# Patient Record
Sex: MALE | Race: WHITE | NOT HISPANIC OR LATINO | Employment: OTHER | URBAN - METROPOLITAN AREA
[De-identification: names, ages, dates, MRNs, and addresses within clinical notes are randomized per-mention and may not be internally consistent; named-entity substitution may affect disease eponyms.]

---

## 2017-02-20 ENCOUNTER — OFFICE VISIT (OUTPATIENT)
Dept: FAMILY MEDICINE CLINIC | Facility: CLINIC | Age: 82
End: 2017-02-20

## 2017-02-20 VITALS
BODY MASS INDEX: 34.8 KG/M2 | OXYGEN SATURATION: 96 % | DIASTOLIC BLOOD PRESSURE: 76 MMHG | WEIGHT: 235 LBS | HEIGHT: 69 IN | HEART RATE: 60 BPM | TEMPERATURE: 97.8 F | SYSTOLIC BLOOD PRESSURE: 124 MMHG

## 2017-02-20 DIAGNOSIS — M10.9 GOUTY ARTHRITIS: Primary | ICD-10-CM

## 2017-02-20 DIAGNOSIS — Z00.00 MEDICARE ANNUAL WELLNESS VISIT, SUBSEQUENT: ICD-10-CM

## 2017-02-20 PROCEDURE — G0438 PPPS, INITIAL VISIT: HCPCS | Performed by: FAMILY MEDICINE

## 2017-02-20 RX ORDER — ALLOPURINOL 300 MG/1
300 TABLET ORAL DAILY
Qty: 90 TABLET | Refills: 3 | Status: SHIPPED | OUTPATIENT
Start: 2017-02-20 | End: 2018-04-07 | Stop reason: SDUPTHER

## 2017-02-20 NOTE — PROGRESS NOTES
QUICK REFERENCE INFORMATION:  The ABCs of the Annual Wellness Visit    Subsequent Medicare Wellness Visit    HEALTH RISK ASSESSMENT    Recent Hospitalizations:  Recently treated at the following:  [facility].    Nov 30 2016. Ureteral stent change.    Current Medical Providers:  Patient Care Team:  Ryne Lofton MD as PCP - General    Dr Romero -Urology.  Dr Heath -Cardiology.  Dr Cardozo -Orthopedics.    Smoking Status:  History   Smoking Status   • Former Smoker   Smokeless Tobacco   • Not on file       Alcohol Consumption:  History   Alcohol Use No       Depression Screen:   PHQ-9 Depression Screening 2/20/2017   Little interest or pleasure in doing things 0   Feeling down, depressed, or hopeless 0   Trouble falling or staying asleep, or sleeping too much 0   Feeling tired or having little energy 0   Poor appetite or overeating 0   Feeling bad about yourself - or that you are a failure or have let yourself or your family down 0   Trouble concentrating on things, such as reading the newspaper or watching television 0   Moving or speaking so slowly that other people could have noticed. Or the opposite - being so fidgety or restless that you have been moving around a lot more than usual 0   Thoughts that you would be better off dead, or of hurting yourself in some way 0   PHQ-9 Total Score 0   If you checked off any problems, how difficult have these problems made it for you to do your work, take care of things at home, or get along with other people? Not difficult at all       Health Habits and Functional and Cognitive Screening:  Functional & Cognitive Status 2/20/2017   Do you have difficulty preparing food and eating? No   Do you have difficulty bathing yourself? No   Do you have difficulty getting dressed? No   Do you have difficulty using the toilet? No   Do you have difficulty moving around from place to place? No   In the past year have you fallen or experienced a near fall? No   Do you need help  using the phone?  No   Are you deaf or do you have serious difficulty hearing?  Yes   Do you need help with transportation? No   Do you need help shopping? No   Do you need help preparing meals?  No   Do you need help with housework?  No   Do you need help with laundry? No   Do you need help taking your medications? No   Do you need help managing money? No   Do you have difficulty concentrating, remembering or making decisions? Yes          Mini cog test was administered. Remembered 2 out of three words. Clock drawing test is normal.    Does the patient have evidence of cognitive impairment? No    Asprin use counseling:yes not taking aspirin.    Finger Rub Hearing Test (right ear):passed  Finger Rub Hearing Test (left ear):failed    Recent Lab Results:  CMP:  Lab Results   Component Value Date     (H) 10/31/2016    BUN 21 (H) 10/31/2016    CREATININE 1.1 10/31/2016    EGFRIFNONA 58 (L) 06/29/2016    EGFRIFAFRI 64 03/29/2016    BCR 16.7 06/29/2016     (H) 10/31/2016    K 4.1 10/31/2016    CO2 25 (L) 10/31/2016    CALCIUM 9.6 10/31/2016    PROTENTOTREF 7.5 03/29/2016    ALBUMIN 4.20 06/29/2016    LABGLOBREF 3.4 03/29/2016    LABIL2 1.3 06/29/2016    BILITOT 0.6 06/29/2016    ALKPHOS 93 06/29/2016    AST 24 06/29/2016    ALT 20 06/29/2016     Lipid Panel:  Lab Results   Component Value Date    CHLPL 221 (H) 03/29/2016    TRIG 360 (H) 06/29/2016    HDL 37 (L) 06/29/2016    VLDL 72 06/29/2016     (H) 03/29/2016     LDL:     HbA1c:  Lab Results   Component Value Date    HGBA1C 6.7 10/03/2016     Urine Microalbumin:     Visual Acuity:  No exam data present    Age-appropriate Screening Schedule:  Refer to the list below for future screening recommendations based on patient's age, sex and/or medical conditions. Orders for these recommended tests are listed in the plan section. The patient has been provided with a written plan.    Health Maintenance   Topic Date Due   • TDAP/TD VACCINES (1 - Tdap)  05/06/1951   • PNEUMOCOCCAL VACCINES (65+ LOW/MEDIUM RISK) (1 of 2 - PCV13) 05/06/1997   • DIABETIC EYE EXAM  06/29/2016   • URINE MICROALBUMIN  06/29/2016   • ZOSTER VACCINE  06/29/2016   • DXA SCAN  10/03/2016   • HEMOGLOBIN A1C  04/03/2017   • LIPID PANEL  06/29/2017   • DIABETIC FOOT EXAM  10/03/2017   • INFLUENZA VACCINE  Completed        Subjective   History of Present Illness    Forrest Zepeda is a 84 y.o. male who presents for an Subsequent Wellness Visit. In addition, we addressed the following health issues:  Gout      The following portions of the patient's history were reviewed and updated as appropriate: allergies, current medications, past family history, past medical history, past social history, past surgical history and problem list.    Outpatient Medications Prior to Visit   Medication Sig Dispense Refill   • acetaminophen (TYLENOL) 500 MG tablet Take 1 tablet by mouth 2 (Two) Times a Day.     • allopurinol (ZYLOPRIM) 300 MG tablet Take  by mouth.     • carvedilol (COREG) 6.25 MG tablet Take  by mouth 2 (two) times a day.     • finasteride (PROSCAR) 5 MG tablet Take  by mouth daily.     • glimepiride (AMARYL) 2 MG tablet Take  by mouth.     • levothyroxine (SYNTHROID) 50 MCG tablet Take 1 tablet by mouth Daily. 90 tablet 3   • Multiple Vitamins-Minerals (OCUVITE ADULT 50+ PO) Take  by mouth daily.     • benzonatate (TESSALON) 100 MG capsule Take  by mouth.     • cefuroxime (CEFTIN) 500 MG tablet Take  by mouth Every 12 (Twelve) Hours.     • metFORMIN (GLUCOPHAGE) 500 MG tablet Take  by mouth 2 (Two) Times a Day.     • traMADol (ULTRAM) 50 MG tablet Take  by mouth.     • triazolam (HALCION) 0.25 MG tablet Take 1 tablet by mouth.       No facility-administered medications prior to visit.        Patient Active Problem List   Diagnosis   • Benign localized hyperplasia of prostate   • Vitamin D deficiency   • Ureteral stricture, left   • Type 2 diabetes mellitus   • Shortness of breath   • Renal  "insufficiency   • Pharyngitis, acute   • Insomnia   • Hypertension   • Hyperkalemia   • Generalized osteoarthritis of multiple sites   • Diabetic neuropathy   • Chronic gout   • Bilateral knee pain   • Immunization due   • Hypertriglyceridemia   • Elevated TSH   • Diabetes mellitus type 2, diet-controlled   • Acquired hypothyroidism       Identification of Risk Factors:  Risk factors include: inactivity, increased fall risk, isolation, cognitive impairment and hearing limitations.    Review of Systems    Compared to one year ago, the patient feels his physical health is the same.  Compared to one year ago, the patient feels his mental health is the same.    Objective     Physical Exam    Vitals:    02/20/17 1054   BP: 124/76   Pulse: 60   Temp: 97.8 °F (36.6 °C)   SpO2: 96%   Weight: 235 lb (107 kg)   Height: 69\" (175.3 cm)       Body mass index is 34.7 kg/(m^2).  Discussed the patient's BMI with him. The BMI is above average; BMI management plan is completed.    Assessment/Plan   Patient Self-Management and Personalized Health Advice  The patient has been provided with information about: diet, exercise, weight management, prevention of cardiac or vascular disease, fall prevention, designing advance directives and mental health concerns and preventive services including:   · Advanced directives: has an advanced directive - a copy HAS NOT been provided, Counseling for cardiovascular disease risk reduction, Exercise counseling provided, Fall Risk assessment done, Fall Risk plan of care done, Glaucoma screening recommended, Nutrition counseling provided, Prostate cancer screening discussed.    Visit Diagnoses:  No diagnosis found.    No orders of the defined types were placed in this encounter.      Outpatient Encounter Prescriptions as of 2/20/2017   Medication Sig Dispense Refill   • acetaminophen (TYLENOL) 500 MG tablet Take 1 tablet by mouth 2 (Two) Times a Day.     • allopurinol (ZYLOPRIM) 300 MG tablet Take  by " mouth.     • carvedilol (COREG) 6.25 MG tablet Take  by mouth 2 (two) times a day.     • finasteride (PROSCAR) 5 MG tablet Take  by mouth daily.     • glimepiride (AMARYL) 2 MG tablet Take  by mouth.     • levothyroxine (SYNTHROID) 50 MCG tablet Take 1 tablet by mouth Daily. 90 tablet 3   • Multiple Vitamins-Minerals (OCUVITE ADULT 50+ PO) Take  by mouth daily.     • [DISCONTINUED] benzonatate (TESSALON) 100 MG capsule Take  by mouth.     • [DISCONTINUED] cefuroxime (CEFTIN) 500 MG tablet Take  by mouth Every 12 (Twelve) Hours.     • [DISCONTINUED] metFORMIN (GLUCOPHAGE) 500 MG tablet Take  by mouth 2 (Two) Times a Day.     • [DISCONTINUED] traMADol (ULTRAM) 50 MG tablet Take  by mouth.     • [DISCONTINUED] triazolam (HALCION) 0.25 MG tablet Take 1 tablet by mouth.       No facility-administered encounter medications on file as of 2/20/2017.        Reviewed use of high risk medication in the elderly: yes  Reviewed for potential of harmful drug interactions in the elderly: yes    Follow Up:  Return in about 2 months (around 4/20/2017) for Recheck.     An After Visit Summary and PPPS with all of these plans were given to the patient.           Scribed for Dr Ryne Lofton by Zahida Townsend CMA.    I, Ryne Lofton MD, personally performed the services described in this documentation, as scribed by Zahida Townsend in my presence, and is both accurate and complete.

## 2017-04-20 ENCOUNTER — OFFICE VISIT (OUTPATIENT)
Dept: FAMILY MEDICINE CLINIC | Facility: CLINIC | Age: 82
End: 2017-04-20

## 2017-04-20 VITALS
HEIGHT: 69 IN | WEIGHT: 233 LBS | BODY MASS INDEX: 34.51 KG/M2 | HEART RATE: 64 BPM | TEMPERATURE: 98.2 F | OXYGEN SATURATION: 96 % | SYSTOLIC BLOOD PRESSURE: 126 MMHG | DIASTOLIC BLOOD PRESSURE: 68 MMHG

## 2017-04-20 DIAGNOSIS — E11.9 TYPE 2 DIABETES MELLITUS WITHOUT COMPLICATION, WITHOUT LONG-TERM CURRENT USE OF INSULIN (HCC): Primary | ICD-10-CM

## 2017-04-20 DIAGNOSIS — I10 ESSENTIAL HYPERTENSION: ICD-10-CM

## 2017-04-20 DIAGNOSIS — Z23 IMMUNIZATION DUE: ICD-10-CM

## 2017-04-20 LAB
HBA1C MFR BLD: 7.5 %
POC CREATININE URINE: NORMAL
POC MICROALBUMIN URINE: NORMAL

## 2017-04-20 PROCEDURE — 83036 HEMOGLOBIN GLYCOSYLATED A1C: CPT | Performed by: FAMILY MEDICINE

## 2017-04-20 PROCEDURE — 90670 PCV13 VACCINE IM: CPT | Performed by: FAMILY MEDICINE

## 2017-04-20 PROCEDURE — 99213 OFFICE O/P EST LOW 20 MIN: CPT | Performed by: FAMILY MEDICINE

## 2017-04-20 PROCEDURE — 82044 UR ALBUMIN SEMIQUANTITATIVE: CPT | Performed by: FAMILY MEDICINE

## 2017-04-20 PROCEDURE — G0009 ADMIN PNEUMOCOCCAL VACCINE: HCPCS | Performed by: FAMILY MEDICINE

## 2017-04-20 RX ORDER — GLIMEPIRIDE 2 MG/1
2 TABLET ORAL
Qty: 90 TABLET | Refills: 3 | Status: SHIPPED | OUTPATIENT
Start: 2017-04-20 | End: 2018-01-03

## 2017-04-20 RX ORDER — LANOLIN ALCOHOL/MO/W.PET/CERES
1000 CREAM (GRAM) TOPICAL DAILY
COMMUNITY
End: 2017-12-14

## 2017-04-20 RX ORDER — CARVEDILOL 6.25 MG/1
6.25 TABLET ORAL 2 TIMES DAILY
Qty: 180 TABLET | Refills: 3 | Status: SHIPPED | OUTPATIENT
Start: 2017-04-20 | End: 2019-05-12 | Stop reason: SDUPTHER

## 2017-04-20 NOTE — PROGRESS NOTES
"Subjective   Forrest Zepeda is a 84 y.o. male.     Diabetes   He presents for his follow-up diabetic visit. He has type 2 diabetes mellitus. His disease course has been stable. There are no hypoglycemic associated symptoms. Pertinent negatives for hypoglycemia include no dizziness. Associated symptoms include foot paresthesias. Pertinent negatives for diabetes include no chest pain and no foot ulcerations. There are no hypoglycemic complications. There are no diabetic complications. Risk factors for coronary artery disease include diabetes mellitus, dyslipidemia, male sex and obesity. Current diabetic treatment includes oral agent (monotherapy) (Glimepiride). He is compliant with treatment all of the time. His weight is stable. He is following a diabetic and generally healthy diet. Meal planning includes avoidance of concentrated sweets. He has not had a previous visit with a dietitian. He participates in exercise intermittently. His home blood glucose trend is increasing steadily. He does not see a podiatrist.Eye exam is not current.      Also a follow up on Hypertension and Hypothyroidism.    BP today is 126/68.  Taking Carvedilol 6.25 mg twice a day.    The following portions of the patient's history were reviewed and updated as appropriate: allergies, current medications, past social history and problem list.    Review of Systems   Eyes: Negative for visual disturbance.   Respiratory: Positive for shortness of breath (with exertion).    Cardiovascular: Negative for chest pain.   Neurological: Negative for dizziness, syncope and numbness.       Objective   /68  Pulse 64  Temp 98.2 °F (36.8 °C)  Ht 69\" (175.3 cm)  Wt 233 lb (106 kg)  SpO2 96%  BMI 34.41 kg/m2  Physical Exam   Constitutional: He appears well-developed and well-nourished.   Cardiovascular: Normal rate, regular rhythm and normal heart sounds.    Pulmonary/Chest: Effort normal and breath sounds normal.    Forrest had a diabetic foot exam " performed (skin and pulses intact, sensation decreased in the right.) today.  Nursing note and vitals reviewed.      Assessment/Plan   Problem List Items Addressed This Visit        Cardiovascular and Mediastinum    Hypertension       Endocrine    Type 2 diabetes mellitus - Primary    Relevant Orders    POC Glycosylated Hemoglobin (Hb A1C) (Completed)       Other    Immunization due              Drink plenty fluids.  See [place] [facility] for annual exam.    Continue medications as doing.    Check a Micro albumin. Report results by letter.    Given a Prevnar 13 vaccine today.    Follow up 5 months.    Scribed for Dr Ryne Lofton by Zahida Townsend CMA.    I, Ryne Lofton MD, personally performed the services described in this documentation, as scribed by Zahida Townsend in my presence, and is both accurate and complete.

## 2017-04-28 RX ORDER — FINASTERIDE 5 MG/1
TABLET, FILM COATED ORAL
Qty: 90 TABLET | Refills: 2 | Status: SHIPPED | OUTPATIENT
Start: 2017-04-28 | End: 2017-12-14 | Stop reason: SDUPTHER

## 2017-08-09 ENCOUNTER — APPOINTMENT (OUTPATIENT)
Dept: GENERAL RADIOLOGY | Facility: HOSPITAL | Age: 82
End: 2017-08-09

## 2017-08-09 ENCOUNTER — HOSPITAL ENCOUNTER (INPATIENT)
Facility: HOSPITAL | Age: 82
LOS: 2 days | Discharge: HOME | End: 2017-08-11
Attending: EMERGENCY MEDICINE | Admitting: INTERNAL MEDICINE

## 2017-08-09 ENCOUNTER — APPOINTMENT (OUTPATIENT)
Dept: CT IMAGING | Facility: HOSPITAL | Age: 82
End: 2017-08-09

## 2017-08-09 DIAGNOSIS — E11.9 DIABETES MELLITUS TYPE 2, DIET-CONTROLLED (HCC): ICD-10-CM

## 2017-08-09 DIAGNOSIS — R10.32 ACUTE BILATERAL LOWER ABDOMINAL PAIN: ICD-10-CM

## 2017-08-09 DIAGNOSIS — K57.32 SIGMOID DIVERTICULITIS: Primary | ICD-10-CM

## 2017-08-09 DIAGNOSIS — E11.42 DIABETIC POLYNEUROPATHY ASSOCIATED WITH TYPE 2 DIABETES MELLITUS (HCC): ICD-10-CM

## 2017-08-09 DIAGNOSIS — M1A.9XX0 CHRONIC GOUT, UNSPECIFIED CAUSE, UNSPECIFIED SITE: ICD-10-CM

## 2017-08-09 DIAGNOSIS — N28.9 RENAL INSUFFICIENCY: ICD-10-CM

## 2017-08-09 DIAGNOSIS — R10.31 ACUTE BILATERAL LOWER ABDOMINAL PAIN: ICD-10-CM

## 2017-08-09 DIAGNOSIS — N40.0 BENIGN LOCALIZED HYPERPLASIA OF PROSTATE: ICD-10-CM

## 2017-08-09 DIAGNOSIS — I44.1 MOBITZ TYPE 2 SECOND DEGREE HEART BLOCK: ICD-10-CM

## 2017-08-09 PROBLEM — D72.829 LEUKOCYTOSIS: Status: ACTIVE | Noted: 2017-08-09

## 2017-08-09 LAB
ALBUMIN SERPL-MCNC: 4.3 G/DL (ref 3.2–4.8)
ALBUMIN/GLOB SERPL: 1.2 G/DL (ref 1.5–2.5)
ALP SERPL-CCNC: 104 U/L (ref 25–100)
ALT SERPL W P-5'-P-CCNC: 18 U/L (ref 7–40)
ANION GAP SERPL CALCULATED.3IONS-SCNC: 9 MMOL/L (ref 3–11)
AST SERPL-CCNC: 16 U/L (ref 0–33)
BACTERIA UR QL AUTO: ABNORMAL /HPF
BASOPHILS # BLD AUTO: 0.04 10*3/MM3 (ref 0–0.2)
BASOPHILS NFR BLD AUTO: 0.3 % (ref 0–1)
BILIRUB SERPL-MCNC: 0.9 MG/DL (ref 0.3–1.2)
BILIRUB UR QL STRIP: NEGATIVE
BUN BLD-MCNC: 18 MG/DL (ref 9–23)
BUN/CREAT SERPL: 16.4 (ref 7–25)
CALCIUM SPEC-SCNC: 9.7 MG/DL (ref 8.7–10.4)
CHLORIDE SERPL-SCNC: 103 MMOL/L (ref 99–109)
CLARITY UR: ABNORMAL
CO2 SERPL-SCNC: 26 MMOL/L (ref 20–31)
COLOR UR: YELLOW
CREAT BLD-MCNC: 1.1 MG/DL (ref 0.6–1.3)
D-LACTATE SERPL-SCNC: 0.8 MMOL/L (ref 0.5–2)
DEPRECATED RDW RBC AUTO: 49.3 FL (ref 37–54)
DEVELOPER EXPIRATION DATE: NORMAL
DEVELOPER LOT NUMBER: NORMAL
EOSINOPHIL # BLD AUTO: 0.26 10*3/MM3 (ref 0–0.3)
EOSINOPHIL NFR BLD AUTO: 1.8 % (ref 0–3)
ERYTHROCYTE [DISTWIDTH] IN BLOOD BY AUTOMATED COUNT: 14.6 % (ref 11.3–14.5)
EXPIRATION DATE: NORMAL
FECAL OCCULT BLOOD SCREEN, POC: NEGATIVE
GFR SERPL CREATININE-BSD FRML MDRD: 64 ML/MIN/1.73
GLOBULIN UR ELPH-MCNC: 3.7 GM/DL
GLUCOSE BLD-MCNC: 117 MG/DL (ref 70–100)
GLUCOSE UR STRIP-MCNC: ABNORMAL MG/DL
HCT VFR BLD AUTO: 47.1 % (ref 38.9–50.9)
HGB BLD-MCNC: 15.7 G/DL (ref 13.1–17.5)
HGB UR QL STRIP.AUTO: ABNORMAL
HOLD SPECIMEN: NORMAL
HOLD SPECIMEN: NORMAL
HYALINE CASTS UR QL AUTO: ABNORMAL /LPF
IMM GRANULOCYTES # BLD: 0.05 10*3/MM3 (ref 0–0.03)
IMM GRANULOCYTES NFR BLD: 0.3 % (ref 0–0.6)
KETONES UR QL STRIP: NEGATIVE
LEUKOCYTE ESTERASE UR QL STRIP.AUTO: ABNORMAL
LIPASE SERPL-CCNC: 43 U/L (ref 6–51)
LYMPHOCYTES # BLD AUTO: 2.82 10*3/MM3 (ref 0.6–4.8)
LYMPHOCYTES NFR BLD AUTO: 19.3 % (ref 24–44)
Lab: NORMAL
MCH RBC QN AUTO: 31.2 PG (ref 27–31)
MCHC RBC AUTO-ENTMCNC: 33.3 G/DL (ref 32–36)
MCV RBC AUTO: 93.5 FL (ref 80–99)
MONOCYTES # BLD AUTO: 1.14 10*3/MM3 (ref 0–1)
MONOCYTES NFR BLD AUTO: 7.8 % (ref 0–12)
NEGATIVE CONTROL: NEGATIVE
NEUTROPHILS # BLD AUTO: 10.31 10*3/MM3 (ref 1.5–8.3)
NEUTROPHILS NFR BLD AUTO: 70.5 % (ref 41–71)
NITRITE UR QL STRIP: NEGATIVE
PH UR STRIP.AUTO: 6 [PH] (ref 5–8)
PLATELET # BLD AUTO: 170 10*3/MM3 (ref 150–450)
PMV BLD AUTO: 9.7 FL (ref 6–12)
POSITIVE CONTROL: POSITIVE
POTASSIUM BLD-SCNC: 4.1 MMOL/L (ref 3.5–5.5)
PROCALCITONIN SERPL-MCNC: 0.08 NG/ML
PROT SERPL-MCNC: 8 G/DL (ref 5.7–8.2)
PROT UR QL STRIP: ABNORMAL
RBC # BLD AUTO: 5.04 10*6/MM3 (ref 4.2–5.76)
RBC # UR: ABNORMAL /HPF
REF LAB TEST METHOD: ABNORMAL
SODIUM BLD-SCNC: 138 MMOL/L (ref 132–146)
SP GR UR STRIP: 1.01 (ref 1–1.03)
SQUAMOUS #/AREA URNS HPF: ABNORMAL /HPF
TROPONIN I SERPL-MCNC: 0 NG/ML (ref 0–0.07)
TROPONIN I SERPL-MCNC: <0.006 NG/ML
UROBILINOGEN UR QL STRIP: ABNORMAL
WBC NRBC COR # BLD: 14.62 10*3/MM3 (ref 3.5–10.8)
WBC UR QL AUTO: ABNORMAL /HPF
WHOLE BLOOD HOLD SPECIMEN: NORMAL
WHOLE BLOOD HOLD SPECIMEN: NORMAL

## 2017-08-09 PROCEDURE — 99284 EMERGENCY DEPT VISIT MOD MDM: CPT

## 2017-08-09 PROCEDURE — 81003 URINALYSIS AUTO W/O SCOPE: CPT | Performed by: EMERGENCY MEDICINE

## 2017-08-09 PROCEDURE — 87086 URINE CULTURE/COLONY COUNT: CPT | Performed by: EMERGENCY MEDICINE

## 2017-08-09 PROCEDURE — 87040 BLOOD CULTURE FOR BACTERIA: CPT | Performed by: PHYSICIAN ASSISTANT

## 2017-08-09 PROCEDURE — 93005 ELECTROCARDIOGRAM TRACING: CPT | Performed by: EMERGENCY MEDICINE

## 2017-08-09 PROCEDURE — 84145 PROCALCITONIN (PCT): CPT | Performed by: PHYSICIAN ASSISTANT

## 2017-08-09 PROCEDURE — 80053 COMPREHEN METABOLIC PANEL: CPT | Performed by: EMERGENCY MEDICINE

## 2017-08-09 PROCEDURE — 81001 URINALYSIS AUTO W/SCOPE: CPT | Performed by: EMERGENCY MEDICINE

## 2017-08-09 PROCEDURE — 25010000002 PIPERACILLIN SOD-TAZOBACTAM PER 1 G: Performed by: PHYSICIAN ASSISTANT

## 2017-08-09 PROCEDURE — 85025 COMPLETE CBC W/AUTO DIFF WBC: CPT

## 2017-08-09 PROCEDURE — 84484 ASSAY OF TROPONIN QUANT: CPT | Performed by: PHYSICIAN ASSISTANT

## 2017-08-09 PROCEDURE — 74176 CT ABD & PELVIS W/O CONTRAST: CPT

## 2017-08-09 PROCEDURE — 71010 HC CHEST PA OR AP: CPT

## 2017-08-09 PROCEDURE — 83605 ASSAY OF LACTIC ACID: CPT | Performed by: PHYSICIAN ASSISTANT

## 2017-08-09 PROCEDURE — 99223 1ST HOSP IP/OBS HIGH 75: CPT | Performed by: HOSPITALIST

## 2017-08-09 PROCEDURE — 84484 ASSAY OF TROPONIN QUANT: CPT

## 2017-08-09 PROCEDURE — 83690 ASSAY OF LIPASE: CPT | Performed by: EMERGENCY MEDICINE

## 2017-08-09 RX ORDER — SODIUM CHLORIDE 0.9 % (FLUSH) 0.9 %
10 SYRINGE (ML) INJECTION AS NEEDED
Status: DISCONTINUED | OUTPATIENT
Start: 2017-08-09 | End: 2017-08-11 | Stop reason: HOSPADM

## 2017-08-09 RX ORDER — SODIUM CHLORIDE 9 MG/ML
100 INJECTION, SOLUTION INTRAVENOUS CONTINUOUS
Status: DISCONTINUED | OUTPATIENT
Start: 2017-08-09 | End: 2017-08-10 | Stop reason: ALTCHOICE

## 2017-08-09 RX ORDER — LEVOTHYROXINE SODIUM 0.07 MG/1
75 TABLET ORAL DAILY
COMMUNITY
End: 2017-08-18 | Stop reason: SDUPTHER

## 2017-08-09 RX ORDER — DEXTROSE MONOHYDRATE 25 G/50ML
25 INJECTION, SOLUTION INTRAVENOUS
Status: DISCONTINUED | OUTPATIENT
Start: 2017-08-09 | End: 2017-08-11 | Stop reason: HOSPADM

## 2017-08-09 RX ORDER — NICOTINE POLACRILEX 4 MG
15 LOZENGE BUCCAL
Status: DISCONTINUED | OUTPATIENT
Start: 2017-08-09 | End: 2017-08-11 | Stop reason: HOSPADM

## 2017-08-09 RX ADMIN — SODIUM CHLORIDE 100 ML/HR: 9 INJECTION, SOLUTION INTRAVENOUS at 23:08

## 2017-08-09 RX ADMIN — TAZOBACTAM SODIUM AND PIPERACILLIN SODIUM 4.5 G: 500; 4 INJECTION, SOLUTION INTRAVENOUS at 23:37

## 2017-08-09 NOTE — ED PROVIDER NOTES
Subjective   HPI Comments: 85-year-old male presents with a 2-3 day history of periumbilical pain, worsening, now with abdominal distention, mild anorexia and nausea.  No fevers chills or sweats.  No melena or hematochezia.  He has a history of BPH, gout and recurrent kidney stones, currently has a ureteral stent on the left per Dr. Romero.  No dysuria or gross hematuria.  No cough chest congestion sputum hemoptysis.  No vision changes photophobia or stiff neck.  No prior history of diverticulitis.    Patient is a 85 y.o. male presenting with abdominal pain.   Abdominal Pain   Pain location:  Periumbilical  Pain quality: aching and bloating    Pain radiates to:  Does not radiate  Onset quality:  Gradual  Duration:  2 days  Timing:  Constant  Progression:  Worsening  Chronicity:  New  Context: not diet changes, not eating, not laxative use, not recent illness and not retching    Relieved by:  Nothing  Worsened by:  Nothing  Ineffective treatments:  None tried  Associated symptoms: anorexia and nausea    Associated symptoms: no belching, no chest pain, no chills, no constipation, no diarrhea, no dysuria (has ureteral stent), no fatigue, no fever, no flatus, no hematemesis, no hematochezia, no hematuria and no vomiting      Past medical history is remarkable for type 2 diabetes, gout, hypertension, diabetic neuropathy, renal insufficiency with frequent recurrent kidney stones and ureteral strictures, currently has stent on the left, followed by Dr. Romero.      Review of Systems   Constitutional: Negative for chills, fatigue and fever.   Cardiovascular: Negative for chest pain, palpitations and leg swelling.   Gastrointestinal: Positive for abdominal pain, anorexia and nausea. Negative for blood in stool, constipation, diarrhea, flatus, hematemesis, hematochezia and vomiting.   Genitourinary: Negative for dysuria (has ureteral stent) and hematuria.   All other systems reviewed and are negative.      Past Medical History:    Diagnosis Date   • Abnormal PSA     Reported abnormal   • Benign localized hyperplasia of prostate    • Bilateral knee pain    • Chronic gout    • Diabetic neuropathy    • Dyslipidemia     H/O   • Generalized osteoarthritis of multiple sites    • History of chronic kidney disease    • History of gout    • History of nephrolithiasis    • History of osteopenia    • Hyperkalemia    • Hypertension    • Insomnia    • Pharyngitis, acute    • Renal insufficiency    • Shortness of breath    • Type 2 diabetes mellitus    • Ureteral stricture, left    • Vitamin D deficiency        Allergies   Allergen Reactions   • Metformin Diarrhea   • Statins Myalgia       Past Surgical History:   Procedure Laterality Date   • APPENDECTOMY     • CHOLECYSTECTOMY     • EYE SURGERY     • KNEE ARTHROSCOPY     • RENAL ARTERY STENT         Family History   Problem Relation Age of Onset   • Heart attack Sister    • Brain cancer Sister    • Stroke Sister    • Lung cancer Sister    • Brain cancer Brother    • Lung cancer Brother        Social History     Social History   • Marital status:      Spouse name: N/A   • Number of children: N/A   • Years of education: N/A     Social History Main Topics   • Smoking status: Former Smoker   • Smokeless tobacco: None   • Alcohol use No   • Drug use: No   • Sexual activity: Not Asked     Other Topics Concern   • None     Social History Narrative           Objective   Physical Exam   Constitutional: He is oriented to person, place, and time. He appears well-developed and well-nourished. No distress.   HENT:   Head: Normocephalic and atraumatic.   Right Ear: External ear normal.   Left Ear: External ear normal.   Nose: Nose normal.   Mouth/Throat: Oropharynx is clear and moist. No oropharyngeal exudate.   Eyes: Conjunctivae and EOM are normal. Pupils are equal, round, and reactive to light. Right eye exhibits no discharge. Left eye exhibits no discharge. No scleral icterus.   Neck: Normal range of  motion. Neck supple. No JVD present. No tracheal deviation present. No thyromegaly present.   Cardiovascular: Normal rate, regular rhythm, normal heart sounds and intact distal pulses.  Exam reveals no gallop and no friction rub.    No murmur heard.  Pulmonary/Chest: Effort normal and breath sounds normal. No stridor. No respiratory distress. He has no wheezes. He has no rales. He exhibits no tenderness.   Abdominal: Soft. Bowel sounds are normal. He exhibits distension. He exhibits no mass. There is tenderness (Periumbilical, bilateral lower quadrant tenderness without guarding or rebound.  Abdomen is distended with slight increase in tympany in the upper quadrants.). There is no guarding. No hernia.   Musculoskeletal: Normal range of motion. He exhibits no edema, tenderness or deformity.   Neurological: He is alert and oriented to person, place, and time. No cranial nerve deficit. He exhibits normal muscle tone. Coordination normal.   Skin: Skin is warm and dry. No rash noted. He is not diaphoretic. No erythema. No pallor.   Psychiatric: He has a normal mood and affect. His behavior is normal. Judgment and thought content normal.   Nursing note and vitals reviewed.      Procedures         ED Course  ED Course   Comment By Time   IMPRESSION:    Findings suggestive of subacute/chronic bronchitis without definite evidence   of consolidation and no pleural effusion.  Recommend comparison with prior   studies. Rigo Jung PA-C 08/09 2039   IMPRESSION:    Severe ACUTE SIGMOID DIVERTICULITIS without pericolonic abscess or free   intra-abdominal air to suggest perforation. Possibility of underlying colonic   mass cannot be excluded.        Marked enlargement of the prostate gland likely resulting in bladder outlet   obstruction suspicious for prostate malignancy versus benign enlargement.    Recommend followup as clinically indicated.        Small RIGHT renal calculi without evidence of obstructive urolithiasis.         Numerous other nonemergent findings as described.        NOTE: Suboptimal evaluation of bowel loops and abdominal organs due to lack   of oral and intravenous contrast. Rigo Jung PA-C 08/09 2210   D/W Dr. Henderson, admit telemetry Rigo Jung PA-C 08/09 2231        Recent Results (from the past 24 hour(s))   Comprehensive Metabolic Panel    Collection Time: 08/09/17  7:33 PM   Result Value Ref Range    Glucose 117 (H) 70 - 100 mg/dL    BUN 18 9 - 23 mg/dL    Creatinine 1.10 0.60 - 1.30 mg/dL    Sodium 138 132 - 146 mmol/L    Potassium 4.1 3.5 - 5.5 mmol/L    Chloride 103 99 - 109 mmol/L    CO2 26.0 20.0 - 31.0 mmol/L    Calcium 9.7 8.7 - 10.4 mg/dL    Total Protein 8.0 5.7 - 8.2 g/dL    Albumin 4.30 3.20 - 4.80 g/dL    ALT (SGPT) 18 7 - 40 U/L    AST (SGOT) 16 0 - 33 U/L    Alkaline Phosphatase 104 (H) 25 - 100 U/L    Total Bilirubin 0.9 0.3 - 1.2 mg/dL    eGFR Non African Amer 64 >60 mL/min/1.73    Globulin 3.7 gm/dL    A/G Ratio 1.2 (L) 1.5 - 2.5 g/dL    BUN/Creatinine Ratio 16.4 7.0 - 25.0    Anion Gap 9.0 3.0 - 11.0 mmol/L   Lipase    Collection Time: 08/09/17  7:33 PM   Result Value Ref Range    Lipase 43 6 - 51 U/L   Light Blue Top    Collection Time: 08/09/17  7:33 PM   Result Value Ref Range    Extra Tube hold for add-on    Green Top (Gel)    Collection Time: 08/09/17  7:33 PM   Result Value Ref Range    Extra Tube Hold for add-ons.    Lavender Top    Collection Time: 08/09/17  7:33 PM   Result Value Ref Range    Extra Tube hold for add-on    Gold Top - SST    Collection Time: 08/09/17  7:33 PM   Result Value Ref Range    Extra Tube Hold for add-ons.    CBC Auto Differential    Collection Time: 08/09/17  7:33 PM   Result Value Ref Range    WBC 14.62 (H) 3.50 - 10.80 10*3/mm3    RBC 5.04 4.20 - 5.76 10*6/mm3    Hemoglobin 15.7 13.1 - 17.5 g/dL    Hematocrit 47.1 38.9 - 50.9 %    MCV 93.5 80.0 - 99.0 fL    MCH 31.2 (H) 27.0 - 31.0 pg    MCHC 33.3 32.0 - 36.0 g/dL    RDW 14.6 (H) 11.3 -  14.5 %    RDW-SD 49.3 37.0 - 54.0 fl    MPV 9.7 6.0 - 12.0 fL    Platelets 170 150 - 450 10*3/mm3    Neutrophil % 70.5 41.0 - 71.0 %    Lymphocyte % 19.3 (L) 24.0 - 44.0 %    Monocyte % 7.8 0.0 - 12.0 %    Eosinophil % 1.8 0.0 - 3.0 %    Basophil % 0.3 0.0 - 1.0 %    Immature Grans % 0.3 0.0 - 0.6 %    Neutrophils, Absolute 10.31 (H) 1.50 - 8.30 10*3/mm3    Lymphocytes, Absolute 2.82 0.60 - 4.80 10*3/mm3    Monocytes, Absolute 1.14 (H) 0.00 - 1.00 10*3/mm3    Eosinophils, Absolute 0.26 0.00 - 0.30 10*3/mm3    Basophils, Absolute 0.04 0.00 - 0.20 10*3/mm3    Immature Grans, Absolute 0.05 (H) 0.00 - 0.03 10*3/mm3   POC Troponin, Rapid    Collection Time: 08/09/17  7:36 PM   Result Value Ref Range    Troponin I 0.00 0.00 - 0.07 ng/mL   Urinalysis With / Culture If Indicated    Collection Time: 08/09/17  8:21 PM   Result Value Ref Range    Color, UA Yellow Yellow, Straw    Appearance, UA Cloudy (A) Clear    pH, UA 6.0 5.0 - 8.0    Specific Gravity, UA 1.014 1.001 - 1.030    Glucose,  mg/dL (Trace) (A) Negative    Ketones, UA Negative Negative    Bilirubin, UA Negative Negative    Blood, UA Moderate (2+) (A) Negative    Protein, UA Trace (A) Negative    Leuk Esterase, UA Trace (A) Negative    Nitrite, UA Negative Negative    Urobilinogen, UA 0.2 E.U./dL 0.2 - 1.0 E.U./dL   Urinalysis, Microscopic Only    Collection Time: 08/09/17  8:21 PM   Result Value Ref Range    RBC, UA 31-50 (A) None Seen, 0-2 /HPF    WBC, UA 6-12 (A) None Seen /HPF    Bacteria, UA None Seen None Seen, Trace /HPF    Squamous Epithelial Cells, UA 3-6 (A) None Seen, 0-2 /HPF    Hyaline Casts, UA 0-6 0 - 6 /LPF    Methodology Manual Light Microscopy    Lactic Acid, Plasma    Collection Time: 08/09/17  8:29 PM   Result Value Ref Range    Lactate 0.8 0.5 - 2.0 mmol/L   Procalcitonin    Collection Time: 08/09/17  8:29 PM   Result Value Ref Range    Procalcitonin 0.08 ng/mL   POCT Occult Blood, stool    Collection Time: 08/09/17 10:20 PM   Result  Value Ref Range    Fecal Occult Blood Negative Negative    Lot Number [id number]     Expiration Date 3/20     DEVELOPER LOT NUMBER [id number]     DEVELOPER EXPIRATION DATE 2/20     Positive Control Positive Positive    Negative Control Negative Negative     Note: In addition to lab results from this visit, the labs listed above may include labs taken at another facility or during a different encounter within the last 24 hours. Please correlate lab times with ED admission and discharge times for further clarification of the services performed during this visit.    CT Abdomen Pelvis Without Contrast   ED Interpretation     Severe ACUTE SIGMOID DIVERTICULITIS without pericolonic abscess or free    intra-abdominal air to suggest perforation. Possibility of underlying colonic    mass cannot be excluded.         Marked enlargement of the prostate gland likely resulting in bladder outlet    obstruction suspicious for prostate malignancy versus benign enlargement.     Recommend followup as clinically indicated.         Small RIGHT renal calculi without evidence of obstructive urolithiasis.         Numerous other nonemergent findings as described.         NOTE: Suboptimal evaluation of bowel loops and abdominal organs due to lack    of oral and intravenous contrast.          THIS DOCUMENT HAS BEEN ELECTRONICALLY SIGNED BY NERY KIMBALL MD      Final Result   Abnormal     Severe ACUTE SIGMOID DIVERTICULITIS without pericolonic abscess or free    intra-abdominal air to suggest perforation. Possibility of underlying colonic    mass cannot be excluded.         Marked enlargement of the prostate gland likely resulting in bladder outlet    obstruction suspicious for prostate malignancy versus benign enlargement.     Recommend followup as clinically indicated.         Small RIGHT renal calculi without evidence of obstructive urolithiasis.         Numerous other nonemergent findings as described.         NOTE: Suboptimal evaluation of  "bowel loops and abdominal organs due to lack    of oral and intravenous contrast.          THIS DOCUMENT HAS BEEN ELECTRONICALLY SIGNED BY NERY KIMBALL MD      XR Chest 1 View   ED Interpretation     Findings suggestive of subacute/chronic bronchitis without definite evidence    of consolidation and no pleural effusion.  Recommend comparison with prior    studies.          THIS DOCUMENT HAS BEEN ELECTRONICALLY SIGNED BY NERY KIMBALL MD      Final Result   Abnormal     Findings suggestive of subacute/chronic bronchitis without definite evidence    of consolidation and no pleural effusion.  Recommend comparison with prior    studies.          THIS DOCUMENT HAS BEEN ELECTRONICALLY SIGNED BY NERY KIMBALL MD        Vitals:    08/09/17 1841 08/09/17 2221 08/09/17 2224   BP: 137/63 151/85    BP Location: Left arm     Patient Position: Sitting     Pulse: 73  66   Resp: 20     Temp: 99.3 °F (37.4 °C)     TempSrc: Oral     SpO2: 93%  92%   Weight: 233 lb (106 kg)     Height: 69\" (175.3 cm)       Medications   sodium chloride 0.9 % flush 10 mL (not administered)   piperacillin-tazobactam (ZOSYN) 4.5 g in iso-osmotic dextrose 100 mL IVPB (premix) (not administered)   metroNIDAZOLE (FLAGYL) IVPB 500 mg (not administered)   sodium chloride 0.9 % infusion (not administered)     ECG/EMG Results (last 24 hours)     Procedure Component Value Units Date/Time    ECG 12 Lead [[id number]] Collected:  08/09/17 1926     Updated:  08/09/17 1926    ECG 12 Lead [[id number]] Collected:  08/09/17 2148     Updated:  08/09/17 2148       are            MDM    Final diagnoses:   Sigmoid diverticulitis   Acute bilateral lower abdominal pain   Diabetes mellitus type 2, diet-controlled   Benign localized hyperplasia of prostate   Renal insufficiency   Diabetic polyneuropathy associated with type 2 diabetes mellitus   Chronic gout, unspecified cause, unspecified site            Rigo Jung PA-C  08/09/17 2233    "

## 2017-08-10 LAB
ANION GAP SERPL CALCULATED.3IONS-SCNC: 8 MMOL/L (ref 3–11)
BUN BLD-MCNC: 18 MG/DL (ref 9–23)
BUN/CREAT SERPL: 15 (ref 7–25)
CALCIUM SPEC-SCNC: 9 MG/DL (ref 8.7–10.4)
CHLORIDE SERPL-SCNC: 103 MMOL/L (ref 99–109)
CO2 SERPL-SCNC: 26 MMOL/L (ref 20–31)
CREAT BLD-MCNC: 1.2 MG/DL (ref 0.6–1.3)
DEPRECATED RDW RBC AUTO: 52.4 FL (ref 37–54)
ERYTHROCYTE [DISTWIDTH] IN BLOOD BY AUTOMATED COUNT: 14.9 % (ref 11.3–14.5)
GFR SERPL CREATININE-BSD FRML MDRD: 58 ML/MIN/1.73
GLUCOSE BLD-MCNC: 201 MG/DL (ref 70–100)
GLUCOSE BLDC GLUCOMTR-MCNC: 124 MG/DL (ref 70–130)
GLUCOSE BLDC GLUCOMTR-MCNC: 154 MG/DL (ref 70–130)
GLUCOSE BLDC GLUCOMTR-MCNC: 158 MG/DL (ref 70–130)
GLUCOSE BLDC GLUCOMTR-MCNC: 163 MG/DL (ref 70–130)
GLUCOSE BLDC GLUCOMTR-MCNC: 181 MG/DL (ref 70–130)
HCT VFR BLD AUTO: 42.1 % (ref 38.9–50.9)
HGB BLD-MCNC: 13.7 G/DL (ref 13.1–17.5)
MCH RBC QN AUTO: 31.3 PG (ref 27–31)
MCHC RBC AUTO-ENTMCNC: 32.5 G/DL (ref 32–36)
MCV RBC AUTO: 96.1 FL (ref 80–99)
PLATELET # BLD AUTO: 168 10*3/MM3 (ref 150–450)
PMV BLD AUTO: 10.6 FL (ref 6–12)
POTASSIUM BLD-SCNC: 3.8 MMOL/L (ref 3.5–5.5)
RBC # BLD AUTO: 4.38 10*6/MM3 (ref 4.2–5.76)
SODIUM BLD-SCNC: 137 MMOL/L (ref 132–146)
WBC NRBC COR # BLD: 12.5 10*3/MM3 (ref 3.5–10.8)

## 2017-08-10 PROCEDURE — 25010000002 HEPARIN (PORCINE) PER 1000 UNITS: Performed by: HOSPITALIST

## 2017-08-10 PROCEDURE — 63710000001 INSULIN LISPRO (HUMAN) PER 5 UNITS: Performed by: HOSPITALIST

## 2017-08-10 PROCEDURE — 99232 SBSQ HOSP IP/OBS MODERATE 35: CPT | Performed by: HOSPITALIST

## 2017-08-10 PROCEDURE — 85027 COMPLETE CBC AUTOMATED: CPT | Performed by: HOSPITALIST

## 2017-08-10 PROCEDURE — 80048 BASIC METABOLIC PNL TOTAL CA: CPT | Performed by: HOSPITALIST

## 2017-08-10 PROCEDURE — 82962 GLUCOSE BLOOD TEST: CPT

## 2017-08-10 PROCEDURE — 25010000002 PIPERACILLIN SOD-TAZOBACTAM PER 1 G: Performed by: HOSPITALIST

## 2017-08-10 RX ORDER — HEPARIN SODIUM 5000 [USP'U]/ML
5000 INJECTION, SOLUTION INTRAVENOUS; SUBCUTANEOUS EVERY 12 HOURS SCHEDULED
Status: DISCONTINUED | OUTPATIENT
Start: 2017-08-10 | End: 2017-08-11 | Stop reason: HOSPADM

## 2017-08-10 RX ORDER — SODIUM CHLORIDE 0.9 % (FLUSH) 0.9 %
1-10 SYRINGE (ML) INJECTION AS NEEDED
Status: DISCONTINUED | OUTPATIENT
Start: 2017-08-10 | End: 2017-08-11 | Stop reason: HOSPADM

## 2017-08-10 RX ORDER — NALOXONE HCL 0.4 MG/ML
0.4 VIAL (ML) INJECTION
Status: DISCONTINUED | OUTPATIENT
Start: 2017-08-10 | End: 2017-08-11 | Stop reason: HOSPADM

## 2017-08-10 RX ORDER — LEVOTHYROXINE SODIUM 0.07 MG/1
75 TABLET ORAL
Status: DISCONTINUED | OUTPATIENT
Start: 2017-08-10 | End: 2017-08-11 | Stop reason: HOSPADM

## 2017-08-10 RX ORDER — ONDANSETRON 2 MG/ML
4 INJECTION INTRAMUSCULAR; INTRAVENOUS EVERY 6 HOURS PRN
Status: DISCONTINUED | OUTPATIENT
Start: 2017-08-10 | End: 2017-08-11 | Stop reason: HOSPADM

## 2017-08-10 RX ORDER — ONDANSETRON 4 MG/1
4 TABLET, FILM COATED ORAL EVERY 6 HOURS PRN
Status: DISCONTINUED | OUTPATIENT
Start: 2017-08-10 | End: 2017-08-11 | Stop reason: HOSPADM

## 2017-08-10 RX ORDER — ACETAMINOPHEN 325 MG/1
650 TABLET ORAL EVERY 4 HOURS PRN
Status: DISCONTINUED | OUTPATIENT
Start: 2017-08-10 | End: 2017-08-11 | Stop reason: HOSPADM

## 2017-08-10 RX ORDER — ALLOPURINOL 300 MG/1
300 TABLET ORAL DAILY
Status: DISCONTINUED | OUTPATIENT
Start: 2017-08-10 | End: 2017-08-11 | Stop reason: HOSPADM

## 2017-08-10 RX ORDER — FINASTERIDE 5 MG/1
5 TABLET, FILM COATED ORAL DAILY
Status: DISCONTINUED | OUTPATIENT
Start: 2017-08-10 | End: 2017-08-11 | Stop reason: HOSPADM

## 2017-08-10 RX ORDER — MORPHINE SULFATE 2 MG/ML
1 INJECTION, SOLUTION INTRAMUSCULAR; INTRAVENOUS EVERY 4 HOURS PRN
Status: DISCONTINUED | OUTPATIENT
Start: 2017-08-10 | End: 2017-08-11 | Stop reason: HOSPADM

## 2017-08-10 RX ORDER — CARVEDILOL 6.25 MG/1
6.25 TABLET ORAL 2 TIMES DAILY
Status: DISCONTINUED | OUTPATIENT
Start: 2017-08-10 | End: 2017-08-11 | Stop reason: HOSPADM

## 2017-08-10 RX ORDER — SODIUM CHLORIDE 9 MG/ML
75 INJECTION, SOLUTION INTRAVENOUS CONTINUOUS
Status: DISCONTINUED | OUTPATIENT
Start: 2017-08-10 | End: 2017-08-11

## 2017-08-10 RX ADMIN — TAZOBACTAM SODIUM AND PIPERACILLIN SODIUM 4.5 G: 500; 4 INJECTION, SOLUTION INTRAVENOUS at 17:33

## 2017-08-10 RX ADMIN — CARVEDILOL 6.25 MG: 6.25 TABLET, FILM COATED ORAL at 08:13

## 2017-08-10 RX ADMIN — TAZOBACTAM SODIUM AND PIPERACILLIN SODIUM 4.5 G: 500; 4 INJECTION, SOLUTION INTRAVENOUS at 23:42

## 2017-08-10 RX ADMIN — METRONIDAZOLE 500 MG: 500 INJECTION, SOLUTION INTRAVENOUS at 01:12

## 2017-08-10 RX ADMIN — INSULIN LISPRO 2 UNITS: 100 INJECTION, SOLUTION INTRAVENOUS; SUBCUTANEOUS at 21:11

## 2017-08-10 RX ADMIN — HEPARIN SODIUM 5000 UNITS: 5000 INJECTION, SOLUTION INTRAVENOUS; SUBCUTANEOUS at 08:14

## 2017-08-10 RX ADMIN — INSULIN LISPRO 2 UNITS: 100 INJECTION, SOLUTION INTRAVENOUS; SUBCUTANEOUS at 11:25

## 2017-08-10 RX ADMIN — CARVEDILOL 6.25 MG: 6.25 TABLET, FILM COATED ORAL at 17:36

## 2017-08-10 RX ADMIN — TAZOBACTAM SODIUM AND PIPERACILLIN SODIUM 4.5 G: 500; 4 INJECTION, SOLUTION INTRAVENOUS at 11:25

## 2017-08-10 RX ADMIN — SODIUM CHLORIDE 75 ML/HR: 9 INJECTION, SOLUTION INTRAVENOUS at 23:42

## 2017-08-10 RX ADMIN — FINASTERIDE 5 MG: 5 TABLET, FILM COATED ORAL at 08:12

## 2017-08-10 RX ADMIN — SODIUM CHLORIDE 75 ML/HR: 9 INJECTION, SOLUTION INTRAVENOUS at 03:00

## 2017-08-10 RX ADMIN — TAZOBACTAM SODIUM AND PIPERACILLIN SODIUM 4.5 G: 500; 4 INJECTION, SOLUTION INTRAVENOUS at 05:22

## 2017-08-10 RX ADMIN — ALLOPURINOL 300 MG: 300 TABLET ORAL at 08:12

## 2017-08-10 RX ADMIN — HEPARIN SODIUM 5000 UNITS: 5000 INJECTION, SOLUTION INTRAVENOUS; SUBCUTANEOUS at 20:20

## 2017-08-10 RX ADMIN — LEVOTHYROXINE SODIUM 75 MCG: 75 TABLET ORAL at 05:22

## 2017-08-10 RX ADMIN — INSULIN LISPRO 2 UNITS: 100 INJECTION, SOLUTION INTRAVENOUS; SUBCUTANEOUS at 08:12

## 2017-08-10 NOTE — PROGRESS NOTES
Discharge Planning Assessment  [facility]     Patient Name: Forrest Zepeda  MRN: [id number]  Today's Date: 8/10/2017    Admit Date: 8/9/2017          Discharge Needs Assessment       08/10/17 0935    Living Environment    Lives With alone    Living Arrangements house    Home Accessibility no concerns    Living Environment Comment Has basement steps but rarely uses them.    Discharge Needs Assessment    Community Agency Name(S) No HH involved    Equipment Currently Used at Home none    Equipment Needed After Discharge none    Discharge Planning Comments Children are available to help if needed.            Discharge Plan       08/10/17 0937    Case Management/Social Work Plan    Plan Home at DC    Patient/Family In Agreement With Plan yes    Additional Comments I spoke with the pt. He has no DC needs at this time.        Discharge Placement     No information found        Expected Discharge Date and Time     Expected Discharge Date Expected Discharge Time    Aug 12, 2017               Demographic Summary     None            Functional Status       08/10/17 0935    Functional Status Prior    Ambulation 0-->independent    Transferring 0-->independent    Toileting 0-->independent    Bathing 0-->independent    Dressing 0-->independent    Eating 0-->independent    IADL    Medications independent    Meal Preparation independent    Housekeeping independent    Laundry independent    Shopping independent    Oral Care independent            Psychosocial     None            Abuse/Neglect     None            Legal     None            Substance Abuse     None            Patient Forms     None          Elsi Woodruff RN

## 2017-08-10 NOTE — PLAN OF CARE
Problem: Diabetes, Type 2 (Adult)  Goal: Signs and Symptoms of Listed Potential Problems Will be Absent or Manageable (Diabetes, Type 2)  Outcome: Ongoing (interventions implemented as appropriate)    08/10/17 0446   Diabetes, Type 2   Problems Assessed (Type 2 Diabetes) all   Problems Present (Type 2 Diabetes) none

## 2017-08-10 NOTE — PROGRESS NOTES
[facility] Medicine Services  INPATIENT PROGRESS NOTE    Date of Admission: 8/9/2017  Length of Stay: 1  Primary Care Physician: Ryne Lofton MD    Subjective-- HPI/Events overnight/ROS/CC- Hospital follow visit.  Detailed ROS not detailed as performed below        Sitting up at bedside eating clears for lunch and tolerating it without any pain or nausea/vomiting. Denies any fever or chills. No diarrhea, BM normal and without blood.    Objective      Temp:  [97.8 °F (36.6 °C)-99.3 °F (37.4 °C)] 98.4 °F (36.9 °C)  Heart Rate:  [62-73] 63  Resp:  [18-20] 18  BP: (129-151)/(63-85) 134/78    Physical Exam:  Physical Exam    NAD  RRR  CTAB  Abd obese, ND, mildly tender over pelvic area, normal BS, no peritoneal signs  No focal neuro deficits  No LE edema  Affect is appropriate    Results Review:    I have reviewed the labs, radiology results and diagnostic studies.      Results from last 7 days  Lab Units 08/10/17  0518   WBC 10*3/mm3 12.50*   HEMOGLOBIN g/dL 13.7   PLATELETS 10*3/mm3 168       Results from last 7 days  Lab Units 08/10/17  0518   SODIUM mmol/L 137   POTASSIUM mmol/L 3.8   CO2 mmol/L 26.0   CREATININE mg/dL 1.20   GLUCOSE mg/dL 201*       Culture Data:  Radiology Data:     I have reviewed the medications.        Assessment/Plan     Assessment/Problem List  86 yo M with hx of HTN, DM2, diverticular disease-last C'scope >20 years ago, and ureteral stricture s/p stent placement, who presents to the ER with a 2 day history of periumbilical abdominal pain and was found to have acute sigmoid diverticulitis. Pt is afebrile and hemodynamically stable.    Principal Problem:    Sigmoid diverticulitis without abscess or perforation  Active Problems:    Ureteral stricture, left s/p stent    Type 2 diabetes mellitus    Hypertension    Acquired hypothyroidism    Leukocytosis      Plan  - Tolerating liquids, so will ADAT to regular at dinner time and keep O/N due to advanced age. This is  the first episode of diverticulitis. Will need 10-14 day course of abx and consider C'scope in 6-8 weeks-weighing risks vs benefits given his advanced age. If tolerating regular diet and remains stable, can DC home tomorrow. Will decreased IVF rate. Cont on SSI, DM2 fairly controlled.      Cheri Wong MD   08/10/17   3:18 PM    Please note that portions of this note may have been completed with a voice recognition program. Efforts were made to edit the dictations, but occasionally words are mistranscribed.

## 2017-08-10 NOTE — PLAN OF CARE
Problem: Diabetes, Type 2 (Adult)  Intervention: Support/Optimize Psychosocial Response to Condition    08/10/17 0222   Coping Strategies   Family/Support System Care support provided   Supportive Measures active listening utilized;relaxation techniques promoted   Coping/Psychosocial Interventions   Environmental Support calm environment promoted       Intervention: Optimize Glycemic Control    08/10/17 0446   Nutrition Interventions   Glycemic Management blood glucose monitoring         Goal: Signs and Symptoms of Listed Potential Problems Will be Absent or Manageable (Diabetes, Type 2)  Outcome: Ongoing (interventions implemented as appropriate)    08/10/17 0446   Diabetes, Type 2   Problems Assessed (Type 2 Diabetes) all   Problems Present (Type 2 Diabetes) none

## 2017-08-10 NOTE — H&P
HOSPITAL MEDICINE HISTORY AND PHYSICAL    PCP: Ryne Lofton MD  Specialists: Griffin Romero MD (Urology)    Chief Complaint: abdominal pain    Subjective     History of Present Illness  Mr. Zepeda is an 86 yo M with hx of HTN, DM2, and ureteral stricture s/p stent placement who presents to the ER with a 2 day history of periumbilical abdominal pain. He has had mild nausea but no vomiting. Denies any fevers or chills. No diarrhea or blood in stools. No prior history of diverticulitis but he has several family members who have had it. In the ER, labs revealed leukocytosis and a CT abd/pelvis showed acute sigmoid diverticulitis without abscess or perforation. He was given Flagyl and Zosyn and admitted for further management.     Review of Systems   Otherwise complete ROS is negative except as mentioned in the HPI.    Past Medical History:   Past Medical History:   Diagnosis Date   • Abnormal PSA     Reported abnormal   • Benign localized hyperplasia of prostate    • Bilateral knee pain    • Chronic gout    • Diabetic neuropathy    • Dyslipidemia     H/O   • Generalized osteoarthritis of multiple sites    • History of chronic kidney disease    • History of gout    • History of nephrolithiasis    • History of osteopenia    • Hyperkalemia    • Hypertension    • Insomnia    • Pharyngitis, acute    • Renal insufficiency    • Shortness of breath    • Type 2 diabetes mellitus    • Ureteral stricture, left    • Vitamin D deficiency        Past Surgical History:  Past Surgical History:   Procedure Laterality Date   • APPENDECTOMY     • CHOLECYSTECTOMY     • EYE SURGERY     • KNEE ARTHROSCOPY     • RENAL ARTERY STENT         Family History: family history includes Brain cancer in his brother and sister; Heart attack in his sister; Lung cancer in his brother and sister; Stroke in his sister.     Social History:  reports that he has quit smoking. He does not have any smokeless tobacco history on file. He reports that he  "does not drink alcohol or use illicit drugs.    Medications:    (Not in a hospital admission)  Allergies   Allergen Reactions   • Metformin Diarrhea   • Statins Myalgia       Objective    Physical Exam:   Vital Signs: /67  Pulse 65  Temp 99.3 °F (37.4 °C) (Oral)   Resp 20  Ht 69\" (175.3 cm)  Wt 233 lb (106 kg)  SpO2 94%  BMI 34.41 kg/m2  Physical Exam  Temp:  [99.3 °F (37.4 °C)] 99.3 °F (37.4 °C)  Heart Rate:  [65-73] 65  Resp:  [20] 20  BP: (129-151)/(63-85) 129/67  Constitutional: no acute distress, awake, alert  Eyes: PERRLA, sclerae anicteric, no conjunctival injection  Neck: supple, no thyromegaly, trachea midline  Respiratory: Clear to auscultation bilaterally, nonlabored respirations   Cardiovascular: RRR, no murmurs, rubs, or gallops, palpable pedal pulses bilaterally  Gastrointestinal: Positive bowel sounds, soft, mild TTP in the periumbilical, nondistended  Musculoskeletal: No bilateral ankle edema, no clubbing or cyanosis to bilateral lower extremities  Psychiatric: oriented x 3, appropriate affect, cooperative  Neurologic: Strength symmetric in all extremities, Cranial Nerves grossly intact to confrontation       Results Reviewed:  I have personally reviewed current lab, radiology, and data and agree.    Results from last 7 days  Lab Units 08/09/17  1933   WBC 10*3/mm3 14.62*   HEMOGLOBIN g/dL 15.7   PLATELETS 10*3/mm3 170       Results from last 7 days  Lab Units 08/09/17  1933   SODIUM mmol/L 138   POTASSIUM mmol/L 4.1   CO2 mmol/L 26.0   CREATININE mg/dL 1.10   GLUCOSE mg/dL 117*   CALCIUM mg/dL 9.7           Assessment/Plan   Assessment & Plan  Principal Problem:    Sigmoid diverticulitis without abscess or perforation  Active Problems:    Leukocytosis    Ureteral stricture, left s/p stent    Type 2 diabetes mellitus    Hypertension    Acquired hypothyroidism      84 yo M with hx of HTN, DM2, and ureteral stricture s/p stent placement who presents to the ER with a 2 day history of " periumbilical abdominal pain. Found to have acute sigmoid diverticulitis.    PLAN:  --Continue Zosyn and IVF's.   --Allow clears tonight and advance tomorrow as tolerated.   --I suspect he will be able to transition to oral ATBx in the next 1-2 days and be discharged home with close follow up.   --Ureteral stent scheduled to be replaced in November with Dr. Romero. UA with moderate blood. Monitor.     I discussed the patients findings and my recommendations with: patient, family    I believe this patient meets INPATIENT status due to the need for care which can only be reasonably provided in an hospital setting such as aggressive/expedited ancillary services and/or consultation services and the necessity for IV medications, close physician monitoring and/or the possible need for procedures.  In such, I feel patient’s risk for adverse outcomes and need for care warrant INPATIENT evaluation and predict the patient’s care encounter to likely last beyond 2 midnights.      Dang Henderson MD   08/09/17   11:44 PM

## 2017-08-11 VITALS
WEIGHT: 219.2 LBS | HEART RATE: 75 BPM | SYSTOLIC BLOOD PRESSURE: 138 MMHG | TEMPERATURE: 97.9 F | HEIGHT: 69 IN | RESPIRATION RATE: 20 BRPM | OXYGEN SATURATION: 95 % | BODY MASS INDEX: 32.47 KG/M2 | DIASTOLIC BLOOD PRESSURE: 63 MMHG

## 2017-08-11 LAB
ANION GAP SERPL CALCULATED.3IONS-SCNC: 8 MMOL/L (ref 3–11)
BACTERIA SPEC AEROBE CULT: NORMAL
BASOPHILS # BLD AUTO: 0.04 10*3/MM3 (ref 0–0.2)
BASOPHILS NFR BLD AUTO: 0.4 % (ref 0–1)
BUN BLD-MCNC: 15 MG/DL (ref 9–23)
BUN/CREAT SERPL: 11.5 (ref 7–25)
CALCIUM SPEC-SCNC: 8.8 MG/DL (ref 8.7–10.4)
CHLORIDE SERPL-SCNC: 105 MMOL/L (ref 99–109)
CO2 SERPL-SCNC: 25 MMOL/L (ref 20–31)
CREAT BLD-MCNC: 1.3 MG/DL (ref 0.6–1.3)
DEPRECATED RDW RBC AUTO: 51.9 FL (ref 37–54)
EOSINOPHIL # BLD AUTO: 0.38 10*3/MM3 (ref 0–0.3)
EOSINOPHIL NFR BLD AUTO: 3.5 % (ref 0–3)
ERYTHROCYTE [DISTWIDTH] IN BLOOD BY AUTOMATED COUNT: 14.9 % (ref 11.3–14.5)
GFR SERPL CREATININE-BSD FRML MDRD: 52 ML/MIN/1.73
GLUCOSE BLD-MCNC: 166 MG/DL (ref 70–100)
GLUCOSE BLDC GLUCOMTR-MCNC: 145 MG/DL (ref 70–130)
GLUCOSE BLDC GLUCOMTR-MCNC: 171 MG/DL (ref 70–130)
HCT VFR BLD AUTO: 41.9 % (ref 38.9–50.9)
HGB BLD-MCNC: 13.9 G/DL (ref 13.1–17.5)
IMM GRANULOCYTES # BLD: 0.06 10*3/MM3 (ref 0–0.03)
IMM GRANULOCYTES NFR BLD: 0.5 % (ref 0–0.6)
LYMPHOCYTES # BLD AUTO: 1.75 10*3/MM3 (ref 0.6–4.8)
LYMPHOCYTES NFR BLD AUTO: 16 % (ref 24–44)
MCH RBC QN AUTO: 31.5 PG (ref 27–31)
MCHC RBC AUTO-ENTMCNC: 33.2 G/DL (ref 32–36)
MCV RBC AUTO: 95 FL (ref 80–99)
MONOCYTES # BLD AUTO: 0.87 10*3/MM3 (ref 0–1)
MONOCYTES NFR BLD AUTO: 7.9 % (ref 0–12)
NEUTROPHILS # BLD AUTO: 7.86 10*3/MM3 (ref 1.5–8.3)
NEUTROPHILS NFR BLD AUTO: 71.7 % (ref 41–71)
PLAT MORPH BLD: NORMAL
PLATELET # BLD AUTO: 169 10*3/MM3 (ref 150–450)
PMV BLD AUTO: 10.6 FL (ref 6–12)
POTASSIUM BLD-SCNC: 3.9 MMOL/L (ref 3.5–5.5)
RBC # BLD AUTO: 4.41 10*6/MM3 (ref 4.2–5.76)
RBC MORPH BLD: NORMAL
SODIUM BLD-SCNC: 138 MMOL/L (ref 132–146)
WBC MORPH BLD: NORMAL
WBC NRBC COR # BLD: 10.96 10*3/MM3 (ref 3.5–10.8)

## 2017-08-11 PROCEDURE — 25010000002 PIPERACILLIN SOD-TAZOBACTAM PER 1 G: Performed by: HOSPITALIST

## 2017-08-11 PROCEDURE — 25010000002 HEPARIN (PORCINE) PER 1000 UNITS: Performed by: HOSPITALIST

## 2017-08-11 PROCEDURE — 80048 BASIC METABOLIC PNL TOTAL CA: CPT | Performed by: HOSPITALIST

## 2017-08-11 PROCEDURE — 82962 GLUCOSE BLOOD TEST: CPT

## 2017-08-11 PROCEDURE — 99239 HOSP IP/OBS DSCHRG MGMT >30: CPT | Performed by: INTERNAL MEDICINE

## 2017-08-11 PROCEDURE — 85025 COMPLETE CBC W/AUTO DIFF WBC: CPT | Performed by: HOSPITALIST

## 2017-08-11 PROCEDURE — 85007 BL SMEAR W/DIFF WBC COUNT: CPT | Performed by: HOSPITALIST

## 2017-08-11 RX ORDER — METRONIDAZOLE 500 MG/1
500 TABLET ORAL EVERY 6 HOURS SCHEDULED
Qty: 56 TABLET | Refills: 0 | Status: SHIPPED | OUTPATIENT
Start: 2017-08-11 | End: 2017-09-14

## 2017-08-11 RX ORDER — CIPROFLOXACIN 250 MG/1
500 TABLET, FILM COATED ORAL EVERY 12 HOURS SCHEDULED
Status: DISCONTINUED | OUTPATIENT
Start: 2017-08-11 | End: 2017-08-11 | Stop reason: HOSPADM

## 2017-08-11 RX ORDER — METRONIDAZOLE 500 MG/1
500 TABLET ORAL EVERY 6 HOURS SCHEDULED
Status: DISCONTINUED | OUTPATIENT
Start: 2017-08-11 | End: 2017-08-11 | Stop reason: HOSPADM

## 2017-08-11 RX ORDER — CIPROFLOXACIN 500 MG/1
500 TABLET, FILM COATED ORAL 2 TIMES DAILY
Qty: 28 TABLET | Refills: 0 | Status: SHIPPED | OUTPATIENT
Start: 2017-08-11 | End: 2017-09-14

## 2017-08-11 RX ADMIN — ALLOPURINOL 300 MG: 300 TABLET ORAL at 08:22

## 2017-08-11 RX ADMIN — TAZOBACTAM SODIUM AND PIPERACILLIN SODIUM 4.5 G: 500; 4 INJECTION, SOLUTION INTRAVENOUS at 12:18

## 2017-08-11 RX ADMIN — FINASTERIDE 5 MG: 5 TABLET, FILM COATED ORAL at 08:22

## 2017-08-11 RX ADMIN — LEVOTHYROXINE SODIUM 75 MCG: 75 TABLET ORAL at 06:07

## 2017-08-11 RX ADMIN — TAZOBACTAM SODIUM AND PIPERACILLIN SODIUM 4.5 G: 500; 4 INJECTION, SOLUTION INTRAVENOUS at 06:07

## 2017-08-11 RX ADMIN — CARVEDILOL 6.25 MG: 6.25 TABLET, FILM COATED ORAL at 08:22

## 2017-08-11 RX ADMIN — HEPARIN SODIUM 5000 UNITS: 5000 INJECTION, SOLUTION INTRAVENOUS; SUBCUTANEOUS at 08:22

## 2017-08-11 RX ADMIN — INSULIN LISPRO 2 UNITS: 100 INJECTION, SOLUTION INTRAVENOUS; SUBCUTANEOUS at 12:18

## 2017-08-11 NOTE — DISCHARGE SUMMARY
[facility] Medicine Services  DISCHARGE SUMMARY       Date of Admission: 8/9/2017  Date of Discharge:  8/11/2017  Primary Care Physician: Ryne Lofton MD  Consulting Physician(s)          None           Discharge Diagnoses:  Active Hospital Problems (** Indicates Principal Problem)    Diagnosis Date Noted   • **Sigmoid diverticulitis without abscess or perforation [K57.32] 08/09/2017   • Leukocytosis [D72.829] 08/09/2017   • Acquired hypothyroidism [E03.9] 10/03/2016   • Hypertension [I10]    • Type 2 diabetes mellitus [E11.9]    • Ureteral stricture, left s/p stent [N13.5]       Resolved Hospital Problems    Diagnosis Date Noted Date Resolved   No resolved problems to display.       This is a delightful 85-year-old  male with a past medical history significant for long-standing essential hypertension, type 2 diabetes mellitus on oral hypoglycemics, who presented with left lower quadrant abdominal pain and was found to have acute sigmoid non-complicated diverticulitis, first episode.  There was no evidence of perforation, bowel obstruction, fistula or intra-abdominal abscess.  The patient was treated empirically with intravenous Zosyn and was subsequently switched to oral ciprofloxacin and metronidazole to complete a two-week course  The day of discharge the patient was afebrile, tolerating a regular diet without fever, abdominal pain, vomiting or other complications  He was advised to follow-up with his primary care physician with plans for outpatient colonoscopy in 6-8 weeks rule out underlying colon malignancy         Consults:   Consults     No orders found from 7/11/2017 to 8/10/2017.          Pertinent Test Results:  Severe ACUTE SIGMOID DIVERTICULITIS without pericolonic abscess or free   intra-abdominal air to suggest perforation. Possibility of underlying colonic   mass cannot be excluded.        Marked enlargement of the prostate gland likely resulting in bladder  "outlet   obstruction suspicious for prostate malignancy versus benign enlargement.    Recommend followup as clinically indicated.        Small RIGHT renal calculi without evidence of obstructive urolithiasis.        Numerous other nonemergent findings as described  Condition on Discharge:  Good    Physical Exam on Discharge:/63 (BP Location: Left arm) Comment: rechecked after bp med  Pulse 75  Temp 97.9 °F (36.6 °C) (Oral)   Resp 20  Ht 69\" (175.3 cm)  Wt 219 lb 3.2 oz (99.4 kg)  SpO2 95%  BMI 32.37 kg/m2  Physical Exam  Vital signs reviewed and within normal range  Gen. appearance: Pleasant  female in no distress. He is awake and alert. She is oriented to person place and time  HEENT: Pupils reactive and responsive to light. Extraocular muscles are intact. Dry mucous membrane: Diminished skin turgor. No oral lesions thrush.  Chest: Clear to auscultation bilaterally.  No wheezing, rales or rhonchi  Cardiovascular: Regular rate and rhythm. No murmurs rubs or gallop no increased jugular venous pulsation  Abdomen: Soft. Non tender to palpation. No palpable masses. No CVA tenderness. Normal bowel sounds.   Extremities: No skin lesions or rashes. No edema. Intact peripheral pulses  Neurologic examination: Motor tone and strength are normal. Intact deep tendon reflexes. No focal neurological deficit.  Psychiatric: appropriate affect    Discharge Disposition  Home or Self Care    Discharge Medications   Forrest Zepeda   Home Medication Instructions ADRIAN:[id number]    Printed on:08/11/17 3343   Medication Information                      acetaminophen (TYLENOL) 500 MG tablet  Take 1 tablet by mouth 2 (Two) Times a Day.             allopurinol (ZYLOPRIM) 300 MG tablet  Take 1 tablet by mouth Daily.             carvedilol (COREG) 6.25 MG tablet  Take 1 tablet by mouth 2 (Two) Times a Day.             ciprofloxacin (CIPRO) 500 MG tablet  Take 1 tablet by mouth 2 (Two) Times a Day. Indications: " Infection Within the Abdomen             finasteride (PROSCAR) 5 MG tablet  TAKE ONE TABLET BY MOUTH DAILY AS DIRECTED             glimepiride (AMARYL) 2 MG tablet  Take 1 tablet by mouth Every Morning Before Breakfast.             levothyroxine (SYNTHROID) 50 MCG tablet  Take 1 tablet by mouth Daily.             levothyroxine (SYNTHROID, LEVOTHROID) 75 MCG tablet  Take 75 mcg by mouth Daily.             metroNIDAZOLE (FLAGYL) 500 MG tablet  Take 1 tablet by mouth Every 6 (Six) Hours. Indications: Infection Within the Abdomen             Multiple Vitamins-Minerals (OCUVITE ADULT 50+ PO)  Take  by mouth daily.             vitamin B-12 (CYANOCOBALAMIN) 1000 MCG tablet  Take 1,000 mcg by mouth Daily.                 Discharge Diet:  regular consistency, cardiac and consistent carbohydrate    Discharge Care Plan / Instructions: Return to emergency room if recurrent fever, abdominal pain, nausea or vomiting    Activity at Discharge:  resume as previous tolerated    Follow-up Appointments  Future Appointments  Date Time Provider Department Center   9/14/2017 1:00 PM Ryne Lofton MD [facility] None     Additional Instructions for the Follow-ups that You Need to Schedule     Discharge Follow-up with PCP    As directed    Follow Up Details:  Post hospital discharge for uncomplicated sigmoid diverticulitis.  Please arrange for outpatient colonoscopy in 8 weeks to rule out underlying malignancy                 Test Results Pending at Discharge   Order Current Status    Blood Culture Preliminary result    Blood Culture Preliminary result           Jeffrey Archibald MD 08/11/17 1:04 PM    Time: A total of 35 minutes of time were spent in the preparation of the discharge summary including patient education and coronation of care.    Please note that portions of this note may have been completed with a voice recognition program. Efforts were made to edit the dictations, but occasionally words are mistranscribed.

## 2017-08-11 NOTE — DISCHARGE INSTRUCTIONS
You primary care physician will need to refer you to a GI specialist for an outpatient colonoscopy in 8 weeks.

## 2017-08-11 NOTE — PROGRESS NOTES
"Adult Nutrition  Assessment/PES    Patient Name:  Forrest Zepeda  YOB: 1932  MRN: [id number]  Admit Date:  8/9/2017    Assessment Date:  8/11/2017        Reason for Assessment       08/11/17 1248    Reason for Assessment    Reason For Assessment/Visit diagnosis/disease state    Time Spent (min) 30    Cardiac HTN    Endocrine DM Type 2;Hypothyroid    Gastrointestinal Diverticulitis   without abscess or perforation              Nutrition/Diet History       08/11/17 1250    Nutrition/Diet History    Typical Food/Fluid Intake Pt. stated he does not have a cook at home. He eats the brunt of his meals at restaurants.            Anthropometrics       08/11/17 1250    Anthropometrics    Height Method Stated    Height 175.3 cm (69\")    Weight Method Standing scale    Weight 99.4 kg (219 lb 3.2 oz)    Ideal Body Weight (IBW)    Ideal Body Weight (IBW), Male (kg) 73.69    % Ideal Body Weight 135.22    Body Mass Index (BMI)    BMI (kg/m2) 32.44            Labs/Tests/Procedures/Meds       08/11/17 1250    Labs/Tests/Procedures/Meds    Labs/Tests Review Reviewed                Nutrition Prescription Ordered       08/11/17 1251    Nutrition Prescription PO    Current PO Diet Regular    Common Modifiers Consistent Carbohydrate            Evaluation of Received Nutrient/Fluid Intake       08/11/17 1251    PO Evaluation    Number of Meals 1    % PO Intake 100   Reflective for breakfast today, RD observed pt. ate brunt of his lunch meal today.              Problem/Interventions:        Problem 1       08/11/17 1251    Nutrition Diagnoses Problem 1    Problem 1 Knowledge Deficit    Etiology (related to) --   clinical condition    Signs/Symptoms (evidenced by) Potential Information Deficit            Problem 2       08/11/17 1252    Nutrition Diagnoses Problem 2    Problem 2 Nutrition Appropriate for Condition at this Time    Etiology (related to) Factors Affecting Nutrition    Appetite Good    Signs/Symptoms " (evidenced by) PO Intake;Report/Observation    Percent (%) intake recorded 100 %    Over number of meals 1    Reported/Observed By RD/Tech                  Intervention Goal       08/11/17 1251    Intervention Goal    General Nutrition support treatment    PO Continue positive trend            Nutrition Intervention       08/11/17 1252    Nutrition Intervention    RD/Tech Action Follow Tx progress   Patient anticipates discharge today.              Education/Evaluation       08/11/17 1252    RD provided nutrition education. Written educational materials provided.Patient voiced understanding of information reviewed and asked appropriate questions. Advised patient to follow-up with RD as needed after discharge.    Education    Education Education topics    Education Topics GI disease   related to diverticulitis    Monitor/Evaluation    Monitor Per protocol        Electronically signed by:  Jeannette Borden RD  08/11/17 12:53 PM

## 2017-08-14 ENCOUNTER — TRANSITIONAL CARE MANAGEMENT TELEPHONE ENCOUNTER (OUTPATIENT)
Dept: FAMILY MEDICINE CLINIC | Facility: CLINIC | Age: 82
End: 2017-08-14

## 2017-08-15 LAB
BACTERIA SPEC AEROBE CULT: NORMAL
BACTERIA SPEC AEROBE CULT: NORMAL

## 2017-08-18 ENCOUNTER — OFFICE VISIT (OUTPATIENT)
Dept: FAMILY MEDICINE CLINIC | Facility: CLINIC | Age: 82
End: 2017-08-18

## 2017-08-18 VITALS
HEIGHT: 69 IN | HEART RATE: 70 BPM | SYSTOLIC BLOOD PRESSURE: 132 MMHG | BODY MASS INDEX: 34.07 KG/M2 | WEIGHT: 230 LBS | OXYGEN SATURATION: 97 % | DIASTOLIC BLOOD PRESSURE: 78 MMHG | RESPIRATION RATE: 16 BRPM

## 2017-08-18 DIAGNOSIS — E03.9 ACQUIRED HYPOTHYROIDISM: Primary | ICD-10-CM

## 2017-08-18 DIAGNOSIS — K57.32 DIVERTICULITIS OF LARGE INTESTINE WITHOUT PERFORATION OR ABSCESS WITHOUT BLEEDING: ICD-10-CM

## 2017-08-18 PROCEDURE — 99496 TRANSJ CARE MGMT HIGH F2F 7D: CPT | Performed by: FAMILY MEDICINE

## 2017-08-18 RX ORDER — LEVOTHYROXINE SODIUM 0.07 MG/1
75 TABLET ORAL DAILY
Qty: 90 TABLET | Refills: 3 | Status: SHIPPED | OUTPATIENT
Start: 2017-08-18 | End: 2017-12-14 | Stop reason: SDUPTHER

## 2017-08-18 NOTE — PROGRESS NOTES
Transitional Care Follow Up Visit  Subjective     Forrest Zepeda is a 85 y.o. male who presents for a transitional care management visit.    Within 48 business hours after discharge our office contacted him via telephone to coordinate his care and needs.      I reviewed and discussed the details of that call along with the discharge summary, hospital problems, inpatient lab results, inpatient diagnostic studies, and consultation reports with Forrest.    Patient states he is doing better. Taking Metronidazole 500 mg every 6 hours and Ciprofloxacin 500 mg twice a day.    States his bowels are moving well. Most of the time twice a day.  States he does not want to do a colonoscopy.      Date of TCM Phone Call 8/14/2017   [facility]   Date of Admission 8/9/2017   Date of Discharge 8/11/2017   Risk for Readmission (LACE Score) 8   Discharge Disposition Home or Self Care       History of Present Illness   Course During Hospital Stay:    Delightful 85-year-old  male with a past medical history significant for long-standing essential hypertension, type 2 diabetes mellitus on oral hypoglycemics, who presented with left lower quadrant abdominal pain and was found to have acute sigmoid non-complicated diverticulitis, first episode.  There was no evidence of perforation, bowel obstruction, fistula or intra-abdominal abscess.  The patient was treated empirically with intravenous Zosyn and was subsequently switched to oral ciprofloxacin and metronidazole to complete a two-week course  The day of discharge the patient was afebrile, tolerating a regular diet without fever, abdominal pain, vomiting or other complications  He was advised to follow-up with his primary care physician with plans for outpatient colonoscopy in 6-8 weeks rule out underlying colon malignancy   The following portions of the patient's history were reviewed and updated as appropriate: allergies, current medications, past family  history, past medical history, past social history, past surgical history and problem list.    Review of Systems   Constitutional: Negative for chills, diaphoresis, fever and unexpected weight change.   Respiratory: Negative for cough, chest tightness, shortness of breath and wheezing.    Cardiovascular: Negative for chest pain, palpitations and leg swelling.   Gastrointestinal: Negative for abdominal distention, abdominal pain, anal bleeding, blood in stool, constipation, diarrhea, nausea, rectal pain and vomiting.   Genitourinary: Negative for difficulty urinating.       Objective   Physical Exam   Constitutional: He appears well-developed and well-nourished.   Eyes: Pupils are equal, round, and reactive to light.   Neck: Normal range of motion. Neck supple.   Cardiovascular: Normal rate, regular rhythm and normal heart sounds.    Pulmonary/Chest: Effort normal and breath sounds normal. No respiratory distress. He has no wheezes. He has no rales.   Abdominal: Soft. Bowel sounds are normal. He exhibits no distension and no mass. There is no tenderness.   Lymphadenopathy:     He has no cervical adenopathy.   Nursing note and vitals reviewed.      Assessment/Plan   Problems Addressed this Visit        Endocrine    Acquired hypothyroidism - Primary      Other Visit Diagnoses     Diverticulitis of large intestine without perforation or abscess without bleeding                    Drink plenty fluids.  Get plenty fruits and vegetables in diet.  Avoid seeds and nuts.    Continue medications as doing.    Finish out the Metronidazole and Cipro until finished.    Defer colonoscopy for now, per patient's request.    Follow up in September as scheduled.          Scribed for Dr Ryne Lofton by Zahida Townsend CMA.          I, Ryne Lofton MD, personally performed the services described in this documentation, as scribed by Zahida Townsend in my presence, and is both accurate and complete.

## 2017-09-14 ENCOUNTER — OFFICE VISIT (OUTPATIENT)
Dept: FAMILY MEDICINE CLINIC | Facility: CLINIC | Age: 82
End: 2017-09-14

## 2017-09-14 VITALS
SYSTOLIC BLOOD PRESSURE: 128 MMHG | DIASTOLIC BLOOD PRESSURE: 68 MMHG | HEIGHT: 69 IN | OXYGEN SATURATION: 97 % | TEMPERATURE: 98.6 F | WEIGHT: 226 LBS | HEART RATE: 64 BPM | BODY MASS INDEX: 33.47 KG/M2

## 2017-09-14 DIAGNOSIS — I10 ESSENTIAL HYPERTENSION: ICD-10-CM

## 2017-09-14 DIAGNOSIS — E03.9 ACQUIRED HYPOTHYROIDISM: ICD-10-CM

## 2017-09-14 DIAGNOSIS — Z23 NEED FOR IMMUNIZATION AGAINST INFLUENZA: ICD-10-CM

## 2017-09-14 DIAGNOSIS — E11.9 DIABETES MELLITUS TYPE 2, DIET-CONTROLLED (HCC): Primary | ICD-10-CM

## 2017-09-14 LAB
EXPIRATION DATE: NORMAL
HBA1C MFR BLD: 7.6 %
Lab: NORMAL

## 2017-09-14 PROCEDURE — G0008 ADMIN INFLUENZA VIRUS VAC: HCPCS | Performed by: FAMILY MEDICINE

## 2017-09-14 PROCEDURE — 90662 IIV NO PRSV INCREASED AG IM: CPT | Performed by: FAMILY MEDICINE

## 2017-09-14 PROCEDURE — 99213 OFFICE O/P EST LOW 20 MIN: CPT | Performed by: FAMILY MEDICINE

## 2017-09-14 PROCEDURE — 36415 COLL VENOUS BLD VENIPUNCTURE: CPT | Performed by: FAMILY MEDICINE

## 2017-09-14 PROCEDURE — 83036 HEMOGLOBIN GLYCOSYLATED A1C: CPT | Performed by: FAMILY MEDICINE

## 2017-09-14 NOTE — PROGRESS NOTES
"Subjective   Forrest Zepeda is a 85 y.o. male.     History of Present Illness   The patient is here today for a follow up on Hypertension,Hypothyroidism, and Type 2 diabetes.    He states he is doing well. No new complaints.  Denies any chest pain or shortness of breath.    BP today is 128/68.  Taking Carvedilol 6.25 mg twice a day.    A1c today is 7.6%. This is about the same as April at 7.5%.  Taking Glimepiride 2 mg daily.    He does see his eye doctor annually.    The following portions of the patient's history were reviewed and updated as appropriate: allergies, current medications, past social history and problem list.    Review of Systems   Respiratory: Negative for shortness of breath.    Cardiovascular: Negative for chest pain.       Objective   /68  Pulse 64  Temp 98.6 °F (37 °C)  Ht 69\" (175.3 cm)  Wt 226 lb (103 kg)  SpO2 97%  BMI 33.37 kg/m2  Physical Exam   Constitutional: He appears well-developed and well-nourished.   Cardiovascular: Normal rate, regular rhythm and normal heart sounds.    Pulmonary/Chest: Effort normal and breath sounds normal.    Forrest had a diabetic foot exam performed (normal) today.  Nursing note and vitals reviewed.      Assessment/Plan   Problem List Items Addressed This Visit        Cardiovascular and Mediastinum    Hypertension       Endocrine    Diabetes mellitus type 2, diet-controlled - Primary    Relevant Orders    POC Glycosylated Hemoglobin (Hb A1C) (Completed)    Acquired hypothyroidism      Other Visit Diagnoses     Need for immunization against influenza        Relevant Orders    Flu Vaccine High Dose PF 65YR+ (FLUZONE [id number]) (Completed)              Drink plenty fluids.    Continue to work on diet and weight loss.    Continue medications as doing.    Check a TSH. Report results by letter.    Given a high dose flu vaccine today.    Follow up in 3 months.        Scribed for Dr Ryne Lofton by Zahida Townsend CMA.          I, Ryne Lofton MD, " personally performed the services described in this documentation, as scribed by Zahdia Townsend in my presence, and is both accurate and complete.

## 2017-09-15 LAB — TSH SERPL DL<=0.005 MIU/L-ACNC: 0.63 MIU/ML (ref 0.35–5.35)

## 2017-12-07 ENCOUNTER — OFFICE VISIT (OUTPATIENT)
Dept: UROLOGY | Facility: CLINIC | Age: 82
End: 2017-12-07

## 2017-12-07 VITALS
DIASTOLIC BLOOD PRESSURE: 62 MMHG | TEMPERATURE: 98.7 F | HEIGHT: 69 IN | SYSTOLIC BLOOD PRESSURE: 118 MMHG | WEIGHT: 227.07 LBS | OXYGEN SATURATION: 98 % | HEART RATE: 85 BPM | BODY MASS INDEX: 33.63 KG/M2

## 2017-12-07 DIAGNOSIS — N13.5 URETERAL STRICTURE: Primary | ICD-10-CM

## 2017-12-07 LAB
BILIRUB BLD-MCNC: NEGATIVE MG/DL
CLARITY, POC: CLEAR
COLOR UR: YELLOW
GLUCOSE UR STRIP-MCNC: NEGATIVE MG/DL
KETONES UR QL: NEGATIVE
LEUKOCYTE EST, POC: NEGATIVE
NITRITE UR-MCNC: NEGATIVE MG/ML
PH UR: 6 [PH] (ref 5–8)
PROT UR STRIP-MCNC: ABNORMAL MG/DL
RBC # UR STRIP: ABNORMAL /UL
SP GR UR: 1.03 (ref 1–1.03)
UROBILINOGEN UR QL: NORMAL

## 2017-12-07 PROCEDURE — 81003 URINALYSIS AUTO W/O SCOPE: CPT | Performed by: UROLOGY

## 2017-12-07 PROCEDURE — 99213 OFFICE O/P EST LOW 20 MIN: CPT | Performed by: UROLOGY

## 2017-12-07 NOTE — PROGRESS NOTES
Chief Complaint  ureteral stricture (yearly follow up for ureteral stricture.)        JAZMIN Zepeda is a 85 y.o. male who returns today for annual checkup with a known history of multiple ureteral strictures and a tendency for recurrent ureteral stones are always on the left side.  This has been handled with a permanent indwelling metallic stent that is changed once a year and he has done very well.  Ureteral access is somewhat problematic because of his markedly enlarged intravesical prostate.  He takes Proscar to shrink it and has no difficulty voiding describing an AUA index of 0 including nocturia of 0.  His urine today is clear except for trace of blood.  He states he was hospitalized in [place] for diverticulitis a few months ago and was noted to have a normal PSA and LUIS FELIPE at that time.    Vitals:    12/07/17 1121   BP: 118/62   Pulse: 85   Temp: 98.7 °F (37.1 °C)   SpO2: 98%       Past Medical History  Past Medical History:   Diagnosis Date   • Abnormal PSA     Reported abnormal   • Benign localized hyperplasia of prostate    • Bilateral knee pain    • Chronic gout    • Diabetic neuropathy    • Dyslipidemia     H/O   • Generalized osteoarthritis of multiple sites    • History of chronic kidney disease    • History of gout    • History of nephrolithiasis    • History of osteopenia    • Hyperkalemia    • Hypertension    • Insomnia    • Pharyngitis, acute    • Renal insufficiency    • Shortness of breath    • Type 2 diabetes mellitus    • Ureteral stricture, left    • Vitamin D deficiency        Past Surgical History  Past Surgical History:   Procedure Laterality Date   • APPENDECTOMY     • CHOLECYSTECTOMY     • EYE SURGERY     • KNEE ARTHROSCOPY     • RENAL ARTERY STENT         Medications  has a current medication list which includes the following prescription(s): allopurinol, carvedilol, diphenhydramine-apap (sleep), finasteride, glimepiride, levothyroxine, acetaminophen, multiple vitamins-minerals, and vitamin  b-12.      Allergies  Allergies   Allergen Reactions   • Metformin Diarrhea   • Statins Myalgia       Social History  Social History     Social History Narrative       Family History  He has no family history of bladder or kidney cancer  He has no family history of kidney stones      AUA Symptom Score:      Review of Systems  Review of Systems    Physical Exam  Physical Exam   Constitutional: He is oriented to person, place, and time. He appears well-developed and well-nourished.   HENT:   Head: Normocephalic and atraumatic.   Neck: Normal range of motion.   Pulmonary/Chest: Effort normal. No respiratory distress.   Abdominal: Soft. He exhibits no distension and no mass. There is no tenderness. No hernia.   Genitourinary: Rectum normal and prostate normal.   Musculoskeletal: Normal range of motion.   Lymphadenopathy:     He has no cervical adenopathy.   Neurological: He is alert and oriented to person, place, and time.   Skin: Skin is warm and dry.   Psychiatric: He has a normal mood and affect. His behavior is normal.   Vitals reviewed.      Labs Recent and today in the office:  Results for orders placed or performed in visit on 12/07/17   POC Urinalysis Dipstick, Automated   Result Value Ref Range    Color Yellow Yellow, Straw, Dark Yellow, Sushila    Clarity, UA Clear Clear    Glucose, UA Negative Negative, 1000 mg/dL (3+) mg/dL    Bilirubin Negative Negative    Ketones, UA Negative Negative    Specific Gravity  1.030 1.005 - 1.030    Blood, UA 3+ (A) Negative    pH, Urine 6.0 5.0 - 8.0    Protein, POC 1+ (A) Negative mg/dL    Urobilinogen, UA Normal Normal    Leukocytes Negative Negative    Nitrite, UA Negative Negative         Assessment & Plan  Ureteral stricture: He will need replacement of his metallic resonance ureteral stent but prefers to have this done after January 1 for insurance purposes.  He will return at that time.

## 2017-12-14 ENCOUNTER — OFFICE VISIT (OUTPATIENT)
Dept: FAMILY MEDICINE CLINIC | Facility: CLINIC | Age: 82
End: 2017-12-14

## 2017-12-14 VITALS
WEIGHT: 227 LBS | HEIGHT: 69 IN | RESPIRATION RATE: 16 BRPM | OXYGEN SATURATION: 97 % | BODY MASS INDEX: 33.62 KG/M2 | SYSTOLIC BLOOD PRESSURE: 122 MMHG | HEART RATE: 68 BPM | DIASTOLIC BLOOD PRESSURE: 74 MMHG

## 2017-12-14 DIAGNOSIS — E03.9 ACQUIRED HYPOTHYROIDISM: ICD-10-CM

## 2017-12-14 DIAGNOSIS — E11.9 DIABETES MELLITUS TYPE 2, DIET-CONTROLLED (HCC): Primary | ICD-10-CM

## 2017-12-14 LAB — HBA1C MFR BLD: 7.6 %

## 2017-12-14 PROCEDURE — 83036 HEMOGLOBIN GLYCOSYLATED A1C: CPT | Performed by: FAMILY MEDICINE

## 2017-12-14 PROCEDURE — 99213 OFFICE O/P EST LOW 20 MIN: CPT | Performed by: FAMILY MEDICINE

## 2017-12-14 RX ORDER — FINASTERIDE 5 MG/1
5 TABLET, FILM COATED ORAL DAILY
Qty: 90 TABLET | Refills: 3 | Status: SHIPPED | OUTPATIENT
Start: 2017-12-14 | End: 2019-02-27 | Stop reason: SDUPTHER

## 2017-12-14 RX ORDER — LEVOTHYROXINE SODIUM 0.05 MG/1
50 TABLET ORAL DAILY
Qty: 90 TABLET | Refills: 1 | Status: SHIPPED | OUTPATIENT
Start: 2017-12-14 | End: 2018-06-22 | Stop reason: SDUPTHER

## 2017-12-14 NOTE — PROGRESS NOTES
"Subjective   Forrest Zepeda is a 85 y.o. male.   The patient is here for a follow up on Hypertension, Hypothyroidism and Type 2 diabetes.      Diabetes   He presents for his follow-up diabetic visit. He has type 2 diabetes mellitus. There are no hypoglycemic associated symptoms. Pertinent negatives for hypoglycemia include no headaches. Associated symptoms include fatigue. Pertinent negatives for diabetes include no chest pain, no polydipsia and no polyuria. There are no hypoglycemic complications. There are no diabetic complications. Risk factors for coronary artery disease include diabetes mellitus, hypertension and male sex. Current diabetic treatment includes oral agent (monotherapy) (Glymepiride 2 mg daily). He is compliant with treatment all of the time. His weight is stable. He is following a generally healthy diet. He participates in exercise intermittently. There is no change in his home blood glucose trend. An ACE inhibitor/angiotensin II receptor blocker is not being taken. He does not see a podiatrist.Eye exam is current ([facility]      BP today is 122/74.  Taking     The following portions of the patient's history were reviewed and updated as appropriate: allergies, current medications, past social history and problem list.    Review of Systems   Constitutional: Positive for fatigue. Negative for unexpected weight change.   HENT: Negative for congestion.    Eyes: Negative for visual disturbance.   Respiratory: Negative for shortness of breath.    Cardiovascular: Negative for chest pain, palpitations and leg swelling.   Endocrine: Negative for polydipsia and polyuria.   Neurological: Negative for light-headedness, numbness and headaches.       Objective   /74  Pulse 68  Resp 16  Ht 175.3 cm (69\")  Wt 103 kg (227 lb)  SpO2 97%  BMI 33.52 kg/m2  Physical Exam   Constitutional: He appears well-developed and well-nourished.   Cardiovascular: Normal rate and regular rhythm.  "   Pulmonary/Chest: Effort normal and breath sounds normal.   Nursing note and vitals reviewed.      Assessment/Plan   Problem List Items Addressed This Visit        Endocrine    Diabetes mellitus type 2, diet-controlled - Primary    Relevant Orders    POC Glycosylated Hemoglobin (Hb A1C) (Completed)    Acquired hypothyroidism              Drink plenty fluids.    Decrease the Levothyroxine to 50 mcg daily #90+1.    Continue other medications as doing.    Refill Finasteride 5 mg daily #90+3.    Follow up in 4 months fasting for general lab studies.          Scribed for Dr Ryne Lofton by Zahida Townsend CMA.          I, Ryne Lofton MD, personally performed the services described in this documentation, as scribed by Zahida Townsend in my presence, and is both accurate and complete.

## 2018-01-03 ENCOUNTER — APPOINTMENT (OUTPATIENT)
Dept: PREADMISSION TESTING | Facility: HOSPITAL | Age: 83
End: 2018-01-03

## 2018-01-03 ENCOUNTER — OFFICE VISIT (OUTPATIENT)
Dept: UROLOGY | Facility: CLINIC | Age: 83
End: 2018-01-03

## 2018-01-03 ENCOUNTER — HOSPITAL ENCOUNTER (OUTPATIENT)
Dept: GENERAL RADIOLOGY | Facility: HOSPITAL | Age: 83
Discharge: HOME | End: 2018-01-03
Admitting: UROLOGY

## 2018-01-03 VITALS
HEIGHT: 69 IN | TEMPERATURE: 98.4 F | DIASTOLIC BLOOD PRESSURE: 65 MMHG | WEIGHT: 227 LBS | HEART RATE: 78 BPM | BODY MASS INDEX: 33.62 KG/M2 | OXYGEN SATURATION: 98 % | SYSTOLIC BLOOD PRESSURE: 115 MMHG

## 2018-01-03 VITALS — HEIGHT: 69 IN | BODY MASS INDEX: 33.77 KG/M2 | WEIGHT: 228 LBS

## 2018-01-03 DIAGNOSIS — N20.0 NEPHROLITHIASIS: ICD-10-CM

## 2018-01-03 DIAGNOSIS — N13.5 URETERAL STRICTURE, LEFT: ICD-10-CM

## 2018-01-03 DIAGNOSIS — N13.5 URETERAL STRICTURE, LEFT: Primary | ICD-10-CM

## 2018-01-03 LAB
ANION GAP SERPL CALCULATED.3IONS-SCNC: 15.5 MMOL/L
BASOPHILS # BLD AUTO: 0.11 10*3/MM3 (ref 0–0.2)
BASOPHILS NFR BLD AUTO: 1.1 % (ref 0–2.5)
BILIRUB BLD-MCNC: NEGATIVE MG/DL
BUN BLD-MCNC: 25 MG/DL (ref 7–20)
BUN/CREAT SERPL: 25 (ref 6.3–21.9)
CALCIUM SPEC-SCNC: 10.3 MG/DL (ref 8.4–10.2)
CHLORIDE SERPL-SCNC: 106 MMOL/L (ref 98–107)
CLARITY, POC: CLEAR
CO2 SERPL-SCNC: 24 MMOL/L (ref 26–30)
COLOR UR: YELLOW
CREAT BLD-MCNC: 1 MG/DL (ref 0.6–1.3)
DEPRECATED RDW RBC AUTO: 49.2 FL (ref 37–54)
EOSINOPHIL # BLD AUTO: 0.3 10*3/MM3 (ref 0–0.7)
EOSINOPHIL NFR BLD AUTO: 3 % (ref 0–7)
ERYTHROCYTE [DISTWIDTH] IN BLOOD BY AUTOMATED COUNT: 14.3 % (ref 11.5–14.5)
GFR SERPL CREATININE-BSD FRML MDRD: 71 ML/MIN/1.73
GLUCOSE BLD-MCNC: 160 MG/DL (ref 74–98)
GLUCOSE UR STRIP-MCNC: NEGATIVE MG/DL
HCT VFR BLD AUTO: 47.7 % (ref 42–52)
HGB BLD-MCNC: 15.6 G/DL (ref 14–18)
IMM GRANULOCYTES # BLD: 0.06 10*3/MM3 (ref 0–0.06)
IMM GRANULOCYTES NFR BLD: 0.6 % (ref 0–0.6)
KETONES UR QL: NEGATIVE
LEUKOCYTE EST, POC: NEGATIVE
LYMPHOCYTES # BLD AUTO: 3.09 10*3/MM3 (ref 0.6–3.4)
LYMPHOCYTES NFR BLD AUTO: 31.1 % (ref 10–50)
MCH RBC QN AUTO: 31 PG (ref 27–31)
MCHC RBC AUTO-ENTMCNC: 32.7 G/DL (ref 30–37)
MCV RBC AUTO: 94.6 FL (ref 80–94)
MONOCYTES # BLD AUTO: 0.83 10*3/MM3 (ref 0–0.9)
MONOCYTES NFR BLD AUTO: 8.4 % (ref 0–12)
NEUTROPHILS # BLD AUTO: 5.53 10*3/MM3 (ref 2–6.9)
NEUTROPHILS NFR BLD AUTO: 55.8 % (ref 37–80)
NITRITE UR-MCNC: NEGATIVE MG/ML
NRBC BLD MANUAL-RTO: 0 /100 WBC (ref 0–0)
PH UR: 6 [PH] (ref 5–8)
PLATELET # BLD AUTO: 200 10*3/MM3 (ref 130–400)
PMV BLD AUTO: 10.9 FL (ref 6–12)
POTASSIUM BLD-SCNC: 4.5 MMOL/L (ref 3.5–5.1)
PROT UR STRIP-MCNC: ABNORMAL MG/DL
RBC # BLD AUTO: 5.04 10*6/MM3 (ref 4.7–6.1)
RBC # UR STRIP: ABNORMAL /UL
SODIUM BLD-SCNC: 141 MMOL/L (ref 137–145)
SP GR UR: 1.03 (ref 1–1.03)
UROBILINOGEN UR QL: NORMAL
WBC NRBC COR # BLD: 9.92 10*3/MM3 (ref 4.8–10.8)

## 2018-01-03 PROCEDURE — 36415 COLL VENOUS BLD VENIPUNCTURE: CPT

## 2018-01-03 PROCEDURE — 71046 X-RAY EXAM CHEST 2 VIEWS: CPT

## 2018-01-03 PROCEDURE — 80048 BASIC METABOLIC PNL TOTAL CA: CPT | Performed by: UROLOGY

## 2018-01-03 PROCEDURE — 81003 URINALYSIS AUTO W/O SCOPE: CPT | Performed by: UROLOGY

## 2018-01-03 PROCEDURE — 85025 COMPLETE CBC W/AUTO DIFF WBC: CPT | Performed by: UROLOGY

## 2018-01-03 PROCEDURE — 99213 OFFICE O/P EST LOW 20 MIN: CPT | Performed by: UROLOGY

## 2018-01-03 NOTE — PROGRESS NOTES
Chief Complaint  ureteral strictures (pre-op for stent change.)        JAZMIN Zepeda is a 85 y.o. male who returns today for follow-up of a tortuous strictured left ureter and a tendency to form ureteral calculi.  This is been handled with periodic laser lithotripsy and insertion of a permanent metallic stent.  It's been in 1 year and he will be scheduled to have the old one extracted a fresh one inserted and any distal ureteral calculi fragmented with laser lithotripsy.    Vitals:    01/03/18 1341   BP: 115/65   Pulse: 78   Temp: 98.4 °F (36.9 °C)   SpO2: 98%       Past Medical History  Past Medical History:   Diagnosis Date   • Abnormal PSA     Reported abnormal   • Benign localized hyperplasia of prostate    • Bilateral knee pain    • Chronic gout    • Diabetic neuropathy    • Dyslipidemia     H/O   • Generalized osteoarthritis of multiple sites    • History of chronic kidney disease    • History of gout    • History of nephrolithiasis    • History of osteopenia    • Hyperkalemia    • Hypertension    • Insomnia    • Pharyngitis, acute    • Renal insufficiency    • Shortness of breath    • Type 2 diabetes mellitus    • Ureteral stricture, left    • Vitamin D deficiency        Past Surgical History  Past Surgical History:   Procedure Laterality Date   • APPENDECTOMY     • CHOLECYSTECTOMY     • EYE SURGERY     • KNEE ARTHROSCOPY     • RENAL ARTERY STENT         Medications  has a current medication list which includes the following prescription(s): acetaminophen, allopurinol, carvedilol, diphenhydramine-apap (sleep), finasteride, glimepiride, levothyroxine, and multiple vitamins-minerals.      Allergies  Allergies   Allergen Reactions   • Metformin Diarrhea   • Statins Myalgia       Social History  Social History     Social History Narrative       Family History  He has no family history of bladder or kidney cancer  He has no family history of kidney stones      AUA Symptom Score:      Review of Systems  Review of  Systems   Constitutional: Negative.    Genitourinary: Negative.    All other systems reviewed and are negative.      Physical Exam  Physical Exam   Constitutional: He is oriented to person, place, and time. He appears well-developed and well-nourished.   HENT:   Head: Normocephalic and atraumatic.   Neck: Normal range of motion.   Cardiovascular: Normal rate, regular rhythm and normal heart sounds.    Pulmonary/Chest: Effort normal. No respiratory distress.   Abdominal: Soft. He exhibits no distension and no mass. There is no tenderness. No hernia.   Musculoskeletal: Normal range of motion.   Lymphadenopathy:     He has no cervical adenopathy.   Neurological: He is alert and oriented to person, place, and time.   Skin: Skin is warm and dry.   Psychiatric: He has a normal mood and affect. His behavior is normal.   Vitals reviewed.      Labs Recent and today in the office:  Results for orders placed or performed in visit on 01/03/18   POC Urinalysis Dipstick, Automated   Result Value Ref Range    Color Yellow Yellow, Straw, Dark Yellow, Sushila    Clarity, UA Clear Clear    Glucose, UA Negative Negative, 1000 mg/dL (3+) mg/dL    Bilirubin Negative Negative    Ketones, UA Negative Negative    Specific Gravity  1.030 1.005 - 1.030    Blood, UA 2+ (A) Negative    pH, Urine 6.0 5.0 - 8.0    Protein, POC 1+ (A) Negative mg/dL    Urobilinogen, UA Normal Normal    Leukocytes Negative Negative    Nitrite, UA Negative Negative         Assessment & Plan  Ureteral stricture/calculi: His procedure is scheduled for Wednesday 1 week from today assuming that we were able to obtain the metallic stent by then.    Procedure:  URETEROSCOPY LASER LITHOTRIPSY WITH STENT INSERTION LEFT

## 2018-01-03 NOTE — NURSING NOTE
9820 CALLED TO CECILE CERVANTES CRNA OF PT'S HAVING ABNORMAL EKG SINCE 2016 LAST THREE EKGS HAVE BEEN ABNORMAL. HAS NOT SEEN A CARDIOLOGIST, DR VENTURA FOR OVER 2 YEARS. INFORMED CECILE OF PROCEDURE SCHEDULED AND CECILE SAID PT WOULD NEED CARDIAC CLEARANCE FOR SCHEDULED PROCEDURE.  0802 CALLED TO IVONE AT DR MANJARREZ'S OFFICE OF PT NEEDING CARDIAC CLEARANCE PRIOR TO HAVING SCHEDULED PROCEDURE. IVONE SAID SHE WILL INFORM DR MANJARREZ. INFORMED PT THAT HE WILL NEED TO SEE DR VENTURA PRIOR TO THE PROCEDURE AND PT SAID 'THEY AIN'T NOTHING WRONG WITH MY HEART'

## 2018-01-10 ENCOUNTER — ANESTHESIA (OUTPATIENT)
Dept: PERIOP | Facility: HOSPITAL | Age: 83
End: 2018-01-10

## 2018-01-10 ENCOUNTER — ANESTHESIA EVENT (OUTPATIENT)
Dept: PERIOP | Facility: HOSPITAL | Age: 83
End: 2018-01-10

## 2018-01-10 ENCOUNTER — APPOINTMENT (OUTPATIENT)
Dept: GENERAL RADIOLOGY | Facility: HOSPITAL | Age: 83
End: 2018-01-10

## 2018-01-10 ENCOUNTER — HOSPITAL ENCOUNTER (OUTPATIENT)
Facility: HOSPITAL | Age: 83
Setting detail: HOSPITAL OUTPATIENT SURGERY
Discharge: HOME | End: 2018-01-10
Attending: UROLOGY | Admitting: UROLOGY

## 2018-01-10 ENCOUNTER — APPOINTMENT (OUTPATIENT)
Dept: GENERAL RADIOLOGY | Facility: HOSPITAL | Age: 83
End: 2018-01-10
Attending: UROLOGY

## 2018-01-10 VITALS
TEMPERATURE: 97.1 F | OXYGEN SATURATION: 95 % | SYSTOLIC BLOOD PRESSURE: 170 MMHG | DIASTOLIC BLOOD PRESSURE: 83 MMHG | WEIGHT: 228 LBS | HEIGHT: 69 IN | BODY MASS INDEX: 33.77 KG/M2 | RESPIRATION RATE: 20 BRPM | HEART RATE: 64 BPM

## 2018-01-10 DIAGNOSIS — N13.5 URETERAL STRICTURE, LEFT: ICD-10-CM

## 2018-01-10 LAB — GLUCOSE BLDC GLUCOMTR-MCNC: 147 MG/DL (ref 70–130)

## 2018-01-10 PROCEDURE — 74018 RADEX ABDOMEN 1 VIEW: CPT

## 2018-01-10 PROCEDURE — 52332 CYSTOSCOPY AND TREATMENT: CPT | Performed by: UROLOGY

## 2018-01-10 PROCEDURE — C1769 GUIDE WIRE: HCPCS | Performed by: UROLOGY

## 2018-01-10 PROCEDURE — C2625 STENT, NON-COR, TEM W/DEL SY: HCPCS | Performed by: UROLOGY

## 2018-01-10 PROCEDURE — 82962 GLUCOSE BLOOD TEST: CPT

## 2018-01-10 DEVICE — STNT URETRL RESONANCE METALLIC 6F24CM: Type: IMPLANTABLE DEVICE | Status: FUNCTIONAL

## 2018-01-10 RX ORDER — BISACODYL 5 MG/1
5 TABLET, DELAYED RELEASE ORAL DAILY PRN
COMMUNITY
End: 2018-11-19

## 2018-01-10 RX ORDER — OXYCODONE HYDROCHLORIDE AND ACETAMINOPHEN 5; 325 MG/1; MG/1
1 TABLET ORAL EVERY 6 HOURS PRN
Qty: 6 TABLET | Refills: 0 | Status: SHIPPED | OUTPATIENT
Start: 2018-01-10 | End: 2018-08-16

## 2018-01-10 RX ORDER — MORPHINE SULFATE 2 MG/ML
2 INJECTION, SOLUTION INTRAMUSCULAR; INTRAVENOUS
Status: DISCONTINUED | OUTPATIENT
Start: 2018-01-10 | End: 2018-01-10 | Stop reason: HOSPADM

## 2018-01-10 RX ORDER — MAGNESIUM HYDROXIDE 1200 MG/15ML
LIQUID ORAL AS NEEDED
Status: DISCONTINUED | OUTPATIENT
Start: 2018-01-10 | End: 2018-01-10 | Stop reason: HOSPADM

## 2018-01-10 RX ORDER — CIPROFLOXACIN 500 MG/1
500 TABLET, FILM COATED ORAL EVERY 12 HOURS SCHEDULED
Qty: 14 TABLET | Refills: 0 | Status: SHIPPED | OUTPATIENT
Start: 2018-01-10 | End: 2018-01-17

## 2018-01-10 RX ORDER — GLIMEPIRIDE 2 MG/1
2 TABLET ORAL
COMMUNITY
End: 2018-08-16 | Stop reason: ALTCHOICE

## 2018-01-10 RX ORDER — SODIUM CHLORIDE, SODIUM LACTATE, POTASSIUM CHLORIDE, CALCIUM CHLORIDE 600; 310; 30; 20 MG/100ML; MG/100ML; MG/100ML; MG/100ML
1000 INJECTION, SOLUTION INTRAVENOUS CONTINUOUS PRN
Status: DISCONTINUED | OUTPATIENT
Start: 2018-01-10 | End: 2018-01-10 | Stop reason: HOSPADM

## 2018-01-10 RX ORDER — LEVOFLOXACIN 5 MG/ML
500 INJECTION, SOLUTION INTRAVENOUS ONCE
Status: DISCONTINUED | OUTPATIENT
Start: 2018-01-10 | End: 2018-01-10 | Stop reason: HOSPADM

## 2018-01-10 RX ORDER — NEOMYCIN AND POLYMYXIN B SULFATES 40; 200000 MG/ML; [USP'U]/ML
SOLUTION IRRIGATION AS NEEDED
Status: DISCONTINUED | OUTPATIENT
Start: 2018-01-10 | End: 2018-01-10 | Stop reason: HOSPADM

## 2018-01-10 RX ADMIN — SODIUM CHLORIDE, POTASSIUM CHLORIDE, SODIUM LACTATE AND CALCIUM CHLORIDE 1000 ML: 600; 310; 30; 20 INJECTION, SOLUTION INTRAVENOUS at 09:56

## 2018-01-10 NOTE — PLAN OF CARE
Problem: Patient Care Overview (Adult)  Goal: Plan of Care Review  Outcome: Ongoing (interventions implemented as appropriate)   01/10/18 1230   Outcome Evaluation   Outcome Summary/Follow up Plan PACU DISCHARGE CRITERIAM MET

## 2018-01-10 NOTE — ANESTHESIA PREPROCEDURE EVALUATION
Anesthesia Evaluation     Patient summary reviewed and Nursing notes reviewed   no history of anesthetic complications:  NPO Solid Status: > 8 hours  NPO Liquid Status: > 8 hours     Airway   Mallampati: I  TM distance: >3 FB  Neck ROM: full  no difficulty expected  Dental - normal exam   (+) lower dentures and upper dentures    Pulmonary - negative pulmonary ROS and normal exam   Cardiovascular - normal exam    ECG reviewed    (+) hypertension well controlled, hyperlipidemia    ROS comment: Card clearence    Neuro/Psych- negative ROS  GI/Hepatic/Renal/Endo    (+)  diabetes mellitus type 2 well controlled, hypothyroidism,     Musculoskeletal (-) negative ROS    Abdominal    Substance History - negative use     OB/GYN negative ob/gyn ROS         Other - negative ROS                                             Anesthesia Plan    ASA 3     MAC and general     intravenous induction   Anesthetic plan and risks discussed with patient.

## 2018-01-10 NOTE — PLAN OF CARE
Problem: Perioperative Period (Adult)  Goal: Signs and Symptoms of Listed Potential Problems Will be Absent or Manageable (Perioperative Period)  Outcome: Ongoing (interventions implemented as appropriate)   01/10/18 0900   Perioperative Period   Problems Assessed (Perioperative Period) all   Problems Present (Perioperative Period) none

## 2018-01-10 NOTE — PLAN OF CARE
Problem: Perioperative Period (Adult)  Intervention: Promote Pulmonary Hygiene and Secretion Clearance   01/10/18 1133   Activity   Activity Type bedrest   Promote Aggressive Pulmonary Hygiene/Secretion Management   Cough And Deep Breathing done independently per patient   Positioning   Head Of Bed (HOB) Position HOB at 30 degrees     Intervention: Monitor/Manage Pain   01/10/18 1133   Safety Interventions   Medication Review/Management medications reviewed     Intervention: Prevent Myriam-procedural Injury   01/10/18 1133   Positioning   Positioning semi-Fowlers;foot of bed elevated   Head Of Bed (HOB) Position HOB at 30 degrees     Intervention: Prevent/Manage DVT/VTE Risk   01/10/18 1133   Support Surgical/Anesthesia Recovery   Venous Thromboembolism Prevent/Manage plantar flexion performed     Intervention: Monitor/Manage Postoperative Bleeding   01/10/18 1133   Safety Interventions   Bleeding Management affected area elevated

## 2018-01-10 NOTE — ANESTHESIA PROCEDURE NOTES
Airway  Urgency: elective    Airway not difficult    General Information and Staff    Patient location during procedure: OR  CRNA: CARLITA CERVANTES    Indications and Patient Condition  Indications for airway management: CNS depression    Preoxygenated: yes  Mask difficulty assessment: 1 - vent by mask    Final Airway Details  Final airway type: supraglottic airway      Successful airway: classic  Size 5    Number of attempts at approach: 1

## 2018-01-10 NOTE — ANESTHESIA POSTPROCEDURE EVALUATION
Patient: Forrest Zepeda    Procedure Summary     Date Anesthesia Start Anesthesia Stop Room / Location    01/10/18 1039 1136  [facility] /  [facility]       Procedure Diagnosis Surgeon Provider     cysto, left ureteral stent replacement  (Left ) Ureteral stricture, left  (Ureteral stricture, left [N13.5]) MD Ryne Aquino CRNA          Anesthesia Type: MAC, general  Last vitals  BP   139/68 (01/10/18 0947)   Temp   98.1 °F (36.7 °C) (01/10/18 0947)   Pulse   68 (01/10/18 0947)   Resp   18 (01/10/18 0947)     SpO2   95 % (01/10/18 0947)     Post Anesthesia Care and Evaluation    Patient location during evaluation: PACU  Patient participation: complete - patient participated  Level of consciousness: awake and alert  Pain score: 2  Pain management: satisfactory to patient  Airway patency: patent  Anesthetic complications: No anesthetic complications  PONV Status: none  Cardiovascular status: stable and acceptable  Respiratory status: acceptable  Hydration status: acceptable

## 2018-01-10 NOTE — DISCHARGE INSTRUCTIONS
Please follow all post op instructions and follow up appointment time from your physician's office included in your discharge packet.  .   No pushing, pulling, tugging,  heavy lifting, or strenuous activity.  No major decision making, driving, or drinking alcoholic beverages for 24 hours. ( due to the medications you have  received)  Always use good hand hygiene/washing techniques.  NO driving while taking pain medications.    To assist you in voiding:  Drink plenty of fluids  Listen to running water while attempting to void.    If you are unable to urinate and you have an uncomfortable urge to void or it has been   6 hours since you were discharged, return to the Emergency Room  Strain all urine,if any stones ,take to your follow up appointment

## 2018-01-10 NOTE — OP NOTE
URETEROSCOPY LASER LITHOTRIPSY WITH STENT INSERTION  Procedure Note    Forrest Zepeda  1/10/2018    Pre-op Diagnosis:   Ureteral stricture, left [N13.5]    Post-op Diagnosis:     Post-Op Diagnosis Codes:     * Ureteral stricture, left [N13.5]    Procedure/CPT® Codes:      Procedure(s):   cysto, left ureteral stent replacement     Surgeon(s):  Griffin Romero MD    Anesthesia: General  Operative technique: This patient with a known left ureteral stricture and history of stones and has had an indwelling ureteral stent for several years being changed on a yearly basis.  He was given general anesthesia placed in the dorsal lithotomy position prepped and draped in routine sterile fashion.  The patient had a tight phimosis which precluded retraction of the foreskin.  The ureteroscope was inserted through the opening used to find the urethral meatus inserted into the bladder and the encrusted left ureteral stent was immediately seen.  Ureteroscope was inserted alongside the stent and used to insert a 35,000 guidewire up into the left renal pelvis.  Because of tortuosity of the distal ureter I've had the experience of removing the stent and being unable to regain access so I always place a guidewire before removing the stent.  The cystoscope was then inserted alongside the guidewire and with a grasping forceps the encrusted ureteral stent was extracted.  A 24 cm resonance metallic stent was then inserted through a sheath up into the left renal pelvis.  The patient was then awakened from anesthesia and was taken to the recovery room in a satisfactory condition.  The obese sent home with a prescription for Cipro and Percocet to return to the office in 2 weeks' time for follow-up urinalysis.  Staff:   Circulator: Yajaira Medeiros RN; Teresa Ngo RN  Scrub Person: Fabiano Nolasco; Lilliana Roach    Estimated Blood Loss: none    Specimens:                None      Drains:   Ureteral Catheter 01/10/18 1113 left ureter  (Active)           Findings: Encrusted ureteral stent    Complications: None      Griffin Romero MD     Date: 1/10/2018  Time: 11:27 AM

## 2018-01-23 ENCOUNTER — OFFICE VISIT (OUTPATIENT)
Dept: UROLOGY | Facility: CLINIC | Age: 83
End: 2018-01-23

## 2018-01-23 VITALS
OXYGEN SATURATION: 98 % | BODY MASS INDEX: 33.77 KG/M2 | WEIGHT: 228 LBS | HEIGHT: 69 IN | HEART RATE: 70 BPM | TEMPERATURE: 97.9 F

## 2018-01-23 DIAGNOSIS — N13.5 URETERAL STRICTURE: Primary | ICD-10-CM

## 2018-01-23 PROCEDURE — 99213 OFFICE O/P EST LOW 20 MIN: CPT | Performed by: UROLOGY

## 2018-01-23 PROCEDURE — 81003 URINALYSIS AUTO W/O SCOPE: CPT | Performed by: UROLOGY

## 2018-01-23 NOTE — PROGRESS NOTES
Chief Complaint  Post-op (PATIENT IS HERE FOR POST-OP. )        JAZMIN Zepeda is a 85 y.o. male who goes by aJrrett returns today for follow-up of his tortuous strictured ureter that is been handled for years with an indwelling stent.  He has a metallic stent which only has to be changed once a year and returns after successful change.  He states this was the easiest ever he saw no blood after the procedure.  He's had stones in the past that required laser lithotripsy but none recently.  I suggested that when we remove the stent next year that we don't replace it to see if he can get by without it.  Unfortunately access to the ureter is limited and has required a percutaneous antegrade approach when we lost access.  Hopefully we'll have a urologist here next year that can perform that procedure if need be because currently our radiologist cannot.    Vitals:    01/23/18 1110   Pulse: 70   Temp: 97.9 °F (36.6 °C)   SpO2: 98%       Past Medical History  Past Medical History:   Diagnosis Date   • Abnormal PSA     Reported abnormal   • Benign localized hyperplasia of prostate    • Bilateral knee pain    • Cataracts, bilateral    • Chronic gout    • Diabetic neuropathy    • Disease of thyroid gland     HYPOTHYROIDISM   • Dyslipidemia     H/O   • Full dentures    • Generalized osteoarthritis of multiple sites    • History of chronic kidney disease    • History of gout    • History of nephrolithiasis    • History of osteopenia    • Hyperkalemia    • Hypertension    • Insomnia    • Pharyngitis, acute    • Renal insufficiency    • Shortness of breath    • Type 2 diabetes mellitus    • Ureteral stricture, left    • Vitamin D deficiency    • Wears glasses        Past Surgical History  Past Surgical History:   Procedure Laterality Date   • APPENDECTOMY     • CHOLECYSTECTOMY     • EYE SURGERY      CATARACTS REMOVED BILATERALLY   • JOINT REPLACEMENT Bilateral     BILATERAL KNEES REPLACED   • KNEE ARTHROSCOPY Bilateral    • RENAL  ARTERY STENT      SEVERAL TIMES EVERY SINCE 1978   • URETEROSCOPY LASER LITHOTRIPSY WITH STENT INSERTION Left 1/10/2018    Procedure:  cysto, left ureteral stent replacement ;  Surgeon: Griffin Romero MD;  Location: [facility];  Service:        Medications  has a current medication list which includes the following prescription(s): allopurinol, bisacodyl, carvedilol, diphenhydramine-apap (sleep), finasteride, glimepiride, levothyroxine, multiple vitamins-minerals, and oxycodone-acetaminophen.      Allergies  Allergies   Allergen Reactions   • Metformin Diarrhea   • Statins Myalgia       Social History  Social History     Social History Narrative       Family History  He has no family history of bladder or kidney cancer  He has no family history of kidney stones      AUA Symptom Score:      Review of Systems  Review of Systems   Constitutional: Negative.    Genitourinary: Negative.    All other systems reviewed and are negative.      Physical Exam  Physical Exam    Labs Recent and today in the office:  Results for orders placed or performed in visit on 01/23/18   POC Urinalysis Dipstick, Automated   Result Value Ref Range    Color Yellow Yellow, Straw, Dark Yellow, Sushila    Clarity, UA Clear Clear    Glucose, UA Negative Negative, 1000 mg/dL (3+) mg/dL    Bilirubin Negative Negative    Ketones, UA Negative Negative    Specific Gravity  1.030 1.005 - 1.030    Blood, UA 2+ (A) Negative    pH, Urine 6.0 5.0 - 8.0    Protein, POC 1+ (A) Negative mg/dL    Urobilinogen, UA Normal Normal    Leukocytes Negative Negative    Nitrite, UA Negative Negative         Assessment & Plan  Ureteral stricture: He is doing well after insertion of a fresh metallic stent so he can return on an annual basis.  He has completed 1 week of antibiotics and his urine today is uninfected.

## 2018-04-07 DIAGNOSIS — M10.9 GOUTY ARTHRITIS: ICD-10-CM

## 2018-04-11 RX ORDER — ALLOPURINOL 300 MG/1
TABLET ORAL
Qty: 90 TABLET | Refills: 2 | Status: SHIPPED | OUTPATIENT
Start: 2018-04-11 | End: 2018-11-05 | Stop reason: SDUPTHER

## 2018-04-16 ENCOUNTER — OFFICE VISIT (OUTPATIENT)
Dept: FAMILY MEDICINE CLINIC | Facility: CLINIC | Age: 83
End: 2018-04-16

## 2018-04-16 VITALS
HEART RATE: 82 BPM | RESPIRATION RATE: 18 BRPM | SYSTOLIC BLOOD PRESSURE: 138 MMHG | WEIGHT: 230 LBS | OXYGEN SATURATION: 97 % | TEMPERATURE: 97.9 F | BODY MASS INDEX: 34.07 KG/M2 | HEIGHT: 69 IN | DIASTOLIC BLOOD PRESSURE: 90 MMHG

## 2018-04-16 DIAGNOSIS — N40.0 BENIGN LOCALIZED HYPERPLASIA OF PROSTATE: ICD-10-CM

## 2018-04-16 DIAGNOSIS — Z12.5 SCREENING FOR PROSTATE CANCER: ICD-10-CM

## 2018-04-16 DIAGNOSIS — E11.8 TYPE 2 DIABETES MELLITUS WITH COMPLICATION, WITHOUT LONG-TERM CURRENT USE OF INSULIN (HCC): Primary | ICD-10-CM

## 2018-04-16 DIAGNOSIS — E03.9 ACQUIRED HYPOTHYROIDISM: ICD-10-CM

## 2018-04-16 PROBLEM — D72.829 LEUKOCYTOSIS: Status: RESOLVED | Noted: 2017-08-09 | Resolved: 2018-04-16

## 2018-04-16 LAB
EXPIRATION DATE: ABNORMAL
EXPIRATION DATE: ABNORMAL
HBA1C MFR BLD: 7.9 %
Lab: ABNORMAL
Lab: ABNORMAL
POC CREATININE URINE: ABNORMAL
POC MICROALBUMIN URINE: ABNORMAL

## 2018-04-16 PROCEDURE — 83036 HEMOGLOBIN GLYCOSYLATED A1C: CPT | Performed by: FAMILY MEDICINE

## 2018-04-16 PROCEDURE — 99213 OFFICE O/P EST LOW 20 MIN: CPT | Performed by: FAMILY MEDICINE

## 2018-04-16 PROCEDURE — 82044 UR ALBUMIN SEMIQUANTITATIVE: CPT | Performed by: FAMILY MEDICINE

## 2018-04-16 NOTE — PROGRESS NOTES
"Subjective   Forrest Zepeda is a 85 y.o. male.     Diabetes   He presents for his follow-up diabetic visit. He has type 2 diabetes mellitus. There are no hypoglycemic associated symptoms. Pertinent negatives for hypoglycemia include no headaches or speech difficulty. There are no diabetic associated symptoms. Pertinent negatives for diabetes include no chest pain. There are no hypoglycemic complications. Risk factors for coronary artery disease include diabetes mellitus, dyslipidemia, hypertension and male sex. Current diabetic treatment includes oral agent (monotherapy) (Glimepiride 2 mg daily). He is compliant with treatment all of the time. His weight is stable. He is following a generally healthy diet. He participates in exercise intermittently. His home blood glucose trend is increasing steadily (A1c 7.9% today). An ACE inhibitor/angiotensin II receptor blocker is not being taken. He does not see a podiatrist.Eye exam is current.        BP today is 138/90. 130/80 on my recheck.    The following portions of the patient's history were reviewed and updated as appropriate: allergies, current medications, past social history and problem list.    Review of Systems   Constitutional: Negative for fever.   HENT: Negative for congestion.    Respiratory: Negative for shortness of breath.    Cardiovascular: Negative for chest pain.   Genitourinary: Positive for frequency.   Musculoskeletal: Positive for arthralgias.   Neurological: Negative for speech difficulty and headaches.       Objective   /90   Pulse 82   Temp 97.9 °F (36.6 °C) (Oral)   Resp 18   Ht 175.3 cm (69\")   Wt 104 kg (230 lb)   SpO2 97%   BMI 33.97 kg/m²   Physical Exam   Constitutional: He appears well-developed and well-nourished.   Cardiovascular: Normal rate and regular rhythm.    Pulmonary/Chest: Effort normal and breath sounds normal.    Forrest had a diabetic foot exam performed today.  Nursing note and vitals " reviewed.      Assessment/Plan   Problem List Items Addressed This Visit        Endocrine    Type 2 diabetes mellitus - Primary    Relevant Orders    POC Glycosylated Hemoglobin (Hb A1C)    Acquired hypothyroidism       Genitourinary    Benign localized hyperplasia of prostate      Other Visit Diagnoses     Screening for prostate cancer                  Drink plenty fluids.  Work on diet and weight loss.  Cut back on concentrated sweets and carbohydrates.    Continue medications as doing.    Check a Micro albumin,CBC,CMP,Lipids,PSA, Free T 4,and TSH. Report results by letter.      Follow up in 4 months.                Scribed for Dr Ryne Lofton by Zahida Townsend CMA.          I, Ryne Lofton MD, personally performed the services described in this documentation, as scribed by Zahida Townsend in my presence, and is both accurate and complete.

## 2018-04-17 LAB
ALBUMIN SERPL-MCNC: 4.2 G/DL (ref 3.2–4.8)
ALBUMIN/GLOB SERPL: 1.4 G/DL (ref 1.5–2.5)
ALP SERPL-CCNC: 92 U/L (ref 25–100)
ALT SERPL-CCNC: 38 U/L (ref 7–40)
AST SERPL-CCNC: 35 U/L (ref 0–33)
BASOPHILS # BLD AUTO: 0.07 10*3/MM3 (ref 0–0.2)
BASOPHILS NFR BLD AUTO: 0.8 % (ref 0–1)
BILIRUB SERPL-MCNC: 0.7 MG/DL (ref 0.3–1.2)
BUN SERPL-MCNC: 19 MG/DL (ref 9–23)
BUN/CREAT SERPL: 15.8 (ref 7–25)
CALCIUM SERPL-MCNC: 9.3 MG/DL (ref 8.7–10.4)
CHLORIDE SERPL-SCNC: 106 MMOL/L (ref 99–109)
CHOLEST SERPL-MCNC: 246 MG/DL (ref 0–200)
CO2 SERPL-SCNC: 29 MMOL/L (ref 20–31)
CREAT SERPL-MCNC: 1.2 MG/DL (ref 0.6–1.3)
EOSINOPHIL # BLD AUTO: 0.31 10*3/MM3 (ref 0–0.3)
EOSINOPHIL NFR BLD AUTO: 3.5 % (ref 0–3)
ERYTHROCYTE [DISTWIDTH] IN BLOOD BY AUTOMATED COUNT: 14.9 % (ref 11.3–14.5)
GFR SERPLBLD CREATININE-BSD FMLA CKD-EPI: 58 ML/MIN/1.73
GFR SERPLBLD CREATININE-BSD FMLA CKD-EPI: 70 ML/MIN/1.73
GLOBULIN SER CALC-MCNC: 3 GM/DL
GLUCOSE SERPL-MCNC: 169 MG/DL (ref 70–100)
HCT VFR BLD AUTO: 47.2 % (ref 38.9–50.9)
HDLC SERPL-MCNC: 38 MG/DL (ref 40–60)
HGB BLD-MCNC: 15 G/DL (ref 13.1–17.5)
IMM GRANULOCYTES # BLD: 0.03 10*3/MM3 (ref 0–0.03)
IMM GRANULOCYTES NFR BLD: 0.3 % (ref 0–0.6)
LDLC SERPL CALC-MCNC: ABNORMAL MG/DL
LYMPHOCYTES # BLD AUTO: 3.05 10*3/MM3 (ref 0.6–4.8)
LYMPHOCYTES NFR BLD AUTO: 34.3 % (ref 24–44)
MCH RBC QN AUTO: 30.9 PG (ref 27–31)
MCHC RBC AUTO-ENTMCNC: 31.8 G/DL (ref 32–36)
MCV RBC AUTO: 97.1 FL (ref 80–99)
MONOCYTES # BLD AUTO: 0.71 10*3/MM3 (ref 0–1)
MONOCYTES NFR BLD AUTO: 8 % (ref 0–12)
NEUTROPHILS # BLD AUTO: 4.73 10*3/MM3 (ref 1.5–8.3)
NEUTROPHILS NFR BLD AUTO: 53.1 % (ref 41–71)
PLATELET # BLD AUTO: 191 10*3/MM3 (ref 150–450)
POTASSIUM SERPL-SCNC: 4.7 MMOL/L (ref 3.5–5.5)
PROT SERPL-MCNC: 7.2 G/DL (ref 5.7–8.2)
PSA SERPL-MCNC: 0.87 NG/ML (ref 0–4)
RBC # BLD AUTO: 4.86 10*6/MM3 (ref 4.2–5.76)
SODIUM SERPL-SCNC: 143 MMOL/L (ref 132–146)
T4 FREE SERPL-MCNC: 1.1 NG/DL (ref 0.89–1.76)
TRIGL SERPL-MCNC: 538 MG/DL (ref 0–150)
TSH SERPL DL<=0.005 MIU/L-ACNC: 3.01 MIU/ML (ref 0.35–5.35)
VLDLC SERPL CALC-MCNC: ABNORMAL MG/DL
WBC # BLD AUTO: 8.9 10*3/MM3 (ref 3.5–10.8)

## 2018-06-22 DIAGNOSIS — E03.9 ACQUIRED HYPOTHYROIDISM: ICD-10-CM

## 2018-06-22 RX ORDER — LEVOTHYROXINE SODIUM 0.05 MG/1
TABLET ORAL
Qty: 90 TABLET | Refills: 0 | Status: SHIPPED | OUTPATIENT
Start: 2018-06-22 | End: 2018-08-16 | Stop reason: SDUPTHER

## 2018-08-16 ENCOUNTER — OFFICE VISIT (OUTPATIENT)
Dept: FAMILY MEDICINE CLINIC | Facility: CLINIC | Age: 83
End: 2018-08-16

## 2018-08-16 VITALS
WEIGHT: 231 LBS | TEMPERATURE: 98.3 F | DIASTOLIC BLOOD PRESSURE: 76 MMHG | OXYGEN SATURATION: 95 % | HEIGHT: 69 IN | SYSTOLIC BLOOD PRESSURE: 126 MMHG | HEART RATE: 82 BPM | RESPIRATION RATE: 22 BRPM | BODY MASS INDEX: 34.21 KG/M2

## 2018-08-16 DIAGNOSIS — E03.9 ACQUIRED HYPOTHYROIDISM: ICD-10-CM

## 2018-08-16 DIAGNOSIS — E11.9 TYPE 2 DIABETES MELLITUS WITHOUT COMPLICATION, WITHOUT LONG-TERM CURRENT USE OF INSULIN (HCC): Primary | ICD-10-CM

## 2018-08-16 LAB — HBA1C MFR BLD: 8.4 %

## 2018-08-16 PROCEDURE — 99213 OFFICE O/P EST LOW 20 MIN: CPT | Performed by: FAMILY MEDICINE

## 2018-08-16 PROCEDURE — 83036 HEMOGLOBIN GLYCOSYLATED A1C: CPT | Performed by: FAMILY MEDICINE

## 2018-08-16 RX ORDER — LEVOTHYROXINE SODIUM 0.05 MG/1
50 TABLET ORAL DAILY
Qty: 90 TABLET | Refills: 3 | Status: SHIPPED | OUTPATIENT
Start: 2018-08-16 | End: 2019-09-06 | Stop reason: SDUPTHER

## 2018-08-16 NOTE — PROGRESS NOTES
"Subjective   Forrest Zepeda is a 86 y.o. male.     Diabetes   He presents for his follow-up diabetic visit. He has type 2 diabetes mellitus. There are no hypoglycemic associated symptoms. There are no diabetic associated symptoms. Pertinent negatives for diabetes include no chest pain. There are no hypoglycemic complications. Risk factors for coronary artery disease include diabetes mellitus, hypertension, male sex and obesity. Current diabetic treatment includes oral agent (monotherapy) (Glimepiride 2 mg). He is compliant with treatment all of the time. His weight is stable. He is following a generally healthy diet. He participates in exercise intermittently. His home blood glucose trend is increasing steadily. An ACE inhibitor/angiotensin II receptor blocker is not being taken. He does not see a podiatrist.Eye exam is not current.        The following portions of the patient's history were reviewed and updated as appropriate: allergies, current medications, past social history and problem list.    Review of Systems   Respiratory: Negative for shortness of breath.    Cardiovascular: Negative for chest pain.   Neurological: Positive for light-headedness (occasional).       Objective   /76   Pulse 82   Temp 98.3 °F (36.8 °C) (Tympanic)   Resp 22   Ht 175.3 cm (69\")   Wt 105 kg (231 lb)   SpO2 95%   BMI 34.11 kg/m²   Physical Exam   Constitutional: He appears well-developed and well-nourished.   Cardiovascular: Normal rate and regular rhythm.    Pulmonary/Chest: Effort normal and breath sounds normal.    Forrest had a diabetic foot exam performed (Sensation good,pulses intact, and a small abrasion top of left foot.) today.  Nursing note and vitals reviewed.      Assessment/Plan   Problem List Items Addressed This Visit        Endocrine    Type 2 diabetes mellitus (CMS/HCC) - Primary    Relevant Orders    POC Glycosylated Hemoglobin (Hb A1C) (Completed)    Acquired hypothyroidism        Hemoglobin A1c has " increased to 8.4%.  The glimepiride does not appear to be exerting much effect and does put him at greater risk for hypoglycemia.  I will therefore replace it with Januvia 100 mg a day.  He also needs to see his ophthalmologist.  Given a refill on the Synthroid 50 µg a day.      Drink plenty fluids.    Stop Glimepiride.  Rx for Januvia 100 mg daily #90+3..    Continue other medications as doing.    Refill Levothyroxine 50 mcg daily #90+3.    Follow up in 3 months. Sooner if needed.            Scribed for Dr Ryne Lofton by Zahida Townsend CMA.          I, Ryne Lofton MD, personally performed the services described in this documentation, as scribed by Zahida Townsend in my presence, and is both accurate and complete.

## 2018-11-05 ENCOUNTER — OFFICE VISIT (OUTPATIENT)
Dept: FAMILY MEDICINE CLINIC | Facility: CLINIC | Age: 83
End: 2018-11-05

## 2018-11-05 VITALS
HEIGHT: 67 IN | WEIGHT: 222.6 LBS | HEART RATE: 76 BPM | RESPIRATION RATE: 24 BRPM | OXYGEN SATURATION: 96 % | BODY MASS INDEX: 34.94 KG/M2 | TEMPERATURE: 97.8 F | SYSTOLIC BLOOD PRESSURE: 150 MMHG | DIASTOLIC BLOOD PRESSURE: 74 MMHG

## 2018-11-05 DIAGNOSIS — M10.9 GOUTY ARTHRITIS: ICD-10-CM

## 2018-11-05 DIAGNOSIS — E11.9 TYPE 2 DIABETES MELLITUS WITHOUT COMPLICATION, WITHOUT LONG-TERM CURRENT USE OF INSULIN (HCC): Primary | ICD-10-CM

## 2018-11-05 DIAGNOSIS — L89.891 PRESSURE INJURY OF TOE OF RIGHT FOOT, STAGE 1: ICD-10-CM

## 2018-11-05 LAB — HBA1C MFR BLD: 12.4 %

## 2018-11-05 PROCEDURE — 83036 HEMOGLOBIN GLYCOSYLATED A1C: CPT | Performed by: FAMILY MEDICINE

## 2018-11-05 PROCEDURE — G0008 ADMIN INFLUENZA VIRUS VAC: HCPCS | Performed by: FAMILY MEDICINE

## 2018-11-05 PROCEDURE — 90662 IIV NO PRSV INCREASED AG IM: CPT | Performed by: FAMILY MEDICINE

## 2018-11-05 PROCEDURE — 99213 OFFICE O/P EST LOW 20 MIN: CPT | Performed by: FAMILY MEDICINE

## 2018-11-05 RX ORDER — GLIMEPIRIDE 2 MG/1
2 TABLET ORAL
Qty: 90 TABLET | Refills: 1 | Status: SHIPPED | OUTPATIENT
Start: 2018-11-05 | End: 2019-05-06 | Stop reason: SDUPTHER

## 2018-11-05 RX ORDER — ALLOPURINOL 300 MG/1
300 TABLET ORAL DAILY
Qty: 90 TABLET | Refills: 3 | Status: SHIPPED | OUTPATIENT
Start: 2018-11-05 | End: 2019-07-18

## 2018-11-05 NOTE — PROGRESS NOTES
"Subjective   Forrest Zepeda is a 86 y.o. male seen today for Diabetes; sore on toe; and Flu Vaccine.     Diabetes   He presents for his follow-up diabetic visit. He has type 2 diabetes mellitus. There are no hypoglycemic associated symptoms. Associated symptoms include foot ulcerations. Pertinent negatives for diabetes include no chest pain. There are no hypoglycemic complications. There are no diabetic complications. Risk factors for coronary artery disease include diabetes mellitus, hypertension and male sex. Current diabetic treatment includes oral agent (monotherapy) (Januvia). He is compliant with treatment all of the time. His weight is decreasing steadily. He is following a generally healthy diet. He participates in exercise intermittently. His home blood glucose trend is increasing steadily.          The following portions of the patient's history were reviewed and updated as appropriate: allergies, current medications, past social history and problem list.    Review of Systems   Respiratory: Negative for shortness of breath.    Cardiovascular: Negative for chest pain.   Skin: Positive for wound (ulcer right great toe).       Objective   /74   Pulse 76   Temp 97.8 °F (36.6 °C) (Temporal Artery )   Resp 24   Ht 170.2 cm (67\")   Wt 101 kg (222 lb 9.6 oz)   SpO2 96%   BMI 34.86 kg/m²   Physical Exam   Cardiovascular: Normal rate and regular rhythm.   No murmur heard.  Pulmonary/Chest: Effort normal and breath sounds normal. He has no wheezes. He has no rales.   Skin: Skin is warm and dry.   Semination at the dorsum of the right first toe reveals a stage I decubitus that is a 6 mm x 8 mm.  This wound is not open and there is no erythema or tenderness.       Assessment/Plan   Problem List Items Addressed This Visit        Endocrine    Type 2 diabetes mellitus (CMS/[facility]) - Primary    Relevant Orders    POC Glycosylated Hemoglobin (Hb A1C) (Completed)      Other Visit Diagnoses     Pressure injury " of toe of right foot, stage 1        Gouty arthritis                  Drink plenty fluids.    Keep the decubitus covered with band aid and change every other day.  Prop foot up as much as possible.    Written Rx for diabetic shoes.    Refer to Podiatry.    Refill Allopurinol and Januvia #90+3.  Restart Glimepiride 2 mg daily #90+1.    Follow up on the 19 th as scheduled.                  Scribed for Dr Ryne Lofton by Zahida Townsend CMA.

## 2018-11-19 ENCOUNTER — OFFICE VISIT (OUTPATIENT)
Dept: FAMILY MEDICINE CLINIC | Facility: CLINIC | Age: 83
End: 2018-11-19

## 2018-11-19 VITALS
WEIGHT: 226.4 LBS | RESPIRATION RATE: 20 BRPM | HEART RATE: 70 BPM | DIASTOLIC BLOOD PRESSURE: 80 MMHG | TEMPERATURE: 97.7 F | BODY MASS INDEX: 35.53 KG/M2 | OXYGEN SATURATION: 95 % | SYSTOLIC BLOOD PRESSURE: 142 MMHG | HEIGHT: 67 IN

## 2018-11-19 DIAGNOSIS — E11.65 UNCONTROLLED TYPE 2 DIABETES MELLITUS WITH HYPERGLYCEMIA (HCC): ICD-10-CM

## 2018-11-19 DIAGNOSIS — L97.511 SKIN ULCER OF RIGHT GREAT TOE, LIMITED TO BREAKDOWN OF SKIN (HCC): Primary | ICD-10-CM

## 2018-11-19 PROCEDURE — 99213 OFFICE O/P EST LOW 20 MIN: CPT | Performed by: FAMILY MEDICINE

## 2018-11-19 NOTE — PROGRESS NOTES
"Subjective   Forrest Zepeda is a 86 y.o. male seen today for place on toe and Diabetes.     History of Present Illness   The patient is here today to follow up on great toe ulcer.    States he is doing better. Still some soreness.  Use 2% Mupirocin ointment.  Denies any chest pain or shortness of breath.    Denies any episodes of Hypoglycemia.    The following portions of the patient's history were reviewed and updated as appropriate: allergies, current medications, past social history and problem list.    Review of Systems   Respiratory: Negative for shortness of breath.    Cardiovascular: Negative for chest pain.   Skin: Positive for wound (right great toe ulcer).       Objective   /80   Pulse 70   Temp 97.7 °F (36.5 °C) (Temporal)   Resp 20   Ht 170.2 cm (67\")   Wt 103 kg (226 lb 6.4 oz)   SpO2 95%   BMI 35.46 kg/m²   Physical Exam   Constitutional: He appears well-developed and well-nourished.   Cardiovascular: Normal rate and regular rhythm.   Pulmonary/Chest: Effort normal and breath sounds normal.   Nursing note and vitals reviewed.      Assessment/Plan   Problem List Items Addressed This Visit        Endocrine    Uncontrolled type 2 diabetes mellitus (CMS/HCC)      Other Visit Diagnoses     Skin ulcer of right great toe, limited to breakdown of skin (CMS/HCC)    -  Primary        The ulcer of the right first toe has healed completely.  We will have him continue to protect the skin because of underlying osteoarthritis with a Band-Aid.      Drink plenty fluids.    Continue covering with Band aid as doing.    Continue Januvia and Glimepiride.  Check a hemoglobin A1c at the next visit.  His previous A1c 2 weeks ago was elevated at 12.3%.    Follow up in 4 weeks.          Scribed for Dr Ryne Lofton by Zahida Townsend CMA.          I, Ryne Lofton MD, personally performed the services described in this documentation, as scribed by Zahida Townsend in my presence, and is both accurate and " complete.

## 2018-12-19 ENCOUNTER — OFFICE VISIT (OUTPATIENT)
Dept: FAMILY MEDICINE CLINIC | Facility: CLINIC | Age: 83
End: 2018-12-19

## 2018-12-19 VITALS
WEIGHT: 224.8 LBS | DIASTOLIC BLOOD PRESSURE: 78 MMHG | RESPIRATION RATE: 22 BRPM | TEMPERATURE: 97.1 F | SYSTOLIC BLOOD PRESSURE: 122 MMHG | HEIGHT: 67 IN | OXYGEN SATURATION: 94 % | HEART RATE: 82 BPM | BODY MASS INDEX: 35.28 KG/M2

## 2018-12-19 DIAGNOSIS — L97.511 SKIN ULCER OF FOURTH TOE OF RIGHT FOOT, LIMITED TO BREAKDOWN OF SKIN (HCC): ICD-10-CM

## 2018-12-19 DIAGNOSIS — E11.65 UNCONTROLLED TYPE 2 DIABETES MELLITUS WITH HYPERGLYCEMIA (HCC): Primary | ICD-10-CM

## 2018-12-19 LAB
GLUCOSE BLDC GLUCOMTR-MCNC: 189 MG/DL (ref 70–130)
HBA1C MFR BLD: 10.1 %

## 2018-12-19 PROCEDURE — 82962 GLUCOSE BLOOD TEST: CPT | Performed by: FAMILY MEDICINE

## 2018-12-19 PROCEDURE — 83036 HEMOGLOBIN GLYCOSYLATED A1C: CPT | Performed by: FAMILY MEDICINE

## 2018-12-19 PROCEDURE — 99213 OFFICE O/P EST LOW 20 MIN: CPT | Performed by: FAMILY MEDICINE

## 2018-12-19 RX ORDER — HEPATITIS A VACCINE 1440 [IU]/ML
INJECTION, SUSPENSION INTRAMUSCULAR
COMMUNITY
Start: 2018-10-08 | End: 2019-02-06

## 2019-01-24 ENCOUNTER — OFFICE VISIT (OUTPATIENT)
Dept: UROLOGY | Facility: CLINIC | Age: 84
End: 2019-01-24

## 2019-01-24 VITALS
HEART RATE: 69 BPM | WEIGHT: 224 LBS | SYSTOLIC BLOOD PRESSURE: 142 MMHG | DIASTOLIC BLOOD PRESSURE: 68 MMHG | OXYGEN SATURATION: 98 % | BODY MASS INDEX: 35.08 KG/M2

## 2019-01-24 DIAGNOSIS — N35.819 OTHER STRICTURE OF URETHRA IN MALE: Primary | ICD-10-CM

## 2019-01-24 LAB
BILIRUB BLD-MCNC: NEGATIVE MG/DL
CLARITY, POC: CLEAR
COLOR UR: YELLOW
GLUCOSE UR STRIP-MCNC: NEGATIVE MG/DL
KETONES UR QL: NEGATIVE
LEUKOCYTE EST, POC: NEGATIVE
NITRITE UR-MCNC: NEGATIVE MG/ML
PH UR: 6 [PH] (ref 5–8)
PROT UR STRIP-MCNC: NEGATIVE MG/DL
RBC # UR STRIP: NEGATIVE /UL
SP GR UR: 1.01 (ref 1–1.03)
UROBILINOGEN UR QL: NORMAL

## 2019-01-24 PROCEDURE — 81003 URINALYSIS AUTO W/O SCOPE: CPT | Performed by: UROLOGY

## 2019-01-24 PROCEDURE — 99213 OFFICE O/P EST LOW 20 MIN: CPT | Performed by: UROLOGY

## 2019-01-24 NOTE — PROGRESS NOTES
Chief Complaint  History of Uretheral Stricture (Yearly follow up.)        JAZMIN Zepeda is a 86 y.o. male who returns today for follow-up of a distal left ureteral stricture after repeated ureteroscopy is a laser lithotripsies for calculi.  This is been handled with an indwelling ureteral stent for about 10 years.  He has a metallic stent that has been changed once a year but we did have the experience one time of removing the stent and not being able to reinsert it.  Because of persistent hydronephrosis he had have a percutaneous procedure threading a guidewire down to assist passage of the stent.  He hasn't had a stone or difficulty reinserting the stent for 5 years but we do not have interventional radiology at this time to do a percutaneous antegrade stent insertion.  The patient wants the stent removed and understands that if he has recurrent and persistent pain or obstruction he will need to go to [place] for interventional radiology or additional urologic intervention.    Vitals:    01/24/19 1143   BP: 142/68   Pulse: 69   SpO2: 98%       Past Medical History  Past Medical History:   Diagnosis Date   • Abnormal PSA     Reported abnormal   • Benign localized hyperplasia of prostate    • Bilateral knee pain    • Cataracts, bilateral    • Chronic gout    • Diabetic neuropathy (CMS/HCC)    • Disease of thyroid gland     HYPOTHYROIDISM   • Dyslipidemia     H/O   • Full dentures    • Generalized osteoarthritis of multiple sites    • History of chronic kidney disease    • History of gout    • History of nephrolithiasis    • History of osteopenia    • Hyperkalemia    • Hypertension    • Insomnia    • Pharyngitis, acute    • Renal insufficiency    • Shortness of breath    • Type 2 diabetes mellitus (CMS/HCC)    • Ureteral stricture, left    • Vitamin D deficiency    • Wears glasses        Past Surgical History  Past Surgical History:   Procedure Laterality Date   • APPENDECTOMY     • CHOLECYSTECTOMY     • EYE SURGERY       CATARACTS REMOVED BILATERALLY   • JOINT REPLACEMENT Bilateral     BILATERAL KNEES REPLACED   • KNEE ARTHROSCOPY Bilateral    • RENAL ARTERY STENT      SEVERAL TIMES EVERY SINCE 1978   • URETEROSCOPY LASER LITHOTRIPSY WITH STENT INSERTION Left 1/10/2018    Procedure:  cysto, left ureteral stent replacement ;  Surgeon: Griffin Romero MD;  Location: [facility];  Service:        Medications  has a current medication list which includes the following prescription(s): allopurinol, carvedilol, finasteride, glimepiride, havrix, levothyroxine, multiple vitamins-minerals, sitagliptin, diphenhydramine-apap (sleep), and mupirocin.      Allergies  Allergies   Allergen Reactions   • Metformin Diarrhea   • Statins Myalgia       Social History  Social History     Social History Narrative   • Not on file       Family History  He has no family history of bladder or kidney cancer  He has no family history of kidney stones      AUA Symptom Score:      Review of Systems  Review of Systems    Physical Exam  Physical Exam    Labs Recent and today in the office:  Results for orders placed or performed in visit on 01/24/19   POC Urinalysis Dipstick, Automated   Result Value Ref Range    Color Yellow Yellow, Straw, Dark Yellow, Sushila    Clarity, UA Clear Clear    Specific Gravity  1.015 1.005 - 1.030    pH, Urine 6.0 5.0 - 8.0    Leukocytes Negative Negative    Nitrite, UA Negative Negative    Protein, POC Negative Negative mg/dL    Glucose, UA Negative Negative, 1000 mg/dL (3+) mg/dL    Ketones, UA Negative Negative    Urobilinogen, UA Normal Normal    Bilirubin Negative Negative    Blood, UA Negative Negative         Assessment & Plan  #1 distal left ureteral stricture: Patient is anxious to have ureteroscopy to rule out recurrent stones and if none there have the stent removed.  Currently he has upper respiratory infection with severe bronchitis so he'll return in 2 weeks to see if he is a candidate for general anesthesia.  15  minutes is spent counseling the patient out of a 20 minute visit.

## 2019-02-06 ENCOUNTER — OFFICE VISIT (OUTPATIENT)
Dept: FAMILY MEDICINE CLINIC | Facility: CLINIC | Age: 84
End: 2019-02-06

## 2019-02-06 VITALS
HEART RATE: 80 BPM | DIASTOLIC BLOOD PRESSURE: 72 MMHG | WEIGHT: 229.6 LBS | BODY MASS INDEX: 36.03 KG/M2 | HEIGHT: 67 IN | TEMPERATURE: 97.4 F | SYSTOLIC BLOOD PRESSURE: 132 MMHG | OXYGEN SATURATION: 95 % | RESPIRATION RATE: 18 BRPM

## 2019-02-06 DIAGNOSIS — Z23 IMMUNIZATION DUE: ICD-10-CM

## 2019-02-06 DIAGNOSIS — Z00.00 HEALTHCARE MAINTENANCE: ICD-10-CM

## 2019-02-06 DIAGNOSIS — E11.65 UNCONTROLLED TYPE 2 DIABETES MELLITUS WITH HYPERGLYCEMIA (HCC): ICD-10-CM

## 2019-02-06 DIAGNOSIS — Z12.5 SCREENING FOR PROSTATE CANCER: ICD-10-CM

## 2019-02-06 DIAGNOSIS — Z00.00 MEDICARE ANNUAL WELLNESS VISIT, SUBSEQUENT: Primary | ICD-10-CM

## 2019-02-06 DIAGNOSIS — L30.9 ECZEMA, UNSPECIFIED TYPE: ICD-10-CM

## 2019-02-06 LAB
ALBUMIN SERPL-MCNC: 4.1 G/DL (ref 3.2–4.8)
ALBUMIN/GLOB SERPL: 1.5 G/DL (ref 1.5–2.5)
ALP SERPL-CCNC: 104 U/L (ref 25–100)
ALT SERPL-CCNC: 23 U/L (ref 7–40)
AST SERPL-CCNC: 24 U/L (ref 0–33)
BASOPHILS # BLD AUTO: 0.06 10*3/MM3 (ref 0–0.2)
BASOPHILS NFR BLD AUTO: 0.6 % (ref 0–1)
BILIRUB BLD-MCNC: NEGATIVE MG/DL
BILIRUB SERPL-MCNC: 0.4 MG/DL (ref 0.3–1.2)
BUN SERPL-MCNC: 22 MG/DL (ref 9–23)
BUN/CREAT SERPL: 15.9 (ref 7–25)
CALCIUM SERPL-MCNC: 9.5 MG/DL (ref 8.7–10.4)
CHLORIDE SERPL-SCNC: 107 MMOL/L (ref 99–109)
CHOLEST SERPL-MCNC: 202 MG/DL (ref 0–200)
CLARITY, POC: CLEAR
CO2 SERPL-SCNC: 28 MMOL/L (ref 20–31)
COLOR UR: YELLOW
CREAT SERPL-MCNC: 1.38 MG/DL (ref 0.6–1.3)
EOSINOPHIL # BLD AUTO: 0.33 10*3/MM3 (ref 0–0.3)
EOSINOPHIL NFR BLD AUTO: 3.2 % (ref 0–3)
ERYTHROCYTE [DISTWIDTH] IN BLOOD BY AUTOMATED COUNT: 14.7 % (ref 11.3–14.5)
EXPIRATION DATE: NORMAL
GLOBULIN SER CALC-MCNC: 2.7 GM/DL
GLUCOSE SERPL-MCNC: 204 MG/DL (ref 70–100)
GLUCOSE UR STRIP-MCNC: ABNORMAL MG/DL
HBA1C MFR BLD: 9.4 %
HCT VFR BLD AUTO: 46.3 % (ref 38.9–50.9)
HDLC SERPL-MCNC: 37 MG/DL (ref 40–60)
HGB BLD-MCNC: 14.7 G/DL (ref 13.1–17.5)
IMM GRANULOCYTES # BLD AUTO: 0.06 10*3/MM3 (ref 0–0.03)
IMM GRANULOCYTES NFR BLD AUTO: 0.6 % (ref 0–0.6)
KETONES UR QL: NEGATIVE
LDLC SERPL CALC-MCNC: 94 MG/DL (ref 0–100)
LEUKOCYTE EST, POC: ABNORMAL
LYMPHOCYTES # BLD AUTO: 3.15 10*3/MM3 (ref 0.6–4.8)
LYMPHOCYTES NFR BLD AUTO: 30.2 % (ref 24–44)
Lab: NORMAL
MCH RBC QN AUTO: 30.4 PG (ref 27–31)
MCHC RBC AUTO-ENTMCNC: 31.7 G/DL (ref 32–36)
MCV RBC AUTO: 95.9 FL (ref 80–99)
MONOCYTES # BLD AUTO: 0.8 10*3/MM3 (ref 0–1)
MONOCYTES NFR BLD AUTO: 7.7 % (ref 0–12)
NEUTROPHILS # BLD AUTO: 6.04 10*3/MM3 (ref 1.5–8.3)
NEUTROPHILS NFR BLD AUTO: 57.7 % (ref 41–71)
NITRITE UR-MCNC: NEGATIVE MG/ML
PH UR: 5.5 [PH] (ref 5–8)
PLATELET # BLD AUTO: 216 10*3/MM3 (ref 150–450)
POTASSIUM SERPL-SCNC: 4.5 MMOL/L (ref 3.5–5.5)
PROT SERPL-MCNC: 6.8 G/DL (ref 5.7–8.2)
PROT UR STRIP-MCNC: NEGATIVE MG/DL
PSA SERPL-MCNC: 0.81 NG/ML (ref 0–4)
RBC # BLD AUTO: 4.83 10*6/MM3 (ref 4.2–5.76)
RBC # UR STRIP: ABNORMAL /UL
SODIUM SERPL-SCNC: 144 MMOL/L (ref 132–146)
SP GR UR: 1.02 (ref 1–1.03)
TRIGL SERPL-MCNC: 353 MG/DL (ref 0–150)
TSH SERPL DL<=0.005 MIU/L-ACNC: 1.62 MIU/ML (ref 0.35–5.35)
UROBILINOGEN UR QL: NORMAL
VLDLC SERPL CALC-MCNC: 70.6 MG/DL
WBC # BLD AUTO: 10.44 10*3/MM3 (ref 3.5–10.8)

## 2019-02-06 PROCEDURE — G0439 PPPS, SUBSEQ VISIT: HCPCS | Performed by: FAMILY MEDICINE

## 2019-02-06 PROCEDURE — 96160 PT-FOCUSED HLTH RISK ASSMT: CPT | Performed by: FAMILY MEDICINE

## 2019-02-06 PROCEDURE — 99397 PER PM REEVAL EST PAT 65+ YR: CPT | Performed by: FAMILY MEDICINE

## 2019-02-06 PROCEDURE — G0009 ADMIN PNEUMOCOCCAL VACCINE: HCPCS | Performed by: FAMILY MEDICINE

## 2019-02-06 PROCEDURE — 36415 COLL VENOUS BLD VENIPUNCTURE: CPT | Performed by: FAMILY MEDICINE

## 2019-02-06 PROCEDURE — 83036 HEMOGLOBIN GLYCOSYLATED A1C: CPT | Performed by: FAMILY MEDICINE

## 2019-02-06 PROCEDURE — 90732 PPSV23 VACC 2 YRS+ SUBQ/IM: CPT | Performed by: FAMILY MEDICINE

## 2019-02-06 PROCEDURE — 81003 URINALYSIS AUTO W/O SCOPE: CPT | Performed by: FAMILY MEDICINE

## 2019-02-06 RX ORDER — DIAPER,BRIEF,INFANT-TODD,DISP
EACH MISCELLANEOUS DAILY
Qty: 45 G | Refills: 5 | Status: SHIPPED | OUTPATIENT
Start: 2019-02-06 | End: 2019-06-10

## 2019-02-06 NOTE — PROGRESS NOTES
QUICK REFERENCE INFORMATION:  The ABCs of the Annual Wellness Visit    Subsequent Medicare Wellness Visit    HEALTH RISK ASSESSMENT    5/6/1932    Recent Hospitalizations:  No hospitalization(s) within the last year..        Current Medical Providers:  Patient Care Team:  Ryne Lofton MD as PCP - General  Dr Griffin Romero - Urology.  Dr Heath - .  Dr Cardozo - Orthopedics.  Dr Garcia - Ophthalmology.      Smoking Status:  Social History     Tobacco Use   Smoking Status Former Smoker   • Types: Cigarettes   Smokeless Tobacco Never Used       Alcohol Consumption:  Social History     Substance and Sexual Activity   Alcohol Use No       Depression Screen:   PHQ-2/PHQ-9 Depression Screening 2/6/2019   Little interest or pleasure in doing things 0   Feeling down, depressed, or hopeless 0   Trouble falling or staying asleep, or sleeping too much -   Feeling tired or having little energy -   Poor appetite or overeating -   Feeling bad about yourself - or that you are a failure or have let yourself or your family down -   Trouble concentrating on things, such as reading the newspaper or watching television -   Moving or speaking so slowly that other people could have noticed. Or the opposite - being so fidgety or restless that you have been moving around a lot more than usual -   Thoughts that you would be better off dead, or of hurting yourself in some way -   Total Score 0   If you checked off any problems, how difficult have these problems made it for you to do your work, take care of things at home, or get along with other people? -       Health Habits and Functional and Cognitive Screening:  Functional & Cognitive Status 2/6/2019   Do you have difficulty preparing food and eating? No   Do you have difficulty bathing yourself, getting dressed or grooming yourself? No   Do you have difficulty using the toilet? No   Do you have difficulty moving around from place to place? No   Do you have trouble with steps or  getting out of a bed or a chair? No   In the past year have you fallen or experienced a near fall? Yes   Current Diet Well Balanced Diet   Exercise (times per week) 3 times per week   Current Exercise Activities Include Walking   Do you need help using the phone?  Yes   Are you deaf or do you have serious difficulty hearing?  No   Do you need help with transportation? No   Do you need help shopping? No   Do you need help preparing meals?  No   Do you need help with housework?  No   Do you need help with laundry? No   Do you need help taking your medications? No   Do you need help managing money? No   Do you ever drive or ride in a car without wearing a seat belt? No   Do you have difficulty concentrating, remembering or making decisions? -       Mini cog test administered. Remembered all three words.  Clock drawing test is abnormal.    Does the patient have evidence of cognitive impairment? No    Aspirin use counseling: Does not need ASA (and currently is not on it)      Recent Lab Results:  CMP:  Lab Results   Component Value Date     (H) 04/16/2018    BUN 19 04/16/2018    CREATININE 1.20 04/16/2018    EGFRIFNONA 58 (L) 04/16/2018    EGFRIFAFRI 70 04/16/2018    BCR 15.8 04/16/2018     04/16/2018    K 4.7 04/16/2018    CO2 29.0 04/16/2018    CALCIUM 9.3 04/16/2018    PROTENTOTREF 7.2 04/16/2018    ALBUMIN 4.20 04/16/2018    LABGLOBREF 3.0 04/16/2018    LABIL2 1.4 (L) 04/16/2018    BILITOT 0.7 04/16/2018    ALKPHOS 92 04/16/2018    AST 35 (H) 04/16/2018    ALT 38 04/16/2018     Lipid Panel:  Lab Results   Component Value Date    CHOL 228 (H) 06/29/2016    TRIG 538 (H) 04/16/2018    HDL 38 (L) 04/16/2018    VLDL CANCELED 04/16/2018    LDLHDL 3.22 06/29/2016     HbA1c:  Lab Results   Component Value Date    HGBA1C 10.1 12/19/2018       Visual Acuity:  No exam data present    Age-appropriate Screening Schedule:  Refer to the list below for future screening recommendations based on patient's age, sex and/or  medical conditions. Orders for these recommended tests are listed in the plan section. The patient has been provided with a written plan.    Health Maintenance   Topic Date Due   • DXA SCAN  10/03/2016   • PNEUMOCOCCAL VACCINES (65+ LOW/MEDIUM RISK) (2 of 2 - PPSV23) 04/20/2018   • TDAP/TD VACCINES (1 - Tdap) 10/04/2018   • LIPID PANEL  04/16/2019   • URINE MICROALBUMIN  04/16/2019   • HEMOGLOBIN A1C  06/19/2019   • INFLUENZA VACCINE  Completed   • ZOSTER VACCINE  Discontinued        Subjective   History of Present Illness    Forrest Zepeda is a 86 y.o. male who presents for an Subsequent Wellness Visit.    The following portions of the patient's history were reviewed and updated as appropriate: allergies, current medications, past family history, past medical history, past social history, past surgical history and problem list.    Outpatient Medications Prior to Visit   Medication Sig Dispense Refill   • allopurinol (ZYLOPRIM) 300 MG tablet Take 1 tablet by mouth Daily. 90 tablet 3   • carvedilol (COREG) 6.25 MG tablet Take 1 tablet by mouth 2 (Two) Times a Day. 180 tablet 3   • Diphenhydramine-APAP, sleep, (TYLENOL PM EXTRA STRENGTH PO) Take  by mouth As Needed (as needed for sleep.).     • finasteride (PROSCAR) 5 MG tablet Take 1 tablet by mouth Daily. 90 tablet 3   • glimepiride (AMARYL) 2 MG tablet Take 1 tablet by mouth Every Morning Before Breakfast. 90 tablet 1   • levothyroxine (SYNTHROID, LEVOTHROID) 50 MCG tablet Take 1 tablet by mouth Daily. 90 tablet 3   • Multiple Vitamins-Minerals (OCUVITE ADULT 50+ PO) Take  by mouth.     • SITagliptin (JANUVIA) 100 MG tablet Take 1 tablet by mouth Daily. 90 tablet 3   • HAVRIX 1440 EL U/ML vaccine      • mupirocin (BACTROBAN) 2 % ointment Apply to affected area bid 30 g 0     No facility-administered medications prior to visit.        Patient Active Problem List   Diagnosis   • Benign localized hyperplasia of prostate   • Vitamin D deficiency   • Stricture of  ureter   • Uncontrolled type 2 diabetes mellitus (CMS/HCC)   • Renal insufficiency   • Insomnia   • Hypertension   • Generalized osteoarthritis   • Diabetic neuropathy (CMS/HCC)   • Gout   • Immunization due   • Hypertriglyceridemia   • Acquired hypothyroidism   • Sigmoid diverticulitis without abscess or perforation       Advance Care Planning:  has NO advance directive - information provided to the patient today    Identification of Risk Factors:  Risk factors include: weight , cardiovascular risk, increased fall risk, vision limitations and hearing limitations.    Review of Systems   Constitutional: Negative.    HENT: Positive for hearing loss (decreased). Negative for congestion, dental problem, drooling, ear discharge, ear pain, facial swelling, mouth sores, nosebleeds, postnasal drip, rhinorrhea, sinus pressure, sinus pain, sneezing, sore throat, tinnitus, trouble swallowing and voice change.    Eyes: Positive for visual disturbance. Negative for photophobia, pain, discharge, redness and itching.   Respiratory: Positive for shortness of breath (with exertion). Negative for apnea, cough, choking, chest tightness, wheezing and stridor.    Cardiovascular: Negative.    Gastrointestinal: Negative.    Endocrine: Negative.    Genitourinary: Negative.    Musculoskeletal: Negative.    Skin: Positive for rash (both lower extremities.). Negative for color change, pallor and wound.   Allergic/Immunologic: Negative.    Neurological: Negative.    Hematological: Negative for adenopathy. Bruises/bleeds easily.   Psychiatric/Behavioral: Positive for sleep disturbance (insomnia). Negative for agitation, behavioral problems, confusion, decreased concentration, dysphoric mood, hallucinations, self-injury and suicidal ideas. The patient is not nervous/anxious and is not hyperactive.        Compared to one year ago, the patient feels his physical health is the same.  Compared to one year ago, the patient feels his mental health is  "worse.    Objective     Physical Exam   Constitutional: He is oriented to person, place, and time. He appears well-developed and well-nourished. No distress.   HENT:   Head: Normocephalic and atraumatic.   Right Ear: External ear normal.   Left Ear: External ear normal.   Nose: Nose normal.   Mouth/Throat: Oropharynx is clear and moist. He has dentures (upper and lower).   Eyes: Conjunctivae and EOM are normal. Pupils are equal, round, and reactive to light.   Neck: Normal range of motion. Neck supple. No thyromegaly present.   Cardiovascular: Normal rate, regular rhythm, normal heart sounds and intact distal pulses.   Pulmonary/Chest: Effort normal and breath sounds normal. No respiratory distress. He has no wheezes. He has no rales.   Abdominal: Soft. Bowel sounds are normal. There is no tenderness.   Musculoskeletal: Normal range of motion.    Forrest had a diabetic foot exam performed (Decreased pulse. Skin intact sensation intact.) today.   During the foot exam he had a monofilament test performed.  Neurological: He is alert and oriented to person, place, and time. He has normal reflexes.   Skin: Skin is warm and dry. Rash (Lesion of shins. 3 right 1 left.) noted. He is not diaphoretic.   Psychiatric: He has a normal mood and affect. His behavior is normal. Judgment and thought content normal.   Nursing note and vitals reviewed.      Vitals:    02/06/19 0954   BP: 132/72   Pulse: 80   Resp: 18   Temp: 97.4 °F (36.3 °C)   TempSrc: Temporal   SpO2: 95%   Weight: 104 kg (229 lb 9.6 oz)   Height: 170.2 cm (67\")   PainSc: 0-No pain       Patient's Body mass index is 35.96 kg/m². BMI is above normal parameters. Recommendations include: exercise counseling and nutrition counseling.      Assessment/Plan   Patient Self-Management and Personalized Health Advice  The patient has been provided with information about: diet, exercise, weight management, prevention of cardiac or vascular disease and designing advance directives " and preventive services including:   · Advance directive, Counseling for cardiovascular disease risk reduction, Exercise counseling provided, Fall Risk assessment done, Fall Risk plan of care done, Glaucoma screening recommended, Nutrition counseling provided, Pneumococcal vaccine , Prostate cancer screening discussed.    Visit Diagnoses:    ICD-10-CM ICD-9-CM   1. Medicare annual wellness visit, subsequent Z00.00 V70.0   2. Healthcare maintenance Z00.00 V70.0   3. Uncontrolled type 2 diabetes mellitus with hyperglycemia (CMS/[facility]) E11.65 250.02   4. Eczema, unspecified type L30.9 692.9   5. Immunization due Z23 V05.9   6. Screening for prostate cancer Z12.5 V76.44       Orders Placed This Encounter   Procedures   • POC Urinalysis Dipstick, Automated       Outpatient Encounter Medications as of 2/6/2019   Medication Sig Dispense Refill   • allopurinol (ZYLOPRIM) 300 MG tablet Take 1 tablet by mouth Daily. 90 tablet 3   • carvedilol (COREG) 6.25 MG tablet Take 1 tablet by mouth 2 (Two) Times a Day. 180 tablet 3   • Diphenhydramine-APAP, sleep, (TYLENOL PM EXTRA STRENGTH PO) Take  by mouth As Needed (as needed for sleep.).     • finasteride (PROSCAR) 5 MG tablet Take 1 tablet by mouth Daily. 90 tablet 3   • glimepiride (AMARYL) 2 MG tablet Take 1 tablet by mouth Every Morning Before Breakfast. 90 tablet 1   • levothyroxine (SYNTHROID, LEVOTHROID) 50 MCG tablet Take 1 tablet by mouth Daily. 90 tablet 3   • Multiple Vitamins-Minerals (OCUVITE ADULT 50+ PO) Take  by mouth.     • SITagliptin (JANUVIA) 100 MG tablet Take 1 tablet by mouth Daily. 90 tablet 3   • [DISCONTINUED] HAVRIX 1440 EL U/ML vaccine      • [DISCONTINUED] mupirocin (BACTROBAN) 2 % ointment Apply to affected area bid 30 g 0     No facility-administered encounter medications on file as of 2/6/2019.        Reviewed use of high risk medication in the elderly: yes  Reviewed for potential of harmful drug interactions in the elderly: yes    Follow Up:  Return  in about 3 months (around 5/6/2019) for Recheck.     An After Visit Summary and PPPS with all of these plans were given to the patient.          Drink plenty fluids.    Continue medications as doing.    Check a UA,A1C,CBC,CMP,Lipids,PSA, and TSH. Report results by letter.    Rx for Hydrocortisone 1 % cream to apply daily #45 gm +5.    See the eye doctor.    Follow up in 3 months. Sooner if needed.        We discussed with the patient the importance of maintaining a healthy weight by observing a diet that is low in carbohydrates.  We also recommended avoiding consumption of high levels of sodium and caffeine to prevent hypertension. We recommended daily exercise and obtaining a weight with a BMI less than 26.  We also recommended avoiding the use of tobacco and alcohol.  We also recommended an annual physical with their primary care physician.              Scribed for Dr Ryne Lofton by Zahida Townsend CMA.          I, Ryne Lofton MD, personally performed the services described in this documentation, as scribed by Zahida Townsend in my presence, and is both accurate and complete.        (Please note that portions of this note were completed with a voice recognition program. Efforts were made to edit the dictations,but occasionally words are mis transcribed.)

## 2019-02-07 ENCOUNTER — APPOINTMENT (OUTPATIENT)
Dept: PREADMISSION TESTING | Facility: HOSPITAL | Age: 84
End: 2019-02-07

## 2019-02-07 ENCOUNTER — OFFICE VISIT (OUTPATIENT)
Dept: UROLOGY | Facility: CLINIC | Age: 84
End: 2019-02-07

## 2019-02-07 ENCOUNTER — HOSPITAL ENCOUNTER (OUTPATIENT)
Dept: GENERAL RADIOLOGY | Facility: HOSPITAL | Age: 84
Discharge: HOME | End: 2019-02-07
Admitting: UROLOGY

## 2019-02-07 VITALS
WEIGHT: 220 LBS | OXYGEN SATURATION: 95 % | HEART RATE: 72 BPM | BODY MASS INDEX: 34.53 KG/M2 | HEIGHT: 67 IN | DIASTOLIC BLOOD PRESSURE: 67 MMHG | SYSTOLIC BLOOD PRESSURE: 131 MMHG

## 2019-02-07 VITALS
OXYGEN SATURATION: 96 % | DIASTOLIC BLOOD PRESSURE: 63 MMHG | TEMPERATURE: 98.4 F | SYSTOLIC BLOOD PRESSURE: 126 MMHG | HEART RATE: 81 BPM

## 2019-02-07 DIAGNOSIS — N13.5 URETERAL STRICTURE, LEFT: ICD-10-CM

## 2019-02-07 DIAGNOSIS — N20.0 NEPHROLITHIASIS: ICD-10-CM

## 2019-02-07 DIAGNOSIS — N13.5 URETERAL STRICTURE, LEFT: Primary | ICD-10-CM

## 2019-02-07 LAB
ANION GAP SERPL CALCULATED.3IONS-SCNC: 12.1 MMOL/L (ref 10–20)
BILIRUB BLD-MCNC: NEGATIVE MG/DL
BUN BLD-MCNC: 21 MG/DL (ref 7–20)
BUN/CREAT SERPL: 17.5 (ref 6.3–21.9)
CALCIUM SPEC-SCNC: 9.2 MG/DL (ref 8.4–10.2)
CHLORIDE SERPL-SCNC: 109 MMOL/L (ref 98–107)
CLARITY, POC: CLEAR
CO2 SERPL-SCNC: 25 MMOL/L (ref 26–30)
COLOR UR: YELLOW
CREAT BLD-MCNC: 1.2 MG/DL (ref 0.6–1.3)
GFR SERPL CREATININE-BSD FRML MDRD: 57 ML/MIN/1.73
GLUCOSE BLD-MCNC: 259 MG/DL (ref 74–98)
GLUCOSE UR STRIP-MCNC: ABNORMAL MG/DL
KETONES UR QL: NEGATIVE
LEUKOCYTE EST, POC: NEGATIVE
NITRITE UR-MCNC: NEGATIVE MG/ML
PH UR: 6 [PH] (ref 5–8)
POTASSIUM BLD-SCNC: 4.1 MMOL/L (ref 3.5–5.1)
PROT UR STRIP-MCNC: ABNORMAL MG/DL
RBC # UR STRIP: NEGATIVE /UL
SODIUM BLD-SCNC: 142 MMOL/L (ref 137–145)
SP GR UR: 1.02 (ref 1–1.03)
UROBILINOGEN UR QL: NORMAL

## 2019-02-07 PROCEDURE — 71046 X-RAY EXAM CHEST 2 VIEWS: CPT

## 2019-02-07 PROCEDURE — 93005 ELECTROCARDIOGRAM TRACING: CPT | Performed by: UROLOGY

## 2019-02-07 PROCEDURE — 36415 COLL VENOUS BLD VENIPUNCTURE: CPT

## 2019-02-07 PROCEDURE — 81003 URINALYSIS AUTO W/O SCOPE: CPT | Performed by: UROLOGY

## 2019-02-07 PROCEDURE — 99214 OFFICE O/P EST MOD 30 MIN: CPT | Performed by: UROLOGY

## 2019-02-07 PROCEDURE — 80048 BASIC METABOLIC PNL TOTAL CA: CPT | Performed by: UROLOGY

## 2019-02-07 NOTE — PAT
"CALLED LATASHA EARLY, CRNA R/T RECORDS RECEIVED FROM DR. VENTURA'S OFFICE.  CRNA CAME OVER TO P.A.T.  REVIEWED LAST OFFICE VISIT FROM DR. VENTURA'S OFFICE AND EKG THAT WAS SENT OVER.  PER OFFICE, NO ECHO DONE.  PER LATASHA, HE STATED \"I DON'T SEE ANY CHANGES.  I DON'T THINK HE NEEDS TO SEE CARDIOLOGY\".    "

## 2019-02-07 NOTE — H&P (VIEW-ONLY)
Chief Complaint  Distal Left Ureteral Stricture (2 week follow up.)        JAZMIN Zepeda is a 86 y.o. male who returns today for follow-up with obstruction of his distal left ureter for several years this been managed with a chronic indwelling metallic stent typically changed on an annual basis.  He's had numerous kidney stones in the past and has had multiple ureteroscopy laser lithotripsy procedures leaving his distal left ureter somewhat tortuous.  On one occasion when his stent was removed he had have antegrade access in order to reinsert a stent.  He's decided at this point he wants to avoid a permanent indwelling stent so it will be removed any stones extracted and if lithotripsy is required he'll need a temporary stent that can be subsequently removed.    There were no vitals filed for this visit.    Past Medical History  Past Medical History:   Diagnosis Date   • Abnormal PSA     Reported abnormal   • Benign localized hyperplasia of prostate    • Bilateral knee pain    • Cataracts, bilateral    • Chronic gout    • Diabetic neuropathy (CMS/HCC)    • Disease of thyroid gland     HYPOTHYROIDISM   • Dyslipidemia     H/O   • Full dentures    • Generalized osteoarthritis of multiple sites    • History of chronic kidney disease    • History of gout    • History of nephrolithiasis    • History of osteopenia    • Hyperkalemia    • Hypertension    • Insomnia    • Pharyngitis, acute    • Renal insufficiency    • Shortness of breath    • Type 2 diabetes mellitus (CMS/HCC)    • Ureteral stricture, left    • Vitamin D deficiency    • Wears glasses        Past Surgical History  Past Surgical History:   Procedure Laterality Date   • APPENDECTOMY     • CHOLECYSTECTOMY     • EYE SURGERY      CATARACTS REMOVED BILATERALLY   • JOINT REPLACEMENT Bilateral     BILATERAL KNEES REPLACED   • KNEE ARTHROSCOPY Bilateral    • RENAL ARTERY STENT      SEVERAL TIMES EVERY SINCE 1978   • URETEROSCOPY LASER LITHOTRIPSY WITH STENT INSERTION  Left 1/10/2018    Procedure:  cysto, left ureteral stent replacement ;  Surgeon: Griffin Romero MD;  Location: [facility];  Service:        Medications  has a current medication list which includes the following prescription(s): allopurinol, carvedilol, diphenhydramine-apap (sleep), finasteride, glimepiride, hydrocortisone, levothyroxine, multiple vitamins-minerals, and sitagliptin.      Allergies  Allergies   Allergen Reactions   • Metformin Diarrhea   • Statins Myalgia       Social History  Social History     Social History Narrative   • Not on file       Family History  He has no family history of bladder or kidney cancer  He has no family history of kidney stones      AUA Symptom Score:      Review of Systems  Review of Systems   Constitutional: Negative.    Genitourinary: Negative.    All other systems reviewed and are negative.      Physical Exam  Physical Exam   Constitutional: He is oriented to person, place, and time. He appears well-developed and well-nourished.   HENT:   Head: Normocephalic and atraumatic.   Neck: Normal range of motion.   Cardiovascular: Normal rate, regular rhythm and normal heart sounds.   Pulmonary/Chest: Effort normal and breath sounds normal. No respiratory distress.   Abdominal: Soft. He exhibits no distension and no mass. There is no tenderness. No hernia.   Musculoskeletal: Normal range of motion.   Lymphadenopathy:     He has no cervical adenopathy.   Neurological: He is alert and oriented to person, place, and time.   Skin: Skin is warm and dry.   Psychiatric: He has a normal mood and affect. His behavior is normal.   Vitals reviewed.      Labs Recent and today in the office:  Results for orders placed or performed in visit on 02/07/19   POC Urinalysis Dipstick, Automated   Result Value Ref Range    Color Yellow Yellow, Straw, Dark Yellow, Sushila    Clarity, UA Clear Clear    Specific Gravity  1.025 1.005 - 1.030    pH, Urine 6.0 5.0 - 8.0    Leukocytes Negative Negative     Nitrite, UA Negative Negative    Protein, POC Trace (A) Negative mg/dL    Glucose, UA 1+ (A) Negative, 1000 mg/dL (3+) mg/dL    Ketones, UA Negative Negative    Urobilinogen, UA Normal Normal    Bilirubin Negative Negative    Blood, UA Negative Negative         Assessment & Plan  #1 history of nephrolithiasis/distal left ureteral obstruction: The plan will be to remove his permanent metallic stent and perform retrograde pyelogram to rule out residual obstruction.  His stones are noted he'll need laser lithotripsy and insertion of a temporary stent.  Otherwise we will remove the stent.

## 2019-02-07 NOTE — PAT
"CALLED ANESTHESIA PHONE.  SPOKE WITH LATASHA FAIRCHILD, HELADIO.   INFORMED THAT PT HAVING A PROCEDURE WITH DR. MANJARREZ ON 2/11/19.  NOTIFIED REGARDING PRELIMINARY EKG DONE IN [facility] TODAY.  ALSO NOTIFIED OF EKG DONE 8/9/17 THAT IS AVAILABLE FOR COMPARISON.  PT STATES THAT HE SAW DR. VENTURA LAST January FOR CARDIAC CLEARANCE FOR STENT PLACEMENT WITH DR. MANJARREZ.  PT STATES HE THINKS AN ECHOCARDIOGRAM WAS DONE, BUT DENIES ANY OTHER TESTING BEING DONE.  [facility] REVIEWED WITH CRNA.  INFORMED HIM THAT REQUEST FOR MEDICAL RECORDS HAD BEEN FAXED TO DR. VENTURA'S OFFICE.  VERBALIZED UNDERSTANDING.  STATED TO CALL HIM BACK WHEN RECORDS FROM DR. VENTURA'S OFFICE HAVE BEEN RECEIVED.  OFFICE CLOSED NOW FOR LUNCH, BUT WILL CALL WHEN RE-OPENS.      ALSO NOTIFIED CRNA REGARDING FAMILY HISTORY (PT'S BROTHER) OF MALIGNANT HYPERTHERMIA.  PT STATES THAT HIS BROTHER HAD A REACTION TO ANESTHESIA DURING A LUNG SURGERY, AND HAD TO BE PACKED ON ICE.  PT DENIES ANY PROBLEMS IN THE PAST, HIMSELF, WITH ANESTHESIA.  DENIES KNOWLEDGE OF ANY OTHER FAMILY MEMBERS WITH ANESTHESIA ISSUES.  INFORMED CRNA THAT THIS WAS NOTED IN THE EMR, SPECIAL NEEDS SECTION OF THE CHART, AND THE NURSING REPORT SHEET.  STATED \"OK, THANK YOU\".    CALLED ELLEN LACY CHARGE RN AND NOTIFIED REGARDING PT PAST FAMILY HX OF MALIGNANT HYPERTHERMIA.  VERBALIZED UNDERSTANDING.  STATED \"OK, THANK YOU\".      EXPLAINED TO PT CONVERSATION WITH CRNA.  INFORMED HIM THAT HE WOULD BE CONTACTED BY DR. MANJARREZ'S OFFICE IF ANYTHING FURTHER NEEDED.  PT VERBALIZED UNDERSTANDING.    "

## 2019-02-07 NOTE — PROGRESS NOTES
Chief Complaint  Distal Left Ureteral Stricture (2 week follow up.)        JAZMIN Zepeda is a 86 y.o. male who returns today for follow-up with obstruction of his distal left ureter for several years this been managed with a chronic indwelling metallic stent typically changed on an annual basis.  He's had numerous kidney stones in the past and has had multiple ureteroscopy laser lithotripsy procedures leaving his distal left ureter somewhat tortuous.  On one occasion when his stent was removed he had have antegrade access in order to reinsert a stent.  He's decided at this point he wants to avoid a permanent indwelling stent so it will be removed any stones extracted and if lithotripsy is required he'll need a temporary stent that can be subsequently removed.    There were no vitals filed for this visit.    Past Medical History  Past Medical History:   Diagnosis Date   • Abnormal PSA     Reported abnormal   • Benign localized hyperplasia of prostate    • Bilateral knee pain    • Cataracts, bilateral    • Chronic gout    • Diabetic neuropathy (CMS/HCC)    • Disease of thyroid gland     HYPOTHYROIDISM   • Dyslipidemia     H/O   • Full dentures    • Generalized osteoarthritis of multiple sites    • History of chronic kidney disease    • History of gout    • History of nephrolithiasis    • History of osteopenia    • Hyperkalemia    • Hypertension    • Insomnia    • Pharyngitis, acute    • Renal insufficiency    • Shortness of breath    • Type 2 diabetes mellitus (CMS/HCC)    • Ureteral stricture, left    • Vitamin D deficiency    • Wears glasses        Past Surgical History  Past Surgical History:   Procedure Laterality Date   • APPENDECTOMY     • CHOLECYSTECTOMY     • EYE SURGERY      CATARACTS REMOVED BILATERALLY   • JOINT REPLACEMENT Bilateral     BILATERAL KNEES REPLACED   • KNEE ARTHROSCOPY Bilateral    • RENAL ARTERY STENT      SEVERAL TIMES EVERY SINCE 1978   • URETEROSCOPY LASER LITHOTRIPSY WITH STENT INSERTION  Left 1/10/2018    Procedure:  cysto, left ureteral stent replacement ;  Surgeon: Griffin Romero MD;  Location: [facility];  Service:        Medications  has a current medication list which includes the following prescription(s): allopurinol, carvedilol, diphenhydramine-apap (sleep), finasteride, glimepiride, hydrocortisone, levothyroxine, multiple vitamins-minerals, and sitagliptin.      Allergies  Allergies   Allergen Reactions   • Metformin Diarrhea   • Statins Myalgia       Social History  Social History     Social History Narrative   • Not on file       Family History  He has no family history of bladder or kidney cancer  He has no family history of kidney stones      AUA Symptom Score:      Review of Systems  Review of Systems   Constitutional: Negative.    Genitourinary: Negative.    All other systems reviewed and are negative.      Physical Exam  Physical Exam   Constitutional: He is oriented to person, place, and time. He appears well-developed and well-nourished.   HENT:   Head: Normocephalic and atraumatic.   Neck: Normal range of motion.   Cardiovascular: Normal rate, regular rhythm and normal heart sounds.   Pulmonary/Chest: Effort normal and breath sounds normal. No respiratory distress.   Abdominal: Soft. He exhibits no distension and no mass. There is no tenderness. No hernia.   Musculoskeletal: Normal range of motion.   Lymphadenopathy:     He has no cervical adenopathy.   Neurological: He is alert and oriented to person, place, and time.   Skin: Skin is warm and dry.   Psychiatric: He has a normal mood and affect. His behavior is normal.   Vitals reviewed.      Labs Recent and today in the office:  Results for orders placed or performed in visit on 02/07/19   POC Urinalysis Dipstick, Automated   Result Value Ref Range    Color Yellow Yellow, Straw, Dark Yellow, Sushila    Clarity, UA Clear Clear    Specific Gravity  1.025 1.005 - 1.030    pH, Urine 6.0 5.0 - 8.0    Leukocytes Negative Negative     Nitrite, UA Negative Negative    Protein, POC Trace (A) Negative mg/dL    Glucose, UA 1+ (A) Negative, 1000 mg/dL (3+) mg/dL    Ketones, UA Negative Negative    Urobilinogen, UA Normal Normal    Bilirubin Negative Negative    Blood, UA Negative Negative         Assessment & Plan  #1 history of nephrolithiasis/distal left ureteral obstruction: The plan will be to remove his permanent metallic stent and perform retrograde pyelogram to rule out residual obstruction.  His stones are noted he'll need laser lithotripsy and insertion of a temporary stent.  Otherwise we will remove the stent.

## 2019-02-11 ENCOUNTER — ANESTHESIA EVENT (OUTPATIENT)
Dept: PERIOP | Facility: HOSPITAL | Age: 84
End: 2019-02-11

## 2019-02-11 ENCOUNTER — APPOINTMENT (OUTPATIENT)
Dept: GENERAL RADIOLOGY | Facility: HOSPITAL | Age: 84
End: 2019-02-11

## 2019-02-11 ENCOUNTER — HOSPITAL ENCOUNTER (OUTPATIENT)
Facility: HOSPITAL | Age: 84
Setting detail: HOSPITAL OUTPATIENT SURGERY
Discharge: HOME | End: 2019-02-11
Attending: UROLOGY | Admitting: UROLOGY

## 2019-02-11 ENCOUNTER — ANESTHESIA (OUTPATIENT)
Dept: PERIOP | Facility: HOSPITAL | Age: 84
End: 2019-02-11

## 2019-02-11 VITALS
OXYGEN SATURATION: 94 % | HEART RATE: 63 BPM | TEMPERATURE: 97.7 F | DIASTOLIC BLOOD PRESSURE: 62 MMHG | RESPIRATION RATE: 18 BRPM | SYSTOLIC BLOOD PRESSURE: 136 MMHG

## 2019-02-11 DIAGNOSIS — N13.5 URETERAL STRICTURE, LEFT: ICD-10-CM

## 2019-02-11 LAB
BACTERIA UR QL AUTO: ABNORMAL /HPF
BILIRUB UR QL STRIP: NEGATIVE
CLARITY UR: CLEAR
COLOR UR: YELLOW
GLUCOSE BLDC GLUCOMTR-MCNC: 184 MG/DL (ref 70–130)
GLUCOSE UR STRIP-MCNC: NEGATIVE MG/DL
HGB UR QL STRIP.AUTO: ABNORMAL
HYALINE CASTS UR QL AUTO: ABNORMAL /LPF
KETONES UR QL STRIP: NEGATIVE
LEUKOCYTE ESTERASE UR QL STRIP.AUTO: ABNORMAL
NITRITE UR QL STRIP: NEGATIVE
PH UR STRIP.AUTO: 5.5 [PH] (ref 5–8)
PROT UR QL STRIP: NEGATIVE
RBC # UR: ABNORMAL /HPF
REF LAB TEST METHOD: ABNORMAL
SP GR UR STRIP: 1.01 (ref 1–1.03)
SQUAMOUS #/AREA URNS HPF: ABNORMAL /HPF
UROBILINOGEN UR QL STRIP: ABNORMAL
WBC UR QL AUTO: ABNORMAL /HPF

## 2019-02-11 PROCEDURE — 52315 CYSTOSCOPY AND TREATMENT: CPT | Performed by: UROLOGY

## 2019-02-11 PROCEDURE — 25010000002 PROPOFOL 10 MG/ML EMULSION: Performed by: NURSE ANESTHETIST, CERTIFIED REGISTERED

## 2019-02-11 PROCEDURE — C1769 GUIDE WIRE: HCPCS | Performed by: UROLOGY

## 2019-02-11 PROCEDURE — 25010000002 LEVOFLOXACIN PER 250 MG: Performed by: UROLOGY

## 2019-02-11 PROCEDURE — 25010000002 METOCLOPRAMIDE PER 10 MG: Performed by: NURSE ANESTHETIST, CERTIFIED REGISTERED

## 2019-02-11 PROCEDURE — 74018 RADEX ABDOMEN 1 VIEW: CPT

## 2019-02-11 PROCEDURE — 81001 URINALYSIS AUTO W/SCOPE: CPT | Performed by: UROLOGY

## 2019-02-11 PROCEDURE — 82962 GLUCOSE BLOOD TEST: CPT

## 2019-02-11 PROCEDURE — 25010000002 ONDANSETRON PER 1 MG: Performed by: NURSE ANESTHETIST, CERTIFIED REGISTERED

## 2019-02-11 PROCEDURE — C1758 CATHETER, URETERAL: HCPCS | Performed by: UROLOGY

## 2019-02-11 PROCEDURE — 25010000002 DEXAMETHASONE PER 1 MG: Performed by: NURSE ANESTHETIST, CERTIFIED REGISTERED

## 2019-02-11 RX ORDER — PROPOFOL 10 MG/ML
VIAL (ML) INTRAVENOUS AS NEEDED
Status: DISCONTINUED | OUTPATIENT
Start: 2019-02-11 | End: 2019-02-11 | Stop reason: SURG

## 2019-02-11 RX ORDER — SODIUM CHLORIDE, SODIUM LACTATE, POTASSIUM CHLORIDE, CALCIUM CHLORIDE 600; 310; 30; 20 MG/100ML; MG/100ML; MG/100ML; MG/100ML
1000 INJECTION, SOLUTION INTRAVENOUS CONTINUOUS
Status: DISCONTINUED | OUTPATIENT
Start: 2019-02-11 | End: 2019-02-11 | Stop reason: HOSPADM

## 2019-02-11 RX ORDER — AMOXICILLIN AND CLAVULANATE POTASSIUM 875; 125 MG/1; MG/1
1 TABLET, FILM COATED ORAL EVERY 12 HOURS
Qty: 20 TABLET | Refills: 0 | Status: SHIPPED | OUTPATIENT
Start: 2019-02-11 | End: 2019-02-21

## 2019-02-11 RX ORDER — LEVOFLOXACIN 5 MG/ML
500 INJECTION, SOLUTION INTRAVENOUS ONCE
Status: COMPLETED | OUTPATIENT
Start: 2019-02-11 | End: 2019-02-11

## 2019-02-11 RX ORDER — MAGNESIUM HYDROXIDE 1200 MG/15ML
LIQUID ORAL AS NEEDED
Status: DISCONTINUED | OUTPATIENT
Start: 2019-02-11 | End: 2019-02-11 | Stop reason: HOSPADM

## 2019-02-11 RX ORDER — ONDANSETRON 2 MG/ML
4 INJECTION INTRAMUSCULAR; INTRAVENOUS ONCE AS NEEDED
Status: CANCELLED | OUTPATIENT
Start: 2019-02-11 | End: 2019-02-11

## 2019-02-11 RX ORDER — LIDOCAINE HYDROCHLORIDE 20 MG/ML
INJECTION, SOLUTION INFILTRATION; PERINEURAL AS NEEDED
Status: DISCONTINUED | OUTPATIENT
Start: 2019-02-11 | End: 2019-02-11 | Stop reason: SURG

## 2019-02-11 RX ORDER — ONDANSETRON 2 MG/ML
INJECTION INTRAMUSCULAR; INTRAVENOUS AS NEEDED
Status: DISCONTINUED | OUTPATIENT
Start: 2019-02-11 | End: 2019-02-11 | Stop reason: SURG

## 2019-02-11 RX ORDER — SODIUM CHLORIDE 0.9 % (FLUSH) 0.9 %
3 SYRINGE (ML) INJECTION AS NEEDED
Status: DISCONTINUED | OUTPATIENT
Start: 2019-02-11 | End: 2019-02-11 | Stop reason: HOSPADM

## 2019-02-11 RX ORDER — DEXAMETHASONE SODIUM PHOSPHATE 4 MG/ML
INJECTION, SOLUTION INTRA-ARTICULAR; INTRALESIONAL; INTRAMUSCULAR; INTRAVENOUS; SOFT TISSUE AS NEEDED
Status: DISCONTINUED | OUTPATIENT
Start: 2019-02-11 | End: 2019-02-11 | Stop reason: SURG

## 2019-02-11 RX ORDER — METOCLOPRAMIDE HYDROCHLORIDE 5 MG/ML
INJECTION INTRAMUSCULAR; INTRAVENOUS AS NEEDED
Status: DISCONTINUED | OUTPATIENT
Start: 2019-02-11 | End: 2019-02-11 | Stop reason: SURG

## 2019-02-11 RX ADMIN — ONDANSETRON 4 MG: 2 INJECTION INTRAMUSCULAR; INTRAVENOUS at 07:36

## 2019-02-11 RX ADMIN — PROPOFOL 300 MG: 10 INJECTION, EMULSION INTRAVENOUS at 08:08

## 2019-02-11 RX ADMIN — DEXAMETHASONE SODIUM PHOSPHATE 8 MG: 4 INJECTION, SOLUTION INTRAMUSCULAR; INTRAVENOUS at 07:36

## 2019-02-11 RX ADMIN — LIDOCAINE HYDROCHLORIDE 100 MG: 20 INJECTION, SOLUTION INFILTRATION; PERINEURAL at 07:36

## 2019-02-11 RX ADMIN — SODIUM CHLORIDE, POTASSIUM CHLORIDE, SODIUM LACTATE AND CALCIUM CHLORIDE 1000 ML: 600; 310; 30; 20 INJECTION, SOLUTION INTRAVENOUS at 06:48

## 2019-02-11 RX ADMIN — METOCLOPRAMIDE 10 MG: 5 INJECTION, SOLUTION INTRAMUSCULAR; INTRAVENOUS at 07:36

## 2019-02-11 RX ADMIN — LEVOFLOXACIN 500 MG: 5 INJECTION, SOLUTION INTRAVENOUS at 07:31

## 2019-02-11 NOTE — OP NOTE
CYSTOSCOPY URETEROSCOPY  Procedure Note    Forrest Zepeda  2/11/2019    Pre-op Diagnosis:   Ureteral stricture, left [N13.5]    Post-op Diagnosis:     Post-Op Diagnosis Codes:     * Ureteral stricture, left [N13.5]    Procedure/CPT® Codes:      Procedure(s):  URETEROSCOPY WITH STENT EXTRACTION, RPG    Surgeon(s):  Griffin Romero MD    Anesthesia: General  Operative technique: This patient has had problems with recurring ureteral strictures and has required multiple ureteroscopy and laser lithotripsies in his distal left ureter over the years resulting in a scarred tortuous distal ureter.  This is been handled with an indwelling metallic permanent stent changed on an annual basis.  The patient requested this be removed to see if he can get by without a stent at this point.    He was brought to the operating room and placed in the dorsal lithotomy position and given intravenous sedation prepped and draped in routine sterile fashion.  A timeout was called and all pertinent data was removed by the parties in the room and agreed to.    At this point attempt was made to insert the ureteroscope through the urethra although he had a very tight phimosis that precluded retraction of the foreskin.  Ureteroscope was able to be inserted under direct vision up into the bladder where the encrusted ureteral stent was noted to be indwelling.  A guidewire was then inserted alongside the stent and ureteroscope removed.  The cystoscope was then inserted in a similar fashion into the bladder and a specimen obtained for culture and sensitivity.  The grasping forceps were then used to extract the encrusted metallic stent.  A Pollack catheter was then inserted over the guidewire and a mixture of  antibiotic and contrast was instilled.  This revealed the ectatic tortuous strictured left ureter.  The guidewire was removed along with a Pollack catheter and with time the contrast was slowly regressing out the left ureter.  The bladder  was therefore emptied with the cystoscope and the patient taken recovery room in a satisfactory condition.  He'll be sent home with a prescription for Augmentin pending the results of culture obtained today.  He'll return the office in 2 weeks for follow-up care.  He tolerated the procedure well under anesthesia using sedation only.  Staff:   Circulator: Jin Patel RN  Scrub Person: Neftaly Patrick    Estimated Blood Loss: none    Specimens:                ID Type Source Tests Collected by Time   1 : URINE FROM BLADDER Urine Urinary Bladder URINALYSIS W/ CULTURE IF INDICATED Jin Patel RN 2/11/2019 0755         Drains:      Findings: Encrusted metallic stent, tortuous stenotic distal ureter.    Complications: None      Griffin Romero MD     Date: 2/11/2019  Time: 8:16 AM

## 2019-02-11 NOTE — ANESTHESIA PREPROCEDURE EVALUATION
Anesthesia Evaluation     Patient summary reviewed and Nursing notes reviewed   no history of anesthetic complications:  NPO Solid Status: > 8 hours  NPO Liquid Status: > 8 hours           Airway   Mallampati: II  TM distance: >3 FB  Neck ROM: full  no difficulty expected  Dental - normal exam     Pulmonary - normal exam   (+) a smoker Former, shortness of breath,   Cardiovascular - normal exam    ECG reviewed  Rhythm: regular  Rate: normal    (+) hypertension, hyperlipidemia,       Neuro/Psych- negative ROS  GI/Hepatic/Renal/Endo    (+) obesity, morbid obesity,  diabetes mellitus type 2, hypothyroidism,     Musculoskeletal     Abdominal    Substance History - negative use     OB/GYN negative ob/gyn ROS         Other   (+) arthritis                     Anesthesia Plan    ASA 3     general   (Risks and benefits discussed including risk of aspiration, recall and dental damage. All patient questions answered.    Patient told that a breathing tube will be used to manage the airway.    Will continue with plan of care.)  intravenous induction   Anesthetic plan, all risks, benefits, and alternatives have been provided, discussed and informed consent has been obtained with: patient.

## 2019-02-11 NOTE — ANESTHESIA POSTPROCEDURE EVALUATION
Patient: Forrest Zepeda    Procedure Summary     Date:  02/11/19 Room / Location:  [facility] /  [facility]    Anesthesia Start:  0731 Anesthesia Stop:  0813    Procedure:  URETEROSCOPY WITH STENT EXTRACTION, RPG (Left ) Diagnosis:       Ureteral stricture, left      (Ureteral stricture, left [N13.5])    Surgeon:  Griffin Romero MD Provider:  Martín Nicole CRNA    Anesthesia Type:  general ASA Status:  3          Anesthesia Type: general  Last vitals  BP   136/62 (02/11/19 0850)   Temp   97.7 °F (36.5 °C) (02/11/19 0820)   Pulse   63 (02/11/19 0850)   Resp   18 (02/11/19 0850)     SpO2   94 % (02/11/19 0850)     Post Anesthesia Care and Evaluation    Patient location during evaluation: PHASE II  Patient participation: complete - patient participated  Level of consciousness: awake  Pain score: 1  Pain management: adequate  Airway patency: patent  Anesthetic complications: No anesthetic complications  PONV Status: controlled  Cardiovascular status: acceptable and stable  Respiratory status: acceptable  Hydration status: acceptable

## 2019-02-11 NOTE — INTERVAL H&P NOTE
H&P reviewed. The patient was examined and there are no changes to the H&P.         "Maryan Devlin RN    "

## 2019-02-25 ENCOUNTER — OFFICE VISIT (OUTPATIENT)
Dept: UROLOGY | Facility: CLINIC | Age: 84
End: 2019-02-25

## 2019-02-25 DIAGNOSIS — R31.29 MICROSCOPIC HEMATURIA: ICD-10-CM

## 2019-02-25 DIAGNOSIS — B37.42 CANDIDAL BALANITIS: Primary | ICD-10-CM

## 2019-02-25 LAB
BILIRUB BLD-MCNC: NEGATIVE MG/DL
CLARITY, POC: CLEAR
COLOR UR: YELLOW
GLUCOSE UR STRIP-MCNC: NEGATIVE MG/DL
KETONES UR QL: NEGATIVE
LEUKOCYTE EST, POC: ABNORMAL
NITRITE UR-MCNC: NEGATIVE MG/ML
PH UR: 6 [PH] (ref 5–8)
PROT UR STRIP-MCNC: ABNORMAL MG/DL
RBC # UR STRIP: ABNORMAL /UL
SP GR UR: 1.02 (ref 1–1.03)
UROBILINOGEN UR QL: NORMAL

## 2019-02-25 PROCEDURE — 81003 URINALYSIS AUTO W/O SCOPE: CPT | Performed by: UROLOGY

## 2019-02-25 PROCEDURE — 99213 OFFICE O/P EST LOW 20 MIN: CPT | Performed by: UROLOGY

## 2019-02-25 RX ORDER — NYSTATIN AND TRIAMCINOLONE ACETONIDE 100000; 1 [USP'U]/G; MG/G
OINTMENT TOPICAL
Qty: 30 G | Refills: 1 | Status: SHIPPED | OUTPATIENT
Start: 2019-02-25 | End: 2019-06-10

## 2019-02-25 NOTE — PROGRESS NOTES
"Chief Complaint  Post OP        HPI  Duane is a 86 y.o. male who returns today for follow-up after removal of his that he has has had intermittently for years associated with a tortuous distal ureter and recurring ureteral calculi.  He has had no recurrent discomfort since the procedure and he feels great.  He has phimosis and chronic Candida balanitis that shows up in the urine as result of his diabetes.  He is not sexually active and not interested in circumcision.      There were no vitals filed for this visit.    Past Medical History  Past Medical History:   Diagnosis Date   • Abnormal PSA     Reported abnormal   • Arthritis     KNEES,  BUT SINCE HAD REPLACEMENT   • Benign localized hyperplasia of prostate    • Bilateral knee pain    • Cataracts, bilateral    • Diabetic neuropathy (CMS/HCC)    • Disease of thyroid gland     HYPOTHYROIDISM   • Diverticulitis    • Dyslipidemia     H/O   • Full dentures    • History of gout    • History of hepatitis A vaccination    • History of nephrolithiasis    • History of nuclear stress test     STATES IN THE 1990'S.  \"IT WAS OK\"   • History of osteopenia    • Hyperkalemia     PT UNSURE REGARDING HX OF THIS   • Hypertension    • Insomnia    • Malignant hyperthermia due to anesthesia     PER PT REPORT, BROTHER HAD DURING LUNG SURGERY SEVERAL YEARS AGO.  STATED THEY PACKED HIM ON ICE.  STATED HE DID SURVIVE.  PT DENIES ANY HX OF MALIGNANT HYPERTHERMIA HIMSELF, OR ANY ISSUES WITH ANESTHESIA.  STATES TO HIS KNOWLEDGE, NO OTHER FAMILY MEMBER HAS THIS ISSUE EXCEPT FOR HIS BROTHER.   • Renal insufficiency    • Shortness of breath     STATES WITH EXERTION, OTHERWISE, DENIES   • Type 2 diabetes mellitus (CMS/HCC)    • Ureteral stricture, left    • Vitamin D deficiency    • Wears glasses        Past Surgical History  Past Surgical History:   Procedure Laterality Date   • APPENDECTOMY     • CHOLECYSTECTOMY     • COLONOSCOPY     • CYSTOSCOPY URETEROSCOPY Left 2/11/2019    Procedure: " URETEROSCOPY WITH STENT EXTRACTION, RPG;  Surgeon: Griffin Romero MD;  Location: [facility];  Service: Urology   • EYE SURGERY      CATARACTS REMOVED BILATERALLY   • JOINT REPLACEMENT Bilateral     BILATERAL KNEES REPLACED   • KNEE ARTHROSCOPY Bilateral    • RENAL ARTERY STENT      SEVERAL TIMES EVERY SINCE 1978   • URETEROSCOPY LASER LITHOTRIPSY WITH STENT INSERTION Left 1/10/2018    Procedure:  cysto, left ureteral stent replacement ;  Surgeon: Griffin Romero MD;  Location: [facility];  Service:        Medications  has a current medication list which includes the following prescription(s): allopurinol, carvedilol, diphenhydramine-apap (sleep), finasteride, glimepiride, hydrocortisone, levothyroxine, multiple vitamins-minerals, and sitagliptin.      Allergies  Allergies   Allergen Reactions   • Metformin Diarrhea   • Statins Myalgia       Social History  Social History     Social History Narrative   • Not on file       Family History  He has no family history of bladder or kidney cancer  He has no family history of kidney stones      AUA Symptom Score:      Review of Systems  Review of Systems   Constitutional: Negative.    Genitourinary: Negative.    All other systems reviewed and are negative.      Physical Exam  Physical Exam    Labs Recent and today in the office:  Results for orders placed or performed during the hospital encounter of 02/11/19   Urinalysis With Culture If Indicated - Urinary Bladder   Result Value Ref Range    Color, UA Yellow Yellow, Straw    Appearance, UA Clear Clear    pH, UA 5.5 5.0 - 8.0    Specific Gravity, UA 1.010 1.005 - 1.030    Glucose, UA Negative Negative    Ketones, UA Negative Negative    Bilirubin, UA Negative Negative    Blood, UA Moderate (2+) (A) Negative    Protein, UA Negative Negative    Leuk Esterase, UA Trace (A) Negative    Nitrite, UA Negative Negative    Urobilinogen, UA 0.2 E.U./dL 0.2 - 1.0 E.U./dL   Urinalysis, Microscopic Only - Urinary Bladder   Result Value  Ref Range    RBC, UA 21-30 (A) None Seen /HPF    WBC, UA 0-2 (A) None Seen /HPF    Bacteria, UA Trace (A) None Seen /HPF    Squamous Epithelial Cells, UA 3-6 (A) None Seen, 0-2 /HPF    Hyaline Casts, UA None Seen None Seen /LPF    Methodology Manual Light Microscopy    POC Glucose Once   Result Value Ref Range    Glucose 184 (H) 70 - 130 mg/dL         Assessment & Plan  #1 balanitis: Mycolog cream is prescribed and he will return on an annual basis.

## 2019-02-27 LAB
BACTERIA UR CULT: NORMAL
BACTERIA UR CULT: NORMAL

## 2019-02-27 RX ORDER — FINASTERIDE 5 MG/1
TABLET, FILM COATED ORAL
Qty: 90 TABLET | Refills: 2 | Status: SHIPPED | OUTPATIENT
Start: 2019-02-27 | End: 2019-07-18

## 2019-05-06 ENCOUNTER — OFFICE VISIT (OUTPATIENT)
Dept: FAMILY MEDICINE CLINIC | Facility: CLINIC | Age: 84
End: 2019-05-06

## 2019-05-06 VITALS
OXYGEN SATURATION: 94 % | SYSTOLIC BLOOD PRESSURE: 120 MMHG | HEART RATE: 64 BPM | WEIGHT: 222 LBS | RESPIRATION RATE: 17 BRPM | TEMPERATURE: 98.2 F | DIASTOLIC BLOOD PRESSURE: 80 MMHG | BODY MASS INDEX: 34.77 KG/M2

## 2019-05-06 DIAGNOSIS — E11.9 TYPE 2 DIABETES MELLITUS TREATED WITHOUT INSULIN (HCC): Primary | ICD-10-CM

## 2019-05-06 DIAGNOSIS — L30.9 ECZEMA, UNSPECIFIED TYPE: ICD-10-CM

## 2019-05-06 DIAGNOSIS — B35.9 TINEA: ICD-10-CM

## 2019-05-06 LAB — HBA1C MFR BLD: 9.4 %

## 2019-05-06 PROCEDURE — 99214 OFFICE O/P EST MOD 30 MIN: CPT | Performed by: FAMILY MEDICINE

## 2019-05-06 PROCEDURE — 83036 HEMOGLOBIN GLYCOSYLATED A1C: CPT | Performed by: FAMILY MEDICINE

## 2019-05-06 RX ORDER — GLIMEPIRIDE 4 MG/1
4 TABLET ORAL
Qty: 90 TABLET | Refills: 1 | Status: SHIPPED | OUTPATIENT
Start: 2019-05-06 | End: 2019-09-26 | Stop reason: SDUPTHER

## 2019-05-06 RX ORDER — BETAMETHASONE DIPROPIONATE 0.5 MG/G
CREAM TOPICAL
Qty: 45 G | Refills: 1 | Status: SHIPPED | OUTPATIENT
Start: 2019-05-06 | End: 2019-06-10

## 2019-05-06 RX ORDER — KETOCONAZOLE 20 MG/G
CREAM TOPICAL DAILY
COMMUNITY
End: 2019-07-18

## 2019-05-06 RX ORDER — FLUCONAZOLE 150 MG/1
150 TABLET ORAL
Qty: 3 TABLET | Refills: 0 | Status: SHIPPED | OUTPATIENT
Start: 2019-05-06 | End: 2019-06-24

## 2019-05-06 NOTE — PROGRESS NOTES
Subjective   Forrest Zepeda is a 87 y.o. male seen today for Diabetes.     Diabetes   He presents for his follow-up diabetic visit. He has type 2 diabetes mellitus. There are no hypoglycemic associated symptoms. There are no diabetic associated symptoms. Pertinent negatives for diabetes include no chest pain. There are no hypoglycemic complications. There are no diabetic complications. Risk factors for coronary artery disease include diabetes mellitus, hypertension and male sex. Current diabetic treatment includes oral agent (dual therapy) (Glimepiride and Januvia.).      Also a follow up on rash of both hands and right knee.      The following portions of the patient's history were reviewed and updated as appropriate: allergies, current medications, past social history and problem list.    Review of Systems   Respiratory: Negative for shortness of breath.    Cardiovascular: Negative for chest pain.   Skin: Positive for rash.       Objective   /80   Pulse 64   Temp 98.2 °F (36.8 °C)   Resp 17   Wt 101 kg (222 lb)   SpO2 94%   BMI 34.77 kg/m²   Physical Exam   Constitutional: He appears well-developed and well-nourished.   Cardiovascular: Normal rate and regular rhythm.   Pulmonary/Chest: Effort normal and breath sounds normal.   Nursing note and vitals reviewed.      Assessment/Plan   Problem List Items Addressed This Visit     None      Visit Diagnoses     Type 2 diabetes mellitus treated without insulin (CMS/[facility])    -  Primary    Relevant Medications    glimepiride (AMARYL) 4 MG tablet    Other Relevant Orders    POC Glycosylated Hemoglobin (Hb A1C) (Completed)    Tinea        right knee      Relevant Medications    fluconazole (DIFLUCAN) 150 MG tablet    Eczema, unspecified type        hands and arms.    Relevant Medications    ketoconazole (NIZORAL) 2 % cream    mupirocin (BACTROBAN) 2 % ointment    betamethasone dipropionate (DIPROLENE) 0.05 % cream              Drink plenty fluids.  Work on  diet and weight loss.  Cut back on carbohydrates such as breads, potatoes, pastas and desserts.  Eat more  Fruits, vegetables, and lean meats such a fish and chicken.    Increase Glimepiride to 4 mg daily. #90+1.    Continue other medications as doing.    Rx for Fluconazole 150 mg once a week. #3+0.  Rx for Betamethasone Dipropionate 0.05% cream to apply to arms and hands twice a day. #45 GM +1.    Follow up in 3 months.                Scribed for Dr Ryne Lofton by Zahida Townsend CMA.          I, Ryne Lofton MD, personally performed the services described in this documentation, as scribed by Zahida Townsend in my presence, and is both accurate and complete.        (Please note that portions of this note were completed with a voice recognition program. Efforts were made to edit the dictations,but occasionally words are mis transcribed.)

## 2019-05-12 DIAGNOSIS — I10 ESSENTIAL HYPERTENSION: ICD-10-CM

## 2019-05-13 RX ORDER — CARVEDILOL 6.25 MG/1
TABLET ORAL
Qty: 180 TABLET | Refills: 2 | Status: SHIPPED | OUTPATIENT
Start: 2019-05-13 | End: 2019-07-18

## 2019-06-03 ENCOUNTER — TELEPHONE (OUTPATIENT)
Dept: UROLOGY | Facility: CLINIC | Age: 84
End: 2019-06-03

## 2019-06-03 DIAGNOSIS — N39.0 URINARY TRACT INFECTION WITHOUT HEMATURIA, SITE UNSPECIFIED: Primary | ICD-10-CM

## 2019-06-03 RX ORDER — FLUCONAZOLE 150 MG/1
150 TABLET ORAL DAILY
Qty: 3 TABLET | Refills: 0 | Status: SHIPPED | OUTPATIENT
Start: 2019-06-03 | End: 2019-06-06

## 2019-06-03 RX ORDER — CEPHALEXIN 250 MG/1
250 CAPSULE ORAL 2 TIMES DAILY
Qty: 14 CAPSULE | Refills: 0 | Status: SHIPPED | OUTPATIENT
Start: 2019-06-03 | End: 2019-06-10

## 2019-06-03 NOTE — TELEPHONE ENCOUNTER
Patient dropped off urine sample in office. He states he is having some low back pain and burning with urination. Sample given in office was very cloudy and u/a was positive for blood, protein and luekocyte.  out of office for 1 week+ so sending to you.

## 2019-06-10 ENCOUNTER — APPOINTMENT (OUTPATIENT)
Dept: CT IMAGING | Facility: HOSPITAL | Age: 84
End: 2019-06-10

## 2019-06-10 ENCOUNTER — HOSPITAL ENCOUNTER (EMERGENCY)
Facility: HOSPITAL | Age: 84
Discharge: HOME | End: 2019-06-10
Attending: EMERGENCY MEDICINE | Admitting: EMERGENCY MEDICINE

## 2019-06-10 ENCOUNTER — APPOINTMENT (OUTPATIENT)
Dept: GENERAL RADIOLOGY | Facility: HOSPITAL | Age: 84
End: 2019-06-10

## 2019-06-10 VITALS
BODY MASS INDEX: 32.58 KG/M2 | WEIGHT: 220 LBS | HEIGHT: 69 IN | SYSTOLIC BLOOD PRESSURE: 143 MMHG | HEART RATE: 73 BPM | DIASTOLIC BLOOD PRESSURE: 80 MMHG | TEMPERATURE: 98.8 F | RESPIRATION RATE: 16 BRPM | OXYGEN SATURATION: 96 %

## 2019-06-10 DIAGNOSIS — N30.01 ACUTE CYSTITIS WITH HEMATURIA: Primary | ICD-10-CM

## 2019-06-10 DIAGNOSIS — L30.9 DERMATITIS: ICD-10-CM

## 2019-06-10 LAB
ALBUMIN SERPL-MCNC: 3.9 G/DL (ref 3.5–5.2)
ALBUMIN/GLOB SERPL: 1.1 G/DL
ALP SERPL-CCNC: 115 U/L (ref 39–117)
ALT SERPL W P-5'-P-CCNC: 16 U/L (ref 1–41)
ANION GAP SERPL CALCULATED.3IONS-SCNC: 12 MMOL/L
AST SERPL-CCNC: 16 U/L (ref 1–40)
BACTERIA UR QL AUTO: ABNORMAL /HPF
BASOPHILS # BLD AUTO: 0.06 10*3/MM3 (ref 0–0.2)
BASOPHILS NFR BLD AUTO: 0.6 % (ref 0–1.5)
BILIRUB SERPL-MCNC: 0.3 MG/DL (ref 0.2–1.2)
BILIRUB UR QL STRIP: NEGATIVE
BUN BLD-MCNC: 25 MG/DL (ref 8–23)
BUN/CREAT SERPL: 19.2 (ref 7–25)
CALCIUM SPEC-SCNC: 9.8 MG/DL (ref 8.6–10.5)
CHLORIDE SERPL-SCNC: 100 MMOL/L (ref 98–107)
CLARITY UR: ABNORMAL
CO2 SERPL-SCNC: 26 MMOL/L (ref 22–29)
COLOR UR: YELLOW
CREAT BLD-MCNC: 1.3 MG/DL (ref 0.76–1.27)
D-LACTATE SERPL-SCNC: 1.8 MMOL/L (ref 0.5–2)
DEPRECATED RDW RBC AUTO: 52.6 FL (ref 37–54)
EOSINOPHIL # BLD AUTO: 0.26 10*3/MM3 (ref 0–0.4)
EOSINOPHIL NFR BLD AUTO: 2.6 % (ref 0.3–6.2)
ERYTHROCYTE [DISTWIDTH] IN BLOOD BY AUTOMATED COUNT: 14.9 % (ref 12.3–15.4)
GFR SERPL CREATININE-BSD FRML MDRD: 52 ML/MIN/1.73
GLOBULIN UR ELPH-MCNC: 3.7 GM/DL
GLUCOSE BLD-MCNC: 427 MG/DL (ref 65–99)
GLUCOSE BLDC GLUCOMTR-MCNC: 394 MG/DL (ref 70–130)
GLUCOSE UR STRIP-MCNC: ABNORMAL MG/DL
HCT VFR BLD AUTO: 46.6 % (ref 37.5–51)
HGB BLD-MCNC: 14.6 G/DL (ref 13–17.7)
HGB UR QL STRIP.AUTO: ABNORMAL
HOLD SPECIMEN: NORMAL
HOLD SPECIMEN: NORMAL
HYALINE CASTS UR QL AUTO: ABNORMAL /LPF
IMM GRANULOCYTES # BLD AUTO: 0.05 10*3/MM3 (ref 0–0.05)
IMM GRANULOCYTES NFR BLD AUTO: 0.5 % (ref 0–0.5)
KETONES UR QL STRIP: NEGATIVE
LEUKOCYTE ESTERASE UR QL STRIP.AUTO: ABNORMAL
LYMPHOCYTES # BLD AUTO: 2.17 10*3/MM3 (ref 0.7–3.1)
LYMPHOCYTES NFR BLD AUTO: 21.7 % (ref 19.6–45.3)
MAGNESIUM SERPL-MCNC: 1.8 MG/DL (ref 1.6–2.4)
MCH RBC QN AUTO: 30.2 PG (ref 26.6–33)
MCHC RBC AUTO-ENTMCNC: 31.3 G/DL (ref 31.5–35.7)
MCV RBC AUTO: 96.5 FL (ref 79–97)
MONOCYTES # BLD AUTO: 0.67 10*3/MM3 (ref 0.1–0.9)
MONOCYTES NFR BLD AUTO: 6.7 % (ref 5–12)
NEUTROPHILS # BLD AUTO: 6.81 10*3/MM3 (ref 1.7–7)
NEUTROPHILS NFR BLD AUTO: 67.9 % (ref 42.7–76)
NITRITE UR QL STRIP: NEGATIVE
NRBC BLD AUTO-RTO: 0 /100 WBC (ref 0–0.2)
PH UR STRIP.AUTO: 5.5 [PH] (ref 5–8)
PLATELET # BLD AUTO: 179 10*3/MM3 (ref 140–450)
PMV BLD AUTO: 10.4 FL (ref 6–12)
POTASSIUM BLD-SCNC: 4.3 MMOL/L (ref 3.5–5.2)
PROT SERPL-MCNC: 7.6 G/DL (ref 6–8.5)
PROT UR QL STRIP: ABNORMAL
RBC # BLD AUTO: 4.83 10*6/MM3 (ref 4.14–5.8)
RBC # UR: ABNORMAL /HPF
REF LAB TEST METHOD: ABNORMAL
SODIUM BLD-SCNC: 138 MMOL/L (ref 136–145)
SP GR UR STRIP: 1.02 (ref 1–1.03)
SQUAMOUS #/AREA URNS HPF: ABNORMAL /HPF
TROPONIN T SERPL-MCNC: <0.01 NG/ML (ref 0–0.03)
TROPONIN T SERPL-MCNC: <0.01 NG/ML (ref 0–0.03)
UROBILINOGEN UR QL STRIP: ABNORMAL
WBC NRBC COR # BLD: 10.02 10*3/MM3 (ref 3.4–10.8)
WBC UR QL AUTO: ABNORMAL /HPF
WHOLE BLOOD HOLD SPECIMEN: NORMAL
WHOLE BLOOD HOLD SPECIMEN: NORMAL

## 2019-06-10 PROCEDURE — 84484 ASSAY OF TROPONIN QUANT: CPT | Performed by: EMERGENCY MEDICINE

## 2019-06-10 PROCEDURE — 96365 THER/PROPH/DIAG IV INF INIT: CPT

## 2019-06-10 PROCEDURE — 74176 CT ABD & PELVIS W/O CONTRAST: CPT

## 2019-06-10 PROCEDURE — 85025 COMPLETE CBC W/AUTO DIFF WBC: CPT

## 2019-06-10 PROCEDURE — 93005 ELECTROCARDIOGRAM TRACING: CPT

## 2019-06-10 PROCEDURE — 84484 ASSAY OF TROPONIN QUANT: CPT

## 2019-06-10 PROCEDURE — 93005 ELECTROCARDIOGRAM TRACING: CPT | Performed by: EMERGENCY MEDICINE

## 2019-06-10 PROCEDURE — 82962 GLUCOSE BLOOD TEST: CPT

## 2019-06-10 PROCEDURE — 83605 ASSAY OF LACTIC ACID: CPT | Performed by: EMERGENCY MEDICINE

## 2019-06-10 PROCEDURE — 87040 BLOOD CULTURE FOR BACTERIA: CPT | Performed by: EMERGENCY MEDICINE

## 2019-06-10 PROCEDURE — 80053 COMPREHEN METABOLIC PANEL: CPT

## 2019-06-10 PROCEDURE — 83735 ASSAY OF MAGNESIUM: CPT

## 2019-06-10 PROCEDURE — 99284 EMERGENCY DEPT VISIT MOD MDM: CPT

## 2019-06-10 PROCEDURE — 81001 URINALYSIS AUTO W/SCOPE: CPT

## 2019-06-10 PROCEDURE — 25010000002 CEFTRIAXONE PER 250 MG: Performed by: EMERGENCY MEDICINE

## 2019-06-10 PROCEDURE — 87086 URINE CULTURE/COLONY COUNT: CPT | Performed by: EMERGENCY MEDICINE

## 2019-06-10 PROCEDURE — 71045 X-RAY EXAM CHEST 1 VIEW: CPT

## 2019-06-10 RX ORDER — CEFDINIR 300 MG/1
300 CAPSULE ORAL 2 TIMES DAILY
Qty: 14 CAPSULE | Refills: 0 | Status: SHIPPED | OUTPATIENT
Start: 2019-06-10 | End: 2019-06-17

## 2019-06-10 RX ORDER — SODIUM CHLORIDE 0.9 % (FLUSH) 0.9 %
10 SYRINGE (ML) INJECTION AS NEEDED
Status: DISCONTINUED | OUTPATIENT
Start: 2019-06-10 | End: 2019-06-10 | Stop reason: HOSPADM

## 2019-06-10 RX ORDER — DESOXIMETASONE 2.5 MG/G
OINTMENT TOPICAL 2 TIMES DAILY
Qty: 60 G | Refills: 0 | Status: SHIPPED | OUTPATIENT
Start: 2019-06-10 | End: 2019-07-18

## 2019-06-10 RX ADMIN — CEFTRIAXONE SODIUM 1 G: 1 INJECTION, POWDER, FOR SOLUTION INTRAMUSCULAR; INTRAVENOUS at 16:10

## 2019-06-10 RX ADMIN — SODIUM CHLORIDE 500 ML: 9 INJECTION, SOLUTION INTRAVENOUS at 16:10

## 2019-06-10 NOTE — ED PROVIDER NOTES
Subjective   Forrest Zepeda is a 87 y.o.male who presents to the emergency department with complaints of generalized weakness. His weakness has persisted for 2-3 months. He mentions fatigue and decreased appetite, but denies chest pain, back pain, neck pain, headache, vomiting, and diarrhea. The patient reports that he has a fungal urinary tract infection and is taking keflex. He notes that his urinary frequency is improving. His history includes a renal stent that was inserted ten years ago due to renal calculi. His stent was removed in January before the onset of his weakness. He also has a benign enlarged prostate and is taking proscar. Other medications include coreg and synthroid. The patient sees Dr. Romero, urologist, and Dr. Lofton, primary care physician. There are no other acute complaints at this time.           History provided by:  Patient  Weakness - Generalized   Severity:  Moderate  Onset quality:  Gradual  Duration:  8 weeks  Timing:  Constant  Progression:  Worsening  Chronicity:  New  Associated symptoms: no abdominal pain, no chest pain, no dysuria and no headaches        Review of Systems   Constitutional: Positive for appetite change and fatigue.   Cardiovascular: Negative for chest pain.   Gastrointestinal: Negative for abdominal pain.   Genitourinary: Negative for dysuria.   Musculoskeletal: Negative for back pain and neck pain.   Neurological: Negative for headaches.   All other systems reviewed and are negative.      Past Medical History:   Diagnosis Date   • Abnormal PSA     Reported abnormal   • Arthritis     KNEES,  BUT SINCE HAD REPLACEMENT   • Benign localized hyperplasia of prostate    • Bilateral knee pain    • Cataracts, bilateral    • Diabetic neuropathy (CMS/HCC)    • Disease of thyroid gland     HYPOTHYROIDISM   • Diverticulitis    • Dyslipidemia     H/O   • Full dentures    • History of gout    • History of hepatitis A vaccination    • History of nephrolithiasis    • History  "of nuclear stress test     STATES IN THE 1990'S.  \"IT WAS OK\"   • History of osteopenia    • Hyperkalemia     PT UNSURE REGARDING HX OF THIS   • Hypertension    • Insomnia    • Malignant hyperthermia due to anesthesia     PER PT REPORT, BROTHER HAD DURING LUNG SURGERY SEVERAL YEARS AGO.  STATED THEY PACKED HIM ON ICE.  STATED HE DID SURVIVE.  PT DENIES ANY HX OF MALIGNANT HYPERTHERMIA HIMSELF, OR ANY ISSUES WITH ANESTHESIA.  STATES TO HIS KNOWLEDGE, NO OTHER FAMILY MEMBER HAS THIS ISSUE EXCEPT FOR HIS BROTHER.   • Renal insufficiency    • Shortness of breath     STATES WITH EXERTION, OTHERWISE, DENIES   • Type 2 diabetes mellitus (CMS/HCC)    • Ureteral stricture, left    • Vitamin D deficiency    • Wears glasses        Allergies   Allergen Reactions   • Metformin Diarrhea   • Statins Myalgia       Past Surgical History:   Procedure Laterality Date   • APPENDECTOMY     • CHOLECYSTECTOMY     • COLONOSCOPY     • CYSTOSCOPY URETEROSCOPY Left 2/11/2019    Procedure: URETEROSCOPY WITH STENT EXTRACTION, RPG;  Surgeon: Griffin Romero MD;  Location: [facility];  Service: Urology   • EYE SURGERY      CATARACTS REMOVED BILATERALLY   • JOINT REPLACEMENT Bilateral     BILATERAL KNEES REPLACED   • KNEE ARTHROSCOPY Bilateral    • RENAL ARTERY STENT      SEVERAL TIMES EVERY SINCE 1978   • URETEROSCOPY LASER LITHOTRIPSY WITH STENT INSERTION Left 1/10/2018    Procedure:  cysto, left ureteral stent replacement ;  Surgeon: Griffin Romero MD;  Location: [facility] OR;  Service:        Family History   Problem Relation Age of Onset   • Heart attack Sister    • Brain cancer Sister    • Stroke Sister    • Lung cancer Sister    • Brain cancer Brother    • Lung cancer Brother    • Malig Hyperthermia Brother        Social History     Socioeconomic History   • Marital status:      Spouse name: Not on file   • Number of children: Not on file   • Years of education: Not on file   • Highest education level: Not on file   Tobacco Use   • " Smoking status: Former Smoker     Types: Cigarettes   • Smokeless tobacco: Never Used   • Tobacco comment: SMOKED SOME AS A TEEN, DENIES ANY HABIT OR ADDICTION   Substance and Sexual Activity   • Alcohol use: No   • Drug use: No   • Sexual activity: Defer         Objective   Physical Exam   Constitutional: He is oriented to person, place, and time. He appears well-developed and well-nourished. No distress.   HENT:   Head: Normocephalic and atraumatic.   Nose: Nose normal.   Eyes: Conjunctivae are normal. No scleral icterus.   Neck: Normal range of motion. Neck supple.   Cardiovascular: Normal rate, regular rhythm and normal heart sounds.   Pulmonary/Chest: Effort normal and breath sounds normal. No respiratory distress.   Abdominal: Soft. There is no tenderness.   Musculoskeletal: Normal range of motion.   Neurological: He is alert and oriented to person, place, and time.   Equal strength upper and lower extremities   Skin: Skin is warm and dry. Capillary refill takes less than 2 seconds.   Dry erythematous rash right knee and left leg.  There is no warmth to this area.   Psychiatric: He has a normal mood and affect. His behavior is normal.   Nursing note and vitals reviewed.      Procedures         ED Course  ED Course as of Charlie 10 2049   Mon Charlie 10, 2019   1903 STEPHEN reevaluated the patient and discussed results of labs and imaging. He informed the patient and his family of his plan to discharge. They feel comfortable with this.  [BM]   2048 Patient also states that he needs a refill on some cream for a rash that has had for several years.  He states the rash has not changed he is just out of his refills.  [JI]      ED Course User Index  [BM] Mariah Vee  [JI] Jack Rojo PA     Recent Results (from the past 24 hour(s))   POC Glucose Once    Collection Time: 06/10/19 12:35 PM   Result Value Ref Range    Glucose 394 (H) 70 - 130 mg/dL   Comprehensive Metabolic Panel    Collection Time: 06/10/19 12:40 PM    Result Value Ref Range    Glucose 427 (C) 65 - 99 mg/dL    BUN 25 (H) 8 - 23 mg/dL    Creatinine 1.30 (H) 0.76 - 1.27 mg/dL    Sodium 138 136 - 145 mmol/L    Potassium 4.3 3.5 - 5.2 mmol/L    Chloride 100 98 - 107 mmol/L    CO2 26.0 22.0 - 29.0 mmol/L    Calcium 9.8 8.6 - 10.5 mg/dL    Total Protein 7.6 6.0 - 8.5 g/dL    Albumin 3.90 3.50 - 5.20 g/dL    ALT (SGPT) 16 1 - 41 U/L    AST (SGOT) 16 1 - 40 U/L    Alkaline Phosphatase 115 39 - 117 U/L    Total Bilirubin 0.3 0.2 - 1.2 mg/dL    eGFR Non African Amer 52 (L) >60 mL/min/1.73    Globulin 3.7 gm/dL    A/G Ratio 1.1 g/dL    BUN/Creatinine Ratio 19.2 7.0 - 25.0    Anion Gap 12.0 mmol/L   Troponin    Collection Time: 06/10/19 12:40 PM   Result Value Ref Range    Troponin T <0.010 0.000 - 0.030 ng/mL   Magnesium    Collection Time: 06/10/19 12:40 PM   Result Value Ref Range    Magnesium 1.8 1.6 - 2.4 mg/dL   Light Blue Top    Collection Time: 06/10/19 12:40 PM   Result Value Ref Range    Extra Tube hold for add-on    Green Top (Gel)    Collection Time: 06/10/19 12:40 PM   Result Value Ref Range    Extra Tube Hold for add-ons.    Lavender Top    Collection Time: 06/10/19 12:40 PM   Result Value Ref Range    Extra Tube hold for add-on    Gold Top - SST    Collection Time: 06/10/19 12:40 PM   Result Value Ref Range    Extra Tube Hold for add-ons.    CBC Auto Differential    Collection Time: 06/10/19 12:40 PM   Result Value Ref Range    WBC 10.02 3.40 - 10.80 10*3/mm3    RBC 4.83 4.14 - 5.80 10*6/mm3    Hemoglobin 14.6 13.0 - 17.7 g/dL    Hematocrit 46.6 37.5 - 51.0 %    MCV 96.5 79.0 - 97.0 fL    MCH 30.2 26.6 - 33.0 pg    MCHC 31.3 (L) 31.5 - 35.7 g/dL    RDW 14.9 12.3 - 15.4 %    RDW-SD 52.6 37.0 - 54.0 fl    MPV 10.4 6.0 - 12.0 fL    Platelets 179 140 - 450 10*3/mm3    Neutrophil % 67.9 42.7 - 76.0 %    Lymphocyte % 21.7 19.6 - 45.3 %    Monocyte % 6.7 5.0 - 12.0 %    Eosinophil % 2.6 0.3 - 6.2 %    Basophil % 0.6 0.0 - 1.5 %    Immature Grans % 0.5 0.0 - 0.5 %     Neutrophils, Absolute 6.81 1.70 - 7.00 10*3/mm3    Lymphocytes, Absolute 2.17 0.70 - 3.10 10*3/mm3    Monocytes, Absolute 0.67 0.10 - 0.90 10*3/mm3    Eosinophils, Absolute 0.26 0.00 - 0.40 10*3/mm3    Basophils, Absolute 0.06 0.00 - 0.20 10*3/mm3    Immature Grans, Absolute 0.05 0.00 - 0.05 10*3/mm3    nRBC 0.0 0.0 - 0.2 /100 WBC   Urinalysis With Microscopic If Indicated (No Culture) - Urine, Clean Catch    Collection Time: 06/10/19 12:58 PM   Result Value Ref Range    Color, UA Yellow Yellow, Straw    Appearance, UA Turbid (A) Clear    pH, UA 5.5 5.0 - 8.0    Specific Gravity, UA 1.022 1.001 - 1.030    Glucose, UA >=1000 mg/dL (3+) (A) Negative    Ketones, UA Negative Negative    Bilirubin, UA Negative Negative    Blood, UA Moderate (2+) (A) Negative    Protein,  mg/dL (2+) (A) Negative    Leuk Esterase, UA Large (3+) (A) Negative    Nitrite, UA Negative Negative    Urobilinogen, UA 0.2 E.U./dL 0.2 - 1.0 E.U./dL   Urinalysis, Microscopic Only - Urine, Clean Catch    Collection Time: 06/10/19 12:58 PM   Result Value Ref Range    RBC, UA 21-30 (A) None Seen, 0-2 /HPF    WBC, UA Too Numerous to Count (A) None Seen, 0-2 /HPF    Bacteria, UA None Seen None Seen, Trace /HPF    Squamous Epithelial Cells, UA 3-6 (A) None Seen, 0-2 /HPF    Hyaline Casts, UA None Seen 0 - 6 /LPF    Methodology Manual Light Microscopy    Troponin    Collection Time: 06/10/19  3:26 PM   Result Value Ref Range    Troponin T <0.010 0.000 - 0.030 ng/mL   Lactic Acid, Plasma    Collection Time: 06/10/19  3:55 PM   Result Value Ref Range    Lactate 1.8 0.5 - 2.0 mmol/L     Note: In addition to lab results from this visit, the labs listed above may include labs taken at another facility or during a different encounter within the last 24 hours. Please correlate lab times with ED admission and discharge times for further clarification of the services performed during this visit.    CT Abdomen Pelvis Without Contrast   Preliminary Result    Chronic obstruction of the left kidney with dilatation of   the renal collecting system and left ureter. There is wall thickening   identified of the bladder with surrounding stranding suggesting a mild   cystitis. Correlation to urinalysis is recommended. Mild enlargement of   the prostate. Diverticulosis with no evidence of diverticulitis.       D:  06/10/2019   E:  06/10/2019          XR Chest 1 View   Final Result   Chronic changes seen within the lung fields with no evidence   of acute parenchymal disease. Degenerative changes seen within the   spine.       D:  06/10/2019   E:  06/10/2019       This report was finalized on 6/10/2019 4:24 PM by Dr. Alona Linares MD.            Vitals:    06/10/19 1900 06/10/19 1930 06/10/19 2000 06/10/19 2030   BP: 144/75 147/68 154/69 143/80   BP Location:       Patient Position:       Pulse: 68 66 59 73   Resp:       Temp:       TempSrc:       SpO2: 93% 94% 94% 96%   Weight:       Height:         Medications   sodium chloride 0.9 % flush 10 mL (not administered)   sodium chloride 0.9 % bolus 500 mL (0 mL Intravenous Stopped 6/10/19 1652)   cefTRIAXone (ROCEPHIN) 1 g/100 mL 0.9% NS (MBP) (0 g Intravenous Stopped 6/10/19 1652)     ECG/EMG Results (last 24 hours)     Procedure Component Value Units Date/Time    ECG 12 Lead [[id number]] Collected:  06/10/19 1233     Updated:  06/10/19 1346        ECG 12 Lead   Final Result   Test Reason : Weak/Dizzy/AMS protocol   Blood Pressure : **/** mmHG   Vent. Rate : 071 BPM     Atrial Rate : 071 BPM      P-R Int : 216 ms          QRS Dur : 090 ms       QT Int : 382 ms       P-R-T Axes : 000 -36 012 degrees      QTc Int : 415 ms      Sinus rhythm with 1st degree AV block   Left axis deviation   Inferior infarct (cited on or before 09-AUG-2017)   Abnormal ECG   When compared with ECG of 09-AUG-2017 21:48,   Questionable change in QRS duration   Confirmed by LATONYA GUERRERO MD ([id number]) on 6/10/2019 6:48:06 PM      Referred By:  ARIELLE            Confirmed By:LATONYA GUERRERO MD                          [facility]    Final diagnoses:   Acute cystitis with hematuria   Dermatitis       Documentation assistance provided by brad Vee.  Information recorded by the danielibdaniella was done at my direction and has been verified and validated by me.     Mariah Vee  06/10/19 7025       Jack Rojo PA  06/10/19 2863

## 2019-06-11 LAB — BACTERIA SPEC AEROBE CULT: NO GROWTH

## 2019-06-15 LAB
BACTERIA SPEC AEROBE CULT: NORMAL
BACTERIA SPEC AEROBE CULT: NORMAL

## 2019-06-24 ENCOUNTER — OFFICE VISIT (OUTPATIENT)
Dept: UROLOGY | Facility: CLINIC | Age: 84
End: 2019-06-24

## 2019-06-24 DIAGNOSIS — B37.49 CANDIDA UTI: ICD-10-CM

## 2019-06-24 DIAGNOSIS — N13.2 HYDRONEPHROSIS WITH RENAL AND URETERAL CALCULUS OBSTRUCTION: ICD-10-CM

## 2019-06-24 DIAGNOSIS — N20.0 NEPHROLITHIASIS: ICD-10-CM

## 2019-06-24 DIAGNOSIS — N39.0 URINARY TRACT INFECTION WITHOUT HEMATURIA, SITE UNSPECIFIED: Primary | ICD-10-CM

## 2019-06-24 LAB
BILIRUB BLD-MCNC: NEGATIVE MG/DL
CLARITY, POC: ABNORMAL
COLOR UR: YELLOW
GLUCOSE UR STRIP-MCNC: ABNORMAL MG/DL
KETONES UR QL: NEGATIVE
LEUKOCYTE EST, POC: ABNORMAL
NITRITE UR-MCNC: NEGATIVE MG/ML
PH UR: 6 [PH] (ref 5–8)
PROT UR STRIP-MCNC: ABNORMAL MG/DL
RBC # UR STRIP: ABNORMAL /UL
SP GR UR: 1.02 (ref 1–1.03)
UROBILINOGEN UR QL: NORMAL

## 2019-06-24 PROCEDURE — 81003 URINALYSIS AUTO W/O SCOPE: CPT | Performed by: UROLOGY

## 2019-06-24 PROCEDURE — 99214 OFFICE O/P EST MOD 30 MIN: CPT | Performed by: UROLOGY

## 2019-06-24 RX ORDER — FLUCONAZOLE 200 MG/1
200 TABLET ORAL 2 TIMES DAILY
Qty: 14 TABLET | Refills: 1 | Status: SHIPPED | OUTPATIENT
Start: 2019-06-24 | End: 2019-07-01

## 2019-06-24 NOTE — PROGRESS NOTES
"Chief Complaint  Urinary Tract Infection        JAZMIN Zepeda is a 87 y.o. male who returns today with complaints of feeling poorly and recent urinary tract infection.  He was treated first here and then in [place] with antibiotics although urine cultures and blood cultures subsequently returned negative or no growth.  He has a past history of left hydronephrosis associated with a tortuous distal strictured ureter and a history of recurrent nephrolithiasis so a CT scan was recently performed.  This revealed hydronephrosis of the left side and a questionable stone in the distal ureter on the left although was not read by radiology.  I suggested a nuclear scan to determine if there is any active obstruction to the left side and if so I will recommend ureteroscopy and possible reinsertion of the stent.  In the meantime we will repeat his urine culture and provide antibiotics if bacterial growth is found.  Otherwise he started on high-dose Diflucan for his apparent yeast infection and encouraged to get his diabetes under better control.  He has 3+ glucose persistently in the urine.  He denies any difficulty voiding.    There were no vitals filed for this visit.    Past Medical History  Past Medical History:   Diagnosis Date   • Abnormal PSA     Reported abnormal   • Arthritis     KNEES,  BUT SINCE HAD REPLACEMENT   • Benign localized hyperplasia of prostate    • Bilateral knee pain    • Cataracts, bilateral    • Diabetic neuropathy (CMS/HCC)    • Disease of thyroid gland     HYPOTHYROIDISM   • Diverticulitis    • Dyslipidemia     H/O   • Full dentures    • History of gout    • History of hepatitis A vaccination    • History of nephrolithiasis    • History of nuclear stress test     STATES IN THE 1990'S.  \"IT WAS OK\"   • History of osteopenia    • Hyperkalemia     PT UNSURE REGARDING HX OF THIS   • Hypertension    • Insomnia    • Malignant hyperthermia due to anesthesia     PER PT REPORT, BROTHER HAD DURING LUNG SURGERY " SEVERAL YEARS AGO.  STATED THEY PACKED HIM ON ICE.  STATED HE DID SURVIVE.  PT DENIES ANY HX OF MALIGNANT HYPERTHERMIA HIMSELF, OR ANY ISSUES WITH ANESTHESIA.  STATES TO HIS KNOWLEDGE, NO OTHER FAMILY MEMBER HAS THIS ISSUE EXCEPT FOR HIS BROTHER.   • Renal insufficiency    • Shortness of breath     STATES WITH EXERTION, OTHERWISE, DENIES   • Type 2 diabetes mellitus (CMS/HCC)    • Ureteral stricture, left    • Vitamin D deficiency    • Wears glasses        Past Surgical History  Past Surgical History:   Procedure Laterality Date   • APPENDECTOMY     • CHOLECYSTECTOMY     • COLONOSCOPY     • CYSTOSCOPY URETEROSCOPY Left 2/11/2019    Procedure: URETEROSCOPY WITH STENT EXTRACTION, RPG;  Surgeon: Griffin Romero MD;  Location: [facility];  Service: Urology   • EYE SURGERY      CATARACTS REMOVED BILATERALLY   • JOINT REPLACEMENT Bilateral     BILATERAL KNEES REPLACED   • KNEE ARTHROSCOPY Bilateral    • RENAL ARTERY STENT      SEVERAL TIMES EVERY SINCE 1978   • URETEROSCOPY LASER LITHOTRIPSY WITH STENT INSERTION Left 1/10/2018    Procedure:  cysto, left ureteral stent replacement ;  Surgeon: Griffin Romero MD;  Location: [facility];  Service:        Medications  has a current medication list which includes the following prescription(s): allopurinol, carvedilol, desoximetasone, diphenhydramine-apap (sleep), finasteride, fluconazole, glimepiride, ketoconazole, levothyroxine, multiple vitamins-minerals, mupirocin, and sitagliptin.      Allergies  Allergies   Allergen Reactions   • Metformin Diarrhea   • Statins Myalgia       Social History  Social History     Social History Narrative   • Not on file       Family History  He has no family history of bladder or kidney cancer  He has no family history of kidney stones      AUA Symptom Score:      Review of Systems  Review of Systems   Constitutional: Positive for fatigue.   Genitourinary: Positive for dysuria.   All other systems reviewed and are negative.      Physical  Exam  Physical Exam    Labs Recent and today in the office:  Results for orders placed or performed in visit on 06/24/19   POC Urinalysis Dipstick, Automated   Result Value Ref Range    Color Yellow Yellow, Straw, Dark Yellow, Sushila    Clarity, UA Cloudy (A) Clear    Specific Gravity  1.020 1.005 - 1.030    pH, Urine 6.0 5.0 - 8.0    Leukocytes Moderate (2+) (A) Negative    Nitrite, UA Negative Negative    Protein, POC 1+ (A) Negative mg/dL    Glucose, UA 3+ (A) Negative, 1000 mg/dL (3+) mg/dL    Ketones, UA Negative Negative    Urobilinogen, UA Normal Normal    Bilirubin Negative Negative    Blood, UA 1+ (A) Negative         Assessment & Plan  Left hydronephrosis: Schedule nuclear scan to rule out obstruction.  This could be residual radiographic changes from previous obstruction    Nephrolithiasis: He could have a stone in his distal ureter according to my review of the CT scan although this was not read by radiology    Urinary tract infection: Most likely Candida in light of his poor control of his diabetes and no growth on urine cultures but will repeat the specimen.  Will provide high-dose Diflucan until the culture returns.

## 2019-06-26 LAB
BACTERIA UR CULT: NO GROWTH
BACTERIA UR CULT: NORMAL

## 2019-07-03 ENCOUNTER — OFFICE VISIT (OUTPATIENT)
Dept: UROLOGY | Facility: CLINIC | Age: 84
End: 2019-07-03

## 2019-07-03 ENCOUNTER — HOSPITAL ENCOUNTER (OUTPATIENT)
Dept: NUCLEAR MEDICINE | Facility: HOSPITAL | Age: 84
Discharge: HOME | End: 2019-07-03

## 2019-07-03 VITALS
TEMPERATURE: 98.1 F | DIASTOLIC BLOOD PRESSURE: 68 MMHG | WEIGHT: 213 LBS | OXYGEN SATURATION: 92 % | SYSTOLIC BLOOD PRESSURE: 132 MMHG | HEART RATE: 77 BPM | BODY MASS INDEX: 31.45 KG/M2

## 2019-07-03 DIAGNOSIS — Z87.440 HISTORY OF UTI: ICD-10-CM

## 2019-07-03 DIAGNOSIS — N13.5 URETERAL STRICTURE: ICD-10-CM

## 2019-07-03 DIAGNOSIS — N30.00 ACUTE CYSTITIS WITHOUT HEMATURIA: Primary | ICD-10-CM

## 2019-07-03 DIAGNOSIS — N13.2 HYDRONEPHROSIS WITH RENAL AND URETERAL CALCULUS OBSTRUCTION: ICD-10-CM

## 2019-07-03 DIAGNOSIS — N13.30 HYDRONEPHROSIS, UNSPECIFIED HYDRONEPHROSIS TYPE: ICD-10-CM

## 2019-07-03 LAB
BILIRUB BLD-MCNC: NEGATIVE MG/DL
CLARITY, POC: ABNORMAL
COLOR UR: YELLOW
GLUCOSE UR STRIP-MCNC: NEGATIVE MG/DL
KETONES UR QL: NEGATIVE
LEUKOCYTE EST, POC: ABNORMAL
NITRITE UR-MCNC: NEGATIVE MG/ML
PH UR: 6 [PH] (ref 5–8)
PROT UR STRIP-MCNC: NEGATIVE MG/DL
RBC # UR STRIP: NEGATIVE /UL
SP GR UR: 1.01 (ref 1–1.03)
UROBILINOGEN UR QL: NORMAL

## 2019-07-03 PROCEDURE — 25010000002 FUROSEMIDE PER 20 MG: Performed by: UROLOGY

## 2019-07-03 PROCEDURE — 0 TECHNETIUM TC 99M PENTETATE KIT: Performed by: UROLOGY

## 2019-07-03 PROCEDURE — A9539 TC99M PENTETATE: HCPCS | Performed by: UROLOGY

## 2019-07-03 PROCEDURE — 78708 K FLOW/FUNCT IMAGE W/DRUG: CPT

## 2019-07-03 PROCEDURE — 99214 OFFICE O/P EST MOD 30 MIN: CPT | Performed by: UROLOGY

## 2019-07-03 PROCEDURE — 81003 URINALYSIS AUTO W/O SCOPE: CPT | Performed by: UROLOGY

## 2019-07-03 RX ORDER — FUROSEMIDE 10 MG/ML
40 INJECTION INTRAMUSCULAR; INTRAVENOUS
Status: COMPLETED | OUTPATIENT
Start: 2019-07-03 | End: 2019-07-03

## 2019-07-03 RX ADMIN — FUROSEMIDE 40 MG: 10 INJECTION, SOLUTION INTRAMUSCULAR; INTRAVENOUS at 08:32

## 2019-07-03 RX ADMIN — KIT FOR THE PREPARATION OF TECHNETIUM TC 99M PENTETATE 1 DOSE: 20 INJECTION, POWDER, LYOPHILIZED, FOR SOLUTION INTRAVENOUS; RESPIRATORY (INHALATION) at 08:08

## 2019-07-03 NOTE — PROGRESS NOTES
"Chief Complaint  History of UTI and left ureteral stricture (1 week follow up.)        JAZMIN Zepeda is a 87 y.o. male who returns today for follow-up with known left hydronephrosis associated with ureteral calculi and possible ureteral stricture that is been handled for years with an indwelling stent.  Recent nuclear medicine study confirms poor function on the left kidney and hydronephrosis with delayed emptying but not definite obstruction.  He has persistent leukocytes in his urine but has had 4 or 5- urine cultures growing only yeast.  He was recently treated with a round of Diflucan without change.  He has a history of diabetes as well as diverticulitis and C. difficile which all complicate the use of antibiotics.  Specifically he denies any pain in the left kidney area.  He denies any difficulty voiding and empties the bladder well.  He continues his Proscar    Vitals:    07/03/19 1519   BP: 132/68   Pulse: 77   Temp: 98.1 °F (36.7 °C)   SpO2: 92%       Past Medical History  Past Medical History:   Diagnosis Date   • Abnormal PSA     Reported abnormal   • Arthritis     KNEES,  BUT SINCE HAD REPLACEMENT   • Benign localized hyperplasia of prostate    • Bilateral knee pain    • Cataracts, bilateral    • Diabetic neuropathy (CMS/HCC)    • Disease of thyroid gland     HYPOTHYROIDISM   • Diverticulitis    • Dyslipidemia     H/O   • Full dentures    • History of gout    • History of hepatitis A vaccination    • History of nephrolithiasis    • History of nuclear stress test     STATES IN THE 1990'S.  \"IT WAS OK\"   • History of osteopenia    • Hyperkalemia     PT UNSURE REGARDING HX OF THIS   • Hypertension    • Insomnia    • Malignant hyperthermia due to anesthesia     PER PT REPORT, BROTHER HAD DURING LUNG SURGERY SEVERAL YEARS AGO.  STATED THEY PACKED HIM ON ICE.  STATED HE DID SURVIVE.  PT DENIES ANY HX OF MALIGNANT HYPERTHERMIA HIMSELF, OR ANY ISSUES WITH ANESTHESIA.  STATES TO HIS KNOWLEDGE, NO OTHER FAMILY MEMBER " HAS THIS ISSUE EXCEPT FOR HIS BROTHER.   • Renal insufficiency    • Shortness of breath     STATES WITH EXERTION, OTHERWISE, DENIES   • Type 2 diabetes mellitus (CMS/HCC)    • Ureteral stricture, left    • Vitamin D deficiency    • Wears glasses        Past Surgical History  Past Surgical History:   Procedure Laterality Date   • APPENDECTOMY     • CHOLECYSTECTOMY     • COLONOSCOPY     • CYSTOSCOPY URETEROSCOPY Left 2/11/2019    Procedure: URETEROSCOPY WITH STENT EXTRACTION, RPG;  Surgeon: Griffin Romero MD;  Location: [facility];  Service: Urology   • EYE SURGERY      CATARACTS REMOVED BILATERALLY   • JOINT REPLACEMENT Bilateral     BILATERAL KNEES REPLACED   • KNEE ARTHROSCOPY Bilateral    • RENAL ARTERY STENT      SEVERAL TIMES EVERY SINCE 1978   • URETEROSCOPY LASER LITHOTRIPSY WITH STENT INSERTION Left 1/10/2018    Procedure:  cysto, left ureteral stent replacement ;  Surgeon: Griffin Romero MD;  Location: [facility];  Service:        Medications  has a current medication list which includes the following prescription(s): allopurinol, carvedilol, desoximetasone, diphenhydramine-apap (sleep), finasteride, glimepiride, ketoconazole, levothyroxine, multiple vitamins-minerals, mupirocin, and sitagliptin.      Allergies  Allergies   Allergen Reactions   • Metformin Diarrhea   • Statins Myalgia       Social History  Social History     Social History Narrative   • Not on file       Family History  He has no family history of bladder or kidney cancer  He has no family history of kidney stones      AUA Symptom Score:      Review of Systems  Review of Systems   Constitutional: Positive for appetite change, fatigue and unexpected weight change.   Genitourinary: Negative.        Physical Exam  Physical Exam    Labs Recent and today in the office:  Results for orders placed or performed in visit on 07/03/19   POC Urinalysis Dipstick, Automated   Result Value Ref Range    Color Yellow Yellow, Straw, Dark Yellow, Sushila     Clarity, UA Hazy (A) Clear    Specific Gravity  1.015 1.005 - 1.030    pH, Urine 6.0 5.0 - 8.0    Leukocytes Large (3+) (A) Negative    Nitrite, UA Negative Negative    Protein, POC Negative Negative mg/dL    Glucose, UA Negative Negative, 1000 mg/dL (3+) mg/dL    Ketones, UA Negative Negative    Urobilinogen, UA Normal Normal    Bilirubin Negative Negative    Blood, UA Negative Negative         Assessment & Plan  Left hydronephrosis associated with a poorly functioning kidney.  He would be a high risk for left nephrectomy but if he develops persistent infection or flank pain we can always reinsert the ureteral stent.  Urine culture submitted and if bacterial growth is documented he might be a candidate for antibiotics.

## 2019-07-05 ENCOUNTER — OFFICE VISIT (OUTPATIENT)
Dept: FAMILY MEDICINE CLINIC | Facility: CLINIC | Age: 84
End: 2019-07-05

## 2019-07-05 VITALS
OXYGEN SATURATION: 94 % | HEART RATE: 66 BPM | DIASTOLIC BLOOD PRESSURE: 64 MMHG | RESPIRATION RATE: 16 BRPM | TEMPERATURE: 97 F | BODY MASS INDEX: 31.4 KG/M2 | HEIGHT: 69 IN | SYSTOLIC BLOOD PRESSURE: 118 MMHG | WEIGHT: 212 LBS

## 2019-07-05 DIAGNOSIS — L97.511 SKIN ULCER OF FOURTH TOE OF RIGHT FOOT, LIMITED TO BREAKDOWN OF SKIN (HCC): ICD-10-CM

## 2019-07-05 DIAGNOSIS — K57.92 DIVERTICULITIS: Primary | ICD-10-CM

## 2019-07-05 LAB
BACTERIA UR CULT: NO GROWTH
BACTERIA UR CULT: NORMAL

## 2019-07-05 PROCEDURE — 99213 OFFICE O/P EST LOW 20 MIN: CPT | Performed by: NURSE PRACTITIONER

## 2019-07-05 RX ORDER — CIPROFLOXACIN 500 MG/1
500 TABLET, FILM COATED ORAL 2 TIMES DAILY
Qty: 20 TABLET | Refills: 0 | Status: SHIPPED | OUTPATIENT
Start: 2019-07-05 | End: 2019-07-18

## 2019-07-05 RX ORDER — METRONIDAZOLE 500 MG/1
500 TABLET ORAL 3 TIMES DAILY
Qty: 30 TABLET | Refills: 0 | Status: SHIPPED | OUTPATIENT
Start: 2019-07-05 | End: 2019-07-15

## 2019-07-05 NOTE — PROGRESS NOTES
"Forrest Zepeda is a 87 y.o. male who presents for diverticulitis.    Chief Complaint   Patient presents with   • Abdominal Pain       Patient is here today accompanied by his son and has had abdominal pain and constipation for 3 weeks. He has been evaluate at the ER and by his urologist who could not find anything. He has diverticulosis and has had diverticulitis in the past. He states that this feels like the same thing. He was very constipated and his granddaughter is an RN and had him eat heated prunes and Metamucil. He states that he lost 8 pounds over the course of a couple of days and now he is regular again. He still has the pain but it is not as bad. He rates it at 3-5/10 versus 8/10 in the beginning.           Past Medical History:   Diagnosis Date   • Abnormal PSA     Reported abnormal   • Arthritis     KNEES,  BUT SINCE HAD REPLACEMENT   • Benign localized hyperplasia of prostate    • Bilateral knee pain    • Cataracts, bilateral    • Diabetic neuropathy (CMS/HCC)    • Disease of thyroid gland     HYPOTHYROIDISM   • Diverticulitis    • Dyslipidemia     H/O   • Full dentures    • History of gout    • History of hepatitis A vaccination    • History of nephrolithiasis    • History of nuclear stress test     STATES IN THE 1990'S.  \"IT WAS OK\"   • History of osteopenia    • Hyperkalemia     PT UNSURE REGARDING HX OF THIS   • Hypertension    • Insomnia    • Malignant hyperthermia due to anesthesia     PER PT REPORT, BROTHER HAD DURING LUNG SURGERY SEVERAL YEARS AGO.  STATED THEY PACKED HIM ON ICE.  STATED HE DID SURVIVE.  PT DENIES ANY HX OF MALIGNANT HYPERTHERMIA HIMSELF, OR ANY ISSUES WITH ANESTHESIA.  STATES TO HIS KNOWLEDGE, NO OTHER FAMILY MEMBER HAS THIS ISSUE EXCEPT FOR HIS BROTHER.   • Renal insufficiency    • Shortness of breath     STATES WITH EXERTION, OTHERWISE, DENIES   • Type 2 diabetes mellitus (CMS/HCC)    • Ureteral stricture, left    • Vitamin D deficiency    • Wears glasses        Past " "Surgical History:   Procedure Laterality Date   • APPENDECTOMY     • CHOLECYSTECTOMY     • COLONOSCOPY     • CYSTOSCOPY URETEROSCOPY Left 2/11/2019    Procedure: URETEROSCOPY WITH STENT EXTRACTION, RPG;  Surgeon: Griffin Romero MD;  Location: [facility];  Service: Urology   • EYE SURGERY      CATARACTS REMOVED BILATERALLY   • JOINT REPLACEMENT Bilateral     BILATERAL KNEES REPLACED   • KNEE ARTHROSCOPY Bilateral    • RENAL ARTERY STENT      SEVERAL TIMES EVERY SINCE 1978   • URETEROSCOPY LASER LITHOTRIPSY WITH STENT INSERTION Left 1/10/2018    Procedure:  cysto, left ureteral stent replacement ;  Surgeon: Griffin Romero MD;  Location: [facility];  Service:        Family History   Problem Relation Age of Onset   • Heart attack Sister    • Brain cancer Sister    • Stroke Sister    • Lung cancer Sister    • Brain cancer Brother    • Lung cancer Brother    • Malig Hyperthermia Brother        Social History     Socioeconomic History   • Marital status:      Spouse name: Not on file   • Number of children: Not on file   • Years of education: Not on file   • Highest education level: Not on file   Tobacco Use   • Smoking status: Former Smoker     Types: Cigarettes   • Smokeless tobacco: Never Used   • Tobacco comment: SMOKED SOME AS A TEEN, DENIES ANY HABIT OR ADDICTION   Substance and Sexual Activity   • Alcohol use: No   • Drug use: No   • Sexual activity: Defer       Allergies   Allergen Reactions   • Metformin Diarrhea   • Statins Myalgia       ROS    Review of Systems   Constitutional: Positive for fatigue.   Gastrointestinal: Positive for abdominal distention, abdominal pain, constipation and nausea.   All other systems reviewed and are negative.      Vitals:    07/05/19 0806   BP: 118/64   BP Location: Right arm   Patient Position: Sitting   Pulse: 66   Resp: 16   Temp: 97 °F (36.1 °C)   TempSrc: Temporal   SpO2: 94%   Weight: 96.2 kg (212 lb)   Height: 175.3 cm (69\")   PainSc:   7   PainLoc: Abdomen "         Current Outpatient Medications:   •  allopurinol (ZYLOPRIM) 300 MG tablet, Take 1 tablet by mouth Daily., Disp: 90 tablet, Rfl: 3  •  carvedilol (COREG) 6.25 MG tablet, TAKE ONE TABLET BY MOUTH TWICE A DAY, Disp: 180 tablet, Rfl: 2  •  desoximetasone (TOPICORT) 0.25 % ointment, Apply  topically to the appropriate area as directed 2 (Two) Times a Day., Disp: 60 g, Rfl: 0  •  Diphenhydramine-APAP, sleep, (TYLENOL PM EXTRA STRENGTH PO), Take 1 tablet by mouth At Night As Needed (as needed for sleep.)., Disp: , Rfl:   •  finasteride (PROSCAR) 5 MG tablet, TAKE ONE TABLET BY MOUTH DAILY, Disp: 90 tablet, Rfl: 2  •  glimepiride (AMARYL) 4 MG tablet, Take 1 tablet by mouth Every Morning Before Breakfast., Disp: 90 tablet, Rfl: 1  •  ketoconazole (NIZORAL) 2 % cream, Apply  topically to the appropriate area as directed Daily., Disp: , Rfl:   •  levothyroxine (SYNTHROID, LEVOTHROID) 50 MCG tablet, Take 1 tablet by mouth Daily., Disp: 90 tablet, Rfl: 3  •  Multiple Vitamins-Minerals (OCUVITE ADULT 50+ PO), Take 1 capsule by mouth Daily., Disp: , Rfl:   •  mupirocin (BACTROBAN) 2 % ointment, Apply  topically to the appropriate area as directed 3 (Three) Times a Day., Disp: , Rfl:   •  SITagliptin (JANUVIA) 100 MG tablet, Take 1 tablet by mouth Daily., Disp: 90 tablet, Rfl: 3  •  metroNIDAZOLE (FLAGYL) 500 MG tablet, Take 1 tablet by mouth 3 (Three) Times a Day for 10 days., Disp: 30 tablet, Rfl: 0    PE    Physical Exam   Constitutional: He is oriented to person, place, and time. He appears well-developed. He is obese.  HENT:   Head: Normocephalic and atraumatic.   Cardiovascular: Normal rate, regular rhythm and normal heart sounds. Exam reveals no gallop and no friction rub.   No murmur heard.  Pulmonary/Chest: Effort normal and breath sounds normal. No stridor. No respiratory distress. He has no wheezes. He has no rales.   Abdominal: Soft. Bowel sounds are normal. There is no hepatosplenomegaly. There is tenderness  in the right lower quadrant, periumbilical area and left lower quadrant. There is guarding. There is no rigidity, no rebound, no CVA tenderness, no tenderness at McBurney's point and negative Baeza's sign. No hernia.   Neurological: He is alert and oriented to person, place, and time.   Skin: Skin is warm and dry.   Psychiatric: He has a normal mood and affect. His behavior is normal. Judgment and thought content normal.   Nursing note and vitals reviewed.       A/P    Problem List Items Addressed This Visit        Musculoskeletal and Integument    Skin ulcer of fourth toe of right foot, limited to breakdown of skin (CMS/HCC)    Relevant Medications    ketoconazole (NIZORAL) 2 % cream    mupirocin (BACTROBAN) 2 % ointment    desoximetasone (TOPICORT) 0.25 % ointment      Other Visit Diagnoses     Diverticulitis    -  Primary    Relevant Medications    metroNIDAZOLE (FLAGYL) 500 MG tablet      Advised patient to avoid alcohol while taking medication and let us know if he has any problems taking it.    Plan of care reviewed with patient at the conclusion of today's visit. Education was provided regarding diagnosis, management and any prescribed or recommended OTC medications.  Patient verbalizes understanding of and agreement with management plan.    Return if symptoms worsen or fail to improve.     JOYCE Meier

## 2019-07-09 ENCOUNTER — APPOINTMENT (OUTPATIENT)
Dept: NUCLEAR MEDICINE | Facility: HOSPITAL | Age: 84
End: 2019-07-09

## 2019-07-18 ENCOUNTER — HOSPITAL ENCOUNTER (INPATIENT)
Facility: HOSPITAL | Age: 84
LOS: 9 days | Discharge: HOME | End: 2019-07-27
Attending: EMERGENCY MEDICINE | Admitting: HOSPITALIST

## 2019-07-18 ENCOUNTER — APPOINTMENT (OUTPATIENT)
Dept: GENERAL RADIOLOGY | Facility: HOSPITAL | Age: 84
End: 2019-07-18

## 2019-07-18 ENCOUNTER — TELEPHONE (OUTPATIENT)
Dept: FAMILY MEDICINE CLINIC | Facility: CLINIC | Age: 84
End: 2019-07-18

## 2019-07-18 ENCOUNTER — APPOINTMENT (OUTPATIENT)
Dept: CT IMAGING | Facility: HOSPITAL | Age: 84
End: 2019-07-18

## 2019-07-18 DIAGNOSIS — N39.0 URINARY TRACT INFECTION WITH HEMATURIA, SITE UNSPECIFIED: ICD-10-CM

## 2019-07-18 DIAGNOSIS — Z74.09 IMPAIRED MOBILITY AND ADLS: ICD-10-CM

## 2019-07-18 DIAGNOSIS — K57.20 DIVERTICULITIS OF LARGE INTESTINE WITH PERFORATION, UNSPECIFIED BLEEDING STATUS: Primary | ICD-10-CM

## 2019-07-18 DIAGNOSIS — R31.9 URINARY TRACT INFECTION WITH HEMATURIA, SITE UNSPECIFIED: ICD-10-CM

## 2019-07-18 DIAGNOSIS — R53.1 GENERALIZED WEAKNESS: ICD-10-CM

## 2019-07-18 DIAGNOSIS — Z74.09 IMPAIRED FUNCTIONAL MOBILITY, BALANCE, GAIT, AND ENDURANCE: ICD-10-CM

## 2019-07-18 DIAGNOSIS — N13.30 HYDRONEPHROSIS, UNSPECIFIED HYDRONEPHROSIS TYPE: ICD-10-CM

## 2019-07-18 DIAGNOSIS — N13.30 HYDRONEPHROSIS OF LEFT KIDNEY: ICD-10-CM

## 2019-07-18 DIAGNOSIS — Z78.9 IMPAIRED MOBILITY AND ADLS: ICD-10-CM

## 2019-07-18 PROBLEM — K57.92 DIVERTICULITIS: Status: ACTIVE | Noted: 2019-07-18

## 2019-07-18 PROBLEM — K57.92 ACUTE DIVERTICULITIS: Status: ACTIVE | Noted: 2019-07-18

## 2019-07-18 PROBLEM — N18.30 CKD (CHRONIC KIDNEY DISEASE) STAGE 3, GFR 30-59 ML/MIN: Status: ACTIVE | Noted: 2019-07-18

## 2019-07-18 LAB
ALBUMIN SERPL-MCNC: 3.7 G/DL (ref 3.5–5.2)
ALBUMIN/GLOB SERPL: 0.8 G/DL
ALP SERPL-CCNC: 94 U/L (ref 39–117)
ALT SERPL W P-5'-P-CCNC: 18 U/L (ref 1–41)
ANION GAP SERPL CALCULATED.3IONS-SCNC: 12 MMOL/L (ref 5–15)
AST SERPL-CCNC: 26 U/L (ref 1–40)
BACTERIA UR QL AUTO: ABNORMAL /HPF
BASOPHILS # BLD AUTO: 0.07 10*3/MM3 (ref 0–0.2)
BASOPHILS NFR BLD AUTO: 0.6 % (ref 0–1.5)
BILIRUB SERPL-MCNC: 0.4 MG/DL (ref 0.2–1.2)
BILIRUB UR QL STRIP: NEGATIVE
BUN BLD-MCNC: 33 MG/DL (ref 8–23)
BUN/CREAT SERPL: 23.9 (ref 7–25)
CALCIUM SPEC-SCNC: 10.4 MG/DL (ref 8.6–10.5)
CHLORIDE SERPL-SCNC: 101 MMOL/L (ref 98–107)
CLARITY UR: ABNORMAL
CO2 SERPL-SCNC: 26 MMOL/L (ref 22–29)
COLOR UR: YELLOW
CREAT BLD-MCNC: 1.38 MG/DL (ref 0.76–1.27)
DEPRECATED RDW RBC AUTO: 51.5 FL (ref 37–54)
EOSINOPHIL # BLD AUTO: 0.32 10*3/MM3 (ref 0–0.4)
EOSINOPHIL NFR BLD AUTO: 3 % (ref 0.3–6.2)
ERYTHROCYTE [DISTWIDTH] IN BLOOD BY AUTOMATED COUNT: 14.8 % (ref 12.3–15.4)
GFR SERPL CREATININE-BSD FRML MDRD: 49 ML/MIN/1.73
GLOBULIN UR ELPH-MCNC: 4.4 GM/DL
GLUCOSE BLD-MCNC: 335 MG/DL (ref 65–99)
GLUCOSE BLDC GLUCOMTR-MCNC: 240 MG/DL (ref 70–130)
GLUCOSE BLDC GLUCOMTR-MCNC: 294 MG/DL (ref 70–130)
GLUCOSE UR STRIP-MCNC: ABNORMAL MG/DL
HCT VFR BLD AUTO: 46.3 % (ref 37.5–51)
HGB BLD-MCNC: 14.8 G/DL (ref 13–17.7)
HGB UR QL STRIP.AUTO: ABNORMAL
HOLD SPECIMEN: NORMAL
HOLD SPECIMEN: NORMAL
HYALINE CASTS UR QL AUTO: ABNORMAL /LPF
IMM GRANULOCYTES # BLD AUTO: 0.05 10*3/MM3 (ref 0–0.05)
IMM GRANULOCYTES NFR BLD AUTO: 0.5 % (ref 0–0.5)
KETONES UR QL STRIP: NEGATIVE
LEUKOCYTE ESTERASE UR QL STRIP.AUTO: ABNORMAL
LYMPHOCYTES # BLD AUTO: 3.07 10*3/MM3 (ref 0.7–3.1)
LYMPHOCYTES NFR BLD AUTO: 28.4 % (ref 19.6–45.3)
MAGNESIUM SERPL-MCNC: 2.1 MG/DL (ref 1.6–2.4)
MCH RBC QN AUTO: 30.6 PG (ref 26.6–33)
MCHC RBC AUTO-ENTMCNC: 32 G/DL (ref 31.5–35.7)
MCV RBC AUTO: 95.9 FL (ref 79–97)
MONOCYTES # BLD AUTO: 0.99 10*3/MM3 (ref 0.1–0.9)
MONOCYTES NFR BLD AUTO: 9.1 % (ref 5–12)
NEUTROPHILS # BLD AUTO: 6.32 10*3/MM3 (ref 1.7–7)
NEUTROPHILS NFR BLD AUTO: 58.4 % (ref 42.7–76)
NITRITE UR QL STRIP: NEGATIVE
NRBC BLD AUTO-RTO: 0 /100 WBC (ref 0–0.2)
PH UR STRIP.AUTO: 5.5 [PH] (ref 5–8)
PLATELET # BLD AUTO: 195 10*3/MM3 (ref 140–450)
PMV BLD AUTO: 10 FL (ref 6–12)
POTASSIUM BLD-SCNC: 4.7 MMOL/L (ref 3.5–5.2)
PROT SERPL-MCNC: 8.1 G/DL (ref 6–8.5)
PROT UR QL STRIP: ABNORMAL
RBC # BLD AUTO: 4.83 10*6/MM3 (ref 4.14–5.8)
RBC # UR: ABNORMAL /HPF
REF LAB TEST METHOD: ABNORMAL
SODIUM BLD-SCNC: 139 MMOL/L (ref 136–145)
SP GR UR STRIP: 1.02 (ref 1–1.03)
SQUAMOUS #/AREA URNS HPF: ABNORMAL /HPF
TROPONIN T SERPL-MCNC: <0.01 NG/ML (ref 0–0.03)
UROBILINOGEN UR QL STRIP: ABNORMAL
WBC NRBC COR # BLD: 10.82 10*3/MM3 (ref 3.4–10.8)
WBC UR QL AUTO: ABNORMAL /HPF
WHOLE BLOOD HOLD SPECIMEN: NORMAL
WHOLE BLOOD HOLD SPECIMEN: NORMAL
YEAST URNS QL MICRO: ABNORMAL /HPF

## 2019-07-18 PROCEDURE — 83735 ASSAY OF MAGNESIUM: CPT | Performed by: EMERGENCY MEDICINE

## 2019-07-18 PROCEDURE — 82962 GLUCOSE BLOOD TEST: CPT

## 2019-07-18 PROCEDURE — 63710000001 INSULIN LISPRO (HUMAN) PER 5 UNITS: Performed by: PHYSICIAN ASSISTANT

## 2019-07-18 PROCEDURE — 85025 COMPLETE CBC W/AUTO DIFF WBC: CPT | Performed by: EMERGENCY MEDICINE

## 2019-07-18 PROCEDURE — 84484 ASSAY OF TROPONIN QUANT: CPT | Performed by: EMERGENCY MEDICINE

## 2019-07-18 PROCEDURE — 80053 COMPREHEN METABOLIC PANEL: CPT | Performed by: EMERGENCY MEDICINE

## 2019-07-18 PROCEDURE — 25010000002 PIPERACILLIN SOD-TAZOBACTAM PER 1 G: Performed by: PHYSICIAN ASSISTANT

## 2019-07-18 PROCEDURE — 71045 X-RAY EXAM CHEST 1 VIEW: CPT

## 2019-07-18 PROCEDURE — 93005 ELECTROCARDIOGRAM TRACING: CPT | Performed by: EMERGENCY MEDICINE

## 2019-07-18 PROCEDURE — 81001 URINALYSIS AUTO W/SCOPE: CPT | Performed by: EMERGENCY MEDICINE

## 2019-07-18 PROCEDURE — 25010000002 HEPARIN (PORCINE) PER 1000 UNITS: Performed by: PHYSICIAN ASSISTANT

## 2019-07-18 PROCEDURE — 74176 CT ABD & PELVIS W/O CONTRAST: CPT

## 2019-07-18 PROCEDURE — 99223 1ST HOSP IP/OBS HIGH 75: CPT | Performed by: HOSPITALIST

## 2019-07-18 PROCEDURE — 99285 EMERGENCY DEPT VISIT HI MDM: CPT

## 2019-07-18 RX ORDER — SODIUM CHLORIDE 0.9 % (FLUSH) 0.9 %
10 SYRINGE (ML) INJECTION AS NEEDED
Status: DISCONTINUED | OUTPATIENT
Start: 2019-07-18 | End: 2019-07-27 | Stop reason: HOSPADM

## 2019-07-18 RX ORDER — ALLOPURINOL 300 MG/1
300 TABLET ORAL NIGHTLY
COMMUNITY
End: 2019-09-26 | Stop reason: SDUPTHER

## 2019-07-18 RX ORDER — ALLOPURINOL 300 MG/1
300 TABLET ORAL NIGHTLY
Status: DISCONTINUED | OUTPATIENT
Start: 2019-07-18 | End: 2019-07-27 | Stop reason: HOSPADM

## 2019-07-18 RX ORDER — SODIUM CHLORIDE 0.9 % (FLUSH) 0.9 %
3 SYRINGE (ML) INJECTION EVERY 12 HOURS SCHEDULED
Status: DISCONTINUED | OUTPATIENT
Start: 2019-07-18 | End: 2019-07-27 | Stop reason: HOSPADM

## 2019-07-18 RX ORDER — FINASTERIDE 5 MG/1
5 TABLET, FILM COATED ORAL DAILY
COMMUNITY
End: 2019-12-05 | Stop reason: SDUPTHER

## 2019-07-18 RX ORDER — CARVEDILOL 6.25 MG/1
6.25 TABLET ORAL 2 TIMES DAILY WITH MEALS
COMMUNITY
End: 2019-09-26 | Stop reason: SDUPTHER

## 2019-07-18 RX ORDER — MULTIPLE VITAMINS W/ MINERALS TAB 9MG-400MCG
1 TAB ORAL DAILY
Status: DISCONTINUED | OUTPATIENT
Start: 2019-07-19 | End: 2019-07-27 | Stop reason: HOSPADM

## 2019-07-18 RX ORDER — DEXTROSE MONOHYDRATE 25 G/50ML
25 INJECTION, SOLUTION INTRAVENOUS
Status: DISCONTINUED | OUTPATIENT
Start: 2019-07-18 | End: 2019-07-27 | Stop reason: HOSPADM

## 2019-07-18 RX ORDER — MUPIROCIN CALCIUM 20 MG/G
CREAM TOPICAL EVERY 12 HOURS SCHEDULED
Status: DISCONTINUED | OUTPATIENT
Start: 2019-07-18 | End: 2019-07-18

## 2019-07-18 RX ORDER — FINASTERIDE 5 MG/1
5 TABLET, FILM COATED ORAL DAILY
Status: DISCONTINUED | OUTPATIENT
Start: 2019-07-19 | End: 2019-07-27 | Stop reason: HOSPADM

## 2019-07-18 RX ORDER — CARVEDILOL 6.25 MG/1
6.25 TABLET ORAL 2 TIMES DAILY WITH MEALS
Status: DISCONTINUED | OUTPATIENT
Start: 2019-07-18 | End: 2019-07-27 | Stop reason: HOSPADM

## 2019-07-18 RX ORDER — L.ACID,PARA/B.BIFIDUM/S.THERM 8B CELL
1 CAPSULE ORAL DAILY
Status: DISCONTINUED | OUTPATIENT
Start: 2019-07-19 | End: 2019-07-27 | Stop reason: HOSPADM

## 2019-07-18 RX ORDER — LEVOTHYROXINE SODIUM 0.05 MG/1
50 TABLET ORAL DAILY
Status: DISCONTINUED | OUTPATIENT
Start: 2019-07-19 | End: 2019-07-27 | Stop reason: HOSPADM

## 2019-07-18 RX ORDER — CHOLECALCIFEROL (VITAMIN D3) 125 MCG
5 CAPSULE ORAL NIGHTLY PRN
Status: DISCONTINUED | OUTPATIENT
Start: 2019-07-18 | End: 2019-07-27 | Stop reason: HOSPADM

## 2019-07-18 RX ORDER — SODIUM CHLORIDE 9 MG/ML
75 INJECTION, SOLUTION INTRAVENOUS ONCE
Status: COMPLETED | OUTPATIENT
Start: 2019-07-18 | End: 2019-07-18

## 2019-07-18 RX ORDER — SODIUM CHLORIDE 0.9 % (FLUSH) 0.9 %
3-10 SYRINGE (ML) INJECTION AS NEEDED
Status: DISCONTINUED | OUTPATIENT
Start: 2019-07-18 | End: 2019-07-27 | Stop reason: HOSPADM

## 2019-07-18 RX ORDER — ACETAMINOPHEN,DIPHENHYDRAMINE HCL 500; 25 MG/1; MG/1
1 TABLET, FILM COATED ORAL NIGHTLY
COMMUNITY
End: 2020-12-14

## 2019-07-18 RX ORDER — NICOTINE POLACRILEX 4 MG
15 LOZENGE BUCCAL
Status: DISCONTINUED | OUTPATIENT
Start: 2019-07-18 | End: 2019-07-27 | Stop reason: HOSPADM

## 2019-07-18 RX ORDER — HEPARIN SODIUM 5000 [USP'U]/ML
5000 INJECTION, SOLUTION INTRAVENOUS; SUBCUTANEOUS EVERY 8 HOURS SCHEDULED
Status: DISCONTINUED | OUTPATIENT
Start: 2019-07-18 | End: 2019-07-27 | Stop reason: HOSPADM

## 2019-07-18 RX ADMIN — SODIUM CHLORIDE, PRESERVATIVE FREE 3 ML: 5 INJECTION INTRAVENOUS at 23:20

## 2019-07-18 RX ADMIN — CARVEDILOL 6.25 MG: 6.25 TABLET, FILM COATED ORAL at 22:40

## 2019-07-18 RX ADMIN — HEPARIN SODIUM 5000 UNITS: 5000 INJECTION INTRAVENOUS; SUBCUTANEOUS at 22:40

## 2019-07-18 RX ADMIN — ALLOPURINOL 300 MG: 300 TABLET ORAL at 22:40

## 2019-07-18 RX ADMIN — MUPIROCIN: 20 OINTMENT TOPICAL at 23:25

## 2019-07-18 RX ADMIN — SODIUM CHLORIDE 75 ML/HR: 9 INJECTION, SOLUTION INTRAVENOUS at 23:19

## 2019-07-18 RX ADMIN — SODIUM CHLORIDE 500 ML: 9 INJECTION, SOLUTION INTRAVENOUS at 19:59

## 2019-07-18 RX ADMIN — TAZOBACTAM SODIUM AND PIPERACILLIN SODIUM 4.5 G: 500; 4 INJECTION, SOLUTION INTRAVENOUS at 20:00

## 2019-07-18 RX ADMIN — INSULIN LISPRO 4 UNITS: 100 INJECTION, SOLUTION INTRAVENOUS; SUBCUTANEOUS at 22:41

## 2019-07-18 NOTE — TELEPHONE ENCOUNTER
Pt's granddaughter called to say that Forrest's glucose level was 400 and that he is having extreme weakness and incontinence.  Spoke with Dr. Lofton regarding this and he wants pt to use 10 units of lantus twice a day and to stop taking the Glimepiride 4 mg. Rx has been sent in for the Lantus and instructions given to pt's granddaughter. Pt advised if not feeling better in few days will need to be seen.  BBmaurice, CMA

## 2019-07-19 ENCOUNTER — APPOINTMENT (OUTPATIENT)
Dept: CARDIOLOGY | Facility: HOSPITAL | Age: 84
End: 2019-07-19

## 2019-07-19 LAB
ANION GAP SERPL CALCULATED.3IONS-SCNC: 13 MMOL/L (ref 5–15)
BASOPHILS # BLD AUTO: 0.08 10*3/MM3 (ref 0–0.2)
BASOPHILS NFR BLD AUTO: 0.8 % (ref 0–1.5)
BH CV ECHO MEAS - AO MAX PG (FULL): 13.2 MMHG
BH CV ECHO MEAS - AO MAX PG: 16 MMHG
BH CV ECHO MEAS - AO MEAN PG (FULL): 7.7 MMHG
BH CV ECHO MEAS - AO MEAN PG: 9.1 MMHG
BH CV ECHO MEAS - AO ROOT AREA (BSA CORRECTED): 1.5
BH CV ECHO MEAS - AO ROOT AREA: 8.4 CM^2
BH CV ECHO MEAS - AO ROOT DIAM: 3.3 CM
BH CV ECHO MEAS - AO V2 MAX: 200.3 CM/SEC
BH CV ECHO MEAS - AO V2 MEAN: 141.2 CM/SEC
BH CV ECHO MEAS - AO V2 VTI: 39.8 CM
BH CV ECHO MEAS - AVA(I,A): 2.1 CM^2
BH CV ECHO MEAS - AVA(I,D): 2.1 CM^2
BH CV ECHO MEAS - AVA(V,A): 1.8 CM^2
BH CV ECHO MEAS - AVA(V,D): 1.8 CM^2
BH CV ECHO MEAS - BSA(HAYCOCK): 2.2 M^2
BH CV ECHO MEAS - BSA: 2.1 M^2
BH CV ECHO MEAS - BZI_BMI: 31 KILOGRAMS/M^2
BH CV ECHO MEAS - BZI_METRIC_HEIGHT: 175.3 CM
BH CV ECHO MEAS - BZI_METRIC_WEIGHT: 95.3 KG
BH CV ECHO MEAS - EDV(CUBED): 51.7 ML
BH CV ECHO MEAS - EDV(TEICH): 59.1 ML
BH CV ECHO MEAS - EF(CUBED): 52.5 %
BH CV ECHO MEAS - EF(TEICH): 45.1 %
BH CV ECHO MEAS - ESV(CUBED): 24.6 ML
BH CV ECHO MEAS - ESV(TEICH): 32.4 ML
BH CV ECHO MEAS - FS: 22 %
BH CV ECHO MEAS - IVS/LVPW: 0.94
BH CV ECHO MEAS - IVSD: 1.3 CM
BH CV ECHO MEAS - LA DIMENSION: 3 CM
BH CV ECHO MEAS - LA/AO: 0.92
BH CV ECHO MEAS - LAD MAJOR: 4.3 CM
BH CV ECHO MEAS - LAT PEAK E' VEL: 7.6 CM/SEC
BH CV ECHO MEAS - LATERAL E/E' RATIO: 7.1
BH CV ECHO MEAS - LV MASS(C)D: 175 GRAMS
BH CV ECHO MEAS - LV MASS(C)DI: 83 GRAMS/M^2
BH CV ECHO MEAS - LV MAX PG: 2.8 MMHG
BH CV ECHO MEAS - LV MEAN PG: 1.4 MMHG
BH CV ECHO MEAS - LV V1 MAX: 84.4 CM/SEC
BH CV ECHO MEAS - LV V1 MEAN: 53.7 CM/SEC
BH CV ECHO MEAS - LV V1 VTI: 19.1 CM
BH CV ECHO MEAS - LVIDD: 3.7 CM
BH CV ECHO MEAS - LVIDS: 2.9 CM
BH CV ECHO MEAS - LVOT AREA (M): 4.2 CM^2
BH CV ECHO MEAS - LVOT AREA: 4.3 CM^2
BH CV ECHO MEAS - LVOT DIAM: 2.3 CM
BH CV ECHO MEAS - LVPWD: 1.4 CM
BH CV ECHO MEAS - MED PEAK E' VEL: 6.5 CM/SEC
BH CV ECHO MEAS - MEDIAL E/E' RATIO: 8.3
BH CV ECHO MEAS - MV A MAX VEL: 79 CM/SEC
BH CV ECHO MEAS - MV DEC TIME: 0.28 SEC
BH CV ECHO MEAS - MV E MAX VEL: 54.8 CM/SEC
BH CV ECHO MEAS - MV E/A: 0.69
BH CV ECHO MEAS - RAP SYSTOLE: 3 MMHG
BH CV ECHO MEAS - RVSP: 50 MMHG
BH CV ECHO MEAS - SI(AO): 158.1 ML/M^2
BH CV ECHO MEAS - SI(CUBED): 12.9 ML/M^2
BH CV ECHO MEAS - SI(LVOT): 39 ML/M^2
BH CV ECHO MEAS - SI(TEICH): 12.6 ML/M^2
BH CV ECHO MEAS - SV(AO): 333.3 ML
BH CV ECHO MEAS - SV(CUBED): 27.1 ML
BH CV ECHO MEAS - SV(LVOT): 82.2 ML
BH CV ECHO MEAS - SV(TEICH): 26.7 ML
BH CV ECHO MEAS - TAPSE (>1.6): 2.8 CM2
BH CV ECHO MEAS - TR MAX PG: 47 MMHG
BH CV ECHO MEAS - TR MAX VEL: 339.9 CM/SEC
BH CV ECHO MEASUREMENTS AVERAGE E/E' RATIO: 7.77
BH CV VAS BP RIGHT ARM: NORMAL MMHG
BH CV XLRA - RV BASE: 3.7 CM
BH CV XLRA - RV LENGTH: 6.7 CM
BH CV XLRA - RV MID: 2.6 CM
BH CV XLRA - TDI S': 13.4 CM/SEC
BUN BLD-MCNC: 27 MG/DL (ref 8–23)
BUN/CREAT SERPL: 19.6 (ref 7–25)
CALCIUM SPEC-SCNC: 9.9 MG/DL (ref 8.6–10.5)
CHLORIDE SERPL-SCNC: 101 MMOL/L (ref 98–107)
CHOLEST SERPL-MCNC: 170 MG/DL (ref 0–200)
CO2 SERPL-SCNC: 25 MMOL/L (ref 22–29)
CREAT BLD-MCNC: 1.38 MG/DL (ref 0.76–1.27)
D-LACTATE SERPL-SCNC: 1.3 MMOL/L (ref 0.5–2)
DEPRECATED RDW RBC AUTO: 52.1 FL (ref 37–54)
EOSINOPHIL # BLD AUTO: 0.33 10*3/MM3 (ref 0–0.4)
EOSINOPHIL NFR BLD AUTO: 3.2 % (ref 0.3–6.2)
ERYTHROCYTE [DISTWIDTH] IN BLOOD BY AUTOMATED COUNT: 14.7 % (ref 12.3–15.4)
GFR SERPL CREATININE-BSD FRML MDRD: 49 ML/MIN/1.73
GLUCOSE BLD-MCNC: 193 MG/DL (ref 65–99)
GLUCOSE BLDC GLUCOMTR-MCNC: 207 MG/DL (ref 70–130)
GLUCOSE BLDC GLUCOMTR-MCNC: 217 MG/DL (ref 70–130)
GLUCOSE BLDC GLUCOMTR-MCNC: 236 MG/DL (ref 70–130)
GLUCOSE BLDC GLUCOMTR-MCNC: 254 MG/DL (ref 70–130)
HBA1C MFR BLD: 10.8 % (ref 4.8–5.6)
HCT VFR BLD AUTO: 43.2 % (ref 37.5–51)
HDLC SERPL-MCNC: 26 MG/DL (ref 40–60)
HGB BLD-MCNC: 13.5 G/DL (ref 13–17.7)
IMM GRANULOCYTES # BLD AUTO: 0.07 10*3/MM3 (ref 0–0.05)
IMM GRANULOCYTES NFR BLD AUTO: 0.7 % (ref 0–0.5)
LDLC SERPL CALC-MCNC: 68 MG/DL (ref 0–100)
LDLC/HDLC SERPL: 2.63 {RATIO}
LEFT ATRIUM VOLUME INDEX: 12.8 ML/M^2
LEFT ATRIUM VOLUME: 27 ML
LV EF 2D ECHO EST: 55 %
LYMPHOCYTES # BLD AUTO: 3.56 10*3/MM3 (ref 0.7–3.1)
LYMPHOCYTES NFR BLD AUTO: 35 % (ref 19.6–45.3)
MAXIMAL PREDICTED HEART RATE: 133 BPM
MCH RBC QN AUTO: 30.3 PG (ref 26.6–33)
MCHC RBC AUTO-ENTMCNC: 31.3 G/DL (ref 31.5–35.7)
MCV RBC AUTO: 96.9 FL (ref 79–97)
MONOCYTES # BLD AUTO: 0.91 10*3/MM3 (ref 0.1–0.9)
MONOCYTES NFR BLD AUTO: 8.9 % (ref 5–12)
NEUTROPHILS # BLD AUTO: 5.23 10*3/MM3 (ref 1.7–7)
NEUTROPHILS NFR BLD AUTO: 51.4 % (ref 42.7–76)
NRBC BLD AUTO-RTO: 0 /100 WBC (ref 0–0.2)
PLATELET # BLD AUTO: 184 10*3/MM3 (ref 140–450)
PMV BLD AUTO: 11.4 FL (ref 6–12)
POTASSIUM BLD-SCNC: 4.4 MMOL/L (ref 3.5–5.2)
PROCALCITONIN SERPL-MCNC: 0.14 NG/ML (ref 0.1–0.25)
RBC # BLD AUTO: 4.46 10*6/MM3 (ref 4.14–5.8)
SODIUM BLD-SCNC: 139 MMOL/L (ref 136–145)
STRESS TARGET HR: 113 BPM
TRIGL SERPL-MCNC: 378 MG/DL (ref 0–150)
TSH SERPL DL<=0.05 MIU/L-ACNC: 2.09 MIU/ML (ref 0.27–4.2)
VLDLC SERPL-MCNC: 75.6 MG/DL
WBC NRBC COR # BLD: 10.18 10*3/MM3 (ref 3.4–10.8)

## 2019-07-19 PROCEDURE — 84443 ASSAY THYROID STIM HORMONE: CPT | Performed by: PHYSICIAN ASSISTANT

## 2019-07-19 PROCEDURE — 87186 SC STD MICRODIL/AGAR DIL: CPT

## 2019-07-19 PROCEDURE — 85025 COMPLETE CBC W/AUTO DIFF WBC: CPT | Performed by: PHYSICIAN ASSISTANT

## 2019-07-19 PROCEDURE — 82962 GLUCOSE BLOOD TEST: CPT

## 2019-07-19 PROCEDURE — 97165 OT EVAL LOW COMPLEX 30 MIN: CPT

## 2019-07-19 PROCEDURE — 83605 ASSAY OF LACTIC ACID: CPT | Performed by: PHYSICIAN ASSISTANT

## 2019-07-19 PROCEDURE — 87186 SC STD MICRODIL/AGAR DIL: CPT | Performed by: INTERNAL MEDICINE

## 2019-07-19 PROCEDURE — 25010000002 PIPERACILLIN SOD-TAZOBACTAM PER 1 G: Performed by: PHYSICIAN ASSISTANT

## 2019-07-19 PROCEDURE — 97162 PT EVAL MOD COMPLEX 30 MIN: CPT

## 2019-07-19 PROCEDURE — 99233 SBSQ HOSP IP/OBS HIGH 50: CPT | Performed by: INTERNAL MEDICINE

## 2019-07-19 PROCEDURE — 84145 PROCALCITONIN (PCT): CPT | Performed by: PHYSICIAN ASSISTANT

## 2019-07-19 PROCEDURE — 93306 TTE W/DOPPLER COMPLETE: CPT

## 2019-07-19 PROCEDURE — 93306 TTE W/DOPPLER COMPLETE: CPT | Performed by: INTERNAL MEDICINE

## 2019-07-19 PROCEDURE — 87158 CULTURE TYPING ADDED METHOD: CPT

## 2019-07-19 PROCEDURE — 87102 FUNGUS ISOLATION CULTURE: CPT | Performed by: INTERNAL MEDICINE

## 2019-07-19 PROCEDURE — 83036 HEMOGLOBIN GLYCOSYLATED A1C: CPT | Performed by: PHYSICIAN ASSISTANT

## 2019-07-19 PROCEDURE — 80048 BASIC METABOLIC PNL TOTAL CA: CPT | Performed by: PHYSICIAN ASSISTANT

## 2019-07-19 PROCEDURE — 25010000002 HEPARIN (PORCINE) PER 1000 UNITS: Performed by: PHYSICIAN ASSISTANT

## 2019-07-19 PROCEDURE — 80061 LIPID PANEL: CPT | Performed by: PHYSICIAN ASSISTANT

## 2019-07-19 RX ORDER — TRAZODONE HYDROCHLORIDE 50 MG/1
50 TABLET ORAL NIGHTLY
Status: DISCONTINUED | OUTPATIENT
Start: 2019-07-19 | End: 2019-07-27 | Stop reason: HOSPADM

## 2019-07-19 RX ORDER — FAMOTIDINE 20 MG/1
20 TABLET, FILM COATED ORAL
Status: DISCONTINUED | OUTPATIENT
Start: 2019-07-19 | End: 2019-07-27 | Stop reason: HOSPADM

## 2019-07-19 RX ADMIN — ALLOPURINOL 300 MG: 300 TABLET ORAL at 22:34

## 2019-07-19 RX ADMIN — LEVOTHYROXINE SODIUM 50 MCG: 50 TABLET ORAL at 05:36

## 2019-07-19 RX ADMIN — CARVEDILOL 6.25 MG: 6.25 TABLET, FILM COATED ORAL at 18:04

## 2019-07-19 RX ADMIN — TAZOBACTAM SODIUM AND PIPERACILLIN SODIUM 3.38 G: 375; 3 INJECTION, SOLUTION INTRAVENOUS at 18:04

## 2019-07-19 RX ADMIN — HEPARIN SODIUM 5000 UNITS: 5000 INJECTION INTRAVENOUS; SUBCUTANEOUS at 16:08

## 2019-07-19 RX ADMIN — CARVEDILOL 6.25 MG: 6.25 TABLET, FILM COATED ORAL at 09:07

## 2019-07-19 RX ADMIN — INSULIN LISPRO 6 UNITS: 100 INJECTION, SOLUTION INTRAVENOUS; SUBCUTANEOUS at 12:30

## 2019-07-19 RX ADMIN — INSULIN LISPRO 4 UNITS: 100 INJECTION, SOLUTION INTRAVENOUS; SUBCUTANEOUS at 09:09

## 2019-07-19 RX ADMIN — TAZOBACTAM SODIUM AND PIPERACILLIN SODIUM 3.38 G: 375; 3 INJECTION, SOLUTION INTRAVENOUS at 01:26

## 2019-07-19 RX ADMIN — FINASTERIDE 5 MG: 5 TABLET, FILM COATED ORAL at 09:08

## 2019-07-19 RX ADMIN — INSULIN LISPRO 4 UNITS: 100 INJECTION, SOLUTION INTRAVENOUS; SUBCUTANEOUS at 22:35

## 2019-07-19 RX ADMIN — INSULIN LISPRO 4 UNITS: 100 INJECTION, SOLUTION INTRAVENOUS; SUBCUTANEOUS at 18:04

## 2019-07-19 RX ADMIN — TAZOBACTAM SODIUM AND PIPERACILLIN SODIUM 3.38 G: 375; 3 INJECTION, SOLUTION INTRAVENOUS at 09:12

## 2019-07-19 RX ADMIN — FAMOTIDINE 20 MG: 20 TABLET ORAL at 18:06

## 2019-07-19 RX ADMIN — MUPIROCIN: 20 OINTMENT TOPICAL at 09:08

## 2019-07-19 RX ADMIN — TRAZODONE HYDROCHLORIDE 50 MG: 50 TABLET ORAL at 22:33

## 2019-07-19 RX ADMIN — SODIUM CHLORIDE, PRESERVATIVE FREE 10 ML: 5 INJECTION INTRAVENOUS at 09:08

## 2019-07-19 RX ADMIN — Medication 1 CAPSULE: at 09:07

## 2019-07-19 RX ADMIN — HEPARIN SODIUM 5000 UNITS: 5000 INJECTION INTRAVENOUS; SUBCUTANEOUS at 05:35

## 2019-07-19 RX ADMIN — MULTIPLE VITAMINS W/ MINERALS TAB 1 TABLET: TAB ORAL at 09:07

## 2019-07-19 RX ADMIN — HEPARIN SODIUM 5000 UNITS: 5000 INJECTION INTRAVENOUS; SUBCUTANEOUS at 22:31

## 2019-07-20 ENCOUNTER — ANESTHESIA EVENT (OUTPATIENT)
Dept: PERIOP | Facility: HOSPITAL | Age: 84
End: 2019-07-20

## 2019-07-20 ENCOUNTER — APPOINTMENT (OUTPATIENT)
Dept: GENERAL RADIOLOGY | Facility: HOSPITAL | Age: 84
End: 2019-07-20

## 2019-07-20 ENCOUNTER — ANESTHESIA (OUTPATIENT)
Dept: PERIOP | Facility: HOSPITAL | Age: 84
End: 2019-07-20

## 2019-07-20 LAB
ALBUMIN SERPL-MCNC: 3 G/DL (ref 3.5–5.2)
ALBUMIN/GLOB SERPL: 0.8 G/DL
ALP SERPL-CCNC: 79 U/L (ref 39–117)
ALT SERPL W P-5'-P-CCNC: 16 U/L (ref 1–41)
ANION GAP SERPL CALCULATED.3IONS-SCNC: 14 MMOL/L (ref 5–15)
AST SERPL-CCNC: 21 U/L (ref 1–40)
BASOPHILS # BLD AUTO: 0.08 10*3/MM3 (ref 0–0.2)
BASOPHILS NFR BLD AUTO: 0.8 % (ref 0–1.5)
BILIRUB SERPL-MCNC: 0.4 MG/DL (ref 0.2–1.2)
BUN BLD-MCNC: 23 MG/DL (ref 8–23)
BUN/CREAT SERPL: 16.5 (ref 7–25)
CALCIUM SPEC-SCNC: 9.2 MG/DL (ref 8.6–10.5)
CHLORIDE SERPL-SCNC: 101 MMOL/L (ref 98–107)
CO2 SERPL-SCNC: 24 MMOL/L (ref 22–29)
CREAT BLD-MCNC: 1.39 MG/DL (ref 0.76–1.27)
CRP SERPL-MCNC: 1.78 MG/DL (ref 0–0.5)
DEPRECATED RDW RBC AUTO: 53.9 FL (ref 37–54)
EOSINOPHIL # BLD AUTO: 0.36 10*3/MM3 (ref 0–0.4)
EOSINOPHIL NFR BLD AUTO: 3.4 % (ref 0.3–6.2)
ERYTHROCYTE [DISTWIDTH] IN BLOOD BY AUTOMATED COUNT: 15 % (ref 12.3–15.4)
ERYTHROCYTE [SEDIMENTATION RATE] IN BLOOD: 55 MM/HR (ref 0–20)
GFR SERPL CREATININE-BSD FRML MDRD: 48 ML/MIN/1.73
GLOBULIN UR ELPH-MCNC: 3.8 GM/DL
GLUCOSE BLD-MCNC: 195 MG/DL (ref 65–99)
GLUCOSE BLDC GLUCOMTR-MCNC: 194 MG/DL (ref 70–130)
GLUCOSE BLDC GLUCOMTR-MCNC: 219 MG/DL (ref 70–130)
GLUCOSE BLDC GLUCOMTR-MCNC: 221 MG/DL (ref 70–130)
GLUCOSE BLDC GLUCOMTR-MCNC: 225 MG/DL (ref 70–130)
HCT VFR BLD AUTO: 44 % (ref 37.5–51)
HGB BLD-MCNC: 13.6 G/DL (ref 13–17.7)
IMM GRANULOCYTES # BLD AUTO: 0.07 10*3/MM3 (ref 0–0.05)
IMM GRANULOCYTES NFR BLD AUTO: 0.7 % (ref 0–0.5)
LYMPHOCYTES # BLD AUTO: 3.4 10*3/MM3 (ref 0.7–3.1)
LYMPHOCYTES NFR BLD AUTO: 32.5 % (ref 19.6–45.3)
MCH RBC QN AUTO: 30.2 PG (ref 26.6–33)
MCHC RBC AUTO-ENTMCNC: 30.9 G/DL (ref 31.5–35.7)
MCV RBC AUTO: 97.8 FL (ref 79–97)
MONOCYTES # BLD AUTO: 0.91 10*3/MM3 (ref 0.1–0.9)
MONOCYTES NFR BLD AUTO: 8.7 % (ref 5–12)
NEUTROPHILS # BLD AUTO: 5.65 10*3/MM3 (ref 1.7–7)
NEUTROPHILS NFR BLD AUTO: 53.9 % (ref 42.7–76)
NRBC BLD AUTO-RTO: 0 /100 WBC (ref 0–0.2)
PLATELET # BLD AUTO: 168 10*3/MM3 (ref 140–450)
PMV BLD AUTO: 11.6 FL (ref 6–12)
POTASSIUM BLD-SCNC: 4.1 MMOL/L (ref 3.5–5.2)
PROT SERPL-MCNC: 6.8 G/DL (ref 6–8.5)
RBC # BLD AUTO: 4.5 10*6/MM3 (ref 4.14–5.8)
SODIUM BLD-SCNC: 139 MMOL/L (ref 136–145)
WBC NRBC COR # BLD: 10.47 10*3/MM3 (ref 3.4–10.8)

## 2019-07-20 PROCEDURE — 99233 SBSQ HOSP IP/OBS HIGH 50: CPT | Performed by: INTERNAL MEDICINE

## 2019-07-20 PROCEDURE — 25010000002 HEPARIN (PORCINE) PER 1000 UNITS: Performed by: PHYSICIAN ASSISTANT

## 2019-07-20 PROCEDURE — C1769 GUIDE WIRE: HCPCS | Performed by: UROLOGY

## 2019-07-20 PROCEDURE — 86140 C-REACTIVE PROTEIN: CPT | Performed by: NURSE PRACTITIONER

## 2019-07-20 PROCEDURE — 80053 COMPREHEN METABOLIC PANEL: CPT | Performed by: NURSE PRACTITIONER

## 2019-07-20 PROCEDURE — 76000 FLUOROSCOPY <1 HR PHYS/QHP: CPT

## 2019-07-20 PROCEDURE — BT1F1ZZ FLUOROSCOPY OF LEFT KIDNEY, URETER AND BLADDER USING LOW OSMOLAR CONTRAST: ICD-10-PCS | Performed by: UROLOGY

## 2019-07-20 PROCEDURE — 25010000002 PIPERACILLIN SOD-TAZOBACTAM PER 1 G: Performed by: PHYSICIAN ASSISTANT

## 2019-07-20 PROCEDURE — 85652 RBC SED RATE AUTOMATED: CPT | Performed by: NURSE PRACTITIONER

## 2019-07-20 PROCEDURE — 25010000002 IOPAMIDOL 61 % SOLUTION: Performed by: UROLOGY

## 2019-07-20 PROCEDURE — 25010000002 PROPOFOL 10 MG/ML EMULSION: Performed by: ANESTHESIOLOGY

## 2019-07-20 PROCEDURE — 85025 COMPLETE CBC W/AUTO DIFF WBC: CPT | Performed by: NURSE PRACTITIONER

## 2019-07-20 PROCEDURE — 82962 GLUCOSE BLOOD TEST: CPT

## 2019-07-20 PROCEDURE — C1758 CATHETER, URETERAL: HCPCS | Performed by: UROLOGY

## 2019-07-20 PROCEDURE — 25010000002 FENTANYL CITRATE (PF) 100 MCG/2ML SOLUTION: Performed by: ANESTHESIOLOGY

## 2019-07-20 RX ORDER — PROMETHAZINE HYDROCHLORIDE 25 MG/1
25 TABLET ORAL ONCE AS NEEDED
Status: DISCONTINUED | OUTPATIENT
Start: 2019-07-20 | End: 2019-07-20 | Stop reason: HOSPADM

## 2019-07-20 RX ORDER — PROMETHAZINE HYDROCHLORIDE 25 MG/1
25 SUPPOSITORY RECTAL ONCE AS NEEDED
Status: DISCONTINUED | OUTPATIENT
Start: 2019-07-20 | End: 2019-07-20 | Stop reason: HOSPADM

## 2019-07-20 RX ORDER — MAGNESIUM HYDROXIDE 1200 MG/15ML
LIQUID ORAL AS NEEDED
Status: DISCONTINUED | OUTPATIENT
Start: 2019-07-20 | End: 2019-07-20 | Stop reason: HOSPADM

## 2019-07-20 RX ORDER — FENTANYL CITRATE 50 UG/ML
INJECTION, SOLUTION INTRAMUSCULAR; INTRAVENOUS AS NEEDED
Status: DISCONTINUED | OUTPATIENT
Start: 2019-07-20 | End: 2019-07-20 | Stop reason: SURG

## 2019-07-20 RX ORDER — FAMOTIDINE 20 MG/1
20 TABLET, FILM COATED ORAL ONCE
Status: CANCELLED | OUTPATIENT
Start: 2019-07-20 | End: 2019-07-20

## 2019-07-20 RX ORDER — PROPOFOL 10 MG/ML
VIAL (ML) INTRAVENOUS AS NEEDED
Status: DISCONTINUED | OUTPATIENT
Start: 2019-07-20 | End: 2019-07-20 | Stop reason: SURG

## 2019-07-20 RX ORDER — PROMETHAZINE HYDROCHLORIDE 25 MG/ML
6.25 INJECTION, SOLUTION INTRAMUSCULAR; INTRAVENOUS ONCE AS NEEDED
Status: DISCONTINUED | OUTPATIENT
Start: 2019-07-20 | End: 2019-07-20 | Stop reason: HOSPADM

## 2019-07-20 RX ORDER — SODIUM CHLORIDE, SODIUM LACTATE, POTASSIUM CHLORIDE, CALCIUM CHLORIDE 600; 310; 30; 20 MG/100ML; MG/100ML; MG/100ML; MG/100ML
9 INJECTION, SOLUTION INTRAVENOUS CONTINUOUS
Status: DISCONTINUED | OUTPATIENT
Start: 2019-07-20 | End: 2019-07-27 | Stop reason: HOSPADM

## 2019-07-20 RX ORDER — HYDROMORPHONE HYDROCHLORIDE 1 MG/ML
0.5 INJECTION, SOLUTION INTRAMUSCULAR; INTRAVENOUS; SUBCUTANEOUS
Status: DISCONTINUED | OUTPATIENT
Start: 2019-07-20 | End: 2019-07-20 | Stop reason: HOSPADM

## 2019-07-20 RX ORDER — FENTANYL CITRATE 50 UG/ML
50 INJECTION, SOLUTION INTRAMUSCULAR; INTRAVENOUS
Status: DISCONTINUED | OUTPATIENT
Start: 2019-07-20 | End: 2019-07-20 | Stop reason: HOSPADM

## 2019-07-20 RX ORDER — SODIUM CHLORIDE 0.9 % (FLUSH) 0.9 %
3 SYRINGE (ML) INJECTION EVERY 12 HOURS SCHEDULED
Status: CANCELLED | OUTPATIENT
Start: 2019-07-20

## 2019-07-20 RX ORDER — ONDANSETRON 2 MG/ML
4 INJECTION INTRAMUSCULAR; INTRAVENOUS ONCE AS NEEDED
Status: DISCONTINUED | OUTPATIENT
Start: 2019-07-20 | End: 2019-07-20 | Stop reason: HOSPADM

## 2019-07-20 RX ORDER — FAMOTIDINE 10 MG/ML
20 INJECTION, SOLUTION INTRAVENOUS ONCE
Status: COMPLETED | OUTPATIENT
Start: 2019-07-20 | End: 2019-07-20

## 2019-07-20 RX ORDER — LIDOCAINE HYDROCHLORIDE 10 MG/ML
0.5 INJECTION, SOLUTION EPIDURAL; INFILTRATION; INTRACAUDAL; PERINEURAL ONCE AS NEEDED
Status: CANCELLED | OUTPATIENT
Start: 2019-07-20

## 2019-07-20 RX ORDER — SODIUM CHLORIDE 0.9 % (FLUSH) 0.9 %
3-10 SYRINGE (ML) INJECTION AS NEEDED
Status: CANCELLED | OUTPATIENT
Start: 2019-07-20

## 2019-07-20 RX ORDER — PROPOFOL 10 MG/ML
VIAL (ML) INTRAVENOUS CONTINUOUS PRN
Status: DISCONTINUED | OUTPATIENT
Start: 2019-07-20 | End: 2019-07-20 | Stop reason: SURG

## 2019-07-20 RX ADMIN — SODIUM CHLORIDE, POTASSIUM CHLORIDE, SODIUM LACTATE AND CALCIUM CHLORIDE 9 ML/HR: 600; 310; 30; 20 INJECTION, SOLUTION INTRAVENOUS at 11:55

## 2019-07-20 RX ADMIN — FENTANYL CITRATE 100 MCG: 50 INJECTION, SOLUTION INTRAMUSCULAR; INTRAVENOUS at 12:38

## 2019-07-20 RX ADMIN — HEPARIN SODIUM 5000 UNITS: 5000 INJECTION INTRAVENOUS; SUBCUTANEOUS at 20:13

## 2019-07-20 RX ADMIN — SODIUM CHLORIDE, POTASSIUM CHLORIDE, SODIUM LACTATE AND CALCIUM CHLORIDE: 600; 310; 30; 20 INJECTION, SOLUTION INTRAVENOUS at 12:29

## 2019-07-20 RX ADMIN — MULTIPLE VITAMINS W/ MINERALS TAB 1 TABLET: TAB ORAL at 08:48

## 2019-07-20 RX ADMIN — FINASTERIDE 5 MG: 5 TABLET, FILM COATED ORAL at 08:48

## 2019-07-20 RX ADMIN — INSULIN LISPRO 4 UNITS: 100 INJECTION, SOLUTION INTRAVENOUS; SUBCUTANEOUS at 17:40

## 2019-07-20 RX ADMIN — TRAZODONE HYDROCHLORIDE 50 MG: 50 TABLET ORAL at 20:12

## 2019-07-20 RX ADMIN — INSULIN LISPRO 4 UNITS: 100 INJECTION, SOLUTION INTRAVENOUS; SUBCUTANEOUS at 08:49

## 2019-07-20 RX ADMIN — TAZOBACTAM SODIUM AND PIPERACILLIN SODIUM 3.38 G: 375; 3 INJECTION, SOLUTION INTRAVENOUS at 01:22

## 2019-07-20 RX ADMIN — SODIUM CHLORIDE, PRESERVATIVE FREE 10 ML: 5 INJECTION INTRAVENOUS at 08:48

## 2019-07-20 RX ADMIN — SODIUM CHLORIDE, PRESERVATIVE FREE 3 ML: 5 INJECTION INTRAVENOUS at 20:13

## 2019-07-20 RX ADMIN — ALLOPURINOL 300 MG: 300 TABLET ORAL at 20:12

## 2019-07-20 RX ADMIN — TAZOBACTAM SODIUM AND PIPERACILLIN SODIUM 3.38 G: 375; 3 INJECTION, SOLUTION INTRAVENOUS at 17:26

## 2019-07-20 RX ADMIN — PROPOFOL 150 MG: 10 INJECTION, EMULSION INTRAVENOUS at 12:38

## 2019-07-20 RX ADMIN — CARVEDILOL 6.25 MG: 6.25 TABLET, FILM COATED ORAL at 17:26

## 2019-07-20 RX ADMIN — FAMOTIDINE 20 MG: 20 TABLET ORAL at 08:48

## 2019-07-20 RX ADMIN — CARVEDILOL 6.25 MG: 6.25 TABLET, FILM COATED ORAL at 08:48

## 2019-07-20 RX ADMIN — INSULIN LISPRO 4 UNITS: 100 INJECTION, SOLUTION INTRAVENOUS; SUBCUTANEOUS at 21:29

## 2019-07-20 RX ADMIN — TAZOBACTAM SODIUM AND PIPERACILLIN SODIUM 3.38 G: 375; 3 INJECTION, SOLUTION INTRAVENOUS at 10:00

## 2019-07-20 RX ADMIN — Medication 1 CAPSULE: at 08:48

## 2019-07-20 RX ADMIN — PROPOFOL 150 MCG/KG/MIN: 10 INJECTION, EMULSION INTRAVENOUS at 12:38

## 2019-07-20 RX ADMIN — FAMOTIDINE 20 MG: 20 TABLET ORAL at 17:26

## 2019-07-20 RX ADMIN — FAMOTIDINE 20 MG: 10 INJECTION INTRAVENOUS at 12:00

## 2019-07-20 NOTE — ANESTHESIA PREPROCEDURE EVALUATION
Anesthesia Evaluation     Patient summary reviewed and Nursing notes reviewed   history of anesthetic complications (brother with history of MH): malignant hyperthermia               Airway   Mallampati: II  TM distance: >3 FB  Neck ROM: full  No difficulty expected  Dental - normal exam     Pulmonary - negative pulmonary ROS and normal exam   Cardiovascular - normal exam    (+) hypertension, hyperlipidemia,     ROS comment: EF 55%, mild AS, mod TR, RVSP 50    Neuro/Psych- negative ROS  GI/Hepatic/Renal/Endo    (+) morbid obesity,  diabetes mellitus, hypothyroidism,     ROS Comment: Diverticulitis    Chronic recurrent left hydronephrosis    Musculoskeletal     Abdominal  - normal exam    Bowel sounds: normal.   Substance History - negative use     OB/GYN negative ob/gyn ROS         Other   (+) arthritis                     Anesthesia Plan    ASA 3     general   (TIVA)  intravenous induction   Anesthetic plan, all risks, benefits, and alternatives have been provided, discussed and informed consent has been obtained with: patient.

## 2019-07-20 NOTE — ANESTHESIA PROCEDURE NOTES
Airway  Urgency: elective    Airway not difficult    General Information and Staff    Patient location during procedure: OR  Anesthesiologist: Brodie Mendoza MD    Indications and Patient Condition  Indications for airway management: airway protection    Preoxygenated: yes  Mask difficulty assessment: 1 - vent by mask    Final Airway Details  Final airway type: supraglottic airway      Successful airway: I-gel  Size 4    Number of attempts at approach: 1    Additional Comments  LMA placed without difficulty, ventilations assisted with breath sounds equal bilaterally and symmetric chest rise and fall

## 2019-07-20 NOTE — ANESTHESIA POSTPROCEDURE EVALUATION
Patient: Forrest Zepeda    Procedure Summary     Date:  07/20/19 Room / Location:   [facility] /  [facility]    Anesthesia Start:  1229 Anesthesia Stop:  1325    Procedure:  CYSTOSCOPY, LEFT RETROGRADE PYELOGRAM,  ATTEMPTED LEFT URETERAL STENT INSERTION (Left ) Diagnosis:      Surgeon:  Isaac Flynn MD Provider:  Brodie Mendoza MD    Anesthesia Type:  general ASA Status:  3          Anesthesia Type: general  Last vitals  BP   136/70 (07/20/19 1149)   Temp   97.8 °F (36.6 °C) (07/20/19 1149)   Pulse   67 (07/20/19 1149)   Resp   18 (07/20/19 1149)     SpO2   91 % (07/20/19 1149)     Post Anesthesia Care and Evaluation    Patient location during evaluation: PACU  Patient participation: complete - patient participated  Level of consciousness: awake and alert  Pain score: 0  Pain management: adequate  Airway patency: patent  Anesthetic complications: No anesthetic complications  PONV Status: none  Cardiovascular status: hemodynamically stable and acceptable  Respiratory status: nonlabored ventilation, acceptable and nasal cannula  Hydration status: acceptable

## 2019-07-21 LAB
GLUCOSE BLDC GLUCOMTR-MCNC: 220 MG/DL (ref 70–130)
GLUCOSE BLDC GLUCOMTR-MCNC: 242 MG/DL (ref 70–130)
GLUCOSE BLDC GLUCOMTR-MCNC: 277 MG/DL (ref 70–130)
GLUCOSE BLDC GLUCOMTR-MCNC: 309 MG/DL (ref 70–130)

## 2019-07-21 PROCEDURE — 99233 SBSQ HOSP IP/OBS HIGH 50: CPT | Performed by: INTERNAL MEDICINE

## 2019-07-21 PROCEDURE — 25010000002 PIPERACILLIN SOD-TAZOBACTAM PER 1 G: Performed by: PHYSICIAN ASSISTANT

## 2019-07-21 PROCEDURE — 82962 GLUCOSE BLOOD TEST: CPT

## 2019-07-21 PROCEDURE — 25010000002 HEPARIN (PORCINE) PER 1000 UNITS: Performed by: PHYSICIAN ASSISTANT

## 2019-07-21 RX ADMIN — INSULIN LISPRO 6 UNITS: 100 INJECTION, SOLUTION INTRAVENOUS; SUBCUTANEOUS at 17:18

## 2019-07-21 RX ADMIN — INSULIN LISPRO 7 UNITS: 100 INJECTION, SOLUTION INTRAVENOUS; SUBCUTANEOUS at 12:04

## 2019-07-21 RX ADMIN — MULTIPLE VITAMINS W/ MINERALS TAB 1 TABLET: TAB ORAL at 09:10

## 2019-07-21 RX ADMIN — INSULIN LISPRO 4 UNITS: 100 INJECTION, SOLUTION INTRAVENOUS; SUBCUTANEOUS at 09:15

## 2019-07-21 RX ADMIN — HEPARIN SODIUM 5000 UNITS: 5000 INJECTION INTRAVENOUS; SUBCUTANEOUS at 05:11

## 2019-07-21 RX ADMIN — FAMOTIDINE 20 MG: 20 TABLET ORAL at 09:10

## 2019-07-21 RX ADMIN — TAZOBACTAM SODIUM AND PIPERACILLIN SODIUM 3.38 G: 375; 3 INJECTION, SOLUTION INTRAVENOUS at 09:15

## 2019-07-21 RX ADMIN — SODIUM CHLORIDE, PRESERVATIVE FREE 10 ML: 5 INJECTION INTRAVENOUS at 09:14

## 2019-07-21 RX ADMIN — INSULIN LISPRO 4 UNITS: 100 INJECTION, SOLUTION INTRAVENOUS; SUBCUTANEOUS at 21:24

## 2019-07-21 RX ADMIN — CARVEDILOL 6.25 MG: 6.25 TABLET, FILM COATED ORAL at 09:11

## 2019-07-21 RX ADMIN — Medication 1 CAPSULE: at 09:10

## 2019-07-21 RX ADMIN — CARVEDILOL 6.25 MG: 6.25 TABLET, FILM COATED ORAL at 17:19

## 2019-07-21 RX ADMIN — HEPARIN SODIUM 5000 UNITS: 5000 INJECTION INTRAVENOUS; SUBCUTANEOUS at 21:20

## 2019-07-21 RX ADMIN — FAMOTIDINE 20 MG: 20 TABLET ORAL at 17:18

## 2019-07-21 RX ADMIN — FINASTERIDE 5 MG: 5 TABLET, FILM COATED ORAL at 09:11

## 2019-07-21 RX ADMIN — TAZOBACTAM SODIUM AND PIPERACILLIN SODIUM 3.38 G: 375; 3 INJECTION, SOLUTION INTRAVENOUS at 01:13

## 2019-07-21 RX ADMIN — TAZOBACTAM SODIUM AND PIPERACILLIN SODIUM 3.38 G: 375; 3 INJECTION, SOLUTION INTRAVENOUS at 17:20

## 2019-07-21 RX ADMIN — HEPARIN SODIUM 5000 UNITS: 5000 INJECTION INTRAVENOUS; SUBCUTANEOUS at 17:18

## 2019-07-21 RX ADMIN — TRAZODONE HYDROCHLORIDE 50 MG: 50 TABLET ORAL at 21:20

## 2019-07-21 RX ADMIN — ALLOPURINOL 300 MG: 300 TABLET ORAL at 21:20

## 2019-07-21 RX ADMIN — POLYETHYLENE GLYCOL 3350 17 G: 17 POWDER, FOR SOLUTION ORAL at 09:10

## 2019-07-21 RX ADMIN — SODIUM CHLORIDE, PRESERVATIVE FREE 3 ML: 5 INJECTION INTRAVENOUS at 21:27

## 2019-07-21 RX ADMIN — LEVOTHYROXINE SODIUM 50 MCG: 50 TABLET ORAL at 05:11

## 2019-07-22 LAB
GLUCOSE BLDC GLUCOMTR-MCNC: 184 MG/DL (ref 70–130)
GLUCOSE BLDC GLUCOMTR-MCNC: 226 MG/DL (ref 70–130)
GLUCOSE BLDC GLUCOMTR-MCNC: 260 MG/DL (ref 70–130)
GLUCOSE BLDC GLUCOMTR-MCNC: 285 MG/DL (ref 70–130)

## 2019-07-22 PROCEDURE — 97535 SELF CARE MNGMENT TRAINING: CPT

## 2019-07-22 PROCEDURE — 25010000002 PIPERACILLIN SOD-TAZOBACTAM PER 1 G: Performed by: PHYSICIAN ASSISTANT

## 2019-07-22 PROCEDURE — 82962 GLUCOSE BLOOD TEST: CPT

## 2019-07-22 PROCEDURE — 25010000002 HEPARIN (PORCINE) PER 1000 UNITS: Performed by: PHYSICIAN ASSISTANT

## 2019-07-22 PROCEDURE — G0108 DIAB MANAGE TRN  PER INDIV: HCPCS | Performed by: REGISTERED NURSE

## 2019-07-22 PROCEDURE — 63710000001 INSULIN DETEMIR PER 5 UNITS: Performed by: INTERNAL MEDICINE

## 2019-07-22 PROCEDURE — 99233 SBSQ HOSP IP/OBS HIGH 50: CPT | Performed by: INTERNAL MEDICINE

## 2019-07-22 RX ADMIN — FINASTERIDE 5 MG: 5 TABLET, FILM COATED ORAL at 07:59

## 2019-07-22 RX ADMIN — FAMOTIDINE 20 MG: 20 TABLET ORAL at 07:58

## 2019-07-22 RX ADMIN — FAMOTIDINE 20 MG: 20 TABLET ORAL at 17:55

## 2019-07-22 RX ADMIN — INSULIN LISPRO 4 UNITS: 100 INJECTION, SOLUTION INTRAVENOUS; SUBCUTANEOUS at 17:53

## 2019-07-22 RX ADMIN — MULTIPLE VITAMINS W/ MINERALS TAB 1 TABLET: TAB ORAL at 07:59

## 2019-07-22 RX ADMIN — ALLOPURINOL 300 MG: 300 TABLET ORAL at 21:04

## 2019-07-22 RX ADMIN — INSULIN LISPRO 6 UNITS: 100 INJECTION, SOLUTION INTRAVENOUS; SUBCUTANEOUS at 21:08

## 2019-07-22 RX ADMIN — Medication 1 CAPSULE: at 07:58

## 2019-07-22 RX ADMIN — INSULIN DETEMIR 10 UNITS: 100 INJECTION, SOLUTION SUBCUTANEOUS at 21:04

## 2019-07-22 RX ADMIN — INSULIN LISPRO 5 UNITS: 100 INJECTION, SOLUTION INTRAVENOUS; SUBCUTANEOUS at 17:52

## 2019-07-22 RX ADMIN — HEPARIN SODIUM 5000 UNITS: 5000 INJECTION INTRAVENOUS; SUBCUTANEOUS at 05:21

## 2019-07-22 RX ADMIN — HEPARIN SODIUM 5000 UNITS: 5000 INJECTION INTRAVENOUS; SUBCUTANEOUS at 14:29

## 2019-07-22 RX ADMIN — HEPARIN SODIUM 5000 UNITS: 5000 INJECTION INTRAVENOUS; SUBCUTANEOUS at 21:04

## 2019-07-22 RX ADMIN — CARVEDILOL 6.25 MG: 6.25 TABLET, FILM COATED ORAL at 07:58

## 2019-07-22 RX ADMIN — TRAZODONE HYDROCHLORIDE 25 MG: 50 TABLET ORAL at 21:04

## 2019-07-22 RX ADMIN — SODIUM CHLORIDE, PRESERVATIVE FREE 3 ML: 5 INJECTION INTRAVENOUS at 07:59

## 2019-07-22 RX ADMIN — INSULIN LISPRO 2 UNITS: 100 INJECTION, SOLUTION INTRAVENOUS; SUBCUTANEOUS at 08:01

## 2019-07-22 RX ADMIN — LEVOTHYROXINE SODIUM 50 MCG: 50 TABLET ORAL at 05:21

## 2019-07-22 RX ADMIN — INSULIN LISPRO 5 UNITS: 100 INJECTION, SOLUTION INTRAVENOUS; SUBCUTANEOUS at 12:49

## 2019-07-22 RX ADMIN — TAZOBACTAM SODIUM AND PIPERACILLIN SODIUM 3.38 G: 375; 3 INJECTION, SOLUTION INTRAVENOUS at 01:01

## 2019-07-22 RX ADMIN — INSULIN LISPRO 6 UNITS: 100 INJECTION, SOLUTION INTRAVENOUS; SUBCUTANEOUS at 12:49

## 2019-07-22 RX ADMIN — CARVEDILOL 6.25 MG: 6.25 TABLET, FILM COATED ORAL at 17:53

## 2019-07-22 RX ADMIN — TAZOBACTAM SODIUM AND PIPERACILLIN SODIUM 3.38 G: 375; 3 INJECTION, SOLUTION INTRAVENOUS at 17:53

## 2019-07-22 RX ADMIN — TAZOBACTAM SODIUM AND PIPERACILLIN SODIUM 3.38 G: 375; 3 INJECTION, SOLUTION INTRAVENOUS at 10:14

## 2019-07-23 ENCOUNTER — APPOINTMENT (OUTPATIENT)
Dept: CT IMAGING | Facility: HOSPITAL | Age: 84
End: 2019-07-23

## 2019-07-23 LAB
ANION GAP SERPL CALCULATED.3IONS-SCNC: 12 MMOL/L (ref 5–15)
APTT PPP: 35.6 SECONDS (ref 24–37)
BUN BLD-MCNC: 17 MG/DL (ref 8–23)
BUN/CREAT SERPL: 11.9 (ref 7–25)
CALCIUM SPEC-SCNC: 8.8 MG/DL (ref 8.6–10.5)
CHLORIDE SERPL-SCNC: 106 MMOL/L (ref 98–107)
CO2 SERPL-SCNC: 22 MMOL/L (ref 22–29)
CREAT BLD-MCNC: 1.43 MG/DL (ref 0.76–1.27)
DEPRECATED RDW RBC AUTO: 54.9 FL (ref 37–54)
ERYTHROCYTE [DISTWIDTH] IN BLOOD BY AUTOMATED COUNT: 15.1 % (ref 12.3–15.4)
GFR SERPL CREATININE-BSD FRML MDRD: 47 ML/MIN/1.73
GLUCOSE BLD-MCNC: 199 MG/DL (ref 65–99)
GLUCOSE BLDC GLUCOMTR-MCNC: 187 MG/DL (ref 70–130)
GLUCOSE BLDC GLUCOMTR-MCNC: 200 MG/DL (ref 70–130)
GLUCOSE BLDC GLUCOMTR-MCNC: 271 MG/DL (ref 70–130)
GLUCOSE BLDC GLUCOMTR-MCNC: 297 MG/DL (ref 70–130)
HCT VFR BLD AUTO: 42.3 % (ref 37.5–51)
HGB BLD-MCNC: 13.2 G/DL (ref 13–17.7)
INR PPP: 1.05 (ref 0.85–1.16)
MCH RBC QN AUTO: 30.7 PG (ref 26.6–33)
MCHC RBC AUTO-ENTMCNC: 31.2 G/DL (ref 31.5–35.7)
MCV RBC AUTO: 98.4 FL (ref 79–97)
PLATELET # BLD AUTO: 152 10*3/MM3 (ref 140–450)
PMV BLD AUTO: 10.9 FL (ref 6–12)
POTASSIUM BLD-SCNC: 4 MMOL/L (ref 3.5–5.2)
PROTHROMBIN TIME: 13.2 SECONDS (ref 11.2–14.3)
RBC # BLD AUTO: 4.3 10*6/MM3 (ref 4.14–5.8)
SODIUM BLD-SCNC: 140 MMOL/L (ref 136–145)
WBC NRBC COR # BLD: 8.01 10*3/MM3 (ref 3.4–10.8)

## 2019-07-23 PROCEDURE — 25010000002 MIDAZOLAM PER 1 MG: Performed by: RADIOLOGY

## 2019-07-23 PROCEDURE — 82962 GLUCOSE BLOOD TEST: CPT

## 2019-07-23 PROCEDURE — 63710000001 INSULIN DETEMIR PER 5 UNITS: Performed by: INTERNAL MEDICINE

## 2019-07-23 PROCEDURE — 25010000002 FENTANYL CITRATE (PF) 100 MCG/2ML SOLUTION: Performed by: RADIOLOGY

## 2019-07-23 PROCEDURE — 85610 PROTHROMBIN TIME: CPT | Performed by: RADIOLOGY

## 2019-07-23 PROCEDURE — C1729 CATH, DRAINAGE: HCPCS

## 2019-07-23 PROCEDURE — 0T143JD BYPASS LEFT KIDNEY PELVIS TO CUTANEOUS WITH SYNTHETIC SUBSTITUTE, PERCUTANEOUS APPROACH: ICD-10-PCS | Performed by: RADIOLOGY

## 2019-07-23 PROCEDURE — 99233 SBSQ HOSP IP/OBS HIGH 50: CPT | Performed by: INTERNAL MEDICINE

## 2019-07-23 PROCEDURE — 25010000002 PIPERACILLIN SOD-TAZOBACTAM PER 1 G: Performed by: PHYSICIAN ASSISTANT

## 2019-07-23 PROCEDURE — 85027 COMPLETE CBC AUTOMATED: CPT | Performed by: INTERNAL MEDICINE

## 2019-07-23 PROCEDURE — 80048 BASIC METABOLIC PNL TOTAL CA: CPT | Performed by: INTERNAL MEDICINE

## 2019-07-23 PROCEDURE — 25010000002 HEPARIN (PORCINE) PER 1000 UNITS: Performed by: PHYSICIAN ASSISTANT

## 2019-07-23 PROCEDURE — 85730 THROMBOPLASTIN TIME PARTIAL: CPT | Performed by: RADIOLOGY

## 2019-07-23 PROCEDURE — 25010000003 LIDOCAINE 1 % SOLUTION: Performed by: RADIOLOGY

## 2019-07-23 RX ORDER — FENTANYL CITRATE 50 UG/ML
50 INJECTION, SOLUTION INTRAMUSCULAR; INTRAVENOUS
Status: COMPLETED | OUTPATIENT
Start: 2019-07-23 | End: 2019-07-23

## 2019-07-23 RX ORDER — MIDAZOLAM HYDROCHLORIDE 1 MG/ML
2 INJECTION INTRAMUSCULAR; INTRAVENOUS
Status: COMPLETED | OUTPATIENT
Start: 2019-07-23 | End: 2019-07-23

## 2019-07-23 RX ORDER — LIDOCAINE HYDROCHLORIDE 10 MG/ML
20 INJECTION, SOLUTION INFILTRATION; PERINEURAL ONCE
Status: COMPLETED | OUTPATIENT
Start: 2019-07-23 | End: 2019-07-23

## 2019-07-23 RX ADMIN — HEPARIN SODIUM 5000 UNITS: 5000 INJECTION INTRAVENOUS; SUBCUTANEOUS at 05:10

## 2019-07-23 RX ADMIN — INSULIN LISPRO 6 UNITS: 100 INJECTION, SOLUTION INTRAVENOUS; SUBCUTANEOUS at 12:05

## 2019-07-23 RX ADMIN — CARVEDILOL 6.25 MG: 6.25 TABLET, FILM COATED ORAL at 17:14

## 2019-07-23 RX ADMIN — CARVEDILOL 6.25 MG: 6.25 TABLET, FILM COATED ORAL at 08:29

## 2019-07-23 RX ADMIN — FAMOTIDINE 20 MG: 20 TABLET ORAL at 08:28

## 2019-07-23 RX ADMIN — LEVOTHYROXINE SODIUM 50 MCG: 50 TABLET ORAL at 05:11

## 2019-07-23 RX ADMIN — INSULIN DETEMIR 10 UNITS: 100 INJECTION, SOLUTION SUBCUTANEOUS at 20:45

## 2019-07-23 RX ADMIN — LIDOCAINE HYDROCHLORIDE 20 ML: 10 INJECTION, SOLUTION INFILTRATION; PERINEURAL at 16:18

## 2019-07-23 RX ADMIN — TAZOBACTAM SODIUM AND PIPERACILLIN SODIUM 3.38 G: 375; 3 INJECTION, SOLUTION INTRAVENOUS at 01:49

## 2019-07-23 RX ADMIN — ALLOPURINOL 300 MG: 300 TABLET ORAL at 20:49

## 2019-07-23 RX ADMIN — FINASTERIDE 5 MG: 5 TABLET, FILM COATED ORAL at 08:28

## 2019-07-23 RX ADMIN — INSULIN LISPRO 2 UNITS: 100 INJECTION, SOLUTION INTRAVENOUS; SUBCUTANEOUS at 17:15

## 2019-07-23 RX ADMIN — INSULIN LISPRO 6 UNITS: 100 INJECTION, SOLUTION INTRAVENOUS; SUBCUTANEOUS at 20:47

## 2019-07-23 RX ADMIN — TAZOBACTAM SODIUM AND PIPERACILLIN SODIUM 3.38 G: 375; 3 INJECTION, SOLUTION INTRAVENOUS at 20:59

## 2019-07-23 RX ADMIN — TAZOBACTAM SODIUM AND PIPERACILLIN SODIUM 3.38 G: 375; 3 INJECTION, SOLUTION INTRAVENOUS at 10:17

## 2019-07-23 RX ADMIN — SODIUM CHLORIDE, PRESERVATIVE FREE 3 ML: 5 INJECTION INTRAVENOUS at 08:30

## 2019-07-23 RX ADMIN — Medication 1 CAPSULE: at 08:30

## 2019-07-23 RX ADMIN — SODIUM CHLORIDE, PRESERVATIVE FREE 3 ML: 5 INJECTION INTRAVENOUS at 20:59

## 2019-07-23 RX ADMIN — MIDAZOLAM HYDROCHLORIDE 1 MG: 1 INJECTION, SOLUTION INTRAMUSCULAR; INTRAVENOUS at 16:13

## 2019-07-23 RX ADMIN — HEPARIN SODIUM 5000 UNITS: 5000 INJECTION INTRAVENOUS; SUBCUTANEOUS at 14:24

## 2019-07-23 RX ADMIN — FENTANYL CITRATE 25 MCG: 50 INJECTION, SOLUTION INTRAMUSCULAR; INTRAVENOUS at 16:15

## 2019-07-23 RX ADMIN — INSULIN LISPRO 4 UNITS: 100 INJECTION, SOLUTION INTRAVENOUS; SUBCUTANEOUS at 08:29

## 2019-07-23 RX ADMIN — INSULIN LISPRO 5 UNITS: 100 INJECTION, SOLUTION INTRAVENOUS; SUBCUTANEOUS at 12:04

## 2019-07-23 RX ADMIN — MULTIPLE VITAMINS W/ MINERALS TAB 1 TABLET: TAB ORAL at 08:28

## 2019-07-23 RX ADMIN — HEPARIN SODIUM 5000 UNITS: 5000 INJECTION INTRAVENOUS; SUBCUTANEOUS at 20:49

## 2019-07-23 RX ADMIN — INSULIN LISPRO 5 UNITS: 100 INJECTION, SOLUTION INTRAVENOUS; SUBCUTANEOUS at 17:15

## 2019-07-23 RX ADMIN — TRAZODONE HYDROCHLORIDE 50 MG: 50 TABLET ORAL at 20:49

## 2019-07-23 RX ADMIN — FAMOTIDINE 20 MG: 20 TABLET ORAL at 17:14

## 2019-07-23 RX ADMIN — INSULIN LISPRO 5 UNITS: 100 INJECTION, SOLUTION INTRAVENOUS; SUBCUTANEOUS at 08:29

## 2019-07-24 ENCOUNTER — TELEPHONE (OUTPATIENT)
Dept: UROLOGY | Facility: CLINIC | Age: 84
End: 2019-07-24

## 2019-07-24 LAB
ANION GAP SERPL CALCULATED.3IONS-SCNC: 12 MMOL/L (ref 5–15)
BACTERIA UR QL AUTO: ABNORMAL /HPF
BACTERIA UR QL AUTO: ABNORMAL /HPF
BILIRUB UR QL STRIP: ABNORMAL
BILIRUB UR QL STRIP: NEGATIVE
BUN BLD-MCNC: 16 MG/DL (ref 8–23)
BUN/CREAT SERPL: 12.2 (ref 7–25)
CALCIUM SPEC-SCNC: 8.9 MG/DL (ref 8.6–10.5)
CHLORIDE SERPL-SCNC: 103 MMOL/L (ref 98–107)
CLARITY UR: ABNORMAL
CLARITY UR: ABNORMAL
CO2 SERPL-SCNC: 23 MMOL/L (ref 22–29)
COLOR UR: ABNORMAL
COLOR UR: YELLOW
CREAT BLD-MCNC: 1.31 MG/DL (ref 0.76–1.27)
DEPRECATED RDW RBC AUTO: 54.4 FL (ref 37–54)
ERYTHROCYTE [DISTWIDTH] IN BLOOD BY AUTOMATED COUNT: 15.2 % (ref 12.3–15.4)
GFR SERPL CREATININE-BSD FRML MDRD: 52 ML/MIN/1.73
GLUCOSE BLD-MCNC: 229 MG/DL (ref 65–99)
GLUCOSE BLDC GLUCOMTR-MCNC: 216 MG/DL (ref 70–130)
GLUCOSE BLDC GLUCOMTR-MCNC: 233 MG/DL (ref 70–130)
GLUCOSE BLDC GLUCOMTR-MCNC: 250 MG/DL (ref 70–130)
GLUCOSE BLDC GLUCOMTR-MCNC: 345 MG/DL (ref 70–130)
GLUCOSE UR STRIP-MCNC: ABNORMAL MG/DL
GLUCOSE UR STRIP-MCNC: ABNORMAL MG/DL
HCT VFR BLD AUTO: 42 % (ref 37.5–51)
HGB BLD-MCNC: 13.3 G/DL (ref 13–17.7)
HGB UR QL STRIP.AUTO: ABNORMAL
HGB UR QL STRIP.AUTO: ABNORMAL
HYALINE CASTS UR QL AUTO: ABNORMAL /LPF
HYALINE CASTS UR QL AUTO: ABNORMAL /LPF
KETONES UR QL STRIP: NEGATIVE
KETONES UR QL STRIP: NEGATIVE
LEUKOCYTE ESTERASE UR QL STRIP.AUTO: ABNORMAL
LEUKOCYTE ESTERASE UR QL STRIP.AUTO: ABNORMAL
MCH RBC QN AUTO: 31.1 PG (ref 26.6–33)
MCHC RBC AUTO-ENTMCNC: 31.7 G/DL (ref 31.5–35.7)
MCV RBC AUTO: 98.4 FL (ref 79–97)
NITRITE UR QL STRIP: NEGATIVE
NITRITE UR QL STRIP: POSITIVE
PH UR STRIP.AUTO: 6 [PH] (ref 5–8)
PH UR STRIP.AUTO: 6.5 [PH] (ref 5–8)
PLATELET # BLD AUTO: 146 10*3/MM3 (ref 140–450)
PMV BLD AUTO: 10.1 FL (ref 6–12)
POTASSIUM BLD-SCNC: 4.2 MMOL/L (ref 3.5–5.2)
PROT UR QL STRIP: ABNORMAL
PROT UR QL STRIP: ABNORMAL
RBC # BLD AUTO: 4.27 10*6/MM3 (ref 4.14–5.8)
RBC # UR: ABNORMAL /HPF
RBC # UR: ABNORMAL /HPF
REF LAB TEST METHOD: ABNORMAL
REF LAB TEST METHOD: ABNORMAL
SODIUM BLD-SCNC: 138 MMOL/L (ref 136–145)
SP GR UR STRIP: 1.01 (ref 1–1.03)
SP GR UR STRIP: 1.01 (ref 1–1.03)
SQUAMOUS #/AREA URNS HPF: ABNORMAL /HPF
SQUAMOUS #/AREA URNS HPF: ABNORMAL /HPF
UROBILINOGEN UR QL STRIP: ABNORMAL
UROBILINOGEN UR QL STRIP: ABNORMAL
WBC NRBC COR # BLD: 8.37 10*3/MM3 (ref 3.4–10.8)
WBC UR QL AUTO: ABNORMAL /HPF
WBC UR QL AUTO: ABNORMAL /HPF
YEAST URNS QL MICRO: ABNORMAL /HPF

## 2019-07-24 PROCEDURE — 63710000001 INSULIN LISPRO (HUMAN) PER 5 UNITS: Performed by: INTERNAL MEDICINE

## 2019-07-24 PROCEDURE — 85027 COMPLETE CBC AUTOMATED: CPT | Performed by: INTERNAL MEDICINE

## 2019-07-24 PROCEDURE — 82962 GLUCOSE BLOOD TEST: CPT

## 2019-07-24 PROCEDURE — 97530 THERAPEUTIC ACTIVITIES: CPT

## 2019-07-24 PROCEDURE — 80048 BASIC METABOLIC PNL TOTAL CA: CPT | Performed by: INTERNAL MEDICINE

## 2019-07-24 PROCEDURE — 63710000001 INSULIN DETEMIR PER 5 UNITS: Performed by: INTERNAL MEDICINE

## 2019-07-24 PROCEDURE — 81001 URINALYSIS AUTO W/SCOPE: CPT | Performed by: NURSE PRACTITIONER

## 2019-07-24 PROCEDURE — 99233 SBSQ HOSP IP/OBS HIGH 50: CPT | Performed by: INTERNAL MEDICINE

## 2019-07-24 PROCEDURE — 25010000002 HEPARIN (PORCINE) PER 1000 UNITS: Performed by: PHYSICIAN ASSISTANT

## 2019-07-24 PROCEDURE — 87086 URINE CULTURE/COLONY COUNT: CPT | Performed by: INTERNAL MEDICINE

## 2019-07-24 PROCEDURE — 97110 THERAPEUTIC EXERCISES: CPT

## 2019-07-24 PROCEDURE — 97116 GAIT TRAINING THERAPY: CPT

## 2019-07-24 PROCEDURE — 81001 URINALYSIS AUTO W/SCOPE: CPT | Performed by: INTERNAL MEDICINE

## 2019-07-24 PROCEDURE — 87086 URINE CULTURE/COLONY COUNT: CPT | Performed by: NURSE PRACTITIONER

## 2019-07-24 RX ORDER — ACETAMINOPHEN 325 MG/1
650 TABLET ORAL EVERY 6 HOURS PRN
Status: DISCONTINUED | OUTPATIENT
Start: 2019-07-24 | End: 2019-07-27 | Stop reason: HOSPADM

## 2019-07-24 RX ADMIN — INSULIN LISPRO 4 UNITS: 100 INJECTION, SOLUTION INTRAVENOUS; SUBCUTANEOUS at 08:30

## 2019-07-24 RX ADMIN — FAMOTIDINE 20 MG: 20 TABLET ORAL at 08:31

## 2019-07-24 RX ADMIN — INSULIN LISPRO 5 UNITS: 100 INJECTION, SOLUTION INTRAVENOUS; SUBCUTANEOUS at 12:31

## 2019-07-24 RX ADMIN — HEPARIN SODIUM 5000 UNITS: 5000 INJECTION INTRAVENOUS; SUBCUTANEOUS at 16:49

## 2019-07-24 RX ADMIN — HEPARIN SODIUM 5000 UNITS: 5000 INJECTION INTRAVENOUS; SUBCUTANEOUS at 05:40

## 2019-07-24 RX ADMIN — CARVEDILOL 6.25 MG: 6.25 TABLET, FILM COATED ORAL at 08:32

## 2019-07-24 RX ADMIN — INSULIN DETEMIR 10 UNITS: 100 INJECTION, SOLUTION SUBCUTANEOUS at 20:13

## 2019-07-24 RX ADMIN — INSULIN LISPRO 5 UNITS: 100 INJECTION, SOLUTION INTRAVENOUS; SUBCUTANEOUS at 18:11

## 2019-07-24 RX ADMIN — FAMOTIDINE 20 MG: 20 TABLET ORAL at 16:52

## 2019-07-24 RX ADMIN — HEPARIN SODIUM 5000 UNITS: 5000 INJECTION INTRAVENOUS; SUBCUTANEOUS at 20:02

## 2019-07-24 RX ADMIN — INSULIN LISPRO 7 UNITS: 100 INJECTION, SOLUTION INTRAVENOUS; SUBCUTANEOUS at 20:11

## 2019-07-24 RX ADMIN — Medication 1 CAPSULE: at 08:31

## 2019-07-24 RX ADMIN — SODIUM CHLORIDE, PRESERVATIVE FREE 3 ML: 5 INJECTION INTRAVENOUS at 08:32

## 2019-07-24 RX ADMIN — INSULIN LISPRO 6 UNITS: 100 INJECTION, SOLUTION INTRAVENOUS; SUBCUTANEOUS at 12:30

## 2019-07-24 RX ADMIN — FINASTERIDE 5 MG: 5 TABLET, FILM COATED ORAL at 08:32

## 2019-07-24 RX ADMIN — ALLOPURINOL 300 MG: 300 TABLET ORAL at 20:01

## 2019-07-24 RX ADMIN — MULTIPLE VITAMINS W/ MINERALS TAB 1 TABLET: TAB ORAL at 08:32

## 2019-07-24 RX ADMIN — TRAZODONE HYDROCHLORIDE 50 MG: 50 TABLET ORAL at 20:01

## 2019-07-24 RX ADMIN — ACETAMINOPHEN 650 MG: 325 TABLET, FILM COATED ORAL at 16:49

## 2019-07-24 RX ADMIN — CARVEDILOL 6.25 MG: 6.25 TABLET, FILM COATED ORAL at 18:11

## 2019-07-24 RX ADMIN — INSULIN LISPRO 5 UNITS: 100 INJECTION, SOLUTION INTRAVENOUS; SUBCUTANEOUS at 08:30

## 2019-07-24 RX ADMIN — LEVOTHYROXINE SODIUM 50 MCG: 50 TABLET ORAL at 05:40

## 2019-07-24 RX ADMIN — INSULIN LISPRO 4 UNITS: 100 INJECTION, SOLUTION INTRAVENOUS; SUBCUTANEOUS at 18:12

## 2019-07-24 RX ADMIN — SODIUM CHLORIDE, PRESERVATIVE FREE 3 ML: 5 INJECTION INTRAVENOUS at 20:11

## 2019-07-24 NOTE — TELEPHONE ENCOUNTER
Erika the patient's grand daughter called stating that Forrest has been in [facility] and Dr.Justin Flynn tried to place the stent and was unable to place the stent, the patient has a nephrostomy tube. Erika wants to know if Forrest needs to go back and see  or come to you for follow up since you have always been his urologist.  Erika [phone]

## 2019-07-25 LAB
ANION GAP SERPL CALCULATED.3IONS-SCNC: 14 MMOL/L (ref 5–15)
BACTERIA SPEC AEROBE CULT: NO GROWTH
BACTERIA SPEC AEROBE CULT: NO GROWTH
BASOPHILS # BLD AUTO: 0.06 10*3/MM3 (ref 0–0.2)
BASOPHILS NFR BLD AUTO: 0.9 % (ref 0–1.5)
BUN BLD-MCNC: 17 MG/DL (ref 8–23)
BUN/CREAT SERPL: 14.5 (ref 7–25)
CALCIUM SPEC-SCNC: 9.2 MG/DL (ref 8.6–10.5)
CHLORIDE SERPL-SCNC: 100 MMOL/L (ref 98–107)
CO2 SERPL-SCNC: 22 MMOL/L (ref 22–29)
CREAT BLD-MCNC: 1.17 MG/DL (ref 0.76–1.27)
DEPRECATED RDW RBC AUTO: 55.6 FL (ref 37–54)
EOSINOPHIL # BLD AUTO: 0.07 10*3/MM3 (ref 0–0.4)
EOSINOPHIL NFR BLD AUTO: 1.1 % (ref 0.3–6.2)
ERYTHROCYTE [DISTWIDTH] IN BLOOD BY AUTOMATED COUNT: 15.1 % (ref 12.3–15.4)
GFR SERPL CREATININE-BSD FRML MDRD: 59 ML/MIN/1.73
GLUCOSE BLD-MCNC: 252 MG/DL (ref 65–99)
GLUCOSE BLDC GLUCOMTR-MCNC: 219 MG/DL (ref 70–130)
GLUCOSE BLDC GLUCOMTR-MCNC: 244 MG/DL (ref 70–130)
GLUCOSE BLDC GLUCOMTR-MCNC: 269 MG/DL (ref 70–130)
GLUCOSE BLDC GLUCOMTR-MCNC: 271 MG/DL (ref 70–130)
HCT VFR BLD AUTO: 46.1 % (ref 37.5–51)
HGB BLD-MCNC: 14.2 G/DL (ref 13–17.7)
IMM GRANULOCYTES # BLD AUTO: 0.09 10*3/MM3 (ref 0–0.05)
IMM GRANULOCYTES NFR BLD AUTO: 1.4 % (ref 0–0.5)
LYMPHOCYTES # BLD AUTO: 1.27 10*3/MM3 (ref 0.7–3.1)
LYMPHOCYTES NFR BLD AUTO: 19.4 % (ref 19.6–45.3)
MCH RBC QN AUTO: 30.7 PG (ref 26.6–33)
MCHC RBC AUTO-ENTMCNC: 30.8 G/DL (ref 31.5–35.7)
MCV RBC AUTO: 99.8 FL (ref 79–97)
MONOCYTES # BLD AUTO: 0.66 10*3/MM3 (ref 0.1–0.9)
MONOCYTES NFR BLD AUTO: 10.1 % (ref 5–12)
NEUTROPHILS # BLD AUTO: 4.38 10*3/MM3 (ref 1.7–7)
NEUTROPHILS NFR BLD AUTO: 67.1 % (ref 42.7–76)
NRBC BLD AUTO-RTO: 0.3 /100 WBC (ref 0–0.2)
PLATELET # BLD AUTO: 132 10*3/MM3 (ref 140–450)
PMV BLD AUTO: 10.1 FL (ref 6–12)
POTASSIUM BLD-SCNC: 4.2 MMOL/L (ref 3.5–5.2)
RBC # BLD AUTO: 4.62 10*6/MM3 (ref 4.14–5.8)
SODIUM BLD-SCNC: 136 MMOL/L (ref 136–145)
WBC NRBC COR # BLD: 6.53 10*3/MM3 (ref 3.4–10.8)

## 2019-07-25 PROCEDURE — 80048 BASIC METABOLIC PNL TOTAL CA: CPT | Performed by: INTERNAL MEDICINE

## 2019-07-25 PROCEDURE — 25010000003 MICAFUNGIN SODIUM 100 MG RECONSTITUTED SOLUTION: Performed by: NURSE PRACTITIONER

## 2019-07-25 PROCEDURE — 97110 THERAPEUTIC EXERCISES: CPT

## 2019-07-25 PROCEDURE — 25010000002 PIPERACILLIN SOD-TAZOBACTAM PER 1 G: Performed by: NURSE PRACTITIONER

## 2019-07-25 PROCEDURE — 85025 COMPLETE CBC W/AUTO DIFF WBC: CPT | Performed by: INTERNAL MEDICINE

## 2019-07-25 PROCEDURE — 25010000002 HEPARIN (PORCINE) PER 1000 UNITS: Performed by: PHYSICIAN ASSISTANT

## 2019-07-25 PROCEDURE — 82962 GLUCOSE BLOOD TEST: CPT

## 2019-07-25 PROCEDURE — 99233 SBSQ HOSP IP/OBS HIGH 50: CPT | Performed by: INTERNAL MEDICINE

## 2019-07-25 PROCEDURE — 63710000001 INSULIN DETEMIR PER 5 UNITS: Performed by: INTERNAL MEDICINE

## 2019-07-25 RX ORDER — FLUCONAZOLE 200 MG/1
200 TABLET ORAL EVERY 24 HOURS
Status: DISCONTINUED | OUTPATIENT
Start: 2019-07-25 | End: 2019-07-27 | Stop reason: HOSPADM

## 2019-07-25 RX ADMIN — HEPARIN SODIUM 5000 UNITS: 5000 INJECTION INTRAVENOUS; SUBCUTANEOUS at 14:09

## 2019-07-25 RX ADMIN — INSULIN LISPRO 5 UNITS: 100 INJECTION, SOLUTION INTRAVENOUS; SUBCUTANEOUS at 12:19

## 2019-07-25 RX ADMIN — MICAFUNGIN SODIUM 100 MG: 20 INJECTION, POWDER, LYOPHILIZED, FOR SOLUTION INTRAVENOUS at 08:11

## 2019-07-25 RX ADMIN — SODIUM CHLORIDE, PRESERVATIVE FREE 3 ML: 5 INJECTION INTRAVENOUS at 08:20

## 2019-07-25 RX ADMIN — INSULIN DETEMIR 15 UNITS: 100 INJECTION, SOLUTION SUBCUTANEOUS at 21:13

## 2019-07-25 RX ADMIN — INSULIN LISPRO 4 UNITS: 100 INJECTION, SOLUTION INTRAVENOUS; SUBCUTANEOUS at 08:12

## 2019-07-25 RX ADMIN — HEPARIN SODIUM 5000 UNITS: 5000 INJECTION INTRAVENOUS; SUBCUTANEOUS at 21:14

## 2019-07-25 RX ADMIN — INSULIN LISPRO 4 UNITS: 100 INJECTION, SOLUTION INTRAVENOUS; SUBCUTANEOUS at 21:14

## 2019-07-25 RX ADMIN — TAZOBACTAM SODIUM AND PIPERACILLIN SODIUM 3.38 G: 375; 3 INJECTION, SOLUTION INTRAVENOUS at 04:31

## 2019-07-25 RX ADMIN — FAMOTIDINE 20 MG: 20 TABLET ORAL at 17:27

## 2019-07-25 RX ADMIN — TAZOBACTAM SODIUM AND PIPERACILLIN SODIUM 3.38 G: 375; 3 INJECTION, SOLUTION INTRAVENOUS at 21:12

## 2019-07-25 RX ADMIN — FLUCONAZOLE 200 MG: 200 TABLET ORAL at 12:18

## 2019-07-25 RX ADMIN — INSULIN LISPRO 4 UNITS: 100 INJECTION, SOLUTION INTRAVENOUS; SUBCUTANEOUS at 12:18

## 2019-07-25 RX ADMIN — TRAZODONE HYDROCHLORIDE 50 MG: 50 TABLET ORAL at 21:14

## 2019-07-25 RX ADMIN — HEPARIN SODIUM 5000 UNITS: 5000 INJECTION INTRAVENOUS; SUBCUTANEOUS at 04:19

## 2019-07-25 RX ADMIN — SODIUM CHLORIDE, PRESERVATIVE FREE 3 ML: 5 INJECTION INTRAVENOUS at 21:13

## 2019-07-25 RX ADMIN — INSULIN LISPRO 6 UNITS: 100 INJECTION, SOLUTION INTRAVENOUS; SUBCUTANEOUS at 17:28

## 2019-07-25 RX ADMIN — LEVOTHYROXINE SODIUM 50 MCG: 50 TABLET ORAL at 04:19

## 2019-07-25 RX ADMIN — ACETAMINOPHEN 650 MG: 325 TABLET, FILM COATED ORAL at 04:32

## 2019-07-25 RX ADMIN — CARVEDILOL 6.25 MG: 6.25 TABLET, FILM COATED ORAL at 17:27

## 2019-07-25 RX ADMIN — ACETAMINOPHEN 650 MG: 325 TABLET, FILM COATED ORAL at 21:13

## 2019-07-25 RX ADMIN — TAZOBACTAM SODIUM AND PIPERACILLIN SODIUM 3.38 G: 375; 3 INJECTION, SOLUTION INTRAVENOUS at 12:18

## 2019-07-25 RX ADMIN — ALLOPURINOL 300 MG: 300 TABLET ORAL at 21:14

## 2019-07-25 RX ADMIN — FAMOTIDINE 20 MG: 20 TABLET ORAL at 08:11

## 2019-07-25 RX ADMIN — MELATONIN TAB 5 MG 5 MG: 5 TAB at 21:14

## 2019-07-25 RX ADMIN — MULTIPLE VITAMINS W/ MINERALS TAB 1 TABLET: TAB ORAL at 08:11

## 2019-07-25 RX ADMIN — FINASTERIDE 5 MG: 5 TABLET, FILM COATED ORAL at 08:17

## 2019-07-25 RX ADMIN — ACETAMINOPHEN 650 MG: 325 TABLET, FILM COATED ORAL at 10:30

## 2019-07-25 RX ADMIN — POLYETHYLENE GLYCOL 3350 17 G: 17 POWDER, FOR SOLUTION ORAL at 08:11

## 2019-07-25 RX ADMIN — Medication 1 CAPSULE: at 08:11

## 2019-07-25 RX ADMIN — CARVEDILOL 6.25 MG: 6.25 TABLET, FILM COATED ORAL at 08:11

## 2019-07-25 RX ADMIN — INSULIN LISPRO 5 UNITS: 100 INJECTION, SOLUTION INTRAVENOUS; SUBCUTANEOUS at 08:13

## 2019-07-26 LAB
ANION GAP SERPL CALCULATED.3IONS-SCNC: 13 MMOL/L (ref 5–15)
BUN BLD-MCNC: 18 MG/DL (ref 8–23)
BUN/CREAT SERPL: 13 (ref 7–25)
CALCIUM SPEC-SCNC: 9.3 MG/DL (ref 8.6–10.5)
CHLORIDE SERPL-SCNC: 99 MMOL/L (ref 98–107)
CO2 SERPL-SCNC: 25 MMOL/L (ref 22–29)
CREAT BLD-MCNC: 1.38 MG/DL (ref 0.76–1.27)
DEPRECATED RDW RBC AUTO: 53.5 FL (ref 37–54)
ERYTHROCYTE [DISTWIDTH] IN BLOOD BY AUTOMATED COUNT: 15 % (ref 12.3–15.4)
GFR SERPL CREATININE-BSD FRML MDRD: 49 ML/MIN/1.73
GLUCOSE BLD-MCNC: 245 MG/DL (ref 65–99)
GLUCOSE BLDC GLUCOMTR-MCNC: 208 MG/DL (ref 70–130)
GLUCOSE BLDC GLUCOMTR-MCNC: 216 MG/DL (ref 70–130)
GLUCOSE BLDC GLUCOMTR-MCNC: 254 MG/DL (ref 70–130)
GLUCOSE BLDC GLUCOMTR-MCNC: 274 MG/DL (ref 70–130)
HCT VFR BLD AUTO: 44.8 % (ref 37.5–51)
HGB BLD-MCNC: 13.9 G/DL (ref 13–17.7)
MCH RBC QN AUTO: 30.3 PG (ref 26.6–33)
MCHC RBC AUTO-ENTMCNC: 31 G/DL (ref 31.5–35.7)
MCV RBC AUTO: 97.8 FL (ref 79–97)
PLATELET # BLD AUTO: 147 10*3/MM3 (ref 140–450)
PMV BLD AUTO: 10.9 FL (ref 6–12)
POTASSIUM BLD-SCNC: 4.3 MMOL/L (ref 3.5–5.2)
RBC # BLD AUTO: 4.58 10*6/MM3 (ref 4.14–5.8)
SODIUM BLD-SCNC: 137 MMOL/L (ref 136–145)
WBC NRBC COR # BLD: 7.17 10*3/MM3 (ref 3.4–10.8)

## 2019-07-26 PROCEDURE — 97116 GAIT TRAINING THERAPY: CPT

## 2019-07-26 PROCEDURE — 25010000002 HEPARIN (PORCINE) PER 1000 UNITS: Performed by: PHYSICIAN ASSISTANT

## 2019-07-26 PROCEDURE — 85027 COMPLETE CBC AUTOMATED: CPT | Performed by: INTERNAL MEDICINE

## 2019-07-26 PROCEDURE — 82962 GLUCOSE BLOOD TEST: CPT

## 2019-07-26 PROCEDURE — 99233 SBSQ HOSP IP/OBS HIGH 50: CPT | Performed by: INTERNAL MEDICINE

## 2019-07-26 PROCEDURE — 63710000001 INSULIN DETEMIR PER 5 UNITS: Performed by: INTERNAL MEDICINE

## 2019-07-26 PROCEDURE — 97110 THERAPEUTIC EXERCISES: CPT

## 2019-07-26 PROCEDURE — 80048 BASIC METABOLIC PNL TOTAL CA: CPT | Performed by: INTERNAL MEDICINE

## 2019-07-26 PROCEDURE — 25010000002 PIPERACILLIN SOD-TAZOBACTAM PER 1 G: Performed by: NURSE PRACTITIONER

## 2019-07-26 RX ADMIN — TAZOBACTAM SODIUM AND PIPERACILLIN SODIUM 3.38 G: 375; 3 INJECTION, SOLUTION INTRAVENOUS at 04:13

## 2019-07-26 RX ADMIN — INSULIN LISPRO 4 UNITS: 100 INJECTION, SOLUTION INTRAVENOUS; SUBCUTANEOUS at 17:25

## 2019-07-26 RX ADMIN — LEVOTHYROXINE SODIUM 50 MCG: 50 TABLET ORAL at 04:13

## 2019-07-26 RX ADMIN — HEPARIN SODIUM 5000 UNITS: 5000 INJECTION INTRAVENOUS; SUBCUTANEOUS at 20:46

## 2019-07-26 RX ADMIN — CARVEDILOL 6.25 MG: 6.25 TABLET, FILM COATED ORAL at 08:41

## 2019-07-26 RX ADMIN — FLUCONAZOLE 200 MG: 200 TABLET ORAL at 11:42

## 2019-07-26 RX ADMIN — SODIUM CHLORIDE, PRESERVATIVE FREE 3 ML: 5 INJECTION INTRAVENOUS at 20:48

## 2019-07-26 RX ADMIN — FAMOTIDINE 20 MG: 20 TABLET ORAL at 08:41

## 2019-07-26 RX ADMIN — INSULIN LISPRO 6 UNITS: 100 INJECTION, SOLUTION INTRAVENOUS; SUBCUTANEOUS at 12:45

## 2019-07-26 RX ADMIN — MULTIPLE VITAMINS W/ MINERALS TAB 1 TABLET: TAB ORAL at 08:41

## 2019-07-26 RX ADMIN — ACETAMINOPHEN 650 MG: 325 TABLET, FILM COATED ORAL at 11:42

## 2019-07-26 RX ADMIN — ALLOPURINOL 300 MG: 300 TABLET ORAL at 20:45

## 2019-07-26 RX ADMIN — INSULIN LISPRO 6 UNITS: 100 INJECTION, SOLUTION INTRAVENOUS; SUBCUTANEOUS at 20:46

## 2019-07-26 RX ADMIN — TRAZODONE HYDROCHLORIDE 50 MG: 50 TABLET ORAL at 20:45

## 2019-07-26 RX ADMIN — Medication 1 CAPSULE: at 08:41

## 2019-07-26 RX ADMIN — FAMOTIDINE 20 MG: 20 TABLET ORAL at 17:26

## 2019-07-26 RX ADMIN — TAZOBACTAM SODIUM AND PIPERACILLIN SODIUM 3.38 G: 375; 3 INJECTION, SOLUTION INTRAVENOUS at 20:46

## 2019-07-26 RX ADMIN — HEPARIN SODIUM 5000 UNITS: 5000 INJECTION INTRAVENOUS; SUBCUTANEOUS at 14:15

## 2019-07-26 RX ADMIN — INSULIN DETEMIR 15 UNITS: 100 INJECTION, SOLUTION SUBCUTANEOUS at 21:00

## 2019-07-26 RX ADMIN — INSULIN LISPRO 4 UNITS: 100 INJECTION, SOLUTION INTRAVENOUS; SUBCUTANEOUS at 08:44

## 2019-07-26 RX ADMIN — HEPARIN SODIUM 5000 UNITS: 5000 INJECTION INTRAVENOUS; SUBCUTANEOUS at 04:13

## 2019-07-26 RX ADMIN — TAZOBACTAM SODIUM AND PIPERACILLIN SODIUM 3.38 G: 375; 3 INJECTION, SOLUTION INTRAVENOUS at 12:48

## 2019-07-26 RX ADMIN — CARVEDILOL 6.25 MG: 6.25 TABLET, FILM COATED ORAL at 17:26

## 2019-07-26 RX ADMIN — FINASTERIDE 5 MG: 5 TABLET, FILM COATED ORAL at 08:41

## 2019-07-26 RX ADMIN — POLYETHYLENE GLYCOL 3350 17 G: 17 POWDER, FOR SOLUTION ORAL at 08:41

## 2019-07-27 VITALS
WEIGHT: 208.4 LBS | BODY MASS INDEX: 30.87 KG/M2 | TEMPERATURE: 98.1 F | HEIGHT: 69 IN | DIASTOLIC BLOOD PRESSURE: 70 MMHG | SYSTOLIC BLOOD PRESSURE: 129 MMHG | HEART RATE: 67 BPM | OXYGEN SATURATION: 89 % | RESPIRATION RATE: 18 BRPM

## 2019-07-27 LAB
ANION GAP SERPL CALCULATED.3IONS-SCNC: 12 MMOL/L (ref 5–15)
BUN BLD-MCNC: 24 MG/DL (ref 8–23)
BUN/CREAT SERPL: 17.1 (ref 7–25)
CALCIUM SPEC-SCNC: 9.1 MG/DL (ref 8.6–10.5)
CHLORIDE SERPL-SCNC: 101 MMOL/L (ref 98–107)
CO2 SERPL-SCNC: 25 MMOL/L (ref 22–29)
CREAT BLD-MCNC: 1.4 MG/DL (ref 0.76–1.27)
GFR SERPL CREATININE-BSD FRML MDRD: 48 ML/MIN/1.73
GLUCOSE BLD-MCNC: 198 MG/DL (ref 65–99)
GLUCOSE BLDC GLUCOMTR-MCNC: 193 MG/DL (ref 70–130)
POTASSIUM BLD-SCNC: 4 MMOL/L (ref 3.5–5.2)
SODIUM BLD-SCNC: 138 MMOL/L (ref 136–145)

## 2019-07-27 PROCEDURE — 80048 BASIC METABOLIC PNL TOTAL CA: CPT | Performed by: INTERNAL MEDICINE

## 2019-07-27 PROCEDURE — 82962 GLUCOSE BLOOD TEST: CPT

## 2019-07-27 PROCEDURE — 25010000002 PIPERACILLIN SOD-TAZOBACTAM PER 1 G: Performed by: NURSE PRACTITIONER

## 2019-07-27 PROCEDURE — 25010000002 HEPARIN (PORCINE) PER 1000 UNITS: Performed by: PHYSICIAN ASSISTANT

## 2019-07-27 PROCEDURE — 99239 HOSP IP/OBS DSCHRG MGMT >30: CPT | Performed by: INTERNAL MEDICINE

## 2019-07-27 RX ORDER — L.ACID,PARA/B.BIFIDUM/S.THERM 8B CELL
1 CAPSULE ORAL DAILY
Qty: 14 CAPSULE | Refills: 0 | Status: ON HOLD | OUTPATIENT
Start: 2019-07-28 | End: 2019-08-23

## 2019-07-27 RX ORDER — FLUCONAZOLE 200 MG/1
200 TABLET ORAL DAILY
Qty: 4 TABLET | Refills: 0 | Status: ON HOLD
Start: 2019-07-26 | End: 2019-08-23

## 2019-07-27 RX ADMIN — CARVEDILOL 6.25 MG: 6.25 TABLET, FILM COATED ORAL at 08:40

## 2019-07-27 RX ADMIN — TAZOBACTAM SODIUM AND PIPERACILLIN SODIUM 3.38 G: 375; 3 INJECTION, SOLUTION INTRAVENOUS at 04:00

## 2019-07-27 RX ADMIN — FLUCONAZOLE 200 MG: 200 TABLET ORAL at 12:20

## 2019-07-27 RX ADMIN — MULTIPLE VITAMINS W/ MINERALS TAB 1 TABLET: TAB ORAL at 08:40

## 2019-07-27 RX ADMIN — Medication 1 CAPSULE: at 08:43

## 2019-07-27 RX ADMIN — INSULIN LISPRO 2 UNITS: 100 INJECTION, SOLUTION INTRAVENOUS; SUBCUTANEOUS at 08:42

## 2019-07-27 RX ADMIN — LEVOTHYROXINE SODIUM 50 MCG: 50 TABLET ORAL at 04:02

## 2019-07-27 RX ADMIN — POLYETHYLENE GLYCOL 3350 17 G: 17 POWDER, FOR SOLUTION ORAL at 08:40

## 2019-07-27 RX ADMIN — FINASTERIDE 5 MG: 5 TABLET, FILM COATED ORAL at 08:40

## 2019-07-27 RX ADMIN — FAMOTIDINE 20 MG: 20 TABLET ORAL at 08:40

## 2019-07-27 RX ADMIN — HEPARIN SODIUM 5000 UNITS: 5000 INJECTION INTRAVENOUS; SUBCUTANEOUS at 04:02

## 2019-07-27 RX ADMIN — SODIUM CHLORIDE, PRESERVATIVE FREE 3 ML: 5 INJECTION INTRAVENOUS at 08:42

## 2019-07-28 ENCOUNTER — READMISSION MANAGEMENT (OUTPATIENT)
Dept: CALL CENTER | Facility: HOSPITAL | Age: 84
End: 2019-07-28

## 2019-07-28 NOTE — OUTREACH NOTE
Prep Survey      Responses   Facility patient discharged from?  [facility]   Is patient eligible?  Yes   Discharge diagnosis  Acute diverticulitis,    Fungal UTI with chronic left hydronephrosis with associated left ureteral stricture and renal insufficiency   Does the patient have one of the following disease processes/diagnoses(primary or secondary)?  Other   Does the patient have Home health ordered?  No   Is there a DME ordered?  No   Prep survey completed?  Yes          Emily Gomes RN

## 2019-07-29 ENCOUNTER — TRANSITIONAL CARE MANAGEMENT TELEPHONE ENCOUNTER (OUTPATIENT)
Dept: FAMILY MEDICINE CLINIC | Facility: CLINIC | Age: 84
End: 2019-07-29

## 2019-07-29 ENCOUNTER — READMISSION MANAGEMENT (OUTPATIENT)
Dept: CALL CENTER | Facility: HOSPITAL | Age: 84
End: 2019-07-29

## 2019-07-29 NOTE — OUTREACH NOTE
Medical Week 1 Survey      Responses   Facility patient discharged from?  [place]   Does the patient have one of the following disease processes/diagnoses(primary or secondary)?  Other   Is there a successful TCM telephone encounter documented?  No   Call start time  1443   Call end time  1447   Discharge diagnosis  Acute diverticulitis,    Fungal UTI with chronic left hydronephrosis with associated left ureteral stricture and renal insufficiency   Meds reviewed with patient/caregiver?  Yes   Is the patient having any side effects they believe may be caused by any medication additions or changes?  No   Does the patient have all medications ordered at discharge?  Yes   Is the patient taking all medications as directed (includes completed medication regime)?  Yes   Does the patient have a primary care provider?   Yes   Comments regarding PCP  Pt plans to see Dr. Romero in the morning at 9am   Has the patient kept scheduled appointments due by today?  N/A   Psychosocial issues?  No   Did the patient receive a copy of their discharge instructions?  Yes   Nursing interventions  Reviewed instructions with patient   What is the patient's perception of their health status since discharge?  Improving   Is the patient/caregiver able to teach back signs and symptoms related to disease process for when to call PCP?  Yes   Is the patient/caregiver able to teach back signs and symptoms related to disease process for when to call 911?  Yes   Is the patient/caregiver able to teach back the hierarchy of who to call/visit for symptoms/problems? PCP, Specialist, Home health nurse, Urgent Care, ED, 911  Yes   Week 1 call completed?  Yes   Wrap up additional comments  Pt reports he is tired but feeling ok otherwise.            Camila Frances RN

## 2019-07-30 ENCOUNTER — OFFICE VISIT (OUTPATIENT)
Dept: UROLOGY | Facility: CLINIC | Age: 84
End: 2019-07-30

## 2019-07-30 VITALS
OXYGEN SATURATION: 93 % | HEART RATE: 74 BPM | TEMPERATURE: 98.3 F | DIASTOLIC BLOOD PRESSURE: 62 MMHG | SYSTOLIC BLOOD PRESSURE: 128 MMHG

## 2019-07-30 DIAGNOSIS — N13.30 HYDRONEPHROSIS, UNSPECIFIED HYDRONEPHROSIS TYPE: Primary | ICD-10-CM

## 2019-07-30 DIAGNOSIS — N20.0 NEPHROLITHIASIS: ICD-10-CM

## 2019-07-30 DIAGNOSIS — N13.5 URETERAL STRICTURE: ICD-10-CM

## 2019-07-30 PROCEDURE — 99214 OFFICE O/P EST MOD 30 MIN: CPT | Performed by: UROLOGY

## 2019-07-30 NOTE — PROGRESS NOTES
Chief Complaint  Recent hospital stay/Nephrostomy tube        JAZMIN Zepeda is a 87 y.o. male who returns today with a complicated situation having recently been admitted to [facility] in [place] for a febrile episode of diverticulitis with apparent perforation of the colon that is being treated conservatively with antibiotics.  During the work-up they noted his hydronephrosis and urology was consulted.  Dr. Flynn was unable to reinsert a ureteral stent is of course he has a distal ureteral stricture and a very tortuous course.  He therefore has a nephrostomy tube and at this point and they state his urine culture was growing out only yeast.  He is currently taking Diflucan and his urine through the nephrostomy tube has cleared.  He is also on Flagyl and Ceftin for his diverticulitis and this will require another week.  Mr. Flynn does not look well today is dyspneic at rest and is not able to eat.  I would be happy to try to reinsert his ureteral stent but he is at high risk for anesthetic with his history of pulmonary edema and mitral regurgitation.  I suggested we give him a couple more weeks to get over the acute illness and then I will try to reinsert his stent so we can remove his nephrostomy tube.  Apparently they do not perform antegrade insertion of the stent at [facility] and he would have to go to [place] for this procedure.    Vitals:    07/30/19 0933   BP: 128/62   Pulse: 74   Temp: 98.3 °F (36.8 °C)   SpO2: 93%       Past Medical History  Past Medical History:   Diagnosis Date   • Abnormal PSA     Reported abnormal   • Arthritis     KNEES,  BUT SINCE HAD REPLACEMENT   • Benign localized hyperplasia of prostate    • Bilateral knee pain    • Cataracts, bilateral    • Diabetic neuropathy (CMS/HCC)    • Disease of thyroid gland     HYPOTHYROIDISM   • Diverticulitis    • Dyslipidemia     H/O   • Full dentures    • History of gout    • History of hepatitis A vaccination    • History of  "nephrolithiasis    • History of nuclear stress test     STATES IN THE 1990'S.  \"IT WAS OK\"   • History of osteopenia    • Hyperkalemia     PT UNSURE REGARDING HX OF THIS   • Hypertension    • Insomnia    • Malignant hyperthermia due to anesthesia     PER PT REPORT, BROTHER HAD DURING LUNG SURGERY SEVERAL YEARS AGO.  STATED THEY PACKED HIM ON ICE.  STATED HE DID SURVIVE.  PT DENIES ANY HX OF MALIGNANT HYPERTHERMIA HIMSELF, OR ANY ISSUES WITH ANESTHESIA.  STATES TO HIS KNOWLEDGE, NO OTHER FAMILY MEMBER HAS THIS ISSUE EXCEPT FOR HIS BROTHER.   • Renal insufficiency    • Shortness of breath     STATES WITH EXERTION, OTHERWISE, DENIES   • Type 2 diabetes mellitus (CMS/HCC)    • Ureteral stricture, left    • Vitamin D deficiency    • Wears glasses        Past Surgical History  Past Surgical History:   Procedure Laterality Date   • APPENDECTOMY     • CHOLECYSTECTOMY     • COLONOSCOPY     • CYSTOSCOPY URETEROSCOPY Left 2/11/2019    Procedure: URETEROSCOPY WITH STENT EXTRACTION, RPG;  Surgeon: Griffin Romero MD;  Location: [facility] OR;  Service: Urology   • CYSTOSCOPY W/ URETERAL STENT PLACEMENT Left 7/20/2019    Procedure: CYSTOSCOPY, LEFT RETROGRADE PYELOGRAM,  ATTEMPTED LEFT URETERAL STENT INSERTION;  Surgeon: Isaac Flynn MD;  Location: [facility] OR;  Service: Urology   • EYE SURGERY      CATARACTS REMOVED BILATERALLY   • KNEE ARTHROPLASTY Bilateral    • KNEE ARTHROSCOPY Bilateral    • RENAL ARTERY STENT      SEVERAL TIMES EVERY SINCE 1978   • URETEROSCOPY LASER LITHOTRIPSY WITH STENT INSERTION Left 1/10/2018    Procedure:  cysto, left ureteral stent replacement ;  Surgeon: Grfifin Romero MD;  Location: [facility] OR;  Service:        Medications  has a current medication list which includes the following prescription(s): allopurinol, carvedilol, cefuroxime, diphenhydramine-acetaminophen, finasteride, fluconazole, glimepiride, glucose blood, insulin glargine, lactobacillus acidophilus, levothyroxine, metronidazole, " multiple vitamins-minerals, and sitagliptin.      Allergies  Allergies   Allergen Reactions   • Metformin Diarrhea   • Statins Myalgia       Social History  Social History     Social History Narrative   • Not on file       Family History  He has no family history of bladder or kidney cancer  He has no family history of kidney stones      AUA Symptom Score:      Review of Systems  Review of Systems   Constitutional: Positive for activity change, appetite change and fatigue.   Respiratory: Positive for shortness of breath.    Genitourinary: Negative.    All other systems reviewed and are negative.      Physical Exam  Physical Exam   Constitutional: He is oriented to person, place, and time. He appears well-developed and well-nourished.   HENT:   Head: Normocephalic and atraumatic.   Neck: Normal range of motion.   Pulmonary/Chest: Effort normal. No respiratory distress.   Abdominal: Soft. He exhibits no distension and no mass. There is no tenderness. No hernia.   Musculoskeletal: Normal range of motion.   Lymphadenopathy:     He has no cervical adenopathy.   Neurological: He is alert and oriented to person, place, and time.   Skin: Skin is warm and dry.   Psychiatric: He has a normal mood and affect. His behavior is normal.   Vitals reviewed.      Labs Recent and today in the office:  Results for orders placed or performed during the hospital encounter of 07/18/19   Fungus Culture - Urine, Urine, Clean Catch   Result Value Ref Range    Fungus Culture Heavy growth (4+) Yeast, Not Candida albicans (A)    Urine Culture - Urine, Urine, Catheter   Result Value Ref Range    Urine Culture No growth    Urine Culture - Urine, Urine, Clean Catch   Result Value Ref Range    Urine Culture No growth    Comprehensive Metabolic Panel   Result Value Ref Range    Glucose 335 (H) 65 - 99 mg/dL    BUN 33 (H) 8 - 23 mg/dL    Creatinine 1.38 (H) 0.76 - 1.27 mg/dL    Sodium 139 136 - 145 mmol/L    Potassium 4.7 3.5 - 5.2 mmol/L    Chloride  101 98 - 107 mmol/L    CO2 26.0 22.0 - 29.0 mmol/L    Calcium 10.4 8.6 - 10.5 mg/dL    Total Protein 8.1 6.0 - 8.5 g/dL    Albumin 3.70 3.50 - 5.20 g/dL    ALT (SGPT) 18 1 - 41 U/L    AST (SGOT) 26 1 - 40 U/L    Alkaline Phosphatase 94 39 - 117 U/L    Total Bilirubin 0.4 0.2 - 1.2 mg/dL    eGFR Non African Amer 49 (L) >60 mL/min/1.73    Globulin 4.4 gm/dL    A/G Ratio 0.8 g/dL    BUN/Creatinine Ratio 23.9 7.0 - 25.0    Anion Gap 12.0 5.0 - 15.0 mmol/L   Troponin   Result Value Ref Range    Troponin T <0.010 0.000 - 0.030 ng/mL   Magnesium   Result Value Ref Range    Magnesium 2.1 1.6 - 2.4 mg/dL   Urinalysis With Microscopic If Indicated (No Culture) - Urine, Clean Catch   Result Value Ref Range    Color, UA Yellow Yellow, Straw    Appearance, UA Turbid (A) Clear    pH, UA 5.5 5.0 - 8.0    Specific Gravity, UA 1.018 1.001 - 1.030    Glucose, UA >=1000 mg/dL (3+) (A) Negative    Ketones, UA Negative Negative    Bilirubin, UA Negative Negative    Blood, UA Moderate (2+) (A) Negative    Protein,  mg/dL (2+) (A) Negative    Leuk Esterase, UA Large (3+) (A) Negative    Nitrite, UA Negative Negative    Urobilinogen, UA 0.2 E.U./dL 0.2 - 1.0 E.U./dL   CBC Auto Differential   Result Value Ref Range    WBC 10.82 (H) 3.40 - 10.80 10*3/mm3    RBC 4.83 4.14 - 5.80 10*6/mm3    Hemoglobin 14.8 13.0 - 17.7 g/dL    Hematocrit 46.3 37.5 - 51.0 %    MCV 95.9 79.0 - 97.0 fL    MCH 30.6 26.6 - 33.0 pg    MCHC 32.0 31.5 - 35.7 g/dL    RDW 14.8 12.3 - 15.4 %    RDW-SD 51.5 37.0 - 54.0 fl    MPV 10.0 6.0 - 12.0 fL    Platelets 195 140 - 450 10*3/mm3    Neutrophil % 58.4 42.7 - 76.0 %    Lymphocyte % 28.4 19.6 - 45.3 %    Monocyte % 9.1 5.0 - 12.0 %    Eosinophil % 3.0 0.3 - 6.2 %    Basophil % 0.6 0.0 - 1.5 %    Immature Grans % 0.5 0.0 - 0.5 %    Neutrophils, Absolute 6.32 1.70 - 7.00 10*3/mm3    Lymphocytes, Absolute 3.07 0.70 - 3.10 10*3/mm3    Monocytes, Absolute 0.99 (H) 0.10 - 0.90 10*3/mm3    Eosinophils, Absolute 0.32 0.00 -  0.40 10*3/mm3    Basophils, Absolute 0.07 0.00 - 0.20 10*3/mm3    Immature Grans, Absolute 0.05 0.00 - 0.05 10*3/mm3    nRBC 0.0 0.0 - 0.2 /100 WBC   Urinalysis, Microscopic Only - Urine, Clean Catch   Result Value Ref Range    RBC, UA Too Numerous to Count (A) None Seen, 0-2 /HPF    WBC, UA Too Numerous to Count (A) None Seen, 0-2 /HPF    Bacteria, UA 3+ (A) None Seen, Trace /HPF    Squamous Epithelial Cells, UA 3-6 (A) None Seen, 0-2 /HPF    Yeast, UA Small/1+ Budding Yeast None Seen /HPF    Hyaline Casts, UA 0-6 0 - 6 /LPF    Methodology Manual Light Microscopy    Basic Metabolic Panel   Result Value Ref Range    Glucose 193 (H) 65 - 99 mg/dL    BUN 27 (H) 8 - 23 mg/dL    Creatinine 1.38 (H) 0.76 - 1.27 mg/dL    Sodium 139 136 - 145 mmol/L    Potassium 4.4 3.5 - 5.2 mmol/L    Chloride 101 98 - 107 mmol/L    CO2 25.0 22.0 - 29.0 mmol/L    Calcium 9.9 8.6 - 10.5 mg/dL    eGFR Non African Amer 49 (L) >60 mL/min/1.73    BUN/Creatinine Ratio 19.6 7.0 - 25.0    Anion Gap 13.0 5.0 - 15.0 mmol/L   CBC Auto Differential   Result Value Ref Range    WBC 10.18 3.40 - 10.80 10*3/mm3    RBC 4.46 4.14 - 5.80 10*6/mm3    Hemoglobin 13.5 13.0 - 17.7 g/dL    Hematocrit 43.2 37.5 - 51.0 %    MCV 96.9 79.0 - 97.0 fL    MCH 30.3 26.6 - 33.0 pg    MCHC 31.3 (L) 31.5 - 35.7 g/dL    RDW 14.7 12.3 - 15.4 %    RDW-SD 52.1 37.0 - 54.0 fl    MPV 11.4 6.0 - 12.0 fL    Platelets 184 140 - 450 10*3/mm3    Neutrophil % 51.4 42.7 - 76.0 %    Lymphocyte % 35.0 19.6 - 45.3 %    Monocyte % 8.9 5.0 - 12.0 %    Eosinophil % 3.2 0.3 - 6.2 %    Basophil % 0.8 0.0 - 1.5 %    Immature Grans % 0.7 (H) 0.0 - 0.5 %    Neutrophils, Absolute 5.23 1.70 - 7.00 10*3/mm3    Lymphocytes, Absolute 3.56 (H) 0.70 - 3.10 10*3/mm3    Monocytes, Absolute 0.91 (H) 0.10 - 0.90 10*3/mm3    Eosinophils, Absolute 0.33 0.00 - 0.40 10*3/mm3    Basophils, Absolute 0.08 0.00 - 0.20 10*3/mm3    Immature Grans, Absolute 0.07 (H) 0.00 - 0.05 10*3/mm3    nRBC 0.0 0.0 - 0.2 /100 WBC    Lactic Acid, Plasma   Result Value Ref Range    Lactate 1.3 0.5 - 2.0 mmol/L   Hemoglobin A1c   Result Value Ref Range    Hemoglobin A1C 10.80 (H) 4.80 - 5.60 %   Lipid Panel   Result Value Ref Range    Total Cholesterol 170 0 - 200 mg/dL    Triglycerides 378 (H) 0 - 150 mg/dL    HDL Cholesterol 26 (L) 40 - 60 mg/dL    LDL Cholesterol  68 0 - 100 mg/dL    VLDL Cholesterol 75.6 mg/dL    LDL/HDL Ratio 2.63    TSH   Result Value Ref Range    TSH 2.090 0.270 - 4.200 mIU/mL   Procalcitonin   Result Value Ref Range    Procalcitonin 0.14 0.10 - 0.25 ng/mL   Comprehensive Metabolic Panel   Result Value Ref Range    Glucose 195 (H) 65 - 99 mg/dL    BUN 23 8 - 23 mg/dL    Creatinine 1.39 (H) 0.76 - 1.27 mg/dL    Sodium 139 136 - 145 mmol/L    Potassium 4.1 3.5 - 5.2 mmol/L    Chloride 101 98 - 107 mmol/L    CO2 24.0 22.0 - 29.0 mmol/L    Calcium 9.2 8.6 - 10.5 mg/dL    Total Protein 6.8 6.0 - 8.5 g/dL    Albumin 3.00 (L) 3.50 - 5.20 g/dL    ALT (SGPT) 16 1 - 41 U/L    AST (SGOT) 21 1 - 40 U/L    Alkaline Phosphatase 79 39 - 117 U/L    Total Bilirubin 0.4 0.2 - 1.2 mg/dL    eGFR Non African Amer 48 (L) >60 mL/min/1.73    Globulin 3.8 gm/dL    A/G Ratio 0.8 g/dL    BUN/Creatinine Ratio 16.5 7.0 - 25.0    Anion Gap 14.0 5.0 - 15.0 mmol/L   C-reactive Protein   Result Value Ref Range    C-Reactive Protein 1.78 (H) 0.00 - 0.50 mg/dL   Sedimentation Rate   Result Value Ref Range    Sed Rate 55 (H) 0 - 20 mm/hr   CBC Auto Differential   Result Value Ref Range    WBC 10.47 3.40 - 10.80 10*3/mm3    RBC 4.50 4.14 - 5.80 10*6/mm3    Hemoglobin 13.6 13.0 - 17.7 g/dL    Hematocrit 44.0 37.5 - 51.0 %    MCV 97.8 (H) 79.0 - 97.0 fL    MCH 30.2 26.6 - 33.0 pg    MCHC 30.9 (L) 31.5 - 35.7 g/dL    RDW 15.0 12.3 - 15.4 %    RDW-SD 53.9 37.0 - 54.0 fl    MPV 11.6 6.0 - 12.0 fL    Platelets 168 140 - 450 10*3/mm3    Neutrophil % 53.9 42.7 - 76.0 %    Lymphocyte % 32.5 19.6 - 45.3 %    Monocyte % 8.7 5.0 - 12.0 %    Eosinophil % 3.4 0.3 - 6.2 %     Basophil % 0.8 0.0 - 1.5 %    Immature Grans % 0.7 (H) 0.0 - 0.5 %    Neutrophils, Absolute 5.65 1.70 - 7.00 10*3/mm3    Lymphocytes, Absolute 3.40 (H) 0.70 - 3.10 10*3/mm3    Monocytes, Absolute 0.91 (H) 0.10 - 0.90 10*3/mm3    Eosinophils, Absolute 0.36 0.00 - 0.40 10*3/mm3    Basophils, Absolute 0.08 0.00 - 0.20 10*3/mm3    Immature Grans, Absolute 0.07 (H) 0.00 - 0.05 10*3/mm3    nRBC 0.0 0.0 - 0.2 /100 WBC   CBC (No Diff)   Result Value Ref Range    WBC 8.01 3.40 - 10.80 10*3/mm3    RBC 4.30 4.14 - 5.80 10*6/mm3    Hemoglobin 13.2 13.0 - 17.7 g/dL    Hematocrit 42.3 37.5 - 51.0 %    MCV 98.4 (H) 79.0 - 97.0 fL    MCH 30.7 26.6 - 33.0 pg    MCHC 31.2 (L) 31.5 - 35.7 g/dL    RDW 15.1 12.3 - 15.4 %    RDW-SD 54.9 (H) 37.0 - 54.0 fl    MPV 10.9 6.0 - 12.0 fL    Platelets 152 140 - 450 10*3/mm3   Basic Metabolic Panel   Result Value Ref Range    Glucose 199 (H) 65 - 99 mg/dL    BUN 17 8 - 23 mg/dL    Creatinine 1.43 (H) 0.76 - 1.27 mg/dL    Sodium 140 136 - 145 mmol/L    Potassium 4.0 3.5 - 5.2 mmol/L    Chloride 106 98 - 107 mmol/L    CO2 22.0 22.0 - 29.0 mmol/L    Calcium 8.8 8.6 - 10.5 mg/dL    eGFR Non African Amer 47 (L) >60 mL/min/1.73    BUN/Creatinine Ratio 11.9 7.0 - 25.0    Anion Gap 12.0 5.0 - 15.0 mmol/L   Protime-INR   Result Value Ref Range    Protime 13.2 11.2 - 14.3 Seconds    INR 1.05 0.85 - 1.16   aPTT   Result Value Ref Range    PTT 35.6 24.0 - 37.0 seconds   CBC (No Diff)   Result Value Ref Range    WBC 8.37 3.40 - 10.80 10*3/mm3    RBC 4.27 4.14 - 5.80 10*6/mm3    Hemoglobin 13.3 13.0 - 17.7 g/dL    Hematocrit 42.0 37.5 - 51.0 %    MCV 98.4 (H) 79.0 - 97.0 fL    MCH 31.1 26.6 - 33.0 pg    MCHC 31.7 31.5 - 35.7 g/dL    RDW 15.2 12.3 - 15.4 %    RDW-SD 54.4 (H) 37.0 - 54.0 fl    MPV 10.1 6.0 - 12.0 fL    Platelets 146 140 - 450 10*3/mm3   Basic Metabolic Panel   Result Value Ref Range    Glucose 229 (H) 65 - 99 mg/dL    BUN 16 8 - 23 mg/dL    Creatinine 1.31 (H) 0.76 - 1.27 mg/dL    Sodium 138  136 - 145 mmol/L    Potassium 4.2 3.5 - 5.2 mmol/L    Chloride 103 98 - 107 mmol/L    CO2 23.0 22.0 - 29.0 mmol/L    Calcium 8.9 8.6 - 10.5 mg/dL    eGFR Non African Amer 52 (L) >60 mL/min/1.73    BUN/Creatinine Ratio 12.2 7.0 - 25.0    Anion Gap 12.0 5.0 - 15.0 mmol/L   Urinalysis With Culture If Indicated - Urine, Catheter   Result Value Ref Range    Color, UA Red (A) Yellow, Straw    Appearance, UA Cloudy (A) Clear    pH, UA 6.5 5.0 - 8.0    Specific Gravity, UA 1.015 1.005 - 1.030    Glucose,  mg/dL (1+) (A) Negative    Ketones, UA Negative Negative    Bilirubin, UA Small (1+) (A) Negative    Blood, UA Large (3+) (A) Negative    Protein,  mg/dL (2+) (A) Negative    Leuk Esterase, UA Large (3+) (A) Negative    Nitrite, UA Positive (A) Negative    Urobilinogen, UA 0.2 E.U./dL 0.2 - 1.0 E.U./dL   Urinalysis, Microscopic Only - Urine, Catheter   Result Value Ref Range    RBC, UA Too Numerous to Count (A) None Seen, 0-2 /HPF    WBC, UA Too Numerous to Count (A) None Seen, 0-2 /HPF    Bacteria, UA 1+ (A) None Seen, Trace /HPF    Squamous Epithelial Cells, UA None Seen None Seen, 0-2 /HPF    Hyaline Casts, UA None Seen 0 - 6 /LPF    Methodology Automated Microscopy    Urinalysis With Culture If Indicated - Urine, Clean Catch   Result Value Ref Range    Color, UA Yellow Yellow, Straw    Appearance, UA Turbid (A) Clear    pH, UA 6.0 5.0 - 8.0    Specific Gravity, UA 1.014 1.001 - 1.030    Glucose,  mg/dL (1+) (A) Negative    Ketones, UA Negative Negative    Bilirubin, UA Negative Negative    Blood, UA Large (3+) (A) Negative    Protein, UA >=300 mg/dL (3+) (A) Negative    Leuk Esterase, UA Large (3+) (A) Negative    Nitrite, UA Negative Negative    Urobilinogen, UA 0.2 E.U./dL 0.2 - 1.0 E.U./dL   Urinalysis, Microscopic Only - Urine, Clean Catch   Result Value Ref Range    RBC, UA Too Numerous to Count (A) None Seen, 0-2 /HPF    WBC, UA Too Numerous to Count (A) None Seen, 0-2 /HPF    Bacteria, UA 1+  (A) None Seen, Trace /HPF    Squamous Epithelial Cells, UA 3-6 (A) None Seen, 0-2 /HPF    Yeast, UA Small/1+ Budding Yeast None Seen /HPF    Hyaline Casts, UA None Seen 0 - 6 /LPF    Methodology Manual Light Microscopy    Basic Metabolic Panel   Result Value Ref Range    Glucose 252 (H) 65 - 99 mg/dL    BUN 17 8 - 23 mg/dL    Creatinine 1.17 0.76 - 1.27 mg/dL    Sodium 136 136 - 145 mmol/L    Potassium 4.2 3.5 - 5.2 mmol/L    Chloride 100 98 - 107 mmol/L    CO2 22.0 22.0 - 29.0 mmol/L    Calcium 9.2 8.6 - 10.5 mg/dL    eGFR Non African Amer 59 (L) >60 mL/min/1.73    BUN/Creatinine Ratio 14.5 7.0 - 25.0    Anion Gap 14.0 5.0 - 15.0 mmol/L   CBC Auto Differential   Result Value Ref Range    WBC 6.53 3.40 - 10.80 10*3/mm3    RBC 4.62 4.14 - 5.80 10*6/mm3    Hemoglobin 14.2 13.0 - 17.7 g/dL    Hematocrit 46.1 37.5 - 51.0 %    MCV 99.8 (H) 79.0 - 97.0 fL    MCH 30.7 26.6 - 33.0 pg    MCHC 30.8 (L) 31.5 - 35.7 g/dL    RDW 15.1 12.3 - 15.4 %    RDW-SD 55.6 (H) 37.0 - 54.0 fl    MPV 10.1 6.0 - 12.0 fL    Platelets 132 (L) 140 - 450 10*3/mm3    Neutrophil % 67.1 42.7 - 76.0 %    Lymphocyte % 19.4 (L) 19.6 - 45.3 %    Monocyte % 10.1 5.0 - 12.0 %    Eosinophil % 1.1 0.3 - 6.2 %    Basophil % 0.9 0.0 - 1.5 %    Immature Grans % 1.4 (H) 0.0 - 0.5 %    Neutrophils, Absolute 4.38 1.70 - 7.00 10*3/mm3    Lymphocytes, Absolute 1.27 0.70 - 3.10 10*3/mm3    Monocytes, Absolute 0.66 0.10 - 0.90 10*3/mm3    Eosinophils, Absolute 0.07 0.00 - 0.40 10*3/mm3    Basophils, Absolute 0.06 0.00 - 0.20 10*3/mm3    Immature Grans, Absolute 0.09 (H) 0.00 - 0.05 10*3/mm3    nRBC 0.3 (H) 0.0 - 0.2 /100 WBC   CBC (No Diff)   Result Value Ref Range    WBC 7.17 3.40 - 10.80 10*3/mm3    RBC 4.58 4.14 - 5.80 10*6/mm3    Hemoglobin 13.9 13.0 - 17.7 g/dL    Hematocrit 44.8 37.5 - 51.0 %    MCV 97.8 (H) 79.0 - 97.0 fL    MCH 30.3 26.6 - 33.0 pg    MCHC 31.0 (L) 31.5 - 35.7 g/dL    RDW 15.0 12.3 - 15.4 %    RDW-SD 53.5 37.0 - 54.0 fl    MPV 10.9 6.0 - 12.0  fL    Platelets 147 140 - 450 10*3/mm3   Basic Metabolic Panel   Result Value Ref Range    Glucose 245 (H) 65 - 99 mg/dL    BUN 18 8 - 23 mg/dL    Creatinine 1.38 (H) 0.76 - 1.27 mg/dL    Sodium 137 136 - 145 mmol/L    Potassium 4.3 3.5 - 5.2 mmol/L    Chloride 99 98 - 107 mmol/L    CO2 25.0 22.0 - 29.0 mmol/L    Calcium 9.3 8.6 - 10.5 mg/dL    eGFR Non African Amer 49 (L) >60 mL/min/1.73    BUN/Creatinine Ratio 13.0 7.0 - 25.0    Anion Gap 13.0 5.0 - 15.0 mmol/L   Basic Metabolic Panel   Result Value Ref Range    Glucose 198 (H) 65 - 99 mg/dL    BUN 24 (H) 8 - 23 mg/dL    Creatinine 1.40 (H) 0.76 - 1.27 mg/dL    Sodium 138 136 - 145 mmol/L    Potassium 4.0 3.5 - 5.2 mmol/L    Chloride 101 98 - 107 mmol/L    CO2 25.0 22.0 - 29.0 mmol/L    Calcium 9.1 8.6 - 10.5 mg/dL    eGFR Non African Amer 48 (L) >60 mL/min/1.73    BUN/Creatinine Ratio 17.1 7.0 - 25.0    Anion Gap 12.0 5.0 - 15.0 mmol/L   POC Glucose Once   Result Value Ref Range    Glucose 294 (H) 70 - 130 mg/dL   POC Glucose Once   Result Value Ref Range    Glucose 240 (H) 70 - 130 mg/dL   POC Glucose Once   Result Value Ref Range    Glucose 236 (H) 70 - 130 mg/dL   POC Glucose Once   Result Value Ref Range    Glucose 254 (H) 70 - 130 mg/dL   POC Glucose Once   Result Value Ref Range    Glucose 207 (H) 70 - 130 mg/dL   POC Glucose Once   Result Value Ref Range    Glucose 217 (H) 70 - 130 mg/dL   POC Glucose Once   Result Value Ref Range    Glucose 219 (H) 70 - 130 mg/dL   POC Glucose Once   Result Value Ref Range    Glucose 194 (H) 70 - 130 mg/dL   POC Glucose Once   Result Value Ref Range    Glucose 221 (H) 70 - 130 mg/dL   POC Glucose Once   Result Value Ref Range    Glucose 225 (H) 70 - 130 mg/dL   POC Glucose Once   Result Value Ref Range    Glucose 220 (H) 70 - 130 mg/dL   POC Glucose Once   Result Value Ref Range    Glucose 309 (H) 70 - 130 mg/dL   POC Glucose Once   Result Value Ref Range    Glucose 277 (H) 70 - 130 mg/dL   POC Glucose Once   Result Value  Ref Range    Glucose 242 (H) 70 - 130 mg/dL   POC Glucose Once   Result Value Ref Range    Glucose 184 (H) 70 - 130 mg/dL   POC Glucose Once   Result Value Ref Range    Glucose 285 (H) 70 - 130 mg/dL   POC Glucose Once   Result Value Ref Range    Glucose 226 (H) 70 - 130 mg/dL   POC Glucose Once   Result Value Ref Range    Glucose 260 (H) 70 - 130 mg/dL   POC Glucose Once   Result Value Ref Range    Glucose 200 (H) 70 - 130 mg/dL   POC Glucose Once   Result Value Ref Range    Glucose 271 (H) 70 - 130 mg/dL   POC Glucose Once   Result Value Ref Range    Glucose 187 (H) 70 - 130 mg/dL   POC Glucose Once   Result Value Ref Range    Glucose 297 (H) 70 - 130 mg/dL   POC Glucose Once   Result Value Ref Range    Glucose 216 (H) 70 - 130 mg/dL   POC Glucose Once   Result Value Ref Range    Glucose 250 (H) 70 - 130 mg/dL   POC Glucose Once   Result Value Ref Range    Glucose 233 (H) 70 - 130 mg/dL   POC Glucose Once   Result Value Ref Range    Glucose 345 (H) 70 - 130 mg/dL   POC Glucose Once   Result Value Ref Range    Glucose 219 (H) 70 - 130 mg/dL   POC Glucose Once   Result Value Ref Range    Glucose 244 (H) 70 - 130 mg/dL   POC Glucose Once   Result Value Ref Range    Glucose 269 (H) 70 - 130 mg/dL   POC Glucose Once   Result Value Ref Range    Glucose 271 (H) 70 - 130 mg/dL   POC Glucose Once   Result Value Ref Range    Glucose 216 (H) 70 - 130 mg/dL   POC Glucose Once   Result Value Ref Range    Glucose 274 (H) 70 - 130 mg/dL   POC Glucose Once   Result Value Ref Range    Glucose 208 (H) 70 - 130 mg/dL   POC Glucose Once   Result Value Ref Range    Glucose 254 (H) 70 - 130 mg/dL   POC Glucose Once   Result Value Ref Range    Glucose 193 (H) 70 - 130 mg/dL   Adult Transthoracic Echo Complete W/ Cont if Necessary Per Protocol   Result Value Ref Range    BSA 2.1 m^2    IVSd 1.3 cm    LVIDd 3.7 cm    LVIDs 2.9 cm    LVPWd 1.4 cm    IVS/LVPW 0.94     FS 22.0 %    EDV(Teich) 59.1 ml    ESV(Teich) 32.4 ml    EF(Teich) 45.1 %     EDV(cubed) 51.7 ml    ESV(cubed) 24.6 ml    EF(cubed) 52.5 %    LV mass(C)d 175.0 grams    LV mass(C)dI 83.0 grams/m^2    SV(Teich) 26.7 ml    SI(Teich) 12.6 ml/m^2    SV(cubed) 27.1 ml    SI(cubed) 12.9 ml/m^2    Ao root diam 3.3 cm    Ao root area 8.4 cm^2    LA dimension 3.0 cm    LA/Ao 0.92     LVOT diam 2.3 cm    LVOT area 4.3 cm^2    LVOT area(traced) 4.2 cm^2    LAd major 4.3 cm    LA volume 27.0 ml    Ao root area (BSA corrected) 1.5     LA Volume Index 12.8 ml/m^2    MV E max lupillo 54.8 cm/sec    MV A max lupillo 79.0 cm/sec    MV E/A 0.69     MV dec time 0.28 sec    Ao pk lupillo 200.3 cm/sec    Ao max PG 16.0 mmHg    Ao max PG (full) 13.2 mmHg    Ao V2 mean 141.2 cm/sec    Ao mean PG 9.1 mmHg    Ao mean PG (full) 7.7 mmHg    Ao V2 VTI 39.8 cm    ASIA(I,A) 2.1 cm^2    ASIA(I,D) 2.1 cm^2    ASIA(V,A) 1.8 cm^2    ASIA(V,D) 1.8 cm^2    LV V1 max PG 2.8 mmHg    LV V1 mean PG 1.4 mmHg    LV V1 max 84.4 cm/sec    LV V1 mean 53.7 cm/sec    LV V1 VTI 19.1 cm    SV(Ao) 333.3 ml    SI(Ao) 158.1 ml/m^2    SV(LVOT) 82.2 ml    SI(LVOT) 39.0 ml/m^2    TR max lupillo 339.9 cm/sec     CV ECHO ZEUS - TR MAX PG 47.0 mmHg    Lat E/e'  7.1     Med E/e' 8.3     Lat Peak E' Lupillo 7.6 cm/sec    Med Peak E' Lupillo 6.5 cm/sec     CV ECHO ZEUS - BZI_BMI 31.0 kilograms/m^2     CV ECHO ZEUS - BSA(HAYCOCK) 2.2 m^2    BH CV ECHO ZEUS - BZI_METRIC_WEIGHT 95.3 kg    BH CV ECHO ZEUS - BZI_METRIC_HEIGHT 175.3 cm    Avg E/e' ratio 7.77     Target HR (85%) 113 bpm    Max. Pred. HR (100%) 133 bpm    BH CV VAS BP RIGHT /85 mmHg    TDI S' 13.40 cm/sec    RV Base 3.70 cm    RV Length 6.70 cm    RV Mid 2.60 cm    RAP systole 3 mmHg    RVSP(TR) 50 mmHg    TAPSE (>1.6) 2.80 cm2    Echo EF Estimated 55 %   Light Blue Top   Result Value Ref Range    Extra Tube hold for add-on    Green Top (Gel)   Result Value Ref Range    Extra Tube Hold for add-ons.    Lavender Top   Result Value Ref Range    Extra Tube hold for add-on    Gold Top - SST   Result Value Ref  Range    Extra Tube Hold for add-ons.          Assessment & Plan  Left hydronephrosis with ureteral stricture: He has an indwelling left nephrostomy tube that is currently draining clear urine and appears to be well-positioned with minimal inflammation at the puncture site.  Sutures are in place.  I have suggested once he gets over his acute illness that I try to reinsert the ureteral stent and then we could remove the nephrostomy tube.  Otherwise he will need to go back to [place] with Dr. Flynn for antegrade insertion of stent.    Nephrolithiasis: He has nonobstructing calculi on the right side and an atrophic hydronephrotic left kidney.

## 2019-07-30 NOTE — OUTREACH NOTE
"Pt's son returned call and lvm during after hours on 7/29 stating pt is \"doing well\". He confirmed pcp appt. He voiced no questions, concerns, or needs.  "

## 2019-08-01 ENCOUNTER — OFFICE VISIT (OUTPATIENT)
Dept: FAMILY MEDICINE CLINIC | Facility: CLINIC | Age: 84
End: 2019-08-01

## 2019-08-01 VITALS
DIASTOLIC BLOOD PRESSURE: 72 MMHG | SYSTOLIC BLOOD PRESSURE: 120 MMHG | HEART RATE: 74 BPM | TEMPERATURE: 98.2 F | HEIGHT: 68 IN | OXYGEN SATURATION: 97 % | RESPIRATION RATE: 18 BRPM | WEIGHT: 207 LBS | BODY MASS INDEX: 31.37 KG/M2

## 2019-08-01 DIAGNOSIS — L30.9 DERMATITIS: ICD-10-CM

## 2019-08-01 DIAGNOSIS — K57.21 DIVERTICULITIS OF LARGE INTESTINE WITH PERFORATION WITH BLEEDING: Primary | ICD-10-CM

## 2019-08-01 DIAGNOSIS — E11.65 UNCONTROLLED TYPE 2 DIABETES MELLITUS WITH HYPERGLYCEMIA (HCC): ICD-10-CM

## 2019-08-01 DIAGNOSIS — N13.30 HYDRONEPHROSIS, UNSPECIFIED HYDRONEPHROSIS TYPE: ICD-10-CM

## 2019-08-01 PROCEDURE — 99214 OFFICE O/P EST MOD 30 MIN: CPT | Performed by: FAMILY MEDICINE

## 2019-08-01 RX ORDER — DESOXIMETASONE 0.5 MG/G
CREAM TOPICAL DAILY
Qty: 60 G | Refills: 1 | Status: SHIPPED | OUTPATIENT
Start: 2019-08-01 | End: 2019-10-18 | Stop reason: ALTCHOICE

## 2019-08-01 NOTE — PROGRESS NOTES
Transitional Care Follow Up Visit  Subjective     Forrest Zepeda is a 87 y.o. male who presents for a transitional care management visit.    Within 48 business hours after discharge our office contacted him via telephone to coordinate his care and needs.      I reviewed and discussed the details of that call along with the discharge summary, hospital problems, inpatient lab results, inpatient diagnostic studies, and consultation reports with Forrest.    States he is not doing much better.  Saw Dr Romero on Tuesday and planning to replace renal stent if possible.     Will see Dr Espino. next week for the infection.    States he is wanting a refill for cream for the dermatitis on his right knee.     Granddaughter states his glucose is running below 200. He has not yet started the Insulin.      Current outpatient and discharge medications have been reconciled for the patient.    Date of TCM Phone Call 8/14/2017 7/30/2019   [facility]   Date of Admission 8/9/2017 7/18/2019   Date of Discharge 8/11/2017 7/27/2019   Risk for Readmission (LACE Score) 8 -   Discharge Disposition Home or Self Care Home or Self Care     Risk for Readmission (LACE) Score: 12 (7/27/2019  6:00 AM)      History of Present Illness   Course During Hospital Stay:    Presented to the ER with c/o weakness for 3-4 weeks.    Was admitted and CT revealed Diverticulitis with perforation. He was started on IV Zosy.    CT showed persistent left hydronephrosis and hydroureter extending to the level of the urinary bladder with lobulated heterogeneous enlargement of the prostate gland unchanged from previous comparison.  Urology attempted cystoscopy with stent placement.  Stent was unable to be placed at that time. CT guided nephrostomy tube placement. Nephrostomy tube to remain in place for temporary relief and eventually for Urology to reattempt stent placement.     Had Shortness of breath and  Echo revealed  a normal ejection fraction of 55%.    Was discharged with home health.  Rx for Flagyl,Ceftin and Fluconazole.     The following portions of the patient's history were reviewed and updated as appropriate: allergies, current medications, past family history, past medical history, past social history, past surgical history and problem list.    Review of Systems   Constitutional: Positive for fatigue.   Respiratory: Negative for shortness of breath.    Cardiovascular: Negative for chest pain.   Neurological: Positive for weakness (legs).       Objective   Physical Exam   Constitutional: He is oriented to person, place, and time. He appears well-developed and well-nourished. No distress.   Cardiovascular: Normal rate and regular rhythm.   No murmur heard.  Pulmonary/Chest: Effort normal and breath sounds normal. He has no rales.   Abdominal: Soft. Bowel sounds are normal. There is no tenderness.   Neurological: He is alert and oriented to person, place, and time.   Skin: Skin is warm and dry. Rash noted. He is not diaphoretic.   Papular dermatitis of the right anterior knee   Nursing note and vitals reviewed.      Assessment/Plan   Forrest was seen today for diabetes.    Diagnoses and all orders for this visit:    Diverticulitis of large intestine with perforation with bleeding    Hydronephrosis, unspecified hydronephrosis type    Dermatitis    Uncontrolled type 2 diabetes mellitus with hyperglycemia (CMS/[facility])      Forrest was recently hospitalized with acute diverticulitis as well as his chronic hydronephrosis.  He says that he still feels extremely fatigued.  No further fever or chills.  He has developed a dermatitis of his right lower extremity.  He is diabetes is stable.        Drink plenty fluids.  Continue to work on diet and weight loss.  Cut back on carbohydrates such as breads, potatoes, pastas and desserts.  Eat more  Fruits, vegetables, and lean meats such a fish and chicken.    Continue medications as doing.    Rx  for Desoximetasone 0.05% cream to apply to right leg daily #60 GM +1.    See Dr Espino and Dr Romero as scheduled.    Follow up in 4 weeks.            Scribed for Dr Ryne Lofton by Zahida Townsend CMA.          I, Ryne Lofton MD, personally performed the services described in this documentation, as scribed by Zahida Townsend in my presence, and is both accurate and complete.        (Please note that portions of this note were completed with a voice recognition program. Efforts were made to edit the dictations,but occasionally words are mis transcribed.)

## 2019-08-02 ENCOUNTER — TELEPHONE (OUTPATIENT)
Dept: FAMILY MEDICINE CLINIC | Facility: CLINIC | Age: 84
End: 2019-08-02

## 2019-08-02 NOTE — TELEPHONE ENCOUNTER
----- Message from Joselin Wilde sent at 7/31/2019  3:13 PM EDT -----  Kasey from [facility] call. Asking for verbal orders to continue OT. Her call back number is [phone]    Gave verbal consent for [facility] to continue OT on pt.  Juan, RADHA

## 2019-08-05 ENCOUNTER — READMISSION MANAGEMENT (OUTPATIENT)
Dept: CALL CENTER | Facility: HOSPITAL | Age: 84
End: 2019-08-05

## 2019-08-05 NOTE — OUTREACH NOTE
Medical Week 2 Survey      Responses   Facility patient discharged from?  [facility]   Does the patient have one of the following disease processes/diagnoses(primary or secondary)?  Other   Week 2 attempt successful?  Yes   Call start time  1414   Discharge diagnosis  Acute diverticulitis,    Fungal UTI with chronic left hydronephrosis with associated left ureteral stricture and renal insufficiency   Call end time  1417   Meds reviewed with patient/caregiver?  Yes   Is the patient taking all medications as directed (includes completed medication regime)?  Yes   Has the patient kept scheduled appointments due by today?  Yes   What is the patient's perception of their health status since discharge?  Improving   Is the patient/caregiver able to teach back signs and symptoms related to disease process for when to call PCP?  Yes   Is the patient/caregiver able to teach back signs and symptoms related to disease process for when to call 911?  Yes   Is the patient/caregiver able to teach back the hierarchy of who to call/visit for symptoms/problems? PCP, Specialist, Home health nurse, Urgent Care, ED, 911  Yes   Additional teach back comments  Pt says he is doing good, he is following his discharge instructions, no quesitons or concerns at this time.   Week 2 Call Completed?  Yes          Leidy Sherman RN

## 2019-08-06 ENCOUNTER — LAB REQUISITION (OUTPATIENT)
Dept: LAB | Facility: HOSPITAL | Age: 84
End: 2019-08-06

## 2019-08-06 DIAGNOSIS — Z00.00 ROUTINE GENERAL MEDICAL EXAMINATION AT A HEALTH CARE FACILITY: ICD-10-CM

## 2019-08-06 LAB
ALBUMIN SERPL-MCNC: 3.6 G/DL (ref 3.5–5.2)
ALBUMIN/GLOB SERPL: 0.8 G/DL
ALP SERPL-CCNC: 82 U/L (ref 39–117)
ALT SERPL W P-5'-P-CCNC: 15 U/L (ref 1–41)
ANION GAP SERPL CALCULATED.3IONS-SCNC: 13 MMOL/L (ref 5–15)
AST SERPL-CCNC: 21 U/L (ref 1–40)
BASOPHILS # BLD AUTO: 0.09 10*3/MM3 (ref 0–0.2)
BASOPHILS NFR BLD AUTO: 0.7 % (ref 0–1.5)
BILIRUB SERPL-MCNC: 0.3 MG/DL (ref 0.2–1.2)
BUN BLD-MCNC: 33 MG/DL (ref 8–23)
BUN/CREAT SERPL: 26.8 (ref 7–25)
CALCIUM SPEC-SCNC: 10.6 MG/DL (ref 8.6–10.5)
CHLORIDE SERPL-SCNC: 103 MMOL/L (ref 98–107)
CK SERPL-CCNC: 17 U/L (ref 20–200)
CO2 SERPL-SCNC: 27 MMOL/L (ref 22–29)
CREAT BLD-MCNC: 1.23 MG/DL (ref 0.76–1.27)
CRP SERPL-MCNC: 2.71 MG/DL (ref 0–0.5)
DEPRECATED RDW RBC AUTO: 53.6 FL (ref 37–54)
EOSINOPHIL # BLD AUTO: 0.36 10*3/MM3 (ref 0–0.4)
EOSINOPHIL NFR BLD AUTO: 2.8 % (ref 0.3–6.2)
ERYTHROCYTE [DISTWIDTH] IN BLOOD BY AUTOMATED COUNT: 14.9 % (ref 12.3–15.4)
ERYTHROCYTE [SEDIMENTATION RATE] IN BLOOD: 82 MM/HR (ref 0–20)
GFR SERPL CREATININE-BSD FRML MDRD: 56 ML/MIN/1.73
GLOBULIN UR ELPH-MCNC: 4.7 GM/DL
GLUCOSE BLD-MCNC: 249 MG/DL (ref 65–99)
HCT VFR BLD AUTO: 45.3 % (ref 37.5–51)
HGB BLD-MCNC: 13.9 G/DL (ref 13–17.7)
IMM GRANULOCYTES # BLD AUTO: 0.09 10*3/MM3 (ref 0–0.05)
IMM GRANULOCYTES NFR BLD AUTO: 0.7 % (ref 0–0.5)
LYMPHOCYTES # BLD AUTO: 2.88 10*3/MM3 (ref 0.7–3.1)
LYMPHOCYTES NFR BLD AUTO: 22.2 % (ref 19.6–45.3)
MCH RBC QN AUTO: 30.1 PG (ref 26.6–33)
MCHC RBC AUTO-ENTMCNC: 30.7 G/DL (ref 31.5–35.7)
MCV RBC AUTO: 98.1 FL (ref 79–97)
MONOCYTES # BLD AUTO: 1.04 10*3/MM3 (ref 0.1–0.9)
MONOCYTES NFR BLD AUTO: 8 % (ref 5–12)
NEUTROPHILS # BLD AUTO: 8.49 10*3/MM3 (ref 1.7–7)
NEUTROPHILS NFR BLD AUTO: 65.6 % (ref 42.7–76)
NRBC BLD AUTO-RTO: 0 /100 WBC (ref 0–0.2)
PLATELET # BLD AUTO: 334 10*3/MM3 (ref 140–450)
PMV BLD AUTO: 10.7 FL (ref 6–12)
POTASSIUM BLD-SCNC: 4.6 MMOL/L (ref 3.5–5.2)
PROT SERPL-MCNC: 8.3 G/DL (ref 6–8.5)
RBC # BLD AUTO: 4.62 10*6/MM3 (ref 4.14–5.8)
SODIUM BLD-SCNC: 143 MMOL/L (ref 136–145)
WBC NRBC COR # BLD: 12.95 10*3/MM3 (ref 3.4–10.8)

## 2019-08-06 PROCEDURE — 85652 RBC SED RATE AUTOMATED: CPT | Performed by: INTERNAL MEDICINE

## 2019-08-06 PROCEDURE — 82550 ASSAY OF CK (CPK): CPT | Performed by: INTERNAL MEDICINE

## 2019-08-06 PROCEDURE — 86140 C-REACTIVE PROTEIN: CPT | Performed by: INTERNAL MEDICINE

## 2019-08-06 PROCEDURE — 80053 COMPREHEN METABOLIC PANEL: CPT | Performed by: INTERNAL MEDICINE

## 2019-08-06 PROCEDURE — 85025 COMPLETE CBC W/AUTO DIFF WBC: CPT | Performed by: INTERNAL MEDICINE

## 2019-08-06 PROCEDURE — 36415 COLL VENOUS BLD VENIPUNCTURE: CPT | Performed by: INTERNAL MEDICINE

## 2019-08-07 LAB — FUNGUS WND CULT: ABNORMAL

## 2019-08-12 ENCOUNTER — OFFICE VISIT (OUTPATIENT)
Dept: UROLOGY | Facility: CLINIC | Age: 84
End: 2019-08-12

## 2019-08-12 ENCOUNTER — APPOINTMENT (OUTPATIENT)
Dept: PREADMISSION TESTING | Facility: HOSPITAL | Age: 84
End: 2019-08-12

## 2019-08-12 VITALS — WEIGHT: 206 LBS | BODY MASS INDEX: 31.22 KG/M2 | HEIGHT: 68 IN

## 2019-08-12 VITALS
DIASTOLIC BLOOD PRESSURE: 68 MMHG | TEMPERATURE: 98.2 F | HEART RATE: 85 BPM | SYSTOLIC BLOOD PRESSURE: 110 MMHG | OXYGEN SATURATION: 95 %

## 2019-08-12 DIAGNOSIS — N13.5 URETERAL STRICTURE, LEFT: ICD-10-CM

## 2019-08-12 DIAGNOSIS — N13.39 OTHER HYDRONEPHROSIS: Primary | ICD-10-CM

## 2019-08-12 DIAGNOSIS — N13.39 OTHER HYDRONEPHROSIS: ICD-10-CM

## 2019-08-12 DIAGNOSIS — N20.0 NEPHROLITHIASIS: ICD-10-CM

## 2019-08-12 LAB
ANION GAP SERPL CALCULATED.3IONS-SCNC: 17 MMOL/L (ref 5–15)
BASOPHILS # BLD AUTO: 0.11 10*3/MM3 (ref 0–0.2)
BASOPHILS NFR BLD AUTO: 0.9 % (ref 0–1.5)
BUN BLD-MCNC: 29 MG/DL (ref 8–23)
BUN/CREAT SERPL: 20.4 (ref 7–25)
CALCIUM SPEC-SCNC: 11.1 MG/DL (ref 8.6–10.5)
CHLORIDE SERPL-SCNC: 100 MMOL/L (ref 98–107)
CO2 SERPL-SCNC: 28.2 MMOL/L (ref 22–29)
CREAT BLD-MCNC: 1.42 MG/DL (ref 0.76–1.27)
DEPRECATED RDW RBC AUTO: 55.1 FL (ref 37–54)
EOSINOPHIL # BLD AUTO: 0.36 10*3/MM3 (ref 0–0.4)
EOSINOPHIL NFR BLD AUTO: 3 % (ref 0.3–6.2)
ERYTHROCYTE [DISTWIDTH] IN BLOOD BY AUTOMATED COUNT: 15.1 % (ref 12.3–15.4)
GFR SERPL CREATININE-BSD FRML MDRD: 47 ML/MIN/1.73
GLUCOSE BLD-MCNC: 244 MG/DL (ref 65–99)
HCT VFR BLD AUTO: 48.1 % (ref 37.5–51)
HGB BLD-MCNC: 14.9 G/DL (ref 13–17.7)
IMM GRANULOCYTES # BLD AUTO: 0.07 10*3/MM3 (ref 0–0.05)
IMM GRANULOCYTES NFR BLD AUTO: 0.6 % (ref 0–0.5)
LYMPHOCYTES # BLD AUTO: 3.17 10*3/MM3 (ref 0.7–3.1)
LYMPHOCYTES NFR BLD AUTO: 26.1 % (ref 19.6–45.3)
MCH RBC QN AUTO: 30.8 PG (ref 26.6–33)
MCHC RBC AUTO-ENTMCNC: 31 G/DL (ref 31.5–35.7)
MCV RBC AUTO: 99.4 FL (ref 79–97)
MONOCYTES # BLD AUTO: 0.91 10*3/MM3 (ref 0.1–0.9)
MONOCYTES NFR BLD AUTO: 7.5 % (ref 5–12)
NEUTROPHILS # BLD AUTO: 7.54 10*3/MM3 (ref 1.7–7)
NEUTROPHILS NFR BLD AUTO: 61.9 % (ref 42.7–76)
NRBC BLD AUTO-RTO: 0 /100 WBC (ref 0–0.2)
PLATELET # BLD AUTO: 219 10*3/MM3 (ref 140–450)
PMV BLD AUTO: 9.7 FL (ref 6–12)
POTASSIUM BLD-SCNC: 5.2 MMOL/L (ref 3.5–5.2)
RBC # BLD AUTO: 4.84 10*6/MM3 (ref 4.14–5.8)
SODIUM BLD-SCNC: 140 MMOL/L (ref 136–145)
WBC NRBC COR # BLD: 12.16 10*3/MM3 (ref 3.4–10.8)

## 2019-08-12 PROCEDURE — 99214 OFFICE O/P EST MOD 30 MIN: CPT | Performed by: UROLOGY

## 2019-08-12 PROCEDURE — 85025 COMPLETE CBC W/AUTO DIFF WBC: CPT | Performed by: UROLOGY

## 2019-08-12 PROCEDURE — 80048 BASIC METABOLIC PNL TOTAL CA: CPT | Performed by: UROLOGY

## 2019-08-12 PROCEDURE — 36415 COLL VENOUS BLD VENIPUNCTURE: CPT

## 2019-08-12 NOTE — PAT
Spoke to YOHANA Conte CRNA regarding patient's PMH including EKG and hospitalization 07/2019 at [facility]; DM; HTN; CKD; hyperlipidemia; thyroid disease; sepsis, obesity. Patient reports seeing Dr. Heath twice, the last time being last year; once for potential MI and once for surgical clearance per patient. Patient denies any CP/tightness, palpitations. Patient has SOB and dizziness at times. Also informed CRNA of patient's family history of malignant hyperthermia (brother), no personal history of anesthesia complications.   Per CRNA, patient okay for scheduled surgery on 8/14/2019; no additional orders.     Spoke to PRAVIN Barron RN at OR back desk and Becki Gonsalez, OR  regarding patient's family hx of MH, scheduled for 8/14/19. Both verbalized acknowledgment. Patient's chart flagged for family hx of MH under FYIs and Special needs; also flagged on paper pre-op chart for SDS.

## 2019-08-12 NOTE — H&P (VIEW-ONLY)
"Chief Complaint  Hydronephrosis        JAZMIN Zepeda is a 87 y.o. male who returns today with a long history of hydronephrosis handled for years with an indwelling ureteral stent.  He was recently hospitalized at [facility] for sepsis thought secondary to diverticulitis but also with noted significant hydronephrosis.  Urology consult was unable to insert a ureteral stent and therefore he had a left nephrostomy tube inserted.  Since I have handled this for a decade with a stent I suggested for me to try the ureteroscopy and insertion of ureteral stent myself.  If this is not successful he will need an antegrade insertion of a ureteral stent through the nephrostomy tube at [facility] in [place] as directed by Dr. Flynn.  He has a long history of a tortuous distal ureter with stricture after repeated ureteral calculi treated with laser lithotripsy.  Has an atrophic left kidney contributing only 25% of his total renal function but is a high risk for nephrectomy because of his multiple medical problems.    Vitals:    08/12/19 1101   BP: 110/68   Pulse: 85   Temp: 98.2 °F (36.8 °C)   SpO2: 95%       Past Medical History  Past Medical History:   Diagnosis Date   • Abnormal PSA     Reported abnormal   • Arthritis     KNEES,  BUT SINCE HAD REPLACEMENT   • Benign localized hyperplasia of prostate    • Bilateral knee pain    • Cataracts, bilateral    • Diabetic neuropathy (CMS/HCC)    • Disease of thyroid gland     HYPOTHYROIDISM   • Diverticulitis    • Dyslipidemia     H/O   • Full dentures    • History of gout    • History of hepatitis A vaccination    • History of nephrolithiasis    • History of nuclear stress test     STATES IN THE 1990'S.  \"IT WAS OK\"   • History of osteopenia    • Hyperkalemia     PT UNSURE REGARDING HX OF THIS   • Hypertension    • Insomnia    • Malignant hyperthermia due to anesthesia     PER PT REPORT, BROTHER HAD DURING LUNG SURGERY SEVERAL YEARS AGO.  STATED THEY PACKED HIM ON " ICE.  STATED HE DID SURVIVE.  PT DENIES ANY HX OF MALIGNANT HYPERTHERMIA HIMSELF, OR ANY ISSUES WITH ANESTHESIA.  STATES TO HIS KNOWLEDGE, NO OTHER FAMILY MEMBER HAS THIS ISSUE EXCEPT FOR HIS BROTHER.   • Renal insufficiency    • Shortness of breath     STATES WITH EXERTION, OTHERWISE, DENIES   • Type 2 diabetes mellitus (CMS/HCC)    • Ureteral stricture, left    • Vitamin D deficiency    • Wears glasses        Past Surgical History  Past Surgical History:   Procedure Laterality Date   • APPENDECTOMY     • CHOLECYSTECTOMY     • COLONOSCOPY     • CYSTOSCOPY URETEROSCOPY Left 2/11/2019    Procedure: URETEROSCOPY WITH STENT EXTRACTION, RPG;  Surgeon: Griffin Romero MD;  Location: [facility] OR;  Service: Urology   • CYSTOSCOPY W/ URETERAL STENT PLACEMENT Left 7/20/2019    Procedure: CYSTOSCOPY, LEFT RETROGRADE PYELOGRAM,  ATTEMPTED LEFT URETERAL STENT INSERTION;  Surgeon: Isaac Flynn MD;  Location: [facility] OR;  Service: Urology   • EYE SURGERY      CATARACTS REMOVED BILATERALLY   • KNEE ARTHROPLASTY Bilateral    • KNEE ARTHROSCOPY Bilateral    • RENAL ARTERY STENT      SEVERAL TIMES EVERY SINCE 1978   • URETEROSCOPY LASER LITHOTRIPSY WITH STENT INSERTION Left 1/10/2018    Procedure:  cysto, left ureteral stent replacement ;  Surgeon: Griffin Romero MD;  Location: [facility] OR;  Service:        Medications  has a current medication list which includes the following prescription(s): allopurinol, carvedilol, cefuroxime, desoximetasone, diphenhydramine-acetaminophen, finasteride, fluconazole, glimepiride, glucose blood, insulin glargine, lactobacillus acidophilus, levothyroxine, metronidazole, multiple vitamins-minerals, and sitagliptin.      Allergies  Allergies   Allergen Reactions   • Metformin Diarrhea   • Statins Myalgia       Social History  Social History     Social History Narrative   • Not on file       Family History  He has no family history of bladder or kidney cancer  He has no family history of kidney  stones      AUA Symptom Score:      Review of Systems  Review of Systems   Constitutional: Negative.    Genitourinary: Positive for flank pain and hematuria.   All other systems reviewed and are negative.      Physical Exam  Physical Exam   Constitutional: He is oriented to person, place, and time. He appears well-developed and well-nourished.   HENT:   Head: Normocephalic and atraumatic.   Neck: Normal range of motion.   Cardiovascular: Normal rate, regular rhythm and normal heart sounds.   Pulmonary/Chest: Effort normal and breath sounds normal. No respiratory distress.   Abdominal: Soft. He exhibits no distension and no mass. There is no tenderness. No hernia.   Musculoskeletal: Normal range of motion.   Lymphadenopathy:     He has no cervical adenopathy.   Neurological: He is alert and oriented to person, place, and time.   Skin: Skin is warm and dry.   Psychiatric: He has a normal mood and affect. His behavior is normal.   Vitals reviewed.      Labs Recent and today in the office:  Results for orders placed or performed in visit on 08/06/19   Comprehensive metabolic panel   Result Value Ref Range    Glucose 249 (H) 65 - 99 mg/dL    BUN 33 (H) 8 - 23 mg/dL    Creatinine 1.23 0.76 - 1.27 mg/dL    Sodium 143 136 - 145 mmol/L    Potassium 4.6 3.5 - 5.2 mmol/L    Chloride 103 98 - 107 mmol/L    CO2 27.0 22.0 - 29.0 mmol/L    Calcium 10.6 (H) 8.6 - 10.5 mg/dL    Total Protein 8.3 6.0 - 8.5 g/dL    Albumin 3.60 3.50 - 5.20 g/dL    ALT (SGPT) 15 1 - 41 U/L    AST (SGOT) 21 1 - 40 U/L    Alkaline Phosphatase 82 39 - 117 U/L    Total Bilirubin 0.3 0.2 - 1.2 mg/dL    eGFR Non African Amer 56 (L) >60 mL/min/1.73    Globulin 4.7 gm/dL    A/G Ratio 0.8 g/dL    BUN/Creatinine Ratio 26.8 (H) 7.0 - 25.0    Anion Gap 13.0 5.0 - 15.0 mmol/L   Sedimentation Rate   Result Value Ref Range    Sed Rate 82 (H) 0 - 20 mm/hr   C-reactive Protein   Result Value Ref Range    C-Reactive Protein 2.71 (H) 0.00 - 0.50 mg/dL   CK   Result Value  Ref Range    Creatine Kinase 17 (L) 20 - 200 U/L   CBC Auto Differential   Result Value Ref Range    WBC 12.95 (H) 3.40 - 10.80 10*3/mm3    RBC 4.62 4.14 - 5.80 10*6/mm3    Hemoglobin 13.9 13.0 - 17.7 g/dL    Hematocrit 45.3 37.5 - 51.0 %    MCV 98.1 (H) 79.0 - 97.0 fL    MCH 30.1 26.6 - 33.0 pg    MCHC 30.7 (L) 31.5 - 35.7 g/dL    RDW 14.9 12.3 - 15.4 %    RDW-SD 53.6 37.0 - 54.0 fl    MPV 10.7 6.0 - 12.0 fL    Platelets 334 140 - 450 10*3/mm3    Neutrophil % 65.6 42.7 - 76.0 %    Lymphocyte % 22.2 19.6 - 45.3 %    Monocyte % 8.0 5.0 - 12.0 %    Eosinophil % 2.8 0.3 - 6.2 %    Basophil % 0.7 0.0 - 1.5 %    Immature Grans % 0.7 (H) 0.0 - 0.5 %    Neutrophils, Absolute 8.49 (H) 1.70 - 7.00 10*3/mm3    Lymphocytes, Absolute 2.88 0.70 - 3.10 10*3/mm3    Monocytes, Absolute 1.04 (H) 0.10 - 0.90 10*3/mm3    Eosinophils, Absolute 0.36 0.00 - 0.40 10*3/mm3    Basophils, Absolute 0.09 0.00 - 0.20 10*3/mm3    Immature Grans, Absolute 0.09 (H) 0.00 - 0.05 10*3/mm3    nRBC 0.0 0.0 - 0.2 /100 WBC         Assessment & Plan  Left hydronephrosis secondary to distal left ureteral stricture and possible stone: Plan ureteroscopy with possible laser lithotripsy and insertion of ureteral stent with subsequent removal of left nephrostomy tube.  He is currently taking Diflucan Ceftin and Flagyl for diverticulitis with his latest urine culture revealing yeast only.

## 2019-08-12 NOTE — PROGRESS NOTES
"Chief Complaint  Hydronephrosis        JAZMIN Zepeda is a 87 y.o. male who returns today with a long history of hydronephrosis handled for years with an indwelling ureteral stent.  He was recently hospitalized at [facility] for sepsis thought secondary to diverticulitis but also with noted significant hydronephrosis.  Urology consult was unable to insert a ureteral stent and therefore he had a left nephrostomy tube inserted.  Since I have handled this for a decade with a stent I suggested for me to try the ureteroscopy and insertion of ureteral stent myself.  If this is not successful he will need an antegrade insertion of a ureteral stent through the nephrostomy tube at [facility] in [place] as directed by Dr. Flynn.  He has a long history of a tortuous distal ureter with stricture after repeated ureteral calculi treated with laser lithotripsy.  Has an atrophic left kidney contributing only 25% of his total renal function but is a high risk for nephrectomy because of his multiple medical problems.    Vitals:    08/12/19 1101   BP: 110/68   Pulse: 85   Temp: 98.2 °F (36.8 °C)   SpO2: 95%       Past Medical History  Past Medical History:   Diagnosis Date   • Abnormal PSA     Reported abnormal   • Arthritis     KNEES,  BUT SINCE HAD REPLACEMENT   • Benign localized hyperplasia of prostate    • Bilateral knee pain    • Cataracts, bilateral    • Diabetic neuropathy (CMS/HCC)    • Disease of thyroid gland     HYPOTHYROIDISM   • Diverticulitis    • Dyslipidemia     H/O   • Full dentures    • History of gout    • History of hepatitis A vaccination    • History of nephrolithiasis    • History of nuclear stress test     STATES IN THE 1990'S.  \"IT WAS OK\"   • History of osteopenia    • Hyperkalemia     PT UNSURE REGARDING HX OF THIS   • Hypertension    • Insomnia    • Malignant hyperthermia due to anesthesia     PER PT REPORT, BROTHER HAD DURING LUNG SURGERY SEVERAL YEARS AGO.  STATED THEY PACKED HIM ON " ICE.  STATED HE DID SURVIVE.  PT DENIES ANY HX OF MALIGNANT HYPERTHERMIA HIMSELF, OR ANY ISSUES WITH ANESTHESIA.  STATES TO HIS KNOWLEDGE, NO OTHER FAMILY MEMBER HAS THIS ISSUE EXCEPT FOR HIS BROTHER.   • Renal insufficiency    • Shortness of breath     STATES WITH EXERTION, OTHERWISE, DENIES   • Type 2 diabetes mellitus (CMS/HCC)    • Ureteral stricture, left    • Vitamin D deficiency    • Wears glasses        Past Surgical History  Past Surgical History:   Procedure Laterality Date   • APPENDECTOMY     • CHOLECYSTECTOMY     • COLONOSCOPY     • CYSTOSCOPY URETEROSCOPY Left 2/11/2019    Procedure: URETEROSCOPY WITH STENT EXTRACTION, RPG;  Surgeon: Griffin Romero MD;  Location: [facility] OR;  Service: Urology   • CYSTOSCOPY W/ URETERAL STENT PLACEMENT Left 7/20/2019    Procedure: CYSTOSCOPY, LEFT RETROGRADE PYELOGRAM,  ATTEMPTED LEFT URETERAL STENT INSERTION;  Surgeon: Isaac Flynn MD;  Location: [facility] OR;  Service: Urology   • EYE SURGERY      CATARACTS REMOVED BILATERALLY   • KNEE ARTHROPLASTY Bilateral    • KNEE ARTHROSCOPY Bilateral    • RENAL ARTERY STENT      SEVERAL TIMES EVERY SINCE 1978   • URETEROSCOPY LASER LITHOTRIPSY WITH STENT INSERTION Left 1/10/2018    Procedure:  cysto, left ureteral stent replacement ;  Surgeon: Griffin Romero MD;  Location: [facility] OR;  Service:        Medications  has a current medication list which includes the following prescription(s): allopurinol, carvedilol, cefuroxime, desoximetasone, diphenhydramine-acetaminophen, finasteride, fluconazole, glimepiride, glucose blood, insulin glargine, lactobacillus acidophilus, levothyroxine, metronidazole, multiple vitamins-minerals, and sitagliptin.      Allergies  Allergies   Allergen Reactions   • Metformin Diarrhea   • Statins Myalgia       Social History  Social History     Social History Narrative   • Not on file       Family History  He has no family history of bladder or kidney cancer  He has no family history of kidney  stones      AUA Symptom Score:      Review of Systems  Review of Systems   Constitutional: Negative.    Genitourinary: Positive for flank pain and hematuria.   All other systems reviewed and are negative.      Physical Exam  Physical Exam   Constitutional: He is oriented to person, place, and time. He appears well-developed and well-nourished.   HENT:   Head: Normocephalic and atraumatic.   Neck: Normal range of motion.   Cardiovascular: Normal rate, regular rhythm and normal heart sounds.   Pulmonary/Chest: Effort normal and breath sounds normal. No respiratory distress.   Abdominal: Soft. He exhibits no distension and no mass. There is no tenderness. No hernia.   Musculoskeletal: Normal range of motion.   Lymphadenopathy:     He has no cervical adenopathy.   Neurological: He is alert and oriented to person, place, and time.   Skin: Skin is warm and dry.   Psychiatric: He has a normal mood and affect. His behavior is normal.   Vitals reviewed.      Labs Recent and today in the office:  Results for orders placed or performed in visit on 08/06/19   Comprehensive metabolic panel   Result Value Ref Range    Glucose 249 (H) 65 - 99 mg/dL    BUN 33 (H) 8 - 23 mg/dL    Creatinine 1.23 0.76 - 1.27 mg/dL    Sodium 143 136 - 145 mmol/L    Potassium 4.6 3.5 - 5.2 mmol/L    Chloride 103 98 - 107 mmol/L    CO2 27.0 22.0 - 29.0 mmol/L    Calcium 10.6 (H) 8.6 - 10.5 mg/dL    Total Protein 8.3 6.0 - 8.5 g/dL    Albumin 3.60 3.50 - 5.20 g/dL    ALT (SGPT) 15 1 - 41 U/L    AST (SGOT) 21 1 - 40 U/L    Alkaline Phosphatase 82 39 - 117 U/L    Total Bilirubin 0.3 0.2 - 1.2 mg/dL    eGFR Non African Amer 56 (L) >60 mL/min/1.73    Globulin 4.7 gm/dL    A/G Ratio 0.8 g/dL    BUN/Creatinine Ratio 26.8 (H) 7.0 - 25.0    Anion Gap 13.0 5.0 - 15.0 mmol/L   Sedimentation Rate   Result Value Ref Range    Sed Rate 82 (H) 0 - 20 mm/hr   C-reactive Protein   Result Value Ref Range    C-Reactive Protein 2.71 (H) 0.00 - 0.50 mg/dL   CK   Result Value  Ref Range    Creatine Kinase 17 (L) 20 - 200 U/L   CBC Auto Differential   Result Value Ref Range    WBC 12.95 (H) 3.40 - 10.80 10*3/mm3    RBC 4.62 4.14 - 5.80 10*6/mm3    Hemoglobin 13.9 13.0 - 17.7 g/dL    Hematocrit 45.3 37.5 - 51.0 %    MCV 98.1 (H) 79.0 - 97.0 fL    MCH 30.1 26.6 - 33.0 pg    MCHC 30.7 (L) 31.5 - 35.7 g/dL    RDW 14.9 12.3 - 15.4 %    RDW-SD 53.6 37.0 - 54.0 fl    MPV 10.7 6.0 - 12.0 fL    Platelets 334 140 - 450 10*3/mm3    Neutrophil % 65.6 42.7 - 76.0 %    Lymphocyte % 22.2 19.6 - 45.3 %    Monocyte % 8.0 5.0 - 12.0 %    Eosinophil % 2.8 0.3 - 6.2 %    Basophil % 0.7 0.0 - 1.5 %    Immature Grans % 0.7 (H) 0.0 - 0.5 %    Neutrophils, Absolute 8.49 (H) 1.70 - 7.00 10*3/mm3    Lymphocytes, Absolute 2.88 0.70 - 3.10 10*3/mm3    Monocytes, Absolute 1.04 (H) 0.10 - 0.90 10*3/mm3    Eosinophils, Absolute 0.36 0.00 - 0.40 10*3/mm3    Basophils, Absolute 0.09 0.00 - 0.20 10*3/mm3    Immature Grans, Absolute 0.09 (H) 0.00 - 0.05 10*3/mm3    nRBC 0.0 0.0 - 0.2 /100 WBC         Assessment & Plan  Left hydronephrosis secondary to distal left ureteral stricture and possible stone: Plan ureteroscopy with possible laser lithotripsy and insertion of ureteral stent with subsequent removal of left nephrostomy tube.  He is currently taking Diflucan Ceftin and Flagyl for diverticulitis with his latest urine culture revealing yeast only.

## 2019-08-13 ENCOUNTER — READMISSION MANAGEMENT (OUTPATIENT)
Dept: CALL CENTER | Facility: HOSPITAL | Age: 84
End: 2019-08-13

## 2019-08-13 NOTE — OUTREACH NOTE
Medical Week 3 Survey      Responses   Facility patient discharged from?  [facility]   Does the patient have one of the following disease processes/diagnoses(primary or secondary)?  Other   Week 3 attempt successful?  Yes   Call start time  1553   Call end time  1554   Discharge diagnosis  Acute diverticulitis,    Fungal UTI with chronic left hydronephrosis with associated left ureteral stricture and renal insufficiency   Meds reviewed with patient/caregiver?  Yes   Is the patient having any side effects they believe may be caused by any medication additions or changes?  No   Does the patient have all medications ordered at discharge?  Yes   Is the patient taking all medications as directed (includes completed medication regime)?  Yes   Does the patient have a primary care provider?   Yes   Does the patient have an appointment with their PCP within 7 days of discharge?  Yes   Has the patient kept scheduled appointments due by today?  Yes   Has home health visited the patient within 72 hours of discharge?  N/A   Psychosocial issues?  No   Did the patient receive a copy of their discharge instructions?  Yes   Nursing interventions  Reviewed instructions with patient   What is the patient's perception of their health status since discharge?  Improving   Is the patient/caregiver able to teach back signs and symptoms related to disease process for when to call PCP?  Yes   Is the patient/caregiver able to teach back signs and symptoms related to disease process for when to call 911?  Yes   Is the patient/caregiver able to teach back the hierarchy of who to call/visit for symptoms/problems? PCP, Specialist, Home health nurse, Urgent Care, ED, 911  Yes   Additional teach back comments  He will be back tomorrow to have a stent placed.  He is ok otherwise.   Week 3 Call Completed?  Yes          Maya Simon RN

## 2019-08-14 ENCOUNTER — ANESTHESIA (OUTPATIENT)
Dept: PERIOP | Facility: HOSPITAL | Age: 84
End: 2019-08-14

## 2019-08-14 ENCOUNTER — HOSPITAL ENCOUNTER (OUTPATIENT)
Facility: HOSPITAL | Age: 84
Setting detail: HOSPITAL OUTPATIENT SURGERY
Discharge: HOME | End: 2019-08-14
Attending: UROLOGY | Admitting: UROLOGY

## 2019-08-14 ENCOUNTER — ANESTHESIA EVENT (OUTPATIENT)
Dept: PERIOP | Facility: HOSPITAL | Age: 84
End: 2019-08-14

## 2019-08-14 ENCOUNTER — APPOINTMENT (OUTPATIENT)
Dept: GENERAL RADIOLOGY | Facility: HOSPITAL | Age: 84
End: 2019-08-14

## 2019-08-14 VITALS
OXYGEN SATURATION: 92 % | SYSTOLIC BLOOD PRESSURE: 156 MMHG | HEART RATE: 64 BPM | TEMPERATURE: 98 F | RESPIRATION RATE: 16 BRPM | DIASTOLIC BLOOD PRESSURE: 71 MMHG

## 2019-08-14 DIAGNOSIS — N13.39 OTHER HYDRONEPHROSIS: ICD-10-CM

## 2019-08-14 LAB
BILIRUB UR QL STRIP: NEGATIVE
CLARITY UR: ABNORMAL
COLOR UR: YELLOW
GLUCOSE BLDC GLUCOMTR-MCNC: 197 MG/DL (ref 70–130)
GLUCOSE BLDC GLUCOMTR-MCNC: 202 MG/DL (ref 70–130)
GLUCOSE UR STRIP-MCNC: NEGATIVE MG/DL
HGB UR QL STRIP.AUTO: ABNORMAL
KETONES UR QL STRIP: NEGATIVE
LEUKOCYTE ESTERASE UR QL STRIP.AUTO: NEGATIVE
NITRITE UR QL STRIP: NEGATIVE
PH UR STRIP.AUTO: <=5 [PH] (ref 5–8)
PROT UR QL STRIP: NEGATIVE
SP GR UR STRIP: 1.01 (ref 1–1.03)
UROBILINOGEN UR QL STRIP: ABNORMAL

## 2019-08-14 PROCEDURE — 25010000002 DEXAMETHASONE PER 1 MG: Performed by: NURSE ANESTHETIST, CERTIFIED REGISTERED

## 2019-08-14 PROCEDURE — 25010000002 LEVOFLOXACIN PER 250 MG: Performed by: UROLOGY

## 2019-08-14 PROCEDURE — 25010000002 PROPOFOL 200 MG/20ML EMULSION: Performed by: NURSE ANESTHETIST, CERTIFIED REGISTERED

## 2019-08-14 PROCEDURE — 82962 GLUCOSE BLOOD TEST: CPT

## 2019-08-14 PROCEDURE — 52352 CYSTOURETERO W/STONE REMOVE: CPT | Performed by: UROLOGY

## 2019-08-14 PROCEDURE — C2617 STENT, NON-COR, TEM W/O DEL: HCPCS | Performed by: UROLOGY

## 2019-08-14 PROCEDURE — 25010000002 PROPOFOL 1000 MG/ML EMULSION: Performed by: NURSE ANESTHETIST, CERTIFIED REGISTERED

## 2019-08-14 PROCEDURE — 74018 RADEX ABDOMEN 1 VIEW: CPT

## 2019-08-14 PROCEDURE — 52332 CYSTOSCOPY AND TREATMENT: CPT | Performed by: UROLOGY

## 2019-08-14 PROCEDURE — 25010000002 ONDANSETRON PER 1 MG: Performed by: NURSE ANESTHETIST, CERTIFIED REGISTERED

## 2019-08-14 PROCEDURE — C1758 CATHETER, URETERAL: HCPCS | Performed by: UROLOGY

## 2019-08-14 PROCEDURE — 87086 URINE CULTURE/COLONY COUNT: CPT | Performed by: UROLOGY

## 2019-08-14 PROCEDURE — 25010000002 FENTANYL CITRATE (PF) 250 MCG/5ML SOLUTION: Performed by: NURSE ANESTHETIST, CERTIFIED REGISTERED

## 2019-08-14 PROCEDURE — 81003 URINALYSIS AUTO W/O SCOPE: CPT | Performed by: UROLOGY

## 2019-08-14 PROCEDURE — C1769 GUIDE WIRE: HCPCS | Performed by: UROLOGY

## 2019-08-14 DEVICE — URETERAL STENT
Type: IMPLANTABLE DEVICE | Site: URETER | Status: FUNCTIONAL
Brand: CONTOUR™

## 2019-08-14 RX ORDER — FENTANYL CITRATE 50 UG/ML
INJECTION, SOLUTION INTRAMUSCULAR; INTRAVENOUS AS NEEDED
Status: DISCONTINUED | OUTPATIENT
Start: 2019-08-14 | End: 2019-08-14 | Stop reason: SURG

## 2019-08-14 RX ORDER — LEVOFLOXACIN 5 MG/ML
500 INJECTION, SOLUTION INTRAVENOUS ONCE
Status: COMPLETED | OUTPATIENT
Start: 2019-08-14 | End: 2019-08-14

## 2019-08-14 RX ORDER — MAGNESIUM HYDROXIDE 1200 MG/15ML
LIQUID ORAL AS NEEDED
Status: DISCONTINUED | OUTPATIENT
Start: 2019-08-14 | End: 2019-08-14 | Stop reason: HOSPADM

## 2019-08-14 RX ORDER — DEXAMETHASONE SODIUM PHOSPHATE 4 MG/ML
INJECTION, SOLUTION INTRA-ARTICULAR; INTRALESIONAL; INTRAMUSCULAR; INTRAVENOUS; SOFT TISSUE AS NEEDED
Status: DISCONTINUED | OUTPATIENT
Start: 2019-08-14 | End: 2019-08-14 | Stop reason: SURG

## 2019-08-14 RX ORDER — ONDANSETRON 2 MG/ML
4 INJECTION INTRAMUSCULAR; INTRAVENOUS ONCE AS NEEDED
Status: DISCONTINUED | OUTPATIENT
Start: 2019-08-14 | End: 2019-08-14 | Stop reason: HOSPADM

## 2019-08-14 RX ORDER — ONDANSETRON 2 MG/ML
INJECTION INTRAMUSCULAR; INTRAVENOUS AS NEEDED
Status: DISCONTINUED | OUTPATIENT
Start: 2019-08-14 | End: 2019-08-14 | Stop reason: SURG

## 2019-08-14 RX ORDER — IPRATROPIUM BROMIDE AND ALBUTEROL SULFATE 2.5; .5 MG/3ML; MG/3ML
3 SOLUTION RESPIRATORY (INHALATION) ONCE AS NEEDED
Status: DISCONTINUED | OUTPATIENT
Start: 2019-08-14 | End: 2019-08-14 | Stop reason: HOSPADM

## 2019-08-14 RX ORDER — PROPOFOL 10 MG/ML
INJECTION, EMULSION INTRAVENOUS AS NEEDED
Status: DISCONTINUED | OUTPATIENT
Start: 2019-08-14 | End: 2019-08-14 | Stop reason: SURG

## 2019-08-14 RX ORDER — SODIUM CHLORIDE, SODIUM LACTATE, POTASSIUM CHLORIDE, CALCIUM CHLORIDE 600; 310; 30; 20 MG/100ML; MG/100ML; MG/100ML; MG/100ML
1000 INJECTION, SOLUTION INTRAVENOUS CONTINUOUS
Status: DISCONTINUED | OUTPATIENT
Start: 2019-08-14 | End: 2019-08-14 | Stop reason: HOSPADM

## 2019-08-14 RX ORDER — LIDOCAINE HYDROCHLORIDE 20 MG/ML
INJECTION, SOLUTION INTRAVENOUS AS NEEDED
Status: DISCONTINUED | OUTPATIENT
Start: 2019-08-14 | End: 2019-08-14 | Stop reason: SURG

## 2019-08-14 RX ADMIN — FENTANYL CITRATE 100 MCG: 50 INJECTION, SOLUTION INTRAMUSCULAR; INTRAVENOUS at 07:56

## 2019-08-14 RX ADMIN — PROPOFOL 160 MCG/KG/MIN: 10 INJECTION, EMULSION INTRAVENOUS at 07:56

## 2019-08-14 RX ADMIN — ONDANSETRON 4 MG: 2 INJECTION INTRAMUSCULAR; INTRAVENOUS at 07:56

## 2019-08-14 RX ADMIN — FENTANYL CITRATE 25 MCG: 50 INJECTION, SOLUTION INTRAMUSCULAR; INTRAVENOUS at 08:20

## 2019-08-14 RX ADMIN — DEXAMETHASONE SODIUM PHOSPHATE 4 MG: 4 INJECTION, SOLUTION INTRAMUSCULAR; INTRAVENOUS at 07:56

## 2019-08-14 RX ADMIN — PROPOFOL 130 MG: 10 INJECTION, EMULSION INTRAVENOUS at 07:56

## 2019-08-14 RX ADMIN — SODIUM CHLORIDE, POTASSIUM CHLORIDE, SODIUM LACTATE AND CALCIUM CHLORIDE 1000 ML: 600; 310; 30; 20 INJECTION, SOLUTION INTRAVENOUS at 07:08

## 2019-08-14 RX ADMIN — LIDOCAINE HYDROCHLORIDE 60 MG: 20 INJECTION, SOLUTION INTRAVENOUS at 07:56

## 2019-08-14 RX ADMIN — LEVOFLOXACIN 500 MG: 5 INJECTION, SOLUTION INTRAVENOUS at 07:47

## 2019-08-14 NOTE — OP NOTE
URETEROSCOPY LASER LITHOTRIPSY WITH STENT INSERTION  Procedure Note    Forrest Zepeda  8/14/2019    Pre-op Diagnosis:   Other hydronephrosis [N13.39]    Post-op Diagnosis:     Post-Op Diagnosis Codes:     * Other hydronephrosis [N13.39]    Procedure/CPT® Codes:      Procedure(s):  URETEROSCOPY EXTRACTION OF URETERAL CALCULUS WITH STENT INSERTION    Surgeon(s):  Griffin Romero MD    Anesthesia: General  Operative procedure: This patient with left hydronephrosis and a failed operative attempt in [place] to insert a ureteral stent was brought to the cystoscopy suite and given general anesthesia by way of laryngal mask anesthetic.  The previously taped left nephrostomy tube was secured to prevent dislodging.  He was placed in the dorsolithotomy position and prepped and draped in routine sterile fashion.  At this point a timeout was called to review all pertinent data and was agreed to by parties in the room.    The patient had a phimosis that would preclude retraction of the foreskin but additional prep was performed underneath this and a cystoscope was then inserted into the urethra.  Anterior urethra was within normal limits posterior fossa was partially obstructed from the prostate.  A small ureteral calculus was noted at the left ureteral orifice and extracted.  An attempt to perform retrograde pyelogram with universal wedge catheter was not successful.  A semirigid ureteroscope was then inserted into the bladder and was inserted into the distal left ureter which was quite tortuous.  A 35,000 guidewire was then inserted through the ureteral scope up into the left renal pelvis and ureteroscopy was performed up to that area to rule out additional stones.  Since the stone was removed intact laser lithotripsy was not required.  Proper placement of the stent was confirmed with fluoroscopy and the bladder was drained with a Ascencio catheter.  He will return to the office and when the nephrostomy tube was draining  it can be removed along with a Ascencio catheter.  He will need to return for periodic stent changes.  Is Vitor on multiple antibiotics and we will change this pending the results of a repeat culture and sensitivity performed today.  He tolerated procedure well there were no complications.  Staff:   Circulator: Yajaira Medeiros RN  Scrub Person: Scout Padilla    Estimated Blood Loss: 0 mL    Specimens:                Specimens     ID Source Type Tests Collected By Collected At Frozen?      1 Urinary Bladder Urine · URINE CULTURE  · URINALYSIS WITHOUT MICROSCOPIC (NO CULTURE)   Griffin Romero MD 8/14/19 0810      Description: cystoscopic urine for culture            Drains:   Nephrostomy Left 8.5 Fr. (Active)   Site Assessment RAN 8/14/2019  7:21 AM   Dressing Status Clean;Dry;Intact 8/14/2019  7:21 AM   Dressing Type Other (Comment) 7/23/2019  5:14 PM   Collection Container Standard drainage bag 8/14/2019  7:21 AM   Securement Method Securing device 7/27/2019 12:00 PM   Output (mL) 550 mL 7/27/2019 11:00 AM       Urethral Catheter Latex 16 Fr. (Active)       Ureteral Drain/Stent Left ureter 6 Fr. (Active)       Findings: #1 phimosis #2 left hydronephrosis #3 left ureteral stricture   #4 3 mm left ureteral calculus    Complications: None      Griffin Romero MD     Date: 8/14/2019  Time: 8:32 AM

## 2019-08-14 NOTE — ANESTHESIA PREPROCEDURE EVALUATION
Anesthesia Evaluation     Patient summary reviewed and Nursing notes reviewed   history of anesthetic complications: malignant hyperthermia  NPO Solid Status: > 8 hours  NPO Liquid Status: > 8 hours           Airway   Mallampati: II  TM distance: >3 FB  Neck ROM: full  No difficulty expected  Dental - normal exam   (+) upper dentures, lower dentures and edentulous    Pulmonary     breath sounds clear to auscultation  (+) shortness of breath, decreased breath sounds,   Cardiovascular - normal exam  Exercise tolerance: good (4-7 METS)    ECG reviewed  Patient on routine beta blocker and Beta blocker not taken-may be given intraoperatively  Rhythm: regular  Rate: normal    (+) hypertension well controlled less than 2 medications, hyperlipidemia,     ROS comment: 7/2019  ECHO  · Left ventricular wall thickness is consistent with mild concentric hypertrophy.  · Mild aortic valve stenosis is present.  · Calculated right ventricular systolic pressure from tricuspid regurgitation is 50 mmHg.  · Moderate tricuspid valve regurgitation is present.  · Estimated EF = 55%.  · Left ventricular systolic function is normal.  · Normal right ventricular cavity size, wall thickness, systolic function and septal motion noted.  · Moderate pulmonary hypertension is present.  · There is no evidence of pericardial effusion.    7/2019 ECG   NSR with left anterior fasicular block     Neuro/Psych- negative ROS  GI/Hepatic/Renal/Endo    (+)   diabetes mellitus type 2 poorly controlled, hypothyroidism,     Musculoskeletal         ROS comment: Weakness - uses cane to ambulate  Abdominal  - normal exam   Substance History - negative use     OB/GYN negative ob/gyn ROS         Other   (+) arthritis       Other Comment: Reported history of malignant hyperthermia of brother. However, pt states he has had multiple procedures without any reaction to anesthesia. Chart review reveals pt has received anesthetic gases (Sevo) in the past. However, due to  close relationship with familial history of malignant hyperthermia, precautions will be taken.   ROS/Med Hx Other:     Benign localized hyperplasia of prostate  Vitamin D deficiency  Stricture of ureter  Uncontrolled type 2 diabetes mellitus   Renal insufficiency  Insomnia  Hypertension  Generalized osteoarthritis  Diabetic neuropathy  Gout  Immunization due  Hypertriglyceridemia  Acquired hypothyroidism  Sigmoid diverticulitis without abscess or perforation  Ureteral stricture, left  Skin ulcer of fourth toe of right foot, limited to breakdown of skin   Acute diverticulitis  UTI (urinary tract infection)  Hydronephrosis of left kidney  Generalized weakness  Other hydronephrosis    Current nephrostomy tube in place          Phys Exam Other: Generalized weakness                Anesthesia Plan    ASA 3     general   (Risks and benefits of general anesthesia discussed with patient, including, aspiration, recall, dental damage, cardiac or respiratory compromise, stroke, fluctuations in blood pressure, seizure or death.     Pt advised that a endotracheal tube (ETT), laryngeal mask airway (LMA) or mask would be utilized to maintain the airway. Pt verbalized understanding and agreed to plan.    Total intravenous anesthesia (TIVA) with LMA planned. )  intravenous induction   Anesthetic plan, all risks, benefits, and alternatives have been provided, discussed and informed consent has been obtained with: patient.

## 2019-08-14 NOTE — ANESTHESIA PROCEDURE NOTES
Airway  Urgency: elective    Airway not difficult    General Information and Staff    Patient location during procedure: OR  CRNA: Tomy Ordonez CRNA    Indications and Patient Condition  Indications for airway management: airway protection    Preoxygenated: yes  MILS maintained throughout  Mask difficulty assessment: 1 - vent by mask    Final Airway Details  Final airway type: supraglottic airway      Successful airway: unique  Size 4    Number of attempts at approach: 1    Additional Comments  LMA placed easily without trauma. Dentition and lips as noted pre-induction. Factory cuff pressure to minimal occlusive pressure.

## 2019-08-15 LAB — BACTERIA SPEC AEROBE CULT: NO GROWTH

## 2019-08-16 ENCOUNTER — HOSPITAL ENCOUNTER (OUTPATIENT)
Dept: GENERAL RADIOLOGY | Facility: HOSPITAL | Age: 84
Discharge: HOME | End: 2019-08-16
Admitting: UROLOGY

## 2019-08-16 ENCOUNTER — OFFICE VISIT (OUTPATIENT)
Dept: UROLOGY | Facility: CLINIC | Age: 84
End: 2019-08-16

## 2019-08-16 VITALS
TEMPERATURE: 97.4 F | OXYGEN SATURATION: 93 % | RESPIRATION RATE: 16 BRPM | DIASTOLIC BLOOD PRESSURE: 68 MMHG | SYSTOLIC BLOOD PRESSURE: 112 MMHG | HEART RATE: 70 BPM

## 2019-08-16 DIAGNOSIS — N13.39 OTHER HYDRONEPHROSIS: Primary | ICD-10-CM

## 2019-08-16 DIAGNOSIS — N13.2 HYDRONEPHROSIS WITH RENAL AND URETERAL CALCULUS OBSTRUCTION: Primary | ICD-10-CM

## 2019-08-16 DIAGNOSIS — N13.5 URETERAL STRICTURE: ICD-10-CM

## 2019-08-16 PROCEDURE — 99214 OFFICE O/P EST MOD 30 MIN: CPT | Performed by: UROLOGY

## 2019-08-16 PROCEDURE — 74018 RADEX ABDOMEN 1 VIEW: CPT

## 2019-08-16 NOTE — PROGRESS NOTES
"Chief Complaint  Hydronephrosis (follow up.)        JAZMIN Zepeda is a 87 y.o. male who returns today after ureteral stent insertion for his left hydronephrosis.  His nephrostomy tube drainage is markedly reduced since insertion of the Ascencio catheter in the stent.  He will therefore be removed today but I recommended leaving in the Ascencio for a few more days.    Vitals:    08/16/19 0927   BP: 112/68   Pulse: 70   Resp: 16   Temp: 97.4 °F (36.3 °C)   SpO2: 93%       Past Medical History  Past Medical History:   Diagnosis Date   • Abnormal EKG    • Abnormal PSA     Reported abnormal   • Acute diverticulitis 2019   • Arthritis     KNEES,  BUT SINCE HAD REPLACEMENT   • Benign localized hyperplasia of prostate    • Bilateral knee pain    • Cataracts, bilateral    • CKD (chronic kidney disease)    • Diabetic neuropathy (CMS/HCC)    • Disease of thyroid gland     HYPOTHYROIDISM   • Diverticulitis    • Dyslipidemia     H/O   • Family history of malignant hyperthermia     Brother    • Full dentures    • Generalized weakness    • History of gout    • History of hepatitis A vaccination    • History of nephrolithiasis    • History of nuclear stress test     STATES IN THE 1990'S.  \"IT WAS OK\"   • History of osteopenia    • History of recurrent UTI (urinary tract infection)    • Hydronephrosis with ureteral stricture    • Hyperkalemia     PT UNSURE REGARDING HX OF THIS   • Hyperlipidemia    • Hypertension    • Insomnia    • Malignant hyperthermia due to anesthesia     PER PT REPORT, BROTHER HAD DURING LUNG SURGERY SEVERAL YEARS AGO.  STATED THEY PACKED HIM ON ICE.  STATED HE DID SURVIVE.  PT DENIES ANY HX OF MALIGNANT HYPERTHERMIA HIMSELF, OR ANY ISSUES WITH ANESTHESIA.  STATES TO HIS KNOWLEDGE, NO OTHER FAMILY MEMBER HAS THIS ISSUE EXCEPT FOR HIS BROTHER.   • Renal insufficiency    • Sepsis (CMS/HCC) 2019   • Shortness of breath     STATES WITH EXERTION, OTHERWISE, DENIES   • Skin breakdown     fourth toe right foot noted in 2019 MD " D/C note   • Type 2 diabetes mellitus (CMS/[facility])    • Ureteral stricture, left    • Vitamin D deficiency    • Wears glasses        Past Surgical History  Past Surgical History:   Procedure Laterality Date   • APPENDECTOMY     • CHOLECYSTECTOMY     • COLONOSCOPY     • CYSTOSCOPY URETEROSCOPY Left 2/11/2019    Procedure: URETEROSCOPY WITH STENT EXTRACTION, RPG;  Surgeon: Griffin Romero MD;  Location: [facility];  Service: Urology   • CYSTOSCOPY W/ URETERAL STENT PLACEMENT Left 7/20/2019    Procedure: CYSTOSCOPY, LEFT RETROGRADE PYELOGRAM,  ATTEMPTED LEFT URETERAL STENT INSERTION;  Surgeon: Isaac Flynn MD;  Location: [facility] OR;  Service: Urology   • EYE SURGERY      CATARACTS REMOVED BILATERALLY   • KNEE ARTHROPLASTY Bilateral    • KNEE ARTHROSCOPY Bilateral    • RENAL ARTERY STENT      SEVERAL TIMES EVERY SINCE 1978   • URETEROSCOPY LASER LITHOTRIPSY WITH STENT INSERTION Left 1/10/2018    Procedure:  cysto, left ureteral stent replacement ;  Surgeon: Griffin Romero MD;  Location: [facility] OR;  Service:    • URETEROSCOPY LASER LITHOTRIPSY WITH STENT INSERTION Left 8/14/2019    Procedure: URETEROSCOPY, STONE REMOVAL WITH STENT INSERTION;  Surgeon: Griffin Romero MD;  Location: [facility];  Service: Urology       Medications  has a current medication list which includes the following prescription(s): allopurinol, carvedilol, desoximetasone, diphenhydramine-acetaminophen, finasteride, fluconazole, glimepiride, glucose blood, insulin glargine, lactobacillus acidophilus, levothyroxine, metronidazole, multiple vitamins-minerals, sitagliptin, and cefuroxime.      Allergies  Allergies   Allergen Reactions   • Metformin Diarrhea   • Statins Myalgia       Social History  Social History     Social History Narrative   • Not on file       Family History  He has no family history of bladder or kidney cancer  He has no family history of kidney stones      AUA Symptom Score:      Review of Systems  Review of Systems    Constitutional: Negative for activity change, appetite change, chills, fatigue, fever, unexpected weight gain and unexpected weight loss.   Respiratory: Negative for apnea, cough, chest tightness, shortness of breath, wheezing and stridor.    Cardiovascular: Negative for chest pain, palpitations and leg swelling.   Gastrointestinal: Negative for abdominal distention, abdominal pain, anal bleeding, blood in stool, constipation, diarrhea, nausea, rectal pain, vomiting, GERD and indigestion.   Genitourinary: Negative for decreased libido, decreased urine volume, difficulty urinating, discharge, dysuria, flank pain, frequency, genital sores, hematuria, nocturia, penile pain, erectile dysfunction, penile swelling, scrotal swelling, testicular pain, urgency and urinary incontinence.   Musculoskeletal: Negative for back pain and joint swelling.   Neurological: Negative for tremors, seizures, speech difficulty, weakness and numbness.   Psychiatric/Behavioral: Negative for agitation, decreased concentration, sleep disturbance, depressed mood and stress. The patient is not nervous/anxious.        Physical Exam  Physical Exam   Constitutional: He is oriented to person, place, and time. He appears well-developed and well-nourished.   HENT:   Head: Normocephalic and atraumatic.   Neck: Normal range of motion.   Pulmonary/Chest: Effort normal. No respiratory distress.   Abdominal: Soft. He exhibits no distension and no mass. There is no tenderness. No hernia.   Musculoskeletal: Normal range of motion.   Lymphadenopathy:     He has no cervical adenopathy.   Neurological: He is alert and oriented to person, place, and time.   Skin: Skin is warm and dry.   Psychiatric: He has a normal mood and affect. His behavior is normal.   Vitals reviewed.      Labs Recent and today in the office:  Results for orders placed or performed during the hospital encounter of 08/14/19   Urine Culture - Urine, Urinary Bladder   Result Value Ref Range     Urine Culture No growth    Urinalysis without microscopic (no culture) - Urinary Bladder   Result Value Ref Range    Color, UA Yellow Yellow, Straw    Appearance, UA Turbid (A) Clear    pH, UA <=5.0 5.0 - 8.0    Specific Gravity, UA 1.007 1.005 - 1.030    Glucose, UA Negative Negative    Ketones, UA Negative Negative    Bilirubin, UA Negative Negative    Blood, UA Moderate (2+) (A) Negative    Protein, UA Negative Negative    Leuk Esterase, UA Negative Negative    Nitrite, UA Negative Negative    Urobilinogen, UA 0.2 E.U./dL 0.2 - 1.0 E.U./dL   POC Glucose Once   Result Value Ref Range    Glucose 197 (H) 70 - 130 mg/dL   POC Glucose Once   Result Value Ref Range    Glucose 202 (H) 70 - 130 mg/dL         Assessment & Plan  Hydronephrosis: His left nephrostomy tube is removed and he will return on Monday for removal of the Ascencio catheter.  He is already on multiple antibiotics pending the results of the repeat culture.

## 2019-08-19 ENCOUNTER — OFFICE VISIT (OUTPATIENT)
Dept: UROLOGY | Facility: CLINIC | Age: 84
End: 2019-08-19

## 2019-08-19 VITALS
SYSTOLIC BLOOD PRESSURE: 126 MMHG | OXYGEN SATURATION: 93 % | TEMPERATURE: 98.1 F | WEIGHT: 206 LBS | BODY MASS INDEX: 31.22 KG/M2 | HEIGHT: 68 IN | HEART RATE: 80 BPM | DIASTOLIC BLOOD PRESSURE: 70 MMHG

## 2019-08-19 DIAGNOSIS — N13.5 URETERAL STRICTURE: ICD-10-CM

## 2019-08-19 DIAGNOSIS — N40.1 BPH WITH URINARY OBSTRUCTION: ICD-10-CM

## 2019-08-19 DIAGNOSIS — N13.8 BPH WITH URINARY OBSTRUCTION: ICD-10-CM

## 2019-08-19 DIAGNOSIS — N13.2 HYDRONEPHROSIS WITH RENAL AND URETERAL CALCULUS OBSTRUCTION: Primary | ICD-10-CM

## 2019-08-19 DIAGNOSIS — Z87.440 HISTORY OF UTI: ICD-10-CM

## 2019-08-19 PROCEDURE — 99213 OFFICE O/P EST LOW 20 MIN: CPT | Performed by: UROLOGY

## 2019-08-19 RX ORDER — SILODOSIN 8 MG/1
8 CAPSULE ORAL
Qty: 30 CAPSULE | Refills: 11 | Status: SHIPPED | OUTPATIENT
Start: 2019-08-19 | End: 2020-06-29 | Stop reason: SDUPTHER

## 2019-08-19 NOTE — PROGRESS NOTES
"Chief Complaint  Hydronephrosis        JAZMIN Zepeda is a 87 y.o. male who returns today for follow-up after a ureteral stent was recently inserted for his left-sided hydronephrosis.  He had an indwelling Ascencio catheter in over the weekend after I pulled his nephrostomy tube on Friday.  He returns today to have the Ascencio removed with a history of urinary frequency and a known markedly enlarged prostate taking Proscar.  He is actually enjoyed having the Ascencio catheter in since it eliminated his frequency both day and nighttime.  He has denied difficulty voiding in the past and apparently has never taken Flomax.  He had a PSA 1 year ago that was less than 1.    Vitals:    08/19/19 0836   BP: 126/70   Pulse: 80   Temp: 98.1 °F (36.7 °C)   SpO2: 93%       Past Medical History  Past Medical History:   Diagnosis Date   • Abnormal EKG    • Abnormal PSA     Reported abnormal   • Acute diverticulitis 2019   • Arthritis     KNEES,  BUT SINCE HAD REPLACEMENT   • Benign localized hyperplasia of prostate    • Bilateral knee pain    • Cataracts, bilateral    • CKD (chronic kidney disease)    • Diabetic neuropathy (CMS/HCC)    • Disease of thyroid gland     HYPOTHYROIDISM   • Diverticulitis    • Dyslipidemia     H/O   • Family history of malignant hyperthermia     Brother    • Full dentures    • Generalized weakness    • History of gout    • History of hepatitis A vaccination    • History of nephrolithiasis    • History of nuclear stress test     STATES IN THE 1990'S.  \"IT WAS OK\"   • History of osteopenia    • History of recurrent UTI (urinary tract infection)    • Hydronephrosis with ureteral stricture    • Hyperkalemia     PT UNSURE REGARDING HX OF THIS   • Hyperlipidemia    • Hypertension    • Insomnia    • Malignant hyperthermia due to anesthesia     PER PT REPORT, BROTHER HAD DURING LUNG SURGERY SEVERAL YEARS AGO.  STATED THEY PACKED HIM ON ICE.  STATED HE DID SURVIVE.  PT DENIES ANY HX OF MALIGNANT HYPERTHERMIA HIMSELF, OR ANY " ISSUES WITH ANESTHESIA.  STATES TO HIS KNOWLEDGE, NO OTHER FAMILY MEMBER HAS THIS ISSUE EXCEPT FOR HIS BROTHER.   • Renal insufficiency    • Sepsis (CMS/HCC) 2019   • Shortness of breath     STATES WITH EXERTION, OTHERWISE, DENIES   • Skin breakdown     fourth toe right foot noted in 2019 MD D/C note   • Type 2 diabetes mellitus (CMS/[facility])    • Ureteral stricture, left    • Vitamin D deficiency    • Wears glasses        Past Surgical History  Past Surgical History:   Procedure Laterality Date   • APPENDECTOMY     • CHOLECYSTECTOMY     • COLONOSCOPY     • CYSTOSCOPY URETEROSCOPY Left 2/11/2019    Procedure: URETEROSCOPY WITH STENT EXTRACTION, RPG;  Surgeon: Griffin Romero MD;  Location: [facility] OR;  Service: Urology   • CYSTOSCOPY W/ URETERAL STENT PLACEMENT Left 7/20/2019    Procedure: CYSTOSCOPY, LEFT RETROGRADE PYELOGRAM,  ATTEMPTED LEFT URETERAL STENT INSERTION;  Surgeon: Isaac Flynn MD;  Location: [facility];  Service: Urology   • EYE SURGERY      CATARACTS REMOVED BILATERALLY   • KNEE ARTHROPLASTY Bilateral    • KNEE ARTHROSCOPY Bilateral    • RENAL ARTERY STENT      SEVERAL TIMES EVERY SINCE 1978   • URETEROSCOPY LASER LITHOTRIPSY WITH STENT INSERTION Left 1/10/2018    Procedure:  cysto, left ureteral stent replacement ;  Surgeon: Griffin Romero MD;  Location: [facility] OR;  Service:    • URETEROSCOPY LASER LITHOTRIPSY WITH STENT INSERTION Left 8/14/2019    Procedure: URETEROSCOPY, STONE REMOVAL WITH STENT INSERTION;  Surgeon: Griffin Romero MD;  Location: [facility];  Service: Urology       Medications  has a current medication list which includes the following prescription(s): allopurinol, carvedilol, cefuroxime, desoximetasone, diphenhydramine-acetaminophen, finasteride, fluconazole, glimepiride, glucose blood, insulin glargine, lactobacillus acidophilus, levothyroxine, metronidazole, multiple vitamins-minerals, and sitagliptin.      Allergies  Allergies   Allergen Reactions   • Metformin  Diarrhea   • Statins Myalgia       Social History  Social History     Social History Narrative   • Not on file       Family History  He has no family history of bladder or kidney cancer  He has no family history of kidney stones      AUA Symptom Score:      Review of Systems  Review of Systems   Constitutional: Positive for unexpected weight loss. Negative for activity change, appetite change, chills, fatigue, fever and unexpected weight gain.   Respiratory: Negative for apnea, cough, chest tightness, shortness of breath, wheezing and stridor.    Cardiovascular: Negative for chest pain, palpitations and leg swelling.   Gastrointestinal: Negative for abdominal distention, abdominal pain, anal bleeding, blood in stool, constipation, diarrhea, nausea, rectal pain, vomiting, GERD and indigestion.   Genitourinary: Positive for frequency and urinary incontinence. Negative for decreased libido, decreased urine volume, difficulty urinating, discharge, dysuria, flank pain, genital sores, hematuria, nocturia, penile pain, erectile dysfunction, penile swelling, scrotal swelling, testicular pain and urgency.   Musculoskeletal: Negative for back pain and joint swelling.   Neurological: Negative for tremors, seizures, speech difficulty, weakness and numbness.   Psychiatric/Behavioral: Negative for agitation, decreased concentration, sleep disturbance, depressed mood and stress. The patient is not nervous/anxious.    All other systems reviewed and are negative.      Physical Exam  Physical Exam   Constitutional: He is oriented to person, place, and time. He appears well-developed and well-nourished.   HENT:   Head: Normocephalic and atraumatic.   Neck: Normal range of motion.   Pulmonary/Chest: Effort normal. No respiratory distress.   Abdominal: Soft. He exhibits no distension and no mass. There is no tenderness. No hernia.   Genitourinary: Penis normal.   Musculoskeletal: Normal range of motion.   Lymphadenopathy:     He has no  cervical adenopathy.   Neurological: He is alert and oriented to person, place, and time.   Skin: Skin is warm and dry.   Psychiatric: He has a normal mood and affect. His behavior is normal.   Vitals reviewed.      Labs Recent and today in the office:  Results for orders placed or performed during the hospital encounter of 08/14/19   Urine Culture - Urine, Urinary Bladder   Result Value Ref Range    Urine Culture No growth    Urinalysis without microscopic (no culture) - Urinary Bladder   Result Value Ref Range    Color, UA Yellow Yellow, Straw    Appearance, UA Turbid (A) Clear    pH, UA <=5.0 5.0 - 8.0    Specific Gravity, UA 1.007 1.005 - 1.030    Glucose, UA Negative Negative    Ketones, UA Negative Negative    Bilirubin, UA Negative Negative    Blood, UA Moderate (2+) (A) Negative    Protein, UA Negative Negative    Leuk Esterase, UA Negative Negative    Nitrite, UA Negative Negative    Urobilinogen, UA 0.2 E.U./dL 0.2 - 1.0 E.U./dL   POC Glucose Once   Result Value Ref Range    Glucose 197 (H) 70 - 130 mg/dL   POC Glucose Once   Result Value Ref Range    Glucose 202 (H) 70 - 130 mg/dL         Assessment & Plan  Left hydronephrosis/UTI: This has been present for a decade and attributed to a strictured tortuous distal left ureter after repeated laser ablation of ureteral calculi.  Is been handled with an indwelling stent which was recently replaced with a conventional ureteral stent.  I recommended he return in 2 months to have a metallic stent inserted which only has to be changed yearly.  He is currently on multiple antibiotics for recent bout of sepsis from diverticulitis and this includes Diflucan Flagyl and Ceftin.  His last urine culture was no growth.     BPH with outlet obstruction: His Ascencio catheter was removed today and he is started on Rapaflo.  If this is not approved we will need to provide Flomax.

## 2019-08-20 ENCOUNTER — LAB REQUISITION (OUTPATIENT)
Dept: LAB | Facility: HOSPITAL | Age: 84
End: 2019-08-20

## 2019-08-20 ENCOUNTER — READMISSION MANAGEMENT (OUTPATIENT)
Dept: CALL CENTER | Facility: HOSPITAL | Age: 84
End: 2019-08-20

## 2019-08-20 DIAGNOSIS — Z00.00 ROUTINE GENERAL MEDICAL EXAMINATION AT A HEALTH CARE FACILITY: ICD-10-CM

## 2019-08-20 LAB
ALBUMIN SERPL-MCNC: 3.6 G/DL (ref 3.5–5.2)
ALBUMIN/GLOB SERPL: 0.9 G/DL
ALP SERPL-CCNC: 78 U/L (ref 39–117)
ALT SERPL W P-5'-P-CCNC: 16 U/L (ref 1–41)
ANION GAP SERPL CALCULATED.3IONS-SCNC: 12 MMOL/L (ref 5–15)
AST SERPL-CCNC: 20 U/L (ref 1–40)
BASOPHILS # BLD AUTO: 0.11 10*3/MM3 (ref 0–0.2)
BASOPHILS NFR BLD AUTO: 0.8 % (ref 0–1.5)
BILIRUB SERPL-MCNC: 0.4 MG/DL (ref 0.2–1.2)
BUN BLD-MCNC: 27 MG/DL (ref 8–23)
BUN/CREAT SERPL: 24.8 (ref 7–25)
CALCIUM SPEC-SCNC: 10.6 MG/DL (ref 8.6–10.5)
CHLORIDE SERPL-SCNC: 101 MMOL/L (ref 98–107)
CO2 SERPL-SCNC: 26 MMOL/L (ref 22–29)
CREAT BLD-MCNC: 1.09 MG/DL (ref 0.76–1.27)
CRP SERPL-MCNC: 2.39 MG/DL (ref 0–0.5)
DEPRECATED RDW RBC AUTO: 53.7 FL (ref 37–54)
EOSINOPHIL # BLD AUTO: 0.43 10*3/MM3 (ref 0–0.4)
EOSINOPHIL NFR BLD AUTO: 3.2 % (ref 0.3–6.2)
ERYTHROCYTE [DISTWIDTH] IN BLOOD BY AUTOMATED COUNT: 15.2 % (ref 12.3–15.4)
ERYTHROCYTE [SEDIMENTATION RATE] IN BLOOD: 66 MM/HR (ref 0–20)
GFR SERPL CREATININE-BSD FRML MDRD: 64 ML/MIN/1.73
GLOBULIN UR ELPH-MCNC: 4.1 GM/DL
GLUCOSE BLD-MCNC: 295 MG/DL (ref 65–99)
HCT VFR BLD AUTO: 47 % (ref 37.5–51)
HGB BLD-MCNC: 14.6 G/DL (ref 13–17.7)
IMM GRANULOCYTES # BLD AUTO: 0.18 10*3/MM3 (ref 0–0.05)
IMM GRANULOCYTES NFR BLD AUTO: 1.3 % (ref 0–0.5)
LYMPHOCYTES # BLD AUTO: 2.89 10*3/MM3 (ref 0.7–3.1)
LYMPHOCYTES NFR BLD AUTO: 21.6 % (ref 19.6–45.3)
MCH RBC QN AUTO: 30.2 PG (ref 26.6–33)
MCHC RBC AUTO-ENTMCNC: 31.1 G/DL (ref 31.5–35.7)
MCV RBC AUTO: 97.3 FL (ref 79–97)
MONOCYTES # BLD AUTO: 0.96 10*3/MM3 (ref 0.1–0.9)
MONOCYTES NFR BLD AUTO: 7.2 % (ref 5–12)
NEUTROPHILS # BLD AUTO: 8.79 10*3/MM3 (ref 1.7–7)
NEUTROPHILS NFR BLD AUTO: 65.9 % (ref 42.7–76)
NRBC BLD AUTO-RTO: 0 /100 WBC (ref 0–0.2)
PLATELET # BLD AUTO: 149 10*3/MM3 (ref 140–450)
PMV BLD AUTO: 10.7 FL (ref 6–12)
POTASSIUM BLD-SCNC: 4.6 MMOL/L (ref 3.5–5.2)
PROT SERPL-MCNC: 7.7 G/DL (ref 6–8.5)
RBC # BLD AUTO: 4.83 10*6/MM3 (ref 4.14–5.8)
SODIUM BLD-SCNC: 139 MMOL/L (ref 136–145)
WBC NRBC COR # BLD: 13.36 10*3/MM3 (ref 3.4–10.8)

## 2019-08-20 PROCEDURE — 80053 COMPREHEN METABOLIC PANEL: CPT | Performed by: INTERNAL MEDICINE

## 2019-08-20 PROCEDURE — 85025 COMPLETE CBC W/AUTO DIFF WBC: CPT | Performed by: INTERNAL MEDICINE

## 2019-08-20 PROCEDURE — 86140 C-REACTIVE PROTEIN: CPT | Performed by: INTERNAL MEDICINE

## 2019-08-20 PROCEDURE — 36415 COLL VENOUS BLD VENIPUNCTURE: CPT | Performed by: INTERNAL MEDICINE

## 2019-08-20 PROCEDURE — 85652 RBC SED RATE AUTOMATED: CPT | Performed by: INTERNAL MEDICINE

## 2019-08-22 ENCOUNTER — APPOINTMENT (OUTPATIENT)
Dept: GENERAL RADIOLOGY | Facility: HOSPITAL | Age: 84
End: 2019-08-22

## 2019-08-22 ENCOUNTER — HOSPITAL ENCOUNTER (INPATIENT)
Facility: HOSPITAL | Age: 84
LOS: 4 days | Discharge: HOME HEALTH | End: 2019-08-26
Attending: EMERGENCY MEDICINE | Admitting: INTERNAL MEDICINE

## 2019-08-22 DIAGNOSIS — R25.2 LEG CRAMPING: ICD-10-CM

## 2019-08-22 DIAGNOSIS — N18.30 CKD (CHRONIC KIDNEY DISEASE) STAGE 3, GFR 30-59 ML/MIN (HCC): ICD-10-CM

## 2019-08-22 DIAGNOSIS — N28.9 ACUTE ON CHRONIC RENAL INSUFFICIENCY: ICD-10-CM

## 2019-08-22 DIAGNOSIS — Z99.81 HYPOXEMIA REQUIRING SUPPLEMENTAL OXYGEN: ICD-10-CM

## 2019-08-22 DIAGNOSIS — E11.65 CONTROLLED TYPE 2 DIABETES MELLITUS WITH HYPERGLYCEMIA, UNSPECIFIED WHETHER LONG TERM INSULIN USE (HCC): ICD-10-CM

## 2019-08-22 DIAGNOSIS — N18.9 ACUTE ON CHRONIC RENAL INSUFFICIENCY: ICD-10-CM

## 2019-08-22 DIAGNOSIS — R09.02 HYPOXEMIA REQUIRING SUPPLEMENTAL OXYGEN: ICD-10-CM

## 2019-08-22 DIAGNOSIS — R06.03 ACUTE RESPIRATORY DISTRESS: Primary | ICD-10-CM

## 2019-08-22 LAB
ALBUMIN SERPL-MCNC: 3.9 G/DL (ref 3.5–5.2)
ALBUMIN/GLOB SERPL: 0.9 G/DL
ALP SERPL-CCNC: 83 U/L (ref 39–117)
ALT SERPL W P-5'-P-CCNC: 18 U/L (ref 1–41)
ANION GAP SERPL CALCULATED.3IONS-SCNC: 15 MMOL/L (ref 5–15)
AST SERPL-CCNC: 56 U/L (ref 1–40)
BASOPHILS # BLD AUTO: 0.09 10*3/MM3 (ref 0–0.2)
BASOPHILS NFR BLD AUTO: 0.6 % (ref 0–1.5)
BILIRUB SERPL-MCNC: 0.3 MG/DL (ref 0.2–1.2)
BUN BLD-MCNC: 31 MG/DL (ref 8–23)
BUN/CREAT SERPL: 23.1 (ref 7–25)
CALCIUM SPEC-SCNC: 10.4 MG/DL (ref 8.6–10.5)
CHLORIDE SERPL-SCNC: 102 MMOL/L (ref 98–107)
CO2 SERPL-SCNC: 24 MMOL/L (ref 22–29)
CREAT BLD-MCNC: 1.34 MG/DL (ref 0.76–1.27)
DEPRECATED RDW RBC AUTO: 54.9 FL (ref 37–54)
EOSINOPHIL # BLD AUTO: 0.42 10*3/MM3 (ref 0–0.4)
EOSINOPHIL NFR BLD AUTO: 2.8 % (ref 0.3–6.2)
ERYTHROCYTE [DISTWIDTH] IN BLOOD BY AUTOMATED COUNT: 15.4 % (ref 12.3–15.4)
GFR SERPL CREATININE-BSD FRML MDRD: 50 ML/MIN/1.73
GLOBULIN UR ELPH-MCNC: 4.4 GM/DL
GLUCOSE BLD-MCNC: 309 MG/DL (ref 65–99)
HCT VFR BLD AUTO: 47.9 % (ref 37.5–51)
HGB BLD-MCNC: 14.8 G/DL (ref 13–17.7)
IMM GRANULOCYTES # BLD AUTO: 0.15 10*3/MM3 (ref 0–0.05)
IMM GRANULOCYTES NFR BLD AUTO: 1 % (ref 0–0.5)
LYMPHOCYTES # BLD AUTO: 2.99 10*3/MM3 (ref 0.7–3.1)
LYMPHOCYTES NFR BLD AUTO: 20.3 % (ref 19.6–45.3)
MCH RBC QN AUTO: 30.3 PG (ref 26.6–33)
MCHC RBC AUTO-ENTMCNC: 30.9 G/DL (ref 31.5–35.7)
MCV RBC AUTO: 98 FL (ref 79–97)
MONOCYTES # BLD AUTO: 1.01 10*3/MM3 (ref 0.1–0.9)
MONOCYTES NFR BLD AUTO: 6.8 % (ref 5–12)
NEUTROPHILS # BLD AUTO: 10.09 10*3/MM3 (ref 1.7–7)
NEUTROPHILS NFR BLD AUTO: 68.5 % (ref 42.7–76)
NRBC BLD AUTO-RTO: 0 /100 WBC (ref 0–0.2)
NT-PROBNP SERPL-MCNC: 311.6 PG/ML (ref 5–1800)
PLATELET # BLD AUTO: 151 10*3/MM3 (ref 140–450)
PMV BLD AUTO: 11.2 FL (ref 6–12)
POTASSIUM BLD-SCNC: 6 MMOL/L (ref 3.5–5.2)
PROT SERPL-MCNC: 8.3 G/DL (ref 6–8.5)
RBC # BLD AUTO: 4.89 10*6/MM3 (ref 4.14–5.8)
SODIUM BLD-SCNC: 141 MMOL/L (ref 136–145)
TROPONIN T SERPL-MCNC: 0.01 NG/ML (ref 0–0.03)
WBC NRBC COR # BLD: 14.75 10*3/MM3 (ref 3.4–10.8)

## 2019-08-22 PROCEDURE — 93005 ELECTROCARDIOGRAM TRACING: CPT | Performed by: EMERGENCY MEDICINE

## 2019-08-22 PROCEDURE — 84484 ASSAY OF TROPONIN QUANT: CPT | Performed by: EMERGENCY MEDICINE

## 2019-08-22 PROCEDURE — 80053 COMPREHEN METABOLIC PANEL: CPT | Performed by: EMERGENCY MEDICINE

## 2019-08-22 PROCEDURE — 83880 ASSAY OF NATRIURETIC PEPTIDE: CPT | Performed by: EMERGENCY MEDICINE

## 2019-08-22 PROCEDURE — 85025 COMPLETE CBC W/AUTO DIFF WBC: CPT

## 2019-08-22 PROCEDURE — 93005 ELECTROCARDIOGRAM TRACING: CPT

## 2019-08-22 PROCEDURE — 25010000002 ENOXAPARIN PER 10 MG: Performed by: EMERGENCY MEDICINE

## 2019-08-22 PROCEDURE — 99285 EMERGENCY DEPT VISIT HI MDM: CPT

## 2019-08-22 PROCEDURE — 71045 X-RAY EXAM CHEST 1 VIEW: CPT

## 2019-08-22 RX ORDER — SODIUM CHLORIDE 0.9 % (FLUSH) 0.9 %
10 SYRINGE (ML) INJECTION AS NEEDED
Status: DISCONTINUED | OUTPATIENT
Start: 2019-08-22 | End: 2019-08-26 | Stop reason: HOSPADM

## 2019-08-22 RX ADMIN — ENOXAPARIN SODIUM 90 MG: 100 INJECTION SUBCUTANEOUS at 23:40

## 2019-08-23 ENCOUNTER — APPOINTMENT (OUTPATIENT)
Dept: CARDIOLOGY | Facility: HOSPITAL | Age: 84
End: 2019-08-23

## 2019-08-23 ENCOUNTER — APPOINTMENT (OUTPATIENT)
Dept: NUCLEAR MEDICINE | Facility: HOSPITAL | Age: 84
End: 2019-08-23

## 2019-08-23 PROBLEM — N13.39 OTHER HYDRONEPHROSIS: Status: RESOLVED | Noted: 2019-08-12 | Resolved: 2019-08-23

## 2019-08-23 PROBLEM — N39.0 UTI (URINARY TRACT INFECTION): Status: RESOLVED | Noted: 2019-07-18 | Resolved: 2019-08-23

## 2019-08-23 PROBLEM — R06.03 ACUTE RESPIRATORY DISTRESS: Status: ACTIVE | Noted: 2019-08-23

## 2019-08-23 PROBLEM — N18.30 CKD (CHRONIC KIDNEY DISEASE) STAGE 3, GFR 30-59 ML/MIN (HCC): Status: ACTIVE | Noted: 2019-08-23

## 2019-08-23 PROBLEM — N13.30 HYDRONEPHROSIS OF LEFT KIDNEY: Status: RESOLVED | Noted: 2019-07-18 | Resolved: 2019-08-23

## 2019-08-23 PROBLEM — K57.32 SIGMOID DIVERTICULITIS: Status: RESOLVED | Noted: 2017-08-09 | Resolved: 2019-08-23

## 2019-08-23 PROBLEM — J96.01 ACUTE RESPIRATORY FAILURE WITH HYPOXIA: Status: ACTIVE | Noted: 2019-08-23

## 2019-08-23 PROBLEM — N13.5 URETERAL STRICTURE, LEFT: Status: RESOLVED | Noted: 2019-02-07 | Resolved: 2019-08-23

## 2019-08-23 PROBLEM — R09.89 SUSPECTED PULMONARY EMBOLISM: Status: ACTIVE | Noted: 2019-08-23

## 2019-08-23 PROBLEM — K57.92 ACUTE DIVERTICULITIS: Status: RESOLVED | Noted: 2019-07-18 | Resolved: 2019-08-23

## 2019-08-23 PROBLEM — R53.1 GENERALIZED WEAKNESS: Status: RESOLVED | Noted: 2019-07-18 | Resolved: 2019-08-23

## 2019-08-23 PROBLEM — L97.511: Status: RESOLVED | Noted: 2019-07-05 | Resolved: 2019-08-23

## 2019-08-23 LAB
ANION GAP SERPL CALCULATED.3IONS-SCNC: 16 MMOL/L (ref 5–15)
BACTERIA UR QL AUTO: ABNORMAL /HPF
BASOPHILS # BLD AUTO: 0.1 10*3/MM3 (ref 0–0.2)
BASOPHILS NFR BLD AUTO: 0.8 % (ref 0–1.5)
BH CV ECHO MEAS - AO ROOT AREA (BSA CORRECTED): 1.8
BH CV ECHO MEAS - AO ROOT AREA: 11 CM^2
BH CV ECHO MEAS - AO ROOT DIAM: 3.7 CM
BH CV ECHO MEAS - BSA(HAYCOCK): 2.1 M^2
BH CV ECHO MEAS - BSA: 2 M^2
BH CV ECHO MEAS - BZI_BMI: 30.1 KILOGRAMS/M^2
BH CV ECHO MEAS - BZI_METRIC_HEIGHT: 172.7 CM
BH CV ECHO MEAS - BZI_METRIC_WEIGHT: 89.8 KG
BH CV ECHO MEAS - EDV(CUBED): 80.1 ML
BH CV ECHO MEAS - EDV(TEICH): 83.5 ML
BH CV ECHO MEAS - EF(CUBED): 66.6 %
BH CV ECHO MEAS - EF(TEICH): 58.4 %
BH CV ECHO MEAS - ESV(CUBED): 26.7 ML
BH CV ECHO MEAS - ESV(TEICH): 34.7 ML
BH CV ECHO MEAS - FS: 30.6 %
BH CV ECHO MEAS - IVS/LVPW: 0.98
BH CV ECHO MEAS - IVSD: 1.2 CM
BH CV ECHO MEAS - LA DIMENSION: 3.8 CM
BH CV ECHO MEAS - LA/AO: 1
BH CV ECHO MEAS - LV MASS(C)D: 184.9 GRAMS
BH CV ECHO MEAS - LV MASS(C)DI: 90.9 GRAMS/M^2
BH CV ECHO MEAS - LVIDD: 4.3 CM
BH CV ECHO MEAS - LVIDS: 3 CM
BH CV ECHO MEAS - LVOT AREA (M): 4.2 CM^2
BH CV ECHO MEAS - LVOT AREA: 4.2 CM^2
BH CV ECHO MEAS - LVOT DIAM: 2.3 CM
BH CV ECHO MEAS - LVPWD: 1.2 CM
BH CV ECHO MEAS - RAP SYSTOLE: 8 MMHG
BH CV ECHO MEAS - RVSP: 26 MMHG
BH CV ECHO MEAS - SI(CUBED): 26.2 ML/M^2
BH CV ECHO MEAS - SI(TEICH): 24 ML/M^2
BH CV ECHO MEAS - SV(CUBED): 53.3 ML
BH CV ECHO MEAS - SV(TEICH): 48.8 ML
BH CV ECHO MEAS - TR MAX PG: 18 MMHG
BH CV ECHO MEAS - TR MAX VEL: 211.8 CM/SEC
BH CV LOW VAS RIGHT DISTAL FEMORAL SPONT: 1
BH CV LOW VAS RIGHT POPLITEAL SPONT: 1
BH CV LOWER VASCULAR LEFT COMMON FEMORAL AUGMENT: NORMAL
BH CV LOWER VASCULAR LEFT COMMON FEMORAL COMPRESS: NORMAL
BH CV LOWER VASCULAR LEFT COMMON FEMORAL PHASIC: NORMAL
BH CV LOWER VASCULAR LEFT COMMON FEMORAL SPONT: NORMAL
BH CV LOWER VASCULAR LEFT DISTAL FEMORAL COMPRESS: NORMAL
BH CV LOWER VASCULAR LEFT GASTRONEMIUS COMPRESS: NORMAL
BH CV LOWER VASCULAR LEFT GREATER SAPH AK COMPRESS: NORMAL
BH CV LOWER VASCULAR LEFT GREATER SAPH BK COMPRESS: NORMAL
BH CV LOWER VASCULAR LEFT MID FEMORAL AUGMENT: NORMAL
BH CV LOWER VASCULAR LEFT MID FEMORAL COMPRESS: NORMAL
BH CV LOWER VASCULAR LEFT MID FEMORAL PHASIC: NORMAL
BH CV LOWER VASCULAR LEFT MID FEMORAL SPONT: NORMAL
BH CV LOWER VASCULAR LEFT PERONEAL COMPRESS: NORMAL
BH CV LOWER VASCULAR LEFT POPLITEAL AUGMENT: NORMAL
BH CV LOWER VASCULAR LEFT POPLITEAL COMPRESS: NORMAL
BH CV LOWER VASCULAR LEFT POPLITEAL PHASIC: NORMAL
BH CV LOWER VASCULAR LEFT POPLITEAL SPONT: NORMAL
BH CV LOWER VASCULAR LEFT POSTERIOR TIBIAL COMPRESS: NORMAL
BH CV LOWER VASCULAR LEFT PROFUNDA FEMORAL AUGMENT: NORMAL
BH CV LOWER VASCULAR LEFT PROFUNDA FEMORAL COMPRESS: NORMAL
BH CV LOWER VASCULAR LEFT PROFUNDA FEMORAL PHASIC: NORMAL
BH CV LOWER VASCULAR LEFT PROFUNDA FEMORAL SPONT: NORMAL
BH CV LOWER VASCULAR LEFT PROXIMAL FEMORAL COMPRESS: NORMAL
BH CV LOWER VASCULAR LEFT SAPHENOFEMORAL JUNCTION AUGMENT: NORMAL
BH CV LOWER VASCULAR LEFT SAPHENOFEMORAL JUNCTION COMPRESS: NORMAL
BH CV LOWER VASCULAR LEFT SAPHENOFEMORAL JUNCTION PHASIC: NORMAL
BH CV LOWER VASCULAR LEFT SAPHENOFEMORAL JUNCTION SPONT: NORMAL
BH CV LOWER VASCULAR RIGHT COMMON FEMORAL AUGMENT: NORMAL
BH CV LOWER VASCULAR RIGHT COMMON FEMORAL COMPRESS: NORMAL
BH CV LOWER VASCULAR RIGHT COMMON FEMORAL PHASIC: NORMAL
BH CV LOWER VASCULAR RIGHT COMMON FEMORAL SPONT: NORMAL
BH CV LOWER VASCULAR RIGHT DISTAL FEMORAL AUGMENT: NORMAL
BH CV LOWER VASCULAR RIGHT DISTAL FEMORAL COMPETENT: NORMAL
BH CV LOWER VASCULAR RIGHT DISTAL FEMORAL COMPRESS: NORMAL
BH CV LOWER VASCULAR RIGHT DISTAL FEMORAL PHASIC: NORMAL
BH CV LOWER VASCULAR RIGHT DISTAL FEMORAL SPONT: NORMAL
BH CV LOWER VASCULAR RIGHT DISTAL FEMORAL THROMBUS: NORMAL
BH CV LOWER VASCULAR RIGHT GASTRONEMIUS COMPRESS: NORMAL
BH CV LOWER VASCULAR RIGHT GREATER SAPH AK COMPRESS: NORMAL
BH CV LOWER VASCULAR RIGHT GREATER SAPH BK COMPRESS: NORMAL
BH CV LOWER VASCULAR RIGHT MID FEMORAL AUGMENT: NORMAL
BH CV LOWER VASCULAR RIGHT MID FEMORAL COMPETENT: NORMAL
BH CV LOWER VASCULAR RIGHT MID FEMORAL COMPRESS: NORMAL
BH CV LOWER VASCULAR RIGHT MID FEMORAL PHASIC: NORMAL
BH CV LOWER VASCULAR RIGHT MID FEMORAL SPONT: NORMAL
BH CV LOWER VASCULAR RIGHT PERONEAL COMPRESS: NORMAL
BH CV LOWER VASCULAR RIGHT POPLITEAL AUGMENT: NORMAL
BH CV LOWER VASCULAR RIGHT POPLITEAL COMPETENT: NORMAL
BH CV LOWER VASCULAR RIGHT POPLITEAL COMPRESS: NORMAL
BH CV LOWER VASCULAR RIGHT POPLITEAL PHASIC: NORMAL
BH CV LOWER VASCULAR RIGHT POPLITEAL SPONT: NORMAL
BH CV LOWER VASCULAR RIGHT POPLITEAL THROMBUS: NORMAL
BH CV LOWER VASCULAR RIGHT POSTERIOR TIBIAL COMPRESS: NORMAL
BH CV LOWER VASCULAR RIGHT PROFUNDA FEMORAL COMPRESS: NORMAL
BH CV LOWER VASCULAR RIGHT PROXIMAL FEMORAL COMPRESS: NORMAL
BH CV LOWER VASCULAR RIGHT SAPHENOFEMORAL JUNCTION AUGMENT: NORMAL
BH CV LOWER VASCULAR RIGHT SAPHENOFEMORAL JUNCTION COMPRESS: NORMAL
BH CV LOWER VASCULAR RIGHT SAPHENOFEMORAL JUNCTION PHASIC: NORMAL
BH CV LOWER VASCULAR RIGHT SAPHENOFEMORAL JUNCTION SPONT: NORMAL
BH CV XLRA - RV BASE: 4.8 CM
BH CV XLRA - RV LENGTH: 6.5 CM
BH CV XLRA - RV MID: 4.2 CM
BILIRUB UR QL STRIP: NEGATIVE
BUN BLD-MCNC: 31 MG/DL (ref 8–23)
BUN/CREAT SERPL: 23.5 (ref 7–25)
CALCIUM SPEC-SCNC: 10.4 MG/DL (ref 8.6–10.5)
CHLORIDE SERPL-SCNC: 102 MMOL/L (ref 98–107)
CLARITY UR: ABNORMAL
CO2 SERPL-SCNC: 23 MMOL/L (ref 22–29)
COLOR UR: YELLOW
CREAT BLD-MCNC: 1.32 MG/DL (ref 0.76–1.27)
DEPRECATED RDW RBC AUTO: 55.5 FL (ref 37–54)
EOSINOPHIL # BLD AUTO: 0.3 10*3/MM3 (ref 0–0.4)
EOSINOPHIL NFR BLD AUTO: 2.4 % (ref 0.3–6.2)
ERYTHROCYTE [DISTWIDTH] IN BLOOD BY AUTOMATED COUNT: 15.4 % (ref 12.3–15.4)
GFR SERPL CREATININE-BSD FRML MDRD: 51 ML/MIN/1.73
GLUCOSE BLD-MCNC: 283 MG/DL (ref 65–99)
GLUCOSE BLDC GLUCOMTR-MCNC: 179 MG/DL (ref 70–130)
GLUCOSE BLDC GLUCOMTR-MCNC: 182 MG/DL (ref 70–130)
GLUCOSE BLDC GLUCOMTR-MCNC: 214 MG/DL (ref 70–130)
GLUCOSE BLDC GLUCOMTR-MCNC: 228 MG/DL (ref 70–130)
GLUCOSE BLDC GLUCOMTR-MCNC: 252 MG/DL (ref 70–130)
GLUCOSE UR STRIP-MCNC: ABNORMAL MG/DL
HBA1C MFR BLD: 9.9 % (ref 4.8–5.6)
HCT VFR BLD AUTO: 45.6 % (ref 37.5–51)
HGB BLD-MCNC: 14 G/DL (ref 13–17.7)
HGB UR QL STRIP.AUTO: ABNORMAL
HOLD SPECIMEN: NORMAL
HOLD SPECIMEN: NORMAL
HYALINE CASTS UR QL AUTO: ABNORMAL /LPF
IMM GRANULOCYTES # BLD AUTO: 0.14 10*3/MM3 (ref 0–0.05)
IMM GRANULOCYTES NFR BLD AUTO: 1.1 % (ref 0–0.5)
KETONES UR QL STRIP: NEGATIVE
LEUKOCYTE ESTERASE UR QL STRIP.AUTO: ABNORMAL
LV EF 2D ECHO EST: 50 %
LYMPHOCYTES # BLD AUTO: 3.07 10*3/MM3 (ref 0.7–3.1)
LYMPHOCYTES NFR BLD AUTO: 24.5 % (ref 19.6–45.3)
MAXIMAL PREDICTED HEART RATE: 133 BPM
MCH RBC QN AUTO: 30.4 PG (ref 26.6–33)
MCHC RBC AUTO-ENTMCNC: 30.7 G/DL (ref 31.5–35.7)
MCV RBC AUTO: 99.1 FL (ref 79–97)
MONOCYTES # BLD AUTO: 0.96 10*3/MM3 (ref 0.1–0.9)
MONOCYTES NFR BLD AUTO: 7.7 % (ref 5–12)
NEUTROPHILS # BLD AUTO: 7.97 10*3/MM3 (ref 1.7–7)
NEUTROPHILS NFR BLD AUTO: 63.5 % (ref 42.7–76)
NITRITE UR QL STRIP: NEGATIVE
NRBC BLD AUTO-RTO: 0 /100 WBC (ref 0–0.2)
PH UR STRIP.AUTO: 5.5 [PH] (ref 5–8)
PLATELET # BLD AUTO: 126 10*3/MM3 (ref 140–450)
PMV BLD AUTO: 11.1 FL (ref 6–12)
POTASSIUM BLD-SCNC: 4.4 MMOL/L (ref 3.5–5.2)
PROT UR QL STRIP: ABNORMAL
RBC # BLD AUTO: 4.6 10*6/MM3 (ref 4.14–5.8)
RBC # UR: ABNORMAL /HPF
REF LAB TEST METHOD: ABNORMAL
SODIUM BLD-SCNC: 141 MMOL/L (ref 136–145)
SP GR UR STRIP: 1.02 (ref 1–1.03)
SQUAMOUS #/AREA URNS HPF: ABNORMAL /HPF
STRESS TARGET HR: 113 BPM
UROBILINOGEN UR QL STRIP: ABNORMAL
WBC NRBC COR # BLD: 12.54 10*3/MM3 (ref 3.4–10.8)
WBC UR QL AUTO: ABNORMAL /HPF
WHOLE BLOOD HOLD SPECIMEN: NORMAL
WHOLE BLOOD HOLD SPECIMEN: NORMAL

## 2019-08-23 PROCEDURE — 82962 GLUCOSE BLOOD TEST: CPT

## 2019-08-23 PROCEDURE — 83036 HEMOGLOBIN GLYCOSYLATED A1C: CPT | Performed by: NURSE PRACTITIONER

## 2019-08-23 PROCEDURE — 80048 BASIC METABOLIC PNL TOTAL CA: CPT | Performed by: NURSE PRACTITIONER

## 2019-08-23 PROCEDURE — A9558 XE133 XENON 10MCI: HCPCS | Performed by: HOSPITALIST

## 2019-08-23 PROCEDURE — 81001 URINALYSIS AUTO W/SCOPE: CPT | Performed by: EMERGENCY MEDICINE

## 2019-08-23 PROCEDURE — 93321 DOPPLER ECHO F-UP/LMTD STD: CPT | Performed by: INTERNAL MEDICINE

## 2019-08-23 PROCEDURE — 87077 CULTURE AEROBIC IDENTIFY: CPT | Performed by: HOSPITALIST

## 2019-08-23 PROCEDURE — 78582 LUNG VENTILAT&PERFUS IMAGING: CPT

## 2019-08-23 PROCEDURE — 0 XENON XE 133: Performed by: HOSPITALIST

## 2019-08-23 PROCEDURE — A9540 TC99M MAA: HCPCS | Performed by: HOSPITALIST

## 2019-08-23 PROCEDURE — 93970 EXTREMITY STUDY: CPT

## 2019-08-23 PROCEDURE — 25010000002 ENOXAPARIN PER 10 MG: Performed by: HOSPITALIST

## 2019-08-23 PROCEDURE — 85025 COMPLETE CBC W/AUTO DIFF WBC: CPT | Performed by: NURSE PRACTITIONER

## 2019-08-23 PROCEDURE — 93308 TTE F-UP OR LMTD: CPT

## 2019-08-23 PROCEDURE — 93325 DOPPLER ECHO COLOR FLOW MAPG: CPT | Performed by: INTERNAL MEDICINE

## 2019-08-23 PROCEDURE — 99223 1ST HOSP IP/OBS HIGH 75: CPT | Performed by: INTERNAL MEDICINE

## 2019-08-23 PROCEDURE — 93321 DOPPLER ECHO F-UP/LMTD STD: CPT

## 2019-08-23 PROCEDURE — 63710000001 INSULIN LISPRO (HUMAN) PER 5 UNITS: Performed by: NURSE PRACTITIONER

## 2019-08-23 PROCEDURE — 25010000002 CEFTRIAXONE PER 250 MG: Performed by: HOSPITALIST

## 2019-08-23 PROCEDURE — 87086 URINE CULTURE/COLONY COUNT: CPT | Performed by: HOSPITALIST

## 2019-08-23 PROCEDURE — 0 TECHNETIUM ALBUMIN AGGREGATED: Performed by: HOSPITALIST

## 2019-08-23 PROCEDURE — 93325 DOPPLER ECHO COLOR FLOW MAPG: CPT

## 2019-08-23 PROCEDURE — 93970 EXTREMITY STUDY: CPT | Performed by: INTERNAL MEDICINE

## 2019-08-23 PROCEDURE — 87186 SC STD MICRODIL/AGAR DIL: CPT | Performed by: HOSPITALIST

## 2019-08-23 PROCEDURE — 93308 TTE F-UP OR LMTD: CPT | Performed by: INTERNAL MEDICINE

## 2019-08-23 RX ORDER — SODIUM CHLORIDE 0.9 % (FLUSH) 0.9 %
3 SYRINGE (ML) INJECTION EVERY 12 HOURS SCHEDULED
Status: DISCONTINUED | OUTPATIENT
Start: 2019-08-23 | End: 2019-08-26 | Stop reason: HOSPADM

## 2019-08-23 RX ORDER — FLUCONAZOLE 200 MG/1
400 TABLET ORAL DAILY
COMMUNITY
End: 2019-08-26 | Stop reason: HOSPADM

## 2019-08-23 RX ORDER — ALLOPURINOL 300 MG/1
300 TABLET ORAL NIGHTLY
Status: DISCONTINUED | OUTPATIENT
Start: 2019-08-23 | End: 2019-08-23

## 2019-08-23 RX ORDER — IPRATROPIUM BROMIDE AND ALBUTEROL SULFATE 2.5; .5 MG/3ML; MG/3ML
3 SOLUTION RESPIRATORY (INHALATION) EVERY 4 HOURS PRN
Status: DISCONTINUED | OUTPATIENT
Start: 2019-08-23 | End: 2019-08-26 | Stop reason: HOSPADM

## 2019-08-23 RX ORDER — FINASTERIDE 5 MG/1
5 TABLET, FILM COATED ORAL DAILY
Status: DISCONTINUED | OUTPATIENT
Start: 2019-08-24 | End: 2019-08-26 | Stop reason: HOSPADM

## 2019-08-23 RX ORDER — NICOTINE POLACRILEX 4 MG
15 LOZENGE BUCCAL
Status: DISCONTINUED | OUTPATIENT
Start: 2019-08-23 | End: 2019-08-26 | Stop reason: HOSPADM

## 2019-08-23 RX ORDER — ALLOPURINOL 100 MG/1
100 TABLET ORAL NIGHTLY
Status: DISCONTINUED | OUTPATIENT
Start: 2019-08-23 | End: 2019-08-23

## 2019-08-23 RX ORDER — CARVEDILOL 6.25 MG/1
6.25 TABLET ORAL 2 TIMES DAILY WITH MEALS
Status: DISCONTINUED | OUTPATIENT
Start: 2019-08-23 | End: 2019-08-25

## 2019-08-23 RX ORDER — CARVEDILOL 3.12 MG/1
3.12 TABLET ORAL 2 TIMES DAILY WITH MEALS
Status: DISCONTINUED | OUTPATIENT
Start: 2019-08-23 | End: 2019-08-23

## 2019-08-23 RX ORDER — DEXTROSE MONOHYDRATE 25 G/50ML
25 INJECTION, SOLUTION INTRAVENOUS
Status: DISCONTINUED | OUTPATIENT
Start: 2019-08-23 | End: 2019-08-26 | Stop reason: HOSPADM

## 2019-08-23 RX ORDER — L.ACID,PARA/B.BIFIDUM/S.THERM 8B CELL
1 CAPSULE ORAL DAILY
Status: DISCONTINUED | OUTPATIENT
Start: 2019-08-23 | End: 2019-08-26 | Stop reason: HOSPADM

## 2019-08-23 RX ORDER — ALLOPURINOL 300 MG/1
300 TABLET ORAL NIGHTLY
Status: DISCONTINUED | OUTPATIENT
Start: 2019-08-23 | End: 2019-08-26 | Stop reason: HOSPADM

## 2019-08-23 RX ORDER — FLUCONAZOLE 200 MG/1
200 TABLET ORAL DAILY
Status: DISCONTINUED | OUTPATIENT
Start: 2019-08-23 | End: 2019-08-23

## 2019-08-23 RX ORDER — FINASTERIDE 5 MG/1
5 TABLET, FILM COATED ORAL DAILY
Status: DISCONTINUED | OUTPATIENT
Start: 2019-08-23 | End: 2019-08-23

## 2019-08-23 RX ORDER — ACETAMINOPHEN 325 MG/1
650 TABLET ORAL EVERY 6 HOURS PRN
Status: DISCONTINUED | OUTPATIENT
Start: 2019-08-23 | End: 2019-08-26 | Stop reason: HOSPADM

## 2019-08-23 RX ORDER — LEVOTHYROXINE SODIUM 0.03 MG/1
50 TABLET ORAL DAILY
Status: DISCONTINUED | OUTPATIENT
Start: 2019-08-23 | End: 2019-08-26 | Stop reason: HOSPADM

## 2019-08-23 RX ORDER — SODIUM CHLORIDE 0.9 % (FLUSH) 0.9 %
3-10 SYRINGE (ML) INJECTION AS NEEDED
Status: DISCONTINUED | OUTPATIENT
Start: 2019-08-23 | End: 2019-08-26 | Stop reason: HOSPADM

## 2019-08-23 RX ORDER — SODIUM CHLORIDE 9 MG/ML
75 INJECTION, SOLUTION INTRAVENOUS ONCE
Status: COMPLETED | OUTPATIENT
Start: 2019-08-23 | End: 2019-08-23

## 2019-08-23 RX ORDER — TAMSULOSIN HYDROCHLORIDE 0.4 MG/1
0.4 CAPSULE ORAL NIGHTLY
Status: DISCONTINUED | OUTPATIENT
Start: 2019-08-23 | End: 2019-08-26 | Stop reason: HOSPADM

## 2019-08-23 RX ORDER — FLUCONAZOLE 200 MG/1
400 TABLET ORAL DAILY
Status: DISCONTINUED | OUTPATIENT
Start: 2019-08-24 | End: 2019-08-25

## 2019-08-23 RX ORDER — METRONIDAZOLE 500 MG/1
500 TABLET ORAL 2 TIMES DAILY
Status: DISCONTINUED | OUTPATIENT
Start: 2019-08-23 | End: 2019-08-23

## 2019-08-23 RX ORDER — CARVEDILOL 6.25 MG/1
6.25 TABLET ORAL 2 TIMES DAILY WITH MEALS
Status: DISCONTINUED | OUTPATIENT
Start: 2019-08-23 | End: 2019-08-23

## 2019-08-23 RX ORDER — FINASTERIDE 5 MG/1
4 TABLET, FILM COATED ORAL DAILY
Status: DISCONTINUED | OUTPATIENT
Start: 2019-08-23 | End: 2019-08-23

## 2019-08-23 RX ADMIN — Medication 1 DOSE: at 11:50

## 2019-08-23 RX ADMIN — ALLOPURINOL 300 MG: 300 TABLET ORAL at 20:47

## 2019-08-23 RX ADMIN — INSULIN LISPRO 3 UNITS: 100 INJECTION, SOLUTION INTRAVENOUS; SUBCUTANEOUS at 12:55

## 2019-08-23 RX ADMIN — INSULIN LISPRO 2 UNITS: 100 INJECTION, SOLUTION INTRAVENOUS; SUBCUTANEOUS at 20:56

## 2019-08-23 RX ADMIN — SODIUM CHLORIDE 75 ML/HR: 9 INJECTION, SOLUTION INTRAVENOUS at 03:35

## 2019-08-23 RX ADMIN — ENOXAPARIN SODIUM 90 MG: 100 INJECTION SUBCUTANEOUS at 21:00

## 2019-08-23 RX ADMIN — CARVEDILOL 3.12 MG: 3.12 TABLET, FILM COATED ORAL at 08:33

## 2019-08-23 RX ADMIN — LEVOTHYROXINE SODIUM 50 MCG: 25 TABLET ORAL at 06:07

## 2019-08-23 RX ADMIN — FINASTERIDE 3.75 MG: 5 TABLET, FILM COATED ORAL at 08:32

## 2019-08-23 RX ADMIN — Medication 1 CAPSULE: at 11:15

## 2019-08-23 RX ADMIN — INSULIN LISPRO 4 UNITS: 100 INJECTION, SOLUTION INTRAVENOUS; SUBCUTANEOUS at 03:36

## 2019-08-23 RX ADMIN — XENON XE-133 15.7 MILLICURIE: 10 GAS RESPIRATORY (INHALATION) at 12:35

## 2019-08-23 RX ADMIN — METRONIDAZOLE 500 MG: 500 TABLET ORAL at 08:32

## 2019-08-23 RX ADMIN — TAMSULOSIN HYDROCHLORIDE 0.4 MG: 0.4 CAPSULE ORAL at 20:47

## 2019-08-23 RX ADMIN — CARVEDILOL 6.25 MG: 6.25 TABLET, FILM COATED ORAL at 18:22

## 2019-08-23 RX ADMIN — INSULIN LISPRO 2 UNITS: 100 INJECTION, SOLUTION INTRAVENOUS; SUBCUTANEOUS at 08:36

## 2019-08-23 RX ADMIN — SODIUM CHLORIDE, PRESERVATIVE FREE 3 ML: 5 INJECTION INTRAVENOUS at 20:48

## 2019-08-23 RX ADMIN — INSULIN LISPRO 3 UNITS: 100 INJECTION, SOLUTION INTRAVENOUS; SUBCUTANEOUS at 18:21

## 2019-08-23 RX ADMIN — ENOXAPARIN SODIUM 90 MG: 100 INJECTION SUBCUTANEOUS at 11:15

## 2019-08-23 RX ADMIN — CEFTRIAXONE SODIUM 1 G: 1 INJECTION, POWDER, FOR SOLUTION INTRAMUSCULAR; INTRAVENOUS at 11:14

## 2019-08-23 RX ADMIN — FLUCONAZOLE 200 MG: 200 TABLET ORAL at 08:31

## 2019-08-24 LAB
ANION GAP SERPL CALCULATED.3IONS-SCNC: 13 MMOL/L (ref 5–15)
BUN BLD-MCNC: 30 MG/DL (ref 8–23)
BUN/CREAT SERPL: 30.6 (ref 7–25)
CALCIUM SPEC-SCNC: 9.8 MG/DL (ref 8.6–10.5)
CHLORIDE SERPL-SCNC: 105 MMOL/L (ref 98–107)
CO2 SERPL-SCNC: 22 MMOL/L (ref 22–29)
CREAT BLD-MCNC: 0.98 MG/DL (ref 0.76–1.27)
DEPRECATED RDW RBC AUTO: 55.3 FL (ref 37–54)
ERYTHROCYTE [DISTWIDTH] IN BLOOD BY AUTOMATED COUNT: 15.3 % (ref 12.3–15.4)
GFR SERPL CREATININE-BSD FRML MDRD: 72 ML/MIN/1.73
GLUCOSE BLD-MCNC: 200 MG/DL (ref 65–99)
GLUCOSE BLDC GLUCOMTR-MCNC: 160 MG/DL (ref 70–130)
GLUCOSE BLDC GLUCOMTR-MCNC: 203 MG/DL (ref 70–130)
GLUCOSE BLDC GLUCOMTR-MCNC: 212 MG/DL (ref 70–130)
GLUCOSE BLDC GLUCOMTR-MCNC: 293 MG/DL (ref 70–130)
HCT VFR BLD AUTO: 43.1 % (ref 37.5–51)
HGB BLD-MCNC: 13.4 G/DL (ref 13–17.7)
MCH RBC QN AUTO: 30.8 PG (ref 26.6–33)
MCHC RBC AUTO-ENTMCNC: 31.1 G/DL (ref 31.5–35.7)
MCV RBC AUTO: 99.1 FL (ref 79–97)
PLATELET # BLD AUTO: 123 10*3/MM3 (ref 140–450)
PMV BLD AUTO: 11.5 FL (ref 6–12)
POTASSIUM BLD-SCNC: 4 MMOL/L (ref 3.5–5.2)
RBC # BLD AUTO: 4.35 10*6/MM3 (ref 4.14–5.8)
SODIUM BLD-SCNC: 140 MMOL/L (ref 136–145)
WBC NRBC COR # BLD: 10.4 10*3/MM3 (ref 3.4–10.8)

## 2019-08-24 PROCEDURE — 63710000001 INSULIN DETEMIR PER 5 UNITS: Performed by: HOSPITALIST

## 2019-08-24 PROCEDURE — 99233 SBSQ HOSP IP/OBS HIGH 50: CPT | Performed by: HOSPITALIST

## 2019-08-24 PROCEDURE — 82962 GLUCOSE BLOOD TEST: CPT

## 2019-08-24 PROCEDURE — 94799 UNLISTED PULMONARY SVC/PX: CPT

## 2019-08-24 PROCEDURE — 25010000002 CEFTRIAXONE PER 250 MG: Performed by: HOSPITALIST

## 2019-08-24 PROCEDURE — 85027 COMPLETE CBC AUTOMATED: CPT | Performed by: HOSPITALIST

## 2019-08-24 PROCEDURE — 80048 BASIC METABOLIC PNL TOTAL CA: CPT | Performed by: HOSPITALIST

## 2019-08-24 RX ADMIN — Medication 1 CAPSULE: at 11:33

## 2019-08-24 RX ADMIN — APIXABAN 10 MG: 5 TABLET, FILM COATED ORAL at 08:24

## 2019-08-24 RX ADMIN — LEVOTHYROXINE SODIUM 50 MCG: 25 TABLET ORAL at 06:22

## 2019-08-24 RX ADMIN — SODIUM CHLORIDE, PRESERVATIVE FREE 3 ML: 5 INJECTION INTRAVENOUS at 20:19

## 2019-08-24 RX ADMIN — CARVEDILOL 6.25 MG: 6.25 TABLET, FILM COATED ORAL at 08:16

## 2019-08-24 RX ADMIN — FINASTERIDE 5 MG: 5 TABLET, FILM COATED ORAL at 08:16

## 2019-08-24 RX ADMIN — FLUCONAZOLE 400 MG: 200 TABLET ORAL at 08:16

## 2019-08-24 RX ADMIN — INSULIN LISPRO 2 UNITS: 100 INJECTION, SOLUTION INTRAVENOUS; SUBCUTANEOUS at 18:14

## 2019-08-24 RX ADMIN — SODIUM CHLORIDE, PRESERVATIVE FREE 3 ML: 5 INJECTION INTRAVENOUS at 08:25

## 2019-08-24 RX ADMIN — INSULIN LISPRO 4 UNITS: 100 INJECTION, SOLUTION INTRAVENOUS; SUBCUTANEOUS at 12:20

## 2019-08-24 RX ADMIN — INSULIN DETEMIR 6 UNITS: 100 INJECTION, SOLUTION SUBCUTANEOUS at 09:54

## 2019-08-24 RX ADMIN — ALLOPURINOL 300 MG: 300 TABLET ORAL at 20:19

## 2019-08-24 RX ADMIN — INSULIN LISPRO 3 UNITS: 100 INJECTION, SOLUTION INTRAVENOUS; SUBCUTANEOUS at 21:17

## 2019-08-24 RX ADMIN — TAMSULOSIN HYDROCHLORIDE 0.4 MG: 0.4 CAPSULE ORAL at 20:19

## 2019-08-24 RX ADMIN — CEFTRIAXONE SODIUM 1 G: 1 INJECTION, POWDER, FOR SOLUTION INTRAMUSCULAR; INTRAVENOUS at 09:54

## 2019-08-24 RX ADMIN — APIXABAN 10 MG: 5 TABLET, FILM COATED ORAL at 20:19

## 2019-08-24 RX ADMIN — INSULIN LISPRO 3 UNITS: 100 INJECTION, SOLUTION INTRAVENOUS; SUBCUTANEOUS at 08:15

## 2019-08-25 LAB
ANION GAP SERPL CALCULATED.3IONS-SCNC: 13 MMOL/L (ref 5–15)
BACTERIA SPEC AEROBE CULT: ABNORMAL
BUN BLD-MCNC: 25 MG/DL (ref 8–23)
BUN/CREAT SERPL: 26 (ref 7–25)
CALCIUM SPEC-SCNC: 9.9 MG/DL (ref 8.6–10.5)
CHLORIDE SERPL-SCNC: 103 MMOL/L (ref 98–107)
CK SERPL-CCNC: 20 U/L (ref 20–200)
CO2 SERPL-SCNC: 25 MMOL/L (ref 22–29)
CREAT BLD-MCNC: 0.96 MG/DL (ref 0.76–1.27)
DEPRECATED RDW RBC AUTO: 54.3 FL (ref 37–54)
ERYTHROCYTE [DISTWIDTH] IN BLOOD BY AUTOMATED COUNT: 15 % (ref 12.3–15.4)
GFR SERPL CREATININE-BSD FRML MDRD: 74 ML/MIN/1.73
GLUCOSE BLD-MCNC: 187 MG/DL (ref 65–99)
GLUCOSE BLDC GLUCOMTR-MCNC: 162 MG/DL (ref 70–130)
GLUCOSE BLDC GLUCOMTR-MCNC: 202 MG/DL (ref 70–130)
GLUCOSE BLDC GLUCOMTR-MCNC: 203 MG/DL (ref 70–130)
GLUCOSE BLDC GLUCOMTR-MCNC: 282 MG/DL (ref 70–130)
HCT VFR BLD AUTO: 43.7 % (ref 37.5–51)
HGB BLD-MCNC: 13.6 G/DL (ref 13–17.7)
MCH RBC QN AUTO: 31 PG (ref 26.6–33)
MCHC RBC AUTO-ENTMCNC: 31.1 G/DL (ref 31.5–35.7)
MCV RBC AUTO: 99.5 FL (ref 79–97)
PLATELET # BLD AUTO: 133 10*3/MM3 (ref 140–450)
PMV BLD AUTO: 11 FL (ref 6–12)
POTASSIUM BLD-SCNC: 4.1 MMOL/L (ref 3.5–5.2)
RBC # BLD AUTO: 4.39 10*6/MM3 (ref 4.14–5.8)
SODIUM BLD-SCNC: 141 MMOL/L (ref 136–145)
WBC NRBC COR # BLD: 10.7 10*3/MM3 (ref 3.4–10.8)

## 2019-08-25 PROCEDURE — 82962 GLUCOSE BLOOD TEST: CPT

## 2019-08-25 PROCEDURE — 85027 COMPLETE CBC AUTOMATED: CPT | Performed by: HOSPITALIST

## 2019-08-25 PROCEDURE — 63710000001 INSULIN DETEMIR PER 5 UNITS: Performed by: HOSPITALIST

## 2019-08-25 PROCEDURE — 25010000002 DAPTOMYCIN PER 1 MG: Performed by: NURSE PRACTITIONER

## 2019-08-25 PROCEDURE — 99233 SBSQ HOSP IP/OBS HIGH 50: CPT | Performed by: HOSPITALIST

## 2019-08-25 PROCEDURE — 25010000002 VANCOMYCIN 10 G RECONSTITUTED SOLUTION

## 2019-08-25 PROCEDURE — 97162 PT EVAL MOD COMPLEX 30 MIN: CPT

## 2019-08-25 PROCEDURE — 97116 GAIT TRAINING THERAPY: CPT

## 2019-08-25 PROCEDURE — 82550 ASSAY OF CK (CPK): CPT | Performed by: INTERNAL MEDICINE

## 2019-08-25 PROCEDURE — 80048 BASIC METABOLIC PNL TOTAL CA: CPT | Performed by: HOSPITALIST

## 2019-08-25 RX ORDER — CARVEDILOL 3.12 MG/1
3.12 TABLET ORAL 2 TIMES DAILY WITH MEALS
Status: DISCONTINUED | OUTPATIENT
Start: 2019-08-25 | End: 2019-08-26 | Stop reason: HOSPADM

## 2019-08-25 RX ADMIN — SODIUM CHLORIDE, PRESERVATIVE FREE 3 ML: 5 INJECTION INTRAVENOUS at 09:24

## 2019-08-25 RX ADMIN — SODIUM CHLORIDE 2000 MG: 9 INJECTION, SOLUTION INTRAVENOUS at 10:23

## 2019-08-25 RX ADMIN — DAPTOMYCIN 300 MG: 500 INJECTION, POWDER, LYOPHILIZED, FOR SOLUTION INTRAVENOUS at 14:03

## 2019-08-25 RX ADMIN — FINASTERIDE 5 MG: 5 TABLET, FILM COATED ORAL at 09:20

## 2019-08-25 RX ADMIN — INSULIN LISPRO 4 UNITS: 100 INJECTION, SOLUTION INTRAVENOUS; SUBCUTANEOUS at 12:35

## 2019-08-25 RX ADMIN — INSULIN LISPRO 3 UNITS: 100 INJECTION, SOLUTION INTRAVENOUS; SUBCUTANEOUS at 09:23

## 2019-08-25 RX ADMIN — ALLOPURINOL 300 MG: 300 TABLET ORAL at 20:40

## 2019-08-25 RX ADMIN — LEVOTHYROXINE SODIUM 50 MCG: 25 TABLET ORAL at 05:40

## 2019-08-25 RX ADMIN — INSULIN LISPRO 3 UNITS: 100 INJECTION, SOLUTION INTRAVENOUS; SUBCUTANEOUS at 20:42

## 2019-08-25 RX ADMIN — INSULIN LISPRO 2 UNITS: 100 INJECTION, SOLUTION INTRAVENOUS; SUBCUTANEOUS at 17:28

## 2019-08-25 RX ADMIN — CARVEDILOL 3.12 MG: 3.12 TABLET, FILM COATED ORAL at 17:29

## 2019-08-25 RX ADMIN — Medication 1 CAPSULE: at 12:35

## 2019-08-25 RX ADMIN — ACETAMINOPHEN 650 MG: 325 TABLET ORAL at 20:40

## 2019-08-25 RX ADMIN — FLUCONAZOLE 400 MG: 200 TABLET ORAL at 09:21

## 2019-08-25 RX ADMIN — APIXABAN 10 MG: 5 TABLET, FILM COATED ORAL at 09:20

## 2019-08-25 RX ADMIN — TAMSULOSIN HYDROCHLORIDE 0.4 MG: 0.4 CAPSULE ORAL at 20:40

## 2019-08-25 RX ADMIN — INSULIN DETEMIR 8 UNITS: 100 INJECTION, SOLUTION SUBCUTANEOUS at 10:23

## 2019-08-25 RX ADMIN — APIXABAN 10 MG: 5 TABLET, FILM COATED ORAL at 20:40

## 2019-08-26 VITALS
WEIGHT: 201.8 LBS | BODY MASS INDEX: 30.58 KG/M2 | HEART RATE: 72 BPM | RESPIRATION RATE: 18 BRPM | OXYGEN SATURATION: 94 % | SYSTOLIC BLOOD PRESSURE: 151 MMHG | HEIGHT: 68 IN | DIASTOLIC BLOOD PRESSURE: 84 MMHG | TEMPERATURE: 98.4 F

## 2019-08-26 PROBLEM — I82.401 ACUTE DEEP VEIN THROMBOSIS (DVT) OF RIGHT LOWER EXTREMITY: Status: ACTIVE | Noted: 2019-08-26

## 2019-08-26 PROBLEM — I50.811 ACUTE SYSTOLIC RIGHT HEART FAILURE: Status: ACTIVE | Noted: 2019-08-26

## 2019-08-26 PROBLEM — I26.02 ACUTE SADDLE PULMONARY EMBOLISM WITH ACUTE COR PULMONALE: Status: ACTIVE | Noted: 2019-08-23

## 2019-08-26 LAB
ANION GAP SERPL CALCULATED.3IONS-SCNC: 11 MMOL/L (ref 5–15)
BUN BLD-MCNC: 23 MG/DL (ref 8–23)
BUN/CREAT SERPL: 21.1 (ref 7–25)
CALCIUM SPEC-SCNC: 9.4 MG/DL (ref 8.6–10.5)
CHLORIDE SERPL-SCNC: 101 MMOL/L (ref 98–107)
CO2 SERPL-SCNC: 26 MMOL/L (ref 22–29)
CREAT BLD-MCNC: 1.09 MG/DL (ref 0.76–1.27)
GFR SERPL CREATININE-BSD FRML MDRD: 64 ML/MIN/1.73
GLUCOSE BLD-MCNC: 184 MG/DL (ref 65–99)
GLUCOSE BLDC GLUCOMTR-MCNC: 190 MG/DL (ref 70–130)
GLUCOSE BLDC GLUCOMTR-MCNC: 211 MG/DL (ref 70–130)
POTASSIUM BLD-SCNC: 4 MMOL/L (ref 3.5–5.2)
SODIUM BLD-SCNC: 138 MMOL/L (ref 136–145)

## 2019-08-26 PROCEDURE — 80048 BASIC METABOLIC PNL TOTAL CA: CPT

## 2019-08-26 PROCEDURE — 63710000001 INSULIN DETEMIR PER 5 UNITS: Performed by: HOSPITALIST

## 2019-08-26 PROCEDURE — 82962 GLUCOSE BLOOD TEST: CPT

## 2019-08-26 PROCEDURE — 99239 HOSP IP/OBS DSCHRG MGMT >30: CPT | Performed by: INTERNAL MEDICINE

## 2019-08-26 RX ORDER — DOXYCYCLINE 100 MG/1
100 CAPSULE ORAL EVERY 12 HOURS SCHEDULED
Status: DISCONTINUED | OUTPATIENT
Start: 2019-08-26 | End: 2019-08-26 | Stop reason: HOSPADM

## 2019-08-26 RX ORDER — L.ACID,PARA/B.BIFIDUM/S.THERM 8B CELL
1 CAPSULE ORAL DAILY
Qty: 30 CAPSULE | Refills: 0 | Status: SHIPPED | OUTPATIENT
Start: 2019-08-26 | End: 2020-12-14

## 2019-08-26 RX ORDER — DOXYCYCLINE 100 MG/1
100 CAPSULE ORAL EVERY 12 HOURS SCHEDULED
Qty: 20 CAPSULE | Refills: 0 | Status: SHIPPED | OUTPATIENT
Start: 2019-08-26 | End: 2019-09-05

## 2019-08-26 RX ADMIN — DOXYCYCLINE 100 MG: 100 CAPSULE ORAL at 13:26

## 2019-08-26 RX ADMIN — Medication 1 CAPSULE: at 13:26

## 2019-08-26 RX ADMIN — INSULIN LISPRO 3 UNITS: 100 INJECTION, SOLUTION INTRAVENOUS; SUBCUTANEOUS at 13:26

## 2019-08-26 RX ADMIN — FINASTERIDE 5 MG: 5 TABLET, FILM COATED ORAL at 09:15

## 2019-08-26 RX ADMIN — INSULIN LISPRO 2 UNITS: 100 INJECTION, SOLUTION INTRAVENOUS; SUBCUTANEOUS at 09:15

## 2019-08-26 RX ADMIN — APIXABAN 10 MG: 5 TABLET, FILM COATED ORAL at 09:15

## 2019-08-26 RX ADMIN — LEVOTHYROXINE SODIUM 50 MCG: 25 TABLET ORAL at 06:13

## 2019-08-26 RX ADMIN — INSULIN DETEMIR 8 UNITS: 100 INJECTION, SOLUTION SUBCUTANEOUS at 09:16

## 2019-08-26 RX ADMIN — CARVEDILOL 3.12 MG: 3.12 TABLET, FILM COATED ORAL at 09:16

## 2019-08-27 ENCOUNTER — READMISSION MANAGEMENT (OUTPATIENT)
Dept: CALL CENTER | Facility: HOSPITAL | Age: 84
End: 2019-08-27

## 2019-08-27 ENCOUNTER — TRANSITIONAL CARE MANAGEMENT TELEPHONE ENCOUNTER (OUTPATIENT)
Dept: FAMILY MEDICINE CLINIC | Facility: CLINIC | Age: 84
End: 2019-08-27

## 2019-08-27 NOTE — OUTREACH NOTE
Prep Survey      Responses   Facility patient discharged from?  [facility]   Is patient eligible?  Yes   Discharge diagnosis  Acute hypoxic respiratory failure due to submassive PE with right heart failure,    Staph Epi UTI    Does the patient have one of the following disease processes/diagnoses(primary or secondary)?  CHF   Does the patient have Home health ordered?  Yes   What is the Home health agency?    [facility]    Is there a DME ordered?  No   Prep survey completed?  Yes          Emily Gomes RN

## 2019-08-27 NOTE — OUTREACH NOTE
TRACE call completed with pt. See TCM call flowsheet for details.    Pt states he is doing pretty good. He states breathing and right leg soreness better. Denies chest pain, n/v, or bowel/bladder issues. He confirms pcp appt 8/29 and denies any questions or needs. He said his granddght is a nurse. Appt extended for hospital f/u.

## 2019-08-28 ENCOUNTER — READMISSION MANAGEMENT (OUTPATIENT)
Dept: CALL CENTER | Facility: HOSPITAL | Age: 84
End: 2019-08-28

## 2019-08-28 NOTE — OUTREACH NOTE
CHF Week 1 Survey      Responses   Facility patient discharged from?  [facility]   Does the patient have one of the following disease processes/diagnoses(primary or secondary)?  CHF   Is there a successful TCM telephone encounter documented?  Yes          Shelley Johnson RN

## 2019-08-29 ENCOUNTER — OFFICE VISIT (OUTPATIENT)
Dept: FAMILY MEDICINE CLINIC | Facility: CLINIC | Age: 84
End: 2019-08-29

## 2019-08-29 VITALS
OXYGEN SATURATION: 91 % | HEIGHT: 68 IN | WEIGHT: 196 LBS | HEART RATE: 73 BPM | DIASTOLIC BLOOD PRESSURE: 70 MMHG | TEMPERATURE: 97.8 F | BODY MASS INDEX: 29.7 KG/M2 | RESPIRATION RATE: 16 BRPM | SYSTOLIC BLOOD PRESSURE: 130 MMHG

## 2019-08-29 DIAGNOSIS — H69.82 DYSFUNCTION OF LEFT EUSTACHIAN TUBE: ICD-10-CM

## 2019-08-29 DIAGNOSIS — I26.02 ACUTE SADDLE PULMONARY EMBOLISM WITH ACUTE COR PULMONALE (HCC): Primary | ICD-10-CM

## 2019-08-29 DIAGNOSIS — N39.0 ACUTE UTI: ICD-10-CM

## 2019-08-29 DIAGNOSIS — R09.02 HYPOXEMIA: ICD-10-CM

## 2019-08-29 DIAGNOSIS — E11.65 UNCONTROLLED TYPE 2 DIABETES MELLITUS WITH HYPERGLYCEMIA (HCC): ICD-10-CM

## 2019-08-29 DIAGNOSIS — I82.431 ACUTE DEEP VEIN THROMBOSIS (DVT) OF POPLITEAL VEIN OF RIGHT LOWER EXTREMITY (HCC): ICD-10-CM

## 2019-08-29 PROCEDURE — 99496 TRANSJ CARE MGMT HIGH F2F 7D: CPT | Performed by: FAMILY MEDICINE

## 2019-08-29 NOTE — PROGRESS NOTES
Transitional Care Follow Up Visit  Subjective     Forrest Zepeda is a 87 y.o. male who presents for a transitional care management visit.    Within 48 business hours after discharge our office contacted him via telephone to coordinate his care and needs.      I reviewed and discussed the details of that call along with the discharge summary, hospital problems, inpatient lab results, inpatient diagnostic studies, and consultation reports with Forrest.    States he is doing some better.  Denies any chest pain. Does have some shortness of breath.  Does c/o some plugged ear sensation of the left ear.  States his appetite is good and fluid intake is good. Resting well.    Taking Eliquis 10 mg twice a day.  Does not want to go on oxygen at home.    Does take Doxycyline 100 mg twice a day for urinary infection.    Last A1C was 9.6%.  Taking Glimepiride 4 mg and Januvia 100 mg daily.    Current outpatient and discharge medications have been reconciled for the patient.  Reviewed by: Ryne Lofton MD      Date of TCM Phone Call 8/14/2017 7/30/2019 8/27/2019   [facility]   Date of Admission 8/9/2017 7/18/2019 8/22/2019   Date of Discharge 8/11/2017 7/27/2019 8/26/2019   Risk for Readmission (LACE Score) 8 - -   Discharge Disposition Home or Self Care Home or Self Care Home or Self Care     Risk for Readmission (LACE) Score: 14 (8/26/2019  6:00 AM)      History of Present Illness   Course During Hospital Stay:    The patient was admitted with Shortness of breath and found to have a submassive Pulmonary Embolis.     Venous duplex was positive for Right Lower Eextremity DVT. Echocardiogram showed reduced Right Ventricular function. VTE was most likely provoked by recent immobility related to his prior hospitalization for ureteral stent placement.   He was placed on therapeutic lovenox and then transitioned to PO Eliquis 5 mg.  Discharged with Doxycyline  100 mg twice a day for urinary infection.     The following portions of the patient's history were reviewed and updated as appropriate: allergies, current medications, past family history, past medical history, past social history, past surgical history and problem list.    Review of Systems   Constitutional: Positive for activity change and fatigue. Negative for appetite change, chills, diaphoresis, fever and unexpected weight change.   HENT: Positive for congestion, ear pain and postnasal drip. Negative for dental problem, drooling, ear discharge, facial swelling, hearing loss, mouth sores, nosebleeds, rhinorrhea, sinus pressure, sinus pain, sneezing, sore throat, tinnitus, trouble swallowing and voice change.         Plugged ear sensation Left    Eyes: Negative for photophobia, pain, discharge, redness, itching and visual disturbance.   Respiratory: Positive for shortness of breath. Negative for apnea, cough, choking, chest tightness, wheezing and stridor.    Cardiovascular: Negative for chest pain, palpitations and leg swelling.   Gastrointestinal: Negative for abdominal distention, abdominal pain, anal bleeding, blood in stool, constipation, diarrhea, nausea, rectal pain and vomiting.   Endocrine: Negative for cold intolerance, heat intolerance, polydipsia, polyphagia and polyuria.   Genitourinary: Positive for urgency. Negative for decreased urine volume, difficulty urinating, discharge, dysuria, enuresis, flank pain, frequency, genital sores, hematuria, penile pain, penile swelling, scrotal swelling and testicular pain.   Musculoskeletal: Positive for back pain and gait problem. Negative for arthralgias, joint swelling, myalgias, neck pain and neck stiffness.   Skin: Negative for color change, pallor, rash and wound.   Allergic/Immunologic: Negative for environmental allergies, food allergies and immunocompromised state.   Neurological: Negative for dizziness, tremors, seizures, syncope, facial asymmetry,  speech difficulty, weakness, light-headedness, numbness and headaches.   Hematological: Negative for adenopathy. Does not bruise/bleed easily.   Psychiatric/Behavioral: Negative for agitation, behavioral problems, confusion, decreased concentration, dysphoric mood, hallucinations, self-injury, sleep disturbance and suicidal ideas. The patient is not nervous/anxious and is not hyperactive.        Objective   Physical Exam   Constitutional: He is oriented to person, place, and time. He appears well-developed and well-nourished. No distress.   HENT:   Head: Normocephalic and atraumatic.   Right Ear: External ear normal.   Nose: Nose normal.   Mouth/Throat: Oropharynx is clear and moist. No oropharyngeal exudate.   Examination of the left ear reveals serous otitis of the left middle ear cavity.   Eyes: Conjunctivae and EOM are normal. Pupils are equal, round, and reactive to light. Right eye exhibits no discharge. Left eye exhibits no discharge. No scleral icterus.   Neck: Normal range of motion. Neck supple. No JVD present. No tracheal deviation present. No thyromegaly present.   Cardiovascular: Normal rate, regular rhythm, normal heart sounds and intact distal pulses. Exam reveals no gallop and no friction rub.   No murmur heard.  Pulmonary/Chest: Effort normal and breath sounds normal. No stridor. No respiratory distress. He has no wheezes. He has no rales. He exhibits no tenderness.   Abdominal: Soft. Bowel sounds are normal. He exhibits no distension and no mass. There is no tenderness. There is no rebound and no guarding. No hernia.   Musculoskeletal: Normal range of motion. He exhibits no edema, tenderness or deformity.   Lymphadenopathy:     He has no cervical adenopathy.   Neurological: He is alert and oriented to person, place, and time. He displays normal reflexes. No cranial nerve deficit or sensory deficit. He exhibits normal muscle tone. Coordination normal.   Skin: Skin is warm and dry. No rash noted. He  is not diaphoretic. No erythema. No pallor.   Nursing note and vitals reviewed.      Assessment/Plan   Problems Addressed this Visit        Cardiovascular and Mediastinum    Acute deep vein thrombosis (DVT) of right lower extremity (CMS/HCC)    Acute saddle pulmonary embolism with acute cor pulmonale (CMS/HCC) - Primary       Endocrine    Uncontrolled type 2 diabetes mellitus (CMS/[facility])      Other Visit Diagnoses     Dysfunction of left eustachian tube        Hypoxemia        Acute UTI            The patient was admitted to [facility] in [place] on August 23 with an acute saddle pulmonary embolus.  This resulted from a deep vein thrombosis of his left lower extremity.  He was treated urgently with anticoagulation therapy.  His vital signs stabilized.  He was able to be switched over to oral anticoagulant therapy within 36 hours.    He also had a urinary tract infection.  Previous placement of a stent was presumed to be causative.  Cultures grew out Staphylococcus epidermidis.  He was seen by infectious disease and has been placed on doxycycline.  His urologist states that he is unable to remove the stent for at least 3 months until the patient is stabilized from his pulmonary embolus.    The patient is accompanied today by his granddaughter.  She notes that he does become short of breath with any exertion.  At rest he states that he is doing well.  He has been able to eat and drink normally and has been able to walk to the bathroom.  He is experiencing no fever or chills at home.  No chest pain.    The patient does have type 2 diabetes.  Glucoses are monitored at home and range between 140 and 240.  He is on metformin and glimepiride.    The patient has developed chronic hypoxemia as the result of a pulmonary embolus that occurred over 1 week ago.  He is dyspneic with any exertion.  His color becomes ashen and he feels as though he is about to lose consciousness.  His O2 saturation on room air at rest is 91%.   Upon ambulating within 10 to 15 feet his O2 saturation drops to 86% and lower.  When placed in a chair and given 2 L of oxygen by nasal cannula his O2 saturation improved to 97% and his clinical condition improves visibly.  As a consequence the patient is being prescribed 2 L of oxygen per nasal cannula on the 24-hour a day basis for the next 3 months.  He is being monitored by home health nursing at his home.  We will reevaluate him here at the office in 4 weeks.    The patient will be seeing Dr. Oneal with infectious disease next Tuesday.  Continue on doxycycline in the interim.  He is on Eliquis 10 mg twice a day and he is to continue this until the end of September at which time his dose will be decreased to 5 mg twice a day.  Samples that medication were provided.    His blood pressure in the office today is 130/60 and his heart rate is 70.  To continue on carvedilol is doing.    The patient is advised to avoid contact with anyone with an acute upper respiratory infection.  There is no evidence of acute infection at the current time.  He does have complaints of discomfort in his left ear.  He has evidence of serous otitis.  He will continue on fluticasone nasal spray and we will add to that Afrin nasal spray to use twice a day for 4 days.    We will have the patient return to see us in 4 weeks.  He understands that his prognosis is tenuous.  He is made a decision to change his CODE STATUS to DO NOT RESUSCITATE.  He states that he is relying entirely on his sonny at this time.    The patient is stable at the time of discharge from the office.  He will follow-up here in 4 weeks.        Rest and Drink plenty fluids.    Use OTC Afrin nasal spray. Place 3 sprays in each nostril twice a day for no more than 4 days. Follow 5 minutes after with OTC saline nasal spray.    Continue medications as doing.    Follow up in 4 weeks.                  Scribed for Dr Ryne Lofton by Ryne Dumas CMA.  Rolly BECK, personally performed the services described in this documentation, as scribed by Zahida Townsend in my presence, and is both accurate and complete.        (Please note that portions of this note were completed with a voice recognition program. Efforts were made to edit the dictations,but occasionally words are mis transcribed.)

## 2019-09-05 ENCOUNTER — TELEPHONE (OUTPATIENT)
Dept: FAMILY MEDICINE CLINIC | Facility: CLINIC | Age: 84
End: 2019-09-05

## 2019-09-05 ENCOUNTER — READMISSION MANAGEMENT (OUTPATIENT)
Dept: CALL CENTER | Facility: HOSPITAL | Age: 84
End: 2019-09-05

## 2019-09-05 NOTE — OUTREACH NOTE
CHF Week 2 Survey      Responses   Facility patient discharged from?  [place]   Does the patient have one of the following disease processes/diagnoses(primary or secondary)?  CHF   Week 2 attempt successful?  Yes   Call start time  1645   Call end time  1646   Discharge diagnosis  Acute hypoxic respiratory failure due to submassive PE with right heart failure,    Staph Epi UTI    Meds reviewed with patient/caregiver?  Yes   Is the patient having any side effects they believe may be caused by any medication additions or changes?  No   Does the patient have all medications ordered at discharge?  Yes   Is the patient taking all medications as directed (includes completed medication regime)?  Yes   Does the patient have a primary care provider?   Yes   Does the patient have an appointment with their PCP within 7 days of discharge?  Yes   Comments regarding PCP  Pt plans to see Dr. Romero in the morning at 9am   Has the patient kept scheduled appointments due by today?  Yes   What is the Home health agency?    [facility]    Has home health visited the patient within 72 hours of discharge?  Yes   Psychosocial issues?  No   Did the patient receive a copy of their discharge instructions?  Yes   Nursing interventions  Reviewed instructions with patient   What is the patient's perception of their health status since discharge?  Improving   Is the patient weighing daily?  No   Does the patient have scales?  Yes   Daily weight interventions  Education provided on importance of daily weight   Is the patient able to teach back Heart Failure diet management?  Yes   Is the patient able to teach back Heart Failure Zones?  Yes   Is the patient able to teach back signs and symptoms of worsening condition? (i.e. weight gain, shortness of air, etc.)  Yes   Is the patient/caregiver able to teach back the hierarchy of who to call/visit for symptoms/problems? PCP, Specialist, Home health nurse, Urgent Care, ED, 911  Yes   CHF Week 2 call  completed?  Yes          Misha Cruz RN

## 2019-09-06 DIAGNOSIS — E03.9 ACQUIRED HYPOTHYROIDISM: ICD-10-CM

## 2019-09-06 RX ORDER — LEVOTHYROXINE SODIUM 0.05 MG/1
TABLET ORAL
Qty: 90 TABLET | Refills: 2 | Status: SHIPPED | OUTPATIENT
Start: 2019-09-06 | End: 2020-06-20

## 2019-09-12 ENCOUNTER — READMISSION MANAGEMENT (OUTPATIENT)
Dept: CALL CENTER | Facility: HOSPITAL | Age: 84
End: 2019-09-12

## 2019-09-12 NOTE — OUTREACH NOTE
CHF Week 3 Survey      Responses   Facility patient discharged from?  [place]   Does the patient have one of the following disease processes/diagnoses(primary or secondary)?  CHF   Week 3 attempt successful?  Yes   Call start time  1757   Call end time  1803   Discharge diagnosis  Acute hypoxic respiratory failure due to submassive PE with right heart failure,    Staph Epi UTI    Is patient permission given to speak with other caregiver?  No   Meds reviewed with patient/caregiver?  Yes   Is the patient having any side effects they believe may be caused by any medication additions or changes?  No   Does the patient have all medications ordered at discharge?  Yes   Is the patient taking all medications as directed (includes completed medication regime)?  Yes   Does the patient have a primary care provider?   Yes   Does the patient have an appointment with their PCP within 7 days of discharge?  Yes   Has the patient kept scheduled appointments due by today?  Yes   Has home health visited the patient within 72 hours of discharge?  Yes   DME comments  Is on Oxygen - he pays out of pocket- his insurance would not pay for it.    Psychosocial issues?  No   Did the patient receive a copy of their discharge instructions?  Yes   Nursing interventions  Reviewed instructions with patient   What is the patient's perception of their health status since discharge?  Improving   Nursing interventions  Nurse provided patient education   Is the patient weighing daily?  No   Does the patient have scales?  Yes   Daily weight interventions  Education provided on importance of daily weight   Is the patient able to teach back Heart Failure diet management?  Yes   Is the patient able to teach back Heart Failure Zones?  Yes   Is the patient able to teach back signs and symptoms of worsening condition? (i.e. weight gain, shortness of air, etc.)  Yes   Is the patient/caregiver able to teach back the hierarchy of who to call/visit for  symptoms/problems? PCP, Specialist, Home health nurse, Urgent Care, ED, 911  Yes   CHF Week 3 call completed?  Yes          Misha Cruz RN

## 2019-09-13 ENCOUNTER — TELEPHONE (OUTPATIENT)
Dept: FAMILY MEDICINE CLINIC | Facility: CLINIC | Age: 84
End: 2019-09-13

## 2019-09-13 NOTE — TELEPHONE ENCOUNTER
----- Message from Tristin Kirby sent at 9/11/2019 10:44 AM EDT -----  Regarding: PAPERWORK   Contact: [phone]  PT GRANDCHILD MARCUS CALLED IN ABOUT [facility] PAPERWORK THAT SHE FAXED IN TO BE SIGNED. IS THIS PAPER WORK READY? CAN YOU PLS CALL HER @ [phone] AND ADVISE      [facility] paperwork has been filled out and faxed back to number provided.  Juan, CMA

## 2019-09-20 ENCOUNTER — READMISSION MANAGEMENT (OUTPATIENT)
Dept: CALL CENTER | Facility: HOSPITAL | Age: 84
End: 2019-09-20

## 2019-09-20 NOTE — OUTREACH NOTE
CHF Week 4 Survey      Responses   Facility patient discharged from?  [place]   Does the patient have one of the following disease processes/diagnoses(primary or secondary)?  CHF   Week 4 attempt successful?  Yes   Call start time  1024   Call end time  1030   Discharge diagnosis  Acute hypoxic respiratory failure due to submassive PE with right heart failure,    Staph Epi UTI    Is patient permission given to speak with other caregiver?  No   Meds reviewed with patient/caregiver?  Yes   Is the patient taking all medications as directed (includes completed medication regime)?  Yes   Has the patient kept scheduled appointments due by today?  Yes   Is the patient still receiving Home Health Services?  N/A   DME comments  Patient wearing home oxygen.    Psychosocial issues?  No   What is the patient's perception of their health status since discharge?  Improving [Patient states breathing is easy wearing the oxygen. ]   Nursing interventions  Nurse provided patient education   Is the patient weighing daily?  No [Patient states weighs every other day. ]   Daily weight interventions  Education provided on importance of daily weight   Is the patient able to teach back Heart Failure diet management?  Yes   Is the patient able to teach back signs and symptoms of worsening condition? (i.e. weight gain, shortness of air, etc.)  Yes   Is the patient/caregiver able to teach back the hierarchy of who to call/visit for symptoms/problems? PCP, Specialist, Home health nurse, Urgent Care, ED, 911  Yes   Week 4 Call Completed?  Yes   Would the patient like one additional call?  No   Graduated  Yes   Did the patient feel the follow up calls were helpful during their recovery period?  Yes   Was the number of calls appropriate?  Yes   Wrap up additional comments  Patient states that he needs a urology stent procedure when he is cleared from the blood clots.           Aleksandra Berger RN

## 2019-09-25 ENCOUNTER — TELEPHONE (OUTPATIENT)
Dept: FAMILY MEDICINE CLINIC | Facility: CLINIC | Age: 84
End: 2019-09-25

## 2019-09-25 NOTE — TELEPHONE ENCOUNTER
----- Message from Tristin Dee Rep sent at 9/25/2019  8:23 AM EDT -----  Regarding: VERBAL ORDER OCCUPATIONAL  Contact: [phone]  EMILY FROM HOME HEALTH NEEDS VERBAL ORDER FOR OCCUPATIONAL THERAPY SO PLEASE GIVE HER A CALL NUMBER ABOVE    Verbal consent given for OT.  BBmaurice, CMA

## 2019-09-26 ENCOUNTER — OFFICE VISIT (OUTPATIENT)
Dept: FAMILY MEDICINE CLINIC | Facility: CLINIC | Age: 84
End: 2019-09-26

## 2019-09-26 VITALS
WEIGHT: 205 LBS | HEIGHT: 68 IN | BODY MASS INDEX: 31.07 KG/M2 | HEART RATE: 68 BPM | RESPIRATION RATE: 15 BRPM | DIASTOLIC BLOOD PRESSURE: 60 MMHG | SYSTOLIC BLOOD PRESSURE: 130 MMHG | TEMPERATURE: 97.6 F | OXYGEN SATURATION: 95 %

## 2019-09-26 DIAGNOSIS — N13.30 HYDRONEPHROSIS OF LEFT KIDNEY: ICD-10-CM

## 2019-09-26 DIAGNOSIS — Z23 NEED FOR IMMUNIZATION AGAINST INFLUENZA: ICD-10-CM

## 2019-09-26 DIAGNOSIS — I10 ESSENTIAL HYPERTENSION: ICD-10-CM

## 2019-09-26 DIAGNOSIS — N13.5 STRICTURE OF LEFT URETER: ICD-10-CM

## 2019-09-26 DIAGNOSIS — E11.65 UNCONTROLLED TYPE 2 DIABETES MELLITUS WITH HYPERGLYCEMIA (HCC): Primary | ICD-10-CM

## 2019-09-26 DIAGNOSIS — M10.9 GOUTY ARTHRITIS: ICD-10-CM

## 2019-09-26 DIAGNOSIS — I26.02 ACUTE SADDLE PULMONARY EMBOLISM WITH ACUTE COR PULMONALE (HCC): ICD-10-CM

## 2019-09-26 LAB — HBA1C MFR BLD: 8.4 %

## 2019-09-26 PROCEDURE — G0008 ADMIN INFLUENZA VIRUS VAC: HCPCS | Performed by: FAMILY MEDICINE

## 2019-09-26 PROCEDURE — 83036 HEMOGLOBIN GLYCOSYLATED A1C: CPT | Performed by: FAMILY MEDICINE

## 2019-09-26 PROCEDURE — 90653 IIV ADJUVANT VACCINE IM: CPT | Performed by: FAMILY MEDICINE

## 2019-09-26 PROCEDURE — 99214 OFFICE O/P EST MOD 30 MIN: CPT | Performed by: FAMILY MEDICINE

## 2019-09-26 RX ORDER — ALLOPURINOL 300 MG/1
300 TABLET ORAL NIGHTLY
Qty: 90 TABLET | Refills: 3 | Status: SHIPPED | OUTPATIENT
Start: 2019-09-26 | End: 2020-09-30

## 2019-09-26 RX ORDER — GLIMEPIRIDE 4 MG/1
4 TABLET ORAL
Qty: 90 TABLET | Refills: 3 | Status: SHIPPED | OUTPATIENT
Start: 2019-09-26 | End: 2019-10-21

## 2019-09-26 RX ORDER — CARVEDILOL 6.25 MG/1
6.25 TABLET ORAL 2 TIMES DAILY WITH MEALS
Qty: 180 TABLET | Refills: 3 | Status: SHIPPED | OUTPATIENT
Start: 2019-09-26 | End: 2020-10-14 | Stop reason: HOSPADM

## 2019-09-26 NOTE — PROGRESS NOTES
"Subjective   Forrest Zepeda is a 87 y.o. male seen today for Diabetes.     Diabetes   He presents for his follow-up diabetic visit. He has type 2 diabetes mellitus. There are no hypoglycemic associated symptoms. There are no diabetic associated symptoms. Pertinent negatives for diabetes include no chest pain. There are no hypoglycemic complications. Diabetic complications include heart disease. Risk factors for coronary artery disease include diabetes mellitus, hypertension, male sex and obesity. Current diabetic treatment includes oral agent (dual therapy) (Amaryl and Januvia). He is compliant with treatment all of the time. His weight is stable. He is following a generally healthy diet. He rarely participates in exercise. His home blood glucose trend is decreasing steadily (A1C is 8.4%. This is down from August at 9.9%.). An ACE inhibitor/angiotensin II receptor blocker is not being taken.      The patient was hospitalized about a month ago with an acute pulmonary embolus secondary to a saddle pulmonary embolus.  He has been on anticoagulant therapy and though he continues to have dyspnea on exertion his breathing overall has improved.  He is not on any home oxygen at this point.  No chest pain and no leg edema.    He will be seeing his urologist regarding the stent in his ureter.  It apparently has moved and needs to have it replaced once he is stable with regards to his pulmonary embolus.    The following portions of the patient's history were reviewed and updated as appropriate: allergies, current medications, past social history and problem list.    Review of Systems   Respiratory: Negative for shortness of breath.    Cardiovascular: Negative for chest pain.       Objective   /60   Pulse 68   Temp 97.6 °F (36.4 °C)   Resp 15   Ht 172.7 cm (68\")   Wt 93 kg (205 lb)   SpO2 95%   BMI 31.17 kg/m²   Physical Exam   Constitutional: He appears well-developed and well-nourished.   Cardiovascular: " Normal rate and regular rhythm.   Pulmonary/Chest: Effort normal and breath sounds normal.   Nursing note and vitals reviewed.      Assessment/Plan   Problem List Items Addressed This Visit        Cardiovascular and Mediastinum    Hypertension    Acute saddle pulmonary embolism with acute cor pulmonale (CMS/HCC)       Endocrine    Uncontrolled type 2 diabetes mellitus (CMS/HCC) - Primary    Relevant Orders    POC Glycosylated Hemoglobin (Hb A1C) (Completed)      Other Visit Diagnoses     Hydronephrosis of left kidney        Stricture of left ureter        Gouty arthritis        Need for immunization against influenza            The patient is doing well following his pulmonary embolus.  His diabetes is stable as well.  We will check a ventilation/perfusion lung scan in order to see if he is a reasonable risk for the urologic procedure that is being planned in the next few weeks.      Drink plenty fluids.    Continue medications as doing.    Refill Amaryl,Carvedilol,Januvia, and Allopurinol. 90 days with 3 refills.    Check a NM lung perfusion scan.    Follow up on the 18 th day of October.              Scribed for Dr Ryne Lofton by Zahida Townsend CMA.          I, Ryne Lofton MD, personally performed the services described in this documentation, as scribed by Zahida Townsend in my presence, and is both accurate and complete.        (Please note that portions of this note were completed with a voice recognition program. Efforts were made to edit the dictations,but occasionally words are mis transcribed.)

## 2019-10-01 ENCOUNTER — HOSPITAL ENCOUNTER (OUTPATIENT)
Dept: NUCLEAR MEDICINE | Facility: HOSPITAL | Age: 84
Discharge: HOME | End: 2019-10-01

## 2019-10-01 ENCOUNTER — APPOINTMENT (OUTPATIENT)
Dept: GENERAL RADIOLOGY | Facility: HOSPITAL | Age: 84
End: 2019-10-01

## 2019-10-01 ENCOUNTER — HOSPITAL ENCOUNTER (OUTPATIENT)
Dept: GENERAL RADIOLOGY | Facility: HOSPITAL | Age: 84
Discharge: HOME | End: 2019-10-01
Admitting: RADIOLOGY

## 2019-10-01 DIAGNOSIS — I26.02 ACUTE SADDLE PULMONARY EMBOLISM WITH ACUTE COR PULMONALE (HCC): ICD-10-CM

## 2019-10-01 DIAGNOSIS — R06.00 DYSPNEA: ICD-10-CM

## 2019-10-01 PROCEDURE — A9540 TC99M MAA: HCPCS | Performed by: FAMILY MEDICINE

## 2019-10-01 PROCEDURE — 78582 LUNG VENTILAT&PERFUS IMAGING: CPT

## 2019-10-01 PROCEDURE — 0 XENON XE 133: Performed by: FAMILY MEDICINE

## 2019-10-01 PROCEDURE — 0 TECHNETIUM ALBUMIN AGGREGATED: Performed by: FAMILY MEDICINE

## 2019-10-01 PROCEDURE — 71045 X-RAY EXAM CHEST 1 VIEW: CPT

## 2019-10-01 PROCEDURE — A9558 XE133 XENON 10MCI: HCPCS | Performed by: FAMILY MEDICINE

## 2019-10-01 RX ADMIN — XENON XE-133 24.8 MILLICURIE: 10 GAS RESPIRATORY (INHALATION) at 12:52

## 2019-10-01 RX ADMIN — Medication 1 DOSE: at 13:05

## 2019-10-18 ENCOUNTER — OFFICE VISIT (OUTPATIENT)
Dept: FAMILY MEDICINE CLINIC | Facility: CLINIC | Age: 84
End: 2019-10-18

## 2019-10-18 VITALS
HEIGHT: 68 IN | DIASTOLIC BLOOD PRESSURE: 60 MMHG | WEIGHT: 208 LBS | TEMPERATURE: 97.1 F | OXYGEN SATURATION: 96 % | RESPIRATION RATE: 16 BRPM | SYSTOLIC BLOOD PRESSURE: 130 MMHG | BODY MASS INDEX: 31.52 KG/M2 | HEART RATE: 66 BPM

## 2019-10-18 DIAGNOSIS — E11.65 UNCONTROLLED TYPE 2 DIABETES MELLITUS WITH HYPERGLYCEMIA (HCC): ICD-10-CM

## 2019-10-18 DIAGNOSIS — Z79.01 ANTICOAGULANT LONG-TERM USE: Primary | ICD-10-CM

## 2019-10-18 LAB — HBA1C MFR BLD: 8.4 %

## 2019-10-18 PROCEDURE — 83036 HEMOGLOBIN GLYCOSYLATED A1C: CPT | Performed by: FAMILY MEDICINE

## 2019-10-18 PROCEDURE — 99213 OFFICE O/P EST LOW 20 MIN: CPT | Performed by: FAMILY MEDICINE

## 2019-10-18 RX ORDER — DESOXIMETASONE 2.5 MG/G
OINTMENT TOPICAL 2 TIMES DAILY
Qty: 60 G | Refills: 5 | Status: SHIPPED | OUTPATIENT
Start: 2019-10-18 | End: 2020-10-16 | Stop reason: SDUPTHER

## 2019-10-18 NOTE — PROGRESS NOTES
"Subjective   Forrest Zepeda is a 87 y.o. male seen today for Deep Vein Thrombosis.     History of Present Illness   The patient is here for a follow up on DVT.    States he is doing well.  Denies any chest pain or shortness of breath.    Taking Eliquis 5 mg twice a day. Needing refills.  DVT has resolved.    Scheduled to see Urology on Monday to discuss Ureteral stent.         The following portions of the patient's history were reviewed and updated as appropriate: allergies, current medications, past social history and problem list.    Review of Systems   Respiratory: Negative for shortness of breath and wheezing.    Cardiovascular: Negative for chest pain and leg swelling.       Objective   /60   Pulse 66   Temp 97.1 °F (36.2 °C)   Resp 16   Ht 172.7 cm (68\")   Wt 94.3 kg (208 lb)   SpO2 96%   BMI 31.63 kg/m²   Physical Exam   Constitutional: He appears well-developed and well-nourished.   Cardiovascular: Normal rate and regular rhythm.   Pulmonary/Chest: Effort normal and breath sounds normal.   Musculoskeletal: He exhibits no edema or tenderness.   Nursing note and vitals reviewed.      Assessment/Plan   Problem List Items Addressed This Visit        Endocrine    Uncontrolled type 2 diabetes mellitus (CMS/[facility])      Other Visit Diagnoses     Anticoagulant long-term use    -  Primary      Hemoglobin A1c today is 8.4%.        Drink plenty fluids.  Wear support stockings when up and about.  Stop the Eliquis for 1 week. Restart next Friday.    Continue other medications as doing.    Refill Eliquis 5 mg twice a day #60+2.  Rx for Desoximetasone 0.25 mg twice a day as needed #60 GM +5.  Rx for True Metrix glucose strips daily #50+11.    Follow up in 3 months.              Scribed for Dr Ryne Lofton by Zahida Townsend [facility].          I, Ryne Lofton MD, personally performed the services described in this documentation, as scribed by Zahida Townsend in my presence, and is both accurate and " complete.        (Please note that portions of this note were completed with a voice recognition program. Efforts were made to edit the dictations,but occasionally words are mis transcribed.)

## 2019-10-21 ENCOUNTER — OFFICE VISIT (OUTPATIENT)
Dept: UROLOGY | Facility: CLINIC | Age: 84
End: 2019-10-21

## 2019-10-21 ENCOUNTER — APPOINTMENT (OUTPATIENT)
Dept: PREADMISSION TESTING | Facility: HOSPITAL | Age: 84
End: 2019-10-21

## 2019-10-21 ENCOUNTER — HOSPITAL ENCOUNTER (OUTPATIENT)
Dept: GENERAL RADIOLOGY | Facility: HOSPITAL | Age: 84
Discharge: HOME | End: 2019-10-21
Admitting: UROLOGY

## 2019-10-21 ENCOUNTER — TELEPHONE (OUTPATIENT)
Dept: UROLOGY | Facility: CLINIC | Age: 84
End: 2019-10-21

## 2019-10-21 VITALS
BODY MASS INDEX: 31.52 KG/M2 | DIASTOLIC BLOOD PRESSURE: 80 MMHG | HEIGHT: 68 IN | SYSTOLIC BLOOD PRESSURE: 128 MMHG | RESPIRATION RATE: 18 BRPM | OXYGEN SATURATION: 96 % | WEIGHT: 208 LBS | HEART RATE: 68 BPM

## 2019-10-21 VITALS
WEIGHT: 199 LBS | SYSTOLIC BLOOD PRESSURE: 142 MMHG | DIASTOLIC BLOOD PRESSURE: 63 MMHG | HEIGHT: 68 IN | OXYGEN SATURATION: 95 % | BODY MASS INDEX: 30.16 KG/M2

## 2019-10-21 DIAGNOSIS — N13.2 HYDRONEPHROSIS WITH RENAL AND URETERAL CALCULUS OBSTRUCTION: Primary | ICD-10-CM

## 2019-10-21 DIAGNOSIS — N13.2 HYDRONEPHROSIS WITH RENAL AND URETERAL CALCULUS OBSTRUCTION: ICD-10-CM

## 2019-10-21 LAB
ANION GAP SERPL CALCULATED.3IONS-SCNC: 11.6 MMOL/L (ref 5–15)
BASOPHILS # BLD AUTO: 0.09 10*3/MM3 (ref 0–0.2)
BASOPHILS NFR BLD AUTO: 0.8 % (ref 0–1.5)
BILIRUB BLD-MCNC: NEGATIVE MG/DL
BUN BLD-MCNC: 25 MG/DL (ref 8–23)
BUN/CREAT SERPL: 19.4 (ref 7–25)
CALCIUM SPEC-SCNC: 10 MG/DL (ref 8.6–10.5)
CHLORIDE SERPL-SCNC: 106 MMOL/L (ref 98–107)
CLARITY, POC: ABNORMAL
CO2 SERPL-SCNC: 25.4 MMOL/L (ref 22–29)
COLOR UR: YELLOW
CREAT BLD-MCNC: 1.29 MG/DL (ref 0.76–1.27)
DEPRECATED RDW RBC AUTO: 55.6 FL (ref 37–54)
EOSINOPHIL # BLD AUTO: 0.45 10*3/MM3 (ref 0–0.4)
EOSINOPHIL NFR BLD AUTO: 4 % (ref 0.3–6.2)
ERYTHROCYTE [DISTWIDTH] IN BLOOD BY AUTOMATED COUNT: 15.5 % (ref 12.3–15.4)
GFR SERPL CREATININE-BSD FRML MDRD: 53 ML/MIN/1.73
GLUCOSE BLD-MCNC: 150 MG/DL (ref 65–99)
GLUCOSE UR STRIP-MCNC: NEGATIVE MG/DL
HCT VFR BLD AUTO: 46.1 % (ref 37.5–51)
HGB BLD-MCNC: 14.3 G/DL (ref 13–17.7)
IMM GRANULOCYTES # BLD AUTO: 0.05 10*3/MM3 (ref 0–0.05)
IMM GRANULOCYTES NFR BLD AUTO: 0.4 % (ref 0–0.5)
KETONES UR QL: NEGATIVE
LEUKOCYTE EST, POC: ABNORMAL
LYMPHOCYTES # BLD AUTO: 3.26 10*3/MM3 (ref 0.7–3.1)
LYMPHOCYTES NFR BLD AUTO: 29.2 % (ref 19.6–45.3)
MCH RBC QN AUTO: 30.5 PG (ref 26.6–33)
MCHC RBC AUTO-ENTMCNC: 31 G/DL (ref 31.5–35.7)
MCV RBC AUTO: 98.3 FL (ref 79–97)
MONOCYTES # BLD AUTO: 0.77 10*3/MM3 (ref 0.1–0.9)
MONOCYTES NFR BLD AUTO: 6.9 % (ref 5–12)
NEUTROPHILS # BLD AUTO: 6.54 10*3/MM3 (ref 1.7–7)
NEUTROPHILS NFR BLD AUTO: 58.7 % (ref 42.7–76)
NITRITE UR-MCNC: NEGATIVE MG/ML
NRBC BLD AUTO-RTO: 0 /100 WBC (ref 0–0.2)
PH UR: 6 [PH] (ref 5–8)
PLATELET # BLD AUTO: 204 10*3/MM3 (ref 140–450)
PMV BLD AUTO: 10.8 FL (ref 6–12)
POTASSIUM BLD-SCNC: 5 MMOL/L (ref 3.5–5.2)
PROT UR STRIP-MCNC: ABNORMAL MG/DL
RBC # BLD AUTO: 4.69 10*6/MM3 (ref 4.14–5.8)
RBC # UR STRIP: ABNORMAL /UL
SODIUM BLD-SCNC: 143 MMOL/L (ref 136–145)
SP GR UR: 1.02 (ref 1–1.03)
UROBILINOGEN UR QL: NORMAL
WBC NRBC COR # BLD: 11.16 10*3/MM3 (ref 3.4–10.8)

## 2019-10-21 PROCEDURE — 93005 ELECTROCARDIOGRAM TRACING: CPT

## 2019-10-21 PROCEDURE — 85025 COMPLETE CBC W/AUTO DIFF WBC: CPT | Performed by: UROLOGY

## 2019-10-21 PROCEDURE — 99214 OFFICE O/P EST MOD 30 MIN: CPT | Performed by: UROLOGY

## 2019-10-21 PROCEDURE — 36415 COLL VENOUS BLD VENIPUNCTURE: CPT

## 2019-10-21 PROCEDURE — 71046 X-RAY EXAM CHEST 2 VIEWS: CPT

## 2019-10-21 PROCEDURE — 81003 URINALYSIS AUTO W/O SCOPE: CPT | Performed by: UROLOGY

## 2019-10-21 PROCEDURE — 80048 BASIC METABOLIC PNL TOTAL CA: CPT | Performed by: UROLOGY

## 2019-10-21 RX ORDER — CIPROFLOXACIN 500 MG/1
500 TABLET, FILM COATED ORAL 2 TIMES DAILY
Qty: 20 TABLET | Refills: 0 | Status: SHIPPED | OUTPATIENT
Start: 2019-10-21 | End: 2019-10-23 | Stop reason: HOSPADM

## 2019-10-21 NOTE — PROGRESS NOTES
"Chief Complaint  Follow-up (2 month)        JAZMIN Zepeda is a 87 y.o. male who returns today for follow-up of hydronephrosis and left ureteral stricture that is been handled for years with an indwelling stent.  When it was removed in [place] they were unable to reinsert it and he ended up with a nephrostomy tube.  He came back here where I reinserted the indwelling stent and remove the nephrostomy tube.  At this point he prefers switching to the permanent metallic stent that only has to be changed once per year and he presents today to schedule that procedure.  He has a very tight phimosis which makes his urine look chronically infected and have recommended a dorsal slit circumcision at the time of the stent change.  Urine culture submitted for antibiotics and he will need to be treated appropriately prior to the procedure.  He will also need cardiology clearance and he is sent back to Dr. Heath.  He's had a past history of pulmonary embolus with a recent lung scan showing resolution.  He still takes his blood thinner and he is informed to stop 3 days before the procedure.  Currently he is voiding without difficulty on Rapaflo.    Vitals:    10/21/19 1314   BP: 128/80   Pulse: 68   Resp: 18   SpO2: 96%       Past Medical History  Past Medical History:   Diagnosis Date   • Abnormal EKG    • Abnormal PSA     Reported abnormal   • Acute diverticulitis 2019   • Arthritis     KNEES,  BUT SINCE HAD REPLACEMENT   • Benign localized hyperplasia of prostate    • Bilateral knee pain    • Cataracts, bilateral    • CKD (chronic kidney disease)    • Diabetic neuropathy (CMS/HCC)    • Disease of thyroid gland     HYPOTHYROIDISM   • Diverticulitis    • Dyslipidemia     H/O   • Family history of malignant hyperthermia     Brother    • Full dentures    • Generalized weakness    • History of gout    • History of hepatitis A vaccination    • History of nephrolithiasis    • History of nuclear stress test     STATES IN THE 1990'S.  \"IT " "WAS OK\"   • History of osteopenia    • History of recurrent UTI (urinary tract infection)    • Hydronephrosis of left kidney 7/18/2019   • Hydronephrosis with ureteral stricture    • Hyperkalemia     PT UNSURE REGARDING HX OF THIS   • Hyperlipidemia    • Hypertension    • Insomnia    • Malignant hyperthermia due to anesthesia     PER PT REPORT, BROTHER HAD DURING LUNG SURGERY SEVERAL YEARS AGO.  STATED THEY PACKED HIM ON ICE.  STATED HE DID SURVIVE.  PT DENIES ANY HX OF MALIGNANT HYPERTHERMIA HIMSELF, OR ANY ISSUES WITH ANESTHESIA.  STATES TO HIS KNOWLEDGE, NO OTHER FAMILY MEMBER HAS THIS ISSUE EXCEPT FOR HIS BROTHER.   • Other hydronephrosis 8/12/2019    Added automatically from request for surgery [id number]   • Renal insufficiency    • Sepsis (CMS/HCC) 2019   • Shortness of breath     STATES WITH EXERTION, OTHERWISE, DENIES   • Sigmoid diverticulitis without abscess or perforation 8/9/2017   • Skin breakdown     fourth toe right foot noted in 2019 MD D/C note   • Skin ulcer of fourth toe of right foot, limited to breakdown of skin (CMS/HCC) 7/5/2019   • Stricture of ureter    • Type 2 diabetes mellitus (CMS/HCC)    • Ureteral stricture, left    • UTI (urinary tract infection) 7/18/2019   • Vitamin D deficiency    • Wears glasses        Past Surgical History  Past Surgical History:   Procedure Laterality Date   • APPENDECTOMY     • CHOLECYSTECTOMY     • COLONOSCOPY     • CYSTOSCOPY URETEROSCOPY Left 2/11/2019    Procedure: URETEROSCOPY WITH STENT EXTRACTION, RPG;  Surgeon: Griffin Romero MD;  Location: [facility] OR;  Service: Urology   • CYSTOSCOPY W/ URETERAL STENT PLACEMENT Left 7/20/2019    Procedure: CYSTOSCOPY, LEFT RETROGRADE PYELOGRAM,  ATTEMPTED LEFT URETERAL STENT INSERTION;  Surgeon: Isaac Flynn MD;  Location: [facility] OR;  Service: Urology   • EYE SURGERY      CATARACTS REMOVED BILATERALLY   • KNEE ARTHROPLASTY Bilateral    • KNEE ARTHROSCOPY Bilateral    • RENAL ARTERY STENT      SEVERAL TIMES " EVERY SINCE 1978   • URETEROSCOPY LASER LITHOTRIPSY WITH STENT INSERTION Left 1/10/2018    Procedure:  cysto, left ureteral stent replacement ;  Surgeon: Griffin Romero MD;  Location: [facility];  Service:    • URETEROSCOPY LASER LITHOTRIPSY WITH STENT INSERTION Left 8/14/2019    Procedure: URETEROSCOPY, STONE REMOVAL WITH STENT INSERTION;  Surgeon: Griffin Romero MD;  Location: [facility];  Service: Urology       Medications  has a current medication list which includes the following prescription(s): allopurinol, apixaban, carvedilol, desoximetasone, diphenhydramine-acetaminophen, finasteride, fluticasone, glimepiride, glucose blood, lactobacillus acidophilus, levothyroxine, multiple vitamins-minerals, polyethylene glycol, silodosin, and sitagliptin.      Allergies  Allergies   Allergen Reactions   • Metformin Diarrhea   • Statins Myalgia       Social History  Social History     Socioeconomic History   • Marital status:      Spouse name: Not on file   • Number of children: Not on file   • Years of education: Not on file   • Highest education level: Not on file   Tobacco Use   • Smoking status: Former Smoker     Types: Cigarettes   • Smokeless tobacco: Never Used   • Tobacco comment: SMOKED SOME AS A TEEN, DENIES ANY HABIT OR ADDICTION   Substance and Sexual Activity   • Alcohol use: No   • Drug use: No   • Sexual activity: Defer       Family History  He has no family history of bladder or kidney cancer  He has no family history of kidney stones      AUA Symptom Score:      Review of Systems  Review of Systems   Constitutional: Negative for activity change, appetite change, chills, fatigue, fever, unexpected weight gain and unexpected weight loss.   Respiratory: Negative for apnea, cough, chest tightness, shortness of breath, wheezing and stridor.    Cardiovascular: Negative for chest pain, palpitations and leg swelling.   Gastrointestinal: Negative for abdominal distention, abdominal pain, anal bleeding,  blood in stool, constipation, diarrhea, nausea, rectal pain, vomiting, GERD and indigestion.   Genitourinary: Negative for decreased libido, decreased urine volume, difficulty urinating, discharge, dysuria, flank pain, frequency, genital sores, hematuria, nocturia, penile pain, erectile dysfunction, penile swelling, scrotal swelling, testicular pain, urgency and urinary incontinence.   Musculoskeletal: Negative for back pain and joint swelling.   Neurological: Negative for tremors, seizures, speech difficulty, weakness and numbness.   Psychiatric/Behavioral: Negative for agitation, decreased concentration, sleep disturbance, depressed mood and stress. The patient is not nervous/anxious.        Physical Exam  Physical Exam   Constitutional: He is oriented to person, place, and time. He appears well-developed and well-nourished.   HENT:   Head: Normocephalic and atraumatic.   Neck: Normal range of motion.   Cardiovascular: Normal rate, regular rhythm and normal heart sounds.   Pulmonary/Chest: Effort normal and breath sounds normal. No respiratory distress.   Abdominal: Soft. He exhibits no distension and no mass. There is no tenderness. No hernia.   Musculoskeletal: Normal range of motion.   Lymphadenopathy:     He has no cervical adenopathy.   Neurological: He is alert and oriented to person, place, and time.   Skin: Skin is warm and dry.   Psychiatric: He has a normal mood and affect. His behavior is normal.   Vitals reviewed.      Labs Recent and today in the office:  Results for orders placed or performed in visit on 10/18/19   POC Glycosylated Hemoglobin (Hb A1C)   Result Value Ref Range    Hemoglobin A1C 8.4 %         Assessment & Plan  Hydronephrosis/ureteral stricture: Patient has an indwelling ureteral stent that needs to be changed and so we will insert a metallic stent which is last him a year in the past.  He has had repeated stones in his distal ureter that initially caused the problem so we reviewed  will be will be prepared for laser lithotripsy if one is present.    Phimosis: Patient appears to have chronic UTI based in part on his chronic phimosis and chronic balanitis so we will plan on doing circumcision limited to a dorsal slit procedure at the time of his stent change.

## 2019-10-21 NOTE — H&P (VIEW-ONLY)
"Chief Complaint  Follow-up (2 month)        JAZMIN Zepeda is a 87 y.o. male who returns today for follow-up of hydronephrosis and left ureteral stricture that is been handled for years with an indwelling stent.  When it was removed in [place] they were unable to reinsert it and he ended up with a nephrostomy tube.  He came back here where I reinserted the indwelling stent and remove the nephrostomy tube.  At this point he prefers switching to the permanent metallic stent that only has to be changed once per year and he presents today to schedule that procedure.  He has a very tight phimosis which makes his urine look chronically infected and have recommended a dorsal slit circumcision at the time of the stent change.  Urine culture submitted for antibiotics and he will need to be treated appropriately prior to the procedure.  He will also need cardiology clearance and he is sent back to Dr. Heath.  He's had a past history of pulmonary embolus with a recent lung scan showing resolution.  He still takes his blood thinner and he is informed to stop 3 days before the procedure.  Currently he is voiding without difficulty on Rapaflo.    Vitals:    10/21/19 1314   BP: 128/80   Pulse: 68   Resp: 18   SpO2: 96%       Past Medical History  Past Medical History:   Diagnosis Date   • Abnormal EKG    • Abnormal PSA     Reported abnormal   • Acute diverticulitis 2019   • Arthritis     KNEES,  BUT SINCE HAD REPLACEMENT   • Benign localized hyperplasia of prostate    • Bilateral knee pain    • Cataracts, bilateral    • CKD (chronic kidney disease)    • Diabetic neuropathy (CMS/HCC)    • Disease of thyroid gland     HYPOTHYROIDISM   • Diverticulitis    • Dyslipidemia     H/O   • Family history of malignant hyperthermia     Brother    • Full dentures    • Generalized weakness    • History of gout    • History of hepatitis A vaccination    • History of nephrolithiasis    • History of nuclear stress test     STATES IN THE 1990'S.  \"IT " "WAS OK\"   • History of osteopenia    • History of recurrent UTI (urinary tract infection)    • Hydronephrosis of left kidney 7/18/2019   • Hydronephrosis with ureteral stricture    • Hyperkalemia     PT UNSURE REGARDING HX OF THIS   • Hyperlipidemia    • Hypertension    • Insomnia    • Malignant hyperthermia due to anesthesia     PER PT REPORT, BROTHER HAD DURING LUNG SURGERY SEVERAL YEARS AGO.  STATED THEY PACKED HIM ON ICE.  STATED HE DID SURVIVE.  PT DENIES ANY HX OF MALIGNANT HYPERTHERMIA HIMSELF, OR ANY ISSUES WITH ANESTHESIA.  STATES TO HIS KNOWLEDGE, NO OTHER FAMILY MEMBER HAS THIS ISSUE EXCEPT FOR HIS BROTHER.   • Other hydronephrosis 8/12/2019    Added automatically from request for surgery [id number]   • Renal insufficiency    • Sepsis (CMS/HCC) 2019   • Shortness of breath     STATES WITH EXERTION, OTHERWISE, DENIES   • Sigmoid diverticulitis without abscess or perforation 8/9/2017   • Skin breakdown     fourth toe right foot noted in 2019 MD D/C note   • Skin ulcer of fourth toe of right foot, limited to breakdown of skin (CMS/HCC) 7/5/2019   • Stricture of ureter    • Type 2 diabetes mellitus (CMS/HCC)    • Ureteral stricture, left    • UTI (urinary tract infection) 7/18/2019   • Vitamin D deficiency    • Wears glasses        Past Surgical History  Past Surgical History:   Procedure Laterality Date   • APPENDECTOMY     • CHOLECYSTECTOMY     • COLONOSCOPY     • CYSTOSCOPY URETEROSCOPY Left 2/11/2019    Procedure: URETEROSCOPY WITH STENT EXTRACTION, RPG;  Surgeon: Griffin Romero MD;  Location: [facility] OR;  Service: Urology   • CYSTOSCOPY W/ URETERAL STENT PLACEMENT Left 7/20/2019    Procedure: CYSTOSCOPY, LEFT RETROGRADE PYELOGRAM,  ATTEMPTED LEFT URETERAL STENT INSERTION;  Surgeon: Isaac Flynn MD;  Location: [facility] OR;  Service: Urology   • EYE SURGERY      CATARACTS REMOVED BILATERALLY   • KNEE ARTHROPLASTY Bilateral    • KNEE ARTHROSCOPY Bilateral    • RENAL ARTERY STENT      SEVERAL TIMES " EVERY SINCE 1978   • URETEROSCOPY LASER LITHOTRIPSY WITH STENT INSERTION Left 1/10/2018    Procedure:  cysto, left ureteral stent replacement ;  Surgeon: Griffin Romero MD;  Location: [facility];  Service:    • URETEROSCOPY LASER LITHOTRIPSY WITH STENT INSERTION Left 8/14/2019    Procedure: URETEROSCOPY, STONE REMOVAL WITH STENT INSERTION;  Surgeon: Griffin Romero MD;  Location: [facility];  Service: Urology       Medications  has a current medication list which includes the following prescription(s): allopurinol, apixaban, carvedilol, desoximetasone, diphenhydramine-acetaminophen, finasteride, fluticasone, glimepiride, glucose blood, lactobacillus acidophilus, levothyroxine, multiple vitamins-minerals, polyethylene glycol, silodosin, and sitagliptin.      Allergies  Allergies   Allergen Reactions   • Metformin Diarrhea   • Statins Myalgia       Social History  Social History     Socioeconomic History   • Marital status:      Spouse name: Not on file   • Number of children: Not on file   • Years of education: Not on file   • Highest education level: Not on file   Tobacco Use   • Smoking status: Former Smoker     Types: Cigarettes   • Smokeless tobacco: Never Used   • Tobacco comment: SMOKED SOME AS A TEEN, DENIES ANY HABIT OR ADDICTION   Substance and Sexual Activity   • Alcohol use: No   • Drug use: No   • Sexual activity: Defer       Family History  He has no family history of bladder or kidney cancer  He has no family history of kidney stones      AUA Symptom Score:      Review of Systems  Review of Systems   Constitutional: Negative for activity change, appetite change, chills, fatigue, fever, unexpected weight gain and unexpected weight loss.   Respiratory: Negative for apnea, cough, chest tightness, shortness of breath, wheezing and stridor.    Cardiovascular: Negative for chest pain, palpitations and leg swelling.   Gastrointestinal: Negative for abdominal distention, abdominal pain, anal bleeding,  blood in stool, constipation, diarrhea, nausea, rectal pain, vomiting, GERD and indigestion.   Genitourinary: Negative for decreased libido, decreased urine volume, difficulty urinating, discharge, dysuria, flank pain, frequency, genital sores, hematuria, nocturia, penile pain, erectile dysfunction, penile swelling, scrotal swelling, testicular pain, urgency and urinary incontinence.   Musculoskeletal: Negative for back pain and joint swelling.   Neurological: Negative for tremors, seizures, speech difficulty, weakness and numbness.   Psychiatric/Behavioral: Negative for agitation, decreased concentration, sleep disturbance, depressed mood and stress. The patient is not nervous/anxious.        Physical Exam  Physical Exam   Constitutional: He is oriented to person, place, and time. He appears well-developed and well-nourished.   HENT:   Head: Normocephalic and atraumatic.   Neck: Normal range of motion.   Cardiovascular: Normal rate, regular rhythm and normal heart sounds.   Pulmonary/Chest: Effort normal and breath sounds normal. No respiratory distress.   Abdominal: Soft. He exhibits no distension and no mass. There is no tenderness. No hernia.   Musculoskeletal: Normal range of motion.   Lymphadenopathy:     He has no cervical adenopathy.   Neurological: He is alert and oriented to person, place, and time.   Skin: Skin is warm and dry.   Psychiatric: He has a normal mood and affect. His behavior is normal.   Vitals reviewed.      Labs Recent and today in the office:  Results for orders placed or performed in visit on 10/18/19   POC Glycosylated Hemoglobin (Hb A1C)   Result Value Ref Range    Hemoglobin A1C 8.4 %         Assessment & Plan  Hydronephrosis/ureteral stricture: Patient has an indwelling ureteral stent that needs to be changed and so we will insert a metallic stent which is last him a year in the past.  He has had repeated stones in his distal ureter that initially caused the problem so we reviewed  will be will be prepared for laser lithotripsy if one is present.    Phimosis: Patient appears to have chronic UTI based in part on his chronic phimosis and chronic balanitis so we will plan on doing circumcision limited to a dorsal slit procedure at the time of his stent change.

## 2019-10-21 NOTE — TELEPHONE ENCOUNTER
I spoke with Dr. Heath's office regarding surgical clearance.  Dr. Heath is reviewing his chart and will fax clearance or call patient to schedule an appointment.

## 2019-10-22 NOTE — PAT
"Call to OR back desk, spoke with RITCHIE Langley RN charge to inform her of patient's upcoming surgery and reported family history of MH (brother).  Spoke with JOSE L Ordonez CRNA and also informed of patient's scheduled surgery and family history of MH. Also discussed patient's medical history including hospital admission 08/2019 for PE/DVT. Dr. Romero's office has requested cardiac clearance from Dr. Heath's office. Per anesthesia, no additional orders.     Spoke with Beto at Dr. Heath's office regarding cardiac clearance for surgery tomorrow 10/23/2019; Beto stated the request was on Dr. Heath's desk but he has not reviewed it yet, call back around 5 pm.   Spoke with Sandra at Dr. Romero's office and informed her that cardiac clearance has not been received yet and patient is scheduled to arrive at 0630 am tomorrow. Sandra stated per Dr. Romero \"if he doesn't have clearance tomorrow morning then it will be up to anesthesia.\"   Spoke with MAGDALENE Rosenbaum CRNA regarding lack of cardiac clearance, patient's arrival time and Dr. Romero's response. Patient's medical history and hospitalization from 8/26/2019 also reviewed. Per CRNA, if patient does not have cardiac clearance he will be cancelled and postponed.     Unable to contact Dr. Rmoero's office after speaking with anesthesia, office closed. Spoke with Beto at Dr. Heath's office, cardiac clearance still pending review by MD. Patient's pre-op chart flagged that cardiac clearance is still pending, SDS charge RN notified and this note attached.   "

## 2019-10-23 ENCOUNTER — APPOINTMENT (OUTPATIENT)
Dept: GENERAL RADIOLOGY | Facility: HOSPITAL | Age: 84
End: 2019-10-23

## 2019-10-23 ENCOUNTER — HOSPITAL ENCOUNTER (OUTPATIENT)
Facility: HOSPITAL | Age: 84
Setting detail: HOSPITAL OUTPATIENT SURGERY
Discharge: HOME | End: 2019-10-23
Attending: UROLOGY | Admitting: UROLOGY

## 2019-10-23 ENCOUNTER — ANESTHESIA (OUTPATIENT)
Dept: PERIOP | Facility: HOSPITAL | Age: 84
End: 2019-10-23

## 2019-10-23 ENCOUNTER — ANESTHESIA EVENT (OUTPATIENT)
Dept: PERIOP | Facility: HOSPITAL | Age: 84
End: 2019-10-23

## 2019-10-23 VITALS
RESPIRATION RATE: 16 BRPM | TEMPERATURE: 97 F | HEART RATE: 58 BPM | SYSTOLIC BLOOD PRESSURE: 139 MMHG | DIASTOLIC BLOOD PRESSURE: 57 MMHG | OXYGEN SATURATION: 94 %

## 2019-10-23 DIAGNOSIS — N13.2 HYDRONEPHROSIS WITH RENAL AND URETERAL CALCULUS OBSTRUCTION: ICD-10-CM

## 2019-10-23 LAB
BACTERIA UR CULT: NO GROWTH
BACTERIA UR CULT: NORMAL
BACTERIA UR QL AUTO: ABNORMAL /HPF
BILIRUB UR QL STRIP: NEGATIVE
CLARITY UR: ABNORMAL
COLOR UR: ABNORMAL
GLUCOSE BLDC GLUCOMTR-MCNC: 169 MG/DL (ref 70–130)
GLUCOSE UR STRIP-MCNC: NEGATIVE MG/DL
HGB UR QL STRIP.AUTO: ABNORMAL
HYALINE CASTS UR QL AUTO: ABNORMAL /LPF
KETONES UR QL STRIP: NEGATIVE
LEUKOCYTE ESTERASE UR QL STRIP.AUTO: ABNORMAL
NITRITE UR QL STRIP: NEGATIVE
PH UR STRIP.AUTO: 5.5 [PH] (ref 5–8)
PROT UR QL STRIP: ABNORMAL
RBC # UR: ABNORMAL /HPF
REF LAB TEST METHOD: ABNORMAL
SP GR UR STRIP: 1.01 (ref 1–1.03)
SQUAMOUS #/AREA URNS HPF: ABNORMAL /HPF
UROBILINOGEN UR QL STRIP: ABNORMAL
WBC UR QL AUTO: ABNORMAL /HPF

## 2019-10-23 PROCEDURE — 25010000003 LIDOCAINE 1 % SOLUTION: Performed by: UROLOGY

## 2019-10-23 PROCEDURE — C2625 STENT, NON-COR, TEM W/DEL SY: HCPCS | Performed by: UROLOGY

## 2019-10-23 PROCEDURE — 25010000002 PROPOFOL 10 MG/ML EMULSION: Performed by: NURSE ANESTHETIST, CERTIFIED REGISTERED

## 2019-10-23 PROCEDURE — 25010000002 DEXAMETHASONE PER 1 MG: Performed by: NURSE ANESTHETIST, CERTIFIED REGISTERED

## 2019-10-23 PROCEDURE — 74018 RADEX ABDOMEN 1 VIEW: CPT

## 2019-10-23 PROCEDURE — 25010000002 MIDAZOLAM PER 1 MG: Performed by: NURSE ANESTHETIST, CERTIFIED REGISTERED

## 2019-10-23 PROCEDURE — C1769 GUIDE WIRE: HCPCS | Performed by: UROLOGY

## 2019-10-23 PROCEDURE — 81001 URINALYSIS AUTO W/SCOPE: CPT | Performed by: UROLOGY

## 2019-10-23 PROCEDURE — C1758 CATHETER, URETERAL: HCPCS | Performed by: UROLOGY

## 2019-10-23 PROCEDURE — 87086 URINE CULTURE/COLONY COUNT: CPT | Performed by: UROLOGY

## 2019-10-23 PROCEDURE — 54001 SLITTING OF PREPUCE: CPT | Performed by: UROLOGY

## 2019-10-23 PROCEDURE — 25010000002 IOPAMIDOL 61 % SOLUTION: Performed by: UROLOGY

## 2019-10-23 PROCEDURE — 25010000002 LEVOFLOXACIN PER 250 MG: Performed by: UROLOGY

## 2019-10-23 PROCEDURE — 52356 CYSTO/URETERO W/LITHOTRIPSY: CPT | Performed by: UROLOGY

## 2019-10-23 PROCEDURE — 25010000002 ONDANSETRON PER 1 MG: Performed by: NURSE ANESTHETIST, CERTIFIED REGISTERED

## 2019-10-23 PROCEDURE — 82962 GLUCOSE BLOOD TEST: CPT

## 2019-10-23 DEVICE — STNT URETRL RESONANCE METALLIC 6F26CM: Type: IMPLANTABLE DEVICE | Site: URETER | Status: FUNCTIONAL

## 2019-10-23 RX ORDER — MIDAZOLAM HYDROCHLORIDE 1 MG/ML
INJECTION INTRAMUSCULAR; INTRAVENOUS AS NEEDED
Status: DISCONTINUED | OUTPATIENT
Start: 2019-10-23 | End: 2019-10-23 | Stop reason: SURG

## 2019-10-23 RX ORDER — MEPERIDINE HYDROCHLORIDE 50 MG/ML
INJECTION INTRAMUSCULAR; INTRAVENOUS; SUBCUTANEOUS AS NEEDED
Status: DISCONTINUED | OUTPATIENT
Start: 2019-10-23 | End: 2019-10-23 | Stop reason: SURG

## 2019-10-23 RX ORDER — ONDANSETRON 2 MG/ML
INJECTION INTRAMUSCULAR; INTRAVENOUS AS NEEDED
Status: DISCONTINUED | OUTPATIENT
Start: 2019-10-23 | End: 2019-10-23 | Stop reason: SURG

## 2019-10-23 RX ORDER — DEXAMETHASONE SODIUM PHOSPHATE 4 MG/ML
INJECTION, SOLUTION INTRA-ARTICULAR; INTRALESIONAL; INTRAMUSCULAR; INTRAVENOUS; SOFT TISSUE AS NEEDED
Status: DISCONTINUED | OUTPATIENT
Start: 2019-10-23 | End: 2019-10-23 | Stop reason: SURG

## 2019-10-23 RX ORDER — KETAMINE HCL IN NACL, ISO-OSM 100MG/10ML
SYRINGE (ML) INJECTION AS NEEDED
Status: DISCONTINUED | OUTPATIENT
Start: 2019-10-23 | End: 2019-10-23 | Stop reason: SURG

## 2019-10-23 RX ORDER — LIDOCAINE HYDROCHLORIDE 10 MG/ML
INJECTION, SOLUTION INFILTRATION; PERINEURAL AS NEEDED
Status: DISCONTINUED | OUTPATIENT
Start: 2019-10-23 | End: 2019-10-23 | Stop reason: HOSPADM

## 2019-10-23 RX ORDER — NITROFURANTOIN 25; 75 MG/1; MG/1
100 CAPSULE ORAL 2 TIMES DAILY
Qty: 14 CAPSULE | Refills: 0 | Status: SHIPPED | OUTPATIENT
Start: 2019-10-23 | End: 2019-11-18

## 2019-10-23 RX ORDER — MEPERIDINE HYDROCHLORIDE 50 MG/ML
25 INJECTION INTRAMUSCULAR; INTRAVENOUS; SUBCUTANEOUS ONCE AS NEEDED
Status: DISCONTINUED | OUTPATIENT
Start: 2019-10-23 | End: 2019-10-23 | Stop reason: HOSPADM

## 2019-10-23 RX ORDER — SODIUM CHLORIDE, SODIUM LACTATE, POTASSIUM CHLORIDE, CALCIUM CHLORIDE 600; 310; 30; 20 MG/100ML; MG/100ML; MG/100ML; MG/100ML
1000 INJECTION, SOLUTION INTRAVENOUS CONTINUOUS
Status: DISCONTINUED | OUTPATIENT
Start: 2019-10-23 | End: 2019-10-23 | Stop reason: HOSPADM

## 2019-10-23 RX ORDER — SODIUM CHLORIDE 0.9 % (FLUSH) 0.9 %
10 SYRINGE (ML) INJECTION AS NEEDED
Status: DISCONTINUED | OUTPATIENT
Start: 2019-10-23 | End: 2019-10-23 | Stop reason: HOSPADM

## 2019-10-23 RX ORDER — LEVOFLOXACIN 5 MG/ML
500 INJECTION, SOLUTION INTRAVENOUS ONCE
Status: COMPLETED | OUTPATIENT
Start: 2019-10-23 | End: 2019-10-23

## 2019-10-23 RX ORDER — ONDANSETRON 2 MG/ML
4 INJECTION INTRAMUSCULAR; INTRAVENOUS ONCE AS NEEDED
Status: DISCONTINUED | OUTPATIENT
Start: 2019-10-23 | End: 2019-10-23 | Stop reason: HOSPADM

## 2019-10-23 RX ORDER — MAGNESIUM HYDROXIDE 1200 MG/15ML
LIQUID ORAL AS NEEDED
Status: DISCONTINUED | OUTPATIENT
Start: 2019-10-23 | End: 2019-10-23 | Stop reason: HOSPADM

## 2019-10-23 RX ADMIN — SODIUM CHLORIDE, POTASSIUM CHLORIDE, SODIUM LACTATE AND CALCIUM CHLORIDE 1000 ML: 600; 310; 30; 20 INJECTION, SOLUTION INTRAVENOUS at 07:45

## 2019-10-23 RX ADMIN — MEPERIDINE HYDROCHLORIDE 25 MG: 50 INJECTION, SOLUTION INTRAMUSCULAR; INTRAVENOUS; SUBCUTANEOUS at 07:59

## 2019-10-23 RX ADMIN — MEPERIDINE HYDROCHLORIDE 25 MG: 50 INJECTION, SOLUTION INTRAMUSCULAR; INTRAVENOUS; SUBCUTANEOUS at 08:10

## 2019-10-23 RX ADMIN — MIDAZOLAM HYDROCHLORIDE 1 MG: 1 INJECTION, SOLUTION INTRAMUSCULAR; INTRAVENOUS at 07:56

## 2019-10-23 RX ADMIN — MIDAZOLAM HYDROCHLORIDE 1 MG: 1 INJECTION, SOLUTION INTRAMUSCULAR; INTRAVENOUS at 08:06

## 2019-10-23 RX ADMIN — LEVOFLOXACIN 500 MG: 5 INJECTION, SOLUTION INTRAVENOUS at 07:54

## 2019-10-23 RX ADMIN — PROPOFOL 100 MCG/KG/MIN: 10 INJECTION, EMULSION INTRAVENOUS at 07:56

## 2019-10-23 RX ADMIN — ONDANSETRON 4 MG: 2 INJECTION INTRAMUSCULAR; INTRAVENOUS at 08:11

## 2019-10-23 RX ADMIN — Medication 25 MG: at 08:04

## 2019-10-23 RX ADMIN — DEXAMETHASONE SODIUM PHOSPHATE 8 MG: 4 INJECTION, SOLUTION INTRAMUSCULAR; INTRAVENOUS at 08:11

## 2019-10-23 NOTE — OP NOTE
URETEROSCOPY LASER LITHOTRIPSY WITH STENT INSERTION, CIRCUMCISION  Procedure Note    Forrest Zepeda  10/23/2019    Pre-op Diagnosis:   Hydronephrosis with renal and ureteral calculus obstruction [N13.2]    Post-op Diagnosis:     Post-Op Diagnosis Codes:     * Hydronephrosis with renal and ureteral calculus obstruction [N13.2]    Procedure/CPT® Codes:      Procedure(s):  Left URETEROSCOPY LASER LITHOTRIPSY WITH STENT INSERTION  CIRCUMCISION    Surgeon(s):  Griffin Romero MD    Anesthesia: General  Operative technique: This patient with a long history of chronic UTI left hydronephrosis and ureteral calculi presents at this time to have his temporary Silastic ureteral stent replaced with a more permanent residence metallic stent that can be left indwelling for 1 year.  Preoperative KUB showed no calculi in the distal ureter at this time.  With his family history of malignant hyperthermia anesthesia elected to provide sedation with propofol drip.  With the patient's tight phimosis contributing to his recurrent UTI and limiting access to the urethra he will undergo dorsal slit circumcision under local anesthesia.  The patient was brought to the operating room placed on the cystoscopy table in the dorsolithotomy position prepped and draped in routine sterile fashion and the above anesthesia administered.  A timeout was called to review all pertinent data and was agreed to by those parties in the room.    At this point additional prep underneath the foreskin was provided with the Betadine and Q-tips.  The foreskin was then grasped on either side with a small curved hemostats and then clamped at the 12 o'clock position with a straight hemostat.  An incision was then made down to the glans penis.  The proximal distal skin margins were then reapproximated with a running 4-0 Vicryl suture.    Rigid ureteroscopy was then performed with insertion of a guidewire up into the left renal pelvis alongside the previously placed  Silastic stent.  The ureteroscope was then replaced with a cystoscope and grasping forceps were used to extract the previously placed temporary ureteral stent.  The [employer] metallic catheter was then inserted over the guidewire under fluoroscopy through the sheath and properly positioned between the kidney and the bladder with a good curl on both hands.  Sheath and guidewire were removed with the stent left indwelling.  A specimen had been submitted for culture and sensitivity.    Preoperative urine culture was no growth and the patient has a history of C. difficile.  Therefore he was instructed to discontinue the broad-spectrum antibiotics and will be sent home on Macrodantin.  Is also had a past history of pulmonary embolus and the blood thinner had been stopped 36 hours prior to the procedure.  Patient was instructed to resume this postoperatively day 2.  He can return to the office for follow-up in 2 weeks.  He tolerated procedure well there were no complications this is end of dictation thank  Staff:   Circulator: Joselin Cooper RN  Scrub Person: Scout Padilla    Estimated Blood Loss: none    Specimens:                Specimens     ID Source Type Tests Collected By Collected At Frozen?      1 Urinary Bladder Urine · URINALYSIS W/ CULTURE IF INDICATED   Joselin Cooper RN 10/23/19 0838      Description: cystoscopy urine            Drains:   Ureteral Drain/Stent Left ureter 6 Fr. (Active)       Findings: Phimosis, distal left ureteral obstruction with hydronephrosis    Complications: None      Griffin Romero MD     Date: 10/23/2019  Time: 8:57 AM

## 2019-10-23 NOTE — INTERVAL H&P NOTE
H&P updated. The patient was examined and the following changes are noted:  This patient has a family history of malignant hyperthermia as well as a documented genetic propensity.  He will therefore undergo this procedure with local anesthesia provided for the circumcision and propofol drip sedation for insertion of the stent.

## 2019-10-23 NOTE — ANESTHESIA POSTPROCEDURE EVALUATION
Patient: Forrest Zepeda    Procedure Summary     Date:  10/23/19 Room / Location:  [facility] /  [facility]    Anesthesia Start:  0754 Anesthesia Stop:      Procedures:       Left URETEROSCOPY WITH STENT INSERTION-LEFT (Left )      CIRCUMCISIOn-DORSAL SLIT (N/A Penis) Diagnosis:       Hydronephrosis with renal and ureteral calculus obstruction      (Hydronephrosis with renal and ureteral calculus obstruction [N13.2])    Surgeon:  Griffin Romero MD Provider:  Griffin Kraus CRNA    Anesthesia Type:  MAC ASA Status:  4          Anesthesia Type: MAC  Last vitals  BP   127/74 (10/23/19 0707)   Temp   97.9 °F (36.6 °C) (10/23/19 0707)   Pulse   65 (10/23/19 0707)   Resp   18 (10/23/19 0707)     SpO2   96 % (10/23/19 0707)     Post Anesthesia Care and Evaluation    Patient location during evaluation: PACU  Patient participation: complete - patient participated  Level of consciousness: awake  Pain score: 0  Pain management: adequate  Airway patency: patent  Anesthetic complications: No anesthetic complications  PONV Status: none  Cardiovascular status: acceptable  Respiratory status: acceptable and face mask  Hydration status: acceptable    Comments: vsss resp spont, reflexes intact, responsive, report given to pacu nurse

## 2019-10-23 NOTE — ANESTHESIA PREPROCEDURE EVALUATION
Anesthesia Evaluation     Patient summary reviewed and Nursing notes reviewed   history of anesthetic complications: malignant hyperthermia  NPO Solid Status: > 8 hours  NPO Liquid Status: > 8 hours           Airway   Mallampati: II  TM distance: >3 FB  Neck ROM: full  No difficulty expected  Dental - normal exam   (+) upper dentures, lower dentures and edentulous    Pulmonary     breath sounds clear to auscultation  (+) pneumonia resolved , pulmonary embolism ( resolved), shortness of breath, sleep apnea, decreased breath sounds,   Cardiovascular - normal exam  Exercise tolerance: good (4-7 METS)    ECG reviewed  PT is on anticoagulation therapy  Patient on routine beta blocker and Beta blocker not taken-may be given intraoperatively  Rhythm: regular  Rate: normal    (+) hypertension well controlled less than 2 medications, CHF, LEHMAN, DVT, hyperlipidemia,   Past MI:  per old ekgs. CAD:  high risk.    ROS comment: 7/2019  ECHO  · Left ventricular wall thickness is consistent with mild concentric hypertrophy.  · Mild aortic valve stenosis is present.  · Calculated right ventricular systolic pressure from tricuspid regurgitation is 50 mmHg.  · Moderate tricuspid valve regurgitation is present.  · Estimated EF = 55%.  · Left ventricular systolic function is normal.  · Normal right ventricular cavity size, wall thickness, systolic function and septal motion noted.  · Moderate pulmonary hypertension is present.  · There is no evidence of pericardial effusion.    7/2019 ECG   NSR with left anterior fasicular block     Neuro/Psych- negative ROS  GI/Hepatic/Renal/Endo    (+)   renal disease stones, diabetes mellitus type 2 poorly controlled, hypothyroidism,     Musculoskeletal     (+) arthralgias, chronic pain, myalgias,       ROS comment: Weakness - uses cane to ambulate  Abdominal   (+) obese,    Substance History - negative use     OB/GYN negative ob/gyn ROS         Other   (+) arthritis       Other Comment: Reported  history of malignant hyperthermia of brother. However, pt states he has had multiple procedures without any reaction to anesthesia. Chart review reveals pt has received anesthetic gases (Sevo) in the past. However, due to close relationship with familial history of malignant hyperthermia, precautions will be taken.   ROS/Med Hx Other: Labs reviewed  ekg sr lvh  cxr chronic changes stable  nm lung vq resolution of abnormality left lung with low probabilty for pe  Benign localized hyperplasia of prostate  Vitamin D deficiency  Stricture of ureter  Uncontrolled type 2 diabetes mellitus   Renal insufficiency  Insomnia  Hypertension  Generalized osteoarthritis  Diabetic neuropathy  Gout  Immunization due  Hypertriglyceridemia  Acquired hypothyroidism  Sigmoid diverticulitis without abscess or perforation  Ureteral stricture, left  Skin ulcer of fourth toe of right foot, limited to breakdown of skin   Acute diverticulitis  UTI (urinary tract infection)  Hydronephrosis of left kidney  Generalized weakness  Other hydronephrosis    Current nephrostomy tube in place          Phys Exam Other: Generalized weakness                Anesthesia Plan    ASA 4     MAC   (Risks and benefits of general anesthesia discussed with patient, including, aspiration, recall, dental damage, cardiac or respiratory compromise, stroke, fluctuations in blood pressure, seizure or death. Discussed risk with pt., pt extremely high intraop and postop risk for cv, resp, and neuro events.  Surgeon wishes to proceed without cardiac clearance under MAC anesthesia. Pt advised that a endotracheal tube (ETT), laryngeal mask airway (LMA) or mask would be utilized to maintain the airway. Pt verbalized understanding and agreed to plan.    Total intravenous anesthesia (TIVA) with LMA planned. )  intravenous induction   Anesthetic plan, all risks, benefits, and alternatives have been provided, discussed and informed consent has been obtained with: patient.    Plan  discussed with CRNA.

## 2019-10-24 LAB — BACTERIA SPEC AEROBE CULT: NO GROWTH

## 2019-10-28 ENCOUNTER — HOSPITAL ENCOUNTER (OUTPATIENT)
Dept: GENERAL RADIOLOGY | Facility: HOSPITAL | Age: 84
Discharge: HOME | End: 2019-10-28
Admitting: UROLOGY

## 2019-10-28 ENCOUNTER — OFFICE VISIT (OUTPATIENT)
Dept: UROLOGY | Facility: CLINIC | Age: 84
End: 2019-10-28

## 2019-10-28 VITALS
OXYGEN SATURATION: 96 % | SYSTOLIC BLOOD PRESSURE: 118 MMHG | DIASTOLIC BLOOD PRESSURE: 74 MMHG | HEART RATE: 76 BPM | TEMPERATURE: 98.3 F

## 2019-10-28 DIAGNOSIS — R31.0 GROSS HEMATURIA: Primary | ICD-10-CM

## 2019-10-28 DIAGNOSIS — R10.30 LOWER ABDOMINAL PAIN: ICD-10-CM

## 2019-10-28 LAB
BILIRUB BLD-MCNC: NEGATIVE MG/DL
CLARITY, POC: ABNORMAL
COLOR UR: YELLOW
GLUCOSE UR STRIP-MCNC: NEGATIVE MG/DL
KETONES UR QL: NEGATIVE
LEUKOCYTE EST, POC: ABNORMAL
NITRITE UR-MCNC: NEGATIVE MG/ML
PH UR: 6 [PH] (ref 5–8)
PROT UR STRIP-MCNC: ABNORMAL MG/DL
RBC # UR STRIP: ABNORMAL /UL
SP GR UR: 1.02 (ref 1–1.03)
UROBILINOGEN UR QL: NORMAL

## 2019-10-28 PROCEDURE — 74018 RADEX ABDOMEN 1 VIEW: CPT

## 2019-10-28 PROCEDURE — 99214 OFFICE O/P EST MOD 30 MIN: CPT | Performed by: UROLOGY

## 2019-10-28 PROCEDURE — 81003 URINALYSIS AUTO W/O SCOPE: CPT | Performed by: UROLOGY

## 2019-10-28 NOTE — PROGRESS NOTES
Chief Complaint  Gross Hematuria (stopped his blood thinner and bleeding stopped but still having abdominal pain.)        JAZMIN Zepeda is a 87 y.o. male who returns today requesting urgent evaluation complaining of abdominal pain but with some gross hematuria over the weekend when he started back on Eliquis subsequent to his stent change.  This has subsequently cleared.  He is accompanied by his granddaughter Yary who is a nurse and points out that he had been complaining of blood in the stools as well.  He reportedly had a hole in his colon from diverticulitis 2 months ago when he was hospitalized at [facility] in [place] but was considered too high risk for surgery and was treated with prolonged parenteral antibiotics.  During that time they were unable to insert his ureteral stent that had been removed but he had that put back in Wednesday and KUB today shows it is in good position.  His urine today is grossly clear although a culture is submitted.  He is currently taking Macrobid and have tried to avoid broad-spectrum antibiotics because of his history of C. difficile.  Currently his chief complaint is abdominal pain but with a history of bleeding and recent perforation I strongly recommended he go back to [facility] in [place] where he was treated for this condition.  Unfortunately he states he would rather die than make that trip.  There were no vitals filed for this visit.    Past Medical History  Past Medical History:   Diagnosis Date   • Abnormal EKG    • Abnormal PSA     Reported abnormal   • Acute deep vein thrombosis (DVT) of right lower extremity (CMS/HCC) 08/22/2019   • Acute diverticulitis 2019   • Acute hyperkalemia 08/22/2019   • Acute respiratory failure with hypoxia (CMS/[facility])    • Acute saddle pulmonary embolism with acute cor pulmonale (CMS/HCC) 08/22/2019   • Acute systolic right heart failure (CMS/HCC) 08/22/2019   • Arthritis     KNEES,  BUT SINCE HAD REPLACEMENT   • Benign  "localized hyperplasia of prostate    • Bilateral knee pain    • Cataracts, bilateral    • CKD (chronic kidney disease)    • Diabetic neuropathy (CMS/HCC)    • Diabetic neuropathy (CMS/HCC)    • Disease of thyroid gland     HYPOTHYROIDISM   • Diverticulitis    • Dyslipidemia     H/O   • Family history of malignant hyperthermia     Brother    • Full dentures    • Generalized weakness    • History of gout    • History of hepatitis A vaccination    • History of nephrolithiasis    • History of nuclear stress test     STATES IN THE 1990'S.  \"IT WAS OK\"   • History of osteopenia    • History of recurrent UTI (urinary tract infection)    • Hydronephrosis of left kidney 7/18/2019   • Hydronephrosis with ureteral stricture    • Hyperkalemia     PT UNSURE REGARDING HX OF THIS   • Hyperlipidemia    • Hypertension    • Insomnia    • Malignant hyperthermia due to anesthesia     PER PT REPORT, BROTHER HAD DURING LUNG SURGERY SEVERAL YEARS AGO.  STATED THEY PACKED HIM ON ICE.  STATED HE DID SURVIVE.  PT DENIES ANY PERSONAL HX OF MALIGNANT HYPERTHERMIA HIMSELF, OR ANY ISSUES WITH ANESTHESIA.  STATES TO HIS KNOWLEDGE, NO OTHER FAMILY MEMBER HAS THIS ISSUE EXCEPT FOR HIS BROTHER.   • On supplemental oxygen therapy     2L NC QHS   • Other hydronephrosis 8/12/2019    Added automatically from request for surgery [id number]   • Renal insufficiency    • Sepsis (CMS/HCC) 2019   • Shortness of breath     STATES WITH EXERTION, OTHERWISE, DENIES   • Sigmoid diverticulitis without abscess or perforation 8/9/2017   • Skin breakdown     fourth toe right foot noted in 2019 MD D/C note   • Skin ulcer of fourth toe of right foot, limited to breakdown of skin (CMS/HCC) 7/5/2019   • Stricture of ureter    • Type 2 diabetes mellitus (CMS/HCC)    • Ureteral stricture, left    • UTI (urinary tract infection) 7/18/2019   • Vitamin D deficiency    • Wears glasses        Past Surgical History  Past Surgical History:   Procedure Laterality Date   • APPENDECTOMY "     • CHOLECYSTECTOMY     • CIRCUMCISION N/A 10/23/2019    Procedure: CIRCUMCISIOn-DORSAL SLIT;  Surgeon: Griffin Romero MD;  Location: [facility] OR;  Service: Urology   • COLONOSCOPY     • CYSTOSCOPY URETEROSCOPY Left 2/11/2019    Procedure: URETEROSCOPY WITH STENT EXTRACTION, RPG;  Surgeon: Griffin Romero MD;  Location: [facility] OR;  Service: Urology   • CYSTOSCOPY W/ URETERAL STENT PLACEMENT Left 7/20/2019    Procedure: CYSTOSCOPY, LEFT RETROGRADE PYELOGRAM,  ATTEMPTED LEFT URETERAL STENT INSERTION;  Surgeon: Isaac Flynn MD;  Location: [facility] OR;  Service: Urology   • EYE SURGERY      CATARACTS REMOVED BILATERALLY   • KNEE ARTHROPLASTY Bilateral    • KNEE ARTHROSCOPY Bilateral    • RENAL ARTERY STENT      SEVERAL TIMES EVERY SINCE 1978   • URETEROSCOPY LASER LITHOTRIPSY WITH STENT INSERTION Left 1/10/2018    Procedure:  cysto, left ureteral stent replacement ;  Surgeon: Griffin Romero MD;  Location: [facility] OR;  Service:    • URETEROSCOPY LASER LITHOTRIPSY WITH STENT INSERTION Left 8/14/2019    Procedure: URETEROSCOPY, STONE REMOVAL WITH STENT INSERTION;  Surgeon: Griffin Romero MD;  Location: [facility] OR;  Service: Urology   • URETEROSCOPY LASER LITHOTRIPSY WITH STENT INSERTION Left 10/23/2019    Procedure: Left URETEROSCOPY WITH STENT INSERTION-LEFT;  Surgeon: Griffin Romero MD;  Location: [facility] OR;  Service: Urology       Medications  has a current medication list which includes the following prescription(s): allopurinol, carvedilol, desoximetasone, diphenhydramine-acetaminophen, finasteride, fluticasone, glucose blood, lactobacillus acidophilus, levothyroxine, multiple vitamins-minerals, o2, polyethylene glycol, silodosin, sitagliptin, and nitrofurantoin (macrocrystal-monohydrate).      Allergies  Allergies   Allergen Reactions   • Metformin Diarrhea   • Statins Myalgia       Social History  Social History     Socioeconomic History   • Marital status:      Spouse name: Not on file   •  Number of children: Not on file   • Years of education: Not on file   • Highest education level: Not on file   Tobacco Use   • Smoking status: Former Smoker     Types: Cigarettes   • Smokeless tobacco: Never Used   • Tobacco comment: SMOKED SOME AS A TEEN, DENIES ANY HABIT OR ADDICTION   Substance and Sexual Activity   • Alcohol use: No   • Drug use: No   • Sexual activity: Defer       Family History  He has no family history of bladder or kidney cancer  He has no family history of kidney stones      AUA Symptom Score:      Review of Systems  Review of Systems   Gastrointestinal: Positive for abdominal distention, abdominal pain and blood in stool.   Genitourinary: Positive for hematuria.       Physical Exam  Physical Exam    Labs Recent and today in the office:  Results for orders placed or performed in visit on 10/28/19   POC Urinalysis Dipstick, Automated   Result Value Ref Range    Color Yellow Yellow, Straw, Dark Yellow, Sushila    Clarity, UA Cloudy (A) Clear    Specific Gravity  1.020 1.005 - 1.030    pH, Urine 6.0 5.0 - 8.0    Leukocytes Large (3+) (A) Negative    Nitrite, UA Negative Negative    Protein, POC 1+ (A) Negative mg/dL    Glucose, UA Negative Negative, 1000 mg/dL (3+) mg/dL    Ketones, UA Negative Negative    Urobilinogen, UA Normal Normal    Bilirubin Negative Negative    Blood, UA 2+ (A) Negative         Assessment & Plan  Abdominal pain/bloody stool: Recommend urgent general surgical evaluation of his abdomen.    Left hydronephrosis: Recently treated by inserting a semipermanent ureteral stent which on KUB today is in good position.  Urine is submitted for culture and he be contacted for antibiotics resistant to Macrodantin.  He denies any difficulty voiding.

## 2019-10-28 NOTE — TELEPHONE ENCOUNTER
Patient's granddaughter is calling - pt is down to one test strip. Need to call her?   [phone]    Please call into the [facility] in [place]

## 2019-10-30 LAB
BACTERIA UR CULT: NORMAL
BACTERIA UR CULT: NORMAL

## 2019-11-04 ENCOUNTER — OFFICE VISIT (OUTPATIENT)
Dept: UROLOGY | Facility: CLINIC | Age: 84
End: 2019-11-04

## 2019-11-04 VITALS
RESPIRATION RATE: 18 BRPM | HEIGHT: 68 IN | WEIGHT: 199 LBS | TEMPERATURE: 98.1 F | OXYGEN SATURATION: 96 % | BODY MASS INDEX: 30.16 KG/M2 | HEART RATE: 84 BPM

## 2019-11-04 DIAGNOSIS — N13.39 OTHER HYDRONEPHROSIS: Primary | ICD-10-CM

## 2019-11-04 DIAGNOSIS — Z87.448 HISTORY OF HEMATURIA: ICD-10-CM

## 2019-11-04 DIAGNOSIS — Z87.440 HISTORY OF UTI: ICD-10-CM

## 2019-11-04 DIAGNOSIS — N47.1 PHIMOSIS: ICD-10-CM

## 2019-11-04 LAB
BILIRUB BLD-MCNC: NEGATIVE MG/DL
CLARITY, POC: ABNORMAL
COLOR UR: YELLOW
GLUCOSE UR STRIP-MCNC: ABNORMAL MG/DL
KETONES UR QL: NEGATIVE
LEUKOCYTE EST, POC: ABNORMAL
NITRITE UR-MCNC: NEGATIVE MG/ML
PH UR: 6 [PH] (ref 5–8)
PROT UR STRIP-MCNC: ABNORMAL MG/DL
RBC # UR STRIP: ABNORMAL /UL
SP GR UR: 1.01 (ref 1–1.03)
UROBILINOGEN UR QL: NORMAL

## 2019-11-04 PROCEDURE — 99213 OFFICE O/P EST LOW 20 MIN: CPT | Performed by: UROLOGY

## 2019-11-04 PROCEDURE — 76857 US EXAM PELVIC LIMITED: CPT | Performed by: UROLOGY

## 2019-11-04 PROCEDURE — 81003 URINALYSIS AUTO W/O SCOPE: CPT | Performed by: UROLOGY

## 2019-11-04 NOTE — PROGRESS NOTES
Chief Complaint  2 Week FUP Procedure/Post OP; Hydronephrosis; and Hematuria        JAZMIN Zepeda is a 87 y.o. male who returns today for follow-up with known hydronephrosis secondary to a distal ureteral stricture handled with an indwelling stent after recent insertion.  On his last visit he complained of abdominal pain and that apparently has improved.  He has had a recent admission Cut Off for possible bowel perforation.  His urine appears infected but this is largely due to his recent dorsal slit circumcision and indwelling sutures on his foreskin.  Recent urine culture was essentially negative.  He has been given a round of Macrobid but broad-spectrum antibiotics need to be avoided with his history of C. difficile.  His only complaint today is discomfort in the foreskin which is probably explained by the recent surgery.  There is no active balanitis although that he has Mycolog cream to treat considering his condition of diabetes.    Vitals:    11/04/19 1328   Pulse: 84   Resp: 18   Temp: 98.1 °F (36.7 °C)   SpO2: 96%       Past Medical History  Past Medical History:   Diagnosis Date   • Abnormal EKG    • Abnormal PSA     Reported abnormal   • Acute deep vein thrombosis (DVT) of right lower extremity (CMS/HCC) 08/22/2019   • Acute diverticulitis 2019   • Acute hyperkalemia 08/22/2019   • Acute respiratory failure with hypoxia (CMS/HCC)    • Acute saddle pulmonary embolism with acute cor pulmonale (CMS/HCC) 08/22/2019   • Acute systolic right heart failure (CMS/HCC) 08/22/2019   • Arthritis     KNEES,  BUT SINCE HAD REPLACEMENT   • Benign localized hyperplasia of prostate    • Bilateral knee pain    • Cataracts, bilateral    • CKD (chronic kidney disease)    • Diabetic neuropathy (CMS/HCC)    • Diabetic neuropathy (CMS/HCC)    • Disease of thyroid gland     HYPOTHYROIDISM   • Diverticulitis    • Dyslipidemia     H/O   • Family history of malignant hyperthermia     Brother    • Full dentures    • Generalized  "weakness    • History of gout    • History of hepatitis A vaccination    • History of nephrolithiasis    • History of nuclear stress test     STATES IN THE 1990'S.  \"IT WAS OK\"   • History of osteopenia    • History of recurrent UTI (urinary tract infection)    • Hydronephrosis of left kidney 7/18/2019   • Hydronephrosis with ureteral stricture    • Hyperkalemia     PT UNSURE REGARDING HX OF THIS   • Hyperlipidemia    • Hypertension    • Insomnia    • Malignant hyperthermia due to anesthesia     PER PT REPORT, BROTHER HAD DURING LUNG SURGERY SEVERAL YEARS AGO.  STATED THEY PACKED HIM ON ICE.  STATED HE DID SURVIVE.  PT DENIES ANY PERSONAL HX OF MALIGNANT HYPERTHERMIA HIMSELF, OR ANY ISSUES WITH ANESTHESIA.  STATES TO HIS KNOWLEDGE, NO OTHER FAMILY MEMBER HAS THIS ISSUE EXCEPT FOR HIS BROTHER.   • On supplemental oxygen therapy     2L NC QHS   • Other hydronephrosis 8/12/2019    Added automatically from request for surgery [id number]   • Renal insufficiency    • Sepsis (CMS/HCC) 2019   • Shortness of breath     STATES WITH EXERTION, OTHERWISE, DENIES   • Sigmoid diverticulitis without abscess or perforation 8/9/2017   • Skin breakdown     fourth toe right foot noted in 2019 MD D/C note   • Skin ulcer of fourth toe of right foot, limited to breakdown of skin (CMS/HCC) 7/5/2019   • Stricture of ureter    • Type 2 diabetes mellitus (CMS/HCC)    • Ureteral stricture, left    • UTI (urinary tract infection) 7/18/2019   • Vitamin D deficiency    • Wears glasses        Past Surgical History  Past Surgical History:   Procedure Laterality Date   • APPENDECTOMY     • CHOLECYSTECTOMY     • CIRCUMCISION N/A 10/23/2019    Procedure: CIRCUMCISIOn-DORSAL SLIT;  Surgeon: Griffin Romero MD;  Location: [facility] OR;  Service: Urology   • COLONOSCOPY     • CYSTOSCOPY URETEROSCOPY Left 2/11/2019    Procedure: URETEROSCOPY WITH STENT EXTRACTION, RPG;  Surgeon: Griffin Romero MD;  Location: [facility] OR;  Service: Urology   • CYSTOSCOPY W/ " URETERAL STENT PLACEMENT Left 7/20/2019    Procedure: CYSTOSCOPY, LEFT RETROGRADE PYELOGRAM,  ATTEMPTED LEFT URETERAL STENT INSERTION;  Surgeon: Isaac Flynn MD;  Location: [facility];  Service: Urology   • EYE SURGERY      CATARACTS REMOVED BILATERALLY   • KNEE ARTHROPLASTY Bilateral    • KNEE ARTHROSCOPY Bilateral    • RENAL ARTERY STENT      SEVERAL TIMES EVERY SINCE 1978   • URETEROSCOPY LASER LITHOTRIPSY WITH STENT INSERTION Left 1/10/2018    Procedure:  cysto, left ureteral stent replacement ;  Surgeon: Griffin Romero MD;  Location: [facility] OR;  Service:    • URETEROSCOPY LASER LITHOTRIPSY WITH STENT INSERTION Left 8/14/2019    Procedure: URETEROSCOPY, STONE REMOVAL WITH STENT INSERTION;  Surgeon: Griffin Romero MD;  Location: [facility] OR;  Service: Urology   • URETEROSCOPY LASER LITHOTRIPSY WITH STENT INSERTION Left 10/23/2019    Procedure: Left URETEROSCOPY WITH STENT INSERTION-LEFT;  Surgeon: Griffin Romero MD;  Location: [facility] OR;  Service: Urology       Medications  has a current medication list which includes the following prescription(s): allopurinol, carvedilol, desoximetasone, diphenhydramine-acetaminophen, finasteride, fluticasone, glucose blood, lactobacillus acidophilus, levothyroxine, multiple vitamins-minerals, nitrofurantoin (macrocrystal-monohydrate), o2, polyethylene glycol, silodosin, and sitagliptin.      Allergies  Allergies   Allergen Reactions   • Metformin Diarrhea   • Statins Myalgia       Social History  Social History     Socioeconomic History   • Marital status:      Spouse name: Not on file   • Number of children: Not on file   • Years of education: Not on file   • Highest education level: Not on file   Tobacco Use   • Smoking status: Former Smoker     Types: Cigarettes   • Smokeless tobacco: Never Used   • Tobacco comment: SMOKED SOME AS A TEEN, DENIES ANY HABIT OR ADDICTION   Substance and Sexual Activity   • Alcohol use: No   • Drug use: No   • Sexual  activity: Defer       Family History  He has no family history of bladder or kidney cancer  He has no family history of kidney stones      AUA Symptom Score:      Review of Systems  Review of Systems   Genitourinary: Positive for penile pain.       Physical Exam  Physical Exam   Constitutional: He is oriented to person, place, and time. He appears well-developed and well-nourished.   HENT:   Head: Normocephalic and atraumatic.   Neck: Normal range of motion.   Pulmonary/Chest: Effort normal. No respiratory distress.   Abdominal: Soft. He exhibits no distension and no mass. There is no tenderness. No hernia.   Genitourinary: Penis normal.         Musculoskeletal: Normal range of motion.   Lymphadenopathy:     He has no cervical adenopathy.   Neurological: He is alert and oriented to person, place, and time.   Skin: Skin is warm and dry.   Psychiatric: He has a normal mood and affect. His behavior is normal.   Vitals reviewed.      Labs Recent and today in the office:  Results for orders placed or performed in visit on 11/04/19   POC Urinalysis Dipstick, Automated   Result Value Ref Range    Color Yellow Yellow, Straw, Dark Yellow, Sushila    Clarity, UA Cloudy (A) Clear    Specific Gravity  1.015 1.005 - 1.030    pH, Urine 6.0 5.0 - 8.0    Leukocytes Large (3+) (A) Negative    Nitrite, UA Negative Negative    Protein, POC 1+ (A) Negative mg/dL    Glucose, UA 2+ (A) Negative, 1000 mg/dL (3+) mg/dL    Ketones, UA Negative Negative    Urobilinogen, UA Normal Normal    Bilirubin Negative Negative    Blood, UA 3+ (A) Negative         Assessment & Plan  BPH with outlet obstruction: Patient has 3 ounces of postvoid residual despite taking Rapaflo and Flomax.  With his 90 g gland he would be very high risk for suprapubic prostatectomy and it is too large for TURP.  Digital rectal exam today is perfectly benign and his latest PSA was less than 1.    Hydronephrosis/ureteral stricture: He recently had a metallic stent inserted  which should be good for 1 year and KUB on his last office visit showed it to be in good position.

## 2019-11-04 NOTE — PROGRESS NOTES
[facility]- UROLOGY  [address], [address]  [place], [place] [address]  [phone]      11/04/2019    Forrest Zepeda  5/6/1932        Pelvic Ultrasound with PVR    A transabdominal pelvic ultrasound was performed using a 3.5 MHz transducer of a B-K Gray through the suprapubic area.     The purpose of the study was to investigate difficulty voiding.  There was mild bladder wall thickening noted.  There were no intravesical filling defects seen.  The post void residual of 116 ml was noted.  Prostate was homogeneous and was noted to be 92 grams.  There was a protrusion of the prostate into the bladder.  Ultrasound images will be scanned into the chart for reference.       CPT code 87216        Performed by Griffin Romero MD

## 2019-11-05 NOTE — TELEPHONE ENCOUNTER
Patients granddaughter (on ИВАН) called in requesting the TRUE METRIX BLOOD GLUCOSE TEST STRIPS be refilled for her grandfather. She states he is completely out and is needing these sent to the [facility] pharmacy in [place] on [address] as soon as possible.     FYI: it shows the test strips was sent and confirmed by the preferred pharmacy on October 28, but patients granddaughter said she spoke with them and they have not received anything, and that is the reason for a 2nd request.

## 2019-11-11 ENCOUNTER — TELEPHONE (OUTPATIENT)
Dept: FAMILY MEDICINE CLINIC | Facility: CLINIC | Age: 84
End: 2019-11-11

## 2019-11-11 NOTE — TELEPHONE ENCOUNTER
Pt's granddaughter called and stated that her grandfather has not been able to check his blood sugar for a week now. Granddaughter just spoke to [facility] and they still have never received the refill request for the test strips.

## 2019-11-11 NOTE — TELEPHONE ENCOUNTER
Erika states the [facility] does not have RX for:    glucose blood (TRUE METRIX BLOOD GLUCOSE TEST) test strip [15276] (Order [id number])       Forrest has been completely out for a week and has made several attempts to get this filled.     Can we call the pharmacy to straighten out and inform pt when filled at [phone]?    Thank you!

## 2019-11-11 NOTE — TELEPHONE ENCOUNTER
Carol from [facility] in [place] called because the patient's daughter has requested a refill for the patient for the glucose blood (TRUE METRIX BLOOD GLUCOSE TEST) test strip. Carol said that she did not have a prescription to send for a refill and so she wanted to send a message back to Dr. Lofton to see if he could send a prescription for this to them to fill. Carol also said that she is assuming that they will need the TRUEplus lancets that go with the strips, so she is requesting for a prescription to be sent over for that as well. Please call Carol from [facility] in [place] back at [phone] when this message is received with information regarding the above prescription requests.

## 2019-11-18 ENCOUNTER — OFFICE VISIT (OUTPATIENT)
Dept: FAMILY MEDICINE CLINIC | Facility: CLINIC | Age: 84
End: 2019-11-18

## 2019-11-18 VITALS
BODY MASS INDEX: 30.31 KG/M2 | DIASTOLIC BLOOD PRESSURE: 60 MMHG | SYSTOLIC BLOOD PRESSURE: 110 MMHG | OXYGEN SATURATION: 96 % | RESPIRATION RATE: 20 BRPM | HEART RATE: 73 BPM | HEIGHT: 68 IN | WEIGHT: 200 LBS | TEMPERATURE: 97.1 F

## 2019-11-18 DIAGNOSIS — N39.0 ACUTE UTI: ICD-10-CM

## 2019-11-18 DIAGNOSIS — E11.65 UNCONTROLLED TYPE 2 DIABETES MELLITUS WITH HYPERGLYCEMIA (HCC): Primary | ICD-10-CM

## 2019-11-18 LAB — HBA1C MFR BLD: 9 %

## 2019-11-18 PROCEDURE — 96372 THER/PROPH/DIAG INJ SC/IM: CPT | Performed by: FAMILY MEDICINE

## 2019-11-18 PROCEDURE — 99213 OFFICE O/P EST LOW 20 MIN: CPT | Performed by: FAMILY MEDICINE

## 2019-11-18 PROCEDURE — 83036 HEMOGLOBIN GLYCOSYLATED A1C: CPT | Performed by: FAMILY MEDICINE

## 2019-11-18 RX ORDER — AMOXICILLIN 250 MG/1
250 CAPSULE ORAL 2 TIMES DAILY
Qty: 20 CAPSULE | Refills: 0 | Status: SHIPPED | OUTPATIENT
Start: 2019-11-18 | End: 2019-12-04

## 2019-11-18 RX ORDER — CEFTRIAXONE 500 MG/1
500 INJECTION, POWDER, FOR SOLUTION INTRAMUSCULAR; INTRAVENOUS ONCE
Status: COMPLETED | OUTPATIENT
Start: 2019-11-18 | End: 2019-11-18

## 2019-11-18 RX ADMIN — CEFTRIAXONE 500 MG: 500 INJECTION, POWDER, FOR SOLUTION INTRAMUSCULAR; INTRAVENOUS at 16:11

## 2019-11-18 NOTE — PROGRESS NOTES
"Subjective   Forrest Zepeda is a 87 y.o. male seen today for Fatigue; Shortness of Breath; and Diabetes.     History of Present Illness   The patient is here for a follow up on Type 2 diabetes and weakness.    States he has been weak since he was in the hospital in July. Stays in except for Confucianist times on Sunday and Wednesday.  States his blood sugars are running in the 400's. Has increased thirst and having to urinate more.  Denies any chest pain.  Does have some shortness of breath.    BP today is 110/60.    The following portions of the patient's history were reviewed and updated as appropriate: allergies, current medications, past social history and problem list.    Review of Systems   Constitutional: Positive for fatigue.   Respiratory: Negative for shortness of breath.    Cardiovascular: Negative for chest pain.   Endocrine: Positive for polydipsia and polyuria.   Genitourinary: Positive for frequency.   Neurological: Positive for weakness.       Objective   /60   Pulse 73   Temp 97.1 °F (36.2 °C)   Resp 20   Ht 172.7 cm (68\")   Wt 90.7 kg (200 lb)   SpO2 96%   BMI 30.41 kg/m²   Physical Exam   Constitutional: He appears well-developed and well-nourished.   HENT:   Mouth/Throat: Oropharynx is clear and moist.   Cardiovascular: Normal rate and regular rhythm.   Pulmonary/Chest: Effort normal and breath sounds normal.   Abdominal: Soft. There is no tenderness.   Nursing note and vitals reviewed.      Assessment/Plan   Problem List Items Addressed This Visit        Endocrine    Uncontrolled type 2 diabetes mellitus (CMS/HCC) - Primary      Other Visit Diagnoses     Acute UTI                  Drink plenty fluids.  Avoid concentrated sweets.    Given Toujeo to inject 20 units SQ once daily. #1 pen.    Continue other medications as doing.    Rx for Amoxicillin 250 mg twice a day #20+0.    Check an A1C,UA,CBC,CMP, and a urine culture. Report results by letter.    Follow up in 3 days.          I, " Ryne Lofton MD, personally performed the services described in this documentation, as scribed by Zahida Townsend in my presence, and is both accurate and complete.        (Please note that portions of this note were completed with a voice recognition program. Efforts were made to edit the dictations,but occasionally words are mis transcribed.)                        Scribed for Dr Ryne Lofton by Zahida Townsend CMA.

## 2019-11-19 LAB
ALBUMIN SERPL-MCNC: 4.1 G/DL (ref 3.5–5.2)
ALBUMIN/GLOB SERPL: 1.1 G/DL
ALP SERPL-CCNC: 110 U/L (ref 39–117)
ALT SERPL-CCNC: 14 U/L (ref 1–41)
AST SERPL-CCNC: 15 U/L (ref 1–40)
BASOPHILS # BLD AUTO: (no result) 10*3/UL
BASOPHILS # BLD MANUAL: 0.12 10*3/MM3 (ref 0–0.2)
BASOPHILS NFR BLD MANUAL: 1.1 % (ref 0–1.5)
BILIRUB SERPL-MCNC: 0.4 MG/DL (ref 0.2–1.2)
BUN SERPL-MCNC: 36 MG/DL (ref 8–23)
BUN/CREAT SERPL: 21.3 (ref 7–25)
CALCIUM SERPL-MCNC: 9.9 MG/DL (ref 8.6–10.5)
CHLORIDE SERPL-SCNC: 98 MMOL/L (ref 98–107)
CO2 SERPL-SCNC: 26 MMOL/L (ref 22–29)
CREAT SERPL-MCNC: 1.69 MG/DL (ref 0.76–1.27)
DIFFERENTIAL COMMENT: ABNORMAL
EOSINOPHIL # BLD AUTO: (no result) 10*3/UL
EOSINOPHIL NFR BLD AUTO: (no result) %
ERYTHROCYTE [DISTWIDTH] IN BLOOD BY AUTOMATED COUNT: 13.3 % (ref 12.3–15.4)
GLOBULIN SER CALC-MCNC: 3.8 GM/DL
GLUCOSE SERPL-MCNC: 399 MG/DL (ref 65–99)
HCT VFR BLD AUTO: 42 % (ref 37.5–51)
HGB BLD-MCNC: 14.2 G/DL (ref 13–17.7)
LYMPHOCYTES # BLD AUTO: (no result) 10*3/UL
LYMPHOCYTES # BLD MANUAL: 4.61 10*3/MM3 (ref 0.7–3.1)
LYMPHOCYTES NFR BLD AUTO: (no result) %
LYMPHOCYTES NFR BLD MANUAL: 41.5 % (ref 19.6–45.3)
MCH RBC QN AUTO: 32 PG (ref 26.6–33)
MCHC RBC AUTO-ENTMCNC: 33.8 G/DL (ref 31.5–35.7)
MCV RBC AUTO: 94.6 FL (ref 79–97)
MONOCYTES # BLD MANUAL: 0.95 10*3/MM3 (ref 0.1–0.9)
MONOCYTES NFR BLD AUTO: (no result) %
MONOCYTES NFR BLD MANUAL: 8.5 % (ref 5–12)
NEUTROPHILS # BLD MANUAL: 5.44 10*3/MM3 (ref 1.7–7)
NEUTROPHILS NFR BLD AUTO: (no result) %
NEUTROPHILS NFR BLD MANUAL: 48.9 % (ref 42.7–76)
PLATELET # BLD AUTO: 201 10*3/MM3 (ref 140–450)
PLATELET BLD QL SMEAR: ABNORMAL
POTASSIUM SERPL-SCNC: 4.8 MMOL/L (ref 3.5–5.2)
PROT SERPL-MCNC: 7.9 G/DL (ref 6–8.5)
RBC # BLD AUTO: 4.44 10*6/MM3 (ref 4.14–5.8)
RBC MORPH BLD: ABNORMAL
SODIUM SERPL-SCNC: 140 MMOL/L (ref 136–145)
WBC # BLD AUTO: 11.12 10*3/MM3 (ref 3.4–10.8)

## 2019-11-21 ENCOUNTER — OFFICE VISIT (OUTPATIENT)
Dept: FAMILY MEDICINE CLINIC | Facility: CLINIC | Age: 84
End: 2019-11-21

## 2019-11-21 VITALS
OXYGEN SATURATION: 95 % | RESPIRATION RATE: 16 BRPM | TEMPERATURE: 97.8 F | HEART RATE: 70 BPM | BODY MASS INDEX: 30.46 KG/M2 | DIASTOLIC BLOOD PRESSURE: 60 MMHG | WEIGHT: 201 LBS | SYSTOLIC BLOOD PRESSURE: 120 MMHG | HEIGHT: 68 IN

## 2019-11-21 DIAGNOSIS — E11.65 UNCONTROLLED TYPE 2 DIABETES MELLITUS WITH HYPERGLYCEMIA (HCC): ICD-10-CM

## 2019-11-21 DIAGNOSIS — N39.0 ACUTE UTI: Primary | ICD-10-CM

## 2019-11-21 LAB
BACTERIA UR CULT: NORMAL
BACTERIA UR CULT: NORMAL
BILIRUB BLD-MCNC: NEGATIVE MG/DL
CLARITY, POC: ABNORMAL
COLOR UR: YELLOW
GLUCOSE UR STRIP-MCNC: ABNORMAL MG/DL
KETONES UR QL: NEGATIVE
LEUKOCYTE EST, POC: ABNORMAL
NITRITE UR-MCNC: NEGATIVE MG/ML
PH UR: 5.5 [PH] (ref 5–8)
PROT UR STRIP-MCNC: ABNORMAL MG/DL
RBC # UR STRIP: ABNORMAL /UL
SP GR UR: 1.02 (ref 1–1.03)
UROBILINOGEN UR QL: NORMAL

## 2019-11-21 PROCEDURE — 99213 OFFICE O/P EST LOW 20 MIN: CPT | Performed by: FAMILY MEDICINE

## 2019-11-21 PROCEDURE — 81003 URINALYSIS AUTO W/O SCOPE: CPT | Performed by: FAMILY MEDICINE

## 2019-11-21 RX ORDER — GLIMEPIRIDE 4 MG/1
4 TABLET ORAL
COMMUNITY
End: 2020-06-29

## 2019-11-21 RX ORDER — FLUCONAZOLE 150 MG/1
TABLET ORAL
Qty: 4 TABLET | Refills: 0 | Status: SHIPPED | OUTPATIENT
Start: 2019-11-21 | End: 2019-12-04

## 2019-11-21 NOTE — PROGRESS NOTES
"Subjective   Forrest Zepeda is a 87 y.o. male seen today for Urinary Tract Infection; Weakness - Generalized; and Diabetes.     History of Present Illness   The patient is here for a follow up on Type 2 diabetes,UTI and Weakness.     States he is still feeling weak but not as much as before.  Denies any chest pain or shortness of breath.    Glucose this morning was 167 and before he left for his visit here today it was 319.  Taking the Toujeo 20 units daily. Januvia 100 mg and Glimepiride 4 mg daily.    Taking the Amoxicillin 250 mg twice a day.      The following portions of the patient's history were reviewed and updated as appropriate: allergies, current medications, past social history and problem list.    Review of Systems   Constitutional: Negative for chills, diaphoresis and fever.   Respiratory: Negative for shortness of breath.    Cardiovascular: Negative for chest pain.   Gastrointestinal: Negative for abdominal pain.   Genitourinary: Positive for hematuria.       Objective   /60   Pulse 70   Temp 97.8 °F (36.6 °C)   Resp 16   Ht 172.7 cm (68\")   Wt 91.2 kg (201 lb)   SpO2 95%   BMI 30.56 kg/m²   Physical Exam   Constitutional: He appears well-developed and well-nourished.   Cardiovascular: Normal rate and regular rhythm.   Pulmonary/Chest: Effort normal and breath sounds normal.   Nursing note and vitals reviewed.      Assessment/Plan   Problem List Items Addressed This Visit        Endocrine    Uncontrolled type 2 diabetes mellitus (CMS/[facility])    Relevant Medications    glimepiride (AMARYL) 4 MG tablet      Other Visit Diagnoses     Acute UTI    -  Primary      Glucose has been ranging between 190 and 319.  We will increase the Toujeo dosage to 26 units a day.  Urinalysis today reveals a urinary tract infection.  Will treat with amoxicillin for 7 days and also use fluconazole 150 mg once a week for 4 weeks.  Continue on Januvia 100 mg a day and glimepiride 4 mg a day.        Drink plenty " fluids.    Increase the Toujeo to 26 units daily.    Continue other medications as doing.    Rx for Fluconazole 150 mg every 7 days x 4 weeks.#4+0.    Follow up in 11 days.            Scribed for Dr Ryne Lofton by Zahida Townsend CMA.          I, Ryne Lofton MD, personally performed the services described in this documentation, as scribed by Zahida Townsend in my presence, and is both accurate and complete.        (Please note that portions of this note were completed with a voice recognition program. Efforts were made to edit the dictations,but occasionally words are mis transcribed.)

## 2019-12-04 ENCOUNTER — OFFICE VISIT (OUTPATIENT)
Dept: FAMILY MEDICINE CLINIC | Facility: CLINIC | Age: 84
End: 2019-12-04

## 2019-12-04 VITALS
HEIGHT: 68 IN | OXYGEN SATURATION: 97 % | SYSTOLIC BLOOD PRESSURE: 120 MMHG | RESPIRATION RATE: 18 BRPM | DIASTOLIC BLOOD PRESSURE: 60 MMHG | BODY MASS INDEX: 30.92 KG/M2 | TEMPERATURE: 98.1 F | WEIGHT: 204 LBS | HEART RATE: 61 BPM

## 2019-12-04 DIAGNOSIS — E11.65 UNCONTROLLED TYPE 2 DIABETES MELLITUS WITH HYPERGLYCEMIA (HCC): Primary | ICD-10-CM

## 2019-12-04 DIAGNOSIS — N39.0 ACUTE UTI: ICD-10-CM

## 2019-12-04 DIAGNOSIS — I26.02 ACUTE SADDLE PULMONARY EMBOLISM WITH ACUTE COR PULMONALE (HCC): ICD-10-CM

## 2019-12-04 PROCEDURE — 99213 OFFICE O/P EST LOW 20 MIN: CPT | Performed by: FAMILY MEDICINE

## 2019-12-04 RX ORDER — BLOOD SUGAR DIAGNOSTIC
1 STRIP MISCELLANEOUS DAILY
Qty: 90 EACH | Refills: 1 | Status: SHIPPED | OUTPATIENT
Start: 2019-12-04 | End: 2020-07-02

## 2019-12-04 NOTE — PROGRESS NOTES
"Subjective   Forrest Zepeda is a 87 y.o. male seen today for Fatigue.     History of Present Illness   The patient is here today for a follow up on Fatigue and Anticoagulation therapy.    States he is doing much better.  Denies any chest pain or shortness of breath.    Glucoses have been doing better.  Running in the 108 to 180's. Did have one episode of 98.  Has decreased the Toujeo to 15 units daily.  Taking Glimepiride 4 mg daily and Januvia 100 mg daily.    Taking Eliquis 5 mg twice a day.      The following portions of the patient's history were reviewed and updated as appropriate: allergies, current medications, past social history and problem list.    Review of Systems   Constitutional: Positive for fatigue (improving).   Respiratory: Negative for shortness of breath.    Cardiovascular: Negative for chest pain.       Objective   /60   Pulse 61   Temp 98.1 °F (36.7 °C)   Resp 18   Ht 172.7 cm (68\")   Wt 92.5 kg (204 lb)   SpO2 97%   BMI 31.02 kg/m²   Physical Exam   Constitutional: He is oriented to person, place, and time. He appears well-developed and well-nourished. No distress.   Neck: Normal range of motion. Neck supple.   Cardiovascular: Normal rate and regular rhythm.   No murmur heard.  Pulmonary/Chest: Effort normal and breath sounds normal.   Abdominal: Soft. Bowel sounds are normal.   Neurological: He is alert and oriented to person, place, and time.   Skin: He is not diaphoretic.   Nursing note and vitals reviewed.      Assessment/Plan   Problem List Items Addressed This Visit        Cardiovascular and Mediastinum    Acute saddle pulmonary embolism with acute cor pulmonale (CMS/HCC)       Endocrine    Uncontrolled type 2 diabetes mellitus (CMS/HCC) - Primary      Other Visit Diagnoses     Acute UTI                  Drink plenty fluids.  Work on weight loss.    Monitor glucose and continue medications as doing.    Rx for pen needles daily #90+1.    Follow up in January as " scheduled.                  Scribed for Dr Ryne Lofton by Zahida Townsend CMA.          I, Ryne Lofton MD, personally performed the services described in this documentation, as scribed by Zahida Townsend in my presence, and is both accurate and complete.        (Please note that portions of this note were completed with a voice recognition program. Efforts were made to edit the dictations,but occasionally words are mis transcribed.)

## 2019-12-05 RX ORDER — FINASTERIDE 5 MG/1
TABLET, FILM COATED ORAL
Qty: 90 TABLET | Refills: 1 | Status: SHIPPED | OUTPATIENT
Start: 2019-12-05 | End: 2020-06-01

## 2020-01-08 ENCOUNTER — TELEPHONE (OUTPATIENT)
Dept: FAMILY MEDICINE CLINIC | Facility: CLINIC | Age: 85
End: 2020-01-08

## 2020-01-08 NOTE — TELEPHONE ENCOUNTER
PT'S DAUGHTER CALLED  STATES THE SMAPLE OF  TOUJEO RAN OUT TODAY SHE WAS ABLE TO GIVE 9 UNITS BUT HER FATHER IS TAKING 15 UNITS DAILY  SHE WANTED TO KNOW IF A REFILL CAN BE CALLED IN OR IS IT OK TO STOP    PLEASE ADVISE AND GIVE CALL

## 2020-01-17 ENCOUNTER — OFFICE VISIT (OUTPATIENT)
Dept: FAMILY MEDICINE CLINIC | Facility: CLINIC | Age: 85
End: 2020-01-17

## 2020-01-17 VITALS
BODY MASS INDEX: 30.92 KG/M2 | RESPIRATION RATE: 16 BRPM | HEIGHT: 68 IN | SYSTOLIC BLOOD PRESSURE: 146 MMHG | OXYGEN SATURATION: 94 % | HEART RATE: 69 BPM | DIASTOLIC BLOOD PRESSURE: 62 MMHG | TEMPERATURE: 97.7 F | WEIGHT: 204 LBS

## 2020-01-17 DIAGNOSIS — I26.02 ACUTE SADDLE PULMONARY EMBOLISM WITH ACUTE COR PULMONALE (HCC): ICD-10-CM

## 2020-01-17 DIAGNOSIS — E11.628 TYPE 2 DIABETES MELLITUS WITH PRESSURE CALLUS (HCC): ICD-10-CM

## 2020-01-17 DIAGNOSIS — E11.65 UNCONTROLLED TYPE 2 DIABETES MELLITUS WITH HYPERGLYCEMIA (HCC): Primary | ICD-10-CM

## 2020-01-17 DIAGNOSIS — L84 TYPE 2 DIABETES MELLITUS WITH PRESSURE CALLUS (HCC): ICD-10-CM

## 2020-01-17 PROBLEM — J96.01 ACUTE RESPIRATORY FAILURE WITH HYPOXIA (HCC): Status: RESOLVED | Noted: 2019-08-23 | Resolved: 2020-01-17

## 2020-01-17 PROCEDURE — 99213 OFFICE O/P EST LOW 20 MIN: CPT | Performed by: FAMILY MEDICINE

## 2020-01-17 RX ORDER — CLOPIDOGREL BISULFATE 75 MG/1
75 TABLET ORAL DAILY
Qty: 30 TABLET | Refills: 5 | Status: SHIPPED | OUTPATIENT
Start: 2020-01-17 | End: 2020-06-29 | Stop reason: SDUPTHER

## 2020-02-17 ENCOUNTER — OFFICE VISIT (OUTPATIENT)
Dept: FAMILY MEDICINE CLINIC | Facility: CLINIC | Age: 85
End: 2020-02-17

## 2020-02-17 VITALS
HEART RATE: 63 BPM | RESPIRATION RATE: 19 BRPM | HEIGHT: 68 IN | SYSTOLIC BLOOD PRESSURE: 112 MMHG | DIASTOLIC BLOOD PRESSURE: 58 MMHG | BODY MASS INDEX: 30.77 KG/M2 | WEIGHT: 203 LBS | OXYGEN SATURATION: 96 % | TEMPERATURE: 97.6 F

## 2020-02-17 DIAGNOSIS — E11.65 UNCONTROLLED TYPE 2 DIABETES MELLITUS WITH HYPERGLYCEMIA (HCC): Primary | ICD-10-CM

## 2020-02-17 DIAGNOSIS — E11.42 DIABETIC POLYNEUROPATHY ASSOCIATED WITH TYPE 2 DIABETES MELLITUS (HCC): ICD-10-CM

## 2020-02-17 LAB — HBA1C MFR BLD: 7.5 %

## 2020-02-17 PROCEDURE — 83036 HEMOGLOBIN GLYCOSYLATED A1C: CPT | Performed by: FAMILY MEDICINE

## 2020-02-17 PROCEDURE — 99214 OFFICE O/P EST MOD 30 MIN: CPT | Performed by: FAMILY MEDICINE

## 2020-02-17 NOTE — PROGRESS NOTES
"Subjective   Forrest Zepeda is a 87 y.o. male seen today for Diabetes.     Diabetes   There are no hypoglycemic associated symptoms. Associated symptoms include foot paresthesias. Pertinent negatives for diabetes include no chest pain and no foot ulcerations. There are no hypoglycemic complications. Diabetic complications include heart disease and peripheral neuropathy. Risk factors for coronary artery disease include diabetes mellitus, hypertension, male sex and obesity. Current diabetic treatment includes insulin injections and oral agent (dual therapy) (Toujeo, Glimepiride and Januvia.). He is compliant with treatment all of the time. His weight is stable. He is following a generally healthy diet. He participates in exercise intermittently. His home blood glucose trend is decreasing steadily. An ACE inhibitor/angiotensin II receptor blocker is not being taken.        The following portions of the patient's history were reviewed and updated as appropriate: allergies, current medications, past social history and problem list.    Review of Systems   Constitutional: Negative for diaphoresis.   Respiratory: Negative for shortness of breath.    Cardiovascular: Negative for chest pain.   Musculoskeletal: Positive for arthralgias. Negative for myalgias.   Neurological: Positive for numbness. Negative for syncope.       Objective   /58   Pulse 63   Temp 97.6 °F (36.4 °C)   Resp 19   Ht 172.7 cm (68\")   Wt 92.1 kg (203 lb)   SpO2 96%   BMI 30.87 kg/m²   Physical Exam   Constitutional: He is oriented to person, place, and time. He appears well-developed and well-nourished.   HENT:   Mouth/Throat: Oropharynx is clear and moist.   Eyes: Pupils are equal, round, and reactive to light.   Neck: Normal range of motion. Neck supple.   Cardiovascular: Normal rate and regular rhythm.   Abdominal: Soft. Bowel sounds are normal.   Musculoskeletal: He exhibits no edema.    Forrest had a diabetic foot exam performed " (decreased sensation) today.  Neurological: He is alert and oriented to person, place, and time.   Diabetic foot exam reveals intact pulses of both feet.  There is decreased sensation to monofilament at the lateral aspect of the sole of each foot.  There is also evidence of callus formation at the ball of each foot and also along each heel.  There is no ulceration.   Skin: Skin is warm and dry. No rash noted.   Nursing note and vitals reviewed.      Assessment/Plan   Problem List Items Addressed This Visit        Endocrine    Uncontrolled type 2 diabetes mellitus (CMS/[facility]) - Primary    Relevant Orders    POC Glycosylated Hemoglobin (Hb A1C) (Completed)        hemoGlobin A1c today is 7.5%.  Patient brings in a home glucose monitoring chart which reveals glucoses varying between 90 and 150.  There have been no hypoglycemic episodes.  He is currently taking Toujeo insulin along with glimepiride and sitagliptin.      Drink plenty fluids.    Continue medications as doing.    Follow up in 4 months. Sooner if needed.          Scribed for Dr Ryne Lofton by Zahida Townsend CMA.          I, Ryne Lofton MD, personally performed the services described in this documentation, as scribed by Zahida Townsend in my presence, and is both accurate and complete.        (Please note that portions of this note were completed with a voice recognition program. Efforts were made to edit the dictations,but occasionally words are mis transcribed.)

## 2020-02-26 ENCOUNTER — OFFICE VISIT (OUTPATIENT)
Dept: UROLOGY | Facility: CLINIC | Age: 85
End: 2020-02-26

## 2020-02-26 DIAGNOSIS — N13.39 OTHER HYDRONEPHROSIS: Primary | ICD-10-CM

## 2020-02-26 DIAGNOSIS — N30.00 ACUTE CYSTITIS WITHOUT HEMATURIA: ICD-10-CM

## 2020-02-26 DIAGNOSIS — N13.5 URETERAL STRICTURE, LEFT: ICD-10-CM

## 2020-02-26 LAB
BILIRUB BLD-MCNC: NEGATIVE MG/DL
CLARITY, POC: ABNORMAL
COLOR UR: YELLOW
GLUCOSE UR STRIP-MCNC: NEGATIVE MG/DL
KETONES UR QL: NEGATIVE
LEUKOCYTE EST, POC: ABNORMAL
NITRITE UR-MCNC: NEGATIVE MG/ML
PH UR: 6 [PH] (ref 5–8)
PROT UR STRIP-MCNC: ABNORMAL MG/DL
RBC # UR STRIP: ABNORMAL /UL
SP GR UR: 1.01 (ref 1–1.03)
UROBILINOGEN UR QL: NORMAL

## 2020-02-26 PROCEDURE — 99214 OFFICE O/P EST MOD 30 MIN: CPT | Performed by: UROLOGY

## 2020-02-26 PROCEDURE — 81003 URINALYSIS AUTO W/O SCOPE: CPT | Performed by: UROLOGY

## 2020-02-26 RX ORDER — NITROFURANTOIN 25; 75 MG/1; MG/1
100 CAPSULE ORAL 2 TIMES DAILY
Qty: 20 CAPSULE | Refills: 0 | Status: SHIPPED | OUTPATIENT
Start: 2020-02-26 | End: 2020-06-29

## 2020-02-26 NOTE — PROGRESS NOTES
"Chief Complaint  Hydronephrosis and Hematuria history        JAZMIN Zepeda is a 87 y.o. 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  He developed severe hydronephrosis that is relieved with an indwelling metallic stent and needs to be changed on an annual basis.  He is currently asymptomatic and feeling great and recovering from his saddle embolus that developed after a bowel perforation.  He has stopped his Eliquis and still takes Plavix.  He is currently voiding without difficulty although his urine appears infected.  With his past history of C. difficile we like to avoid broad-spectrum antibiotics.  Previous urine cultures have revealed yeast infection only and he developed some arrhythmias from Diflucan.    There were no vitals filed for this visit.    Past Medical History  Past Medical History:   Diagnosis Date   • Abnormal EKG    • Abnormal PSA     Reported abnormal   • Acute deep vein thrombosis (DVT) of right lower extremity (CMS/[facility]) 08/22/2019   • Acute diverticulitis 2019   • Acute hyperkalemia 08/22/2019   • Acute respiratory failure with hypoxia (CMS/[facility])    • Acute saddle pulmonary embolism with acute cor pulmonale (CMS/HCC) 08/22/2019   • Acute systolic right heart failure (CMS/[facility]) 08/22/2019   • Arthritis     KNEES,  BUT SINCE HAD REPLACEMENT   • Benign localized hyperplasia of prostate    • Bilateral knee pain    • Cataracts, bilateral    • CKD (chronic kidney disease)    • Diabetic neuropathy (CMS/HCC)    • Diabetic neuropathy (CMS/HCC)    • Disease of thyroid gland     HYPOTHYROIDISM   • Diverticulitis    • Dyslipidemia     H/O   • Family history of malignant hyperthermia     Brother    • Full dentures    • Generalized weakness    • History of gout    • History of hepatitis A vaccination    • History of nephrolithiasis    • History of nuclear stress test     STATES IN THE 1990'S.  \"IT " "WAS OK\"   • History of osteopenia    • History of recurrent UTI (urinary tract infection)    • Hydronephrosis of left kidney 7/18/2019   • Hydronephrosis with ureteral stricture    • Hyperkalemia     PT UNSURE REGARDING HX OF THIS   • Hyperlipidemia    • Hypertension    • Insomnia    • Malignant hyperthermia due to anesthesia     PER PT REPORT, BROTHER HAD DURING LUNG SURGERY SEVERAL YEARS AGO.  STATED THEY PACKED HIM ON ICE.  STATED HE DID SURVIVE.  PT DENIES ANY PERSONAL HX OF MALIGNANT HYPERTHERMIA HIMSELF, OR ANY ISSUES WITH ANESTHESIA.  STATES TO HIS KNOWLEDGE, NO OTHER FAMILY MEMBER HAS THIS ISSUE EXCEPT FOR HIS BROTHER.   • On supplemental oxygen therapy     2L [place]   • Other hydronephrosis 8/12/2019    Added automatically from request for surgery [id number]   • Renal insufficiency    • Sepsis (CMS/HCC) 2019   • Shortness of breath     STATES WITH EXERTION, OTHERWISE, DENIES   • Sigmoid diverticulitis without abscess or perforation 8/9/2017   • Skin breakdown     fourth toe right foot noted in 2019 MD D/C note   • Skin ulcer of fourth toe of right foot, limited to breakdown of skin (CMS/HCC) 7/5/2019   • Stricture of ureter    • Type 2 diabetes mellitus (CMS/HCC)    • Ureteral stricture, left    • UTI (urinary tract infection) 7/18/2019   • Vitamin D deficiency    • Wears glasses        Past Surgical History  Past Surgical History:   Procedure Laterality Date   • APPENDECTOMY     • CHOLECYSTECTOMY     • CIRCUMCISION N/A 10/23/2019    Procedure: CIRCUMCISIOn-DORSAL SLIT;  Surgeon: Griffin Romero MD;  Location: [facility] OR;  Service: Urology   • COLONOSCOPY     • CYSTOSCOPY URETEROSCOPY Left 2/11/2019    Procedure: URETEROSCOPY WITH STENT EXTRACTION, RPG;  Surgeon: Griffin Romero MD;  Location: [facility] OR;  Service: Urology   • CYSTOSCOPY W/ URETERAL STENT PLACEMENT Left 7/20/2019    Procedure: CYSTOSCOPY, LEFT RETROGRADE PYELOGRAM,  ATTEMPTED LEFT URETERAL STENT INSERTION;  Surgeon: Isaac Flynn MD; "  Location: [facility];  Service: Urology   • EYE SURGERY      CATARACTS REMOVED BILATERALLY   • KNEE ARTHROPLASTY Bilateral    • KNEE ARTHROSCOPY Bilateral    • RENAL ARTERY STENT      SEVERAL TIMES EVERY SINCE 1978   • URETEROSCOPY LASER LITHOTRIPSY WITH STENT INSERTION Left 1/10/2018    Procedure:  cysto, left ureteral stent replacement ;  Surgeon: Griffin Romero MD;  Location: [facility];  Service:    • URETEROSCOPY LASER LITHOTRIPSY WITH STENT INSERTION Left 8/14/2019    Procedure: URETEROSCOPY, STONE REMOVAL WITH STENT INSERTION;  Surgeon: Griffin Romero MD;  Location: [facility] OR;  Service: Urology   • URETEROSCOPY LASER LITHOTRIPSY WITH STENT INSERTION Left 10/23/2019    Procedure: Left URETEROSCOPY WITH STENT INSERTION-LEFT;  Surgeon: Griffin Romero MD;  Location: [facility] OR;  Service: Urology       Medications  has a current medication list which includes the following prescription(s): allopurinol, carvedilol, clopidogrel, desoximetasone, diphenhydramine-acetaminophen, finasteride, fluticasone, glimepiride, glucose blood, insulin glargine (1 unit dial), pen needles, lactobacillus acidophilus, levothyroxine, multiple vitamins-minerals, o2, polyethylene glycol, silodosin, and sitagliptin.      Allergies  Allergies   Allergen Reactions   • Metformin Diarrhea   • Statins Myalgia       Social History  Social History     Socioeconomic History   • Marital status:      Spouse name: Not on file   • Number of children: Not on file   • Years of education: Not on file   • Highest education level: Not on file   Tobacco Use   • Smoking status: Former Smoker     Types: Cigarettes   • Smokeless tobacco: Never Used   • Tobacco comment: SMOKED SOME AS A TEEN, DENIES ANY HABIT OR ADDICTION   Substance and Sexual Activity   • Alcohol use: No   • Drug use: No   • Sexual activity: Defer       Family History  He has no family history of bladder or kidney cancer  He has no family history of kidney stones      AUA Symptom  Score:      Review of Systems  Review of Systems   Constitutional: Negative for activity change, appetite change, chills, fatigue, fever, unexpected weight gain and unexpected weight loss.   Respiratory: Negative for apnea, cough, chest tightness, shortness of breath, wheezing and stridor.    Cardiovascular: Negative for chest pain, palpitations and leg swelling.   Gastrointestinal: Negative for abdominal distention, abdominal pain, anal bleeding, blood in stool, constipation, diarrhea, nausea, rectal pain, vomiting, GERD and indigestion.   Genitourinary: Negative for decreased libido, decreased urine volume, difficulty urinating, discharge, dysuria, flank pain, frequency, genital sores, hematuria, nocturia, penile pain, erectile dysfunction, penile swelling, scrotal swelling, testicular pain, urgency and urinary incontinence.   Musculoskeletal: Negative for back pain and joint swelling.   Neurological: Negative for tremors, seizures, speech difficulty, weakness and numbness.   Psychiatric/Behavioral: Negative for agitation, decreased concentration, sleep disturbance, depressed mood and stress. The patient is not nervous/anxious.        Physical Exam  Physical Exam   Constitutional: He is oriented to person, place, and time. He appears well-developed and well-nourished.   HENT:   Head: Normocephalic and atraumatic.   Neck: Normal range of motion.   Pulmonary/Chest: Effort normal. No respiratory distress.   Abdominal: He exhibits no distension and no mass. There is no tenderness. No hernia.   Musculoskeletal: Normal range of motion.   Lymphadenopathy:     He has no cervical adenopathy.   Neurological: He is alert and oriented to person, place, and time.   Skin: Skin is warm and dry.   Psychiatric: He has a normal mood and affect. His behavior is normal.   Vitals reviewed.      Labs Recent and today in the office:  Results for orders placed or performed in visit on 02/26/20   POC Urinalysis Dipstick, Automated    Result Value Ref Range    Color Yellow Yellow, Straw, Dark Yellow, Sushila    Clarity, UA Hazy (A) Clear    Specific Gravity  1.015 1.005 - 1.030    pH, Urine 6.0 5.0 - 8.0    Leukocytes Large (3+) (A) Negative    Nitrite, UA Negative Negative    Protein, POC Trace (A) Negative mg/dL    Glucose, UA Negative Negative, 1000 mg/dL (3+) mg/dL    Ketones, UA Negative Negative    Urobilinogen, UA Normal Normal    Bilirubin Negative Negative    Blood, UA 2+ (A) Negative         Assessment & Plan  Ureteral stricture/hydronephrosis/urinary tract infection: Urine culture submitted and Macrobid is called and pending the results of the sensitivities.  He may need Diflucan for yeast infection but I would like to avoid broad-spectrum antibiotics because of his history of C. difficile.  He will return in 6 months when we will need to reschedule his next stent change.

## 2020-02-29 LAB
BACTERIA UR CULT: ABNORMAL
BACTERIA UR CULT: ABNORMAL
OTHER ANTIBIOTIC SUSC ISLT: ABNORMAL

## 2020-04-01 ENCOUNTER — TELEPHONE (OUTPATIENT)
Dept: FAMILY MEDICINE CLINIC | Facility: CLINIC | Age: 85
End: 2020-04-01

## 2020-04-01 ENCOUNTER — LAB REQUISITION (OUTPATIENT)
Dept: LAB | Facility: HOSPITAL | Age: 85
End: 2020-04-01

## 2020-04-01 DIAGNOSIS — R30.0 DYSURIA: ICD-10-CM

## 2020-04-01 DIAGNOSIS — R50.9 FEVER, UNSPECIFIED: ICD-10-CM

## 2020-04-01 DIAGNOSIS — N39.0 URINARY TRACT INFECTION WITHOUT HEMATURIA, SITE UNSPECIFIED: Primary | ICD-10-CM

## 2020-04-01 LAB
BACTERIA UR QL AUTO: ABNORMAL /HPF
BILIRUB UR QL STRIP: NEGATIVE
CLARITY UR: ABNORMAL
COLOR UR: YELLOW
GLUCOSE UR STRIP-MCNC: NEGATIVE MG/DL
HGB UR QL STRIP.AUTO: ABNORMAL
HYALINE CASTS UR QL AUTO: ABNORMAL /LPF
KETONES UR QL STRIP: NEGATIVE
LEUKOCYTE ESTERASE UR QL STRIP.AUTO: ABNORMAL
NITRITE UR QL STRIP: NEGATIVE
PH UR STRIP.AUTO: 6.5 [PH] (ref 5–8)
PROT UR QL STRIP: ABNORMAL
RBC # UR: ABNORMAL /HPF
REF LAB TEST METHOD: ABNORMAL
SP GR UR STRIP: 1.02 (ref 1–1.03)
SQUAMOUS #/AREA URNS HPF: ABNORMAL /HPF
UROBILINOGEN UR QL STRIP: ABNORMAL
WBC UR QL AUTO: ABNORMAL /HPF

## 2020-04-01 PROCEDURE — 87086 URINE CULTURE/COLONY COUNT: CPT | Performed by: FAMILY MEDICINE

## 2020-04-01 PROCEDURE — 81001 URINALYSIS AUTO W/SCOPE: CPT | Performed by: FAMILY MEDICINE

## 2020-04-01 RX ORDER — SULFAMETHOXAZOLE AND TRIMETHOPRIM 800; 160 MG/1; MG/1
1 TABLET ORAL 2 TIMES DAILY
Qty: 20 TABLET | Refills: 0 | Status: SHIPPED | OUTPATIENT
Start: 2020-04-01 | End: 2020-06-29

## 2020-04-01 NOTE — TELEPHONE ENCOUNTER
Please advise    I spoke to the patient's granddaughter who is a nurse on the telephone.  She tells me that her grandfather called her this morning and said that he was not feeling well.  He apparently has had some shaking chills overnight.  When his granddaughter saw him this morning to the temperature was 100.2.  His other vital signs are within normal range.  Glucose was 166.    The patient saw his urologist Dr. Romero on March 25.  At that time he did have pyuria and was treated briefly with Macrobid.    The patient has had no cough.  His granddaughter did auscultate his lungs in the lungs sounded clear to her.    My diagnosis at this point would be a UTI with possible early bacteremia.  We will treat him with trimethoprim sulfamethoxazole 800/160 1 tablet twice a day.  We will do that for 10 days.  The patient's granddaughter will observe him overnight to ensure that he is improving and not deteriorating.  Should he begin to deteriorate then I recommended going to the emergency room.

## 2020-04-01 NOTE — TELEPHONE ENCOUNTER
Patient has a fever of 102 and granddaughter called to inquire if she could do labs for him (she is a nurse).  Please return call and advise.

## 2020-04-02 ENCOUNTER — TELEPHONE (OUTPATIENT)
Dept: FAMILY MEDICINE CLINIC | Facility: CLINIC | Age: 85
End: 2020-04-02

## 2020-04-02 LAB — BACTERIA SPEC AEROBE CULT: NO GROWTH

## 2020-04-02 NOTE — TELEPHONE ENCOUNTER
I spoke to the patient's granddaughter by telephone.  She tells me that he is feeling much better today with no fever.    We did discuss the absence of any bacterial growth on his urine culture.  Apparently that is been the case on several occasions in the past.  At one point there was a fungus that grew out and he was treated for a 4-week period of time with fluconazole.  However he developed changes in his EKG and as a consequence was taken off that medication.    In the future we may want to also include orders for fungal culture of his urine.

## 2020-06-01 RX ORDER — FINASTERIDE 5 MG/1
TABLET, FILM COATED ORAL
Qty: 90 TABLET | Refills: 0 | Status: SHIPPED | OUTPATIENT
Start: 2020-06-01 | End: 2020-06-29 | Stop reason: SDUPTHER

## 2020-06-20 DIAGNOSIS — E03.9 ACQUIRED HYPOTHYROIDISM: ICD-10-CM

## 2020-06-20 RX ORDER — LEVOTHYROXINE SODIUM 0.05 MG/1
TABLET ORAL
Qty: 30 TABLET | Refills: 0 | Status: SHIPPED | OUTPATIENT
Start: 2020-06-20 | End: 2020-06-29 | Stop reason: SDUPTHER

## 2020-06-29 ENCOUNTER — OFFICE VISIT (OUTPATIENT)
Dept: FAMILY MEDICINE CLINIC | Facility: CLINIC | Age: 85
End: 2020-06-29

## 2020-06-29 VITALS
HEIGHT: 68 IN | BODY MASS INDEX: 32.13 KG/M2 | WEIGHT: 212 LBS | TEMPERATURE: 97.3 F | SYSTOLIC BLOOD PRESSURE: 120 MMHG | DIASTOLIC BLOOD PRESSURE: 68 MMHG | RESPIRATION RATE: 17 BRPM | HEART RATE: 65 BPM | OXYGEN SATURATION: 96 %

## 2020-06-29 DIAGNOSIS — E11.65 UNCONTROLLED TYPE 2 DIABETES MELLITUS WITH HYPERGLYCEMIA (HCC): Primary | ICD-10-CM

## 2020-06-29 DIAGNOSIS — E03.9 ACQUIRED HYPOTHYROIDISM: ICD-10-CM

## 2020-06-29 DIAGNOSIS — N13.8 BPH WITH URINARY OBSTRUCTION: ICD-10-CM

## 2020-06-29 DIAGNOSIS — I10 ESSENTIAL HYPERTENSION: ICD-10-CM

## 2020-06-29 DIAGNOSIS — N40.1 BPH WITH URINARY OBSTRUCTION: ICD-10-CM

## 2020-06-29 LAB — HBA1C MFR BLD: 6.8 %

## 2020-06-29 PROCEDURE — 83036 HEMOGLOBIN GLYCOSYLATED A1C: CPT | Performed by: FAMILY MEDICINE

## 2020-06-29 PROCEDURE — 99213 OFFICE O/P EST LOW 20 MIN: CPT | Performed by: FAMILY MEDICINE

## 2020-06-29 RX ORDER — CLOPIDOGREL BISULFATE 75 MG/1
75 TABLET ORAL DAILY
Qty: 90 TABLET | Refills: 3 | Status: SHIPPED | OUTPATIENT
Start: 2020-06-29 | End: 2021-02-08

## 2020-06-29 RX ORDER — SILODOSIN 8 MG/1
8 CAPSULE ORAL
Qty: 90 CAPSULE | Refills: 3 | Status: SHIPPED | OUTPATIENT
Start: 2020-06-29 | End: 2021-06-23

## 2020-06-29 RX ORDER — LEVOTHYROXINE SODIUM 0.05 MG/1
50 TABLET ORAL DAILY
Qty: 90 TABLET | Refills: 3 | Status: SHIPPED | OUTPATIENT
Start: 2020-06-29 | End: 2021-07-24 | Stop reason: SDUPTHER

## 2020-06-29 RX ORDER — FINASTERIDE 5 MG/1
5 TABLET, FILM COATED ORAL DAILY
Qty: 90 TABLET | Refills: 3 | Status: SHIPPED | OUTPATIENT
Start: 2020-06-29 | End: 2021-06-01 | Stop reason: SDUPTHER

## 2020-06-30 ENCOUNTER — APPOINTMENT (OUTPATIENT)
Dept: CT IMAGING | Facility: HOSPITAL | Age: 85
End: 2020-06-30

## 2020-06-30 ENCOUNTER — APPOINTMENT (OUTPATIENT)
Dept: GENERAL RADIOLOGY | Facility: HOSPITAL | Age: 85
End: 2020-06-30

## 2020-06-30 ENCOUNTER — HOSPITAL ENCOUNTER (EMERGENCY)
Facility: HOSPITAL | Age: 85
Discharge: HOME | End: 2020-06-30
Attending: EMERGENCY MEDICINE | Admitting: EMERGENCY MEDICINE

## 2020-06-30 VITALS
DIASTOLIC BLOOD PRESSURE: 88 MMHG | HEIGHT: 69 IN | HEART RATE: 65 BPM | OXYGEN SATURATION: 96 % | WEIGHT: 212 LBS | RESPIRATION RATE: 16 BRPM | BODY MASS INDEX: 31.4 KG/M2 | SYSTOLIC BLOOD PRESSURE: 144 MMHG | TEMPERATURE: 98 F

## 2020-06-30 DIAGNOSIS — S61.216A LACERATION OF RIGHT LITTLE FINGER WITHOUT FOREIGN BODY WITHOUT DAMAGE TO NAIL, INITIAL ENCOUNTER: Primary | ICD-10-CM

## 2020-06-30 DIAGNOSIS — S00.93XA CONTUSION OF HEAD, UNSPECIFIED PART OF HEAD, INITIAL ENCOUNTER: ICD-10-CM

## 2020-06-30 PROCEDURE — 90471 IMMUNIZATION ADMIN: CPT | Performed by: NURSE PRACTITIONER

## 2020-06-30 PROCEDURE — 70450 CT HEAD/BRAIN W/O DYE: CPT

## 2020-06-30 PROCEDURE — 73140 X-RAY EXAM OF FINGER(S): CPT

## 2020-06-30 PROCEDURE — 90715 TDAP VACCINE 7 YRS/> IM: CPT | Performed by: NURSE PRACTITIONER

## 2020-06-30 PROCEDURE — 25010000002 TDAP 5-2.5-18.5 LF-MCG/0.5 SUSPENSION: Performed by: NURSE PRACTITIONER

## 2020-06-30 PROCEDURE — 70486 CT MAXILLOFACIAL W/O DYE: CPT

## 2020-06-30 PROCEDURE — 99284 EMERGENCY DEPT VISIT MOD MDM: CPT

## 2020-06-30 RX ORDER — LIDOCAINE HYDROCHLORIDE 10 MG/ML
10 INJECTION, SOLUTION EPIDURAL; INFILTRATION; INTRACAUDAL; PERINEURAL ONCE
Status: COMPLETED | OUTPATIENT
Start: 2020-06-30 | End: 2020-06-30

## 2020-06-30 RX ADMIN — TETANUS TOXOID, REDUCED DIPHTHERIA TOXOID AND ACELLULAR PERTUSSIS VACCINE, ADSORBED 0.5 ML: 5; 2.5; 8; 8; 2.5 SUSPENSION INTRAMUSCULAR at 19:45

## 2020-06-30 RX ADMIN — LIDOCAINE HYDROCHLORIDE 10 ML: 10 INJECTION, SOLUTION EPIDURAL; INFILTRATION; INTRACAUDAL; PERINEURAL at 20:30

## 2020-07-01 ENCOUNTER — EPISODE CHANGES (OUTPATIENT)
Dept: CASE MANAGEMENT | Facility: OTHER | Age: 85
End: 2020-07-01

## 2020-07-01 DIAGNOSIS — E11.65 UNCONTROLLED TYPE 2 DIABETES MELLITUS WITH HYPERGLYCEMIA (HCC): ICD-10-CM

## 2020-07-01 NOTE — DISCHARGE INSTRUCTIONS
Strict skin care to your laceration, keeping it dressed until morning to control the small amount of bleeding.  Tomorrow, remove the dressing and apply her first skin care.  Limit use of your right hand as you recover.  See your primary care for suture removal in 7 days.  Avoid aspirin-containing products.  Cool compresses to the bruise over your right eye to reduce swelling and bruising.  Return to the emergency department as needed for worsening symptoms or concerns.  Thank you

## 2020-07-02 ENCOUNTER — EPISODE CHANGES (OUTPATIENT)
Dept: CASE MANAGEMENT | Facility: OTHER | Age: 85
End: 2020-07-02

## 2020-07-02 ENCOUNTER — PATIENT OUTREACH (OUTPATIENT)
Dept: CASE MANAGEMENT | Facility: OTHER | Age: 85
End: 2020-07-02

## 2020-07-02 RX ORDER — INSULIN GLARGINE 300 U/ML
INJECTION, SOLUTION SUBCUTANEOUS
Qty: 3 PEN | Refills: 3 | Status: SHIPPED | OUTPATIENT
Start: 2020-07-02 | End: 2020-08-21 | Stop reason: SDUPTHER

## 2020-07-02 NOTE — OUTREACH NOTE
"Patient Outreach Note    RN-ACM outreach to patient following ED visit 6/30/20 for a fall. Explained role of Ambulatory  and contact information given. ED AVS and recommendations reviewed. Pt reports to be doing well.  [facility]. He reports contusion of forehead is some better. He had been applying ice. Laceration of finger right hand sutured, reports looks \"clean as a whistle.\" Granddaughter is a nurse and checking on pt twice daily.  Reviewed signs and sx's of infection to monitor and symptoms to monitor after head injury.  Reviewed safe application of ice as needed per AVS.  Fall prevention discussed.  Pt has a cane however does not use it consistently. States usually ambulates well. Discussed importance of ED f/u with PCP and suture removal as per AVS.  Pt states his granddaughter is planning to remove his sutures for him, explained that he and his granddaughter feels very comfortable with that plan.  Encouraged pt to contact PCP should any needs or concerns arise.  Pt lives alone but reports good family support. He has not been out often out of precaution due to COVID pandemic. Son and granddaughter assist with groceries and meals.  He also cooks as needed.  Pt drives and has good transportation. Confirms compliance with all medications. Reports DM well managed, was recently able to discontinue one of his DM medications due to good control. He monitors glucose at home. Discussed healthy eating habits and keeping log of glucose with medication changes to ensure glucose remains well controlled. Notify PCP if glucose trending upwards.  24/7 nurse triage number offered but pt declined. States he can call granddaughter as needed. Advance Directive on file. Sincere active.  Discussed Medicare AWV which is scheduled 10/27/20. Reviewed overdue for eye exam and discussed importance of yearly exam. Pt reports he wishes to find a new eye doctor and " has one in mind he plans to contact. Encouraged him to contact PCP for referral if needed. Pt denies any needs at present and voiced much appreciation for the call.    Silvia Taylor RN  Ambulatory     7/2/2020, 13:45

## 2020-08-03 NOTE — TELEPHONE ENCOUNTER
Please call and tell him that I sent in both antibiotics and antifungals to his pharmacy, given his history of Candida balanitis and phimosis. Pt signed AMA because she did not want to be admitted. Pt is A&Ox3.        Abdelrahman Hayes RN  08/03/20 [id number]

## 2020-08-26 ENCOUNTER — OFFICE VISIT (OUTPATIENT)
Dept: UROLOGY | Facility: CLINIC | Age: 85
End: 2020-08-26

## 2020-08-26 VITALS
HEIGHT: 69 IN | DIASTOLIC BLOOD PRESSURE: 68 MMHG | BODY MASS INDEX: 31.4 KG/M2 | TEMPERATURE: 97.8 F | SYSTOLIC BLOOD PRESSURE: 124 MMHG | OXYGEN SATURATION: 98 % | WEIGHT: 212 LBS | HEART RATE: 66 BPM

## 2020-08-26 DIAGNOSIS — N13.5 URETERAL STRICTURE, LEFT: ICD-10-CM

## 2020-08-26 DIAGNOSIS — E29.1 HYPOGONADISM MALE: ICD-10-CM

## 2020-08-26 DIAGNOSIS — N30.00 ACUTE CYSTITIS WITHOUT HEMATURIA: ICD-10-CM

## 2020-08-26 DIAGNOSIS — N13.8 BPH WITH URINARY OBSTRUCTION: ICD-10-CM

## 2020-08-26 DIAGNOSIS — N40.1 BPH WITH URINARY OBSTRUCTION: ICD-10-CM

## 2020-08-26 DIAGNOSIS — N13.39 OTHER HYDRONEPHROSIS: Primary | ICD-10-CM

## 2020-08-26 PROCEDURE — 99214 OFFICE O/P EST MOD 30 MIN: CPT | Performed by: UROLOGY

## 2020-08-26 PROCEDURE — 81003 URINALYSIS AUTO W/O SCOPE: CPT | Performed by: UROLOGY

## 2020-08-26 RX ORDER — FLUCONAZOLE 150 MG/1
150 TABLET ORAL DAILY
Qty: 10 TABLET | Refills: 0 | Status: SHIPPED | OUTPATIENT
Start: 2020-08-26 | End: 2020-10-14 | Stop reason: HOSPADM

## 2020-08-26 NOTE — PROGRESS NOTES
Chief Complaint  Hydronephrosis        JAZMIN Zepeda is a 88 y.o. male who returns today for follow-up of complex urological problem.  He has a long history of recurrent kidney stones and in fact passed one in the past week.  Due to repeated laser lithotripsy's of his distal left ureter he now has a ureteral stricture and a very tortuous distal ureter that is associated with hydronephrosis and handled with a chronic indwelling ureteral stent.  He now has a metallic stent that only has to be changed once per year and is only been 6 months.  His current problem is persistent urinary tract infection that was recently treated with 10 days of Augmentin but not checked with a culture.  Repeated cultures in the past have been no growth on bacteria but positive for yeast.  Another acute problem is poorly controlled diabetes.  His daughter is a nurse who accompanies him today and has had increase the amount of insulin required to keep his blood sugar even at 1 60-1 70.  Obviously this is related to urinary tract infection.  When encouraged to get more physical activity to help control his diabetes he states he is weak as a kitten and cannot even walk.  I suggested checking a testosterone level as if appropriate supplement might increase his stamina and ability to exercise more and help control his diabetes.    He is known to have a markedly enlarged prostate that prevents easy access to his ureter but he is voiding adequately on Proscar and Rapaflo.  He was unable to tolerate other alpha blockers.  He also difficulty with arrhythmias from high-dose Diflucan because of its effect on his QT interval.    Vitals:    08/26/20 1303   BP: 124/68   Pulse: 66   Temp: 97.8 °F (36.6 °C)   SpO2: 98%       Past Medical History  Past Medical History:   Diagnosis Date   • Abnormal EKG    • Abnormal PSA     Reported abnormal   • Acute deep vein thrombosis (DVT) of right lower extremity (CMS/[facility]) 08/22/2019   • Acute diverticulitis 2019   •  "Acute hyperkalemia 08/22/2019   • Acute respiratory failure with hypoxia (CMS/[facility])    • Acute saddle pulmonary embolism with acute cor pulmonale (CMS/[facility]) 08/22/2019   • Acute systolic right heart failure (CMS/[facility]) 08/22/2019   • Arthritis     KNEES,  BUT SINCE HAD REPLACEMENT   • Benign localized hyperplasia of prostate    • Bilateral knee pain    • Cataracts, bilateral    • CKD (chronic kidney disease)    • Diabetic neuropathy (CMS/[facility])    • Diabetic neuropathy (CMS/[facility])    • Disease of thyroid gland     HYPOTHYROIDISM   • Diverticulitis    • Dyslipidemia     H/O   • Family history of malignant hyperthermia     Brother    • Full dentures    • Generalized weakness    • History of gout    • History of hepatitis A vaccination    • History of nephrolithiasis    • History of nuclear stress test     STATES IN THE 1990'S.  \"IT WAS OK\"   • History of osteopenia    • History of recurrent UTI (urinary tract infection)    • Hydronephrosis of left kidney 7/18/2019   • Hydronephrosis with ureteral stricture    • Hyperkalemia     PT UNSURE REGARDING HX OF THIS   • Hyperlipidemia    • Hypertension    • Insomnia    • Malignant hyperthermia due to anesthesia     PER PT REPORT, BROTHER HAD DURING LUNG SURGERY SEVERAL YEARS AGO.  STATED THEY PACKED HIM ON ICE.  STATED HE DID SURVIVE.  PT DENIES ANY PERSONAL HX OF MALIGNANT HYPERTHERMIA HIMSELF, OR ANY ISSUES WITH ANESTHESIA.  STATES TO HIS KNOWLEDGE, NO OTHER FAMILY MEMBER HAS THIS ISSUE EXCEPT FOR HIS BROTHER.   • On supplemental oxygen therapy     2L [place] QHS   • Other hydronephrosis 8/12/2019    Added automatically from request for surgery [id number]   • Renal insufficiency    • Sepsis (CMS/[facility]) 2019   • Shortness of breath     STATES WITH EXERTION, OTHERWISE, DENIES   • Sigmoid diverticulitis without abscess or perforation 8/9/2017   • Skin breakdown     fourth toe right foot noted in 2019 MD D/C note   • Skin ulcer of fourth toe of right foot, limited to breakdown of skin (CMS/[facility]) " 7/5/2019   • Stricture of ureter    • Type 2 diabetes mellitus (CMS/HCC)    • Ureteral stricture, left    • UTI (urinary tract infection) 7/18/2019   • Vitamin D deficiency    • Wears glasses        Past Surgical History  Past Surgical History:   Procedure Laterality Date   • APPENDECTOMY     • CHOLECYSTECTOMY     • CIRCUMCISION N/A 10/23/2019    Procedure: CIRCUMCISIOn-DORSAL SLIT;  Surgeon: Griffin Romero MD;  Location: [facility] OR;  Service: Urology   • COLONOSCOPY     • CYSTOSCOPY URETEROSCOPY Left 2/11/2019    Procedure: URETEROSCOPY WITH STENT EXTRACTION, RPG;  Surgeon: Griffin Romero MD;  Location: [facility] OR;  Service: Urology   • CYSTOSCOPY W/ URETERAL STENT PLACEMENT Left 7/20/2019    Procedure: CYSTOSCOPY, LEFT RETROGRADE PYELOGRAM,  ATTEMPTED LEFT URETERAL STENT INSERTION;  Surgeon: Isaac Flynn MD;  Location: [facility] OR;  Service: Urology   • EYE SURGERY      CATARACTS REMOVED BILATERALLY   • KNEE ARTHROPLASTY Bilateral    • KNEE ARTHROSCOPY Bilateral    • RENAL ARTERY STENT      SEVERAL TIMES EVERY SINCE 1978   • URETEROSCOPY LASER LITHOTRIPSY WITH STENT INSERTION Left 1/10/2018    Procedure:  cysto, left ureteral stent replacement ;  Surgeon: Griffin Romero MD;  Location: [facility] OR;  Service:    • URETEROSCOPY LASER LITHOTRIPSY WITH STENT INSERTION Left 8/14/2019    Procedure: URETEROSCOPY, STONE REMOVAL WITH STENT INSERTION;  Surgeon: Griffin Romero MD;  Location: [facility] OR;  Service: Urology   • URETEROSCOPY LASER LITHOTRIPSY WITH STENT INSERTION Left 10/23/2019    Procedure: Left URETEROSCOPY WITH STENT INSERTION-LEFT;  Surgeon: Griffin Romero MD;  Location: [facility] OR;  Service: Urology       Medications  has a current medication list which includes the following prescription(s): allopurinol, carvedilol, clopidogrel, desoximetasone, diphenhydramine-acetaminophen, finasteride, glucose blood, insulin glargine (1 unit dial), lactobacillus acidophilus, levothyroxine, multiple  vitamins-minerals, novofine, polyethylene glycol, silodosin, and sitagliptin.      Allergies  Allergies   Allergen Reactions   • Metformin Diarrhea   • Statins Myalgia       Social History  Social History     Socioeconomic History   • Marital status:      Spouse name: Not on file   • Number of children: Not on file   • Years of education: Not on file   • Highest education level: Not on file   Tobacco Use   • Smoking status: Former Smoker     Types: Cigarettes   • Smokeless tobacco: Never Used   • Tobacco comment: SMOKED SOME AS A TEEN, DENIES ANY HABIT OR ADDICTION   Substance and Sexual Activity   • Alcohol use: No   • Drug use: No   • Sexual activity: Defer       Family History  He has no family history of bladder or kidney cancer  He has no family history of kidney stones      AUA Symptom Score:      Review of Systems  Review of Systems   Constitutional: Positive for fatigue. Negative for activity change, appetite change, chills, fever, unexpected weight gain and unexpected weight loss.   Respiratory: Negative for apnea, cough, chest tightness, shortness of breath, wheezing and stridor.    Cardiovascular: Negative for chest pain, palpitations and leg swelling.   Gastrointestinal: Negative for abdominal distention, abdominal pain, anal bleeding, blood in stool, constipation, diarrhea, nausea, rectal pain, vomiting, GERD and indigestion.   Genitourinary: Positive for difficulty urinating. Negative for decreased libido, decreased urine volume, discharge, dysuria, flank pain, frequency, genital sores, hematuria, nocturia, penile pain, erectile dysfunction, penile swelling, scrotal swelling, testicular pain, urgency and urinary incontinence.   Musculoskeletal: Negative for back pain and joint swelling.   Neurological: Negative for tremors, seizures, speech difficulty, weakness and numbness.   Psychiatric/Behavioral: Negative for agitation, decreased concentration, sleep disturbance, depressed mood and stress.  The patient is not nervous/anxious.        Physical Exam  Physical Exam   Constitutional: He is oriented to person, place, and time. He appears well-developed and well-nourished.   HENT:   Head: Normocephalic and atraumatic.   Neck: Normal range of motion.   Pulmonary/Chest: Effort normal. No respiratory distress.   Abdominal: Soft. He exhibits no distension and no mass. There is no tenderness. No hernia.   Musculoskeletal: Normal range of motion.   Lymphadenopathy:     He has no cervical adenopathy.   Neurological: He is alert and oriented to person, place, and time.   Skin: Skin is warm and dry.   Psychiatric: He has a normal mood and affect. His behavior is normal.   Vitals reviewed.      Labs Recent and today in the office:  Results for orders placed or performed in visit on 08/26/20   POC Urinalysis Dipstick, Automated   Result Value Ref Range    Color Yellow Yellow, Straw, Dark Yellow, Sushila    Clarity, UA Clear Clear    Specific Gravity  1.025 1.005 - 1.030    pH, Urine 6.0 5.0 - 8.0    Leukocytes Large (3+) (A) Negative    Nitrite, UA Negative Negative    Protein, POC 1+ (A) Negative mg/dL    Glucose, UA Negative Negative, 1000 mg/dL (3+) mg/dL    Ketones, UA Negative Negative    Urobilinogen, UA Normal Normal    Bilirubin Negative Negative    Blood, UA 3+ (A) Negative         Assessment & Plan  Urinary tract infection/hydronephrosis/ureteral stricture/poorly controlled diabetes: Patient is placed on Diflucan 150 mg daily for 10 days and will return in 2 weeks for follow-up.  A urine culture submitted and he will need to be contacted for antibiotics if bacteria are found.  He does not need his stent change for another 6 months if we can control the infection.    Hypogonadism: The patient's extreme weakness may be limiting his ability to control his diabetes through exercise so we will check a testosterone level and consider supplement.

## 2020-08-28 LAB
BACTERIA UR CULT: NORMAL
BACTERIA UR CULT: NORMAL

## 2020-08-29 DIAGNOSIS — E29.1 HYPOGONADISM MALE: Primary | ICD-10-CM

## 2020-08-29 LAB
PSA SERPL-MCNC: 1.45 NG/ML (ref 0–4)
TESTOST FREE SERPL-MCNC: 4.1 PG/ML (ref 6.6–18.1)
TESTOST SERPL-MCNC: 146 NG/DL (ref 264–916)

## 2020-08-29 RX ORDER — TESTOSTERONE CYPIONATE 200 MG/ML
400 INJECTION, SOLUTION INTRAMUSCULAR
Qty: 4 ML | Refills: 2 | Status: SHIPPED | OUTPATIENT
Start: 2020-08-29 | End: 2020-11-10

## 2020-09-10 ENCOUNTER — OFFICE VISIT (OUTPATIENT)
Dept: UROLOGY | Facility: CLINIC | Age: 85
End: 2020-09-10

## 2020-09-10 VITALS
HEART RATE: 70 BPM | OXYGEN SATURATION: 98 % | SYSTOLIC BLOOD PRESSURE: 118 MMHG | TEMPERATURE: 98.4 F | RESPIRATION RATE: 16 BRPM | DIASTOLIC BLOOD PRESSURE: 70 MMHG

## 2020-09-10 DIAGNOSIS — N13.30 HYDRONEPHROSIS, UNSPECIFIED HYDRONEPHROSIS TYPE: Primary | ICD-10-CM

## 2020-09-10 DIAGNOSIS — E29.1 HYPOGONADISM MALE: ICD-10-CM

## 2020-09-10 PROCEDURE — 99214 OFFICE O/P EST MOD 30 MIN: CPT | Performed by: UROLOGY

## 2020-09-10 PROCEDURE — 81003 URINALYSIS AUTO W/O SCOPE: CPT | Performed by: UROLOGY

## 2020-09-10 NOTE — PROGRESS NOTES
Chief Complaint  2 week fup        JAZMIN Zepeda is a 88 y.o. male who returns today for follow-up of hypogonadism.  The patient has a long history of a tortuous distal ureteral stricture from repeated laser lithotripsy stone impacted calculi and subsequent development of hydronephrosis that is been handled for many years with an indwelling stent.  He has a metallic stent that has to be changed once a year at this point.  On his last visit he was accompanied by his daughter who is a nurse and complained of extreme fatigue lack of energy weight gain loss of muscle mass and I suggested checking a testosterone.  It was quite low and he is recently been started on supplement using 400 mg every 14 days as injected by his daughter at home.  He returns today stating he already feels a little better but is anxious to continue at this point.  His PSA was normal and his urine culture was negative.  He always has pyuria associated with chronic yeast infection from repeated bouts of antibiotics and the indwelling metallic stent.  A major concern is his diabetes is difficult to control even on insulin.    Vitals:    09/10/20 1453   BP: 118/70   Pulse: 70   Resp: 16   Temp: 98.4 °F (36.9 °C)   SpO2: 98%       Past Medical History  Past Medical History:   Diagnosis Date   • Abnormal EKG    • Abnormal PSA     Reported abnormal   • Acute deep vein thrombosis (DVT) of right lower extremity (CMS/HCC) 08/22/2019   • Acute diverticulitis 2019   • Acute hyperkalemia 08/22/2019   • Acute respiratory failure with hypoxia (CMS/HCC)    • Acute saddle pulmonary embolism with acute cor pulmonale (CMS/HCC) 08/22/2019   • Acute systolic right heart failure (CMS/HCC) 08/22/2019   • Arthritis     KNEES,  BUT SINCE HAD REPLACEMENT   • Benign localized hyperplasia of prostate    • Bilateral knee pain    • Cataracts, bilateral    • CKD (chronic kidney disease)    • Diabetic neuropathy (CMS/HCC)    • Diabetic neuropathy (CMS/HCC)    • Disease of thyroid  "gland     HYPOTHYROIDISM   • Diverticulitis    • Dyslipidemia     H/O   • Family history of malignant hyperthermia     Brother    • Full dentures    • Generalized weakness    • History of gout    • History of hepatitis A vaccination    • History of nephrolithiasis    • History of nuclear stress test     STATES IN THE 1990'S.  \"IT WAS OK\"   • History of osteopenia    • History of recurrent UTI (urinary tract infection)    • Hydronephrosis of left kidney 7/18/2019   • Hydronephrosis with ureteral stricture    • Hyperkalemia     PT UNSURE REGARDING HX OF THIS   • Hyperlipidemia    • Hypertension    • Insomnia    • Malignant hyperthermia due to anesthesia     PER PT REPORT, BROTHER HAD DURING LUNG SURGERY SEVERAL YEARS AGO.  STATED THEY PACKED HIM ON ICE.  STATED HE DID SURVIVE.  PT DENIES ANY PERSONAL HX OF MALIGNANT HYPERTHERMIA HIMSELF, OR ANY ISSUES WITH ANESTHESIA.  STATES TO HIS KNOWLEDGE, NO OTHER FAMILY MEMBER HAS THIS ISSUE EXCEPT FOR HIS BROTHER.   • On supplemental oxygen therapy     2L [place] QHS   • Other hydronephrosis 8/12/2019    Added automatically from request for surgery [id number]   • Renal insufficiency    • Sepsis (CMS/HCC) 2019   • Shortness of breath     STATES WITH EXERTION, OTHERWISE, DENIES   • Sigmoid diverticulitis without abscess or perforation 8/9/2017   • Skin breakdown     fourth toe right foot noted in 2019 MD D/C note   • Skin ulcer of fourth toe of right foot, limited to breakdown of skin (CMS/HCC) 7/5/2019   • Stricture of ureter    • Type 2 diabetes mellitus (CMS/HCC)    • Ureteral stricture, left    • UTI (urinary tract infection) 7/18/2019   • Vitamin D deficiency    • Wears glasses        Past Surgical History  Past Surgical History:   Procedure Laterality Date   • APPENDECTOMY     • CHOLECYSTECTOMY     • CIRCUMCISION N/A 10/23/2019    Procedure: CIRCUMCISIOn-DORSAL SLIT;  Surgeon: Griffin Romero MD;  Location: [facility];  Service: Urology   • COLONOSCOPY     • CYSTOSCOPY " URETEROSCOPY Left 2/11/2019    Procedure: URETEROSCOPY WITH STENT EXTRACTION, RPG;  Surgeon: Griffin Romero MD;  Location: [facility] OR;  Service: Urology   • CYSTOSCOPY W/ URETERAL STENT PLACEMENT Left 7/20/2019    Procedure: CYSTOSCOPY, LEFT RETROGRADE PYELOGRAM,  ATTEMPTED LEFT URETERAL STENT INSERTION;  Surgeon: Isaac Flynn MD;  Location:  [facility] OR;  Service: Urology   • EYE SURGERY      CATARACTS REMOVED BILATERALLY   • KNEE ARTHROPLASTY Bilateral    • KNEE ARTHROSCOPY Bilateral    • RENAL ARTERY STENT      SEVERAL TIMES EVERY SINCE 1978   • URETEROSCOPY LASER LITHOTRIPSY WITH STENT INSERTION Left 1/10/2018    Procedure:  cysto, left ureteral stent replacement ;  Surgeon: Griffin Romero MD;  Location: [facility] OR;  Service:    • URETEROSCOPY LASER LITHOTRIPSY WITH STENT INSERTION Left 8/14/2019    Procedure: URETEROSCOPY, STONE REMOVAL WITH STENT INSERTION;  Surgeon: Griffin Romero MD;  Location: [facility] OR;  Service: Urology   • URETEROSCOPY LASER LITHOTRIPSY WITH STENT INSERTION Left 10/23/2019    Procedure: Left URETEROSCOPY WITH STENT INSERTION-LEFT;  Surgeon: Griffin Romero MD;  Location: [facility] OR;  Service: Urology       Medications  has a current medication list which includes the following prescription(s): allopurinol, carvedilol, clopidogrel, desoximetasone, diphenhydramine-acetaminophen, finasteride, glucose blood, insulin glargine (1 unit dial), lactobacillus acidophilus, levothyroxine, multiple vitamins-minerals, novofine, polyethylene glycol, silodosin, sitagliptin, testosterone cypionate, and fluconazole.      Allergies  Allergies   Allergen Reactions   • Metformin Diarrhea   • Statins Myalgia       Social History  Social History     Socioeconomic History   • Marital status:      Spouse name: Not on file   • Number of children: Not on file   • Years of education: Not on file   • Highest education level: Not on file   Tobacco Use   • Smoking status: Former Smoker     Types:  Cigarettes   • Smokeless tobacco: Never Used   • Tobacco comment: SMOKED SOME AS A TEEN, DENIES ANY HABIT OR ADDICTION   Substance and Sexual Activity   • Alcohol use: No   • Drug use: No   • Sexual activity: Defer       Family History  He has no family history of bladder or kidney cancer  He has no family history of kidney stones      AUA Symptom Score:      Review of Systems  Review of Systems   Constitutional: Positive for fatigue. Negative for activity change, appetite change, chills, fever, unexpected weight gain and unexpected weight loss.   Respiratory: Negative for apnea, cough, chest tightness, shortness of breath, wheezing and stridor.    Cardiovascular: Negative for chest pain, palpitations and leg swelling.   Gastrointestinal: Negative for abdominal distention, abdominal pain, anal bleeding, blood in stool, constipation, diarrhea, nausea, rectal pain, vomiting, GERD and indigestion.   Genitourinary: Positive for difficulty urinating and dysuria. Negative for decreased libido, decreased urine volume, discharge, flank pain, frequency, genital sores, hematuria, nocturia, penile pain, erectile dysfunction, penile swelling, scrotal swelling, testicular pain, urgency and urinary incontinence.   Musculoskeletal: Negative for back pain and joint swelling.   Neurological: Negative for tremors, seizures, speech difficulty, weakness and numbness.   Psychiatric/Behavioral: Negative for agitation, decreased concentration, sleep disturbance, depressed mood and stress. The patient is not nervous/anxious.        Physical Exam  Physical Exam   Constitutional: He is oriented to person, place, and time. He appears well-developed and well-nourished.   HENT:   Head: Normocephalic and atraumatic.   Neck: Normal range of motion.   Pulmonary/Chest: Effort normal. No respiratory distress.   Abdominal: Soft. He exhibits no distension and no mass. There is no tenderness. No hernia.   Musculoskeletal: Normal range of motion.    Lymphadenopathy:     He has no cervical adenopathy.   Neurological: He is alert and oriented to person, place, and time.   Skin: Skin is warm and dry.   Psychiatric: He has a normal mood and affect. His behavior is normal.   Vitals reviewed.      Labs Recent and today in the office:  Results for orders placed or performed in visit on 09/10/20   POC Urinalysis Dipstick, Automated   Result Value Ref Range    Color Yellow Yellow, Straw, Dark Yellow, Sushila    Clarity, UA Clear Clear    Specific Gravity  1.020 1.005 - 1.030    pH, Urine 6.0 5.0 - 8.0    Leukocytes Large (3+) (A) Negative    Nitrite, UA Negative Negative    Protein, POC Trace (A) Negative mg/dL    Glucose, UA Negative Negative, 1000 mg/dL (3+) mg/dL    Ketones, UA Negative Negative    Urobilinogen, UA Normal Normal    Bilirubin Negative Negative    Blood, UA 2+ (A) Negative         Assessment & Plan  Hypogonadism: The patient will receive Dabo testosterone 400 mg every 14 days and return in 2 months with follow-up blood work.

## 2020-09-30 DIAGNOSIS — M10.9 GOUTY ARTHRITIS: ICD-10-CM

## 2020-09-30 RX ORDER — ALLOPURINOL 300 MG/1
TABLET ORAL
Qty: 90 TABLET | Refills: 0 | Status: SHIPPED | OUTPATIENT
Start: 2020-09-30 | End: 2020-10-16 | Stop reason: SDUPTHER

## 2020-10-04 ENCOUNTER — APPOINTMENT (OUTPATIENT)
Dept: CT IMAGING | Facility: HOSPITAL | Age: 85
End: 2020-10-04

## 2020-10-04 ENCOUNTER — HOSPITAL ENCOUNTER (EMERGENCY)
Facility: HOSPITAL | Age: 85
Discharge: HOME | End: 2020-10-04
Attending: EMERGENCY MEDICINE | Admitting: EMERGENCY MEDICINE

## 2020-10-04 VITALS
DIASTOLIC BLOOD PRESSURE: 68 MMHG | HEART RATE: 63 BPM | TEMPERATURE: 97.7 F | RESPIRATION RATE: 20 BRPM | HEIGHT: 69 IN | WEIGHT: 210 LBS | SYSTOLIC BLOOD PRESSURE: 142 MMHG | BODY MASS INDEX: 31.1 KG/M2 | OXYGEN SATURATION: 96 %

## 2020-10-04 DIAGNOSIS — R31.0 GROSS HEMATURIA: Primary | ICD-10-CM

## 2020-10-04 DIAGNOSIS — N39.0 ACUTE UTI: ICD-10-CM

## 2020-10-04 LAB
ALBUMIN SERPL-MCNC: 4 G/DL (ref 3.5–5.2)
ALBUMIN/GLOB SERPL: 1.3 G/DL
ALP SERPL-CCNC: 98 U/L (ref 39–117)
ALT SERPL W P-5'-P-CCNC: 12 U/L (ref 1–41)
ANION GAP SERPL CALCULATED.3IONS-SCNC: 9 MMOL/L (ref 5–15)
AST SERPL-CCNC: 24 U/L (ref 1–40)
BACTERIA UR QL AUTO: ABNORMAL /HPF
BASOPHILS # BLD AUTO: 0.09 10*3/MM3 (ref 0–0.2)
BASOPHILS NFR BLD AUTO: 0.8 % (ref 0–1.5)
BILIRUB SERPL-MCNC: 0.5 MG/DL (ref 0–1.2)
BILIRUB UR QL STRIP: ABNORMAL
BUN SERPL-MCNC: 20 MG/DL (ref 8–23)
BUN/CREAT SERPL: 13.3 (ref 7–25)
CALCIUM SPEC-SCNC: 9.2 MG/DL (ref 8.6–10.5)
CHLORIDE SERPL-SCNC: 104 MMOL/L (ref 98–107)
CLARITY UR: ABNORMAL
CO2 SERPL-SCNC: 28 MMOL/L (ref 22–29)
COLOR UR: ABNORMAL
CREAT SERPL-MCNC: 1.5 MG/DL (ref 0.76–1.27)
DEPRECATED RDW RBC AUTO: 62.1 FL (ref 37–54)
EOSINOPHIL # BLD AUTO: 0.39 10*3/MM3 (ref 0–0.4)
EOSINOPHIL NFR BLD AUTO: 3.5 % (ref 0.3–6.2)
ERYTHROCYTE [DISTWIDTH] IN BLOOD BY AUTOMATED COUNT: 16.2 % (ref 12.3–15.4)
GFR SERPL CREATININE-BSD FRML MDRD: 44 ML/MIN/1.73
GLOBULIN UR ELPH-MCNC: 3.2 GM/DL
GLUCOSE SERPL-MCNC: 179 MG/DL (ref 65–99)
GLUCOSE UR STRIP-MCNC: NEGATIVE MG/DL
HCT VFR BLD AUTO: 48.9 % (ref 37.5–51)
HGB BLD-MCNC: 15.3 G/DL (ref 13–17.7)
HGB UR QL STRIP.AUTO: ABNORMAL
HYALINE CASTS UR QL AUTO: ABNORMAL /LPF
IMM GRANULOCYTES # BLD AUTO: 0.09 10*3/MM3 (ref 0–0.05)
IMM GRANULOCYTES NFR BLD AUTO: 0.8 % (ref 0–0.5)
INR PPP: 1.07 (ref 0.85–1.16)
KETONES UR QL STRIP: NEGATIVE
LEUKOCYTE ESTERASE UR QL STRIP.AUTO: ABNORMAL
LYMPHOCYTES # BLD AUTO: 2.5 10*3/MM3 (ref 0.7–3.1)
LYMPHOCYTES NFR BLD AUTO: 22.3 % (ref 19.6–45.3)
MCH RBC QN AUTO: 32.1 PG (ref 26.6–33)
MCHC RBC AUTO-ENTMCNC: 31.3 G/DL (ref 31.5–35.7)
MCV RBC AUTO: 102.5 FL (ref 79–97)
MONOCYTES # BLD AUTO: 0.78 10*3/MM3 (ref 0.1–0.9)
MONOCYTES NFR BLD AUTO: 7 % (ref 5–12)
NEUTROPHILS NFR BLD AUTO: 65.6 % (ref 42.7–76)
NEUTROPHILS NFR BLD AUTO: 7.35 10*3/MM3 (ref 1.7–7)
NITRITE UR QL STRIP: POSITIVE
NRBC BLD AUTO-RTO: 0 /100 WBC (ref 0–0.2)
PH UR STRIP.AUTO: <=5 [PH] (ref 5–8)
PLATELET # BLD AUTO: 205 10*3/MM3 (ref 140–450)
PMV BLD AUTO: 10 FL (ref 6–12)
POTASSIUM SERPL-SCNC: 4.6 MMOL/L (ref 3.5–5.2)
PROT SERPL-MCNC: 7.2 G/DL (ref 6–8.5)
PROT UR QL STRIP: ABNORMAL
PROTHROMBIN TIME: 13.6 SECONDS (ref 11.5–14)
RBC # BLD AUTO: 4.77 10*6/MM3 (ref 4.14–5.8)
RBC # UR: ABNORMAL /HPF
REF LAB TEST METHOD: ABNORMAL
SODIUM SERPL-SCNC: 141 MMOL/L (ref 136–145)
SP GR UR STRIP: 1.02 (ref 1–1.03)
SQUAMOUS #/AREA URNS HPF: ABNORMAL /HPF
UROBILINOGEN UR QL STRIP: ABNORMAL
WBC # BLD AUTO: 11.2 10*3/MM3 (ref 3.4–10.8)
WBC CLUMPS # UR AUTO: ABNORMAL /HPF
WBC UR QL AUTO: ABNORMAL /HPF

## 2020-10-04 PROCEDURE — 25010000002 CEFTRIAXONE PER 250 MG: Performed by: PHYSICIAN ASSISTANT

## 2020-10-04 PROCEDURE — 87102 FUNGUS ISOLATION CULTURE: CPT | Performed by: PHYSICIAN ASSISTANT

## 2020-10-04 PROCEDURE — 80053 COMPREHEN METABOLIC PANEL: CPT | Performed by: PHYSICIAN ASSISTANT

## 2020-10-04 PROCEDURE — 85025 COMPLETE CBC W/AUTO DIFF WBC: CPT | Performed by: PHYSICIAN ASSISTANT

## 2020-10-04 PROCEDURE — 85610 PROTHROMBIN TIME: CPT | Performed by: PHYSICIAN ASSISTANT

## 2020-10-04 PROCEDURE — 81001 URINALYSIS AUTO W/SCOPE: CPT | Performed by: PHYSICIAN ASSISTANT

## 2020-10-04 PROCEDURE — 74176 CT ABD & PELVIS W/O CONTRAST: CPT

## 2020-10-04 PROCEDURE — 87086 URINE CULTURE/COLONY COUNT: CPT | Performed by: PHYSICIAN ASSISTANT

## 2020-10-04 PROCEDURE — 96365 THER/PROPH/DIAG IV INF INIT: CPT

## 2020-10-04 PROCEDURE — 99283 EMERGENCY DEPT VISIT LOW MDM: CPT

## 2020-10-04 RX ORDER — SODIUM CHLORIDE 0.9 % (FLUSH) 0.9 %
10 SYRINGE (ML) INJECTION AS NEEDED
Status: DISCONTINUED | OUTPATIENT
Start: 2020-10-04 | End: 2020-10-04 | Stop reason: HOSPADM

## 2020-10-04 RX ORDER — CEFDINIR 300 MG/1
300 CAPSULE ORAL 2 TIMES DAILY
Qty: 20 CAPSULE | Refills: 0 | Status: SHIPPED | OUTPATIENT
Start: 2020-10-04 | End: 2020-10-14 | Stop reason: HOSPADM

## 2020-10-04 RX ADMIN — CEFTRIAXONE SODIUM 1 G: 1 INJECTION, POWDER, FOR SOLUTION INTRAMUSCULAR; INTRAVENOUS at 14:15

## 2020-10-04 NOTE — ED PROVIDER NOTES
Subjective   Mr. Sherman is a 88-year-old male that began having hematuria yesterday.  He has a history of recurrent UTIs and many of these have been fungal in nature.  He is on Plavix.  He states he is had no difficulty passing his urine.  He has a little bit of frequency but no urgency or dysuria.  He is had no clots in his urine.  States he is had no trauma to the abdomen or back.  Sees Dr. Pelon Norwoodty  infectious disease and Dr. Romero in urology.  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  He has no other complaints.  He has had no fevers no chills no nausea or vomiting associated with this.  He is attended here by his daughter who states that he has had fungal UTIs in the past and has been on Diflucan for up to 6 weeks.      History provided by:  Patient   used: No    Blood in Urine  This is a new problem. The current episode started yesterday. The problem is unchanged. He describes the hematuria as gross hematuria. The hematuria occurs during the initial portion of his urinary stream. He reports no clotting in his urine stream. He is experiencing no pain. He describes his urine color as dark red. Irritative symptoms include frequency and urgency. Obstructive symptoms do not include dribbling, incomplete emptying, an intermittent stream, a slower stream, straining or a weak stream. Pertinent negatives include no abdominal pain, chills, dysuria, facial swelling, fever, flank pain, genital pain, hesitancy, inability to urinate, nausea or vomiting. He is not sexually active. His past medical history is significant for kidney stones and recent infection. There is no history of prostatitis, sickle cell disease or tobacco use.       Review of Systems   Constitutional: Negative for chills and fever.   HENT: Negative for facial swelling.    Respiratory: Negative for chest tightness, shortness of breath and wheezing.    Cardiovascular: Negative for chest pain and  "palpitations.   Gastrointestinal: Negative for abdominal pain, nausea and vomiting.   Genitourinary: Positive for frequency, hematuria and urgency. Negative for dysuria, flank pain, hesitancy, incomplete emptying and penile pain.   All other systems reviewed and are negative.      Past Medical History:   Diagnosis Date   • Abnormal EKG    • Abnormal PSA     Reported abnormal   • Acute deep vein thrombosis (DVT) of right lower extremity (CMS/[facility]) 08/22/2019   • Acute diverticulitis 2019   • Acute hyperkalemia 08/22/2019   • Acute respiratory failure with hypoxia (CMS/[facility])    • Acute saddle pulmonary embolism with acute cor pulmonale (CMS/[facility]) 08/22/2019   • Acute systolic right heart failure (CMS/[facility]) 08/22/2019   • Arthritis     KNEES,  BUT SINCE HAD REPLACEMENT   • Benign localized hyperplasia of prostate    • Bilateral knee pain    • Cataracts, bilateral    • CKD (chronic kidney disease)    • Diabetic neuropathy (CMS/[facility])    • Diabetic neuropathy (CMS/[facility])    • Disease of thyroid gland     HYPOTHYROIDISM   • Diverticulitis    • Dyslipidemia     H/O   • Family history of malignant hyperthermia     Brother    • Full dentures    • Generalized weakness    • History of gout    • History of hepatitis A vaccination    • History of nephrolithiasis    • History of nuclear stress test     STATES IN THE 1990'S.  \"IT WAS OK\"   • History of osteopenia    • History of recurrent UTI (urinary tract infection)    • Hydronephrosis of left kidney 7/18/2019   • Hydronephrosis with ureteral stricture    • Hyperkalemia     PT UNSURE REGARDING HX OF THIS   • Hyperlipidemia    • Hypertension    • Insomnia    • Malignant hyperthermia due to anesthesia     PER PT REPORT, BROTHER HAD DURING LUNG SURGERY SEVERAL YEARS AGO.  STATED THEY PACKED HIM ON ICE.  STATED HE DID SURVIVE.  PT DENIES ANY PERSONAL HX OF MALIGNANT HYPERTHERMIA HIMSELF, OR ANY ISSUES WITH ANESTHESIA.  STATES TO HIS KNOWLEDGE, NO OTHER FAMILY MEMBER HAS THIS ISSUE EXCEPT " FOR HIS BROTHER.   • On supplemental oxygen therapy     2L [place]   • Other hydronephrosis 8/12/2019    Added automatically from request for surgery [id number]   • Renal insufficiency    • Sepsis (CMS/HCC) 2019   • Shortness of breath     STATES WITH EXERTION, OTHERWISE, DENIES   • Sigmoid diverticulitis without abscess or perforation 8/9/2017   • Skin breakdown     fourth toe right foot noted in 2019 MD D/C note   • Skin ulcer of fourth toe of right foot, limited to breakdown of skin (CMS/HCC) 7/5/2019   • Stricture of ureter    • Type 2 diabetes mellitus (CMS/HCC)    • Ureteral stricture, left    • UTI (urinary tract infection) 7/18/2019   • Vitamin D deficiency    • Wears glasses        Allergies   Allergen Reactions   • Metformin Diarrhea   • Statins Myalgia       Past Surgical History:   Procedure Laterality Date   • APPENDECTOMY     • CHOLECYSTECTOMY     • CIRCUMCISION N/A 10/23/2019    Procedure: CIRCUMCISIOn-DORSAL SLIT;  Surgeon: Griffin Romero MD;  Location: [facility] OR;  Service: Urology   • COLONOSCOPY     • CYSTOSCOPY URETEROSCOPY Left 2/11/2019    Procedure: URETEROSCOPY WITH STENT EXTRACTION, RPG;  Surgeon: Griffin Romero MD;  Location: [facility] OR;  Service: Urology   • CYSTOSCOPY W/ URETERAL STENT PLACEMENT Left 7/20/2019    Procedure: CYSTOSCOPY, LEFT RETROGRADE PYELOGRAM,  ATTEMPTED LEFT URETERAL STENT INSERTION;  Surgeon: Isaac Flynn MD;  Location: [facility] OR;  Service: Urology   • EYE SURGERY      CATARACTS REMOVED BILATERALLY   • KNEE ARTHROPLASTY Bilateral    • KNEE ARTHROSCOPY Bilateral    • RENAL ARTERY STENT      SEVERAL TIMES EVERY SINCE 1978   • URETEROSCOPY LASER LITHOTRIPSY WITH STENT INSERTION Left 1/10/2018    Procedure:  cysto, left ureteral stent replacement ;  Surgeon: Griffin Romero MD;  Location: [facility] OR;  Service:    • URETEROSCOPY LASER LITHOTRIPSY WITH STENT INSERTION Left 8/14/2019    Procedure: URETEROSCOPY, STONE REMOVAL WITH STENT INSERTION;  Surgeon: Nick  Griffin JOSEPH MD;  Location: [facility];  Service: Urology   • URETEROSCOPY LASER LITHOTRIPSY WITH STENT INSERTION Left 10/23/2019    Procedure: Left URETEROSCOPY WITH STENT INSERTION-LEFT;  Surgeon: Griffin Romero MD;  Location: [facility];  Service: Urology       Family History   Problem Relation Age of Onset   • Heart attack Sister    • Brain cancer Sister    • Stroke Sister    • Lung cancer Sister    • Brain cancer Brother    • Lung cancer Brother    • Malig Hyperthermia Brother        Social History     Socioeconomic History   • Marital status:      Spouse name: Not on file   • Number of children: Not on file   • Years of education: Not on file   • Highest education level: Not on file   Tobacco Use   • Smoking status: Former Smoker     Types: Cigarettes   • Smokeless tobacco: Never Used   • Tobacco comment: SMOKED SOME AS A TEEN, DENIES ANY HABIT OR ADDICTION   Substance and Sexual Activity   • Alcohol use: No   • Drug use: No   • Sexual activity: Defer           Objective   Physical Exam  Vitals signs and nursing note reviewed.   Constitutional:       Appearance: He is well-developed.   HENT:      Head: Normocephalic and atraumatic.      Right Ear: External ear normal.      Left Ear: External ear normal.      Nose: Nose normal.   Eyes:      General: No scleral icterus.     Conjunctiva/sclera: Conjunctivae normal.      Pupils: Pupils are equal, round, and reactive to light.   Neck:      Musculoskeletal: Normal range of motion.      Thyroid: No thyromegaly.   Cardiovascular:      Rate and Rhythm: Normal rate and regular rhythm.      Heart sounds: Normal heart sounds.   Pulmonary:      Effort: Pulmonary effort is normal. No respiratory distress.      Breath sounds: Normal breath sounds. No wheezing or rales.   Chest:      Chest wall: No tenderness.   Abdominal:      General: Bowel sounds are normal. There is no distension.      Palpations: Abdomen is soft.      Tenderness: There is no abdominal tenderness.    Musculoskeletal: Normal range of motion.   Lymphadenopathy:      Cervical: No cervical adenopathy.   Skin:     General: Skin is warm and dry.   Neurological:      Mental Status: He is alert and oriented to person, place, and time.      Cranial Nerves: No cranial nerve deficit.      Coordination: Coordination normal.      Deep Tendon Reflexes: Reflexes are normal and symmetric. Reflexes normal.   Psychiatric:         Behavior: Behavior normal.         Thought Content: Thought content normal.         Judgment: Judgment normal.         Procedures           ED Course      I discussed the findings with the CAT scan and the urine with the patient and his daughter who is at bedside.  She states that he is had recurrent fungal urinary tract infections.  I advised her that I could not access her find evidence of this in the past medical history.  We are going to give a dose of Rocephin here today she is going to contact his infectious disease doctor Dr. Pelon Espino tomorrow for guidance as to whether they want to continue oral antibiotics or oral antifungals.  He is in no acute distress he does not want to be admitted to the hospital.  He is having no difficulty with stream no obstructive symptoms.                                     MDM  Number of Diagnoses or Management Options  Acute UTI: new and requires workup  Gross hematuria: new and requires workup     Amount and/or Complexity of Data Reviewed  Clinical lab tests: ordered and reviewed  Tests in the radiology section of CPT®: ordered and reviewed  Tests in the medicine section of CPT®: reviewed and ordered  Discuss the patient with other providers: yes    Patient Progress  Patient progress: stable      Final diagnoses:   Gross hematuria   Acute UTI            Jose Antonio Ybarra PA  10/05/20 1119

## 2020-10-05 ENCOUNTER — EPISODE CHANGES (OUTPATIENT)
Dept: CASE MANAGEMENT | Facility: OTHER | Age: 85
End: 2020-10-05

## 2020-10-05 LAB — BACTERIA SPEC AEROBE CULT: NO GROWTH

## 2020-10-07 ENCOUNTER — PATIENT OUTREACH (OUTPATIENT)
Dept: CASE MANAGEMENT | Facility: OTHER | Age: 85
End: 2020-10-07

## 2020-10-07 NOTE — OUTREACH NOTE
"Care Plan Note      Responses   Lifestyle Goals  Medication management, Lower blood sugar, Self monitor blood sugar, Routine follow-up with doctor(s)   Barriers  Disease education   Self Management  Medication Adherence, Home Glucose Monitoring   Annual Wellness Visit:   -- [Scheduled for 10/27/20, reviewed w/ pt]   Care Gaps Addressed  Diabetic A1C, Diabetic Eye Exam, Flu Shot   HbA1c Status  Up to Date-within defined limits   Diabetic Eye Exam Status  Patient will Complete   Flu Shot Status  Patient will Complete   Flu Shot Completion at [facility] or Other  [facility]   Flu Shot Comments  Plans to complete at PCP visit 10/27/20   Specific Disease Process Teaching  Diabetes   Does patient have depression diagnosis?  No   Advanced Directives:  Patient Has   Ed Visits past 12 months:  2 or 3   Hospitalizations past 12 months  1   Medication Adherence  -- [Pt reports granddaughter who is a nurse prepares weekly pill box for pt which he reports is working very well]   Goal Progress  Making Progress Toward Goal(s)        The main concerns and/or symptoms the patient would like to address are: [facility] ED visit 10/4/20 for hematuria, DM. Pt reports to be doing well today and \"feeling good.\" He states his urologist Dr. Romero recommended f/u with a urology clinic at [facility] for evaluation of ureter stent replacement.  Pt completed f/u on Monday and urology is making arrangements to replace stent.  Pt states  urology did not feel he needed to continue cefdinir rx'd at [facility] ED so med was discontinued. Pt denies any further hematuria (states resolved by time of ED eval), denies any fever, abd pain, back pain, n/v. Pt voiced compliance w/ medications. Reports granddaughter who is a nurse prepares weekly pill box.  She is very supportive and active in his healthcare needs. Pt reports grandalejandroughter lives close by should he need anything. States she keeps up with his meds in her phone, appts, etc. Pt reports to be independent in " "ADLs, tends to the home and housekeeping, still drives.    Education/instruction provided by Care Coordinator: Explained role of Ambulatory  and contact information given. ED AVS and recommendations reviewed. Discussed importance of close f/u with providers.  Reviewed signs and sx's that would warrant re-eval and pt voiced understanding.  DM management discussed. Pt reports to continue to monitor glucose daily, has noticed recent elevation in fasting glucose, avg \"around 166.\" Reviewed w/ pt ADA target goals. Encouraged pt continue to log glucose and communicate w/ PCP if glucose remains elevated.  Encouraged pt to take glucose log to PCP appt which he states he always does. Care gaps discussed as noted in care plan. MyChart Active. Advance Directive on file. Pt denies any needs at this time. Encouraged he call RN-ACM as needed.    Follow Up Outreach Due: as needed    Silvia Taylor RN  Ambulatory     10/7/2020, 11:43 EDT    "

## 2020-10-07 NOTE — OUTREACH NOTE
Care Coordination Assessment    Documented/Reviewed By: Silvia Taylor RN Date/time: 10/7/2020 11:39 AM   Assessment completed with: patient  Living arrangement: alone  Support system: children, family (Comment: Granddaughter is a nurse, lives 10 minutes away, very supportive in pt's needs and healthcare)  Type of residence: private residence  Equipment used at home: cane  Communication device: Yes  Bed or wheelchair confined: No  Inadequate nutrition: No  Medication adherence problem: No  Experiencing side effects from current medications: No  History of fall(s) in last 6 months: Yes  Difficulty keeping appointments: No  Family aware of the patient's advance care planning wishes: Yes (Comment: Advance Directive on file)

## 2020-10-13 ENCOUNTER — APPOINTMENT (OUTPATIENT)
Dept: CT IMAGING | Facility: HOSPITAL | Age: 85
End: 2020-10-13

## 2020-10-13 ENCOUNTER — HOSPITAL ENCOUNTER (OUTPATIENT)
Facility: HOSPITAL | Age: 85
Setting detail: OBSERVATION
Discharge: HOME | End: 2020-10-14
Attending: EMERGENCY MEDICINE | Admitting: INTERNAL MEDICINE

## 2020-10-13 ENCOUNTER — APPOINTMENT (OUTPATIENT)
Dept: GENERAL RADIOLOGY | Facility: HOSPITAL | Age: 85
End: 2020-10-13

## 2020-10-13 ENCOUNTER — TELEPHONE (OUTPATIENT)
Dept: FAMILY MEDICINE CLINIC | Facility: CLINIC | Age: 85
End: 2020-10-13

## 2020-10-13 DIAGNOSIS — N18.9 CHRONIC RENAL IMPAIRMENT, UNSPECIFIED CKD STAGE: ICD-10-CM

## 2020-10-13 DIAGNOSIS — N39.0 URINARY TRACT INFECTION IN MALE: ICD-10-CM

## 2020-10-13 DIAGNOSIS — J84.10 PULMONARY FIBROSIS (HCC): ICD-10-CM

## 2020-10-13 DIAGNOSIS — R53.1 WEAKNESS: ICD-10-CM

## 2020-10-13 DIAGNOSIS — R06.00 DYSPNEA, UNSPECIFIED TYPE: Primary | ICD-10-CM

## 2020-10-13 DIAGNOSIS — R00.1 BRADYCARDIA, UNSPECIFIED: ICD-10-CM

## 2020-10-13 DIAGNOSIS — R09.02 HYPOXIA: ICD-10-CM

## 2020-10-13 LAB
ALBUMIN SERPL-MCNC: 3.8 G/DL (ref 3.5–5.2)
ALBUMIN/GLOB SERPL: 1.2 G/DL
ALP SERPL-CCNC: 84 U/L (ref 39–117)
ALT SERPL W P-5'-P-CCNC: 8 U/L (ref 1–41)
ANION GAP SERPL CALCULATED.3IONS-SCNC: 11 MMOL/L (ref 5–15)
AST SERPL-CCNC: 23 U/L (ref 1–40)
BACTERIA UR QL AUTO: ABNORMAL /HPF
BASOPHILS # BLD AUTO: 0.09 10*3/MM3 (ref 0–0.2)
BASOPHILS NFR BLD AUTO: 0.8 % (ref 0–1.5)
BILIRUB SERPL-MCNC: 0.4 MG/DL (ref 0–1.2)
BILIRUB UR QL STRIP: NEGATIVE
BUN SERPL-MCNC: 26 MG/DL (ref 8–23)
BUN/CREAT SERPL: 18.2 (ref 7–25)
CALCIUM SPEC-SCNC: 8.7 MG/DL (ref 8.6–10.5)
CHLORIDE SERPL-SCNC: 107 MMOL/L (ref 98–107)
CLARITY UR: ABNORMAL
CO2 SERPL-SCNC: 23 MMOL/L (ref 22–29)
COLOR UR: YELLOW
CREAT SERPL-MCNC: 1.43 MG/DL (ref 0.76–1.27)
D DIMER PPP FEU-MCNC: 0.59 MCGFEU/ML (ref 0–0.56)
DEPRECATED RDW RBC AUTO: 58.8 FL (ref 37–54)
EOSINOPHIL # BLD AUTO: 0.32 10*3/MM3 (ref 0–0.4)
EOSINOPHIL NFR BLD AUTO: 2.9 % (ref 0.3–6.2)
ERYTHROCYTE [DISTWIDTH] IN BLOOD BY AUTOMATED COUNT: 15.8 % (ref 12.3–15.4)
GFR SERPL CREATININE-BSD FRML MDRD: 47 ML/MIN/1.73
GLOBULIN UR ELPH-MCNC: 3.2 GM/DL
GLUCOSE SERPL-MCNC: 208 MG/DL (ref 65–99)
GLUCOSE UR STRIP-MCNC: ABNORMAL MG/DL
HCT VFR BLD AUTO: 47.4 % (ref 37.5–51)
HGB BLD-MCNC: 14.8 G/DL (ref 13–17.7)
HGB UR QL STRIP.AUTO: ABNORMAL
HOLD SPECIMEN: NORMAL
HOLD SPECIMEN: NORMAL
HYALINE CASTS UR QL AUTO: ABNORMAL /LPF
IMM GRANULOCYTES # BLD AUTO: 0.07 10*3/MM3 (ref 0–0.05)
IMM GRANULOCYTES NFR BLD AUTO: 0.6 % (ref 0–0.5)
KETONES UR QL STRIP: NEGATIVE
LEUKOCYTE ESTERASE UR QL STRIP.AUTO: ABNORMAL
LYMPHOCYTES # BLD AUTO: 2.74 10*3/MM3 (ref 0.7–3.1)
LYMPHOCYTES NFR BLD AUTO: 25.1 % (ref 19.6–45.3)
MCH RBC QN AUTO: 31.4 PG (ref 26.6–33)
MCHC RBC AUTO-ENTMCNC: 31.2 G/DL (ref 31.5–35.7)
MCV RBC AUTO: 100.6 FL (ref 79–97)
MONOCYTES # BLD AUTO: 0.98 10*3/MM3 (ref 0.1–0.9)
MONOCYTES NFR BLD AUTO: 9 % (ref 5–12)
NEUTROPHILS NFR BLD AUTO: 6.71 10*3/MM3 (ref 1.7–7)
NEUTROPHILS NFR BLD AUTO: 61.6 % (ref 42.7–76)
NITRITE UR QL STRIP: NEGATIVE
NRBC BLD AUTO-RTO: 0 /100 WBC (ref 0–0.2)
NT-PROBNP SERPL-MCNC: 323.2 PG/ML (ref 0–1800)
PH UR STRIP.AUTO: 6 [PH] (ref 5–8)
PLATELET # BLD AUTO: 207 10*3/MM3 (ref 140–450)
PMV BLD AUTO: 10.1 FL (ref 6–12)
POTASSIUM SERPL-SCNC: 4.5 MMOL/L (ref 3.5–5.2)
PROT SERPL-MCNC: 7 G/DL (ref 6–8.5)
PROT UR QL STRIP: ABNORMAL
RBC # BLD AUTO: 4.71 10*6/MM3 (ref 4.14–5.8)
RBC # UR: ABNORMAL /HPF
REF LAB TEST METHOD: ABNORMAL
SODIUM SERPL-SCNC: 141 MMOL/L (ref 136–145)
SP GR UR STRIP: 1.02 (ref 1–1.03)
SQUAMOUS #/AREA URNS HPF: ABNORMAL /HPF
TROPONIN T SERPL-MCNC: <0.01 NG/ML (ref 0–0.03)
TSH SERPL DL<=0.05 MIU/L-ACNC: 2.2 UIU/ML (ref 0.27–4.2)
UROBILINOGEN UR QL STRIP: ABNORMAL
WBC # BLD AUTO: 10.91 10*3/MM3 (ref 3.4–10.8)
WBC UR QL AUTO: ABNORMAL /HPF
WHOLE BLOOD HOLD SPECIMEN: NORMAL
WHOLE BLOOD HOLD SPECIMEN: NORMAL
YEAST URNS QL MICRO: ABNORMAL /HPF

## 2020-10-13 PROCEDURE — 71275 CT ANGIOGRAPHY CHEST: CPT

## 2020-10-13 PROCEDURE — 83880 ASSAY OF NATRIURETIC PEPTIDE: CPT | Performed by: EMERGENCY MEDICINE

## 2020-10-13 PROCEDURE — 99285 EMERGENCY DEPT VISIT HI MDM: CPT

## 2020-10-13 PROCEDURE — 87086 URINE CULTURE/COLONY COUNT: CPT | Performed by: FAMILY MEDICINE

## 2020-10-13 PROCEDURE — 99219 PR INITIAL OBSERVATION CARE/DAY 50 MINUTES: CPT | Performed by: FAMILY MEDICINE

## 2020-10-13 PROCEDURE — 85379 FIBRIN DEGRADATION QUANT: CPT | Performed by: NURSE PRACTITIONER

## 2020-10-13 PROCEDURE — 93005 ELECTROCARDIOGRAM TRACING: CPT

## 2020-10-13 PROCEDURE — 84484 ASSAY OF TROPONIN QUANT: CPT | Performed by: EMERGENCY MEDICINE

## 2020-10-13 PROCEDURE — 0 IOPAMIDOL PER 1 ML: Performed by: EMERGENCY MEDICINE

## 2020-10-13 PROCEDURE — 84443 ASSAY THYROID STIM HORMONE: CPT | Performed by: NURSE PRACTITIONER

## 2020-10-13 PROCEDURE — 80053 COMPREHEN METABOLIC PANEL: CPT | Performed by: EMERGENCY MEDICINE

## 2020-10-13 PROCEDURE — 84145 PROCALCITONIN (PCT): CPT | Performed by: FAMILY MEDICINE

## 2020-10-13 PROCEDURE — 71045 X-RAY EXAM CHEST 1 VIEW: CPT

## 2020-10-13 PROCEDURE — 85025 COMPLETE CBC W/AUTO DIFF WBC: CPT

## 2020-10-13 PROCEDURE — 81001 URINALYSIS AUTO W/SCOPE: CPT | Performed by: NURSE PRACTITIONER

## 2020-10-13 PROCEDURE — 93005 ELECTROCARDIOGRAM TRACING: CPT | Performed by: EMERGENCY MEDICINE

## 2020-10-13 RX ORDER — SODIUM CHLORIDE 0.9 % (FLUSH) 0.9 %
10 SYRINGE (ML) INJECTION AS NEEDED
Status: DISCONTINUED | OUTPATIENT
Start: 2020-10-13 | End: 2020-10-14

## 2020-10-13 RX ADMIN — IOPAMIDOL 82 ML: 755 INJECTION, SOLUTION INTRAVENOUS at 22:25

## 2020-10-13 RX ADMIN — SODIUM CHLORIDE 1000 ML: 9 INJECTION, SOLUTION INTRAVENOUS at 21:14

## 2020-10-13 NOTE — TELEPHONE ENCOUNTER
PT DAUGHTER  CALLED TO REQUEST A CALL BACK FROM NURSE IN REGARDS TO PT HEART RATE BEING 36.    PLEASE ADVISE.  CALL BACK:[phone]

## 2020-10-13 NOTE — ED PROVIDER NOTES
Subjective   Forrest Zepeda is an 88-year-old male that presents emergency department for complaints of shortness of breath.  Patient explains that he has had issues with his breathing throughout the summer.  He advises that he has become more short of air over the past week.  Patient reports a decreased heart rate.  Patient's heart rate was in the 40s upon his arrival.  Patient's heart rate at this time is 73.  Patient denies any history of cardiac catheterization, stress test or any evaluation regarding his heart.  Patient explains that he has not had any chest pain but the shortness of breath does increase with exertion.  He denies any nausea, vomiting.  He denies any swelling to his extremities.  Patient reports increasing fatigue and weakness.  Patient advises he was in our emergency department on Sunday for hematuria.  Patient has a stent that was placed 1 year ago.  He advises that he came in to possibly have it removed he was encouraged to follow-up with his urologist.  Patient contacted his urologist and has an appointment now to see a different urologist at  on Thursday.      History provided by:  Patient   used: No    Shortness of Breath  Severity:  Moderate  Timing:  Intermittent  Progression:  Worsening  Context: activity    Relieved by:  Nothing  Worsened by:  Activity and exertion  Associated symptoms: no abdominal pain, no chest pain, no cough, no fever, no sputum production and no vomiting    Risk factors: no hx of PE/DVT, no prolonged immobilization, no recent surgery and no tobacco use        Review of Systems   Constitutional: Negative for chills and fever.   Respiratory: Positive for shortness of breath. Negative for cough and sputum production.    Cardiovascular: Negative for chest pain and leg swelling.   Gastrointestinal: Negative for abdominal pain, nausea and vomiting.   Genitourinary: Positive for hematuria.   Neurological: Positive for weakness. Negative for dizziness  "and light-headedness.   All other systems reviewed and are negative.      Past Medical History:   Diagnosis Date   • Abnormal EKG    • Abnormal PSA     Reported abnormal   • Acute deep vein thrombosis (DVT) of right lower extremity (CMS/[facility]) 08/22/2019   • Acute diverticulitis 2019   • Acute hyperkalemia 08/22/2019   • Acute respiratory failure with hypoxia (CMS/[facility])    • Acute saddle pulmonary embolism with acute cor pulmonale (CMS/[facility]) 08/22/2019   • Acute systolic right heart failure (CMS/[facility]) 08/22/2019   • Arthritis     KNEES,  BUT SINCE HAD REPLACEMENT   • Benign localized hyperplasia of prostate    • Bilateral knee pain    • Cataracts, bilateral    • CKD (chronic kidney disease)    • Diabetic neuropathy (CMS/[facility])    • Diabetic neuropathy (CMS/[facility])    • Disease of thyroid gland     HYPOTHYROIDISM   • Diverticulitis    • Dyslipidemia     H/O   • Family history of malignant hyperthermia     Brother    • Full dentures    • Generalized weakness    • History of gout    • History of hepatitis A vaccination    • History of nephrolithiasis    • History of nuclear stress test     STATES IN THE 1990'S.  \"IT WAS OK\"   • History of osteopenia    • History of recurrent UTI (urinary tract infection)    • Hydronephrosis of left kidney 7/18/2019   • Hydronephrosis with ureteral stricture    • Hyperkalemia     PT UNSURE REGARDING HX OF THIS   • Hyperlipidemia    • Hypertension    • Insomnia    • Malignant hyperthermia due to anesthesia     PER PT REPORT, BROTHER HAD DURING LUNG SURGERY SEVERAL YEARS AGO.  STATED THEY PACKED HIM ON ICE.  STATED HE DID SURVIVE.  PT DENIES ANY PERSONAL HX OF MALIGNANT HYPERTHERMIA HIMSELF, OR ANY ISSUES WITH ANESTHESIA.  STATES TO HIS KNOWLEDGE, NO OTHER FAMILY MEMBER HAS THIS ISSUE EXCEPT FOR HIS BROTHER.   • On supplemental oxygen therapy     [address] [place] [address]   • Other hydronephrosis 8/12/2019    Added automatically from request for surgery [id number]   • Renal insufficiency    • Sepsis (CMS/[facility]) 2019 "   • Shortness of breath     STATES WITH EXERTION, OTHERWISE, DENIES   • Sigmoid diverticulitis without abscess or perforation 8/9/2017   • Skin breakdown     fourth toe right foot noted in 2019 MD D/C note   • Skin ulcer of fourth toe of right foot, limited to breakdown of skin (CMS/[facility]) 7/5/2019   • Stricture of ureter    • Type 2 diabetes mellitus (CMS/[facility])    • Ureteral stricture, left    • UTI (urinary tract infection) 7/18/2019   • Vitamin D deficiency    • Wears glasses        Allergies   Allergen Reactions   • Metformin Diarrhea   • Statins Myalgia       Past Surgical History:   Procedure Laterality Date   • APPENDECTOMY     • CHOLECYSTECTOMY     • CIRCUMCISION N/A 10/23/2019    Procedure: CIRCUMCISIOn-DORSAL SLIT;  Surgeon: Griffin Romero MD;  Location: [facility] OR;  Service: Urology   • COLONOSCOPY     • CYSTOSCOPY URETEROSCOPY Left 2/11/2019    Procedure: URETEROSCOPY WITH STENT EXTRACTION, RPG;  Surgeon: Griffin Romero MD;  Location: [facility] OR;  Service: Urology   • CYSTOSCOPY W/ URETERAL STENT PLACEMENT Left 7/20/2019    Procedure: CYSTOSCOPY, LEFT RETROGRADE PYELOGRAM,  ATTEMPTED LEFT URETERAL STENT INSERTION;  Surgeon: Isaac Flynn MD;  Location:  [facility] OR;  Service: Urology   • EYE SURGERY      CATARACTS REMOVED BILATERALLY   • KNEE ARTHROPLASTY Bilateral    • KNEE ARTHROSCOPY Bilateral    • RENAL ARTERY STENT      SEVERAL TIMES EVERY SINCE 1978   • URETEROSCOPY LASER LITHOTRIPSY WITH STENT INSERTION Left 1/10/2018    Procedure:  cysto, left ureteral stent replacement ;  Surgeon: Griffin Romero MD;  Location: [facility] OR;  Service:    • URETEROSCOPY LASER LITHOTRIPSY WITH STENT INSERTION Left 8/14/2019    Procedure: URETEROSCOPY, STONE REMOVAL WITH STENT INSERTION;  Surgeon: Griffin Romero MD;  Location: [facility] OR;  Service: Urology   • URETEROSCOPY LASER LITHOTRIPSY WITH STENT INSERTION Left 10/23/2019    Procedure: Left URETEROSCOPY WITH STENT INSERTION-LEFT;  Surgeon: Griffin Romero  MD OPAL;  Location: [facility];  Service: Urology       Family History   Problem Relation Age of Onset   • Heart attack Sister    • Brain cancer Sister    • Stroke Sister    • Lung cancer Sister    • Brain cancer Brother    • Lung cancer Brother    • Malig Hyperthermia Brother        Social History     Socioeconomic History   • Marital status:      Spouse name: Not on file   • Number of children: Not on file   • Years of education: Not on file   • Highest education level: Not on file   Tobacco Use   • Smoking status: Former Smoker     Types: Cigarettes   • Smokeless tobacco: Never Used   • Tobacco comment: SMOKED SOME AS A TEEN, DENIES ANY HABIT OR ADDICTION   Substance and Sexual Activity   • Alcohol use: No   • Drug use: No   • Sexual activity: Defer           Objective   Physical Exam  Vitals signs and nursing note reviewed.   Constitutional:       General: He is not in acute distress.     Appearance: Normal appearance. He is well-developed. He is not ill-appearing or toxic-appearing.   HENT:      Head: Normocephalic and atraumatic.      Mouth/Throat:      Mouth: Mucous membranes are moist.   Eyes:      General: Lids are normal.      Extraocular Movements: Extraocular movements intact.      Conjunctiva/sclera: Conjunctivae normal.      Pupils: Pupils are equal, round, and reactive to light.   Neck:      Musculoskeletal: Full passive range of motion without pain and normal range of motion.      Trachea: Trachea normal.   Cardiovascular:      Rate and Rhythm: Regular rhythm. Bradycardia present.      Pulses: Normal pulses.      Heart sounds: Normal heart sounds.   Pulmonary:      Effort: Pulmonary effort is normal. No respiratory distress.      Breath sounds: Normal breath sounds. No decreased breath sounds, wheezing, rhonchi or rales.   Abdominal:      General: Bowel sounds are normal.      Palpations: Abdomen is soft.      Tenderness: There is no abdominal tenderness.   Musculoskeletal: Normal range of  motion.      Right lower leg: He exhibits no tenderness. No edema.      Left lower leg: He exhibits no tenderness. No edema.   Skin:     General: Skin is warm and dry.      Findings: No rash.   Neurological:      Mental Status: He is alert and oriented to person, place, and time.      Cranial Nerves: No cranial nerve deficit.   Psychiatric:         Speech: Speech normal.         Behavior: Behavior normal. Behavior is cooperative.         Procedures           ED Course  ED Course as of Oct 13 2335   Tue Oct 13, 2020   1929 Creatinine(!): 1.43 [KG]   1929 WBC(!): 10.91 [KG]   2024 D-Dimer, Quant(!): 0.59 [KG]   2324 RBC, UA(!): Too Numerous to Count [KG]   2324 WBC, UA(!): Too Numerous to Count [KG]   2324 Pt is currently on Omnicef.     Leukocytes, UA(!): Large (3+) [KG]      ED Course User Index  [KG] Billie Chaparro, JOYCE           Recent Results (from the past 24 hour(s))   Comprehensive Metabolic Panel    Collection Time: 10/13/20  6:44 PM    Specimen: Blood   Result Value Ref Range    Glucose 208 (H) 65 - 99 mg/dL    BUN 26 (H) 8 - 23 mg/dL    Creatinine 1.43 (H) 0.76 - 1.27 mg/dL    Sodium 141 136 - 145 mmol/L    Potassium 4.5 3.5 - 5.2 mmol/L    Chloride 107 98 - 107 mmol/L    CO2 23.0 22.0 - 29.0 mmol/L    Calcium 8.7 8.6 - 10.5 mg/dL    Total Protein 7.0 6.0 - 8.5 g/dL    Albumin 3.80 3.50 - 5.20 g/dL    ALT (SGPT) 8 1 - 41 U/L    AST (SGOT) 23 1 - 40 U/L    Alkaline Phosphatase 84 39 - 117 U/L    Total Bilirubin 0.4 0.0 - 1.2 mg/dL    eGFR Non African Amer 47 (L) >60 mL/min/1.73    Globulin 3.2 gm/dL    A/G Ratio 1.2 g/dL    BUN/Creatinine Ratio 18.2 7.0 - 25.0    Anion Gap 11.0 5.0 - 15.0 mmol/L   BNP    Collection Time: 10/13/20  6:44 PM    Specimen: Blood   Result Value Ref Range    proBNP 323.2 0.0-1,800.0 pg/mL   Troponin    Collection Time: 10/13/20  6:44 PM    Specimen: Blood   Result Value Ref Range    Troponin T <0.010 0.000 - 0.030 ng/mL   Light Blue Top    Collection Time: 10/13/20  6:44 PM    Result Value Ref Range    Extra Tube hold for add-on    Green Top (Gel)    Collection Time: 10/13/20  6:44 PM   Result Value Ref Range    Extra Tube Hold for add-ons.    Lavender Top    Collection Time: 10/13/20  6:44 PM   Result Value Ref Range    Extra Tube hold for add-on    Gold Top - SST    Collection Time: 10/13/20  6:44 PM   Result Value Ref Range    Extra Tube Hold for add-ons.    CBC Auto Differential    Collection Time: 10/13/20  6:44 PM    Specimen: Blood   Result Value Ref Range    WBC 10.91 (H) 3.40 - 10.80 10*3/mm3    RBC 4.71 4.14 - 5.80 10*6/mm3    Hemoglobin 14.8 13.0 - 17.7 g/dL    Hematocrit 47.4 37.5 - 51.0 %    .6 (H) 79.0 - 97.0 fL    MCH 31.4 26.6 - 33.0 pg    MCHC 31.2 (L) 31.5 - 35.7 g/dL    RDW 15.8 (H) 12.3 - 15.4 %    RDW-SD 58.8 (H) 37.0 - 54.0 fl    MPV 10.1 6.0 - 12.0 fL    Platelets 207 140 - 450 10*3/mm3    Neutrophil % 61.6 42.7 - 76.0 %    Lymphocyte % 25.1 19.6 - 45.3 %    Monocyte % 9.0 5.0 - 12.0 %    Eosinophil % 2.9 0.3 - 6.2 %    Basophil % 0.8 0.0 - 1.5 %    Immature Grans % 0.6 (H) 0.0 - 0.5 %    Neutrophils, Absolute 6.71 1.70 - 7.00 10*3/mm3    Lymphocytes, Absolute 2.74 0.70 - 3.10 10*3/mm3    Monocytes, Absolute 0.98 (H) 0.10 - 0.90 10*3/mm3    Eosinophils, Absolute 0.32 0.00 - 0.40 10*3/mm3    Basophils, Absolute 0.09 0.00 - 0.20 10*3/mm3    Immature Grans, Absolute 0.07 (H) 0.00 - 0.05 10*3/mm3    nRBC 0.0 0.0 - 0.2 /100 WBC   TSH    Collection Time: 10/13/20  6:44 PM    Specimen: Blood   Result Value Ref Range    TSH 2.200 0.270 - 4.200 uIU/mL   D-dimer, Quantitative    Collection Time: 10/13/20  6:44 PM    Specimen: Blood   Result Value Ref Range    D-Dimer, Quantitative 0.59 (H) 0.00 - 0.56 MCGFEU/mL   Urinalysis With Microscopic If Indicated (No Culture) - Urine, Clean Catch    Collection Time: 10/13/20  9:14 PM    Specimen: Urine, Clean Catch   Result Value Ref Range    Color, UA Yellow Yellow, Straw    Appearance, UA Turbid (A) Clear    pH, UA 6.0 5.0 -  8.0    Specific Gravity, UA 1.019 1.001 - 1.030    Glucose,  mg/dL (1+) (A) Negative    Ketones, UA Negative Negative    Bilirubin, UA Negative Negative    Blood, UA Large (3+) (A) Negative    Protein,  mg/dL (2+) (A) Negative    Leuk Esterase, UA Large (3+) (A) Negative    Nitrite, UA Negative Negative    Urobilinogen, UA 0.2 E.U./dL 0.2 - 1.0 E.U./dL   Urinalysis, Microscopic Only - Urine, Clean Catch    Collection Time: 10/13/20  9:14 PM    Specimen: Urine, Clean Catch   Result Value Ref Range    RBC, UA Too Numerous to Count (A) None Seen, 0-2 /HPF    WBC, UA Too Numerous to Count (A) None Seen, 0-2 /HPF    Bacteria, UA None Seen None Seen, Trace /HPF    Squamous Epithelial Cells, UA 3-6 (A) None Seen, 0-2 /HPF    Yeast, UA Large/3+ Budding Yeast None Seen /HPF    Hyaline Casts, UA 13-20 0 - 6 /LPF    Methodology Manual Light Microscopy      Note: In addition to lab results from this visit, the labs listed above may include labs taken at another facility or during a different encounter within the last 24 hours. Please correlate lab times with ED admission and discharge times for further clarification of the services performed during this visit.    CT Angiogram Chest   Final Result   1.  No evidence of pulmonary embolism or other acute vascular abnormality within the chest.   2.  Moderate diffuse chronic interstitial pulmonary fibrosis in a UIP pattern. No evidence of superimposed acute pulmonary infiltrate.   3.  Limited images through the uppermost abdomen partially include the upper left kidney, and a left ureteral stent is visible along with probable hydronephrosis on the left.      Signer Name: Richard Delgado MD    Signed: 10/13/2020 10:45 PM    Workstation Name: [id number]     [facility]      XR Chest 1 View   Final Result   No clearly acute cardiopulmonary findings.      No significant change from study of 10/1/2019.      Signer Name: Shalom Jones MD    Signed:  10/13/2020 7:54 PM    Workstation Name: [id number]     [facility] [place]        Vitals:    10/13/20 2030 10/13/20 2130 10/13/20 2200 10/13/20 2300   BP: 152/85 156/78 152/77 169/86   BP Location:       Patient Position:       Pulse: 71 67 69 65   Resp: 22 22     Temp:       SpO2: 96% 94% 94% 96%   Weight:       Height:         Medications   sodium chloride 0.9 % flush 10 mL (has no administration in time range)   sodium chloride 0.9 % bolus 1,000 mL (1,000 mL Intravenous New Bag 10/13/20 2114)   iopamidol (ISOVUE-370) 76 % injection 100 mL (82 mL Intravenous Given 10/13/20 2225)     ECG/EMG Results (last 24 hours)     Procedure Component Value Units Date/Time    ECG 12 Lead [[id number]] Collected: 10/13/20 1838     Updated: 10/13/20 1840        ECG 12 Lead                    COVID-19 RISK SCREEN     1. Has the patient had close contact without PPE with a lab confirmed COVID-19 (+) person or a person under investigation (PUI) for COVID-19 infection?  -- No     2. Has the patient had respiratory symptoms, worsened/new cough and/or SOA, unexplained fever, or sudden loss of smell and/or taste in the past 7 days? --  No    3. Does the patient have baseline higher exposure risk such as working in healthcare field, currently residing in healthcare facility, or ongoing hemodialysis?  --  No                                MDM    Final diagnoses:   Dyspnea, unspecified type   Bradycardia, unspecified   Weakness   Urinary tract infection in male   Chronic renal impairment, unspecified CKD stage            Billie Chaparro, APRN  10/13/20 [id number]

## 2020-10-13 NOTE — TELEPHONE ENCOUNTER
Spoke to pt grand daughter and she said his hr apical was 36. Dr. Lofton advised the pt to go to the ER and the pt grand daughter said he said he did not want to go at this moment that when he gets up walking around it will come back up. Pt grand daughter said she was holding his carvedilol to see if this helps with it coming back up. I advised her to keep in touch if the pt decides to go to the ER.

## 2020-10-14 ENCOUNTER — READMISSION MANAGEMENT (OUTPATIENT)
Dept: CALL CENTER | Facility: HOSPITAL | Age: 85
End: 2020-10-14

## 2020-10-14 ENCOUNTER — APPOINTMENT (OUTPATIENT)
Dept: CARDIOLOGY | Facility: HOSPITAL | Age: 85
End: 2020-10-14

## 2020-10-14 VITALS
OXYGEN SATURATION: 94 % | DIASTOLIC BLOOD PRESSURE: 77 MMHG | HEIGHT: 69 IN | BODY MASS INDEX: 31.99 KG/M2 | HEART RATE: 67 BPM | RESPIRATION RATE: 16 BRPM | TEMPERATURE: 98.8 F | SYSTOLIC BLOOD PRESSURE: 173 MMHG | WEIGHT: 216 LBS

## 2020-10-14 PROBLEM — B37.49 CANDIDA UTI: Status: ACTIVE | Noted: 2020-10-14

## 2020-10-14 PROBLEM — J84.10 PULMONARY FIBROSIS: Status: ACTIVE | Noted: 2020-10-14

## 2020-10-14 PROBLEM — R00.1 SYMPTOMATIC BRADYCARDIA: Status: ACTIVE | Noted: 2020-10-14

## 2020-10-14 LAB
D-LACTATE SERPL-SCNC: 1 MMOL/L (ref 0.5–2)
GLUCOSE BLDC GLUCOMTR-MCNC: 136 MG/DL (ref 70–130)
GLUCOSE BLDC GLUCOMTR-MCNC: 162 MG/DL (ref 70–130)
HBA1C MFR BLD: 7.5 % (ref 4.8–5.6)
PROCALCITONIN SERPL-MCNC: 0.09 NG/ML (ref 0–0.25)
SARS-COV-2 RNA RESP QL NAA+PROBE: NOT DETECTED

## 2020-10-14 PROCEDURE — 25010000002 CEFTRIAXONE PER 250 MG: Performed by: FAMILY MEDICINE

## 2020-10-14 PROCEDURE — 94799 UNLISTED PULMONARY SVC/PX: CPT

## 2020-10-14 PROCEDURE — 99217 PR OBSERVATION CARE DISCHARGE MANAGEMENT: CPT | Performed by: INTERNAL MEDICINE

## 2020-10-14 PROCEDURE — 83605 ASSAY OF LACTIC ACID: CPT | Performed by: FAMILY MEDICINE

## 2020-10-14 PROCEDURE — G0378 HOSPITAL OBSERVATION PER HR: HCPCS

## 2020-10-14 PROCEDURE — 63710000001 INSULIN DETEMIR PER 5 UNITS: Performed by: FAMILY MEDICINE

## 2020-10-14 PROCEDURE — 94640 AIRWAY INHALATION TREATMENT: CPT

## 2020-10-14 PROCEDURE — 82962 GLUCOSE BLOOD TEST: CPT

## 2020-10-14 PROCEDURE — 63710000001 INSULIN LISPRO (HUMAN) PER 5 UNITS: Performed by: FAMILY MEDICINE

## 2020-10-14 PROCEDURE — 87635 SARS-COV-2 COVID-19 AMP PRB: CPT | Performed by: INTERNAL MEDICINE

## 2020-10-14 PROCEDURE — 97165 OT EVAL LOW COMPLEX 30 MIN: CPT

## 2020-10-14 PROCEDURE — 97161 PT EVAL LOW COMPLEX 20 MIN: CPT

## 2020-10-14 PROCEDURE — 83036 HEMOGLOBIN GLYCOSYLATED A1C: CPT | Performed by: FAMILY MEDICINE

## 2020-10-14 RX ORDER — ALLOPURINOL 300 MG/1
300 TABLET ORAL NIGHTLY
Status: DISCONTINUED | OUTPATIENT
Start: 2020-10-14 | End: 2020-10-14 | Stop reason: HOSPADM

## 2020-10-14 RX ORDER — DEXTROSE MONOHYDRATE 25 G/50ML
25 INJECTION, SOLUTION INTRAVENOUS
Status: DISCONTINUED | OUTPATIENT
Start: 2020-10-14 | End: 2020-10-14 | Stop reason: HOSPADM

## 2020-10-14 RX ORDER — FINASTERIDE 5 MG/1
5 TABLET, FILM COATED ORAL DAILY
Status: DISCONTINUED | OUTPATIENT
Start: 2020-10-14 | End: 2020-10-14 | Stop reason: HOSPADM

## 2020-10-14 RX ORDER — TAMSULOSIN HYDROCHLORIDE 0.4 MG/1
0.4 CAPSULE ORAL NIGHTLY
Status: DISCONTINUED | OUTPATIENT
Start: 2020-10-14 | End: 2020-10-14 | Stop reason: HOSPADM

## 2020-10-14 RX ORDER — FLUCONAZOLE 100 MG/1
100 TABLET ORAL EVERY 24 HOURS
Status: DISCONTINUED | OUTPATIENT
Start: 2020-10-14 | End: 2020-10-14

## 2020-10-14 RX ORDER — SODIUM CHLORIDE 0.9 % (FLUSH) 0.9 %
10 SYRINGE (ML) INJECTION AS NEEDED
Status: DISCONTINUED | OUTPATIENT
Start: 2020-10-14 | End: 2020-10-14 | Stop reason: HOSPADM

## 2020-10-14 RX ORDER — CLOPIDOGREL BISULFATE 75 MG/1
75 TABLET ORAL DAILY
Status: DISCONTINUED | OUTPATIENT
Start: 2020-10-14 | End: 2020-10-14 | Stop reason: HOSPADM

## 2020-10-14 RX ORDER — IPRATROPIUM BROMIDE AND ALBUTEROL SULFATE 2.5; .5 MG/3ML; MG/3ML
3 SOLUTION RESPIRATORY (INHALATION)
Status: DISCONTINUED | OUTPATIENT
Start: 2020-10-14 | End: 2020-10-14 | Stop reason: HOSPADM

## 2020-10-14 RX ORDER — LEVOTHYROXINE SODIUM 0.05 MG/1
50 TABLET ORAL DAILY
Status: DISCONTINUED | OUTPATIENT
Start: 2020-10-14 | End: 2020-10-14 | Stop reason: HOSPADM

## 2020-10-14 RX ORDER — NICOTINE POLACRILEX 4 MG
15 LOZENGE BUCCAL
Status: DISCONTINUED | OUTPATIENT
Start: 2020-10-14 | End: 2020-10-14 | Stop reason: HOSPADM

## 2020-10-14 RX ORDER — IPRATROPIUM BROMIDE AND ALBUTEROL SULFATE 2.5; .5 MG/3ML; MG/3ML
3 SOLUTION RESPIRATORY (INHALATION)
Status: DISCONTINUED | OUTPATIENT
Start: 2020-10-14 | End: 2020-10-14

## 2020-10-14 RX ORDER — POLYETHYLENE GLYCOL 3350 17 G/17G
17 POWDER, FOR SOLUTION ORAL DAILY
Status: DISCONTINUED | OUTPATIENT
Start: 2020-10-14 | End: 2020-10-14 | Stop reason: HOSPADM

## 2020-10-14 RX ORDER — BUDESONIDE 0.5 MG/2ML
0.5 INHALANT ORAL
Status: DISCONTINUED | OUTPATIENT
Start: 2020-10-14 | End: 2020-10-14 | Stop reason: HOSPADM

## 2020-10-14 RX ORDER — ACETAMINOPHEN 325 MG/1
650 TABLET ORAL EVERY 4 HOURS PRN
Status: DISCONTINUED | OUTPATIENT
Start: 2020-10-14 | End: 2020-10-14 | Stop reason: HOSPADM

## 2020-10-14 RX ORDER — SODIUM CHLORIDE 0.9 % (FLUSH) 0.9 %
10 SYRINGE (ML) INJECTION EVERY 12 HOURS SCHEDULED
Status: DISCONTINUED | OUTPATIENT
Start: 2020-10-14 | End: 2020-10-14 | Stop reason: HOSPADM

## 2020-10-14 RX ADMIN — POLYETHYLENE GLYCOL 3350 17 G: 17 POWDER, FOR SOLUTION ORAL at 09:21

## 2020-10-14 RX ADMIN — BUDESONIDE 0.5 MG: 0.5 INHALANT RESPIRATORY (INHALATION) at 07:23

## 2020-10-14 RX ADMIN — IPRATROPIUM BROMIDE AND ALBUTEROL SULFATE 3 ML: 2.5; .5 SOLUTION RESPIRATORY (INHALATION) at 07:23

## 2020-10-14 RX ADMIN — SODIUM CHLORIDE, PRESERVATIVE FREE 10 ML: 5 INJECTION INTRAVENOUS at 09:21

## 2020-10-14 RX ADMIN — ACETAMINOPHEN 650 MG: 325 TABLET, FILM COATED ORAL at 10:59

## 2020-10-14 RX ADMIN — FINASTERIDE 5 MG: 5 TABLET, FILM COATED ORAL at 09:21

## 2020-10-14 RX ADMIN — INSULIN DETEMIR 10 UNITS: 100 INJECTION, SOLUTION SUBCUTANEOUS at 09:31

## 2020-10-14 RX ADMIN — IPRATROPIUM BROMIDE AND ALBUTEROL SULFATE 3 ML: 2.5; .5 SOLUTION RESPIRATORY (INHALATION) at 12:11

## 2020-10-14 RX ADMIN — FLUCONAZOLE 100 MG: 100 TABLET ORAL at 09:21

## 2020-10-14 RX ADMIN — SODIUM CHLORIDE, PRESERVATIVE FREE 10 ML: 5 INJECTION INTRAVENOUS at 05:18

## 2020-10-14 RX ADMIN — CEFTRIAXONE 1 G: 1 INJECTION, POWDER, FOR SOLUTION INTRAMUSCULAR; INTRAVENOUS at 05:17

## 2020-10-14 RX ADMIN — INSULIN LISPRO 2 UNITS: 100 INJECTION, SOLUTION INTRAVENOUS; SUBCUTANEOUS at 13:19

## 2020-10-14 RX ADMIN — CLOPIDOGREL BISULFATE 75 MG: 75 TABLET ORAL at 09:21

## 2020-10-14 RX ADMIN — LEVOTHYROXINE SODIUM 50 MCG: 50 TABLET ORAL at 05:21

## 2020-10-14 NOTE — H&P
[facility] Medicine Services  HISTORY AND PHYSICAL    Patient Name: Forrest Zepeda  : 1932  MRN: [id number]  Primary Care Physician: Ryne Lofton MD  Date of admission: 10/13/2020      Subjective   Subjective     Chief Complaint:  Shortness of breath     HPI:  Forrest Zepeda is a 88 y.o. male with type 2 DM, HTN, CKD stage 3, hypothyroidsim, urethral stricture with indwelling urethral stent, history of pulmonary embolus, history of malignant hyperthermia under anesthesia, hyperlipidemia, history of saddle emboli and 2019.  Patient presented to [facility] ED due to worsening shortness of breath.  Patient states he has been becoming more short of breath for the past 6 months and then last night his shortness of breath became severe.  States he was short of breath today and his granddaughter who is a nurse prompted him to come to the emergency department.  Patient also reports hypoxia at home with pulse ox at home noted to be in the 80s.  He states his granddaughter had checked his pulse several times throughout the day and his heart rate was in 30-40 bpm.  Patient denied any chest pain denied any coughing or wheezing denied any edema.  He complains however increased fatigue.    Patient was recently diagnosed with a UTI and given Omnicef to take for the UTI by the ED.  Patient was subsequently referred to [facility] for plans of stent replacement.  Patient states he was advised by [facility] to not take the Omnicef.  He has an appointment Thursday to establish care with [facility] for stent removal and replacement.  He was previously followed by Dr. Romero at [facility] who last changed his ureteral stent on 10/23/2019.        Current COVID Risks are:  [] Fever []  Cough [x] Shortness of breath [x] Fatigue [] Change in taste or smell    [] Exposure to COVID positive patient  [] High risk facility   []  NONE     Review of Systems   Constitutional: Positive for  appetite change and fatigue. Negative for chills and fever.   HENT: Negative for congestion, ear pain, rhinorrhea and sore throat.    Eyes: Negative for visual disturbance.   Respiratory: Positive for shortness of breath. Negative for cough, chest tightness and wheezing.    Cardiovascular: Negative for chest pain, palpitations and leg swelling.   Gastrointestinal: Negative for abdominal pain, blood in stool, constipation, diarrhea, nausea and vomiting.   Genitourinary: Positive for hematuria (stent placed 1 year ago, seeing urology at  ). Negative for dysuria.        New blood in urine today    Musculoskeletal: Negative for back pain.   Skin: Negative.    Neurological: Positive for weakness. Negative for dizziness, syncope, light-headedness and headaches.        [place] throbbing in his head   Psychiatric/Behavioral: Negative for dysphoric mood and sleep disturbance. The patient is not nervous/anxious.         All other systems reviewed and are negative.     Personal History     Past Medical History:   Diagnosis Date   • Abnormal EKG    • Abnormal PSA     Reported abnormal   • Acute deep vein thrombosis (DVT) of right lower extremity (CMS/HCC) 08/22/2019   • Acute diverticulitis 2019   • Acute hyperkalemia 08/22/2019   • Acute respiratory failure with hypoxia (CMS/HCC)    • Acute saddle pulmonary embolism with acute cor pulmonale (CMS/HCC) 08/22/2019   • Acute systolic right heart failure (CMS/HCC) 08/22/2019   • Arthritis     KNEES,  BUT SINCE HAD REPLACEMENT   • Benign localized hyperplasia of prostate    • Bilateral knee pain    • Cataracts, bilateral    • CKD (chronic kidney disease)    • Diabetic neuropathy (CMS/HCC)    • Diabetic neuropathy (CMS/HCC)    • Disease of thyroid gland     HYPOTHYROIDISM   • Diverticulitis    • Dyslipidemia     H/O   • Family history of malignant hyperthermia     Brother    • Full dentures    • Generalized weakness    • History of gout    • History of hepatitis A vaccination    •  "History of nephrolithiasis    • History of nuclear stress test     STATES IN THE 1990'S.  \"IT WAS OK\"   • History of osteopenia    • History of recurrent UTI (urinary tract infection)    • Hydronephrosis of left kidney 7/18/2019   • Hydronephrosis with ureteral stricture    • Hyperkalemia     PT UNSURE REGARDING HX OF THIS   • Hyperlipidemia    • Hypertension    • Insomnia    • Malignant hyperthermia due to anesthesia     PER PT REPORT, BROTHER HAD DURING LUNG SURGERY SEVERAL YEARS AGO.  STATED THEY PACKED HIM ON ICE.  STATED HE DID SURVIVE.  PT DENIES ANY PERSONAL HX OF MALIGNANT HYPERTHERMIA HIMSELF, OR ANY ISSUES WITH ANESTHESIA.  STATES TO HIS KNOWLEDGE, NO OTHER FAMILY MEMBER HAS THIS ISSUE EXCEPT FOR HIS BROTHER.   • On supplemental oxygen therapy     2L [place]   • Other hydronephrosis 8/12/2019    Added automatically from request for surgery [id number]   • Renal insufficiency    • Sepsis (CMS/HCC) 2019   • Shortness of breath     STATES WITH EXERTION, OTHERWISE, DENIES   • Sigmoid diverticulitis without abscess or perforation 8/9/2017   • Skin breakdown     fourth toe right foot noted in 2019 MD D/C note   • Skin ulcer of fourth toe of right foot, limited to breakdown of skin (CMS/HCC) 7/5/2019   • Stricture of ureter    • Type 2 diabetes mellitus (CMS/HCC)    • Ureteral stricture, left    • UTI (urinary tract infection) 7/18/2019   • Vitamin D deficiency    • Wears glasses        Past Surgical History:   Procedure Laterality Date   • APPENDECTOMY     • CHOLECYSTECTOMY     • CIRCUMCISION N/A 10/23/2019    Procedure: CIRCUMCISIOn-DORSAL SLIT;  Surgeon: Griffin Romero MD;  Location: [facility] OR;  Service: Urology   • COLONOSCOPY     • CYSTOSCOPY URETEROSCOPY Left 2/11/2019    Procedure: URETEROSCOPY WITH STENT EXTRACTION, RPG;  Surgeon: Griffin Romero MD;  Location: [facility] OR;  Service: Urology   • CYSTOSCOPY W/ URETERAL STENT PLACEMENT Left 7/20/2019    Procedure: CYSTOSCOPY, LEFT RETROGRADE PYELOGRAM,  " ATTEMPTED LEFT URETERAL STENT INSERTION;  Surgeon: Isaac Flynn MD;  Location: [facility] OR;  Service: Urology   • EYE SURGERY      CATARACTS REMOVED BILATERALLY   • KNEE ARTHROPLASTY Bilateral    • KNEE ARTHROSCOPY Bilateral    • RENAL ARTERY STENT      SEVERAL TIMES EVERY SINCE 1978   • URETEROSCOPY LASER LITHOTRIPSY WITH STENT INSERTION Left 1/10/2018    Procedure:  cysto, left ureteral stent replacement ;  Surgeon: Griffin Romero MD;  Location: [facility] OR;  Service:    • URETEROSCOPY LASER LITHOTRIPSY WITH STENT INSERTION Left 8/14/2019    Procedure: URETEROSCOPY, STONE REMOVAL WITH STENT INSERTION;  Surgeon: Griffin Romero MD;  Location: [facility] OR;  Service: Urology   • URETEROSCOPY LASER LITHOTRIPSY WITH STENT INSERTION Left 10/23/2019    Procedure: Left URETEROSCOPY WITH STENT INSERTION-LEFT;  Surgeon: Griffin Romero MD;  Location: [facility] OR;  Service: Urology       Family History: family history includes Brain cancer in his brother and sister; Heart attack in his sister; Lung cancer in his brother and sister; Malig Hyperthermia in his brother; Stroke in his sister. Otherwise pertinent FHx was reviewed and unremarkable.     Social History:  reports that he has quit smoking. His smoking use included cigarettes. He has never used smokeless tobacco. He reports that he does not drink alcohol or use drugs.  Social History     Social History Narrative   • Not on file       Medications:  Available home medication information reviewed.    No current facility-administered medications on file prior to encounter.      Current Outpatient Medications on File Prior to Encounter   Medication Sig Dispense Refill   • allopurinol (ZYLOPRIM) 300 MG tablet TAKE ONE TABLET BY MOUTH ONCE NIGHTLY 90 tablet 0   • carvedilol (COREG) 6.25 MG tablet Take 1 tablet by mouth 2 (Two) Times a Day With Meals. 180 tablet 3   • cefdinir (OMNICEF) 300 MG capsule Take 1 capsule by mouth 2 (Two) Times a Day. 20 capsule 0   •  clopidogrel (PLAVIX) 75 MG tablet Take 1 tablet by mouth Daily. Start on 2/1/20, after completing course of eliquis 90 tablet 3   • desoximetasone (TOPICORT) 0.25 % ointment Apply  topically to the appropriate area as directed 2 (Two) Times a Day. 60 g 5   • diphenhydrAMINE-acetaminophen (TYLENOL PM)  MG tablet per tablet Take 1 tablet by mouth Every Night.     • finasteride (PROSCAR) 5 MG tablet Take 1 tablet by mouth Daily. 90 tablet 3   • fluconazole (DIFLUCAN) 150 MG tablet Take 1 tablet by mouth Daily. 10 tablet 0   • glucose blood (TRUE METRIX BLOOD GLUCOSE TEST) test strip Use once a day as instructed 100 each 6   • Insulin Glargine, 1 Unit Dial, (Toujeo SoloStar) 300 UNIT/ML solution pen-injector injection Inject 30 Units under the skin into the appropriate area as directed Daily. inject 30 units under the skin daily 3 pen 3   • lactobacillus acidophilus (RISAQUAD) capsule capsule Take 1 capsule by mouth Daily. 30 capsule 0   • levothyroxine (SYNTHROID, LEVOTHROID) 50 MCG tablet Take 1 tablet by mouth Daily. 90 tablet 3   • Multiple Vitamins-Minerals (OCUVITE ADULT 50+ PO) Take 1 capsule by mouth Daily.     • NOVOFINE 32G X 6 MM misc USE ONE DAILY 100 each 3   • polyethylene glycol (MIRALAX) pack packet Take 17 g by mouth Daily.     • silodosin (RAPAFLO) 8 MG capsule capsule Take 1 capsule by mouth Daily With Breakfast. 90 capsule 3   • SITagliptin (JANUVIA) 100 MG tablet Take 1 tablet by mouth Daily. 90 tablet 3   • Testosterone Cypionate (Depo-Testosterone) 200 MG/ML injection Inject 2 mL into the appropriate muscle as directed by prescriber Every 14 (Fourteen) Days. 4 mL 2         Allergies   Allergen Reactions   • Metformin Diarrhea   • Statins Myalgia       Objective   Objective     Vital Signs:   Temp:  [97.8 °F (36.6 °C)] 97.8 °F (36.6 °C)  Heart Rate:  [42-72] 71  Resp:  [16-22] 22  BP: (145-170)/(68-89) 145/81        Physical Exam  Vitals signs and nursing note reviewed.   Constitutional:        General: He is not in acute distress.     Appearance: He is well-developed.   HENT:      Head: Atraumatic.      Right Ear: External ear normal.      Left Ear: External ear normal.   Eyes:      Conjunctiva/sclera: Conjunctivae normal.   Neck:      Musculoskeletal: Normal range of motion.   Cardiovascular:      Rate and Rhythm: Normal rate and regular rhythm.      Heart sounds: No murmur.   Pulmonary:      Breath sounds: Rales (dry rales in bases bilaterally ) present. No wheezing.   Abdominal:      General: Bowel sounds are normal.      Palpations: Abdomen is soft.      Tenderness: There is no abdominal tenderness.   Musculoskeletal:      Right lower leg: No edema.      Left lower leg: No edema.   Skin:     Findings: No rash.   Neurological:      Mental Status: He is alert and oriented to person, place, and time.   Psychiatric:         Behavior: Behavior normal.            Results Reviewed:  I have personally reviewed most recent indicated data and agree with findings including:  [x]  Laboratory  [x]  Radiology  [x]  EKG/Telemetry  []  Pathology  []  Cardiac/Vascular Studies  []  Old records  []  Other:  Most pertinent findings include: CT chest left ureteral stent with probable hydronephrosis. IPF noted on CT of chest.   CMP noted 1.43, BUN 26, glucose 208.  UA   D-Dimer-0.59, CBC noted WBC of 10.91, h/h 14.8/47.4, .     Results from last 7 days   Lab Units 10/13/20  1844   WBC 10*3/mm3 10.91*   HEMOGLOBIN g/dL 14.8   HEMATOCRIT % 47.4   PLATELETS 10*3/mm3 207     Results from last 7 days   Lab Units 10/13/20  1844   SODIUM mmol/L 141   POTASSIUM mmol/L 4.5   CHLORIDE mmol/L 107   CO2 mmol/L 23.0   BUN mg/dL 26*   CREATININE mg/dL 1.43*   GLUCOSE mg/dL 208*   CALCIUM mg/dL 8.7   ALT (SGPT) U/L 8   AST (SGOT) U/L 23   TROPONIN T ng/mL <0.010   PROBNP pg/mL 323.2     Estimated Creatinine Clearance: 40.7 mL/min (A) (by C-G formula based on SCr of 1.43 mg/dL (H)).  Brief Urine Lab Results  (Last result in the  past 365 days)      Color   Clarity   Blood   Leuk Est   Nitrite   Protein   CREAT   Urine HCG        10/13/20 2114 Yellow Turbid Large (3+) Large (3+) Negative 100 mg/dL (2+)             Imaging Results (Last 24 Hours)     Procedure Component Value Units Date/Time    CT Angiogram Chest [[id number]] Collected: 10/13/20 2245     Updated: 10/13/20 2247    Narrative:      CT CHEST WITH CONTRAST, PE PROTOCOL, 10/13/2020    HISTORY:  88-year-old male in the ED with shortness of air over the last several months, but worsening symptoms this week. Bradycardia.    TECHNIQUE:  CT examination of the chest with IV contrast. CTA MIP multiplanar pulmonary artery images were reformatted with 3-D postprocessing. Radiation dose reduction techniques included automated exposure control. Radiation audit for CT and nuclear cardiology  exams in the last 12 months: 3.    FINDINGS:  No pulmonary embolism is demonstrated. Thoracic aorta is normal in caliber with no aneurysm or dissection. Mild cardiomegaly, but no pericardial effusion.    Lung images show moderate diffuse chronic interstitial pulmonary fibrosis in a peripheral distribution throughout both lungs with a slight basilar predominance. There is a component of honeycomb fibrosis within the lung bases. The findings are compatible  with pulmonary fibrosis in a UIP pattern.    No visible superimposed acute airspace consolidation, endobronchial occlusion, pulmonary nodularity or pleural effusion. Trachea and central bronchi are patent. No mass or adenopathy within the chest. No hiatal hernia or thoracic esophageal dilatation.      Impression:      1.  No evidence of pulmonary embolism or other acute vascular abnormality within the chest.  2.  Moderate diffuse chronic interstitial pulmonary fibrosis in a UIP pattern. No evidence of superimposed acute pulmonary infiltrate.  3.  Limited images through the uppermost abdomen partially include the upper left kidney, and a left ureteral stent  is visible along with probable hydronephrosis on the left.    Signer Name: Richard Delgado MD   Signed: 10/13/2020 10:45 PM   Workstation Name: [email or web address]    [facility]    XR Chest 1 View [[id number]] Collected: 10/13/20 1954     Updated: 10/13/20 1956    Narrative:      CR Chest 1 Vw    INDICATION:   Shortness of breath protocol     COMPARISON:    October 1 2819    FINDINGS:  Single portable AP view(s) of the chest.  The heart and mediastinal contours are normal. The lungs are free of acute infiltrates. Chronic interstitial changes redemonstrated. No pneumothorax or pleural effusion.      Impression:      No clearly acute cardiopulmonary findings.    No significant change from study of 10/1/2019.    Signer Name: Shalom Jones MD   Signed: 10/13/2020 7:54 PM   Workstation Name: [id number]    [facility]        Results for orders placed during the hospital encounter of 08/22/19   Adult Transthoracic Echo Limited W/ Cont if Necessary Per Protocol    Narrative · This was a limited echocardiogram.  · Left ventricular systolic function is low normal. Estimated EF = 50%.  · Left ventricular wall thickness is consistent with mild concentric   hypertrophy.  · Right ventricular cavity is moderately dilated.  · Severely reduced right ventricular systolic function noted.          Assessment/Plan   Assessment & Plan     Active Hospital Problems    Diagnosis POA   • **Symptomatic bradycardia [R00.1] Unknown   • Pulmonary fibrosis (CMS/HCC) [J84.10] Unknown   • [place] UTI [B37.49] Unknown   • Dyspnea [R06.00] Yes   • CKD (chronic kidney disease) stage 3, GFR 30-59 ml/min [N18.30] Yes   • Uncontrolled type 2 diabetes mellitus (CMS/HCC) [E11.65] Yes   • Hypertension [I10] Yes       PLAN:   -Admit patient to telemetry-symptomatic sinus bradycardia will hold Coreg for now.  Get 2D echocardiogram in a.m. we will also obtain Lexar covid testing.  -UTI-with budding yeast noted on  UA. we will start Diflucan. Will send urine for culture, yeast noted on UA. Will send for culture as well.   -Hematuria-secondary to Plavix and indwelling ureteral stent.  Patient has an appointment to see urology this week at the [facility].  -Pulmonary fibrosis-this is possibly new diagnosis for the patient.  Pulmonary fibrosis noted on CT scan of the chest.  Patient would likely benefit from a referral to pulmonologist as outpatient.  We will also initiate trial of Pulmicort nebs and duo nebs to see if patient benefits from this.  -History of pulmonary embolus-patient states he is currently only on Plavix.  Patient had been started on Eliquis in August 2019 after diagnosis of pulmonary embolus.  -T2DM- will cover with SSI and basal insulin.     DVT prophylaxis:  SCDs      CODE STATUS:    Code Status and Medical Interventions:   Ordered at: 10/14/20 0241     Level Of Support Discussed With:    Patient     Code Status:    CPR     Medical Interventions (Level of Support Prior to Arrest):    Full       Admission Status:  I believe this patient meets INPATIENT status due to symptomatic bradycardia and right sided heart failure.  I feel patient’s risk for adverse outcomes and need for care warrant INPATIENT evaluation and I predict the patient’s care encounter to likely last beyond 2 midnights.      Dorota Morton, DO  10/14/20

## 2020-10-14 NOTE — DISCHARGE SUMMARY
[facility] Medicine Services  DISCHARGE SUMMARY    Patient Name: Forrest Zepeda  : 1932  MRN: [id number]    Date of Admission: 10/13/2020  6:56 PM  Date of Discharge:  10/14/2020  Primary Care Physician: Ryne Lofton MD    Consults     No orders found for last 30 day(s).          Hospital Course     Presenting Problem:   Dyspnea, unspecified type [R06.00]  Dyspnea, unspecified type [R06.00]    Active Hospital Problems    Diagnosis  POA   • **Symptomatic bradycardia [R00.1]  Unknown   • Pulmonary fibrosis (CMS/HCC) [J84.10]  Unknown   • Candida UTI [B37.49]  Unknown   • Dyspnea [R06.00]  Yes   • CKD (chronic kidney disease) stage 3, GFR 30-59 ml/min [N18.30]  Yes   • Uncontrolled type 2 diabetes mellitus (CMS/HCC) [E11.65]  Yes   • Hypertension [I10]  Yes      Resolved Hospital Problems   No resolved problems to display.      ---------final diagnses---------  Pulmonary fibrosis changes on CT scan  Hypoxia (due to above)  Sinus Bradycardia, resolved    -stopped coreg; tsh ok  Hx of previous Pulmonary Embolism (completed anticoagulation)   -ct angio chest negative for PE this admission  Hx or previous Transthoracic Echo w/ corpulmonale (2019)  Recurrent fungal UTI's, most recently growing candida globrata from 10/4/2020 culture   -typically follows w/ Dr. Espino of [facility]  Hx of left ureteral stricture, requiring yearly ureteral stent changes (last stent change 10/2019)   -previously seen by Dr. Romero in [place], who apparently is no longer performing this procedure, and patient referred to  urology w/ appointment planned tomorrow 10/15/2020  -------------------------------      Hospital Course:  Forrest Zepeda is a 88 y.o. male w/ hx left ureteral stent requiring yearly changes, formerly followed by Dr. Romero in [place]. However, patient tells me that Dr. Romero is no longer able to perform this procedure and has thus referred patient to [facility], as (per patient &  "granddaughter) multiple other urologists have had great difficulty changing this stent. Patient has been followed by [facility] (Dr. Martín Espino) as well for previous uti's including candida globrata. Patient also w/ previous hx of PE, apparently completed anticoagulation, and now is on plavix (as cannot tolerate plavix per granddaughter) for secondary prevention. Patient was recently seen in our ED on 10/4/20 for hematuria. At that time, urine culture obtained and given rocephin and script for omnicef. Apparently urine cleared up shortly after rocephin shot and never filled the omnicef. Denies flank pain, fever, or dysuria and no further hematuria. In the meantime the urine culture from that appointment (10/4) grew candida globrata.   On this occasion came in due to fatigue, dyspnea. Was noted to be bradycardic (sinus) in ED. Troponin negative and ekg no ischemic changes. Ct angio chest negative for PE or acute infiltrate but did have appearance of pulmonary fibrosis. procalcitonin negative. tsh normal. Heart rate normalized after stopping coreg. Patient has no wheezing, does desaturate w/ ambulation (sats 87%). Discussed staying in hospital and repeating echo, consulting pulmonary but patient pleasantly adamant about going home since he needs to make tomorrow's  urology appointment, and patient feels better. Will discharge on home oxygen, follow up w/ [facility] as outpatient and strict instructions to go to nearest ED if develops worsening dyspnea. Also instructed to contact Dr. Espino w/ fever or urinary complaints as could warrant treatment of candica globrata and patient voices understanding and I also discussed w/ Dr. Espino about this plan.      Discharge Follow Up Recommendations for outpatient labs/diagnostics:  pcp Friday 10/16/20  [facility] 1st available  [facility] tomorrow 10/15/20 as scheduled    Day of Discharge     HPI:   Feels fine. Wants to go home. Breathing \"fine\". No fever " or productive cough. No flank pain, no dysuria, no abd pain, no hematuria    Review of Systems  No headache.     Vital Signs:   Temp:  [97.8 °F (36.6 °C)-98.3 °F (36.8 °C)] 98.1 °F (36.7 °C)  Heart Rate:  [42-75] 66  Resp:  [16-22] 20  BP: (145-170)/(66-89) 162/66     Physical Exam:  Constitutional:Alert, oriented x 3, sitting in chair comfortably  Psych:Normal/appropriate affect  HEENT:Ncat, oroph clear, nasal canula in place  Neck: neck supple, full range of motion  Neuro: Face symmetric, speech clear, equal , moves all extremities  Cardiac: Rrr; No pretibial pitting edema  Resp: Ctab, normal effort  GI: abd soft, nontender, obese, no c.v.a. tenderness  Skin: No extremity rash  Musculoskeletal/extremities: no cyanosis extremities; no significant ankle edema        Pertinent  and/or Most Recent Results     Results from last 7 days   Lab Units 10/13/20  1844   WBC 10*3/mm3 10.91*   HEMOGLOBIN g/dL 14.8   HEMATOCRIT % 47.4   PLATELETS 10*3/mm3 207   SODIUM mmol/L 141   POTASSIUM mmol/L 4.5   CHLORIDE mmol/L 107   CO2 mmol/L 23.0   BUN mg/dL 26*   CREATININE mg/dL 1.43*   GLUCOSE mg/dL 208*   CALCIUM mg/dL 8.7     Results from last 7 days   Lab Units 10/13/20  1844   BILIRUBIN mg/dL 0.4   ALK PHOS U/L 84   ALT (SGPT) U/L 8   AST (SGOT) U/L 23           Invalid input(s): TG, LDLCALC, LDLREALC  Results from last 7 days   Lab Units 10/14/20  0502 10/13/20  1844   TSH uIU/mL  --  2.200   HEMOGLOBIN A1C % 7.50*  --    PROBNP pg/mL  --  323.2   TROPONIN T ng/mL  --  <0.010   PROCALCITONIN ng/mL  --  0.09   LACTATE mmol/L 1.0  --        Brief Urine Lab Results  (Last result in the past 365 days)      Color   Clarity   Blood   Leuk Est   Nitrite   Protein   CREAT   Urine HCG        10/13/20 2114 Yellow Turbid Large (3+) Large (3+) Negative 100 mg/dL (2+)               Microbiology Results Abnormal     Procedure Component Value - Date/Time    COVID PRE-OP / PRE-PROCEDURE SCREENING ORDER (NO ISOLATION) - Swab, Nasopharynx  [[id number]]  (Normal) Collected: 10/14/20 0049    Lab Status: Final result Specimen: Swab from Nasopharynx Updated: 10/14/20 0207    Narrative:      The following orders were created for panel order COVID PRE-OP / PRE-PROCEDURE SCREENING ORDER (NO ISOLATION) - Swab, Nasopharynx.  Procedure                               Abnormality         Status                     ---------                               -----------         ------                     COVID-19,CEPHEID,JOSE IN-...[[id number]]  Normal              Final result                 Please view results for these tests on the individual orders.    COVID-19,CEPHEID,JOSE IN-HOUSE(OR EMERGENT/ADD-ON),NP SWAB IN TRANSPORT MEDIA 3-4 HR TAT - Swab, Nasopharynx [[id number]]  (Normal) Collected: 10/14/20 0049    Lab Status: Final result Specimen: Swab from Nasopharynx Updated: 10/14/20 0207     COVID19 Not Detected    Narrative:      Fact sheet for providers: https://www.fda.gov/media/619414/download     Fact sheet for patients: https://www.fda.gov/media/334901/download          Imaging Results (All)     Procedure Component Value Units Date/Time    CT Angiogram Chest [[id number]] Collected: 10/13/20 2245     Updated: 10/13/20 2247    Narrative:      CT CHEST WITH CONTRAST, PE PROTOCOL, 10/13/2020    HISTORY:  88-year-old male in the ED with shortness of air over the last several months, but worsening symptoms this week. Bradycardia.    TECHNIQUE:  CT examination of the chest with IV contrast. CTA MIP multiplanar pulmonary artery images were reformatted with 3-D postprocessing. Radiation dose reduction techniques included automated exposure control. Radiation audit for CT and nuclear cardiology  exams in the last 12 months: 3.    FINDINGS:  No pulmonary embolism is demonstrated. Thoracic aorta is normal in caliber with no aneurysm or dissection. Mild cardiomegaly, but no pericardial effusion.    Lung images show moderate diffuse chronic interstitial pulmonary fibrosis in a  peripheral distribution throughout both lungs with a slight basilar predominance. There is a component of honeycomb fibrosis within the lung bases. The findings are compatible  with pulmonary fibrosis in a UIP pattern.    No visible superimposed acute airspace consolidation, endobronchial occlusion, pulmonary nodularity or pleural effusion. Trachea and central bronchi are patent. No mass or adenopathy within the chest. No hiatal hernia or thoracic esophageal dilatation.      Impression:      1.  No evidence of pulmonary embolism or other acute vascular abnormality within the chest.  2.  Moderate diffuse chronic interstitial pulmonary fibrosis in a UIP pattern. No evidence of superimposed acute pulmonary infiltrate.  3.  Limited images through the uppermost abdomen partially include the upper left kidney, and a left ureteral stent is visible along with probable hydronephrosis on the left.    Signer Name: Richard Delgado MD   Signed: 10/13/2020 10:45 PM   Workstation Name: [id number]    [facility]    XR Chest 1 View [[id number]] Collected: 10/13/20 1954     Updated: 10/13/20 1956    Narrative:      CR Chest 1 Vw    INDICATION:   Shortness of breath protocol     COMPARISON:    October 1 2819    FINDINGS:  Single portable AP view(s) of the chest.  The heart and mediastinal contours are normal. The lungs are free of acute infiltrates. Chronic interstitial changes redemonstrated. No pneumothorax or pleural effusion.      Impression:      No clearly acute cardiopulmonary findings.    No significant change from study of 10/1/2019.    Signer Name: Shalom Jones MD   Signed: 10/13/2020 7:54 PM   Workstation Name: [id number]    [facility]          Results for orders placed during the hospital encounter of 08/22/19   Duplex Venous Lower Extremity - Bilateral CAR    Narrative · Acute right lower extremity deep vein thrombosis noted in the distal   femoral and popliteal.  ·  All other veins appeared normal bilaterally.          Results for orders placed during the hospital encounter of 08/22/19   Duplex Venous Lower Extremity - Bilateral CAR    Narrative · Acute right lower extremity deep vein thrombosis noted in the distal   femoral and popliteal.  · All other veins appeared normal bilaterally.          Results for orders placed during the hospital encounter of 08/22/19   Adult Transthoracic Echo Limited W/ Cont if Necessary Per Protocol    Narrative · This was a limited echocardiogram.  · Left ventricular systolic function is low normal. Estimated EF = 50%.  · Left ventricular wall thickness is consistent with mild concentric   hypertrophy.  · Right ventricular cavity is moderately dilated.  · Severely reduced right ventricular systolic function noted.          Plan for Follow-up of Pending Labs/Results: pcp  Pending Labs     Order Current Status    Urine Culture - Urine, Urine, Clean Catch In process        Discharge Details        Discharge Medications      Continue These Medications      Instructions Start Date   allopurinol 300 MG tablet  Commonly known as: ZYLOPRIM   TAKE ONE TABLET BY MOUTH ONCE NIGHTLY      clopidogrel 75 MG tablet  Commonly known as: PLAVIX   75 mg, Oral, Daily, Start on 2/1/20, after completing course of eliquis      desoximetasone 0.25 % ointment  Commonly known as: TOPICORT   Topical, 2 Times Daily      diphenhydrAMINE-acetaminophen  MG tablet per tablet  Commonly known as: TYLENOL PM   1 tablet, Oral, Nightly      finasteride 5 MG tablet  Commonly known as: PROSCAR   5 mg, Oral, Daily      glucose blood test strip  Commonly known as: True Metrix Blood Glucose Test   Use once a day as instructed      Insulin Glargine (1 Unit Dial) 300 UNIT/ML solution pen-injector injection  Commonly known as: Toujeo SoloStar   30 Units, Subcutaneous, Daily, inject 30 units under the skin daily      lactobacillus acidophilus capsule capsule   1 capsule, Oral, Daily       levothyroxine 50 MCG tablet  Commonly known as: SYNTHROID, LEVOTHROID   50 mcg, Oral, Daily      NovoFine 32G X 6 MM misc  Generic drug: Insulin Pen Needle   USE ONE DAILY      OCUVITE ADULT 50+ PO   1 capsule, Oral, Daily      polyethylene glycol pack packet  Commonly known as: MIRALAX   17 g, Oral, Daily      silodosin 8 MG capsule capsule  Commonly known as: RAPAFLO   8 mg, Oral, Daily With Breakfast      SITagliptin 100 MG tablet  Commonly known as: JANUVIA   100 mg, Oral, Daily      Testosterone Cypionate 200 MG/ML injection  Commonly known as: Depo-Testosterone   400 mg, Intramuscular, Every 14 Days         Stop These Medications    carvedilol 6.25 MG tablet  Commonly known as: COREG     cefdinir 300 MG capsule  Commonly known as: OMNICEF     fluconazole 150 MG tablet  Commonly known as: DIFLUCAN            Allergies   Allergen Reactions   • Metformin Diarrhea   • Statins Myalgia         Discharge Disposition:  Home or Self Care    Diet:  Hospital:  Diet Order   Procedures   • Diet Regular; Cardiac, Consistent Carbohydrate, Renal       Activity:         CODE STATUS:    Code Status and Medical Interventions:   Ordered at: 10/14/20 0241     Level Of Support Discussed With:    Patient     Code Status:    CPR     Medical Interventions (Level of Support Prior to Arrest):    Full       Future Appointments   Date Time Provider Department Center   10/16/2020  3:00 PM Ryne Lofton MD [facility] None   10/27/2020 11:00 AM Ryne Lofton MD [facility] None   11/10/2020  1:00 PM Griffin Romero MD [facility] None       Additional Instructions for the Follow-ups that You Need to Schedule     Discharge Follow-up with PCP   As directed       Currently Documented PCP:    Ryne Lofton MD    PCP Phone Number:    [phone]     Follow Up Details: Dr. Ryne Lofton on 10/16/2020 @ 3 pm.         Discharge Follow-up with Specialty: [facility]monary clinic, 1st available (pulmonary fibrosis on ct  scan, hypoxia)   As directed      Specialty: [facility]monary clinic, 1st available (pulmonary fibrosis on ct scan, hypoxia)         Discharge Follow-up with Specialty: keep follow up w/ [facility] as scheduled tomorrow 10/15/20   As directed      Specialty: keep follow up w/  urology as scheduled tomorrow 10/15/20                     Cristo Barreto MD  10/14/20      Time Spent on Discharge:  I spent  40 minutes on this discharge activity which included: face-to-face encounter with the patient, reviewing the data in the system, coordination of the care with the nursing staff as well as consultants, documentation, and entering orders.

## 2020-10-14 NOTE — PLAN OF CARE
Problem: Adult Inpatient Plan of Care  Goal: Plan of Care Review  Recent Flowsheet Documentation  Taken 10/14/2020 1026 by Idalmis Downs, OT  Progress: improving  Plan of Care Reviewed With:   patient   family  Outcome Summary: OT eval completed Pt presents at baseline ADL completion SBA STS without LOB, SUP progressed to mod I bed mobility, tolerated toileting tasks, washing hands sink side with mod I on RA desat to 89% with recovery to 90% under 30 seconds, I don/doff socks at chair with extended time, no further recom IPOT at this time recom home with assist

## 2020-10-14 NOTE — OUTREACH NOTE
Prep Survey      Responses   [facility] patient discharged from?  [place]   Is LACE score < 7 ?  No   Eligibility  [facility]   Date of Admission  10/13/20   Date of Discharge  10/14/20   Discharge Disposition  Home or Self Care   Discharge diagnosis  Symptomatic bradycardia  Pulmonary fibrosis    Does the patient have one of the following disease processes/diagnoses(primary or secondary)?  Other   Does the patient have Home health ordered?  No   Is there a DME ordered?  Yes   What DME was ordered?  [employer] along with order for oxgyen   Prep survey completed?  Yes          Aleksandra Rodriguez RN

## 2020-10-14 NOTE — PROGRESS NOTES
"Discharge Planning Assessment  [facility]     Patient Name: Forrest Zepeda  MRN: [id number]  Today's Date: 10/14/2020    Admit Date: 10/13/2020    Discharge Needs Assessment     Row Name 10/14/20 1520       Living Environment    Lives With  alone    Current Living Arrangements  home/apartment/condo    Primary Care Provided by  self    Provides Primary Care For  no one    Family Caregiver if Needed  grandchild(shital), adult    Family Caregiver Names  Erika    Able to Return to Prior Arrangements  yes       Transition Planning    Patient/Family Anticipates Transition to  home    Patient/Family Anticipated Services at Transition  none    Transportation Anticipated  family or friend will provide       Discharge Needs Assessment    Readmission Within the Last 30 Days  no previous admission in last 30 days    Equipment Currently Used at Home  cane, straight;walker, rolling;wheelchair;other (see comments) raised toilet seat    Concerns to be Addressed  no discharge needs identified;denies needs/concerns at this time    Anticipated Changes Related to Illness  none    Equipment Needed After Discharge  none        Discharge Plan     Row Name 10/14/20 1523       Plan    Plan  Home    Plan Comments  Met and spoke with Mr. Zepeda and his granddaughter, Erika at the bedside. He lives alone in [facility] [place]. He is ind. with ADL's/mobility. He has a straight cane, WC, RW, and a raised toilet seat. He said he has the equipment, but never uses it. Denies oxygen use at home. However, he qualified for oxygen during this admission. He had used [employer] in the past. I contacted [facility] and they faxed a \"testing for oxygen\" paper to be completed by the nurse to get him qualified for oxgyen. His oxygen saturations on exertion were 88%, resting on room air was 94%, and oxygen at 2LNC at exertion was 95%. I faxed this paper to [facility] along with order for oxgyen therapy. He ideally needs to wear oxygen with exertion and not continuous " according to the values. Mr. Zepeda is being discharged home today. Ronald will deliver the oxygen (ordered portable & concentrator) to his home per family request. His granddaughter will be transporting him home via car.    Final Discharge Disposition Code  01 - home or self-care        Continued Care and Services - Admitted Since 10/13/2020     Durable Medical Equipment Coordination complete    Service Provider Request Status Selected Services Address Phone Fax    [facility] [address] [place] [address] DR SCHUMACHER [address] [place] [address] [phone]              Expected Discharge Date and Time     Expected Discharge Date Expected Discharge Time    Oct 14, 2020         Demographic Summary     Row Name 10/14/20 1515       General Information    General Information Comments  Met and spoke with Mr. Zepeda and his grand daughter, Erika at the bedside. Verified his PCP is Dr Ryne Lofton. Primary insurance is [employer] Medicare replacement. Has prescription drug coverage.        Functional Status     Row Name 10/14/20 1519       Functional Status, IADL    Medications  independent    Meal Preparation  independent    Housekeeping  independent    Laundry  independent    Shopping  independent        Psychosocial    No documentation.       Abuse/Neglect    No documentation.       Legal    No documentation.       Substance Abuse    No documentation.       Patient Forms    No documentation.           Keely Alicea, RN

## 2020-10-14 NOTE — DISCHARGE PLACEMENT REQUEST
"Forrest Zepeda (88 y.o. Male)     From [facility] Case Mgr  [phone]      Date of Birth Social Security Number Address Home Phone MRN    1932  [address] [place] [address] [phone] [id number]    [employer] Marital Status          [employer]        Admission Date Admission Type Admitting Provider Attending Provider Department, Room/Bed    10/13/20 Emergency Olena Harrington DO West, Christopher R, MD [address], [address]    Discharge Date Discharge Disposition Discharge Destination         Home or Self Care              Attending Provider: Cristo Barreto MD    Allergies: Metformin, Statins    Isolation: None   Infection: None   Code Status: CPR    Ht: 175.3 cm (69\")   Wt: 98 kg (216 lb)    Admission Cmt: None   Principal Problem: Symptomatic bradycardia [R00.1]                 Active Insurance as of 10/13/2020     Primary Coverage     Payor Plan Insurance Group Employer/Plan Group    HU[employer]NA MEDICARE REPLACEMENT HU[employer]NA MEDICARE REPLACEMENT [id number]     Payor Plan Address Payor Plan Phone Number Payor Plan Fax Number Effective Dates    [address] [address] [phone]  2018 - None Entered    [facility] [address]       Subscriber Name Subscriber Birth Date Member ID       FORREST ZEPEDA 1932 [id number]                 Emergency Contacts      (Rel.) Home Phone Work Phone Mobile Phone    MARCUS DIMAS (Grandchild) [phone] -- --    Eron Zepeda (Power of ) [phone] -- --    Mariam Dior (Daughter) [phone] -- --          [address] [address]  Dept. Phone:  [phone]  Dept. Fax:   Date Ordered: Oct 14, 2020         Patient:  Forrest Zepeda MRN:  [id number]   [address] [place] [address] :  1932  SSN:    Phone: [phone] Sex:  M     Weight: 98 kg (216 lb)         Ht Readings from Last 1 Encounters:   10/14/20 175.3 cm (69\")         Oxygen Therapy    "      (Order ID: [id number])    Diagnosis:  Dyspnea, unspecified type (R06.00 [ICD-10-CM] 786.09 [ICD-9-CM])  Hypoxia (R09.02 [ICD-10-CM] 799.02 [ICD-9-CM])  Pulmonary fibrosis (CMS/HCC) (J84.10 [ICD-10-CM] 515 [ICD-9-CM])   Quantity:  1  Comments: Specifically needed when ambulating.     Delivery Modality: Nasal Cannula  Liters Per Minute: 2  Duration: With Exertion  Equipment:  Oxygen Concentrator &  &  Portable Gaseous Oxygen System & Portable Oxygen Contents Gaseous &  Conserving Regulator  Length of Need (99 Months = Lifetime): 99 Months = Lifetime        Authorizing Provider's Phone: [phone]   Verbal Order Mode: Verbal with readback   Authorizing Provider: Cristo Barreto MD  Authorizing Provider's NPI: [id number]     Order Entered By: Keely Alicea RN 10/14/2020 12:30 PM

## 2020-10-14 NOTE — PLAN OF CARE
Goal Outcome Evaluation:  Plan of Care Reviewed With: patient  Progress: no change  Outcome Summary: New admission from ED.  SB to SR on monitor.  VSS on 2L per NC.  Increased SOA with minimal exertion.  Accessory muscle use noted.  Denies pain.  Plan for ECHO this am.  IV abx Rocephin and Diflucan initiated.  Pt and ED report blood tinged urine.  Pt follows with urology for ureteral stents, appt thursday.  Will continue to monitor.

## 2020-10-14 NOTE — PLAN OF CARE
Problem: Adult Inpatient Plan of Care  Goal: Plan of Care Review  Recent Flowsheet Documentation  Taken 10/14/2020 1109 by Teresa Rausch PT  Progress: improving  Plan of Care Reviewed With:   patient   family  Outcome Summary: PT eval completed. Pt demonstrates continued strength and ROM BLEs, intact sensation, mobility without LOB. Pt performed sit<>stand with supervision. Pt ambulated 380 ft in el without AD, with CGA 2/2 pt c/o pain in back causing forward lean. Pt goes up/down 2 stairs with CGA, uses HR despite cueing to attempt as if at home (w/o HR). Pt reports he has returned to baseline. Pt expected to d/c home safely with assist. Pt would benefit from HR by stairs entering house, but not interested during session.

## 2020-10-14 NOTE — THERAPY DISCHARGE NOTE
Patient Name: Forrest Zepeda  : 1932    MRN: [id number]                              Today's Date: 10/14/2020       Admit Date: 10/13/2020    Visit Dx:     ICD-10-CM ICD-9-CM   1. Dyspnea, unspecified type  R06.00 786.09   2. Bradycardia, unspecified  R00.1 427.89   3. Weakness  R53.1 780.79   4. Urinary tract infection in male  N39.0 599.0   5. Chronic renal impairment, unspecified CKD stage  N18.9 585.9     Patient Active Problem List   Diagnosis   • Uncontrolled type 2 diabetes mellitus (CMS/[facility])   • Hypertension   • Hyperkalemia   • Generalized osteoarthritis   • Diabetic neuropathy (CMS/[facility])   • Gout   • Hypertriglyceridemia   • Acquired hypothyroidism   • Acute saddle pulmonary embolism with acute cor pulmonale (CMS/[facility])   • CKD (chronic kidney disease) stage 3, GFR 30-59 ml/min   • Acute respiratory distress   • Acute systolic right heart failure (CMS/[facility])   • Acute deep vein thrombosis (DVT) of right lower extremity (CMS/[facility])   • Hydronephrosis with renal and ureteral calculus obstruction   • Dyspnea   • Pulmonary fibrosis (CMS/[facility])   • Symptomatic bradycardia   • Candida UTI     Past Medical History:   Diagnosis Date   • Abnormal EKG    • Abnormal PSA     Reported abnormal   • Acute deep vein thrombosis (DVT) of right lower extremity (CMS/[facility]) 2019   • Acute diverticulitis    • Acute hyperkalemia 2019   • Acute respiratory failure with hypoxia (CMS/[facility])    • Acute saddle pulmonary embolism with acute cor pulmonale (CMS/[facility]) 2019   • Acute systolic right heart failure (CMS/[facility]) 2019   • Arthritis     KNEES,  BUT SINCE HAD REPLACEMENT   • Benign localized hyperplasia of prostate    • Bilateral knee pain    • Cataracts, bilateral    • CKD (chronic kidney disease)    • Diabetic neuropathy (CMS/HCC)    • Diabetic neuropathy (CMS/[facility])    • Disease of thyroid gland     HYPOTHYROIDISM   • Diverticulitis    • Dyslipidemia     H/O   • Family history of malignant hyperthermia      "Brother    • Full dentures    • Generalized weakness    • History of gout    • History of hepatitis A vaccination    • History of nephrolithiasis    • History of nuclear stress test     STATES IN THE 1990'S.  \"IT WAS OK\"   • History of osteopenia    • History of recurrent UTI (urinary tract infection)    • Hydronephrosis of left kidney 7/18/2019   • Hydronephrosis with ureteral stricture    • Hyperkalemia     PT UNSURE REGARDING HX OF THIS   • Hyperlipidemia    • Hypertension    • Insomnia    • Malignant hyperthermia due to anesthesia     PER PT REPORT, BROTHER HAD DURING LUNG SURGERY SEVERAL YEARS AGO.  STATED THEY PACKED HIM ON ICE.  STATED HE DID SURVIVE.  PT DENIES ANY PERSONAL HX OF MALIGNANT HYPERTHERMIA HIMSELF, OR ANY ISSUES WITH ANESTHESIA.  STATES TO HIS KNOWLEDGE, NO OTHER FAMILY MEMBER HAS THIS ISSUE EXCEPT FOR HIS BROTHER.   • On supplemental oxygen therapy     2L [place]   • Other hydronephrosis 8/12/2019    Added automatically from request for surgery [id number]   • Renal insufficiency    • Sepsis (CMS/HCC) 2019   • Shortness of breath     STATES WITH EXERTION, OTHERWISE, DENIES   • Sigmoid diverticulitis without abscess or perforation 8/9/2017   • Skin breakdown     fourth toe right foot noted in 2019 MD D/C note   • Skin ulcer of fourth toe of right foot, limited to breakdown of skin (CMS/HCC) 7/5/2019   • Stricture of ureter    • Type 2 diabetes mellitus (CMS/HCC)    • Ureteral stricture, left    • UTI (urinary tract infection) 7/18/2019   • Vitamin D deficiency    • Wears glasses      Past Surgical History:   Procedure Laterality Date   • APPENDECTOMY     • CHOLECYSTECTOMY     • CIRCUMCISION N/A 10/23/2019    Procedure: CIRCUMCISIOn-DORSAL SLIT;  Surgeon: Griffin Romero MD;  Location: [facility] OR;  Service: Urology   • COLONOSCOPY     • CYSTOSCOPY URETEROSCOPY Left 2/11/2019    Procedure: URETEROSCOPY WITH STENT EXTRACTION, RPG;  Surgeon: Griffin Romero MD;  Location: [facility] OR;  Service: Urology "   • CYSTOSCOPY W/ URETERAL STENT PLACEMENT Left 7/20/2019    Procedure: CYSTOSCOPY, LEFT RETROGRADE PYELOGRAM,  ATTEMPTED LEFT URETERAL STENT INSERTION;  Surgeon: Isaac Flynn MD;  [facility];  Service: Urology   • EYE SURGERY      CATARACTS REMOVED BILATERALLY   • KNEE ARTHROPLASTY Bilateral    • KNEE ARTHROSCOPY Bilateral    • RENAL ARTERY STENT      SEVERAL TIMES EVERY SINCE 1978   • URETEROSCOPY LASER LITHOTRIPSY WITH STENT INSERTION Left 1/10/2018    Procedure:  cysto, left ureteral stent replacement ;  Surgeon: Griffin Romero MD;  Location: [facility] OR;  Service:    • URETEROSCOPY LASER LITHOTRIPSY WITH STENT INSERTION Left 8/14/2019    Procedure: URETEROSCOPY, STONE REMOVAL WITH STENT INSERTION;  Surgeon: Griffin Romero MD;  Location: [facility] OR;  Service: Urology   • URETEROSCOPY LASER LITHOTRIPSY WITH STENT INSERTION Left 10/23/2019    Procedure: Left URETEROSCOPY WITH STENT INSERTION-LEFT;  Surgeon: Griffin Romero MD;  Location: [facility] OR;  Service: Urology     General Information     Row Name 10/14/20 1102          Physical Therapy Time and Intention    Document Type  discharge evaluation/summary  -EJ     Mode of Treatment  physical therapy  -EJ     Row Name 10/14/20 1102          General Information    Patient Profile Reviewed  yes  -EJ     Prior Level of Function  independent:;all household mobility;gait;transfer;ADL's;bed mobility;community mobility;cooking;cleaning does not go to basement  -EJ     Existing Precautions/Restrictions  oxygen therapy device and L/min  -EJ     Barriers to Rehab  previous functional deficit  -EJ     Row Name 10/14/20 1102          Living Environment    Lives With  alone  -EJ     Row Name 10/14/20 1102          Home Main Entrance    Number of Stairs, Main Entrance  two  -EJ     Stair Railings, Main Entrance  none  -EJ     Row Name 10/14/20 1102          Cognition    Orientation Status (Cognition)  oriented x 4  -EJ     Row Name 10/14/20 1102           Safety Issues, Functional Mobility    Safety Issues Affecting Function (Mobility)  insight into deficits/self-awareness;judgment  -EJ     Impairments Affecting Function (Mobility)  balance;endurance/activity tolerance  -EJ       User Key  (r) = Recorded By, (t) = Taken By, (c) = Cosigned By    Initials Name Provider Type    Teresa Salazar PT Physical Therapist        Mobility     Row Name 10/14/20 1104          Bed Mobility    Comment (Bed Mobility)  Pt UIC  -EJ     Row Name 10/14/20 1104          Sit-Stand Transfer    Sit-Stand [place] (Transfers)  standby assist  -EJ     Row Name 10/14/20 1104          Gait/Stairs (Locomotion)    [place] Level (Gait)  contact guard  -EJ     Distance in Feet (Gait)  380  -EJ     Deviations/Abnormal Patterns (Gait)  antalgic  -EJ     Bilateral Gait Deviations  forward flexed posture  -EJ     Comment (Gait/Stairs)  Pt ambulated with slight forward flexed posture, improved with cueing for upright posture, but worsened with fatigue and increased back pain. Reports this is a chronic issue- increasing back pain with upright posture while walking.  -EJ       User Key  (r) = Recorded By, (t) = Taken By, (c) = Cosigned By    Initials Name Provider Type    EJ Teresa Rausch PT Physical Therapist        Obj/Interventions     Row Name 10/14/20 1106          Range of Motion Comprehensive    General Range of Motion  bilateral upper extremity ROM WNL  -EJ     Row Name 10/14/20 1106          Strength Comprehensive (MMT)    General Manual Muscle Testing (MMT) Assessment  no strength deficits identified  -EJ     Comment, General Manual Muscle Testing (MMT) Assessment  5/5 throughout.  -EJ     Row Name 10/14/20 1106          Motor Skills    Therapeutic Exercise  knee  -EJ     Row Name 10/14/20 1106          Knee (Therapeutic Exercise)    Knee (Therapeutic Exercise)  strengthening exercise  -EJ     Knee Strengthening (Therapeutic Exercise)  SLR (straight leg raise);5  repetitions;bilateral;heel slides  -     Row Name 10/14/20 1106          Balance    Comment, Balance  pt performs, demonstrating some ther ex he performs at home.  -[facility] Name 10/14/20 1106          Sensory Assessment (Somatosensory)    Sensory Assessment (Somatosensory)  LE sensation intact  -       User Key  (r) = Recorded By, (t) = Taken By, (c) = Cosigned By    Initials Name Provider Type     Teresa Rausch PT Physical Therapist        Goals/Plan    No documentation.       Clinical Impression     [facility] Name 10/14/20 1109          Pain Scale: Numbers Pre/Post-Treatment    Pretreatment Pain Rating  0/10 - no pain  -EJ     Posttreatment Pain Rating  0/10 - no pain  -EJ     Pain Intervention(s)  Repositioned;Ambulation/increased activity  -[facility] Name 10/14/20 1109          Plan of Care Review    Plan of Care Reviewed With  patient;family  -     Progress  improving  -     Outcome Summary  PT eval completed. Pt demonstrates continued strength and ROM BLEs, intact sensation, mobility without LOB. Pt performed sit<>stand with supervision. Pt ambulated 380 ft in el without AD, with CGA 2/2 pt c/o pain in back causing forward lean. Pt goes up/down 2 stairs with CGA, uses HR despite cueing to attempt as if at home (w/o HR). Pt reports he has returned to baseline. Pt expected to d/c home safely with assist. Pt would benefit from HR by stairs entering house, but not interested during session.  -[facility] Name 10/14/20 1109          Therapy Assessment/Plan (PT)    Criteria for Skilled Interventions Met (PT)  other (see comments) pt reports he is at baseline, expected to d/c home this date.  -[facility] Name 10/14/20 1109          Vital Signs    Pre Systolic BP Rehab  142  -EJ     Pre Treatment Diastolic BP  74  -EJ     Post Systolic BP Rehab  173  -EJ     Post Treatment Diastolic BP  77  -EJ     Pretreatment Heart Rate (beats/min)  76  -EJ     Intratreatment Heart Rate (beats/min)  98  -EJ      Posttreatment Heart Rate (beats/min)  77  -EJ     Pre SpO2 (%)  94  -EJ     O2 Delivery Pre Treatment  room air  -EJ     Intra SpO2 (%)  88  -EJ     O2 Delivery Intra Treatment  room air  -EJ     Post SpO2 (%)  93  -EJ     O2 Delivery Post Treatment  room air  -EJ     Pre Patient Position  Sitting  -EJ     Intra Patient Position  Standing  -EJ     Post Patient Position  Sitting  -EJ     Row Name 10/14/20 1109          Positioning and Restraints    Pre-Treatment Position  sitting in chair/recliner  -EJ     Post Treatment Position  chair  -EJ     In Chair  reclined;call light within reach;encouraged to call for assist;with family/caregiver;compression device;waffle cushion  -       User Key  (r) = Recorded By, (t) = Taken By, (c) = Cosigned By    Initials Name Provider Type    Teresa Salazar PT Physical Therapist        Outcome Measures     Row Name 10/14/20 1116          How much help from another person do you currently need...    Turning from your back to your side while in flat bed without using bedrails?  4  -EJ     Moving from lying on back to sitting on the side of a flat bed without bedrails?  4  -EJ     Moving to and from a bed to a chair (including a wheelchair)?  4  -EJ     Standing up from a chair using your arms (e.g., wheelchair, bedside chair)?  4  -EJ     Climbing 3-5 steps with a railing?  3  -EJ     To walk in hospital room?  3  -EJ     AM-PAC 6 Clicks Score (PT)  22  -     Row Name 10/14/20 1116          Functional Assessment    Outcome Measure Options  AM-PAC 6 Clicks Basic Mobility (PT)  -       User Key  (r) = Recorded By, (t) = Taken By, (c) = Cosigned By    Initials Name Provider Type    Teresa Salazar PT Physical Therapist        Physical Therapy Education                 Title: PT OT SLP Therapies (Done)     Topic: Physical Therapy (Done)     Point: Mobility training (Done)     Learning Progress Summary           Patient Acceptance, E,TB, BRAVO,DU by ALEN at 10/14/2020 1117    Family Acceptance, E,TB, VU,DU by  at 10/14/2020 1117                   Point: Home exercise program (Done)     Learning Progress Summary           Patient Acceptance, E,TB, VU,DU by EJ at 10/14/2020 1117   Family Acceptance, E,TB, VU,DU by EJ at 10/14/2020 1117                   Point: Body mechanics (Done)     Learning Progress Summary           Patient Acceptance, E,TB, VU,DU by EJ at 10/14/2020 1117   Family Acceptance, E,TB, VU,DU by EJ at 10/14/2020 1117                   Point: Precautions (Done)     Learning Progress Summary           Patient Acceptance, E,TB, VU,DU by EJ at 10/14/2020 1117   Family Acceptance, E,TB, VU,DU by EJ at 10/14/2020 1117                               User Key     Initials Effective Dates Name Provider Type [facility] 11/20/18 -  Teresa Rausch PT Physical Therapist PT              PT Recommendation and Plan     Plan of Care Reviewed With: patient, family  Progress: improving  Outcome Summary: PT eval completed. Pt demonstrates continued strength and ROM BLEs, intact sensation, mobility without LOB. Pt performed sit<>stand with supervision. Pt ambulated 380 ft in el without AD, with CGA 2/2 pt c/o pain in back causing forward lean. Pt goes up/down 2 stairs with CGA, uses HR despite cueing to attempt as if at home (w/o HR). Pt reports he has returned to baseline. Pt expected to d/c home safely with assist. Pt would benefit from HR by stairs entering house, but not interested during session.     Time Calculation:   PT Charges     Row Name 10/14/20 1119             Time Calculation    Start Time  1035  -EJ      PT Received On  10/14/20  -      PT Goal Re-Cert Due Date  10/24/20  -         Time Calculation- PT    Total Timed Code Minutes- PT  3 minute(s)  -         Timed Charges    59466 - PT Therapeutic Exercise Minutes  3  -EJ        User Key  (r) = Recorded By, (t) = Taken By, (c) = Cosigned By    Initials Name Provider Type     Teresa Rausch PT  Physical Therapist        Therapy Charges for Today     Code Description Service Date Service Provider Modifiers Qty    01360907771 HC PT EVAL LOW COMPLEXITY 4 10/14/2020 Teresa Rausch, PT GP 1          PT G-Codes  Outcome Measure Options: AM-PAC 6 Clicks Basic Mobility (PT)  AM-PAC 6 Clicks Score (PT): 22  AM-PAC 6 Clicks Score (OT): 24    PT Discharge Summary  Anticipated Discharge Disposition (PT): home with assist  Reason for Discharge: At baseline function, Discharge from facility    Teresa Kim, PT  10/14/2020

## 2020-10-14 NOTE — THERAPY DISCHARGE NOTE
Patient Name: Forrest Zepeda  : 1932    MRN: [id number]                              Today's Date: 10/14/2020       Admit Date: 10/13/2020    Visit Dx:     ICD-10-CM ICD-9-CM   1. Dyspnea, unspecified type  R06.00 786.09   2. Bradycardia, unspecified  R00.1 427.89   3. Weakness  R53.1 780.79   4. Urinary tract infection in male  N39.0 599.0   5. Chronic renal impairment, unspecified CKD stage  N18.9 585.9     Patient Active Problem List   Diagnosis   • Uncontrolled type 2 diabetes mellitus (CMS/[facility])   • Hypertension   • Hyperkalemia   • Generalized osteoarthritis   • Diabetic neuropathy (CMS/[facility])   • Gout   • Hypertriglyceridemia   • Acquired hypothyroidism   • Acute saddle pulmonary embolism with acute cor pulmonale (CMS/[facility])   • CKD (chronic kidney disease) stage 3, GFR 30-59 ml/min   • Acute respiratory distress   • Acute systolic right heart failure (CMS/[facility])   • Acute deep vein thrombosis (DVT) of right lower extremity (CMS/[facility])   • Hydronephrosis with renal and ureteral calculus obstruction   • Dyspnea   • Pulmonary fibrosis (CMS/[facility])   • Symptomatic bradycardia   • Candida UTI     Past Medical History:   Diagnosis Date   • Abnormal EKG    • Abnormal PSA     Reported abnormal   • Acute deep vein thrombosis (DVT) of right lower extremity (CMS/[facility]) 2019   • Acute diverticulitis    • Acute hyperkalemia 2019   • Acute respiratory failure with hypoxia (CMS/[facility])    • Acute saddle pulmonary embolism with acute cor pulmonale (CMS/[facility]) 2019   • Acute systolic right heart failure (CMS/[facility]) 2019   • Arthritis     KNEES,  BUT SINCE HAD REPLACEMENT   • Benign localized hyperplasia of prostate    • Bilateral knee pain    • Cataracts, bilateral    • CKD (chronic kidney disease)    • Diabetic neuropathy (CMS/HCC)    • Diabetic neuropathy (CMS/[facility])    • Disease of thyroid gland     HYPOTHYROIDISM   • Diverticulitis    • Dyslipidemia     H/O   • Family history of malignant hyperthermia      "Brother    • Full dentures    • Generalized weakness    • History of gout    • History of hepatitis A vaccination    • History of nephrolithiasis    • History of nuclear stress test     STATES IN THE 1990'S.  \"IT WAS OK\"   • History of osteopenia    • History of recurrent UTI (urinary tract infection)    • Hydronephrosis of left kidney 7/18/2019   • Hydronephrosis with ureteral stricture    • Hyperkalemia     PT UNSURE REGARDING HX OF THIS   • Hyperlipidemia    • Hypertension    • Insomnia    • Malignant hyperthermia due to anesthesia     PER PT REPORT, BROTHER HAD DURING LUNG SURGERY SEVERAL YEARS AGO.  STATED THEY PACKED HIM ON ICE.  STATED HE DID SURVIVE.  PT DENIES ANY PERSONAL HX OF MALIGNANT HYPERTHERMIA HIMSELF, OR ANY ISSUES WITH ANESTHESIA.  STATES TO HIS KNOWLEDGE, NO OTHER FAMILY MEMBER HAS THIS ISSUE EXCEPT FOR HIS BROTHER.   • On supplemental oxygen therapy     2L NC QHS   • Other hydronephrosis 8/12/2019    Added automatically from request for surgery [id number]   • Renal insufficiency    • Sepsis (CMS/HCC) 2019   • Shortness of breath     STATES WITH EXERTION, OTHERWISE, DENIES   • Sigmoid diverticulitis without abscess or perforation 8/9/2017   • Skin breakdown     fourth toe right foot noted in 2019 MD D/C note   • Skin ulcer of fourth toe of right foot, limited to breakdown of skin (CMS/HCC) 7/5/2019   • Stricture of ureter    • Type 2 diabetes mellitus (CMS/HCC)    • Ureteral stricture, left    • UTI (urinary tract infection) 7/18/2019   • Vitamin D deficiency    • Wears glasses      Past Surgical History:   Procedure Laterality Date   • APPENDECTOMY     • CHOLECYSTECTOMY     • CIRCUMCISION N/A 10/23/2019    Procedure: CIRCUMCISIOn-DORSAL SLIT;  Surgeon: Griffin Romero MD;  Location: [facility] OR;  Service: Urology   • COLONOSCOPY     • CYSTOSCOPY URETEROSCOPY Left 2/11/2019    Procedure: URETEROSCOPY WITH STENT EXTRACTION, RPG;  Surgeon: Griffin Romero MD;  Location: [facility] OR;  Service: Urology "   • CYSTOSCOPY W/ URETERAL STENT PLACEMENT Left 7/20/2019    Procedure: CYSTOSCOPY, LEFT RETROGRADE PYELOGRAM,  ATTEMPTED LEFT URETERAL STENT INSERTION;  Surgeon: Isaac Flynn MD;  Location: [facility] OR;  Service: Urology   • EYE SURGERY      CATARACTS REMOVED BILATERALLY   • KNEE ARTHROPLASTY Bilateral    • KNEE ARTHROSCOPY Bilateral    • RENAL ARTERY STENT      SEVERAL TIMES EVERY SINCE 1978   • URETEROSCOPY LASER LITHOTRIPSY WITH STENT INSERTION Left 1/10/2018    Procedure:  cysto, left ureteral stent replacement ;  Surgeon: Griffin Romero MD;  Location: [facility] OR;  Service:    • URETEROSCOPY LASER LITHOTRIPSY WITH STENT INSERTION Left 8/14/2019    Procedure: URETEROSCOPY, STONE REMOVAL WITH STENT INSERTION;  Surgeon: Griffin Romero MD;  Location: [facility] OR;  Service: Urology   • URETEROSCOPY LASER LITHOTRIPSY WITH STENT INSERTION Left 10/23/2019    Procedure: Left URETEROSCOPY WITH STENT INSERTION-LEFT;  Surgeon: Griffin Romero MD;  Location: [facility] OR;  Service: Urology     General Information     Row Name 10/14/20 1020          OT Time and Intention    Document Type  evaluation;discharge evaluation/summary  -KF     Mode of Treatment  occupational therapy  -KF     Row Name 10/14/20 1020          General Information    Patient Profile Reviewed  yes  -KF     Prior Level of Function  independent:;all household mobility;gait;transfer;w/c or scooter;ADL's;bed mobility;driving;community mobility;cooking;cleaning  -KF     Existing Precautions/Restrictions  oxygen therapy device and L/min  -KF     Barriers to Rehab  previous functional deficit  -KF     Row Name 10/14/20 1020          Living Environment    Lives With  alone  -KF     Row Name 10/14/20 1020          Home Main Entrance    Number of Stairs, Main Entrance  two  -KF     Row Name 10/14/20 1020          Stairs Within Home, Primary    Stairs, Within Home, Primary  10 steps to basement but doesn't use, bed/bath on first floor; at baseline sponge  bathes at sink side  -     Row Name 10/14/20 1020          Cognition    Orientation Status (Cognition)  oriented x 4  -     Row Name 10/14/20 1020          Safety Issues, Functional Mobility    Safety Issues Affecting Function (Mobility)  judgment;insight into deficits/self-awareness very mild  -KF     Impairments Affecting Function (Mobility)  balance;endurance/activity tolerance  -       User Key  (r) = Recorded By, (t) = Taken By, (c) = Cosigned By    Initials Name Provider Type    KF Idalmis Downs, OT Occupational Therapist        Mobility/ADL's     Row Name 10/14/20 1022          Bed Mobility    Bed Mobility  rolling left;scooting/bridging;supine-sit  -KF     Rolling Left [place] (Bed Mobility)  modified independence  -KF     Scooting/Bridging [place] (Bed Mobility)  modified independence  -KF     Supine-Sit [place] (Bed Mobility)  modified independence  -KF     Bed Mobility, Safety Issues  impaired trunk control for bed mobility  -KF     Assistive Device (Bed Mobility)  bed rails;head of bed elevated  -     Comment (Bed Mobility)  demonstrated good rolling technique cues on easier method in sidelying  -     Row Name 10/14/20 1022          Transfers    Transfers  sit-stand transfer;bed-chair transfer;toilet transfer  -     Bed-Chair [place] (Transfers)  standby assist  -     Assistive Device (Bed-Chair Transfers)  -- no AD, HHA as needed  -     Sit-Stand [place] (Transfers)  standby assist  -     [place] Level (Toilet Transfer)  standby assist  -     Assistive Device (Toilet Transfer)  commode;grab bars/safety frame  -     Row Name 10/14/20 1022          Toilet Transfer    Type (Toilet Transfer)  stand pivot/stand step  -     Row Name 10/14/20 1022          Functional Mobility    Functional Mobility- Ind. Level  standby assist with HHA PRN  -     Functional Mobility- Device  -- no AD  -KF     Functional Mobility- Safety Issues  step length decreased   -KF     Functional Mobility- Comment  limited activity tolerance but post toileting and 20 ft desat to 89% with recovery sitting under 30 seconds  -     Row Name 10/14/20 1022          Activities of Daily Living    BADL Assessment/Intervention  lower body dressing;grooming;toileting  -     Row Name 10/14/20 1022          Lower Body Dressing Assessment/Training    [place] Level (Lower Body Dressing)  don;doff;socks;independent;pants/bottoms  -     Position (Lower Body Dressing)  unsupported sitting  -     Comment (Lower Body Dressing)  I with extended time; demo throughout toileting tasks I with briefs as well  -     Row Name 10/14/20 1022          Grooming Assessment/Training    [place] Level (Grooming)  wash face, hands;supervision  -     Position (Grooming)  sink side;unsupported standing  -     Row Name 10/14/20 1022          Toileting Assessment/Training    [place] Level (Toileting)  toileting skills;adjust/manage clothing;perform perineal hygiene;modified independence  -KF     Position (Toileting)  supported sitting;unsupported standing  -       User Key  (r) = Recorded By, (t) = Taken By, (c) = Cosigned By    Initials Name Provider Type    KF Idalmis Downs, OT Occupational Therapist        Obj/Interventions     Row Name 10/14/20 1025          Sensory Assessment (Somatosensory)    Sensory Assessment (Somatosensory)  UE sensation intact  -     Row Name 10/14/20 1025          Vision Assessment/Intervention    Visual Impairment/Limitations  corrective lenses full-time  -     Row Name 10/14/20 1025          Range of Motion Comprehensive    General Range of Motion  bilateral upper extremity ROM WNL  -     Row Name 10/14/20 1025          Strength Comprehensive (MMT)    General Manual Muscle Testing (MMT) Assessment  no strength deficits identified  -     Comment, General Manual Muscle Testing (MMT) Assessment  for BUEs: grossly 5/5  -     Row Name 10/14/20 1025           Balance    Balance Assessment  sitting static balance;sitting dynamic balance;standing static balance;standing dynamic balance  -KF     Static Sitting Balance  WFL  -KF     Dynamic Sitting Balance  WFL  -KF     Static Standing Balance  WFL  -KF     Dynamic Standing Balance  WFL;mild impairment;unsupported;standing family reports appears at baseline now  -KF     Balance Interventions  occupation based/functional task;moderate challenge  -KF       User Key  (r) = Recorded By, (t) = Taken By, (c) = Cosigned By    Initials Name Provider Type    Idalmis Huitron, OT Occupational Therapist        Goals/Plan    No documentation.       Clinical Impression     Row Name 10/14/20 1026          Pain Assessment    Additional Documentation  Pain Scale: Numbers Pre/Post-Treatment (Group)  -[facility] Name 10/14/20 1026          Pain Scale: Numbers Pre/Post-Treatment    Pretreatment Pain Rating  0/10 - no pain  -KF     Posttreatment Pain Rating  0/10 - no pain  -KF     Pain Intervention(s)  Ambulation/increased activity;Repositioned  -     Row Name 10/14/20 1026          Plan of Care Review    Plan of Care Reviewed With  patient;family  -KF     Progress  improving  -     Outcome Summary  OT eval completed Pt presents at baseline ADL completion SBA STS without LOB, SUP progressed to mod I bed mobility, tolerated toileting tasks, washing hands sink side with mod I on RA desat to 89% with recovery to 90% under 30 seconds, I don/doff socks at chair with extended time, no further recom IPOT at this time recom home with assist  -     Row Name 10/14/20 1026          Therapy Assessment/Plan (OT)    Criteria for Skilled Therapeutic Interventions Met (OT)  no;skilled treatment is necessary  -KF     Therapy Frequency (OT)  evaluation only  -     Row Name 10/14/20 1026          Therapy Plan Review/Discharge Plan (OT)    Anticipated Discharge Disposition (OT)  home with assist  -     Row Name 10/14/20 1026          Vital Signs     Pre Systolic BP Rehab  -- RN cleared VSS  -KF     Pretreatment Heart Rate (beats/min)  77  -KF     Intratreatment Heart Rate (beats/min)  93  -KF     Posttreatment Heart Rate (beats/min)  79  -KF     Pre SpO2 (%)  93  -KF     O2 Delivery Pre Treatment  room air  -KF     Intra SpO2 (%)  89  -KF     O2 Delivery Intra Treatment  room air  -KF     Post SpO2 (%)  93  -KF     O2 Delivery Post Treatment  room air  -KF     Pre Patient Position  Supine  -KF     Intra Patient Position  Standing  -KF     Post Patient Position  Sitting  -KF     Rest Breaks   1  -KF     Row Name 10/14/20 1026          Positioning and Restraints    Pre-Treatment Position  in bed  -KF     Post Treatment Position  chair  -KF     In Chair  notified nsg;reclined;sitting;call light within reach;encouraged to call for assist;with family/caregiver;waffle cushion;legs elevated SCDs, exit waved by RN  -KF       User Key  (r) = Recorded By, (t) = Taken By, (c) = Cosigned By    Initials Name Provider Type    Idalmis Huitron OT Occupational Therapist        Outcome Measures     Row Name 10/14/20 1029          How much help from another is currently needed...    Putting on and taking off regular lower body clothing?  4  -KF     Bathing (including washing, rinsing, and drying)  4  -KF     Toileting (which includes using toilet bed pan or urinal)  4  -KF     Putting on and taking off regular upper body clothing  4  -KF     Taking care of personal grooming (such as brushing teeth)  4  -KF     Eating meals  4  -KF     AM-PAC 6 Clicks Score (OT)  24  -KF     Row Name 10/14/20 1029          Functional Assessment    Outcome Measure Options  AM-PAC 6 Clicks Daily Activity (OT)  -KF       User Key  (r) = Recorded By, (t) = Taken By, (c) = Cosigned By    Initials Name Provider Type    Idalmis Huitron OT Occupational Therapist        Occupational Therapy Education                 Title: PT OT SLP Therapies (Done)     Topic: Occupational Therapy (Done)      Point: ADL training (Done)     Description:   Instruct learner(s) on proper safety adaptation and remediation techniques during self care or transfers.   Instruct in proper use of assistive devices.              Learning Progress Summary           Patient Acceptance, E,TB,D, VU,DU by  at 10/14/2020 1030   Family Acceptance, E,TB,D, VU,DU by  at 10/14/2020 1030                   Point: Home exercise program (Done)     Description:   Instruct learner(s) on appropriate technique for monitoring, assisting and/or progressing therapeutic exercises/activities.              Learning Progress Summary           Patient Acceptance, E,TB,D, VU,DU by KF at 10/14/2020 1030   Family Acceptance, E,TB,D, VU,DU by  at 10/14/2020 1030                   Point: Precautions (Done)     Description:   Instruct learner(s) on prescribed precautions during self-care and functional transfers.              Learning Progress Summary           Patient Acceptance, E,TB,D, VU,DU by  at 10/14/2020 1030   Family Acceptance, E,TB,D, VU,DU by  at 10/14/2020 1030                   Point: Body mechanics (Done)     Description:   Instruct learner(s) on proper positioning and spine alignment during self-care, functional mobility activities and/or exercises.              Learning Progress Summary           Patient Acceptance, E,TB,D, VU,DU by  at 10/14/2020 1030   Family Acceptance, E,TB,D, VU,DU by  at 10/14/2020 1030                               User Key     Initials Effective Dates Name Provider Type Discipline     04/03/18 -  Idalmis Downs OT Occupational Therapist OT              OT Recommendation and Plan  Therapy Frequency (OT): evaluation only  Plan of Care Review  Plan of Care Reviewed With: patient, family  Progress: improving  Outcome Summary: OT eval completed Pt presents at baseline ADL completion SBA STS without LOB, SUP progressed to mod I bed mobility, tolerated toileting tasks, washing hands sink side with mod I on RA  desat to 89% with recovery to 90% under 30 seconds, I don/doff socks at chair with extended time, no further recom IPOT at this time recom home with assist     Time Calculation:   Time Calculation- OT     Row Name 10/14/20 1031             Time Calculation- OT    OT Start Time  1010  -KF      OT Received On  10/14/20  -KF         Timed Charges    34958 - OT Self Care/Mgmt Minutes  --  -KF        User Key  (r) = Recorded By, (t) = Taken By, (c) = Cosigned By    Initials Name Provider Type    Idalmis Huitron OT Occupational Therapist        Therapy Charges for Today     Code Description Service Date Service Provider Modifiers Qty    [id number]  OT EVAL LOW COMPLEXITY 4 10/14/2020 Idalmis Downs OT GO 1               Idalmis Downs OT  10/14/2020

## 2020-10-15 ENCOUNTER — TRANSITIONAL CARE MANAGEMENT TELEPHONE ENCOUNTER (OUTPATIENT)
Dept: CALL CENTER | Facility: HOSPITAL | Age: 85
End: 2020-10-15

## 2020-10-15 ENCOUNTER — EPISODE CHANGES (OUTPATIENT)
Dept: CASE MANAGEMENT | Facility: OTHER | Age: 85
End: 2020-10-15

## 2020-10-15 LAB — BACTERIA SPEC AEROBE CULT: NORMAL

## 2020-10-15 NOTE — OUTREACH NOTE
Call Center TCM Note      Responses   [facility] patient discharged from?  [place]   Does the patient have one of the following disease processes/diagnoses(primary or secondary)?  Other   TCM attempt successful?  Yes   Call start time  1459   Call end time  1502   Meds reviewed with patient/caregiver?  Yes   Is the patient having any side effects they believe may be caused by any medication additions or changes?  No   Does the patient have all medications ordered at discharge?  N/A   Is the patient taking all medications as directed (includes completed medication regime)?  Yes   Does the patient have a primary care provider?   Yes   Does the patient have an appointment with their PCP within 7 days of discharge?  Yes   Comments regarding PCP  PCP APPOINTMENT IS TOMORROW 10/16, AND HE STATES HE HAS SURGERY SCHEDULED FOR 10/27 AT    Has the patient kept scheduled appointments due by today?  N/A   Has home health visited the patient within 72 hours of discharge?  N/A   What DME was ordered?  [employer] along with order for [employer]gyen   Did the patient receive a copy of their discharge instructions?  Yes   Nursing interventions  Reviewed instructions with patient   What is the patient's perception of their health status since discharge?  Improving   Is the patient/caregiver able to teach back signs and symptoms related to disease process for when to call PCP?  Yes   Is the patient/caregiver able to teach back signs and symptoms related to disease process for when to call 911?  Yes   Is the patient/caregiver able to teach back the hierarchy of who to call/visit for symptoms/problems? PCP, Specialist, Home health nurse, Urgent Care, ED, 911  Yes   TCM call completed?  Yes          Nany Mirza LPN    10/15/2020, 15:03 EDT

## 2020-10-16 ENCOUNTER — OFFICE VISIT (OUTPATIENT)
Dept: FAMILY MEDICINE CLINIC | Facility: CLINIC | Age: 85
End: 2020-10-16

## 2020-10-16 VITALS
HEART RATE: 89 BPM | SYSTOLIC BLOOD PRESSURE: 132 MMHG | DIASTOLIC BLOOD PRESSURE: 70 MMHG | BODY MASS INDEX: 32.58 KG/M2 | HEIGHT: 69 IN | TEMPERATURE: 97.5 F | WEIGHT: 220 LBS | OXYGEN SATURATION: 94 %

## 2020-10-16 DIAGNOSIS — B35.9 TINEA: ICD-10-CM

## 2020-10-16 DIAGNOSIS — M10.9 GOUTY ARTHRITIS: ICD-10-CM

## 2020-10-16 DIAGNOSIS — E11.65 UNCONTROLLED TYPE 2 DIABETES MELLITUS WITH HYPERGLYCEMIA (HCC): Primary | ICD-10-CM

## 2020-10-16 DIAGNOSIS — L30.9 DERMATITIS: ICD-10-CM

## 2020-10-16 DIAGNOSIS — Z23 NEED FOR IMMUNIZATION AGAINST INFLUENZA: ICD-10-CM

## 2020-10-16 PROCEDURE — 90694 VACC AIIV4 NO PRSRV 0.5ML IM: CPT | Performed by: FAMILY MEDICINE

## 2020-10-16 PROCEDURE — 99495 TRANSJ CARE MGMT MOD F2F 14D: CPT | Performed by: FAMILY MEDICINE

## 2020-10-16 PROCEDURE — G0008 ADMIN INFLUENZA VIRUS VAC: HCPCS | Performed by: FAMILY MEDICINE

## 2020-10-16 RX ORDER — CLOTRIMAZOLE 1 %
CREAM (GRAM) TOPICAL 2 TIMES DAILY
Qty: 45 G | Refills: 0 | Status: SHIPPED | OUTPATIENT
Start: 2020-10-16 | End: 2022-01-13 | Stop reason: ALTCHOICE

## 2020-10-16 RX ORDER — GLIMEPIRIDE 2 MG/1
2 TABLET ORAL
Qty: 90 TABLET | Refills: 1 | Status: SHIPPED | OUTPATIENT
Start: 2020-10-16 | End: 2021-03-16 | Stop reason: SDUPTHER

## 2020-10-16 RX ORDER — INSULIN GLARGINE 300 U/ML
15 INJECTION, SOLUTION SUBCUTANEOUS DAILY
Qty: 3 PEN | Refills: 3 | Status: SHIPPED | OUTPATIENT
Start: 2020-10-16 | End: 2021-11-30 | Stop reason: SDUPTHER

## 2020-10-16 RX ORDER — DESOXIMETASONE 2.5 MG/G
OINTMENT TOPICAL 2 TIMES DAILY
Qty: 60 G | Refills: 2 | Status: SHIPPED | OUTPATIENT
Start: 2020-10-16 | End: 2021-08-04 | Stop reason: SDUPTHER

## 2020-10-16 RX ORDER — ALLOPURINOL 300 MG/1
300 TABLET ORAL NIGHTLY
Qty: 90 TABLET | Refills: 3 | Status: SHIPPED | OUTPATIENT
Start: 2020-10-16 | End: 2022-01-10 | Stop reason: SDUPTHER

## 2020-10-16 NOTE — PROGRESS NOTES
Transitional Care Follow Up Visit  Subjective     Forrest Zepeda is a 88 y.o. male who presents for a transitional care management visit.    Within 48 business hours after discharge our office contacted him via telephone to coordinate his care and needs.      I reviewed and discussed the details of that call along with the discharge summary, hospital problems, inpatient lab results, inpatient diagnostic studies, and consultation reports with Forrest.    States he is doing better.  Denies any chest pain or shortness of breath.  He is off the Carvedilol.  BP today is 132/70 and heart rate is 89.  O 2 sat is 94%.    States he is scheduled to have surgery on 10/27/2020 with Dr Jain to remove the metal stent and replace it with a rubber stent due to recurrent infections.    States he is taking Toujeo 30 units daily and Januvia 100 mg daily.     Current outpatient and discharge medications have been reconciled for the patient.  Reviewed by: Ryne Lofton MD      Date of TCM Phone Call 10/14/2020   [facility]   Date of Admission 10/13/2020   Date of Discharge 10/14/2020   Risk for Readmission (LACE Score) -   Discharge Disposition Home or Self Care     Risk for Readmission (LACE) Score: 12 (10/14/2020  6:00 AM)      History of Present Illness   Course During Hospital Stay:    The patient went to the ER on [id number] due to shortness of breath, fatigue, and Bradycardia.  The EKG show no change from the previous one in. Chest xray, CT chest and Troponin were negative except for some Pulmonary fibrosis on CT.  Carvedilol was discontinued and the heart rate normalized.  Patient did not want to be hospitalized due to having an appointment the next day with Urology.  Scheduled to see Pulmonary due to Pulmonary fibrosis.     The following portions of the patient's history were reviewed and updated as appropriate: allergies, current medications, past family history, past medical history, past social history, past  surgical history and problem list.    Review of Systems   Constitutional: Positive for activity change. Negative for appetite change, chills, diaphoresis, fatigue, fever and unexpected weight change.   HENT: Negative for congestion.    Eyes: Negative for visual disturbance.   Respiratory: Positive for shortness of breath. Negative for cough.    Cardiovascular: Negative for chest pain, palpitations and leg swelling.   Gastrointestinal: Negative for abdominal pain and diarrhea.       Objective   Physical Exam    Assessment/Plan   Problems Addressed this Visit        Endocrine    Uncontrolled type 2 diabetes mellitus (CMS/[facility]) - Primary    Relevant Medications    Insulin Glargine, 1 Unit Dial, (Toujeo SoloStar) 300 UNIT/ML solution pen-injector injection    glimepiride (Amaryl) 2 MG tablet    SITagliptin (JANUVIA) 100 MG tablet      Other Visit Diagnoses     Gouty arthritis        Relevant Medications    allopurinol (ZYLOPRIM) 300 MG tablet    Need for immunization against influenza        Relevant Orders    Fluad Quad >65 years (Completed)    Tinea        Relevant Medications    clotrimazole (LOTRIMIN) 1 % cream    Dermatitis        Relevant Medications    desoximetasone (TOPICORT) 0.25 % ointment    clotrimazole (LOTRIMIN) 1 % cream      Diagnoses       Codes Comments    Uncontrolled type 2 diabetes mellitus with hyperglycemia (CMS/[facility])    -  Primary ICD-10-CM: E11.65  ICD-9-CM: 250.02     Gouty arthritis     ICD-10-CM: M10.9  ICD-9-CM: 274.00     Need for immunization against influenza     ICD-10-CM: Z23  ICD-9-CM: V04.81     Tinea     ICD-10-CM: B35.9  ICD-9-CM: 110.9     Dermatitis     ICD-10-CM: L30.9  ICD-9-CM: 692.9         The patient has recently been hospitalized with urosepsis as well as bradycardia.  His bradycardia improved.  He does have congestive heart failure which also has improved with appropriate therapy.  His urosepsis resolved with IV antibiotic therapy.  He is been referred to urology at  for  replacement of his stents that been a chronic need for many years.  That procedure is planned in the near future.  His diabetes has been uncontrolled with some elevation of his glucoses.  The patient request that he be allowed to start back on glimepiride and to reduce his Toujeo.  We will do so.        Drink plenty fluids.    Decrease Toujeo to 15 units daily #3pens with 3 refills.  Restart Glimepiride 2 mg daily #90+1.    Continue other medications as doing.    Refill Allopurinol 300 mg daily #90+3.  Refill Desoximetasone 0.25% ointment twice a day as needed #60 GM +2.    Follow up in 6 weeks for a Medicare wellness exam.              Scribed for Dr Ryne Lofton by Zahida Townsend CMA.          I, Ryne Lofton MD, personally performed the services described in this documentation, as scribed by Zahida Townsend in my presence, and is both accurate and complete.        (Please note that portions of this note were completed with a voice recognition program. Efforts were made to edit the dictations,but occasionally words are mis transcribed.)

## 2020-10-23 ENCOUNTER — READMISSION MANAGEMENT (OUTPATIENT)
Dept: CALL CENTER | Facility: HOSPITAL | Age: 85
End: 2020-10-23

## 2020-10-23 NOTE — OUTREACH NOTE
Medical Week 2 Survey      Responses   [facility] patient discharged from?  [place]   Does the patient have one of the following disease processes/diagnoses(primary or secondary)?  Other   Week 2 attempt successful?  Yes   Call start time  1457   Discharge diagnosis  Symptomatic bradycardia  Pulmonary fibrosis    Revoke  Decline to participate   Call end time  1503   Meds reviewed with patient/caregiver?  Yes   Is the patient having any side effects they believe may be caused by any medication additions or changes?  No   Does the patient have all medications ordered at discharge?  Yes   Is the patient taking all medications as directed (includes completed medication regime)?  Yes   Does the patient have a primary care provider?   Yes   Does the patient have an appointment with their PCP within 7 days of discharge?  Yes   Comments regarding PCP  PCP APPOINTMENT IS TOMORROW 10/16, AND HE STATES HE HAS SURGERY SCHEDULED FOR 10/27 AT    Has the patient kept scheduled appointments due by today?  Yes   Did the patient receive a copy of their discharge instructions?  Yes   Nursing interventions  Reviewed instructions with patient   What is the patient's perception of their health status since discharge?  Improving   Is the patient/caregiver able to teach back signs and symptoms related to disease process for when to call PCP?  Yes   Is the patient/caregiver able to teach back signs and symptoms related to disease process for when to call 911?  Yes   Is the patient/caregiver able to teach back the hierarchy of who to call/visit for symptoms/problems? PCP, Specialist, Home health nurse, Urgent Care, ED, 911  Yes   If the patient is a current smoker, are they able to teach back resources for cessation?  Not a smoker   Week 2 Call Completed?  Yes          Sara Aranda RN

## 2020-11-02 ENCOUNTER — RESULTS ENCOUNTER (OUTPATIENT)
Dept: UROLOGY | Facility: CLINIC | Age: 85
End: 2020-11-02

## 2020-11-02 DIAGNOSIS — E29.1 HYPOGONADISM MALE: ICD-10-CM

## 2020-11-02 LAB — FUNGUS WND CULT: ABNORMAL

## 2020-11-04 ENCOUNTER — EPISODE CHANGES (OUTPATIENT)
Dept: CASE MANAGEMENT | Facility: OTHER | Age: 85
End: 2020-11-04

## 2020-11-04 ENCOUNTER — OFFICE VISIT (OUTPATIENT)
Dept: PULMONOLOGY | Facility: CLINIC | Age: 85
End: 2020-11-04

## 2020-11-04 VITALS
SYSTOLIC BLOOD PRESSURE: 128 MMHG | WEIGHT: 218 LBS | HEART RATE: 75 BPM | BODY MASS INDEX: 32.29 KG/M2 | DIASTOLIC BLOOD PRESSURE: 70 MMHG | OXYGEN SATURATION: 95 % | HEIGHT: 69 IN | TEMPERATURE: 98 F

## 2020-11-04 DIAGNOSIS — Z99.81 HYPOXEMIA REQUIRING SUPPLEMENTAL OXYGEN: ICD-10-CM

## 2020-11-04 DIAGNOSIS — Z01.812 BLOOD TESTS PRIOR TO TREATMENT OR PROCEDURE: Primary | ICD-10-CM

## 2020-11-04 DIAGNOSIS — Z86.711 HISTORY OF PULMONARY EMBOLISM: ICD-10-CM

## 2020-11-04 DIAGNOSIS — R06.09 DYSPNEA ON EXERTION: ICD-10-CM

## 2020-11-04 DIAGNOSIS — R09.02 HYPOXEMIA REQUIRING SUPPLEMENTAL OXYGEN: ICD-10-CM

## 2020-11-04 DIAGNOSIS — J84.112 IPF (IDIOPATHIC PULMONARY FIBROSIS) (HCC): Primary | ICD-10-CM

## 2020-11-04 PROBLEM — I50.811 ACUTE SYSTOLIC RIGHT HEART FAILURE (HCC): Status: RESOLVED | Noted: 2019-08-26 | Resolved: 2020-11-04

## 2020-11-04 PROBLEM — I26.02 ACUTE SADDLE PULMONARY EMBOLISM WITH ACUTE COR PULMONALE: Status: RESOLVED | Noted: 2019-08-23 | Resolved: 2020-11-04

## 2020-11-04 PROBLEM — I82.401 ACUTE DEEP VEIN THROMBOSIS (DVT) OF RIGHT LOWER EXTREMITY (HCC): Status: RESOLVED | Noted: 2019-08-26 | Resolved: 2020-11-04

## 2020-11-04 PROBLEM — R06.03 ACUTE RESPIRATORY DISTRESS: Status: RESOLVED | Noted: 2019-08-23 | Resolved: 2020-11-04

## 2020-11-04 LAB — CHROMATIN AB SERPL-ACNC: <10 IU/ML (ref 0–14)

## 2020-11-04 PROCEDURE — 86225 DNA ANTIBODY NATIVE: CPT | Performed by: INTERNAL MEDICINE

## 2020-11-04 PROCEDURE — 80076 HEPATIC FUNCTION PANEL: CPT | Performed by: INTERNAL MEDICINE

## 2020-11-04 PROCEDURE — 86063 ANTISTREPTOLYSIN O SCREEN: CPT | Performed by: INTERNAL MEDICINE

## 2020-11-04 PROCEDURE — 36415 COLL VENOUS BLD VENIPUNCTURE: CPT | Performed by: INTERNAL MEDICINE

## 2020-11-04 PROCEDURE — 84550 ASSAY OF BLOOD/URIC ACID: CPT | Performed by: INTERNAL MEDICINE

## 2020-11-04 PROCEDURE — 94618 PULMONARY STRESS TESTING: CPT | Performed by: INTERNAL MEDICINE

## 2020-11-04 PROCEDURE — 86140 C-REACTIVE PROTEIN: CPT | Performed by: INTERNAL MEDICINE

## 2020-11-04 PROCEDURE — 85025 COMPLETE CBC W/AUTO DIFF WBC: CPT | Performed by: INTERNAL MEDICINE

## 2020-11-04 PROCEDURE — 99204 OFFICE O/P NEW MOD 45 MIN: CPT | Performed by: INTERNAL MEDICINE

## 2020-11-04 PROCEDURE — 86431 RHEUMATOID FACTOR QUANT: CPT | Performed by: INTERNAL MEDICINE

## 2020-11-04 PROCEDURE — 86038 ANTINUCLEAR ANTIBODIES: CPT | Performed by: INTERNAL MEDICINE

## 2020-11-04 NOTE — PROGRESS NOTES
Sorts Subjective:     Chief Complaint:   Chief Complaint   Patient presents with   • Shortness of Breath       HPI:    Forrest Zepeda is a 88 y.o. male seen in consultation at the request of Cristo Barreto MD for evaluation of shortness of breath and possible ILD    He was recently hospitalized at [facility] from 10/13 through 10/14 for bradycardia and hypoxemia. His bradycardia was felt related to Coreg and this was discontinued. He was found to have no pulmonary embolism as he does have a history of that. What was seen on his CAT scan were findings consistent with ILD. He was found to have oxygen desaturation with exercise. He was referred to pulmonary on an outpatient basis.    It appears his shortness of breath arose to his level of consciousness about 6 months ago. He has a mild cough. He notes no lower extremity edema. He had a pulmonary embolism in 2019 while hospitalized for urologic reasons. He was placed on Eliquis for 6 months but has since been placed on Plavix for assistance with prevention though the clotting was considered to be provoked by his hospital confinement. He has had problems over the years with his urologic stent which needs periodic replacement.    He has a remote history of asbestos exposure. He worked as a  and was exposed to asbestos containing brake shoes for period of time. Since then he has worked as a  and a .    He has no significant animal or pet exposure. He has been on Macrodantin in the past but only briefly. He has no significant rheumatologic diagnoses.    Further historical details:    General symptoms:  - no fever  - weight gain of 10 lbs  - no edema    Exercise tolerance:  - SOB at about 60 feet on level ground    Chest symptoms:  - no chest pain  - mild cough    Sputum:  - expectorates clear/white sputum  - denies hemoptysis    Upper airway:  - chronic congestion    Reflux symptoms:  - no reflux symptoms    Recent imaging:  - CT of chest:  Basilar predominant peripheral fibrosis with heterogeneity and honeycombing very consistent with UIP. Images seen.    Current medications are:   Current Outpatient Medications:   •  allopurinol (ZYLOPRIM) 300 MG tablet, Take 1 tablet by mouth Every Night., Disp: 90 tablet, Rfl: 3  •  clopidogrel (PLAVIX) 75 MG tablet, Take 1 tablet by mouth Daily. Start on 2/1/20, after completing course of eliquis, Disp: 90 tablet, Rfl: 3  •  clotrimazole (LOTRIMIN) 1 % cream, Apply  topically to the appropriate area as directed 2 (Two) Times a Day., Disp: 45 g, Rfl: 0  •  desoximetasone (TOPICORT) 0.25 % ointment, Apply  topically to the appropriate area as directed 2 (Two) Times a Day., Disp: 60 g, Rfl: 2  •  diphenhydrAMINE-acetaminophen (TYLENOL PM)  MG tablet per tablet, Take 1 tablet by mouth Every Night., Disp: , Rfl:   •  finasteride (PROSCAR) 5 MG tablet, Take 1 tablet by mouth Daily., Disp: 90 tablet, Rfl: 3  •  glimepiride (Amaryl) 2 MG tablet, Take 1 tablet by mouth Every Morning Before Breakfast., Disp: 90 tablet, Rfl: 1  •  glucose blood (TRUE METRIX BLOOD GLUCOSE TEST) test strip, Use once a day as instructed, Disp: 100 each, Rfl: 6  •  Insulin Glargine, 1 Unit Dial, (Toujeo SoloStar) 300 UNIT/ML solution pen-injector injection, Inject 15 Units under the skin into the appropriate area as directed Daily., Disp: 3 pen, Rfl: 3  •  lactobacillus acidophilus (RISAQUAD) capsule capsule, Take 1 capsule by mouth Daily., Disp: 30 capsule, Rfl: 0  •  levothyroxine (SYNTHROID, LEVOTHROID) 50 MCG tablet, Take 1 tablet by mouth Daily., Disp: 90 tablet, Rfl: 3  •  Multiple Vitamins-Minerals (OCUVITE ADULT 50+ PO), Take 1 capsule by mouth Daily., Disp: , Rfl:   •  NOVOFINE 32G X 6 MM misc, USE ONE DAILY, Disp: 100 each, Rfl: 3  •  polyethylene glycol (MIRALAX) pack packet, Take 17 g by mouth Daily., Disp: , Rfl:   •  silodosin (RAPAFLO) 8 MG capsule capsule, Take 1 capsule by mouth Daily With Breakfast., Disp: 90 capsule,  Rfl: 3  •  SITagliptin (JANUVIA) 100 MG tablet, Take 1 tablet by mouth Daily., Disp: 90 tablet, Rfl: 3  •  Testosterone Cypionate (Depo-Testosterone) 200 MG/ML injection, Inject 2 mL into the appropriate muscle as directed by prescriber Every 14 (Fourteen) Days., Disp: 4 mL, Rfl: 2.      The patient's relevant past medical, surgical, family and social history were reviewed and updated in Epic as appropriate.     ROS:    Review of Systems  ROS documented in patient questionnaire ×14 systems.  Reviewed with patient.  Otherwise negative except as noted in HPI.    Objective:    Physical Exam  Vitals signs and nursing note reviewed.   Constitutional:       Appearance: Normal appearance. He is well-developed.   HENT:      Head: Normocephalic and atraumatic.      Nose: Nose normal.      Mouth/Throat:      Mouth: Mucous membranes are moist.      Pharynx: Oropharynx is clear. No oropharyngeal exudate.      Comments: Edentulous  Eyes:      General: No scleral icterus.     Conjunctiva/sclera: Conjunctivae normal.   Neck:      Thyroid: No thyromegaly.      Trachea: No tracheal deviation.   Cardiovascular:      Rate and Rhythm: Normal rate and regular rhythm.      Heart sounds: No murmur. No friction rub. No gallop.    Pulmonary:      Effort: Pulmonary effort is normal. No respiratory distress.      Breath sounds: Rales present. No wheezing.      Comments: Bibasilar Velcro crackles  Musculoskeletal: Normal range of motion.         General: No deformity.   Skin:     General: Skin is warm and dry.      Findings: No rash.   Neurological:      Mental Status: He is alert and oriented to person, place, and time.   Psychiatric:         Behavior: Behavior normal.         Thought Content: Thought content normal.         Diagnostics:     PFT:  -None    CXR:  -None. CT as noted above and images reviewed with the patient and his daughter.    Exercise oximetry: Walked 600 feet with oxygen desaturations to 93% but did not require supplemental  oxygen.  Assessment:    Problem List Items Addressed This Visit        Pulmonary Problems    Dyspnea    Relevant Orders    CBC & Differential    Hepatic Function Panel    JEANNA Screen    Uric acid    Antistreptolysin O screen    Rheumatoid factor    C-reactive protein    CBC Auto Differential    IPF (idiopathic pulmonary fibrosis) (CMS/HCC) - Primary    Relevant Orders    CT Chest Hi Resolution    CBC & Differential    Hepatic Function Panel    JEANNA Screen    Uric acid    Antistreptolysin O screen    Rheumatoid factor    C-reactive protein    CBC Auto Differential    Walking Oximetry (Completed)    Hypoxemia requiring supplemental oxygen    Relevant Orders    CBC & Differential    Hepatic Function Panel    JEANNA Screen    Uric acid    Antistreptolysin O screen    Rheumatoid factor    C-reactive protein    CBC Auto Differential       Other    History of pulmonary embolism    Relevant Orders    CBC & Differential    Hepatic Function Panel    JEANNA Screen    Uric acid    Antistreptolysin O screen    Rheumatoid factor    C-reactive protein    CBC Auto Differential          88-year-old male with an almost certain diagnosis of IPF due to UIP. He is in the proper age group with insidious onset of symptoms and a high probability scan with basilar predominant heterogeneous fibrosis with honeycombing. He does have some hypoxemia and has been recommended to use oxygen at night and with exertion though he is often reticent to do this according to his granddaughter, who accompanies him and is a registered nurse.    Plan:     1. HRCT  2. Full PFTs as these have not been done  3. JEANNA, RF/CCP  4. I discussed the diagnosis in detail as well as its progressive nature and likelihood of about 5 years of survival after diagnosis in most patients without treatment. We discussed the possibility of using Esbriet or Ofev and the potential side effects. I gave the patient and his granddaughter information on pulmonary fibrosis and the medications.  Would likely prefer Esbriet as initial therapy in his case.    Level of Risk Moderate due to: undiagnosed new problem    Signed by  eSe Alexandre MD

## 2020-11-05 LAB
ALBUMIN SERPL-MCNC: 4.3 G/DL (ref 3.5–5.2)
ALP SERPL-CCNC: 90 U/L (ref 39–117)
ALT SERPL W P-5'-P-CCNC: 15 U/L (ref 1–41)
ASO AB SERPL-ACNC: NEGATIVE [IU]/ML
AST SERPL-CCNC: 25 U/L (ref 1–40)
BASOPHILS # BLD AUTO: 0.11 10*3/MM3 (ref 0–0.2)
BASOPHILS NFR BLD AUTO: 0.9 % (ref 0–1.5)
BILIRUB CONJ SERPL-MCNC: <0.2 MG/DL (ref 0–0.3)
BILIRUB INDIRECT SERPL-MCNC: NORMAL MG/DL
BILIRUB SERPL-MCNC: 0.4 MG/DL (ref 0–1.2)
CRP SERPL-MCNC: 1.12 MG/DL (ref 0–0.5)
DEPRECATED RDW RBC AUTO: 48.6 FL (ref 37–54)
EOSINOPHIL # BLD AUTO: 0.37 10*3/MM3 (ref 0–0.4)
EOSINOPHIL NFR BLD AUTO: 3 % (ref 0.3–6.2)
ERYTHROCYTE [DISTWIDTH] IN BLOOD BY AUTOMATED COUNT: 14.1 % (ref 12.3–15.4)
HCT VFR BLD AUTO: 47.6 % (ref 37.5–51)
HGB BLD-MCNC: 15.7 G/DL (ref 13–17.7)
IMM GRANULOCYTES # BLD AUTO: 0.08 10*3/MM3 (ref 0–0.05)
IMM GRANULOCYTES NFR BLD AUTO: 0.6 % (ref 0–0.5)
LYMPHOCYTES # BLD AUTO: 3.74 10*3/MM3 (ref 0.7–3.1)
LYMPHOCYTES NFR BLD AUTO: 30.1 % (ref 19.6–45.3)
MCH RBC QN AUTO: 30.9 PG (ref 26.6–33)
MCHC RBC AUTO-ENTMCNC: 33 G/DL (ref 31.5–35.7)
MCV RBC AUTO: 93.7 FL (ref 79–97)
MONOCYTES # BLD AUTO: 0.94 10*3/MM3 (ref 0.1–0.9)
MONOCYTES NFR BLD AUTO: 7.6 % (ref 5–12)
NEUTROPHILS NFR BLD AUTO: 57.8 % (ref 42.7–76)
NEUTROPHILS NFR BLD AUTO: 7.18 10*3/MM3 (ref 1.7–7)
NRBC BLD AUTO-RTO: 0.1 /100 WBC (ref 0–0.2)
PLATELET # BLD AUTO: 233 10*3/MM3 (ref 140–450)
PMV BLD AUTO: 12.5 FL (ref 6–12)
PROT SERPL-MCNC: 7.3 G/DL (ref 6–8.5)
RBC # BLD AUTO: 5.08 10*6/MM3 (ref 4.14–5.8)
URATE SERPL-MCNC: 5.2 MG/DL (ref 3.4–7)
WBC # BLD AUTO: 12.42 10*3/MM3 (ref 3.4–10.8)

## 2020-11-06 LAB
ANA SER QL: POSITIVE
DSDNA AB SER-ACNC: 22 IU/ML (ref 0–9)
Lab: ABNORMAL

## 2020-11-09 DIAGNOSIS — R79.89 LOW TESTOSTERONE: ICD-10-CM

## 2020-11-09 DIAGNOSIS — E29.1 HYPOGONADISM IN MALE: Primary | ICD-10-CM

## 2020-11-10 ENCOUNTER — OFFICE VISIT (OUTPATIENT)
Dept: UROLOGY | Facility: CLINIC | Age: 85
End: 2020-11-10

## 2020-11-10 ENCOUNTER — HOSPITAL ENCOUNTER (OUTPATIENT)
Dept: CT IMAGING | Facility: HOSPITAL | Age: 85
Discharge: HOME | End: 2020-11-10
Admitting: INTERNAL MEDICINE

## 2020-11-10 DIAGNOSIS — E28.0 ESTRADIOL EXCESS: ICD-10-CM

## 2020-11-10 DIAGNOSIS — J84.112 IPF (IDIOPATHIC PULMONARY FIBROSIS) (HCC): ICD-10-CM

## 2020-11-10 DIAGNOSIS — E29.1 HYPOGONADISM IN MALE: Primary | ICD-10-CM

## 2020-11-10 DIAGNOSIS — E29.1 HYPOGONADISM MALE: ICD-10-CM

## 2020-11-10 LAB
BASOPHILS # BLD AUTO: 0.12 10*3/MM3 (ref 0–0.2)
BASOPHILS NFR BLD AUTO: 0.9 % (ref 0–1.5)
BILIRUB BLD-MCNC: NEGATIVE MG/DL
CLARITY, POC: ABNORMAL
COLOR UR: YELLOW
EOSINOPHIL # BLD AUTO: 0.45 10*3/MM3 (ref 0–0.4)
EOSINOPHIL NFR BLD AUTO: 3.2 % (ref 0.3–6.2)
ERYTHROCYTE [DISTWIDTH] IN BLOOD BY AUTOMATED COUNT: 14 % (ref 12.3–15.4)
ESTRADIOL SERPL-MCNC: 154 PG/ML (ref 7.6–42.6)
GLUCOSE UR STRIP-MCNC: NEGATIVE MG/DL
HCT VFR BLD AUTO: 49.8 % (ref 37.5–51)
HGB BLD-MCNC: 16.4 G/DL (ref 13–17.7)
IMM GRANULOCYTES # BLD AUTO: 0.09 10*3/MM3 (ref 0–0.05)
IMM GRANULOCYTES NFR BLD AUTO: 0.6 % (ref 0–0.5)
KETONES UR QL: NEGATIVE
LEUKOCYTE EST, POC: ABNORMAL
LYMPHOCYTES # BLD AUTO: 3.72 10*3/MM3 (ref 0.7–3.1)
LYMPHOCYTES NFR BLD AUTO: 26.6 % (ref 19.6–45.3)
MCH RBC QN AUTO: 31.1 PG (ref 26.6–33)
MCHC RBC AUTO-ENTMCNC: 32.9 G/DL (ref 31.5–35.7)
MCV RBC AUTO: 94.3 FL (ref 79–97)
MONOCYTES # BLD AUTO: 0.81 10*3/MM3 (ref 0.1–0.9)
MONOCYTES NFR BLD AUTO: 5.8 % (ref 5–12)
NEUTROPHILS # BLD AUTO: 8.79 10*3/MM3 (ref 1.7–7)
NEUTROPHILS NFR BLD AUTO: 62.9 % (ref 42.7–76)
NITRITE UR-MCNC: NEGATIVE MG/ML
NRBC BLD AUTO-RTO: 0 /100 WBC (ref 0–0.2)
PH UR: 6 [PH] (ref 5–8)
PLATELET # BLD AUTO: 212 10*3/MM3 (ref 140–450)
PROT UR STRIP-MCNC: ABNORMAL MG/DL
RBC # BLD AUTO: 5.28 10*6/MM3 (ref 4.14–5.8)
RBC # UR STRIP: ABNORMAL /UL
SP GR UR: 1.02 (ref 1–1.03)
TESTOST SERPL-MCNC: >1500 NG/DL (ref 264–916)
UROBILINOGEN UR QL: NORMAL
WBC # BLD AUTO: 13.98 10*3/MM3 (ref 3.4–10.8)

## 2020-11-10 PROCEDURE — 99214 OFFICE O/P EST MOD 30 MIN: CPT | Performed by: UROLOGY

## 2020-11-10 PROCEDURE — 71250 CT THORAX DX C-: CPT

## 2020-11-10 PROCEDURE — 81003 URINALYSIS AUTO W/O SCOPE: CPT | Performed by: UROLOGY

## 2020-11-10 RX ORDER — TESTOSTERONE CYPIONATE 200 MG/ML
400 INJECTION, SOLUTION INTRAMUSCULAR
Qty: 4 ML | Refills: 2 | Status: SHIPPED | OUTPATIENT
Start: 2020-11-10 | End: 2020-12-14

## 2020-11-10 RX ORDER — ANASTROZOLE 1 MG/1
1 TABLET ORAL
Qty: 12 TABLET | Refills: 3 | Status: SHIPPED | OUTPATIENT
Start: 2020-11-10 | End: 2020-12-14

## 2020-11-10 NOTE — PROGRESS NOTES
Chief Complaint  Hypogonadism (follow up labs.)        JAZMIN Zepeda is a 88 y.o. male who returns today for follow-up of his hypogonadism and low testosterone recently being treated with supplemental testosterone 400 mg every 14 days.  He is noted a tremendous clinical benefit in terms of strength stamina and energy and the ability to walk up to right.  This is administered at home by his daughter who is a nurse.  He understands the need to monitor for toxicity therapy and came by recently for blood work.  His testosterone is overcorrected and I have instructed his daughter to reduce this to every 21 days.  Also because of the high level he is converted sound to estradiol and he is started on Arimidex.    He has a known distal urethral stricture that requires insertion of a metallic stent and this was recently changed at .  His urine always grows out by resistant Candida and infectious disease suggested as long as it is asymptomatic to forego the required parenteral therapy.    There were no vitals filed for this visit.    Past Medical History  Past Medical History:   Diagnosis Date   • Abnormal EKG    • Abnormal PSA     Reported abnormal   • Acute deep vein thrombosis (DVT) of right lower extremity (CMS/HCC) 08/22/2019   • Acute diverticulitis 2019   • Acute hyperkalemia 08/22/2019   • Acute respiratory failure with hypoxia (CMS/HCC)    • Acute saddle pulmonary embolism with acute cor pulmonale (CMS/HCC) 08/22/2019   • Acute systolic right heart failure (CMS/HCC) 08/22/2019   • Arthritis     KNEES,  BUT SINCE HAD REPLACEMENT   • Benign localized hyperplasia of prostate    • Bilateral knee pain    • Cataracts, bilateral    • CKD (chronic kidney disease)    • Diabetic neuropathy (CMS/HCC)    • Diabetic neuropathy (CMS/HCC)    • Disease of thyroid gland     HYPOTHYROIDISM   • Diverticulitis    • Dyslipidemia     H/O   • Family history of malignant hyperthermia     Brother    • Full dentures    • Generalized weakness   "  • History of gout    • History of hepatitis A vaccination    • History of nephrolithiasis    • History of nuclear stress test     STATES IN THE 1990'S.  \"IT WAS OK\"   • History of osteopenia    • History of recurrent UTI (urinary tract infection)    • Hydronephrosis of left kidney 7/18/2019   • Hydronephrosis with ureteral stricture    • Hyperkalemia     PT UNSURE REGARDING HX OF THIS   • Hyperlipidemia    • Hypertension    • Insomnia    • Malignant hyperthermia due to anesthesia     PER PT REPORT, BROTHER HAD DURING LUNG SURGERY SEVERAL YEARS AGO.  STATED THEY PACKED HIM ON ICE.  STATED HE DID SURVIVE.  PT DENIES ANY PERSONAL HX OF MALIGNANT HYPERTHERMIA HIMSELF, OR ANY ISSUES WITH ANESTHESIA.  STATES TO HIS KNOWLEDGE, NO OTHER FAMILY MEMBER HAS THIS ISSUE EXCEPT FOR HIS BROTHER.   • On supplemental oxygen therapy     2L [place] QHS   • Other hydronephrosis 8/12/2019    Added automatically from request for surgery [id number]   • Renal insufficiency    • Sepsis (CMS/HCC) 2019   • Shortness of breath     STATES WITH EXERTION, OTHERWISE, DENIES   • Sigmoid diverticulitis without abscess or perforation 8/9/2017   • Skin breakdown     fourth toe right foot noted in 2019 MD D/C note   • Skin ulcer of fourth toe of right foot, limited to breakdown of skin (CMS/HCC) 7/5/2019   • Stricture of ureter    • Type 2 diabetes mellitus (CMS/HCC)    • Ureteral stricture, left    • UTI (urinary tract infection) 7/18/2019   • Vitamin D deficiency    • Wears glasses        Past Surgical History  Past Surgical History:   Procedure Laterality Date   • APPENDECTOMY     • CHOLECYSTECTOMY     • CIRCUMCISION N/A 10/23/2019    Procedure: CIRCUMCISIOn-DORSAL SLIT;  Surgeon: Griffin Romero MD;  Location: [facility] OR;  Service: Urology   • COLONOSCOPY     • CYSTOSCOPY URETEROSCOPY Left 2/11/2019    Procedure: URETEROSCOPY WITH STENT EXTRACTION, RPG;  Surgeon: Griffin Romero MD;  Location: [facility] OR;  Service: Urology   • CYSTOSCOPY W/ URETERAL " STENT PLACEMENT Left 7/20/2019    Procedure: CYSTOSCOPY, LEFT RETROGRADE PYELOGRAM,  ATTEMPTED LEFT URETERAL STENT INSERTION;  Surgeon: Isaac Flynn MD;  Location: [facility];  Service: Urology   • EYE SURGERY      CATARACTS REMOVED BILATERALLY   • KNEE ARTHROPLASTY Bilateral    • KNEE ARTHROSCOPY Bilateral    • RENAL ARTERY STENT      SEVERAL TIMES EVERY SINCE 1978   • URETEROSCOPY LASER LITHOTRIPSY WITH STENT INSERTION Left 1/10/2018    Procedure:  cysto, left ureteral stent replacement ;  Surgeon: Griffin Romero MD;  Location: [facility] OR;  Service:    • URETEROSCOPY LASER LITHOTRIPSY WITH STENT INSERTION Left 8/14/2019    Procedure: URETEROSCOPY, STONE REMOVAL WITH STENT INSERTION;  Surgeon: Griffin Romero MD;  Location: [facility] OR;  Service: Urology   • URETEROSCOPY LASER LITHOTRIPSY WITH STENT INSERTION Left 10/23/2019    Procedure: Left URETEROSCOPY WITH STENT INSERTION-LEFT;  Surgeon: Griffin Romero MD;  Location: [facility] OR;  Service: Urology       Medications  has a current medication list which includes the following prescription(s): allopurinol, clopidogrel, clotrimazole, desoximetasone, diphenhydramine-acetaminophen, finasteride, glimepiride, glucose blood, toujeo solostar, lactobacillus acidophilus, levothyroxine, multiple vitamins-minerals, novofine, polyethylene glycol, silodosin, sitagliptin, and testosterone cypionate.      Allergies  Allergies   Allergen Reactions   • Metformin Diarrhea   • Statins Myalgia       Social History  Social History     Socioeconomic History   • Marital status:      Spouse name: Not on file   • Number of children: Not on file   • Years of education: Not on file   • Highest education level: Not on file   Tobacco Use   • Smoking status: Former Smoker     Types: Cigarettes   • Smokeless tobacco: Never Used   • Tobacco comment: SMOKED SOME AS A TEEN, DENIES ANY HABIT OR ADDICTION   Substance and Sexual Activity   • Alcohol use: No   • Drug use: No   •  Sexual activity: Defer   Social History Narrative    Pt lives alone    Granddaughter Erika is nurse.        Family History  He has no family history of bladder or kidney cancer  He has no family history of kidney stones      AUA Symptom Score:      Review of Systems  Review of Systems   Constitutional: Negative for activity change, appetite change, chills, fatigue, fever, unexpected weight gain and unexpected weight loss.   Respiratory: Negative for apnea, cough, chest tightness, shortness of breath, wheezing and stridor.    Cardiovascular: Negative for chest pain, palpitations and leg swelling.   Gastrointestinal: Negative for abdominal distention, abdominal pain, anal bleeding, blood in stool, constipation, diarrhea, nausea, rectal pain, vomiting, GERD and indigestion.   Genitourinary: Negative for decreased libido, decreased urine volume, difficulty urinating, discharge, dysuria, flank pain, frequency, genital sores, hematuria, nocturia, penile pain, erectile dysfunction, penile swelling, scrotal swelling, testicular pain, urgency and urinary incontinence.   Musculoskeletal: Negative for back pain and joint swelling.   Neurological: Negative for tremors, seizures, speech difficulty, weakness and numbness.   Psychiatric/Behavioral: Negative for agitation, decreased concentration, sleep disturbance, depressed mood and stress. The patient is not nervous/anxious.        Physical Exam  Physical Exam  Vitals signs reviewed.   Constitutional:       Appearance: He is well-developed.   HENT:      Head: Normocephalic and atraumatic.   Neck:      Musculoskeletal: Normal range of motion.   Pulmonary:      Effort: Pulmonary effort is normal. No respiratory distress.   Abdominal:      General: There is no distension.      Palpations: Abdomen is soft. There is no mass.      Tenderness: There is no abdominal tenderness.      Hernia: No hernia is present.   Musculoskeletal: Normal range of motion.   Lymphadenopathy:       Cervical: No cervical adenopathy.   Skin:     General: Skin is warm and dry.   Neurological:      Mental Status: He is alert and oriented to person, place, and time.   Psychiatric:         Behavior: Behavior normal.         Labs Recent and today in the office:  Results for orders placed or performed in visit on 11/10/20   POC Urinalysis Dipstick, Automated    Specimen: Urine   Result Value Ref Range    Color Yellow Yellow, Straw, Dark Yellow, Sushila    Clarity, UA Cloudy (A) Clear    Specific Gravity  1.025 1.005 - 1.030    pH, Urine 6.0 5.0 - 8.0    Leukocytes Small (1+) (A) Negative    Nitrite, UA Negative Negative    Protein, POC 1+ (A) Negative mg/dL    Glucose, UA Negative Negative, 1000 mg/dL (3+) mg/dL    Ketones, UA Negative Negative    Urobilinogen, UA Normal Normal    Bilirubin Negative Negative    Blood, UA 3+ (A) Negative         Assessment & Plan  Hypogonadism/estradiol excess: He is instructed to reduce the amount of testosterone supplement to 400 mg every 21 days and begin Arimidex 1 mg weekly to safely continue the supplement.  He will return in 3 months with follow-up blood work to monitor for toxicity therapy.

## 2020-11-11 DIAGNOSIS — I10 ESSENTIAL HYPERTENSION: ICD-10-CM

## 2020-11-11 RX ORDER — CARVEDILOL 6.25 MG/1
TABLET ORAL
Qty: 180 TABLET | Refills: 2 | Status: SHIPPED | OUTPATIENT
Start: 2020-11-11 | End: 2020-11-30 | Stop reason: ALTCHOICE

## 2020-11-30 ENCOUNTER — OFFICE VISIT (OUTPATIENT)
Dept: FAMILY MEDICINE CLINIC | Facility: CLINIC | Age: 85
End: 2020-11-30

## 2020-11-30 VITALS
TEMPERATURE: 97.8 F | BODY MASS INDEX: 31.96 KG/M2 | OXYGEN SATURATION: 93 % | HEART RATE: 81 BPM | DIASTOLIC BLOOD PRESSURE: 78 MMHG | SYSTOLIC BLOOD PRESSURE: 160 MMHG | WEIGHT: 215.8 LBS | HEIGHT: 69 IN

## 2020-11-30 DIAGNOSIS — R00.1 SYMPTOMATIC BRADYCARDIA: ICD-10-CM

## 2020-11-30 DIAGNOSIS — Z00.00 MEDICARE ANNUAL WELLNESS VISIT, SUBSEQUENT: Primary | ICD-10-CM

## 2020-11-30 DIAGNOSIS — Z12.5 SCREENING FOR PROSTATE CANCER: ICD-10-CM

## 2020-11-30 DIAGNOSIS — E11.65 UNCONTROLLED TYPE 2 DIABETES MELLITUS WITH HYPERGLYCEMIA (HCC): ICD-10-CM

## 2020-11-30 DIAGNOSIS — Z00.00 HEALTHCARE MAINTENANCE: ICD-10-CM

## 2020-11-30 PROCEDURE — 36415 COLL VENOUS BLD VENIPUNCTURE: CPT | Performed by: FAMILY MEDICINE

## 2020-11-30 PROCEDURE — G0439 PPPS, SUBSEQ VISIT: HCPCS | Performed by: FAMILY MEDICINE

## 2020-11-30 PROCEDURE — 96160 PT-FOCUSED HLTH RISK ASSMT: CPT | Performed by: FAMILY MEDICINE

## 2020-11-30 PROCEDURE — 99397 PER PM REEVAL EST PAT 65+ YR: CPT | Performed by: FAMILY MEDICINE

## 2020-11-30 PROCEDURE — 93000 ELECTROCARDIOGRAM COMPLETE: CPT | Performed by: FAMILY MEDICINE

## 2020-11-30 PROCEDURE — 1126F AMNT PAIN NOTED NONE PRSNT: CPT | Performed by: FAMILY MEDICINE

## 2020-11-30 PROCEDURE — 1170F FXNL STATUS ASSESSED: CPT | Performed by: FAMILY MEDICINE

## 2020-12-01 ENCOUNTER — LAB REQUISITION (OUTPATIENT)
Dept: LAB | Facility: HOSPITAL | Age: 85
End: 2020-12-01

## 2020-12-01 DIAGNOSIS — Z11.59 ENCOUNTER FOR SCREENING FOR OTHER VIRAL DISEASES: ICD-10-CM

## 2020-12-01 LAB
ALBUMIN SERPL-MCNC: 4.4 G/DL (ref 3.5–5.2)
ALBUMIN/GLOB SERPL: 1.3 G/DL
ALP SERPL-CCNC: 104 U/L (ref 39–117)
ALT SERPL-CCNC: 17 U/L (ref 1–41)
AST SERPL-CCNC: 19 U/L (ref 1–40)
BASOPHILS # BLD AUTO: 0.1 10*3/MM3 (ref 0–0.2)
BASOPHILS NFR BLD AUTO: 0.9 % (ref 0–1.5)
BILIRUB SERPL-MCNC: 0.3 MG/DL (ref 0–1.2)
BUN SERPL-MCNC: 29 MG/DL (ref 8–23)
BUN/CREAT SERPL: 18 (ref 7–25)
CALCIUM SERPL-MCNC: 9.8 MG/DL (ref 8.6–10.5)
CHLORIDE SERPL-SCNC: 104 MMOL/L (ref 98–107)
CHOLEST SERPL-MCNC: 187 MG/DL (ref 0–200)
CO2 SERPL-SCNC: 28.8 MMOL/L (ref 22–29)
CREAT SERPL-MCNC: 1.61 MG/DL (ref 0.76–1.27)
EOSINOPHIL # BLD AUTO: 0.32 10*3/MM3 (ref 0–0.4)
EOSINOPHIL NFR BLD AUTO: 2.9 % (ref 0.3–6.2)
ERYTHROCYTE [DISTWIDTH] IN BLOOD BY AUTOMATED COUNT: 13.6 % (ref 12.3–15.4)
GLOBULIN SER CALC-MCNC: 3.3 GM/DL
GLUCOSE SERPL-MCNC: 99 MG/DL (ref 65–99)
HBA1C MFR BLD: 6.83 % (ref 4.8–5.6)
HCT VFR BLD AUTO: 52.8 % (ref 37.5–51)
HDLC SERPL-MCNC: 40 MG/DL (ref 40–60)
HGB BLD-MCNC: 17.5 G/DL (ref 13–17.7)
IMM GRANULOCYTES # BLD AUTO: 0.05 10*3/MM3 (ref 0–0.05)
IMM GRANULOCYTES NFR BLD AUTO: 0.5 % (ref 0–0.5)
LDLC SERPL CALC-MCNC: 113 MG/DL (ref 0–100)
LYMPHOCYTES # BLD AUTO: 2.49 10*3/MM3 (ref 0.7–3.1)
LYMPHOCYTES NFR BLD AUTO: 22.5 % (ref 19.6–45.3)
MCH RBC QN AUTO: 30.6 PG (ref 26.6–33)
MCHC RBC AUTO-ENTMCNC: 33.1 G/DL (ref 31.5–35.7)
MCV RBC AUTO: 92.5 FL (ref 79–97)
MONOCYTES # BLD AUTO: 0.7 10*3/MM3 (ref 0.1–0.9)
MONOCYTES NFR BLD AUTO: 6.3 % (ref 5–12)
NEUTROPHILS # BLD AUTO: 7.42 10*3/MM3 (ref 1.7–7)
NEUTROPHILS NFR BLD AUTO: 66.9 % (ref 42.7–76)
NRBC BLD AUTO-RTO: 0 /100 WBC (ref 0–0.2)
PLATELET # BLD AUTO: 228 10*3/MM3 (ref 140–450)
POTASSIUM SERPL-SCNC: 4.9 MMOL/L (ref 3.5–5.2)
PROT SERPL-MCNC: 7.7 G/DL (ref 6–8.5)
PSA SERPL-MCNC: 8.64 NG/ML (ref 0–4)
RBC # BLD AUTO: 5.71 10*6/MM3 (ref 4.14–5.8)
SARS-COV-2 RNA RESP QL NAA+PROBE: NOT DETECTED
SODIUM SERPL-SCNC: 143 MMOL/L (ref 136–145)
TRIGL SERPL-MCNC: 192 MG/DL (ref 0–150)
TSH SERPL DL<=0.005 MIU/L-ACNC: 2.38 UIU/ML (ref 0.27–4.2)
VLDLC SERPL CALC-MCNC: 34 MG/DL (ref 5–40)
WBC # BLD AUTO: 11.08 10*3/MM3 (ref 3.4–10.8)

## 2020-12-01 PROCEDURE — U0004 COV-19 TEST NON-CDC HGH THRU: HCPCS | Performed by: INTERNAL MEDICINE

## 2020-12-04 ENCOUNTER — OFFICE VISIT (OUTPATIENT)
Dept: CARDIOLOGY | Facility: HOSPITAL | Age: 85
End: 2020-12-04

## 2020-12-04 ENCOUNTER — OFFICE VISIT (OUTPATIENT)
Dept: PULMONOLOGY | Facility: CLINIC | Age: 85
End: 2020-12-04

## 2020-12-04 ENCOUNTER — HOSPITAL ENCOUNTER (OUTPATIENT)
Dept: CARDIOLOGY | Facility: HOSPITAL | Age: 85
Discharge: HOME | End: 2020-12-04

## 2020-12-04 VITALS
BODY MASS INDEX: 31.73 KG/M2 | RESPIRATION RATE: 20 BRPM | OXYGEN SATURATION: 95 % | HEART RATE: 81 BPM | WEIGHT: 214.25 LBS | SYSTOLIC BLOOD PRESSURE: 173 MMHG | TEMPERATURE: 98.1 F | DIASTOLIC BLOOD PRESSURE: 78 MMHG | HEIGHT: 69 IN

## 2020-12-04 VITALS
BODY MASS INDEX: 31.84 KG/M2 | SYSTOLIC BLOOD PRESSURE: 178 MMHG | DIASTOLIC BLOOD PRESSURE: 74 MMHG | WEIGHT: 215 LBS | HEART RATE: 80 BPM | OXYGEN SATURATION: 99 % | HEIGHT: 69 IN | TEMPERATURE: 97.5 F

## 2020-12-04 DIAGNOSIS — R00.1 BRADYCARDIA, SINUS: ICD-10-CM

## 2020-12-04 DIAGNOSIS — R53.83 OTHER FATIGUE: ICD-10-CM

## 2020-12-04 DIAGNOSIS — I10 ESSENTIAL HYPERTENSION: ICD-10-CM

## 2020-12-04 DIAGNOSIS — Z86.711 HISTORY OF PULMONARY EMBOLISM: ICD-10-CM

## 2020-12-04 DIAGNOSIS — R00.1 BRADYCARDIA, SINUS: Primary | ICD-10-CM

## 2020-12-04 DIAGNOSIS — R42 DIZZINESS: ICD-10-CM

## 2020-12-04 DIAGNOSIS — R06.09 DYSPNEA ON EXERTION: Primary | ICD-10-CM

## 2020-12-04 DIAGNOSIS — Z99.81 HYPOXEMIA REQUIRING SUPPLEMENTAL OXYGEN: ICD-10-CM

## 2020-12-04 DIAGNOSIS — R09.02 HYPOXEMIA REQUIRING SUPPLEMENTAL OXYGEN: ICD-10-CM

## 2020-12-04 DIAGNOSIS — J84.112 IPF (IDIOPATHIC PULMONARY FIBROSIS) (HCC): ICD-10-CM

## 2020-12-04 PROCEDURE — 94729 DIFFUSING CAPACITY: CPT | Performed by: INTERNAL MEDICINE

## 2020-12-04 PROCEDURE — 99214 OFFICE O/P EST MOD 30 MIN: CPT | Performed by: INTERNAL MEDICINE

## 2020-12-04 PROCEDURE — 99214 OFFICE O/P EST MOD 30 MIN: CPT | Performed by: NURSE PRACTITIONER

## 2020-12-04 PROCEDURE — 94726 PLETHYSMOGRAPHY LUNG VOLUMES: CPT | Performed by: INTERNAL MEDICINE

## 2020-12-04 PROCEDURE — 94375 RESPIRATORY FLOW VOLUME LOOP: CPT | Performed by: INTERNAL MEDICINE

## 2020-12-04 RX ORDER — LOSARTAN POTASSIUM 25 MG/1
25 TABLET ORAL DAILY
Qty: 30 TABLET | Refills: 3 | Status: SHIPPED | OUTPATIENT
Start: 2020-12-04 | End: 2021-03-16 | Stop reason: SDUPTHER

## 2020-12-04 NOTE — PROGRESS NOTES
[facility]  Heart and Valve Center      12/04/2020         Forrest Zepeda  [address] [place] [address]  [unfilled]    5/6/1932    Ryne Lofton MD    Forrest Zepeda is a 88 y.o. male.      Subjective:     Chief Complaint:  Slow Heart Rate and Establish Care       HPI 88-year-old male presents the office today at the request of his primary care provider Dr. Lofton for ongoing evaluation of his recent bradycardia.  Patient was noted to be bradycardic during recent Medicare wellness visit with heart rate in the forties.  Patient reports he has been experiencing intermittent fatigue, dizziness and generalized weakness for the past 2 to 3 years.  He reports he sustained a fall in July.  Patient reports that he was walking on the sidewalk which was not raised and suddenly was on the ground.  Does not remember any dizziness, palpitations or lightheadedness prior to that fall.  Patient reports he is very active and continues to live on his own.  Denies chest pain.  Does note dyspnea on exertion that improves with rest.  Has a history of pulmonary hypertension and is followed by Dr. Hernandez.  Per his granddaughter's report who is an RN, heart rate is sometimes noted to be in the thirties.      Patient Active Problem List   Diagnosis   • Uncontrolled type 2 diabetes mellitus (CMS/HCC)   • Hypertension   • Generalized osteoarthritis   • Diabetic neuropathy (CMS/HCC)   • Gout   • Hypertriglyceridemia   • Acquired hypothyroidism   • CKD (chronic kidney disease) stage 3, GFR 30-59 ml/min   • Hydronephrosis with renal and ureteral calculus obstruction   • Dyspnea   • IPF (idiopathic pulmonary fibrosis) (CMS/HCC)   • Symptomatic bradycardia   • Candida UTI   • Hypoxemia requiring supplemental oxygen   • History of pulmonary embolism       Past Medical History:   Diagnosis Date   • Abnormal EKG    • Abnormal PSA     Reported abnormal   • Acute deep vein thrombosis (DVT) of right lower extremity (CMS/HCC)  "08/22/2019   • Acute diverticulitis 2019   • Acute hyperkalemia 08/22/2019   • Acute respiratory failure with hypoxia (CMS/[facility])    • Acute saddle pulmonary embolism with acute cor pulmonale (CMS/[facility]) 08/22/2019   • Acute systolic right heart failure (CMS/[facility]) 08/22/2019   • Arthritis     KNEES,  BUT SINCE HAD REPLACEMENT   • Benign localized hyperplasia of prostate    • Bilateral knee pain    • Cataracts, bilateral    • CKD (chronic kidney disease)    • Diabetic neuropathy (CMS/[facility])    • Diabetic neuropathy (CMS/[facility])    • Disease of thyroid gland     HYPOTHYROIDISM   • Diverticulitis    • Dyslipidemia     H/O   • Family history of malignant hyperthermia     Brother    • Full dentures    • Generalized weakness    • History of gout    • History of hepatitis A vaccination    • History of nephrolithiasis    • History of nuclear stress test     STATES IN THE 1990'S.  \"IT WAS OK\"   • History of osteopenia    • History of recurrent UTI (urinary tract infection)    • Hydronephrosis of left kidney 7/18/2019   • Hydronephrosis with ureteral stricture    • Hyperkalemia     PT UNSURE REGARDING HX OF THIS   • Hyperlipidemia    • Hypertension    • Insomnia    • Malignant hyperthermia due to anesthesia     PER PT REPORT, BROTHER HAD DURING LUNG SURGERY SEVERAL YEARS AGO.  STATED THEY PACKED HIM ON ICE.  STATED HE DID SURVIVE.  PT DENIES ANY PERSONAL HX OF MALIGNANT HYPERTHERMIA HIMSELF, OR ANY ISSUES WITH ANESTHESIA.  STATES TO HIS KNOWLEDGE, NO OTHER FAMILY MEMBER HAS THIS ISSUE EXCEPT FOR HIS BROTHER.   • On supplemental oxygen therapy     2L [place] QHS   • Other hydronephrosis 8/12/2019    Added automatically from request for surgery [id number]   • Renal insufficiency    • Sepsis (CMS/[facility]) 2019   • Shortness of breath     STATES WITH EXERTION, OTHERWISE, DENIES   • Sigmoid diverticulitis without abscess or perforation 8/9/2017   • Skin breakdown     fourth toe right foot noted in 2019 MD D/C note   • Skin ulcer of fourth toe of right " foot, limited to breakdown of skin (CMS/HCC) 7/5/2019   • Stricture of ureter    • Type 2 diabetes mellitus (CMS/HCC)    • Ureteral stricture, left    • UTI (urinary tract infection) 7/18/2019   • Vitamin D deficiency    • Wears glasses        Past Surgical History:   Procedure Laterality Date   • APPENDECTOMY     • CHOLECYSTECTOMY     • CIRCUMCISION N/A 10/23/2019    Procedure: CIRCUMCISIOn-DORSAL SLIT;  Surgeon: Griffin Romero MD;  Location: [facility] OR;  Service: Urology   • COLONOSCOPY     • CYSTOSCOPY URETEROSCOPY Left 2/11/2019    Procedure: URETEROSCOPY WITH STENT EXTRACTION, RPG;  Surgeon: Griffin Romero MD;  Location: [facility] OR;  Service: Urology   • CYSTOSCOPY W/ URETERAL STENT PLACEMENT Left 7/20/2019    Procedure: CYSTOSCOPY, LEFT RETROGRADE PYELOGRAM,  ATTEMPTED LEFT URETERAL STENT INSERTION;  Surgeon: Isaac Flynn MD;  Location: [facility] OR;  Service: Urology   • EYE SURGERY      CATARACTS REMOVED BILATERALLY   • KNEE ARTHROPLASTY Bilateral    • KNEE ARTHROSCOPY Bilateral    • RENAL ARTERY STENT      SEVERAL TIMES EVERY SINCE 1978   • URETERAL STENT INSERTION  10/2020   • URETEROSCOPY LASER LITHOTRIPSY WITH STENT INSERTION Left 1/10/2018    Procedure:  cysto, left ureteral stent replacement ;  Surgeon: Griffin Romero MD;  Location: [facility] OR;  Service:    • URETEROSCOPY LASER LITHOTRIPSY WITH STENT INSERTION Left 8/14/2019    Procedure: URETEROSCOPY, STONE REMOVAL WITH STENT INSERTION;  Surgeon: Griffin Romero MD;  Location: [facility] OR;  Service: Urology   • URETEROSCOPY LASER LITHOTRIPSY WITH STENT INSERTION Left 10/23/2019    Procedure: Left URETEROSCOPY WITH STENT INSERTION-LEFT;  Surgeon: Griffin Romero MD;  Location: [facility] OR;  Service: Urology       Family History   Problem Relation Age of Onset   • Heart attack Sister    • Brain cancer Sister    • Stroke Sister    • Lung cancer Sister    • Brain cancer Brother    • Lung cancer Brother    • Malig Hyperthermia Brother         Social History     Socioeconomic History   • Marital status:      Spouse name: Not on file   • Number of children: Not on file   • Years of education: Not on file   • Highest education level: Not on file   Tobacco Use   • Smoking status: Former Smoker     Types: Cigarettes   • Smokeless tobacco: Never Used   • Tobacco comment: SMOKED SOME AS A TEEN, DENIES ANY HABIT OR ADDICTION   Substance and Sexual Activity   • Alcohol use: No   • Drug use: No   • Sexual activity: Defer   Social History Narrative    Pt lives alone    Granddaughter Erika is nurse.     Caffeine soda ocassional       Allergies   Allergen Reactions   • Metformin Diarrhea   • Statins Myalgia         Current Outpatient Medications:   •  allopurinol (ZYLOPRIM) 300 MG tablet, Take 1 tablet by mouth Every Night., Disp: 90 tablet, Rfl: 3  •  clopidogrel (PLAVIX) 75 MG tablet, Take 1 tablet by mouth Daily. Start on 2/1/20, after completing course of eliquis, Disp: 90 tablet, Rfl: 3  •  clotrimazole (LOTRIMIN) 1 % cream, Apply  topically to the appropriate area as directed 2 (Two) Times a Day., Disp: 45 g, Rfl: 0  •  desoximetasone (TOPICORT) 0.25 % ointment, Apply  topically to the appropriate area as directed 2 (Two) Times a Day., Disp: 60 g, Rfl: 2  •  finasteride (PROSCAR) 5 MG tablet, Take 1 tablet by mouth Daily., Disp: 90 tablet, Rfl: 3  •  glimepiride (Amaryl) 2 MG tablet, Take 1 tablet by mouth Every Morning Before Breakfast., Disp: 90 tablet, Rfl: 1  •  glucose blood (TRUE METRIX BLOOD GLUCOSE TEST) test strip, Use once a day as instructed, Disp: 100 each, Rfl: 6  •  Insulin Glargine, 1 Unit Dial, (Toujeo SoloStar) 300 UNIT/ML solution pen-injector injection, Inject 15 Units under the skin into the appropriate area as directed Daily., Disp: 3 pen, Rfl: 3  •  levothyroxine (SYNTHROID, LEVOTHROID) 50 MCG tablet, Take 1 tablet by mouth Daily., Disp: 90 tablet, Rfl: 3  •  Multiple Vitamins-Minerals (OCUVITE ADULT 50+ PO), Take 1 capsule by  mouth Daily., Disp: , Rfl:   •  NOVOFINE 32G X 6 MM misc, USE ONE DAILY, Disp: 100 each, Rfl: 3  •  polyethylene glycol (MIRALAX) pack packet, Take 17 g by mouth Daily., Disp: , Rfl:   •  silodosin (RAPAFLO) 8 MG capsule capsule, Take 1 capsule by mouth Daily With Breakfast., Disp: 90 capsule, Rfl: 3  •  SITagliptin (JANUVIA) 100 MG tablet, Take 1 tablet by mouth Daily., Disp: 90 tablet, Rfl: 3  •  anastrozole (Arimidex) 1 MG tablet, Take 1 tablet by mouth Every 7 (Seven) Days., Disp: 12 tablet, Rfl: 3  •  diphenhydrAMINE-acetaminophen (TYLENOL PM)  MG tablet per tablet, Take 1 tablet by mouth Every Night., Disp: , Rfl:   •  lactobacillus acidophilus (RISAQUAD) capsule capsule, Take 1 capsule by mouth Daily., Disp: 30 capsule, Rfl: 0  •  Testosterone Cypionate (Depo-Testosterone) 200 MG/ML injection, Inject 2 mL into the appropriate muscle as directed by prescriber Every 21 (Twenty-One) Days., Disp: 4 mL, Rfl: 2    The following portions of the patient's history were reviewed today and updated as appropriate: allergies, current medications, past family history, past medical history, past social history, past surgical history and problem list     Review of Systems   Constitution: Positive for malaise/fatigue. Negative for chills, decreased appetite, diaphoresis, fever, night sweats, weight gain and weight loss.   HENT: Negative for congestion, hearing loss, hoarse voice and nosebleeds.    Eyes: Negative for blurred vision, visual disturbance and visual halos.   Cardiovascular: Negative for chest pain, claudication, cyanosis, dyspnea on exertion, irregular heartbeat, leg swelling, near-syncope, orthopnea, palpitations, paroxysmal nocturnal dyspnea and syncope.   Respiratory: Negative for cough, hemoptysis, shortness of breath, sleep disturbances due to breathing, snoring, sputum production and wheezing.    Hematologic/Lymphatic: Negative for bleeding problem. Does not bruise/bleed easily.   Skin: Negative for  "dry skin, itching and rash.   Musculoskeletal: Positive for falls (july 2020). Negative for arthritis, joint pain, joint swelling and myalgias.   Gastrointestinal: Negative for bloating, abdominal pain, constipation, diarrhea, flatus, heartburn, hematemesis, hematochezia, melena, nausea and vomiting.   Genitourinary: Negative for dysuria, frequency, hematuria, nocturia and urgency.   Neurological: Positive for dizziness and weakness. Negative for excessive daytime sleepiness, headaches, light-headedness and loss of balance.   Psychiatric/Behavioral: Negative for depression. The patient does not have insomnia and is not nervous/anxious.        Objective:     Vitals:    12/04/20 1047 12/04/20 1052 12/04/20 1053   BP: (!) 182/84 166/78 173/78   BP Location: Right arm Left arm Left arm   Patient Position: Sitting Standing Sitting   Cuff Size: Adult Adult Adult   Pulse: 88 84 81   Resp:   20   Temp:   98.1 °F (36.7 °C)   TempSrc:   Temporal   SpO2: 96% 95% 95%   Weight:   97.2 kg (214 lb 4 oz)   Height:   175.3 cm (69\")       Body mass index is 31.64 kg/m².    Vitals signs and nursing note reviewed.   Constitutional:       General: Not in acute distress.     Appearance: Well-developed.   Eyes:      Conjunctiva/sclera: Conjunctivae normal.      Pupils: Pupils are equal, round, and reactive to light.   HENT:      Head: Normocephalic and atraumatic.   Neck:      Musculoskeletal: Normal range of motion and neck supple.      Thyroid: No thyromegaly.      Vascular: No JVD.      Trachea: No tracheal deviation.   Pulmonary:      Effort: Pulmonary effort is normal.      Breath sounds: Normal breath sounds.   Cardiovascular:      Normal rate. Regular rhythm.   Pulses:     Intact distal pulses.   Abdominal:      General: Bowel sounds are normal. There is no distension.      Palpations: Abdomen is soft.      Tenderness: There is no abdominal tenderness.   Musculoskeletal: Normal range of motion.   Skin:     General: Skin is warm and " dry.   Neurological:      Mental Status: Alert and oriented to person, place, and time.   Psychiatric:         Behavior: Behavior normal. Behavior is cooperative.         Lab and Diagnostic Review:  Results for orders placed or performed in visit on 12/01/20   COVID-19 PCR, [facility], NP SWAB IN [facility] V[facility]AL TRANSPORT MEDIA 24-30 HR TAT - Swab, Nasopharynx    Specimen: Nasopharynx; Swab   Result Value Ref Range    SARS-CoV-2 LYUBOV Not Detected Not Detected     ekg 10/2020: Blood Pressure : **/** mmHG  Vent. Rate : 046 BPM     Atrial Rate : 046 BPM     P-R Int : 190 ms          QRS Dur : 092 ms      QT Int : 416 ms       P-R-T Axes : 017 071 026 degrees     QTc Int : 364 ms     Sinus bradycardia with premature atrial complexes  Otherwise normal ECG  When compared with ECG of 21-OCT-2019 14:54,  premature atrial complexes are now present  QRS axis shifted right  QT has shortened  Confirmed by ENZO BECK, HARRIS ([id number]) on 10/20/2020 12:42:20 PM       Assessment and Plan:   1. Bradycardia, sinus  Coreg had been discontinued  Suspect SSS  - Mobile Cardiac Outpatient Telemetry; Future    2. Dizziness    - Mobile Cardiac Outpatient Telemetry; Future    3. Other fatigue    - Mobile Cardiac Outpatient Telemetry; Future    4. Essential hypertension  Begin losartan 25 mg daily and watch bps closely            It has been a pleasure to participate in the care of this patient.  Patient was instructed to call the [facility] with any questions, concerns, or worsening symptoms.    *Please note that portions of this note were completed with a voice recognition program. Efforts were made to edit the dictations, but occasionally words are mistranscribed.

## 2020-12-04 NOTE — PROGRESS NOTES
[facility] Heart Monitor Documentation    Forrest Zepeda  5/6/1932  [id number]  12/04/20    JOYCE Urbina    [] ZIO XT Patch  Model [id number] Prescribed for N/A Days    · Serial Number: (N + 9 Digits) N   · Apply-By Date on Box:   · USPS Tracking Number:   · USPS Tracking        [] [employer] BodyGuardian MINI PLUS Mobile Cardiac Telemetry  Model BGMINIPLUS Prescribed for 30 Days    · Serial Number: (BGM + 7 Digits) [id number]  · Shipped-By Date on Box: [id number]  · UPS Tracking Number: [id number]  · UPS Tracking      [] [employer]ian MINI Holter Monitor  Model BGMINIEL Prescribed for N/A Days    · Serial Number: (7 Digits)   · Shipped-By Date on Box:   · UPS Tracking Number: [id number]  · UPS Tracking        This monitor was applied to the patient's chest and checked for proper functioning.  Mr. Forrest Zepeda was instructed in the proper use of this monitor.  He was given the opportunity to ask questions and left the office with the device 's instruction manual.    Aarti Strauss MA, 11:25 EST, 12/04/20                  [facility]

## 2020-12-04 NOTE — PATIENT INSTRUCTIONS
Begin losartan 25 mg once a day for high blood pressure  Please wear heart monitor for up to 30 days

## 2020-12-04 NOTE — PROGRESS NOTES
Subjective:     Chief Complaint:   Chief Complaint   Patient presents with   • ipf   • Follow-up       HPI:    Forrest Zepeda is a 88 y.o. male here for follow-up of IPF    I saw him in consultation 1 month ago initially.    He was recently hospitalized at [facility] from 10/13 through 10/14 for bradycardia and hypoxemia. His bradycardia was felt related to Coreg and this was discontinued. He was found to have no pulmonary embolism as he does have a history of that. What was seen on his CAT scan were findings consistent with ILD. He was found to have oxygen desaturation with exercise. He was referred to me after discharge.     It appears his shortness of breath arose to his level of consciousness about 6 months ago. He has a mild cough. He notes no lower extremity edema. He had a pulmonary embolism in 2019 while hospitalized for urologic reasons. He was placed on Eliquis for 6 months but has since been placed on Plavix for assistance with prevention though the clotting was considered to be provoked by his hospital confinement. He has had problems over the years with his urologic stent which needs periodic replacement.     He has a remote history of asbestos exposure. He worked as a  and was exposed to asbestos containing brake shoes for period of time. Since then he has worked as a  and a .     He has no significant animal or pet exposure. He has been on Macrodantin in the past but only briefly    At the time of his initial evaluation I felt that he almost certainly had IPF.  An HRCT was performed which revealed findings highly consistent with UIP.  PFTs revealed a mild restrictive defect.  Serologies showed a positive JEANNA screen and double-stranded DNA positivity of unclear clinical significance.    Further details:    General symptoms:  - no fever  - no edema    Exercise tolerance:  -Shortness of breath at 60 feet on level ground    Chest symptoms:  - no chest pain  - mild  cough    Sputum:  - expectorates clear/white sputum  - denies hemoptysis    Upper airway:  - chronic congestion    Recent imaging:  - high resolution CT of chest: Findings consistent with UIP    Current medications are:   Current Outpatient Medications:   •  allopurinol (ZYLOPRIM) 300 MG tablet, Take 1 tablet by mouth Every Night., Disp: 90 tablet, Rfl: 3  •  anastrozole (Arimidex) 1 MG tablet, Take 1 tablet by mouth Every 7 (Seven) Days., Disp: 12 tablet, Rfl: 3  •  clopidogrel (PLAVIX) 75 MG tablet, Take 1 tablet by mouth Daily. Start on 2/1/20, after completing course of eliquis, Disp: 90 tablet, Rfl: 3  •  clotrimazole (LOTRIMIN) 1 % cream, Apply  topically to the appropriate area as directed 2 (Two) Times a Day., Disp: 45 g, Rfl: 0  •  desoximetasone (TOPICORT) 0.25 % ointment, Apply  topically to the appropriate area as directed 2 (Two) Times a Day., Disp: 60 g, Rfl: 2  •  diphenhydrAMINE-acetaminophen (TYLENOL PM)  MG tablet per tablet, Take 1 tablet by mouth Every Night., Disp: , Rfl:   •  finasteride (PROSCAR) 5 MG tablet, Take 1 tablet by mouth Daily., Disp: 90 tablet, Rfl: 3  •  glimepiride (Amaryl) 2 MG tablet, Take 1 tablet by mouth Every Morning Before Breakfast., Disp: 90 tablet, Rfl: 1  •  glucose blood (TRUE METRIX BLOOD GLUCOSE TEST) test strip, Use once a day as instructed, Disp: 100 each, Rfl: 6  •  Insulin Glargine, 1 Unit Dial, (Toujeo SoloStar) 300 UNIT/ML solution pen-injector injection, Inject 15 Units under the skin into the appropriate area as directed Daily., Disp: 3 pen, Rfl: 3  •  lactobacillus acidophilus (RISAQUAD) capsule capsule, Take 1 capsule by mouth Daily., Disp: 30 capsule, Rfl: 0  •  levothyroxine (SYNTHROID, LEVOTHROID) 50 MCG tablet, Take 1 tablet by mouth Daily., Disp: 90 tablet, Rfl: 3  •  losartan (Cozaar) 25 MG tablet, Take 1 tablet by mouth Daily., Disp: 30 tablet, Rfl: 3  •  Multiple Vitamins-Minerals (OCUVITE ADULT 50+ PO), Take 1 capsule by mouth Daily., Disp:  , Rfl:   •  NOVOFINE 32G X 6 MM misc, USE ONE DAILY, Disp: 100 each, Rfl: 3  •  polyethylene glycol (MIRALAX) pack packet, Take 17 g by mouth Daily., Disp: , Rfl:   •  silodosin (RAPAFLO) 8 MG capsule capsule, Take 1 capsule by mouth Daily With Breakfast., Disp: 90 capsule, Rfl: 3  •  SITagliptin (JANUVIA) 100 MG tablet, Take 1 tablet by mouth Daily., Disp: 90 tablet, Rfl: 3  •  Testosterone Cypionate (Depo-Testosterone) 200 MG/ML injection, Inject 2 mL into the appropriate muscle as directed by prescriber Every 21 (Twenty-One) Days., Disp: 4 mL, Rfl: 2.      The patient's relevant past medical, surgical, family and social history were reviewed and updated in Epic as appropriate.     ROS:    Review of Systems   Constitutional: Negative for fever.   HENT: Positive for congestion.    Respiratory: Positive for cough.      ROS as documented in patient questionnaire unless as noted otherwise    Objective:    Physical Exam  Vitals signs reviewed.   Constitutional:       Appearance: He is well-developed.   HENT:      Head: Normocephalic and atraumatic.      Mouth/Throat:      Mouth: Mucous membranes are moist.      Pharynx: Oropharynx is clear.   Neck:      Musculoskeletal: Neck supple.      Thyroid: No thyromegaly.   Cardiovascular:      Rate and Rhythm: Normal rate and regular rhythm.      Heart sounds: No murmur. No friction rub. No gallop.    Pulmonary:      Effort: Pulmonary effort is normal. No respiratory distress.      Breath sounds: Rales present. No wheezing.      Comments: Bibasilar crackles  Skin:     General: Skin is warm and dry.   Neurological:      Mental Status: He is alert and oriented to person, place, and time.   Psychiatric:         Behavior: Behavior normal.         Thought Content: Thought content normal.         Diagnostics:    CXR:  - no additional    PFT:  - mild restriction  - normal diffusion    Assessment:    Problem List Items Addressed This Visit        Pulmonary Problems    Dyspnea - Primary     Relevant Orders    Pulmonary Function Test (Completed)    IPF (idiopathic pulmonary fibrosis) (CMS/HCC)    Hypoxemia requiring supplemental oxygen       Other    History of pulmonary embolism            1. ILD: HRCT and remainder of work-up completely consistent with UIP/IPF etiology.  Have discussed treatment options in detail with the patient and his granddaughter at the prior visit and today.  He has considered it carefully and does not want to do any type of medical therapy with potential side effects.  He is fairly clear that he wants to simply live with the conditions he has for what time he has left.  He does understand that the medications can potentially slow any progression of his lung disease.  I think he has made an informed decision.  His granddaughter agrees.  2. Dyspnea on exertion: Stable  3. Hypoxemia: Recommendations are to use oxygen with exertion and at night.  He does have this available.    Plan:    1. I offered to make him a follow-up visit to assess any progression but he does not want to do this at the current time.  2. Other recommendations as above  3. Will be available for further consultation in the future as needed.    Level of Risk Moderate due to: two stable chronic illnesses    Discussed in detail with the patient.  He will call prior to his follow up visit for any new problems.    Signed by  See Alexandre MD

## 2020-12-07 ENCOUNTER — TELEPHONE (OUTPATIENT)
Dept: CARDIOLOGY | Facility: HOSPITAL | Age: 85
End: 2020-12-07

## 2020-12-07 NOTE — TELEPHONE ENCOUNTER
VI Telephone Note for:    Forrest Zepeda, 5/6/1932  Home Phone [phone]   Mobile [phone]       Reason for Call: Patient is experiencing weakness and bradycardia     Symptoms:  Weakness, bradycardia     Onset:: Last night     Anything Tried?:  Nothing tried     Other Pertinent Information (Weight, Vitals, etc.):    Patient stated he is having some SOA and weakness. Patient took his BP this morning and it was 154/85 and the pulse ox is registering his HR at 34. Patients granddaughter attempted to get a manual pulse but could not because her watch was messed up.

## 2020-12-07 NOTE — TELEPHONE ENCOUNTER
Spoke with patient's granddaughter.  I have reviewed M cot strips.  Patient did have 1 brief run of nonsustained VT (4 beats) and one brief episode of bradycardia.  Continue to wear M cot.

## 2020-12-08 ENCOUNTER — TELEPHONE (OUTPATIENT)
Dept: CARDIOLOGY | Facility: HOSPITAL | Age: 85
End: 2020-12-08

## 2020-12-08 ENCOUNTER — DOCUMENTATION (OUTPATIENT)
Dept: CARDIOLOGY | Facility: HOSPITAL | Age: 85
End: 2020-12-08

## 2020-12-08 DIAGNOSIS — R00.1 SYMPTOMATIC BRADYCARDIA: Primary | ICD-10-CM

## 2020-12-08 NOTE — PROGRESS NOTES
Patient is wearing MCOT and patient has been having intermittent bradycardia with heart rates 36.8-40. Patient symptomatic with fatigue, lightheadedness. Urgent referral to Cardiology for PPM

## 2020-12-08 NOTE — TELEPHONE ENCOUNTER
Yuni a representative with [facility] called regarding the patient having a critical episode. Yuni stated the patient is experiencing bradycardia at 32bpm. Yuni stated she would fax this report over to the H[facility]V clinic.

## 2020-12-08 NOTE — TELEPHONE ENCOUNTER
Patient is current wearing M cot with symptomatic bradycardia.  Strip earlier today showed a heart rate of 32.1.  Strip late yesterday showed a heart rate of 36.8.  Patient experiences fatigue, lightheadedness and generalized weakness.  Urgent referral placed to cardiac EP for further evaluation of pacemaker.  Did discuss plan of care with patient's granddaughter Erika.

## 2020-12-14 ENCOUNTER — TELEPHONE (OUTPATIENT)
Dept: CARDIOLOGY | Facility: HOSPITAL | Age: 85
End: 2020-12-14

## 2020-12-14 ENCOUNTER — CONSULT (OUTPATIENT)
Dept: CARDIOLOGY | Facility: CLINIC | Age: 85
End: 2020-12-14

## 2020-12-14 VITALS
SYSTOLIC BLOOD PRESSURE: 138 MMHG | BODY MASS INDEX: 31.7 KG/M2 | WEIGHT: 214 LBS | TEMPERATURE: 97.3 F | HEIGHT: 69 IN | OXYGEN SATURATION: 97 % | DIASTOLIC BLOOD PRESSURE: 78 MMHG | HEART RATE: 75 BPM

## 2020-12-14 DIAGNOSIS — R06.09 DYSPNEA ON EXERTION: ICD-10-CM

## 2020-12-14 DIAGNOSIS — I10 ESSENTIAL HYPERTENSION: ICD-10-CM

## 2020-12-14 DIAGNOSIS — I49.5 SINUS NODE DYSFUNCTION (HCC): Primary | ICD-10-CM

## 2020-12-14 PROBLEM — R53.82 CHRONIC FATIGUE: Status: ACTIVE | Noted: 2020-12-14

## 2020-12-14 PROCEDURE — 93000 ELECTROCARDIOGRAM COMPLETE: CPT | Performed by: INTERNAL MEDICINE

## 2020-12-14 PROCEDURE — 99204 OFFICE O/P NEW MOD 45 MIN: CPT | Performed by: INTERNAL MEDICINE

## 2020-12-14 NOTE — PROGRESS NOTES
Cardiac Electrophysiology Outpatient Consult Note            [facility]    Consult Note     Forrest Zepeda  [id number]  12/14/2020  [phone]     Primary Care Physician: Ryne Lofton MD    Referred By: Opal Helms APRN    Subjective     Chief Complaint: Sinus node dysfunction, hypertension, dyspnea on exertion.  Chief Complaint   Patient presents with   • Slow Heart Rate     Problem List:      1. Sinus node dysfunction  a. Echo 8/23/2019 EF 50%, mild LVH, severely reduced RV systolic function with moderate dilation  b. 30-day cardiac monitor placed 12/4/2020 revealing evidence of sinus node dysfunction with heart rate consistently in the 30s associated with increased profound fatigue and shortness of breath with minimal activity.  2. Essential hypertension  3. Dyslipidemia  4. Type 2 diabetes mellitus  5. Hypothyroidism  6. Chronic renal insufficiency  7. Pulmonary fibrosis  8. Pulmonary hypertension  9. History of PE  10. Gout      History of Present Illness: Mr. Forrest Zepeda is an 88-year-old white male seen in EP consultation today at the request of JOYCE Urbina for the evaluation of sinus node dysfunction and consideration for permanent pacemaker implant.  Patient with recent cardiac monitor placed with early transmission revealing daytime heart rates 30 bpm associated with continued profound fatigue, weakness, and shortness of breath with minimal activity.  Patient taking no heart rate lowering medications and no transient or reversible causes have been identified.  Upon evaluation a progressive decline in his functional ability over the past year and 1/2 to 2 years associated with increased fatigue, weakness, and shortness of breath with activity.  He reports more bad days than good over the past 3 months.  Patient reports occasional lightheadedness and dizziness with standing on occasion.  Patient denies chest pain, palpitations, presyncope,  or syncope.  Patient denies TIA/strokelike symptoms.  Patient admits to history of thyroid disease with normal TSH on recent lab evaluation.  Patient denies history of obstructive sleep apnea or concerning associated signs and symptoms.  Patient with echocardiogram in recent past with noted normal LV function.  Patient denies orthopnea, PND, or bilateral lower extremity edema.  Patient with acceptable BP in office today on current medication therapy with recorded control at home from his granddaughter in clinic today who is a nurse.      Review of Systems:   Constitutional: No fevers or chills, no recent weight gain or weight loss, + fatigue  Eyes: No visual loss, blurred vision, double vision, yellow sclerae.  ENT: No headaches, hearing loss, vertigo, congestion or sore throat.   Cardiovascular: Per HPI  Respiratory: No cough or wheezing, no sputum production, no hematemesis, + SOA, LEHMAN  Gastrointestinal: No abdominal pain, no nausea, vomiting, constipation, diarrhea, melena.   Genitourinary: No dysuria, hematuria or increased frequency.  Musculoskeletal:  No gait disturbance, weakness or joint pain or stiffness  Integumentary: No rashes, urticaria, ulcers or sores.   Neurological: No headache, syncope, paralysis, ataxia, no prior CVA/TIA  Psychiatric: No anxiety, or depression  Endocrine: No diaphoresis, cold or heat intolerance. No polyuria or polydipsia.   Hematologic/Lymphatic: No anemia, abnormal bruising or bleeding.       Past Medical History:   Past Medical History:   Diagnosis Date   • Abnormal EKG    • Abnormal PSA     Reported abnormal   • Acute deep vein thrombosis (DVT) of right lower extremity (CMS/[facility]) 08/22/2019   • Acute diverticulitis 2019   • Acute hyperkalemia 08/22/2019   • Acute respiratory failure with hypoxia (CMS/[facility])    • Acute saddle pulmonary embolism with acute cor pulmonale (CMS/[facility]) 08/22/2019   • Acute systolic right heart failure (CMS/[facility]) 08/22/2019   • Arthritis     KNEES,  BUT  "SINCE HAD REPLACEMENT   • Benign localized hyperplasia of prostate    • Bilateral knee pain    • Cataracts, bilateral    • CKD (chronic kidney disease)    • Diabetic neuropathy (CMS/HCC)    • Diabetic neuropathy (CMS/HCC)    • Disease of thyroid gland     HYPOTHYROIDISM   • Diverticulitis    • Dyslipidemia     H/O   • Family history of malignant hyperthermia     Brother    • Full dentures    • Generalized weakness    • History of gout    • History of hepatitis A vaccination    • History of nephrolithiasis    • History of nuclear stress test     STATES IN THE 1990'S.  \"IT WAS OK\"   • History of osteopenia    • History of recurrent UTI (urinary tract infection)    • Hydronephrosis of left kidney 7/18/2019   • Hydronephrosis with ureteral stricture    • Hyperkalemia     PT UNSURE REGARDING HX OF THIS   • Hyperlipidemia    • Hypertension    • Insomnia    • Malignant hyperthermia due to anesthesia     PER PT REPORT, BROTHER HAD DURING LUNG SURGERY SEVERAL YEARS AGO.  STATED THEY PACKED HIM ON ICE.  STATED HE DID SURVIVE.  PT DENIES ANY PERSONAL HX OF MALIGNANT HYPERTHERMIA HIMSELF, OR ANY ISSUES WITH ANESTHESIA.  STATES TO HIS KNOWLEDGE, NO OTHER FAMILY MEMBER HAS THIS ISSUE EXCEPT FOR HIS BROTHER.   • On supplemental oxygen therapy     2L [place]   • Other hydronephrosis 8/12/2019    Added automatically from request for surgery [id number]   • Renal insufficiency    • Sepsis (CMS/HCC) 2019   • Shortness of breath     STATES WITH EXERTION, OTHERWISE, DENIES   • Sigmoid diverticulitis without abscess or perforation 8/9/2017   • Skin breakdown     fourth toe right foot noted in 2019 MD D/C note   • Skin ulcer of fourth toe of right foot, limited to breakdown of skin (CMS/HCC) 7/5/2019   • Stricture of ureter    • Type 2 diabetes mellitus (CMS/HCC)    • Ureteral stricture, left    • UTI (urinary tract infection) 7/18/2019   • Vitamin D deficiency    • Wears glasses        Past Surgical History:   Past Surgical History:   "   Procedure Laterality Date   • APPENDECTOMY     • CHOLECYSTECTOMY     • CIRCUMCISION N/A 10/23/2019    Procedure: CIRCUMCISIOn-DORSAL SLIT;  Surgeon: Griffin Romero MD;  Location: [facility] OR;  Service: Urology   • COLONOSCOPY     • CYSTOSCOPY URETEROSCOPY Left 2/11/2019    Procedure: URETEROSCOPY WITH STENT EXTRACTION, RPG;  Surgeon: Griffin Romero MD;  Location: [facility] OR;  Service: Urology   • CYSTOSCOPY W/ URETERAL STENT PLACEMENT Left 7/20/2019    Procedure: CYSTOSCOPY, LEFT RETROGRADE PYELOGRAM,  ATTEMPTED LEFT URETERAL STENT INSERTION;  Surgeon: Isaac Flynn MD;  Location: [facility] OR;  Service: Urology   • EYE SURGERY      CATARACTS REMOVED BILATERALLY   • KNEE ARTHROPLASTY Bilateral    • KNEE ARTHROSCOPY Bilateral    • RENAL ARTERY STENT      SEVERAL TIMES EVERY SINCE 1978   • URETERAL STENT INSERTION  10/2020   • URETEROSCOPY LASER LITHOTRIPSY WITH STENT INSERTION Left 1/10/2018    Procedure:  cysto, left ureteral stent replacement ;  Surgeon: Griffin Romero MD;  Location: [facility] OR;  Service:    • URETEROSCOPY LASER LITHOTRIPSY WITH STENT INSERTION Left 8/14/2019    Procedure: URETEROSCOPY, STONE REMOVAL WITH STENT INSERTION;  Surgeon: Griffin Romero MD;  Location: [facility] OR;  Service: Urology   • URETEROSCOPY LASER LITHOTRIPSY WITH STENT INSERTION Left 10/23/2019    Procedure: Left URETEROSCOPY WITH STENT INSERTION-LEFT;  Surgeon: Griffin Romero MD;  Location: [facility] OR;  Service: Urology       Family History:   Family History   Problem Relation Age of Onset   • Heart attack Sister    • Brain cancer Sister    • Stroke Sister    • Lung cancer Sister    • Brain cancer Brother    • Lung cancer Brother    • Malig Hyperthermia Brother        Social History:   Social History     Socioeconomic History   • Marital status:      Spouse name: Not on file   • Number of children: Not on file   • Years of education: Not on file   • Highest education level: Not on file   Tobacco Use   •  Smoking status: Former Smoker     Types: Cigarettes   • Smokeless tobacco: Never Used   • Tobacco comment: SMOKED SOME AS A TEEN, DENIES ANY HABIT OR ADDICTION   Substance and Sexual Activity   • Alcohol use: No   • Drug use: No   • Sexual activity: Defer   Social History Narrative    Pt lives alone    Granddaughter Erika is nurse.     Caffeine soda ocassional       Medications:     Current Outpatient Medications:   •  allopurinol (ZYLOPRIM) 300 MG tablet, Take 1 tablet by mouth Every Night., Disp: 90 tablet, Rfl: 3  •  clopidogrel (PLAVIX) 75 MG tablet, Take 1 tablet by mouth Daily. Start on 2/1/20, after completing course of eliquis, Disp: 90 tablet, Rfl: 3  •  clotrimazole (LOTRIMIN) 1 % cream, Apply  topically to the appropriate area as directed 2 (Two) Times a Day., Disp: 45 g, Rfl: 0  •  desoximetasone (TOPICORT) 0.25 % ointment, Apply  topically to the appropriate area as directed 2 (Two) Times a Day., Disp: 60 g, Rfl: 2  •  finasteride (PROSCAR) 5 MG tablet, Take 1 tablet by mouth Daily., Disp: 90 tablet, Rfl: 3  •  glimepiride (Amaryl) 2 MG tablet, Take 1 tablet by mouth Every Morning Before Breakfast., Disp: 90 tablet, Rfl: 1  •  glucose blood (TRUE METRIX BLOOD GLUCOSE TEST) test strip, Use once a day as instructed, Disp: 100 each, Rfl: 6  •  Insulin Glargine, 1 Unit Dial, (Toujeo SoloStar) 300 UNIT/ML solution pen-injector injection, Inject 15 Units under the skin into the appropriate area as directed Daily., Disp: 3 pen, Rfl: 3  •  levothyroxine (SYNTHROID, LEVOTHROID) 50 MCG tablet, Take 1 tablet by mouth Daily., Disp: 90 tablet, Rfl: 3  •  losartan (Cozaar) 25 MG tablet, Take 1 tablet by mouth Daily., Disp: 30 tablet, Rfl: 3  •  Multiple Vitamins-Minerals (OCUVITE ADULT 50+ PO), Take 1 capsule by mouth Daily., Disp: , Rfl:   •  NOVOFINE 32G X 6 MM misc, USE ONE DAILY, Disp: 100 each, Rfl: 3  •  polyethylene glycol (MIRALAX) pack packet, Take 17 g by mouth Daily As Needed., Disp: , Rfl:   •  silodosin  "(RAPAFLO) 8 MG capsule capsule, Take 1 capsule by mouth Daily With Breakfast., Disp: 90 capsule, Rfl: 3  •  SITagliptin (JANUVIA) 100 MG tablet, Take 1 tablet by mouth Daily., Disp: 90 tablet, Rfl: 3    Allergies:   Allergies   Allergen Reactions   • Metformin Diarrhea   • Statins Myalgia       Objective     Physical Exam:  Vital Signs:   Vitals:    12/14/20 0849   BP: 138/78   BP Location: Left arm   Patient Position: Sitting   Pulse: 75   Temp: 97.3 °F (36.3 °C)   SpO2: 97%   Weight: 97.1 kg (214 lb)   Height: 175.3 cm (69\")     GEN: Well nourished, well-developed, no acute distress  HEENT: Normocephalic, atraumatic, moist mucous membranes  NECK: Supple, no JVD, no thyromegaly, no lymphadenopathy  CARD: Regular rate and rhythm, normal S1 & S2 are present.  No murmur, gallop or rubs are appreciated.  LUNGS: Clear to auscultation bilateraly, normal respiratory effort  ABDOMEN: Soft, nontender, normal bowel sounds  EXTREMITIES: No gross deformities, no clubbing, cyanosis.  No edema   SKIN: Warm, dry  NEURO: No focal deficits, alert and oriented x 3  PSYCHIATRIC: Normal affect and mood, appropriate use of semantics and logic.      Lab Results   Component Value Date    GLUCOSE 208 (H) 10/13/2020    CALCIUM 9.8 11/30/2020     11/30/2020    K 4.9 11/30/2020    CO2 28.8 11/30/2020     11/30/2020    BUN 29 (H) 11/30/2020    CREATININE 1.61 (H) 11/30/2020    EGFRIFAFRI 49 (L) 11/30/2020    EGFRIFNONA 41 (L) 11/30/2020    BCR 18.0 11/30/2020    ANIONGAP 11.0 10/13/2020     Lab Results   Component Value Date    WBC 11.08 (H) 11/30/2020    HGB 17.5 11/30/2020    HCT 52.8 (H) 11/30/2020    MCV 92.5 11/30/2020     11/30/2020     Lab Results   Component Value Date    INR 1.07 10/04/2020    INR 1.05 07/23/2019    PROTIME 13.6 10/04/2020    PROTIME 13.2 07/23/2019     Lab Results   Component Value Date    TSH 2.380 11/30/2020       Cardiac Testing:      I personally viewed and interpreted the patient's " EKG/Telemetry/lab data        ECG 12 Lead    Date/Time: 12/14/2020 9:35 AM  Performed by: Michael Frances APRN  Authorized by: Michael Frances APRN   Comparison: compared with previous ECG from 11/30/2020  Similar to previous ECG  Rhythm: sinus rhythm  BPM: 75            Assessment & Plan      Diagnoses and all orders for this visit:    1. Sinus node dysfunction (CMS/HCC) (Primary)  -     ECG 12 Lead    2. Dyspnea on exertion    3. Essential hypertension         Diagnosis Plan   1. Sinus node dysfunction (CMS/HCC)   PPM implant   2. Dyspnea on exertion   likely multifactorial however we will assess after PPM implant   3. Essential hypertension   on reasonable medical therapy at this point.  Will reassess after permanent pacemaker implantation.     Plan:  Mr. Forrest Zepeda is an 88-year-old white male seen in EP consultation today at the request of JOYCE Urbina for the evaluation of sinus node dysfunction and consideration for permanent pacemaker implant.  Patient with recent cardiac monitor placed with early transmission revealing daytime heart rates 30 bpm associated with continued profound fatigue, weakness, and shortness of breath with minimal activity.  Patient taking no heart rate lowering medications and no transient or reversible causes identified.  Patient seen in evaluation in clinic today with Dr. Ledezma with recommended permanent pacemaker implant for documented symptomatic sinus node dysfunction outlined above.  All risk, benefits, and alternatives to the procedure were outlined reviewed with patient in detail in clinic today with granddaughter at bedside.  Patient verbalized clear understanding of the procedure and is willing to proceed with scheduling in near future.        Follow Up: After scheduled procedure       Scribed for Jimy Ledezma, DO by JOYCE Torres . 12/14/2020  09:47 EST      Thank you for allowing me to participate in the care of your patient. Please to not  hesitate to contact me with additional questions or concerns.        Jimy Ledezma, DO, FACC, RS  Cardiac Electrophysiologist

## 2020-12-14 NOTE — H&P (VIEW-ONLY)
Cardiac Electrophysiology Outpatient Consult Note            [facility]    Consult Note     Forrest Zepdea  [id number]  12/14/2020  [phone]     Primary Care Physician: Ryne Lofton MD    Referred By: Opal Helms APRN    Subjective     Chief Complaint: Sinus node dysfunction, hypertension, dyspnea on exertion.  Chief Complaint   Patient presents with   • Slow Heart Rate     Problem List:      1. Sinus node dysfunction  a. Echo 8/23/2019 EF 50%, mild LVH, severely reduced RV systolic function with moderate dilation  b. 30-day cardiac monitor placed 12/4/2020 revealing evidence of sinus node dysfunction with heart rate consistently in the 30s associated with increased profound fatigue and shortness of breath with minimal activity.  2. Essential hypertension  3. Dyslipidemia  4. Type 2 diabetes mellitus  5. Hypothyroidism  6. Chronic renal insufficiency  7. Pulmonary fibrosis  8. Pulmonary hypertension  9. History of PE  10. Gout      History of Present Illness: Mr. Forrest Zepeda is an 88-year-old white male seen in EP consultation today at the request of JOYCE Urbina for the evaluation of sinus node dysfunction and consideration for permanent pacemaker implant.  Patient with recent cardiac monitor placed with early transmission revealing daytime heart rates 30 bpm associated with continued profound fatigue, weakness, and shortness of breath with minimal activity.  Patient taking no heart rate lowering medications and no transient or reversible causes have been identified.  Upon evaluation a progressive decline in his functional ability over the past year and 1/2 to 2 years associated with increased fatigue, weakness, and shortness of breath with activity.  He reports more bad days than good over the past 3 months.  Patient reports occasional lightheadedness and dizziness with standing on occasion.  Patient denies chest pain, palpitations, presyncope,  or syncope.  Patient denies TIA/strokelike symptoms.  Patient admits to history of thyroid disease with normal TSH on recent lab evaluation.  Patient denies history of obstructive sleep apnea or concerning associated signs and symptoms.  Patient with echocardiogram in recent past with noted normal LV function.  Patient denies orthopnea, PND, or bilateral lower extremity edema.  Patient with acceptable BP in office today on current medication therapy with recorded control at home from his granddaughter in clinic today who is a nurse.      Review of Systems:   Constitutional: No fevers or chills, no recent weight gain or weight loss, + fatigue  Eyes: No visual loss, blurred vision, double vision, yellow sclerae.  ENT: No headaches, hearing loss, vertigo, congestion or sore throat.   Cardiovascular: Per HPI  Respiratory: No cough or wheezing, no sputum production, no hematemesis, + SOA, LEHMAN  Gastrointestinal: No abdominal pain, no nausea, vomiting, constipation, diarrhea, melena.   Genitourinary: No dysuria, hematuria or increased frequency.  Musculoskeletal:  No gait disturbance, weakness or joint pain or stiffness  Integumentary: No rashes, urticaria, ulcers or sores.   Neurological: No headache, syncope, paralysis, ataxia, no prior CVA/TIA  Psychiatric: No anxiety, or depression  Endocrine: No diaphoresis, cold or heat intolerance. No polyuria or polydipsia.   Hematologic/Lymphatic: No anemia, abnormal bruising or bleeding.       Past Medical History:   Past Medical History:   Diagnosis Date   • Abnormal EKG    • Abnormal PSA     Reported abnormal   • Acute deep vein thrombosis (DVT) of right lower extremity (CMS/[facility]) 08/22/2019   • Acute diverticulitis 2019   • Acute hyperkalemia 08/22/2019   • Acute respiratory failure with hypoxia (CMS/[facility])    • Acute saddle pulmonary embolism with acute cor pulmonale (CMS/[facility]) 08/22/2019   • Acute systolic right heart failure (CMS/[facility]) 08/22/2019   • Arthritis     KNEES,  BUT  "SINCE HAD REPLACEMENT   • Benign localized hyperplasia of prostate    • Bilateral knee pain    • Cataracts, bilateral    • CKD (chronic kidney disease)    • Diabetic neuropathy (CMS/HCC)    • Diabetic neuropathy (CMS/HCC)    • Disease of thyroid gland     HYPOTHYROIDISM   • Diverticulitis    • Dyslipidemia     H/O   • Family history of malignant hyperthermia     Brother    • Full dentures    • Generalized weakness    • History of gout    • History of hepatitis A vaccination    • History of nephrolithiasis    • History of nuclear stress test     STATES IN THE 1990'S.  \"IT WAS OK\"   • History of osteopenia    • History of recurrent UTI (urinary tract infection)    • Hydronephrosis of left kidney 7/18/2019   • Hydronephrosis with ureteral stricture    • Hyperkalemia     PT UNSURE REGARDING HX OF THIS   • Hyperlipidemia    • Hypertension    • Insomnia    • Malignant hyperthermia due to anesthesia     PER PT REPORT, BROTHER HAD DURING LUNG SURGERY SEVERAL YEARS AGO.  STATED THEY PACKED HIM ON ICE.  STATED HE DID SURVIVE.  PT DENIES ANY PERSONAL HX OF MALIGNANT HYPERTHERMIA HIMSELF, OR ANY ISSUES WITH ANESTHESIA.  STATES TO HIS KNOWLEDGE, NO OTHER FAMILY MEMBER HAS THIS ISSUE EXCEPT FOR HIS BROTHER.   • On supplemental oxygen therapy     2L [place] QHS   • Other hydronephrosis 8/12/2019    Added automatically from request for surgery [id number]   • Renal insufficiency    • Sepsis (CMS/HCC) 2019   • Shortness of breath     STATES WITH EXERTION, OTHERWISE, DENIES   • Sigmoid diverticulitis without abscess or perforation 8/9/2017   • Skin breakdown     fourth toe right foot noted in 2019 MD D/C note   • Skin ulcer of fourth toe of right foot, limited to breakdown of skin (CMS/HCC) 7/5/2019   • Stricture of ureter    • Type 2 diabetes mellitus (CMS/HCC)    • Ureteral stricture, left    • UTI (urinary tract infection) 7/18/2019   • Vitamin D deficiency    • Wears glasses        Past Surgical History:   Past Surgical History:   "   Procedure Laterality Date   • APPENDECTOMY     • CHOLECYSTECTOMY     • CIRCUMCISION N/A 10/23/2019    Procedure: CIRCUMCISIOn-DORSAL SLIT;  Surgeon: Griffin Romero MD;  Location: [facility] OR;  Service: Urology   • COLONOSCOPY     • CYSTOSCOPY URETEROSCOPY Left 2/11/2019    Procedure: URETEROSCOPY WITH STENT EXTRACTION, RPG;  Surgeon: Griffin Romero MD;  Location: [facility] OR;  Service: Urology   • CYSTOSCOPY W/ URETERAL STENT PLACEMENT Left 7/20/2019    Procedure: CYSTOSCOPY, LEFT RETROGRADE PYELOGRAM,  ATTEMPTED LEFT URETERAL STENT INSERTION;  Surgeon: Isaac Flynn MD;  Location: [facility] OR;  Service: Urology   • EYE SURGERY      CATARACTS REMOVED BILATERALLY   • KNEE ARTHROPLASTY Bilateral    • KNEE ARTHROSCOPY Bilateral    • RENAL ARTERY STENT      SEVERAL TIMES EVERY SINCE 1978   • URETERAL STENT INSERTION  10/2020   • URETEROSCOPY LASER LITHOTRIPSY WITH STENT INSERTION Left 1/10/2018    Procedure:  cysto, left ureteral stent replacement ;  Surgeon: Griffin Romero MD;  Location: [facility] OR;  Service:    • URETEROSCOPY LASER LITHOTRIPSY WITH STENT INSERTION Left 8/14/2019    Procedure: URETEROSCOPY, STONE REMOVAL WITH STENT INSERTION;  Surgeon: Griffin Romero MD;  Location: [facility] OR;  Service: Urology   • URETEROSCOPY LASER LITHOTRIPSY WITH STENT INSERTION Left 10/23/2019    Procedure: Left URETEROSCOPY WITH STENT INSERTION-LEFT;  Surgeon: Griffin Romero MD;  Location: [facility] OR;  Service: Urology       Family History:   Family History   Problem Relation Age of Onset   • Heart attack Sister    • Brain cancer Sister    • Stroke Sister    • Lung cancer Sister    • Brain cancer Brother    • Lung cancer Brother    • Malig Hyperthermia Brother        Social History:   Social History     Socioeconomic History   • Marital status:      Spouse name: Not on file   • Number of children: Not on file   • Years of education: Not on file   • Highest education level: Not on file   Tobacco Use   •  Smoking status: Former Smoker     Types: Cigarettes   • Smokeless tobacco: Never Used   • Tobacco comment: SMOKED SOME AS A TEEN, DENIES ANY HABIT OR ADDICTION   Substance and Sexual Activity   • Alcohol use: No   • Drug use: No   • Sexual activity: Defer   Social History Narrative    Pt lives alone    Granddaughter Erika is nurse.     Caffeine soda ocassional       Medications:     Current Outpatient Medications:   •  allopurinol (ZYLOPRIM) 300 MG tablet, Take 1 tablet by mouth Every Night., Disp: 90 tablet, Rfl: 3  •  clopidogrel (PLAVIX) 75 MG tablet, Take 1 tablet by mouth Daily. Start on 2/1/20, after completing course of eliquis, Disp: 90 tablet, Rfl: 3  •  clotrimazole (LOTRIMIN) 1 % cream, Apply  topically to the appropriate area as directed 2 (Two) Times a Day., Disp: 45 g, Rfl: 0  •  desoximetasone (TOPICORT) 0.25 % ointment, Apply  topically to the appropriate area as directed 2 (Two) Times a Day., Disp: 60 g, Rfl: 2  •  finasteride (PROSCAR) 5 MG tablet, Take 1 tablet by mouth Daily., Disp: 90 tablet, Rfl: 3  •  glimepiride (Amaryl) 2 MG tablet, Take 1 tablet by mouth Every Morning Before Breakfast., Disp: 90 tablet, Rfl: 1  •  glucose blood (TRUE METRIX BLOOD GLUCOSE TEST) test strip, Use once a day as instructed, Disp: 100 each, Rfl: 6  •  Insulin Glargine, 1 Unit Dial, (Toujeo SoloStar) 300 UNIT/ML solution pen-injector injection, Inject 15 Units under the skin into the appropriate area as directed Daily., Disp: 3 pen, Rfl: 3  •  levothyroxine (SYNTHROID, LEVOTHROID) 50 MCG tablet, Take 1 tablet by mouth Daily., Disp: 90 tablet, Rfl: 3  •  losartan (Cozaar) 25 MG tablet, Take 1 tablet by mouth Daily., Disp: 30 tablet, Rfl: 3  •  Multiple Vitamins-Minerals (OCUVITE ADULT 50+ PO), Take 1 capsule by mouth Daily., Disp: , Rfl:   •  NOVOFINE 32G X 6 MM misc, USE ONE DAILY, Disp: 100 each, Rfl: 3  •  polyethylene glycol (MIRALAX) pack packet, Take 17 g by mouth Daily As Needed., Disp: , Rfl:   •  silodosin  "(RAPAFLO) 8 MG capsule capsule, Take 1 capsule by mouth Daily With Breakfast., Disp: 90 capsule, Rfl: 3  •  SITagliptin (JANUVIA) 100 MG tablet, Take 1 tablet by mouth Daily., Disp: 90 tablet, Rfl: 3    Allergies:   Allergies   Allergen Reactions   • Metformin Diarrhea   • Statins Myalgia       Objective     Physical Exam:  Vital Signs:   Vitals:    12/14/20 0849   BP: 138/78   BP Location: Left arm   Patient Position: Sitting   Pulse: 75   Temp: 97.3 °F (36.3 °C)   SpO2: 97%   Weight: 97.1 kg (214 lb)   Height: 175.3 cm (69\")     GEN: Well nourished, well-developed, no acute distress  HEENT: Normocephalic, atraumatic, moist mucous membranes  NECK: Supple, no JVD, no thyromegaly, no lymphadenopathy  CARD: Regular rate and rhythm, normal S1 & S2 are present.  No murmur, gallop or rubs are appreciated.  LUNGS: Clear to auscultation bilateraly, normal respiratory effort  ABDOMEN: Soft, nontender, normal bowel sounds  EXTREMITIES: No gross deformities, no clubbing, cyanosis.  No edema   SKIN: Warm, dry  NEURO: No focal deficits, alert and oriented x 3  PSYCHIATRIC: Normal affect and mood, appropriate use of semantics and logic.      Lab Results   Component Value Date    GLUCOSE 208 (H) 10/13/2020    CALCIUM 9.8 11/30/2020     11/30/2020    K 4.9 11/30/2020    CO2 28.8 11/30/2020     11/30/2020    BUN 29 (H) 11/30/2020    CREATININE 1.61 (H) 11/30/2020    EGFRIFAFRI 49 (L) 11/30/2020    EGFRIFNONA 41 (L) 11/30/2020    BCR 18.0 11/30/2020    ANIONGAP 11.0 10/13/2020     Lab Results   Component Value Date    WBC 11.08 (H) 11/30/2020    HGB 17.5 11/30/2020    HCT 52.8 (H) 11/30/2020    MCV 92.5 11/30/2020     11/30/2020     Lab Results   Component Value Date    INR 1.07 10/04/2020    INR 1.05 07/23/2019    PROTIME 13.6 10/04/2020    PROTIME 13.2 07/23/2019     Lab Results   Component Value Date    TSH 2.380 11/30/2020       Cardiac Testing:      I personally viewed and interpreted the patient's " EKG/Telemetry/lab data        ECG 12 Lead    Date/Time: 12/14/2020 9:35 AM  Performed by: Michael Frances APRN  Authorized by: Michael Frances APRN   Comparison: compared with previous ECG from 11/30/2020  Similar to previous ECG  Rhythm: sinus rhythm  BPM: 75            Assessment & Plan      Diagnoses and all orders for this visit:    1. Sinus node dysfunction (CMS/HCC) (Primary)  -     ECG 12 Lead    2. Dyspnea on exertion    3. Essential hypertension         Diagnosis Plan   1. Sinus node dysfunction (CMS/HCC)   PPM implant   2. Dyspnea on exertion   likely multifactorial however we will assess after PPM implant   3. Essential hypertension   on reasonable medical therapy at this point.  Will reassess after permanent pacemaker implantation.     Plan:  Mr. Forrest Zepeda is an 88-year-old white male seen in EP consultation today at the request of JOYCE Urbina for the evaluation of sinus node dysfunction and consideration for permanent pacemaker implant.  Patient with recent cardiac monitor placed with early transmission revealing daytime heart rates 30 bpm associated with continued profound fatigue, weakness, and shortness of breath with minimal activity.  Patient taking no heart rate lowering medications and no transient or reversible causes identified.  Patient seen in evaluation in clinic today with Dr. Ledezma with recommended permanent pacemaker implant for documented symptomatic sinus node dysfunction outlined above.  All risk, benefits, and alternatives to the procedure were outlined reviewed with patient in detail in clinic today with granddaughter at bedside.  Patient verbalized clear understanding of the procedure and is willing to proceed with scheduling in near future.        Follow Up: After scheduled procedure       Scribed for Jimy Ledezma, DO by JOYCE Torres . 12/14/2020  09:47 EST      Thank you for allowing me to participate in the care of your patient. Please to not  hesitate to contact me with additional questions or concerns.        Jimy Ledezma, DO, FACC, RS  Cardiac Electrophysiologist

## 2020-12-15 NOTE — TELEPHONE ENCOUNTER
Spoke with patient's granddaughter. Patient is having intermittent atrial fib on MCOT.  Will begin eliquis 5 mg bid . Did notify Dr Ledezma and patient to hold eliquis 24 hours prior to PPM implant.

## 2020-12-16 ENCOUNTER — PREP FOR SURGERY (OUTPATIENT)
Dept: OTHER | Facility: HOSPITAL | Age: 85
End: 2020-12-16

## 2020-12-16 DIAGNOSIS — I49.5 SINUS NODE DYSFUNCTION (HCC): Primary | ICD-10-CM

## 2020-12-16 RX ORDER — SODIUM CHLORIDE 0.9 % (FLUSH) 0.9 %
1-10 SYRINGE (ML) INJECTION AS NEEDED
Status: CANCELLED | OUTPATIENT
Start: 2020-12-16

## 2020-12-16 RX ORDER — SODIUM CHLORIDE 0.9 % (FLUSH) 0.9 %
3 SYRINGE (ML) INJECTION EVERY 12 HOURS SCHEDULED
Status: CANCELLED | OUTPATIENT
Start: 2020-12-16

## 2020-12-16 RX ORDER — CEFAZOLIN SODIUM 2 G/100ML
2 INJECTION, SOLUTION INTRAVENOUS ONCE
Status: CANCELLED | OUTPATIENT
Start: 2020-12-16 | End: 2020-12-16

## 2020-12-16 RX ORDER — NITROGLYCERIN 0.4 MG/1
0.4 TABLET SUBLINGUAL
Status: CANCELLED | OUTPATIENT
Start: 2020-12-16

## 2020-12-16 RX ORDER — SODIUM CHLORIDE 9 MG/ML
1 INJECTION, SOLUTION INTRAVENOUS CONTINUOUS
Status: CANCELLED | OUTPATIENT
Start: 2020-12-16 | End: 2020-12-16

## 2020-12-16 RX ORDER — ACETAMINOPHEN 325 MG/1
650 TABLET ORAL EVERY 4 HOURS PRN
Status: CANCELLED | OUTPATIENT
Start: 2020-12-16

## 2020-12-21 ENCOUNTER — LAB REQUISITION (OUTPATIENT)
Dept: LAB | Facility: HOSPITAL | Age: 85
End: 2020-12-21

## 2020-12-21 DIAGNOSIS — Z11.59 ENCOUNTER FOR SCREENING FOR OTHER VIRAL DISEASES: ICD-10-CM

## 2020-12-21 LAB — SARS-COV-2 RNA RESP QL NAA+PROBE: NOT DETECTED

## 2020-12-21 PROCEDURE — U0004 COV-19 TEST NON-CDC HGH THRU: HCPCS | Performed by: INTERNAL MEDICINE

## 2020-12-22 ENCOUNTER — APPOINTMENT (OUTPATIENT)
Dept: PREADMISSION TESTING | Facility: HOSPITAL | Age: 85
End: 2020-12-22

## 2020-12-22 DIAGNOSIS — I49.5 SINUS NODE DYSFUNCTION (HCC): ICD-10-CM

## 2020-12-22 LAB
ANION GAP SERPL CALCULATED.3IONS-SCNC: 9 MMOL/L (ref 5–15)
BUN SERPL-MCNC: 37 MG/DL (ref 8–23)
BUN/CREAT SERPL: 24 (ref 7–25)
CALCIUM SPEC-SCNC: 9.2 MG/DL (ref 8.6–10.5)
CHLORIDE SERPL-SCNC: 105 MMOL/L (ref 98–107)
CO2 SERPL-SCNC: 27 MMOL/L (ref 22–29)
CREAT SERPL-MCNC: 1.54 MG/DL (ref 0.76–1.27)
DEPRECATED RDW RBC AUTO: 47.8 FL (ref 37–54)
ERYTHROCYTE [DISTWIDTH] IN BLOOD BY AUTOMATED COUNT: 13.8 % (ref 12.3–15.4)
GFR SERPL CREATININE-BSD FRML MDRD: 43 ML/MIN/1.73
GLUCOSE SERPL-MCNC: 152 MG/DL (ref 65–99)
HCT VFR BLD AUTO: 49.5 % (ref 37.5–51)
HGB BLD-MCNC: 15.1 G/DL (ref 13–17.7)
MCH RBC QN AUTO: 29 PG (ref 26.6–33)
MCHC RBC AUTO-ENTMCNC: 30.5 G/DL (ref 31.5–35.7)
MCV RBC AUTO: 95 FL (ref 79–97)
PLATELET # BLD AUTO: 173 10*3/MM3 (ref 140–450)
PMV BLD AUTO: 9.9 FL (ref 6–12)
POTASSIUM SERPL-SCNC: 4.6 MMOL/L (ref 3.5–5.2)
RBC # BLD AUTO: 5.21 10*6/MM3 (ref 4.14–5.8)
SODIUM SERPL-SCNC: 141 MMOL/L (ref 136–145)
WBC # BLD AUTO: 11.87 10*3/MM3 (ref 3.4–10.8)

## 2020-12-22 PROCEDURE — 80048 BASIC METABOLIC PNL TOTAL CA: CPT

## 2020-12-22 PROCEDURE — 85027 COMPLETE CBC AUTOMATED: CPT

## 2020-12-22 PROCEDURE — 36415 COLL VENOUS BLD VENIPUNCTURE: CPT

## 2020-12-22 NOTE — DISCHARGE INSTRUCTIONS

## 2020-12-22 NOTE — PAT
Anesthesia:  PER PT REPORT, BROTHER HAD malignant Hyperthermia DURING LUNG SURGERY SEVERAL YEARS AGO.  STATED THEY PACKED HIM ON ICE.  STATED HE DID SURVIVE.  PT DENIES ANY PERSONAL HX OF MALIGNANT HYPERTHERMIA HIMSELF, OR ANY ISSUES WITH ANESTHESIA.  STATES TO HIS KNOWLEDGE, NO OTHER FAMILY MEMBER HAS THIS ISSUE EXCEPT FOR HIS BROTHER.    Patient to apply Chlorhexadine wipes  to surgical area (as instructed) the night before procedure and the AM of procedure. Wipes provided.    Hold blood thinners 24 hours before procedure per Dr. Ledezma

## 2020-12-23 ENCOUNTER — HOSPITAL ENCOUNTER (OUTPATIENT)
Facility: HOSPITAL | Age: 85
Discharge: HOME | End: 2020-12-23
Attending: INTERNAL MEDICINE | Admitting: INTERNAL MEDICINE

## 2020-12-23 ENCOUNTER — APPOINTMENT (OUTPATIENT)
Dept: GENERAL RADIOLOGY | Facility: HOSPITAL | Age: 85
End: 2020-12-23

## 2020-12-23 VITALS
TEMPERATURE: 97.8 F | RESPIRATION RATE: 16 BRPM | SYSTOLIC BLOOD PRESSURE: 166 MMHG | WEIGHT: 214.51 LBS | HEIGHT: 69 IN | OXYGEN SATURATION: 93 % | BODY MASS INDEX: 31.77 KG/M2 | DIASTOLIC BLOOD PRESSURE: 82 MMHG | HEART RATE: 61 BPM

## 2020-12-23 DIAGNOSIS — I49.5 SINUS NODE DYSFUNCTION (HCC): ICD-10-CM

## 2020-12-23 PROCEDURE — 33208 INSRT HEART PM ATRIAL & VENT: CPT | Performed by: INTERNAL MEDICINE

## 2020-12-23 PROCEDURE — C1892 INTRO/SHEATH,FIXED,PEEL-AWAY: HCPCS | Performed by: INTERNAL MEDICINE

## 2020-12-23 PROCEDURE — C1898 LEAD, PMKR, OTHER THAN TRANS: HCPCS | Performed by: INTERNAL MEDICINE

## 2020-12-23 PROCEDURE — C1785 PMKR, DUAL, RATE-RESP: HCPCS | Performed by: INTERNAL MEDICINE

## 2020-12-23 PROCEDURE — 25010000003 LIDOCAINE 1 % SOLUTION: Performed by: INTERNAL MEDICINE

## 2020-12-23 PROCEDURE — 99152 MOD SED SAME PHYS/QHP 5/>YRS: CPT | Performed by: INTERNAL MEDICINE

## 2020-12-23 PROCEDURE — 25010000003 CEFAZOLIN IN DEXTROSE 2-4 GM/100ML-% SOLUTION: Performed by: NURSE PRACTITIONER

## 2020-12-23 PROCEDURE — 25010000002 FENTANYL CITRATE (PF) 100 MCG/2ML SOLUTION: Performed by: INTERNAL MEDICINE

## 2020-12-23 PROCEDURE — 99153 MOD SED SAME PHYS/QHP EA: CPT | Performed by: INTERNAL MEDICINE

## 2020-12-23 PROCEDURE — S0260 H&P FOR SURGERY: HCPCS | Performed by: INTERNAL MEDICINE

## 2020-12-23 PROCEDURE — 71046 X-RAY EXAM CHEST 2 VIEWS: CPT

## 2020-12-23 PROCEDURE — 25010000002 MIDAZOLAM PER 1 MG: Performed by: INTERNAL MEDICINE

## 2020-12-23 DEVICE — PACEMAKER
Type: IMPLANTABLE DEVICE | Status: FUNCTIONAL
Brand: ACCOLADE™ MRI DR

## 2020-12-23 DEVICE — PACE/SENSE LEAD
Type: IMPLANTABLE DEVICE | Status: FUNCTIONAL
Brand: INGEVITY™+

## 2020-12-23 RX ORDER — ACETAMINOPHEN 325 MG/1
650 TABLET ORAL EVERY 6 HOURS SCHEDULED
Qty: 40 TABLET | Refills: 0 | Status: SHIPPED | OUTPATIENT
Start: 2020-12-23 | End: 2020-12-31 | Stop reason: HOSPADM

## 2020-12-23 RX ORDER — LIDOCAINE HYDROCHLORIDE 10 MG/ML
INJECTION, SOLUTION INFILTRATION; PERINEURAL AS NEEDED
Status: DISCONTINUED | OUTPATIENT
Start: 2020-12-23 | End: 2020-12-23 | Stop reason: HOSPADM

## 2020-12-23 RX ORDER — SODIUM CHLORIDE 0.9 % (FLUSH) 0.9 %
1-10 SYRINGE (ML) INJECTION AS NEEDED
Status: DISCONTINUED | OUTPATIENT
Start: 2020-12-23 | End: 2020-12-23 | Stop reason: HOSPADM

## 2020-12-23 RX ORDER — SODIUM CHLORIDE 9 MG/ML
1 INJECTION, SOLUTION INTRAVENOUS CONTINUOUS
Status: ACTIVE | OUTPATIENT
Start: 2020-12-23 | End: 2020-12-23

## 2020-12-23 RX ORDER — IBUPROFEN 600 MG/1
600 TABLET ORAL EVERY 6 HOURS SCHEDULED
Status: DISCONTINUED | OUTPATIENT
Start: 2020-12-23 | End: 2020-12-23 | Stop reason: HOSPADM

## 2020-12-23 RX ORDER — FENTANYL CITRATE 50 UG/ML
INJECTION, SOLUTION INTRAMUSCULAR; INTRAVENOUS AS NEEDED
Status: DISCONTINUED | OUTPATIENT
Start: 2020-12-23 | End: 2020-12-23 | Stop reason: HOSPADM

## 2020-12-23 RX ORDER — SODIUM CHLORIDE 0.9 % (FLUSH) 0.9 %
3 SYRINGE (ML) INJECTION EVERY 12 HOURS SCHEDULED
Status: DISCONTINUED | OUTPATIENT
Start: 2020-12-23 | End: 2020-12-23 | Stop reason: HOSPADM

## 2020-12-23 RX ORDER — BUPIVACAINE HYDROCHLORIDE 5 MG/ML
INJECTION, SOLUTION PERINEURAL AS NEEDED
Status: DISCONTINUED | OUTPATIENT
Start: 2020-12-23 | End: 2020-12-23 | Stop reason: HOSPADM

## 2020-12-23 RX ORDER — CEFAZOLIN SODIUM 2 G/100ML
2 INJECTION, SOLUTION INTRAVENOUS ONCE
Status: COMPLETED | OUTPATIENT
Start: 2020-12-23 | End: 2020-12-23

## 2020-12-23 RX ORDER — MIDAZOLAM HYDROCHLORIDE 1 MG/ML
INJECTION INTRAMUSCULAR; INTRAVENOUS AS NEEDED
Status: DISCONTINUED | OUTPATIENT
Start: 2020-12-23 | End: 2020-12-23 | Stop reason: HOSPADM

## 2020-12-23 RX ORDER — ACETAMINOPHEN 325 MG/1
650 TABLET ORAL EVERY 6 HOURS
Status: DISCONTINUED | OUTPATIENT
Start: 2020-12-23 | End: 2020-12-23 | Stop reason: HOSPADM

## 2020-12-23 RX ORDER — IBUPROFEN 600 MG/1
600 TABLET ORAL EVERY 6 HOURS SCHEDULED
Qty: 20 TABLET | Refills: 0 | Status: SHIPPED | OUTPATIENT
Start: 2020-12-23 | End: 2020-12-31 | Stop reason: HOSPADM

## 2020-12-23 RX ORDER — SODIUM CHLORIDE 9 MG/ML
INJECTION, SOLUTION INTRAVENOUS CONTINUOUS PRN
Status: COMPLETED | OUTPATIENT
Start: 2020-12-23 | End: 2020-12-23

## 2020-12-23 RX ORDER — SODIUM CHLORIDE 0.9 % (FLUSH) 0.9 %
10 SYRINGE (ML) INJECTION AS NEEDED
Status: DISCONTINUED | OUTPATIENT
Start: 2020-12-23 | End: 2020-12-23 | Stop reason: HOSPADM

## 2020-12-23 RX ORDER — ACETAMINOPHEN 325 MG/1
650 TABLET ORAL EVERY 4 HOURS PRN
Status: DISCONTINUED | OUTPATIENT
Start: 2020-12-23 | End: 2020-12-23 | Stop reason: HOSPADM

## 2020-12-23 RX ORDER — NITROGLYCERIN 0.4 MG/1
0.4 TABLET SUBLINGUAL
Status: DISCONTINUED | OUTPATIENT
Start: 2020-12-23 | End: 2020-12-23 | Stop reason: HOSPADM

## 2020-12-23 RX ADMIN — SODIUM CHLORIDE 1 ML/KG/HR: 9 INJECTION, SOLUTION INTRAVENOUS at 12:59

## 2020-12-23 RX ADMIN — CEFAZOLIN SODIUM 2 G: 2 INJECTION, SOLUTION INTRAVENOUS at 13:48

## 2020-12-23 NOTE — INTERVAL H&P NOTE
CC: Sinus node dysfunction    HPI update: Mr. Forrest Zepeda is an 88-year-old white male who presents today for elective dual-chamber permanent pacemaker implant for documented symptomatic sinus node dysfunction.  Patient was recently seen in EP consultation on 12/14/2020 with options for continued treatment for his sinus node dysfunction outlined and reviewed to include a permanent pacemaker implant for which he presents for today.  All risk, benefits, and alternatives to the procedure were outlined reviewed with patient in detail in clinic and again here at bedside today.  Patient verbalizes complete understanding of the procedure and is willing to proceed as scheduled.  All preprocedure lab evaluation reviewed and acceptable with noted history of chronic renal insufficiency.  Patient denies recent infections or signs of fever, chills, sweats, or cough.  Patient denies recent sick contacts.  Patient has a negative Covid screening documented within the last 72 hours.    ROS:  Constitutional: No fevers or chills, no recent weight gain or weight loss, + fatigue  Eyes: No visual loss, blurred vision, double vision, yellow sclerae.  ENT: No headaches, hearing loss, vertigo, congestion or sore throat.   Cardiovascular: Per HPI  Respiratory: No cough or wheezing, no sputum production, no hematemesis, + soa, [facility]   Gastrointestinal: No abdominal pain, no nausea, vomiting, constipation, diarrhea, melena.   Genitourinary: No dysuria, hematuria or increased frequency.  Musculoskeletal:  No gait disturbance, weakness or joint pain or stiffness  Integumentary: No rashes, urticaria, ulcers or sores.   Neurological: No headache, dizziness, syncope, paralysis, ataxia  Psychiatric: No anxiety, or depression  Endocrine: No diaphoresis, cold or heat intolerance. No polyuria or polydipsia.   Hematologic/Lymphatic: No anemia, abnormal bruising or bleeding.       Vital signs:  /84 (BP Location: Right arm, Patient Position:  "Lying)   Pulse 65   Temp 97.8 °F (36.6 °C) (Tympanic)   Resp 16   Ht 175.3 cm (69\")   Wt 97.3 kg (214 lb 8.1 oz)   SpO2 92%   BMI 31.68 kg/m²     Telemetry: NSR 60 bpm      Lab Results   Component Value Date    WBC 11.87 (H) 12/22/2020    HGB 15.1 12/22/2020    HCT 49.5 12/22/2020    MCV 95.0 12/22/2020     12/22/2020     Lab Results   Component Value Date    GLUCOSE 152 (H) 12/22/2020    CALCIUM 9.2 12/22/2020     12/22/2020    K 4.6 12/22/2020    CO2 27.0 12/22/2020     12/22/2020    BUN 37 (H) 12/22/2020    CREATININE 1.54 (H) 12/22/2020    EGFRIFAFRI 49 (L) 11/30/2020    EGFRIFNONA 43 (L) 12/22/2020    BCR 24.0 12/22/2020    ANIONGAP 9.0 12/22/2020       GEN: Well nourished, well-developed, no acute distress  HEENT: Normocephalic, atraumatic, moist mucous membranes  NECK: Supple, no JVD, no thyromegaly, no lymphadenopathy  CARD: Regular rate and rhythm, normal S1 & S2 are present.  No murmur, gallop or rubs are appreciated.  LUNGS: Clear to auscultation bilateraly, normal respiratory effort  ABDOMEN: Soft, nontender, normal bowel sounds  EXTREMITIES: No gross deformities, no clubbing, cyanosis.  No edema   SKIN: Warm, dry  NEURO: No focal deficits, alert and oriented x 3  PSYCHIATRIC: Normal affect and mood, appropriate use of semantics and logic.          Electronically signed by JOYCE Torres, 12/23/20, 12:39 PM EST.          "

## 2020-12-23 NOTE — OP NOTE
Cardiac Electrophysiology Procedure Note       [facility]    PROCEDURE: PERMANENT PACEMAKER    OPERATION PERFORMED:     Implantation of MRI compatible [employer] model L3 1 1 model, [id number] serial number pulse generator at the left prepectoral site.    Atrial lead [employer] MRI compatible model 7841, 52 cm, serial number [id number].    Right ventricular lead [employer] MRI compatible model 7842, 59 cm, serial number [id number].     ATTENDING SURGEON: Jimy Ledezma, DO     MODERATE SEDATION FOR PROCEDURE:    Moderate sedation was given during this procedure.    I supervised and directed Isis CUNHA to administer this sedation.  This staff member also monitored the patient's hemodynamic and respiratory status and response to these medications.  Please see the full detailed procedure report generated by the electrophysiology laboratory staff.  The patient tolerated moderate sedation well.  There were no complications regarding sedation.  The total dose of fentanyl was 100 mcg and the total dose of midazolam was 3 mg.  The total dose of Brevital was 50 mg.  First sedation was administered at 1348 and continued through 1439.    ESTIMATED BLOOD LOSS: less than 20cc    RADIATION EXPOSURE: 1.1 minutes and 4 milliGray.    COMPLICATIONS: None.    TIME OUT: Time out was completed with verification of the correct patient identity, procedure to be performed, procedure site and implanted equipment.    INDICATION FOR PROCEDURE:  Briefly, Forrest Zepeda is a 88 y.o. year old male with a history of symptomatic sinus node dysfunction.     PROCEDURE AND FINDINGS:  The patient was brought to the electrophysiology laboratory in a post absorptive state.  Informed consent was given by the patient prior to the procedure and confirmed.  Intravenous prophylactic antibiotics were administered prior to the procedure.  After the site of implantation was prepped and draped in the usual  sterile fashion and after adequate anesthesia was given, the skin was infiltrated with 1% lidocaine and 0.5% bupivicaine 50/50 mixture.  The skin was incised with a #10 scalpel.  Blunt and electrosurgical dissection was carried out to the level of the pre pectoral fascia with careful attention paid to hemostasis.  A pocket to house the pulse generator was formed between the pre pectoral fascia and subcutaneous fat with blunt and electrosurgical dissection.  The pocket was copiously irrigated with antibiotic containing normal saline and subsequently observed.  Once adequate hemostasis was confirmed within the pocket, venous access was obtained.  Venous access was obtained via cephalic vein cut down.  This was performed via direct visualization.  After dissection down to the fascia of the pectoralis major muscle, the deltopectoral groove was identified.  The fat lying between the pectoralis major and deltoid muscles was identified.  I used Metzenbaum and electrosurgery to dissect posteriorly through the deltopectoral groove.  A cephalic vein was identified here.  The vein was isolated and cannulated with a direct surgical cut down technique.  A 0.035 inch J tip wire was introduced into the vein and passed through the superior vena cava, then right atrium and then to the inferior vena cava, excluding inadvertent arterial access.  A short peel away sheath was then placed over this wire into the vascular system.  The cephalic vein was then ligatted medially and laterally to the vascular insertion point of the sheath.      RIGHT VENTRICULAR LEAD:    A peel away sheath was brought to the field and placed into the venous system via over the wire technique.  The right ventricular lead was placed via this sheath into the right ventricle. The lead was attached via active fixation.  Adequate sensing and threshold parameters were obtained.  There was no evidence of diaphragmatic stimulation at 10 V output.  The peel away sheath  was removed and the lead collar was advanced to the pectoral muscle and sutured with non absorbable suture.  Tug testing of this lead confirmed stability of the lead and the length of the lead's slack was assessed as optimal with fluoroscopy.     RIGHT ATRIAL LEAD:    A peel away sheath was brought to the field and placed into the venous system via over the wire technique.  The right atrial lead was placed via this sheath into the right atrium. The lead was attached via active fixation.  Adequate sensing and threshold parameters were obtained.  There was no evidence of diaphragmatic stimulation at 10 V output.  The peel away sheath was removed and the lead collar was advanced to the pectoral muscle and sutured with non absorbable suture.  Tug testing of this lead confirmed stability of the lead and the length of the lead's slack was assessed as optimal with fluoroscopy.     The pulse generator was then sutured to the fascia in a medial location within the pocket using 1-0 silk suture.  The pocket was closed with two layers of suture using 2-0 then 3-0 Vicryl, followed by a layer of surgical adhesive and finally a sterile occlusive dressing.    MEASURED DEVICE DATA:    Atrial lead                   sensin mV                   impedance:            592 ohms                   Threshold:             1 volts at 0.4 ms    RV lead                    sensin.9 mV                   impedance:            818 ohms                   Threshold:             0.5 volts at 0.4 ms                     PROGRAMMED PARAMETERS:    Mode:                      DDDR  Lower Rate:                60 pulses per minute  Upper Sensor Rate:         130 pulses per minute  Upper Tracking Rate:       130 pulses per minute  Mode Switch Rate:          170 bpm    CONCLUSION:  Successful implantation of permanent pacemaker system.      Home tonight      RECOMMENDATION:    Patient is to follow up with our clinic in  approximately one week and then myself in 3 months.    No lovenox or heparin at any dose is to be given.      FOR THE PATIENT    Please do not lift more than 10 pounds or abduct the shoulder joint / or raise the arm above the shoulder for 6 weeks after device was implanted (this does not apply to subcutaneous ICDs).    Avoid activities that involve heavy lifting or rough contact that could result in blows to your implant site and to allow your incision time to heal.    No shower or getting device incision wet for 2 days post-operatively.    Keep wound exposed to air unless otherwise instructed, the surgical glue is the bandage.    No creams, lotions, or powders to incision.    Please avoid allowing bra strap or suspenders to lay over incision until completely healed.    Avoid hot tubs or pools until incision completely healed.    No driving for 24 hours post-operatively after device implant.    Call your doctor if you have any swelling, redness or discharge around your incision, notice anything unusual or unexpected, or you develop a fever that does not go away in two or three days.    Call your doctor if you hear any beeping sounds / vibratory alerts from your device as this indicates your device needs to be checked immediately.    Carry your Medical Device ID Card with you at all times.  Please call our office () with any questions about the device or the wounds.          Jimy Ledezma DO, FACC, [facility]  Cardiac Electrophysiologist  [facility] / [facility]

## 2020-12-23 NOTE — DISCHARGE INSTRUCTIONS
Patient is to follow up with our clinic in approximately one week and then myself in 3 months.    Please do not lift more than 10 pounds or abduct the shoulder joint / or raise the arm above the shoulder for 6 weeks after device was implanted (this does not apply to subcutaneous ICDs).     Avoid activities that involve heavy lifting or rough contact that could result in blows to your implant site and to allow your incision time to heal.     No shower or getting device incision wet for 2 days post-operatively.     Keep wound exposed to air unless otherwise instructed, the surgical glue is the bandage.     No creams, lotions, or powders to incision.     Please avoid allowing bra strap or suspenders to lay over incision until completely healed.     Avoid hot tubs or pools until incision completely healed.     No driving for 24 hours post-operatively after device implant.     Call your doctor if you have any swelling, redness or discharge around your incision, notice anything unusual or unexpected, or you develop a fever that does not go away in two or three days.     Call your doctor if you hear any beeping sounds / vibratory alerts from your device as this indicates your device needs to be checked immediately.     Carry your Medical Device ID Card with you at all times.  Please call our office () with any questions about the device or the wounds.

## 2020-12-26 ENCOUNTER — HOSPITAL ENCOUNTER (EMERGENCY)
Facility: HOSPITAL | Age: 85
Discharge: HOME | End: 2020-12-26
Attending: EMERGENCY MEDICINE | Admitting: EMERGENCY MEDICINE

## 2020-12-26 ENCOUNTER — APPOINTMENT (OUTPATIENT)
Dept: GENERAL RADIOLOGY | Facility: HOSPITAL | Age: 85
End: 2020-12-26

## 2020-12-26 VITALS
BODY MASS INDEX: 31.1 KG/M2 | TEMPERATURE: 97.8 F | HEIGHT: 69 IN | DIASTOLIC BLOOD PRESSURE: 79 MMHG | OXYGEN SATURATION: 96 % | WEIGHT: 210 LBS | RESPIRATION RATE: 18 BRPM | HEART RATE: 86 BPM | SYSTOLIC BLOOD PRESSURE: 141 MMHG

## 2020-12-26 DIAGNOSIS — R00.0 TACHYCARDIA: ICD-10-CM

## 2020-12-26 DIAGNOSIS — Z98.890 POST-OPERATIVE STATE: Primary | ICD-10-CM

## 2020-12-26 DIAGNOSIS — R53.1 WEAKNESS GENERALIZED: ICD-10-CM

## 2020-12-26 DIAGNOSIS — Z95.0 PRESENCE OF CARDIAC PACEMAKER: ICD-10-CM

## 2020-12-26 LAB
ALBUMIN SERPL-MCNC: 3.6 G/DL (ref 3.5–5.2)
ALBUMIN/GLOB SERPL: 0.9 G/DL
ALP SERPL-CCNC: 102 U/L (ref 39–117)
ALT SERPL W P-5'-P-CCNC: 16 U/L (ref 1–41)
ANION GAP SERPL CALCULATED.3IONS-SCNC: 8 MMOL/L (ref 5–15)
AST SERPL-CCNC: 21 U/L (ref 1–40)
BASOPHILS # BLD AUTO: 0.08 10*3/MM3 (ref 0–0.2)
BASOPHILS NFR BLD AUTO: 0.7 % (ref 0–1.5)
BILIRUB SERPL-MCNC: 0.3 MG/DL (ref 0–1.2)
BUN SERPL-MCNC: 34 MG/DL (ref 8–23)
BUN/CREAT SERPL: 21.8 (ref 7–25)
CALCIUM SPEC-SCNC: 9.3 MG/DL (ref 8.6–10.5)
CHLORIDE SERPL-SCNC: 103 MMOL/L (ref 98–107)
CO2 SERPL-SCNC: 28 MMOL/L (ref 22–29)
CREAT SERPL-MCNC: 1.56 MG/DL (ref 0.76–1.27)
DEPRECATED RDW RBC AUTO: 48 FL (ref 37–54)
EOSINOPHIL # BLD AUTO: 0.24 10*3/MM3 (ref 0–0.4)
EOSINOPHIL NFR BLD AUTO: 2 % (ref 0.3–6.2)
ERYTHROCYTE [DISTWIDTH] IN BLOOD BY AUTOMATED COUNT: 14 % (ref 12.3–15.4)
GFR SERPL CREATININE-BSD FRML MDRD: 42 ML/MIN/1.73
GLOBULIN UR ELPH-MCNC: 4 GM/DL
GLUCOSE SERPL-MCNC: 164 MG/DL (ref 65–99)
HCT VFR BLD AUTO: 49.2 % (ref 37.5–51)
HGB BLD-MCNC: 15.3 G/DL (ref 13–17.7)
HOLD SPECIMEN: NORMAL
HOLD SPECIMEN: NORMAL
IMM GRANULOCYTES # BLD AUTO: 0.04 10*3/MM3 (ref 0–0.05)
IMM GRANULOCYTES NFR BLD AUTO: 0.3 % (ref 0–0.5)
LYMPHOCYTES # BLD AUTO: 1.12 10*3/MM3 (ref 0.7–3.1)
LYMPHOCYTES NFR BLD AUTO: 9.5 % (ref 19.6–45.3)
MAGNESIUM SERPL-MCNC: 1.8 MG/DL (ref 1.6–2.4)
MCH RBC QN AUTO: 29.2 PG (ref 26.6–33)
MCHC RBC AUTO-ENTMCNC: 31.1 G/DL (ref 31.5–35.7)
MCV RBC AUTO: 93.9 FL (ref 79–97)
MONOCYTES # BLD AUTO: 0.58 10*3/MM3 (ref 0.1–0.9)
MONOCYTES NFR BLD AUTO: 4.9 % (ref 5–12)
NEUTROPHILS NFR BLD AUTO: 82.6 % (ref 42.7–76)
NEUTROPHILS NFR BLD AUTO: 9.69 10*3/MM3 (ref 1.7–7)
NRBC BLD AUTO-RTO: 0 /100 WBC (ref 0–0.2)
NT-PROBNP SERPL-MCNC: 244.6 PG/ML (ref 0–1800)
PLATELET # BLD AUTO: 162 10*3/MM3 (ref 140–450)
PMV BLD AUTO: 9.8 FL (ref 6–12)
POTASSIUM SERPL-SCNC: 4.6 MMOL/L (ref 3.5–5.2)
PROT SERPL-MCNC: 7.6 G/DL (ref 6–8.5)
QT INTERVAL: 400 MS
QTC INTERVAL: 510 MS
RBC # BLD AUTO: 5.24 10*6/MM3 (ref 4.14–5.8)
SODIUM SERPL-SCNC: 139 MMOL/L (ref 136–145)
TROPONIN T SERPL-MCNC: <0.01 NG/ML (ref 0–0.03)
TSH SERPL DL<=0.05 MIU/L-ACNC: 1.82 UIU/ML (ref 0.27–4.2)
WBC # BLD AUTO: 11.75 10*3/MM3 (ref 3.4–10.8)
WHOLE BLOOD HOLD SPECIMEN: NORMAL
WHOLE BLOOD HOLD SPECIMEN: NORMAL

## 2020-12-26 PROCEDURE — 80053 COMPREHEN METABOLIC PANEL: CPT

## 2020-12-26 PROCEDURE — 85025 COMPLETE CBC W/AUTO DIFF WBC: CPT

## 2020-12-26 PROCEDURE — 99284 EMERGENCY DEPT VISIT MOD MDM: CPT

## 2020-12-26 PROCEDURE — 83880 ASSAY OF NATRIURETIC PEPTIDE: CPT

## 2020-12-26 PROCEDURE — 84443 ASSAY THYROID STIM HORMONE: CPT

## 2020-12-26 PROCEDURE — 83735 ASSAY OF MAGNESIUM: CPT

## 2020-12-26 PROCEDURE — 84484 ASSAY OF TROPONIN QUANT: CPT

## 2020-12-26 PROCEDURE — 93005 ELECTROCARDIOGRAM TRACING: CPT

## 2020-12-26 PROCEDURE — 71045 X-RAY EXAM CHEST 1 VIEW: CPT

## 2020-12-27 NOTE — DISCHARGE INSTRUCTIONS
Symptomatic care is recommended. Take all medications as prescribed and instructed. Follow up with your primary care and cardiology as directed or return to Emergency Department with worsening of symptoms.

## 2020-12-27 NOTE — ED PROVIDER NOTES
Subjective   Patient is an 88-year-old male who recently underwent implantation of a cardiac pacemaker on December 23, 2020 by .  Patient shares that he was discharged home and feeling well.  He states that he had family over for [place] and that his granddaughter was concerned as he experienced an episode of an elevated heart rate at 114 bpm.  Granddaughter who is present at bedside also reports that she felt as if he was weaker than normal and that he may have been running a fever.  She states that since her grandfather has been home he has used his oxygen which he has in place to use as needed.  Patient denies any specific concerns and notes that he does not wish to be admitted to the hospital and is feeling fine.          Review of Systems   Constitutional: Positive for activity change. Negative for chills, diaphoresis, fatigue and fever.   HENT: Negative.    Eyes: Negative.    Respiratory: Positive for shortness of breath. Negative for cough and chest tightness.    Cardiovascular: Negative for chest pain and palpitations.   Gastrointestinal: Negative.    Genitourinary: Negative.    Musculoskeletal: Negative.    Skin: Negative.    Neurological: Negative.    Psychiatric/Behavioral: Negative.    All other systems reviewed and are negative.      Past Medical History:   Diagnosis Date   • Abnormal EKG    • Abnormal PSA     Reported abnormal   • Acute deep vein thrombosis (DVT) of right lower extremity (CMS/[facility]) 08/22/2019   • Acute diverticulitis 2019   • Acute hyperkalemia 08/22/2019   • Acute respiratory failure with hypoxia (CMS/[facility])    • Acute saddle pulmonary embolism with acute cor pulmonale (CMS/[facility]) 08/22/2019   • Acute systolic right heart failure (CMS/[facility]) 08/22/2019   • Arthritis     KNEES,  BUT SINCE HAD REPLACEMENT   • Benign localized hyperplasia of prostate    • Bilateral knee pain    • C. difficile diarrhea 2010   • Cataracts, bilateral    • CKD (chronic kidney disease)    • Diabetic  "neuropathy (CMS/HCC)    • Diabetic neuropathy (CMS/HCC)    • Disease of thyroid gland     HYPOTHYROIDISM   • Diverticulitis    • Dyslipidemia     H/O   • Family history of malignant hyperthermia     Brother    • Full dentures    • Generalized weakness    • History of gout    • History of hepatitis A vaccination    • History of nephrolithiasis    • History of nuclear stress test     STATES IN THE 1990'S.  \"IT WAS OK\"   • History of osteopenia    • History of recurrent UTI (urinary tract infection)    • Hydronephrosis of left kidney 7/18/2019   • Hydronephrosis with ureteral stricture    • Hyperkalemia     PT UNSURE REGARDING HX OF THIS   • Hyperlipidemia    • Hypertension    • Insomnia    • Malignant hyperthermia due to anesthesia     PER PT REPORT, BROTHER HAD DURING LUNG SURGERY SEVERAL YEARS AGO.  STATED THEY PACKED HIM ON ICE.  STATED HE DID SURVIVE.  PT DENIES ANY PERSONAL HX OF MALIGNANT HYPERTHERMIA HIMSELF, OR ANY ISSUES WITH ANESTHESIA.  STATES TO HIS KNOWLEDGE, NO OTHER FAMILY MEMBER HAS THIS ISSUE EXCEPT FOR HIS BROTHER.   • On supplemental oxygen therapy     2L NC QHS   • Other hydronephrosis 8/12/2019    Added automatically from request for surgery [id number]   • Renal insufficiency    • Sepsis (CMS/HCC) 2019   • Shortness of breath     STATES WITH EXERTION, OTHERWISE, DENIES   • Sigmoid diverticulitis without abscess or perforation 8/9/2017   • Skin breakdown     fourth toe right foot noted in 2019 MD D/C note   • Skin ulcer of fourth toe of right foot, limited to breakdown of skin (CMS/HCC) 7/5/2019   • Stricture of ureter    • Type 2 diabetes mellitus (CMS/HCC)    • Ureteral stricture, left    • UTI (urinary tract infection) 7/18/2019   • Vitamin D deficiency    • Wears glasses        Allergies   Allergen Reactions   • Metformin Diarrhea   • Statins Myalgia       Past Surgical History:   Procedure Laterality Date   • APPENDECTOMY     • CARDIAC ELECTROPHYSIOLOGY PROCEDURE N/A 12/23/2020    Procedure: " Pacemaker DC new. DNS meds.;  Surgeon: Jimy Ledezma DO;  Location:  [facility];  Service: Cardiology;  Laterality: N/A;   • CHOLECYSTECTOMY     • CIRCUMCISION N/A 10/23/2019    Procedure: CIRCUMCISIOn-DORSAL SLIT;  Surgeon: Griffin Romero MD;  Location: [facility] OR;  Service: Urology   • COLONOSCOPY     • CYSTOSCOPY URETEROSCOPY Left 2/11/2019    Procedure: URETEROSCOPY WITH STENT EXTRACTION, RPG;  Surgeon: Griffin Romero MD;  Location:  [facility];  Service: Urology   • CYSTOSCOPY W/ URETERAL STENT PLACEMENT Left 7/20/2019    Procedure: CYSTOSCOPY, LEFT RETROGRADE PYELOGRAM,  ATTEMPTED LEFT URETERAL STENT INSERTION;  Surgeon: Isaac Flynn MD;  Location:  [facility] OR;  Service: Urology   • EYE SURGERY      CATARACTS REMOVED BILATERALLY   • KNEE ARTHROPLASTY Bilateral    • KNEE ARTHROSCOPY Bilateral    • RENAL ARTERY STENT      SEVERAL TIMES EVERY SINCE 1978   • URETERAL STENT INSERTION  10/2020   • URETEROSCOPY LASER LITHOTRIPSY WITH STENT INSERTION Left 1/10/2018    Procedure:  cysto, left ureteral stent replacement ;  Surgeon: Griffin Romero MD;  Location: [facility] OR;  Service:    • URETEROSCOPY LASER LITHOTRIPSY WITH STENT INSERTION Left 8/14/2019    Procedure: URETEROSCOPY, STONE REMOVAL WITH STENT INSERTION;  Surgeon: Griffin Romero MD;  Location: [facility] OR;  Service: Urology   • URETEROSCOPY LASER LITHOTRIPSY WITH STENT INSERTION Left 10/23/2019    Procedure: Left URETEROSCOPY WITH STENT INSERTION-LEFT;  Surgeon: Griffin Romero MD;  Location: [facility] OR;  Service: Urology       Family History   Problem Relation Age of Onset   • Heart attack Sister    • Brain cancer Sister    • Stroke Sister    • Lung cancer Sister    • Brain cancer Brother    • Lung cancer Brother    • Malig Hyperthermia Brother        Social History     Socioeconomic History   • Marital status:      Spouse name: Not on file   • Number of children: Not on file   • Years of education: Not on file   •  Highest education level: Not on file   Tobacco Use   • Smoking status: Former Smoker     Types: Cigarettes   • Smokeless tobacco: Never Used   • Tobacco comment: SMOKED SOME AS A TEEN, DENIES ANY HABIT OR ADDICTION   Substance and Sexual Activity   • Alcohol use: No   • Drug use: No   • Sexual activity: Defer   Social History Narrative    Pt lives alone    Granddaughter Erika is nurse.     Caffeine soda ocassional           Objective   Physical Exam  Vitals signs and nursing note reviewed.   Constitutional:       General: He is not in acute distress.     Appearance: Normal appearance. He is well-developed. He is not ill-appearing or toxic-appearing.   HENT:      Head: Normocephalic and atraumatic.      Mouth/Throat:      Mouth: Mucous membranes are moist.   Eyes:      Extraocular Movements: Extraocular movements intact.   Neck:      Musculoskeletal: Normal range of motion and neck supple.   Cardiovascular:      Rate and Rhythm: Normal rate and regular rhythm.      Pulses: Normal pulses.   Pulmonary:      Effort: Pulmonary effort is normal. No respiratory distress.      Breath sounds: Normal breath sounds.   Chest:      Comments: Site of recent pacemaker implantation and left chest wall healing well without signs or symptoms of infection.  Abdominal:      General: There is no distension.      Palpations: Abdomen is soft.      Tenderness: There is no abdominal tenderness.   Musculoskeletal: Normal range of motion.         General: No deformity.   Skin:     General: Skin is warm and dry.   Neurological:      General: No focal deficit present.      Mental Status: He is alert.   Psychiatric:         Mood and Affect: Mood normal.         Behavior: Behavior normal.         Thought Content: Thought content normal.         Judgment: Judgment normal.         Procedures           ED Course  ED Course as of Dec 27 0526   Sun Dec 27, 2020   0524 Patient presents to ED for evaluation following new pacemaker placed on December 23,  2020.  No acute abnormalities appreciated on physical exam.  Pacemaker site healing well without signs or symptoms of infection.  No acute abnormalities on labs, consistent with previous results.  Negative troponin.  Paced rhythm on EKG without acute ST elevations.  Chest x-ray without acute cardiopulmonary process.  Patient afebrile, nontoxic in appearance and vital signs stable.  Will be discharged home with continued outpatient follow-up to primary care and cardiology as scheduled.  Patient and family at bedside are agreeable to plan of care of outpatient follow-up, given return precautions and showed understanding.    [JG]      ED Course User Index  [JG] Avi West, PA      Recent Results (from the past 24 hour(s))   ECG 12 Lead    Collection Time: 12/26/20  8:15 PM   Result Value Ref Range    QT Interval 400 ms    QTC Interval 510 ms   Comprehensive Metabolic Panel    Collection Time: 12/26/20  8:21 PM    Specimen: Blood   Result Value Ref Range    Glucose 164 (H) 65 - 99 mg/dL    BUN 34 (H) 8 - 23 mg/dL    Creatinine 1.56 (H) 0.76 - 1.27 mg/dL    Sodium 139 136 - 145 mmol/L    Potassium 4.6 3.5 - 5.2 mmol/L    Chloride 103 98 - 107 mmol/L    CO2 28.0 22.0 - 29.0 mmol/L    Calcium 9.3 8.6 - 10.5 mg/dL    Total Protein 7.6 6.0 - 8.5 g/dL    Albumin 3.60 3.50 - 5.20 g/dL    ALT (SGPT) 16 1 - 41 U/L    AST (SGOT) 21 1 - 40 U/L    Alkaline Phosphatase 102 39 - 117 U/L    Total Bilirubin 0.3 0.0 - 1.2 mg/dL    eGFR Non African Amer 42 (L) >60 mL/min/1.73    Globulin 4.0 gm/dL    A/G Ratio 0.9 g/dL    BUN/Creatinine Ratio 21.8 7.0 - 25.0    Anion Gap 8.0 5.0 - 15.0 mmol/L   Magnesium    Collection Time: 12/26/20  8:21 PM    Specimen: Blood   Result Value Ref Range    Magnesium 1.8 1.6 - 2.4 mg/dL   Troponin    Collection Time: 12/26/20  8:21 PM    Specimen: Blood   Result Value Ref Range    Troponin T <0.010 0.000 - 0.030 ng/mL   TSH    Collection Time: 12/26/20  8:21 PM    Specimen: Blood   Result Value Ref  Range    TSH 1.820 0.270 - 4.200 uIU/mL   BNP    Collection Time: 12/26/20  8:21 PM    Specimen: Blood   Result Value Ref Range    proBNP 244.6 0.0-1,800.0 pg/mL   Light Blue Top    Collection Time: 12/26/20  8:21 PM   Result Value Ref Range    Extra Tube hold for add-on    Green Top (Gel)    Collection Time: 12/26/20  8:21 PM   Result Value Ref Range    Extra Tube Hold for add-ons.    Lavender Top    Collection Time: 12/26/20  8:21 PM   Result Value Ref Range    Extra Tube hold for add-on    Gold Top - SST    Collection Time: 12/26/20  8:21 PM   Result Value Ref Range    Extra Tube Hold for add-ons.    CBC Auto Differential    Collection Time: 12/26/20  8:21 PM    Specimen: Blood   Result Value Ref Range    WBC 11.75 (H) 3.40 - 10.80 10*3/mm3    RBC 5.24 4.14 - 5.80 10*6/mm3    Hemoglobin 15.3 13.0 - 17.7 g/dL    Hematocrit 49.2 37.5 - 51.0 %    MCV 93.9 79.0 - 97.0 fL    MCH 29.2 26.6 - 33.0 pg    MCHC 31.1 (L) 31.5 - 35.7 g/dL    RDW 14.0 12.3 - 15.4 %    RDW-SD 48.0 37.0 - 54.0 fl    MPV 9.8 6.0 - 12.0 fL    Platelets 162 140 - 450 10*3/mm3    Neutrophil % 82.6 (H) 42.7 - 76.0 %    Lymphocyte % 9.5 (L) 19.6 - 45.3 %    Monocyte % 4.9 (L) 5.0 - 12.0 %    Eosinophil % 2.0 0.3 - 6.2 %    Basophil % 0.7 0.0 - 1.5 %    Immature Grans % 0.3 0.0 - 0.5 %    Neutrophils, Absolute 9.69 (H) 1.70 - 7.00 10*3/mm3    Lymphocytes, Absolute 1.12 0.70 - 3.10 10*3/mm3    Monocytes, Absolute 0.58 0.10 - 0.90 10*3/mm3    Eosinophils, Absolute 0.24 0.00 - 0.40 10*3/mm3    Basophils, Absolute 0.08 0.00 - 0.20 10*3/mm3    Immature Grans, Absolute 0.04 0.00 - 0.05 10*3/mm3    nRBC 0.0 0.0 - 0.2 /100 WBC     Note: In addition to lab results from this visit, the labs listed above may include labs taken at another facility or during a different encounter within the last 24 hours. Please correlate lab times with ED admission and discharge times for further clarification of the services performed during this visit.    XR Chest 1 View    Final Result   No acute disease. Chronic interstitial fibrosis seen in the periphery of the lungs and confirmed by prior chest CT.             Signer Name: Janette Mendoza MD    Signed: 12/26/2020 9:00 PM    Workstation Name: [id number]     [facility]        Vitals:    12/26/20 2100 12/26/20 2115 12/26/20 2130 12/26/20 2145   BP: 131/87 146/72 140/88 141/79   BP Location:       Patient Position:       Pulse: 90 89 92 86   Resp:    18   Temp:       TempSrc:       SpO2: 96% 97% 97% 96%   Weight:       Height:         Medications - No data to display  ECG/EMG Results (last 24 hours)     Procedure Component Value Units Date/Time    ECG 12 Lead [[id number]] Collected: 12/26/20 2015     Updated: 12/26/20 2141     QT Interval 400 ms      QTC Interval 510 ms     Narrative:      Test Reason : Dysrhythmia triage protocol  Blood Pressure : **/** mmHG  Vent. Rate : 098 BPM     Atrial Rate : 113 BPM     P-R Int : 000 ms          QRS Dur : 168 ms      QT Int : 400 ms       P-R-T Axes : 000 023 228 degrees     QTc Int : 510 ms    Ventricular-paced rhythm  Abnormal ECG  When compared with ECG of 13-OCT-2020 18:38,  Electronic ventricular pacemaker has replaced Sinus rhythm  Vent. rate has increased BY  52 BPM  Confirmed by ISI MAR MD ([id number]) on 12/26/2020 9:41:20 PM    Referred By:  ARIELLE           Confirmed By:ISI MAR MD        ECG 12 Lead   Final Result   Test Reason : Dysrhythmia triage protocol   Blood Pressure : **/** mmHG   Vent. Rate : 098 BPM     Atrial Rate : 113 BPM      P-R Int : 000 ms          QRS Dur : 168 ms       QT Int : 400 ms       P-R-T Axes : 000 023 228 degrees      QTc Int : 510 ms      Ventricular-paced rhythm   Abnormal ECG   When compared with ECG of 13-OCT-2020 18:38,   Electronic ventricular pacemaker has replaced Sinus rhythm   Vent. rate has increased BY  52 BPM   Confirmed by ISI MAR MD ([id number]) on 12/26/2020 9:41:20 PM      Referred By:  ARIELLE           Confirmed  By:ISI MAR MD             DISCHARGE    Patient discharged in stable condition.    Reviewed implications of results, diagnosis, meds, responsibility to follow up, warning signs and symptoms of possible worsening, potential complications and reasons to return to ER.    Patient/Family voiced understanding of above instructions.    Discussed plan for discharge, as there is no emergent indication for admission.  Pt/family is agreeable and understands need for follow up and possible repeat testing.  Pt/family is aware that discharge does not mean that nothing is wrong but that it indicates no emergency is currently present that requires admission and they must continue care with follow-up as given below or with a physician of their choice.     FOLLOW-UP  Ryne Lofton MD  [address]  [address]  [place] [id number]  [phone]    Schedule an appointment as soon as possible for a visit       Jimy Ledezma, DO  [address]  [address]  [facility] [address]  [phone]      Keep follow up appointment as scheduled with cardiology    [facility] Emergency Department  [address] [address] [phone]  Go to   If symptoms worsen         Medication List      Changed    clopidogrel 75 MG tablet  Commonly known as: PLAVIX  Take 1 tablet by mouth Daily. Start on 2/1/20, after completing course of eliquis  What changed: additional instructions     desoximetasone 0.25 % ointment  Commonly known as: TOPICORT  Apply  topically to the appropriate area as directed 2 (Two) Times a Day.  What changed: how much to take                                        MDM  Number of Diagnoses or Management Options  Post-operative state: new and requires workup  Presence of cardiac pacemaker: new and requires workup  Tachycardia: new and requires workup  Weakness generalized: new and requires workup     Amount and/or Complexity of Data Reviewed  Clinical lab tests:  reviewed  Tests in the radiology section of CPT®: reviewed  Tests in the medicine section of CPT®: reviewed  Decide to obtain previous medical records or to obtain history from someone other than the patient: yes    Risk of Complications, Morbidity, and/or Mortality  Presenting problems: moderate  Diagnostic procedures: moderate  Management options: moderate    Patient Progress  Patient progress: stable      Final diagnoses:   Post-operative state   Weakness generalized   Tachycardia   Presence of cardiac pacemaker            Avi West PA  12/27/20 0527

## 2020-12-28 ENCOUNTER — APPOINTMENT (OUTPATIENT)
Dept: CT IMAGING | Facility: HOSPITAL | Age: 85
End: 2020-12-28

## 2020-12-28 ENCOUNTER — CALL CENTER PROGRAMS (OUTPATIENT)
Dept: CALL CENTER | Facility: HOSPITAL | Age: 85
End: 2020-12-28

## 2020-12-28 ENCOUNTER — HOSPITAL ENCOUNTER (OUTPATIENT)
Facility: HOSPITAL | Age: 85
Setting detail: OBSERVATION
Discharge: HOME HEALTH | End: 2020-12-31
Attending: EMERGENCY MEDICINE | Admitting: EMERGENCY MEDICINE

## 2020-12-28 DIAGNOSIS — R79.89 ELEVATED PROCALCITONIN: ICD-10-CM

## 2020-12-28 DIAGNOSIS — B96.29 UTI DUE TO EXTENDED-SPECTRUM BETA LACTAMASE (ESBL) PRODUCING ESCHERICHIA COLI: Primary | ICD-10-CM

## 2020-12-28 DIAGNOSIS — N39.0 UTI DUE TO EXTENDED-SPECTRUM BETA LACTAMASE (ESBL) PRODUCING ESCHERICHIA COLI: Primary | ICD-10-CM

## 2020-12-28 DIAGNOSIS — R53.1 GENERAL WEAKNESS: ICD-10-CM

## 2020-12-28 DIAGNOSIS — N39.0 URINARY TRACT INFECTION WITHOUT HEMATURIA, SITE UNSPECIFIED: ICD-10-CM

## 2020-12-28 DIAGNOSIS — Z16.12 UTI DUE TO EXTENDED-SPECTRUM BETA LACTAMASE (ESBL) PRODUCING ESCHERICHIA COLI: Primary | ICD-10-CM

## 2020-12-28 DIAGNOSIS — N28.9 RENAL INSUFFICIENCY: ICD-10-CM

## 2020-12-28 PROBLEM — N35.919 URETHRAL STRICTURE: Status: ACTIVE | Noted: 2020-12-28

## 2020-12-28 LAB
ALBUMIN SERPL-MCNC: 3.7 G/DL (ref 3.5–5.2)
ALBUMIN/GLOB SERPL: 1 G/DL
ALP SERPL-CCNC: 99 U/L (ref 39–117)
ALT SERPL W P-5'-P-CCNC: 24 U/L (ref 1–41)
ANION GAP SERPL CALCULATED.3IONS-SCNC: 13.6 MMOL/L (ref 5–15)
AST SERPL-CCNC: 30 U/L (ref 1–40)
BACTERIA UR QL AUTO: ABNORMAL /HPF
BASOPHILS # BLD AUTO: 0.12 10*3/MM3 (ref 0–0.2)
BASOPHILS NFR BLD AUTO: 0.6 % (ref 0–1.5)
BILIRUB SERPL-MCNC: 0.5 MG/DL (ref 0–1.2)
BILIRUB UR QL STRIP: NEGATIVE
BUN SERPL-MCNC: 30 MG/DL (ref 8–23)
BUN/CREAT SERPL: 17.5 (ref 7–25)
CALCIUM SPEC-SCNC: 8.9 MG/DL (ref 8.6–10.5)
CHLORIDE SERPL-SCNC: 101 MMOL/L (ref 98–107)
CLARITY UR: ABNORMAL
CO2 SERPL-SCNC: 23.4 MMOL/L (ref 22–29)
COLOR UR: YELLOW
CREAT SERPL-MCNC: 1.71 MG/DL (ref 0.76–1.27)
D-LACTATE SERPL-SCNC: 2.1 MMOL/L (ref 0.5–2)
DEPRECATED RDW RBC AUTO: 47.7 FL (ref 37–54)
EOSINOPHIL # BLD AUTO: 0.1 10*3/MM3 (ref 0–0.4)
EOSINOPHIL NFR BLD AUTO: 0.5 % (ref 0.3–6.2)
ERYTHROCYTE [DISTWIDTH] IN BLOOD BY AUTOMATED COUNT: 14 % (ref 12.3–15.4)
GFR SERPL CREATININE-BSD FRML MDRD: 38 ML/MIN/1.73
GLOBULIN UR ELPH-MCNC: 3.8 GM/DL
GLUCOSE SERPL-MCNC: 227 MG/DL (ref 65–99)
GLUCOSE UR STRIP-MCNC: ABNORMAL MG/DL
HBA1C MFR BLD: 7.5 % (ref 4.8–5.6)
HCT VFR BLD AUTO: 46.9 % (ref 37.5–51)
HGB BLD-MCNC: 15.2 G/DL (ref 13–17.7)
HGB UR QL STRIP.AUTO: ABNORMAL
HOLD SPECIMEN: NORMAL
HOLD SPECIMEN: NORMAL
HYALINE CASTS UR QL AUTO: ABNORMAL /LPF
IMM GRANULOCYTES # BLD AUTO: 0.14 10*3/MM3 (ref 0–0.05)
IMM GRANULOCYTES NFR BLD AUTO: 0.7 % (ref 0–0.5)
KETONES UR QL STRIP: NEGATIVE
LACTATE HOLD SPECIMEN: NORMAL
LEUKOCYTE ESTERASE UR QL STRIP.AUTO: ABNORMAL
LYMPHOCYTES # BLD AUTO: 2.77 10*3/MM3 (ref 0.7–3.1)
LYMPHOCYTES NFR BLD AUTO: 12.9 % (ref 19.6–45.3)
MCH RBC QN AUTO: 29.8 PG (ref 26.6–33)
MCHC RBC AUTO-ENTMCNC: 32.4 G/DL (ref 31.5–35.7)
MCV RBC AUTO: 92 FL (ref 79–97)
MONOCYTES # BLD AUTO: 1.53 10*3/MM3 (ref 0.1–0.9)
MONOCYTES NFR BLD AUTO: 7.1 % (ref 5–12)
NEUTROPHILS NFR BLD AUTO: 16.87 10*3/MM3 (ref 1.7–7)
NEUTROPHILS NFR BLD AUTO: 78.2 % (ref 42.7–76)
NITRITE UR QL STRIP: POSITIVE
NRBC BLD AUTO-RTO: 0 /100 WBC (ref 0–0.2)
NT-PROBNP SERPL-MCNC: 743.1 PG/ML (ref 0–1800)
PH UR STRIP.AUTO: 6 [PH] (ref 5–8)
PLATELET # BLD AUTO: 151 10*3/MM3 (ref 140–450)
PMV BLD AUTO: 9.6 FL (ref 6–12)
POTASSIUM SERPL-SCNC: 4.3 MMOL/L (ref 3.5–5.2)
PROCALCITONIN SERPL-MCNC: 1.08 NG/ML (ref 0–0.25)
PROT SERPL-MCNC: 7.5 G/DL (ref 6–8.5)
PROT UR QL STRIP: ABNORMAL
RBC # BLD AUTO: 5.1 10*6/MM3 (ref 4.14–5.8)
RBC # UR: ABNORMAL /HPF
REF LAB TEST METHOD: ABNORMAL
SARS-COV-2 RNA PNL SPEC NAA+PROBE: NOT DETECTED
SODIUM SERPL-SCNC: 138 MMOL/L (ref 136–145)
SP GR UR STRIP: 1.02 (ref 1–1.03)
SQUAMOUS #/AREA URNS HPF: ABNORMAL /HPF
TROPONIN T SERPL-MCNC: <0.01 NG/ML (ref 0–0.03)
UROBILINOGEN UR QL STRIP: ABNORMAL
WBC # BLD AUTO: 21.53 10*3/MM3 (ref 3.4–10.8)
WBC UR QL AUTO: ABNORMAL /HPF
WHOLE BLOOD HOLD SPECIMEN: NORMAL
WHOLE BLOOD HOLD SPECIMEN: NORMAL

## 2020-12-28 PROCEDURE — 96365 THER/PROPH/DIAG IV INF INIT: CPT

## 2020-12-28 PROCEDURE — 84484 ASSAY OF TROPONIN QUANT: CPT | Performed by: EMERGENCY MEDICINE

## 2020-12-28 PROCEDURE — 87040 BLOOD CULTURE FOR BACTERIA: CPT | Performed by: EMERGENCY MEDICINE

## 2020-12-28 PROCEDURE — 87186 SC STD MICRODIL/AGAR DIL: CPT | Performed by: EMERGENCY MEDICINE

## 2020-12-28 PROCEDURE — 84145 PROCALCITONIN (PCT): CPT | Performed by: EMERGENCY MEDICINE

## 2020-12-28 PROCEDURE — 83036 HEMOGLOBIN GLYCOSYLATED A1C: CPT | Performed by: EMERGENCY MEDICINE

## 2020-12-28 PROCEDURE — 83880 ASSAY OF NATRIURETIC PEPTIDE: CPT | Performed by: EMERGENCY MEDICINE

## 2020-12-28 PROCEDURE — 74176 CT ABD & PELVIS W/O CONTRAST: CPT

## 2020-12-28 PROCEDURE — 80053 COMPREHEN METABOLIC PANEL: CPT | Performed by: EMERGENCY MEDICINE

## 2020-12-28 PROCEDURE — 99284 EMERGENCY DEPT VISIT MOD MDM: CPT

## 2020-12-28 PROCEDURE — 71250 CT THORAX DX C-: CPT

## 2020-12-28 PROCEDURE — 83605 ASSAY OF LACTIC ACID: CPT | Performed by: EMERGENCY MEDICINE

## 2020-12-28 PROCEDURE — C9803 HOPD COVID-19 SPEC COLLECT: HCPCS

## 2020-12-28 PROCEDURE — G0378 HOSPITAL OBSERVATION PER HR: HCPCS

## 2020-12-28 PROCEDURE — 85025 COMPLETE CBC W/AUTO DIFF WBC: CPT | Performed by: EMERGENCY MEDICINE

## 2020-12-28 PROCEDURE — 99220 PR INITIAL OBSERVATION CARE/DAY 70 MINUTES: CPT | Performed by: EMERGENCY MEDICINE

## 2020-12-28 PROCEDURE — 25010000002 PIPERACILLIN SOD-TAZOBACTAM PER 1 G: Performed by: EMERGENCY MEDICINE

## 2020-12-28 PROCEDURE — 81001 URINALYSIS AUTO W/SCOPE: CPT | Performed by: EMERGENCY MEDICINE

## 2020-12-28 PROCEDURE — 87635 SARS-COV-2 COVID-19 AMP PRB: CPT | Performed by: EMERGENCY MEDICINE

## 2020-12-28 PROCEDURE — 87086 URINE CULTURE/COLONY COUNT: CPT | Performed by: EMERGENCY MEDICINE

## 2020-12-28 PROCEDURE — 87077 CULTURE AEROBIC IDENTIFY: CPT | Performed by: EMERGENCY MEDICINE

## 2020-12-28 PROCEDURE — 93005 ELECTROCARDIOGRAM TRACING: CPT | Performed by: EMERGENCY MEDICINE

## 2020-12-28 RX ORDER — SODIUM CHLORIDE 0.9 % (FLUSH) 0.9 %
10 SYRINGE (ML) INJECTION AS NEEDED
Status: DISCONTINUED | OUTPATIENT
Start: 2020-12-28 | End: 2020-12-31 | Stop reason: HOSPADM

## 2020-12-28 RX ORDER — DEXTROSE MONOHYDRATE 25 G/50ML
25 INJECTION, SOLUTION INTRAVENOUS
Status: DISCONTINUED | OUTPATIENT
Start: 2020-12-28 | End: 2020-12-31 | Stop reason: HOSPADM

## 2020-12-28 RX ORDER — LOSARTAN POTASSIUM 25 MG/1
25 TABLET ORAL DAILY
Status: DISCONTINUED | OUTPATIENT
Start: 2020-12-29 | End: 2020-12-31 | Stop reason: HOSPADM

## 2020-12-28 RX ORDER — LEVOTHYROXINE SODIUM 0.05 MG/1
50 TABLET ORAL DAILY
Status: DISCONTINUED | OUTPATIENT
Start: 2020-12-29 | End: 2020-12-31 | Stop reason: HOSPADM

## 2020-12-28 RX ORDER — NICOTINE POLACRILEX 4 MG
1 LOZENGE BUCCAL
Status: DISCONTINUED | OUTPATIENT
Start: 2020-12-28 | End: 2020-12-31 | Stop reason: HOSPADM

## 2020-12-28 RX ORDER — ACETAMINOPHEN 160 MG/5ML
650 SOLUTION ORAL EVERY 4 HOURS PRN
Status: DISCONTINUED | OUTPATIENT
Start: 2020-12-28 | End: 2020-12-31 | Stop reason: HOSPADM

## 2020-12-28 RX ORDER — SODIUM CHLORIDE 0.9 % (FLUSH) 0.9 %
10 SYRINGE (ML) INJECTION EVERY 12 HOURS SCHEDULED
Status: DISCONTINUED | OUTPATIENT
Start: 2020-12-28 | End: 2020-12-31 | Stop reason: HOSPADM

## 2020-12-28 RX ORDER — ACETAMINOPHEN 650 MG/1
650 SUPPOSITORY RECTAL EVERY 4 HOURS PRN
Status: DISCONTINUED | OUTPATIENT
Start: 2020-12-28 | End: 2020-12-31 | Stop reason: HOSPADM

## 2020-12-28 RX ORDER — CLOPIDOGREL BISULFATE 75 MG/1
75 TABLET ORAL DAILY
Status: DISCONTINUED | OUTPATIENT
Start: 2020-12-29 | End: 2020-12-31 | Stop reason: HOSPADM

## 2020-12-28 RX ORDER — ONDANSETRON 2 MG/ML
4 INJECTION INTRAMUSCULAR; INTRAVENOUS EVERY 6 HOURS PRN
Status: DISCONTINUED | OUTPATIENT
Start: 2020-12-28 | End: 2020-12-31 | Stop reason: HOSPADM

## 2020-12-28 RX ORDER — TAMSULOSIN HYDROCHLORIDE 0.4 MG/1
0.4 CAPSULE ORAL NIGHTLY
Status: DISCONTINUED | OUTPATIENT
Start: 2020-12-29 | End: 2020-12-31 | Stop reason: HOSPADM

## 2020-12-28 RX ORDER — ACETAMINOPHEN 325 MG/1
650 TABLET ORAL EVERY 4 HOURS PRN
Status: DISCONTINUED | OUTPATIENT
Start: 2020-12-28 | End: 2020-12-31 | Stop reason: HOSPADM

## 2020-12-28 RX ADMIN — TAZOBACTAM SODIUM AND PIPERACILLIN SODIUM 4.5 G: 500; 4 INJECTION, SOLUTION INTRAVENOUS at 21:03

## 2020-12-28 RX ADMIN — SODIUM CHLORIDE 500 ML: 9 INJECTION, SOLUTION INTRAVENOUS at 20:13

## 2020-12-28 NOTE — OUTREACH NOTE
PCI/Device Survey      Responses   Facility patient discharged from?  [facility]   Procedure date  12/26/20   Procedure (if device, specify in description)  Device   Device Description  L Chect PM   Performing MD Dr. Jimy Ledezma   Attempt successful?  Yes   Call start time  0939   Call end time  0943   Person spoke with today (if not patient) and relationship  son   Nursing intervention  Reminded to continue to take prescribed medications   Nursing intervention  Patient education provided   Does the patient have an appointment scheduled with the cardiologist?  Yes   Did the patient feel prepared to go home on the same day as the procedure?  Yes   Is the patient satisfied with the same day discharge process?  Yes   PCI/Device call completed  Yes          Pelon Pereyra RN

## 2020-12-29 LAB
ANION GAP SERPL CALCULATED.3IONS-SCNC: 10.6 MMOL/L (ref 5–15)
BASOPHILS # BLD AUTO: 0.09 10*3/MM3 (ref 0–0.2)
BASOPHILS NFR BLD AUTO: 0.4 % (ref 0–1.5)
BUN SERPL-MCNC: 28 MG/DL (ref 8–23)
BUN/CREAT SERPL: 16.2 (ref 7–25)
CALCIUM SPEC-SCNC: 8.8 MG/DL (ref 8.6–10.5)
CHLORIDE SERPL-SCNC: 104 MMOL/L (ref 98–107)
CO2 SERPL-SCNC: 21.4 MMOL/L (ref 22–29)
CREAT SERPL-MCNC: 1.73 MG/DL (ref 0.76–1.27)
D-LACTATE SERPL-SCNC: 1.8 MMOL/L (ref 0.5–2)
DEPRECATED RDW RBC AUTO: 46.6 FL (ref 37–54)
EOSINOPHIL # BLD AUTO: 0.08 10*3/MM3 (ref 0–0.4)
EOSINOPHIL NFR BLD AUTO: 0.3 % (ref 0.3–6.2)
ERYTHROCYTE [DISTWIDTH] IN BLOOD BY AUTOMATED COUNT: 14 % (ref 12.3–15.4)
GFR SERPL CREATININE-BSD FRML MDRD: 37 ML/MIN/1.73
GLUCOSE BLDC GLUCOMTR-MCNC: 149 MG/DL (ref 70–130)
GLUCOSE BLDC GLUCOMTR-MCNC: 168 MG/DL (ref 70–130)
GLUCOSE BLDC GLUCOMTR-MCNC: 170 MG/DL (ref 70–130)
GLUCOSE BLDC GLUCOMTR-MCNC: 226 MG/DL (ref 70–130)
GLUCOSE SERPL-MCNC: 188 MG/DL (ref 65–99)
HCT VFR BLD AUTO: 45 % (ref 37.5–51)
HGB BLD-MCNC: 14.3 G/DL (ref 13–17.7)
IMM GRANULOCYTES # BLD AUTO: 0.22 10*3/MM3 (ref 0–0.05)
IMM GRANULOCYTES NFR BLD AUTO: 1 % (ref 0–0.5)
LYMPHOCYTES # BLD AUTO: 2.97 10*3/MM3 (ref 0.7–3.1)
LYMPHOCYTES NFR BLD AUTO: 13 % (ref 19.6–45.3)
MCH RBC QN AUTO: 29.1 PG (ref 26.6–33)
MCHC RBC AUTO-ENTMCNC: 31.8 G/DL (ref 31.5–35.7)
MCV RBC AUTO: 91.5 FL (ref 79–97)
MONOCYTES # BLD AUTO: 1.58 10*3/MM3 (ref 0.1–0.9)
MONOCYTES NFR BLD AUTO: 6.9 % (ref 5–12)
NEUTROPHILS NFR BLD AUTO: 17.99 10*3/MM3 (ref 1.7–7)
NEUTROPHILS NFR BLD AUTO: 78.4 % (ref 42.7–76)
NRBC BLD AUTO-RTO: 0 /100 WBC (ref 0–0.2)
PLATELET # BLD AUTO: 141 10*3/MM3 (ref 140–450)
PMV BLD AUTO: 10.2 FL (ref 6–12)
POTASSIUM SERPL-SCNC: 4.9 MMOL/L (ref 3.5–5.2)
RBC # BLD AUTO: 4.92 10*6/MM3 (ref 4.14–5.8)
SODIUM SERPL-SCNC: 136 MMOL/L (ref 136–145)
WBC # BLD AUTO: 22.93 10*3/MM3 (ref 3.4–10.8)

## 2020-12-29 PROCEDURE — 96367 TX/PROPH/DG ADDL SEQ IV INF: CPT

## 2020-12-29 PROCEDURE — 99225 PR SBSQ OBSERVATION CARE/DAY 25 MINUTES: CPT | Performed by: INTERNAL MEDICINE

## 2020-12-29 PROCEDURE — 96366 THER/PROPH/DIAG IV INF ADDON: CPT

## 2020-12-29 PROCEDURE — 63710000001 INSULIN ASPART PER 5 UNITS: Performed by: EMERGENCY MEDICINE

## 2020-12-29 PROCEDURE — 80048 BASIC METABOLIC PNL TOTAL CA: CPT | Performed by: EMERGENCY MEDICINE

## 2020-12-29 PROCEDURE — G0378 HOSPITAL OBSERVATION PER HR: HCPCS

## 2020-12-29 PROCEDURE — 25010000002 PIPERACILLIN SOD-TAZOBACTAM PER 1 G: Performed by: EMERGENCY MEDICINE

## 2020-12-29 PROCEDURE — 25010000002 ENOXAPARIN PER 10 MG: Performed by: INTERNAL MEDICINE

## 2020-12-29 PROCEDURE — 97165 OT EVAL LOW COMPLEX 30 MIN: CPT

## 2020-12-29 PROCEDURE — 82962 GLUCOSE BLOOD TEST: CPT

## 2020-12-29 PROCEDURE — 83605 ASSAY OF LACTIC ACID: CPT | Performed by: EMERGENCY MEDICINE

## 2020-12-29 PROCEDURE — 96372 THER/PROPH/DIAG INJ SC/IM: CPT

## 2020-12-29 PROCEDURE — 97161 PT EVAL LOW COMPLEX 20 MIN: CPT

## 2020-12-29 PROCEDURE — 63710000001 INSULIN DETEMIR PER 5 UNITS: Performed by: EMERGENCY MEDICINE

## 2020-12-29 PROCEDURE — 85025 COMPLETE CBC W/AUTO DIFF WBC: CPT | Performed by: EMERGENCY MEDICINE

## 2020-12-29 RX ADMIN — SODIUM CHLORIDE, PRESERVATIVE FREE 10 ML: 5 INJECTION INTRAVENOUS at 20:26

## 2020-12-29 RX ADMIN — CLOPIDOGREL BISULFATE 75 MG: 75 TABLET ORAL at 09:17

## 2020-12-29 RX ADMIN — TAZOBACTAM SODIUM AND PIPERACILLIN SODIUM 3.38 G: 375; 3 INJECTION, SOLUTION INTRAVENOUS at 06:02

## 2020-12-29 RX ADMIN — INSULIN ASPART 4 UNITS: 100 INJECTION, SOLUTION INTRAVENOUS; SUBCUTANEOUS at 12:26

## 2020-12-29 RX ADMIN — ACETAMINOPHEN 650 MG: 325 TABLET, FILM COATED ORAL at 17:45

## 2020-12-29 RX ADMIN — TAMSULOSIN HYDROCHLORIDE 0.4 MG: 0.4 CAPSULE ORAL at 01:23

## 2020-12-29 RX ADMIN — ACETAMINOPHEN 650 MG: 325 TABLET, FILM COATED ORAL at 01:23

## 2020-12-29 RX ADMIN — TAMSULOSIN HYDROCHLORIDE 0.4 MG: 0.4 CAPSULE ORAL at 20:26

## 2020-12-29 RX ADMIN — MICAFUNGIN SODIUM 100 MG: 20 INJECTION, POWDER, LYOPHILIZED, FOR SOLUTION INTRAVENOUS at 11:48

## 2020-12-29 RX ADMIN — INSULIN ASPART 2 UNITS: 100 INJECTION, SOLUTION INTRAVENOUS; SUBCUTANEOUS at 06:41

## 2020-12-29 RX ADMIN — TAZOBACTAM SODIUM AND PIPERACILLIN SODIUM 3.38 G: 375; 3 INJECTION, SOLUTION INTRAVENOUS at 16:05

## 2020-12-29 RX ADMIN — SODIUM CHLORIDE, PRESERVATIVE FREE 10 ML: 5 INJECTION INTRAVENOUS at 01:25

## 2020-12-29 RX ADMIN — INSULIN DETEMIR 10 UNITS: 100 INJECTION, SOLUTION SUBCUTANEOUS at 09:18

## 2020-12-29 RX ADMIN — ENOXAPARIN SODIUM 40 MG: 40 INJECTION SUBCUTANEOUS at 17:45

## 2020-12-29 RX ADMIN — SODIUM CHLORIDE, PRESERVATIVE FREE 10 ML: 5 INJECTION INTRAVENOUS at 09:17

## 2020-12-29 RX ADMIN — LEVOTHYROXINE SODIUM 50 MCG: 50 TABLET ORAL at 09:17

## 2020-12-30 LAB
ANION GAP SERPL CALCULATED.3IONS-SCNC: 12.1 MMOL/L (ref 5–15)
BACTERIA SPEC AEROBE CULT: ABNORMAL
BASOPHILS # BLD AUTO: 0.09 10*3/MM3 (ref 0–0.2)
BASOPHILS NFR BLD AUTO: 0.5 % (ref 0–1.5)
BUN SERPL-MCNC: 30 MG/DL (ref 8–23)
BUN/CREAT SERPL: 17.1 (ref 7–25)
CALCIUM SPEC-SCNC: 8.7 MG/DL (ref 8.6–10.5)
CHLORIDE SERPL-SCNC: 102 MMOL/L (ref 98–107)
CO2 SERPL-SCNC: 20.9 MMOL/L (ref 22–29)
CREAT SERPL-MCNC: 1.75 MG/DL (ref 0.76–1.27)
DEPRECATED RDW RBC AUTO: 46.5 FL (ref 37–54)
EOSINOPHIL # BLD AUTO: 0.37 10*3/MM3 (ref 0–0.4)
EOSINOPHIL NFR BLD AUTO: 2 % (ref 0.3–6.2)
ERYTHROCYTE [DISTWIDTH] IN BLOOD BY AUTOMATED COUNT: 14 % (ref 12.3–15.4)
GFR SERPL CREATININE-BSD FRML MDRD: 37 ML/MIN/1.73
GLUCOSE BLDC GLUCOMTR-MCNC: 149 MG/DL (ref 70–130)
GLUCOSE BLDC GLUCOMTR-MCNC: 185 MG/DL (ref 70–130)
GLUCOSE BLDC GLUCOMTR-MCNC: 217 MG/DL (ref 70–130)
GLUCOSE BLDC GLUCOMTR-MCNC: 285 MG/DL (ref 70–130)
GLUCOSE SERPL-MCNC: 257 MG/DL (ref 65–99)
HCT VFR BLD AUTO: 46.2 % (ref 37.5–51)
HGB BLD-MCNC: 14.9 G/DL (ref 13–17.7)
IMM GRANULOCYTES # BLD AUTO: 0.09 10*3/MM3 (ref 0–0.05)
IMM GRANULOCYTES NFR BLD AUTO: 0.5 % (ref 0–0.5)
LYMPHOCYTES # BLD AUTO: 2.69 10*3/MM3 (ref 0.7–3.1)
LYMPHOCYTES NFR BLD AUTO: 14.8 % (ref 19.6–45.3)
MCH RBC QN AUTO: 29.3 PG (ref 26.6–33)
MCHC RBC AUTO-ENTMCNC: 32.3 G/DL (ref 31.5–35.7)
MCV RBC AUTO: 90.8 FL (ref 79–97)
MONOCYTES # BLD AUTO: 0.94 10*3/MM3 (ref 0.1–0.9)
MONOCYTES NFR BLD AUTO: 5.2 % (ref 5–12)
NEUTROPHILS NFR BLD AUTO: 13.95 10*3/MM3 (ref 1.7–7)
NEUTROPHILS NFR BLD AUTO: 77 % (ref 42.7–76)
NRBC BLD AUTO-RTO: 0 /100 WBC (ref 0–0.2)
PLATELET # BLD AUTO: 157 10*3/MM3 (ref 140–450)
PMV BLD AUTO: 10.5 FL (ref 6–12)
POTASSIUM SERPL-SCNC: 4 MMOL/L (ref 3.5–5.2)
RBC # BLD AUTO: 5.09 10*6/MM3 (ref 4.14–5.8)
SODIUM SERPL-SCNC: 135 MMOL/L (ref 136–145)
WBC # BLD AUTO: 18.13 10*3/MM3 (ref 3.4–10.8)

## 2020-12-30 PROCEDURE — 80048 BASIC METABOLIC PNL TOTAL CA: CPT | Performed by: INTERNAL MEDICINE

## 2020-12-30 PROCEDURE — 25010000002 PIPERACILLIN SOD-TAZOBACTAM PER 1 G: Performed by: EMERGENCY MEDICINE

## 2020-12-30 PROCEDURE — 85025 COMPLETE CBC W/AUTO DIFF WBC: CPT | Performed by: INTERNAL MEDICINE

## 2020-12-30 PROCEDURE — 99024 POSTOP FOLLOW-UP VISIT: CPT | Performed by: UROLOGY

## 2020-12-30 PROCEDURE — 25010000002 ERTAPENEM PER 500 MG: Performed by: INTERNAL MEDICINE

## 2020-12-30 PROCEDURE — 99225 PR SBSQ OBSERVATION CARE/DAY 25 MINUTES: CPT | Performed by: INTERNAL MEDICINE

## 2020-12-30 PROCEDURE — 63710000001 INSULIN ASPART PER 5 UNITS: Performed by: EMERGENCY MEDICINE

## 2020-12-30 PROCEDURE — 25010000002 ENOXAPARIN PER 10 MG: Performed by: INTERNAL MEDICINE

## 2020-12-30 PROCEDURE — G0378 HOSPITAL OBSERVATION PER HR: HCPCS

## 2020-12-30 PROCEDURE — 63710000001 INSULIN DETEMIR PER 5 UNITS: Performed by: EMERGENCY MEDICINE

## 2020-12-30 PROCEDURE — 82962 GLUCOSE BLOOD TEST: CPT

## 2020-12-30 PROCEDURE — 96372 THER/PROPH/DIAG INJ SC/IM: CPT

## 2020-12-30 RX ADMIN — TAMSULOSIN HYDROCHLORIDE 0.4 MG: 0.4 CAPSULE ORAL at 19:59

## 2020-12-30 RX ADMIN — INSULIN DETEMIR 10 UNITS: 100 INJECTION, SOLUTION SUBCUTANEOUS at 08:43

## 2020-12-30 RX ADMIN — LEVOTHYROXINE SODIUM 50 MCG: 50 TABLET ORAL at 08:43

## 2020-12-30 RX ADMIN — TAZOBACTAM SODIUM AND PIPERACILLIN SODIUM 3.38 G: 375; 3 INJECTION, SOLUTION INTRAVENOUS at 06:51

## 2020-12-30 RX ADMIN — ERTAPENEM SODIUM 1 G: 1 INJECTION, POWDER, LYOPHILIZED, FOR SOLUTION INTRAMUSCULAR; INTRAVENOUS at 16:36

## 2020-12-30 RX ADMIN — ACETAMINOPHEN 650 MG: 325 TABLET, FILM COATED ORAL at 22:02

## 2020-12-30 RX ADMIN — SODIUM CHLORIDE, PRESERVATIVE FREE 10 ML: 5 INJECTION INTRAVENOUS at 08:45

## 2020-12-30 RX ADMIN — INSULIN ASPART 2 UNITS: 100 INJECTION, SOLUTION INTRAVENOUS; SUBCUTANEOUS at 16:36

## 2020-12-30 RX ADMIN — SODIUM CHLORIDE, PRESERVATIVE FREE 10 ML: 5 INJECTION INTRAVENOUS at 20:02

## 2020-12-30 RX ADMIN — ENOXAPARIN SODIUM 40 MG: 40 INJECTION SUBCUTANEOUS at 16:36

## 2020-12-30 RX ADMIN — CLOPIDOGREL BISULFATE 75 MG: 75 TABLET ORAL at 08:43

## 2020-12-30 RX ADMIN — LOSARTAN POTASSIUM 25 MG: 25 TABLET, FILM COATED ORAL at 08:43

## 2020-12-30 RX ADMIN — INSULIN ASPART 6 UNITS: 100 INJECTION, SOLUTION INTRAVENOUS; SUBCUTANEOUS at 11:27

## 2020-12-31 ENCOUNTER — APPOINTMENT (OUTPATIENT)
Dept: GENERAL RADIOLOGY | Facility: HOSPITAL | Age: 85
End: 2020-12-31

## 2020-12-31 ENCOUNTER — READMISSION MANAGEMENT (OUTPATIENT)
Dept: CALL CENTER | Facility: HOSPITAL | Age: 85
End: 2020-12-31

## 2020-12-31 VITALS
SYSTOLIC BLOOD PRESSURE: 149 MMHG | TEMPERATURE: 97.7 F | HEART RATE: 71 BPM | DIASTOLIC BLOOD PRESSURE: 71 MMHG | HEIGHT: 69 IN | OXYGEN SATURATION: 93 % | RESPIRATION RATE: 18 BRPM | WEIGHT: 207.23 LBS | BODY MASS INDEX: 30.69 KG/M2

## 2020-12-31 LAB
ANION GAP SERPL CALCULATED.3IONS-SCNC: 10.6 MMOL/L (ref 5–15)
BASOPHILS # BLD AUTO: 0.08 10*3/MM3 (ref 0–0.2)
BASOPHILS NFR BLD AUTO: 0.6 % (ref 0–1.5)
BUN SERPL-MCNC: 34 MG/DL (ref 8–23)
BUN/CREAT SERPL: 20.5 (ref 7–25)
CALCIUM SPEC-SCNC: 8.7 MG/DL (ref 8.6–10.5)
CHLORIDE SERPL-SCNC: 106 MMOL/L (ref 98–107)
CO2 SERPL-SCNC: 20.4 MMOL/L (ref 22–29)
CREAT SERPL-MCNC: 1.66 MG/DL (ref 0.76–1.27)
DEPRECATED RDW RBC AUTO: 46.8 FL (ref 37–54)
EOSINOPHIL # BLD AUTO: 0.49 10*3/MM3 (ref 0–0.4)
EOSINOPHIL NFR BLD AUTO: 3.8 % (ref 0.3–6.2)
ERYTHROCYTE [DISTWIDTH] IN BLOOD BY AUTOMATED COUNT: 13.9 % (ref 12.3–15.4)
GFR SERPL CREATININE-BSD FRML MDRD: 39 ML/MIN/1.73
GLUCOSE BLDC GLUCOMTR-MCNC: 140 MG/DL (ref 70–130)
GLUCOSE BLDC GLUCOMTR-MCNC: 211 MG/DL (ref 70–130)
GLUCOSE SERPL-MCNC: 155 MG/DL (ref 65–99)
HCT VFR BLD AUTO: 45.2 % (ref 37.5–51)
HGB BLD-MCNC: 14.4 G/DL (ref 13–17.7)
IMM GRANULOCYTES # BLD AUTO: 0.05 10*3/MM3 (ref 0–0.05)
IMM GRANULOCYTES NFR BLD AUTO: 0.4 % (ref 0–0.5)
LYMPHOCYTES # BLD AUTO: 2.57 10*3/MM3 (ref 0.7–3.1)
LYMPHOCYTES NFR BLD AUTO: 20 % (ref 19.6–45.3)
MCH RBC QN AUTO: 29.1 PG (ref 26.6–33)
MCHC RBC AUTO-ENTMCNC: 31.9 G/DL (ref 31.5–35.7)
MCV RBC AUTO: 91.5 FL (ref 79–97)
MONOCYTES # BLD AUTO: 0.82 10*3/MM3 (ref 0.1–0.9)
MONOCYTES NFR BLD AUTO: 6.4 % (ref 5–12)
NEUTROPHILS NFR BLD AUTO: 68.8 % (ref 42.7–76)
NEUTROPHILS NFR BLD AUTO: 8.87 10*3/MM3 (ref 1.7–7)
NRBC BLD AUTO-RTO: 0 /100 WBC (ref 0–0.2)
PLATELET # BLD AUTO: 161 10*3/MM3 (ref 140–450)
PMV BLD AUTO: 10.2 FL (ref 6–12)
POTASSIUM SERPL-SCNC: 4.3 MMOL/L (ref 3.5–5.2)
RBC # BLD AUTO: 4.94 10*6/MM3 (ref 4.14–5.8)
SODIUM SERPL-SCNC: 137 MMOL/L (ref 136–145)
WBC # BLD AUTO: 12.88 10*3/MM3 (ref 3.4–10.8)

## 2020-12-31 PROCEDURE — 71045 X-RAY EXAM CHEST 1 VIEW: CPT

## 2020-12-31 PROCEDURE — 99217 PR OBSERVATION CARE DISCHARGE MANAGEMENT: CPT | Performed by: INTERNAL MEDICINE

## 2020-12-31 PROCEDURE — 63710000001 INSULIN ASPART PER 5 UNITS: Performed by: EMERGENCY MEDICINE

## 2020-12-31 PROCEDURE — 63710000001 INSULIN DETEMIR PER 5 UNITS: Performed by: EMERGENCY MEDICINE

## 2020-12-31 PROCEDURE — C1751 CATH, INF, PER/CENT/MIDLINE: HCPCS

## 2020-12-31 PROCEDURE — G0378 HOSPITAL OBSERVATION PER HR: HCPCS

## 2020-12-31 PROCEDURE — C1894 INTRO/SHEATH, NON-LASER: HCPCS

## 2020-12-31 PROCEDURE — 85025 COMPLETE CBC W/AUTO DIFF WBC: CPT | Performed by: INTERNAL MEDICINE

## 2020-12-31 PROCEDURE — 25010000002 ERTAPENEM PER 500 MG: Performed by: INTERNAL MEDICINE

## 2020-12-31 PROCEDURE — 80048 BASIC METABOLIC PNL TOTAL CA: CPT | Performed by: INTERNAL MEDICINE

## 2020-12-31 PROCEDURE — 82962 GLUCOSE BLOOD TEST: CPT

## 2020-12-31 RX ORDER — SODIUM CHLORIDE 0.9 % (FLUSH) 0.9 %
10 SYRINGE (ML) INJECTION EVERY 12 HOURS SCHEDULED
Status: DISCONTINUED | OUTPATIENT
Start: 2020-12-31 | End: 2020-12-31 | Stop reason: HOSPADM

## 2020-12-31 RX ORDER — SODIUM CHLORIDE 0.9 % (FLUSH) 0.9 %
20 SYRINGE (ML) INJECTION AS NEEDED
Status: DISCONTINUED | OUTPATIENT
Start: 2020-12-31 | End: 2020-12-31 | Stop reason: HOSPADM

## 2020-12-31 RX ORDER — SODIUM CHLORIDE 0.9 % (FLUSH) 0.9 %
10 SYRINGE (ML) INJECTION AS NEEDED
Status: DISCONTINUED | OUTPATIENT
Start: 2020-12-31 | End: 2020-12-31 | Stop reason: HOSPADM

## 2020-12-31 RX ADMIN — SODIUM CHLORIDE, PRESERVATIVE FREE 10 ML: 5 INJECTION INTRAVENOUS at 08:57

## 2020-12-31 RX ADMIN — INSULIN DETEMIR 10 UNITS: 100 INJECTION, SOLUTION SUBCUTANEOUS at 08:58

## 2020-12-31 RX ADMIN — ERTAPENEM SODIUM 1 G: 1 INJECTION, POWDER, LYOPHILIZED, FOR SOLUTION INTRAMUSCULAR; INTRAVENOUS at 11:55

## 2020-12-31 RX ADMIN — LEVOTHYROXINE SODIUM 50 MCG: 50 TABLET ORAL at 08:57

## 2020-12-31 RX ADMIN — Medication 10 ML: at 12:35

## 2020-12-31 RX ADMIN — INSULIN ASPART 4 UNITS: 100 INJECTION, SOLUTION INTRAVENOUS; SUBCUTANEOUS at 11:55

## 2020-12-31 RX ADMIN — LOSARTAN POTASSIUM 25 MG: 25 TABLET, FILM COATED ORAL at 08:57

## 2020-12-31 RX ADMIN — CLOPIDOGREL BISULFATE 75 MG: 75 TABLET ORAL at 08:57

## 2020-12-31 NOTE — OUTREACH NOTE
Prep Survey      Responses   [facility] patient discharged from?  [facility]   Is LACE score < 7 ?  No   Emergency Room discharge w/ pulse ox?  No   Eligibility  [facility]   Date of Admission  12/28/20   Date of Discharge  12/31/20   Discharge Disposition  Home-Health Care Svc   Discharge diagnosis  Acute UTI PIC line placed 12/31/2020   Does the patient have one of the following disease processes/diagnoses(primary or secondary)?  Other   Does the patient have Home health ordered?  Yes   What is the Home health agency?   [facility]    Is there a DME ordered?  No   Prep survey completed?  Yes          Aleksandra Rodriguez RN

## 2021-01-01 NOTE — PROGRESS NOTES
Case Management Discharge Note           Provided Post Acute Provider List?: N/A  N/A Provider List Comment: No DME, HH or Skilled Nursing needs  Provided Post Acute Provider Quality & Resource List?: N/A  N/A Quality & Resource List Comment: No Needs    Selected Continued Care - Discharged on 12/31/2020 Admission date: 12/28/2020 - Discharge disposition: Home-Health Care Svc    Destination    No services have been selected for the patient.              Durable Medical Equipment    No services have been selected for the patient.              Dialysis/Infusion Coordination complete    Service Provider Selected Services Address Phone Fax Patient Preferred    [facility]  [facility] IV [facility] [address] [facility] [address] [address] [phone] --          Home Medical Care Coordination complete    Service Provider Selected Services Address Phone Fax Patient Preferred    JAYMIE BAUER,[facility]  [facility] [address] [id number] [phone] --          Therapy    No services have been selected for the patient.              Community Resources    No services have been selected for the patient.                Selected Continued Care - Prior Encounters Includes selections from prior encounters from 9/29/2020 to 12/31/2020    Discharged on 10/14/2020 Admission date: 10/13/2020 - Discharge disposition: Home or Self Care    Durable Medical Equipment     Service Provider Selected Services Address Phone Fax Patient Preferred    [facility]  Durable Medical Equipment [address] [address] [phone] --                         Final Discharge Disposition Code: 06 - home with home health care

## 2021-01-02 LAB
BACTERIA SPEC AEROBE CULT: NORMAL
BACTERIA SPEC AEROBE CULT: NORMAL

## 2021-01-04 ENCOUNTER — TRANSITIONAL CARE MANAGEMENT TELEPHONE ENCOUNTER (OUTPATIENT)
Dept: CALL CENTER | Facility: HOSPITAL | Age: 86
End: 2021-01-04

## 2021-01-04 NOTE — TELEPHONE ENCOUNTER
Patients granddaughter called to report that the patient was hospitalized for UTI and did not restart his Eliquis until 1/1/21. On 1/3/21 he started to experience hematuria. He was told while he was in the hospital that reducing his Eliquis dose to 2.5mg should be considered due to renal function. Would you like the patient to stop Eliquis? Or reduce dose? Erika reports that this has happened in the past and stopping the Eliquis cured the hematuria.

## 2021-01-04 NOTE — OUTREACH NOTE
Call Center TCM Note      Responses   [facility] patient discharged from?  [facility]   Does the patient have one of the following disease processes/diagnoses(primary or secondary)?  Other   TCM attempt successful?  Yes   Call start time  1057   Call end time  1110   Discharge diagnosis  Acute UTI, PICC line placed 12/31/2020, symptomatic bradycardia/ pacemaker   Is patient permission given to speak with other caregiver?  Yes   List who call center can speak with  keiry Fitzgerald   Person spoke with today (if not patient) and relationship  Carliekam Zepedakeiry   Medication alerts for this patient  On home IV antibiotic per PICC Line   Meds reviewed with patient/caregiver?  Yes   Is the patient having any side effects they believe may be caused by any medication additions or changes?  No   Does the patient have all medications ordered at discharge?  Yes   Is the patient taking all medications as directed (includes completed medication regime)?  No   What is preventing the patient from taking all medications as directed?  Side effects, Medication regime [Son reports that as soon as eliquis resumed, patient having blood in urine. ]   Nursing Interventions  Nurse provided patient education, Advised patient to call provider [Advised to contact cardiology regarding blood thinner regimine. ]   Medication comments  Son reports that he has already contacted cardiology office and awaiting a callback.    Does the patient have a primary care provider?   Yes   Does the patient have an appointment with their PCP within 7 days of discharge?  Greater than 7 days   Comments regarding PCP  PCP Dr Ryne Lofton. Hospital follow up scheduled for 1/8 3pm.    Nursing Interventions  -- [Scheduled needed TCM appt. ]   Has the patient kept scheduled appointments due by today?  N/A   Comments  Follow Up with Jimy Ledezma DO Monday Mar 22, 2021 2:45 PM   What is the Home health agency?   [facility]   Has home health visited the patient  within 72 hours of discharge?  Yes   Psychosocial issues?  No   Did the patient receive a copy of their discharge instructions?  Yes   Nursing interventions  Reviewed instructions with patient [son]   What is the patient's perception of their health status since discharge?  Improving   Is the patient/caregiver able to teach back signs and symptoms related to disease process for when to call PCP?  Yes   Is the patient/caregiver able to teach back signs and symptoms related to disease process for when to call 911?  Yes   Is the patient/caregiver able to teach back the hierarchy of who to call/visit for symptoms/problems? PCP, Specialist, Home health nurse, Urgent Care, ED, 911  Yes   If the patient is a current smoker, are they able to teach back resources for cessation?  Not a smoker   TCM call completed?  Yes          Aleksandra Berger RN    1/4/2021, 11:10 EST

## 2021-01-05 PROCEDURE — 93228 REMOTE 30 DAY ECG REV/REPORT: CPT | Performed by: INTERNAL MEDICINE

## 2021-01-07 ENCOUNTER — READMISSION MANAGEMENT (OUTPATIENT)
Dept: CALL CENTER | Facility: HOSPITAL | Age: 86
End: 2021-01-07

## 2021-01-07 NOTE — OUTREACH NOTE
Medical Week 2 Survey      Responses   [facility] patient discharged from?  [facility]   Does the patient have one of the following disease processes/diagnoses(primary or secondary)?  Other   Week 2 attempt successful?  Yes   Call start time  1429   Call end time  1432   Medication alerts for this patient  Pt has completed IV antibiotics and PICC line has been removed.   Meds reviewed with patient/caregiver?  Yes   Is the patient taking all medications as directed (includes completed medication regime)?  Yes   Week 2 Call Completed?  Yes   Wrap up additional comments  Spoke with dewey who is a  nurse. She stated he is doing well and PICC line has been removed.  is still coming for one more week.          Carmelita Chan RN

## 2021-01-08 ENCOUNTER — OFFICE VISIT (OUTPATIENT)
Dept: FAMILY MEDICINE CLINIC | Facility: CLINIC | Age: 86
End: 2021-01-08

## 2021-01-08 VITALS
HEIGHT: 69 IN | SYSTOLIC BLOOD PRESSURE: 130 MMHG | BODY MASS INDEX: 31.7 KG/M2 | DIASTOLIC BLOOD PRESSURE: 68 MMHG | WEIGHT: 214 LBS | OXYGEN SATURATION: 97 % | HEART RATE: 67 BPM | TEMPERATURE: 97.8 F | RESPIRATION RATE: 20 BRPM

## 2021-01-08 DIAGNOSIS — R31.9 URINARY TRACT INFECTION WITH HEMATURIA, SITE UNSPECIFIED: Primary | ICD-10-CM

## 2021-01-08 DIAGNOSIS — N39.0 URINARY TRACT INFECTION WITH HEMATURIA, SITE UNSPECIFIED: Primary | ICD-10-CM

## 2021-01-08 LAB
BILIRUB BLD-MCNC: NEGATIVE MG/DL
CLARITY, POC: ABNORMAL
COLOR UR: YELLOW
EXPIRATION DATE: ABNORMAL
GLUCOSE UR STRIP-MCNC: NEGATIVE MG/DL
KETONES UR QL: NEGATIVE
LEUKOCYTE EST, POC: ABNORMAL
Lab: 6023
NITRITE UR-MCNC: NEGATIVE MG/ML
PH UR: 6 [PH] (ref 5–8)
PROT UR STRIP-MCNC: ABNORMAL MG/DL
RBC # UR STRIP: ABNORMAL /UL
SP GR UR: 1.02 (ref 1–1.03)
UROBILINOGEN UR QL: NORMAL

## 2021-01-08 PROCEDURE — 81003 URINALYSIS AUTO W/O SCOPE: CPT | Performed by: FAMILY MEDICINE

## 2021-01-08 PROCEDURE — 99214 OFFICE O/P EST MOD 30 MIN: CPT | Performed by: FAMILY MEDICINE

## 2021-01-08 RX ORDER — SULFAMETHOXAZOLE AND TRIMETHOPRIM 800; 160 MG/1; MG/1
1 TABLET ORAL 2 TIMES DAILY
Qty: 14 TABLET | Refills: 1 | Status: SHIPPED | OUTPATIENT
Start: 2021-01-08 | End: 2021-01-22 | Stop reason: ALTCHOICE

## 2021-01-08 NOTE — PROGRESS NOTES
"Subjective   Forrest Zepeda is a 88 y.o. male seen today for Urinary Tract Infection.     History of Present Illness   The patient is here today for a follow up from [facility].    Admitted to the hospital on 12/28/2020 and discharged on 12/31/2020.  States he is doing better.  Denies any fever,chills,abdominal pain, nausea, or vomiting.  Denies any chest pain or shortness of breath.    He states his appetite is good. Does have some sore throat on the right side with swallowing.        The following portions of the patient's history were reviewed and updated as appropriate: allergies, current medications, past social history and problem list.    Review of Systems   Constitutional: Negative for chills and fever.   HENT: Positive for postnasal drip and sore throat.    Respiratory: Negative for cough and shortness of breath.    Cardiovascular: Negative for chest pain.       Objective   /68   Pulse 67   Temp 97.8 °F (36.6 °C)   Resp 20   Ht 175.3 cm (69\")   Wt 97.1 kg (214 lb)   SpO2 97%   BMI 31.60 kg/m²   Physical Exam  Vitals signs and nursing note reviewed.   Constitutional:       Appearance: Normal appearance.   Cardiovascular:      Rate and Rhythm: Normal rate and regular rhythm.   Pulmonary:      Effort: Pulmonary effort is normal.      Breath sounds: Normal breath sounds.   Neurological:      Mental Status: He is alert.         Assessment/Plan   Problems Addressed this Visit     None      Visit Diagnoses     Urinary tract infection with hematuria, site unspecified    -  Primary    Relevant Orders    POCT urinalysis dipstick, automated (Completed)      Diagnoses       Codes Comments    Urinary tract infection with hematuria, site unspecified    -  Primary ICD-10-CM: N39.0, R31.9  ICD-9-CM: 599.0, 599.70               Drink plenty fluids.    Rx for Bactrim DS twice a day #14+1.    Check urine every 2-3 days for any signs of infection.    Follow up in February as " scheduled.            Scribed for Dr Ryne Lofton by Zahida Townsend CMA.          I, Ryne Lofton MD, personally performed the services described in this documentation, as scribed by Zahida Townsend in my presence, and is both accurate and complete.        (Please note that portions of this note were completed with a voice recognition program. Efforts were made to edit the dictations,but occasionally words are mis transcribed.)

## 2021-01-13 ENCOUNTER — READMISSION MANAGEMENT (OUTPATIENT)
Dept: CALL CENTER | Facility: HOSPITAL | Age: 86
End: 2021-01-13

## 2021-01-13 NOTE — OUTREACH NOTE
Medical Week 3 Survey      Responses   [facility] patient discharged from?  [place]   Does the patient have one of the following disease processes/diagnoses(primary or secondary)?  Other   Week 3 attempt successful?  Yes   Call start time  0959   Call end time  1002   Discharge diagnosis  Acute UTI, PICC line placed 12/31/2020, symptomatic bradycardia/ pacemaker   Person spoke with today (if not patient) and relationship  keiry Finch   Meds reviewed with patient/caregiver?  Yes   Is the patient taking all medications as directed (includes completed medication regime)?  Yes   Medication comments  Oral antibiotic started   Does the patient have a primary care provider?   Yes   Has the patient kept scheduled appointments due by today?  Yes   What is the Home health agency?   [facility]   What is the patient's perception of their health status since discharge?  Improving   Week 3 Call Completed?  Yes          Honey Arzola RN

## 2021-01-20 ENCOUNTER — READMISSION MANAGEMENT (OUTPATIENT)
Dept: CALL CENTER | Facility: HOSPITAL | Age: 86
End: 2021-01-20

## 2021-01-20 NOTE — OUTREACH NOTE
Medical Week 4 Survey      Responses   [facility] [place] patient discharged from?  [place]   Does the patient have one of the following disease processes/diagnoses(primary or secondary)?  Other   Week 4 attempt successful?  Yes   Call start time  1521   Call end time  1522   Discharge diagnosis  Acute UTI, PICC line placed 12/31/2020, symptomatic bradycardia/ pacemaker   Person spoke with today (if not patient) and relationship  keiry Finch   Meds reviewed with patient/caregiver?  Yes   Is the patient taking all medications as directed (includes completed medication regime)?  Yes   Has the patient kept scheduled appointments due by today?  Yes   Is the patient still receiving Home Health Services?  N/A   What is the patient's perception of their health status since discharge?  Improving   Week 4 Call Completed?  Yes   Would the patient like one additional call?  No   Graduated  Yes   Is the patient interested in additional calls from an ambulatory ?  NOTE:  applies to high risk patients requiring additional follow-up.  No   Did the patient feel the follow up calls were helpful during their recovery period?  Yes          Honey Arzola RN

## 2021-01-22 ENCOUNTER — TELEPHONE (OUTPATIENT)
Dept: FAMILY MEDICINE CLINIC | Facility: CLINIC | Age: 86
End: 2021-01-22

## 2021-01-22 RX ORDER — NITROFURANTOIN 25; 75 MG/1; MG/1
100 CAPSULE ORAL 2 TIMES DAILY
Qty: 20 CAPSULE | Refills: 0 | Status: SHIPPED | OUTPATIENT
Start: 2021-01-22 | End: 2021-01-29 | Stop reason: HOSPADM

## 2021-01-22 NOTE — TELEPHONE ENCOUNTER
I spoke to the patient's granddaughter.  His urine has become cloudy and he is experiencing some dysuria.  His a last culture grew out E. coli which was resistant to Levaquin.  He was treated with IV antibiotics in the hospital and then I placed him on oral trimethoprim sulfamethoxazole on  January 6.    We will have him discontinue Bactrim at this point.  Place him on Macrobid 100 mg twice a day #20.

## 2021-01-26 ENCOUNTER — APPOINTMENT (OUTPATIENT)
Dept: CT IMAGING | Facility: HOSPITAL | Age: 86
End: 2021-01-26

## 2021-01-26 ENCOUNTER — HOSPITAL ENCOUNTER (OUTPATIENT)
Facility: HOSPITAL | Age: 86
Setting detail: OBSERVATION
Discharge: HOME | End: 2021-01-29
Attending: EMERGENCY MEDICINE | Admitting: EMERGENCY MEDICINE

## 2021-01-26 DIAGNOSIS — N39.0 ACUTE UTI (URINARY TRACT INFECTION): ICD-10-CM

## 2021-01-26 DIAGNOSIS — N39.0 URINARY TRACT INFECTION WITHOUT HEMATURIA, SITE UNSPECIFIED: Primary | ICD-10-CM

## 2021-01-26 LAB
ALBUMIN SERPL-MCNC: 3.7 G/DL (ref 3.5–5.2)
ALBUMIN/GLOB SERPL: 0.9 G/DL
ALP SERPL-CCNC: 134 U/L (ref 39–117)
ALT SERPL W P-5'-P-CCNC: 29 U/L (ref 1–41)
ANION GAP SERPL CALCULATED.3IONS-SCNC: 13.3 MMOL/L (ref 5–15)
AST SERPL-CCNC: 31 U/L (ref 1–40)
BACTERIA UR QL AUTO: ABNORMAL /HPF
BASOPHILS # BLD AUTO: 0.06 10*3/MM3 (ref 0–0.2)
BASOPHILS NFR BLD AUTO: 0.4 % (ref 0–1.5)
BILIRUB SERPL-MCNC: 0.3 MG/DL (ref 0–1.2)
BILIRUB UR QL STRIP: NEGATIVE
BUN SERPL-MCNC: 29 MG/DL (ref 8–23)
BUN/CREAT SERPL: 16.5 (ref 7–25)
CALCIUM SPEC-SCNC: 9.1 MG/DL (ref 8.6–10.5)
CHLORIDE SERPL-SCNC: 97 MMOL/L (ref 98–107)
CLARITY UR: ABNORMAL
CO2 SERPL-SCNC: 22.7 MMOL/L (ref 22–29)
COLOR UR: YELLOW
CREAT SERPL-MCNC: 1.76 MG/DL (ref 0.76–1.27)
D-LACTATE SERPL-SCNC: 1.7 MMOL/L (ref 0.5–2)
DEPRECATED RDW RBC AUTO: 49.7 FL (ref 37–54)
EOSINOPHIL # BLD AUTO: 0.11 10*3/MM3 (ref 0–0.4)
EOSINOPHIL NFR BLD AUTO: 0.7 % (ref 0.3–6.2)
ERYTHROCYTE [DISTWIDTH] IN BLOOD BY AUTOMATED COUNT: 15.1 % (ref 12.3–15.4)
GFR SERPL CREATININE-BSD FRML MDRD: 37 ML/MIN/1.73
GLOBULIN UR ELPH-MCNC: 3.9 GM/DL
GLUCOSE SERPL-MCNC: 198 MG/DL (ref 65–99)
GLUCOSE UR STRIP-MCNC: ABNORMAL MG/DL
HCT VFR BLD AUTO: 44.8 % (ref 37.5–51)
HGB BLD-MCNC: 14.2 G/DL (ref 13–17.7)
HGB UR QL STRIP.AUTO: ABNORMAL
HOLD SPECIMEN: NORMAL
HOLD SPECIMEN: NORMAL
HYALINE CASTS UR QL AUTO: ABNORMAL /LPF
IMM GRANULOCYTES # BLD AUTO: 0.12 10*3/MM3 (ref 0–0.05)
IMM GRANULOCYTES NFR BLD AUTO: 0.8 % (ref 0–0.5)
KETONES UR QL STRIP: NEGATIVE
LEUKOCYTE ESTERASE UR QL STRIP.AUTO: ABNORMAL
LYMPHOCYTES # BLD AUTO: 2.26 10*3/MM3 (ref 0.7–3.1)
LYMPHOCYTES NFR BLD AUTO: 14.4 % (ref 19.6–45.3)
MCH RBC QN AUTO: 28.5 PG (ref 26.6–33)
MCHC RBC AUTO-ENTMCNC: 31.7 G/DL (ref 31.5–35.7)
MCV RBC AUTO: 90 FL (ref 79–97)
MONOCYTES # BLD AUTO: 1.08 10*3/MM3 (ref 0.1–0.9)
MONOCYTES NFR BLD AUTO: 6.9 % (ref 5–12)
NEUTROPHILS NFR BLD AUTO: 12.07 10*3/MM3 (ref 1.7–7)
NEUTROPHILS NFR BLD AUTO: 76.8 % (ref 42.7–76)
NITRITE UR QL STRIP: NEGATIVE
NRBC BLD AUTO-RTO: 0 /100 WBC (ref 0–0.2)
PH UR STRIP.AUTO: 7 [PH] (ref 5–8)
PLATELET # BLD AUTO: 169 10*3/MM3 (ref 140–450)
PMV BLD AUTO: 8.6 FL (ref 6–12)
POTASSIUM SERPL-SCNC: 5 MMOL/L (ref 3.5–5.2)
PROCALCITONIN SERPL-MCNC: 0.53 NG/ML (ref 0–0.25)
PROT SERPL-MCNC: 7.6 G/DL (ref 6–8.5)
PROT UR QL STRIP: ABNORMAL
RBC # BLD AUTO: 4.98 10*6/MM3 (ref 4.14–5.8)
RBC # UR: ABNORMAL /HPF
REF LAB TEST METHOD: ABNORMAL
SODIUM SERPL-SCNC: 133 MMOL/L (ref 136–145)
SP GR UR STRIP: 1.02 (ref 1–1.03)
SQUAMOUS #/AREA URNS HPF: ABNORMAL /HPF
UROBILINOGEN UR QL STRIP: ABNORMAL
WBC # BLD AUTO: 15.7 10*3/MM3 (ref 3.4–10.8)
WBC UR QL AUTO: ABNORMAL /HPF
WHOLE BLOOD HOLD SPECIMEN: NORMAL
WHOLE BLOOD HOLD SPECIMEN: NORMAL

## 2021-01-26 PROCEDURE — 93005 ELECTROCARDIOGRAM TRACING: CPT | Performed by: EMERGENCY MEDICINE

## 2021-01-26 PROCEDURE — 85025 COMPLETE CBC W/AUTO DIFF WBC: CPT | Performed by: EMERGENCY MEDICINE

## 2021-01-26 PROCEDURE — 99284 EMERGENCY DEPT VISIT MOD MDM: CPT

## 2021-01-26 PROCEDURE — 87186 SC STD MICRODIL/AGAR DIL: CPT | Performed by: EMERGENCY MEDICINE

## 2021-01-26 PROCEDURE — 71250 CT THORAX DX C-: CPT

## 2021-01-26 PROCEDURE — 87088 URINE BACTERIA CULTURE: CPT | Performed by: EMERGENCY MEDICINE

## 2021-01-26 PROCEDURE — 83605 ASSAY OF LACTIC ACID: CPT | Performed by: EMERGENCY MEDICINE

## 2021-01-26 PROCEDURE — 81001 URINALYSIS AUTO W/SCOPE: CPT | Performed by: EMERGENCY MEDICINE

## 2021-01-26 PROCEDURE — 87086 URINE CULTURE/COLONY COUNT: CPT | Performed by: EMERGENCY MEDICINE

## 2021-01-26 PROCEDURE — 84145 PROCALCITONIN (PCT): CPT | Performed by: EMERGENCY MEDICINE

## 2021-01-26 PROCEDURE — 96365 THER/PROPH/DIAG IV INF INIT: CPT

## 2021-01-26 PROCEDURE — 87040 BLOOD CULTURE FOR BACTERIA: CPT | Performed by: EMERGENCY MEDICINE

## 2021-01-26 PROCEDURE — 80053 COMPREHEN METABOLIC PANEL: CPT | Performed by: EMERGENCY MEDICINE

## 2021-01-26 PROCEDURE — 74176 CT ABD & PELVIS W/O CONTRAST: CPT

## 2021-01-26 PROCEDURE — 25010000002 PIPERACILLIN SOD-TAZOBACTAM PER 1 G: Performed by: EMERGENCY MEDICINE

## 2021-01-26 RX ORDER — SODIUM CHLORIDE 0.9 % (FLUSH) 0.9 %
10 SYRINGE (ML) INJECTION AS NEEDED
Status: DISCONTINUED | OUTPATIENT
Start: 2021-01-26 | End: 2021-01-29 | Stop reason: HOSPADM

## 2021-01-26 RX ORDER — ACETAMINOPHEN 325 MG/1
975 TABLET ORAL ONCE
Status: COMPLETED | OUTPATIENT
Start: 2021-01-26 | End: 2021-01-26

## 2021-01-26 RX ADMIN — ACETAMINOPHEN 975 MG: 325 TABLET, FILM COATED ORAL at 23:49

## 2021-01-26 RX ADMIN — TAZOBACTAM SODIUM AND PIPERACILLIN SODIUM 3.38 G: 375; 3 INJECTION, SOLUTION INTRAVENOUS at 23:40

## 2021-01-26 RX ADMIN — SODIUM CHLORIDE 1000 ML: 9 INJECTION, SOLUTION INTRAVENOUS at 21:11

## 2021-01-27 PROBLEM — I95.1 ORTHOSTATIC HYPOTENSION: Status: RESOLVED | Noted: 2021-01-27 | Resolved: 2021-01-27

## 2021-01-27 PROBLEM — N39.0 URINARY TRACT INFECTION WITHOUT HEMATURIA: Status: ACTIVE | Noted: 2021-01-27

## 2021-01-27 PROBLEM — I95.1 ORTHOSTATIC HYPOTENSION: Status: ACTIVE | Noted: 2021-01-27

## 2021-01-27 LAB
ANION GAP SERPL CALCULATED.3IONS-SCNC: 9.9 MMOL/L (ref 5–15)
BASOPHILS # BLD AUTO: 0.06 10*3/MM3 (ref 0–0.2)
BASOPHILS NFR BLD AUTO: 0.4 % (ref 0–1.5)
BUN SERPL-MCNC: 28 MG/DL (ref 8–23)
BUN/CREAT SERPL: 16.5 (ref 7–25)
CALCIUM SPEC-SCNC: 9 MG/DL (ref 8.6–10.5)
CHLORIDE SERPL-SCNC: 101 MMOL/L (ref 98–107)
CO2 SERPL-SCNC: 24.1 MMOL/L (ref 22–29)
CREAT SERPL-MCNC: 1.7 MG/DL (ref 0.76–1.27)
DEPRECATED RDW RBC AUTO: 50.5 FL (ref 37–54)
EOSINOPHIL # BLD AUTO: 0.12 10*3/MM3 (ref 0–0.4)
EOSINOPHIL NFR BLD AUTO: 0.9 % (ref 0.3–6.2)
ERYTHROCYTE [DISTWIDTH] IN BLOOD BY AUTOMATED COUNT: 15.2 % (ref 12.3–15.4)
GFR SERPL CREATININE-BSD FRML MDRD: 38 ML/MIN/1.73
GLUCOSE BLDC GLUCOMTR-MCNC: 121 MG/DL (ref 70–130)
GLUCOSE BLDC GLUCOMTR-MCNC: 146 MG/DL (ref 70–130)
GLUCOSE BLDC GLUCOMTR-MCNC: 154 MG/DL (ref 70–130)
GLUCOSE BLDC GLUCOMTR-MCNC: 181 MG/DL (ref 70–130)
GLUCOSE BLDC GLUCOMTR-MCNC: 218 MG/DL (ref 70–130)
GLUCOSE SERPL-MCNC: 129 MG/DL (ref 65–99)
HCT VFR BLD AUTO: 43.5 % (ref 37.5–51)
HGB BLD-MCNC: 13.6 G/DL (ref 13–17.7)
IMM GRANULOCYTES # BLD AUTO: 0.1 10*3/MM3 (ref 0–0.05)
IMM GRANULOCYTES NFR BLD AUTO: 0.7 % (ref 0–0.5)
LYMPHOCYTES # BLD AUTO: 1.86 10*3/MM3 (ref 0.7–3.1)
LYMPHOCYTES NFR BLD AUTO: 13.9 % (ref 19.6–45.3)
MCH RBC QN AUTO: 28.4 PG (ref 26.6–33)
MCHC RBC AUTO-ENTMCNC: 31.3 G/DL (ref 31.5–35.7)
MCV RBC AUTO: 90.8 FL (ref 79–97)
MONOCYTES # BLD AUTO: 0.83 10*3/MM3 (ref 0.1–0.9)
MONOCYTES NFR BLD AUTO: 6.2 % (ref 5–12)
NEUTROPHILS NFR BLD AUTO: 10.45 10*3/MM3 (ref 1.7–7)
NEUTROPHILS NFR BLD AUTO: 77.9 % (ref 42.7–76)
NRBC BLD AUTO-RTO: 0 /100 WBC (ref 0–0.2)
PLATELET # BLD AUTO: 171 10*3/MM3 (ref 140–450)
PMV BLD AUTO: 9.6 FL (ref 6–12)
POTASSIUM SERPL-SCNC: 5.4 MMOL/L (ref 3.5–5.2)
RBC # BLD AUTO: 4.79 10*6/MM3 (ref 4.14–5.8)
SARS-COV-2 RNA PNL SPEC NAA+PROBE: NOT DETECTED
SODIUM SERPL-SCNC: 135 MMOL/L (ref 136–145)
WBC # BLD AUTO: 13.42 10*3/MM3 (ref 3.4–10.8)

## 2021-01-27 PROCEDURE — 63710000001 INSULIN ASPART PER 5 UNITS: Performed by: EMERGENCY MEDICINE

## 2021-01-27 PROCEDURE — G0378 HOSPITAL OBSERVATION PER HR: HCPCS

## 2021-01-27 PROCEDURE — 97165 OT EVAL LOW COMPLEX 30 MIN: CPT

## 2021-01-27 PROCEDURE — 25010000002 PIPERACILLIN SOD-TAZOBACTAM PER 1 G: Performed by: EMERGENCY MEDICINE

## 2021-01-27 PROCEDURE — 96366 THER/PROPH/DIAG IV INF ADDON: CPT

## 2021-01-27 PROCEDURE — 99225 PR SBSQ OBSERVATION CARE/DAY 25 MINUTES: CPT | Performed by: NURSE PRACTITIONER

## 2021-01-27 PROCEDURE — 99024 POSTOP FOLLOW-UP VISIT: CPT | Performed by: UROLOGY

## 2021-01-27 PROCEDURE — 80048 BASIC METABOLIC PNL TOTAL CA: CPT | Performed by: EMERGENCY MEDICINE

## 2021-01-27 PROCEDURE — 87635 SARS-COV-2 COVID-19 AMP PRB: CPT | Performed by: EMERGENCY MEDICINE

## 2021-01-27 PROCEDURE — 97161 PT EVAL LOW COMPLEX 20 MIN: CPT

## 2021-01-27 PROCEDURE — 99220 PR INITIAL OBSERVATION CARE/DAY 70 MINUTES: CPT | Performed by: EMERGENCY MEDICINE

## 2021-01-27 PROCEDURE — 82962 GLUCOSE BLOOD TEST: CPT

## 2021-01-27 PROCEDURE — 63710000001 INSULIN DETEMIR PER 5 UNITS: Performed by: EMERGENCY MEDICINE

## 2021-01-27 PROCEDURE — 85025 COMPLETE CBC W/AUTO DIFF WBC: CPT | Performed by: EMERGENCY MEDICINE

## 2021-01-27 RX ORDER — SODIUM CHLORIDE 0.9 % (FLUSH) 0.9 %
10 SYRINGE (ML) INJECTION EVERY 12 HOURS SCHEDULED
Status: DISCONTINUED | OUTPATIENT
Start: 2021-01-27 | End: 2021-01-29 | Stop reason: HOSPADM

## 2021-01-27 RX ORDER — TAMSULOSIN HYDROCHLORIDE 0.4 MG/1
0.4 CAPSULE ORAL NIGHTLY
Status: DISCONTINUED | OUTPATIENT
Start: 2021-01-27 | End: 2021-01-29 | Stop reason: HOSPADM

## 2021-01-27 RX ORDER — CLOPIDOGREL BISULFATE 75 MG/1
75 TABLET ORAL DAILY
Status: DISCONTINUED | OUTPATIENT
Start: 2021-01-27 | End: 2021-01-29 | Stop reason: HOSPADM

## 2021-01-27 RX ORDER — L.ACID,CASEI/B.ANIMAL/S.THERMO 16B CELL
1 CAPSULE ORAL DAILY
COMMUNITY
End: 2022-01-13 | Stop reason: ALTCHOICE

## 2021-01-27 RX ORDER — SODIUM CHLORIDE 0.9 % (FLUSH) 0.9 %
10 SYRINGE (ML) INJECTION AS NEEDED
Status: DISCONTINUED | OUTPATIENT
Start: 2021-01-27 | End: 2021-01-29 | Stop reason: HOSPADM

## 2021-01-27 RX ORDER — DEXTROSE MONOHYDRATE 25 G/50ML
25 INJECTION, SOLUTION INTRAVENOUS
Status: DISCONTINUED | OUTPATIENT
Start: 2021-01-27 | End: 2021-01-29 | Stop reason: HOSPADM

## 2021-01-27 RX ORDER — ACETAMINOPHEN 650 MG/1
650 SUPPOSITORY RECTAL EVERY 4 HOURS PRN
Status: DISCONTINUED | OUTPATIENT
Start: 2021-01-27 | End: 2021-01-29 | Stop reason: HOSPADM

## 2021-01-27 RX ORDER — LEVOTHYROXINE SODIUM 0.05 MG/1
50 TABLET ORAL DAILY
Status: DISCONTINUED | OUTPATIENT
Start: 2021-01-27 | End: 2021-01-29 | Stop reason: HOSPADM

## 2021-01-27 RX ORDER — ONDANSETRON 2 MG/ML
4 INJECTION INTRAMUSCULAR; INTRAVENOUS EVERY 6 HOURS PRN
Status: DISCONTINUED | OUTPATIENT
Start: 2021-01-27 | End: 2021-01-29 | Stop reason: HOSPADM

## 2021-01-27 RX ORDER — ACETAMINOPHEN 325 MG/1
650 TABLET ORAL EVERY 4 HOURS PRN
Status: DISCONTINUED | OUTPATIENT
Start: 2021-01-27 | End: 2021-01-29 | Stop reason: HOSPADM

## 2021-01-27 RX ORDER — NICOTINE POLACRILEX 4 MG
1 LOZENGE BUCCAL
Status: DISCONTINUED | OUTPATIENT
Start: 2021-01-27 | End: 2021-01-29 | Stop reason: HOSPADM

## 2021-01-27 RX ORDER — ACETAMINOPHEN 160 MG/5ML
650 SOLUTION ORAL EVERY 4 HOURS PRN
Status: DISCONTINUED | OUTPATIENT
Start: 2021-01-27 | End: 2021-01-29 | Stop reason: HOSPADM

## 2021-01-27 RX ADMIN — TAZOBACTAM SODIUM AND PIPERACILLIN SODIUM 3.38 G: 375; 3 INJECTION, SOLUTION INTRAVENOUS at 22:52

## 2021-01-27 RX ADMIN — TAZOBACTAM SODIUM AND PIPERACILLIN SODIUM 3.38 G: 375; 3 INJECTION, SOLUTION INTRAVENOUS at 06:51

## 2021-01-27 RX ADMIN — ACETAMINOPHEN 650 MG: 325 TABLET, FILM COATED ORAL at 20:32

## 2021-01-27 RX ADMIN — APIXABAN 2.5 MG: 2.5 TABLET, FILM COATED ORAL at 20:32

## 2021-01-27 RX ADMIN — SODIUM CHLORIDE, PRESERVATIVE FREE 10 ML: 5 INJECTION INTRAVENOUS at 08:45

## 2021-01-27 RX ADMIN — SODIUM CHLORIDE, PRESERVATIVE FREE 10 ML: 5 INJECTION INTRAVENOUS at 20:33

## 2021-01-27 RX ADMIN — INSULIN DETEMIR 15 UNITS: 100 INJECTION, SOLUTION SUBCUTANEOUS at 03:07

## 2021-01-27 RX ADMIN — CLOPIDOGREL BISULFATE 75 MG: 75 TABLET ORAL at 08:45

## 2021-01-27 RX ADMIN — TAZOBACTAM SODIUM AND PIPERACILLIN SODIUM 3.38 G: 375; 3 INJECTION, SOLUTION INTRAVENOUS at 15:45

## 2021-01-27 RX ADMIN — TAMSULOSIN HYDROCHLORIDE 0.4 MG: 0.4 CAPSULE ORAL at 03:07

## 2021-01-27 RX ADMIN — LEVOTHYROXINE SODIUM 50 MCG: 50 TABLET ORAL at 08:45

## 2021-01-27 RX ADMIN — INSULIN ASPART 2 UNITS: 100 INJECTION, SOLUTION INTRAVENOUS; SUBCUTANEOUS at 12:40

## 2021-01-27 RX ADMIN — APIXABAN 2.5 MG: 2.5 TABLET, FILM COATED ORAL at 08:45

## 2021-01-27 RX ADMIN — INSULIN DETEMIR 15 UNITS: 100 INJECTION, SOLUTION SUBCUTANEOUS at 20:39

## 2021-01-27 RX ADMIN — TAMSULOSIN HYDROCHLORIDE 0.4 MG: 0.4 CAPSULE ORAL at 20:32

## 2021-01-27 RX ADMIN — SODIUM CHLORIDE, PRESERVATIVE FREE 10 ML: 5 INJECTION INTRAVENOUS at 15:45

## 2021-01-27 RX ADMIN — SODIUM CHLORIDE, PRESERVATIVE FREE 10 ML: 5 INJECTION INTRAVENOUS at 02:30

## 2021-01-28 LAB
ALBUMIN SERPL-MCNC: 3.1 G/DL (ref 3.5–5.2)
ALBUMIN/GLOB SERPL: 0.8 G/DL
ALP SERPL-CCNC: 113 U/L (ref 39–117)
ALT SERPL W P-5'-P-CCNC: 36 U/L (ref 1–41)
ANION GAP SERPL CALCULATED.3IONS-SCNC: 10.2 MMOL/L (ref 5–15)
AST SERPL-CCNC: 42 U/L (ref 1–40)
BACTERIA SPEC AEROBE CULT: ABNORMAL
BASOPHILS # BLD AUTO: 0.05 10*3/MM3 (ref 0–0.2)
BASOPHILS NFR BLD AUTO: 0.5 % (ref 0–1.5)
BILIRUB SERPL-MCNC: 0.3 MG/DL (ref 0–1.2)
BUN SERPL-MCNC: 29 MG/DL (ref 8–23)
BUN/CREAT SERPL: 16.7 (ref 7–25)
CALCIUM SPEC-SCNC: 8.9 MG/DL (ref 8.6–10.5)
CHLORIDE SERPL-SCNC: 104 MMOL/L (ref 98–107)
CO2 SERPL-SCNC: 23.8 MMOL/L (ref 22–29)
CREAT SERPL-MCNC: 1.74 MG/DL (ref 0.76–1.27)
DEPRECATED RDW RBC AUTO: 50.5 FL (ref 37–54)
EOSINOPHIL # BLD AUTO: 0.46 10*3/MM3 (ref 0–0.4)
EOSINOPHIL NFR BLD AUTO: 4.3 % (ref 0.3–6.2)
ERYTHROCYTE [DISTWIDTH] IN BLOOD BY AUTOMATED COUNT: 15.4 % (ref 12.3–15.4)
GFR SERPL CREATININE-BSD FRML MDRD: 37 ML/MIN/1.73
GLOBULIN UR ELPH-MCNC: 3.8 GM/DL
GLUCOSE BLDC GLUCOMTR-MCNC: 142 MG/DL (ref 70–130)
GLUCOSE BLDC GLUCOMTR-MCNC: 164 MG/DL (ref 70–130)
GLUCOSE BLDC GLUCOMTR-MCNC: 225 MG/DL (ref 70–130)
GLUCOSE BLDC GLUCOMTR-MCNC: 87 MG/DL (ref 70–130)
GLUCOSE SERPL-MCNC: 88 MG/DL (ref 65–99)
HCT VFR BLD AUTO: 41.2 % (ref 37.5–51)
HGB BLD-MCNC: 13.4 G/DL (ref 13–17.7)
IMM GRANULOCYTES # BLD AUTO: 0.1 10*3/MM3 (ref 0–0.05)
IMM GRANULOCYTES NFR BLD AUTO: 0.9 % (ref 0–0.5)
LYMPHOCYTES # BLD AUTO: 2.19 10*3/MM3 (ref 0.7–3.1)
LYMPHOCYTES NFR BLD AUTO: 20.5 % (ref 19.6–45.3)
MCH RBC QN AUTO: 29.2 PG (ref 26.6–33)
MCHC RBC AUTO-ENTMCNC: 32.5 G/DL (ref 31.5–35.7)
MCV RBC AUTO: 89.8 FL (ref 79–97)
MONOCYTES # BLD AUTO: 1.05 10*3/MM3 (ref 0.1–0.9)
MONOCYTES NFR BLD AUTO: 9.8 % (ref 5–12)
NEUTROPHILS NFR BLD AUTO: 6.84 10*3/MM3 (ref 1.7–7)
NEUTROPHILS NFR BLD AUTO: 64 % (ref 42.7–76)
NRBC BLD AUTO-RTO: 0 /100 WBC (ref 0–0.2)
PLATELET # BLD AUTO: 157 10*3/MM3 (ref 140–450)
PMV BLD AUTO: 9 FL (ref 6–12)
POTASSIUM SERPL-SCNC: 4.2 MMOL/L (ref 3.5–5.2)
PROT SERPL-MCNC: 6.9 G/DL (ref 6–8.5)
RBC # BLD AUTO: 4.59 10*6/MM3 (ref 4.14–5.8)
SODIUM SERPL-SCNC: 138 MMOL/L (ref 136–145)
WBC # BLD AUTO: 10.69 10*3/MM3 (ref 3.4–10.8)

## 2021-01-28 PROCEDURE — 25010000002 ERTAPENEM PER 500 MG: Performed by: NURSE PRACTITIONER

## 2021-01-28 PROCEDURE — G0378 HOSPITAL OBSERVATION PER HR: HCPCS

## 2021-01-28 PROCEDURE — 82962 GLUCOSE BLOOD TEST: CPT

## 2021-01-28 PROCEDURE — 96367 TX/PROPH/DG ADDL SEQ IV INF: CPT

## 2021-01-28 PROCEDURE — 63710000001 INSULIN ASPART PER 5 UNITS: Performed by: EMERGENCY MEDICINE

## 2021-01-28 PROCEDURE — 99225 PR SBSQ OBSERVATION CARE/DAY 25 MINUTES: CPT | Performed by: NURSE PRACTITIONER

## 2021-01-28 PROCEDURE — 96366 THER/PROPH/DIAG IV INF ADDON: CPT

## 2021-01-28 PROCEDURE — 93005 ELECTROCARDIOGRAM TRACING: CPT | Performed by: EMERGENCY MEDICINE

## 2021-01-28 PROCEDURE — 25010000002 PIPERACILLIN SOD-TAZOBACTAM PER 1 G: Performed by: EMERGENCY MEDICINE

## 2021-01-28 PROCEDURE — 85025 COMPLETE CBC W/AUTO DIFF WBC: CPT | Performed by: NURSE PRACTITIONER

## 2021-01-28 PROCEDURE — 80053 COMPREHEN METABOLIC PANEL: CPT | Performed by: NURSE PRACTITIONER

## 2021-01-28 PROCEDURE — 63710000001 INSULIN DETEMIR PER 5 UNITS: Performed by: EMERGENCY MEDICINE

## 2021-01-28 RX ADMIN — TAMSULOSIN HYDROCHLORIDE 0.4 MG: 0.4 CAPSULE ORAL at 21:06

## 2021-01-28 RX ADMIN — ERTAPENEM SODIUM 1 G: 1 INJECTION, POWDER, LYOPHILIZED, FOR SOLUTION INTRAMUSCULAR; INTRAVENOUS at 14:20

## 2021-01-28 RX ADMIN — APIXABAN 2.5 MG: 2.5 TABLET, FILM COATED ORAL at 08:34

## 2021-01-28 RX ADMIN — LEVOTHYROXINE SODIUM 50 MCG: 50 TABLET ORAL at 08:34

## 2021-01-28 RX ADMIN — INSULIN DETEMIR 15 UNITS: 100 INJECTION, SOLUTION SUBCUTANEOUS at 21:09

## 2021-01-28 RX ADMIN — INSULIN ASPART 4 UNITS: 100 INJECTION, SOLUTION INTRAVENOUS; SUBCUTANEOUS at 17:01

## 2021-01-28 RX ADMIN — APIXABAN 2.5 MG: 2.5 TABLET, FILM COATED ORAL at 21:06

## 2021-01-28 RX ADMIN — SODIUM CHLORIDE, PRESERVATIVE FREE 10 ML: 5 INJECTION INTRAVENOUS at 21:07

## 2021-01-28 RX ADMIN — TAZOBACTAM SODIUM AND PIPERACILLIN SODIUM 3.38 G: 375; 3 INJECTION, SOLUTION INTRAVENOUS at 06:01

## 2021-01-28 RX ADMIN — CLOPIDOGREL BISULFATE 75 MG: 75 TABLET ORAL at 08:34

## 2021-01-29 ENCOUNTER — READMISSION MANAGEMENT (OUTPATIENT)
Dept: CALL CENTER | Facility: HOSPITAL | Age: 86
End: 2021-01-29

## 2021-01-29 VITALS
BODY MASS INDEX: 30.66 KG/M2 | HEART RATE: 80 BPM | RESPIRATION RATE: 16 BRPM | OXYGEN SATURATION: 93 % | HEIGHT: 69 IN | SYSTOLIC BLOOD PRESSURE: 149 MMHG | TEMPERATURE: 98.1 F | WEIGHT: 207.01 LBS | DIASTOLIC BLOOD PRESSURE: 69 MMHG

## 2021-01-29 LAB
ALBUMIN SERPL-MCNC: 3.1 G/DL (ref 3.5–5.2)
ALBUMIN/GLOB SERPL: 0.7 G/DL
ALP SERPL-CCNC: 111 U/L (ref 39–117)
ALT SERPL W P-5'-P-CCNC: 32 U/L (ref 1–41)
ANION GAP SERPL CALCULATED.3IONS-SCNC: 9.9 MMOL/L (ref 5–15)
AST SERPL-CCNC: 30 U/L (ref 1–40)
BASOPHILS # BLD AUTO: 0.07 10*3/MM3 (ref 0–0.2)
BASOPHILS NFR BLD AUTO: 0.7 % (ref 0–1.5)
BILIRUB SERPL-MCNC: 0.3 MG/DL (ref 0–1.2)
BUN SERPL-MCNC: 31 MG/DL (ref 8–23)
BUN/CREAT SERPL: 23.1 (ref 7–25)
CALCIUM SPEC-SCNC: 9.5 MG/DL (ref 8.6–10.5)
CHLORIDE SERPL-SCNC: 103 MMOL/L (ref 98–107)
CO2 SERPL-SCNC: 24.1 MMOL/L (ref 22–29)
CREAT SERPL-MCNC: 1.34 MG/DL (ref 0.76–1.27)
DEPRECATED RDW RBC AUTO: 49.8 FL (ref 37–54)
EOSINOPHIL # BLD AUTO: 0.56 10*3/MM3 (ref 0–0.4)
EOSINOPHIL NFR BLD AUTO: 5.6 % (ref 0.3–6.2)
ERYTHROCYTE [DISTWIDTH] IN BLOOD BY AUTOMATED COUNT: 15.1 % (ref 12.3–15.4)
GFR SERPL CREATININE-BSD FRML MDRD: 50 ML/MIN/1.73
GLOBULIN UR ELPH-MCNC: 4.2 GM/DL
GLUCOSE BLDC GLUCOMTR-MCNC: 123 MG/DL (ref 70–130)
GLUCOSE BLDC GLUCOMTR-MCNC: 223 MG/DL (ref 70–130)
GLUCOSE SERPL-MCNC: 129 MG/DL (ref 65–99)
HCT VFR BLD AUTO: 41.1 % (ref 37.5–51)
HGB BLD-MCNC: 13.3 G/DL (ref 13–17.7)
IMM GRANULOCYTES # BLD AUTO: 0.07 10*3/MM3 (ref 0–0.05)
IMM GRANULOCYTES NFR BLD AUTO: 0.7 % (ref 0–0.5)
LYMPHOCYTES # BLD AUTO: 2.23 10*3/MM3 (ref 0.7–3.1)
LYMPHOCYTES NFR BLD AUTO: 22.3 % (ref 19.6–45.3)
MCH RBC QN AUTO: 29.2 PG (ref 26.6–33)
MCHC RBC AUTO-ENTMCNC: 32.4 G/DL (ref 31.5–35.7)
MCV RBC AUTO: 90.1 FL (ref 79–97)
MONOCYTES # BLD AUTO: 0.78 10*3/MM3 (ref 0.1–0.9)
MONOCYTES NFR BLD AUTO: 7.8 % (ref 5–12)
NEUTROPHILS NFR BLD AUTO: 6.27 10*3/MM3 (ref 1.7–7)
NEUTROPHILS NFR BLD AUTO: 62.9 % (ref 42.7–76)
NRBC BLD AUTO-RTO: 0 /100 WBC (ref 0–0.2)
PLATELET # BLD AUTO: 189 10*3/MM3 (ref 140–450)
PMV BLD AUTO: 9.3 FL (ref 6–12)
POTASSIUM SERPL-SCNC: 4.4 MMOL/L (ref 3.5–5.2)
PROT SERPL-MCNC: 7.3 G/DL (ref 6–8.5)
QT INTERVAL: 400 MS
QTC INTERVAL: 440 MS
RBC # BLD AUTO: 4.56 10*6/MM3 (ref 4.14–5.8)
SODIUM SERPL-SCNC: 137 MMOL/L (ref 136–145)
WBC # BLD AUTO: 9.98 10*3/MM3 (ref 3.4–10.8)

## 2021-01-29 PROCEDURE — 63710000001 INSULIN ASPART PER 5 UNITS: Performed by: EMERGENCY MEDICINE

## 2021-01-29 PROCEDURE — 80053 COMPREHEN METABOLIC PANEL: CPT | Performed by: NURSE PRACTITIONER

## 2021-01-29 PROCEDURE — 99217 PR OBSERVATION CARE DISCHARGE MANAGEMENT: CPT | Performed by: NURSE PRACTITIONER

## 2021-01-29 PROCEDURE — 85025 COMPLETE CBC W/AUTO DIFF WBC: CPT | Performed by: NURSE PRACTITIONER

## 2021-01-29 PROCEDURE — 25010000002 ERTAPENEM PER 500 MG: Performed by: NURSE PRACTITIONER

## 2021-01-29 PROCEDURE — C1751 CATH, INF, PER/CENT/MIDLINE: HCPCS

## 2021-01-29 PROCEDURE — 82962 GLUCOSE BLOOD TEST: CPT

## 2021-01-29 PROCEDURE — G0378 HOSPITAL OBSERVATION PER HR: HCPCS

## 2021-01-29 PROCEDURE — C1894 INTRO/SHEATH, NON-LASER: HCPCS

## 2021-01-29 RX ORDER — SODIUM CHLORIDE 0.9 % (FLUSH) 0.9 %
10 SYRINGE (ML) INJECTION AS NEEDED
Status: DISCONTINUED | OUTPATIENT
Start: 2021-01-29 | End: 2021-01-29 | Stop reason: HOSPADM

## 2021-01-29 RX ORDER — SODIUM CHLORIDE 0.9 % (FLUSH) 0.9 %
10 SYRINGE (ML) INJECTION EVERY 12 HOURS SCHEDULED
Status: DISCONTINUED | OUTPATIENT
Start: 2021-01-29 | End: 2021-01-29 | Stop reason: HOSPADM

## 2021-01-29 RX ORDER — SODIUM CHLORIDE 0.9 % (FLUSH) 0.9 %
20 SYRINGE (ML) INJECTION AS NEEDED
Status: DISCONTINUED | OUTPATIENT
Start: 2021-01-29 | End: 2021-01-29 | Stop reason: HOSPADM

## 2021-01-29 RX ORDER — NITROFURANTOIN 25; 75 MG/1; MG/1
100 CAPSULE ORAL DAILY
Qty: 14 CAPSULE | Refills: 0 | Status: SHIPPED | OUTPATIENT
Start: 2021-01-29 | End: 2021-02-12

## 2021-01-29 RX ADMIN — APIXABAN 2.5 MG: 2.5 TABLET, FILM COATED ORAL at 08:21

## 2021-01-29 RX ADMIN — INSULIN ASPART 4 UNITS: 100 INJECTION, SOLUTION INTRAVENOUS; SUBCUTANEOUS at 11:46

## 2021-01-29 RX ADMIN — LEVOTHYROXINE SODIUM 50 MCG: 50 TABLET ORAL at 08:20

## 2021-01-29 RX ADMIN — CLOPIDOGREL BISULFATE 75 MG: 75 TABLET ORAL at 08:21

## 2021-01-29 RX ADMIN — ERTAPENEM SODIUM 1 G: 1 INJECTION, POWDER, LYOPHILIZED, FOR SOLUTION INTRAMUSCULAR; INTRAVENOUS at 13:37

## 2021-01-29 RX ADMIN — SODIUM CHLORIDE, PRESERVATIVE FREE 10 ML: 5 INJECTION INTRAVENOUS at 08:21

## 2021-01-30 NOTE — OUTREACH NOTE
Prep Survey      Responses   [facility] patient discharged from?  [place]   Is LACE score < 7 ?  No   Emergency Room discharge w/ pulse ox?  No   Eligibility  [facility]   Date of Admission  01/26/21   Date of Discharge  01/29/21   Discharge Disposition  Home-Health Care [facility]   Discharge diagnosis   Urinary tract infection without hematuriaWill be discharged home today with PICC line for home infusions ending 2/11/2021.   Does the patient have one of the following disease processes/diagnoses(primary or secondary)?  Other   Does the patient have Home health ordered?  Yes   What [facility] the [facility] agency[facility]    Is there a DME ordered?  No   Prep survey completed?  Yes          Sara Aranda RN

## 2021-01-31 LAB
BACTERIA SPEC AEROBE CULT: NORMAL
BACTERIA SPEC AEROBE CULT: NORMAL

## 2021-02-01 ENCOUNTER — OFFICE VISIT (OUTPATIENT)
Dept: FAMILY MEDICINE CLINIC | Facility: CLINIC | Age: 86
End: 2021-02-01

## 2021-02-01 ENCOUNTER — TRANSITIONAL CARE MANAGEMENT TELEPHONE ENCOUNTER (OUTPATIENT)
Dept: CALL CENTER | Facility: HOSPITAL | Age: 86
End: 2021-02-01

## 2021-02-01 VITALS
SYSTOLIC BLOOD PRESSURE: 140 MMHG | RESPIRATION RATE: 17 BRPM | BODY MASS INDEX: 30.96 KG/M2 | HEART RATE: 74 BPM | WEIGHT: 209 LBS | HEIGHT: 69 IN | OXYGEN SATURATION: 98 % | DIASTOLIC BLOOD PRESSURE: 58 MMHG | TEMPERATURE: 97.8 F

## 2021-02-01 DIAGNOSIS — R93.1 ABNORMAL ECHOCARDIOGRAM: ICD-10-CM

## 2021-02-01 DIAGNOSIS — E11.65 UNCONTROLLED TYPE 2 DIABETES MELLITUS WITH HYPERGLYCEMIA (HCC): Primary | ICD-10-CM

## 2021-02-01 DIAGNOSIS — N39.0 ACUTE UTI: ICD-10-CM

## 2021-02-01 DIAGNOSIS — Z01.818 PRE-OPERATIVE CLEARANCE: ICD-10-CM

## 2021-02-01 PROCEDURE — 99495 TRANSJ CARE MGMT MOD F2F 14D: CPT | Performed by: FAMILY MEDICINE

## 2021-02-01 PROCEDURE — 1111F DSCHRG MED/CURRENT MED MERGE: CPT | Performed by: FAMILY MEDICINE

## 2021-02-01 NOTE — OUTREACH NOTE
Call Center TCM Note      Responses   [facility] patient discharged from?  [place]   Does the patient have one of the following disease processes/diagnoses(primary or secondary)?  Other   TCM attempt successful?  No   Revoked Reason  Other [Patient is at his f/u appt with Dr. Lofton]          Leidy Sherman RN    2/1/2021, 16:13 EST

## 2021-02-01 NOTE — PROGRESS NOTES
Transitional Care Follow Up Visit  Subjective     Forrest Zepeda is a 88 y.o. male who presents for a transitional care management visit.    Within 48 business hours after discharge our office contacted him via telephone to coordinate his care and needs.      I reviewed and discussed the details of that call along with the discharge summary, hospital problems, inpatient lab results, inpatient diagnostic studies, and consultation reports with Forrest.    States he is doing some better. Still having some dysuria and frequency. He is doing IV Invanz via PICC line through 2/11/21. Then he will transition to PO Macrobid 100 mg daily.  Denies any chest pain or shortness of breath.    States he is scheduled to see Dr Sierra to discuss the ureteral stent replacement.     Current outpatient and discharge medications have been reconciled for the patient.  Reviewed by: Zahida Townsend MA      Date of TCM Phone Call 1/29/2021   [facility]   Date of Admission 1/26/2021   Date of Discharge 1/29/2021   Risk for Readmission (LACE Score) -   Discharge Disposition Home-Health Care [facility]     Risk for Readmission (LACE) Score: 12 (1/29/2021  6:00 AM)      History of Present Illness   Course During Hospital Stay:    The patient presented to the ER at [facility] on 01/26/21 due to Dysuria, fever, and frequency. He has a Ureteral stent in placed and was consulted by Urology Dr Sierra.  He was admitted to hospital for treatment of UTI. Urine culture grew out E coli and he was given IV fluids and Zosyn. He was then transitioned to Ertapenum 1 gm daily. He was discharged home on 01/29/21 with PICC line in place.     The following portions of the patient's history were reviewed and updated as appropriate: allergies, current medications, past family history, past medical history, past social history, past surgical history and problem list.    Review of Systems   Constitutional: Negative for chills and fever.    Respiratory: Negative for shortness of breath.    Cardiovascular: Negative for chest pain.   Genitourinary: Positive for dysuria and frequency.   Neurological: Positive for weakness (hips and legs).       Objective   Physical Exam  Vitals signs and nursing note reviewed.   Constitutional:       Appearance: Normal appearance.         Assessment/Plan   Diagnoses and all orders for this visit:    1. Uncontrolled type 2 diabetes mellitus with hyperglycemia (CMS/[facility]) (Primary)    2. Acute UTI    3. Abnormal echocardiogram    4. Pre-operative clearance             Drink plenty fluids.    Continue medications as doing.    Refer to Cardiology for pre op clearance for ureteral stent replacement.    See Dr Sierra as scheduled.    Follow up on 01/25/21 as scheduled.          Scribed for Dr Ryne Lofton by Zahida Townsend CMA.          I, Ryne Lofton MD, personally performed the services described in this documentation, as scribed by Zahida Townsend in my presence, and is both accurate and complete.        (Please note that portions of this note were completed with a voice recognition program. Efforts were made to edit the dictations,but occasionally words are mis transcribed.)

## 2021-02-05 ENCOUNTER — READMISSION MANAGEMENT (OUTPATIENT)
Dept: CALL CENTER | Facility: HOSPITAL | Age: 86
End: 2021-02-05

## 2021-02-05 NOTE — OUTREACH NOTE
Medical Week 2 Survey      Responses   [facility] patient discharged from?  [place]   Does the patient have one of the following disease processes/diagnoses(primary or secondary)?  Other   Week 2 attempt successful?  Yes   Call start time  1108   Discharge diagnosis   Urinary tract infection without hematuriaWill be discharged home today with PICC line for home infusions ending 2/11/2021.   Call end time  1109   Is patient permission given to speak with other caregiver?  Yes   List who call center can speak with  Eron- Son,  Erika- Grandchild   Person spoke with today (if not patient) and relationship  Erika- Granddaughter   Meds reviewed with patient/caregiver?  Yes   Is the patient having any side effects they believe may be caused by any medication additions or changes?  No   Does the patient have all medications ordered at discharge?  Yes   Is the patient taking all medications as directed (includes completed medication regime)?  Yes   Does the patient have a primary care provider?   Yes   Does the patient have an appointment with their PCP within 7 days of discharge?  Yes   Has the patient kept scheduled appointments due by today?  Yes   Psychosocial issues?  No   Did the patient receive a copy of their discharge instructions?  Yes   Nursing interventions  Reviewed instructions with patient, Educated on MyChart   What is the patient's perception of their health status since discharge?  Improving   Is the patient/caregiver able to teach back signs and symptoms related to disease process for when to call PCP?  Yes   Is the patient/caregiver able to teach back signs and symptoms related to disease process for when to call 911?  Yes   Is the patient/caregiver able to teach back the hierarchy of who to call/visit for symptoms/problems? PCP, Specialist, Home health nurse, Urgent Care, ED, 911  Yes   If the patient is a current smoker, are they able to teach back resources for cessation?  Not a smoker   Week 2  Call Completed?  Yes          Marylou Ta RN

## 2021-02-06 RX ORDER — CALCIUM CITRATE/VITAMIN D3 200MG-6.25
TABLET ORAL
Qty: 100 EACH | Refills: 5 | Status: SHIPPED | OUTPATIENT
Start: 2021-02-06 | End: 2021-08-20 | Stop reason: SDUPTHER

## 2021-02-08 ENCOUNTER — CONSULT (OUTPATIENT)
Dept: CARDIOLOGY | Facility: CLINIC | Age: 86
End: 2021-02-08

## 2021-02-08 VITALS
SYSTOLIC BLOOD PRESSURE: 102 MMHG | BODY MASS INDEX: 30.96 KG/M2 | DIASTOLIC BLOOD PRESSURE: 68 MMHG | WEIGHT: 209 LBS | OXYGEN SATURATION: 92 % | HEART RATE: 96 BPM | HEIGHT: 69 IN

## 2021-02-08 DIAGNOSIS — J84.112 IPF (IDIOPATHIC PULMONARY FIBROSIS) (HCC): ICD-10-CM

## 2021-02-08 DIAGNOSIS — Z01.810 PRE-OPERATIVE CARDIOVASCULAR EXAMINATION: ICD-10-CM

## 2021-02-08 DIAGNOSIS — I49.5 SINUS NODE DYSFUNCTION (HCC): ICD-10-CM

## 2021-02-08 DIAGNOSIS — I10 ESSENTIAL HYPERTENSION: ICD-10-CM

## 2021-02-08 DIAGNOSIS — I48.0 PAROXYSMAL ATRIAL FIBRILLATION (HCC): ICD-10-CM

## 2021-02-08 DIAGNOSIS — N18.32 STAGE 3B CHRONIC KIDNEY DISEASE (HCC): ICD-10-CM

## 2021-02-08 DIAGNOSIS — R06.09 DYSPNEA ON EXERTION: Primary | ICD-10-CM

## 2021-02-08 PROCEDURE — 99214 OFFICE O/P EST MOD 30 MIN: CPT | Performed by: INTERNAL MEDICINE

## 2021-02-08 RX ORDER — SODIUM CHLORIDE 0.9 % (FLUSH) 0.9 %
SYRINGE (ML) INJECTION
COMMUNITY
Start: 2020-12-30 | End: 2021-03-16

## 2021-02-08 RX ORDER — ERTAPENEM 1 G/1
INJECTION, POWDER, LYOPHILIZED, FOR SOLUTION INTRAMUSCULAR; INTRAVENOUS
COMMUNITY
Start: 2021-01-29 | End: 2021-02-22

## 2021-02-12 ENCOUNTER — READMISSION MANAGEMENT (OUTPATIENT)
Dept: CALL CENTER | Facility: HOSPITAL | Age: 86
End: 2021-02-12

## 2021-02-12 NOTE — OUTREACH NOTE
Medical Week 3 Survey      Responses   [facility] patient discharged from?  [place]   Does the patient have one of the following disease processes/diagnoses(primary or secondary)?  Other   Week 3 attempt successful?  No   Unsuccessful attempts  Attempt 1          Opal Corado RN

## 2021-02-15 ENCOUNTER — READMISSION MANAGEMENT (OUTPATIENT)
Dept: CALL CENTER | Facility: HOSPITAL | Age: 86
End: 2021-02-15

## 2021-02-15 NOTE — OUTREACH NOTE
Medical Week 3 Survey      Responses   [facility] [place] patient discharged from?  [place]   Does the patient have one of the following disease processes/diagnoses(primary or secondary)?  Other   Week 3 attempt successful?  Yes   Call start time  1725   Call end time  1726   Discharge diagnosis   Urinary tract infection without hematuriaWill be discharged home today with PICC line for home infusions ending 2/11/2021.   Is patient permission given to speak with other caregiver?  Yes   Person spoke with today (if not patient) and relationship  Erika- Granddaughter   Meds reviewed with patient/caregiver?  Yes   Is the patient having any side effects they believe may be caused by any medication additions or changes?  No   Does the patient have all medications ordered at discharge?  Yes   Is the patient taking all medications as directed (includes completed medication regime)?  Yes   Does the patient have a primary care provider?   Yes   Does the patient have an appointment with their PCP within 7 days of discharge?  Yes   Has the patient kept scheduled appointments due by today?  Yes   Comments  Follow Up with Jimy Ledezma DO Monday Mar 22, 2021 2:45 PM   Has home health visited the patient within 72 hours of discharge?  Yes   Psychosocial issues?  No   Did the patient receive a copy of their discharge instructions?  Yes   Nursing interventions  Reviewed instructions with patient, Educated on MyChart   What is the patient's perception of their health status since discharge?  Improving   Is the patient/caregiver able to teach back signs and symptoms related to disease process for when to call PCP?  Yes   Is the patient/caregiver able to teach back signs and symptoms related to disease process for when to call 911?  Yes   Is the patient/caregiver able to teach back the hierarchy of who to call/visit for symptoms/problems? PCP, Specialist, Home health nurse, Urgent Care, ED, 911  Yes   If the patient is a current smoker,  are they able to teach back resources for cessation?  Not a smoker   Week 3 Call Completed?  Yes          Misha Cruz RN

## 2021-02-22 ENCOUNTER — OFFICE VISIT (OUTPATIENT)
Dept: UROLOGY | Facility: CLINIC | Age: 86
End: 2021-02-22

## 2021-02-22 ENCOUNTER — READMISSION MANAGEMENT (OUTPATIENT)
Dept: CALL CENTER | Facility: HOSPITAL | Age: 86
End: 2021-02-22

## 2021-02-22 VITALS
WEIGHT: 205 LBS | OXYGEN SATURATION: 95 % | HEART RATE: 114 BPM | TEMPERATURE: 97.3 F | BODY MASS INDEX: 30.36 KG/M2 | HEIGHT: 69 IN

## 2021-02-22 DIAGNOSIS — N13.2 HYDRONEPHROSIS WITH RENAL AND URETERAL CALCULUS OBSTRUCTION: ICD-10-CM

## 2021-02-22 PROCEDURE — 51798 US URINE CAPACITY MEASURE: CPT | Performed by: UROLOGY

## 2021-02-22 PROCEDURE — 99215 OFFICE O/P EST HI 40 MIN: CPT | Performed by: UROLOGY

## 2021-02-22 RX ORDER — NITROFURANTOIN 25; 75 MG/1; MG/1
100 CAPSULE ORAL DAILY
COMMUNITY
End: 2021-02-22 | Stop reason: SDUPTHER

## 2021-02-22 RX ORDER — NITROFURANTOIN 25; 75 MG/1; MG/1
100 CAPSULE ORAL DAILY
Qty: 30 CAPSULE | Refills: 11 | Status: SHIPPED | OUTPATIENT
Start: 2021-02-22 | End: 2021-12-22

## 2021-02-22 NOTE — OUTREACH NOTE
Medical Week 4 Survey      Responses   [facility] patient discharged from?  [place]   Does the patient have one of the following disease processes/diagnoses(primary or secondary)?  Other   Week 4 attempt successful?  Yes   Call start time  1529   Call end time  1531   Discharge diagnosis   Urinary tract infection without hematuriaWill be discharged home today with PICC line for home infusions ending 2/11/2021.   List who call center can speak with  Eron- Son,  Erika- Grandchild   Person spoke with today (if not patient) and relationship  Erika- Granddaughter   Meds reviewed with patient/caregiver?  Yes   Is the patient having any side effects they believe may be caused by any medication additions or changes?  No   Is the patient taking all medications as directed (includes completed medication regime)?  Yes   Has the patient kept scheduled appointments due by today?  Yes   Comments  Follow Up with Jimy Ledezma DO Monday Mar 22, 2021 2:45 PM   Is the patient still receiving Home Health Services?  Yes   Home health comments  still coming [facility]    Psychosocial issues?  No   What is the patient's perception of their health status since discharge?  Improving   Is the patient/caregiver able to teach back signs and symptoms related to disease process for when to call PCP?  Yes   Is the patient/caregiver able to teach back signs and symptoms related to disease process for when to call 911?  Yes   Is the patient/caregiver able to teach back the hierarchy of who to call/visit for symptoms/problems? PCP, Specialist, Home health nurse, Urgent Care, ED, 911  Yes   If the patient is a current smoker, are they able to teach back resources for cessation?  Not a smoker   Week 4 Call Completed?  Yes   Would the patient like one additional call?  No   Graduated  Yes   Is the patient interested in additional calls from an ambulatory ?  NOTE:  applies to high risk patients requiring additional follow-up.  Yes    Did the patient feel the follow up calls were helpful during their recovery period?  Yes   Was the number of calls appropriate?  Yes          Sara Aranda RN

## 2021-02-22 NOTE — PROGRESS NOTES
"Chief Complaint  Chief Complaint   Patient presents with   • Follow-up        HPI  Mr. Zepeda is a 88 y.o. male with PMHx of diabetes mellitus, pulmonary embolus, CHF, longstanding ureteral stricture with metallic resonance left ureteral stent, who presented to the hospital recently with urinary tract infection, chronic lower urinary tract symptoms, severe urge urinary incontinence.   He also has a 6 mm right lower pole renal stone.    He denies weak stream   He wears a diaper at night, with sometimes it being dry.   He is currently on silodosin and finasteride    Still on eliquis    Past Medical History  Past Medical History:   Diagnosis Date   • Abnormal EKG    • Abnormal PSA     Reported abnormal   • Acute deep vein thrombosis (DVT) of right lower extremity (CMS/[facility]) 08/22/2019   • Acute diverticulitis 2019   • Acute hyperkalemia 08/22/2019   • Acute respiratory failure with hypoxia (CMS/[facility])    • Acute saddle pulmonary embolism with acute cor pulmonale (CMS/[facility]) 08/22/2019   • Acute systolic right heart failure (CMS/[facility]) 08/22/2019   • Arthritis     KNEES,  BUT SINCE HAD REPLACEMENT   • Benign localized hyperplasia of prostate    • Bilateral knee pain    • C. difficile diarrhea 2010   • Cataracts, bilateral    • CKD (chronic kidney disease)    • Diabetic neuropathy (CMS/HCC)    • Diabetic neuropathy (CMS/HCC)    • Disease of thyroid gland     HYPOTHYROIDISM   • Diverticulitis    • Dyslipidemia     H/O   • Family history of malignant hyperthermia     Brother    • Full dentures    • Generalized weakness    • History of gout    • History of hepatitis A vaccination    • History of nephrolithiasis    • History of nuclear stress test     STATES IN THE 1990'S.  \"IT WAS OK\"   • History of osteopenia    • History of recurrent UTI (urinary tract infection)    • Hydronephrosis of left kidney 7/18/2019   • Hydronephrosis with ureteral stricture    • Hyperkalemia     PT UNSURE REGARDING HX OF THIS   • Hyperlipidemia    • " Hypertension    • Impaired functional mobility, balance, gait, and endurance    • Impaired functional mobility, balance, gait, and endurance    • Insomnia    • Malignant hyperthermia due to anesthesia     PER PT REPORT, BROTHER HAD DURING LUNG SURGERY SEVERAL YEARS AGO.  STATED THEY PACKED HIM ON ICE.  STATED HE DID SURVIVE.  PT DENIES ANY PERSONAL HX OF MALIGNANT HYPERTHERMIA HIMSELF, OR ANY ISSUES WITH ANESTHESIA.  STATES TO HIS KNOWLEDGE, NO OTHER FAMILY MEMBER HAS THIS ISSUE EXCEPT FOR HIS BROTHER.   • On supplemental oxygen therapy     2L [place] Q[place]   • Other hydronephrosis 8/12/2019    Added automatically from request for surgery [id number]   • Renal insufficiency    • Sepsis (CMS/HCC) 2019   • Shortness of breath     STATES WITH EXERTION, OTHERWISE, DENIES   • Sigmoid diverticulitis without abscess or perforation 8/9/2017   • Skin breakdown     fourth toe right foot noted in 2019 MD D/C note   • Skin ulcer of fourth toe of right foot, limited to breakdown of skin (CMS/HCC) 7/5/2019   • Stricture of ureter    • Type 2 diabetes mellitus (CMS/HCC)    • Ureteral stricture, left    • UTI (urinary tract infection) 7/18/2019   • Vitamin D deficiency    • Wears glasses        Past Surgical History  Past Surgical History:   Procedure Laterality Date   • APPENDECTOMY     • CARDIAC ELECTROPHYSIOLOGY PROCEDURE N/A 12/23/2020    Procedure: Pacemaker DC new. DNS meds.;  Surgeon: Jimy Ledezma DO;  Location: [facility] INVASIVE LOCATION;  Service: Cardiology;  Laterality: N/A;   • CHOLECYSTECTOMY     • CIRCUMCISION N/A 10/23/2019    Procedure: CIRCUMCISIOn-DORSAL SLIT;  Surgeon: Griffin Romero MD;  Location: [facility] OR;  Service: Urology   • COLONOSCOPY     • CYSTOSCOPY URETEROSCOPY Left 2/11/2019    Procedure: URETEROSCOPY WITH STENT EXTRACTION, RPG;  Surgeon: Griffin Romero MD;  Location: [facility] OR;  Service: Urology   • CYSTOSCOPY W/ URETERAL STENT PLACEMENT Left 7/20/2019    Procedure: CYSTOSCOPY, LEFT RETROGRADE  PYELOGRAM,  ATTEMPTED LEFT URETERAL STENT INSERTION;  Surgeon: Isaac Flynn MD;  Location:  [facility] OR;  Service: Urology   • EYE SURGERY      CATARACTS REMOVED BILATERALLY   • KNEE ARTHROPLASTY Bilateral    • KNEE ARTHROSCOPY Bilateral    • RENAL ARTERY STENT      SEVERAL TIMES EVERY SINCE 1978   • URETERAL STENT INSERTION  10/2020   • URETEROSCOPY LASER LITHOTRIPSY WITH STENT INSERTION Left 1/10/2018    Procedure:  cysto, left ureteral stent replacement ;  Surgeon: Griffin Romero MD;  Location: [facility] OR;  Service:    • URETEROSCOPY LASER LITHOTRIPSY WITH STENT INSERTION Left 8/14/2019    Procedure: URETEROSCOPY, STONE REMOVAL WITH STENT INSERTION;  Surgeon: Griffin Romero MD;  Location: [facility] OR;  Service: Urology   • URETEROSCOPY LASER LITHOTRIPSY WITH STENT INSERTION Left 10/23/2019    Procedure: Left URETEROSCOPY WITH STENT INSERTION-LEFT;  Surgeon: Griffin Romero MD;  Location: [facility] OR;  Service: Urology       Medications    Current Outpatient Medications:   •  allopurinol (ZYLOPRIM) 300 MG tablet, Take 1 tablet by mouth Every Night., Disp: 90 tablet, Rfl: 3  •  apixaban (ELIQUIS) 2.5 MG tablet tablet, Take 1 tablet by mouth 2 (Two) Times a Day., Disp: 60 tablet, Rfl: 5  •  clotrimazole (LOTRIMIN) 1 % cream, Apply  topically to the appropriate area as directed 2 (Two) Times a Day. (Patient taking differently: Apply 1 application topically to the appropriate area as directed As Needed.), Disp: 45 g, Rfl: 0  •  desoximetasone (TOPICORT) 0.25 % ointment, Apply  topically to the appropriate area as directed 2 (Two) Times a Day. (Patient taking differently: Apply 1 application topically to the appropriate area as directed 2 (Two) Times a Day As Needed.), Disp: 60 g, Rfl: 2  •  finasteride (PROSCAR) 5 MG tablet, Take 1 tablet by mouth Daily., Disp: 90 tablet, Rfl: 3  •  glimepiride (Amaryl) 2 MG tablet, Take 1 tablet by mouth Every Morning Before Breakfast., Disp: 90 tablet, Rfl: 1  •  Insulin  Glargine, 1 Unit Dial, (Toujeo SoloStar) 300 UNIT/ML solution pen-injector injection, Inject 15 Units under the skin into the appropriate area as directed Daily., Disp: 3 pen, Rfl: 3  •  levothyroxine (SYNTHROID, LEVOTHROID) 50 MCG tablet, Take 1 tablet by mouth Daily., Disp: 90 tablet, Rfl: 3  •  losartan (Cozaar) 25 MG tablet, Take 1 tablet by mouth Daily., Disp: 30 tablet, Rfl: 3  •  Multiple Vitamins-Minerals (OCUVITE ADULT 50+ PO), Take 1 capsule by mouth Daily., Disp: , Rfl:   •  nitrofurantoin, macrocrystal-monohydrate, (MACROBID) 100 MG capsule, Take 100 mg by mouth Daily., Disp: , Rfl:   •  NOVOFINE 32G X 6 MM misc, USE ONE DAILY, Disp: 100 each, Rfl: 3  •  polyethylene glycol (MIRALAX) pack packet, Take 17 g by mouth Daily As Needed., Disp: , Rfl:   •  Probiotic Product (Risaquad-2) capsule capsule, Take 1 capsule by mouth Daily., Disp: , Rfl:   •  silodosin (RAPAFLO) 8 MG capsule capsule, Take 1 capsule by mouth Daily With Breakfast., Disp: 90 capsule, Rfl: 3  •  SITagliptin (JANUVIA) 100 MG tablet, Take 1 tablet by mouth Daily., Disp: 90 tablet, Rfl: 3  •  Sodium Chloride Flush (sodium chloride 0.9 % flush) 0.9 % solution, , Disp: , Rfl:   •  True Metrix Blood Glucose Test test strip, USE TO TEST BLOOD SUGAR ONCE DAILY, Disp: 100 each, Rfl: 5    Allergies  Allergies   Allergen Reactions   • Metformin Diarrhea   • Statins Myalgia       Social History  Social History     Socioeconomic History   • Marital status:      Spouse name: Not on file   • Number of children: Not on file   • Years of education: Not on file   • Highest education level: Not on file   Tobacco Use   • Smoking status: Former Smoker     Types: Cigarettes     Quit date: 1950     Years since quittin.2   • Smokeless tobacco: Never Used   • Tobacco comment: SMOKED SOME AS A TEEN, DENIES ANY HABIT OR ADDICTION   Substance and Sexual Activity   • Alcohol use: No   • Drug use: No   • Sexual activity: Defer   Social History  "Narrative    Pt lives alone    Granddaughter Erika is nurse.     Caffeine soda ocassional       Family History  Family History   Problem Relation Age of Onset   • Heart attack Sister    • Brain cancer Sister    • Stroke Sister    • Lung cancer Sister    • Brain cancer Brother    • Lung cancer Brother    • Malig Hyperthermia Brother        Review of Systems  Review of systems was notable for occasional right flank pain.    Physical Exam  Visit Vitals  Pulse 114   Temp 97.3 °F (36.3 °C)   Ht 175.3 cm (69\")   Wt 93 kg (205 lb)   SpO2 95%   BMI 30.27 kg/m²     Physical exam was performed and was notable for obese.  Somewhat unkempt.    Labs  Lab Results   Component Value Date    PSA 8.640 (H) 11/30/2020    PSA 1.450 08/26/2020    PSA 0.810 02/06/2019       Lab Results   Component Value Date    GLUCOSE 129 (H) 01/29/2021    CALCIUM 9.5 01/29/2021     01/29/2021    K 4.4 01/29/2021    CO2 24.1 01/29/2021     01/29/2021    BUN 31 (H) 01/29/2021    CREATININE 1.34 (H) 01/29/2021    EGFRIFAFRI 49 (L) 11/30/2020    EGFRIFNONA 50 (L) 01/29/2021    BCR 23.1 01/29/2021    ANIONGAP 9.9 01/29/2021       Lab Results   Component Value Date    WBC 9.98 01/29/2021    HGB 13.3 01/29/2021    HCT 41.1 01/29/2021    MCV 90.1 01/29/2021     01/29/2021       Brief Urine Lab Results  (Last result in the past 365 days)      Color   Clarity   Blood   Leuk Est   Nitrite   Protein   CREAT   Urine HCG        01/26/21 2218 Yellow Turbid Moderate (2+) Large (3+) Negative 100 mg/dL (2+)                Radiologic Studies  Ct Abdomen Pelvis Without Contrast  Result Date: 1/26/2021  Enlarged prostate gland. Chronic appearing findings as detailed above. Authenticated by Sy Agee MD on 01/26/2021 10:21:32 PM    Ct Abdomen Pelvis Without Contrast  Result Date: 12/29/2020  1. Left ureteral stent in place which appears appropriately positioned. There is persistent, chronic moderate to severe left hydronephrosis and ureteral " dilatation with left renal cortical thinning. 2. Colonic diverticula without CT evidence of diverticulitis. 3. Enlargement of the prostate gland. 4. Fibrotic changes in the lung bases.     1232.53 mGy.cm. 24.80 mGy  This study was performed with techniques to keep radiation doses as low as reasonably achievable (ALARA). Individualized dose reduction techniques using automated exposure control or adjustment of mA and/or kV according to the patient size were employed.  This report was finalized on 12/29/2020 8:22 AM by Mercedes Bailey M.D..      PVR  Post-void residual performed with ultrasound scanner by staff and interpreted by me -0 mL after multiple attempts      Assessment  Mr. Zepeda is a 88 y.o. male with PMHx of diabetes mellitus, pulmonary embolus, CHF, longstanding ureteral stricture with metallic resonance left ureteral stent, who presented to the hospital recently with urinary tract infection, chronic lower urinary tract symptoms, severe urge urinary incontinence.  Urge incontinence seems to have improved.  He has had multiple urinary tract infections in the past 6 months and this is his greatest urologic problem at present because he usually results in hospitalization.   He also has a 6 mm right lower pole renal stone.  Additionally, his PSA was checked in November and was 8.6.    Plan  1.  Tentatively for left resonance stent exchange, as well as right uRS/HLL, pending cardiac eval  2.  Macrobid daily until then  3.  Cardiac eval and risk assesment as planned with Dr. Saha  4.  Defer elevated PSA work-up to the future.  This will be another separate long conversation and we will need to review the risks versus benefits of prostate biopsy.    I spent a total of 40 minutes with the patient and the chart engaging in data gathering and interpretation, patient interaction, as well as counseling on the risks, benefits, and alternatives of the therapy and coordinating care.       Mumtaz Sierra,  MD

## 2021-02-23 ENCOUNTER — EPISODE CHANGES (OUTPATIENT)
Dept: CASE MANAGEMENT | Facility: OTHER | Age: 86
End: 2021-02-23

## 2021-02-25 LAB
BH CV ECHO MEAS - % IVS THICK: 14.3 %
BH CV ECHO MEAS - % LVPW THICK: 15.8 %
BH CV ECHO MEAS - AO MAX PG (FULL): 9.3 MMHG
BH CV ECHO MEAS - AO MAX PG: 12 MMHG
BH CV ECHO MEAS - AO MEAN PG (FULL): 5.7 MMHG
BH CV ECHO MEAS - AO MEAN PG: 6.7 MMHG
BH CV ECHO MEAS - AO ROOT AREA (BSA CORRECTED): 1.3
BH CV ECHO MEAS - AO ROOT AREA: 6.2 CM^2
BH CV ECHO MEAS - AO ROOT DIAM: 2.8 CM
BH CV ECHO MEAS - AO V2 MAX: 170.8 CM/SEC
BH CV ECHO MEAS - AO V2 MEAN: 120 CM/SEC
BH CV ECHO MEAS - AO V2 VTI: 31.1 CM
BH CV ECHO MEAS - AVA(I,A): 2.2 CM^2
BH CV ECHO MEAS - AVA(I,D): 2.2 CM^2
BH CV ECHO MEAS - AVA(V,A): 2.3 CM^2
BH CV ECHO MEAS - AVA(V,D): 2.3 CM^2
BH CV ECHO MEAS - BSA(HAYCOCK): 2.2 M^2
BH CV ECHO MEAS - BSA: 2.1 M^2
BH CV ECHO MEAS - BZI_BMI: 30.9 KILOGRAMS/M^2
BH CV ECHO MEAS - BZI_METRIC_HEIGHT: 175.3 CM
BH CV ECHO MEAS - BZI_METRIC_WEIGHT: 94.8 KG
BH CV ECHO MEAS - EDV(CUBED): 29.8 ML
BH CV ECHO MEAS - EDV(MOD-SP4): 152 ML
BH CV ECHO MEAS - EDV(TEICH): 37.9 ML
BH CV ECHO MEAS - EF(CUBED): 73.1 %
BH CV ECHO MEAS - EF(MOD-SP4): 63.2 %
BH CV ECHO MEAS - EF(TEICH): 66.4 %
BH CV ECHO MEAS - ESV(CUBED): 8 ML
BH CV ECHO MEAS - ESV(MOD-SP4): 56 ML
BH CV ECHO MEAS - ESV(TEICH): 12.7 ML
BH CV ECHO MEAS - FS: 35.5 %
BH CV ECHO MEAS - IVS/LVPW: 0.74
BH CV ECHO MEAS - IVSD: 1.4 CM
BH CV ECHO MEAS - IVSS: 1.6 CM
BH CV ECHO MEAS - LA DIMENSION: 3.8 CM
BH CV ECHO MEAS - LA/AO: 1.5
BH CV ECHO MEAS - LAD MAJOR: 4.8 CM
BH CV ECHO MEAS - LAT PEAK E' VEL: 6.4 CM/SEC
BH CV ECHO MEAS - LATERAL E/E' RATIO: 9.3
BH CV ECHO MEAS - LV DIASTOLIC VOL/BSA (35-75): 72.2 ML/M^2
BH CV ECHO MEAS - LV IVRT: 0.11 SEC
BH CV ECHO MEAS - LV MASS(C)D: 193.9 GRAMS
BH CV ECHO MEAS - LV MASS(C)DI: 92.1 GRAMS/M^2
BH CV ECHO MEAS - LV MASS(C)S: 156.3 GRAMS
BH CV ECHO MEAS - LV MASS(C)SI: 74.3 GRAMS/M^2
BH CV ECHO MEAS - LV MAX PG: 2.7 MMHG
BH CV ECHO MEAS - LV MEAN PG: 1 MMHG
BH CV ECHO MEAS - LV SYSTOLIC VOL/BSA (12-30): 26.6 ML/M^2
BH CV ECHO MEAS - LV V1 MAX: 81.8 CM/SEC
BH CV ECHO MEAS - LV V1 MEAN: 42.8 CM/SEC
BH CV ECHO MEAS - LV V1 VTI: 14.6 CM
BH CV ECHO MEAS - LVIDD: 3.1 CM
BH CV ECHO MEAS - LVIDS: 2 CM
BH CV ECHO MEAS - LVLD AP4: 8.2 CM
BH CV ECHO MEAS - LVLS AP4: 7.3 CM
BH CV ECHO MEAS - LVOT AREA (M): 4.9 CM^2
BH CV ECHO MEAS - LVOT AREA: 4.7 CM^2
BH CV ECHO MEAS - LVOT DIAM: 2.5 CM
BH CV ECHO MEAS - LVPWD: 1.4 CM
BH CV ECHO MEAS - LVPWS: 2.2 CM
BH CV ECHO MEAS - MED PEAK E' VEL: 7.1 CM/SEC
BH CV ECHO MEAS - MEDIAL E/E' RATIO: 8.4
BH CV ECHO MEAS - MV A MAX VEL: 92.8 CM/SEC
BH CV ECHO MEAS - MV DEC SLOPE: 202.5 CM/SEC^2
BH CV ECHO MEAS - MV DEC TIME: 0.28 SEC
BH CV ECHO MEAS - MV E MAX VEL: 59.8 CM/SEC
BH CV ECHO MEAS - MV E/A: 0.64
BH CV ECHO MEAS - MV MAX PG: 5.2 MMHG
BH CV ECHO MEAS - MV MEAN PG: 2 MMHG
BH CV ECHO MEAS - MV P1/2T MAX VEL: 58.4 CM/SEC
BH CV ECHO MEAS - MV P1/2T: 84.5 MSEC
BH CV ECHO MEAS - MV V2 MAX: 114 CM/SEC
BH CV ECHO MEAS - MV V2 MEAN: 61 CM/SEC
BH CV ECHO MEAS - MV V2 VTI: 27.9 CM
BH CV ECHO MEAS - MVA P1/2T LCG: 3.8 CM^2
BH CV ECHO MEAS - MVA(P1/2T): 2.6 CM^2
BH CV ECHO MEAS - MVA(VTI): 2.5 CM^2
BH CV ECHO MEAS - PA MAX PG: 2.7 MMHG
BH CV ECHO MEAS - PA V2 MAX: 82.5 CM/SEC
BH CV ECHO MEAS - RAP SYSTOLE: 3 MMHG
BH CV ECHO MEAS - RVSP: 36 MMHG
BH CV ECHO MEAS - SI(AO): 91 ML/M^2
BH CV ECHO MEAS - SI(CUBED): 10.4 ML/M^2
BH CV ECHO MEAS - SI(LVOT): 32.7 ML/M^2
BH CV ECHO MEAS - SI(MOD-SP4): 45.6 ML/M^2
BH CV ECHO MEAS - SI(TEICH): 12 ML/M^2
BH CV ECHO MEAS - SV(AO): 191.5 ML
BH CV ECHO MEAS - SV(CUBED): 21.8 ML
BH CV ECHO MEAS - SV(LVOT): 68.8 ML
BH CV ECHO MEAS - SV(MOD-SP4): 96 ML
BH CV ECHO MEAS - SV(TEICH): 25.2 ML
BH CV ECHO MEAS - TR MAX PG: 26 MMHG
BH CV ECHO MEAS - TR MAX VEL: 253 CM/SEC
BH CV ECHO MEAS - TV MAX PG: 0.82 MMHG
BH CV ECHO MEAS - TV V2 MAX: 45.4 CM/SEC
BH CV ECHO MEASUREMENTS AVERAGE E/E' RATIO: 8.86
LEFT ATRIUM VOLUME INDEX: 17.1 ML/M^2
LEFT ATRIUM VOLUME: 36 ML
LV EF 2D ECHO EST: 64 %

## 2021-02-26 ENCOUNTER — OFFICE VISIT (OUTPATIENT)
Dept: FAMILY MEDICINE CLINIC | Facility: CLINIC | Age: 86
End: 2021-02-26

## 2021-02-26 VITALS
TEMPERATURE: 98.2 F | OXYGEN SATURATION: 97 % | BODY MASS INDEX: 31.25 KG/M2 | HEART RATE: 65 BPM | DIASTOLIC BLOOD PRESSURE: 80 MMHG | SYSTOLIC BLOOD PRESSURE: 122 MMHG | HEIGHT: 69 IN | WEIGHT: 211 LBS

## 2021-02-26 DIAGNOSIS — I10 ESSENTIAL HYPERTENSION: Primary | ICD-10-CM

## 2021-02-26 DIAGNOSIS — E11.65 UNCONTROLLED TYPE 2 DIABETES MELLITUS WITH HYPERGLYCEMIA (HCC): ICD-10-CM

## 2021-02-26 PROCEDURE — 99213 OFFICE O/P EST LOW 20 MIN: CPT | Performed by: FAMILY MEDICINE

## 2021-02-26 NOTE — PROGRESS NOTES
"Subjective   Forrest Zepeda is a 88 y.o. male seen today for Diabetes and Hypertension.     History of Present Illness   The patient is here for a follow up on Hypertension and Type 2 diabetes.    States he is doing well.  Denies any chest pain or shortness of breath.    BP today is 122/80  Taking Losartan 25 mg daily.    A1C in January was 7.5%.  Taking Glimepiride 2 mg and Januvia 100 mg daily.  Toujeo 15 units daily.    States he saw Dr Sierra on 02/22/21. He placed him on Macrobid daily until his left ureteral stent replacement in April.  States his bladder is emptying good and denies any urinary burning or hematuria.      The following portions of the patient's history were reviewed and updated as appropriate: allergies, current medications, past social history and problem list.    Review of Systems   Constitutional: Negative for chills and fever.   Respiratory: Negative for shortness of breath.    Cardiovascular: Negative for chest pain.   Genitourinary: Positive for frequency (occasional frequency.). Negative for dysuria and hematuria.       Objective   /80   Pulse 65   Temp 98.2 °F (36.8 °C)   Ht 175.3 cm (69\")   Wt 95.7 kg (211 lb)   SpO2 97%   BMI 31.16 kg/m²   Physical Exam  Vitals signs and nursing note reviewed.   Constitutional:       General: He is not in acute distress.     Appearance: Normal appearance.   Neurological:      Mental Status: He is alert.         Assessment/Plan   Problems Addressed this Visit        Cardiac and Vasculature    Essential hypertension - Primary       Endocrine and Metabolic    Uncontrolled type 2 diabetes mellitus (CMS/HCC)      Diagnoses       Codes Comments    Essential hypertension    -  Primary ICD-10-CM: I10  ICD-9-CM: 401.9     Uncontrolled type 2 diabetes mellitus with hyperglycemia (CMS/HCC)     ICD-10-CM: E11.65  ICD-9-CM: 250.02               Drink plenty fluids.    Continue medications as doing.    See Dr Saha and Dr Sierra as scheduled.    Follow up " in March with Dr Wen in [place] as scheduled.          Scribed for Dr Ryne Lofton by Zahida Townsend CMA.          I, Ryne Lofton MD, personally performed the services described in this documentation, as scribed by Zahida Townsend in my presence, and is both accurate and complete.        (Please note that portions of this note were completed with a voice recognition program. Efforts were made to edit the dictations,but occasionally words are mis transcribed.)

## 2021-03-01 ENCOUNTER — PATIENT OUTREACH (OUTPATIENT)
Dept: CASE MANAGEMENT | Facility: OTHER | Age: 86
End: 2021-03-01

## 2021-03-01 NOTE — OUTREACH NOTE
"Patient Outreach Note    Called patient, son, Eron, answered phone, in follow up to the [facility]'s Nurse outreaches.  Recent hospitalization 1/26 - 1/29/21 with UTI; patient was DC'd home on IV abx, which was completed.  Son, Eron, said patient \"is doing great\"; his last 2 weeks have been his best in a long time.  Said he is walking without a Cane; is walking with his back straighter than usual.  Reported patient saw PCP, Dr. LIZ Lofton on Friday, 2/26, and since he is retiring, patient's next PCP appt is in [place], with Dr. DAKSHA Wen (3-16-21).  Son stated Dr. Lofton ordered another urine sample while patient was in the office; patient remains on an oral abx; still has a PICC line in his arm.  Son, stated his daughter, patient's granddaughter, Erika, is a Nurse with [employer]; [facility] has completed their service.  Reports that all the family are very attentive to patient's daily needs; family fills weekly pill-box and gives patient his insulin every AM.  Son said patient remains very independent, living alone; still drives.  Noted AWV is up to date; Advanced Directives are on file; MyChart is active.  Son denied needs or concerns for RN-[facility] to address.      Claudia Baum RN  Ambulatory     3/1/2021, 13:03 EST      "

## 2021-03-02 ENCOUNTER — APPOINTMENT (OUTPATIENT)
Dept: NUCLEAR MEDICINE | Facility: HOSPITAL | Age: 86
End: 2021-03-02

## 2021-03-03 ENCOUNTER — APPOINTMENT (OUTPATIENT)
Dept: NUCLEAR MEDICINE | Facility: HOSPITAL | Age: 86
End: 2021-03-03

## 2021-03-04 ENCOUNTER — HOSPITAL ENCOUNTER (OUTPATIENT)
Dept: NUCLEAR MEDICINE | Facility: HOSPITAL | Age: 86
Discharge: HOME | End: 2021-03-04

## 2021-03-04 PROCEDURE — 78452 HT MUSCLE IMAGE SPECT MULT: CPT

## 2021-03-04 PROCEDURE — 25010000002 REGADENOSON 0.4 MG/5ML SOLUTION: Performed by: INTERNAL MEDICINE

## 2021-03-04 PROCEDURE — 93017 CV STRESS TEST TRACING ONLY: CPT

## 2021-03-04 PROCEDURE — 93018 CV STRESS TEST I&R ONLY: CPT | Performed by: INTERNAL MEDICINE

## 2021-03-04 PROCEDURE — 0 TECHNETIUM SESTAMIBI: Performed by: INTERNAL MEDICINE

## 2021-03-04 PROCEDURE — 78452 HT MUSCLE IMAGE SPECT MULT: CPT | Performed by: INTERNAL MEDICINE

## 2021-03-04 PROCEDURE — A9500 TC99M SESTAMIBI: HCPCS | Performed by: INTERNAL MEDICINE

## 2021-03-04 RX ADMIN — TECHNETIUM TC 99M SESTAMIBI 1 DOSE: 1 INJECTION INTRAVENOUS at 08:40

## 2021-03-04 RX ADMIN — REGADENOSON 0.4 MG: 0.08 INJECTION, SOLUTION INTRAVENOUS at 08:40

## 2021-03-05 ENCOUNTER — HOSPITAL ENCOUNTER (OUTPATIENT)
Dept: NUCLEAR MEDICINE | Facility: HOSPITAL | Age: 86
Discharge: HOME | End: 2021-03-05

## 2021-03-05 LAB
BH CV STRESS COMMENTS STAGE 1: ABNORMAL
BH CV STRESS DOSE REGADENOSON STAGE 1: 0.4
BH CV STRESS DURATION MIN STAGE 1: 0
BH CV STRESS DURATION SEC STAGE 1: 10
BH CV STRESS PROTOCOL 1: ABNORMAL
BH CV STRESS RECOVERY BP: ABNORMAL MMHG
BH CV STRESS RECOVERY HR: 72 BPM
BH CV STRESS STAGE 1: 1
LV EF NUC BP: 53 %
MAXIMAL PREDICTED HEART RATE: 132 BPM
PERCENT MAX PREDICTED HR: 59.09 %
STRESS BASELINE BP: ABNORMAL MMHG
STRESS BASELINE HR: 65 BPM
STRESS PERCENT HR: 70 %
STRESS POST PEAK BP: ABNORMAL MMHG
STRESS POST PEAK HR: 78 BPM
STRESS TARGET HR: 112 BPM

## 2021-03-05 PROCEDURE — A9500 TC99M SESTAMIBI: HCPCS | Performed by: INTERNAL MEDICINE

## 2021-03-05 PROCEDURE — 0 TECHNETIUM SESTAMIBI: Performed by: INTERNAL MEDICINE

## 2021-03-05 RX ADMIN — TECHNETIUM TC 99M SESTAMIBI 1 DOSE: 1 INJECTION INTRAVENOUS at 07:27

## 2021-03-10 ENCOUNTER — IMMUNIZATION (OUTPATIENT)
Dept: VACCINE CLINIC | Facility: HOSPITAL | Age: 86
End: 2021-03-10

## 2021-03-10 PROCEDURE — 0001A: CPT | Performed by: INTERNAL MEDICINE

## 2021-03-10 PROCEDURE — 91300 HC SARSCOV02 VAC 30MCG/0.3ML IM: CPT | Performed by: INTERNAL MEDICINE

## 2021-03-16 ENCOUNTER — OFFICE VISIT (OUTPATIENT)
Dept: INTERNAL MEDICINE | Facility: CLINIC | Age: 86
End: 2021-03-16

## 2021-03-16 VITALS
TEMPERATURE: 97.8 F | WEIGHT: 210.13 LBS | BODY MASS INDEX: 31.12 KG/M2 | RESPIRATION RATE: 18 BRPM | HEART RATE: 72 BPM | DIASTOLIC BLOOD PRESSURE: 70 MMHG | HEIGHT: 69 IN | SYSTOLIC BLOOD PRESSURE: 138 MMHG | OXYGEN SATURATION: 94 %

## 2021-03-16 DIAGNOSIS — I48.0 PAROXYSMAL ATRIAL FIBRILLATION (HCC): ICD-10-CM

## 2021-03-16 DIAGNOSIS — E11.59 HYPERTENSION ASSOCIATED WITH DIABETES (HCC): ICD-10-CM

## 2021-03-16 DIAGNOSIS — R97.20 ELEVATED PSA: ICD-10-CM

## 2021-03-16 DIAGNOSIS — I15.2 HYPERTENSION ASSOCIATED WITH DIABETES (HCC): ICD-10-CM

## 2021-03-16 DIAGNOSIS — I10 ESSENTIAL HYPERTENSION: ICD-10-CM

## 2021-03-16 DIAGNOSIS — Z79.4 TYPE 2 DIABETES MELLITUS WITH STAGE 3A CHRONIC KIDNEY DISEASE, WITH LONG-TERM CURRENT USE OF INSULIN (HCC): ICD-10-CM

## 2021-03-16 DIAGNOSIS — Z91.81 AT HIGH RISK FOR FALLS: ICD-10-CM

## 2021-03-16 DIAGNOSIS — M15.9 GENERALIZED OSTEOARTHRITIS: ICD-10-CM

## 2021-03-16 DIAGNOSIS — E03.9 ACQUIRED HYPOTHYROIDISM: ICD-10-CM

## 2021-03-16 DIAGNOSIS — M1A.30X0 CHRONIC GOUT DUE TO RENAL IMPAIRMENT WITHOUT TOPHUS, UNSPECIFIED SITE: ICD-10-CM

## 2021-03-16 DIAGNOSIS — E11.65 TYPE 2 DIABETES MELLITUS WITH HYPERGLYCEMIA, WITH LONG-TERM CURRENT USE OF INSULIN (HCC): Primary | ICD-10-CM

## 2021-03-16 DIAGNOSIS — Z79.4 TYPE 2 DIABETES MELLITUS WITH HYPERGLYCEMIA, WITH LONG-TERM CURRENT USE OF INSULIN (HCC): Primary | ICD-10-CM

## 2021-03-16 DIAGNOSIS — N18.32 STAGE 3B CHRONIC KIDNEY DISEASE (HCC): ICD-10-CM

## 2021-03-16 DIAGNOSIS — N18.31 TYPE 2 DIABETES MELLITUS WITH STAGE 3A CHRONIC KIDNEY DISEASE, WITH LONG-TERM CURRENT USE OF INSULIN (HCC): ICD-10-CM

## 2021-03-16 DIAGNOSIS — E11.22 TYPE 2 DIABETES MELLITUS WITH STAGE 3A CHRONIC KIDNEY DISEASE, WITH LONG-TERM CURRENT USE OF INSULIN (HCC): ICD-10-CM

## 2021-03-16 PROBLEM — B37.49 CANDIDA UTI: Status: RESOLVED | Noted: 2020-10-14 | Resolved: 2021-03-16

## 2021-03-16 PROBLEM — Z01.810 PRE-OPERATIVE CARDIOVASCULAR EXAMINATION: Status: RESOLVED | Noted: 2021-02-08 | Resolved: 2021-03-16

## 2021-03-16 PROBLEM — R53.1 GENERAL WEAKNESS: Status: RESOLVED | Noted: 2020-12-28 | Resolved: 2021-03-16

## 2021-03-16 PROBLEM — N39.0 ACUTE UTI (URINARY TRACT INFECTION): Status: RESOLVED | Noted: 2019-07-18 | Resolved: 2021-03-16

## 2021-03-16 PROBLEM — N39.0 URINARY TRACT INFECTION WITHOUT HEMATURIA: Status: RESOLVED | Noted: 2021-01-27 | Resolved: 2021-03-16

## 2021-03-16 PROCEDURE — 99214 OFFICE O/P EST MOD 30 MIN: CPT | Performed by: FAMILY MEDICINE

## 2021-03-16 RX ORDER — LOSARTAN POTASSIUM 25 MG/1
25 TABLET ORAL DAILY
Qty: 90 TABLET | Refills: 3 | Status: SHIPPED | OUTPATIENT
Start: 2021-03-16 | End: 2022-03-07 | Stop reason: SDUPTHER

## 2021-03-16 RX ORDER — GLIMEPIRIDE 2 MG/1
2 TABLET ORAL
Qty: 90 TABLET | Refills: 3 | Status: SHIPPED | OUTPATIENT
Start: 2021-03-16 | End: 2021-08-04

## 2021-03-16 RX ORDER — ACETAMINOPHEN 500 MG
1000 TABLET ORAL EVERY 6 HOURS PRN
COMMUNITY

## 2021-03-16 NOTE — PROGRESS NOTES
"Subjective    Forrest Zepeda is a 88 y.o. male here for:  Chief Complaint   Patient presents with   • Diabetes     Pt checks his sugars at home, denies any readings over 200.    • Establish Care     Pt would like to establish care with Dr. Wen today.        History per MA reviewed.    Sugars usually 110-125 fasting.   No hypoglycemia appreciated  Continues to see urology for infection, on antibiotic.         The following portions of the patient's history were reviewed and updated as appropriate: allergies, current medications, past family history, past medical history, past social history, past surgical history and problem list.    Review of Systems   Constitutional: Positive for activity change and fatigue. Negative for fever.   All other systems reviewed and are negative.      Visit Vitals  /70   Pulse 72   Temp 97.8 °F (36.6 °C) (Temporal)   Resp 18   Ht 175.3 cm (69.02\")   Wt 95.3 kg (210 lb 2 oz)   SpO2 94%   BMI 31.02 kg/m²         Objective   Physical Exam  Vitals and nursing note reviewed.   Constitutional:       General: He is not in acute distress.     Appearance: Normal appearance. He is well-developed and well-groomed. He is obese. He is not ill-appearing, toxic-appearing or diaphoretic.      Interventions: Face mask in place.   HENT:      Head: Normocephalic and atraumatic.      Right Ear: Hearing normal.      Left Ear: Hearing normal.   Eyes:      General: Lids are normal. No scleral icterus.     Extraocular Movements: Extraocular movements intact.   Cardiovascular:      Rate and Rhythm: Normal rate and regular rhythm.      Heart sounds: No murmur heard.     Pulmonary:      Effort: Pulmonary effort is normal.      Breath sounds: Normal breath sounds and air entry.   Skin:     Coloration: Skin is not jaundiced.   Neurological:      General: No focal deficit present.      Mental Status: He is alert and oriented to person, place, and time.   Psychiatric:         Attention and Perception: Attention " and perception normal.         Mood and Affect: Mood and affect normal.         Speech: Speech normal.         Behavior: Behavior normal. Behavior is cooperative.         Thought Content: Thought content normal.         Cognition and Memory: Cognition and memory normal.         Judgment: Judgment normal.         For medical decision making review of the following was required:  Lab Results   Component Value Date    HGBA1C 7.50 (H) 12/28/2020    HGBA1C 6.83 (H) 11/30/2020    HGBA1C 7.50 (H) 10/14/2020     Lab Results   Component Value Date    TSH 1.820 12/26/2020     Lab Results   Component Value Date    FREET4 1.10 04/16/2018         Assessment/Plan     Problem List Items Addressed This Visit        Cardiac and Vasculature    Essential hypertension    Relevant Medications    losartan (Cozaar) 25 MG tablet    Other Relevant Orders    CBC & Differential    Paroxysmal atrial fibrillation (CMS/HCC)    Relevant Medications    apixaban (ELIQUIS) 2.5 MG tablet tablet    Other Relevant Orders    CBC & Differential       Endocrine and Metabolic    Acquired hypothyroidism    Relevant Orders    CBC & Differential    TSH+Free T4    Type 2 diabetes mellitus with hyperglycemia, with long-term current use of insulin (CMS/[facility]) - Primary    Relevant Medications    glimepiride (Amaryl) 2 MG tablet    Other Relevant Orders    Hemoglobin A1c    Comprehensive Metabolic Panel       Genitourinary and Reproductive     CKD (chronic kidney disease) stage 3, GFR 30-59 ml/min    Relevant Orders    Comprehensive Metabolic Panel    CBC & Differential    Uric Acid    Vitamin D 25 Hydroxy    PTH, Intact    Calcium, Ionized       Musculoskeletal and Injuries    Generalized osteoarthritis    Relevant Medications    acetaminophen (TYLENOL) 500 MG tablet    Gout    Relevant Orders    Uric Acid       Other    Type 2 diabetes mellitus with stage 3a chronic kidney disease, with long-term current use of insulin (CMS/[facility])    Relevant Medications     glimepiride (Amaryl) 2 MG tablet    Other Relevant Orders    Hemoglobin A1c    Comprehensive Metabolic Panel      Other Visit Diagnoses     Hypertension associated with diabetes (CMS/HCC)        Relevant Medications    glimepiride (Amaryl) 2 MG tablet    losartan (Cozaar) 25 MG tablet    Other Relevant Orders    CBC & Differential    At high risk for falls        Elevated PSA        Relevant Orders    PSA DIAGNOSTIC          A1c is not due until March 28.  Daughter will bring her back for labs.  Reviewed chart, last PSA was elevated over 8, will recheck, may have been due to other urological issues.  Plan to follow-up in July for diabetes follow-up then he will need another A1c.    Return in about 4 months (around 7/19/2021) for Diabetes follow up.     Kary Wen MD

## 2021-03-16 NOTE — PATIENT INSTRUCTIONS

## 2021-03-22 ENCOUNTER — OFFICE VISIT (OUTPATIENT)
Dept: CARDIOLOGY | Facility: CLINIC | Age: 86
End: 2021-03-22

## 2021-03-22 VITALS
RESPIRATION RATE: 18 BRPM | BODY MASS INDEX: 31.31 KG/M2 | SYSTOLIC BLOOD PRESSURE: 148 MMHG | HEART RATE: 73 BPM | WEIGHT: 211.4 LBS | DIASTOLIC BLOOD PRESSURE: 64 MMHG | OXYGEN SATURATION: 94 % | HEIGHT: 69 IN

## 2021-03-22 DIAGNOSIS — Z79.4 TYPE 2 DIABETES MELLITUS WITH STAGE 3A CHRONIC KIDNEY DISEASE, WITH LONG-TERM CURRENT USE OF INSULIN (HCC): ICD-10-CM

## 2021-03-22 DIAGNOSIS — J84.112 IPF (IDIOPATHIC PULMONARY FIBROSIS) (HCC): ICD-10-CM

## 2021-03-22 DIAGNOSIS — Z95.0 CARDIAC PACEMAKER IN SITU: ICD-10-CM

## 2021-03-22 DIAGNOSIS — R06.09 DYSPNEA ON EXERTION: ICD-10-CM

## 2021-03-22 DIAGNOSIS — N18.31 TYPE 2 DIABETES MELLITUS WITH STAGE 3A CHRONIC KIDNEY DISEASE, WITH LONG-TERM CURRENT USE OF INSULIN (HCC): ICD-10-CM

## 2021-03-22 DIAGNOSIS — I10 ESSENTIAL HYPERTENSION: Primary | ICD-10-CM

## 2021-03-22 DIAGNOSIS — I48.0 PAROXYSMAL ATRIAL FIBRILLATION (HCC): ICD-10-CM

## 2021-03-22 DIAGNOSIS — I49.5 SINUS NODE DYSFUNCTION (HCC): ICD-10-CM

## 2021-03-22 DIAGNOSIS — E11.22 TYPE 2 DIABETES MELLITUS WITH STAGE 3A CHRONIC KIDNEY DISEASE, WITH LONG-TERM CURRENT USE OF INSULIN (HCC): ICD-10-CM

## 2021-03-22 PROCEDURE — 99213 OFFICE O/P EST LOW 20 MIN: CPT | Performed by: NURSE PRACTITIONER

## 2021-03-22 PROCEDURE — 93280 PM DEVICE PROGR EVAL DUAL: CPT | Performed by: INTERNAL MEDICINE

## 2021-03-24 ENCOUNTER — OFFICE VISIT (OUTPATIENT)
Dept: CARDIOLOGY | Facility: CLINIC | Age: 86
End: 2021-03-24

## 2021-03-24 ENCOUNTER — TELEPHONE (OUTPATIENT)
Dept: CARDIOLOGY | Facility: CLINIC | Age: 86
End: 2021-03-24

## 2021-03-24 VITALS
DIASTOLIC BLOOD PRESSURE: 68 MMHG | BODY MASS INDEX: 30.96 KG/M2 | SYSTOLIC BLOOD PRESSURE: 130 MMHG | HEART RATE: 75 BPM | OXYGEN SATURATION: 93 % | WEIGHT: 209 LBS | HEIGHT: 69 IN

## 2021-03-24 DIAGNOSIS — I49.5 SINUS NODE DYSFUNCTION (HCC): ICD-10-CM

## 2021-03-24 DIAGNOSIS — Z95.0 CARDIAC PACEMAKER IN SITU: ICD-10-CM

## 2021-03-24 DIAGNOSIS — J84.112 IPF (IDIOPATHIC PULMONARY FIBROSIS) (HCC): Primary | ICD-10-CM

## 2021-03-24 DIAGNOSIS — R06.09 DOE (DYSPNEA ON EXERTION): ICD-10-CM

## 2021-03-24 DIAGNOSIS — I48.0 PAROXYSMAL ATRIAL FIBRILLATION (HCC): ICD-10-CM

## 2021-03-24 DIAGNOSIS — I27.20 PULMONARY HYPERTENSION (HCC): ICD-10-CM

## 2021-03-24 DIAGNOSIS — J84.112 IPF (IDIOPATHIC PULMONARY FIBROSIS) (HCC): ICD-10-CM

## 2021-03-24 DIAGNOSIS — I10 ESSENTIAL HYPERTENSION: ICD-10-CM

## 2021-03-24 DIAGNOSIS — Z01.810 PRE-OPERATIVE CARDIOVASCULAR EXAMINATION: ICD-10-CM

## 2021-03-24 DIAGNOSIS — R94.39 ABNORMAL CARDIOVASCULAR STRESS TEST: Primary | ICD-10-CM

## 2021-03-24 DIAGNOSIS — I20.8 ANGINAL EQUIVALENT (HCC): ICD-10-CM

## 2021-03-24 PROBLEM — I20.89 ANGINAL EQUIVALENT: Status: ACTIVE | Noted: 2021-03-24

## 2021-03-24 PROCEDURE — 99214 OFFICE O/P EST MOD 30 MIN: CPT | Performed by: INTERNAL MEDICINE

## 2021-03-24 RX ORDER — ASPIRIN 81 MG/1
81 TABLET ORAL DAILY
Qty: 30 TABLET | Refills: 11 | Status: SHIPPED | OUTPATIENT
Start: 2021-03-24 | End: 2022-03-21 | Stop reason: SDUPTHER

## 2021-03-24 RX ORDER — ISOSORBIDE MONONITRATE 30 MG/1
30 TABLET, EXTENDED RELEASE ORAL EVERY MORNING
Qty: 90 TABLET | Refills: 3 | Status: SHIPPED | OUTPATIENT
Start: 2021-03-24 | End: 2022-03-21 | Stop reason: SDUPTHER

## 2021-03-24 RX ORDER — NITROGLYCERIN 0.4 MG/1
TABLET SUBLINGUAL
Qty: 100 TABLET | Refills: 11 | Status: SHIPPED | OUTPATIENT
Start: 2021-03-24

## 2021-03-24 RX ORDER — BISOPROLOL FUMARATE 5 MG/1
5 TABLET, FILM COATED ORAL DAILY
Qty: 30 TABLET | Refills: 11 | Status: SHIPPED | OUTPATIENT
Start: 2021-03-24 | End: 2022-03-21 | Stop reason: SDUPTHER

## 2021-03-24 RX ORDER — ALIROCUMAB 150 MG/ML
150 INJECTION, SOLUTION SUBCUTANEOUS
Qty: 6 PEN | Refills: 3 | Status: SHIPPED | OUTPATIENT
Start: 2021-03-24 | End: 2021-04-06

## 2021-03-24 NOTE — PROGRESS NOTES
"    Subjective:     Encounter Date:03/24/2021      Patient ID: Forrest Zepeda is a 88 y.o. male.    Chief Complaint: Shortness of breath with activity  HPI  This is an 88-year-old male patient who underwent outpatient cardiac evaluation for shortness of breath with activity.  The patient indicates that he has had a stable dyspnea pattern experiencing a sensation that he is \"breathing hard\" after climbing 1 flight of stairs.  His symptoms are relieved in less than 5 minutes.  He denies having chest discomfort.  He denies having exertional chest arm neck jaw shoulder back discomfort.  He denies orthopnea PND or lower extremity edema.  He denies shortness of breath at rest.  He has had no palpitations dizziness or syncope.  He is a non-smoker.  The patient underwent a vasodilator nuclear stress test which showed a small area of ischemia limited to the distal anterior wall and apex.  The patient also had an echocardiogram which showed a normal ejection fraction with no regional wall motion abnormalities.  The patient had borderline-mild concentric left ventricular hypertrophy, mild left atrial enlargement and grade 1 diastolic dysfunction consistent with hypertensive cardiac disease.  The patient was demonstrated to have mild secondary pulmonary hypertension with estimated right ventricular systolic pressures of approximately 45 mmHg.  There is no evidence of significant valvular heart disease or pericardial disease.  The patient has a history of idiopathic pulmonary fibrosis.  The following portions of the patient's history were reviewed and updated as appropriate: allergies, current medications, past family history, past medical history, past social history, past surgical history and problem  Review of Systems   Constitutional: Negative for chills, diaphoresis, fever, malaise/fatigue, weight gain and weight loss.   HENT: Negative for ear discharge, hearing loss, hoarse voice and nosebleeds.    Eyes: Negative for " discharge, double vision, pain and photophobia.   Cardiovascular: Positive for dyspnea on exertion. Negative for chest pain, claudication, cyanosis, irregular heartbeat, leg swelling, near-syncope, orthopnea, palpitations, paroxysmal nocturnal dyspnea and syncope.   Respiratory: Negative for cough, hemoptysis, shortness of breath, sputum production and wheezing.    Endocrine: Negative for cold intolerance, heat intolerance, polydipsia, polyphagia and polyuria.   Hematologic/Lymphatic: Negative for adenopathy and bleeding problem. Does not bruise/bleed easily.   Skin: Negative for color change, flushing, itching and rash.   Musculoskeletal: Negative for muscle cramps, muscle weakness, myalgias and stiffness.   Gastrointestinal: Negative for abdominal pain, diarrhea, hematemesis, hematochezia, nausea and vomiting.   Genitourinary: Negative for dysuria, frequency and nocturia.   Neurological: Negative for focal weakness, loss of balance, numbness, paresthesias and seizures.   Psychiatric/Behavioral: Negative for altered mental status, hallucinations and suicidal ideas.   Allergic/Immunologic: Negative for HIV exposure, hives and persistent infections.           Current Outpatient Medications:   •  acetaminophen (TYLENOL) 500 MG tablet, Take 500 mg by mouth Every 6 (Six) Hours As Needed for Mild Pain ., Disp: , Rfl:   •  allopurinol (ZYLOPRIM) 300 MG tablet, Take 1 tablet by mouth Every Night., Disp: 90 tablet, Rfl: 3  •  apixaban (ELIQUIS) 2.5 MG tablet tablet, Take 1 tablet by mouth 2 (Two) Times a Day., Disp: 180 tablet, Rfl: 3  •  clotrimazole (LOTRIMIN) 1 % cream, Apply  topically to the appropriate area as directed 2 (Two) Times a Day. (Patient taking differently: Apply 1 application topically to the appropriate area as directed As Needed.), Disp: 45 g, Rfl: 0  •  desoximetasone (TOPICORT) 0.25 % ointment, Apply  topically to the appropriate area as directed 2 (Two) Times a Day. (Patient taking differently: Apply  1 application topically to the appropriate area as directed 2 (Two) Times a Day As Needed.), Disp: 60 g, Rfl: 2  •  finasteride (PROSCAR) 5 MG tablet, Take 1 tablet by mouth Daily., Disp: 90 tablet, Rfl: 3  •  glimepiride (Amaryl) 2 MG tablet, Take 1 tablet by mouth Every Morning Before Breakfast., Disp: 90 tablet, Rfl: 3  •  Insulin Glargine, 1 Unit Dial, (Toujeo SoloStar) 300 UNIT/ML solution pen-injector injection, Inject 15 Units under the skin into the appropriate area as directed Daily., Disp: 3 pen, Rfl: 3  •  levothyroxine (SYNTHROID, LEVOTHROID) 50 MCG tablet, Take 1 tablet by mouth Daily., Disp: 90 tablet, Rfl: 3  •  losartan (Cozaar) 25 MG tablet, Take 1 tablet by mouth Daily., Disp: 90 tablet, Rfl: 3  •  Multiple Vitamins-Minerals (OCUVITE ADULT 50+ PO), Take 1 capsule by mouth Daily., Disp: , Rfl:   •  nitrofurantoin, macrocrystal-monohydrate, (MACROBID) 100 MG capsule, Take 1 capsule by mouth Daily., Disp: 30 capsule, Rfl: 11  •  NOVOFINE 32G X 6 MM misc, USE ONE DAILY, Disp: 100 each, Rfl: 3  •  polyethylene glycol (MIRALAX) pack packet, Take 17 g by mouth Daily As Needed., Disp: , Rfl:   •  Probiotic Product (Risaquad-2) capsule capsule, Take 1 capsule by mouth Daily., Disp: , Rfl:   •  silodosin (RAPAFLO) 8 MG capsule capsule, Take 1 capsule by mouth Daily With Breakfast., Disp: 90 capsule, Rfl: 3  •  SITagliptin (JANUVIA) 100 MG tablet, Take 1 tablet by mouth Daily., Disp: 90 tablet, Rfl: 3  •  True Metrix Blood Glucose Test test strip, USE TO TEST BLOOD SUGAR ONCE DAILY, Disp: 100 each, Rfl: 5  •  Alirocumab (Praluent) 150 MG/ML solution auto-injector, Inject 1 mL under the skin into the appropriate area as directed Every 14 (Fourteen) Days., Disp: 6 pen, Rfl: 3  •  aspirin (aspirin) 81 MG EC tablet, Take 1 tablet by mouth Daily., Disp: 30 tablet, Rfl: 11  •  bisoprolol (ZEBeta) 5 MG tablet, Take 1 tablet by mouth Daily., Disp: 30 tablet, Rfl: 11  •  isosorbide mononitrate (IMDUR) 30 MG 24 hr  "tablet, Take 1 tablet by mouth Every Morning., Disp: 90 tablet, Rfl: 3  •  nitroglycerin (NITROSTAT) 0.4 MG SL tablet, 1 under the tongue as needed for angina, may repeat q5mins for up three doses, Disp: 100 tablet, Rfl: 11    Objective:   Vitals and nursing note reviewed.   Constitutional:       Appearance: Healthy appearance. Not in distress.   Neck:      Vascular: No JVR. JVD normal.   Pulmonary:      Effort: Pulmonary effort is normal.      Breath sounds: Examination of the right-upper field reveals rales. Examination of the left-upper field reveals rales. Examination of the right-middle field reveals rales. Examination of the left-middle field reveals rales. Examination of the right-lower field reveals rales. Examination of the left-lower field reveals rales. No wheezing. No rhonchi. Rales present.      Comments: Fine, \"Velcro-type\" crackles in all lung fields, bilaterally.  Chest:      Chest wall: Not tender to palpatation.   Cardiovascular:      PMI at left midclavicular line. Normal rate. Regular rhythm. Normal S1. Normal S2.      Murmurs: There is no murmur.      No gallop. No click. No rub.   Pulses:     Intact distal pulses.   Edema:     Peripheral edema absent.   Abdominal:      General: Bowel sounds are normal.      Palpations: Abdomen is soft.      Tenderness: There is no abdominal tenderness.   Musculoskeletal: Normal range of motion.         General: No tenderness. Skin:     General: Skin is warm and dry.   Neurological:      General: No focal deficit present.      Mental Status: Alert and oriented to person, place and time.       Blood pressure 130/68, pulse 75, height 175.3 cm (69\"), weight 94.8 kg (209 lb), SpO2 93 %.   Lab Review:     Assessment:       1. Abnormal cardiovascular stress test  Vasodilator nuclear stress test shows a small ischemic burden limited to the distal LAD territory.  No high risk features.    2. Anginal equivalent (CMS/HCC)  Shortness of breath with activity that is " relieved with rest is typical of the diagnosis of angina pectoris.  In light of the results of his stress test, this is a likely contributing etiology.  The patient's overall angina pattern has been stable.    3. LEHMAN (dyspnea on exertion)  Multifactorial.  There appears to be an element of chronic stable angina pectoris in addition to pulmonary fibrosis and mild secondary pulmonary hypertension.    4. Essential hypertension  Acceptable blood pressure control.    5. Sinus node dysfunction (CMS/HCC)  Normal pacemaker function.    6. Paroxysmal atrial fibrillation (CMS/HCC)  No evidence of recurrent arrhythmia.  Rate control and anticoagulation strategy.  The patient has had no bleeding complications related to anticoagulation therapy with reduced dose Eliquis.  Dose reduction due to age and chronic renal insufficiency.    7. Pre-operative cardiovascular examination  The patient may proceed with elective urologic surgery at acceptable risk of a cardiovascular complication.  The low risk abnormal nuclear stress test should not affect his overall surgical risk to an appreciable degree.    8. IPF (idiopathic pulmonary fibrosis) (CMS/HCC)  The patient is not under the care of a pulmonologist but should be.    9. 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  This is primarily due to underlying interstitial lung disease with pulmonary fibrosis.    Procedures    Plan:     Strictly speaking from a cardiovascular standpoint, the patient may proceed with elective urologic surgery with an acceptable risk of cardiovascular complication.  I have had a patient level discussion with Mr. Zepeda and his son regarding the option of coronary angiography with interventional standby.  The procedure has been explained in detail to the patient including risks benefits and alternatives.  The patient indicates that he does not desire any invasive cardiovascular procedures at this time.  He is  "however interested in medical antianginal therapy.  I have recommended the patient start an enteric-coated baby aspirin once per day in combination with Eliquis therapy.  The patient has been advised that he should be on cholesterol-lowering therapy.  He indicates that he will not consider taking statin based cholesterol-lowering therapies as he has tried multiple different statins in the past including but not limited to Zocor, Pravachol, Lipitor and Crestor.  All have resulted in severe \"crippling\" myalgias.  The patient is agreeable to try a PCSK9 inhibitor.  I have recommended Praluent 150 mg subcutaneously every 2 weeks.  I have recommended starting bisoprolol 5 mg orally once per day for antianginal purposes.  Bradycardia will not be a limiting issue as he has a normally functioning underlying permanent pacemaker.  I have recommended starting Imdur 30 mg orally once in the morning.  He is also been given a prescription for sublingual nitroglycerin to use on an as-needed basis for dyspnea that does not improve after resting for 5-10 minutes.  The patient has been advised that the sublingual nitroglycerin is only to be used on an as-needed basis.  He has been given specific instructions on appropriate use.  The patient has been advised that the long-acting oral nitroglycerin therapy should be taken every morning regardless of the presence or absence of symptoms.  If the patient's symptoms do not improve or he develops severe headaches with nitroglycerin therapy, we we will consider the addition of ranolazine.  The patient has been advised that he should be under the care of a pulmonologist given his underlying lung disease.  The decision to refer to pulmonology will be deferred to his primary care provider.      "

## 2021-03-24 NOTE — TELEPHONE ENCOUNTER
Dr. Saha, you saw this patient last month regarding cardiac clearance for exchange of urology stent.  He did have an abnormal stress test and you are seeing him today for follow-up.  You did note that he has a recent diagnosis of idiopathic pulmonary fibrosis.  I was unable to determine if he had started seeing a pulmonologist, as you had advised in your note.  Will you please follow-up with him about this today ?  Please let me know if you want me to put in a referral or do anything.  Thanks

## 2021-03-24 NOTE — PROGRESS NOTES
[facility] Cardiology Office Follow Up Note    Forrest Zepeda  [id number]  03/22/2021    Primary Care Provider: Kary Wen MD   Referring Provider: No ref. provider found    Chief Complaint: Routine follow-up    History of Present Illness:   Mr. Forrest Zepeda is a 88 y.o. male who presents to the Cardiology Clinic for follow-up after cardiac testing.  He has a past medical history of hypertension, paroxysmal atrial fibrillation, pulmonary embolism, hypothyroidism, type 2 diabetes, and chronic kidney disease.  He was last seen in this office by Dr. Saha on 2/8/2021.  At that time, the patient was recently diagnosed with sinus node dysfunction and severe symptomatic bradycardia.  He had underwent implantation of a dual-chamber rate responsive pacemaker-[employer] by one of the electrophysiologist at [facility].  The patient indicated to Dr. Saha that he is no longer able to travel back and forth to the [place] for pacemaker follow-up.  The patient also has a history of multiple urologic abnormalities and is requiring further urologic intervention (changing out a stent).  The patient reported progressively worsening shortness of breath both at rest and with activity.  He had no orthopnea PND or lower extremity edema.  He had been diagnosed with advanced pulmonary fibrosis.  He required no oxygen therapy.  He had no chest discomfort.  He reported to Dr. Saha that his shortness of breath occurred with activities of daily living including bathing and dressing.  At that time there was a growing concern on the part of his primary care provider that this may be due to ischemic heart disease.  A review of records showed that he had not had an ischemic evaluation in over 10 years.  He has no history of documented congestive heart failure.  The patient is on anticoagulation therapy with inappropriate dosing for Eliquis.  The patient only meets 1 of 3 criteria  for reduced dose of Eliquis therapy (age).  His serum creatinine is 1.3 and his body weight is far above 60 kg.  He has had no signs or symptoms to suggest stroke, TIA or other cardioembolic event.  He has had no bleeding complications related to Eliquis therapy.  The patient's daughter indicated that he had previously worn a cardiac monitor in which he apparently had one episode of atrial fibrillation and one episode of ventricular tachycardia.  The duration of both of these events were unknown.  Dr. Saha noted this information was of importance as nonsustained brief paroxysms of atrial fibrillation would not necessitate lifelong anticoagulation therapy.  A stress test and echocardiogram were ordered as part of his ischemic evaluation.  Today, the patient presents with this daughter for follow-up of these results.  The daughter states that they are needing pre-operative clearance for exchange of urology stent today.  Patient denies any change in his status since is last visit, but continues to report ongoing fatigue and shortness of breath with activity.  He specifically denies chest pain, palpitations, orthopnea, PND, and syncope.  He is tolerating his medication without any blood/bleeding problems or perceived side effects.  No other complaints or concern at this time.    Past Cardiac Testin. Last Coronary Angio: N/A  2. Prior Stress Testing: 3/4/2021 Stress Test With Myocardial Perfusion Two Day (2021 08:47)  1. No ECG evidence of myocardial ischemia.  2. Positive clinical evidence of myocardial ischemia.   3. Findings consistent with an abnormal ECG stress test.  4. Myocardial perfusion imaging indicates a medium-to-large-sized, severe area of ischemia located in the anterior wall, apex and septal wall.   5. Impressions are consistent with an intermediate risk study.  3. Last Echo: 2021 Adult Transthoracic Echo Complete W/ Cont if Necessary Per Protocol (2021 12:56)  · Left  ventricular wall thickness is consistent with mild concentric hypertrophy.  · Estimated left ventricular EF = 64% Left ventricular ejection fraction appears to be 61 - 65%. Left ventricular systolic function is normal.  · Left ventricular diastolic function is consistent with (grade I) impaired relaxation.  · The right ventricular cavity is mildly dilated.  · The right atrial cavity is mildly dilated.  · There is severe calcification of the aortic valve mainly affecting the non-coronary, left coronary and right coronary cusp(s).  · Estimated right ventricular systolic pressure from tricuspid regurgitation is mildly elevated (35-45 mmHg).  · Mild pulmonary hypertension is present.  4. Prior Holter Monitor: 12/4/2020 SCANNED - HOLTER MONITOR (12/14/2020)      Review of Systems:   Review of Systems   Constitutional: Positive for fatigue. Negative for activity change, chills, diaphoresis, fever and unexpected weight gain.   Eyes: Negative for blurred vision and visual disturbance.   Respiratory: Positive for shortness of breath. Negative for apnea, cough, chest tightness and wheezing.    Cardiovascular: Negative for chest pain, palpitations and leg swelling.   Gastrointestinal: Negative for abdominal distention, blood in stool, GERD and indigestion.   Endocrine: Negative for cold intolerance and heat intolerance.   Genitourinary: Negative for hematuria.   Musculoskeletal: Negative for gait problem, joint swelling and myalgias.   Skin: Negative for color change, pallor and bruise.   Neurological: Negative for dizziness, seizures, syncope, weakness, light-headedness, numbness, headache and confusion.   Hematological: Does not bruise/bleed easily.   Psychiatric/Behavioral: Negative for behavioral problems, sleep disturbance, suicidal ideas and depressed mood.     I have reviewed and confirmed the accuracy of the ROS as documented by the MA/LPN/RN JOYCE Mercer    I have reviewed and/or updated the patient's  past medical, past surgical, family, social history, problem list and allergies as appropriate.     Medications:     Current Outpatient Medications:   •  acetaminophen (TYLENOL) 500 MG tablet, Take 500 mg by mouth Every 6 (Six) Hours As Needed for Mild Pain ., Disp: , Rfl:   •  allopurinol (ZYLOPRIM) 300 MG tablet, Take 1 tablet by mouth Every Night., Disp: 90 tablet, Rfl: 3  •  apixaban (ELIQUIS) 2.5 MG tablet tablet, Take 1 tablet by mouth 2 (Two) Times a Day., Disp: 180 tablet, Rfl: 3  •  clotrimazole (LOTRIMIN) 1 % cream, Apply  topically to the appropriate area as directed 2 (Two) Times a Day. (Patient taking differently: Apply 1 application topically to the appropriate area as directed As Needed.), Disp: 45 g, Rfl: 0  •  desoximetasone (TOPICORT) 0.25 % ointment, Apply  topically to the appropriate area as directed 2 (Two) Times a Day. (Patient taking differently: Apply 1 application topically to the appropriate area as directed 2 (Two) Times a Day As Needed.), Disp: 60 g, Rfl: 2  •  finasteride (PROSCAR) 5 MG tablet, Take 1 tablet by mouth Daily., Disp: 90 tablet, Rfl: 3  •  glimepiride (Amaryl) 2 MG tablet, Take 1 tablet by mouth Every Morning Before Breakfast., Disp: 90 tablet, Rfl: 3  •  Insulin Glargine, 1 Unit Dial, (Toujeo SoloStar) 300 UNIT/ML solution pen-injector injection, Inject 15 Units under the skin into the appropriate area as directed Daily., Disp: 3 pen, Rfl: 3  •  levothyroxine (SYNTHROID, LEVOTHROID) 50 MCG tablet, Take 1 tablet by mouth Daily., Disp: 90 tablet, Rfl: 3  •  losartan (Cozaar) 25 MG tablet, Take 1 tablet by mouth Daily., Disp: 90 tablet, Rfl: 3  •  Multiple Vitamins-Minerals (OCUVITE ADULT 50+ PO), Take 1 capsule by mouth Daily., Disp: , Rfl:   •  nitrofurantoin, macrocrystal-monohydrate, (MACROBID) 100 MG capsule, Take 1 capsule by mouth Daily., Disp: 30 capsule, Rfl: 11  •  NOVOFINE 32G X 6 MM misc, USE ONE DAILY, Disp: 100 each, Rfl: 3  •  polyethylene glycol (MIRALAX) pack  "packet, Take 17 g by mouth Daily As Needed., Disp: , Rfl:   •  Probiotic Product (Risaquad-2) capsule capsule, Take 1 capsule by mouth Daily., Disp: , Rfl:   •  silodosin (RAPAFLO) 8 MG capsule capsule, Take 1 capsule by mouth Daily With Breakfast., Disp: 90 capsule, Rfl: 3  •  SITagliptin (JANUVIA) 100 MG tablet, Take 1 tablet by mouth Daily., Disp: 90 tablet, Rfl: 3  •  True Metrix Blood Glucose Test test strip, USE TO TEST BLOOD SUGAR ONCE DAILY, Disp: 100 each, Rfl: 5    Physical Exam:  Vital Signs:   Vitals:    03/22/21 0949   BP: 148/64   BP Location: Left arm   Patient Position: Sitting   Cuff Size: Adult   Pulse: 73   Resp: 18   SpO2: 94%   Weight: 95.9 kg (211 lb 6.4 oz)   Height: 175.3 cm (69.02\")     Body mass index is 31.2 kg/m².    Physical Exam  Vitals and nursing note reviewed.   Constitutional:       General: He is not in acute distress.     Appearance: Normal appearance. He is well-developed. He is obese.   HENT:      Head: Normocephalic and atraumatic.      Mouth/Throat:      Mouth: Mucous membranes are moist.   Eyes:      General: No scleral icterus.     Extraocular Movements: Extraocular movements intact.   Neck:      Trachea: Trachea normal.   Cardiovascular:      Rate and Rhythm: Normal rate and regular rhythm.      Pulses: Normal pulses.      Heart sounds: Normal heart sounds. No murmur heard.   No friction rub. No gallop.    Pulmonary:      Effort: Pulmonary effort is normal.      Breath sounds: Normal breath sounds.   Abdominal:      Palpations: Abdomen is soft.      Tenderness: There is no abdominal tenderness.   Musculoskeletal:         General: Normal range of motion.      Cervical back: Neck supple.      Right lower leg: No edema.      Left lower leg: No edema.   Skin:     General: Skin is warm and dry.      Findings: No bruising, lesion or rash.   Neurological:      Mental Status: He is alert and oriented to person, place, and time.      Motor: No weakness.      Gait: Gait normal. "   Psychiatric:         Mood and Affect: Mood normal.         Behavior: Behavior normal. Behavior is cooperative.         Thought Content: Thought content does not include suicidal ideation.         Results Review:   I reviewed the patient's new clinical results.    Procedures    DEVICE INTERROGATION    Interrogation date: 3/22/2021  Implant date: 2020  Device type:[employer] PPM, Accolade MRI, model number L311  Mode: DDDR    RA pacin%,  P wave is 1.5 mV with a threshold of 0.9 V at 0.4 msec and an impedance of 584 ohms.  RV pacin%. R wave is 12.6 mV with a threshold of 0.6 V at 0.4 msec and an impedance of 760 ohms.    Time to JEREMY is 8.5 years.     Summary:  25 ATR, AT/AF 6%      Assessment / Plan:   Diagnoses and all orders for this visit:    1. Essential hypertension (Primary)  -Progressive elevation of blood pressure     2. Sinus node dysfunction (CMS/HCC)  -Functioning pacemaker with interrogation completed today    3. Paroxysmal atrial fibrillation (CMS/HCC)  -CHADSVASC 5, HASBLED 3  -Continue Eliquis for embolic prophylaxis    4. Type 2 diabetes mellitus with stage 3a chronic kidney disease, with long-term current use of insulin (CMS/HCC)  -Closely managed by primary care provider    5. Dyspnea on exertion  -Ongoing  -Abnormal stress test with medium-to-large-sized, severe area of ischemia located in the anterior wall, apex and septal wall    6. IPF (idiopathic pulmonary fibrosis) (CMS/HCC)  -Unknown if patient has a pulmonologist at this point  -Will alert Dr. Saha to this and put in a referral, if needed    7. Pre-operative cardiac examination  -Cancel any pending surgery until patient has met with Dr. Saha    Follow Up:   Schedule for follow-up with Dr. Saha to discuss abnormal stress test results, plan.    Thank you for allowing me to participate in the care of your patient. Please to not hesitate to contact me with additional questions or concerns.     Helen ROMANO  JOYCE Cui

## 2021-03-24 NOTE — TELEPHONE ENCOUNTER
Anyone with the diagnosis of pulmonary fibrosis should be under the care of a pulmonologist long-term.

## 2021-03-31 ENCOUNTER — IMMUNIZATION (OUTPATIENT)
Dept: VACCINE CLINIC | Facility: HOSPITAL | Age: 86
End: 2021-03-31

## 2021-03-31 PROCEDURE — 91300 HC SARSCOV02 VAC 30MCG/0.3ML IM: CPT | Performed by: INTERNAL MEDICINE

## 2021-03-31 PROCEDURE — 0002A: CPT | Performed by: INTERNAL MEDICINE

## 2021-04-06 DIAGNOSIS — E78.1 HYPERTRIGLYCERIDEMIA: Primary | ICD-10-CM

## 2021-04-29 ENCOUNTER — TELEPHONE (OUTPATIENT)
Dept: CARDIOLOGY | Facility: CLINIC | Age: 86
End: 2021-04-29

## 2021-04-29 NOTE — TELEPHONE ENCOUNTER
Per recent EMR records, pt is now receiving his cardiac care with Dr. Saha. I spoke with the patient's son to ask about pacemaker remote home monitoring. Pt's son states Dr. Saha is to follow remote monitoring .     Emailed Danae Salazar asking her to transfer patient to Dr. Saha's remote monitor clinic in [employer] web site.

## 2021-05-10 ENCOUNTER — TELEPHONE (OUTPATIENT)
Dept: CARDIOLOGY | Facility: CLINIC | Age: 86
End: 2021-05-10

## 2021-05-29 DIAGNOSIS — N13.8 BPH WITH URINARY OBSTRUCTION: ICD-10-CM

## 2021-05-29 DIAGNOSIS — N40.1 BPH WITH URINARY OBSTRUCTION: ICD-10-CM

## 2021-05-29 RX ORDER — FINASTERIDE 5 MG/1
TABLET, FILM COATED ORAL
Qty: 90 TABLET | Refills: 2 | OUTPATIENT
Start: 2021-05-29

## 2021-06-01 DIAGNOSIS — N40.1 BPH WITH URINARY OBSTRUCTION: ICD-10-CM

## 2021-06-01 DIAGNOSIS — N13.8 BPH WITH URINARY OBSTRUCTION: ICD-10-CM

## 2021-06-02 RX ORDER — FINASTERIDE 5 MG/1
5 TABLET, FILM COATED ORAL DAILY
Qty: 90 TABLET | Refills: 3 | Status: SHIPPED | OUTPATIENT
Start: 2021-06-02 | End: 2022-06-04

## 2021-06-03 ENCOUNTER — TELEPHONE (OUTPATIENT)
Dept: CARDIOLOGY | Facility: CLINIC | Age: 86
End: 2021-06-03

## 2021-06-03 NOTE — TELEPHONE ENCOUNTER
Spoke with patients dewey Erika. States patient has previously had this procedure done and when he resumed blood thinners the day after, he had a bleed. When this happened, patient held blood thinners for an extended 48 hours and the hematura subsided. Also patient recently had a fall and had a large hematoma to the rt thigh. Due to the fall patients doctor had him stop blood thinners x5 days. He just resumed them on 5/24/21. I just wanted to let you know this information to see if you wanted to make any adjustments.  Please advise.  Thanks!

## 2021-06-03 NOTE — TELEPHONE ENCOUNTER
----- Message from Leidy Yeager MA sent at 6/3/2021 12:46 PM EDT -----  Regarding: FW: Prescription Question  Contact: [phone]    ----- Message -----  From: Forrest Zepeda  Sent: 6/3/2021  12:26 PM EDT  To: [facility]  Subject: Prescription Question                            I was needing to asked about Eliquis and aspirin. Bernardo is having his kidney stent changed on June 11th at . When should he come off these medications and how long before I restart them.     Thanks  Erika Trujillo

## 2021-06-03 NOTE — TELEPHONE ENCOUNTER
Please let the patient know that he can stop Eliquis and aspirin 48 hours prior to his surgery and resume both the day after.

## 2021-06-04 NOTE — TELEPHONE ENCOUNTER
It is not uncommon to have some blood in the urine after uretal stent placement.  Also, that medication is being used to treat underlying coronary artery disease as well.  No changes in medication orders at this time.  Will you let his family know?  Thanks!

## 2021-06-08 ENCOUNTER — LAB REQUISITION (OUTPATIENT)
Dept: LAB | Facility: HOSPITAL | Age: 86
End: 2021-06-08

## 2021-06-08 DIAGNOSIS — N13.30 UNSPECIFIED HYDRONEPHROSIS: ICD-10-CM

## 2021-06-08 LAB — SARS-COV-2 RNA NOSE QL NAA+PROBE: NOT DETECTED

## 2021-06-08 PROCEDURE — U0004 COV-19 TEST NON-CDC HGH THRU: HCPCS | Performed by: UROLOGY

## 2021-06-08 NOTE — TELEPHONE ENCOUNTER
Spoke with patients granddaughter, Erika. Information was given and understood. Advised to call if patient has complications after procedure.

## 2021-06-23 ENCOUNTER — OFFICE VISIT (OUTPATIENT)
Dept: CARDIOLOGY | Facility: CLINIC | Age: 86
End: 2021-06-23

## 2021-06-23 VITALS
OXYGEN SATURATION: 97 % | DIASTOLIC BLOOD PRESSURE: 62 MMHG | BODY MASS INDEX: 31.99 KG/M2 | SYSTOLIC BLOOD PRESSURE: 104 MMHG | HEIGHT: 69 IN | WEIGHT: 216 LBS | RESPIRATION RATE: 18 BRPM | HEART RATE: 60 BPM

## 2021-06-23 DIAGNOSIS — N18.30 STAGE 3 CHRONIC KIDNEY DISEASE, UNSPECIFIED WHETHER STAGE 3A OR 3B CKD (HCC): ICD-10-CM

## 2021-06-23 DIAGNOSIS — Z79.4 TYPE 2 DIABETES MELLITUS WITH HYPERGLYCEMIA, WITH LONG-TERM CURRENT USE OF INSULIN (HCC): ICD-10-CM

## 2021-06-23 DIAGNOSIS — E11.65 TYPE 2 DIABETES MELLITUS WITH HYPERGLYCEMIA, WITH LONG-TERM CURRENT USE OF INSULIN (HCC): ICD-10-CM

## 2021-06-23 DIAGNOSIS — E78.1 HYPERTRIGLYCERIDEMIA: ICD-10-CM

## 2021-06-23 DIAGNOSIS — I48.0 PAROXYSMAL ATRIAL FIBRILLATION (HCC): Primary | ICD-10-CM

## 2021-06-23 DIAGNOSIS — I10 ESSENTIAL HYPERTENSION: ICD-10-CM

## 2021-06-23 DIAGNOSIS — Z86.711 HISTORY OF PULMONARY EMBOLISM: ICD-10-CM

## 2021-06-23 PROCEDURE — 99213 OFFICE O/P EST LOW 20 MIN: CPT | Performed by: NURSE PRACTITIONER

## 2021-06-23 RX ORDER — SILODOSIN 8 MG/1
CAPSULE ORAL
COMMUNITY
End: 2021-08-16

## 2021-06-23 NOTE — PROGRESS NOTES
[facility] Cardiology Office Follow Up Note    Forrest Zepeda  [id number]  06/23/2021    Primary Care Provider: Kary Wen MD   Referring Provider: No ref. provider found    Chief Complaint: Regular follow-up    History of Present Illness:   Mr. Forrest Zepeda is a 89 y.o. male who presents to the Cardiology Clinic for regular follow-up.  The patient has a past medical history of sinus node dysfunction with subsequent pacemaker placement, hypertension, hyperlipidemia, paroxysmal atrial fibrillation, and pulmonary embolism.  Additional pertinent history includes type II by diabetes, chronic kidney disease, and diabetic neuropathy.  At his last cardiology clinic appointment, he was seeking preoperative cardiac clearance for urinary stent procedure.  At that time the patient had reported progressively worsening of shortness of breath both at rest and activity.  He was referred to Dr. Grossman for advanced pulmonary fibrosis, but when our office called to schedule him for an appointment, the patient told our staff that he had already seen a pulmonologist in [place] and did not want to see another provider..  His shortness of breath was noted to occur with activities of daily living such as bathing and dressing.  He underwent an echocardiogram (normal EF 64%, but diastolic dysfunction, calcified aortic valve and mild pulmonary hypertension) and pharmacologic nuclear stress test which was found to be abnormal and consistent with an intermediate risk study.  The patient indicated that he wished to defer invasive cardiovascular procedures in favor of medical management.  A previous cardiac monitor study revealed 1 episode of atrial fibrillation and one episode of ventricular tachycardia.  Duration of both of these events were unknown.  The patient was recently started on daily aspirin in addition to Eliquis.  He was started on a PE C SK 9 inhibitor due to severe myalgias with multiple other  "statins.  He was started on bisoprolol as an antianginal.  Was also started on Imdur 30 mg once daily and given a prescription for sublingual nitroglycerin to use on an as-needed basis for dyspnea that did not improve after resting for 5 to 10 minutes.  He presents today for regular follow-up.  The patient reports feeling okay today.  He denies chest pain and pain of any kind, for that matter.  He does, however, report of shortness of dyspnea on exertion, but notes this is about the same or little worse than when he was last here.  He has self discontinued Repatha and is no longer taking Crestor.  He specifically states that he saw a pulmonologist in [place] who told him that he received a \"false\" diagnosis of pulmonary fibrosis.  He does not recall the name of the pulmonologist.  He reports having to use a cane for mobility but denies any other health issues at this time.  He is taking the rest of his prescribed medication without perceived side effects.  He proceeded with the exchange of urinary stent without any complication.  He does note, however, that he had bright red blood for approximately 3 days with complete resolution yesterday.  He did not call his urologist in [place].  Outside of this he denies any significant bruising or bleeding. No other complaints or concerns at this time.    Past Cardiac Testin. Last Coronary Angio: None  2. Prior Stress Testing: 3/4/2021   1. No ECG evidence of myocardial ischemia. Positive clinical evidence of myocardial ischemia. Findings consistent with an abnormal ECG stress test.   2. Myocardial perfusion imaging indicates a medium-to-large-sized, severe area of ischemia located in the anterior wall, apex and septal wall.    3. Impressions are consistent with an intermediate risk study.  3. Last Echo: 2021   1. Left ventricular wall thickness is consistent with mild concentric hypertrophy.   2. Estimated left ventricular EF = 64% Left ventricular ejection " fraction appears to be 61 - 65%. Left ventricular systolic function is normal.   3. Left ventricular diastolic function is consistent with (grade I) impaired relaxation.   4. The right ventricular cavity is mildly dilated.   5. The right atrial cavity is mildly dilated.   6. There is severe calcification of the aortic valve mainly affecting the non-coronary, left coronary and right coronary cusp(s).   7. Estimated right ventricular systolic pressure from tricuspid regurgitation is mildly elevated (35-45 mmHg).   8. Mild pulmonary hypertension is present.  4. Prior Holter Monitor: [facility] Tele[facility] (12/04/2020 15:59)   1.  Sinus bradycardia   2.  Paroxysmal atrial fibrillation    Review of Systems:   Review of Systems   Constitutional: Positive for fatigue. Negative for activity change, chills, diaphoresis, fever and unexpected weight gain.   Eyes: Negative for blurred vision and visual disturbance.   Respiratory: Positive for shortness of breath. Negative for apnea, cough, chest tightness and wheezing.    Cardiovascular: Negative for chest pain, palpitations and leg swelling.   Gastrointestinal: Negative for abdominal distention, blood in stool, GERD and indigestion.   Endocrine: Negative for cold intolerance and heat intolerance.   Genitourinary: Negative for hematuria.   Musculoskeletal: Negative for gait problem, joint swelling and myalgias.   Skin: Negative for color change, pallor and bruise.   Neurological: Negative for dizziness, seizures, syncope, weakness, light-headedness, numbness, headache and confusion.   Hematological: Does not bruise/bleed easily.   Psychiatric/Behavioral: Negative for behavioral problems, sleep disturbance, suicidal ideas and depressed mood.     I have reviewed and confirmed the accuracy of the ROS as documented by the MA/ALIYA/RN JOYCE Mercer    I have reviewed and/or updated the patient's past medical, past surgical, family, social history, problem list  and allergies as appropriate.     Medications:     Current Outpatient Medications:   •  acetaminophen (TYLENOL) 500 MG tablet, Take 500 mg by mouth Every 6 (Six) Hours As Needed for Mild Pain ., Disp: , Rfl:   •  allopurinol (ZYLOPRIM) 300 MG tablet, Take 1 tablet by mouth Every Night., Disp: 90 tablet, Rfl: 3  •  apixaban (ELIQUIS) 2.5 MG tablet tablet, Take 1 tablet by mouth 2 (Two) Times a Day., Disp: 180 tablet, Rfl: 3  •  aspirin (aspirin) 81 MG EC tablet, Take 1 tablet by mouth Daily., Disp: 30 tablet, Rfl: 11  •  bisoprolol (ZEBeta) 5 MG tablet, Take 1 tablet by mouth Daily., Disp: 30 tablet, Rfl: 11  •  clotrimazole (LOTRIMIN) 1 % cream, Apply  topically to the appropriate area as directed 2 (Two) Times a Day. (Patient taking differently: Apply 1 application topically to the appropriate area as directed As Needed.), Disp: 45 g, Rfl: 0  •  desoximetasone (TOPICORT) 0.25 % ointment, Apply  topically to the appropriate area as directed 2 (Two) Times a Day. (Patient taking differently: Apply 1 application topically to the appropriate area as directed 2 (Two) Times a Day As Needed.), Disp: 60 g, Rfl: 2  •  finasteride (PROSCAR) 5 MG tablet, Take 1 tablet by mouth Daily., Disp: 90 tablet, Rfl: 3  •  glimepiride (Amaryl) 2 MG tablet, Take 1 tablet by mouth Every Morning Before Breakfast., Disp: 90 tablet, Rfl: 3  •  Insulin Glargine, 1 Unit Dial, (Toujeo SoloStar) 300 UNIT/ML solution pen-injector injection, Inject 15 Units under the skin into the appropriate area as directed Daily., Disp: 3 pen, Rfl: 3  •  isosorbide mononitrate (IMDUR) 30 MG 24 hr tablet, Take 1 tablet by mouth Every Morning., Disp: 90 tablet, Rfl: 3  •  levothyroxine (SYNTHROID, LEVOTHROID) 50 MCG tablet, Take 1 tablet by mouth Daily., Disp: 90 tablet, Rfl: 3  •  losartan (Cozaar) 25 MG tablet, Take 1 tablet by mouth Daily., Disp: 90 tablet, Rfl: 3  •  Multiple Vitamins-Minerals (OCUVITE ADULT 50+ PO), Take 1 capsule by mouth Daily., Disp: ,  "Rfl:   •  nitrofurantoin, macrocrystal-monohydrate, (MACROBID) 100 MG capsule, Take 1 capsule by mouth Daily., Disp: 30 capsule, Rfl: 11  •  nitroglycerin (NITROSTAT) 0.4 MG SL tablet, 1 under the tongue as needed for angina, may repeat q5mins for up three doses, Disp: 100 tablet, Rfl: 11  •  Probiotic Product (Risaquad-2) capsule capsule, Take 1 capsule by mouth Daily., Disp: , Rfl:   •  silodosin (Rapaflo) 8 MG capsule capsule, Take  by mouth Daily With Breakfast., Disp: , Rfl:   •  SITagliptin (JANUVIA) 100 MG tablet, Take 1 tablet by mouth Daily., Disp: 90 tablet, Rfl: 3  •  True Metrix Blood Glucose Test test strip, USE TO TEST BLOOD SUGAR ONCE DAILY, Disp: 100 each, Rfl: 5    Physical Exam:  Vital Signs:   Vitals:    06/23/21 1401   BP: 104/62   BP Location: Right arm   Patient Position: Sitting   Cuff Size: Adult   Pulse: 60   Resp: 18   SpO2: 97%   Weight: 98 kg (216 lb)   Height: 175.3 cm (69.02\")     Body mass index is 31.88 kg/m².    Physical Exam  Vitals and nursing note reviewed.   Constitutional:       General: He is not in acute distress.     Appearance: Normal appearance. He is well-developed. He is not toxic-appearing or diaphoretic.      Comments: BMI 31.88, elderly appearing   HENT:      Head: Normocephalic and atraumatic.   Eyes:      General: No scleral icterus.     Extraocular Movements: Extraocular movements intact.   Neck:      Trachea: Trachea normal.   Cardiovascular:      Rate and Rhythm: Normal rate and regular rhythm.      Pulses: Normal pulses.      Heart sounds: Normal heart sounds. No murmur heard.   No friction rub. No gallop.    Pulmonary:      Effort: Pulmonary effort is normal.      Breath sounds: Normal breath sounds. No stridor. No wheezing, rhonchi or rales.   Abdominal:      Palpations: Abdomen is soft.      Tenderness: There is no abdominal tenderness.   Musculoskeletal:         General: Normal range of motion.      Cervical back: Neck supple.      Right lower leg: No edema. "      Left lower leg: No edema.   Skin:     General: Skin is warm and dry.      Findings: No bruising, lesion or rash.   Neurological:      Mental Status: He is alert and oriented to person, place, and time.      Motor: No weakness.      Gait: Gait abnormal.      Comments: Uses cane for ambulation   Psychiatric:         Mood and Affect: Mood normal.         Behavior: Behavior normal. Behavior is cooperative.         Thought Content: Thought content does not include suicidal ideation.         Results Review:   I reviewed the patient's new clinical results.    Procedures    Assessment / Plan:     1. Abnormal cardiovascular stress test  --Denies chest pain or worsening dyspnea on exertion  --Declines invasive testing or further work-up     2. Anginal equivalent (CMS/HCC)  --Shortness of breath with activity that is relieved with rest is typical of the diagnosis of angina pectoris.   --In light of the results of his stress test, this is a likely contributing etiology.    --Stable     3. LEHMAN (dyspnea on exertion)  --Multifactorial.    --Age and deconditioning  --Mild secondary pulmonary hypertension     4. Essential hypertension  --Excellent blood pressure control     5. Sinus node dysfunction (CMS/HCC)  --Normal pacemaker function  --Plan for repeat pacemaker interrogation at next follow-up appt     6. Paroxysmal atrial fibrillation (CMS/HCC)  --No evidence of recurrent arrhythmia  --Rate control and anticoagulation strategy  --Reduced dose of Eliquis due to age and chronic renal insufficiency.     7. IPF (idiopathic pulmonary fibrosis) (CMS/HCC)  --Patient reports he was misdiagnosed with pulmonary fibrosis  --His son will call back to the office with the name of the pulmonologist at which time we can request records and delete this from his problem list, if necessary     9. Pulmonary hypertension (CMS/HCC)  --WHO Group 2  --Possible Group 3 (based on whether or not he has pulmonary fibrosis)     Preventative  Cardiology:   Tobacco Cessation: N/A   Advance Care Planning: ACP discussion was held with the patient during this visit. Patient has an advance directive in EMR which is still valid.      Follow Up:   Return in about 6 months (around 12/23/2021) for Follow-up with Dr. Saha.      Thank you for allowing me to participate in the care of your patient. Please to not hesitate to contact me with additional questions or concerns.     Helen Cui APRN

## 2021-07-23 DIAGNOSIS — E03.9 ACQUIRED HYPOTHYROIDISM: ICD-10-CM

## 2021-07-24 RX ORDER — LEVOTHYROXINE SODIUM 0.05 MG/1
TABLET ORAL
Qty: 90 TABLET | Refills: 2 | Status: SHIPPED | OUTPATIENT
Start: 2021-07-24 | End: 2022-03-07 | Stop reason: SDUPTHER

## 2021-08-04 ENCOUNTER — OFFICE VISIT (OUTPATIENT)
Dept: INTERNAL MEDICINE | Facility: CLINIC | Age: 86
End: 2021-08-04

## 2021-08-04 VITALS
DIASTOLIC BLOOD PRESSURE: 70 MMHG | OXYGEN SATURATION: 95 % | SYSTOLIC BLOOD PRESSURE: 125 MMHG | RESPIRATION RATE: 18 BRPM | TEMPERATURE: 97.3 F | HEART RATE: 63 BPM | HEIGHT: 69 IN | BODY MASS INDEX: 32.03 KG/M2 | WEIGHT: 216.25 LBS

## 2021-08-04 DIAGNOSIS — I10 ESSENTIAL HYPERTENSION: ICD-10-CM

## 2021-08-04 DIAGNOSIS — R53.82 CHRONIC FATIGUE: ICD-10-CM

## 2021-08-04 DIAGNOSIS — E03.9 ACQUIRED HYPOTHYROIDISM: ICD-10-CM

## 2021-08-04 DIAGNOSIS — N18.31 TYPE 2 DIABETES MELLITUS WITH STAGE 3A CHRONIC KIDNEY DISEASE, WITH LONG-TERM CURRENT USE OF INSULIN (HCC): ICD-10-CM

## 2021-08-04 DIAGNOSIS — R29.898 WEAKNESS OF LEFT LOWER EXTREMITY: ICD-10-CM

## 2021-08-04 DIAGNOSIS — L30.9 DERMATITIS: ICD-10-CM

## 2021-08-04 DIAGNOSIS — Z79.4 TYPE 2 DIABETES MELLITUS WITH STAGE 3A CHRONIC KIDNEY DISEASE, WITH LONG-TERM CURRENT USE OF INSULIN (HCC): ICD-10-CM

## 2021-08-04 DIAGNOSIS — R79.89 ABNORMAL CBC: ICD-10-CM

## 2021-08-04 DIAGNOSIS — Z79.4 TYPE 2 DIABETES MELLITUS WITH HYPERGLYCEMIA, WITH LONG-TERM CURRENT USE OF INSULIN (HCC): Primary | ICD-10-CM

## 2021-08-04 DIAGNOSIS — G47.00 INSOMNIA, UNSPECIFIED TYPE: ICD-10-CM

## 2021-08-04 DIAGNOSIS — E11.22 TYPE 2 DIABETES MELLITUS WITH STAGE 3A CHRONIC KIDNEY DISEASE, WITH LONG-TERM CURRENT USE OF INSULIN (HCC): ICD-10-CM

## 2021-08-04 DIAGNOSIS — M54.50 CHRONIC LOW BACK PAIN WITHOUT SCIATICA, UNSPECIFIED BACK PAIN LATERALITY: ICD-10-CM

## 2021-08-04 DIAGNOSIS — G89.29 CHRONIC LOW BACK PAIN WITHOUT SCIATICA, UNSPECIFIED BACK PAIN LATERALITY: ICD-10-CM

## 2021-08-04 DIAGNOSIS — E11.65 TYPE 2 DIABETES MELLITUS WITH HYPERGLYCEMIA, WITH LONG-TERM CURRENT USE OF INSULIN (HCC): Primary | ICD-10-CM

## 2021-08-04 PROBLEM — I50.9 CHF (CONGESTIVE HEART FAILURE) (HCC): Status: ACTIVE | Noted: 2021-06-10

## 2021-08-04 PROBLEM — I25.10 CAD (CORONARY ARTERY DISEASE): Status: ACTIVE | Noted: 2021-06-10

## 2021-08-04 PROBLEM — Z01.810 PRE-OPERATIVE CARDIOVASCULAR EXAMINATION: Status: RESOLVED | Noted: 2021-02-08 | Resolved: 2021-08-04

## 2021-08-04 LAB — HBA1C MFR BLD: 6.5 %

## 2021-08-04 PROCEDURE — 99214 OFFICE O/P EST MOD 30 MIN: CPT | Performed by: FAMILY MEDICINE

## 2021-08-04 PROCEDURE — 83036 HEMOGLOBIN GLYCOSYLATED A1C: CPT | Performed by: FAMILY MEDICINE

## 2021-08-04 RX ORDER — GLIMEPIRIDE 1 MG/1
1 TABLET ORAL
Qty: 90 TABLET | Refills: 3 | Status: SHIPPED | OUTPATIENT
Start: 2021-08-04 | End: 2022-06-15

## 2021-08-04 RX ORDER — DESOXIMETASONE 2.5 MG/G
OINTMENT TOPICAL 2 TIMES DAILY
Qty: 60 G | Refills: 2 | Status: SHIPPED | OUTPATIENT
Start: 2021-08-04 | End: 2021-12-22

## 2021-08-04 NOTE — PROGRESS NOTES
"Subjective    Forrest Zepeda is a 89 y.o. male here for:  Chief Complaint   Patient presents with   • Diabetes     Pt states he is doing well but feels tired all the time.        History per MA reviewed.     Patient notes recent bout where his left leg gave out and he could not use it. Got up middle of morning to go to bathroom and leg didn't want to work. Was able to get back in bed and notes leg gradually got back to normal. He worries this could have been TIA.    Chronically fatigued. Followed by cardiology for chronic heart issues. On levothyroxine for acquired hypothyroidism.     Has some symptoms of hypoglycemia at times. Family tries to help him with diet though they slip him occasional milkshakes.    Rash right knee x years. Topical steroid helps, he's only used once a day as needed but rx says to use bid x 14 days. That's the only topical agent that has helped clear rash though it's never gone away completely. Can be itchy but has not had scale. No rash on left knee nor elbows.         The following portions of the patient's history were reviewed and updated as appropriate: allergies, current medications, past family history, past medical history, past social history, past surgical history and problem list.    Review of Systems   Constitutional: Positive for fatigue. Negative for fever.   Musculoskeletal: Positive for gait problem.   Skin: Positive for rash.   Neurological: Positive for weakness.       Objective   Visit Vitals  /70   Pulse 63   Temp 97.3 °F (36.3 °C) (Temporal)   Resp 18   Ht 175.3 cm (69.02\")   Wt 98.1 kg (216 lb 4 oz)   SpO2 95%   BMI 31.92 kg/m²       Physical Exam  Vitals and nursing note reviewed.   Constitutional:       General: He is not in acute distress.     Appearance: Normal appearance. He is well-developed and well-groomed. He is not ill-appearing, toxic-appearing or diaphoretic.      Interventions: Face mask in place.   HENT:      Head: Normocephalic and atraumatic.      " Right Ear: Hearing normal.      Left Ear: Hearing normal.   Eyes:      General: Lids are normal. No scleral icterus.     Extraocular Movements: Extraocular movements intact.   Pulmonary:      Effort: Pulmonary effort is normal.   Musculoskeletal:      Cervical back: Neck supple.   Skin:     Coloration: Skin is not jaundiced or pale.      Findings: Rash (erythematous patch to right knee no scale) present.   Neurological:      General: No focal deficit present.      Mental Status: He is alert and oriented to person, place, and time.   Psychiatric:         Attention and Perception: Attention and perception normal.         Mood and Affect: Mood and affect normal.         Speech: Speech normal.         Behavior: Behavior normal. Behavior is cooperative.         Thought Content: Thought content normal.         Cognition and Memory: Cognition and memory normal.         Judgment: Judgment normal.         For medical decision making review of the following was required:  Lab Results   Component Value Date    HGBA1C 6.5 08/04/2021    HGBA1C 7.20 (H) 04/15/2021    HGBA1C 7.50 (H) 12/28/2020       Assessment/Plan     Problem List Items Addressed This Visit        Cardiac and Vasculature    Essential hypertension    Current Assessment & Plan     Hypertension is improving with treatment.  Continue current treatment regimen.  Blood pressure will be reassessed in 3 months.         Relevant Orders    TSH+Free T4       Endocrine and Metabolic    Acquired hypothyroidism    Relevant Orders    TSH+Free T4    Type 2 diabetes mellitus with hyperglycemia, with long-term current use of insulin (CMS/[facility]) - Primary    Current Assessment & Plan     Diabetes is improving with treatment.   Decrease glimepiride to 1 mg daily from 2 mg daily to lower risk of hypoglycemia, lower fall risk  Diabetes will be reassessed in 3 months.         Relevant Medications    glimepiride (Amaryl) 1 MG tablet    Other Relevant Orders    POC Glycosylated Hemoglobin  (Hb A1C) (Completed)       Symptoms and Signs    Chronic fatigue    Relevant Orders    Vitamin B12    Folate    CBC (No Diff)    Comprehensive Metabolic Panel    TSH+Free T4       Other    Type 2 diabetes mellitus with stage 3a chronic kidney disease, with long-term current use of insulin (CMS/[facility])    Relevant Medications    glimepiride (Amaryl) 1 MG tablet    Other Relevant Orders    POC Glycosylated Hemoglobin (Hb A1C) (Completed)      Other Visit Diagnoses     Dermatitis        use topical agent bid x 14 days    Relevant Medications    desoximetasone (TOPICORT) 0.25 % ointment    Insomnia, unspecified type        may resume medicine he's taken in past    Relevant Medications    triazolam (HALCION) 0.25 MG tablet    Abnormal CBC        elevated MCV, checking B12/Folate due to fatigue    Relevant Orders    Vitamin B12    Folate    CBC (No Diff)    Chronic low back pain without sciatica, unspecified back pain laterality        Relevant Orders    Back Brace    Weakness of left lower extremity        Discussed possible TIA, risk of CVA, importance of time to presentation to ER          · JOSEFINA reviewed and appropriate.      Return in about 17 weeks (around 12/1/2021) for Medicare Wellness (366 days from last AWV).   Kary Wen MD

## 2021-08-04 NOTE — ASSESSMENT & PLAN NOTE
Diabetes is improving with treatment.   Decrease glimepiride to 1 mg daily from 2 mg daily to lower risk of hypoglycemia, lower fall risk  Diabetes will be reassessed in 3 months.

## 2021-08-05 LAB
ALBUMIN SERPL-MCNC: 4 G/DL (ref 3.5–5.2)
ALBUMIN/GLOB SERPL: 1.1 G/DL
ALP SERPL-CCNC: 100 U/L (ref 39–117)
ALT SERPL-CCNC: 13 U/L (ref 1–41)
AST SERPL-CCNC: 20 U/L (ref 1–40)
BILIRUB SERPL-MCNC: 0.2 MG/DL (ref 0–1.2)
BUN SERPL-MCNC: 35 MG/DL (ref 8–23)
BUN/CREAT SERPL: 23.2 (ref 7–25)
CALCIUM SERPL-MCNC: 9.7 MG/DL (ref 8.6–10.5)
CHLORIDE SERPL-SCNC: 104 MMOL/L (ref 98–107)
CO2 SERPL-SCNC: 26.2 MMOL/L (ref 22–29)
CREAT SERPL-MCNC: 1.51 MG/DL (ref 0.76–1.27)
ERYTHROCYTE [DISTWIDTH] IN BLOOD BY AUTOMATED COUNT: 14.4 % (ref 12.3–15.4)
FOLATE SERPL-MCNC: >20 NG/ML (ref 4.78–24.2)
GLOBULIN SER CALC-MCNC: 3.5 GM/DL
GLUCOSE SERPL-MCNC: 148 MG/DL (ref 65–99)
HCT VFR BLD AUTO: 36.8 % (ref 37.5–51)
HGB BLD-MCNC: 12.2 G/DL (ref 13–17.7)
MCH RBC QN AUTO: 30.9 PG (ref 26.6–33)
MCHC RBC AUTO-ENTMCNC: 33.2 G/DL (ref 31.5–35.7)
MCV RBC AUTO: 93.2 FL (ref 79–97)
PLATELET # BLD AUTO: 225 10*3/MM3 (ref 140–450)
POTASSIUM SERPL-SCNC: 5.1 MMOL/L (ref 3.5–5.2)
PROT SERPL-MCNC: 7.5 G/DL (ref 6–8.5)
RBC # BLD AUTO: 3.95 10*6/MM3 (ref 4.14–5.8)
SODIUM SERPL-SCNC: 142 MMOL/L (ref 136–145)
T4 FREE SERPL-MCNC: 0.98 NG/DL (ref 0.93–1.7)
TSH SERPL DL<=0.005 MIU/L-ACNC: 3.23 UIU/ML (ref 0.27–4.2)
VIT B12 SERPL-MCNC: 574 PG/ML (ref 211–946)
WBC # BLD AUTO: 12.73 10*3/MM3 (ref 3.4–10.8)

## 2021-08-16 ENCOUNTER — PATIENT MESSAGE (OUTPATIENT)
Dept: INTERNAL MEDICINE | Facility: CLINIC | Age: 86
End: 2021-08-16

## 2021-08-16 DIAGNOSIS — E11.22 TYPE 2 DIABETES MELLITUS WITH STAGE 3A CHRONIC KIDNEY DISEASE, WITH LONG-TERM CURRENT USE OF INSULIN (HCC): ICD-10-CM

## 2021-08-16 DIAGNOSIS — N18.31 TYPE 2 DIABETES MELLITUS WITH STAGE 3A CHRONIC KIDNEY DISEASE, WITH LONG-TERM CURRENT USE OF INSULIN (HCC): ICD-10-CM

## 2021-08-16 DIAGNOSIS — Z79.4 TYPE 2 DIABETES MELLITUS WITH STAGE 3A CHRONIC KIDNEY DISEASE, WITH LONG-TERM CURRENT USE OF INSULIN (HCC): ICD-10-CM

## 2021-08-16 DIAGNOSIS — E11.65 TYPE 2 DIABETES MELLITUS WITH HYPERGLYCEMIA, WITH LONG-TERM CURRENT USE OF INSULIN (HCC): Primary | ICD-10-CM

## 2021-08-16 DIAGNOSIS — Z79.4 TYPE 2 DIABETES MELLITUS WITH HYPERGLYCEMIA, WITH LONG-TERM CURRENT USE OF INSULIN (HCC): Primary | ICD-10-CM

## 2021-08-16 RX ORDER — SILODOSIN 8 MG/1
CAPSULE ORAL
Qty: 90 CAPSULE | Refills: 0 | Status: SHIPPED | OUTPATIENT
Start: 2021-08-16 | End: 2021-11-19 | Stop reason: SDUPTHER

## 2021-08-19 RX ORDER — PROCHLORPERAZINE 25 MG/1
SUPPOSITORY RECTAL
Qty: 3 EACH | Refills: 11 | Status: SHIPPED | OUTPATIENT
Start: 2021-08-19 | End: 2022-01-13 | Stop reason: ALTCHOICE

## 2021-08-19 RX ORDER — FLASH GLUCOSE SENSOR
1 KIT MISCELLANEOUS
Qty: 2 EACH | Refills: 6 | Status: SHIPPED | OUTPATIENT
Start: 2021-08-19 | End: 2021-08-19

## 2021-08-19 RX ORDER — PROCHLORPERAZINE 25 MG/1
1 SUPPOSITORY RECTAL CONTINUOUS
Qty: 1 EACH | Refills: 0 | Status: SHIPPED | OUTPATIENT
Start: 2021-08-19 | End: 2022-01-13 | Stop reason: ALTCHOICE

## 2021-08-19 RX ORDER — PROCHLORPERAZINE 25 MG/1
1 SUPPOSITORY RECTAL ONCE
Qty: 1 EACH | Refills: 0 | Status: SHIPPED | OUTPATIENT
Start: 2021-08-19 | End: 2021-08-19

## 2021-08-19 RX ORDER — FLASH GLUCOSE SENSOR
1 KIT MISCELLANEOUS ONCE
Qty: 1 EACH | Refills: 0 | Status: SHIPPED | OUTPATIENT
Start: 2021-08-19 | End: 2021-08-19

## 2021-08-19 NOTE — TELEPHONE ENCOUNTER
From: Forrest Zepeda  To: Kary Wen MD  Sent: 8/16/2021 6:43 PM EDT  Subject: Non-Urgent Medical Question    Hey Dr. Wen,  This is Erika Forrest granddaughter. I wanted to ask you on his last visit and unfortunately I couldn't make it and my dad brought him. I was wondering if we start using the freestyle flora continuous monitor system. There are time he uses multiple strips testing due to humane error lol. I wanted to try and see if maybe this would be easier and I could also keep track of his blood sugar more often especially if he isn't feeling well. Hope you have a great day. Thanks for all you do.    Erika Trujillo  [phone]

## 2021-08-20 RX ORDER — CALCIUM CITRATE/VITAMIN D3 200MG-6.25
1 TABLET ORAL DAILY
Qty: 300 EACH | Refills: 5 | Status: SHIPPED | OUTPATIENT
Start: 2021-08-20 | End: 2022-06-15

## 2021-08-20 NOTE — TELEPHONE ENCOUNTER
"Pt's granddaughter called asking for the test strips to be written more frequently due to \"human error\". He uses several strips at a time when checking his own sugars and is running out of strips each month.   "

## 2021-08-21 DIAGNOSIS — E11.65 UNCONTROLLED TYPE 2 DIABETES MELLITUS WITH HYPERGLYCEMIA (HCC): ICD-10-CM

## 2021-09-14 DIAGNOSIS — G47.00 INSOMNIA, UNSPECIFIED TYPE: ICD-10-CM

## 2021-09-20 ENCOUNTER — OFFICE VISIT (OUTPATIENT)
Dept: UROLOGY | Facility: CLINIC | Age: 86
End: 2021-09-20

## 2021-09-20 VITALS — HEIGHT: 69 IN | BODY MASS INDEX: 32.97 KG/M2 | WEIGHT: 222.6 LBS

## 2021-09-20 DIAGNOSIS — N13.30 HYDRONEPHROSIS, UNSPECIFIED HYDRONEPHROSIS TYPE: Primary | ICD-10-CM

## 2021-09-20 DIAGNOSIS — N30.01 ACUTE CYSTITIS WITH HEMATURIA: ICD-10-CM

## 2021-09-20 PROCEDURE — 99213 OFFICE O/P EST LOW 20 MIN: CPT | Performed by: UROLOGY

## 2021-09-20 NOTE — PROGRESS NOTES
Hydronephrosis Office Visit      Patient Name: Forrest Zepeda  : 1932   MRN: [id number]     Chief Complaint:  Gross Hematuria.   Chief Complaint   Patient presents with   • Blood in Urine     New Patient    • hydronephrosis       Referring Provider: No ref. provider found    History of Present Illness: Mr. Zepeda is a 89 y.o. male with history of gross hematuria.  He was seen by me at [facility].  He has a long history of a left ureteral stricture that is being managed with yearly stent exchanges.  He has a bare metal resonance stent.  He had his last exchange in .  He reports that he was doing well but has since developed gross hematuria.  He reports that his hematuria last 3 to 4 days and goes away after he takes Macrobid that was given to him by his daughter.  Most recently he has been on Macrobid for 2 weeks now.  His urine is clear today.  He has a history of ESBL E. coli.  We admitted him prior to his last stent exchange for IV antibiotics.  He sees infectious disease at [facility].   No fever, chills, nausea or vomiting.     Subjective      Review of System: Review of Systems   Constitutional: Positive for fatigue.   HENT: Negative for sinus pressure and sinus pain.    Eyes: Negative for pain and redness.   Respiratory: Negative for chest tightness.    Cardiovascular: Negative for chest pain.   Gastrointestinal: Negative for abdominal pain, constipation and diarrhea.   Endocrine: Negative for heat intolerance.   Genitourinary: Positive for hematuria. Negative for dysuria and frequency.   Musculoskeletal: Positive for back pain.   Skin: Negative for rash.   Allergic/Immunologic: Negative for food allergies.   Neurological: Negative for headaches.   Hematological: Bruises/bleeds easily.   Psychiatric/Behavioral: The patient is not nervous/anxious.       I have reviewed the ROS documented by my clinical staff,  I have updated appropriately and I agree. Deanne Pitts  "MD    Past Medical History:  Past Medical History:   Diagnosis Date   • Abnormal EKG    • Abnormal PSA     Reported abnormal   • Acute deep vein thrombosis (DVT) of right lower extremity (CMS/HCC) 08/22/2019   • Acute diverticulitis 2019   • Acute hyperkalemia 08/22/2019   • Acute respiratory failure with hypoxia (CMS/HCC)    • Acute saddle pulmonary embolism with acute cor pulmonale (CMS/HCC) 08/22/2019   • Acute systolic right heart failure (CMS/HCC) 08/22/2019   • Arthritis     KNEES,  BUT SINCE HAD REPLACEMENT   • Benign localized hyperplasia of prostate    • Bilateral knee pain    • C. difficile diarrhea 2010   • Cataracts, bilateral    • CKD (chronic kidney disease)    • Diabetic neuropathy (CMS/HCC)    • Diabetic neuropathy (CMS/HCC)    • Disease of thyroid gland     HYPOTHYROIDISM   • Diverticulitis    • Dyslipidemia     H/O   • Family history of malignant hyperthermia     Brother    • Full dentures    • Generalized weakness    • History of gout    • History of hepatitis A vaccination    • History of nephrolithiasis    • History of nuclear stress test     STATES IN THE 1990'S.  \"IT WAS OK\"   • History of osteopenia    • History of recurrent UTI (urinary tract infection)    • Hydronephrosis of left kidney 7/18/2019   • Hydronephrosis with ureteral stricture    • Hypercholesterolemia    • Hyperkalemia     PT UNSURE REGARDING HX OF THIS   • Hyperlipidemia    • Hypertension    • Impaired functional mobility, balance, gait, and endurance    • Impaired functional mobility, balance, gait, and endurance    • Insomnia    • Malignant hyperthermia due to anesthesia     PER PT REPORT, BROTHER HAD DURING LUNG SURGERY SEVERAL YEARS AGO.  STATED THEY PACKED HIM ON ICE.  STATED HE DID SURVIVE.  PT DENIES ANY PERSONAL HX OF MALIGNANT HYPERTHERMIA HIMSELF, OR ANY ISSUES WITH ANESTHESIA.  STATES TO HIS KNOWLEDGE, NO OTHER FAMILY MEMBER HAS THIS ISSUE EXCEPT FOR HIS BROTHER.   • On supplemental oxygen therapy     [address] [place], [place] [address]HS   • " Other hydronephrosis 8/12/2019    Added automatically from request for surgery [id number]   • Renal insufficiency    • Sepsis (CMS/HCC) 2019   • Shortness of breath     STATES WITH EXERTION, OTHERWISE, DENIES   • Sigmoid diverticulitis without abscess or perforation 8/9/2017   • Skin breakdown     fourth toe right foot noted in 2019 MD D/C note   • Skin ulcer of fourth toe of right foot, limited to breakdown of skin (CMS/HCC) 7/5/2019   • Stricture of ureter    • Type 2 diabetes mellitus (CMS/[facility])    • Ureteral stricture, left    • UTI (urinary tract infection) 7/18/2019   • Vitamin D deficiency    • Wears glasses        Past Surgical History:  Past Surgical History:   Procedure Laterality Date   • APPENDECTOMY     • CARDIAC ELECTROPHYSIOLOGY PROCEDURE N/A 12/23/2020    Procedure: Pacemaker DC new. DNS meds.;  Surgeon: Jimy Ledezma DO;  Location:  [facility];  Service: Cardiology;  Laterality: N/A;   • CHOLECYSTECTOMY     • CIRCUMCISION N/A 10/23/2019    Procedure: CIRCUMCISIOn-DORSAL SLIT;  Surgeon: Griffin Romero MD;  Location:  [facility];  Service: Urology   • COLONOSCOPY     • CYSTOSCOPY URETEROSCOPY Left 2/11/2019    Procedure: URETEROSCOPY WITH STENT EXTRACTION, RPG;  Surgeon: Griffin Romero MD;  Location:  [facility];  Service: Urology   • CYSTOSCOPY W/ URETERAL STENT PLACEMENT Left 7/20/2019    Procedure: CYSTOSCOPY, LEFT RETROGRADE PYELOGRAM,  ATTEMPTED LEFT URETERAL STENT INSERTION;  Surgeon: Isaac Flynn MD;  Location:  [facility];  Service: Urology   • EYE SURGERY      CATARACTS REMOVED BILATERALLY   • KNEE ARTHROPLASTY Bilateral    • KNEE ARTHROSCOPY Bilateral    • RENAL ARTERY STENT      SEVERAL TIMES EVERY SINCE 1978   • URETERAL STENT INSERTION  10/2020   • URETEROSCOPY LASER LITHOTRIPSY WITH STENT INSERTION Left 1/10/2018    Procedure:  cysto, left ureteral stent replacement ;  Surgeon: Griffin Romero MD;  Location:  [facility];  Service:    • URETEROSCOPY LASER LITHOTRIPSY  WITH STENT INSERTION Left 8/14/2019    Procedure: URETEROSCOPY, STONE REMOVAL WITH STENT INSERTION;  Surgeon: Griffin Romero MD;  Location: [facility] OR;  Service: Urology   • URETEROSCOPY LASER LITHOTRIPSY WITH STENT INSERTION Left 10/23/2019    Procedure: Left URETEROSCOPY WITH STENT INSERTION-LEFT;  Surgeon: Griffin Romero MD;  Location: [facility] OR;  Service: Urology       Medications:    Current Outpatient Medications:   •  acetaminophen (TYLENOL) 500 MG tablet, Take 500 mg by mouth Every 6 (Six) Hours As Needed for Mild Pain ., Disp: , Rfl:   •  allopurinol (ZYLOPRIM) 300 MG tablet, Take 1 tablet by mouth Every Night., Disp: 90 tablet, Rfl: 3  •  apixaban (ELIQUIS) 2.5 MG tablet tablet, Take 1 tablet by mouth 2 (Two) Times a Day., Disp: 180 tablet, Rfl: 3  •  aspirin (aspirin) 81 MG EC tablet, Take 1 tablet by mouth Daily., Disp: 30 tablet, Rfl: 11  •  bisoprolol (ZEBeta) 5 MG tablet, Take 1 tablet by mouth Daily., Disp: 30 tablet, Rfl: 11  •  clotrimazole (LOTRIMIN) 1 % cream, Apply  topically to the appropriate area as directed 2 (Two) Times a Day. (Patient taking differently: Apply 1 application topically to the appropriate area as directed As Needed.), Disp: 45 g, Rfl: 0  •  Continuous Blood Gluc  (Dexcom G6 ) device, 1 each Continuous., Disp: 1 each, Rfl: 0  •  Continuous Blood Gluc Sensor (Dexcom G6 Sensor), Every 10 (Ten) Days. Apply as directed, Disp: 3 each, Rfl: 11  •  desoximetasone (TOPICORT) 0.25 % ointment, Apply  topically to the appropriate area as directed 2 (Two) Times a Day., Disp: 60 g, Rfl: 2  •  finasteride (PROSCAR) 5 MG tablet, Take 1 tablet by mouth Daily., Disp: 90 tablet, Rfl: 3  •  glimepiride (Amaryl) 1 MG tablet, Take 1 tablet by mouth Every Morning Before Breakfast. Indications: Type 2 Diabetes, Disp: 90 tablet, Rfl: 3  •  glucose blood (True Metrix Blood Glucose Test) test strip, 1 each by Other route Daily. use to test blood sugar 4 times daily, Disp: 300 each,  Rfl: 5  •  Insulin Glargine, 1 Unit Dial, (Toujeo SoloStar) 300 UNIT/ML solution pen-injector injection, Inject 15 Units under the skin into the appropriate area as directed Daily., Disp: 3 pen, Rfl: 3  •  isosorbide mononitrate (IMDUR) 30 MG 24 hr tablet, Take 1 tablet by mouth Every Morning., Disp: 90 tablet, Rfl: 3  •  levothyroxine (SYNTHROID, LEVOTHROID) 50 MCG tablet, TAKE ONE TABLET BY MOUTH DAILY, Disp: 90 tablet, Rfl: 2  •  losartan (Cozaar) 25 MG tablet, Take 1 tablet by mouth Daily., Disp: 90 tablet, Rfl: 3  •  Multiple Vitamins-Minerals (OCUVITE ADULT 50+ PO), Take 1 capsule by mouth Daily., Disp: , Rfl:   •  nitrofurantoin, macrocrystal-monohydrate, (MACROBID) 100 MG capsule, Take 1 capsule by mouth Daily., Disp: 30 capsule, Rfl: 11  •  nitroglycerin (NITROSTAT) 0.4 MG SL tablet, 1 under the tongue as needed for angina, may repeat q5mins for up three doses, Disp: 100 tablet, Rfl: 11  •  NovoFine 32G X 6 MM misc, USE ONE DAILY, Disp: 100 each, Rfl: 3  •  Probiotic Product (Risaquad-2) capsule capsule, Take 1 capsule by mouth Daily., Disp: , Rfl:   •  silodosin (RAPAFLO) 8 MG capsule capsule, TAKE ONE CAPSULE BY MOUTH DAILY WITH BREAKFAST, Disp: 90 capsule, Rfl: 0  •  SITagliptin (JANUVIA) 100 MG tablet, Take 1 tablet by mouth Daily., Disp: 90 tablet, Rfl: 3  •  triazolam (HALCION) 0.25 MG tablet, Take 1 tablet by mouth At Night As Needed for Sleep., Disp: 30 tablet, Rfl: 2    Allergies:  Allergies   Allergen Reactions   • Metformin Diarrhea, GI Intolerance and Unknown - Low Severity     diahrea   • Statins Myalgia and Diarrhea       Social History:  Social History     Socioeconomic History   • Marital status:      Spouse name: Not on file   • Number of children: Not on file   • Years of education: Not on file   • Highest education level: Not on file   Tobacco Use   • Smoking status: Former Smoker     Types: Cigarettes     Quit date: 1950     Years since quittin.7   • Smokeless tobacco:  "Never Used   • Tobacco comment: SMOKED SOME AS A TEEN, DENIES ANY HABIT OR ADDICTION   Substance and Sexual Activity   • Alcohol use: No   • Drug use: No   • Sexual activity: Defer       Family History:  Family History   Problem Relation Age of Onset   • Heart attack Sister    • Brain cancer Sister    • Stroke Sister    • Lung cancer Sister    • Hypertension Sister    • Brain cancer Brother    • Lung cancer Brother    • Malig Hyperthermia Brother        Objective     Physical Exam:   Vital Signs:   Vitals:    09/20/21 1405   Weight: 101 kg (222 lb 9.6 oz)   Height: 175.3 cm (69\")     Body mass index is 32.87 kg/m².     Physical Exam  Vitals and nursing note reviewed.   Constitutional:       General: He is awake. He is not in acute distress.     Appearance: Normal appearance. He is well-developed. He is obese.   HENT:      Head: Normocephalic and atraumatic.      Right Ear: External ear normal.      Left Ear: External ear normal.      Nose: Nose normal.   Eyes:      Conjunctiva/sclera: Conjunctivae normal.   Pulmonary:      Effort: Pulmonary effort is normal.   Abdominal:      General: There is no distension.      Palpations: Abdomen is soft. There is no mass.      Tenderness: There is no abdominal tenderness. There is no right CVA tenderness, left CVA tenderness, guarding or rebound.      Hernia: No hernia is present. There is no hernia in the left inguinal area or right inguinal area.   Genitourinary:     Pubic Area: No rash.       Rectum: No mass or tenderness. Normal anal tone.   Musculoskeletal:      Cervical back: Normal range of motion.   Lymphadenopathy:      Lower Body: No right inguinal adenopathy. No left inguinal adenopathy.   Skin:     General: Skin is warm.   Neurological:      General: No focal deficit present.      Mental Status: He is alert and oriented to person, place, and time.   Psychiatric:         Behavior: Behavior normal. Behavior is cooperative.         Labs:   Brief Urine Lab Results  (Last " result in the past 365 days)      Color   Clarity   Blood   Leuk Est   Nitrite   Protein   CREAT   Urine HCG        01/26/21 2218 Yellow Turbid Moderate (2+) Large (3+) Negative 100 mg/dL (2+)               Urine Culture    Urine Culture 10/13/20 12/28/20 1/26/21   Urine Culture 50,000 CFU/mL Mixed Mary Grace Isolated >100,000 CFU/mL Escherichia coli ESBL (A) >100,000 CFU/mL Escherichia coli ESBL (A)   (A) Abnormal value       Comments are available for some flowsheets but are not being displayed.              Lab Results   Component Value Date    GLUCOSE 129 (H) 01/29/2021    CALCIUM 9.7 08/04/2021     08/04/2021    K 5.1 08/04/2021    CO2 26.2 08/04/2021     08/04/2021    BUN 35 (H) 08/04/2021    CREATININE 1.51 (H) 08/04/2021    EGFRIFAFRI 53 (L) 08/04/2021    EGFRIFNONA 44 (L) 08/04/2021    BCR 23.2 08/04/2021    ANIONGAP 9.9 01/29/2021       Lab Results   Component Value Date    WBC 12.73 (H) 08/04/2021    HGB 12.2 (L) 08/04/2021    HCT 36.8 (L) 08/04/2021    MCV 93.2 08/04/2021     08/04/2021       Lab Results   Component Value Date    PSA 1.160 04/15/2021    PSA 8.640 (H) 11/30/2020    PSA 1.450 08/26/2020       Images:   No Images in the past 120 days found..    Measures:   Tobacco:   Forrest Zepeda  reports that he quit smoking about 70 years ago. His smoking use included cigarettes. He has never used smokeless tobacco.        Urine Incontinence: ( NOUI)  None   Assessment / Plan      Assessment/Plan:   Mr. Zepeda is a 89 y.o. male who presented today for evaluation of gross hematuria.  I discussed that he will likely have gross hematuria on and off as long as he has his stent in place.  I also discussed that it is likely that his urine culture will always be positive and he should only be treated if he has clinical signs of infection.  We discussed that should he start passing large clots or his hematuria last more than a few days he needs to let us know.  We will plan on his next stent exchange  in June 2022.  I will have him come in to see me in 4 months just to make sure things are stable.  If he continues to have significant hematuria we may decide to exchange his stent earlier.      Diagnoses and all orders for this visit:    1. Hydronephrosis, unspecified hydronephrosis type (Primary)  -     Cancel: Urine Culture - Urine, Urine, Clean Catch; Future  -     Urine Culture - Urine, Urine, Clean Catch    2. Acute cystitis with hematuria  -     Cancel: Urine Culture - Urine, Urine, Clean Catch; Future  -     Urine Culture - Urine, Urine, Clean Catch           Follow Up:   Return in about 4 months (around 1/20/2022).    I spent approximately 25 minutes providing clinical care for this patient; including review of patient's chart and provider documentation, face to face time spent with patient in examination room (obtaining history, performing physical exam, discussing diagnosis and management options), placing orders, and completing patient documentation.     Deanne Pitts MD  [facility]

## 2021-09-23 LAB
BACTERIA UR CULT: ABNORMAL
BACTERIA UR CULT: ABNORMAL
OTHER ANTIBIOTIC SUSC ISLT: ABNORMAL

## 2021-09-28 ENCOUNTER — OFFICE VISIT (OUTPATIENT)
Dept: INTERNAL MEDICINE | Facility: CLINIC | Age: 86
End: 2021-09-28

## 2021-09-28 VITALS
HEART RATE: 55 BPM | DIASTOLIC BLOOD PRESSURE: 77 MMHG | WEIGHT: 221 LBS | OXYGEN SATURATION: 98 % | BODY MASS INDEX: 32.73 KG/M2 | TEMPERATURE: 97.7 F | SYSTOLIC BLOOD PRESSURE: 135 MMHG | HEIGHT: 69 IN

## 2021-09-28 DIAGNOSIS — Z95.0 CARDIAC PACEMAKER IN SITU: ICD-10-CM

## 2021-09-28 DIAGNOSIS — R06.09 DOE (DYSPNEA ON EXERTION): ICD-10-CM

## 2021-09-28 DIAGNOSIS — J84.112 IPF (IDIOPATHIC PULMONARY FIBROSIS) (HCC): ICD-10-CM

## 2021-09-28 DIAGNOSIS — J42 CHRONIC BRONCHITIS, UNSPECIFIED CHRONIC BRONCHITIS TYPE (HCC): ICD-10-CM

## 2021-09-28 DIAGNOSIS — Z99.81 HYPOXEMIA REQUIRING SUPPLEMENTAL OXYGEN: ICD-10-CM

## 2021-09-28 DIAGNOSIS — Z23 NEED FOR COVID-19 VACCINE: ICD-10-CM

## 2021-09-28 DIAGNOSIS — R09.02 HYPOXEMIA REQUIRING SUPPLEMENTAL OXYGEN: ICD-10-CM

## 2021-09-28 DIAGNOSIS — I27.20 PULMONARY HYPERTENSION (HCC): ICD-10-CM

## 2021-09-28 DIAGNOSIS — Z23 NEED FOR INFLUENZA VACCINATION: Primary | ICD-10-CM

## 2021-09-28 PROCEDURE — 90662 IIV NO PRSV INCREASED AG IM: CPT | Performed by: FAMILY MEDICINE

## 2021-09-28 PROCEDURE — 91300 COVID-19 (PFIZER): CPT | Performed by: FAMILY MEDICINE

## 2021-09-28 PROCEDURE — 0003A COVID-19 (PFIZER): CPT | Performed by: FAMILY MEDICINE

## 2021-09-28 PROCEDURE — G0008 ADMIN INFLUENZA VIRUS VAC: HCPCS | Performed by: FAMILY MEDICINE

## 2021-09-28 PROCEDURE — 99214 OFFICE O/P EST MOD 30 MIN: CPT | Performed by: FAMILY MEDICINE

## 2021-09-28 NOTE — PROGRESS NOTES
"Subjective    Forrest Zepeda is a 89 y.o. male here for:  Chief Complaint   Patient presents with   • Breathing Problem     Pt needs a note certifying his oxygen need.        History per MA reviewed.    Letter received from insurance stating they need an office note from us to justify his continued use of oxygen. Patient has pulmonary fibrosis and relies on oxygen supplement. Has trouble walking around, can only go very short distances, becomes short of breath and takes quite a bit of time to recover. He worries his pacemaker may not be working like it should. He also notes at one point he fell and landed on the area of his pacemaker, area was very bruised up, he worries he could have affected the device. Has talked to Dr. Saha, his general cardiologist about it, but has not had follow up recently with EP who placed device, Dr. Ledezma.         The following portions of the patient's history were reviewed and updated as appropriate: allergies, current medications, past family history, past medical history, past social history, past surgical history and problem list.    Review of Systems   Constitutional: Positive for fatigue. Negative for fever.   Respiratory: Positive for shortness of breath.          Objective   Visit Vitals  /77   Pulse 55   Temp 97.7 °F (36.5 °C) (Temporal)   Ht 175.3 cm (69.02\")   Wt 100 kg (221 lb)   SpO2 98%   BMI 32.62 kg/m²       Physical Exam  Vitals and nursing note reviewed.   Cardiovascular:      Rate and Rhythm: Normal rate and regular rhythm.   Pulmonary:      Effort: Tachypnea present.      Breath sounds: No stridor. Rales (dry, diffuse) present. No wheezing or rhonchi.   Neurological:      Mental Status: He is alert and oriented to person, place, and time.      Gait: Gait abnormal.         For medical decision making review of the following was required:  Lab Results   Component Value Date    WBC 12.73 (H) 08/04/2021    HGB 12.2 (L) 08/04/2021    HCT 36.8 (L) 08/04/2021    MCV " 93.2 08/04/2021     08/04/2021     Lab Results   Component Value Date    GLUCOSE 129 (H) 01/29/2021    BUN 35 (H) 08/04/2021    CREATININE 1.51 (H) 08/04/2021    EGFRIFNONA 44 (L) 08/04/2021    EGFRIFAFRI 53 (L) 08/04/2021    BCR 23.2 08/04/2021    K 5.1 08/04/2021    CO2 26.2 08/04/2021    CALCIUM 9.7 08/04/2021    PROTENTOTREF 7.5 08/04/2021    ALBUMIN 4.00 08/04/2021    LABIL2 1.1 08/04/2021    AST 20 08/04/2021    ALT 13 08/04/2021         Assessment/Plan     Problem List Items Addressed This Visit        Cardiac and Vasculature    LEHMAN (dyspnea on exertion)    Relevant Orders    Overnight Sleep Oximetry Study    Ambulatory Referral to Cardiology    Cardiac pacemaker in situ    Relevant Orders    Ambulatory Referral to Cardiology       Pulmonary and Pneumonias    IPF (idiopathic pulmonary fibrosis) (CMS/[facility])    Relevant Orders    Overnight Sleep Oximetry Study    Hypoxemia requiring supplemental oxygen    Relevant Orders    Overnight Sleep Oximetry Study    Pulmonary hypertension (CMS/[facility])    Relevant Orders    Overnight Sleep Oximetry Study      Other Visit Diagnoses     Need for influenza vaccination    -  Primary    Relevant Orders    Fluzone High-Dose 65+yrs (1950-9677) (Completed)    Need for COVID-19 vaccine        Relevant Orders    COVID-19 Vaccine (Pfizer) (Completed)    Chronic bronchitis, unspecified chronic bronchitis type (CMS/[facility])        Relevant Orders    Overnight Sleep Oximetry Study          · Six minute walk attempted. He was only able to walk for about 2 min and was too tired and shortness of breath to continue. Saturation down to 90% on room air. After his brief period of ambulation he remained shortness of breath the rest of the encounter, was tachypnic trying to recover. With his pulmonary fibrosis he will always need oxygen. It has previously been approved, and his lungs will not improve, we expect worsening over time hence always will need oxygen supplement.   · Patient himself  worries pacemaker isn't doing what it's supposed to. As he has this person concern I'd like to have it checked by EP to ensure it's working correctly.     Return for As scheduled previously.  For AWV and diabetes mellitus.    Kary Wen MD

## 2021-11-19 ENCOUNTER — PATIENT MESSAGE (OUTPATIENT)
Dept: INTERNAL MEDICINE | Facility: CLINIC | Age: 86
End: 2021-11-19

## 2021-11-19 DIAGNOSIS — E11.65 UNCONTROLLED TYPE 2 DIABETES MELLITUS WITH HYPERGLYCEMIA (HCC): ICD-10-CM

## 2021-11-19 RX ORDER — SILODOSIN 8 MG/1
8 CAPSULE ORAL
Qty: 90 CAPSULE | Refills: 3 | Status: SHIPPED | OUTPATIENT
Start: 2021-11-19 | End: 2022-11-07

## 2021-11-19 NOTE — TELEPHONE ENCOUNTER
From: Forrest Zepeda  To: Kary Wen MD  Sent: 11/19/2021 7:35 AM EST  Subject: Refill    Good morning. [facility] is out of refills on silododin and Januvia. I sent it to pharmacy for request but one is under dr carmona and not you. . Hope you have a good day.    Erika

## 2021-11-30 DIAGNOSIS — E11.22 TYPE 2 DIABETES MELLITUS WITH STAGE 3A CHRONIC KIDNEY DISEASE, WITH LONG-TERM CURRENT USE OF INSULIN (HCC): ICD-10-CM

## 2021-11-30 DIAGNOSIS — Z79.4 TYPE 2 DIABETES MELLITUS WITH HYPERGLYCEMIA, WITH LONG-TERM CURRENT USE OF INSULIN (HCC): Primary | ICD-10-CM

## 2021-11-30 DIAGNOSIS — N18.31 TYPE 2 DIABETES MELLITUS WITH STAGE 3A CHRONIC KIDNEY DISEASE, WITH LONG-TERM CURRENT USE OF INSULIN (HCC): ICD-10-CM

## 2021-11-30 DIAGNOSIS — E11.65 TYPE 2 DIABETES MELLITUS WITH HYPERGLYCEMIA, WITH LONG-TERM CURRENT USE OF INSULIN (HCC): Primary | ICD-10-CM

## 2021-11-30 DIAGNOSIS — E11.65 UNCONTROLLED TYPE 2 DIABETES MELLITUS WITH HYPERGLYCEMIA (HCC): ICD-10-CM

## 2021-11-30 DIAGNOSIS — Z79.4 TYPE 2 DIABETES MELLITUS WITH STAGE 3A CHRONIC KIDNEY DISEASE, WITH LONG-TERM CURRENT USE OF INSULIN (HCC): ICD-10-CM

## 2021-11-30 RX ORDER — INSULIN GLARGINE 300 U/ML
15 INJECTION, SOLUTION SUBCUTANEOUS DAILY
Qty: 3 PEN | Refills: 3 | Status: SHIPPED | OUTPATIENT
Start: 2021-11-30 | End: 2022-08-31 | Stop reason: SDUPTHER

## 2021-12-07 ENCOUNTER — OFFICE VISIT (OUTPATIENT)
Dept: INTERNAL MEDICINE | Facility: CLINIC | Age: 86
End: 2021-12-07

## 2021-12-07 VITALS
RESPIRATION RATE: 16 BRPM | DIASTOLIC BLOOD PRESSURE: 50 MMHG | TEMPERATURE: 97.1 F | BODY MASS INDEX: 32.64 KG/M2 | WEIGHT: 220.4 LBS | HEART RATE: 68 BPM | SYSTOLIC BLOOD PRESSURE: 90 MMHG | OXYGEN SATURATION: 99 % | HEIGHT: 69 IN

## 2021-12-07 DIAGNOSIS — M1A.30X0 CHRONIC GOUT DUE TO RENAL IMPAIRMENT WITHOUT TOPHUS, UNSPECIFIED SITE: ICD-10-CM

## 2021-12-07 DIAGNOSIS — J42 CHRONIC BRONCHITIS, UNSPECIFIED CHRONIC BRONCHITIS TYPE (HCC): ICD-10-CM

## 2021-12-07 DIAGNOSIS — I49.5 SINUS NODE DYSFUNCTION (HCC): ICD-10-CM

## 2021-12-07 DIAGNOSIS — I15.2 HYPERTENSION ASSOCIATED WITH DIABETES (HCC): ICD-10-CM

## 2021-12-07 DIAGNOSIS — N18.32 STAGE 3B CHRONIC KIDNEY DISEASE (HCC): ICD-10-CM

## 2021-12-07 DIAGNOSIS — E03.9 ACQUIRED HYPOTHYROIDISM: ICD-10-CM

## 2021-12-07 DIAGNOSIS — E11.59 HYPERTENSION ASSOCIATED WITH DIABETES (HCC): ICD-10-CM

## 2021-12-07 DIAGNOSIS — I27.20 PULMONARY HYPERTENSION (HCC): ICD-10-CM

## 2021-12-07 DIAGNOSIS — R79.9 ABNORMAL BLOOD CHEMISTRY: ICD-10-CM

## 2021-12-07 DIAGNOSIS — Z91.81 AT HIGH RISK FOR FALLS: ICD-10-CM

## 2021-12-07 DIAGNOSIS — E11.42 DIABETIC POLYNEUROPATHY ASSOCIATED WITH TYPE 2 DIABETES MELLITUS (HCC): ICD-10-CM

## 2021-12-07 DIAGNOSIS — E11.65 TYPE 2 DIABETES MELLITUS WITH HYPERGLYCEMIA, WITH LONG-TERM CURRENT USE OF INSULIN (HCC): ICD-10-CM

## 2021-12-07 DIAGNOSIS — E11.22 TYPE 2 DIABETES MELLITUS WITH STAGE 3B CHRONIC KIDNEY DISEASE, WITH LONG-TERM CURRENT USE OF INSULIN (HCC): ICD-10-CM

## 2021-12-07 DIAGNOSIS — Z51.81 MEDICATION MONITORING ENCOUNTER: ICD-10-CM

## 2021-12-07 DIAGNOSIS — I20.8 ANGINAL EQUIVALENT (HCC): ICD-10-CM

## 2021-12-07 DIAGNOSIS — I48.0 PAROXYSMAL ATRIAL FIBRILLATION (HCC): ICD-10-CM

## 2021-12-07 DIAGNOSIS — J84.112 IPF (IDIOPATHIC PULMONARY FIBROSIS) (HCC): ICD-10-CM

## 2021-12-07 DIAGNOSIS — I10 ESSENTIAL HYPERTENSION: ICD-10-CM

## 2021-12-07 DIAGNOSIS — R00.1 SYMPTOMATIC BRADYCARDIA: ICD-10-CM

## 2021-12-07 DIAGNOSIS — Z00.00 MEDICARE ANNUAL WELLNESS VISIT, SUBSEQUENT: Primary | ICD-10-CM

## 2021-12-07 DIAGNOSIS — N18.32 TYPE 2 DIABETES MELLITUS WITH STAGE 3B CHRONIC KIDNEY DISEASE, WITH LONG-TERM CURRENT USE OF INSULIN (HCC): ICD-10-CM

## 2021-12-07 DIAGNOSIS — Z79.4 TYPE 2 DIABETES MELLITUS WITH STAGE 3B CHRONIC KIDNEY DISEASE, WITH LONG-TERM CURRENT USE OF INSULIN (HCC): ICD-10-CM

## 2021-12-07 DIAGNOSIS — Z79.4 TYPE 2 DIABETES MELLITUS WITH HYPERGLYCEMIA, WITH LONG-TERM CURRENT USE OF INSULIN (HCC): ICD-10-CM

## 2021-12-07 PROBLEM — N13.2 HYDRONEPHROSIS WITH RENAL AND URETERAL CALCULUS OBSTRUCTION: Status: RESOLVED | Noted: 2019-10-21 | Resolved: 2021-12-07

## 2021-12-07 PROCEDURE — 96160 PT-FOCUSED HLTH RISK ASSMT: CPT | Performed by: FAMILY MEDICINE

## 2021-12-07 PROCEDURE — 1159F MED LIST DOCD IN RCRD: CPT | Performed by: FAMILY MEDICINE

## 2021-12-07 PROCEDURE — 1170F FXNL STATUS ASSESSED: CPT | Performed by: FAMILY MEDICINE

## 2021-12-07 PROCEDURE — 1126F AMNT PAIN NOTED NONE PRSNT: CPT | Performed by: FAMILY MEDICINE

## 2021-12-07 PROCEDURE — G0439 PPPS, SUBSEQ VISIT: HCPCS | Performed by: FAMILY MEDICINE

## 2021-12-07 PROCEDURE — 99397 PER PM REEVAL EST PAT 65+ YR: CPT | Performed by: FAMILY MEDICINE

## 2021-12-07 NOTE — PATIENT INSTRUCTIONS
Heart Failure  Heart failure is a condition in which the heart has trouble pumping blood. This means your heart does not pump blood efficiently for your body to work well. In some cases of heart failure, fluid may back up into your lungs or you may have swelling (edema) in your lower legs. Heart failure is usually a long-term (chronic) condition. It is important for you to take good care of yourself and follow your health care provider's treatment plan.    How to cope with Congestive Heart Failure:  Congestive Heart Failure (CHF) is not an unchanging condition.  Heart Failure may deteriorate for a variety of reasons.  For instance:  excessive salt or fluid intake, illness such as flu or pneumonia, cardiac arrhythmias, and heart attack all may worsen heart failure.  Sometimes the patient with heart failure worsens for no apparent reason.  The educated patient must know how to anticipate deterioration, and to know how to react to it in order to correct the deterioration before it becomes serious.  Just as when steering a car, the heart failure patient must adjust to changes in their condition in order to stay on course.  A little too wet and they become congested and short of breathe.  A little too dry and they become weak, fatigued and dizzy.    When your provider examines your neck, he/she is looking at your veins to assess how much fluid is in the circulatory system.  You can also help monitor your fluid status by paying attention to your condition,(particularly how you feel), by noting how much swelling is present at the ankles and monitoring your body weight daily. A little bit of swelling of the ankles at the end of the day is normal and indicates sufficient fluid in the circulatory system to allow a weakened heart to pump normally.  More a than a trace of swelling at the ankles indicates fluid excess.  This fluid may re-enter the central circulation when you lie down, awakening you with shortness of breath  or forcing you to sleep on several pillows for comfort.  Similarly if you weight goes up by more than 2-3 pounds in 1-2 days or by 5 pounds over a week, the body may be retaining too much fluid and worsening heart failure may ensue.  CAUSES   Some health conditions can cause heart failure. Those health conditions include:  · High blood pressure (hypertension). Hypertension causes the heart muscle to work harder than normal. When pressure in the blood vessels is high, the heart needs to pump (contract) with more force in order to circulate blood throughout the body. High blood pressure eventually causes the heart to become stiff and weak.  · Coronary artery disease (CAD). CAD is the buildup of cholesterol and fat (plaque) in the arteries of the heart. The blockage in the arteries deprives the heart muscle of oxygen and blood. This can cause chest pain and may lead to a heart attack. High blood pressure can also contribute to CAD.  · Heart attack (myocardial infarction). A heart attack occurs when one or more arteries in the heart become blocked. The loss of oxygen damages the muscle tissue of the heart. When this happens, part of the heart muscle dies. The injured tissue does not contract as well and weakens the heart's ability to pump blood.  · Abnormal heart valves. When the heart valves do not open and close properly, it can cause heart failure. This makes the heart muscle pump harder to keep the blood flowing.  · Heart muscle disease (cardiomyopathy or myocarditis). Heart muscle disease is damage to the heart muscle from a variety of causes. These can include drug or alcohol abuse, infections, or unknown reasons. These can increase the risk of heart failure.  · Lung disease. Lung disease makes the heart work harder because the lungs do not work properly. This can cause a strain on the heart, leading it to fail.  · Diabetes. Diabetes increases the risk of heart failure. High blood sugar contributes to high fat  (lipid) levels in the blood. Diabetes can also cause slow damage to tiny blood vessels that carry important nutrients to the heart muscle. When the heart does not get enough oxygen and food, it can cause the heart to become weak and stiff. This leads to a heart that does not contract efficiently.  · Other conditions can contribute to heart failure. These include abnormal heart rhythms, thyroid problems, and low blood counts (anemia).  Certain unhealthy behaviors can increase the risk of heart failure, including:  · Being overweight.  · Smoking or chewing tobacco.  · Eating foods high in fat and cholesterol.  · Abusing illicit drugs or alcohol.  · Lacking physical activity.  SYMPTOMS   Heart failure symptoms may vary and can be hard to detect. Symptoms may include:  · Shortness of breath with activity, such as climbing stairs.  · Persistent cough.  · Swelling of the feet, ankles, legs, or abdomen.  · Unexplained weight gain.  · Difficulty breathing when lying flat (orthopnea).  · Waking from sleep because of the need to sit up and get more air.  · Rapid heartbeat.  · Fatigue and loss of energy.  · Feeling light-headed, dizzy, or close to fainting.  · Loss of appetite.  · Nausea.  · Increased urination during the night (nocturia).      DIAGNOSIS   A diagnosis of heart failure is based on your history, symptoms, physical examination, and diagnostic tests. Diagnostic tests for heart failure may include:  · Echocardiography.  · Electrocardiography.  · Chest X-ray.  · Blood tests.  · Exercise stress test.  · Cardiac angiography.  · Radionuclide scans.  TREATMENT   Treatment is aimed at managing the symptoms of heart failure. Medicines, behavioral changes, or surgical intervention may be necessary to treat heart failure.  · Medicines to help treat heart failure may include:    Angiotensin-converting enzyme (ACE) inhibitors. This type of medicine blocks the effects of a blood protein called angiotensin-converting enzyme.  ACE inhibitors relax (dilate) the blood vessels and help lower blood pressure.    Angiotensin receptor blockers (ARBs). This type of medicine blocks the actions of a blood protein called angiotensin. Angiotensin receptor blockers dilate the blood vessels and help lower blood pressure.    Water pills (diuretics). Diuretics cause the kidneys to remove salt and water from the blood. The extra fluid is removed through urination. This loss of extra fluid lowers the volume of blood the heart pumps.    Beta blockers. These prevent the heart from beating too fast and improve heart muscle strength.    Digitalis. This increases the force of the heartbeat.  · Healthy behavior changes include:    Obtaining and maintaining a healthy weight.    Stopping smoking or chewing tobacco.    Eating heart-healthy foods.    Limiting or avoiding alcohol.    Stopping illicit drug use.    Physical activity as directed by your health care provider.  · Surgical treatment for heart failure may include:    A procedure to open blocked arteries, repair damaged heart valves, or remove damaged heart muscle tissue.    A pacemaker to improve heart muscle function and control certain abnormal heart rhythms.    An internal cardioverter defibrillator to treat certain serious abnormal heart rhythms.    A left ventricular assist device (LVAD) to assist the pumping ability of the heart.                HOME CARE INSTRUCTIONS   Take medicines only as directed by your health care provider. Medicines are important in reducing the workload of your heart, slowing the progression of heart failure, and improving your symptoms.    Do not stop taking your medicine unless directed by your health care provider.    Do not skip any dose of medicine.    Refill your prescriptions before you run out of medicine. Your medicines are needed every day.  · Engage in moderate physical activity if directed by your health care provider. Moderate physical activity can benefit some  people. The elderly and people with severe heart failure should consult with a health care provider for physical activity recommendations.  · Eat heart-healthy foods. Food choices should be free of trans fat and low in saturated fat, cholesterol, and salt (sodium). Healthy choices include fresh or frozen fruits and vegetables, fish, lean meats, legumes, fat-free or low-fat dairy products, and whole grain or high fiber foods. Talk to a dietitian to learn more about heart-healthy foods.  · Limit sodium if directed by your health care provider. Sodium restriction may reduce symptoms of heart failure in some people. Talk to a dietitian to learn more about heart-healthy seasonings.  · Use healthy cooking methods. Healthy cooking methods include roasting, grilling, broiling, baking, poaching, steaming, or stir-frying. Talk to a dietitian to learn more about healthy cooking methods.  · Limit fluids if directed by your health care provider. Fluid restriction may reduce symptoms of heart failure in some people.  · Weigh yourself every day. Daily weights are important in the early recognition of excess fluid. You should weigh yourself every morning after you urinate and before you eat breakfast. Wear the same amount of clothing each time you weigh yourself. Record your daily weight. Provide your health care provider with your weight record.  · Monitor and record your blood pressure if directed by your health care provider.  · Check your pulse if directed by your health care provider.  · Lose weight if directed by your health care provider. Weight loss may reduce symptoms of heart failure in some people.  · Stop smoking or chewing tobacco. Nicotine makes your heart work harder by causing your blood vessels to constrict. Do not use nicotine gum or patches before talking to your health care provider.  · Keep all follow-up visits as directed by your health care provider. This is important.  · Limit alcohol intake to no more than  1 drink per day for nonpregnant women and 2 drinks per day for men. One drink equals 12 ounces of beer, 5 ounces of wine, or 1½ ounces of hard liquor. Drinking more than that is harmful to your heart. Tell your health care provider if you drink alcohol several times a week. Talk with your health care provider about whether alcohol is safe for you. If your heart has already been damaged by alcohol or you have severe heart failure, drinking alcohol should be stopped completely.  · Stop illicit drug use.  · Stay up-to-date with immunizations. It is especially important to prevent respiratory infections through current pneumococcal and influenza immunizations.  · Manage other health conditions such as hypertension, diabetes, thyroid disease, or abnormal heart rhythms as directed by your health care provider.  · Learn to manage stress.  · Plan rest periods when fatigued.  · Learn strategies to manage high temperatures. If the weather is extremely hot:    Avoid vigorous physical activity.    Use air conditioning or fans or seek a cooler location.    Avoid caffeine and alcohol.    Wear loose-fitting, lightweight, and light-colored clothing.  · Learn strategies to manage cold temperatures. If the weather is extremely cold:    Avoid vigorous physical activity.    Layer clothes.    Wear mittens or gloves, a hat, and a scarf when going outside.    Avoid alcohol.  · Obtain ongoing education and support as needed.  · Participate in or seek rehabilitation as needed to maintain or improve independence and quality of life.      Medication Type Medication Name/Dose How much to take When to take it                                                                                                           To Monitor Your Own Fluid Status at Home:   1.Weigh yourself daily   2. Weigh yourself at the same time every day-before breakfast is best   3. Use the same scale all the time   4. Wear the same amount of clothes when you weigh  yourself   5. Empty your bladder before weighing   6. Record your weight on the daily record below   7. The weight at which there is just a little bit of swelling in the ankles at the end of       the day is your ideal weight- try and maintain it   8. When taking diuretics, avoid drinking too much in the way of fluids, even if your      mouth is dry and you feel thirsty.  This could counter the effect of the diuretic           and dilute the body's salts causing weakness and confusion   9. You should drink no more than 2000 ml (8 glasses or cups) of fluid per day, or          whatever amount is prescribed for you      Date Weight Exercise Duration Symptoms Better/Worse/Same Swelling Better/Worse/Same                                                                                                                                                                Congestive Heart Failure Management Guide      Green Zone: All Clear Green Zone: Actions   Your Goal Weight____________  *No Shortness of Breath  *No Swelling  *No Weight Gain  *No Chest Pain  *No Decrease in your ability to    maintain your activity level *Your symptoms are under control  *Continue taking your medications as              ordered  *Continue daily weights  *Follow low salt diet  *Keep all physician/provider     appointments     Yellow Zone: Caution Yellow Zone: Actions   If you have any of the following signs or symptoms;    *weight gain of 3 or more pounds in 2 days  *increased cough  *increased swelling  *increased shortness of breath with activity  *increased in the number of pillows to          sleep comfortably    Call your provider's medical assistant or home health nurse or nurse coordinator *Your symptoms may indicate that you           need an adjustment of your medications  *Call your provider's medical         assistant, home health nurse, or      nurse  "coordinator  *NAME______________________    *NUMBER___________________    *INSTRUCTIONS_______________________________________________________________________________________________________       Red Zone: Medical Alert Red Zone: Actions   *Unrelieved shortness of breath   *Shortness of breath at rest  *Unrelieved chest pain  *Wheezing or chest tightness at rest  *Need to sit in chair to sleep  *Weight gain or loss of more than 5       pounds in 2 days  *Confusion This indicates that you need to be evaluated by a physician right away  Call your Doctor immediately if you are going into the red zone    Doctor:______________________________  Number:_____________________________  If unable to reach Doctor, then call 911                        Low-Sodium Eating Plan  Sodium, which is an element that makes up salt, helps you maintain a healthy balance of fluids in your body. Too much sodium can increase your blood pressure and cause fluid and waste to be held in your body.  Your health care provider or dietitian may recommend following this plan if you have high blood pressure (hypertension), kidney disease, liver disease, or heart failure. Eating less sodium can help lower your blood pressure, reduce swelling, and protect your heart, liver, and kidneys.  What are tips for following this plan?  Reading food labels  · The Nutrition Facts label lists the amount of sodium in one serving of the food. If you eat more than one serving, you must multiply the listed amount of sodium by the number of servings.  · Choose foods with less than 140 mg of sodium per serving.  · Avoid foods with 300 mg of sodium or more per serving.  Shopping    · Look for lower-sodium products, often labeled as \"low-sodium\" or \"no salt added.\"  · Always check the sodium content, even if foods are labeled as \"unsalted\" or \"no salt added.\"  · Buy fresh foods.  ? Avoid canned foods and pre-made or frozen meals.  ? Avoid canned, cured, or processed " "meats.  · Buy breads that have less than 80 mg of sodium per slice.    Cooking    · Eat more home-cooked food and less restaurant, buffet, and fast food.  · Avoid adding salt when cooking. Use salt-free seasonings or herbs instead of table salt or sea salt. Check with your health care provider or pharmacist before using salt substitutes.  · Cook with plant-based oils, such as canola, sunflower, or olive oil.    Meal planning  · When eating at a restaurant, ask that your food be prepared with less salt or no salt, if possible. Avoid dishes labeled as brined, pickled, cured, smoked, or made with soy sauce, miso, or teriyaki sauce.  · Avoid foods that contain MSG (monosodium glutamate). MSG is sometimes added to Chinese food, bouillon, and some canned foods.  · Make meals that can be grilled, baked, poached, roasted, or steamed. These are generally made with less sodium.  General information  Most people on this plan should limit their sodium intake to 1,500-2,000 mg (milligrams) of sodium each day.  What foods should I eat?  Fruits  Fresh, frozen, or canned fruit. Fruit juice.  Vegetables  Fresh or frozen vegetables. \"No salt added\" canned vegetables. \"No salt added\" tomato sauce and paste. Low-sodium or reduced-sodium tomato and vegetable juice.  Grains  Low-sodium cereals, including oats, puffed wheat and rice, and shredded wheat. Low-sodium crackers. Unsalted rice. Unsalted pasta. Low-sodium bread. Whole-grain breads and whole-grain pasta.  Meats and other proteins  Fresh or frozen (no salt added) meat, poultry, seafood, and fish. Low-sodium canned tuna and salmon. Unsalted nuts. Dried peas, beans, and lentils without added salt. Unsalted canned beans. Eggs. Unsalted nut butters.  Dairy  Milk. Soy milk. Cheese that is naturally low in sodium, such as ricotta cheese, fresh mozzarella, or Swiss cheese. Low-sodium or reduced-sodium cheese. Cream cheese. Yogurt.  Seasonings and condiments  Fresh and dried herbs and " spices. Salt-free seasonings. Low-sodium mustard and ketchup. Sodium-free salad dressing. Sodium-free light mayonnaise. Fresh or refrigerated horseradish. Lemon juice. Vinegar.  Other foods  Homemade, reduced-sodium, or low-sodium soups. Unsalted popcorn and pretzels. Low-salt or salt-free chips.  The items listed above may not be a complete list of foods and beverages you can eat. Contact a dietitian for more information.  What foods should I avoid?  Vegetables  Sauerkraut, pickled vegetables, and relishes. Olives. French fries. Onion rings. Regular canned vegetables (not low-sodium or reduced-sodium). Regular canned tomato sauce and paste (not low-sodium or reduced-sodium). Regular tomato and vegetable juice (not low-sodium or reduced-sodium). Frozen vegetables in sauces.  Grains  Instant hot cereals. Bread stuffing, pancake, and biscuit mixes. Croutons. Seasoned rice or pasta mixes. Noodle soup cups. Boxed or frozen macaroni and cheese. Regular salted crackers. Self-rising flour.  Meats and other proteins  Meat or fish that is salted, canned, smoked, spiced, or pickled. Precooked or cured meat, such as sausages or meat loaves. Green. Ham. Pepperoni. Hot dogs. Corned beef. Chipped beef. Salt pork. Jerky. Pickled herring. Anchovies and sardines. Regular canned tuna. Salted nuts.  Dairy  Processed cheese and cheese spreads. Hard cheeses. Cheese curds. Blue cheese. Feta cheese. String cheese. Regular cottage cheese. Buttermilk. Canned milk.  Fats and oils  Salted butter. Regular margarine. Ghee. Green fat.  Seasonings and condiments  Onion salt, garlic salt, seasoned salt, table salt, and sea salt. Canned and packaged gravies. Worcestershire sauce. Tartar sauce. Barbecue sauce. Teriyaki sauce. Soy sauce, including reduced-sodium. Steak sauce. Fish sauce. Oyster sauce. Cocktail sauce. Horseradish that you find on the shelf. Regular ketchup and mustard. Meat flavorings and tenderizers. Bouillon cubes. Hot sauce.  Pre-made or packaged marinades. Pre-made or packaged taco seasonings. Relishes. Regular salad dressings. Salsa.  Other foods  Salted popcorn and pretzels. Corn chips and puffs. Potato and tortilla chips. Canned or dried soups. Pizza. Frozen entrees and pot pies.  The items listed above may not be a complete list of foods and beverages you should avoid. Contact a dietitian for more information.  Summary  · Eating less sodium can help lower your blood pressure, reduce swelling, and protect your heart, liver, and kidneys.  · Most people on this plan should limit their sodium intake to 1,500-2,000 mg (milligrams) of sodium each day.  · Canned, boxed, and frozen foods are high in sodium. Restaurant foods, fast foods, and pizza are also very high in sodium. You also get sodium by adding salt to food.  · Try to cook at home, eat more fresh fruits and vegetables, and eat less fast food and canned, processed, or prepared foods.  This information is not intended to replace advice given to you by your health care provider. Make sure you discuss any questions you have with your health care provider.  Document Revised: 01/22/2021 Document Reviewed: 11/18/2020  [employer]vier Patient Education © 2021 [employer]      Fall Prevention in the Home, Adult  Falls can cause injuries and can affect people from all age groups. There are many simple things that you can do to make your home safe and to help prevent falls. Ask for help when making these changes, if needed.  What actions can I take to prevent falls?  General instructions  · Use good lighting in all rooms. Replace any light bulbs that burn out.  · Turn on lights if it is dark. Use night-lights.  · Place frequently used items in easy-to-reach places. Lower the shelves around your home if necessary.  · Set up furniture so that there are clear paths around it. Avoid moving your furniture around.  · Remove throw rugs and other tripping hazards from the floor.  · Avoid walking on wet  floors.  · Fix any uneven floor surfaces.  · Add color or contrast paint or tape to grab bars and handrails in your home. Place contrasting color strips on the first and last steps of stairways.  · When you use a stepladder, make sure that it is completely opened and that the sides are firmly locked. Have someone hold the ladder while you are using it. Do not climb a closed stepladder.  · Be aware of any and all pets.  What can I do in the bathroom?         · Keep the floor dry. Immediately clean up any water that spills onto the floor.  · Remove soap buildup in the tub or shower on a regular basis.  · Use non-skid mats or decals on the floor of the tub or shower.  · Attach bath mats securely with double-sided, non-slip rug tape.  · If you need to sit down while you are in the shower, use a plastic, non-slip stool.  · Install grab bars by the toilet and in the tub and shower. Do not use towel bars as grab bars.  What can I do in the bedroom?  · Make sure that a bedside light is easy to reach.  · Do not use oversized bedding that drapes onto the floor.  · Have a firm chair that has side arms to use for getting dressed.  What can I do in the kitchen?  · Clean up any spills right away.  · If you need to reach for something above you, use a sturdy step stool that has a grab bar.  · Keep electrical cables out of the way.  · Do not use floor polish or wax that makes floors slippery. If you must use wax, make sure that it is non-skid floor wax.  What can I do in the stairways?  · Do not leave any items on the stairs.  · Make sure that you have a light switch at the top of the stairs and the bottom of the stairs. Have them installed if you do not have them.  · Make sure that there are handrails on both sides of the stairs. Fix handrails that are broken or loose. Make sure that handrails are as long as the stairways.  · Install non-slip stair treads on all stairs in your home.  · Avoid having throw rugs at the top or bottom  of stairways, or secure the rugs with carpet tape to prevent them from moving.  · Choose a carpet design that does not hide the edge of steps on the stairway.  · Check any carpeting to make sure that it is firmly attached to the stairs. Fix any carpet that is loose or worn.  What can I do on the outside of my home?  · Use bright outdoor lighting.  · Regularly repair the edges of walkways and driveways and fix any cracks.  · Remove high doorway thresholds.  · Trim any shrubbery on the main path into your home.  · Regularly check that handrails are securely fastened and in good repair. Both sides of any steps should have handrails.  · Install guardrails along the edges of any raised decks or porches.  · Clear walkways of debris and clutter, including tools and rocks.  · Have leaves, snow, and ice cleared regularly.  · Use sand or salt on walkways during winter months.  · In the garage, clean up any spills right away, including grease or oil spills.  What other actions can I take?  · Wear closed-toe shoes that fit well and support your feet. Wear shoes that have rubber soles or low heels.  · Use mobility aids as needed, such as canes, walkers, scooters, and crutches.  · Review your medicines with your health care provider. Some medicines can cause dizziness or changes in blood pressure, which increase your risk of falling.  Talk with your health care provider about other ways that you can decrease your risk of falls. This may include working with a physical therapist or  to improve your strength, balance, and endurance.  Where to find more information  · Centers for Disease Control and Prevention, STEADI: https://www.cdc.gov  · National [place] on Aging: https://cb7ieid.vinicio.nih.gov  Contact a health care provider if:  · You are afraid of falling at home.  · You feel weak, drowsy, or dizzy at home.  · You fall at home.  Summary  · There are many simple things that you can do to make your home safe and to help  prevent falls.  · Ways to make your home safe include removing tripping hazards and installing grab bars in the bathroom.  · Ask for help when making these changes in your home.  This information is not intended to replace advice given to you by your health care provider. Make sure you discuss any questions you have with your health care provider.  Document Revised: 2018 Document Reviewed: 2018  [employer] Patient Education ©  [employer]    Medicare Wellness  Personal Prevention Plan of Service     Date of Office Visit:  2021  Encounter Provider:  Kary Wen MD  Place of Service:  [facility]  Patient Name: Forrest Zepeda  :  1932    As part of the Medicare Wellness portion of your visit today, we are providing you with this personalized preventive plan of services (PPPS). This plan is based upon recommendations of the [place] Preventive Services Task Force (USPSTF) and the Advisory Committee on Immunization Practices (ACIP).    This lists the preventive care services that should be considered, and provides dates of when you are due. Items listed as completed are up-to-date and do not require any further intervention.    Health Maintenance   Topic Date Due   • DXA SCAN  Never done   • URINE MICROALBUMIN  2019   • DIABETIC EYE EXAM  2021   • ANNUAL WELLNESS VISIT  2021   • LIPID PANEL  2021   • HEMOGLOBIN A1C  2022   • TDAP/TD VACCINES (3 - Td or Tdap) 2030   • COVID-19 Vaccine  Completed   • INFLUENZA VACCINE  Completed   • Pneumococcal Vaccine 65+  Completed   • ZOSTER VACCINE  Discontinued       Orders Placed This Encounter   Procedures   • Hemoglobin A1c     Order Specific Question:   Release to patient     Answer:   Immediate   • TSH+Free T4     Order Specific Question:   Release to patient     Answer:   Immediate   • Uric Acid     Order Specific Question:   Release to patient     Answer:   Immediate   •  Comprehensive Metabolic Panel     Order Specific Question:   Release to patient     Answer:   Immediate   • Lipid Panel     Order Specific Question:   [facility] Has the patient fasted?     Answer:   Yes   • Magnesium     Order Specific Question:   Release to patient     Answer:   Immediate   • Vitamin B12     Order Specific Question:   Release to patient     Answer:   Immediate   • Folate     Order Specific Question:   Release to patient     Answer:   Immediate   • PTH, Intact     Order Specific Question:   Release to patient     Answer:   Immediate   • Vitamin D 25 Hydroxy     Order Specific Question:   Release to patient     Answer:   Immediate   • CBC & Differential     Order Specific Question:   Manual Differential     Answer:   No       Return in about 3 months (around 3/12/2022) for Diabetes follow up.

## 2021-12-07 NOTE — PROGRESS NOTES
The ABCs of the Annual Wellness Visit  Subsequent Medicare Wellness Visit    Chief Complaint   Patient presents with   • Medicare Wellness-subsequent     annual wellness      Subjective    History of Present Illness:  Forrest Zepeda is a 89 y.o. male who presents for a Subsequent Medicare Wellness Visit.    Diabetes mellitus: glucose log provided. Lowest 92, highest 159 over last 6 weeks. Majority low 100s to 110s.     Blood pressure: low today but wasn't yesterday. Has low values at times. Some dizziness today. Scheduled to see cardiology ([facility]) but still waiting to see specialist (). Continues to feel pacemaker is a big part of his fatigue.    The following portions of the patient's history were reviewed and   updated as appropriate: allergies, current medications, past family history, past medical history, past social history, past surgical history and problem list.    Compared to one year ago, the patient feels his physical   health is the same.    Compared to one year ago, the patient feels his mental   health is the same.    Recent Hospitalizations:  This patient has had a [facility] admission record on file within the last 365 days.    Current Medical Providers:  Patient Care Team:  Kary Wen MD as PCP - General (Family Medicine)    Outpatient Medications Prior to Visit   Medication Sig Dispense Refill   • acetaminophen (TYLENOL) 500 MG tablet Take 500 mg by mouth Every 6 (Six) Hours As Needed for Mild Pain .     • allopurinol (ZYLOPRIM) 300 MG tablet Take 1 tablet by mouth Every Night. 90 tablet 3   • apixaban (ELIQUIS) 2.5 MG tablet tablet Take 1 tablet by mouth 2 (Two) Times a Day. 180 tablet 3   • aspirin (aspirin) 81 MG EC tablet Take 1 tablet by mouth Daily. 30 tablet 11   • bisoprolol (ZEBeta) 5 MG tablet Take 1 tablet by mouth Daily. 30 tablet 11   • clotrimazole (LOTRIMIN) 1 % cream Apply  topically to the appropriate area as directed 2 (Two) Times a Day. (Patient taking  differently: Apply 1 application topically to the appropriate area as directed As Needed.) 45 g 0   • Continuous Blood Gluc  (Dexcom G6 ) device 1 each Continuous. 1 each 0   • Continuous Blood Gluc Sensor (Dexcom G6 Sensor) Every 10 (Ten) Days. Apply as directed 3 each 11   • desoximetasone (TOPICORT) 0.25 % ointment Apply  topically to the appropriate area as directed 2 (Two) Times a Day. 60 g 2   • finasteride (PROSCAR) 5 MG tablet Take 1 tablet by mouth Daily. 90 tablet 3   • glimepiride (Amaryl) 1 MG tablet Take 1 tablet by mouth Every Morning Before Breakfast. Indications: Type 2 Diabetes 90 tablet 3   • glucose blood (True Metrix Blood Glucose Test) test strip 1 each by Other route Daily. use to test blood sugar 4 times daily 300 each 5   • Insulin Glargine, 1 Unit Dial, (Toujeo SoloStar) 300 UNIT/ML solution pen-injector injection Inject 15 Units under the skin into the appropriate area as directed Daily. 3 pen 3   • isosorbide mononitrate (IMDUR) 30 MG 24 hr tablet Take 1 tablet by mouth Every Morning. 90 tablet 3   • levothyroxine (SYNTHROID, LEVOTHROID) 50 MCG tablet TAKE ONE TABLET BY MOUTH DAILY 90 tablet 2   • losartan (Cozaar) 25 MG tablet Take 1 tablet by mouth Daily. 90 tablet 3   • Multiple Vitamins-Minerals (OCUVITE ADULT 50+ PO) Take 1 capsule by mouth Daily.     • nitrofurantoin, macrocrystal-monohydrate, (MACROBID) 100 MG capsule Take 1 capsule by mouth Daily. 30 capsule 11   • nitroglycerin (NITROSTAT) 0.4 MG SL tablet 1 under the tongue as needed for angina, may repeat q5mins for up three doses 100 tablet 11   • NovoFine 32G X 6 MM misc USE ONE DAILY 100 each 3   • Probiotic Product (Risaquad-2) capsule capsule Take 1 capsule by mouth Daily.     • silodosin (RAPAFLO) 8 MG capsule capsule Take 1 capsule by mouth Daily With Breakfast. 90 capsule 3   • SITagliptin (JANUVIA) 100 MG tablet Take 1 tablet by mouth Daily. 90 tablet 3   • triazolam (HALCION) 0.25 MG tablet Take 1  tablet by mouth At Night As Needed for Sleep. 30 tablet 2     No facility-administered medications prior to visit.       No opioid medication identified on active medication list. I have reviewed chart for other potential  high risk medication/s and harmful drug interactions in the elderly.          Aspirin is on active medication list. Aspirin use is indicated based on review of current medical condition/s. Pros and cons of this therapy have been discussed today. Benefits of this medication outweigh potential harm.  Patient has been encouraged to continue taking this medication.  .      Patient Active Problem List   Diagnosis   • Essential hypertension   • Generalized osteoarthritis   • Diabetic neuropathy (HCC)   • Gout   • Hypertriglyceridemia   • Acquired hypothyroidism   • CKD (chronic kidney disease) stage 3, GFR 30-59 ml/min   • LEHMAN (dyspnea on exertion)   • IPF (idiopathic pulmonary fibrosis) ([facility])   • Symptomatic bradycardia   • Hypoxemia requiring supplemental oxygen   • History of pulmonary embolism   • Sinus node dysfunction ([facility])   • Chronic fatigue   • Urethral stricture   • Paroxysmal atrial fibrillation ([facility])   • Type 2 diabetes mellitus with hyperglycemia, with long-term current use of insulin (HCC)   • Type 2 diabetes mellitus with stage 3a chronic kidney disease, with long-term current use of insulin ([facility])   • 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  ACP discussion was held with the patient during this visit. Patient has an advance directive in EMR which is still valid.     Review of Systems   Constitutional: Positive for activity change and fatigue. Negative for fever.   Respiratory: Negative for shortness of breath.    Cardiovascular: Negative for chest pain.   All other systems  "reviewed and are negative.       Objective    Vitals:    12/07/21 1341   BP: 90/50   BP Location: Right arm   Patient Position: Sitting   Cuff Size: Adult   Pulse: 68   Resp: 16   Temp: 97.1 °F (36.2 °C)   TempSrc: Temporal   SpO2: 99%   Weight: 100 kg (220 lb 6.4 oz)   Height: 175.3 cm (69\")   PainSc: 0-No pain     BMI Readings from Last 1 Encounters:   12/07/21 32.55 kg/m²   BMI is above normal parameters. Recommendations include: educational material    Does the patient have evidence of cognitive impairment? No    Physical Exam  Vitals and nursing note reviewed.   Constitutional:       General: He is not in acute distress.     Appearance: Normal appearance. He is well-developed and well-groomed. He is obese. He is not ill-appearing, toxic-appearing or diaphoretic.      Interventions: Face mask in place.      Comments: Looks tired   HENT:      Head: Normocephalic and atraumatic.      Right Ear: Tympanic membrane, ear canal and external ear normal. Decreased hearing noted.      Left Ear: Tympanic membrane, ear canal and external ear normal. Decreased hearing noted.   Eyes:      General: Lids are normal. No scleral icterus.     Extraocular Movements: Extraocular movements intact.   Cardiovascular:      Rate and Rhythm: Regular rhythm. Bradycardia present.      Heart sounds: Heart sounds are distant.   Pulmonary:      Effort: Pulmonary effort is normal.      Breath sounds: Normal breath sounds and air entry.   Chest:       Musculoskeletal:      Cervical back: Neck supple.   Skin:     General: Skin is warm.      Capillary Refill: Capillary refill takes less than 2 seconds.      Coloration: Skin is not cyanotic, jaundiced or pale.      Findings: Bruising present.   Neurological:      General: No focal deficit present.      Mental Status: He is alert and oriented to person, place, and time.      Gait: Gait abnormal.      Comments: Some fasciculations noted to the right hand between thumb and index finger noted while I " was auscultating heart. Pt denies issues with muscle twitches.   Psychiatric:         Attention and Perception: Attention and perception normal.         Mood and Affect: Mood and affect normal.         Speech: Speech normal.         Behavior: Behavior normal. Behavior is cooperative.         Thought Content: Thought content normal.         Cognition and Memory: Cognition and memory normal.         Judgment: Judgment normal.                 HEALTH RISK ASSESSMENT    Smoking Status:  Social History     Tobacco Use   Smoking Status Former Smoker   • Types: Cigarettes   • Quit date: 1950   • Years since quittin.9   Smokeless Tobacco Never Used   Tobacco Comment    SMOKED SOME AS A TEEN, DENIES ANY HABIT OR ADDICTION     Alcohol Consumption:  Social History     Substance and Sexual Activity   Alcohol Use No     Fall Risk Screen:    STEADI Fall Risk Assessment was completed, and patient is at MODERATE risk for falls. Assessment completed on:2021    Depression Screening:  PHQ-2/PHQ-9 Depression Screening 2021   Little interest or pleasure in doing things 0   Feeling down, depressed, or hopeless 0   Trouble falling or staying asleep, or sleeping too much -   Feeling tired or having little energy -   Poor appetite or overeating -   Feeling bad about yourself - or that you are a failure or have let yourself or your family down -   Trouble concentrating on things, such as reading the newspaper or watching television -   Moving or speaking so slowly that other people could have noticed. Or the opposite - being so fidgety or restless that you have been moving around a lot more than usual -   Thoughts that you would be better off dead, or of hurting yourself in some way -   Total Score 0   If you checked off any problems, how difficult have these problems made it for you to do your work, take care of things at home, or get along with other people? -       Health Habits and Functional and Cognitive  Screening:  Functional & Cognitive Status 12/7/2021   Do you have difficulty preparing food and eating? No   Do you have difficulty bathing yourself, getting dressed or grooming yourself? No   Do you have difficulty using the toilet? No   Do you have difficulty moving around from place to place? No   Do you have trouble with steps or getting out of a bed or a chair? No   Current Diet Well Balanced Diet   Dental Exam Not up to date   Eye Exam Up to date   Exercise (times per week) 7 times per week   Current Exercises Include Walking   Current Exercise Activities Include -   Do you need help using the phone?  No   Are you deaf or do you have serious difficulty hearing?  No   Do you need help with transportation? No   Do you need help shopping? No   Do you need help preparing meals?  No   Do you need help with housework?  No   Do you need help with laundry? No   Do you need help taking your medications? No   Do you need help managing money? No   Do you ever drive or ride in a car without wearing a seat belt? No   Have you felt unusual stress, anger or loneliness in the last month? No   Who do you live with? Alone   If you need help, do you have trouble finding someone available to you? No   Have you been bothered in the last four weeks by sexual problems? No   Do you have difficulty concentrating, remembering or making decisions? No       Age-appropriate Screening Schedule:  Refer to the list below for future screening recommendations based on patient's age, sex and/or medical conditions. Orders for these recommended tests are listed in the plan section. The patient has been provided with a written plan.    Health Maintenance   Topic Date Due   • DXA SCAN  Never done   • URINE MICROALBUMIN  04/16/2019   • DIABETIC EYE EXAM  11/12/2021   • LIPID PANEL  11/30/2021   • HEMOGLOBIN A1C  02/04/2022   • TDAP/TD VACCINES (3 - Td or Tdap) 06/30/2030   • INFLUENZA VACCINE  Completed   • ZOSTER VACCINE  Discontinued               Assessment/Plan   CMS Preventative Services Quick Reference  Risk Factors Identified During Encounter  Cardiovascular Disease  Immunizations Discussed/Encouraged (specific Immunizations; Pneumococcal 23 and COVID19  Inactivity/Sedentary  Obesity/Overweight   Polypharmacy  The above risks/problems have been discussed with the patient.  Follow up actions/plans if indicated are seen below in the Assessment/Plan Section.  Pertinent information has been shared with the patient in the After Visit Summary.    Diagnoses and all orders for this visit:    1. Medicare annual wellness visit, subsequent (Primary)    2. Type 2 diabetes mellitus with hyperglycemia, with long-term current use of insulin (HCC)  Comments:  good glucose levels per log, expecting a1c on labs today to show good glycemic control  Orders:  -     Hemoglobin A1c  -     Comprehensive Metabolic Panel    3. Type 2 diabetes mellitus with stage 3b chronic kidney disease, with long-term current use of insulin (HCC)  -     Hemoglobin A1c  -     Uric Acid  -     Comprehensive Metabolic Panel    4. Stage 3b chronic kidney disease (HCC)  -     Uric Acid  -     CBC & Differential  -     Comprehensive Metabolic Panel  -     PTH, Intact  -     Vitamin D 25 Hydroxy    5. Diabetic polyneuropathy associated with type 2 diabetes mellitus (HCC)  -     TSH+Free T4  -     Vitamin B12  -     Folate    6. Anginal equivalent (HCC)  Comments:  follow up with cardiology; may need to decrease Imdur dose to 15.  Orders:  -     CBC & Differential    7. Sinus node dysfunction (HCC)  Comments:  follow up with cardiology, s/p pacemaker placement  Orders:  -     TSH+Free T4    8. Paroxysmal atrial fibrillation (HCC)  Comments:  continue eliqus  Orders:  -     CBC & Differential    9. IPF (idiopathic pulmonary fibrosis) (HCC)  -     CBC & Differential    10. Chronic bronchitis, unspecified chronic bronchitis type (HCC)  -     CBC & Differential    11. Pulmonary hypertension (HCC)  -      CBC & Differential    12. Hypertension associated with diabetes (HCC)  -     TSH+Free T4    13. Chronic gout due to renal impairment without tophus, unspecified site  -     Uric Acid    14. Symptomatic bradycardia  Comments:  see cardiology as scheduled.  Orders:  -     TSH+Free T4    15. Acquired hypothyroidism  -     TSH+Free T4    16. Essential hypertension  -     TSH+Free T4  -     PTH, Intact    17. Abnormal blood chemistry  -     Hemoglobin A1c  -     TSH+Free T4  -     Uric Acid  -     CBC & Differential  -     Comprehensive Metabolic Panel  -     Lipid Panel  -     Magnesium  -     Vitamin B12  -     Folate  -     PTH, Intact  -     Vitamin D 25 Hydroxy    18. At high risk for falls    19. Medication monitoring encounter  -     Hemoglobin A1c  -     TSH+Free T4  -     Uric Acid  -     CBC & Differential  -     Comprehensive Metabolic Panel  -     Lipid Panel  -     Magnesium  -     Vitamin B12  -     Folate  -     PTH, Intact  -     Vitamin D 25 Hydroxy        Follow Up:   Return in about 3 months (around 3/12/2022) for Diabetes follow up.     An After Visit Summary and PPPS were made available to the patient.

## 2021-12-08 LAB
25(OH)D3+25(OH)D2 SERPL-MCNC: 29.6 NG/ML (ref 30–100)
ALBUMIN SERPL-MCNC: 3.9 G/DL (ref 3.5–5.2)
ALBUMIN/GLOB SERPL: 1.2 G/DL
ALP SERPL-CCNC: 91 U/L (ref 39–117)
ALT SERPL-CCNC: 14 U/L (ref 1–41)
AST SERPL-CCNC: 18 U/L (ref 1–40)
BASOPHILS # BLD AUTO: 0.1 10*3/MM3 (ref 0–0.2)
BASOPHILS NFR BLD AUTO: 0.8 % (ref 0–1.5)
BILIRUB SERPL-MCNC: 0.3 MG/DL (ref 0–1.2)
BUN SERPL-MCNC: 33 MG/DL (ref 8–23)
BUN/CREAT SERPL: 18 (ref 7–25)
CALCIUM SERPL-MCNC: 9.9 MG/DL (ref 8.6–10.5)
CHLORIDE SERPL-SCNC: 104 MMOL/L (ref 98–107)
CHOLEST SERPL-MCNC: 199 MG/DL (ref 0–200)
CO2 SERPL-SCNC: 26.8 MMOL/L (ref 22–29)
CREAT SERPL-MCNC: 1.83 MG/DL (ref 0.76–1.27)
EOSINOPHIL # BLD AUTO: 0.35 10*3/MM3 (ref 0–0.4)
EOSINOPHIL NFR BLD AUTO: 3 % (ref 0.3–6.2)
ERYTHROCYTE [DISTWIDTH] IN BLOOD BY AUTOMATED COUNT: 15 % (ref 12.3–15.4)
FOLATE SERPL-MCNC: >20 NG/ML (ref 4.78–24.2)
GLOBULIN SER CALC-MCNC: 3.3 GM/DL
GLUCOSE SERPL-MCNC: 193 MG/DL (ref 65–99)
HBA1C MFR BLD: 7.2 % (ref 4.8–5.6)
HCT VFR BLD AUTO: 38.1 % (ref 37.5–51)
HDLC SERPL-MCNC: 34 MG/DL (ref 40–60)
HGB BLD-MCNC: 12.8 G/DL (ref 13–17.7)
IMM GRANULOCYTES # BLD AUTO: 0.06 10*3/MM3 (ref 0–0.05)
IMM GRANULOCYTES NFR BLD AUTO: 0.5 % (ref 0–0.5)
LDLC SERPL CALC-MCNC: 114 MG/DL (ref 0–100)
LYMPHOCYTES # BLD AUTO: 3.17 10*3/MM3 (ref 0.7–3.1)
LYMPHOCYTES NFR BLD AUTO: 26.9 % (ref 19.6–45.3)
MAGNESIUM SERPL-MCNC: 1.9 MG/DL (ref 1.6–2.4)
MCH RBC QN AUTO: 30.6 PG (ref 26.6–33)
MCHC RBC AUTO-ENTMCNC: 33.6 G/DL (ref 31.5–35.7)
MCV RBC AUTO: 91.1 FL (ref 79–97)
MONOCYTES # BLD AUTO: 0.65 10*3/MM3 (ref 0.1–0.9)
MONOCYTES NFR BLD AUTO: 5.5 % (ref 5–12)
NEUTROPHILS # BLD AUTO: 7.45 10*3/MM3 (ref 1.7–7)
NEUTROPHILS NFR BLD AUTO: 63.3 % (ref 42.7–76)
PLATELET # BLD AUTO: 190 10*3/MM3 (ref 140–450)
POTASSIUM SERPL-SCNC: 5.4 MMOL/L (ref 3.5–5.2)
PROT SERPL-MCNC: 7.2 G/DL (ref 6–8.5)
PTH-INTACT SERPL-MCNC: 39 PG/ML (ref 15–65)
RBC # BLD AUTO: 4.18 10*6/MM3 (ref 4.14–5.8)
SODIUM SERPL-SCNC: 141 MMOL/L (ref 136–145)
T4 FREE SERPL-MCNC: 0.99 NG/DL (ref 0.93–1.7)
TRIGL SERPL-MCNC: 296 MG/DL (ref 0–150)
TSH SERPL DL<=0.005 MIU/L-ACNC: 3.04 UIU/ML (ref 0.27–4.2)
URATE SERPL-MCNC: 4.7 MG/DL (ref 3.4–7)
VIT B12 SERPL-MCNC: 575 PG/ML (ref 211–946)
VLDLC SERPL CALC-MCNC: 51 MG/DL (ref 5–40)
WBC # BLD AUTO: 11.78 10*3/MM3 (ref 3.4–10.8)

## 2021-12-10 DIAGNOSIS — R79.89 ABNORMAL CBC: ICD-10-CM

## 2021-12-10 DIAGNOSIS — N17.9 ACUTE RENAL FAILURE SUPERIMPOSED ON STAGE 3B CHRONIC KIDNEY DISEASE, UNSPECIFIED ACUTE RENAL FAILURE TYPE (HCC): Primary | ICD-10-CM

## 2021-12-10 DIAGNOSIS — N18.32 ACUTE RENAL FAILURE SUPERIMPOSED ON STAGE 3B CHRONIC KIDNEY DISEASE, UNSPECIFIED ACUTE RENAL FAILURE TYPE (HCC): Primary | ICD-10-CM

## 2021-12-15 LAB
ALBUMIN MFR UR ELPH: 40.7 %
ALBUMIN SERPL ELPH-MCNC: 3.3 G/DL (ref 2.9–4.4)
ALBUMIN SERPL-MCNC: 4.2 G/DL (ref 3.6–4.6)
ALBUMIN/GLOB SERPL: 0.9 {RATIO} (ref 0.7–1.7)
ALPHA1 GLOB MFR UR ELPH: 1.6 %
ALPHA1 GLOB SERPL ELPH-MCNC: 0.2 G/DL (ref 0–0.4)
ALPHA2 GLOB MFR UR ELPH: 10.7 %
ALPHA2 GLOB SERPL ELPH-MCNC: 1 G/DL (ref 0.4–1)
APPEARANCE UR: ABNORMAL
B-GLOBULIN MFR UR ELPH: 29.4 %
B-GLOBULIN SERPL ELPH-MCNC: 1.4 G/DL (ref 0.7–1.3)
BACTERIA #/AREA URNS HPF: ABNORMAL /[HPF]
BASOPHILS # BLD AUTO: 0.1 X10E3/UL (ref 0–0.2)
BASOPHILS NFR BLD AUTO: 1 %
BILIRUB UR QL STRIP: NEGATIVE
BUN SERPL-MCNC: 29 MG/DL (ref 8–27)
BUN/CREAT SERPL: 19 (ref 10–24)
CALCIUM SERPL-MCNC: 9.4 MG/DL (ref 8.6–10.2)
CASTS URNS QL MICRO: ABNORMAL /LPF
CHLORIDE SERPL-SCNC: 103 MMOL/L (ref 96–106)
CO2 SERPL-SCNC: 22 MMOL/L (ref 20–29)
COLOR UR: ABNORMAL
CREAT SERPL-MCNC: 1.56 MG/DL (ref 0.76–1.27)
EOSINOPHIL # BLD AUTO: 0.5 X10E3/UL (ref 0–0.4)
EOSINOPHIL NFR BLD AUTO: 5 %
EPI CELLS #/AREA URNS HPF: ABNORMAL /HPF (ref 0–10)
ERYTHROCYTE [DISTWIDTH] IN BLOOD BY AUTOMATED COUNT: 15.2 % (ref 11.6–15.4)
GAMMA GLOB MFR UR ELPH: 17.5 %
GAMMA GLOB SERPL ELPH-MCNC: 1.3 G/DL (ref 0.4–1.8)
GLOBULIN SER-MCNC: 3.9 G/DL (ref 2.2–3.9)
GLUCOSE SERPL-MCNC: 266 MG/DL (ref 65–99)
GLUCOSE UR QL: ABNORMAL
HCT VFR BLD AUTO: 39.9 % (ref 37.5–51)
HGB BLD-MCNC: 13.1 G/DL (ref 13–17.7)
HGB UR QL STRIP: ABNORMAL
IGA SERPL-MCNC: 617 MG/DL (ref 61–437)
IGG SERPL-MCNC: 1107 MG/DL (ref 603–1613)
IGM SERPL-MCNC: 52 MG/DL (ref 15–143)
IMM GRANULOCYTES # BLD AUTO: 0.1 X10E3/UL (ref 0–0.1)
IMM GRANULOCYTES NFR BLD AUTO: 1 %
INTERPRETATION SERPL IEP-IMP: ABNORMAL
KAPPA LC FREE SER-MCNC: 51 MG/L (ref 3.3–19.4)
KAPPA LC FREE/LAMBDA FREE SER: 1.46 {RATIO} (ref 0.26–1.65)
KETONES UR QL STRIP: NEGATIVE
LABORATORY COMMENT REPORT: ABNORMAL
LABORATORY COMMENT REPORT: ABNORMAL
LAMBDA LC FREE SERPL-MCNC: 34.9 MG/L (ref 5.7–26.3)
LEUKOCYTE ESTERASE UR QL STRIP: ABNORMAL
LYMPHOCYTES # BLD AUTO: 3.5 X10E3/UL (ref 0.7–3.1)
LYMPHOCYTES NFR BLD AUTO: 32 %
M PROTEIN MFR UR ELPH: 6.3 %
M PROTEIN SERPL ELPH-MCNC: ABNORMAL G/DL
MCH RBC QN AUTO: 31 PG (ref 26.6–33)
MCHC RBC AUTO-ENTMCNC: 32.8 G/DL (ref 31.5–35.7)
MCV RBC AUTO: 94 FL (ref 79–97)
MICRO URNS: ABNORMAL
MONOCYTES # BLD AUTO: 0.5 X10E3/UL (ref 0.1–0.9)
MONOCYTES NFR BLD AUTO: 5 %
NEUTROPHILS # BLD AUTO: 6.2 X10E3/UL (ref 1.4–7)
NEUTROPHILS NFR BLD AUTO: 56 %
NITRITE UR QL STRIP: NEGATIVE
PATH INTERP BLD-IMP: ABNORMAL
PATH REV BLD -IMP: ABNORMAL
PATHOLOGIST NAME: ABNORMAL
PH UR STRIP: 5.5 [PH] (ref 5–7.5)
PHOSPHATE SERPL-MCNC: 3.3 MG/DL (ref 2.8–4.1)
PLATELET # BLD AUTO: 208 X10E3/UL (ref 150–450)
POTASSIUM SERPL-SCNC: 4.7 MMOL/L (ref 3.5–5.2)
PROT SERPL-MCNC: 7.2 G/DL (ref 6–8.5)
PROT UR QL STRIP: ABNORMAL
PROT UR-MCNC: 49.7 MG/DL
RBC # BLD AUTO: 4.23 X10E6/UL (ref 4.14–5.8)
RBC #/AREA URNS HPF: >30 /HPF (ref 0–2)
SODIUM SERPL-SCNC: 142 MMOL/L (ref 134–144)
SP GR UR: 1.01 (ref 1–1.03)
UROBILINOGEN UR STRIP-MCNC: 0.2 MG/DL (ref 0.2–1)
WBC # BLD AUTO: 10.9 X10E3/UL (ref 3.4–10.8)
WBC #/AREA URNS HPF: >30 /HPF (ref 0–5)

## 2021-12-16 DIAGNOSIS — N17.9 ACUTE RENAL FAILURE SUPERIMPOSED ON STAGE 3B CHRONIC KIDNEY DISEASE, UNSPECIFIED ACUTE RENAL FAILURE TYPE (HCC): ICD-10-CM

## 2021-12-16 DIAGNOSIS — R80.9 PROTEINURIA, UNSPECIFIED TYPE: Primary | ICD-10-CM

## 2021-12-16 DIAGNOSIS — R77.8 ABNORMAL SPEP: ICD-10-CM

## 2021-12-16 DIAGNOSIS — N18.32 ACUTE RENAL FAILURE SUPERIMPOSED ON STAGE 3B CHRONIC KIDNEY DISEASE, UNSPECIFIED ACUTE RENAL FAILURE TYPE (HCC): ICD-10-CM

## 2021-12-16 DIAGNOSIS — R79.89 ABNORMAL CBC: ICD-10-CM

## 2021-12-16 NOTE — PROGRESS NOTES
I am going to make notes on labs on MyChart but make sure patient (I think granddaughter? Helps with care?) knows. There are some abnormal findings on his protein levels, with the kidney issues I worry we're starting to see signs of a myeloma or blood cancer. Also, very important, find out who is urologist ([place] I think) as his urine is worrisome for infection. I don't think a culture will be done based on how I ordered. Result needs to be sent up to urology.

## 2021-12-17 ENCOUNTER — TELEPHONE (OUTPATIENT)
Dept: INTERNAL MEDICINE | Facility: CLINIC | Age: 86
End: 2021-12-17

## 2021-12-17 NOTE — TELEPHONE ENCOUNTER
----- Message from Blayne Horan MA sent at 12/17/2021  9:35 AM EST -----  Called pt daughter verbally informed. She did have some concerns though, she is afraid that if cancer is mentioned to the pt it will be very bad due to his sister recently dying of cancer, she is wondering if its possible to not mention it until it is fully diagnosed. I did explain that we couldn't really control what was discussed once he sees hematology but she wanted us to try and mention it to hematology, to see if they would avoid using the word cancer if possible since it is such a sensitive topic for the pt. Please advise.

## 2021-12-22 ENCOUNTER — OFFICE VISIT (OUTPATIENT)
Dept: CARDIOLOGY | Facility: CLINIC | Age: 86
End: 2021-12-22

## 2021-12-22 VITALS
HEIGHT: 69 IN | WEIGHT: 221 LBS | OXYGEN SATURATION: 95 % | SYSTOLIC BLOOD PRESSURE: 94 MMHG | HEART RATE: 63 BPM | DIASTOLIC BLOOD PRESSURE: 60 MMHG | BODY MASS INDEX: 32.73 KG/M2

## 2021-12-22 DIAGNOSIS — I25.10 CORONARY ARTERY DISEASE INVOLVING NATIVE CORONARY ARTERY OF NATIVE HEART WITHOUT ANGINA PECTORIS: Primary | ICD-10-CM

## 2021-12-22 DIAGNOSIS — I10 ESSENTIAL HYPERTENSION: ICD-10-CM

## 2021-12-22 DIAGNOSIS — I49.5 SINUS NODE DYSFUNCTION (HCC): ICD-10-CM

## 2021-12-22 DIAGNOSIS — I48.0 PAROXYSMAL ATRIAL FIBRILLATION (HCC): ICD-10-CM

## 2021-12-22 DIAGNOSIS — I50.22 CHRONIC SYSTOLIC CONGESTIVE HEART FAILURE (HCC): ICD-10-CM

## 2021-12-22 DIAGNOSIS — Z95.0 CARDIAC PACEMAKER IN SITU: ICD-10-CM

## 2021-12-22 PROCEDURE — 93280 PM DEVICE PROGR EVAL DUAL: CPT | Performed by: INTERNAL MEDICINE

## 2021-12-22 PROCEDURE — 99213 OFFICE O/P EST LOW 20 MIN: CPT | Performed by: INTERNAL MEDICINE

## 2021-12-22 NOTE — PROGRESS NOTES
Subjective:     Encounter Date:12/22/2021      Patient ID: Forrest Zepeda is a 89 y.o. male.    Chief Complaint: Congestive heart failure  HPI  This is an 89-year-old male patient who presents to cardiology clinic for routine follow-up and pacemaker interrogation.  The patient has a [employer] dual-chamber rate responsive pacemaker model number: L311.  He is paced 57% of the time the right atrium and 43% of the time the right ventricle.  Right atrial and right ventricular lead interrogation shows normal function based on sensing, pacing threshold and lead impedance.  He has had less than 1% atrial fibrillation with the longest episode lasting for 3 minutes on July 3 of this year.  He has had no high heart rate events.  He reports compliance with his medications with no perceived side effects.  He is a non-smoker.  He remains active and ambulates with a cane.  He has had no exertional symptoms or limitations.  The following portions of the patient's history were reviewed and updated as appropriate: allergies, current medications, past family history, past medical history, past social history, past surgical history and problem  Review of Systems   Constitutional: Negative for chills, diaphoresis, fever, malaise/fatigue, weight gain and weight loss.   HENT: Negative for ear discharge, hearing loss, hoarse voice and nosebleeds.    Eyes: Negative for discharge, double vision, pain and photophobia.   Cardiovascular: Negative for chest pain, claudication, cyanosis, dyspnea on exertion, irregular heartbeat, leg swelling, near-syncope, orthopnea, palpitations, paroxysmal nocturnal dyspnea and syncope.   Respiratory: Negative for cough, hemoptysis, shortness of breath, sputum production and wheezing.    Endocrine: Negative for cold intolerance, heat intolerance, polydipsia, polyphagia and polyuria.   Hematologic/Lymphatic: Negative for adenopathy and bleeding problem. Does not bruise/bleed easily.   Skin: Negative  for color change, flushing, itching and rash.   Musculoskeletal: Negative for muscle cramps, muscle weakness, myalgias and stiffness.   Gastrointestinal: Negative for abdominal pain, diarrhea, hematemesis, hematochezia, nausea and vomiting.   Genitourinary: Negative for dysuria, frequency and nocturia.   Neurological: Negative for focal weakness, loss of balance, numbness, paresthesias and seizures.   Psychiatric/Behavioral: Negative for altered mental status, hallucinations and suicidal ideas.   Allergic/Immunologic: Negative for HIV exposure, hives and persistent infections.           Current Outpatient Medications:   •  acetaminophen (TYLENOL) 500 MG tablet, Take 500 mg by mouth Every 6 (Six) Hours As Needed for Mild Pain ., Disp: , Rfl:   •  allopurinol (ZYLOPRIM) 300 MG tablet, Take 1 tablet by mouth Every Night., Disp: 90 tablet, Rfl: 3  •  apixaban (ELIQUIS) 2.5 MG tablet tablet, Take 1 tablet by mouth 2 (Two) Times a Day., Disp: 180 tablet, Rfl: 3  •  aspirin (aspirin) 81 MG EC tablet, Take 1 tablet by mouth Daily., Disp: 30 tablet, Rfl: 11  •  bisoprolol (ZEBeta) 5 MG tablet, Take 1 tablet by mouth Daily., Disp: 30 tablet, Rfl: 11  •  clotrimazole (LOTRIMIN) 1 % cream, Apply  topically to the appropriate area as directed 2 (Two) Times a Day. (Patient taking differently: Apply 1 application topically to the appropriate area as directed As Needed.), Disp: 45 g, Rfl: 0  •  Continuous Blood Gluc  (Dexcom G6 ) device, 1 each Continuous., Disp: 1 each, Rfl: 0  •  Continuous Blood Gluc Sensor (Dexcom G6 Sensor), Every 10 (Ten) Days. Apply as directed, Disp: 3 each, Rfl: 11  •  finasteride (PROSCAR) 5 MG tablet, Take 1 tablet by mouth Daily., Disp: 90 tablet, Rfl: 3  •  glimepiride (Amaryl) 1 MG tablet, Take 1 tablet by mouth Every Morning Before Breakfast. Indications: Type 2 Diabetes, Disp: 90 tablet, Rfl: 3  •  glucose blood (True Metrix Blood Glucose Test) test strip, 1 each by Other route  Daily. use to test blood sugar 4 times daily, Disp: 300 each, Rfl: 5  •  Insulin Glargine, 1 Unit Dial, (Toujeo SoloStar) 300 UNIT/ML solution pen-injector injection, Inject 15 Units under the skin into the appropriate area as directed Daily., Disp: 3 pen, Rfl: 3  •  isosorbide mononitrate (IMDUR) 30 MG 24 hr tablet, Take 1 tablet by mouth Every Morning., Disp: 90 tablet, Rfl: 3  •  levothyroxine (SYNTHROID, LEVOTHROID) 50 MCG tablet, TAKE ONE TABLET BY MOUTH DAILY, Disp: 90 tablet, Rfl: 2  •  losartan (Cozaar) 25 MG tablet, Take 1 tablet by mouth Daily., Disp: 90 tablet, Rfl: 3  •  Multiple Vitamins-Minerals (OCUVITE ADULT 50+ PO), Take 1 capsule by mouth Daily., Disp: , Rfl:   •  nitroglycerin (NITROSTAT) 0.4 MG SL tablet, 1 under the tongue as needed for angina, may repeat q5mins for up three doses, Disp: 100 tablet, Rfl: 11  •  NovoFine 32G X 6 MM misc, USE ONE DAILY, Disp: 100 each, Rfl: 3  •  Probiotic Product (Risaquad-2) capsule capsule, Take 1 capsule by mouth Daily., Disp: , Rfl:   •  silodosin (RAPAFLO) 8 MG capsule capsule, Take 1 capsule by mouth Daily With Breakfast., Disp: 90 capsule, Rfl: 3  •  SITagliptin (JANUVIA) 100 MG tablet, Take 1 tablet by mouth Daily., Disp: 90 tablet, Rfl: 3  •  triazolam (HALCION) 0.25 MG tablet, Take 1 tablet by mouth At Night As Needed for Sleep., Disp: 30 tablet, Rfl: 2    Objective:   Vitals and nursing note reviewed.   Constitutional:       Appearance: Healthy appearance. Not in distress.   Neck:      Vascular: No JVR. JVD normal.   Pulmonary:      Effort: Pulmonary effort is normal.      Breath sounds: Normal breath sounds. No wheezing. No rhonchi. No rales.   Chest:      Chest wall: Not tender to palpatation.   Cardiovascular:      PMI at left midclavicular line. Normal rate. Regular rhythm. Normal S1. Normal S2.      Murmurs: There is no murmur.      No gallop. No click. No rub.   Pulses:     Intact distal pulses.   Edema:     Peripheral edema absent.   Abdominal:  "     General: Bowel sounds are normal.      Palpations: Abdomen is soft.      Tenderness: There is no abdominal tenderness.   Musculoskeletal: Normal range of motion.         General: No tenderness. Skin:     General: Skin is warm and dry.   Neurological:      General: No focal deficit present.      Mental Status: Alert and oriented to person, place and time.       Blood pressure 94/60, pulse 63, height 175.3 cm (69\"), weight 100 kg (221 lb), SpO2 95 %.   Lab Review:     Assessment:       1. Coronary artery disease involving native coronary artery of native heart without angina pectoris  Stable and angina free.    2. Chronic systolic congestive heart failure (HCC)  Heart failure with reduced ejection fraction.  Stage C.  New York Heart Association functional class I symptoms.  Euvolemic.    3. Essential hypertension  Acceptable blood pressure control.    4. Paroxysmal atrial fibrillation (HCC)  Minimal atrial fibrillation burden with no prolonged events.  No bleeding complications related to Eliquis therapy.  No signs or symptoms to suggest stroke, TIA or other cardioembolic events.    5. Sinus node dysfunction (HCC)  Normal pacemaker function.    Procedures    Plan:     Advance Care Planning   ACP discussion was held with the patient during this visit. Patient has an advance directive (not in EMR), copy requested.  No changes in his medications have been made at today's visit.  No cardiovascular testing is warranted at this time.      "

## 2022-01-08 ENCOUNTER — PATIENT MESSAGE (OUTPATIENT)
Dept: INTERNAL MEDICINE | Facility: CLINIC | Age: 87
End: 2022-01-08

## 2022-01-08 DIAGNOSIS — M10.9 GOUTY ARTHRITIS: ICD-10-CM

## 2022-01-10 RX ORDER — ALLOPURINOL 300 MG/1
300 TABLET ORAL NIGHTLY
Qty: 90 TABLET | Refills: 3 | Status: SHIPPED | OUTPATIENT
Start: 2022-01-10 | End: 2023-01-09

## 2022-01-10 NOTE — TELEPHONE ENCOUNTER
From: Forrest Zepeda  To: Kary Wen MD  Sent: 1/8/2022 9:16 AM EST  Subject: Allopurinol    Bernardo is out of refills on his allopurinol and original script is under dr carmona. I contacted the pharmacy to send refill authorization to you, but wanted to email you in case they didn't send it. Hope you have a great day.

## 2022-01-13 ENCOUNTER — CONSULT (OUTPATIENT)
Dept: ONCOLOGY | Facility: CLINIC | Age: 87
End: 2022-01-13

## 2022-01-13 VITALS
DIASTOLIC BLOOD PRESSURE: 58 MMHG | HEIGHT: 69 IN | OXYGEN SATURATION: 95 % | WEIGHT: 225 LBS | BODY MASS INDEX: 33.33 KG/M2 | HEART RATE: 73 BPM | TEMPERATURE: 97.7 F | RESPIRATION RATE: 20 BRPM | SYSTOLIC BLOOD PRESSURE: 123 MMHG

## 2022-01-13 DIAGNOSIS — R77.8 ABNORMAL SPEP: Primary | ICD-10-CM

## 2022-01-13 PROCEDURE — 99204 OFFICE O/P NEW MOD 45 MIN: CPT | Performed by: INTERNAL MEDICINE

## 2022-01-13 NOTE — PROGRESS NOTES
New Patient Office Visit      Date: 2022     Patient Name: Forrest Zepeda  MRN: [id number]  : 1932  Referring Physician: Kary Wen    Chief Complaint: Tablets care for abnormal SPEP    History of Present Illness: Forrest Zepeda is a pleasant 89 y.o. male past medical history of gout, hypertension, hyperlipidemia, type 2 diabetes, hypothyroidism, CKD, A. fib, idiopathic pulmonary fibrosis, urethral stricture status post stent who presents today for evaluation of abnormal SPEP. The patient is accompanied by their son who contributes to the history of their care.  Patient was recently seen by his PCP who checked labs which included an SPEP with immunofixation and UPEP.  SPEP did not reveal any M spike or monoclonal component.  UPEP was notable for a mild M spike of 6.3.  Kappa/lambda light chains mildly elevated but ratio 1.4.  Calcium within normal limits, hemoglobin within normal limits, creatinine slightly elevated but at baseline for the patient.  He denies any new bone or joint pains.  Denies any family history of multiple myeloma.  States that even if he were to have an abnormal protein in his blood, he would not want a PET scan or a bone marrow biopsy as he would not want to pursue any aggressive treatment given his age and medical comorbidities.  He states that at this time he feels well and is compliant with his home medications and has no other major concerns or complaints today    Oncology History:    Oncology/Hematology History    No history exists.       Subjective      Review of Systems:     Constitutional: Negative for fevers, chills, or weight loss  Eyes: Negative for blurred vision or discharge         Ear/Nose/Throat: Negative for difficulty swallowing, sore throat, LAD                                                       Respiratory: Negative for cough, SOA, wheezing                                                                                        Cardiovascular: Negative for  "chest pain or palpitations                                                                  Gastrointestinal: Negative for nausea, vomiting or diarrhea                                                                     Genitourinary: Negative for dysuria or hematuria                                                                                           Musculoskeletal: Negative for any joint pains or muscle aches                                                                        Neurologic: Negative for any weakness, headaches, dizziness                                                                         Hematologic: Negative for any easy bleeding or bruising                                                                                   Psychiatric: Negative for anxiety or depression                             Past Medical History:   Past Medical History:   Diagnosis Date   • Abnormal EKG    • Abnormal PSA     Reported abnormal   • Acute deep vein thrombosis (DVT) of right lower extremity ([facility]) 08/22/2019   • Acute diverticulitis 2019   • Acute hyperkalemia 08/22/2019   • Acute respiratory failure with hypoxia ([facility])    • Acute saddle pulmonary embolism with acute cor pulmonale ([facility]) 08/22/2019   • Acute systolic right heart failure ([facility]) 08/22/2019   • Arthritis     KNEES,  BUT SINCE HAD REPLACEMENT   • Benign localized hyperplasia of prostate    • Bilateral knee pain    • C. difficile diarrhea 2010   • Cataracts, bilateral    • CKD (chronic kidney disease)    • Diabetic neuropathy (HCC)    • Diabetic neuropathy (HCC)    • Disease of thyroid gland     HYPOTHYROIDISM   • Diverticulitis    • Dyslipidemia     H/O   • Family history of malignant hyperthermia     Brother    • Full dentures    • Generalized weakness    • History of gout    • History of hepatitis A vaccination    • History of nephrolithiasis    • History of nuclear stress test     STATES IN THE 1990'S.  \"IT WAS OK\"   • History of osteopenia "    • History of recurrent UTI (urinary tract infection)    • Hydronephrosis of left kidney 7/18/2019   • Hydronephrosis with renal and ureteral calculus obstruction 10/21/2019    Added automatically from request for surgery [id number]   • Hydronephrosis with ureteral stricture    • Hypercholesterolemia    • Hyperkalemia     PT UNSURE REGARDING HX OF THIS   • Hyperlipidemia    • Hypertension    • Impaired functional mobility, balance, gait, and endurance    • Impaired functional mobility, balance, gait, and endurance    • Insomnia    • Malignant hyperthermia due to anesthesia     PER PT REPORT, BROTHER HAD DURING LUNG SURGERY SEVERAL YEARS AGO.  STATED THEY PACKED HIM ON ICE.  STATED HE DID SURVIVE.  PT DENIES ANY PERSONAL HX OF MALIGNANT HYPERTHERMIA HIMSELF, OR ANY ISSUES WITH ANESTHESIA.  STATES TO HIS KNOWLEDGE, NO OTHER FAMILY MEMBER HAS THIS ISSUE EXCEPT FOR HIS BROTHER.   • On supplemental oxygen therapy     2L [place] [facility]   • Other hydronephrosis 8/12/2019    Added automatically from request for surgery [id number]   • Renal insufficiency    • Sepsis (HCC) 2019   • Shortness of breath     STATES WITH EXERTION, OTHERWISE, DENIES   • Sigmoid diverticulitis without abscess or perforation 8/9/2017   • Skin breakdown     fourth toe right foot noted in 2019 MD D/C note   • Skin ulcer of fourth toe of right foot, limited to breakdown of skin (HCC) 7/5/2019   • Stricture of ureter    • Type 2 diabetes mellitus (HCC)    • Ureteral stricture, left    • UTI (urinary tract infection) 7/18/2019   • Vitamin D deficiency    • Wears glasses        Past Surgical History:   Past Surgical History:   Procedure Laterality Date   • APPENDECTOMY     • CARDIAC ELECTROPHYSIOLOGY PROCEDURE N/A 12/23/2020    Procedure: Pacemaker DC new. DNS meds.;  Surgeon: Jimy Ledezma DO;  Location: [facility] INVASIVE LOCATION;  Service: Cardiology;  Laterality: N/A;   • CHOLECYSTECTOMY     • CIRCUMCISION N/A 10/23/2019    Procedure: CIRCUMCISIOn-DORSAL SLIT;   Surgeon: Griffin Romero MD;  Location: [facility] OR;  Service: Urology   • COLONOSCOPY     • CYSTOSCOPY URETEROSCOPY Left 2019    Procedure: URETEROSCOPY WITH STENT EXTRACTION, RPG;  Surgeon: Griffin Romero MD;  Location: [facility] OR;  Service: Urology   • CYSTOSCOPY W/ URETERAL STENT PLACEMENT Left 2019    Procedure: CYSTOSCOPY, LEFT RETROGRADE PYELOGRAM,  ATTEMPTED LEFT URETERAL STENT INSERTION;  Surgeon: Isaac Flynn MD;  Location: [facility] OR;  Service: Urology   • EYE SURGERY      CATARACTS REMOVED BILATERALLY   • KNEE ARTHROPLASTY Bilateral    • KNEE ARTHROSCOPY Bilateral    • RENAL ARTERY STENT      SEVERAL TIMES EVERY SINCE    • URETERAL STENT INSERTION  10/2020   • URETEROSCOPY LASER LITHOTRIPSY WITH STENT INSERTION Left 1/10/2018    Procedure:  cysto, left ureteral stent replacement ;  Surgeon: Griffin Romero MD;  Location: [facility] OR;  Service:    • URETEROSCOPY LASER LITHOTRIPSY WITH STENT INSERTION Left 2019    Procedure: URETEROSCOPY, STONE REMOVAL WITH STENT INSERTION;  Surgeon: Griffin Romero MD;  Location: [facility] OR;  Service: Urology   • URETEROSCOPY LASER LITHOTRIPSY WITH STENT INSERTION Left 10/23/2019    Procedure: Left URETEROSCOPY WITH STENT INSERTION-LEFT;  Surgeon: Griffin Romero MD;  Location: [facility] OR;  Service: Urology       Family History:   Family History   Problem Relation Age of Onset   • Heart attack Sister    • Brain cancer Sister    • Stroke Sister    • Lung cancer Sister    • Hypertension Sister    • Brain cancer Brother    • Lung cancer Brother    • Malig Hyperthermia Brother        Social History:   Social History     Socioeconomic History   • Marital status:    Tobacco Use   • Smoking status: Former Smoker     Types: Cigarettes     Quit date: 1950     Years since quittin.0   • Smokeless tobacco: Never Used   • Tobacco comment: SMOKED SOME AS A TEEN, DENIES ANY HABIT OR ADDICTION   Substance and Sexual Activity   • Alcohol  use: No   • Drug use: No   • Sexual activity: Defer       Medications:     Current Outpatient Medications:   •  allopurinol (ZYLOPRIM) 300 MG tablet, Take 1 tablet by mouth Every Night., Disp: 90 tablet, Rfl: 3  •  apixaban (ELIQUIS) 2.5 MG tablet tablet, Take 1 tablet by mouth 2 (Two) Times a Day., Disp: 180 tablet, Rfl: 3  •  aspirin (aspirin) 81 MG EC tablet, Take 1 tablet by mouth Daily., Disp: 30 tablet, Rfl: 11  •  bisoprolol (ZEBeta) 5 MG tablet, Take 1 tablet by mouth Daily., Disp: 30 tablet, Rfl: 11  •  finasteride (PROSCAR) 5 MG tablet, Take 1 tablet by mouth Daily., Disp: 90 tablet, Rfl: 3  •  glimepiride (Amaryl) 1 MG tablet, Take 1 tablet by mouth Every Morning Before Breakfast. Indications: Type 2 Diabetes, Disp: 90 tablet, Rfl: 3  •  Insulin Glargine, 1 Unit Dial, (Toujeo SoloStar) 300 UNIT/ML solution pen-injector injection, Inject 15 Units under the skin into the appropriate area as directed Daily., Disp: 3 pen, Rfl: 3  •  isosorbide mononitrate (IMDUR) 30 MG 24 hr tablet, Take 1 tablet by mouth Every Morning., Disp: 90 tablet, Rfl: 3  •  levothyroxine (SYNTHROID, LEVOTHROID) 50 MCG tablet, TAKE ONE TABLET BY MOUTH DAILY, Disp: 90 tablet, Rfl: 2  •  losartan (Cozaar) 25 MG tablet, Take 1 tablet by mouth Daily., Disp: 90 tablet, Rfl: 3  •  Multiple Vitamins-Minerals (OCUVITE ADULT 50+ PO), Take 1 capsule by mouth Daily., Disp: , Rfl:   •  nitroglycerin (NITROSTAT) 0.4 MG SL tablet, 1 under the tongue as needed for angina, may repeat q5mins for up three doses, Disp: 100 tablet, Rfl: 11  •  NovoFine 32G X 6 MM misc, USE ONE DAILY, Disp: 100 each, Rfl: 3  •  silodosin (RAPAFLO) 8 MG capsule capsule, Take 1 capsule by mouth Daily With Breakfast., Disp: 90 capsule, Rfl: 3  •  SITagliptin (JANUVIA) 100 MG tablet, Take 1 tablet by mouth Daily., Disp: 90 tablet, Rfl: 3  •  triazolam (HALCION) 0.25 MG tablet, Take 1 tablet by mouth At Night As Needed for Sleep., Disp: 30 tablet, Rfl: 2  •  acetaminophen  "(TYLENOL) 500 MG tablet, Take 500 mg by mouth Every 6 (Six) Hours As Needed for Mild Pain ., Disp: , Rfl:   •  glucose blood (True Metrix Blood Glucose Test) test strip, 1 each by Other route Daily. use to test blood sugar 4 times daily, Disp: 300 each, Rfl: 5    Allergies:   Allergies   Allergen Reactions   • Metformin Diarrhea, GI Intolerance and Unknown - Low Severity     diahrea   • Statins Myalgia and Diarrhea       Objective     Physical Exam:  Vital Signs:   Vitals:    01/13/22 1516   BP: 123/58   Pulse: 73   Resp: 20   Temp: 97.7 °F (36.5 °C)   TempSrc: Temporal   SpO2: 95%   Weight: 102 kg (225 lb)   Height: 175.3 cm (69\")   PainSc: 0-No pain     Pain Score    01/13/22 1516   PainSc: 0-No pain     ECOG Performance Status: 1 - Symptomatic but completely ambulatory    Constitutional: NAD, ECOG 1  Eyes: PERRLA, scleral anicteric  ENT: No LAD, no thyromegaly  Respiratory: CTAB, no wheezing, rales, rhonchi  Cardiovascular: RRR, no murmurs, pulses 2+ bilaterally  Abdomen: soft, NT/ND, no HSM  Musculoskeletal: strength 5/5 bilaterally, no c/c/e  Neurologic: A&O x 3, CN II-XII intact grossly  Psych: mood and affect congruent, no SI or HI    Results Review:   No visits with results within 2 Week(s) from this visit.   Latest known visit with results is:   Orders Only on 12/10/2021   Component Date Value Ref Range Status   • IgG 12/13/2021 1,107  603 - 1,613 mg/dL Final   • IgA 12/13/2021 617* 61 - 437 mg/dL Final   • IgM 12/13/2021 52  15 - 143 mg/dL Final   • Total Protein 12/13/2021 7.2  6.0 - 8.5 g/dL Final   • Albumin 12/13/2021 3.3  2.9 - 4.4 g/dL Final   • Alpha-1-Globulin 12/13/2021 0.2  0.0 - 0.4 g/dL Final   • Alpha-2-Globulin 12/13/2021 1.0  0.4 - 1.0 g/dL Final   • Beta Globulin 12/13/2021 1.4* 0.7 - 1.3 g/dL Final   • Gamma Globulin 12/13/2021 1.3  0.4 - 1.8 g/dL Final   • M-Andrew 12/13/2021 Not Observed  Not Observed g/dL Final   • Globulin 12/13/2021 3.9  2.2 - 3.9 g/dL Final   • A/G Ratio 12/13/2021 " 0.9  0.7 - 1.7 Final   • Immunofixation Reflex, Serum 12/13/2021 Comment   Final    No monoclonality detected.   • Please note 12/13/2021 Comment   Final    Comment: Protein electrophoresis scan will follow via computer, mail, or   delivery.     • Free Light Chain, [place] 12/13/2021 51.0* 3.3 - 19.4 mg/L Final   • Free Lambda Light Chains 12/13/2021 34.9* 5.7 - 26.3 mg/L Final   • Kappa/Lambda Ratio 12/13/2021 1.46  0.26 - 1.65 Final   • Total Protein, Urine 12/13/2021 49.7  Not Estab. mg/dL Final   • Albumin, U 12/13/2021 40.7  % Final   • Alpha-1-Globulin, U 12/13/2021 1.6  % Final   • Alpha-2-Globulin, U 12/13/2021 10.7  % Final   • Beta Globulin, U 12/13/2021 29.4  % Final   • Gamma Globulin, Urine 12/13/2021 17.5  % Final   • M-Andrew 12/13/2021 6.3* Not Observed % Final   • Please note 12/13/2021 Comment   Final    Comment: Protein electrophoresis scan will follow via computer, mail, or   delivery.     • WBC 12/13/2021 Comment   Final    Comment: Lymphocytosis with reactive changes, suggestive of viral  process.  Eosinophilia. This can be seen in allergic reactions (acute  or chronic), drug therapy, or parasite infestation. Other  associations include solid tumor, hematologic neoplasms,  hyper-eosinophilic syndromes or idiopathic causes.     • RBC 12/13/2021 Comment   Final    RBC morphology is normal.   • Platelets 12/13/2021 Comment   Final    Platelet morphology appears normal.   • Comment 12/13/2021 Comment   Final    Comment: Suggest repeat study after resolution of clinical symptoms  to assure return of values to baseline levels.     • Pathologist Review 12/13/2021 Comment   Final    Reviewed by: José Bacon MD, Pathologist   • WBC 12/13/2021 10.9* 3.4 - 10.8 x10E3/uL Final   • RBC 12/13/2021 4.23  4.14 - 5.80 x10E6/uL Final   • Hemoglobin 12/13/2021 13.1  13.0 - 17.7 g/dL Final   • Hematocrit 12/13/2021 39.9  37.5 - 51.0 % Final   • MCV 12/13/2021 94  79 - 97 fL Final   • MCH 12/13/2021 31.0   26.6 - 33.0 pg Final   • MCHC 12/13/2021 32.8  31.5 - 35.7 g/dL Final   • RDW 12/13/2021 15.2  11.6 - 15.4 % Final   • Platelets 12/13/2021 208  150 - 450 x10E3/uL Final   • Neutrophil Rel % 12/13/2021 56  Not Estab. % Final   • Lymphocyte Rel % 12/13/2021 32  Not Estab. % Final   • Monocyte Rel % 12/13/2021 5  Not Estab. % Final   • Eosinophil Rel % 12/13/2021 5  Not Estab. % Final   • Basophil Rel % 12/13/2021 1  Not Estab. % Final   • Neutrophils Absolute 12/13/2021 6.2  1.4 - 7.0 x10E3/uL Final   • Lymphocytes Absolute 12/13/2021 3.5* 0.7 - 3.1 x10E3/uL Final   • Monocytes Absolute 12/13/2021 0.5  0.1 - 0.9 x10E3/uL Final   • Eosinophils Absolute 12/13/2021 0.5* 0.0 - 0.4 x10E3/uL Final   • Basophils Absolute 12/13/2021 0.1  0.0 - 0.2 x10E3/uL Final   • Immature Granulocyte Rel % 12/13/2021 1  Not Estab. % Final   • Immature Grans Absolute 12/13/2021 0.1  0.0 - 0.1 x10E3/uL Final   • Specific Gravity, UA 12/13/2021 1.014  1.005 - 1.030 Final   • pH, UA 12/13/2021 5.5  5.0 - 7.5 Final   • Color, UA 12/13/2021 Red* Yellow Final   • Appearance, UA 12/13/2021 Cloudy* Clear Final   • Leukocytes, UA 12/13/2021 3+* Negative Final   • Protein 12/13/2021 1+* Negative/Trace Final   • Glucose, UA 12/13/2021 Trace* Negative Final   • Ketones 12/13/2021 Negative  Negative Final   • Blood, UA 12/13/2021 3+* Negative Final   • Bilirubin, UA 12/13/2021 Negative  Negative Final   • Urobilinogen, UA 12/13/2021 0.2  0.2 - 1.0 mg/dL Final   • Nitrite, UA 12/13/2021 Negative  Negative Final   • Microscopic Examination 12/13/2021 See below:   Final    Microscopic was indicated and was performed.   • Glucose 12/13/2021 266* 65 - 99 mg/dL Final   • BUN 12/13/2021 29* 8 - 27 mg/dL Final   • Creatinine 12/13/2021 1.56* 0.76 - 1.27 mg/dL Final   • eGFR Non  Am 12/13/2021 39* >59 mL/min/1.73 Final   • eGFR African Am 12/13/2021 45* >59 mL/min/1.73 Final    Comment: **In accordance with recommendations from the NKF-ASN Task  force,**    [facility] is in the process of updating its eGFR calculation to the    2021 CKD-EPI creatinine equation that estimates kidney function    without a race variable.     • BUN/Creatinine Ratio 12/13/2021 19  10 - 24 Final   • Sodium 12/13/2021 142  134 - 144 mmol/L Final   • Potassium 12/13/2021 4.7  3.5 - 5.2 mmol/L Final   • Chloride 12/13/2021 103  96 - 106 mmol/L Final   • Total CO2 12/13/2021 22  20 - 29 mmol/L Final   • Calcium 12/13/2021 9.4  8.6 - 10.2 mg/dL Final   • Phosphorus 12/13/2021 3.3  2.8 - 4.1 mg/dL Final   • Albumin 12/13/2021 4.2  3.6 - 4.6 g/dL Final   • WBC, UA 12/13/2021 >30* 0 - 5 /hpf Final   • RBC, UA 12/13/2021 >30* 0 - 2 /hpf Final   • Epithelial Cells (non renal) 12/13/2021 0-10  0 - 10 /hpf Final   • Casts 12/13/2021 None seen  None seen /lpf Final   • Bacteria, UA 12/13/2021 Many* None seen/Few Final       No results found.    Assessment / Plan      Assessment/Plan:   1. Abnormal SPEP/UPEP (Primary)  -Noted on recent labs with a UPEP noting a mild M spike of 6.3  -SPEP with immunofixation showing no M spike or monoclonal protein  -Kappa/lambda free light chains mildly elevated but ratio 1.4  -No significant CRAB criteria on review of labs from December 2021  -At this point time, there is a low concern for plasma cell dyscrasia  -There is no indication for bone marrow biopsy or PET/CT  -Would avoid further monitoring with SPEP and UPEP's as the patient would not want any further work-up including bone marrow biopsy or PET/CT should he have evidence of a monoclonal protein on SPEP in the future    2.  Hypertension  - Blood pressure stable in clinic today on his current antihypertensive regimen      Follow Up:   Follow-up as needed     Misha Knight MD  Hematology and Oncology     Please note that portions of this note may have been completed with a voice recognition program. Efforts were made to edit the dictations, but occasionally words are mistranscribed.

## 2022-01-18 NOTE — OUTREACH NOTE
From: Isíaas Santiago  To: Joo Guillermo MD  Sent: 1/17/2022 8:50 PM CST  Subject: CBC test results     Hi Yris Boys,    I saw my wbc is high, platelets and Hb are low after delivery. Are these values are fine after postpartum?     Also, I am having Medical Week 4 Survey      Responses   Facility patient discharged from?  [facility]   Does the patient have one of the following disease processes/diagnoses(primary or secondary)?  Other   Week 4 attempt successful?  Yes   Call start time  1447   Call end time  1448   Discharge diagnosis  Acute diverticulitis,    Fungal UTI with chronic left hydronephrosis with associated left ureteral stricture and renal insufficiency   Is patient permission given to speak with other caregiver?  No   Meds reviewed with patient/caregiver?  Yes   Is the patient taking all medications as directed (includes completed medication regime)?  Yes   Has the patient kept scheduled appointments due by today?  Yes   Is the patient still receiving Home Health Services?  N/A   What is the patient's perception of their health status since discharge?  Improving   Week 4 Call Completed?  Yes   Would the patient like one additional call?  No   Graduated  Yes   Did the patient feel the follow up calls were helpful during their recovery period?  Yes   Was the number of calls appropriate?  Yes   Wrap up additional comments  Has no pain. Feeling much better. No questions or complaints.           Opal Corado RN

## 2022-03-07 ENCOUNTER — OFFICE VISIT (OUTPATIENT)
Dept: INTERNAL MEDICINE | Facility: CLINIC | Age: 87
End: 2022-03-07

## 2022-03-07 VITALS
WEIGHT: 227.2 LBS | HEART RATE: 62 BPM | HEIGHT: 69 IN | SYSTOLIC BLOOD PRESSURE: 132 MMHG | BODY MASS INDEX: 33.65 KG/M2 | RESPIRATION RATE: 16 BRPM | OXYGEN SATURATION: 95 % | DIASTOLIC BLOOD PRESSURE: 62 MMHG | TEMPERATURE: 97.1 F

## 2022-03-07 DIAGNOSIS — I10 ESSENTIAL HYPERTENSION: ICD-10-CM

## 2022-03-07 DIAGNOSIS — E03.9 ACQUIRED HYPOTHYROIDISM: ICD-10-CM

## 2022-03-07 DIAGNOSIS — E11.65 TYPE 2 DIABETES MELLITUS WITH HYPERGLYCEMIA, WITH LONG-TERM CURRENT USE OF INSULIN: Primary | ICD-10-CM

## 2022-03-07 DIAGNOSIS — R39.89 ABNORMAL URINE COLOR: ICD-10-CM

## 2022-03-07 DIAGNOSIS — Z79.4 TYPE 2 DIABETES MELLITUS WITH HYPERGLYCEMIA, WITH LONG-TERM CURRENT USE OF INSULIN: Primary | ICD-10-CM

## 2022-03-07 DIAGNOSIS — I27.20 PULMONARY HYPERTENSION: ICD-10-CM

## 2022-03-07 LAB
BILIRUB BLD-MCNC: NEGATIVE MG/DL
CLARITY, POC: ABNORMAL
COLOR UR: ABNORMAL
EXPIRATION DATE: ABNORMAL
EXPIRATION DATE: NORMAL
GLUCOSE UR STRIP-MCNC: NEGATIVE MG/DL
HBA1C MFR BLD: 7.9 %
KETONES UR QL: NEGATIVE
LEUKOCYTE EST, POC: ABNORMAL
Lab: ABNORMAL
Lab: NORMAL
NITRITE UR-MCNC: NEGATIVE MG/ML
PH UR: 5.5 [PH] (ref 5–8)
POC CREATININE URINE: 200
POC MICROALBUMIN URINE: 150
PROT UR STRIP-MCNC: ABNORMAL MG/DL
RBC # UR STRIP: ABNORMAL /UL
SP GR UR: 1.02 (ref 1–1.03)
UROBILINOGEN UR QL: NORMAL

## 2022-03-07 PROCEDURE — 81003 URINALYSIS AUTO W/O SCOPE: CPT | Performed by: FAMILY MEDICINE

## 2022-03-07 PROCEDURE — 99214 OFFICE O/P EST MOD 30 MIN: CPT | Performed by: FAMILY MEDICINE

## 2022-03-07 PROCEDURE — 83036 HEMOGLOBIN GLYCOSYLATED A1C: CPT | Performed by: FAMILY MEDICINE

## 2022-03-07 PROCEDURE — 82044 UR ALBUMIN SEMIQUANTITATIVE: CPT | Performed by: FAMILY MEDICINE

## 2022-03-07 PROCEDURE — 3051F HG A1C>EQUAL 7.0%<8.0%: CPT | Performed by: FAMILY MEDICINE

## 2022-03-07 RX ORDER — LEVOTHYROXINE SODIUM 0.05 MG/1
50 TABLET ORAL DAILY
Qty: 90 TABLET | Refills: 3 | Status: SHIPPED | OUTPATIENT
Start: 2022-03-07 | End: 2022-07-14 | Stop reason: SDUPTHER

## 2022-03-07 RX ORDER — LOSARTAN POTASSIUM 25 MG/1
25 TABLET ORAL DAILY
Qty: 90 TABLET | Refills: 3 | Status: SHIPPED | OUTPATIENT
Start: 2022-03-07

## 2022-03-07 NOTE — ASSESSMENT & PLAN NOTE
Diabetes is worsening.   Continue current treatment regimen.  cut back slightly on carb intake  Diabetes will be reassessed in 4 months.

## 2022-03-07 NOTE — ASSESSMENT & PLAN NOTE
Encouraged more frequent use of supplemental oxygen to help with his dyspnea and improve quality of life.

## 2022-03-07 NOTE — PROGRESS NOTES
"Subjective    Forrest Zepeda is a 89 y.o. male here for:  Chief Complaint   Patient presents with   • Diabetes     3 month follow up, A1c       History per MA reviewed.     Likes sweets  Continues to ayoub shortness of breath  Has tried wearing oxygen more frequently and it helps         The following portions of the patient's history were reviewed and updated as appropriate: allergies, current medications, past family history, past medical history, past social history, past surgical history and problem list.    Review of Systems   Constitutional: Negative for fever.   Respiratory: Positive for shortness of breath.    Genitourinary: Negative for dysuria.   Psychiatric/Behavioral: Positive for sleep disturbance.       Objective   Visit Vitals  /62 (BP Location: Right arm, Patient Position: Sitting, Cuff Size: Adult)   Pulse 62   Temp 97.1 °F (36.2 °C) (Temporal)   Resp 16   Ht 175.3 cm (69\")   Wt 103 kg (227 lb 3.2 oz)   SpO2 95%   BMI 33.55 kg/m²       Physical Exam  Vitals and nursing note reviewed.   Constitutional:       General: He is not in acute distress.     Appearance: Normal appearance. He is well-developed and well-groomed. He is not ill-appearing, toxic-appearing or diaphoretic.      Interventions: Face mask in place.   HENT:      Head: Normocephalic and atraumatic.      Right Ear: Hearing normal.      Left Ear: Hearing normal.   Eyes:      General: Lids are normal. No scleral icterus.     Extraocular Movements: Extraocular movements intact.   Cardiovascular:      Rate and Rhythm: Normal rate and regular rhythm.   Pulmonary:      Effort: Pulmonary effort is normal. Tachypnea present.      Breath sounds: No wheezing, rhonchi or rales.   Musculoskeletal:      Cervical back: Neck supple.   Skin:     Coloration: Skin is not jaundiced or pale.   Neurological:      General: No focal deficit present.      Mental Status: He is alert and oriented to person, place, and time.   Psychiatric:         Attention " and Perception: Attention and perception normal.         Mood and Affect: Mood and affect normal.         Speech: Speech normal.         Behavior: Behavior normal. Behavior is cooperative.         Thought Content: Thought content normal.         Cognition and Memory: Cognition and memory normal.         Judgment: Judgment normal.         For medical decision making review of the following was required:  Lab Results   Component Value Date    HGBA1C 7.20 (H) 12/07/2021    HGBA1C 6.5 08/04/2021    HGBA1C 7.20 (H) 04/15/2021     Lab Results   Component Value Date    TSH 3.040 12/07/2021     Lab Results   Component Value Date    FREET4 0.99 12/07/2021         Office Visit on 03/07/2022   Component Date Value Ref Range Status   • Hemoglobin A1C 03/07/2022 7.9  % Final   • Lot Number 03/07/2022 [id number]   Final   • Expiration Date 03/07/2022 09/13/2023   Final   • Microalbumin, Urine 03/07/2022 150   Final   • Creatinine, Urine 03/07/2022 200   Final   • Color 03/07/2022 Orange (A) Yellow, Straw, Dark Yellow, Sushila Final   • Clarity, UA 03/07/2022 Turbid (A) Clear Final   • Specific Gravity  03/07/2022 1.020  1.005 - 1.030 Final   • pH, Urine 03/07/2022 5.5  5.0 - 8.0 Final   • Leukocytes 03/07/2022 Large (3+) (A) Negative Final   • Nitrite, UA 03/07/2022 Negative  Negative Final   • Protein, POC 03/07/2022 1+ (A) Negative mg/dL Final   • Glucose, UA 03/07/2022 Negative  Negative, 1000 mg/dL (3+) mg/dL Final   • Ketones, UA 03/07/2022 Negative  Negative Final   • Urobilinogen, UA 03/07/2022 Normal  Normal Final   • Bilirubin 03/07/2022 Negative  Negative Final   • Blood, UA 03/07/2022 3+ (A) Negative Final   • Lot Number 03/07/2022 [id number]   Final   • Expiration Date 03/07/2022 10/21/2023   Final         Assessment/Plan     Problem List Items Addressed This Visit        Cardiac and Vasculature    Essential hypertension    Current Assessment & Plan     Hypertension is improving with treatment.  Continue current  treatment regimen.  Blood pressure will be reassessed at the next regular appointment.           Relevant Medications    losartan (Cozaar) 25 MG tablet       Endocrine and Metabolic    Acquired hypothyroidism    Relevant Medications    levothyroxine (SYNTHROID, LEVOTHROID) 50 MCG tablet    Type 2 diabetes mellitus with hyperglycemia, with long-term current use of insulin (HCC) - Primary    Current Assessment & Plan     Diabetes is worsening.   Continue current treatment regimen.  cut back slightly on carb intake  Diabetes will be reassessed in 4 months.           Relevant Orders    POC Glycosylated Hemoglobin (Hb A1C) (Completed)    POC Microalbumin (Completed)       Pulmonary and Pneumonias    Pulmonary hypertension (HCC)    Current Assessment & Plan     Encouraged more frequent use of supplemental oxygen to help with his dyspnea and improve quality of life.             Other Visit Diagnoses     Abnormal urine color        Relevant Orders    POC Urinalysis Dipstick, Automated (Completed)    Urine Culture - , Urine, Clean Catch            Return in about 4 months (around 7/7/2022) for Diabetes follow up.   Kary Wen MD

## 2022-03-12 LAB
BACTERIA UR CULT: ABNORMAL
BACTERIA UR CULT: ABNORMAL
OTHER ANTIBIOTIC SUSC ISLT: ABNORMAL

## 2022-03-14 RX ORDER — AMOXICILLIN AND CLAVULANATE POTASSIUM 875; 125 MG/1; MG/1
1 TABLET, FILM COATED ORAL 2 TIMES DAILY
Qty: 20 TABLET | Refills: 0 | Status: SHIPPED | OUTPATIENT
Start: 2022-03-14 | End: 2022-03-24

## 2022-03-21 RX ORDER — BISOPROLOL FUMARATE 5 MG/1
5 TABLET, FILM COATED ORAL DAILY
Qty: 30 TABLET | Refills: 11 | Status: SHIPPED | OUTPATIENT
Start: 2022-03-21

## 2022-03-21 RX ORDER — ISOSORBIDE MONONITRATE 30 MG/1
30 TABLET, EXTENDED RELEASE ORAL EVERY MORNING
Qty: 90 TABLET | Refills: 3 | Status: SHIPPED | OUTPATIENT
Start: 2022-03-21 | End: 2023-03-20 | Stop reason: SDUPTHER

## 2022-03-21 RX ORDER — ASPIRIN 81 MG/1
81 TABLET ORAL DAILY
Qty: 30 TABLET | Refills: 11 | Status: SHIPPED | OUTPATIENT
Start: 2022-03-21 | End: 2023-03-20 | Stop reason: SDUPTHER

## 2022-04-06 ENCOUNTER — OFFICE VISIT (OUTPATIENT)
Dept: UROLOGY | Facility: CLINIC | Age: 87
End: 2022-04-06

## 2022-04-06 VITALS — WEIGHT: 227 LBS | BODY MASS INDEX: 33.62 KG/M2 | HEIGHT: 69 IN

## 2022-04-06 DIAGNOSIS — R35.0 FREQUENCY OF URINATION: Primary | ICD-10-CM

## 2022-04-06 DIAGNOSIS — R31.9 URINARY TRACT INFECTION WITH HEMATURIA, SITE UNSPECIFIED: ICD-10-CM

## 2022-04-06 DIAGNOSIS — N39.0 URINARY TRACT INFECTION WITH HEMATURIA, SITE UNSPECIFIED: ICD-10-CM

## 2022-04-06 LAB
BILIRUB BLD-MCNC: NEGATIVE MG/DL
CLARITY, POC: ABNORMAL
COLOR UR: ABNORMAL
EXPIRATION DATE: ABNORMAL
GLUCOSE UR STRIP-MCNC: NEGATIVE MG/DL
KETONES UR QL: NEGATIVE
LEUKOCYTE EST, POC: ABNORMAL
Lab: ABNORMAL
NITRITE UR-MCNC: NEGATIVE MG/ML
PH UR: 5 [PH] (ref 5–8)
PROT UR STRIP-MCNC: ABNORMAL MG/DL
RBC # UR STRIP: ABNORMAL /UL
SP GR UR: 1.01 (ref 1–1.03)
UROBILINOGEN UR QL: NORMAL

## 2022-04-06 PROCEDURE — 99214 OFFICE O/P EST MOD 30 MIN: CPT | Performed by: UROLOGY

## 2022-04-06 PROCEDURE — 81003 URINALYSIS AUTO W/O SCOPE: CPT | Performed by: UROLOGY

## 2022-04-06 PROCEDURE — 87086 URINE CULTURE/COLONY COUNT: CPT | Performed by: UROLOGY

## 2022-04-06 PROCEDURE — 87186 SC STD MICRODIL/AGAR DIL: CPT | Performed by: UROLOGY

## 2022-04-06 RX ORDER — AMOXICILLIN AND CLAVULANATE POTASSIUM 875; 125 MG/1; MG/1
1 TABLET, FILM COATED ORAL 2 TIMES DAILY
Qty: 14 TABLET | Refills: 0 | Status: SHIPPED | OUTPATIENT
Start: 2022-04-06 | End: 2022-05-04

## 2022-04-06 NOTE — PROGRESS NOTES
Follow Up Office Visit      Patient Name: Forrest Zepeda  : 1932   MRN: [id number]     Chief Complaint:    Chief Complaint   Patient presents with   • Blood in Urine       Referring Provider: No ref. provider found    History of Present Illness: Forrest Zepeda is a 89 y.o. male who presents today for follow up of hydronephrosis.  He is managed with bare metal stents yearly.  He is here today after being treated for UTI.  He states he is not having any gross hematuria.  He feels his infection is still there.  He reports that his blood sugar has been difficult to control which is his usual sign when he has a UTI.  He was recently treated with 10 days of Augmentin based off of his urine culture from March.    Subjective      Review of System: Review of Systems   Constitutional: Negative for chills, fatigue, fever and unexpected weight change.   HENT: Negative for congestion, rhinorrhea and sore throat.    Eyes: Negative for visual disturbance.   Respiratory: Negative for apnea, cough, chest tightness and shortness of breath.    Cardiovascular: Negative for chest pain.   Gastrointestinal: Negative for abdominal pain, constipation, diarrhea, nausea and vomiting.   Endocrine: Negative for polydipsia and polyuria.   Genitourinary: Negative for difficulty urinating, dysuria, enuresis, flank pain, frequency, genital sores, hematuria and urgency.   Musculoskeletal: Negative for gait problem.   Skin: Negative for rash and wound.   Allergic/Immunologic: Negative for immunocompromised state.   Neurological: Negative for dizziness, tremors, syncope, weakness and light-headedness.   Hematological: Does not bruise/bleed easily.      I have reviewed the ROS documented by my clinical staff, I have updated appropriately and I agree. Deanne Pitts MD    I have reviewed and the following portions of the patient's history were updated as appropriate: past family history, past medical history, past social history, past surgical  "history and problem list.    Past Medical History:   Past Medical History:   Diagnosis Date   • Abnormal EKG    • Abnormal PSA     Reported abnormal   • Acute deep vein thrombosis (DVT) of right lower extremity (HCC) 08/22/2019   • Acute diverticulitis 2019   • Acute hyperkalemia 08/22/2019   • Acute respiratory failure with hypoxia (HCC)    • Acute saddle pulmonary embolism with acute cor pulmonale (HCC) 08/22/2019   • Acute systolic right heart failure (HCC) 08/22/2019   • Arthritis     KNEES,  BUT SINCE HAD REPLACEMENT   • Benign localized hyperplasia of prostate    • Bilateral knee pain    • C. difficile diarrhea 2010   • Cataracts, bilateral    • CKD (chronic kidney disease)    • Diabetic neuropathy (HCC)    • Diabetic neuropathy (HCC)    • Disease of thyroid gland     HYPOTHYROIDISM   • Diverticulitis    • Dyslipidemia     H/O   • Family history of malignant hyperthermia     Brother    • Full dentures    • Generalized weakness    • History of gout    • History of hepatitis A vaccination    • History of nephrolithiasis    • History of nuclear stress test     STATES IN THE 1990'S.  \"IT WAS OK\"   • History of osteopenia    • History of recurrent UTI (urinary tract infection)    • Hydronephrosis of left kidney 7/18/2019   • Hydronephrosis with renal and ureteral calculus obstruction 10/21/2019    Added automatically from request for surgery [id number]   • Hydronephrosis with ureteral stricture    • Hypercholesterolemia    • Hyperkalemia     PT UNSURE REGARDING HX OF THIS   • Hyperlipidemia    • Hypertension    • Impaired functional mobility, balance, gait, and endurance    • Impaired functional mobility, balance, gait, and endurance    • Insomnia    • Malignant hyperthermia due to anesthesia     PER PT REPORT, BROTHER HAD DURING LUNG SURGERY SEVERAL YEARS AGO.  STATED THEY PACKED HIM ON ICE.  STATED HE DID SURVIVE.  PT DENIES ANY PERSONAL HX OF MALIGNANT HYPERTHERMIA HIMSELF, OR ANY ISSUES WITH ANESTHESIA.  STATES TO " HIS KNOWLEDGE, NO OTHER FAMILY MEMBER HAS THIS ISSUE EXCEPT FOR HIS BROTHER.   • On supplemental oxygen therapy     2L [place] QHS   • Other hydronephrosis 8/12/2019    Added automatically from request for surgery [id number]   • Renal insufficiency    • Sepsis (HCC) 2019   • Shortness of breath     STATES WITH EXERTION, OTHERWISE, DENIES   • Sigmoid diverticulitis without abscess or perforation 8/9/2017   • Skin breakdown     fourth toe right foot noted in 2019 MD D/C note   • Skin ulcer of fourth toe of right foot, limited to breakdown of skin (HCC) 7/5/2019   • Stricture of ureter    • Type 2 diabetes mellitus (HCC)    • Ureteral stricture, left    • UTI (urinary tract infection) 7/18/2019   • Vitamin D deficiency    • Wears glasses        Past Surgical History:  Past Surgical History:   Procedure Laterality Date   • APPENDECTOMY     • CARDIAC ELECTROPHYSIOLOGY PROCEDURE N/A 12/23/2020    Procedure: Pacemaker DC new. DNS meds.;  Surgeon: Jimy Ledezma DO;  Location:  [facility];  Service: Cardiology;  Laterality: N/A;   • CHOLECYSTECTOMY     • CIRCUMCISION N/A 10/23/2019    Procedure: CIRCUMCISIOn-DORSAL SLIT;  Surgeon: Griffin Romero MD;  Location:  [facility];  Service: Urology   • COLONOSCOPY     • CYSTOSCOPY URETEROSCOPY Left 2/11/2019    Procedure: URETEROSCOPY WITH STENT EXTRACTION, RPG;  Surgeon: Griffin Romero MD;  Location:  [facility];  Service: Urology   • CYSTOSCOPY W/ URETERAL STENT PLACEMENT Left 7/20/2019    Procedure: CYSTOSCOPY, LEFT RETROGRADE PYELOGRAM,  ATTEMPTED LEFT URETERAL STENT INSERTION;  Surgeon: Isaac Flynn MD;  Location:  [facility];  Service: Urology   • EYE SURGERY      CATARACTS REMOVED BILATERALLY   • KNEE ARTHROPLASTY Bilateral    • KNEE ARTHROSCOPY Bilateral    • RENAL ARTERY STENT      SEVERAL TIMES EVERY SINCE 1978   • URETERAL STENT INSERTION  10/2020   • URETEROSCOPY LASER LITHOTRIPSY WITH STENT INSERTION Left 1/10/2018    Procedure:  cysto, left ureteral  stent replacement ;  Surgeon: Griffin Romero MD;  Location: [facility] OR;  Service:    • URETEROSCOPY LASER LITHOTRIPSY WITH STENT INSERTION Left 8/14/2019    Procedure: URETEROSCOPY, STONE REMOVAL WITH STENT INSERTION;  Surgeon: Griffin Romero MD;  Location: [facility] OR;  Service: Urology   • URETEROSCOPY LASER LITHOTRIPSY WITH STENT INSERTION Left 10/23/2019    Procedure: Left URETEROSCOPY WITH STENT INSERTION-LEFT;  Surgeon: Griffin Romero MD;  Location: [facility] OR;  Service: Urology       Family History:   family history includes Brain cancer in his brother and sister; Heart attack in his sister; Hypertension in his sister; Lung cancer in his brother and sister; Malig Hyperthermia in his brother; Stroke in his sister.   Otherwise pertinent FHx was reviewed and not pertinent to current issue.    Social History:    reports that he quit smoking about 71 years ago. His smoking use included cigarettes. He has a 0.25 pack-year smoking history. He has never used smokeless tobacco. He reports that he does not drink alcohol and does not use drugs.    Medications:     Current Outpatient Medications:   •  acetaminophen (TYLENOL) 500 MG tablet, Take 500 mg by mouth Every 6 (Six) Hours As Needed for Mild Pain ., Disp: , Rfl:   •  allopurinol (ZYLOPRIM) 300 MG tablet, Take 1 tablet by mouth Every Night., Disp: 90 tablet, Rfl: 3  •  apixaban (ELIQUIS) 2.5 MG tablet tablet, Take 1 tablet by mouth 2 (Two) Times a Day., Disp: 180 tablet, Rfl: 3  •  aspirin (aspirin) 81 MG EC tablet, Take 1 tablet by mouth Daily., Disp: 30 tablet, Rfl: 11  •  bisoprolol (ZEBeta) 5 MG tablet, Take 1 tablet by mouth Daily., Disp: 30 tablet, Rfl: 11  •  finasteride (PROSCAR) 5 MG tablet, Take 1 tablet by mouth Daily., Disp: 90 tablet, Rfl: 3  •  glimepiride (Amaryl) 1 MG tablet, Take 1 tablet by mouth Every Morning Before Breakfast. Indications: Type 2 Diabetes, Disp: 90 tablet, Rfl: 3  •  glucose blood (True Metrix Blood Glucose Test) test strip,  "1 each by Other route Daily. use to test blood sugar 4 times daily, Disp: 300 each, Rfl: 5  •  Insulin Glargine, 1 Unit Dial, (Toujeo SoloStar) 300 UNIT/ML solution pen-injector injection, Inject 15 Units under the skin into the appropriate area as directed Daily., Disp: 3 pen, Rfl: 3  •  isosorbide mononitrate (IMDUR) 30 MG 24 hr tablet, Take 1 tablet by mouth Every Morning., Disp: 90 tablet, Rfl: 3  •  levothyroxine (SYNTHROID, LEVOTHROID) 50 MCG tablet, Take 1 tablet by mouth Daily. Indications: Underactive Thyroid, Disp: 90 tablet, Rfl: 3  •  losartan (Cozaar) 25 MG tablet, Take 1 tablet by mouth Daily. Indications: High Blood Pressure Disorder, Disp: 90 tablet, Rfl: 3  •  Multiple Vitamins-Minerals (OCUVITE ADULT 50+ PO), Take 1 capsule by mouth Daily., Disp: , Rfl:   •  nitroglycerin (NITROSTAT) 0.4 MG SL tablet, 1 under the tongue as needed for angina, may repeat q5mins for up three doses, Disp: 100 tablet, Rfl: 11  •  NovoFine 32G X 6 MM misc, USE ONE DAILY, Disp: 100 each, Rfl: 3  •  silodosin (RAPAFLO) 8 MG capsule capsule, Take 1 capsule by mouth Daily With Breakfast., Disp: 90 capsule, Rfl: 3  •  SITagliptin (JANUVIA) 100 MG tablet, Take 1 tablet by mouth Daily., Disp: 90 tablet, Rfl: 3  •  triazolam (HALCION) 0.25 MG tablet, Take 1 tablet by mouth At Night As Needed for Sleep., Disp: 30 tablet, Rfl: 2  •  amoxicillin-clavulanate (Augmentin) 875-125 MG per tablet, Take 1 tablet by mouth 2 (Two) Times a Day., Disp: 14 tablet, Rfl: 0    Allergies:   Allergies   Allergen Reactions   • Metformin Diarrhea, GI Intolerance and Unknown - Low Severity     diahrea   • Statins Myalgia and Diarrhea         Objective     Physical Exam:   Vital Signs:   Vitals:    04/06/22 1045   Weight: 103 kg (227 lb)   Height: 175.3 cm (69\")     Body mass index is 33.52 kg/m².     Physical Exam  Vitals and nursing note reviewed.   Constitutional:       General: He is awake. He is not in acute distress.     Appearance: Normal " appearance. He is well-developed. He is obese.   HENT:      Head: Normocephalic and atraumatic.      Right Ear: External ear normal.      Left Ear: External ear normal.      Nose: Nose normal.   Eyes:      Conjunctiva/sclera: Conjunctivae normal.   Pulmonary:      Effort: Pulmonary effort is normal.   Abdominal:      General: There is no distension.      Palpations: Abdomen is soft. There is no mass.      Tenderness: There is no abdominal tenderness. There is no right CVA tenderness, left CVA tenderness, guarding or rebound.      Hernia: No hernia is present. There is no hernia in the left inguinal area or right inguinal area.   Genitourinary:     Pubic Area: No rash.       Rectum: No mass or tenderness. Normal anal tone.   Musculoskeletal:      Cervical back: Normal range of motion.   Lymphadenopathy:      Lower Body: No right inguinal adenopathy. No left inguinal adenopathy.   Skin:     General: Skin is warm.   Neurological:      General: No focal deficit present.      Mental Status: He is alert and oriented to person, place, and time.   Psychiatric:         Behavior: Behavior normal. Behavior is cooperative.         Labs:   Brief Urine Lab Results  (Last result in the past 365 days)      Color   Clarity   Blood   Leuk Est   Nitrite   Protein   CREAT   Urine HCG        04/06/22 1055 Dark Yellow   Cloudy   3+   Large (3+)   Negative   1+                 Urine Culture    Urine Culture 9/20/21 3/7/22   Urine Culture Final report (A) Final report (A)   (A) Abnormal value               Lab Results   Component Value Date    GLUCOSE 266 (H) 12/13/2021    CALCIUM 9.4 12/13/2021     12/13/2021    K 4.7 12/13/2021    CO2 22 12/13/2021     12/13/2021    BUN 29 (H) 12/13/2021    CREATININE 1.56 (H) 12/13/2021    EGFRIFAFRI 45 (L) 12/13/2021    EGFRIFNONA 39 (L) 12/13/2021    BCR 19 12/13/2021    ANIONGAP 10 06/11/2021       Lab Results   Component Value Date    WBC Comment 12/13/2021    WBC 10.9 (H) 12/13/2021     HGB 13.1 12/13/2021    HCT 39.9 12/13/2021    MCV 94 12/13/2021     12/13/2021       Lab Results   Component Value Date    PSA 1.160 04/15/2021    PSA 8.640 (H) 11/30/2020    PSA 1.450 08/26/2020       Images:   No Images in the past 120 days found..      Measures:   Tobacco:   Forrest Zepeda  reports that he quit smoking about 71 years ago. His smoking use included cigarettes. He has a 0.25 pack-year smoking history. He has never used smokeless tobacco.. I have educated him on the risk of diseases from using tobacco products such as cancer, COPD and heart disease.     I advised him to quit and he is not willing to quit.    I spent 3  minutes counseling the patient.           Urine Incontinence: Patient reports that he is not currently experiencing any symptoms of urinary incontinence.         Assessment / Plan      Assessment/Plan:   89 y.o. male who presented today for follow up of ureteral stricture being managed by chronic stent exchanges.  He has undergone yearly exchanges with a bare-metal stent.  He is due for his next exchange in June.  Based off of his urine today I will restart his Augmentin.  I will send his urine for a new culture.  If he continues to have difficulty with his UTI he may require an exchange sooner.  I will have him follow-up in approximately 3 to 4 weeks to recheck his urine.  We again discussed bare-metal stent versus a long-term [employer] stent.  He is willing to try the [employer] stent.  We will likely do this in [place] as his ID physician is located there.  He will need admitted 24 hours prior for IV antibiotics.    Diagnoses and all orders for this visit:    1. Frequency of urination (Primary)  -     POC Urinalysis Dipstick, Automated  -     Cancel: POC Urinalysis Dipstick, Automated    2. Urinary tract infection with hematuria, site unspecified  -     Urine Culture - Urine, Urine, Random Void  -     amoxicillin-clavulanate (Augmentin) 875-125 MG per tablet;  Take 1 tablet by mouth 2 (Two) Times a Day.  Dispense: 14 tablet; Refill: 0           Follow Up:   Return in about 4 weeks (around 5/4/2022).    I spent approximately 30 minutes providing clinical care for this patient; including review of patient's chart and provider documentation, face to face time spent with patient in examination room (obtaining history, performing physical exam, discussing diagnosis and management options), placing orders, and completing patient documentation.     Deanne Pitts MD  [facility]

## 2022-04-11 ENCOUNTER — TELEPHONE (OUTPATIENT)
Dept: UROLOGY | Facility: CLINIC | Age: 87
End: 2022-04-11

## 2022-04-11 DIAGNOSIS — I48.0 PAROXYSMAL ATRIAL FIBRILLATION: ICD-10-CM

## 2022-04-11 RX ORDER — APIXABAN 2.5 MG/1
TABLET, FILM COATED ORAL
Qty: 180 TABLET | Refills: 3 | Status: SHIPPED | OUTPATIENT
Start: 2022-04-11

## 2022-04-11 NOTE — TELEPHONE ENCOUNTER
The patients granddaughter called and said that he was told to take the Augmentin for 14 days but a weeks worth was called in.

## 2022-04-12 LAB
BACTERIA UR CULT: ABNORMAL
BACTERIA UR CULT: ABNORMAL
OTHER ANTIBIOTIC SUSC ISLT: ABNORMAL

## 2022-04-13 DIAGNOSIS — N30.00 ACUTE CYSTITIS WITHOUT HEMATURIA: Primary | ICD-10-CM

## 2022-04-13 RX ORDER — AMOXICILLIN AND CLAVULANATE POTASSIUM 875; 125 MG/1; MG/1
1 TABLET, FILM COATED ORAL 2 TIMES DAILY
Qty: 14 TABLET | Refills: 0 | Status: SHIPPED | OUTPATIENT
Start: 2022-04-13 | End: 2022-05-04

## 2022-05-04 ENCOUNTER — OFFICE VISIT (OUTPATIENT)
Dept: UROLOGY | Facility: CLINIC | Age: 87
End: 2022-05-04

## 2022-05-04 VITALS — WEIGHT: 227 LBS | HEIGHT: 69 IN | BODY MASS INDEX: 33.62 KG/M2

## 2022-05-04 DIAGNOSIS — N30.00 ACUTE CYSTITIS WITHOUT HEMATURIA: ICD-10-CM

## 2022-05-04 DIAGNOSIS — R35.0 FREQUENCY OF URINATION: Primary | ICD-10-CM

## 2022-05-04 LAB
BILIRUB BLD-MCNC: NEGATIVE MG/DL
CLARITY, POC: ABNORMAL
COLOR UR: ABNORMAL
EXPIRATION DATE: ABNORMAL
GLUCOSE UR STRIP-MCNC: NEGATIVE MG/DL
KETONES UR QL: NEGATIVE
LEUKOCYTE EST, POC: ABNORMAL
Lab: ABNORMAL
NITRITE UR-MCNC: NEGATIVE MG/ML
PH UR: 6 [PH] (ref 5–8)
PROT UR STRIP-MCNC: ABNORMAL MG/DL
RBC # UR STRIP: ABNORMAL /UL
SP GR UR: 1.01 (ref 1–1.03)
UROBILINOGEN UR QL: NORMAL

## 2022-05-04 PROCEDURE — 81003 URINALYSIS AUTO W/O SCOPE: CPT | Performed by: UROLOGY

## 2022-05-04 PROCEDURE — 87086 URINE CULTURE/COLONY COUNT: CPT | Performed by: UROLOGY

## 2022-05-04 PROCEDURE — 99214 OFFICE O/P EST MOD 30 MIN: CPT | Performed by: UROLOGY

## 2022-05-04 NOTE — PROGRESS NOTES
Follow Up Office Visit      Patient Name: Forrest Zepeda  : 1932   MRN: [id number]     Chief Complaint:    Chief Complaint   Patient presents with   • Urinary Tract Infection     F/u       Referring Provider: No ref. provider found    History of Present Illness: Forrest Zepeda is a 89 y.o. male who presents today for follow up of bilateral hydronephrosis.  He has been chronically managed with bare metal stents.  He has a history of MDRO E.coli and is being followed by ID at [facility].  He reports he is doing well but is still having some dysuria and discomfort from the bare metal stents.  During his last visit we had discussed switching him to the Tria stents.  He will need pre-admitted for IV antibiotics 24 hrs prior to his stent exchange.  His ID is in [place].  I have discussed his case with Dr. Mccann to has agreed to see him in [place] to his stent exchange.     Subjective      Review of System: Review of Systems   Constitutional: Negative for chills, fatigue, fever and unexpected weight change.   HENT: Negative for congestion, rhinorrhea and sore throat.    Eyes: Negative for visual disturbance.   Respiratory: Negative for apnea, cough, chest tightness and shortness of breath.    Cardiovascular: Negative for chest pain.   Gastrointestinal: Negative for abdominal pain, constipation, diarrhea, nausea and vomiting.   Endocrine: Negative for polydipsia and polyuria.   Genitourinary: Positive for dysuria and frequency. Negative for difficulty urinating, enuresis, flank pain, genital sores, hematuria and urgency.   Musculoskeletal: Negative for gait problem.   Skin: Negative for rash and wound.   Allergic/Immunologic: Negative for immunocompromised state.   Neurological: Negative for dizziness, tremors, syncope, weakness and light-headedness.   Hematological: Does not bruise/bleed easily.      I have reviewed the ROS documented by my clinical staff, I have updated appropriately and I agree. Deanne TALAMANTES  "MD Hong    I have reviewed and the following portions of the patient's history were updated as appropriate: past family history, past medical history, past social history, past surgical history and problem list.    Past Medical History:   Past Medical History:   Diagnosis Date   • Abnormal EKG    • Abnormal PSA     Reported abnormal   • Acute deep vein thrombosis (DVT) of right lower extremity (HCC) 08/22/2019   • Acute diverticulitis 2019   • Acute hyperkalemia 08/22/2019   • Acute respiratory failure with hypoxia (HCC)    • Acute saddle pulmonary embolism with acute cor pulmonale (HCC) 08/22/2019   • Acute systolic right heart failure (HCC) 08/22/2019   • Arthritis     KNEES,  BUT SINCE HAD REPLACEMENT   • Benign localized hyperplasia of prostate    • Bilateral knee pain    • C. difficile diarrhea 2010   • Cataracts, bilateral    • CKD (chronic kidney disease)    • Diabetic neuropathy (HCC)    • Diabetic neuropathy (HCC)    • Disease of thyroid gland     HYPOTHYROIDISM   • Diverticulitis    • Dyslipidemia     H/O   • Family history of malignant hyperthermia     Brother    • Full dentures    • Generalized weakness    • History of gout    • History of hepatitis A vaccination    • History of nephrolithiasis    • History of nuclear stress test     STATES IN THE 1990'S.  \"IT WAS OK\"   • History of osteopenia    • History of recurrent UTI (urinary tract infection)    • Hydronephrosis of left kidney 7/18/2019   • Hydronephrosis with renal and ureteral calculus obstruction 10/21/2019    Added automatically from request for surgery [id number]   • Hydronephrosis with ureteral stricture    • Hypercholesterolemia    • Hyperkalemia     PT UNSURE REGARDING HX OF THIS   • Hyperlipidemia    • Hypertension    • Impaired functional mobility, balance, gait, and endurance    • Impaired functional mobility, balance, gait, and endurance    • Insomnia    • Malignant hyperthermia due to anesthesia     PER PT REPORT, BROTHER HAD DURING " LUNG SURGERY SEVERAL YEARS AGO.  STATED THEY PACKED HIM ON ICE.  STATED HE DID SURVIVE.  PT DENIES ANY PERSONAL HX OF MALIGNANT HYPERTHERMIA HIMSELF, OR ANY ISSUES WITH ANESTHESIA.  STATES TO HIS KNOWLEDGE, NO OTHER FAMILY MEMBER HAS THIS ISSUE EXCEPT FOR HIS BROTHER.   • On supplemental oxygen therapy     2L [place] QHS   • Other hydronephrosis 8/12/2019    Added automatically from request for surgery [id number]   • Renal insufficiency    • Sepsis (HCC) 2019   • Shortness of breath     STATES WITH EXERTION, OTHERWISE, DENIES   • Sigmoid diverticulitis without abscess or perforation 8/9/2017   • Skin breakdown     fourth toe right foot noted in 2019 MD D/C note   • Skin ulcer of fourth toe of right foot, limited to breakdown of skin ([facility]) 7/5/2019   • Stricture of ureter    • Type 2 diabetes mellitus ([facility])    • Ureteral stricture, left    • UTI (urinary tract infection) 7/18/2019   • Vitamin D deficiency    • Wears glasses        Past Surgical History:  Past Surgical History:   Procedure Laterality Date   • APPENDECTOMY     • CARDIAC ELECTROPHYSIOLOGY PROCEDURE N/A 12/23/2020    Procedure: Pacemaker DC new. DNS meds.;  Surgeon: Jimy Ledezma DO;  Location:  [facility];  Service: Cardiology;  Laterality: N/A;   • CHOLECYSTECTOMY     • CIRCUMCISION N/A 10/23/2019    Procedure: CIRCUMCISIOn-DORSAL SLIT;  Surgeon: Griffin Romero MD;  Location: [facility] OR;  Service: Urology   • COLONOSCOPY     • CYSTOSCOPY URETEROSCOPY Left 2/11/2019    Procedure: URETEROSCOPY WITH STENT EXTRACTION, RPG;  Surgeon: Griffin Romero MD;  Location:  [facility] OR;  Service: Urology   • CYSTOSCOPY W/ URETERAL STENT PLACEMENT Left 7/20/2019    Procedure: CYSTOSCOPY, LEFT RETROGRADE PYELOGRAM,  ATTEMPTED LEFT URETERAL STENT INSERTION;  Surgeon: Isaac Flynn MD;  Location:  [facility] OR;  Service: Urology   • EYE SURGERY      CATARACTS REMOVED BILATERALLY   • KNEE ARTHROPLASTY Bilateral    • KNEE ARTHROSCOPY Bilateral    • RENAL  ARTERY STENT      SEVERAL TIMES EVERY SINCE 1978   • URETERAL STENT INSERTION  10/2020   • URETEROSCOPY LASER LITHOTRIPSY WITH STENT INSERTION Left 1/10/2018    Procedure:  cysto, left ureteral stent replacement ;  Surgeon: Griffin Romero MD;  Location: [facility] OR;  Service:    • URETEROSCOPY LASER LITHOTRIPSY WITH STENT INSERTION Left 8/14/2019    Procedure: URETEROSCOPY, STONE REMOVAL WITH STENT INSERTION;  Surgeon: Griffin Romero MD;  Location: [facility] OR;  Service: Urology   • URETEROSCOPY LASER LITHOTRIPSY WITH STENT INSERTION Left 10/23/2019    Procedure: Left URETEROSCOPY WITH STENT INSERTION-LEFT;  Surgeon: Griffin Romero MD;  Location: [facility] OR;  Service: Urology       Family History:   family history includes Brain cancer in his brother and sister; Heart attack in his sister; Hypertension in his sister; Lung cancer in his brother and sister; Malig Hyperthermia in his brother; Stroke in his sister.   Otherwise pertinent FHx was reviewed and not pertinent to current issue.    Social History:    reports that he quit smoking about 71 years ago. His smoking use included cigarettes. He has a 0.25 pack-year smoking history. He has never used smokeless tobacco. He reports that he does not drink alcohol and does not use drugs.    Medications:     Current Outpatient Medications:   •  acetaminophen (TYLENOL) 500 MG tablet, Take 500 mg by mouth Every 6 (Six) Hours As Needed for Mild Pain ., Disp: , Rfl:   •  allopurinol (ZYLOPRIM) 300 MG tablet, Take 1 tablet by mouth Every Night., Disp: 90 tablet, Rfl: 3  •  aspirin (aspirin) 81 MG EC tablet, Take 1 tablet by mouth Daily., Disp: 30 tablet, Rfl: 11  •  bisoprolol (ZEBeta) 5 MG tablet, Take 1 tablet by mouth Daily., Disp: 30 tablet, Rfl: 11  •  Eliquis 2.5 MG tablet tablet, TAKE ONE TABLET BY MOUTH TWICE A DAY, Disp: 180 tablet, Rfl: 3  •  finasteride (PROSCAR) 5 MG tablet, Take 1 tablet by mouth Daily., Disp: 90 tablet, Rfl: 3  •  glimepiride (Amaryl) 1 MG  "tablet, Take 1 tablet by mouth Every Morning Before Breakfast. Indications: Type 2 Diabetes, Disp: 90 tablet, Rfl: 3  •  glucose blood (True Metrix Blood Glucose Test) test strip, 1 each by Other route Daily. use to test blood sugar 4 times daily, Disp: 300 each, Rfl: 5  •  Insulin Glargine, 1 Unit Dial, (Toujeo SoloStar) 300 UNIT/ML solution pen-injector injection, Inject 15 Units under the skin into the appropriate area as directed Daily., Disp: 3 pen, Rfl: 3  •  isosorbide mononitrate (IMDUR) 30 MG 24 hr tablet, Take 1 tablet by mouth Every Morning., Disp: 90 tablet, Rfl: 3  •  levothyroxine (SYNTHROID, LEVOTHROID) 50 MCG tablet, Take 1 tablet by mouth Daily. Indications: Underactive Thyroid, Disp: 90 tablet, Rfl: 3  •  losartan (Cozaar) 25 MG tablet, Take 1 tablet by mouth Daily. Indications: High Blood Pressure Disorder, Disp: 90 tablet, Rfl: 3  •  Multiple Vitamins-Minerals (OCUVITE ADULT 50+ PO), Take 1 capsule by mouth Daily., Disp: , Rfl:   •  nitroglycerin (NITROSTAT) 0.4 MG SL tablet, 1 under the tongue as needed for angina, may repeat q5mins for up three doses, Disp: 100 tablet, Rfl: 11  •  NovoFine 32G X 6 MM misc, USE ONE DAILY, Disp: 100 each, Rfl: 3  •  silodosin (RAPAFLO) 8 MG capsule capsule, Take 1 capsule by mouth Daily With Breakfast., Disp: 90 capsule, Rfl: 3  •  SITagliptin (JANUVIA) 100 MG tablet, Take 1 tablet by mouth Daily., Disp: 90 tablet, Rfl: 3  •  triazolam (HALCION) 0.25 MG tablet, Take 1 tablet by mouth At Night As Needed for Sleep., Disp: 30 tablet, Rfl: 2    Allergies:   Allergies   Allergen Reactions   • Metformin Diarrhea, GI Intolerance and Unknown - Low Severity     diahrea   • Statins Myalgia and Diarrhea         Objective     Physical Exam:   Vital Signs:   Vitals:    05/04/22 1257   Weight: 103 kg (227 lb)   Height: 175.3 cm (69\")     Body mass index is 33.52 kg/m².     Physical Exam  Vitals and nursing note reviewed.   Constitutional:       General: He is awake. He is not " in acute distress.     Appearance: Normal appearance. He is well-developed. He is obese.   HENT:      Head: Normocephalic and atraumatic.      Right Ear: External ear normal.      Left Ear: External ear normal.      Nose: Nose normal.   Eyes:      Conjunctiva/sclera: Conjunctivae normal.   Pulmonary:      Effort: Pulmonary effort is normal.   Abdominal:      General: There is no distension.      Palpations: Abdomen is soft. There is no mass.      Tenderness: There is no abdominal tenderness. There is no right CVA tenderness, left CVA tenderness, guarding or rebound.      Hernia: No hernia is present. There is no hernia in the left inguinal area or right inguinal area.   Genitourinary:     Pubic Area: No rash.       Rectum: No mass or tenderness. Normal anal tone.   Musculoskeletal:      Cervical back: Normal range of motion.   Lymphadenopathy:      Lower Body: No right inguinal adenopathy. No left inguinal adenopathy.   Skin:     General: Skin is warm.   Neurological:      General: No focal deficit present.      Mental Status: He is alert and oriented to person, place, and time.   Psychiatric:         Behavior: Behavior normal. Behavior is cooperative.         Labs:   Brief Urine Lab Results  (Last result in the past 365 days)      Color   Clarity   Blood   Leuk Est   Nitrite   Protein   CREAT   Urine HCG        05/04/22 1309 Other   Cloudy   3+   Large (3+)   Negative   1+                 Urine Culture    Urine Culture 9/20/21 3/7/22 4/6/22   Urine Culture Final report (A) Final report (A) Final report (A)   (A) Abnormal value               Lab Results   Component Value Date    GLUCOSE 266 (H) 12/13/2021    CALCIUM 9.4 12/13/2021     12/13/2021    K 4.7 12/13/2021    CO2 22 12/13/2021     12/13/2021    BUN 29 (H) 12/13/2021    CREATININE 1.56 (H) 12/13/2021    EGFRIFAFRI 45 (L) 12/13/2021    EGFRIFNONA 39 (L) 12/13/2021    BCR 19 12/13/2021    ANIONGAP 10 06/11/2021       Lab Results   Component Value  Date    WBC Comment 12/13/2021    WBC 10.9 (H) 12/13/2021    HGB 13.1 12/13/2021    HCT 39.9 12/13/2021    MCV 94 12/13/2021     12/13/2021       Lab Results   Component Value Date    PSA 1.160 04/15/2021    PSA 8.640 (H) 11/30/2020    PSA 1.450 08/26/2020       Images:   No Images in the past 120 days found..      Measures:   Tobacco:   Forrest Zepeda  reports that he quit smoking about 71 years ago. His smoking use included cigarettes. He has a 0.25 pack-year smoking history. He has never used smokeless tobacco.    Urine Incontinence: Patient reports that he is not currently experiencing any symptoms of urinary incontinence    Assessment / Plan      Assessment/Plan:   89 y.o. male who presented today for follow up of bilateral hydronephrosis.  He is being managed with chronic bare metal stents.  He has a history of MDRO E.coli and will require admission 24 hrs prior.  I have coordinated with Dr. Mccann in [place].  We will plan stent exchange in June.  I will have him seen in [place] to coordinate his stent exchange.  We will plan on using Tria stents.     Diagnoses and all orders for this visit:    1. Frequency of urination (Primary)  -     POC Urinalysis Dipstick, Automated    2. Acute cystitis without hematuria  -     Urine Culture - Urine, Urine, Random Void           Follow Up:   Return for Follow up after surgery.    I spent approximately 30 minutes providing clinical care for this patient; including review of patient's chart and provider documentation, face to face time spent with patient in examination room (obtaining history, performing physical exam, discussing diagnosis and management options), placing orders, and completing patient documentation.     Deanne Pitts MD  [facility] [place]

## 2022-05-07 LAB
BACTERIA UR CULT: NORMAL
BACTERIA UR CULT: NORMAL

## 2022-05-24 ENCOUNTER — OFFICE VISIT (OUTPATIENT)
Dept: UROLOGY | Facility: CLINIC | Age: 87
End: 2022-05-24

## 2022-05-24 VITALS — BODY MASS INDEX: 33.62 KG/M2 | HEART RATE: 68 BPM | HEIGHT: 69 IN | OXYGEN SATURATION: 97 % | WEIGHT: 227 LBS

## 2022-05-24 DIAGNOSIS — N13.30 HYDRONEPHROSIS OF LEFT KIDNEY: ICD-10-CM

## 2022-05-24 DIAGNOSIS — N13.5 URETERAL STRICTURE: Primary | ICD-10-CM

## 2022-05-24 PROCEDURE — 99215 OFFICE O/P EST HI 40 MIN: CPT | Performed by: UROLOGY

## 2022-05-24 NOTE — PROGRESS NOTES
Office Visit New Urology      Patient Name: Forrest Zepeda  : 1932   MRN: [id number]     Chief Complaint:  Ureteral Stricture, Hydronephrosis     Referring Provider: Deanne Pitts    History of Present Illness: Forrest Zepeda is a 89 y.o. male who presents to Urology today for evaluation as a referral from Dr.Amul Pitts.  The patient has a past medical history of ureteral stricture, bilateral hydronephrosis.  He has previously been managed with  with left bare-metal ureteral stent.  CT imaging with known atrophy of the left kidney.  He has a history of MDRO E. coli and is followed by ID Dr. Oneal at [facility].  He has annual ureteral stent exchange.  Presents today as a referral for stent exchange.    He denies significant discomfort associated with stents.  Reports mild dysuria.  Denies obstructive based urinary symptoms.      Subjective      Review of System: Review of Systems   Constitutional: Negative for chills, fatigue, fever and unexpected weight change.   HENT: Negative for sore throat.    Eyes: Negative for visual disturbance.   Respiratory: Negative for cough, chest tightness and shortness of breath.    Cardiovascular: Negative for chest pain and leg swelling.   Gastrointestinal: Negative for blood in stool, constipation, diarrhea, nausea, rectal pain and vomiting.   Genitourinary: Negative for decreased urine volume, difficulty urinating, dysuria, enuresis, flank pain, frequency, genital sores, hematuria and urgency.   Musculoskeletal: Negative for back pain and joint swelling.   Skin: Negative for rash and wound.   Neurological: Negative for seizures, speech difficulty, weakness and headaches.   Psychiatric/Behavioral: Negative for confusion, sleep disturbance and suicidal ideas. The patient is not nervous/anxious.       I have reviewed the ROS documented by my clinical staff, updated appropriately and I agree. Ryne Mccann MD    Past Medical History:   Past Medical  "History:   Diagnosis Date   • Abnormal EKG    • Abnormal PSA     Reported abnormal   • Acute deep vein thrombosis (DVT) of right lower extremity (HCC) 08/22/2019   • Acute diverticulitis 2019   • Acute hyperkalemia 08/22/2019   • Acute respiratory failure with hypoxia (HCC)    • Acute saddle pulmonary embolism with acute cor pulmonale (HCC) 08/22/2019   • Acute systolic right heart failure (HCC) 08/22/2019   • Arthritis     KNEES,  BUT SINCE HAD REPLACEMENT   • Benign localized hyperplasia of prostate    • Bilateral knee pain    • C. difficile diarrhea 2010   • Cataracts, bilateral    • CKD (chronic kidney disease)    • Diabetic neuropathy (HCC)    • Diabetic neuropathy (HCC)    • Disease of thyroid gland     HYPOTHYROIDISM   • Diverticulitis    • Dyslipidemia     H/O   • Family history of malignant hyperthermia     Brother    • Full dentures    • Generalized weakness    • History of gout    • History of hepatitis A vaccination    • History of nephrolithiasis    • History of nuclear stress test     STATES IN THE 1990'S.  \"IT WAS OK\"   • History of osteopenia    • History of recurrent UTI (urinary tract infection)    • Hydronephrosis of left kidney 7/18/2019   • Hydronephrosis with renal and ureteral calculus obstruction 10/21/2019    Added automatically from request for surgery [id number]   • Hydronephrosis with ureteral stricture    • Hypercholesterolemia    • Hyperkalemia     PT UNSURE REGARDING HX OF THIS   • Hyperlipidemia    • Hypertension    • Impaired functional mobility, balance, gait, and endurance    • Impaired functional mobility, balance, gait, and endurance    • Insomnia    • Malignant hyperthermia due to anesthesia     PER PT REPORT, BROTHER HAD DURING LUNG SURGERY SEVERAL YEARS AGO.  STATED THEY PACKED HIM ON ICE.  STATED HE DID SURVIVE.  PT DENIES ANY PERSONAL HX OF MALIGNANT HYPERTHERMIA HIMSELF, OR ANY ISSUES WITH ANESTHESIA.  STATES TO HIS KNOWLEDGE, NO OTHER FAMILY MEMBER HAS THIS ISSUE EXCEPT FOR " HIS BROTHER.   • On supplemental oxygen therapy     2L NC QHS   • Other hydronephrosis 8/12/2019    Added automatically from request for surgery [id number]   • Renal insufficiency    • Sepsis (HCC) 2019   • Shortness of breath     STATES WITH EXERTION, OTHERWISE, DENIES   • Sigmoid diverticulitis without abscess or perforation 8/9/2017   • Skin breakdown     fourth toe right foot noted in 2019 MD D/C note   • Skin ulcer of fourth toe of right foot, limited to breakdown of skin (HCC) 7/5/2019   • Stricture of ureter    • Type 2 diabetes mellitus (HCC)    • Ureteral stricture, left    • UTI (urinary tract infection) 7/18/2019   • Vitamin D deficiency    • Wears glasses        Past Surgical History:   Past Surgical History:   Procedure Laterality Date   • APPENDECTOMY     • CARDIAC ELECTROPHYSIOLOGY PROCEDURE N/A 12/23/2020    Procedure: Pacemaker DC new. DNS meds.;  Surgeon: Jimy Ledezma DO;  Location:  [facility];  Service: Cardiology;  Laterality: N/A;   • CHOLECYSTECTOMY     • CIRCUMCISION N/A 10/23/2019    Procedure: CIRCUMCISIOn-DORSAL SLIT;  Surgeon: Griffin Romero MD;  Location:  [facility];  Service: Urology   • COLONOSCOPY     • CYSTOSCOPY URETEROSCOPY Left 2/11/2019    Procedure: URETEROSCOPY WITH STENT EXTRACTION, RPG;  Surgeon: Griffin Romero MD;  Location:  [facility];  Service: Urology   • CYSTOSCOPY W/ URETERAL STENT PLACEMENT Left 7/20/2019    Procedure: CYSTOSCOPY, LEFT RETROGRADE PYELOGRAM,  ATTEMPTED LEFT URETERAL STENT INSERTION;  Surgeon: Isaac Flynn MD;  Location:  [facility];  Service: Urology   • EYE SURGERY      CATARACTS REMOVED BILATERALLY   • KNEE ARTHROPLASTY Bilateral    • KNEE ARTHROSCOPY Bilateral    • RENAL ARTERY STENT      SEVERAL TIMES EVERY SINCE 1978   • URETERAL STENT INSERTION  10/2020   • URETEROSCOPY LASER LITHOTRIPSY WITH STENT INSERTION Left 1/10/2018    Procedure:  cysto, left ureteral stent replacement ;  Surgeon: Griffin Romero MD;  Location:  [facility] OR;  Service:    • URETEROSCOPY LASER LITHOTRIPSY WITH STENT INSERTION Left 2019    Procedure: URETEROSCOPY, STONE REMOVAL WITH STENT INSERTION;  Surgeon: Griffin Romero MD;  Location: [facility] OR;  Service: Urology   • URETEROSCOPY LASER LITHOTRIPSY WITH STENT INSERTION Left 10/23/2019    Procedure: Left URETEROSCOPY WITH STENT INSERTION-LEFT;  Surgeon: Griffin Romero MD;  Location: [facility] OR;  Service: Urology       Family History:   Family History   Problem Relation Age of Onset   • Heart attack Sister    • Brain cancer Sister    • Stroke Sister    • Lung cancer Sister    • Hypertension Sister    • Brain cancer Brother    • Lung cancer Brother    • Malig Hyperthermia Brother        Social History:   Social History     Socioeconomic History   • Marital status:    Tobacco Use   • Smoking status: Former Smoker     Packs/day: 0.25     Years: 1.00     Pack years: 0.25     Types: Cigarettes     Quit date: 1950     Years since quittin.4   • Smokeless tobacco: Never Used   • Tobacco comment: SMOKED SOME AS A TEEN, DENIES ANY HABIT OR ADDICTION   Substance and Sexual Activity   • Alcohol use: No   • Drug use: No   • Sexual activity: Defer       Medications:     Current Outpatient Medications:   •  acetaminophen (TYLENOL) 500 MG tablet, Take 500 mg by mouth Every 6 (Six) Hours As Needed for Mild Pain ., Disp: , Rfl:   •  allopurinol (ZYLOPRIM) 300 MG tablet, Take 1 tablet by mouth Every Night., Disp: 90 tablet, Rfl: 3  •  aspirin (aspirin) 81 MG EC tablet, Take 1 tablet by mouth Daily., Disp: 30 tablet, Rfl: 11  •  bisoprolol (ZEBeta) 5 MG tablet, Take 1 tablet by mouth Daily., Disp: 30 tablet, Rfl: 11  •  Eliquis 2.5 MG tablet tablet, TAKE ONE TABLET BY MOUTH TWICE A DAY, Disp: 180 tablet, Rfl: 3  •  finasteride (PROSCAR) 5 MG tablet, Take 1 tablet by mouth Daily., Disp: 90 tablet, Rfl: 3  •  glimepiride (Amaryl) 1 MG tablet, Take 1 tablet by mouth Every Morning Before Breakfast.  "Indications: Type 2 Diabetes, Disp: 90 tablet, Rfl: 3  •  glucose blood (True Metrix Blood Glucose Test) test strip, 1 each by Other route Daily. use to test blood sugar 4 times daily, Disp: 300 each, Rfl: 5  •  Insulin Glargine, 1 Unit Dial, (Toujeo SoloStar) 300 UNIT/ML solution pen-injector injection, Inject 15 Units under the skin into the appropriate area as directed Daily., Disp: 3 pen, Rfl: 3  •  isosorbide mononitrate (IMDUR) 30 MG 24 hr tablet, Take 1 tablet by mouth Every Morning., Disp: 90 tablet, Rfl: 3  •  levothyroxine (SYNTHROID, LEVOTHROID) 50 MCG tablet, Take 1 tablet by mouth Daily. Indications: Underactive Thyroid, Disp: 90 tablet, Rfl: 3  •  losartan (Cozaar) 25 MG tablet, Take 1 tablet by mouth Daily. Indications: High Blood Pressure Disorder, Disp: 90 tablet, Rfl: 3  •  Multiple Vitamins-Minerals (OCUVITE ADULT 50+ PO), Take 1 capsule by mouth Daily., Disp: , Rfl:   •  nitroglycerin (NITROSTAT) 0.4 MG SL tablet, 1 under the tongue as needed for angina, may repeat q5mins for up three doses, Disp: 100 tablet, Rfl: 11  •  NovoFine 32G X 6 MM misc, USE ONE DAILY, Disp: 100 each, Rfl: 3  •  silodosin (RAPAFLO) 8 MG capsule capsule, Take 1 capsule by mouth Daily With Breakfast., Disp: 90 capsule, Rfl: 3  •  SITagliptin (JANUVIA) 100 MG tablet, Take 1 tablet by mouth Daily., Disp: 90 tablet, Rfl: 3  •  triazolam (HALCION) 0.25 MG tablet, Take 1 tablet by mouth At Night As Needed for Sleep., Disp: 30 tablet, Rfl: 2    Allergies:   Allergies   Allergen Reactions   • Metformin Diarrhea, GI Intolerance and Unknown - Low Severity     diahrea   • Statins Myalgia and Diarrhea           Objective     Physical Exam:   Vital Signs:   Vitals:    05/24/22 1551   Pulse: 68   SpO2: 97%   Weight: 103 kg (227 lb)   Height: 175.3 cm (69.02\")   PainSc: 0-No pain     Body mass index is 33.51 kg/m².     Physical Exam  Vitals and nursing note reviewed.   Constitutional:       Appearance: Normal appearance.   HENT:      " Head: Normocephalic and atraumatic.   Cardiovascular:      Comments: Well perfused  Pulmonary:      Effort: Pulmonary effort is normal.   Abdominal:      General: Abdomen is flat.      Palpations: Abdomen is soft.   Musculoskeletal:         General: Normal range of motion.   Skin:     General: Skin is warm and dry.   Neurological:      General: No focal deficit present.      Mental Status: He is alert and oriented to person, place, and time. Mental status is at baseline.   Psychiatric:         Mood and Affect: Mood normal.         Behavior: Behavior normal.         Thought Content: Thought content normal.         Judgment: Judgment normal.         Labs:   Brief Urine Lab Results  (Last result in the past 365 days)      Color   Clarity   Blood   Leuk Est   Nitrite   Protein   CREAT   Urine HCG        05/04/22 1309 Other   Cloudy   3+   Large (3+)   Negative   1+                 Urine Culture    Urine Culture 3/7/22 4/6/22 5/4/22   Urine Culture Final report (A) Final report (A) Final report   (A) Abnormal value               Lab Results   Component Value Date    GLUCOSE 266 (H) 12/13/2021    CALCIUM 9.4 12/13/2021     12/13/2021    K 4.7 12/13/2021    CO2 22 12/13/2021     12/13/2021    BUN 29 (H) 12/13/2021    CREATININE 1.56 (H) 12/13/2021    EGFRIFAFRI 45 (L) 12/13/2021    EGFRIFNONA 39 (L) 12/13/2021    BCR 19 12/13/2021    ANIONGAP 10 06/11/2021       Lab Results   Component Value Date    WBC Comment 12/13/2021    WBC 10.9 (H) 12/13/2021    HGB 13.1 12/13/2021    HCT 39.9 12/13/2021    MCV 94 12/13/2021     12/13/2021       Images:   No Images in the past 120 days found..    Measures:   Tobacco:   Forrest Zepeda  reports that he quit smoking about 71 years ago. His smoking use included cigarettes. He has a 0.25 pack-year smoking history. He has never used smokeless tobacco.. I have educated him on the risk of diseases from using tobacco products.    Assessment / Plan      Assessment/Plan:   89  y.o. male  who presents to Urology today for evaluation as a referral from Dr.Amul Pitts.  The patient has a past medical history of ureteral stricture, bilateral hydronephrosis.  He has previously been managed with  with left bare-metal ureteral stent.  CT imaging with known atrophy of the left kidney.  He has a history of MDRO E. coli and [facility]  He has annual ureteral stent exchange.  Presents today as a referral for stent exchange.    He has previously required hospital admission prior to his procedure for antibiotic regimen due to history of sepsis and MDRO positive E. coli cultures.  We discussed coordination for admission, surgical procedure and ID consultation prior to procedure.  He is needing scheduled stent exchange in 6/2022.  We will coordinate hospital admission and ID consult prior to procedure.  We will contact patient with further operative management plan.  He is understanding agreeable.    Diagnoses and all orders for this visit:    1. Ureteral stricture (Primary)    2. Hydronephrosis of left kidney         I spent approximately 45 minutes providing clinical care for this patient; including review of patient's chart and provider documentation, face to face time spent with patient in examination room (obtaining history, performing physical exam, discussing diagnosis and management options), placing orders, and completing patient documentation.     Ryne Mccann MD  [facility] [place]

## 2022-06-04 DIAGNOSIS — N40.1 BPH WITH URINARY OBSTRUCTION: ICD-10-CM

## 2022-06-04 DIAGNOSIS — N13.8 BPH WITH URINARY OBSTRUCTION: ICD-10-CM

## 2022-06-04 RX ORDER — FINASTERIDE 5 MG/1
TABLET, FILM COATED ORAL
Qty: 90 TABLET | Refills: 3 | Status: SHIPPED | OUTPATIENT
Start: 2022-06-04

## 2022-06-06 ENCOUNTER — TELEPHONE (OUTPATIENT)
Dept: UROLOGY | Facility: CLINIC | Age: 87
End: 2022-06-06

## 2022-06-06 NOTE — TELEPHONE ENCOUNTER
MARCUS IS CALLING AND STATES THAT HER FATHER IS SUPPOSE TO BE SET UP FOR SURGERY. I DIDN'T SEE A SURGICAL REFERRAL IN SO I WENT AND LOOKED AT THE LAST OFFICE NOTE AND IT LOOKS LIKE DR LEON WOULD LIKE HIM TO SEE INFECTIOUS DISEASE FIRST. I ADVISED HER OF THAT AND SHE DIDN'T THINK THAT WAS WHAT DR LEON SAID AND ALSO SAID THAT HER FATHER WILL NOT GO TO ID AND SEE DR BARTLETT. PLEASE ADVISE.

## 2022-06-06 NOTE — TELEPHONE ENCOUNTER
Would you mind calling and letting them know we are planning for 6/17- we are working to coordinate ID appoitment- they want to see and then pre-admission for 16th. Thanks

## 2022-06-06 NOTE — TELEPHONE ENCOUNTER
Jose from [facility] left a message.      He needs the most recent office note, labs and demographics.  Please contact Jose at [phone]

## 2022-06-06 NOTE — TELEPHONE ENCOUNTER
MARCUS CALLED BACK, AND CORRECTION SHE IS THE GRAND DAUGHTER, BUT SHE SAID THAT SHE TALKED MR. ZURITA INTO GOING TO ID AND THEY ARE SCHEDULED FOR THIS Thursday.

## 2022-06-07 ENCOUNTER — LAB REQUISITION (OUTPATIENT)
Dept: LAB | Facility: HOSPITAL | Age: 87
End: 2022-06-07

## 2022-06-07 DIAGNOSIS — N39.0 URINARY TRACT INFECTION, SITE NOT SPECIFIED: ICD-10-CM

## 2022-06-07 LAB
BACTERIA UR QL AUTO: ABNORMAL /HPF
BILIRUB UR QL STRIP: NEGATIVE
CLARITY UR: ABNORMAL
COLOR UR: YELLOW
GLUCOSE UR STRIP-MCNC: NEGATIVE MG/DL
HGB UR QL STRIP.AUTO: ABNORMAL
HYALINE CASTS UR QL AUTO: ABNORMAL /LPF
KETONES UR QL STRIP: NEGATIVE
LEUKOCYTE ESTERASE UR QL STRIP.AUTO: ABNORMAL
NITRITE UR QL STRIP: NEGATIVE
PH UR STRIP.AUTO: 5.5 [PH] (ref 5–8)
PROT UR QL STRIP: ABNORMAL
RBC # UR STRIP: ABNORMAL /HPF
REF LAB TEST METHOD: ABNORMAL
SP GR UR STRIP: 1.02 (ref 1–1.03)
SQUAMOUS #/AREA URNS HPF: ABNORMAL /HPF
UROBILINOGEN UR QL STRIP: ABNORMAL
WBC # UR STRIP: ABNORMAL /HPF
YEAST URNS QL MICRO: ABNORMAL /HPF

## 2022-06-07 PROCEDURE — 87086 URINE CULTURE/COLONY COUNT: CPT | Performed by: INTERNAL MEDICINE

## 2022-06-07 PROCEDURE — 81001 URINALYSIS AUTO W/SCOPE: CPT | Performed by: INTERNAL MEDICINE

## 2022-06-08 LAB — BACTERIA SPEC AEROBE CULT: NORMAL

## 2022-06-12 ENCOUNTER — PREP FOR SURGERY (OUTPATIENT)
Dept: OTHER | Facility: HOSPITAL | Age: 87
End: 2022-06-12

## 2022-06-12 DIAGNOSIS — N13.30 HYDRONEPHROSIS, LEFT: Primary | ICD-10-CM

## 2022-06-12 RX ORDER — ACETAMINOPHEN 500 MG
1000 TABLET ORAL ONCE
Status: CANCELLED | OUTPATIENT
Start: 2022-06-12 | End: 2022-06-12

## 2022-06-12 RX ORDER — MELOXICAM 15 MG/1
15 TABLET ORAL ONCE
Status: CANCELLED | OUTPATIENT
Start: 2022-06-12 | End: 2022-06-12

## 2022-06-12 RX ORDER — SCOLOPAMINE TRANSDERMAL SYSTEM 1 MG/1
1 PATCH, EXTENDED RELEASE TRANSDERMAL CONTINUOUS
Status: CANCELLED | OUTPATIENT
Start: 2022-06-12 | End: 2022-06-15

## 2022-06-12 RX ORDER — GABAPENTIN 300 MG/1
600 CAPSULE ORAL ONCE
Status: CANCELLED | OUTPATIENT
Start: 2022-06-12 | End: 2022-06-12

## 2022-06-15 ENCOUNTER — PRE-ADMISSION TESTING (OUTPATIENT)
Dept: PREADMISSION TESTING | Facility: HOSPITAL | Age: 87
End: 2022-06-15

## 2022-06-15 VITALS — BODY MASS INDEX: 32.82 KG/M2 | WEIGHT: 221.56 LBS | HEIGHT: 69 IN

## 2022-06-15 DIAGNOSIS — N13.30 HYDRONEPHROSIS, LEFT: ICD-10-CM

## 2022-06-15 LAB
ANION GAP SERPL CALCULATED.3IONS-SCNC: 10 MMOL/L (ref 5–15)
APTT PPP: 41.1 SECONDS (ref 22–39)
BUN SERPL-MCNC: 35 MG/DL (ref 8–23)
BUN/CREAT SERPL: 23.3 (ref 7–25)
CALCIUM SPEC-SCNC: 9.7 MG/DL (ref 8.2–9.6)
CHLORIDE SERPL-SCNC: 105 MMOL/L (ref 98–107)
CO2 SERPL-SCNC: 26 MMOL/L (ref 22–29)
CREAT SERPL-MCNC: 1.5 MG/DL (ref 0.76–1.27)
DEPRECATED RDW RBC AUTO: 58.8 FL (ref 37–54)
EGFRCR SERPLBLD CKD-EPI 2021: 44 ML/MIN/1.73
ERYTHROCYTE [DISTWIDTH] IN BLOOD BY AUTOMATED COUNT: 16 % (ref 12.3–15.4)
GLUCOSE SERPL-MCNC: 127 MG/DL (ref 65–99)
HCT VFR BLD AUTO: 40.7 % (ref 37.5–51)
HGB BLD-MCNC: 13 G/DL (ref 13–17.7)
INR PPP: 1.17 (ref 0.84–1.13)
MCH RBC QN AUTO: 32 PG (ref 26.6–33)
MCHC RBC AUTO-ENTMCNC: 31.9 G/DL (ref 31.5–35.7)
MCV RBC AUTO: 100.2 FL (ref 79–97)
PLATELET # BLD AUTO: 195 10*3/MM3 (ref 140–450)
PMV BLD AUTO: 9.6 FL (ref 6–12)
POTASSIUM SERPL-SCNC: 5.6 MMOL/L (ref 3.5–5.2)
PROTHROMBIN TIME: 14.8 SECONDS (ref 11.4–14.4)
QT INTERVAL: 464 MS
QTC INTERVAL: 486 MS
RBC # BLD AUTO: 4.06 10*6/MM3 (ref 4.14–5.8)
SARS-COV-2 RNA PNL SPEC NAA+PROBE: NOT DETECTED
SODIUM SERPL-SCNC: 141 MMOL/L (ref 136–145)
WBC NRBC COR # BLD: 10.99 10*3/MM3 (ref 3.4–10.8)

## 2022-06-15 PROCEDURE — 85730 THROMBOPLASTIN TIME PARTIAL: CPT

## 2022-06-15 PROCEDURE — C9803 HOPD COVID-19 SPEC COLLECT: HCPCS

## 2022-06-15 PROCEDURE — U0004 COV-19 TEST NON-CDC HGH THRU: HCPCS

## 2022-06-15 PROCEDURE — 85027 COMPLETE CBC AUTOMATED: CPT

## 2022-06-15 PROCEDURE — 85610 PROTHROMBIN TIME: CPT

## 2022-06-15 PROCEDURE — 93010 ELECTROCARDIOGRAM REPORT: CPT | Performed by: INTERNAL MEDICINE

## 2022-06-15 PROCEDURE — 36415 COLL VENOUS BLD VENIPUNCTURE: CPT

## 2022-06-15 PROCEDURE — 93005 ELECTROCARDIOGRAM TRACING: CPT

## 2022-06-15 PROCEDURE — 80048 BASIC METABOLIC PNL TOTAL CA: CPT

## 2022-06-15 RX ORDER — GLIMEPIRIDE 2 MG/1
2 TABLET ORAL
COMMUNITY
End: 2022-06-15

## 2022-06-15 RX ORDER — GLIMEPIRIDE 1 MG/1
1 TABLET ORAL
COMMUNITY
End: 2022-07-12 | Stop reason: SDUPTHER

## 2022-06-15 NOTE — PAT
Per Anesthesia Request, patient instructed not to take their ACE/ARB medications on the AM of surgery.    Patient viewed general PAT education video as instructed in their preoperative information received from their surgeon.  Patient stated the general PAT education video was viewed in its entirety and survey completed.  Copies of PAT general education handouts (Incentive Spirometry, Meds to Beds Program, Patient Belongings, Pre-op skin preparation instructions, Blood Glucose testing, Visitor policy, Surgery FAQ, Code H) distributed to patient if not printed. Education related to the PAT pass and skin preparation for surgery (if applicable) completed in PAT as a reinforcement to PAT education video. Patient instructed to return PAT pass provided today as well as completed skin preparation sheet (if applicable) on the day of procedure.     Additionally if patient had not viewed video yet but intended to view it at home or in our waiting area, then referred them to the handout with QR code/link provided during PAT visit.  Instructed patient to complete survey after viewing the video in its entirety.  Encouraged patient/family to read PAT general education handouts thoroughly and notify PAT staff with any questions or concerns. Patient verbalized understanding of all information and priority content.    Patient instructed to drink 20 ounces (or until full) of Gatorade and it needs to be completed 1 hour (for Main OR patients) or 2 hours (scheduled  section patients) before given arrival time for procedure (NO RED Gatorade)  Patient verbalized understanding.    Last dose of eliquis per pt report today 6-15-22, patient/son stated they have not received instructions to hold. Ca in Dr Mccann's office notified and stated she will contact Dr Mccann and call patient back with instructions. Ca also notified to obtain cardiac clearance per dr Saha and recent [employer] Pacemaker report (last from  2-2022 in epic). Info and history reviewed with Dr benítez in the event we do not receive clearance and he stated ok to proceed but would prefer clearance from Dr Saha. Note left on chart to hold until received     History of esbl ecoli in urine. Talked with Veronica in Infectious disease and she lifted precautions based on most recent urine in epic

## 2022-06-16 ENCOUNTER — ANESTHESIA EVENT (OUTPATIENT)
Dept: PERIOP | Facility: HOSPITAL | Age: 87
End: 2022-06-16

## 2022-06-16 NOTE — PAT
Cardiac clearance by Dr Saha not entered in computer yet.  Dr Mccann's office was to contact Dr Saha's office to request for cardiac clearance and latest pacemaker interrogation. Chart forwarded to preop.

## 2022-06-17 ENCOUNTER — ANESTHESIA (OUTPATIENT)
Dept: PERIOP | Facility: HOSPITAL | Age: 87
End: 2022-06-17

## 2022-06-17 ENCOUNTER — HOSPITAL ENCOUNTER (OUTPATIENT)
Facility: HOSPITAL | Age: 87
Setting detail: SURGERY ADMIT
Discharge: HOME | End: 2022-06-17
Attending: UROLOGY | Admitting: UROLOGY

## 2022-06-17 ENCOUNTER — APPOINTMENT (OUTPATIENT)
Dept: GENERAL RADIOLOGY | Facility: HOSPITAL | Age: 87
End: 2022-06-17

## 2022-06-17 VITALS
SYSTOLIC BLOOD PRESSURE: 152 MMHG | WEIGHT: 220 LBS | RESPIRATION RATE: 20 BRPM | OXYGEN SATURATION: 92 % | HEIGHT: 69 IN | BODY MASS INDEX: 32.58 KG/M2 | TEMPERATURE: 98 F | HEART RATE: 60 BPM | DIASTOLIC BLOOD PRESSURE: 75 MMHG

## 2022-06-17 DIAGNOSIS — N13.30 HYDRONEPHROSIS, LEFT: ICD-10-CM

## 2022-06-17 LAB
GLUCOSE BLDC GLUCOMTR-MCNC: 136 MG/DL (ref 70–130)
GLUCOSE BLDC GLUCOMTR-MCNC: 168 MG/DL (ref 70–130)

## 2022-06-17 PROCEDURE — C2617 STENT, NON-COR, TEM W/O DEL: HCPCS | Performed by: UROLOGY

## 2022-06-17 PROCEDURE — 25010000002 DEXAMETHASONE PER 1 MG

## 2022-06-17 PROCEDURE — 25010000002 ONDANSETRON PER 1 MG

## 2022-06-17 PROCEDURE — 25010000002 PROPOFOL 10 MG/ML EMULSION

## 2022-06-17 PROCEDURE — 52332 CYSTOSCOPY AND TREATMENT: CPT | Performed by: UROLOGY

## 2022-06-17 PROCEDURE — C1769 GUIDE WIRE: HCPCS | Performed by: UROLOGY

## 2022-06-17 PROCEDURE — C1758 CATHETER, URETERAL: HCPCS | Performed by: UROLOGY

## 2022-06-17 PROCEDURE — 76000 FLUOROSCOPY <1 HR PHYS/QHP: CPT

## 2022-06-17 PROCEDURE — 82962 GLUCOSE BLOOD TEST: CPT

## 2022-06-17 PROCEDURE — 25010000002 ERTAPENEM PER 500 MG: Performed by: UROLOGY

## 2022-06-17 PROCEDURE — 52351 CYSTOURETERO & OR PYELOSCOPE: CPT | Performed by: UROLOGY

## 2022-06-17 PROCEDURE — 25010000002 FENTANYL CITRATE (PF) 50 MCG/ML SOLUTION

## 2022-06-17 PROCEDURE — 25010000002 IOPAMIDOL 61 % SOLUTION: Performed by: UROLOGY

## 2022-06-17 DEVICE — URETERAL STENT WITH SIDE HOLES 8FX26CM
Type: IMPLANTABLE DEVICE | Site: URETER | Status: FUNCTIONAL
Brand: TRIA™ FIRM

## 2022-06-17 RX ORDER — BUPIVACAINE HCL/0.9 % NACL/PF 0.125 %
PLASTIC BAG, INJECTION (ML) EPIDURAL AS NEEDED
Status: DISCONTINUED | OUTPATIENT
Start: 2022-06-17 | End: 2022-06-17 | Stop reason: SURG

## 2022-06-17 RX ORDER — IPRATROPIUM BROMIDE AND ALBUTEROL SULFATE 2.5; .5 MG/3ML; MG/3ML
3 SOLUTION RESPIRATORY (INHALATION) ONCE AS NEEDED
Status: DISCONTINUED | OUTPATIENT
Start: 2022-06-17 | End: 2022-06-17 | Stop reason: HOSPADM

## 2022-06-17 RX ORDER — ONDANSETRON 2 MG/ML
4 INJECTION INTRAMUSCULAR; INTRAVENOUS ONCE AS NEEDED
Status: DISCONTINUED | OUTPATIENT
Start: 2022-06-17 | End: 2022-06-17 | Stop reason: HOSPADM

## 2022-06-17 RX ORDER — SODIUM CHLORIDE 0.9 % (FLUSH) 0.9 %
10 SYRINGE (ML) INJECTION EVERY 12 HOURS SCHEDULED
Status: DISCONTINUED | OUTPATIENT
Start: 2022-06-17 | End: 2022-06-17 | Stop reason: HOSPADM

## 2022-06-17 RX ORDER — NALOXONE HCL 0.4 MG/ML
0.4 VIAL (ML) INJECTION AS NEEDED
Status: DISCONTINUED | OUTPATIENT
Start: 2022-06-17 | End: 2022-06-17 | Stop reason: HOSPADM

## 2022-06-17 RX ORDER — ROCURONIUM BROMIDE 10 MG/ML
INJECTION, SOLUTION INTRAVENOUS AS NEEDED
Status: DISCONTINUED | OUTPATIENT
Start: 2022-06-17 | End: 2022-06-17 | Stop reason: SURG

## 2022-06-17 RX ORDER — ACETAMINOPHEN 500 MG
1000 TABLET ORAL ONCE
Status: COMPLETED | OUTPATIENT
Start: 2022-06-17 | End: 2022-06-17

## 2022-06-17 RX ORDER — FENTANYL CITRATE 50 UG/ML
INJECTION, SOLUTION INTRAMUSCULAR; INTRAVENOUS AS NEEDED
Status: DISCONTINUED | OUTPATIENT
Start: 2022-06-17 | End: 2022-06-17 | Stop reason: SURG

## 2022-06-17 RX ORDER — MIDAZOLAM HYDROCHLORIDE 1 MG/ML
0.5 INJECTION INTRAMUSCULAR; INTRAVENOUS
Status: DISCONTINUED | OUTPATIENT
Start: 2022-06-17 | End: 2022-06-17 | Stop reason: HOSPADM

## 2022-06-17 RX ORDER — FENTANYL CITRATE 50 UG/ML
50 INJECTION, SOLUTION INTRAMUSCULAR; INTRAVENOUS
Status: DISCONTINUED | OUTPATIENT
Start: 2022-06-17 | End: 2022-06-17 | Stop reason: HOSPADM

## 2022-06-17 RX ORDER — PROPOFOL 10 MG/ML
VIAL (ML) INTRAVENOUS AS NEEDED
Status: DISCONTINUED | OUTPATIENT
Start: 2022-06-17 | End: 2022-06-17 | Stop reason: SURG

## 2022-06-17 RX ORDER — SCOLOPAMINE TRANSDERMAL SYSTEM 1 MG/1
1 PATCH, EXTENDED RELEASE TRANSDERMAL CONTINUOUS
Status: DISCONTINUED | OUTPATIENT
Start: 2022-06-17 | End: 2022-06-17 | Stop reason: HOSPADM

## 2022-06-17 RX ORDER — MAGNESIUM HYDROXIDE 1200 MG/15ML
LIQUID ORAL AS NEEDED
Status: DISCONTINUED | OUTPATIENT
Start: 2022-06-17 | End: 2022-06-17 | Stop reason: HOSPADM

## 2022-06-17 RX ORDER — FAMOTIDINE 10 MG/ML
20 INJECTION, SOLUTION INTRAVENOUS ONCE
Status: DISCONTINUED | OUTPATIENT
Start: 2022-06-17 | End: 2022-06-17 | Stop reason: HOSPADM

## 2022-06-17 RX ORDER — SODIUM CHLORIDE 0.9 % (FLUSH) 0.9 %
10 SYRINGE (ML) INJECTION AS NEEDED
Status: DISCONTINUED | OUTPATIENT
Start: 2022-06-17 | End: 2022-06-17 | Stop reason: HOSPADM

## 2022-06-17 RX ORDER — FAMOTIDINE 20 MG/1
20 TABLET, FILM COATED ORAL ONCE
Status: COMPLETED | OUTPATIENT
Start: 2022-06-17 | End: 2022-06-17

## 2022-06-17 RX ORDER — MELOXICAM 15 MG/1
15 TABLET ORAL ONCE
Status: COMPLETED | OUTPATIENT
Start: 2022-06-17 | End: 2022-06-17

## 2022-06-17 RX ORDER — SODIUM CHLORIDE, SODIUM LACTATE, POTASSIUM CHLORIDE, CALCIUM CHLORIDE 600; 310; 30; 20 MG/100ML; MG/100ML; MG/100ML; MG/100ML
9 INJECTION, SOLUTION INTRAVENOUS CONTINUOUS
Status: DISCONTINUED | OUTPATIENT
Start: 2022-06-17 | End: 2022-06-17 | Stop reason: HOSPADM

## 2022-06-17 RX ORDER — SODIUM CHLORIDE 0.9 % (FLUSH) 0.9 %
3 SYRINGE (ML) INJECTION EVERY 12 HOURS SCHEDULED
Status: DISCONTINUED | OUTPATIENT
Start: 2022-06-17 | End: 2022-06-17 | Stop reason: HOSPADM

## 2022-06-17 RX ORDER — SODIUM CHLORIDE 9 MG/ML
9 INJECTION, SOLUTION INTRAVENOUS ONCE
Status: COMPLETED | OUTPATIENT
Start: 2022-06-17 | End: 2022-06-17

## 2022-06-17 RX ORDER — ONDANSETRON 2 MG/ML
INJECTION INTRAMUSCULAR; INTRAVENOUS AS NEEDED
Status: DISCONTINUED | OUTPATIENT
Start: 2022-06-17 | End: 2022-06-17 | Stop reason: SURG

## 2022-06-17 RX ORDER — LABETALOL HYDROCHLORIDE 5 MG/ML
5 INJECTION, SOLUTION INTRAVENOUS
Status: DISCONTINUED | OUTPATIENT
Start: 2022-06-17 | End: 2022-06-17 | Stop reason: HOSPADM

## 2022-06-17 RX ORDER — DEXAMETHASONE SODIUM PHOSPHATE 4 MG/ML
INJECTION, SOLUTION INTRA-ARTICULAR; INTRALESIONAL; INTRAMUSCULAR; INTRAVENOUS; SOFT TISSUE AS NEEDED
Status: DISCONTINUED | OUTPATIENT
Start: 2022-06-17 | End: 2022-06-17 | Stop reason: SURG

## 2022-06-17 RX ORDER — LIDOCAINE HYDROCHLORIDE 10 MG/ML
INJECTION, SOLUTION EPIDURAL; INFILTRATION; INTRACAUDAL; PERINEURAL AS NEEDED
Status: DISCONTINUED | OUTPATIENT
Start: 2022-06-17 | End: 2022-06-17 | Stop reason: SURG

## 2022-06-17 RX ORDER — GABAPENTIN 300 MG/1
600 CAPSULE ORAL ONCE
Status: COMPLETED | OUTPATIENT
Start: 2022-06-17 | End: 2022-06-17

## 2022-06-17 RX ORDER — HYDROCODONE BITARTRATE AND ACETAMINOPHEN 5; 325 MG/1; MG/1
1 TABLET ORAL ONCE AS NEEDED
Status: DISCONTINUED | OUTPATIENT
Start: 2022-06-17 | End: 2022-06-17 | Stop reason: HOSPADM

## 2022-06-17 RX ORDER — SODIUM CHLORIDE 0.9 % (FLUSH) 0.9 %
3-10 SYRINGE (ML) INJECTION AS NEEDED
Status: DISCONTINUED | OUTPATIENT
Start: 2022-06-17 | End: 2022-06-17 | Stop reason: HOSPADM

## 2022-06-17 RX ORDER — LIDOCAINE HYDROCHLORIDE 10 MG/ML
0.5 INJECTION, SOLUTION EPIDURAL; INFILTRATION; INTRACAUDAL; PERINEURAL ONCE AS NEEDED
Status: DISCONTINUED | OUTPATIENT
Start: 2022-06-17 | End: 2022-06-17 | Stop reason: HOSPADM

## 2022-06-17 RX ADMIN — SODIUM CHLORIDE, POTASSIUM CHLORIDE, SODIUM LACTATE AND CALCIUM CHLORIDE: 600; 310; 30; 20 INJECTION, SOLUTION INTRAVENOUS at 07:33

## 2022-06-17 RX ADMIN — FENTANYL CITRATE 100 MCG: 50 INJECTION, SOLUTION INTRAMUSCULAR; INTRAVENOUS at 07:40

## 2022-06-17 RX ADMIN — FAMOTIDINE 20 MG: 20 TABLET ORAL at 07:10

## 2022-06-17 RX ADMIN — DEXAMETHASONE SODIUM PHOSPHATE 4 MG: 4 INJECTION, SOLUTION INTRA-ARTICULAR; INTRALESIONAL; INTRAMUSCULAR; INTRAVENOUS; SOFT TISSUE at 07:44

## 2022-06-17 RX ADMIN — ACETAMINOPHEN 1000 MG: 500 TABLET ORAL at 07:10

## 2022-06-17 RX ADMIN — SODIUM CHLORIDE 9 ML/HR: 9 INJECTION, SOLUTION INTRAVENOUS at 07:17

## 2022-06-17 RX ADMIN — ONDANSETRON 4 MG: 2 INJECTION INTRAMUSCULAR; INTRAVENOUS at 07:44

## 2022-06-17 RX ADMIN — Medication 1 G: at 07:33

## 2022-06-17 RX ADMIN — Medication 100 MCG: at 07:40

## 2022-06-17 RX ADMIN — ROCURONIUM BROMIDE 50 MG: 10 INJECTION, SOLUTION INTRAVENOUS at 07:40

## 2022-06-17 RX ADMIN — PROPOFOL 200 MCG/KG/MIN: 10 INJECTION, EMULSION INTRAVENOUS at 07:40

## 2022-06-17 RX ADMIN — SUGAMMADEX 200 MG: 100 INJECTION, SOLUTION INTRAVENOUS at 08:14

## 2022-06-17 RX ADMIN — MELOXICAM 15 MG: 15 TABLET ORAL at 07:12

## 2022-06-17 RX ADMIN — PROPOFOL 150 MG: 10 INJECTION, EMULSION INTRAVENOUS at 07:40

## 2022-06-17 RX ADMIN — Medication 100 MCG: at 07:46

## 2022-06-17 RX ADMIN — GABAPENTIN 600 MG: 300 CAPSULE ORAL at 07:10

## 2022-06-17 RX ADMIN — Medication 100 MCG: at 08:03

## 2022-06-17 RX ADMIN — LIDOCAINE HYDROCHLORIDE 50 MG: 10 INJECTION, SOLUTION EPIDURAL; INFILTRATION; INTRACAUDAL; PERINEURAL at 07:40

## 2022-06-17 NOTE — ANESTHESIA PROCEDURE NOTES
Airway  Urgency: elective    Date/Time: 6/17/2022 7:41 AM  Airway not difficult    General Information and Staff    Patient location during procedure: OR  CRNA/CAA: Alice Thompson CRNA    Indications and Patient Condition  Indications for airway management: airway protection    Preoxygenated: yes  MILS not maintained throughout  Mask difficulty assessment: 0 - not attempted    Final Airway Details  Final airway type: endotracheal airway      Successful airway: ETT  Cuffed: yes   Successful intubation technique: direct laryngoscopy  Endotracheal tube insertion site: oral  Blade: Everardo  Blade size: 4  ETT size (mm): 8.0  Cormack-Lehane Classification: grade I - full view of glottis  Placement verified by: chest auscultation and capnometry   Cuff volume (mL): 8  Measured from: lips  ETT/EBT  to lips (cm): 22  Number of attempts at approach: 1  Assessment: lips, teeth, and gum same as pre-op and atraumatic intubation    Additional Comments  Negative epigastric sounds, Breath sound equal bilaterally with symmetric chest rise and fall

## 2022-06-17 NOTE — ANESTHESIA PREPROCEDURE EVALUATION
Anesthesia Evaluation                  Airway   Mallampati: II  Dental      Pulmonary    Cardiovascular     (+) hypertension, dysrhythmias, CHF ,       Neuro/Psych  GI/Hepatic/Renal/Endo    (+)   diabetes mellitus, thyroid problem     Musculoskeletal     Abdominal    Substance History      OB/GYN          Other                        Anesthesia Plan    ASA 4     general             CODE STATUS:

## 2022-06-17 NOTE — OP NOTE
CYSTOSCOPY URETERAL CATHETER/STENT INSERTION, CYSTOSCOPY RETROGRADE PYELOGRAM  Procedure Report    Patient Name:  Forrest Zepeda  YOB: 1932    Date of Surgery:  6/17/2022     Indications: 89-year-old male with a history of left ureteral stent sure, chronic left ureteral stent for management.  History of resistant urinary tract infection.  He follows with infectious disease service.  In preparation for his planned stent exchange she has seen infectious disease and they have determined antibiotic regimen.  He presents today for stent exchange the risks and benefits of this procedure were discussed with patient he elects to proceed.    Pre-op Diagnosis:   Hydronephrosis, left [N13.30]       Post-Op Diagnosis Codes:     * Hydronephrosis, left [N13.30]      Procedure(s):   CYSTOURETHROSCOPY  LEFT URETERAL CATHETER/STENT INSERTION  LEFT RETROGRADE PYELOGRAM    Staff:  Surgeon(s):  Ryne Mccann MD         Anesthesia: General    Estimated Blood Loss: none    Implants:    Implant Name Type Inv. Item Serial No.  Lot No. LRB No. Used Action   Tria Soft Ureteral Stent     [id number] Left 1 Implanted       Specimen:          None        Findings:   1.  Normal pendulous, bulbar, prostatic urethra.  Trabeculated bladder with multiple diverticuli.  Prior indwelling metal resident stent identified.  2.  Sensor wire passed to the level of the left renal collecting system.  Contrast instilled to perform a retrograde pyelogram demonstrating dilated left renal collecting system.  3.  Resident metal indwelling stent removed successfully without difficulty.  4.  Left retrograde pyelogram reperformed demonstrating dilated left renal collecting system, this imaging was used for appropriate stent placement.  5.  Accessible placement of 8 [place] by 26 cm double-J Tria stent.    Complications: None immediate    Description of Procedure:   The patient was identified in the preoperative holding area where informed  consent was reviewed and signed. He was transported the operating room per anesthesia and placed supine on the operating table. Smooth endotracheal intubation was performed without issue after administration of general anesthesia. The patient was then placed in the dorsal lithotomy position where genitals were prepped and draped in the usual sterile fashion. A brief timeout was performed identifying the correct patient procedure and laterality. Perioperative antibiotics were administered. All pressure points were padded.     Procedure began by inserting a 22 Slovak cystoscope atraumatically per the patient's urethra, entering into the bladder were pan cystoscopy was performed identifying bilateral orthotopic ureteral orifices and no evidence of bladder masses or other lesions.  The patient has multiple bladder diverticuli, significantly trabeculated bladder.  Attention was then turned to the left ureteral orifice.  Prior indwelling metal resident stent was in place.A Sensor wire was inserted through the working channel of the scope and advanced up the left ureteral orifice to the level of the renal pelvis on fluoroscopy.  A 5 Slovak open-ended ureteral catheter was then advanced over the wire.  Contrast was instilled to perform a retrograde pyelogram to ensure evaluation of the renal collecting system.  Wire in position the cystoscope was removed    A flexible stent grasper was passed and the indwelling metal stent was grasped and removed intact without difficulty.    5 Slovak open-ended ureteral catheter was then readvanced over the wire and contrast instilled.  A dilated left renal collecting system was identified.  This was used for correct placement of her stent.      A 4.8 x 26 Slovak double J ureteral stent was then advanced over the wire to the renal pelvis confirmed on fluoroscopy. The Sensor wire was then removed and a good proximal stent curl was visualized within the collecting system on fluoroscopy, and  a distal stent curl was observed within the bladder on direct visualization. The patient's bladder was emptied. The cystoscope was then removed.    The patient was awoken from general anesthesia and transported to the PACU in stable condition.    PLAN  -Successful left stent exchange.  He will follow-up with [facility] [place] for next scheduled stent exchange.  He will be contacted by office for next date and time.  He will continue to follow with infectious disease for management of urinary tract infection.  Antibiotic regimen post procedurally per their service.            Ryne Mccann MD     Date: 6/17/2022  Time: 08:44 EDT

## 2022-06-17 NOTE — ANESTHESIA POSTPROCEDURE EVALUATION
Patient: Forrest Zepeda    Procedure Summary     Date: 06/17/22 Room / Location:  [facility]    Anesthesia Start: 0733 Anesthesia Stop:     Procedure: CYSTOSCOPY URETERAL CATHETER/STENT INSERTION (Left Bladder) Diagnosis:       Hydronephrosis, left      (Hydronephrosis, left [N13.30])    Surgeons: Ryne Mccann MD Provider: Christiano Murphy MD    Anesthesia Type: general ASA Status: 4          Anesthesia Type: general    Vitals  Vitals Value Taken Time   /98 06/17/22 0831   Temp 98 °F (36.7 °C) 06/17/22 0831   Pulse 62 06/17/22 0831   Resp 14 06/17/22 0831   SpO2 94 % 06/17/22 0831           Post Anesthesia Care and Evaluation    Patient location during evaluation: PACU  Patient participation: complete - patient participated  Level of consciousness: lethargic  Pain management: adequate    Airway patency: patent  Anesthetic complications: No anesthetic complications  PONV Status: none  Cardiovascular status: hemodynamically stable and acceptable  Respiratory status: nonlabored ventilation, acceptable and nasal cannula  Hydration status: acceptable

## 2022-06-17 NOTE — INTERVAL H&P NOTE
Pre-Op H&P (See Recent Office Note Attached for Full H&P)    Patient Name: Forrest Zepeda  : 1932  MRN: [id number]    Chief complaint: Hydronephrosis    HPI:      Patient is a 89 y.o. male who presents with hydronephrosis of left kidney. Office note is up to date. No changes in symptoms or medical history. Patient anticipates a cystoscopy ureteral catheter today with stent insertion on left side with Dr. Mccann.    He takes Eliquis. Last dose morning of 06/15.    Review of Systems:  General ROS:  no fever, chills, rashes.  No change since last office visit.  No recent sick exposure  Cardiovascular ROS: no chest pain or dyspnea on exertion  Respiratory ROS: no cough, shortness of breath, or wheezing    Immunization History:   Tetanus: 2020  COVID:     Meds:    No current facility-administered medications on file prior to encounter.     Current Outpatient Medications on File Prior to Encounter   Medication Sig Dispense Refill   • acetaminophen (TYLENOL) 500 MG tablet Take 1,000 mg by mouth Every 6 (Six) Hours As Needed for Mild Pain .     • allopurinol (ZYLOPRIM) 300 MG tablet Take 1 tablet by mouth Every Night. 90 tablet 3   • aspirin (aspirin) 81 MG EC tablet Take 1 tablet by mouth Daily. 30 tablet 11   • bisoprolol (ZEBeta) 5 MG tablet Take 1 tablet by mouth Daily. 30 tablet 11   • Eliquis 2.5 MG tablet tablet TAKE ONE TABLET BY MOUTH TWICE A DAY (Patient taking differently: 2.5 mg Every 12 (Twelve) Hours.) 180 tablet 3   • silodosin (RAPAFLO) 8 MG capsule capsule Take 1 capsule by mouth Daily With Breakfast. 90 capsule 3   • SITagliptin (JANUVIA) 100 MG tablet Take 1 tablet by mouth Daily. 90 tablet 3   • finasteride (PROSCAR) 5 MG tablet TAKE ONE TABLET BY MOUTH DAILY (Patient taking differently: Take 5 mg by mouth Daily.) 90 tablet 3   • Insulin Glargine, 1 Unit Dial, (Toujeo SoloStar) 300 UNIT/ML solution pen-injector injection Inject 15 Units under the skin into the appropriate area as directed  "Daily. 3 pen 3   • isosorbide mononitrate (IMDUR) 30 MG 24 hr tablet Take 1 tablet by mouth Every Morning. 90 tablet 3   • levothyroxine (SYNTHROID, LEVOTHROID) 50 MCG tablet Take 1 tablet by mouth Daily. Indications: Underactive Thyroid 90 tablet 3   • losartan (Cozaar) 25 MG tablet Take 1 tablet by mouth Daily. Indications: High Blood Pressure Disorder 90 tablet 3   • Multiple Vitamins-Minerals (OCUVITE ADULT 50+ PO) Take 1 capsule by mouth Daily.     • nitroglycerin (NITROSTAT) 0.4 MG SL tablet 1 under the tongue as needed for angina, may repeat q5mins for up three doses (Patient taking differently: Place 0.4 mg under the tongue Every 5 (Five) Minutes As Needed for Chest Pain. 1 under the tongue as needed for angina, may repeat q5mins for up three doses) 100 tablet 11       Vital Signs:  /79 (BP Location: Right arm, Patient Position: Lying)   Pulse 60   Temp 96.8 °F (36 °C) (Temporal)   Resp 20   Ht 175.3 cm (69\")   Wt 99.8 kg (220 lb)   SpO2 94%   BMI 32.49 kg/m²     Physical Exam:    CV:  S1S2 regular rate and rhythm, no murmur               Resp:  Clear to auscultation; respirations regular, even and unlabored    Results Review:     Lab Results   Component Value Date    WBC 10.99 (H) 06/15/2022    HGB 13.0 06/15/2022    HCT 40.7 06/15/2022    .2 (H) 06/15/2022     06/15/2022    NEUTROABS 6.2 12/13/2021    GLUCOSE 127 (H) 06/15/2022    BUN 35 (H) 06/15/2022    CREATININE 1.50 (H) 06/15/2022    EGFRIFNONA 39 (L) 12/13/2021    EGFRIFAFRI 45 (L) 12/13/2021     06/15/2022    K 5.6 (H) 06/15/2022     06/15/2022    CO2 26.0 06/15/2022    MG 1.9 12/07/2021    CALCIUM 9.7 (H) 06/15/2022    ALBUMIN 3.3 12/13/2021    ALBUMIN 4.2 12/13/2021    AST 18 12/07/2021    ALT 14 12/07/2021    BILITOT 0.3 12/07/2021    PTT 41.1 (H) 06/15/2022        I reviewed the patient's new clinical results.      Assessment/Plan:  Assessment:  Hydronephrosis of left kidney    Plan:  Cystoscopy ureteral " catheter/stent insertion    Electronically signed by Sandra Sierra PA-C    6/17/2022   06:55 EDT     H&P reviewed. The patient was examined and there are no changes to the H&P.

## 2022-06-22 ENCOUNTER — TELEPHONE (OUTPATIENT)
Dept: INTERNAL MEDICINE | Facility: CLINIC | Age: 87
End: 2022-06-22

## 2022-06-22 NOTE — TELEPHONE ENCOUNTER
Sent message earlier about how I put on my schedule today. Can they make that time? If not please cancel.     With the issues mentioned in the [facility] messages it is not wise to wait 2 days.

## 2022-07-12 ENCOUNTER — OFFICE VISIT (OUTPATIENT)
Dept: INTERNAL MEDICINE | Facility: CLINIC | Age: 87
End: 2022-07-12

## 2022-07-12 VITALS
HEART RATE: 82 BPM | SYSTOLIC BLOOD PRESSURE: 126 MMHG | TEMPERATURE: 97.3 F | WEIGHT: 223.8 LBS | BODY MASS INDEX: 33.15 KG/M2 | DIASTOLIC BLOOD PRESSURE: 70 MMHG | OXYGEN SATURATION: 95 % | HEIGHT: 69 IN | RESPIRATION RATE: 16 BRPM

## 2022-07-12 DIAGNOSIS — I10 ESSENTIAL HYPERTENSION: ICD-10-CM

## 2022-07-12 DIAGNOSIS — J84.112 IPF (IDIOPATHIC PULMONARY FIBROSIS): ICD-10-CM

## 2022-07-12 DIAGNOSIS — E03.9 ACQUIRED HYPOTHYROIDISM: ICD-10-CM

## 2022-07-12 DIAGNOSIS — Z51.81 MEDICATION MONITORING ENCOUNTER: ICD-10-CM

## 2022-07-12 DIAGNOSIS — E66.9 CLASS 1 OBESITY WITH SERIOUS COMORBIDITY AND BODY MASS INDEX (BMI) OF 33.0 TO 33.9 IN ADULT, UNSPECIFIED OBESITY TYPE: ICD-10-CM

## 2022-07-12 DIAGNOSIS — R26.9 ABNORMAL GAIT: ICD-10-CM

## 2022-07-12 DIAGNOSIS — N18.32 STAGE 3B CHRONIC KIDNEY DISEASE: ICD-10-CM

## 2022-07-12 DIAGNOSIS — Z79.4 TYPE 2 DIABETES MELLITUS WITH STAGE 3B CHRONIC KIDNEY DISEASE, WITH LONG-TERM CURRENT USE OF INSULIN: ICD-10-CM

## 2022-07-12 DIAGNOSIS — R39.15 URINARY URGENCY: Primary | ICD-10-CM

## 2022-07-12 DIAGNOSIS — M10.30 GOUT DUE TO RENAL IMPAIRMENT, UNSPECIFIED CHRONICITY, UNSPECIFIED SITE: ICD-10-CM

## 2022-07-12 DIAGNOSIS — E11.22 TYPE 2 DIABETES MELLITUS WITH STAGE 3B CHRONIC KIDNEY DISEASE, WITH LONG-TERM CURRENT USE OF INSULIN: ICD-10-CM

## 2022-07-12 DIAGNOSIS — E11.65 TYPE 2 DIABETES MELLITUS WITH HYPERGLYCEMIA, WITH LONG-TERM CURRENT USE OF INSULIN: ICD-10-CM

## 2022-07-12 DIAGNOSIS — Z79.4 TYPE 2 DIABETES MELLITUS WITH HYPERGLYCEMIA, WITH LONG-TERM CURRENT USE OF INSULIN: ICD-10-CM

## 2022-07-12 DIAGNOSIS — N18.32 TYPE 2 DIABETES MELLITUS WITH STAGE 3B CHRONIC KIDNEY DISEASE, WITH LONG-TERM CURRENT USE OF INSULIN: ICD-10-CM

## 2022-07-12 PROCEDURE — 99214 OFFICE O/P EST MOD 30 MIN: CPT | Performed by: FAMILY MEDICINE

## 2022-07-12 RX ORDER — GLIMEPIRIDE 1 MG/1
1 TABLET ORAL
Qty: 90 TABLET | Refills: 3 | Status: SHIPPED | OUTPATIENT
Start: 2022-07-12

## 2022-07-12 NOTE — ASSESSMENT & PLAN NOTE
Hypertension is improving with treatment.  Continue current treatment regimen.  Dietary sodium restriction.  Ambulatory blood pressure monitoring. majority of blood pressures are under 140/80 on log  Blood pressure will be reassessed at the next regular appointment.

## 2022-07-12 NOTE — PROGRESS NOTES
07/12/2022      Assessment & Plan    Problem List Items Addressed This Visit        Cardiac and Vasculature    Essential hypertension    Current Assessment & Plan     Hypertension is improving with treatment.  Continue current treatment regimen.  Dietary sodium restriction.  Ambulatory blood pressure monitoring. majority of blood pressures are under 140/80 on log  Blood pressure will be reassessed at the next regular appointment.           Relevant Orders    TSH+Free T4    CBC (No Diff)    Comprehensive Metabolic Panel       Endocrine and Metabolic    Acquired hypothyroidism    Relevant Orders    TSH+Free T4    Type 2 diabetes mellitus with hyperglycemia, with long-term current use of insulin ([facility])    Relevant Medications    glimepiride (AMARYL) 1 MG tablet    Other Relevant Orders    Comprehensive Metabolic Panel    Hemoglobin A1c       Genitourinary and Reproductive     CKD (chronic kidney disease) stage 3, GFR 30-59 ml/min    Relevant Orders    TSH+Free T4    Vitamin D 25 Hydroxy    CBC (No Diff)    Comprehensive Metabolic Panel       Musculoskeletal and Injuries    Gout    Relevant Orders    Uric Acid       Pulmonary and Pneumonias    IPF (idiopathic pulmonary fibrosis) ([facility])    Current Assessment & Plan     Continue oxygen supplementation           Relevant Orders    CBC (No Diff)       Other    Type 2 diabetes mellitus with stage 3b chronic kidney disease, with long-term current use of insulin ([facility])    Relevant Medications    glimepiride (AMARYL) 1 MG tablet    Other Relevant Orders    Comprehensive Metabolic Panel    Hemoglobin A1c      Other Visit Diagnoses     Urinary urgency    -  Primary    on two urinary agents, not on a diuretic. follow up with urology on this ongoing issue that impacts quality of life.    Abnormal gait        encouraged use of cane for safety    Medication monitoring encounter        Relevant Orders    Uric Acid    TSH+Free T4    Vitamin D 25 Hydroxy    CBC (No Diff)    Comprehensive  Metabolic Panel    Class 1 obesity with serious comorbidity and body mass index (BMI) of 33.0 to 33.9 in adult, unspecified obesity type        information on mediterranean diet via avs    Relevant Orders    Vitamin D 25 Hydroxy        Patient understandably is not drinking a lot of liquids due to his urinary frequency, urgency, and at times incontinence.  He is aware, however, that he must stay hydrated due to his chronic kidney disease.  Drinking Gatorade 0 (lemon lime flavor only when he likes) and water along with occasional cherry Coke zero.     BMI is >= 30 and <35. (Class 1 Obesity). The following options were offered after discussion;: weight loss educational material (shared in after visit summary)     Return in about 5 months (around 12/8/2022) for Medicare Wellness, Diabetes follow up.      Subjective      Forrest Zepeda is a 89 y.o. male here for:  Chief Complaint   Patient presents with   • Diabetes     4 month follow up   • Urinary Incontinence     Been getting worse foe a couple months now         Answers for HPI/ROS submitted by the patient on 7/8/2022  What is the primary reason for your visit?: Other  Please describe your symptoms.: 3 month follow up  Have you had these symptoms before?: No  How long have you been having these symptoms?: 1-4 days  Please list any medications you are currently taking for this condition.: N/a  Please describe any probable cause for these symptoms. : N/a        History per MA reviewed.    Urinary frequency and urgency continues.  Has to get up about 3 times at night and has trouble getting good rest due to this.  On 2 medicines for urination issues, followed by urology.  Family member who accompanies Mr. Flynn today states he they will follow-up with urology on this ongoing issue.  Patient needs a refill today on diabetes medicine.  Otherwise has no acute needs.  Home blood pressure log provided for review see scanned media.  Glucose levels also recorded daily.   "Patient has oxygen at home if needed.         The following portions of the patient's history were reviewed and updated as appropriate: allergies, current medications, past family history, past medical history, past social history, past surgical history and problem list.    Review of Systems   Constitutional: Negative for fever.   Respiratory: Positive for shortness of breath (chronic x years unchanged).    Genitourinary: Positive for frequency, nocturia, urgency and urinary incontinence.         Objective   Visit Vitals  /70 (BP Location: Right arm, Patient Position: Sitting, Cuff Size: Adult)   Pulse 82   Temp 97.3 °F (36.3 °C) (Temporal)   Resp 16   Ht 175.3 cm (69\")   Wt 102 kg (223 lb 12.8 oz)   SpO2 95%   BMI 33.05 kg/m²       Physical Exam  Vitals and nursing note reviewed.   Constitutional:       General: He is not in acute distress.     Appearance: Normal appearance. He is well-developed and well-groomed. He is not ill-appearing, toxic-appearing or diaphoretic.      Interventions: Face mask in place.   HENT:      Head: Normocephalic and atraumatic.      Right Ear: Hearing normal.      Left Ear: Hearing normal.   Eyes:      General: Lids are normal. No scleral icterus.        Right eye: No discharge.         Left eye: No discharge.      Extraocular Movements: Extraocular movements intact.   Cardiovascular:      Rate and Rhythm: Regular rhythm. Bradycardia present.   Pulmonary:      Effort: Pulmonary effort is normal.      Breath sounds: Normal breath sounds.   Musculoskeletal:      Cervical back: Neck supple.   Skin:     Coloration: Skin is not jaundiced or pale.   Neurological:      General: No focal deficit present.      Mental Status: He is alert and oriented to person, place, and time.      Gait: Gait abnormal (a bit slow and unsteady, not using cane, left in car).   Psychiatric:         Attention and Perception: Attention and perception normal.         Mood and Affect: Mood and affect normal.    "      Speech: Speech normal.         Behavior: Behavior normal. Behavior is cooperative.         Thought Content: Thought content normal.         Cognition and Memory: Cognition and memory normal.         Judgment: Judgment normal.           For medical decision making review of the following was required:  Lab Results   Component Value Date    HGBA1C 7.9 03/07/2022    HGBA1C 7.20 (H) 12/07/2021    HGBA1C 6.5 08/04/2021     Lab Results   Component Value Date    TSH 3.040 12/07/2021     Lab Results   Component Value Date    FREET4 0.99 12/07/2021             Kary Wen MD

## 2022-07-12 NOTE — PATIENT INSTRUCTIONS
Mediterranean Diet  A Mediterranean diet refers to food and lifestyle choices that are based on the traditions of countries located on the [place]. This way of eating has been shown to help prevent certain conditions and improve outcomes for people who have chronic diseases, like kidney disease and heart disease.  What are tips for following this plan?  Lifestyle  Cook and eat meals together with your family, when possible.  Drink enough fluid to keep your urine clear or pale yellow.  Be physically active every day. This includes:  Aerobic exercise like running or swimming.  Leisure activities like gardening, walking, or housework.  Get 7-8 hours of sleep each night.  If recommended by your health care provider, drink red wine in moderation. This means 1 glass a day for nonpregnant women and 2 glasses a day for men. A glass of wine equals 5 oz (150 mL).  Reading food labels    Check the serving size of packaged foods. For foods such as rice and pasta, the serving size refers to the amount of cooked product, not dry.  Check the total fat in packaged foods. Avoid foods that have saturated fat or trans fats.  Check the ingredients list for added sugars, such as corn syrup.    Shopping  At the grocery store, buy most of your food from the areas near the walls of the store. This includes:  Fresh fruits and vegetables (produce).  Grains, beans, nuts, and seeds. Some of these may be available in unpackaged forms or large amounts (in bulk).  Fresh seafood.  Poultry and eggs.  Low-fat dairy products.  Buy whole ingredients instead of prepackaged foods.  Buy fresh fruits and vegetables in-season from local farmers markets.  Buy frozen fruits and vegetables in resealable bags.  If you do not have access to quality fresh seafood, buy precooked frozen shrimp or canned fish, such as tuna, salmon, or sardines.  Buy small amounts of raw or cooked vegetables, salads, or olives from the deli or salad bar at your  store.  Stock your pantry so you always have certain foods on hand, such as olive oil, canned tuna, canned tomatoes, rice, pasta, and beans.  Cooking  Cook foods with extra-virgin olive oil instead of using butter or other vegetable oils.  Have meat as a side dish, and have vegetables or grains as your main dish. This means having meat in small portions or adding small amounts of meat to foods like pasta or stew.  Use beans or vegetables instead of meat in common dishes like chili or lasagna.  [place] with different cooking methods. Try roasting or broiling vegetables instead of steaming or sautéeing them.  Add frozen vegetables to soups, stews, pasta, or rice.  Add nuts or seeds for added healthy fat at each meal. You can add these to yogurt, salads, or vegetable dishes.  Marinate fish or vegetables using olive oil, lemon juice, garlic, and fresh herbs.  Meal planning    Plan to eat 1 vegetarian meal one day each week. Try to work up to 2 vegetarian meals, if possible.  Eat seafood 2 or more times a week.  Have healthy snacks readily available, such as:  Vegetable sticks with hummus.  Greek yogurt.  Fruit and nut trail mix.  Eat balanced meals throughout the week. This includes:  Fruit: 2-3 servings a day  Vegetables: 4-5 servings a day  Low-fat dairy: 2 servings a day  Fish, poultry, or lean meat: 1 serving a day  Beans and legumes: 2 or more servings a week  Nuts and seeds: 1-2 servings a day  Whole grains: 6-8 servings a day  Extra-virgin olive oil: 3-4 servings a day  Limit red meat and sweets to only a few servings a month    What are my food choices?  Mediterranean diet  Recommended  Grains: Whole-grain pasta. Brown rice. Bulgar wheat. Polenta. Couscous. Whole-wheat bread. Oatmeal. Quinoa.  Vegetables: Artichokes. Beets. Broccoli. Cabbage. Carrots. Eggplant. Green beans. Chard. Kale. Spinach. Onions. Leeks. Peas. Squash. Tomatoes. Peppers. Radishes.  Fruits: Apples. Apricots. Avocado. Berries. Bananas.  Cherries. Dates. Figs. Grapes. [place]air[place]. Melon. Oranges. Peaches. Plums. Pomegranate.  Meats and other protein foods: Beans. Almonds. Sunflower seeds. Pine nuts. Peanuts. Cod. [place]. Scallops. Shrimp. Tuna. Tilapia. Clams. Oysters. Eggs.  Dairy: Low-fat milk. Cheese. Greek yogurt.  Beverages: Water. Red wine. Herbal tea.  Fats and oils: Extra virgin olive oil. Avocado oil. Grape seed oil.  Sweets and desserts: Greek yogurt with honey. Baked apples. Poached pears. Trail mix.  Seasoning and other foods: Basil. Cilantro. Coriander. Cumin. Mint. Parsley. Stevie. Rosemary. Tarragon. Garlic. Oregano. Thyme. Pepper. Balsalmic vinegar. Tahini. Hummus. Tomato sauce. Olives. Mushrooms.  Limit these  Grains: Prepackaged pasta or rice dishes. Prepackaged cereal with added sugar.  Vegetables: Deep fried potatoes (french fries).  Fruits: Fruit canned in syrup.  Meats and other protein foods: Beef. Pork. Lamb. Poultry with skin. Hot dogs. Green.  Dairy: Ice cream. Sour cream. Whole milk.  Beverages: Juice. Sugar-sweetened soft drinks. Beer. Liquor and spirits.  Fats and oils: Butter. Canola oil. Vegetable oil. Beef fat (tallow). Lard.  Sweets and desserts: Cookies. Cakes. Pies. Candy.  Seasoning and other foods: Mayonnaise. Premade sauces and marinades.  The items listed may not be a complete list. Talk with your dietitian about what dietary choices are right for you.  Summary  The Mediterranean diet includes both food and lifestyle choices.  Eat a variety of fresh fruits and vegetables, beans, nuts, seeds, and whole grains.  Limit the amount of red meat and sweets that you eat.  Talk with your health care provider about whether it is safe for you to drink red wine in moderation. This means 1 glass a day for nonpregnant women and 2 glasses a day for men. A glass of wine equals 5 oz (150 mL).  This information is not intended to replace advice given to you by your health care provider. Make sure you discuss any questions you have with  your health care provider.  Document Revised: 08/17/2017 Document Reviewed: 08/10/2017  Elsevier Patient Education © 2020 [employer]

## 2022-07-13 LAB
25(OH)D3+25(OH)D2 SERPL-MCNC: 27.8 NG/ML (ref 30–100)
ALBUMIN SERPL-MCNC: 4.1 G/DL (ref 3.5–5.2)
ALBUMIN/GLOB SERPL: 1.3 G/DL
ALP SERPL-CCNC: 82 U/L (ref 39–117)
ALT SERPL-CCNC: 14 U/L (ref 1–41)
AST SERPL-CCNC: 17 U/L (ref 1–40)
BILIRUB SERPL-MCNC: 0.3 MG/DL (ref 0–1.2)
BUN SERPL-MCNC: 25 MG/DL (ref 8–23)
BUN/CREAT SERPL: 17.4 (ref 7–25)
CALCIUM SERPL-MCNC: 9.3 MG/DL (ref 8.2–9.6)
CHLORIDE SERPL-SCNC: 104 MMOL/L (ref 98–107)
CO2 SERPL-SCNC: 25.8 MMOL/L (ref 22–29)
CREAT SERPL-MCNC: 1.44 MG/DL (ref 0.76–1.27)
EGFRCR SERPLBLD CKD-EPI 2021: 46.2 ML/MIN/1.73
ERYTHROCYTE [DISTWIDTH] IN BLOOD BY AUTOMATED COUNT: 15.2 % (ref 12.3–15.4)
GLOBULIN SER CALC-MCNC: 3.1 GM/DL
GLUCOSE SERPL-MCNC: 125 MG/DL (ref 65–99)
HBA1C MFR BLD: 7.5 % (ref 4.8–5.6)
HCT VFR BLD AUTO: 38.8 % (ref 37.5–51)
HGB BLD-MCNC: 12.9 G/DL (ref 13–17.7)
MCH RBC QN AUTO: 31.8 PG (ref 26.6–33)
MCHC RBC AUTO-ENTMCNC: 33.2 G/DL (ref 31.5–35.7)
MCV RBC AUTO: 95.6 FL (ref 79–97)
PLATELET # BLD AUTO: 183 10*3/MM3 (ref 140–450)
POTASSIUM SERPL-SCNC: 5.5 MMOL/L (ref 3.5–5.2)
PROT SERPL-MCNC: 7.2 G/DL (ref 6–8.5)
RBC # BLD AUTO: 4.06 10*6/MM3 (ref 4.14–5.8)
SODIUM SERPL-SCNC: 141 MMOL/L (ref 136–145)
T4 FREE SERPL-MCNC: 1.13 NG/DL (ref 0.93–1.7)
TSH SERPL DL<=0.005 MIU/L-ACNC: 1.96 UIU/ML (ref 0.27–4.2)
URATE SERPL-MCNC: 4.5 MG/DL (ref 3.4–7)
WBC # BLD AUTO: 12.11 10*3/MM3 (ref 3.4–10.8)

## 2022-07-14 ENCOUNTER — OFFICE VISIT (OUTPATIENT)
Dept: CARDIOLOGY | Facility: CLINIC | Age: 87
End: 2022-07-14

## 2022-07-14 ENCOUNTER — TELEPHONE (OUTPATIENT)
Dept: UROLOGY | Facility: CLINIC | Age: 87
End: 2022-07-14

## 2022-07-14 VITALS
DIASTOLIC BLOOD PRESSURE: 68 MMHG | HEIGHT: 69 IN | SYSTOLIC BLOOD PRESSURE: 128 MMHG | WEIGHT: 222.4 LBS | BODY MASS INDEX: 32.94 KG/M2 | HEART RATE: 50 BPM | OXYGEN SATURATION: 99 %

## 2022-07-14 DIAGNOSIS — I49.5 SINUS NODE DYSFUNCTION: ICD-10-CM

## 2022-07-14 DIAGNOSIS — J84.112 IPF (IDIOPATHIC PULMONARY FIBROSIS): ICD-10-CM

## 2022-07-14 DIAGNOSIS — I27.20 PULMONARY HYPERTENSION: ICD-10-CM

## 2022-07-14 DIAGNOSIS — I48.0 PAROXYSMAL ATRIAL FIBRILLATION: ICD-10-CM

## 2022-07-14 DIAGNOSIS — I10 ESSENTIAL HYPERTENSION: ICD-10-CM

## 2022-07-14 DIAGNOSIS — E03.9 ACQUIRED HYPOTHYROIDISM: ICD-10-CM

## 2022-07-14 DIAGNOSIS — E11.65 TYPE 2 DIABETES MELLITUS WITH HYPERGLYCEMIA, WITH LONG-TERM CURRENT USE OF INSULIN: ICD-10-CM

## 2022-07-14 DIAGNOSIS — Z79.4 TYPE 2 DIABETES MELLITUS WITH HYPERGLYCEMIA, WITH LONG-TERM CURRENT USE OF INSULIN: ICD-10-CM

## 2022-07-14 DIAGNOSIS — Z95.0 CARDIAC PACEMAKER IN SITU: ICD-10-CM

## 2022-07-14 DIAGNOSIS — I25.10 CORONARY ARTERY DISEASE INVOLVING NATIVE CORONARY ARTERY OF NATIVE HEART WITHOUT ANGINA PECTORIS: Primary | ICD-10-CM

## 2022-07-14 PROBLEM — I20.8 ANGINAL EQUIVALENT (HCC): Status: RESOLVED | Noted: 2021-03-24 | Resolved: 2022-07-14

## 2022-07-14 PROBLEM — I20.89 ANGINAL EQUIVALENT: Status: RESOLVED | Noted: 2021-03-24 | Resolved: 2022-07-14

## 2022-07-14 PROCEDURE — 99213 OFFICE O/P EST LOW 20 MIN: CPT | Performed by: INTERNAL MEDICINE

## 2022-07-14 PROCEDURE — 93288 INTERROG EVL PM/LDLS PM IP: CPT | Performed by: INTERNAL MEDICINE

## 2022-07-14 RX ORDER — LEVOTHYROXINE SODIUM 0.05 MG/1
50 TABLET ORAL DAILY
Qty: 90 TABLET | Refills: 3 | Status: SHIPPED | OUTPATIENT
Start: 2022-07-14

## 2022-07-14 NOTE — TELEPHONE ENCOUNTER
Caller: MARCUS DIMAS    Relationship to patient: GRANDDAUGHTER    Best call back number: [phone]    Patient is needing: PT HAS STARTED EXPERIENCING SOME INCONTINENCE SINCE PROCEDURE. PT HAD CYSTOSCOPY URETERAL CATHETER/STENT INSERTION 6/17/22 W/DR LEON BUT IS A PT OF DR BILL'S AND WAS REFERRED OVER TO DR LEON FOR PROCEDURE SO HE WOULD NOT HAVE TO STAY OUT OF TOWN OVERNIGHT TO HAVE IT W/DR BILL. HE HAS NOT HEARD BACK FROM DR LEON'S OFFICE REGARDING SCHEDULING A POST-OP APPT AS HIS D/C INSTRUCTIONS STATED, AND HE WOULD PREFER TO SCHEDULE W/DR BILL. HOWEVER, UROLOGY ANIVAL STATED HE SHOULD FOLLOW UP WITH DR LEON SINCE HE HAD THE PROCEDURE WITH HIM.

## 2022-07-14 NOTE — PROGRESS NOTES
Subjective:     Encounter Date:07/14/2022      Patient ID: Forrest Zepeda is a 89 y.o. male.    Chief Complaint: Coronary artery disease  HPI  This is a 89-year-old male patient who presents to cardiology clinic for routine follow-up and pacemaker interrogation.  The patient reports doing well since his last visit.  He has had no active cardiovascular issues, symptoms or hospitalizations.  He reports compliance with his medications with no perceived side effects.  He is on continuous oxygen therapy.  He is confined to a wheelchair.  Pacemaker interrogation today demonstrates normal function.  He has a [employer] model L3 1 1 dual-chamber rate responsive pacemaker-60/130.  He is paced 53% of the time the right atrium and 41% of the time the right ventricle.  There is an estimated 7.5 years of generator longevity.  Right atrial and right ventricular lead interrogation shows normal function based on sensing pacing threshold and lead impedance.  He has had no mode switches or high heart rate events.  The following portions of the patient's history were reviewed and updated as appropriate: allergies, current medications, past family history, past medical history, past social history, past surgical history and problem  Review of Systems   Constitutional: Negative for chills, diaphoresis, fever, malaise/fatigue, weight gain and weight loss.   HENT: Negative for ear discharge, hearing loss, hoarse voice and nosebleeds.    Eyes: Negative for discharge, double vision, pain and photophobia.   Cardiovascular: Negative for chest pain, claudication, cyanosis, dyspnea on exertion, irregular heartbeat, leg swelling, near-syncope, orthopnea, palpitations, paroxysmal nocturnal dyspnea and syncope.   Respiratory: Negative for cough, hemoptysis, shortness of breath, sputum production and wheezing.    Endocrine: Negative for cold intolerance, heat intolerance, polydipsia, polyphagia and polyuria.   Hematologic/Lymphatic:  Negative for adenopathy and bleeding problem. Does not bruise/bleed easily.   Skin: Negative for color change, flushing, itching and rash.   Musculoskeletal: Negative for muscle cramps, muscle weakness, myalgias and stiffness.   Gastrointestinal: Negative for abdominal pain, diarrhea, hematemesis, hematochezia, nausea and vomiting.   Genitourinary: Negative for dysuria, frequency and nocturia.   Neurological: Negative for focal weakness, loss of balance, numbness, paresthesias and seizures.   Psychiatric/Behavioral: Negative for altered mental status, hallucinations and suicidal ideas.   Allergic/Immunologic: Negative for HIV exposure, hives and persistent infections.           Current Outpatient Medications:   •  acetaminophen (TYLENOL) 500 MG tablet, Take 1,000 mg by mouth Every 6 (Six) Hours As Needed for Mild Pain ., Disp: , Rfl:   •  allopurinol (ZYLOPRIM) 300 MG tablet, Take 1 tablet by mouth Every Night., Disp: 90 tablet, Rfl: 3  •  aspirin (aspirin) 81 MG EC tablet, Take 1 tablet by mouth Daily., Disp: 30 tablet, Rfl: 11  •  bisoprolol (ZEBeta) 5 MG tablet, Take 1 tablet by mouth Daily., Disp: 30 tablet, Rfl: 11  •  Eliquis 2.5 MG tablet tablet, TAKE ONE TABLET BY MOUTH TWICE A DAY (Patient taking differently: 2.5 mg Every 12 (Twelve) Hours.), Disp: 180 tablet, Rfl: 3  •  finasteride (PROSCAR) 5 MG tablet, TAKE ONE TABLET BY MOUTH DAILY (Patient taking differently: Take 5 mg by mouth Daily.), Disp: 90 tablet, Rfl: 3  •  glimepiride (AMARYL) 1 MG tablet, Take 1 tablet by mouth Every Morning Before Breakfast. Indications: Type 2 Diabetes, Disp: 90 tablet, Rfl: 3  •  Insulin Glargine, 1 Unit Dial, (Toujeo SoloStar) 300 UNIT/ML solution pen-injector injection, Inject 15 Units under the skin into the appropriate area as directed Daily., Disp: 3 pen, Rfl: 3  •  isosorbide mononitrate (IMDUR) 30 MG 24 hr tablet, Take 1 tablet by mouth Every Morning., Disp: 90 tablet, Rfl: 3  •  levothyroxine (SYNTHROID, LEVOTHROID)  50 MCG tablet, Take 1 tablet by mouth Daily. Indications: Underactive Thyroid, Disp: 90 tablet, Rfl: 3  •  losartan (Cozaar) 25 MG tablet, Take 1 tablet by mouth Daily. Indications: High Blood Pressure Disorder, Disp: 90 tablet, Rfl: 3  •  Multiple Vitamins-Minerals (OCUVITE ADULT 50+ PO), Take 1 capsule by mouth Daily., Disp: , Rfl:   •  nitroglycerin (NITROSTAT) 0.4 MG SL tablet, 1 under the tongue as needed for angina, may repeat q5mins for up three doses (Patient taking differently: Place 0.4 mg under the tongue Every 5 (Five) Minutes As Needed for Chest Pain. 1 under the tongue as needed for angina, may repeat q5mins for up three doses), Disp: 100 tablet, Rfl: 11  •  silodosin (RAPAFLO) 8 MG capsule capsule, Take 1 capsule by mouth Daily With Breakfast., Disp: 90 capsule, Rfl: 3  •  SITagliptin (JANUVIA) 100 MG tablet, Take 1 tablet by mouth Daily., Disp: 90 tablet, Rfl: 3    Objective:   Vitals and nursing note reviewed.   Constitutional:       Appearance: Not in distress. Frail. Chronically ill-appearing.      Comments: Confined to wheelchair   Neck:      Vascular: No JVR. JVD normal.   Pulmonary:      Effort: Pulmonary effort is normal.      Breath sounds: Normal breath sounds. No wheezing. No rhonchi. No rales.   Chest:      Chest wall: Not tender to palpatation.   Cardiovascular:      PMI at left midclavicular line. Normal rate. Regular rhythm. Normal S1. Normal S2.      Murmurs: There is no murmur.      No gallop. No click. No rub.   Pulses:     Intact distal pulses.   Edema:     Peripheral edema absent.   Abdominal:      General: Bowel sounds are normal.      Palpations: Abdomen is soft.      Tenderness: There is no abdominal tenderness.   Musculoskeletal: Normal range of motion.         General: No tenderness. Skin:     General: Skin is warm and dry.   Neurological:      General: No focal deficit present.      Mental Status: Alert and oriented to person, place and time.       Blood pressure 128/68,  "pulse 50, height 175.3 cm (69\"), weight 101 kg (222 lb 6.4 oz), SpO2 99 %.   Lab Review:     Assessment:       1. Coronary artery disease involving native coronary artery of native heart without angina pectoris  Stable and angina free.    2. Cardiac pacemaker in situ  Normal pacemaker function.    3. Essential hypertension  Acceptable blood pressure control.    4. Paroxysmal atrial fibrillation (HCC)  Maintaining normal sinus rhythm.    5. Sinus node dysfunction (HCC)  Normal pacemaker function.    6. Type 2 diabetes mellitus with hyperglycemia, with long-term current use of insulin (HCC)      7. IPF (idiopathic pulmonary fibrosis) (HCC)  Continuous oxygen therapy.    8. Pulmonary hypertension (HCC)      Procedures    Plan:     Advance Care Planning   ACP discussion was held with the patient during this visit. Patient has an advance directive (not in EMR), copy requested.           "

## 2022-07-14 NOTE — PROGRESS NOTES
Vitamin D low. Suggest taking vitamin D3 over the counter 2000 IU daily and trying to get 15 min of sun exposure daily to face and arms (when possible).    Thyroid level normal, refilled current dose of med.    White blood cell count mildly elevated similar to past levels.  Hemoglobin just under normal range, again similar to past levels.    Chronic kidney disease, creatinine down somewhat from last 2 checks.  Potassium slightly elevated, decrease intake of potassium rich foods which includes bananas and tomatoes.    For age A1c goal is 7.5 and under.  His A1c did come down from 7.9 to 7.5.

## 2022-07-18 ENCOUNTER — HOSPITAL ENCOUNTER (OUTPATIENT)
Dept: GENERAL RADIOLOGY | Facility: HOSPITAL | Age: 87
Discharge: HOME | End: 2022-07-18
Admitting: UROLOGY

## 2022-07-18 ENCOUNTER — OFFICE VISIT (OUTPATIENT)
Dept: UROLOGY | Facility: CLINIC | Age: 87
End: 2022-07-18

## 2022-07-18 VITALS — HEIGHT: 69 IN | WEIGHT: 222 LBS | BODY MASS INDEX: 32.88 KG/M2

## 2022-07-18 DIAGNOSIS — N30.00 ACUTE CYSTITIS WITHOUT HEMATURIA: ICD-10-CM

## 2022-07-18 DIAGNOSIS — N13.30 HYDRONEPHROSIS, UNSPECIFIED HYDRONEPHROSIS TYPE: ICD-10-CM

## 2022-07-18 DIAGNOSIS — R35.0 FREQUENCY OF URINATION: Primary | ICD-10-CM

## 2022-07-18 DIAGNOSIS — R33.9 INCOMPLETE EMPTYING OF BLADDER: ICD-10-CM

## 2022-07-18 LAB
BILIRUB BLD-MCNC: NEGATIVE MG/DL
CLARITY, POC: ABNORMAL
COLOR UR: YELLOW
EXPIRATION DATE: ABNORMAL
GLUCOSE UR STRIP-MCNC: ABNORMAL MG/DL
KETONES UR QL: NEGATIVE
LEUKOCYTE EST, POC: ABNORMAL
Lab: ABNORMAL
NITRITE UR-MCNC: NEGATIVE MG/ML
PH UR: 6 [PH] (ref 5–8)
PROT UR STRIP-MCNC: ABNORMAL MG/DL
RBC # UR STRIP: ABNORMAL /UL
SP GR UR: 1.02 (ref 1–1.03)
UROBILINOGEN UR QL: NORMAL

## 2022-07-18 PROCEDURE — 74018 RADEX ABDOMEN 1 VIEW: CPT

## 2022-07-18 PROCEDURE — 81003 URINALYSIS AUTO W/O SCOPE: CPT | Performed by: UROLOGY

## 2022-07-18 PROCEDURE — 99213 OFFICE O/P EST LOW 20 MIN: CPT | Performed by: UROLOGY

## 2022-07-18 PROCEDURE — 74018 RADEX ABDOMEN 1 VIEW: CPT | Performed by: RADIOLOGY

## 2022-07-18 NOTE — PROGRESS NOTES
Follow Up Office Visit      Patient Name: Forrest Zepeda  : 1932   MRN: [id number]     Chief Complaint:    Chief Complaint   Patient presents with   • Urinary Incontinence       Referring Provider: No ref. provider found    History of Present Illness: Forrest Zepeda is a 89 y.o. male who presents today for follow up of his chronic stent exchanges.  He had his stent exchange on 22 by Dr. Mccann.  His resonance stent was removed and had a Tria stent replaced.  No dysuria or gross hematuria.  His only complaint is that his urgency and frequency have gotten worse.  He does not wear pads or pull ups during the day.  He wears at night he wears one at night and it is soaked.    He states that his urgency and frequency are no different than prior than his stent change but feels like he would like to try something to control it.      Subjective      Review of System: Review of Systems   Constitutional: Negative for chills, fatigue, fever and unexpected weight change.   HENT: Negative for congestion, rhinorrhea and sore throat.    Eyes: Negative for visual disturbance.   Respiratory: Negative for apnea, cough, chest tightness and shortness of breath.    Cardiovascular: Negative for chest pain.   Gastrointestinal: Negative for abdominal pain, constipation, diarrhea, nausea and vomiting.   Endocrine: Negative for polydipsia and polyuria.   Genitourinary: Positive for frequency and urgency. Negative for difficulty urinating, dysuria, enuresis, flank pain, genital sores and hematuria.   Musculoskeletal: Negative for gait problem.   Skin: Negative for rash and wound.   Allergic/Immunologic: Negative for immunocompromised state.   Neurological: Negative for dizziness, tremors, syncope, weakness and light-headedness.   Hematological: Does not bruise/bleed easily.      I have reviewed the ROS documented by my clinical staff, I have updated appropriately and I agree. Deanne Pitts MD    I have reviewed and the  "following portions of the patient's history were updated as appropriate: past family history, past medical history, past social history, past surgical history and problem list.    Past Medical History:   Past Medical History:   Diagnosis Date   • Abnormal EKG    • Abnormal PSA     Reported abnormal   • Acute deep vein thrombosis (DVT) of right lower extremity (HCC) 08/22/2019   • Acute diverticulitis 2019   • Acute hyperkalemia 08/22/2019   • Acute respiratory failure with hypoxia ([facility])    • Acute saddle pulmonary embolism with acute cor pulmonale (HCC) 08/22/2019   • Acute systolic right heart failure (HCC) 08/22/2019   • Arthritis     KNEES,  BUT SINCE HAD REPLACEMENT   • Benign localized hyperplasia of prostate    • Bilateral knee pain    • C. difficile diarrhea 2010   • Cataracts, bilateral    • CKD (chronic kidney disease)    • Diabetic neuropathy (HCC)    • Diabetic neuropathy ([facility])    • Disease of thyroid gland     HYPOTHYROIDISM   • Diverticulitis    • Dyslipidemia     H/O   • Family history of malignant hyperthermia     Brother    • Full dentures    • Generalized weakness    • History of ESBL E. coli infection     most recent 4-2022 f/u with ID Dr Johnson   • History of gout    • History of hepatitis A vaccination    • History of nephrolithiasis    • History of nuclear stress test     STATES IN THE 1990'S.  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  STATED THEY PACKED HIM ON ICE.  STATED HE DID SURVIVE.  PT DENIES ANY PERSONAL HX OF MALIGNANT HYPERTHERMIA HIMSELF, OR ANY ISSUES WITH ANESTHESIA.  STATES TO HIS KNOWLEDGE, NO OTHER FAMILY MEMBER HAS THIS ISSUE EXCEPT FOR HIS BROTHER.   • On supplemental oxygen therapy     2L [place] [facility]   • Other hydronephrosis 08/12/2019    Added automatically from request for surgery [id number]   • Renal insufficiency    • Sepsis ([facility]) 2019   • Shortness of breath     STATES WITH EXERTION, OTHERWISE, DENIES   • Sigmoid diverticulitis without abscess or perforation 08/09/2017   • Skin breakdown     fourth toe right foot noted in 2019 MD D/C note   • Skin ulcer of fourth toe of right foot, limited to breakdown of skin ([facility]) 07/05/2019   • Stricture of ureter    • Type 2 diabetes mellitus ([facility])    • Ureteral stricture, left    • UTI (urinary tract infection) 07/18/2019   • Vitamin D deficiency    • Wears glasses        Past Surgical History:  Past Surgical History:   Procedure Laterality Date   • APPENDECTOMY     • CARDIAC ELECTROPHYSIOLOGY PROCEDURE N/A 12/23/2020    Procedure: Pacemaker DC new. DNS meds.;  Surgeon: Jimy Ledezma DO;  Location:  [facility];  Service: Cardiology;  Laterality: N/A;   • CHOLECYSTECTOMY     • CIRCUMCISION N/A 10/23/2019    Procedure: CIRCUMCISIOn-DORSAL SLIT;  Surgeon: Griffin Romero MD;  Location:  [facility];  Service: Urology   • COLONOSCOPY     • CYSTOSCOPY URETEROSCOPY Left 2/11/2019    Procedure: URETEROSCOPY WITH STENT EXTRACTION, RPG;  Surgeon: Griffin Romero MD;  Location:  [facility];  Service: Urology   • CYSTOSCOPY W/ URETERAL STENT PLACEMENT Left 7/20/2019    Procedure: CYSTOSCOPY, LEFT RETROGRADE PYELOGRAM,  ATTEMPTED LEFT URETERAL STENT INSERTION;  Surgeon: Isaac Flynn MD;  Location:  [facility];  Service: Urology   • EYE SURGERY      CATARACTS REMOVED BILATERALLY   • KNEE ARTHROPLASTY  Bilateral    • KNEE ARTHROSCOPY Bilateral    • RENAL ARTERY STENT      SEVERAL TIMES EVERY SINCE 1978   • URETERAL STENT INSERTION  10/2020   • URETEROSCOPY LASER LITHOTRIPSY WITH STENT INSERTION Left 1/10/2018    Procedure:  cysto, left ureteral stent replacement ;  Surgeon: Griffin Romero MD;  Location: [facility] OR;  Service:    • URETEROSCOPY LASER LITHOTRIPSY WITH STENT INSERTION Left 8/14/2019    Procedure: URETEROSCOPY, STONE REMOVAL WITH STENT INSERTION;  Surgeon: Griffin Romero MD;  Location: [facility] OR;  Service: Urology   • URETEROSCOPY LASER LITHOTRIPSY WITH STENT INSERTION Left 10/23/2019    Procedure: Left URETEROSCOPY WITH STENT INSERTION-LEFT;  Surgeon: Griffin Romero MD;  Location: [facility] OR;  Service: Urology       Family History:   family history includes Brain cancer in his brother and sister; Heart attack in his sister; Hypertension in his sister; Lung cancer in his brother and sister; Malig Hyperthermia in his brother; Stroke in his sister.   Otherwise pertinent FHx was reviewed and not pertinent to current issue.    Social History:    reports that he quit smoking about 71 years ago. His smoking use included cigarettes and cigarettes. He smoked 0.00 packs per day for 0.00 years. He has never used smokeless tobacco. He reports that he does not drink alcohol and does not use drugs.    Medications:     Current Outpatient Medications:   •  acetaminophen (TYLENOL) 500 MG tablet, Take 1,000 mg by mouth Every 6 (Six) Hours As Needed for Mild Pain ., Disp: , Rfl:   •  allopurinol (ZYLOPRIM) 300 MG tablet, Take 1 tablet by mouth Every Night., Disp: 90 tablet, Rfl: 3  •  aspirin (aspirin) 81 MG EC tablet, Take 1 tablet by mouth Daily., Disp: 30 tablet, Rfl: 11  •  bisoprolol (ZEBeta) 5 MG tablet, Take 1 tablet by mouth Daily., Disp: 30 tablet, Rfl: 11  •  Eliquis 2.5 MG tablet tablet, TAKE ONE TABLET BY MOUTH TWICE A DAY (Patient taking differently: 2.5 mg Every 12 (Twelve) Hours.), Disp: 180 tablet,  Rfl: 3  •  finasteride (PROSCAR) 5 MG tablet, TAKE ONE TABLET BY MOUTH DAILY (Patient taking differently: Take 5 mg by mouth Daily.), Disp: 90 tablet, Rfl: 3  •  glimepiride (AMARYL) 1 MG tablet, Take 1 tablet by mouth Every Morning Before Breakfast. Indications: Type 2 Diabetes, Disp: 90 tablet, Rfl: 3  •  Insulin Glargine, 1 Unit Dial, (T[employer]jeo SoloStar) 300 UNIT/ML solution pen-injector injection, Inject 15 Units under the skin into the appropriate area as directed Daily., Disp: 3 pen, Rfl: 3  •  isosorbide mononitrate (IMDUR) 30 MG 24 hr tablet, Take 1 tablet by mouth Every Morning., Disp: 90 tablet, Rfl: 3  •  levothyroxine (SYNTHROID, LEVOTHROID) 50 MCG tablet, Take 1 tablet by mouth Daily. Indications: Underactive Thyroid, Disp: 90 tablet, Rfl: 3  •  losartan (Cozaar) 25 MG tablet, Take 1 tablet by mouth Daily. Indications: High Blood Pressure Disorder, Disp: 90 tablet, Rfl: 3  •  Multiple Vitamins-Minerals (OCUVITE ADULT 50+ PO), Take 1 capsule by mouth Daily., Disp: , Rfl:   •  nitroglycerin (NITROSTAT) 0.4 MG SL tablet, 1 under the tongue as needed for angina, may repeat q5mins for up three doses (Patient taking differently: Place 0.4 mg under the tongue Every 5 (Five) Minutes As Needed for Chest Pain. 1 under the tongue as needed for angina, may repeat q5mins for up three doses), Disp: 100 tablet, Rfl: 11  •  silodosin (RAPAFLO) 8 MG capsule capsule, Take 1 capsule by mouth Daily With Breakfast., Disp: 90 capsule, Rfl: 3  •  SITagliptin (JANUVIA) 100 MG tablet, Take 1 tablet by mouth Daily., Disp: 90 tablet, Rfl: 3    Allergies:   Allergies   Allergen Reactions   • Metformin Diarrhea, GI Intolerance and Unknown - Low Severity     diahrea   • Statins Myalgia and Diarrhea       IPSS Questionnaire (AUA-7):  Over the past month…    1)  Incomplete Emptying  How often have you had a sensation of not emptying your bladder?  1 - Less than 1 time in 5   2)  Frequency  How often have you had to urinate less than  "every two hours? 5 - Almost always   3)  Intermittency  How often have you found you stopped and started again several times when you urinated?  0 - Not at all   4) Urgency  How often have you found it difficult to postpone urination?  5 - Almost always   5) Weak Stream  How often have you had a weak urinary stream?  4 - More than half the time   6) Straining  How often have you had to push or strain to begin urination?  0 - Not at all   7) Nocturia  How many times did you typically get up at night to urinate?  3 - 3 times   Total Score:  18   The International Prostate Symptom Score (IPSS) is used to screen, diagnose, track symptoms of benign prostatic hyperplasia (BPH).    0-7 pts (Mild Symptoms)  / 8-19 pts (Moderate) / 20-35 (Severe)    Quality of life due to urinary symptoms:  If you were to spend the rest of your life with your urinary condition the way it is now, how would you feel about that? 5-Unhappy   Urine Leakage (Incontinence) 4-Severe Leakage Problems         Post void residual bladder scan:   94 mL     Objective     Physical Exam:   Vital Signs:   Vitals:    07/18/22 1358   Weight: 101 kg (222 lb)   Height: 175.3 cm (69.02\")     Body mass index is 32.77 kg/m².     Physical Exam  Vitals and nursing note reviewed.   Constitutional:       General: He is awake. He is not in acute distress.     Appearance: Normal appearance. He is well-developed. He is obese.   HENT:      Head: Normocephalic and atraumatic.      Right Ear: External ear normal.      Left Ear: External ear normal.      Nose: Nose normal.   Eyes:      Conjunctiva/sclera: Conjunctivae normal.   Pulmonary:      Effort: Pulmonary effort is normal.   Abdominal:      General: There is no distension.      Palpations: Abdomen is soft. There is no mass.      Tenderness: There is no abdominal tenderness. There is no right CVA tenderness, left CVA tenderness, guarding or rebound.      Hernia: No hernia is present. There is no hernia in the left " inguinal area or right inguinal area.   Genitourinary:     Pubic Area: No rash.       Rectum: No mass or tenderness. Normal anal tone.   Musculoskeletal:      Cervical back: Normal range of motion.   Lymphadenopathy:      Lower Body: No right inguinal adenopathy. No left inguinal adenopathy.   Skin:     General: Skin is warm.   Neurological:      General: No focal deficit present.      Mental Status: He is alert and oriented to person, place, and time.   Psychiatric:         Behavior: Behavior normal. Behavior is cooperative.         Labs:   Brief Urine Lab Results  (Last result in the past 365 days)      Color   Clarity   Blood   Leuk Est   Nitrite   Protein   CREAT   Urine HCG        07/18/22 1422 Yellow   Cloudy   3+   Large (3+)   Negative   2+                 Urine Culture    Urine Culture 4/6/22 5/4/22 6/7/22   Urine Culture Final report (A) Final report 25,000 CFU/mL Normal Urogenital [facility] [place]   (A) Abnormal value               Lab Results   Component Value Date    GLUCOSE 125 (H) 07/12/2022    CALCIUM 9.3 07/12/2022     07/12/2022    K 5.5 (H) 07/12/2022    CO2 25.8 07/12/2022     07/12/2022    BUN 25 (H) 07/12/2022    CREATININE 1.44 (H) 07/12/2022    EGFRIFAFRI 45 (L) 12/13/2021    EGFRIFNONA 39 (L) 12/13/2021    BCR 17.4 07/12/2022    ANIONGAP 10.0 06/15/2022       Lab Results   Component Value Date    WBC 12.11 (H) 07/12/2022    HGB 12.9 (L) 07/12/2022    HCT 38.8 07/12/2022    MCV 95.6 07/12/2022     07/12/2022       Lab Results   Component Value Date    PSA 1.160 04/15/2021    PSA 8.640 (H) 11/30/2020    PSA 1.450 08/26/2020       Images:   FL C Arm During Surgery    Result Date: 6/17/2022  Fluoroscopy provided during cystoscopy and left ureteral stent placement.  This report was finalized on 6/17/2022 8:44 AM by Dr. Felix Samson MD.        Pathology:      Measures:   Tobacco:   Forrest Zepeda  reports that he quit smoking about 71 years ago. His smoking use included cigarettes and  cigarettes. He smoked 0.00 packs per day for 0.00 years. He has never used smokeless tobacco..    Urine Incontinence: Patient reports that he is not currently experiencing any symptoms of urinary incontinence.         Assessment / Plan      Assessment/Plan:   89 y.o. male who presented today for follow up of what sounds like worsening overactive bladder.  He reports no discomfort from the stent and is tolerating it well.  He actually reports that he has had more energy and feels that the stent is performing better.  I will give him samples of Myrbetriq.  I discussed the possible risks and side effects of the medication.  I will have him follow-up in September in [place].  We also discussed behavioral modification and timed voiding.     Diagnoses and all orders for this visit:    1. Frequency of urination (Primary)    2. Acute cystitis without hematuria  -     POC Urinalysis Dipstick, Automated  -     Cancel: Urine Culture - Urine, Urine, Clean Catch    3. Incomplete emptying of bladder  -     Bladder Scan    4. Hydronephrosis, unspecified hydronephrosis type  -     XR Abdomen KUB; Future           Follow Up:   Return for Next scheduled follow up.    I spent approximately 25 minutes providing clinical care for this patient; including review of patient's chart and provider documentation, face to face time spent with patient in examination room (obtaining history, performing physical exam, discussing diagnosis and management options), placing orders, and completing patient documentation.     Deanne Pitts MD  [facility]

## 2022-07-22 LAB
BACTERIA UR CULT: NORMAL
BACTERIA UR CULT: NORMAL

## 2022-07-25 NOTE — PROGRESS NOTES
"Subjective   Forrest Zepeda is a 87 y.o. male seen today for Diabetes (fu).     The patient is accompanied by his granddaughter.  It has now been about 5 months since he developed respiratory failure and shock as the result of an acute saddle pulmonary embolus.  He survived the acute event and was managed at the hospital for over a week.  He has been placed on anticoagulant therapy.  He no longer has any edema of his legs and has no pain in either his lower extremities nor in his chest.  He is now breathing without the use of supplemental oxygen.  He does still become mildly dyspneic with exertion.    The patient has a history of type 2 diabetes.  He has been on Toujeo insulin taking 15 units a day.  His morning glucoses have remained under 200 mg percent and none have been lower than 80 mg percent.  He is still on his oral medications as well.       The following portions of the patient's history were reviewed and updated as appropriate: allergies, current medications, past social history and problem list.    Review of Systems   Constitutional: Positive for fatigue. Negative for activity change, appetite change, diaphoresis, fever and unexpected weight change.   HENT: Negative for congestion.    Eyes: Negative for visual disturbance.   Respiratory: Negative for cough and shortness of breath.    Cardiovascular: Negative for chest pain, palpitations and leg swelling.   Neurological: Negative for dizziness, numbness and headaches.       Objective   /62   Pulse 69   Temp 97.7 °F (36.5 °C)   Resp 16   Ht 172.7 cm (68\")   Wt 92.5 kg (204 lb)   SpO2 94%   BMI 31.02 kg/m²   Physical Exam   Constitutional: He is oriented to person, place, and time. He appears well-developed and well-nourished.   Elderly  male who is alert and pleasant.  He is accompanied by his granddaughter.   Eyes: Pupils are equal, round, and reactive to light. Conjunctivae are normal.   Neck: Normal range of motion. Neck supple. " No thyromegaly present.   Cardiovascular: Normal rate and regular rhythm.   No murmur heard.  Pulmonary/Chest: Effort normal and breath sounds normal. He has no wheezes. He has no rales.   Abdominal: Soft. Bowel sounds are normal. There is no tenderness.   Musculoskeletal: Normal range of motion. He exhibits no edema or tenderness.   Neurological: He is alert and oriented to person, place, and time.   Nursing note and vitals reviewed.      Assessment/Plan   Problem List Items Addressed This Visit        Cardiovascular and Mediastinum    Acute saddle pulmonary embolism with acute cor pulmonale (CMS/[facility])       Endocrine    Uncontrolled type 2 diabetes mellitus (CMS/[facility]) - Primary    Relevant Orders    Ambulatory Referral to Podiatry      Other Visit Diagnoses     Type 2 diabetes mellitus with pressure callus (CMS/[facility])        Relevant Orders    Ambulatory Referral to Podiatry      The patient is doing well.  We do note on foot exam that he has a callus on the dorsum of 1 of his toes.  This is been persistent and is so we will ask him to see podiatry for care of that.  Use Neosporin ointment and a Band-Aid on a daily basis.  He is now completed 5 months of anticoagulant therapy with Eliquis.  He is given a few more samples and we will have him stay on the medication for 2 more weeks which will complete a 6-month course.  Continue using the Toujeo insulin along with his oral medications.  Return to see us here in 1 month.        Pt with persistent hyperkalemia in the setting of CKD.

## 2022-08-30 ENCOUNTER — PATIENT MESSAGE (OUTPATIENT)
Dept: INTERNAL MEDICINE | Facility: CLINIC | Age: 87
End: 2022-08-30

## 2022-08-30 DIAGNOSIS — E11.65 TYPE 2 DIABETES MELLITUS WITH HYPERGLYCEMIA, WITH LONG-TERM CURRENT USE OF INSULIN: ICD-10-CM

## 2022-08-30 DIAGNOSIS — N18.31 TYPE 2 DIABETES MELLITUS WITH STAGE 3A CHRONIC KIDNEY DISEASE, WITH LONG-TERM CURRENT USE OF INSULIN: ICD-10-CM

## 2022-08-30 DIAGNOSIS — Z79.4 TYPE 2 DIABETES MELLITUS WITH STAGE 3A CHRONIC KIDNEY DISEASE, WITH LONG-TERM CURRENT USE OF INSULIN: ICD-10-CM

## 2022-08-30 DIAGNOSIS — Z79.4 TYPE 2 DIABETES MELLITUS WITH HYPERGLYCEMIA, WITH LONG-TERM CURRENT USE OF INSULIN: ICD-10-CM

## 2022-08-30 DIAGNOSIS — E11.22 TYPE 2 DIABETES MELLITUS WITH STAGE 3A CHRONIC KIDNEY DISEASE, WITH LONG-TERM CURRENT USE OF INSULIN: ICD-10-CM

## 2022-08-31 RX ORDER — INSULIN GLARGINE 300 U/ML
20 INJECTION, SOLUTION SUBCUTANEOUS NIGHTLY
Qty: 6 ML | Refills: 3 | Status: SHIPPED | OUTPATIENT
Start: 2022-08-31

## 2022-08-31 NOTE — TELEPHONE ENCOUNTER
From: Forrest Zepeda  To: Kary Wen MD  Sent: 8/30/2022 5:29 PM EDT  Subject: Insulin    Good evening,  I wanted to get your opinion on papuli insulin. His fasting blood sugars have been running a little high 130 and over even reached 200 Sunday morning. I thought if we could increase his insulin to 20 units daily. He had started eating more sweets. And honestly he is 89 and that the only real enjoyment he gets so I don't want to take that from him. He is eating at least one to two sweet daily. Let me know what you think. Any questions feel free to call me     Erika pedro

## 2022-09-26 RX ORDER — PEN NEEDLE, DIABETIC 31 GX5/16"
1 NEEDLE, DISPOSABLE MISCELLANEOUS TAKE AS DIRECTED
Qty: 90 EACH | Refills: 1 | Status: SHIPPED | OUTPATIENT
Start: 2022-09-26 | End: 2022-11-07

## 2022-09-26 NOTE — TELEPHONE ENCOUNTER
"Rx Refill Note  Requested Prescriptions     Pending Prescriptions Disp Refills   • Insulin Pen Needle (Pen Needles \") 31G X 8 MM misc 90 each 1     Si each Take As Directed.      Last office visit with prescribing clinician: 2022      Next office visit with prescribing clinician: 2022            Helen Mario LPN  22, 14:07 EDT  "

## 2022-09-26 NOTE — TELEPHONE ENCOUNTER
Erika, patient granddaughter called and states patient needs refill on pen needles. They are no longer on med list when she tried to request online. She states he uses the small pen needles. Call her at [phone]. [facility].

## 2022-10-18 ENCOUNTER — OFFICE VISIT (OUTPATIENT)
Dept: UROLOGY | Facility: CLINIC | Age: 87
End: 2022-10-18

## 2022-10-18 VITALS — HEIGHT: 69 IN | WEIGHT: 222 LBS | BODY MASS INDEX: 32.88 KG/M2

## 2022-10-18 DIAGNOSIS — R35.0 FREQUENCY OF URINATION: ICD-10-CM

## 2022-10-18 PROCEDURE — 99214 OFFICE O/P EST MOD 30 MIN: CPT | Performed by: UROLOGY

## 2022-10-18 PROCEDURE — 51798 US URINE CAPACITY MEASURE: CPT | Performed by: UROLOGY

## 2022-10-18 NOTE — PROGRESS NOTES
Follow Up Office Visit      Patient Name: Forrest Zepeda  : 1932   MRN: [id number]     Chief Complaint:  No chief complaint on file.      Referring Provider: No ref. provider found    History of Present Illness: Forrest Zepeda is a 89 y.o. male who presents today for follow up of chronic stent exchanges.  He had his stent exchange on 22 by Dr. Mccann.  He was then seen in July.  He had complained of irritative symptoms and was started on myrbetriq.  He has done really well with myrbetriq.  He has also stopped caffeine drinks.  He has tried other anticholinergics without success.  His resonance stent was removed and had a Tria stent replaced.  No dysuria or gross hematuria.  His only complaint is that his urgency and frequency have gotten worse.  He does not wear pads or pull ups during the day.  He wears at night he wears one at night and it is soaked.    He states that his urgency and frequency are no different than prior than his stent change but feels like he would like to try something to control it.  He states he gets up a lot at night but is not sure if he snores.  He does not have the frequency during the day.     Subjective      Review of System: Review of Systems   Constitutional: Negative.  Negative for chills, fatigue, fever and unexpected weight change.   HENT: Negative.  Negative for sore throat.    Eyes: Negative.  Negative for visual disturbance.   Respiratory: Negative.  Negative for cough, chest tightness and shortness of breath.    Cardiovascular: Negative.  Negative for chest pain and leg swelling.   Gastrointestinal: Negative.  Negative for blood in stool, constipation, diarrhea, nausea, rectal pain and vomiting.   Genitourinary: Positive for frequency. Negative for decreased urine volume, difficulty urinating, dysuria, enuresis, flank pain, genital sores, hematuria and urgency.   Musculoskeletal: Negative.  Negative for back pain and joint swelling.   Skin: Negative.  Negative  "for rash and wound.   Neurological: Negative.  Negative for seizures, speech difficulty, weakness and headaches.   Psychiatric/Behavioral: Negative.  Negative for confusion, sleep disturbance and suicidal ideas. The patient is not nervous/anxious.       I have reviewed the ROS documented by my clinical staff, I have updated appropriately and I agree. Deanne Pitts MD    I have reviewed and the following portions of the patient's history were updated as appropriate: past family history, past medical history, past social history, past surgical history and problem list.    Past Medical History:   Past Medical History:   Diagnosis Date   • Abnormal EKG    • Abnormal PSA     Reported abnormal   • Acute deep vein thrombosis (DVT) of right lower extremity (HCC) 08/22/2019   • Acute diverticulitis 2019   • Acute hyperkalemia 08/22/2019   • Acute respiratory failure with hypoxia ([facility])    • Acute saddle pulmonary embolism with acute cor pulmonale ([facility]) 08/22/2019   • Acute systolic right heart failure ([facility]) 08/22/2019   • Arthritis     KNEES,  BUT SINCE HAD REPLACEMENT   • Benign localized hyperplasia of prostate    • Bilateral knee pain    • C. difficile diarrhea 2010   • Cataracts, bilateral    • CKD (chronic kidney disease)    • Diabetic neuropathy (HCC)    • Diabetic neuropathy (HCC)    • Disease of thyroid gland     HYPOTHYROIDISM   • Diverticulitis    • Dyslipidemia     H/O   • Family history of malignant hyperthermia     Brother    • Full dentures    • Generalized weakness    • History of ESBL E. coli infection     most recent 4-2022 f/u with ID Dr Johnson   • History of gout    • History of hepatitis A vaccination    • History of nephrolithiasis    • History of nuclear stress test     STATES IN THE 1990'S.  \"IT WAS OK\"   • History of osteopenia    • History of recurrent UTI (urinary tract infection)    • Hydronephrosis of left kidney 07/18/2019   • Hydronephrosis with renal and ureteral calculus obstruction " 10/21/2019    Added automatically from request for surgery [id number]   • Hydronephrosis with ureteral stricture    • Hypercholesterolemia    • Hyperkalemia     PT UNSURE REGARDING HX OF THIS   • Hyperlipidemia    • Hypertension    • Impaired functional mobility, balance, gait, and endurance    • Impaired functional mobility, balance, gait, and endurance    • Insomnia    • Malignant hyperthermia due to anesthesia     PER PT REPORT, BROTHER HAD DURING LUNG SURGERY SEVERAL YEARS AGO.  STATED THEY PACKED HIM ON ICE.  STATED HE DID SURVIVE.  PT DENIES ANY PERSONAL HX OF MALIGNANT HYPERTHERMIA HIMSELF, OR ANY ISSUES WITH ANESTHESIA.  STATES TO HIS KNOWLEDGE, NO OTHER FAMILY MEMBER HAS THIS ISSUE EXCEPT FOR HIS BROTHER.   • On supplemental oxygen therapy     2L [place] [facility]   • Other hydronephrosis 08/12/2019    Added automatically from request for surgery [id number]   • Renal insufficiency    • Sepsis ([facility]) 2019   • Shortness of breath     STATES WITH EXERTION, OTHERWISE, DENIES   • Sigmoid diverticulitis without abscess or perforation 08/09/2017   • Skin breakdown     fourth toe right foot noted in 2019 MD D/C note   • Skin ulcer of fourth toe of right foot, limited to breakdown of skin ([facility]) 07/05/2019   • Stricture of ureter    • Type 2 diabetes mellitus ([facility])    • Ureteral stricture, left    • UTI (urinary tract infection) 07/18/2019   • Vitamin D deficiency    • Wears glasses        Past Surgical History:  Past Surgical History:   Procedure Laterality Date   • APPENDECTOMY     • CARDIAC ELECTROPHYSIOLOGY PROCEDURE N/A 12/23/2020    Procedure: Pacemaker DC new. DNS meds.;  Surgeon: Jimy Ledezma DO;  Location: [facility] INVASIVE LOCATION;  Service: Cardiology;  Laterality: N/A;   • CHOLECYSTECTOMY     • CIRCUMCISION N/A 10/23/2019    Procedure: CIRCUMCISIOn-DORSAL SLIT;  Surgeon: Griffin Romero MD;  Location: [facility] OR;  Service: Urology   • COLONOSCOPY     • CYSTOSCOPY RETROGRADE PYELOGRAM Left 6/17/2022    Procedure:  CYSTOSCOPY RETROGRADE PYELOGRAM;  Surgeon: Ryne Mccann MD;  Location:  [facility] OR;  Service: Urology;  Laterality: Left;   • CYSTOSCOPY URETEROSCOPY Left 2/11/2019    Procedure: URETEROSCOPY WITH STENT EXTRACTION, RPG;  Surgeon: Griffin Romero MD;  Location:  [facility] OR;  Service: Urology   • CYSTOSCOPY W/ URETERAL STENT PLACEMENT Left 7/20/2019    Procedure: CYSTOSCOPY, LEFT RETROGRADE PYELOGRAM,  ATTEMPTED LEFT URETERAL STENT INSERTION;  Surgeon: Isaac Flynn MD;  Location:  [facility] OR;  Service: Urology   • CYSTOSCOPY W/ URETERAL STENT PLACEMENT Left 6/17/2022    Procedure: CYSTOSCOPY URETERAL CATHETER/STENT INSERTION;  Surgeon: Ryne Mccann MD;  Location:  [facility] OR;  Service: Urology;  Laterality: Left;   • EYE SURGERY      CATARACTS REMOVED BILATERALLY   • KNEE ARTHROPLASTY Bilateral    • KNEE ARTHROSCOPY Bilateral    • RENAL ARTERY STENT      SEVERAL TIMES EVERY SINCE 1978   • URETERAL STENT INSERTION  10/2020   • URETEROSCOPY LASER LITHOTRIPSY WITH STENT INSERTION Left 1/10/2018    Procedure:  cysto, left ureteral stent replacement ;  Surgeon: Griffin Romero MD;  Location:  [facility] OR;  Service:    • URETEROSCOPY LASER LITHOTRIPSY WITH STENT INSERTION Left 8/14/2019    Procedure: URETEROSCOPY, STONE REMOVAL WITH STENT INSERTION;  Surgeon: Griffin Romero MD;  Location:  [facility] OR;  Service: Urology   • URETEROSCOPY LASER LITHOTRIPSY WITH STENT INSERTION Left 10/23/2019    Procedure: Left URETEROSCOPY WITH STENT INSERTION-LEFT;  Surgeon: Griffin Romero MD;  Location:  [facility] OR;  Service: Urology       Family History:   family history includes Brain cancer in his brother and sister; Heart attack in his sister; Hypertension in his sister; Lung cancer in his brother and sister; Malig Hyperthermia in his brother; Stroke in his sister.   Otherwise pertinent FHx was reviewed and not pertinent to current issue.    Social History:    reports that he quit smoking about 71 years ago. His smoking use  "included cigarettes and cigarettes. He has never used smokeless tobacco. He reports that he does not drink alcohol and does not use drugs.    Medications:     Current Outpatient Medications:   •  acetaminophen (TYLENOL) 500 MG tablet, Take 1,000 mg by mouth Every 6 (Six) Hours As Needed for Mild Pain ., Disp: , Rfl:   •  allopurinol (ZYLOPRIM) 300 MG tablet, Take 1 tablet by mouth Every Night., Disp: 90 tablet, Rfl: 3  •  aspirin (aspirin) 81 MG EC tablet, Take 1 tablet by mouth Daily., Disp: 30 tablet, Rfl: 11  •  bisoprolol (ZEBeta) 5 MG tablet, Take 1 tablet by mouth Daily., Disp: 30 tablet, Rfl: 11  •  Eliquis 2.5 MG tablet tablet, TAKE ONE TABLET BY MOUTH TWICE A DAY (Patient taking differently: 2.5 mg Every 12 (Twelve) Hours.), Disp: 180 tablet, Rfl: 3  •  finasteride (PROSCAR) 5 MG tablet, TAKE ONE TABLET BY MOUTH DAILY (Patient taking differently: Take 5 mg by mouth Daily.), Disp: 90 tablet, Rfl: 3  •  glimepiride (AMARYL) 1 MG tablet, Take 1 tablet by mouth Every Morning Before Breakfast. Indications: Type 2 Diabetes, Disp: 90 tablet, Rfl: 3  •  Insulin Glargine, 1 Unit Dial, (Toujeo SoloStar) 300 UNIT/ML solution pen-injector injection, Inject 20 Units under the skin into the appropriate area as directed Every Night., Disp: 6 mL, Rfl: 3  •  Insulin Pen Needle (Pen Needles 5/16\") 31G X 8 MM misc, 1 each Take As Directed., Disp: 90 each, Rfl: 1  •  isosorbide mononitrate (IMDUR) 30 MG 24 hr tablet, Take 1 tablet by mouth Every Morning., Disp: 90 tablet, Rfl: 3  •  levothyroxine (SYNTHROID, LEVOTHROID) 50 MCG tablet, Take 1 tablet by mouth Daily. Indications: Underactive Thyroid, Disp: 90 tablet, Rfl: 3  •  losartan (Cozaar) 25 MG tablet, Take 1 tablet by mouth Daily. Indications: High Blood Pressure Disorder, Disp: 90 tablet, Rfl: 3  •  Mirabegron ER (MYRBETRIQ) 50 MG tablet sustained-release 24 hour 24 hr tablet, Take 50 mg by mouth Daily for 360 days., Disp: 90 tablet, Rfl: 3  •  Multiple Vitamins-Minerals " "(OCUVITE ADULT 50+ PO), Take 1 capsule by mouth Daily., Disp: , Rfl:   •  nitroglycerin (NITROSTAT) 0.4 MG SL tablet, 1 under the tongue as needed for angina, may repeat q5mins for up three doses (Patient taking differently: Place 0.4 mg under the tongue Every 5 (Five) Minutes As Needed for Chest Pain. 1 under the tongue as needed for angina, may repeat q5mins for up three doses), Disp: 100 tablet, Rfl: 11  •  silodosin (RAPAFLO) 8 MG capsule capsule, Take 1 capsule by mouth Daily With Breakfast., Disp: 90 capsule, Rfl: 3  •  SITagliptin (JANUVIA) 100 MG tablet, Take 1 tablet by mouth Daily., Disp: 90 tablet, Rfl: 3    Allergies:   Allergies   Allergen Reactions   • Metformin Diarrhea, GI Intolerance and Unknown - Low Severity     diahrea   • Statins Myalgia and Diarrhea     Post void residual bladder scan:   06mL    Objective     Physical Exam:   Vital Signs:   Vitals:    10/18/22 1307   Weight: 101 kg (222 lb)   Height: 175.3 cm (69.02\")     Body mass index is 32.76 kg/m².     Physical Exam  Vitals and nursing note reviewed.   Constitutional:       General: He is awake. He is not in acute distress.     Appearance: Normal appearance. He is well-developed. He is obese.   HENT:      Head: Normocephalic and atraumatic.      Right Ear: External ear normal.      Left Ear: External ear normal.      Nose: Nose normal.   Eyes:      Conjunctiva/sclera: Conjunctivae normal.   Pulmonary:      Effort: Pulmonary effort is normal.   Abdominal:      General: There is no distension.      Palpations: Abdomen is soft. There is no mass.      Tenderness: There is no abdominal tenderness. There is no right CVA tenderness, left CVA tenderness, guarding or rebound.      Hernia: No hernia is present. There is no hernia in the left inguinal area or right inguinal area.   Genitourinary:     Pubic Area: No rash.       Rectum: No mass or tenderness. Normal anal tone.   Musculoskeletal:      Cervical back: Normal range of motion. "   Lymphadenopathy:      Lower Body: No right inguinal adenopathy. No left inguinal adenopathy.   Skin:     General: Skin is warm.   Neurological:      General: No focal deficit present.      Mental Status: He is alert and oriented to person, place, and time.   Psychiatric:         Behavior: Behavior normal. Behavior is cooperative.         Labs:   Brief Urine Lab Results  (Last result in the past 365 days)      Color   Clarity   Blood   Leuk Est   Nitrite   Protein   CREAT   Urine HCG        07/18/22 1422 Yellow   Cloudy   3+   Large (3+)   Negative   2+                 Urine Culture    Urine Culture 5/4/22 6/7/22 7/18/22   Urine Culture Final report 25,000 CFU/mL Normal Urogenital [place] Final report              Lab Results   Component Value Date    GLUCOSE 125 (H) 07/12/2022    CALCIUM 9.3 07/12/2022     07/12/2022    K 5.5 (H) 07/12/2022    CO2 25.8 07/12/2022     07/12/2022    BUN 25 (H) 07/12/2022    CREATININE 1.44 (H) 07/12/2022    EGFRIFAFRI 45 (L) 12/13/2021    EGFRIFNONA 39 (L) 12/13/2021    BCR 17.4 07/12/2022    ANIONGAP 10.0 06/15/2022       Lab Results   Component Value Date    WBC 12.11 (H) 07/12/2022    HGB 12.9 (L) 07/12/2022    HCT 38.8 07/12/2022    MCV 95.6 07/12/2022     07/12/2022       Lab Results   Component Value Date    PSA 1.160 04/15/2021    PSA 8.640 (H) 11/30/2020    PSA 1.450 08/26/2020       Images:   XR Abdomen KUB    Result Date: 7/19/2022    No acute findings.  This report was finalized on 7/19/2022 9:03 AM by Dr. Marcelino Franklin MD.          Measures:   Tobacco:   Forrest Zepeda  reports that he quit smoking about 71 years ago. His smoking use included cigarettes and cigarettes. He has never used smokeless tobacco.    Urine Incontinence: Patient reports that he is not currently experiencing any symptoms of urinary incontinence.        Assessment / Plan      Assessment/Plan:   89 y.o. male who presented today for follow up of chronic stent exchanges.  He will be  due in December.  I will have him follow up in 6 weeks for scheduling.  As far as his nocturia, I believe he would benefit from a sleep study for sleep apnea.    He would like to continue his myrbetriq as this has worked well for him.  I will send in a prescription for him.      Diagnoses and all orders for this visit:    1. Frequency of urination  -     Mirabegron ER (MYRBETRIQ) 50 MG tablet sustained-release 24 hour 24 hr tablet; Take 50 mg by mouth Daily for 360 days.  Dispense: 90 tablet; Refill: 3           Follow Up:   Return in about 6 weeks (around 11/29/2022) for Recheck.    I spent approximately 30 minutes providing clinical care for this patient; including review of patient's chart and provider documentation, face to face time spent with patient in examination room (obtaining history, performing physical exam, discussing diagnosis and management options), placing orders, and completing patient documentation.     Deanne Pitts MD  [facility]

## 2022-11-05 DIAGNOSIS — E11.65 UNCONTROLLED TYPE 2 DIABETES MELLITUS WITH HYPERGLYCEMIA: ICD-10-CM

## 2022-11-07 ENCOUNTER — PATIENT MESSAGE (OUTPATIENT)
Dept: INTERNAL MEDICINE | Facility: CLINIC | Age: 87
End: 2022-11-07

## 2022-11-07 RX ORDER — SILODOSIN 8 MG/1
8 CAPSULE ORAL
Qty: 90 CAPSULE | Refills: 3 | OUTPATIENT
Start: 2022-11-07

## 2022-11-07 RX ORDER — PEN NEEDLE, DIABETIC 32 GX 1/4"
1 NEEDLE, DISPOSABLE MISCELLANEOUS DAILY
Qty: 100 EACH | Refills: 3 | Status: SHIPPED | OUTPATIENT
Start: 2022-11-07

## 2022-11-07 RX ORDER — SILODOSIN 8 MG/1
CAPSULE ORAL
Qty: 90 CAPSULE | Refills: 3 | Status: SHIPPED | OUTPATIENT
Start: 2022-11-07

## 2022-11-07 RX ORDER — CALCIUM CITRATE/VITAMIN D3 200MG-6.25
TABLET ORAL
OUTPATIENT
Start: 2022-11-07

## 2022-11-07 RX ORDER — SITAGLIPTIN 100 MG/1
TABLET, FILM COATED ORAL
Qty: 90 TABLET | Refills: 3 | Status: SHIPPED | OUTPATIENT
Start: 2022-11-07

## 2022-11-07 NOTE — TELEPHONE ENCOUNTER
From: Forrest Zepeda  To: Kary Zheng MD  Sent: 11/7/2022 5:49 PM EST  Subject: [place]    Hey Dr zheng  Would you mind sending a prescription for papaw pen needles. Junior fine 32 gauge 1/4 inches. The ones called in to pharmacy are brusing him. Any question feel free to give me a call.    Erika vasquez

## 2022-11-07 NOTE — TELEPHONE ENCOUNTER
Rx Refill Note  Requested Prescriptions     Refused Prescriptions Disp Refills   • silodosin (RAPAFLO) 8 MG capsule capsule 90 capsule 3     Sig: Take 1 capsule by mouth Daily With Breakfast.      Last office visit with prescribing clinician: 7/12/2022      Next office visit with prescribing clinician: 1/17/2023            Carlyn Crenshaw LPN  11/07/22, 10:07 EST

## 2022-11-07 NOTE — TELEPHONE ENCOUNTER
Rx Refill Note  Requested Prescriptions     Pending Prescriptions Disp Refills   • Januvia 100 MG tablet [Pharmacy Med Name: JANUVIA 100 MG TABLET] 90 tablet 3     Sig: TAKE ONE TABLET BY MOUTH DAILY   • silodosin (RAPAFLO) 8 MG capsule capsule [Pharmacy Med Name: SILODOSIN 8 MG CAPSULE] 90 capsule 3     Sig: TAKE ONE CAPSULE BY MOUTH DAILY WITH BREAKFAST     Refused Prescriptions Disp Refills   • True Metrix Blood Glucose Test test strip [Pharmacy Med Name: JOSEO TRUE METRIX GLUCOSE TEST STRIP]       Sig: USE ONE STRIP TO TEST BLOOD SUGAR FOUR TIMES A DAY      Last office visit with prescribing clinician: 7/12/2022      Next office visit with prescribing clinician: 11/5/2022            Carlyn Crenshaw LPN  11/07/22, 10:04 EST

## 2022-12-22 NOTE — PROGRESS NOTES
[facility] Cardiology Office Follow Up Note    Forrest Zepeda  [id number]  2023    Primary Care Provider: Kary Wen MD   Referring Provider: No ref. provider found      Chief Complaint: Routine follow-up    History of Present Illness:   Mr. Forrest Zepeda is a 89 y.o. male who presents to the Cardiology Clinic for routine follow-up.  The patient reports feeling \"okay\" today.  He notes he is neither better nor worse than his last cardiology clinic visit.  He reports chronic dyspnea, but denies cough and orthopnea.  He does report lower extremity edema, but denies any wearing his shoes.  He is preparing to undergo a urologic procedure soon (ureteral stent exchange).  He continues to deny chest discomfort, palpitations, dizziness.  He continues to take Eliquis without significant bruising or bleeding.  He offers no other complaints or concerns at this time.      Past Cardiac Testin. Last Coronary Angio: None    2. Prior Stress Testing: 3/4/2021     Study Impression Myocardial perfusion imaging indicates a medium-to-large-sized, severe area of ischemia located in the anterior wall, apex and septal wall. Impressions are consistent with an intermediate risk study.   Rest Perfusion Defect 1 No rest myocardial perfusion defect noted.   Stress Perfusion Defect 1 There is a moderately sized defect of severe severity present in the apical anterior, apical septal and apex wall.   Ventricle Size / Description Left ventricular ejection fraction is normal.  (Calculated EF = 53%). Normal LV cavity size. Normal LV wall motion noted. Normal RV cavity size.       3. Last Echo: 2021  • Left ventricular wall thickness is consistent with mild concentric hypertrophy.  • Estimated left ventricular EF = 64% Left ventricular ejection fraction appears to be 61 - 65%. Left ventricular systolic function is normal.  • Left ventricular diastolic function is consistent with (grade I) impaired  relaxation.  • The right ventricular cavity is mildly dilated.  • The right atrial cavity is mildly dilated.  • There is severe calcification of the aortic valve mainly affecting the non-coronary, left coronary and right coronary cusp(s).  • Estimated right ventricular systolic pressure from tricuspid regurgitation is mildly elevated (35-45 mmHg).  • Mild pulmonary hypertension is present.      4. Prior Holter Monitor: 12/4/2020   *Tending toward sinus bradycardia   *Paroxysmal Afib    Review of Systems:   Review of Systems   Constitutional: Negative for activity change, chills, diaphoresis, fatigue, fever and unexpected weight gain.   Eyes: Negative for blurred vision and visual disturbance.   Respiratory: Positive for shortness of breath. Negative for apnea, cough, chest tightness and wheezing.    Cardiovascular: Positive for leg swelling. Negative for chest pain and palpitations.   Gastrointestinal: Negative for abdominal distention, blood in stool, GERD and indigestion.   Endocrine: Negative for cold intolerance and heat intolerance.   Genitourinary: Negative for hematuria.   Musculoskeletal: Negative for joint swelling and myalgias.   Skin: Negative for color change, pallor and wound.   Neurological: Negative for dizziness, seizures, syncope, weakness, light-headedness, numbness, headache and confusion.   Hematological: Does not bruise/bleed easily.   Psychiatric/Behavioral: Negative for behavioral problems, sleep disturbance, suicidal ideas and depressed mood.     I have reviewed and confirmed the accuracy of the ROS as documented by the MA/LPN/RN JOYCE Mercer    I have reviewed and/or updated the patient's past medical, past surgical, family, social history, problem list and allergies as appropriate.     Medications:     Current Outpatient Medications:   •  acetaminophen (TYLENOL) 500 MG tablet, Take 1,000 mg by mouth Every 6 (Six) Hours As Needed for Mild Pain ., Disp: , Rfl:   •  allopurinol  (ZYLOPRIM) 300 MG tablet, TAKE ONE TABLET BY MOUTH ONCE NIGHTLY, Disp: 90 tablet, Rfl: 3  •  aspirin (aspirin) 81 MG EC tablet, Take 1 tablet by mouth Daily., Disp: 30 tablet, Rfl: 11  •  bisoprolol (ZEBeta) 5 MG tablet, Take 1 tablet by mouth Daily., Disp: 30 tablet, Rfl: 11  •  Eliquis 2.5 MG tablet tablet, TAKE ONE TABLET BY MOUTH TWICE A DAY (Patient taking differently: 2.5 mg Every 12 (Twelve) Hours.), Disp: 180 tablet, Rfl: 3  •  finasteride (PROSCAR) 5 MG tablet, TAKE ONE TABLET BY MOUTH DAILY (Patient taking differently: Take 5 mg by mouth Daily.), Disp: 90 tablet, Rfl: 3  •  glimepiride (AMARYL) 1 MG tablet, Take 1 tablet by mouth Every Morning Before Breakfast. Indications: Type 2 Diabetes, Disp: 90 tablet, Rfl: 3  •  Insulin Glargine, 1 Unit Dial, (Toujeo SoloStar) 300 UNIT/ML solution pen-injector injection, Inject 20 Units under the skin into the appropriate area as directed Every Night., Disp: 6 mL, Rfl: 3  •  Insulin Pen Needle (Novofine Pen Needle) 32G X 6 MM misc, 1 each Daily., Disp: 100 each, Rfl: 3  •  isosorbide mononitrate (IMDUR) 30 MG 24 hr tablet, Take 1 tablet by mouth Every Morning., Disp: 90 tablet, Rfl: 3  •  Januvia 100 MG tablet, TAKE ONE TABLET BY MOUTH DAILY, Disp: 90 tablet, Rfl: 3  •  levothyroxine (SYNTHROID, LEVOTHROID) 50 MCG tablet, Take 1 tablet by mouth Daily. Indications: Underactive Thyroid, Disp: 90 tablet, Rfl: 3  •  losartan (Cozaar) 25 MG tablet, Take 1 tablet by mouth Daily. Indications: High Blood Pressure Disorder, Disp: 90 tablet, Rfl: 3  •  Multiple Vitamins-Minerals (OCUVITE ADULT 50+ PO), Take 1 capsule by mouth Daily., Disp: , Rfl:   •  nitroglycerin (NITROSTAT) 0.4 MG SL tablet, 1 under the tongue as needed for angina, may repeat q5mins for up three doses (Patient taking differently: Place 0.4 mg under the tongue Every 5 (Five) Minutes As Needed for Chest Pain. 1 under the tongue as needed for angina, may repeat q5mins for up three doses), Disp: 100 tablet,  Rfl: 11  •  silodosin (RAPAFLO) 8 MG capsule capsule, TAKE ONE CAPSULE BY MOUTH DAILY WITH BREAKFAST, Disp: 90 capsule, Rfl: 3    Physical Exam:  Vital Signs:   Vitals:    23 1116   BP: 118/68   BP Location: Left arm   Patient Position: Sitting   Cuff Size: Adult   Pulse: 66   SpO2: 97%   Weight: 101 kg (222 lb)   Height: 175.3 cm (69\")     Body mass index is 32.78 kg/m².    Physical Exam  Vitals and nursing note reviewed.   Constitutional:       General: He is not in acute distress.     Appearance: He is well-developed.      Comments: Elderly appearing, ambulates with single post cane   HENT:      Head: Normocephalic and atraumatic.   Eyes:      General: No scleral icterus.     Extraocular Movements: Extraocular movements intact.   Neck:      Trachea: Trachea normal.   Cardiovascular:      Rate and Rhythm: Normal rate and regular rhythm.      Pulses: Normal pulses.      Heart sounds: Normal heart sounds. No murmur heard.    No friction rub. No gallop.   Pulmonary:      Effort: Pulmonary effort is normal.      Breath sounds: Examination of the right-middle field reveals rales. Examination of the right-lower field reveals rales. Examination of the left-lower field reveals rales. Rales present.   Abdominal:      General: There is distension.   Musculoskeletal:         General: Normal range of motion.      Cervical back: Neck supple.      Right lower le+ Edema present.      Left lower le+ Edema present.   Skin:     General: Skin is warm and dry.      Findings: No bruising, lesion or rash.   Neurological:      Mental Status: He is alert and oriented to person, place, and time.      Motor: No weakness.   Psychiatric:         Mood and Affect: Mood normal.         Behavior: Behavior normal. Behavior is cooperative.         Thought Content: Thought content does not include suicidal ideation.         Results Review:   I reviewed the patient's new clinical results.     DEVICE INTERROGATION    Interrogation date:  2023  Implant date: 2020  Device type: Lightningcast, PPM  Mode:DDDR    RA pacin,  P wave is 1.9 mV with a threshold of 0.8 V at 0.4 msec and an impedance of 575 ohms.  RV pacin%. R wave is 18.4 mV with a threshold of 0.4 V at 0.4 msec and an impedance of 797 ohms.    Time to JEREMY is 7.5 years.       Summary:  No program changes         Assessment / Plan:     1. Coronary artery disease involving native coronary artery of native heart without angina pectoris  Stable, denies chest pain and exertional angina  Continue daily aspirin  Currently not on statin therapy due to mylagia     2. Cardiac pacemaker in situ  Normal device check/function today     3. Essential hypertension  Currently well controlled     4. Paroxysmal atrial fibrillation (HCC)  Continue bisoprolol for rate control  Continue Eliquis for CVA prophyalxis     5. Sinus node dysfunction (HCC)  Normal pacemaker function     6. Type 2 diabetes mellitus with hyperglycemia, with long-term current use of insulin (HCC)   , HgA1c 7.5     7. IPF (idiopathic pulmonary fibrosis) (HCC)  Maintained on continuous oxygen therapy     8. Pulmonary hypertension (HCC)  , Mild per echocardiogram    9. Shortness of breath  Patient reports this is unchanged, but he has right-sided rales and LLL rales.  He does have lower extremity and reports abdominal edema in the setting of normal BMs.  We (patient, son, and myself) discussed the possibility of volume overload and getting a proBNP/BMP in anticipation of diuresis.  His son notes that he is due to have a uretal stent switched out; they do not want to do anything that could delay this procedure.  Further work-up is declined at this time.    10. Diastolic dysfunction  , LVEF 61-64%, grade 1 diastolic dysfunction  Weight is stable, but evidence of rales and lower extremity edema today    11. Preoperative cardiovascular exam  Results for Revised Cardiac Risk Index (Ricki Criteria) by  [employer]      Estimated Risk of Adverse Outcome with Non-cardiac Surgery: Moderate Risk    Estimated Rate of Myocardial Infarction, Pulmonary Edema, Ventricular Fibrillation, Cardiac Arrest, or Complete Heart Block: 6.6 %      Answers calculated to formulate result:    1. High Risk Surgery? -- No  2. Coronary Artery Disease? -- No  3. Congestive Heart Failure? -- Yes  4. Cerebrovascular Disease? -- No  5. Diabetes Mellitus on Insulin? -- Yes  6. Serum Creatinine >2 mg/dl or >177 ?mol/L? -- No      January 10, 2023 at 12:37  Calculated at: [email or web address]  Get Calculate by [employer] for iOS, Android and web at [email or web address]    Preventative Cardiology:   Tobacco Cessation: N/A   Advance Care Planning: ACP discussion was declined by the patient. Patient has an advance directive in EMR which is still valid.      Follow Up:   Return in about 6 months (around 7/9/2023) for Follow-up with Dr. Saha.      Thank you for allowing me to participate in the care of your patient. Please to not hesitate to contact me with additional questions or concerns.     JOYCE Zamora

## 2023-01-05 ENCOUNTER — OFFICE VISIT (OUTPATIENT)
Dept: UROLOGY | Facility: CLINIC | Age: 88
End: 2023-01-05
Payer: MEDICARE

## 2023-01-05 VITALS — BODY MASS INDEX: 32.88 KG/M2 | HEIGHT: 69 IN | WEIGHT: 222 LBS

## 2023-01-05 DIAGNOSIS — N13.30 HYDRONEPHROSIS, UNSPECIFIED HYDRONEPHROSIS TYPE: Primary | ICD-10-CM

## 2023-01-05 LAB
BILIRUB BLD-MCNC: NEGATIVE MG/DL
CLARITY, POC: CLEAR
COLOR UR: YELLOW
EXPIRATION DATE: ABNORMAL
GLUCOSE UR STRIP-MCNC: NEGATIVE MG/DL
KETONES UR QL: NEGATIVE
LEUKOCYTE EST, POC: ABNORMAL
Lab: ABNORMAL
NITRITE UR-MCNC: NEGATIVE MG/ML
PH UR: 6 [PH] (ref 5–8)
PROT UR STRIP-MCNC: ABNORMAL MG/DL
RBC # UR STRIP: ABNORMAL /UL
SP GR UR: 1.02 (ref 1–1.03)
UROBILINOGEN UR QL: NORMAL

## 2023-01-05 PROCEDURE — 87086 URINE CULTURE/COLONY COUNT: CPT | Performed by: UROLOGY

## 2023-01-05 PROCEDURE — 81003 URINALYSIS AUTO W/O SCOPE: CPT | Performed by: UROLOGY

## 2023-01-05 PROCEDURE — 81001 URINALYSIS AUTO W/SCOPE: CPT | Performed by: UROLOGY

## 2023-01-05 PROCEDURE — 99214 OFFICE O/P EST MOD 30 MIN: CPT | Performed by: UROLOGY

## 2023-01-05 NOTE — PROGRESS NOTES
Follow Up Office Visit      Patient Name: Forrest Zepeda  : 1932   MRN: [id number]     Chief Complaint:    Chief Complaint   Patient presents with   • Frequency of urination       Referring Provider: No ref. provider found    History of Present Illness: Forrest Zepeda is a 89 y.o. male who presents today for follow up of chronic stent exchanges.   He had his stent exchange on 22 by Dr. Mccann.  He was then seen in July.  He had complained of irritative symptoms and was started on myrbetriq.  He has done really well with myrbetriq.  He has also stopped caffeine drinks.  He has tried other anticholinergics without success.  He is here to exchange his 8f Tria stent.  No dysuria or gross hematuria.  He reports Myrbetriq has worked really well for him.        Subjective      Review of System:   Review of Systems   Constitutional: Positive for fatigue.   HENT: Negative.    Eyes: Negative.    Respiratory: Positive for shortness of breath.    Cardiovascular: Negative.    Gastrointestinal: Negative.    Endocrine: Negative.    Genitourinary: Positive for flank pain.   Skin: Negative.    Allergic/Immunologic: Negative.    Neurological: Negative.    Hematological: Negative.    Psychiatric/Behavioral: Negative.       I have reviewed the ROS documented by my clinical staff, I have updated appropriately and I agree. Deanne Pitts MD    I have reviewed and the following portions of the patient's history were updated as appropriate: past family history, past medical history, past social history, past surgical history and problem list.    Past Medical History:   Past Medical History:   Diagnosis Date   • Abnormal EKG    • Abnormal PSA     Reported abnormal   • Acute deep vein thrombosis (DVT) of right lower extremity (HCC) 2019   • Acute diverticulitis    • Acute hyperkalemia 2019   • Acute respiratory failure with hypoxia ([facility])    • Acute saddle pulmonary embolism with acute cor pulmonale (HCC)  08/22/2019   • Acute systolic right heart failure (HCC) 08/22/2019   • Arthritis     KNEES,  BUT SINCE HAD REPLACEMENT   • Benign localized hyperplasia of prostate    • Bilateral knee pain    • C. difficile diarrhea 2010   • Cataracts, bilateral    • CKD (chronic kidney disease)    • Diabetic neuropathy (HCC)    • Diabetic neuropathy (HCC)    • Disease of thyroid gland     HYPOTHYROIDISM   • Diverticulitis    • Dyslipidemia     H/O   • Family history of malignant hyperthermia     Brother    • Full dentures    • Generalized weakness    • History of ESBL E. coli infection     most recent 4-2022 f/u with ID Dr Johnson   • History of gout    • History of hepatitis A vaccination    • History of nephrolithiasis    • History of nuclear stress test     STATES IN THE 1990'S.  \"IT WAS OK\"   • History of osteopenia    • History of recurrent UTI (urinary tract infection)    • Hydronephrosis of left kidney 07/18/2019   • Hydronephrosis with renal and ureteral calculus obstruction 10/21/2019    Added automatically from request for surgery [id number]   • Hydronephrosis with ureteral stricture    • Hypercholesterolemia    • Hyperkalemia     PT UNSURE REGARDING HX OF THIS   • Hyperlipidemia    • Hypertension    • Impaired functional mobility, balance, gait, and endurance    • Impaired functional mobility, balance, gait, and endurance    • Insomnia    • Malignant hyperthermia due to anesthesia     PER PT REPORT, BROTHER HAD DURING LUNG SURGERY SEVERAL YEARS AGO.  STATED THEY PACKED HIM ON ICE.  STATED HE DID SURVIVE.  PT DENIES ANY PERSONAL HX OF MALIGNANT HYPERTHERMIA HIMSELF, OR ANY ISSUES WITH ANESTHESIA.  STATES TO HIS KNOWLEDGE, NO OTHER FAMILY MEMBER HAS THIS ISSUE EXCEPT FOR HIS BROTHER.   • On supplemental oxygen therapy     2L NC QHS   • Other hydronephrosis 08/12/2019    Added automatically from request for surgery [id number]   • Renal insufficiency    • Sepsis (HCC) 2019   • Shortness of breath     STATES WITH EXERTION,  OTHERWISE, DENIES   • Sigmoid diverticulitis without abscess or perforation 08/09/2017   • Skin breakdown     fourth toe right foot noted in 2019 MD D/C note   • Skin ulcer of fourth toe of right foot, limited to breakdown of skin (HCC) 07/05/2019   • Stricture of ureter    • Type 2 diabetes mellitus (HCC)    • Ureteral stricture, left    • UTI (urinary tract infection) 07/18/2019   • Vitamin D deficiency    • Wears glasses        Past Surgical History:  Past Surgical History:   Procedure Laterality Date   • APPENDECTOMY     • CARDIAC ELECTROPHYSIOLOGY PROCEDURE N/A 12/23/2020    Procedure: Pacemaker DC new. DNS meds.;  Surgeon: Jimy Ledezma DO;  Location:  [facility];  Service: Cardiology;  Laterality: N/A;   • CHOLECYSTECTOMY     • CIRCUMCISION N/A 10/23/2019    Procedure: CIRCUMCISIOn-DORSAL SLIT;  Surgeon: Griffin Romero MD;  Location:  [facility];  Service: Urology   • COLONOSCOPY     • CYSTOSCOPY RETROGRADE PYELOGRAM Left 6/17/2022    Procedure: CYSTOSCOPY RETROGRADE PYELOGRAM;  Surgeon: Ryne Mccann MD;  Location:  [facility];  Service: Urology;  Laterality: Left;   • CYSTOSCOPY URETEROSCOPY Left 2/11/2019    Procedure: URETEROSCOPY WITH STENT EXTRACTION, RPG;  Surgeon: Griffin Romero MD;  Location:  [facility];  Service: Urology   • CYSTOSCOPY W/ URETERAL STENT PLACEMENT Left 7/20/2019    Procedure: CYSTOSCOPY, LEFT RETROGRADE PYELOGRAM,  ATTEMPTED LEFT URETERAL STENT INSERTION;  Surgeon: Isaac Flynn MD;  Location:  [facility];  Service: Urology   • CYSTOSCOPY W/ URETERAL STENT PLACEMENT Left 6/17/2022    Procedure: CYSTOSCOPY URETERAL CATHETER/STENT INSERTION;  Surgeon: Ryne Mccann MD;  Location:  [facility];  Service: Urology;  Laterality: Left;   • EYE SURGERY      CATARACTS REMOVED BILATERALLY   • KNEE ARTHROPLASTY Bilateral    • KNEE ARTHROSCOPY Bilateral    • RENAL ARTERY STENT      SEVERAL TIMES EVERY SINCE 1978   • URETERAL STENT INSERTION  10/2020   • URETEROSCOPY  LASER LITHOTRIPSY WITH STENT INSERTION Left 1/10/2018    Procedure:  cysto, left ureteral stent replacement ;  Surgeon: Griffin Romero MD;  Location: [facility];  Service:    • URETEROSCOPY LASER LITHOTRIPSY WITH STENT INSERTION Left 8/14/2019    Procedure: URETEROSCOPY, STONE REMOVAL WITH STENT INSERTION;  Surgeon: Griffin Romero MD;  Location: [facility];  Service: Urology   • URETEROSCOPY LASER LITHOTRIPSY WITH STENT INSERTION Left 10/23/2019    Procedure: Left URETEROSCOPY WITH STENT INSERTION-LEFT;  Surgeon: Griffin Romero MD;  Location: [facility];  Service: Urology       Family History:   family history includes Brain cancer in his brother and sister; Heart attack in his sister; Hypertension in his sister; Lung cancer in his brother and sister; Malig Hyperthermia in his brother; Stroke in his sister.   Otherwise pertinent FHx was reviewed and not pertinent to current issue.    Social History:    reports that he quit smoking about 72 years ago. His smoking use included cigarettes. He has never used smokeless tobacco. He reports that he does not drink alcohol and does not use drugs.    Medications:     Current Outpatient Medications:   •  acetaminophen (TYLENOL) 500 MG tablet, Take 1,000 mg by mouth Every 6 (Six) Hours As Needed for Mild Pain ., Disp: , Rfl:   •  allopurinol (ZYLOPRIM) 300 MG tablet, Take 1 tablet by mouth Every Night., Disp: 90 tablet, Rfl: 3  •  aspirin (aspirin) 81 MG EC tablet, Take 1 tablet by mouth Daily., Disp: 30 tablet, Rfl: 11  •  bisoprolol (ZEBeta) 5 MG tablet, Take 1 tablet by mouth Daily., Disp: 30 tablet, Rfl: 11  •  Eliquis 2.5 MG tablet tablet, TAKE ONE TABLET BY MOUTH TWICE A DAY (Patient taking differently: 2.5 mg Every 12 (Twelve) Hours.), Disp: 180 tablet, Rfl: 3  •  finasteride (PROSCAR) 5 MG tablet, TAKE ONE TABLET BY MOUTH DAILY (Patient taking differently: Take 5 mg by mouth Daily.), Disp: 90 tablet, Rfl: 3  •  glimepiride (AMARYL) 1 MG tablet, Take 1 tablet by  mouth Every Morning Before Breakfast. Indications: Type 2 Diabetes, Disp: 90 tablet, Rfl: 3  •  Insulin Glargine, 1 Unit Dial, (Toujeo SoloStar) 300 UNIT/ML solution pen-injector injection, Inject 20 Units under the skin into the appropriate area as directed Every Night., Disp: 6 mL, Rfl: 3  •  Insulin Pen Needle (Novofine Pen Needle) 32G X 6 MM misc, 1 each Daily., Disp: 100 each, Rfl: 3  •  isosorbide mononitrate (IMDUR) 30 MG 24 hr tablet, Take 1 tablet by mouth Every Morning., Disp: 90 tablet, Rfl: 3  •  Januvia 100 MG tablet, TAKE ONE TABLET BY MOUTH DAILY, Disp: 90 tablet, Rfl: 3  •  levothyroxine (SYNTHROID, LEVOTHROID) 50 MCG tablet, Take 1 tablet by mouth Daily. Indications: Underactive Thyroid, Disp: 90 tablet, Rfl: 3  •  losartan (Cozaar) 25 MG tablet, Take 1 tablet by mouth Daily. Indications: High Blood Pressure Disorder, Disp: 90 tablet, Rfl: 3  •  Multiple Vitamins-Minerals (OCUVITE ADULT 50+ PO), Take 1 capsule by mouth Daily., Disp: , Rfl:   •  nitroglycerin (NITROSTAT) 0.4 MG SL tablet, 1 under the tongue as needed for angina, may repeat q5mins for up three doses (Patient taking differently: Place 0.4 mg under the tongue Every 5 (Five) Minutes As Needed for Chest Pain. 1 under the tongue as needed for angina, may repeat q5mins for up three doses), Disp: 100 tablet, Rfl: 11  •  silodosin (RAPAFLO) 8 MG capsule capsule, TAKE ONE CAPSULE BY MOUTH DAILY WITH BREAKFAST, Disp: 90 capsule, Rfl: 3    Allergies:   Allergies   Allergen Reactions   • Metformin Diarrhea, GI Intolerance and Unknown - Low Severity     diahrea   • Statins Myalgia and Diarrhea         Objective     Physical Exam:   Vital Signs:   Vitals:    01/05/23 1311   Weight: 101 kg (222 lb)   Height: 175.3 cm (69.02\")   PainSc: 0-No pain     Body mass index is 32.77 kg/m².     Physical Exam  Vitals and nursing note reviewed.   Constitutional:       General: He is awake. He is not in acute distress.     Appearance: Normal appearance. He is  well-developed.   HENT:      Head: Normocephalic and atraumatic.      Right Ear: External ear normal.      Left Ear: External ear normal.      Nose: Nose normal.   Eyes:      Conjunctiva/sclera: Conjunctivae normal.   Pulmonary:      Effort: Pulmonary effort is normal.   Abdominal:      General: There is no distension.      Palpations: Abdomen is soft. There is no mass.      Tenderness: There is no abdominal tenderness. There is no right CVA tenderness, left CVA tenderness, guarding or rebound.      Hernia: No hernia is present. There is no hernia in the left inguinal area or right inguinal area.   Genitourinary:     Pubic Area: No rash.       Rectum: No mass or tenderness. Normal anal tone.   Musculoskeletal:      Cervical back: Normal range of motion.   Lymphadenopathy:      Lower Body: No right inguinal adenopathy. No left inguinal adenopathy.   Skin:     General: Skin is warm.   Neurological:      General: No focal deficit present.      Mental Status: He is alert and oriented to person, place, and time.   Psychiatric:         Behavior: Behavior normal. Behavior is cooperative.         Labs:   Brief Urine Lab Results  (Last result in the past 365 days)      Color   Clarity   Blood   Leuk Est   Nitrite   Protein   CREAT   Urine HCG        07/18/22 1422 Yellow   Cloudy   3+   Large (3+)   Negative   2+                 Urine Culture    Urine Culture 5/4/22 6/7/22 7/18/22   Urine Culture Final report 25,000 CFU/mL Normal Urogenital [place] Final report              Lab Results   Component Value Date    GLUCOSE 125 (H) 07/12/2022    CALCIUM 9.3 07/12/2022     07/12/2022    K 5.5 (H) 07/12/2022    CO2 25.8 07/12/2022     07/12/2022    BUN 25 (H) 07/12/2022    CREATININE 1.44 (H) 07/12/2022    EGFRIFAFRI 45 (L) 12/13/2021    EGFRIFNONA 39 (L) 12/13/2021    BCR 17.4 07/12/2022    ANIONGAP 10.0 06/15/2022       Lab Results   Component Value Date    WBC 12.11 (H) 07/12/2022    HGB 12.9 (L) 07/12/2022    HCT 38.8  07/12/2022    MCV 95.6 07/12/2022     07/12/2022       Lab Results   Component Value Date    PSA 1.160 04/15/2021    PSA 8.640 (H) 11/30/2020    PSA 1.450 08/26/2020       Images:   No Images in the past 120 days found..    Measures:   Tobacco:   Forrest Zepeda  reports that he quit smoking about 72 years ago. His smoking use included cigarettes. He has never used smokeless tobacco..      Urine Incontinence: Patient reports that he is not currently experiencing any symptoms of urinary incontinence.         Assessment / Plan      Assessment/Plan:   89 y.o. male who presented today for follow up of chronic left hydronephrosis.  He is here to schedule stent exchanges.  We again discussed the risks and benefits of stent exchange again.  He has had multiple stent exchanges.    We will plan on giving him ertapenem for prophylaxis.      Diagnoses and all orders for this visit:    1. Hydronephrosis, unspecified hydronephrosis type (Primary)  -     Case Request; Standing  -     Urinalysis With Culture If Indicated -; Future  -     ceFAZolin (ANCEF) 2 g in sodium chloride 0.9 % 100 mL IVPB  -     CBC (No Diff); Future  -     Basic Metabolic Panel; Future  -     ertapenem (INVanz) 1 g in sodium chloride 0.9 % 50 mL IVPB  -     Case Request    Other orders  -     Outpatient In A Bed; Standing  -     Follow Anesthesia Guidelines / Protocol; Future  -     Provide NPO Instructions to Patient; Future  -     Chlorhexidine Skin Prep; Future  -     Follow Anesthesia Guidelines / Protocol; Standing  -     Verify NPO Status; Standing  -     Obtain Informed Consent; Standing  -     SCD (Sequential Compression Device) - Place on Patient in Pre-Op; Standing  -     Verify / Perform Chlorhexidine Skin Prep; Standing  -     Verify / Perform Chlorhexidine Skin Prep if Indicated (If Not Already Completed); Standing         Follow Up:   Return for Follow up after procedure.    I spent approximately 30 minutes providing clinical care for  this patient; including review of patient's chart and provider documentation, face to face time spent with patient in examination room (obtaining history, performing physical exam, discussing diagnosis and management options), placing orders, and completing patient documentation.     Deanne Pitts MD  [facility]

## 2023-01-06 LAB
BACTERIA UR QL AUTO: ABNORMAL /HPF
BILIRUB UR QL STRIP: NEGATIVE
CLARITY UR: ABNORMAL
COLOR UR: YELLOW
GLUCOSE UR STRIP-MCNC: NEGATIVE MG/DL
HGB UR QL STRIP.AUTO: ABNORMAL
HYALINE CASTS UR QL AUTO: ABNORMAL /LPF
KETONES UR QL STRIP: NEGATIVE
LEUKOCYTE ESTERASE UR QL STRIP.AUTO: ABNORMAL
NITRITE UR QL STRIP: NEGATIVE
PH UR STRIP.AUTO: 6 [PH] (ref 5–8)
PROT UR QL STRIP: ABNORMAL
RBC # UR STRIP: ABNORMAL /HPF
REF LAB TEST METHOD: ABNORMAL
SP GR UR STRIP: 1.02 (ref 1–1.03)
SQUAMOUS #/AREA URNS HPF: ABNORMAL /HPF
UROBILINOGEN UR QL STRIP: ABNORMAL
WBC # UR STRIP: ABNORMAL /HPF

## 2023-01-07 DIAGNOSIS — M10.9 GOUTY ARTHRITIS: ICD-10-CM

## 2023-01-07 LAB — BACTERIA SPEC AEROBE CULT: NO GROWTH

## 2023-01-09 ENCOUNTER — OFFICE VISIT (OUTPATIENT)
Dept: CARDIOLOGY | Facility: CLINIC | Age: 88
End: 2023-01-09
Payer: MEDICARE

## 2023-01-09 VITALS
WEIGHT: 222 LBS | DIASTOLIC BLOOD PRESSURE: 68 MMHG | HEIGHT: 69 IN | OXYGEN SATURATION: 97 % | HEART RATE: 66 BPM | BODY MASS INDEX: 32.88 KG/M2 | SYSTOLIC BLOOD PRESSURE: 118 MMHG

## 2023-01-09 DIAGNOSIS — I49.5 SINUS NODE DYSFUNCTION: ICD-10-CM

## 2023-01-09 DIAGNOSIS — I10 ESSENTIAL HYPERTENSION: ICD-10-CM

## 2023-01-09 DIAGNOSIS — J84.112 IPF (IDIOPATHIC PULMONARY FIBROSIS): ICD-10-CM

## 2023-01-09 DIAGNOSIS — Z79.4 TYPE 2 DIABETES MELLITUS WITH HYPERGLYCEMIA, WITH LONG-TERM CURRENT USE OF INSULIN: ICD-10-CM

## 2023-01-09 DIAGNOSIS — I25.10 CORONARY ARTERY DISEASE INVOLVING NATIVE CORONARY ARTERY OF NATIVE HEART WITHOUT ANGINA PECTORIS: ICD-10-CM

## 2023-01-09 DIAGNOSIS — E11.65 TYPE 2 DIABETES MELLITUS WITH HYPERGLYCEMIA, WITH LONG-TERM CURRENT USE OF INSULIN: ICD-10-CM

## 2023-01-09 DIAGNOSIS — Z95.0 CARDIAC PACEMAKER IN SITU: ICD-10-CM

## 2023-01-09 DIAGNOSIS — I27.20 PULMONARY HYPERTENSION: ICD-10-CM

## 2023-01-09 DIAGNOSIS — I48.0 PAROXYSMAL ATRIAL FIBRILLATION: Primary | ICD-10-CM

## 2023-01-09 PROCEDURE — 99214 OFFICE O/P EST MOD 30 MIN: CPT | Performed by: NURSE PRACTITIONER

## 2023-01-09 PROCEDURE — 93280 PM DEVICE PROGR EVAL DUAL: CPT | Performed by: INTERNAL MEDICINE

## 2023-01-09 RX ORDER — ALLOPURINOL 300 MG/1
TABLET ORAL
Qty: 90 TABLET | Refills: 3 | Status: SHIPPED | OUTPATIENT
Start: 2023-01-09

## 2023-01-09 NOTE — TELEPHONE ENCOUNTER
Rx Refill Note  Requested Prescriptions     Pending Prescriptions Disp Refills   • allopurinol (ZYLOPRIM) 300 MG tablet [Pharmacy Med Name: ALLOPURINOL 300 MG TABLET] 90 tablet 3     Sig: TAKE ONE TABLET BY MOUTH ONCE NIGHTLY      Last office visit with prescribing clinician: 7/12/2022   Next office visit with prescribing clinician: 1/17/2023         Blayne Horan MA  01/09/23, 17:15 EST

## 2023-01-10 PROBLEM — I50.30 DIASTOLIC CONGESTIVE HEART FAILURE: Status: ACTIVE | Noted: 2021-06-10

## 2023-01-14 DIAGNOSIS — M10.9 GOUTY ARTHRITIS: ICD-10-CM

## 2023-01-16 RX ORDER — ALLOPURINOL 300 MG/1
300 TABLET ORAL NIGHTLY
Qty: 90 TABLET | Refills: 3 | OUTPATIENT
Start: 2023-01-16

## 2023-01-17 ENCOUNTER — OFFICE VISIT (OUTPATIENT)
Dept: INTERNAL MEDICINE | Facility: CLINIC | Age: 88
End: 2023-01-17
Payer: MEDICARE

## 2023-01-17 VITALS
OXYGEN SATURATION: 95 % | SYSTOLIC BLOOD PRESSURE: 120 MMHG | TEMPERATURE: 97.5 F | HEIGHT: 69 IN | WEIGHT: 221.4 LBS | DIASTOLIC BLOOD PRESSURE: 68 MMHG | RESPIRATION RATE: 16 BRPM | HEART RATE: 77 BPM | BODY MASS INDEX: 32.79 KG/M2

## 2023-01-17 DIAGNOSIS — Z23 NEED FOR COVID-19 VACCINE: ICD-10-CM

## 2023-01-17 DIAGNOSIS — E03.9 ACQUIRED HYPOTHYROIDISM: ICD-10-CM

## 2023-01-17 DIAGNOSIS — J84.112 IPF (IDIOPATHIC PULMONARY FIBROSIS): ICD-10-CM

## 2023-01-17 DIAGNOSIS — R79.9 ABNORMAL BLOOD CHEMISTRY: ICD-10-CM

## 2023-01-17 DIAGNOSIS — Z79.4 TYPE 2 DIABETES MELLITUS WITH STAGE 3A CHRONIC KIDNEY DISEASE, WITH LONG-TERM CURRENT USE OF INSULIN: ICD-10-CM

## 2023-01-17 DIAGNOSIS — Z23 NEED FOR INFLUENZA VACCINATION: ICD-10-CM

## 2023-01-17 DIAGNOSIS — Z91.81 AT HIGH RISK FOR FALLS: ICD-10-CM

## 2023-01-17 DIAGNOSIS — Z79.4 TYPE 2 DIABETES MELLITUS WITH HYPERGLYCEMIA, WITH LONG-TERM CURRENT USE OF INSULIN: ICD-10-CM

## 2023-01-17 DIAGNOSIS — Z00.00 MEDICARE ANNUAL WELLNESS VISIT, SUBSEQUENT: Primary | ICD-10-CM

## 2023-01-17 DIAGNOSIS — N18.31 TYPE 2 DIABETES MELLITUS WITH STAGE 3A CHRONIC KIDNEY DISEASE, WITH LONG-TERM CURRENT USE OF INSULIN: ICD-10-CM

## 2023-01-17 DIAGNOSIS — N18.30 STAGE 3 CHRONIC KIDNEY DISEASE, UNSPECIFIED WHETHER STAGE 3A OR 3B CKD: ICD-10-CM

## 2023-01-17 DIAGNOSIS — Z51.81 MEDICATION MONITORING ENCOUNTER: ICD-10-CM

## 2023-01-17 DIAGNOSIS — E11.65 TYPE 2 DIABETES MELLITUS WITH HYPERGLYCEMIA, WITH LONG-TERM CURRENT USE OF INSULIN: ICD-10-CM

## 2023-01-17 DIAGNOSIS — E11.22 TYPE 2 DIABETES MELLITUS WITH STAGE 3A CHRONIC KIDNEY DISEASE, WITH LONG-TERM CURRENT USE OF INSULIN: ICD-10-CM

## 2023-01-17 DIAGNOSIS — I48.0 PAROXYSMAL ATRIAL FIBRILLATION: ICD-10-CM

## 2023-01-17 DIAGNOSIS — E66.09 CLASS 1 OBESITY DUE TO EXCESS CALORIES WITH SERIOUS COMORBIDITY AND BODY MASS INDEX (BMI) OF 32.0 TO 32.9 IN ADULT: ICD-10-CM

## 2023-01-17 PROCEDURE — 1170F FXNL STATUS ASSESSED: CPT | Performed by: FAMILY MEDICINE

## 2023-01-17 PROCEDURE — 96160 PT-FOCUSED HLTH RISK ASSMT: CPT | Performed by: FAMILY MEDICINE

## 2023-01-17 PROCEDURE — 0124A PR ADM SARSCOV2 30MCG/0.3ML BST: CPT | Performed by: FAMILY MEDICINE

## 2023-01-17 PROCEDURE — 99397 PER PM REEVAL EST PAT 65+ YR: CPT | Performed by: FAMILY MEDICINE

## 2023-01-17 PROCEDURE — 90662 IIV NO PRSV INCREASED AG IM: CPT | Performed by: FAMILY MEDICINE

## 2023-01-17 PROCEDURE — 99213 OFFICE O/P EST LOW 20 MIN: CPT | Performed by: FAMILY MEDICINE

## 2023-01-17 PROCEDURE — G0439 PPPS, SUBSEQ VISIT: HCPCS | Performed by: FAMILY MEDICINE

## 2023-01-17 PROCEDURE — G0008 ADMIN INFLUENZA VIRUS VAC: HCPCS | Performed by: FAMILY MEDICINE

## 2023-01-17 PROCEDURE — 1159F MED LIST DOCD IN RCRD: CPT | Performed by: FAMILY MEDICINE

## 2023-01-17 PROCEDURE — 91312 COVID-19 (PFIZER) BIVALENT BOOSTER 12+YRS: CPT | Performed by: FAMILY MEDICINE

## 2023-01-17 NOTE — PATIENT INSTRUCTIONS
Medicare Wellness  Personal Prevention Plan of Service     Date of Office Visit:    Encounter Provider:  Kary Wen MD  Place of Service:  [facility]  Patient Name: Forrest Zepeda  :  1932    As part of the Medicare Wellness portion of your visit today, we are providing you with this personalized preventive plan of services (PPPS). This plan is based upon recommendations of the [place] Preventive Services Task Force (USPSTF) and the Advisory Committee on Immunization Practices (ACIP).    This lists the preventive care services that should be considered, and provides dates of when you are due. Items listed as completed are up-to-date and do not require any further intervention.    Health Maintenance   Topic Date Due   • ANNUAL WELLNESS VISIT  2022   • LIPID PANEL  2022   • HEMOGLOBIN A1C  2023   • DIABETIC EYE EXAM  2023 (Originally 2021)   • URINE MICROALBUMIN  2023   • TDAP/TD VACCINES (3 - Td or Tdap) 2030   • COVID-19 Vaccine  Completed   • INFLUENZA VACCINE  Completed   • Pneumococcal Vaccine 65+  Completed   • DXA SCAN  Discontinued   • ZOSTER VACCINE  Discontinued       Orders Placed This Encounter   Procedures   • COVID-19 Bivalent Booster (Pfizer) 12+yrs   • Fluzone High-Dose 65+yrs ([id number])   • Hemoglobin A1c     Standing Status:   Future     Standing Expiration Date:   2024     Order Specific Question:   Release to patient     Answer:   Routine Release   • Lipid Panel     Standing Status:   Future     Standing Expiration Date:   2024     Order Specific Question:   [facility] Has the patient fasted?     Answer:   Yes   • TSH+Free T4     Standing Status:   Future     Standing Expiration Date:   2024     Order Specific Question:   Release to patient     Answer:   Routine Release   • Vitamin D,25-Hydroxy     Standing Status:   Future     Standing Expiration Date:   2024     Order Specific  Question:   Release to patient     Answer:   Routine Release   • Hepatic Function Panel     Standing Status:   Future     Standing Expiration Date:   1/17/2024     Order Specific Question:   Release to patient     Answer:   Routine Release   • Vitamin B12 & Folate     Standing Status:   Future     Standing Expiration Date:   1/17/2024     Order Specific Question:   Release to patient     Answer:   Routine Release       Return in about 4 months (around 5/17/2023) for Diabetes follow up, A1C in office.            Fall Prevention in the Home, Adult  Falls can cause injuries and affect people of all ages. There are many simple things that you can do to make your home safe and to help prevent falls. Ask for help when making these changes, if needed.  What actions can I take to prevent falls?  General instructions  • Use good lighting in all rooms. Replace any light bulbs that burn out, turn on lights if it is dark, and use night-lights.  • Place frequently used items in easy-to-reach places. Lower the shelves around your home if necessary.  • Set up furniture so that there are clear paths around it. Avoid moving your furniture around.  • Remove throw rugs and other tripping hazards from the floor.  • Avoid walking on wet floors.  • Fix any uneven floor surfaces.  • Add color or contrast paint or tape to grab bars and handrails in your home. Place contrasting color strips on the first and last steps of staircases.  • When you use a stepladder, make sure that it is completely opened and that the sides and supports are firmly locked. Have someone hold the ladder while you are using it. Do not climb a closed stepladder.  • Know where your pets are when moving through your home.  What can I do in the bathroom?     • Keep the floor dry. Immediately clean up any water that is on the floor.  • Remove soap buildup in the tub or shower regularly.  • Use nonskid mats or decals on the floor of the tub or shower.  • Attach bath mats  securely with double-sided, nonslip rug tape.  • If you need to sit down while you are in the shower, use a plastic, nonslip stool.  • Install grab bars by the toilet and in the tub and shower. Do not use towel bars as grab bars.  What can I do in the bedroom?  • Make sure that a bedside light is easy to reach.  • Do not use oversized bedding that reaches the floor.  • Have a firm chair that has side arms to use for getting dressed.  What can I do in the kitchen?  • Clean up any spills right away.  • If you need to reach for something above you, use a sturdy step stool that has a grab bar.  • Keep electrical cables out of the way.  • Do not use floor polish or wax that makes floors slippery. If you must use wax, make sure that it is non-skid floor wax.  What can I do with my stairs?  • Do not leave any items on the stairs.  • Make sure that you have a light switch at the top and the bottom of the stairs. Have them installed if you do not have them.  • Make sure that there are handrails on both sides of the stairs. Fix handrails that are broken or loose. Make sure that handrails are as long as the staircases.  • Install non-slip stair treads on all stairs in your home.  • Avoid having throw rugs at the top or bottom of stairs, or secure the rugs with carpet tape to prevent them from moving.  • Choose a carpet design that does not hide the edge of steps on the stairs.  • Check any carpeting to make sure that it is firmly attached to the stairs. Fix any carpet that is loose or worn.  What can I do on the outside of my home?  • Use bright outdoor lighting.  • Regularly repair the edges of walkways and driveways and fix any cracks.  • Remove high doorway thresholds.  • Trim any shrubbery on the main path into your home.  • Regularly check that handrails are securely fastened and in good repair. Both sides of all steps should have handrails.  • Install guardrails along the edges of any raised decks or porches.  • Clear  walkways of debris and clutter, including tools and rocks.  • Have leaves, snow, and ice cleared regularly.  • Use sand or salt on walkways during winter months.  • In the garage, clean up any spills right away, including grease or oil spills.  What other actions can I take?  • Wear closed-toe shoes that fit well and support your feet. Wear shoes that have rubber soles or low heels.  • Use mobility aids as needed, such as canes, walkers, scooters, and crutches.  • Review your medicines with your health care provider. Some medicines can cause dizziness or changes in blood pressure, which increase your risk of falling.  Talk with your health care provider about other ways that you can decrease your risk of falls. This may include working with a physical therapist or  to improve your strength, balance, and endurance.  Where to find more information  • Centers for Disease Control and Prevention, [place]: www.cdc.gov  • [place] on Aging: www.vinicio.nih.gov  Contact a health care provider if:  • You are afraid of falling at home.  • You feel weak, drowsy, or dizzy at home.  • You fall at home.  Summary  • There are many simple things that you can do to make your home safe and to help prevent falls.  • Ways to make your home safe include removing tripping hazards and installing grab bars in the bathroom.  • Ask for help when making these changes in your home.  This information is not intended to replace advice given to you by your health care provider. Make sure you discuss any questions you have with your health care provider.  Document Revised: 09/19/2022 Document Reviewed: 07/21/2021  [employer] Patient Education © 2022 [employer]      Sit-to-Stand Exercise  The sit-to-stand exercise (also known as the chair stand or chair rise exercise) strengthens your lower body and helps you maintain or improve your mobility and independence. The end goal is to do the sit-to-stand exercise without using your hands.  This will be easier as you become stronger. You should always talk with your health care provider before starting any exercise program, especially if you have had recent surgery.  Do the exercise exactly as told by your health care provider and adjust it as directed. It is normal to feel mild stretching, pulling, tightness, or discomfort as you do this exercise, but you should stop right away if you feel sudden pain or your pain gets worse. Do not begin doing this exercise until told by your health care provider.  What the sit-to-stand exercise does  The sit-to-stand exercise helps to strengthen the muscles in your thighs and the muscles in the center of your body that give you stability (core muscles). This exercise is especially helpful if:  • You have had knee or hip surgery.  • You have trouble getting up from a chair, out of a car, or off the toilet due to muscle weakness.  How to do the sit-to-stand exercise  1. Sit toward the front edge of a sturdy chair without armrests. Your knees should be bent and your feet should be flat on the floor and shoulder-width apart and underneath your hips.  2. Place your hands lightly on each side of the seat. Keep your back and neck as straight as possible, with your chest slightly forward.  3. Breathe in slowly. Lean forward and slightly shift your weight to the front of your feet.  4. Breathe out as you slowly stand up. Try not to support any weight with your hands.  5. Stand and pause for a full breath in and out.  6. Breathe in as you sit down slowly. Tighten your core and abdominal muscles to control your lowering as much as possible. You should lower yourself back to the chair slowly, not just drop back into the seat.  7. Breathe out slowly.  8. Do this exercise 10-15 times. If needed, do it fewer times until you build up strength.  9. Rest for 1 minute, then do another set of 10-15 repetitions.  To change the difficulty of the sit-to-stand exercise  • If the exercise  is too difficult, use a chair with sturdy armrests, and push off the armrests to help you come to the standing position. You can also use the armrests to help slowly lower yourself back to sitting. As this gets easier, try to use your arms less. You can also place a firm cushion or pillow on the chair to make the surface higher.  • If this exercise is too easy, do not use your arms to help raise or lower yourself. You can also wear a weighted vest, use hand weights, increase your repetitions, or try a lower chair.  General tips  • You may feel tired when starting an exercise routine. This is normal.  • You may have muscle soreness that lasts a few days. This is normal. As you get stronger, you may not feel muscle soreness.  • Use smooth, steady movements.  • Do not  hold your breath during strength exercises. This can cause unsafe changes in your blood pressure.  • Breathe in slowly through your nose, and breathe out slowly through your mouth.  Summary  • Strengthening your lower body is an important step to help you move safely and independently.  • The sit-to-stand exercise helps strengthen the muscles in your thighs and core.  • You should always talk with your health care provider before starting any exercise program, especially if you have had recent surgery.  This information is not intended to replace advice given to you by your health care provider. Make sure you discuss any questions you have with your health care provider.  Document Revised: 04/10/2022 Document Reviewed: 04/10/2022  [employer] Patient Education © 2022 [employer]      Exercising to Stay Healthy  To become healthy and stay healthy, it is recommended that you do moderate-intensity and vigorous-intensity exercise. You can tell that you are exercising at a moderate intensity if your heart starts beating faster and you start breathing faster but can still hold a conversation. You can tell that you are exercising at a vigorous intensity if you  are breathing much harder and faster and cannot hold a conversation while exercising.  How can exercise benefit me?  Exercising regularly is important. It has many health benefits, such as:  • Improving overall fitness, flexibility, and endurance.  • Increasing bone density.  • Helping with weight control.  • Decreasing body fat.  • Increasing muscle strength and endurance.  • Reducing stress and tension, anxiety, depression, or anger.  • Improving overall health.  What guidelines should I follow while exercising?  • Before you start a new exercise program, talk with your health care provider.  • Do not exercise so much that you hurt yourself, feel dizzy, or get very short of breath.  • Wear comfortable clothes and wear shoes with good support.  • Drink plenty of water while you exercise to prevent dehydration or heat stroke.  • Work out until your breathing and your heartbeat get faster (moderate intensity).  How often should I exercise?  Choose an activity that you enjoy, and set realistic goals. Your health care provider can help you make an activity plan that is individually designed and works best for you.  Exercise regularly as told by your health care provider. This may include:  • Doing strength training two times a week, such as:  ? Lifting weights.  ? Using resistance bands.  ? Push-ups.  ? Sit-ups.  ? Yoga.  • Doing a certain intensity of exercise for a given amount of time. Choose from these options:  ? A total of 150 minutes of moderate-intensity exercise every week.  ? A total of 75 minutes of vigorous-intensity exercise every week.  ? A mix of moderate-intensity and vigorous-intensity exercise every week.  Children, pregnant women, people who have not exercised regularly, people who are overweight, and older adults may need to talk with a health care provider about what activities are safe to perform. If you have a medical condition, be sure to talk with your health care provider before you start a  new exercise program.  What are some exercise ideas?  Moderate-intensity exercise ideas include:  • Walking 1 mile (1.6 km) in about 15 minutes.  • Biking.  • Hiking.  • Golfing.  • Dancing.  • Water aerobics.  Vigorous-intensity exercise ideas include:  • Walking 4.5 miles (7.2 km) or more in about 1 hour.  • Jogging or running 5 miles (8 km) in about 1 hour.  • Biking 10 miles (16.1 km) or more in about 1 hour.  • Lap swimming.  • Roller-skating or in-line skating.  • Cross-country skiing.  • Vigorous competitive sports, such as football, basketball, and soccer.  • Jumping rope.  • Aerobic dancing.  What are some everyday activities that can help me get exercise?  • Yard work, such as:  ? Pushing a .  ? Raking and bagging leaves.  • Washing your car.  • Pushing a stroller.  • Shoveling snow.  • Gardening.  • Washing windows or floors.  How can I be more active in my day-to-day activities?  • Use stairs instead of an elevator.  • Take a walk during your lunch break.  • If you drive, park your car farther away from your work or school.  • If you take public transportation, get off one stop early and walk the rest of the way.  • Stand up or walk around during all of your indoor phone calls.  • Get up, stretch, and walk around every 30 minutes throughout the day.  • Enjoy exercise with a friend. Support to continue exercising will help you keep a regular routine of activity.  Where to find more information  You can find more information about exercising to stay healthy from:  • [place] Department of Health and Human Services: www.hhs.gov  • Centers for Disease Control and Prevention (CDC): www.cdc.gov  Summary  • Exercising regularly is important. It will improve your overall fitness, flexibility, and endurance.  • Regular exercise will also improve your overall health. It can help you control your weight, reduce stress, and improve your bone density.  • Do not exercise so much that you hurt yourself, feel  dizzy, or get very short of breath.  • Before you start a new exercise program, talk with your health care provider.  This information is not intended to replace advice given to you by your health care provider. Make sure you discuss any questions you have with your health care provider.  Document Revised: 04/15/2022 Document Reviewed: 04/15/2022  [employer] Patient Education © 2022 [employer]

## 2023-01-17 NOTE — PROGRESS NOTES
The ABCs of the Annual Wellness Visit  Subsequent Medicare Wellness Visit    Subjective    Forrest Zepeda is a 89 y.o. male who presents for a Subsequent Medicare Wellness Visit.    The following portions of the patient's history were reviewed and   updated as appropriate: allergies, current medications, past family history, past medical history, past social history, past surgical history and problem list.    Compared to one year ago, the patient feels his physical   health is the same.    Compared to one year ago, the patient feels his mental   health is the same.    Recent Hospitalizations:  This patient has had a [facility] admission record on file within the last 365 days.    Current Medical Providers:  Patient Care Team:  Kary Wen MD as PCP - General (Family Medicine)  Win Saha MD as Consulting Physician (Cardiology)  Deanne Pitts MD as Consulting Physician (Urology)    Outpatient Medications Prior to Visit   Medication Sig Dispense Refill   • acetaminophen (TYLENOL) 500 MG tablet Take 1,000 mg by mouth Every 6 (Six) Hours As Needed for Mild Pain .     • allopurinol (ZYLOPRIM) 300 MG tablet TAKE ONE TABLET BY MOUTH ONCE NIGHTLY 90 tablet 3   • aspirin (aspirin) 81 MG EC tablet Take 1 tablet by mouth Daily. 30 tablet 11   • bisoprolol (ZEBeta) 5 MG tablet Take 1 tablet by mouth Daily. 30 tablet 11   • Eliquis 2.5 MG tablet tablet TAKE ONE TABLET BY MOUTH TWICE A DAY (Patient taking differently: 2.5 mg Every 12 (Twelve) Hours.) 180 tablet 3   • finasteride (PROSCAR) 5 MG tablet TAKE ONE TABLET BY MOUTH DAILY (Patient taking differently: Take 5 mg by mouth Daily.) 90 tablet 3   • glimepiride (AMARYL) 1 MG tablet Take 1 tablet by mouth Every Morning Before Breakfast. Indications: Type 2 Diabetes 90 tablet 3   • Insulin Glargine, 1 Unit Dial, (Toujeo SoloStar) 300 UNIT/ML solution pen-injector injection Inject 20 Units under the skin into the appropriate area as directed Every Night. 6  mL 3   • Insulin Pen Needle (Novofine Pen Needle) 32G X 6 MM misc 1 each Daily. 100 each 3   • isosorbide mononitrate (IMDUR) 30 MG 24 hr tablet Take 1 tablet by mouth Every Morning. 90 tablet 3   • Januvia 100 MG tablet TAKE ONE TABLET BY MOUTH DAILY 90 tablet 3   • levothyroxine (SYNTHROID, LEVOTHROID) 50 MCG tablet Take 1 tablet by mouth Daily. Indications: Underactive Thyroid 90 tablet 3   • losartan (Cozaar) 25 MG tablet Take 1 tablet by mouth Daily. Indications: High Blood Pressure Disorder 90 tablet 3   • Multiple Vitamins-Minerals (OCUVITE ADULT 50+ PO) Take 1 capsule by mouth Daily.     • nitroglycerin (NITROSTAT) 0.4 MG SL tablet 1 under the tongue as needed for angina, may repeat q5mins for up three doses (Patient taking differently: Place 0.4 mg under the tongue Every 5 (Five) Minutes As Needed for Chest Pain. 1 under the tongue as needed for angina, may repeat q5mins for up three doses) 100 tablet 11   • silodosin (RAPAFLO) 8 MG capsule capsule TAKE ONE CAPSULE BY MOUTH DAILY WITH BREAKFAST 90 capsule 3     No facility-administered medications prior to visit.       No opioid medication identified on active medication list. I have reviewed chart for other potential  high risk medication/s and harmful drug interactions in the elderly.          Aspirin is on active medication list. Aspirin use is indicated based on review of current medical condition/s. Pros and cons of this therapy have been discussed today. Benefits of this medication outweigh potential harm.  Patient has been encouraged to continue taking this medication.  .      Patient Active Problem List   Diagnosis   • Essential hypertension   • Generalized osteoarthritis   • Diabetic neuropathy (HCC)   • Gout   • Hypertriglyceridemia   • Acquired hypothyroidism   • Ureteral stricture   • Hydronephrosis of left kidney   • CKD (chronic kidney disease) stage 3, GFR 30-59 ml/min   • LEHMAN (dyspnea on exertion)   • IPF (idiopathic pulmonary fibrosis)  "(HCC)   • Symptomatic bradycardia   • Hypoxemia requiring supplemental oxygen   • History of pulmonary embolism   • Sinus node dysfunction (HCC)   • Chronic fatigue   • Urethral stricture   • Paroxysmal atrial fibrillation (HCC)   • Type 2 diabetes mellitus with hyperglycemia, with long-term current use of insulin (HCC)   • Type 2 diabetes mellitus with stage 3b chronic kidney disease, with long-term current use of insulin (HCC)   • Abnormal cardiovascular stress test   • Pulmonary hypertension (HCC)   • Cardiac pacemaker in situ   • CAD (coronary artery disease)   • Diastolic congestive heart failure (HCC)   • Abnormal SPEP   • Type 2 diabetes mellitus with stage 3b chronic kidney disease, with long-term current use of insulin (HCC)   • Hydronephrosis, left     Advance Care Planning  Advance Directive is on file.  ACP discussion was held with the patient during this visit. Patient has an advance directive in EMR which is still valid.      Objective    Vitals:    23 1514   BP: 120/68   BP Location: Right arm   Patient Position: Sitting   Cuff Size: Adult   Pulse: 77   Resp: 16   Temp: 97.5 °F (36.4 °C)   TempSrc: Temporal   SpO2: 95%   Weight: 100 kg (221 lb 6.4 oz)   Height: 175.3 cm (69\")   PainSc: 0-No pain     Estimated body mass index is 32.7 kg/m² as calculated from the following:    Height as of this encounter: 175.3 cm (69\").    Weight as of this encounter: 100 kg (221 lb 6.4 oz).    BMI is >= 30 and <35. (Class 1 Obesity). The following options were offered after discussion;: nutrition counseling/recommendations      Does the patient have evidence of cognitive impairment? No          HEALTH RISK ASSESSMENT    Smoking Status:  Social History     Tobacco Use   Smoking Status Former   • Packs/day: 0.00   • Years: 0.00   • Pack years: 0.00   • Types: Cigarettes   • Quit date: 1950   • Years since quittin.1   Smokeless Tobacco Never   Tobacco Comments    SMOKED SOME AS A TEEN, DENIES ANY HABIT " OR ADDICTION     Alcohol Consumption:  Social History     Substance and Sexual Activity   Alcohol Use No     Fall Risk Screen:    STEADI Fall Risk Assessment was completed, and patient is at MODERATE risk for falls. Assessment completed on:1/17/2023    Depression Screening:  PHQ-2/PHQ-9 Depression Screening 1/17/2023   Little Interest or Pleasure in Doing Things 0-->not at all   Feeling Down, Depressed or Hopeless 0-->not at all   PHQ-9: Brief Depression Severity Measure Score 0       Health Habits and Functional and Cognitive Screening:  Functional & Cognitive Status 1/17/2023   Do you have difficulty preparing food and eating? No   Do you have difficulty bathing yourself, getting dressed or grooming yourself? No   Do you have difficulty using the toilet? No   Do you have difficulty moving around from place to place? Yes   Do you have trouble with steps or getting out of a bed or a chair? Yes   Current Diet Well Balanced Diet   Dental Exam Not up to date   Eye Exam Not up to date   Exercise (times per week) 0 times per week   Current Exercises Include No Regular Exercise   Current Exercise Activities Include -   Do you need help using the phone?  No   Are you deaf or do you have serious difficulty hearing?  No   Do you need help with transportation? No   Do you need help shopping? No   Do you need help preparing meals?  No   Do you need help with housework?  No   Do you need help with laundry? No   Do you need help taking your medications? No   Do you need help managing money? No   Do you ever drive or ride in a car without wearing a seat belt? No   Have you felt unusual stress, anger or loneliness in the last month? No   Who do you live with? Alone   If you need help, do you have trouble finding someone available to you? No   Have you been bothered in the last four weeks by sexual problems? No   Do you have difficulty concentrating, remembering or making decisions? No       Age-appropriate Screening  Schedule:  Refer to the list below for future screening recommendations based on patient's age, sex and/or medical conditions. Orders for these recommended tests are listed in the plan section. The patient has been provided with a written plan.    Health Maintenance   Topic Date Due   • LIPID PANEL  12/07/2022   • HEMOGLOBIN A1C  01/12/2023   • DIABETIC EYE EXAM  03/07/2023 (Originally 11/12/2021)   • URINE MICROALBUMIN  03/07/2023   • TDAP/TD VACCINES (3 - Td or Tdap) 06/30/2030   • INFLUENZA VACCINE  Completed   • DXA SCAN  Discontinued   • ZOSTER VACCINE  Discontinued                CMS Preventative Services Quick Reference  Risk Factors Identified During Encounter  Fall Risk-High or Moderate: Information on Fall Prevention Shared in After Visit Summary and Sit to Stand Exercise Information Shared in After Visit Summary  The above risks/problems have been discussed with the patient.  Pertinent information has been shared with the patient in the After Visit Summary.  An After Visit Summary and PPPS were made available to the patient.    Follow Up:   Next Medicare Wellness visit to be scheduled in 1 year.       Additional E&M Note during same encounter follows:  Patient has multiple medical problems which are significant and separately identifiable that require additional work above and beyond the Medicare Wellness Visit.      Chief Complaint  Medicare Wellness-subsequent (AWV and preventive care ) and Diabetes    Subjective        HPI  Forrest Zepeda is also being seen today for diabetes and chronic care management. Continues to feel shortness of breath. Has oxygen to use if needed, sometimes helps and sometimes doesn't. Saw cardiology clinic, adding diuretic was discussed, but patient/son held off. Pt denies smothering sensation. Not describing paroxysmal nocturnal dyspnea. Scheduled for stent replacement with urology, has to have pre-op labs next week.          Objective   Vital Signs:  /68 (BP Location:  "Right arm, Patient Position: Sitting, Cuff Size: Adult)   Pulse 77   Temp 97.5 °F (36.4 °C) (Temporal)   Resp 16   Ht 175.3 cm (69\")   Wt 100 kg (221 lb 6.4 oz)   SpO2 95%   BMI 32.70 kg/m²     Physical Exam  Vitals and nursing note reviewed.   Constitutional:       General: He is not in acute distress.     Appearance: Normal appearance. He is well-developed and well-groomed. He is obese. He is not ill-appearing, toxic-appearing or diaphoretic.      Interventions: Face mask in place.   HENT:      Head: Normocephalic and atraumatic.      Right Ear: Decreased hearing noted.      Left Ear: Decreased hearing noted.   Eyes:      General: Lids are normal. No scleral icterus.        Right eye: No discharge.         Left eye: No discharge.      Extraocular Movements: Extraocular movements intact.   Pulmonary:      Effort: Pulmonary effort is normal.      Breath sounds: No stridor. Examination of the right-lower field reveals rales. Examination of the left-lower field reveals rales. Rales (dry) present. No wheezing or rhonchi.      Comments: Shallow breathing (baseline)  Musculoskeletal:      Cervical back: Neck supple.   Skin:     Coloration: Skin is not jaundiced or pale.   Neurological:      General: No focal deficit present.      Mental Status: He is alert and oriented to person, place, and time.   Psychiatric:         Attention and Perception: Attention and perception normal.         Mood and Affect: Mood and affect normal.         Speech: Speech normal.         Behavior: Behavior normal. Behavior is cooperative.         Thought Content: Thought content normal.         Cognition and Memory: Cognition and memory normal.         Judgment: Judgment normal.          The following data was reviewed by: Kary Wen MD on 01/17/2023:  Lab Results   Component Value Date    HGBA1C 7.50 (H) 07/12/2022    HGBA1C 7.9 03/07/2022    HGBA1C 7.20 (H) 12/07/2021                 Assessment and Plan   Diagnoses and all " orders for this visit:    1. Medicare annual wellness visit, subsequent (Primary)    2. Type 2 diabetes mellitus with hyperglycemia, with long-term current use of insulin ([facility])  -     glucose blood test strip; 1 each by Other route 4 (Four) Times a Day. Due to insulin dependent diabetes  Dispense: 300 each; Refill: 3  -     Hemoglobin A1c; Future  -     Lipid Panel; Future  -     Hepatic Function Panel; Future  -     Vitamin B12 & Folate; Future    3. Type 2 diabetes mellitus with stage 3a chronic kidney disease, with long-term current use of insulin (HCC)  -     glucose blood test strip; 1 each by Other route 4 (Four) Times a Day. Due to insulin dependent diabetes  Dispense: 300 each; Refill: 3  -     Hemoglobin A1c; Future  -     Lipid Panel; Future  -     Hepatic Function Panel; Future  -     Vitamin B12 & Folate; Future    4. Stage 3 chronic kidney disease, unspecified whether stage 3a or 3b CKD ([facility])  -     Vitamin D,25-Hydroxy; Future    5. Paroxysmal atrial fibrillation (HCC)  -     TSH+Free T4; Future    6. IPF (idiopathic pulmonary fibrosis) ([facility])  Comments:  continue oxygen    7. Acquired hypothyroidism  -     TSH+Free T4; Future    8. Class 1 obesity due to excess calories with serious comorbidity and body mass index (BMI) of 32.0 to 32.9 in adult  -     Vitamin D,25-Hydroxy; Future    9. Medication monitoring encounter  -     Hemoglobin A1c; Future  -     Lipid Panel; Future  -     TSH+Free T4; Future  -     Vitamin D,25-Hydroxy; Future  -     Hepatic Function Panel; Future  -     Vitamin B12 & Folate; Future    10. Abnormal blood chemistry  -     Hemoglobin A1c; Future  -     Lipid Panel; Future  -     TSH+Free T4; Future  -     Vitamin D,25-Hydroxy; Future  -     Hepatic Function Panel; Future  -     Vitamin B12 & Folate; Future    11. Need for COVID-19 vaccine  -     COVID-19 Bivalent Booster (Pfizer) 12+yrs    12. Need for influenza vaccination  -     Fluzone High-Dose 65+yrs ([id number])    13. At  high risk for falls  Comments:  info via avs    home vitals reviewed, see scanned media.     Labs due, he has to have labs next week at hospital for pre-op work up. Son will ask them to also draw my labs, so that patient only has to have one lab draw. CBC with diff and bmp ordered by urology so did not order those.    Dyspnea--continue oxygen. Discussed exam findings, I suspect atelectasis over wet rales at this time, do not feel diuretic is indicated at this moment for multiple reasons which I discussed with patient/son and they voiced understanding and agree. They have an incentive spirometer at home, I encouraged use and discussed why. Signs of fluid overload discussed, may need diuretic in future.         Follow Up   Return in about 4 months (around 5/17/2023) for Diabetes follow up, A1C in office.  Patient was given instructions and counseling regarding his condition or for health maintenance advice. Please see specific information pulled into the AVS if appropriate.

## 2023-01-24 ENCOUNTER — PRE-ADMISSION TESTING (OUTPATIENT)
Dept: PREADMISSION TESTING | Facility: HOSPITAL | Age: 88
End: 2023-01-24
Payer: MEDICARE

## 2023-01-24 ENCOUNTER — TELEPHONE (OUTPATIENT)
Dept: UROLOGY | Facility: CLINIC | Age: 88
End: 2023-01-24
Payer: MEDICARE

## 2023-01-24 VITALS — HEIGHT: 69 IN | BODY MASS INDEX: 32.73 KG/M2 | WEIGHT: 221.01 LBS

## 2023-01-24 DIAGNOSIS — N18.31 TYPE 2 DIABETES MELLITUS WITH STAGE 3A CHRONIC KIDNEY DISEASE, WITH LONG-TERM CURRENT USE OF INSULIN: ICD-10-CM

## 2023-01-24 DIAGNOSIS — Z51.81 MEDICATION MONITORING ENCOUNTER: ICD-10-CM

## 2023-01-24 DIAGNOSIS — I48.0 PAROXYSMAL ATRIAL FIBRILLATION: ICD-10-CM

## 2023-01-24 DIAGNOSIS — Z79.4 TYPE 2 DIABETES MELLITUS WITH HYPERGLYCEMIA, WITH LONG-TERM CURRENT USE OF INSULIN: ICD-10-CM

## 2023-01-24 DIAGNOSIS — Z79.4 TYPE 2 DIABETES MELLITUS WITH STAGE 3A CHRONIC KIDNEY DISEASE, WITH LONG-TERM CURRENT USE OF INSULIN: ICD-10-CM

## 2023-01-24 DIAGNOSIS — N18.30 STAGE 3 CHRONIC KIDNEY DISEASE, UNSPECIFIED WHETHER STAGE 3A OR 3B CKD: ICD-10-CM

## 2023-01-24 DIAGNOSIS — E03.9 ACQUIRED HYPOTHYROIDISM: ICD-10-CM

## 2023-01-24 DIAGNOSIS — E11.22 TYPE 2 DIABETES MELLITUS WITH STAGE 3A CHRONIC KIDNEY DISEASE, WITH LONG-TERM CURRENT USE OF INSULIN: ICD-10-CM

## 2023-01-24 DIAGNOSIS — E11.65 TYPE 2 DIABETES MELLITUS WITH HYPERGLYCEMIA, WITH LONG-TERM CURRENT USE OF INSULIN: ICD-10-CM

## 2023-01-24 DIAGNOSIS — E66.09 CLASS 1 OBESITY DUE TO EXCESS CALORIES WITH SERIOUS COMORBIDITY AND BODY MASS INDEX (BMI) OF 32.0 TO 32.9 IN ADULT: ICD-10-CM

## 2023-01-24 DIAGNOSIS — R79.9 ABNORMAL BLOOD CHEMISTRY: ICD-10-CM

## 2023-01-24 LAB
25(OH)D3 SERPL-MCNC: 26.9 NG/ML (ref 30–100)
ALBUMIN SERPL-MCNC: 3.9 G/DL (ref 3.5–5.2)
ALP SERPL-CCNC: 102 U/L (ref 39–117)
ALT SERPL W P-5'-P-CCNC: 19 U/L (ref 1–41)
AST SERPL-CCNC: 33 U/L (ref 1–40)
BILIRUB CONJ SERPL-MCNC: <0.2 MG/DL (ref 0–0.3)
BILIRUB INDIRECT SERPL-MCNC: NORMAL MG/DL
BILIRUB SERPL-MCNC: 0.3 MG/DL (ref 0–1.2)
CHOLEST SERPL-MCNC: 179 MG/DL (ref 0–200)
FOLATE SERPL-MCNC: >20 NG/ML (ref 4.78–24.2)
HBA1C MFR BLD: 7.5 % (ref 4.8–5.6)
HDLC SERPL-MCNC: 33 MG/DL (ref 40–60)
INR PPP: 1.2 (ref 0.84–1.13)
LDLC SERPL CALC-MCNC: 83 MG/DL (ref 0–100)
LDLC/HDLC SERPL: 2.08 {RATIO}
PROT SERPL-MCNC: 7.5 G/DL (ref 6–8.5)
PROTHROMBIN TIME: 15.2 SECONDS (ref 11.4–14.4)
QT INTERVAL: 388 MS
QTC INTERVAL: 406 MS
T4 FREE SERPL-MCNC: 1.09 NG/DL (ref 0.93–1.7)
TRIGL SERPL-MCNC: 386 MG/DL (ref 0–150)
TSH SERPL DL<=0.05 MIU/L-ACNC: 2.86 UIU/ML (ref 0.27–4.2)
VIT B12 BLD-MCNC: 693 PG/ML (ref 211–946)
VLDLC SERPL-MCNC: 63 MG/DL (ref 5–40)

## 2023-01-24 PROCEDURE — 82746 ASSAY OF FOLIC ACID SERUM: CPT

## 2023-01-24 PROCEDURE — 84443 ASSAY THYROID STIM HORMONE: CPT

## 2023-01-24 PROCEDURE — 84439 ASSAY OF FREE THYROXINE: CPT

## 2023-01-24 PROCEDURE — 93010 ELECTROCARDIOGRAM REPORT: CPT | Performed by: INTERNAL MEDICINE

## 2023-01-24 PROCEDURE — 36415 COLL VENOUS BLD VENIPUNCTURE: CPT

## 2023-01-24 PROCEDURE — 85027 COMPLETE CBC AUTOMATED: CPT | Performed by: UROLOGY

## 2023-01-24 PROCEDURE — 82607 VITAMIN B-12: CPT

## 2023-01-24 PROCEDURE — 80076 HEPATIC FUNCTION PANEL: CPT

## 2023-01-24 PROCEDURE — 85610 PROTHROMBIN TIME: CPT

## 2023-01-24 PROCEDURE — 83036 HEMOGLOBIN GLYCOSYLATED A1C: CPT

## 2023-01-24 PROCEDURE — 80048 BASIC METABOLIC PNL TOTAL CA: CPT | Performed by: UROLOGY

## 2023-01-24 PROCEDURE — 80061 LIPID PANEL: CPT

## 2023-01-24 PROCEDURE — 82306 VITAMIN D 25 HYDROXY: CPT

## 2023-01-24 PROCEDURE — 93005 ELECTROCARDIOGRAM TRACING: CPT

## 2023-01-24 NOTE — PAT
An arrival time for procedure was not provided during PAT visit. If patient had any questions or concerns about their arrival time, they were instructed to contact their surgeon/physician.  Additionally, if the patient referred to an arrival time that was acquired from their my chart account, patient was encouraged to verify that time with their surgeon/physician. Arrival times are NOT provided in Pre Admission Testing Department.    Patient viewed general PAT education video as instructed in their preoperative information received from their surgeon.  Patient stated the general PAT education video was viewed in its entirety and survey completed.  Copies of PAT general education handouts (Incentive Spirometry, Meds to Beds Program, Patient Belongings, Pre-op skin preparation instructions, Blood Glucose testing, Visitor policy, Surgery FAQ, Code H) distributed to patient if not printed. Education related to the PAT pass and skin preparation for surgery (if applicable) completed in PAT as a reinforcement to PAT education video. Patient instructed to return PAT pass provided today as well as completed skin preparation sheet (if applicable) on the day of procedure.     Additionally if patient had not viewed video yet but intended to view it at home or in our waiting area, then referred them to the handout with QR code/link provided during PAT visit.  Instructed patient to complete survey after viewing the video in its entirety.  Encouraged patient/family to read PAT general education handouts thoroughly and notify PAT staff with any questions or concerns. Patient verbalized understanding of all information and priority content.    Per Anesthesia Request, patient instructed not to take their ACE/ARB medications on the AM of surgery.    Cardiac clearance and PPM Interrogation in [facility] from cardio on 1/9/23 (copy on paper chart).

## 2023-01-24 NOTE — TELEPHONE ENCOUNTER
I called the patient and let him know that Dr. Pitts said that he does not need to stop his blood thinners for surgery on 1/27/23.

## 2023-01-25 NOTE — PROGRESS NOTES
Vitamin D low. Suggest taking vitamin D3 over the counter 2000 IU daily and trying to get 15 min of sun exposure daily to face and arms (when possible).     Total cholesterol and LDL show decent control. No med changes suggested.    Goal for A1C for his age/health conditions is to keep under 7.5. A1C stable, was 7.5 last time and again this time. Continue current medicines. Cut back slightly on sugar intake.    Normal thyroid levels, continue current dose of levothyroxine.

## 2023-01-26 ENCOUNTER — ANESTHESIA EVENT (OUTPATIENT)
Dept: PERIOP | Facility: HOSPITAL | Age: 88
End: 2023-01-26
Payer: MEDICARE

## 2023-01-26 RX ORDER — SODIUM CHLORIDE 9 MG/ML
40 INJECTION, SOLUTION INTRAVENOUS AS NEEDED
Status: CANCELLED | OUTPATIENT
Start: 2023-01-26

## 2023-01-26 RX ORDER — FAMOTIDINE 10 MG/ML
20 INJECTION, SOLUTION INTRAVENOUS ONCE
Status: CANCELLED | OUTPATIENT
Start: 2023-01-26 | End: 2023-01-26

## 2023-01-26 RX ORDER — SODIUM CHLORIDE 0.9 % (FLUSH) 0.9 %
10 SYRINGE (ML) INJECTION AS NEEDED
Status: CANCELLED | OUTPATIENT
Start: 2023-01-26

## 2023-01-26 RX ORDER — SODIUM CHLORIDE, SODIUM LACTATE, POTASSIUM CHLORIDE, CALCIUM CHLORIDE 600; 310; 30; 20 MG/100ML; MG/100ML; MG/100ML; MG/100ML
9 INJECTION, SOLUTION INTRAVENOUS CONTINUOUS
Status: CANCELLED | OUTPATIENT
Start: 2023-01-26

## 2023-01-26 RX ORDER — SODIUM CHLORIDE 0.9 % (FLUSH) 0.9 %
10 SYRINGE (ML) INJECTION EVERY 12 HOURS SCHEDULED
Status: CANCELLED | OUTPATIENT
Start: 2023-01-26

## 2023-01-27 ENCOUNTER — APPOINTMENT (OUTPATIENT)
Dept: GENERAL RADIOLOGY | Facility: HOSPITAL | Age: 88
End: 2023-01-27
Payer: MEDICARE

## 2023-01-27 ENCOUNTER — ANESTHESIA (OUTPATIENT)
Dept: PERIOP | Facility: HOSPITAL | Age: 88
End: 2023-01-27
Payer: MEDICARE

## 2023-01-27 ENCOUNTER — HOSPITAL ENCOUNTER (OUTPATIENT)
Facility: HOSPITAL | Age: 88
Discharge: HOME | End: 2023-01-27
Attending: UROLOGY | Admitting: UROLOGY
Payer: MEDICARE

## 2023-01-27 VITALS
RESPIRATION RATE: 12 BRPM | TEMPERATURE: 97.2 F | WEIGHT: 220.46 LBS | BODY MASS INDEX: 32.65 KG/M2 | DIASTOLIC BLOOD PRESSURE: 65 MMHG | HEART RATE: 60 BPM | OXYGEN SATURATION: 95 % | SYSTOLIC BLOOD PRESSURE: 126 MMHG | HEIGHT: 69 IN

## 2023-01-27 DIAGNOSIS — N13.30 HYDRONEPHROSIS, UNSPECIFIED HYDRONEPHROSIS TYPE: ICD-10-CM

## 2023-01-27 LAB — GLUCOSE BLDC GLUCOMTR-MCNC: 152 MG/DL (ref 70–130)

## 2023-01-27 PROCEDURE — C2617 STENT, NON-COR, TEM W/O DEL: HCPCS | Performed by: UROLOGY

## 2023-01-27 PROCEDURE — 76000 FLUOROSCOPY <1 HR PHYS/QHP: CPT

## 2023-01-27 PROCEDURE — 25010000002 PHENYLEPHRINE 10 MG/ML SOLUTION: Performed by: ANESTHESIOLOGY

## 2023-01-27 PROCEDURE — 25010000002 IOPAMIDOL 61 % SOLUTION 50 ML VIAL: Performed by: UROLOGY

## 2023-01-27 PROCEDURE — 87086 URINE CULTURE/COLONY COUNT: CPT | Performed by: UROLOGY

## 2023-01-27 PROCEDURE — 52332 CYSTOSCOPY AND TREATMENT: CPT | Performed by: UROLOGY

## 2023-01-27 PROCEDURE — 25010000002 ERTAPENEM PER 500 MG: Performed by: UROLOGY

## 2023-01-27 PROCEDURE — C1769 GUIDE WIRE: HCPCS | Performed by: UROLOGY

## 2023-01-27 PROCEDURE — C1758 CATHETER, URETERAL: HCPCS | Performed by: UROLOGY

## 2023-01-27 PROCEDURE — 74420 UROGRAPHY RTRGR +-KUB: CPT | Performed by: UROLOGY

## 2023-01-27 PROCEDURE — 63710000001 FAMOTIDINE 20 MG TABLET: Performed by: ANESTHESIOLOGY

## 2023-01-27 PROCEDURE — 25010000002 DEXAMETHASONE PER 1 MG: Performed by: ANESTHESIOLOGY

## 2023-01-27 PROCEDURE — 82962 GLUCOSE BLOOD TEST: CPT

## 2023-01-27 PROCEDURE — 25010000002 GENTAMICIN PER 80 MG: Performed by: UROLOGY

## 2023-01-27 PROCEDURE — A9270 NON-COVERED ITEM OR SERVICE: HCPCS | Performed by: ANESTHESIOLOGY

## 2023-01-27 PROCEDURE — 25010000002 PROPOFOL 10 MG/ML EMULSION: Performed by: ANESTHESIOLOGY

## 2023-01-27 DEVICE — URETERAL STENT WITH SIDE HOLES 8FX26CM
Type: IMPLANTABLE DEVICE | Site: URETER | Status: FUNCTIONAL
Brand: TRIA™ FIRM

## 2023-01-27 RX ORDER — DEXAMETHASONE SODIUM PHOSPHATE 4 MG/ML
INJECTION, SOLUTION INTRA-ARTICULAR; INTRALESIONAL; INTRAMUSCULAR; INTRAVENOUS; SOFT TISSUE AS NEEDED
Status: DISCONTINUED | OUTPATIENT
Start: 2023-01-27 | End: 2023-01-27 | Stop reason: SURG

## 2023-01-27 RX ORDER — FENTANYL CITRATE 50 UG/ML
50 INJECTION, SOLUTION INTRAMUSCULAR; INTRAVENOUS
Status: DISCONTINUED | OUTPATIENT
Start: 2023-01-27 | End: 2023-01-27 | Stop reason: HOSPADM

## 2023-01-27 RX ORDER — ONDANSETRON 2 MG/ML
4 INJECTION INTRAMUSCULAR; INTRAVENOUS ONCE AS NEEDED
Status: DISCONTINUED | OUTPATIENT
Start: 2023-01-27 | End: 2023-01-27 | Stop reason: HOSPADM

## 2023-01-27 RX ORDER — FAMOTIDINE 20 MG/1
20 TABLET, FILM COATED ORAL ONCE
Status: COMPLETED | OUTPATIENT
Start: 2023-01-27 | End: 2023-01-27

## 2023-01-27 RX ORDER — OXYCODONE HYDROCHLORIDE 5 MG/1
5 TABLET ORAL ONCE AS NEEDED
Status: DISCONTINUED | OUTPATIENT
Start: 2023-01-27 | End: 2023-01-27 | Stop reason: HOSPADM

## 2023-01-27 RX ORDER — LIDOCAINE HYDROCHLORIDE 10 MG/ML
0.5 INJECTION, SOLUTION EPIDURAL; INFILTRATION; INTRACAUDAL; PERINEURAL ONCE AS NEEDED
Status: COMPLETED | OUTPATIENT
Start: 2023-01-27 | End: 2023-01-27

## 2023-01-27 RX ORDER — PROPOFOL 10 MG/ML
VIAL (ML) INTRAVENOUS AS NEEDED
Status: DISCONTINUED | OUTPATIENT
Start: 2023-01-27 | End: 2023-01-27 | Stop reason: SURG

## 2023-01-27 RX ORDER — LABETALOL HYDROCHLORIDE 5 MG/ML
5 INJECTION, SOLUTION INTRAVENOUS
Status: DISCONTINUED | OUTPATIENT
Start: 2023-01-27 | End: 2023-01-27 | Stop reason: HOSPADM

## 2023-01-27 RX ORDER — PHENAZOPYRIDINE HYDROCHLORIDE 100 MG/1
100 TABLET, FILM COATED ORAL 3 TIMES DAILY PRN
Qty: 9 TABLET | Refills: 0 | Status: SHIPPED | OUTPATIENT
Start: 2023-01-27 | End: 2023-01-30

## 2023-01-27 RX ORDER — MAGNESIUM HYDROXIDE 1200 MG/15ML
LIQUID ORAL AS NEEDED
Status: DISCONTINUED | OUTPATIENT
Start: 2023-01-27 | End: 2023-01-27 | Stop reason: HOSPADM

## 2023-01-27 RX ORDER — ACETAMINOPHEN 650 MG/1
650 SUPPOSITORY RECTAL ONCE AS NEEDED
Status: DISCONTINUED | OUTPATIENT
Start: 2023-01-27 | End: 2023-01-27 | Stop reason: HOSPADM

## 2023-01-27 RX ORDER — EPHEDRINE SULFATE 50 MG/ML
5 INJECTION, SOLUTION INTRAVENOUS ONCE AS NEEDED
Status: DISCONTINUED | OUTPATIENT
Start: 2023-01-27 | End: 2023-01-27 | Stop reason: HOSPADM

## 2023-01-27 RX ORDER — MIDAZOLAM HYDROCHLORIDE 1 MG/ML
0.5 INJECTION INTRAMUSCULAR; INTRAVENOUS
Status: DISCONTINUED | OUTPATIENT
Start: 2023-01-27 | End: 2023-01-27 | Stop reason: HOSPADM

## 2023-01-27 RX ORDER — CEFAZOLIN SODIUM 2 G/100ML
2 INJECTION, SOLUTION INTRAVENOUS ONCE
Status: DISCONTINUED | OUTPATIENT
Start: 2023-01-27 | End: 2023-01-27

## 2023-01-27 RX ORDER — PHENYLEPHRINE HYDROCHLORIDE 10 MG/ML
INJECTION INTRAVENOUS AS NEEDED
Status: DISCONTINUED | OUTPATIENT
Start: 2023-01-27 | End: 2023-01-27 | Stop reason: SURG

## 2023-01-27 RX ORDER — SODIUM CHLORIDE 9 MG/ML
9 INJECTION, SOLUTION INTRAVENOUS CONTINUOUS
Status: DISCONTINUED | OUTPATIENT
Start: 2023-01-27 | End: 2023-01-27 | Stop reason: HOSPADM

## 2023-01-27 RX ORDER — LIDOCAINE HYDROCHLORIDE 20 MG/ML
JELLY TOPICAL AS NEEDED
Status: DISCONTINUED | OUTPATIENT
Start: 2023-01-27 | End: 2023-01-27 | Stop reason: HOSPADM

## 2023-01-27 RX ORDER — ACETAMINOPHEN 325 MG/1
650 TABLET ORAL ONCE AS NEEDED
Status: DISCONTINUED | OUTPATIENT
Start: 2023-01-27 | End: 2023-01-27 | Stop reason: HOSPADM

## 2023-01-27 RX ADMIN — PROPOFOL 150 MG: 10 INJECTION, EMULSION INTRAVENOUS at 16:16

## 2023-01-27 RX ADMIN — PROPOFOL 50 MG: 10 INJECTION, EMULSION INTRAVENOUS at 16:32

## 2023-01-27 RX ADMIN — PHENYLEPHRINE HYDROCHLORIDE 200 MCG: 10 INJECTION, SOLUTION INTRAVENOUS at 16:16

## 2023-01-27 RX ADMIN — PHENYLEPHRINE HYDROCHLORIDE 200 MCG: 10 INJECTION, SOLUTION INTRAVENOUS at 16:30

## 2023-01-27 RX ADMIN — PROPOFOL 100 MCG/KG/MIN: 10 INJECTION, EMULSION INTRAVENOUS at 16:18

## 2023-01-27 RX ADMIN — Medication 1 G: at 16:18

## 2023-01-27 RX ADMIN — LIDOCAINE HYDROCHLORIDE 0.5 ML: 10 INJECTION, SOLUTION EPIDURAL; INFILTRATION; INTRACAUDAL; PERINEURAL at 12:53

## 2023-01-27 RX ADMIN — SODIUM CHLORIDE 9 ML/HR: 9 INJECTION, SOLUTION INTRAVENOUS at 12:55

## 2023-01-27 RX ADMIN — PHENYLEPHRINE HYDROCHLORIDE 200 MCG: 10 INJECTION, SOLUTION INTRAVENOUS at 16:24

## 2023-01-27 RX ADMIN — FAMOTIDINE 20 MG: 20 TABLET, FILM COATED ORAL at 12:53

## 2023-01-27 RX ADMIN — DEXAMETHASONE SODIUM PHOSPHATE 4 MG: 4 INJECTION, SOLUTION INTRAMUSCULAR; INTRAVENOUS at 16:18

## 2023-01-27 NOTE — ANESTHESIA PREPROCEDURE EVALUATION
Anesthesia Evaluation                  Airway   Mallampati: I  TM distance: >3 FB  Neck ROM: full  No difficulty expected  Dental      Pulmonary    (+) pneumonia , pulmonary embolism, shortness of breath,   Cardiovascular     ECG reviewed    (+) hypertension, CABG, dysrhythmias, LEHMAN, DVT, hyperlipidemia,       Neuro/Psych  GI/Hepatic/Renal/Endo    (+)   renal disease, diabetes mellitus, thyroid problem     Musculoskeletal     Abdominal    Substance History      OB/GYN          Other                        Anesthesia Plan    ASA 3     general     (Non triggering anestheic)  intravenous induction     Anesthetic plan, risks, benefits, and alternatives have been provided, discussed and informed consent has been obtained with: patient.    Plan discussed with CRNA.        CODE STATUS:

## 2023-01-27 NOTE — ANESTHESIA PROCEDURE NOTES
Airway  Urgency: elective    Date/Time: 1/27/2023 4:17 PM  Airway not difficult    General Information and Staff    Patient location during procedure: OR    Indications and Patient Condition  Indications for airway management: airway protection    Preoxygenated: yes  Mask difficulty assessment: 1 - vent by mask    Final Airway Details  Final airway type: supraglottic airway      Successful airway: I-gel  Size 4     Number of attempts at approach: 1  Assessment: lips, teeth, and gum same as pre-op    Additional Comments  LMA placed without difficulty, ventilation with assist, equal breath sounds and symmetric chest rise and fall

## 2023-01-27 NOTE — ANESTHESIA POSTPROCEDURE EVALUATION
Patient: Forrest Zepeda    Procedure Summary     Date: 01/27/23 Room / Location:  [facility] /  [facility]    Anesthesia Start: 1608 Anesthesia Stop: 1650    Procedure: CYSTOSCOPY URETERAL CATHETER/STENT INSERTION (Left: Bladder) Diagnosis:       Hydronephrosis, unspecified hydronephrosis type      (Hydronephrosis, unspecified hydronephrosis type [N13.30])    Surgeons: Deanne Pitts MD Provider: Shay Camp MD    Anesthesia Type: general ASA Status: 3          Anesthesia Type: general    Vitals  Vitals Value Taken Time   BP     Temp     Pulse 62 01/27/23 1650   Resp     SpO2 95 % 01/27/23 1650   Vitals shown include unvalidated device data.        Post Anesthesia Care and Evaluation    Patient location during evaluation: PACU  Patient participation: complete - patient participated  Level of consciousness: awake and alert  Pain management: adequate    Airway patency: patent  Anesthetic complications: No anesthetic complications  PONV Status: none  Cardiovascular status: hemodynamically stable and acceptable  Respiratory status: nonlabored ventilation, acceptable and nasal cannula  Hydration status: acceptable

## 2023-01-28 LAB — BACTERIA SPEC AEROBE CULT: ABNORMAL

## 2023-01-28 NOTE — ADDENDUM NOTE
Addendum  created 01/28/23 1807 by Latisha Lynne CRNA    Clinical Note Signed, Intraprocedure Blocks edited

## 2023-01-31 ENCOUNTER — TELEPHONE (OUTPATIENT)
Dept: UROLOGY | Facility: CLINIC | Age: 88
End: 2023-01-31
Payer: MEDICARE

## 2023-01-31 ENCOUNTER — LAB REQUISITION (OUTPATIENT)
Dept: LAB | Facility: HOSPITAL | Age: 88
End: 2023-01-31
Payer: MEDICARE

## 2023-01-31 DIAGNOSIS — N30.00 ACUTE CYSTITIS WITHOUT HEMATURIA: ICD-10-CM

## 2023-01-31 DIAGNOSIS — N39.0 URINARY TRACT INFECTION, SITE NOT SPECIFIED: ICD-10-CM

## 2023-01-31 DIAGNOSIS — B37.49 YEAST UTI: Primary | ICD-10-CM

## 2023-01-31 LAB
BACTERIA UR QL AUTO: ABNORMAL /HPF
BILIRUB UR QL STRIP: NEGATIVE
CLARITY UR: ABNORMAL
COLOR UR: YELLOW
GLUCOSE UR STRIP-MCNC: NEGATIVE MG/DL
HGB UR QL STRIP.AUTO: ABNORMAL
HYALINE CASTS UR QL AUTO: ABNORMAL /LPF
KETONES UR QL STRIP: NEGATIVE
LEUKOCYTE ESTERASE UR QL STRIP.AUTO: ABNORMAL
NITRITE UR QL STRIP: NEGATIVE
PH UR STRIP.AUTO: 5.5 [PH] (ref 5–8)
PROT UR QL STRIP: ABNORMAL
RBC # UR STRIP: ABNORMAL /HPF
REF LAB TEST METHOD: ABNORMAL
SP GR UR STRIP: 1.01 (ref 1–1.03)
SQUAMOUS #/AREA URNS HPF: ABNORMAL /HPF
UROBILINOGEN UR QL STRIP: ABNORMAL
WBC # UR STRIP: ABNORMAL /HPF

## 2023-01-31 PROCEDURE — 81001 URINALYSIS AUTO W/SCOPE: CPT | Performed by: UROLOGY

## 2023-01-31 PROCEDURE — 87086 URINE CULTURE/COLONY COUNT: CPT | Performed by: UROLOGY

## 2023-01-31 NOTE — TELEPHONE ENCOUNTER
Called patient to inform him of yeast isolated in OR urine culture.  He was on diflucan previously which caused prolonged QT.   I will have them resend a culture.  Currently he is asymptomatic.

## 2023-02-01 LAB — BACTERIA SPEC AEROBE CULT: NO GROWTH

## 2023-03-20 RX ORDER — ASPIRIN 81 MG/1
81 TABLET ORAL DAILY
Qty: 90 TABLET | Refills: 2 | Status: SHIPPED | OUTPATIENT
Start: 2023-03-20

## 2023-03-20 RX ORDER — ISOSORBIDE MONONITRATE 30 MG/1
30 TABLET, EXTENDED RELEASE ORAL EVERY MORNING
Qty: 90 TABLET | Refills: 1 | Status: SHIPPED | OUTPATIENT
Start: 2023-03-20

## 2023-03-21 ENCOUNTER — PATIENT MESSAGE (OUTPATIENT)
Dept: INTERNAL MEDICINE | Facility: CLINIC | Age: 88
End: 2023-03-21
Payer: MEDICARE

## 2023-03-21 DIAGNOSIS — Z79.4 TYPE 2 DIABETES MELLITUS WITH HYPERGLYCEMIA, WITH LONG-TERM CURRENT USE OF INSULIN: Primary | ICD-10-CM

## 2023-03-21 DIAGNOSIS — N18.31 TYPE 2 DIABETES MELLITUS WITH STAGE 3A CHRONIC KIDNEY DISEASE, WITH LONG-TERM CURRENT USE OF INSULIN: ICD-10-CM

## 2023-03-21 DIAGNOSIS — Z79.4 TYPE 2 DIABETES MELLITUS WITH STAGE 3A CHRONIC KIDNEY DISEASE, WITH LONG-TERM CURRENT USE OF INSULIN: ICD-10-CM

## 2023-03-21 DIAGNOSIS — E11.65 TYPE 2 DIABETES MELLITUS WITH HYPERGLYCEMIA, WITH LONG-TERM CURRENT USE OF INSULIN: Primary | ICD-10-CM

## 2023-03-21 DIAGNOSIS — E11.22 TYPE 2 DIABETES MELLITUS WITH STAGE 3A CHRONIC KIDNEY DISEASE, WITH LONG-TERM CURRENT USE OF INSULIN: ICD-10-CM

## 2023-03-21 RX ORDER — BLOOD-GLUCOSE,RECEIVER,CONT
1 EACH MISCELLANEOUS ONCE
Qty: 1 EACH | Refills: 0 | Status: SHIPPED | OUTPATIENT
Start: 2023-03-21 | End: 2023-03-21

## 2023-03-21 RX ORDER — ISOSORBIDE MONONITRATE 30 MG/1
30 TABLET, EXTENDED RELEASE ORAL EVERY MORNING
Qty: 90 TABLET | Refills: 1 | OUTPATIENT
Start: 2023-03-21

## 2023-03-21 RX ORDER — BLOOD-GLUCOSE SENSOR
1 EACH MISCELLANEOUS
Qty: 3 EACH | Refills: 11 | Status: SHIPPED | OUTPATIENT
Start: 2023-03-21

## 2023-03-28 ENCOUNTER — PRIOR AUTHORIZATION (OUTPATIENT)
Dept: INTERNAL MEDICINE | Facility: CLINIC | Age: 88
End: 2023-03-28
Payer: MEDICARE

## 2023-03-29 NOTE — TELEPHONE ENCOUNTER
"The dexcom G7 was denied with no reason, \"no notification sent\" have submitted an appeal but the appeal process did not allow me to provide any clinical information other than resubmitting his chart notes, it stated \"invalded\" when I attempted to provide any information.     Will update once an appeal determination is sent back to us.   "

## 2023-04-12 RX ORDER — BISOPROLOL FUMARATE 5 MG/1
5 TABLET, FILM COATED ORAL DAILY
Qty: 30 TABLET | Refills: 11 | Status: SHIPPED | OUTPATIENT
Start: 2023-04-12

## 2023-04-19 DIAGNOSIS — I48.0 PAROXYSMAL ATRIAL FIBRILLATION: ICD-10-CM

## 2023-04-19 RX ORDER — APIXABAN 2.5 MG/1
TABLET, FILM COATED ORAL
Qty: 180 TABLET | Refills: 3 | Status: SHIPPED | OUTPATIENT
Start: 2023-04-19

## 2023-04-30 ENCOUNTER — TELEPHONE (OUTPATIENT)
Dept: INTERNAL MEDICINE | Facility: CLINIC | Age: 88
End: 2023-04-30
Payer: MEDICARE

## 2023-04-30 DIAGNOSIS — R31.0 GROSS HEMATURIA: Primary | ICD-10-CM

## 2023-04-30 NOTE — TELEPHONE ENCOUNTER
Patients grand-daughter called the on call provider today.  She reported Forrest has not felt well for the past week.  He did not go to [facility] this morning, so she knew something was wrong.  He had told her hematuria presented last night.  He has a h/o frequent UTIs and sees urology.  She is a home health nurse and requested to draw a CBC and collect a urine herself.  I advised her since he doesn't have home health there wouldn't be any way to sign the order.  However, I did advise she could bring a urine into the clinic in the morning to have that checked.  She is agreeable to this.  She advised that Forrest has previously refused home health and refuses to go to the ER today despite recommendation.  Erika is aware the ER is appropriate for Forrest, but she is not able to get him to go.  Orders for UA and culture have been placed.

## 2023-05-01 ENCOUNTER — APPOINTMENT (OUTPATIENT)
Dept: CT IMAGING | Facility: HOSPITAL | Age: 88
End: 2023-05-01
Payer: MEDICARE

## 2023-05-01 ENCOUNTER — CLINICAL SUPPORT (OUTPATIENT)
Dept: INTERNAL MEDICINE | Facility: CLINIC | Age: 88
End: 2023-05-01
Payer: MEDICARE

## 2023-05-01 ENCOUNTER — HOSPITAL ENCOUNTER (INPATIENT)
Facility: HOSPITAL | Age: 88
LOS: 1 days | Discharge: HOME | End: 2023-05-04
Attending: EMERGENCY MEDICINE | Admitting: INTERNAL MEDICINE
Payer: MEDICARE

## 2023-05-01 DIAGNOSIS — N39.0 ACUTE UTI: ICD-10-CM

## 2023-05-01 DIAGNOSIS — I48.0 PAROXYSMAL ATRIAL FIBRILLATION: ICD-10-CM

## 2023-05-01 DIAGNOSIS — Z96.0 URETERAL STENT PRESENT: ICD-10-CM

## 2023-05-01 DIAGNOSIS — R31.0 GROSS HEMATURIA: Primary | ICD-10-CM

## 2023-05-01 DIAGNOSIS — R93.5 ABNORMAL CT OF THE ABDOMEN: ICD-10-CM

## 2023-05-01 DIAGNOSIS — R31.0 GROSS HEMATURIA: ICD-10-CM

## 2023-05-01 PROBLEM — N30.01 ACUTE CYSTITIS WITH HEMATURIA: Status: ACTIVE | Noted: 2023-05-01

## 2023-05-01 LAB
ABO GROUP BLD: NORMAL
ALBUMIN SERPL-MCNC: 4 G/DL (ref 3.5–5.2)
ALBUMIN/GLOB SERPL: 1.1 G/DL
ALP SERPL-CCNC: 100 U/L (ref 39–117)
ALT SERPL W P-5'-P-CCNC: 14 U/L (ref 1–41)
ANION GAP SERPL CALCULATED.3IONS-SCNC: 11 MMOL/L (ref 5–15)
AST SERPL-CCNC: 21 U/L (ref 1–40)
BACTERIA UR QL AUTO: ABNORMAL /HPF
BASOPHILS # BLD AUTO: 0.11 10*3/MM3 (ref 0–0.2)
BASOPHILS NFR BLD AUTO: 0.9 % (ref 0–1.5)
BILIRUB BLD-MCNC: ABNORMAL MG/DL
BILIRUB SERPL-MCNC: 0.4 MG/DL (ref 0–1.2)
BILIRUB UR QL STRIP: NEGATIVE
BLD GP AB SCN SERPL QL: NEGATIVE
BUN SERPL-MCNC: 28 MG/DL (ref 8–23)
BUN/CREAT SERPL: 15.9 (ref 7–25)
CALCIUM SPEC-SCNC: 9.7 MG/DL (ref 8.2–9.6)
CHLORIDE SERPL-SCNC: 106 MMOL/L (ref 98–107)
CLARITY UR: ABNORMAL
CLARITY, POC: ABNORMAL
CO2 SERPL-SCNC: 20 MMOL/L (ref 22–29)
COLOR UR: ABNORMAL
COLOR UR: YELLOW
CREAT BLDA-MCNC: 1.9 MG/DL
CREAT BLDA-MCNC: 1.9 MG/DL (ref 0.6–1.3)
CREAT SERPL-MCNC: 1.76 MG/DL (ref 0.76–1.27)
DEPRECATED RDW RBC AUTO: 59.6 FL (ref 37–54)
EGFRCR SERPLBLD CKD-EPI 2021: 36.3 ML/MIN/1.73
EOSINOPHIL # BLD AUTO: 0.38 10*3/MM3 (ref 0–0.4)
EOSINOPHIL NFR BLD AUTO: 3.1 % (ref 0.3–6.2)
ERYTHROCYTE [DISTWIDTH] IN BLOOD BY AUTOMATED COUNT: 16 % (ref 12.3–15.4)
EXPIRATION DATE: ABNORMAL
GLOBULIN UR ELPH-MCNC: 3.8 GM/DL
GLUCOSE BLDC GLUCOMTR-MCNC: 218 MG/DL (ref 70–130)
GLUCOSE SERPL-MCNC: 138 MG/DL (ref 65–99)
GLUCOSE UR STRIP-MCNC: ABNORMAL MG/DL
GLUCOSE UR STRIP-MCNC: NEGATIVE MG/DL
HCT VFR BLD AUTO: 39.8 % (ref 37.5–51)
HGB BLD-MCNC: 12.4 G/DL (ref 13–17.7)
HGB UR QL STRIP.AUTO: NEGATIVE
HYALINE CASTS UR QL AUTO: ABNORMAL /LPF
IMM GRANULOCYTES # BLD AUTO: 0.08 10*3/MM3 (ref 0–0.05)
IMM GRANULOCYTES NFR BLD AUTO: 0.7 % (ref 0–0.5)
KETONES UR QL STRIP: NEGATIVE
KETONES UR QL: ABNORMAL
LEUKOCYTE EST, POC: ABNORMAL
LEUKOCYTE ESTERASE UR QL STRIP.AUTO: NEGATIVE
LYMPHOCYTES # BLD AUTO: 3.54 10*3/MM3 (ref 0.7–3.1)
LYMPHOCYTES NFR BLD AUTO: 28.8 % (ref 19.6–45.3)
Lab: ABNORMAL
MCH RBC QN AUTO: 31.5 PG (ref 26.6–33)
MCHC RBC AUTO-ENTMCNC: 31.2 G/DL (ref 31.5–35.7)
MCV RBC AUTO: 101 FL (ref 79–97)
MONOCYTES # BLD AUTO: 0.85 10*3/MM3 (ref 0.1–0.9)
MONOCYTES NFR BLD AUTO: 6.9 % (ref 5–12)
NEUTROPHILS NFR BLD AUTO: 59.6 % (ref 42.7–76)
NEUTROPHILS NFR BLD AUTO: 7.33 10*3/MM3 (ref 1.7–7)
NITRITE UR QL STRIP: NEGATIVE
NITRITE UR-MCNC: POSITIVE MG/ML
NRBC BLD AUTO-RTO: 0.2 /100 WBC (ref 0–0.2)
PH UR STRIP.AUTO: <=5 [PH] (ref 5–8)
PH UR: 7 [PH] (ref 5–8)
PLATELET # BLD AUTO: 241 10*3/MM3 (ref 140–450)
PMV BLD AUTO: 10.1 FL (ref 6–12)
POTASSIUM SERPL-SCNC: 4.8 MMOL/L (ref 3.5–5.2)
PROT SERPL-MCNC: 7.8 G/DL (ref 6–8.5)
PROT UR QL STRIP: ABNORMAL
PROT UR STRIP-MCNC: ABNORMAL MG/DL
RBC # BLD AUTO: 3.94 10*6/MM3 (ref 4.14–5.8)
RBC # UR STRIP: ABNORMAL /HPF
RBC # UR STRIP: ABNORMAL /UL
REF LAB TEST METHOD: ABNORMAL
RH BLD: NEGATIVE
SODIUM SERPL-SCNC: 137 MMOL/L (ref 136–145)
SP GR UR STRIP: 1.09 (ref 1–1.03)
SP GR UR: 1.01 (ref 1–1.03)
SQUAMOUS #/AREA URNS HPF: ABNORMAL /HPF
T&S EXPIRATION DATE: NORMAL
UROBILINOGEN UR QL STRIP: ABNORMAL
UROBILINOGEN UR QL: ABNORMAL
WBC # UR STRIP: ABNORMAL /HPF
WBC NRBC COR # BLD: 12.29 10*3/MM3 (ref 3.4–10.8)

## 2023-05-01 PROCEDURE — 99284 EMERGENCY DEPT VISIT MOD MDM: CPT

## 2023-05-01 PROCEDURE — 81001 URINALYSIS AUTO W/SCOPE: CPT | Performed by: EMERGENCY MEDICINE

## 2023-05-01 PROCEDURE — 85025 COMPLETE CBC W/AUTO DIFF WBC: CPT | Performed by: EMERGENCY MEDICINE

## 2023-05-01 PROCEDURE — 25010000002 ERTAPENEM PER 500 MG: Performed by: NURSE PRACTITIONER

## 2023-05-01 PROCEDURE — G0378 HOSPITAL OBSERVATION PER HR: HCPCS

## 2023-05-01 PROCEDURE — 80053 COMPREHEN METABOLIC PANEL: CPT | Performed by: EMERGENCY MEDICINE

## 2023-05-01 PROCEDURE — 86850 RBC ANTIBODY SCREEN: CPT | Performed by: NURSE PRACTITIONER

## 2023-05-01 PROCEDURE — 86901 BLOOD TYPING SEROLOGIC RH(D): CPT | Performed by: NURSE PRACTITIONER

## 2023-05-01 PROCEDURE — 63710000001 INSULIN LISPRO (HUMAN) PER 5 UNITS: Performed by: NURSE PRACTITIONER

## 2023-05-01 PROCEDURE — 81003 URINALYSIS AUTO W/O SCOPE: CPT | Performed by: FAMILY MEDICINE

## 2023-05-01 PROCEDURE — 99223 1ST HOSP IP/OBS HIGH 75: CPT | Performed by: NURSE PRACTITIONER

## 2023-05-01 PROCEDURE — 74176 CT ABD & PELVIS W/O CONTRAST: CPT

## 2023-05-01 PROCEDURE — 86900 BLOOD TYPING SEROLOGIC ABO: CPT | Performed by: NURSE PRACTITIONER

## 2023-05-01 PROCEDURE — 82948 REAGENT STRIP/BLOOD GLUCOSE: CPT

## 2023-05-01 PROCEDURE — 87086 URINE CULTURE/COLONY COUNT: CPT | Performed by: EMERGENCY MEDICINE

## 2023-05-01 PROCEDURE — 82565 ASSAY OF CREATININE: CPT

## 2023-05-01 RX ORDER — SODIUM CHLORIDE 0.9 % (FLUSH) 0.9 %
10 SYRINGE (ML) INJECTION EVERY 12 HOURS SCHEDULED
Status: DISCONTINUED | OUTPATIENT
Start: 2023-05-01 | End: 2023-05-04 | Stop reason: HOSPADM

## 2023-05-01 RX ORDER — ACETAMINOPHEN 325 MG/1
650 TABLET ORAL EVERY 4 HOURS PRN
Status: DISCONTINUED | OUTPATIENT
Start: 2023-05-01 | End: 2023-05-04 | Stop reason: HOSPADM

## 2023-05-01 RX ORDER — LOSARTAN POTASSIUM 25 MG/1
25 TABLET ORAL EVERY MORNING
Status: DISCONTINUED | OUTPATIENT
Start: 2023-05-02 | End: 2023-05-04 | Stop reason: HOSPADM

## 2023-05-01 RX ORDER — LEVOTHYROXINE SODIUM 0.05 MG/1
50 TABLET ORAL DAILY
Status: DISCONTINUED | OUTPATIENT
Start: 2023-05-02 | End: 2023-05-04 | Stop reason: HOSPADM

## 2023-05-01 RX ORDER — SODIUM CHLORIDE 9 MG/ML
40 INJECTION, SOLUTION INTRAVENOUS AS NEEDED
Status: DISCONTINUED | OUTPATIENT
Start: 2023-05-01 | End: 2023-05-04 | Stop reason: HOSPADM

## 2023-05-01 RX ORDER — TAMSULOSIN HYDROCHLORIDE 0.4 MG/1
0.4 CAPSULE ORAL DAILY
Status: DISCONTINUED | OUTPATIENT
Start: 2023-05-02 | End: 2023-05-04 | Stop reason: HOSPADM

## 2023-05-01 RX ORDER — IBUPROFEN 600 MG/1
1 TABLET ORAL
Status: DISCONTINUED | OUTPATIENT
Start: 2023-05-01 | End: 2023-05-04 | Stop reason: HOSPADM

## 2023-05-01 RX ORDER — FINASTERIDE 5 MG/1
5 TABLET, FILM COATED ORAL DAILY
Status: DISCONTINUED | OUTPATIENT
Start: 2023-05-02 | End: 2023-05-04 | Stop reason: HOSPADM

## 2023-05-01 RX ORDER — BISOPROLOL FUMARATE 5 MG/1
5 TABLET, FILM COATED ORAL DAILY
Status: DISCONTINUED | OUTPATIENT
Start: 2023-05-02 | End: 2023-05-04 | Stop reason: HOSPADM

## 2023-05-01 RX ORDER — L.ACID,PARA/B.BIFIDUM/S.THERM 8B CELL
1 CAPSULE ORAL 2 TIMES DAILY
Status: DISCONTINUED | OUTPATIENT
Start: 2023-05-01 | End: 2023-05-04 | Stop reason: HOSPADM

## 2023-05-01 RX ORDER — ALLOPURINOL 300 MG/1
300 TABLET ORAL NIGHTLY
Status: DISCONTINUED | OUTPATIENT
Start: 2023-05-01 | End: 2023-05-04 | Stop reason: HOSPADM

## 2023-05-01 RX ORDER — ACETAMINOPHEN 160 MG/5ML
650 SOLUTION ORAL EVERY 4 HOURS PRN
Status: DISCONTINUED | OUTPATIENT
Start: 2023-05-01 | End: 2023-05-04 | Stop reason: HOSPADM

## 2023-05-01 RX ORDER — SODIUM CHLORIDE 0.9 % (FLUSH) 0.9 %
10 SYRINGE (ML) INJECTION AS NEEDED
Status: DISCONTINUED | OUTPATIENT
Start: 2023-05-01 | End: 2023-05-04 | Stop reason: HOSPADM

## 2023-05-01 RX ORDER — ACETAMINOPHEN 650 MG/1
650 SUPPOSITORY RECTAL EVERY 4 HOURS PRN
Status: DISCONTINUED | OUTPATIENT
Start: 2023-05-01 | End: 2023-05-04 | Stop reason: HOSPADM

## 2023-05-01 RX ORDER — INSULIN LISPRO 100 [IU]/ML
0-7 INJECTION, SOLUTION INTRAVENOUS; SUBCUTANEOUS
Status: DISCONTINUED | OUTPATIENT
Start: 2023-05-01 | End: 2023-05-04 | Stop reason: HOSPADM

## 2023-05-01 RX ORDER — NICOTINE POLACRILEX 4 MG
15 LOZENGE BUCCAL
Status: DISCONTINUED | OUTPATIENT
Start: 2023-05-01 | End: 2023-05-04 | Stop reason: HOSPADM

## 2023-05-01 RX ORDER — DEXTROSE MONOHYDRATE 25 G/50ML
25 INJECTION, SOLUTION INTRAVENOUS
Status: DISCONTINUED | OUTPATIENT
Start: 2023-05-01 | End: 2023-05-04 | Stop reason: HOSPADM

## 2023-05-01 RX ADMIN — Medication 10 ML: at 22:42

## 2023-05-01 RX ADMIN — ERTAPENEM 500 MG: 1 INJECTION INTRAMUSCULAR; INTRAVENOUS at 22:42

## 2023-05-01 RX ADMIN — ALLOPURINOL 300 MG: 300 TABLET ORAL at 22:24

## 2023-05-01 RX ADMIN — INSULIN LISPRO 3 UNITS: 100 INJECTION, SOLUTION INTRAVENOUS; SUBCUTANEOUS at 22:30

## 2023-05-01 RX ADMIN — SODIUM CHLORIDE 40 ML: 9 INJECTION, SOLUTION INTRAVENOUS at 22:43

## 2023-05-01 NOTE — ED PROVIDER NOTES
Subjective   History of Present Illness  Patient is a pleasant 89-year-old male with a history of ureter stents, most recently placed several months ago.  He is seen by Dr. Hodgson, urology.  He states that yesterday began having bright red blood in his urine.  This persisted overnight prompting his visit to the emergency department today.  He denies associated symptoms.  Denies fever, chills, chest pain, shortness of breath, abdominal pain, flank pain, nausea, vomiting, diarrhea, or other acute complaints.  He states that his had similar presentations with urinary tract infections in years past but typically the hematuria does not last as long as it has this time.  Denies weight loss or history of cancer.  Again, denies flank pain, increased urinary frequency, or dysuria.        Review of Systems   All other systems reviewed and are negative.      Past Medical History:   Diagnosis Date   • Abnormal EKG    • Abnormal PSA     Reported abnormal   • Acute deep vein thrombosis (DVT) of right lower extremity 08/22/2019   • Acute diverticulitis 2019   • Acute hyperkalemia 08/22/2019   • Acute respiratory failure with hypoxia    • Acute saddle pulmonary embolism with acute cor pulmonale 08/22/2019   • Acute systolic right heart failure 08/22/2019   • Arthritis     KNEES,  BUT SINCE HAD REPLACEMENT   • Benign localized hyperplasia of prostate    • Bilateral knee pain    • C. difficile diarrhea 2010   • Cataracts, bilateral    • CKD (chronic kidney disease)    • Coronary artery disease    • Diabetic neuropathy    • Diastolic dysfunction    • Disease of thyroid gland     HYPOTHYROIDISM   • Diverticulitis    • Dyslipidemia     H/O   • Family history of malignant hyperthermia     Brother    • Full dentures    • Generalized weakness    • History of ESBL E. coli infection     most recent 4-2022 f/u with ID Dr Johnson   • History of gout    • History of hepatitis A vaccination    • History of nephrolithiasis    • History of  "nuclear stress test     STATES IN THE 1990'S.  \"IT WAS OK\"   • History of osteopenia    • History of recurrent UTI (urinary tract infection)    • Hydronephrosis of left kidney 07/18/2019   • Hydronephrosis with renal and ureteral calculus obstruction 10/21/2019    Added automatically from request for surgery [id number]   • Hydronephrosis with ureteral stricture    • Hypercholesterolemia    • Hyperkalemia     PT UNSURE REGARDING HX OF THIS   • Hyperlipidemia    • Hypertension    • Impaired functional mobility, balance, gait, and endurance    • Insomnia    • IPF (idiopathic pulmonary fibrosis)     per patient and son, dx at [facility], was told no treatment necessary, not to worry about it   • Malignant hyperthermia due to anesthesia     PER PT REPORT, BROTHER HAD DURING LUNG SURGERY SEVERAL YEARS AGO.  STATED THEY PACKED HIM ON ICE.  STATED HE DID SURVIVE.  PT DENIES ANY PERSONAL HX OF MALIGNANT HYPERTHERMIA HIMSELF, OR ANY ISSUES WITH ANESTHESIA.  STATES TO HIS KNOWLEDGE, NO OTHER FAMILY MEMBER HAS THIS ISSUE EXCEPT FOR HIS BROTHER.   • On supplemental oxygen therapy     2L NC QHS   • Other hydronephrosis 08/12/2019    Added automatically from request for surgery [id number]   • Paroxysmal atrial fibrillation    • Pulmonary hypertension, mild 02/2021    per echo per cardiology note 1/9/23, per pt's son, PCP does not agree with this dx   • Renal insufficiency    • Sepsis 2019   • Shortness of breath     STATES WITH EXERTION, OTHERWISE, DENIES   • Sigmoid diverticulitis without abscess or perforation 08/09/2017   • Sinus node dysfunction    • Skin breakdown     fourth toe right foot noted in 2019 MD D/C note   • Skin ulcer of fourth toe of right foot, limited to breakdown of skin 07/05/2019   • Stricture of ureter    • Type 2 diabetes mellitus    • Ureteral stricture, left    • UTI (urinary tract infection) 07/18/2019   • Vitamin D deficiency    • Wears glasses        Allergies   Allergen Reactions   • Metformin Diarrhea and GI " Intolerance   • Statins Diarrhea and Myalgia       Past Surgical History:   Procedure Laterality Date   • APPENDECTOMY     • CARDIAC ELECTROPHYSIOLOGY PROCEDURE N/A 12/23/2020    Procedure: Pacemaker DC new. DNS meds.;  Surgeon: Jimy Ledezma DO;  Location:  [facility];  Service: Cardiology;  Laterality: N/A;   • CHOLECYSTECTOMY     • CIRCUMCISION N/A 10/23/2019    Procedure: CIRCUMCISIOn-DORSAL SLIT;  Surgeon: Griffin Romero MD;  Location:  [facility];  Service: Urology   • COLONOSCOPY     • CYSTOSCOPY RETROGRADE PYELOGRAM Left 6/17/2022    Procedure: CYSTOSCOPY RETROGRADE PYELOGRAM;  Surgeon: Ryne Mccann MD;  Location:  [facility];  Service: Urology;  Laterality: Left;   • CYSTOSCOPY URETEROSCOPY Left 2/11/2019    Procedure: URETEROSCOPY WITH STENT EXTRACTION, RPG;  Surgeon: Griffin Romero MD;  Location:  [facility];  Service: Urology   • CYSTOSCOPY W/ URETERAL STENT PLACEMENT Left 7/20/2019    Procedure: CYSTOSCOPY, LEFT RETROGRADE PYELOGRAM,  ATTEMPTED LEFT URETERAL STENT INSERTION;  Surgeon: Isaac Flynn MD;  Location:  [facility];  Service: Urology   • CYSTOSCOPY W/ URETERAL STENT PLACEMENT Left 6/17/2022    Procedure: CYSTOSCOPY URETERAL CATHETER/STENT INSERTION;  Surgeon: Ryne Mccann MD;  Location:  [facility];  Service: Urology;  Laterality: Left;   • CYSTOSCOPY W/ URETERAL STENT PLACEMENT Left 1/27/2023    Procedure: CYSTOSCOPY URETERAL CATHETER/STENT INSERTION;  Surgeon: Deanne Pitts MD;  Location:  [facility];  Service: Urology;  Laterality: Left;   • EYE SURGERY      CATARACTS REMOVED BILATERALLY   • KNEE ARTHROPLASTY Bilateral    • KNEE ARTHROSCOPY Bilateral    • RENAL ARTERY STENT      SEVERAL TIMES EVERY SINCE 1978   • URETERAL STENT INSERTION  10/2020   • URETEROSCOPY LASER LITHOTRIPSY WITH STENT INSERTION Left 1/10/2018    Procedure:  cysto, left ureteral stent replacement ;  Surgeon: Griffin Romero MD;  Location:  [facility];  Service:    • URETEROSCOPY LASER  LITHOTRIPSY WITH STENT INSERTION Left 2019    Procedure: URETEROSCOPY, STONE REMOVAL WITH STENT INSERTION;  Surgeon: Griffin Romero MD;  Location: [facility] OR;  Service: Urology   • URETEROSCOPY LASER LITHOTRIPSY WITH STENT INSERTION Left 10/23/2019    Procedure: Left URETEROSCOPY WITH STENT INSERTION-LEFT;  Surgeon: Griffin Romero MD;  Location: [facility] OR;  Service: Urology       Family History   Problem Relation Age of Onset   • Heart attack Sister    • Brain cancer Sister    • Stroke Sister    • Lung cancer Sister    • Hypertension Sister    • Brain cancer Brother    • Lung cancer Brother    • Malig Hyperthermia Brother        Social History     Socioeconomic History   • Marital status:    Tobacco Use   • Smoking status: Former     Packs/day: 0.25     Years: 2.00     Pack years: 0.50     Types: Cigarettes     Quit date: 1950     Years since quittin.3   • Smokeless tobacco: Never   • Tobacco comments:     SMOKED SOME AS A TEEN, DENIES ANY HABIT OR ADDICTION   Vaping Use   • Vaping Use: Never used   Substance and Sexual Activity   • Alcohol use: No   • Drug use: No   • Sexual activity: Defer           Objective   Physical Exam  Vitals and nursing note reviewed.   Constitutional:       General: He is not in acute distress.  HENT:      Head: Normocephalic and atraumatic.   Eyes:      Conjunctiva/sclera: Conjunctivae normal.      Pupils: Pupils are equal, round, and reactive to light.   Neck:      Thyroid: No thyromegaly.   Cardiovascular:      Rate and Rhythm: Normal rate and regular rhythm.      Heart sounds: Normal heart sounds. No murmur heard.    No friction rub. No gallop.   Pulmonary:      Effort: Pulmonary effort is normal. No respiratory distress.      Breath sounds: Normal breath sounds.   Abdominal:      General: Bowel sounds are normal.      Palpations: Abdomen is soft.      Tenderness: There is no abdominal tenderness.   Musculoskeletal:         General: Normal range of motion.       Cervical back: Normal range of motion and neck supple.   Lymphadenopathy:      Cervical: No cervical adenopathy.   Skin:     General: Skin is warm and dry.   Neurological:      Mental Status: He is alert and oriented to person, place, and time.   Psychiatric:         Behavior: Behavior normal.         Thought Content: Thought content normal.         Procedures           ED Course  ED Course as of 05/02/23 1640   Mon May 01, 2023   [id number] Paged Dr. Jerman Allen, urology [CP]      ED Course User Index  [CP] Darryl Shrestha DO           Recent Results (from the past 24 hour(s))   Type & Screen    Collection Time: 05/01/23 10:13 PM    Specimen: Blood   Result Value Ref Range    ABO Type O     RH type Negative     Antibody Screen Negative     T&S Expiration Date 5/4/2023 11:59:59 PM    POC Glucose Once    Collection Time: 05/01/23 10:26 PM    Specimen: Blood   Result Value Ref Range    Glucose 218 (H) 70 - 130 mg/dL   Hemoglobin & Hematocrit, Blood    Collection Time: 05/02/23 12:31 AM    Specimen: Blood   Result Value Ref Range    Hemoglobin 10.8 (L) 13.0 - 17.7 g/dL    Hematocrit 34.5 (L) 37.5 - 51.0 %   Hemoglobin & Hematocrit, Blood    Collection Time: 05/02/23  6:11 AM    Specimen: Blood   Result Value Ref Range    Hemoglobin 11.1 (L) 13.0 - 17.7 g/dL    Hematocrit 36.4 (L) 37.5 - 51.0 %   CBC Auto Differential    Collection Time: 05/02/23  6:11 AM    Specimen: Blood   Result Value Ref Range    WBC 10.64 3.40 - 10.80 10*3/mm3    RBC 3.46 (L) 4.14 - 5.80 10*6/mm3    Hemoglobin 11.1 (L) 13.0 - 17.7 g/dL    Hematocrit 36.4 (L) 37.5 - 51.0 %    .2 (H) 79.0 - 97.0 fL    MCH 32.1 26.6 - 33.0 pg    MCHC 30.5 (L) 31.5 - 35.7 g/dL    RDW 16.1 (H) 12.3 - 15.4 %    RDW-SD 61.9 (H) 37.0 - 54.0 fl    MPV 10.5 6.0 - 12.0 fL    Platelets 200 140 - 450 10*3/mm3    Neutrophil % 52.8 42.7 - 76.0 %    Lymphocyte % 34.0 19.6 - 45.3 %    Monocyte % 7.6 5.0 - 12.0 %    Eosinophil % 4.1 0.3 - 6.2 %    Basophil % 0.9 0.0 - 1.5 %     Immature Grans % 0.6 (H) 0.0 - 0.5 %    Neutrophils, Absolute 5.61 1.70 - 7.00 10*3/mm3    Lymphocytes, Absolute 3.62 (H) 0.70 - 3.10 10*3/mm3    Monocytes, Absolute 0.81 0.10 - 0.90 10*3/mm3    Eosinophils, Absolute 0.44 (H) 0.00 - 0.40 10*3/mm3    Basophils, Absolute 0.10 0.00 - 0.20 10*3/mm3    Immature Grans, Absolute 0.06 (H) 0.00 - 0.05 10*3/mm3    nRBC 0.0 0.0 - 0.2 /100 WBC   Basic Metabolic Panel    Collection Time: 05/02/23  6:11 AM    Specimen: Blood   Result Value Ref Range    Glucose 72 65 - 99 mg/dL    BUN 31 (H) 8 - 23 mg/dL    Creatinine 1.45 (H) 0.76 - 1.27 mg/dL    Sodium 140 136 - 145 mmol/L    Potassium 4.7 3.5 - 5.2 mmol/L    Chloride 107 98 - 107 mmol/L    CO2 17.0 (L) 22.0 - 29.0 mmol/L    Calcium 9.2 8.2 - 9.6 mg/dL    BUN/Creatinine Ratio 21.4 7.0 - 25.0    Anion Gap 16.0 (H) 5.0 - 15.0 mmol/L    eGFR 45.8 (L) >60.0 mL/min/1.73   Hemoglobin A1c    Collection Time: 05/02/23  6:11 AM    Specimen: Blood   Result Value Ref Range    Hemoglobin A1C 7.30 (H) 4.80 - 5.60 %   POC Glucose Once    Collection Time: 05/02/23  7:21 AM    Specimen: Blood   Result Value Ref Range    Glucose 107 70 - 130 mg/dL   Hemoglobin & Hematocrit, Blood    Collection Time: 05/02/23 11:17 AM    Specimen: Blood   Result Value Ref Range    Hemoglobin 10.9 (L) 13.0 - 17.7 g/dL    Hematocrit 35.8 (L) 37.5 - 51.0 %   POC Glucose Once    Collection Time: 05/02/23 11:22 AM    Specimen: Blood   Result Value Ref Range    Glucose 194 (H) 70 - 130 mg/dL   POC Glucose Once    Collection Time: 05/02/23  4:15 PM    Specimen: Blood   Result Value Ref Range    Glucose 237 (H) 70 - 130 mg/dL     Note: In addition to lab results from this visit, the labs listed above may include labs taken at another facility or during a different encounter within the last 24 hours. Please correlate lab times with ED admission and discharge times for further clarification of the services performed during this visit.    CT Abdomen Pelvis Without Contrast    Final Result   1. Left double-J ureteral stent remains in place. New hyperdense material in the upper one half of the dilated left renal pelvis, whether clot or, less likely, tumor.      2. New anterior and superior bladder wall inflammation consistent with cystitis.      3. No evidence of acute intra-abdominal or intrapelvic disease elsewhere. Extensive sigmoid reticulosis without evidence of diverticulitis.      4. Extensive but stable changes of fibrosis of the lower lungs.               Electronically Signed: Felix Samson     5/1/2023 6:21 PM EDT     Workstation ID: [id number]        Vitals:    05/02/23 0719 05/02/23 0900 05/02/23 1100 05/02/23 1300   BP: 124/65  110/67    BP Location: Right arm  Right arm    Patient Position: Lying  Lying    Pulse: 66 65 70 63   Resp: 16  18    Temp: 98.6 °F (37 °C)  97.7 °F (36.5 °C)    TempSrc: Oral  Oral    SpO2: 96% 100%  91%   Weight:       Height:         Medications   allopurinol (ZYLOPRIM) tablet 300 mg (300 mg Oral Given 5/1/23 2224)   bisoprolol (ZEBeta) tablet 5 mg (5 mg Oral Given 5/2/23 0831)   finasteride (PROSCAR) tablet 5 mg (5 mg Oral Given 5/2/23 0831)   levothyroxine (SYNTHROID, LEVOTHROID) tablet 50 mcg (50 mcg Oral Given 5/2/23 0831)   losartan (COZAAR) tablet 25 mg (25 mg Oral Not Given 5/2/23 0754)   lactobacillus acidophilus (RISAQUAD) capsule 1 capsule (1 capsule Oral Given 5/2/23 0832)   tamsulosin (FLOMAX) 24 hr capsule 0.4 mg (0.4 mg Oral Given 5/2/23 0831)   sodium chloride 0.9 % flush 10 mL ( Intravenous Canceled Entry 5/2/23 0805)   sodium chloride 0.9 % flush 10 mL (has no administration in time range)   sodium chloride 0.9 % infusion 40 mL (40 mL Intravenous Given 5/1/23 2243)   acetaminophen (TYLENOL) tablet 650 mg (has no administration in time range)     Or   acetaminophen (TYLENOL) 160 MG/5ML solution 650 mg (has no administration in time range)     Or   acetaminophen (TYLENOL) suppository 650 mg (has no administration in time range)   dextrose  (GLUTOSE) oral gel 15 g (has no administration in time range)   dextrose (D50W) (25 g/50 mL) IV injection 25 g (has no administration in time range)   glucagon (GLUCAGEN) injection 1 mg (has no administration in time range)   Insulin Lispro (humaLOG) injection 0-7 Units (2 Units Subcutaneous Given 5/2/23 1142)   ertapenem (INVanz) 1 g/100 mL 0.9% NS VTB (mbp) (has no administration in time range)     ECG/EMG Results (last 24 hours)     ** No results found for the last 24 hours. **        No orders to display                                       MDM    Final diagnoses:   Gross hematuria   Acute UTI   Ureteral stent present   Abnormal CT of the abdomen       ED Disposition  ED Disposition     ED Disposition   Decision to Admit    Condition   --    Comment   Level of Care: Telemetry [5]   Diagnosis: Gross hematuria [599.71.ICD-9-CM]   Admitting Physician: BESSIE BORJAS [[id number]]   Attending Physician: BESSIE BORJAS [[id number]]               No follow-up provider specified.       Medication List      No changes were made to your prescriptions during this visit.

## 2023-05-02 ENCOUNTER — TELEPHONE (OUTPATIENT)
Dept: UROLOGY | Facility: CLINIC | Age: 88
End: 2023-05-02
Payer: MEDICARE

## 2023-05-02 LAB
ANION GAP SERPL CALCULATED.3IONS-SCNC: 16 MMOL/L (ref 5–15)
BACTERIA SPEC AEROBE CULT: NO GROWTH
BASOPHILS # BLD AUTO: 0.1 10*3/MM3 (ref 0–0.2)
BASOPHILS NFR BLD AUTO: 0.9 % (ref 0–1.5)
BUN SERPL-MCNC: 31 MG/DL (ref 8–23)
BUN/CREAT SERPL: 21.4 (ref 7–25)
CALCIUM SPEC-SCNC: 9.2 MG/DL (ref 8.2–9.6)
CHLORIDE SERPL-SCNC: 107 MMOL/L (ref 98–107)
CO2 SERPL-SCNC: 17 MMOL/L (ref 22–29)
CREAT SERPL-MCNC: 1.45 MG/DL (ref 0.76–1.27)
DEPRECATED RDW RBC AUTO: 61.9 FL (ref 37–54)
EGFRCR SERPLBLD CKD-EPI 2021: 45.8 ML/MIN/1.73
EOSINOPHIL # BLD AUTO: 0.44 10*3/MM3 (ref 0–0.4)
EOSINOPHIL NFR BLD AUTO: 4.1 % (ref 0.3–6.2)
ERYTHROCYTE [DISTWIDTH] IN BLOOD BY AUTOMATED COUNT: 16.1 % (ref 12.3–15.4)
GLUCOSE BLDC GLUCOMTR-MCNC: 107 MG/DL (ref 70–130)
GLUCOSE BLDC GLUCOMTR-MCNC: 152 MG/DL (ref 70–130)
GLUCOSE BLDC GLUCOMTR-MCNC: 194 MG/DL (ref 70–130)
GLUCOSE BLDC GLUCOMTR-MCNC: 237 MG/DL (ref 70–130)
GLUCOSE SERPL-MCNC: 72 MG/DL (ref 65–99)
HBA1C MFR BLD: 7.3 % (ref 4.8–5.6)
HCT VFR BLD AUTO: 32.8 % (ref 37.5–51)
HCT VFR BLD AUTO: 34.5 % (ref 37.5–51)
HCT VFR BLD AUTO: 35.8 % (ref 37.5–51)
HCT VFR BLD AUTO: 36.4 % (ref 37.5–51)
HCT VFR BLD AUTO: 36.4 % (ref 37.5–51)
HGB BLD-MCNC: 10.3 G/DL (ref 13–17.7)
HGB BLD-MCNC: 10.8 G/DL (ref 13–17.7)
HGB BLD-MCNC: 10.9 G/DL (ref 13–17.7)
HGB BLD-MCNC: 11.1 G/DL (ref 13–17.7)
HGB BLD-MCNC: 11.1 G/DL (ref 13–17.7)
IMM GRANULOCYTES # BLD AUTO: 0.06 10*3/MM3 (ref 0–0.05)
IMM GRANULOCYTES NFR BLD AUTO: 0.6 % (ref 0–0.5)
LYMPHOCYTES # BLD AUTO: 3.62 10*3/MM3 (ref 0.7–3.1)
LYMPHOCYTES NFR BLD AUTO: 34 % (ref 19.6–45.3)
MCH RBC QN AUTO: 32.1 PG (ref 26.6–33)
MCHC RBC AUTO-ENTMCNC: 30.5 G/DL (ref 31.5–35.7)
MCV RBC AUTO: 105.2 FL (ref 79–97)
MONOCYTES # BLD AUTO: 0.81 10*3/MM3 (ref 0.1–0.9)
MONOCYTES NFR BLD AUTO: 7.6 % (ref 5–12)
NEUTROPHILS NFR BLD AUTO: 5.61 10*3/MM3 (ref 1.7–7)
NEUTROPHILS NFR BLD AUTO: 52.8 % (ref 42.7–76)
NRBC BLD AUTO-RTO: 0 /100 WBC (ref 0–0.2)
PLATELET # BLD AUTO: 200 10*3/MM3 (ref 140–450)
PMV BLD AUTO: 10.5 FL (ref 6–12)
POTASSIUM SERPL-SCNC: 4.7 MMOL/L (ref 3.5–5.2)
RBC # BLD AUTO: 3.46 10*6/MM3 (ref 4.14–5.8)
SODIUM SERPL-SCNC: 140 MMOL/L (ref 136–145)
WBC NRBC COR # BLD: 10.64 10*3/MM3 (ref 3.4–10.8)

## 2023-05-02 PROCEDURE — 85018 HEMOGLOBIN: CPT | Performed by: NURSE PRACTITIONER

## 2023-05-02 PROCEDURE — 85014 HEMATOCRIT: CPT | Performed by: NURSE PRACTITIONER

## 2023-05-02 PROCEDURE — 25010000002 ERTAPENEM PER 500 MG: Performed by: INTERNAL MEDICINE

## 2023-05-02 PROCEDURE — 85018 HEMOGLOBIN: CPT | Performed by: INTERNAL MEDICINE

## 2023-05-02 PROCEDURE — 63710000001 INSULIN LISPRO (HUMAN) PER 5 UNITS: Performed by: NURSE PRACTITIONER

## 2023-05-02 PROCEDURE — 99232 SBSQ HOSP IP/OBS MODERATE 35: CPT | Performed by: INTERNAL MEDICINE

## 2023-05-02 PROCEDURE — 82948 REAGENT STRIP/BLOOD GLUCOSE: CPT

## 2023-05-02 PROCEDURE — 99231 SBSQ HOSP IP/OBS SF/LOW 25: CPT | Performed by: NURSE PRACTITIONER

## 2023-05-02 PROCEDURE — 85014 HEMATOCRIT: CPT | Performed by: INTERNAL MEDICINE

## 2023-05-02 PROCEDURE — 99222 1ST HOSP IP/OBS MODERATE 55: CPT | Performed by: NURSE PRACTITIONER

## 2023-05-02 PROCEDURE — 83036 HEMOGLOBIN GLYCOSYLATED A1C: CPT | Performed by: NURSE PRACTITIONER

## 2023-05-02 PROCEDURE — 51700 IRRIGATION OF BLADDER: CPT | Performed by: NURSE PRACTITIONER

## 2023-05-02 PROCEDURE — 85025 COMPLETE CBC W/AUTO DIFF WBC: CPT | Performed by: NURSE PRACTITIONER

## 2023-05-02 PROCEDURE — 80048 BASIC METABOLIC PNL TOTAL CA: CPT | Performed by: NURSE PRACTITIONER

## 2023-05-02 PROCEDURE — 99024 POSTOP FOLLOW-UP VISIT: CPT | Performed by: NURSE PRACTITIONER

## 2023-05-02 PROCEDURE — G0378 HOSPITAL OBSERVATION PER HR: HCPCS

## 2023-05-02 RX ADMIN — INSULIN LISPRO 2 UNITS: 100 INJECTION, SOLUTION INTRAVENOUS; SUBCUTANEOUS at 11:42

## 2023-05-02 RX ADMIN — INSULIN LISPRO 2 UNITS: 100 INJECTION, SOLUTION INTRAVENOUS; SUBCUTANEOUS at 17:32

## 2023-05-02 RX ADMIN — TAMSULOSIN HYDROCHLORIDE 0.4 MG: 0.4 CAPSULE ORAL at 08:31

## 2023-05-02 RX ADMIN — LEVOTHYROXINE SODIUM 50 MCG: 0.05 TABLET ORAL at 08:31

## 2023-05-02 RX ADMIN — Medication 10 ML: at 21:05

## 2023-05-02 RX ADMIN — BISOPROLOL FUMARATE 5 MG: 5 TABLET ORAL at 08:31

## 2023-05-02 RX ADMIN — ERTAPENEM 1 G: 1 INJECTION INTRAMUSCULAR; INTRAVENOUS at 21:04

## 2023-05-02 RX ADMIN — ALLOPURINOL 300 MG: 300 TABLET ORAL at 20:13

## 2023-05-02 RX ADMIN — FINASTERIDE 5 MG: 5 TABLET, FILM COATED ORAL at 08:31

## 2023-05-02 RX ADMIN — Medication 1 CAPSULE: at 08:32

## 2023-05-02 RX ADMIN — INSULIN LISPRO 2 UNITS: 100 INJECTION, SOLUTION INTRAVENOUS; SUBCUTANEOUS at 21:04

## 2023-05-02 NOTE — PROGRESS NOTES
[facility] Medicine Services  PROGRESS NOTE    Patient Name: Forrest Zepeda  : 1932  MRN: [id number]    Date of Admission: 2023  Primary Care Physician: Kary Wen MD    Subjective   Subjective     CC:  F/u hematuria    HPI:  Cont to have hematuria, no caceres currently, no other complaints    ROS:  Gen- No fevers, chills  CV- No chest pain, palpitations  Resp- No cough, dyspnea  GI- No N/V/D, abd pain     Objective   Objective     Vital Signs:   Temp:  [97.7 °F (36.5 °C)-98.6 °F (37 °C)] 97.7 °F (36.5 °C)  Heart Rate:  [61-71] 70  Resp:  [16-18] 18  BP: (110-143)/() 110/67  Flow (L/min):  [2] 2     Physical Exam:  Constitutional: Awake, alert, elderly male sitting up in bed eating breakfast  HENT: NCAT, mucous membranes moist  Respiratory: Clear to auscultation bilaterally, respiratory effort normal   Cardiovascular: RRR, palpable radial pulses  Gastrointestinal: Positive bowel sounds, soft, nontender, nondistended  Musculoskeletal: No bilateral ankle edema  Psychiatric: Appropriate affect, cooperative  Neurologic: Speech clear and fluent    Results Reviewed:  LAB RESULTS:      Lab 23  1117 23  0611 23  0031 23  1627   WBC  --  10.64  --  12.29*   HEMOGLOBIN 10.9* 11.1*  11.1* 10.8* 12.4*   HEMATOCRIT 35.8* 36.4*  36.4* 34.5* 39.8   PLATELETS  --  200  --  241   NEUTROS ABS  --  5.61  --  7.33*   IMMATURE GRANS (ABS)  --  0.06*  --  0.08*   LYMPHS ABS  --  3.62*  --  3.54*   MONOS ABS  --  0.81  --  0.85   EOS ABS  --  0.44*  --  0.38   MCV  --  105.2*  --  101.0*         Lab 23  0611 23  1632 23  1631 23  1627   SODIUM 140  --   --  137   POTASSIUM 4.7  --   --  4.8   CHLORIDE 107  --   --  106   CO2 17.0*  --   --  20.0*   ANION GAP 16.0*  --   --  11.0   BUN 31*  --   --  28*   CREATININE 1.45* 1.90 1.90* 1.76*   EGFR 45.8*  --   --  36.3*   GLUCOSE 72  --   --  138*   CALCIUM 9.2  --   --  9.7*   HEMOGLOBIN  A1C 7.30*  --   --   --          Lab 05/01/23  1627   TOTAL PROTEIN 7.8   ALBUMIN 4.0   GLOBULIN 3.8   ALT (SGPT) 14   AST (SGOT) 21   BILIRUBIN 0.4   ALK PHOS 100                 Lab 05/01/23  2213   ABO TYPING O   RH TYPING Negative   ANTIBODY SCREEN Negative         Brief Urine Lab Results  (Last result in the past 365 days)      Color   Clarity   Blood   Leuk Est   Nitrite   Protein   CREAT   Urine HCG        05/01/23 1556 Yellow   Turbid   Negative   Negative   Negative   >=300 mg/dL (3+)                 Microbiology Results Abnormal     Procedure Component Value - Date/Time    Urine Culture - Urine, Urine, Clean Catch [[id number]]  (Normal) Collected: 05/01/23 1556    Lab Status: Preliminary result Specimen: Urine, Clean Catch Updated: 05/02/23 1055     Urine Culture No growth          CT Abdomen Pelvis Without Contrast    Result Date: 5/1/2023  CT ABDOMEN PELVIS WO CONTRAST Date of Exam: 5/1/2023 5:35 PM EDT Indication: hematuria. Comparison: Abdomen pelvis CT scan 1/26/2021 Technique: Axial CT images were obtained of the abdomen and pelvis without the administration of contrast. Reconstructed coronal and sagittal images were also obtained. Automated exposure control and iterative construction methods were used. Findings: Double-J left ureteral stent is again seen in place, with persistent dilatation of the left renal collecting system and ureter, but very similar in extent to the exam from 2021. No left-sided perinephric edema is seen. Left renal cortex is again noted to  be atrophic. Best appreciated on coronal images of today's study, specifically coronal images 71 through 73, is hyperdense appearance of the upper one half of the dilated left renal pelvis, which measures 46 Hounsfield units. By contrast the lower half of the dilated renal pelvis measures fluid density at 13 H. U. Findings presumably represent some aging clot. No mass effect is seen to favor that this is tumor. On the right, there may be a  few tiny cysts but the right kidney otherwise appears normal for age. No right ureteral dilatation or calculus is seen. The bladder is thick walled and shows some new pericystic inflammation anteriorly at the dome of the bladder. No hemorrhage or mass is appreciated. Elsewhere, included lungs show similar degree of fibrosis and bullous cystic change as well as mild bronchiectasis. No active lower lung disease is seen. Gallbladder is surgically absent. Liver, spleen, pancreas, and adrenal glands are unremarkable. Bowel loops are normal in caliber and normal in appearance except for extensive sigmoid diverticulosis. No inflammatory changes are seen to suggest diverticulitis. Bony structures appear to be intact.     Impression: 1. Left double-J ureteral stent remains in place. New hyperdense material in the upper one half of the dilated left renal pelvis, whether clot or, less likely, tumor. 2. New anterior and superior bladder wall inflammation consistent with cystitis. 3. No evidence of acute intra-abdominal or intrapelvic disease elsewhere. Extensive sigmoid reticulosis without evidence of diverticulitis. 4. Extensive but stable changes of fibrosis of the lower lungs. Electronically Signed: Felix Samson  5/1/2023 6:21 PM EDT  Workstation ID: [id number]      Results for orders placed in visit on 02/08/21    Adult Transthoracic Echo Complete W/ Cont if Necessary Per Protocol    Interpretation Summary  · Left ventricular wall thickness is consistent with mild concentric hypertrophy.  · Estimated left ventricular EF = 64% Left ventricular ejection fraction appears to be 61 - 65%. Left ventricular systolic function is normal.  · Left ventricular diastolic function is consistent with (grade I) impaired relaxation.  · The right ventricular cavity is mildly dilated.  · The right atrial cavity is mildly dilated.  · There is severe calcification of the aortic valve mainly affecting the non-coronary, left coronary and right  coronary cusp(s).  · Estimated right ventricular systolic pressure from tricuspid regurgitation is mildly elevated (35-45 mmHg).  · Mild pulmonary hypertension is present.      Current medications:  Scheduled Meds:allopurinol, 300 mg, Oral, Nightly  bisoprolol, 5 mg, Oral, Daily  ertapenem, 1 g, Intravenous, Q24H  finasteride, 5 mg, Oral, Daily  insulin lispro, 0-7 Units, Subcutaneous, 4x Daily With Meals & Nightly  lactobacillus acidophilus, 1 capsule, Oral, BID  levothyroxine, 50 mcg, Oral, Daily  losartan, 25 mg, Oral, QAM  sodium chloride, 10 mL, Intravenous, Q12H  tamsulosin, 0.4 mg, Oral, Daily      Continuous Infusions:   PRN Meds:.•  acetaminophen **OR** acetaminophen **OR** acetaminophen  •  dextrose  •  dextrose  •  glucagon (human recombinant)  •  sodium chloride  •  sodium chloride    Assessment & Plan   Assessment & Plan     Active Hospital Problems    Diagnosis  POA   • **Gross hematuria [R31.0]  Yes   • Acute cystitis with hematuria [N30.01]  Yes   • CAD (coronary artery disease) [I25.10]  Yes   • Diastolic congestive heart failure [I50.30]  Yes   • Type 2 diabetes mellitus with hyperglycemia, with long-term current use of insulin [E11.65, Z79.4]  Not Applicable   • History of pulmonary embolism [Z86.711]  Yes   • IPF (idiopathic pulmonary fibrosis) [J84.112]  Yes   • CKD (chronic kidney disease) stage 3, GFR 30-59 ml/min [N18.30]  Yes   • Acquired hypothyroidism [E03.9]  Yes   • Essential hypertension [I10]  Yes      Resolved Hospital Problems   No resolved problems to display.        Brief Hospital Course to date:  Forrest Zepeda is a 89 y.o. male w/ HTN, hypothyroidism, CKD3, idiopathic pulm-fibrosis, pAfib, prior PE, DM2, CAD, HFpEF, ureteral strictures followed by Dr. Pitts, who presented w/ gross hematuria    The following problems are new to me today    Assessment/Plan    Gross hematuria  Hx ureteral stricture w/ present J-stent  -cont empiric Invanz, follow UrCx  -holding oral AC  -urology  eval pending  -dec H&H frequency    pAfib  Hx PE  Chronic HFpEF, compensated  CAD  -cont bisoprolol, losartan  -asa and eliquis on hold for hematuria    CKD stage 3  -BLine Cr 1.4-1.8; eGFR 30-40's    DM type 2, a1c 7.3%, w/ long term use of insulin  -SSI    Hypothyroidism  -cont levothyroxine    BPH  -cont tamsulosin; at home takes silodosin    Expected Discharge Location and Transportation: home  Expected Discharge   Expected Discharge Date: 5/5/2023; Expected Discharge Time:      DVT prophylaxis:  Mechanical DVT prophylaxis orders are present.     AM-PAC 6 Clicks Score (PT): 17 (05/02/23 0800)    CODE STATUS:   Code Status and Medical Interventions:   Ordered at: 05/01/23 4101     Level Of Support Discussed With:    Patient     Code Status (Patient has no pulse and is not breathing):    CPR (Attempt to Resuscitate)     Medical Interventions (Patient has pulse or is breathing):    Full Support       Praveen Willson, DO  05/02/23

## 2023-05-02 NOTE — CONSULTS
"[facility] [place]    Patient Name: Forrest Zepeda  : 1932  MRN: [id number]  Primary Care Physician:  Kary Wen MD  Date of admission: 2023    Subjective   Subjective     Chief Complaint: Gross Hematuria    HPI:    Forrest Zepeda is a 89 y.o. male with history of HTN, CKD stage III, pulmonary fibrosis, pulmonary embolism, paroxysmal Afib managed on Eliquis, Type 2 Diabetes, HFpEf, CAD and Left ureteral stricture who presented to [facility] for worsening gross hematuria. He reports the hematuria initially began Saturday night and has progressively worsened. He reports history of intermittent gross hematuria in setting of chronic left ureteral stent, however it usually resolved and is \"never this dark\".     He last underwent Cystoscopy, Left ureteral stent exchange with Dr. Pitts on 23 for history of Left ureteral stricture and chronic Left hydronephrosis.     UA upon admission with TNTC WBC, TNTC RBC, 3+Bacteria, +nitrite.   Cr stable at baseline. H&H stable.     CT AP wo contrast ; Left ureteral stent in appropriate position, with persistent dilatation of the left renal collecting system and ureter. There is is hyperdense appearance of the upper one half of the dilated left renal pelvis, likely clot. The bladder is distended.      He is currently resting in bed. He denies flank pain or dysuria. He has been afebrile. His son is at the bedside. He is been voiding small amounts, bladder scan PVR 287cc, 330 cc, 188cc.     Review of Systems   All systems were reviewed and negative except for: Gross hematuria    Personal History     Past Medical History:   Diagnosis Date   • Abnormal EKG    • Abnormal PSA     Reported abnormal   • Acute deep vein thrombosis (DVT) of right lower extremity 2019   • Acute diverticulitis 2019   • Acute hyperkalemia 2019   • Acute respiratory failure with hypoxia    • Acute saddle pulmonary embolism with acute cor pulmonale " "08/22/2019   • Acute systolic right heart failure 08/22/2019   • Arthritis     KNEES,  BUT SINCE HAD REPLACEMENT   • Benign localized hyperplasia of prostate    • Bilateral knee pain    • C. difficile diarrhea 2010   • Cataracts, bilateral    • CKD (chronic kidney disease)    • Coronary artery disease    • Diabetic neuropathy    • Diastolic dysfunction    • Disease of thyroid gland     HYPOTHYROIDISM   • Diverticulitis    • Dyslipidemia     H/O   • Family history of malignant hyperthermia     Brother    • Full dentures    • Generalized weakness    • History of ESBL E. coli infection     most recent 4-2022 f/u with ID Dr Johnson   • History of gout    • History of hepatitis A vaccination    • History of nephrolithiasis    • History of nuclear stress test     STATES IN THE 1990'S.  \"IT WAS OK\"   • History of osteopenia    • History of recurrent UTI (urinary tract infection)    • Hydronephrosis of left kidney 07/18/2019   • Hydronephrosis with renal and ureteral calculus obstruction 10/21/2019    Added automatically from request for surgery [id number]   • Hydronephrosis with ureteral stricture    • Hypercholesterolemia    • Hyperkalemia     PT UNSURE REGARDING HX OF THIS   • Hyperlipidemia    • Hypertension    • Impaired functional mobility, balance, gait, and endurance    • Insomnia    • IPF (idiopathic pulmonary fibrosis)     per patient and son, dx at [facility], was told no treatment necessary, not to worry about it   • Malignant hyperthermia due to anesthesia     PER PT REPORT, BROTHER HAD DURING LUNG SURGERY SEVERAL YEARS AGO.  STATED THEY PACKED HIM ON ICE.  STATED HE DID SURVIVE.  PT DENIES ANY PERSONAL HX OF MALIGNANT HYPERTHERMIA HIMSELF, OR ANY ISSUES WITH ANESTHESIA.  STATES TO HIS KNOWLEDGE, NO OTHER FAMILY MEMBER HAS THIS ISSUE EXCEPT FOR HIS BROTHER.   • On supplemental oxygen therapy     2L NC QHS   • Other hydronephrosis 08/12/2019    Added automatically from request for surgery [id number]   • Paroxysmal atrial " fibrillation    • Pulmonary hypertension, mild 02/2021    per echo per cardiology note 1/9/23, per pt's son, PCP does not agree with this dx   • Renal insufficiency    • Sepsis 2019   • Shortness of breath     STATES WITH EXERTION, OTHERWISE, DENIES   • Sigmoid diverticulitis without abscess or perforation 08/09/2017   • Sinus node dysfunction    • Skin breakdown     fourth toe right foot noted in 2019 MD D/C note   • Skin ulcer of fourth toe of right foot, limited to breakdown of skin 07/05/2019   • Stricture of ureter    • Type 2 diabetes mellitus    • Ureteral stricture, left    • UTI (urinary tract infection) 07/18/2019   • Vitamin D deficiency    • Wears glasses        Past Surgical History:   Procedure Laterality Date   • APPENDECTOMY     • CARDIAC ELECTROPHYSIOLOGY PROCEDURE N/A 12/23/2020    Procedure: Pacemaker DC new. DNS meds.;  Surgeon: Jimy Ledezma DO;  Location:  [facility];  Service: Cardiology;  Laterality: N/A;   • CHOLECYSTECTOMY     • CIRCUMCISION N/A 10/23/2019    Procedure: CIRCUMCISIOn-DORSAL SLIT;  Surgeon: Griffin Romero MD;  Location:  [facility];  Service: Urology   • COLONOSCOPY     • CYSTOSCOPY RETROGRADE PYELOGRAM Left 6/17/2022    Procedure: CYSTOSCOPY RETROGRADE PYELOGRAM;  Surgeon: Ryne Mccann MD;  Location:  [facility];  Service: Urology;  Laterality: Left;   • CYSTOSCOPY URETEROSCOPY Left 2/11/2019    Procedure: URETEROSCOPY WITH STENT EXTRACTION, RPG;  Surgeon: Griffin Romero MD;  Location:  [facility];  Service: Urology   • CYSTOSCOPY W/ URETERAL STENT PLACEMENT Left 7/20/2019    Procedure: CYSTOSCOPY, LEFT RETROGRADE PYELOGRAM,  ATTEMPTED LEFT URETERAL STENT INSERTION;  Surgeon: Isaac Flynn MD;  Location:  [facility];  Service: Urology   • CYSTOSCOPY W/ URETERAL STENT PLACEMENT Left 6/17/2022    Procedure: CYSTOSCOPY URETERAL CATHETER/STENT INSERTION;  Surgeon: Ryne Mccann MD;  Location:  [facility];  Service: Urology;  Laterality: Left;   •  CYSTOSCOPY W/ URETERAL STENT PLACEMENT Left 1/27/2023    Procedure: CYSTOSCOPY URETERAL CATHETER/STENT INSERTION;  Surgeon: Deanne Pitts MD;  Location: [facility] OR;  Service: Urology;  Laterality: Left;   • EYE SURGERY      CATARACTS REMOVED BILATERALLY   • KNEE ARTHROPLASTY Bilateral    • KNEE ARTHROSCOPY Bilateral    • RENAL ARTERY STENT      SEVERAL TIMES EVERY SINCE 1978   • URETERAL STENT INSERTION  10/2020   • URETEROSCOPY LASER LITHOTRIPSY WITH STENT INSERTION Left 1/10/2018    Procedure:  cysto, left ureteral stent replacement ;  Surgeon: Griffin Romero MD;  Location: [facility] OR;  Service:    • URETEROSCOPY LASER LITHOTRIPSY WITH STENT INSERTION Left 8/14/2019    Procedure: URETEROSCOPY, STONE REMOVAL WITH STENT INSERTION;  Surgeon: Griffin Romero MD;  Location: [facility] OR;  Service: Urology   • URETEROSCOPY LASER LITHOTRIPSY WITH STENT INSERTION Left 10/23/2019    Procedure: Left URETEROSCOPY WITH STENT INSERTION-LEFT;  Surgeon: Griffin Romero MD;  Location: [facility] OR;  Service: Urology       Family History: family history includes Brain cancer in his brother and sister; Heart attack in his sister; Hypertension in his sister; Lung cancer in his brother and sister; Malig Hyperthermia in his brother; Stroke in his sister. Otherwise pertinent FHx was reviewed and not pertinent to current issue.    Social History:  reports that he quit smoking about 72 years ago. His smoking use included cigarettes. He has a 0.50 pack-year smoking history. He has never used smokeless tobacco. He reports that he does not drink alcohol and does not use drugs.    Home Medications:  Dexcom G7 Sensor, Insulin Glargine (1 Unit Dial), Insulin Pen Needle, Multiple Vitamins-Minerals, Probiotic Product, SITagliptin, acetaminophen, allopurinol, apixaban, aspirin, bisoprolol, finasteride, glimepiride, glucose blood, isosorbide mononitrate, levothyroxine, losartan, nitroglycerin, and silodosin      Allergies:  Allergies   Allergen  Reactions   • Metformin Diarrhea and GI Intolerance   • Statins Diarrhea and Myalgia       Objective   Objective     Vitals:   Temp:  [97.9 °F (36.6 °C)-98.6 °F (37 °C)] 98.6 °F (37 °C)  Heart Rate:  [61-71] 66  Resp:  [16-18] 16  BP: (118-143)/() 124/65  Flow (L/min):  [2] 2  Physical Exam    Constitutional: Awake in bed, alert    Eyes: PERRLA, sclerae anicteric, no conjunctival injection   HENT: Normocephalic, atraumatic, mucous membranes moist   Neck: Supple, trachea midline   Respiratory:Equal chest rise, non-labored respirations    Cardiovascular: RRR, palpable radial pulses bilaterally   Gastrointestinal: Soft, nontender, non-distended   Musculoskeletal: No bilateral ankle edema, no clubbing or cyanosis to extremities   Genitourinary: Uncircumcised, grossly bloody urine, firm area in right hemiscrotum that resembles AUS pump or IPP pump. Patient denies either of these procedures.    Psychiatric: Appropriate affect, cooperative   Neurologic: Oriented x 3, Cranial Nerves grossly intact, speech clear   Skin: No rashes     Result Review    Result Review:  I have personally reviewed the results from the time of this admission to 5/2/2023 09:46 EDT and agree with these findings:  [x]  Laboratory  [x]  Microbiology  [x]  Radiology  []  EKG/Telemetry   []  Cardiology/Vascular   []  Pathology  [x]  Old records  [x]  Other: Vital signs    Most notable findings include: Gross hematuria, hyperdense material in left renal pelvis.     Assessment & Plan   Assessment / Plan     Brief Patient Summary:  Forrest Zepeda is a 89 y.o. male with history of chronic left ureteral stent who presented to [facility] for worsening gross hematuria.     Active Hospital Problems:  Active Hospital Problems    Diagnosis    • **Gross hematuria    • Acute cystitis with hematuria    • CAD (coronary artery disease)    • Diastolic congestive heart failure    • Type 2 diabetes mellitus with hyperglycemia, with long-term current use of insulin    •  History of pulmonary embolism    • IPF (idiopathic pulmonary fibrosis)    • CKD (chronic kidney disease) stage 3, GFR 30-59 ml/min    • Acquired hypothyroidism    • Essential hypertension      At the bedside, using sterile technique, a 20 Fr 3 way caceres catheter was advanced without incident. There was immediate return of grossly bloody/maroon urine output without clots. The caceres catheter was secured with 10 cc sterile water and attached to gravity bag drainage. I performed manual bladder irrigation with 800 cc sterile water. Urine output initially cleared to cherry red and then quickly become grossly bloody.   CBI was initiated on fast drip with plans to titrate CBI to keep urine output light pink to clear.     Plan:  -Hold Eliquis x5-7 days.  -Continue abx, urine culture pending.   -Tend H&H, transfuse PRN.  -Perform manual bladder irrigation as needed to remove intravesical clots.  -Keep caceres catheter in place at discharge with outpatient follow up with Dr. Pitts. Our office will call to schedule appointment.   -No acute urologic surgical intervention at this time, left ureteral stent is in appropriate position.   will follow, please call if any questions.   Rounds with Dr. Herrera.     DVT prophylaxis:  Mechanical DVT prophylaxis orders are present.    CODE STATUS:    Level Of Support Discussed With: Patient  Code Status (Patient has no pulse and is not breathing): CPR (Attempt to Resuscitate)  Medical Interventions (Patient has pulse or is breathing): Full Support    Admission Status:  I believe this patient meets inpatient status.    Electronically signed by JOYCE Proctor, 05/02/23, 9:46 AM EDT.

## 2023-05-02 NOTE — PROGRESS NOTES
Patient resting in bed. He denies any pain. CBI bag empty/dry. Caceres catheter draining dark cherry red urine output. CBI restarted on fast drip. Urine output in tubing light cherry red/pink. There was one small clot in the caceres catheter tubing.

## 2023-05-02 NOTE — CASE MANAGEMENT/SOCIAL WORK
Continued Stay Note  [facility]     Patient Name: Forrest Zepeda  MRN: [id number]  Today's Date: 5/2/2023    Admit Date: 5/1/2023    Plan: Home   Discharge Plan     Row Name 05/02/23 1157       Plan    Plan Home    Patient/Family in Agreement with Plan yes    Plan Comments Mr. Zepeda resides in [facility] alone.  His son lives close and can assist as needed.  Family does the shopping for him otherwise he is independent with ADL's.   Uses a rolling walker and wheelchair for mobility assistance.   Has O2 at 2L prn supplied by [employer].  His PCP is Kary Wen.  He has had [facility] in the past as well as [facility] and [facility].   Plan is home    Final Discharge Disposition Code 01 - home or self-care               Discharge Codes    No documentation.               Expected Discharge Date and Time     Expected Discharge Date Expected Discharge Time    May 5, 2023             Jillian Gee RN

## 2023-05-02 NOTE — H&P
"    [facility] Medicine Services  HISTORY AND PHYSICAL    Patient Name: Forrest Zepeda  : 1932  MRN: [id number]  Primary Care Physician: Kary Wen MD  Date of admission: 2023    Subjective   Subjective     Chief Complaint:  Hematuria     HPI:  Forrest Zepeda is a 89 y.o. male past medical history significant for essential hypertension, hypothyroidism, CKD stage III, idiopathic pulmonary fibrosis, pulmonary embolism, paroxysmal atrial fibrillation, diabetes mellitus type 2, CAD, HFpEF and ureteral strictures presents the ED accompanied by family due to hematuria.  Patient reports on Saturday evening he noticed \"wine colored\" urine.  By  he describes bright red urinary output.  He denies any dysuria, abdominal pain, flank pain, fever, nausea, vomiting, diarrhea, chest pain, dizziness or lightheadedness.  He does report chronic ongoing shortness of breath that does not seem to be any worse than his usual.  He does currently follow with Dr. Pitts with urology.  He did undergo stent exchange meant in 2023.    Work-up in the ED tonight included CT of abdomen and pelvis without contrast that showed left double-J ureteral stent that is in place.  New hyperdense material in the upper one half of the dilated left renal pelvis, whether clot or less likely tumor.  New anterior and superior bladder wall inflammation consistent with cystitis.  No evidence of acute intra-abdominal intrapelvic disease elsewhere.  Extensive sigmoid diverticulosis without evidence of diverticulitis.  Extensive but stable changes of fibrosis of the lower lungs.  Patient will be [facility] under the care of the hospitalist for further evaluation and treatment.        Review of Systems   Constitutional: Negative for activity change, appetite change, chills, diaphoresis, fatigue, fever and unexpected weight change.   HENT: Negative.    Eyes: Negative for photophobia " "and visual disturbance.   Respiratory: Positive for shortness of breath. Negative for cough.    Cardiovascular: Negative for chest pain, palpitations and leg swelling.   Gastrointestinal: Negative for abdominal distention, abdominal pain, blood in stool, constipation, diarrhea, nausea and vomiting.   Genitourinary: Positive for hematuria. Negative for difficulty urinating, dysuria, flank pain and frequency.   Musculoskeletal: Negative for neck pain and neck stiffness.   Skin: Negative.    Neurological: Negative for dizziness, syncope, weakness, light-headedness, numbness and headaches.   Psychiatric/Behavioral: Negative.             Personal History     Past Medical History:   Diagnosis Date    Abnormal EKG     Abnormal PSA     Reported abnormal    Acute deep vein thrombosis (DVT) of right lower extremity 08/22/2019    Acute diverticulitis 2019    Acute hyperkalemia 08/22/2019    Acute respiratory failure with hypoxia     Acute saddle pulmonary embolism with acute cor pulmonale 08/22/2019    Acute systolic right heart failure 08/22/2019    Arthritis     KNEES,  BUT SINCE HAD REPLACEMENT    Benign localized hyperplasia of prostate     Bilateral knee pain     C. difficile diarrhea 2010    Cataracts, bilateral     CKD (chronic kidney disease)     Coronary artery disease     Diabetic neuropathy     Diastolic dysfunction     Disease of thyroid gland     HYPOTHYROIDISM    Diverticulitis     Dyslipidemia     H/O    Family history of malignant hyperthermia     Brother     Full dentures     Generalized weakness     History of ESBL E. coli infection     most recent 4-2022 f/u with ID Dr Johnson    History of gout     History of hepatitis A vaccination     History of nephrolithiasis     History of nuclear stress test     STATES IN THE 1990'S.  \"IT WAS OK\"    History of osteopenia     History of recurrent UTI (urinary tract infection)     Hydronephrosis of left kidney 07/18/2019    Hydronephrosis with renal and ureteral " calculus obstruction 10/21/2019    Added automatically from request for surgery [id number]    Hydronephrosis with ureteral stricture     Hypercholesterolemia     Hyperkalemia     PT UNSURE REGARDING HX OF THIS    Hyperlipidemia     Hypertension     Impaired functional mobility, balance, gait, and endurance     Insomnia     IPF (idiopathic pulmonary fibrosis)     per patient and son, dx at [facility], was told no treatment necessary, not to worry about it    Malignant hyperthermia due to anesthesia     PER PT REPORT, BROTHER HAD DURING LUNG SURGERY SEVERAL YEARS AGO.  STATED THEY PACKED HIM ON ICE.  STATED HE DID SURVIVE.  PT DENIES ANY PERSONAL HX OF MALIGNANT HYPERTHERMIA HIMSELF, OR ANY ISSUES WITH ANESTHESIA.  STATES TO HIS KNOWLEDGE, NO OTHER FAMILY MEMBER HAS THIS ISSUE EXCEPT FOR HIS BROTHER.    On supplemental oxygen therapy     2L NC QHS    Other hydronephrosis 08/12/2019    Added automatically from request for surgery [id number]    Paroxysmal atrial fibrillation     Pulmonary hypertension, mild 02/2021    per echo per cardiology note 1/9/23, per pt's son, PCP does not agree with this dx    Renal insufficiency     Sepsis 2019    Shortness of breath     STATES WITH EXERTION, OTHERWISE, DENIES    Sigmoid diverticulitis without abscess or perforation 08/09/2017    Sinus node dysfunction     Skin breakdown     fourth toe right foot noted in 2019 MD D/C note    Skin ulcer of fourth toe of right foot, limited to breakdown of skin 07/05/2019    Stricture of ureter     Type 2 diabetes mellitus     Ureteral stricture, left     UTI (urinary tract infection) 07/18/2019    Vitamin D deficiency     Wears glasses              Past Surgical History:   Procedure Laterality Date    APPENDECTOMY      CARDIAC ELECTROPHYSIOLOGY PROCEDURE N/A 12/23/2020    Procedure: Pacemaker DC new. DNS meds.;  Surgeon: Jimy Ledezma DO;  Location: [facility] INVASIVE LOCATION;  Service: Cardiology;  Laterality: N/A;    CHOLECYSTECTOMY      CIRCUMCISION  N/A 10/23/2019    Procedure: CIRCUMCISIOn-DORSAL SLIT;  Surgeon: Griffin Romero MD;  Location:  [facility];  Service: Urology    COLONOSCOPY      CYSTOSCOPY RETROGRADE PYELOGRAM Left 6/17/2022    Procedure: CYSTOSCOPY RETROGRADE PYELOGRAM;  Surgeon: Ryne Mccann MD;  Location:  [facility];  Service: Urology;  Laterality: Left;    CYSTOSCOPY URETEROSCOPY Left 2/11/2019    Procedure: URETEROSCOPY WITH STENT EXTRACTION, RPG;  Surgeon: Griffin Romero MD;  Location:  [facility];  Service: Urology    CYSTOSCOPY W/ URETERAL STENT PLACEMENT Left 7/20/2019    Procedure: CYSTOSCOPY, LEFT RETROGRADE PYELOGRAM,  ATTEMPTED LEFT URETERAL STENT INSERTION;  Surgeon: Isaac Flynn MD;  Location:  [facility];  Service: Urology    CYSTOSCOPY W/ URETERAL STENT PLACEMENT Left 6/17/2022    Procedure: CYSTOSCOPY URETERAL CATHETER/STENT INSERTION;  Surgeon: Ryne Mccann MD;  Location:  [facility];  Service: Urology;  Laterality: Left;    CYSTOSCOPY W/ URETERAL STENT PLACEMENT Left 1/27/2023    Procedure: CYSTOSCOPY URETERAL CATHETER/STENT INSERTION;  Surgeon: Deanne Pitts MD;  Location:  [facility];  Service: Urology;  Laterality: Left;    EYE SURGERY      CATARACTS REMOVED BILATERALLY    KNEE ARTHROPLASTY Bilateral     KNEE ARTHROSCOPY Bilateral     RENAL ARTERY STENT      SEVERAL TIMES EVERY SINCE 1978    URETERAL STENT INSERTION  10/2020    URETEROSCOPY LASER LITHOTRIPSY WITH STENT INSERTION Left 1/10/2018    Procedure:  cysto, left ureteral stent replacement ;  Surgeon: Griffin Romero MD;  Location:  [facility];  Service:     URETEROSCOPY LASER LITHOTRIPSY WITH STENT INSERTION Left 8/14/2019    Procedure: URETEROSCOPY, STONE REMOVAL WITH STENT INSERTION;  Surgeon: Griffin Romero MD;  Location:  [facility];  Service: Urology    URETEROSCOPY LASER LITHOTRIPSY WITH STENT INSERTION Left 10/23/2019    Procedure: Left URETEROSCOPY WITH STENT INSERTION-LEFT;  Surgeon: Griffin Romero MD;  Location:  [facility];  Service: Urology        Family History:  family history includes Brain cancer in his brother and sister; Heart attack in his sister; Hypertension in his sister; Lung cancer in his brother and sister; Malig Hyperthermia in his brother; Stroke in his sister.     Social History:  reports that he quit smoking about 72 years ago. His smoking use included cigarettes. He has a 0.50 pack-year smoking history. He has never used smokeless tobacco. He reports that he does not drink alcohol and does not use drugs.  Social History     Social History Narrative    Pt lives alone    Granddaughter Erika is nurse.     Had 7 brothers and 3 sisters.        Medications:  Dexcom G7 Sensor, Insulin Glargine (1 Unit Dial), Insulin Pen Needle, Multiple Vitamins-Minerals, Probiotic Product, SITagliptin, acetaminophen, allopurinol, apixaban, aspirin, bisoprolol, finasteride, glimepiride, glucose blood, isosorbide mononitrate, levothyroxine, losartan, nitroglycerin, and silodosin    Allergies   Allergen Reactions    Metformin Diarrhea and GI Intolerance    Statins Diarrhea and Myalgia       Objective   Objective     Vital Signs:   Temp:  [97.9 °F (36.6 °C)] 97.9 °F (36.6 °C)  Heart Rate:  [61-64] 62  Resp:  [18] 18  BP: (118-143)/() 122/80    Physical Exam  Vitals and nursing note reviewed.   Constitutional:       General: He is not in acute distress.     Appearance: Normal appearance. He is not ill-appearing, toxic-appearing or diaphoretic.   HENT:      Head: Normocephalic and atraumatic.      Nose: Nose normal.      Mouth/Throat:      Mouth: Mucous membranes are dry.      Pharynx: Oropharynx is clear.   Eyes:      Extraocular Movements: Extraocular movements intact.      Conjunctiva/sclera: Conjunctivae normal.      Pupils: Pupils are equal, round, and reactive to light.   Cardiovascular:      Rate and Rhythm: Normal rate and regular rhythm.      Pulses: Normal pulses.      Heart sounds: Normal heart sounds.   Pulmonary:      Effort: Pulmonary effort is  normal.      Breath sounds: Normal breath sounds.   Abdominal:      General: Bowel sounds are normal. There is no distension.      Palpations: Abdomen is soft. There is no mass.      Tenderness: There is no abdominal tenderness. There is no right CVA tenderness, left CVA tenderness, guarding or rebound.      Hernia: No hernia is present.   Musculoskeletal:         General: No swelling, tenderness, deformity or signs of injury. Normal range of motion.      Cervical back: Normal range of motion and neck supple.      Right lower leg: No edema.      Left lower leg: No edema.   Skin:     General: Skin is warm and dry.   Neurological:      General: No focal deficit present.      Mental Status: He is alert and oriented to person, place, and time. Mental status is at baseline.   Psychiatric:         Mood and Affect: Mood normal.         Behavior: Behavior normal.         Thought Content: Thought content normal.         Judgment: Judgment normal.          Result Review:  I have personally reviewed the results from the time of this admission to 5/1/2023 21:20 EDT and agree with these findings:  [x]  Laboratory list / accordion  [x]  Microbiology  [x]  Radiology  [x]  EKG/Telemetry   []  Cardiology/Vascular   []  Pathology  []  Old records  []  Other:  Most notable findings include:     LAB RESULTS:      Lab 05/01/23  1627   WBC 12.29*   HEMOGLOBIN 12.4*   HEMATOCRIT 39.8   PLATELETS 241   NEUTROS ABS 7.33*   IMMATURE GRANS (ABS) 0.08*   LYMPHS ABS 3.54*   MONOS ABS 0.85   EOS ABS 0.38   .0*         Lab 05/01/23  1632 05/01/23  1631 05/01/23  1627   SODIUM  --   --  137   POTASSIUM  --   --  4.8   CHLORIDE  --   --  106   CO2  --   --  20.0*   ANION GAP  --   --  11.0   BUN  --   --  28*   CREATININE 1.90 1.90* 1.76*   EGFR  --   --  36.3*   GLUCOSE  --   --  138*   CALCIUM  --   --  9.7*         Lab 05/01/23  1627   TOTAL PROTEIN 7.8   ALBUMIN 4.0   GLOBULIN 3.8   ALT (SGPT) 14   AST (SGOT) 21   BILIRUBIN 0.4   ALK  PHOS 100                     Brief Urine Lab Results  (Last result in the past 365 days)        Color   Clarity   Blood   Leuk Est   Nitrite   Protein   CREAT   Urine HCG        05/01/23 1556 Yellow   Turbid   Negative   Negative   Negative   >=300 mg/dL (3+)                 Microbiology Results (last 10 days)       ** No results found for the last 240 hours. **            CT Abdomen Pelvis Without Contrast    Result Date: 5/1/2023  CT ABDOMEN PELVIS WO CONTRAST Date of Exam: 5/1/2023 5:35 PM EDT Indication: hematuria. Comparison: Abdomen pelvis CT scan 1/26/2021 Technique: Axial CT images were obtained of the abdomen and pelvis without the administration of contrast. Reconstructed coronal and sagittal images were also obtained. Automated exposure control and iterative construction methods were used. Findings: Double-J left ureteral stent is again seen in place, with persistent dilatation of the left renal collecting system and ureter, but very similar in extent to the exam from 2021. No left-sided perinephric edema is seen. Left renal cortex is again noted to  be atrophic. Best appreciated on coronal images of today's study, specifically coronal images 71 through 73, is hyperdense appearance of the upper one half of the dilated left renal pelvis, which measures 46 Hounsfield units. By contrast the lower half of the dilated renal pelvis measures fluid density at 13 H. U. Findings presumably represent some aging clot. No mass effect is seen to favor that this is tumor. On the right, there may be a few tiny cysts but the right kidney otherwise appears normal for age. No right ureteral dilatation or calculus is seen. The bladder is thick walled and shows some new pericystic inflammation anteriorly at the dome of the bladder. No hemorrhage or mass is appreciated. Elsewhere, included lungs show similar degree of fibrosis and bullous cystic change as well as mild bronchiectasis. No active lower lung disease is seen.  Gallbladder is surgically absent. Liver, spleen, pancreas, and adrenal glands are unremarkable. Bowel loops are normal in caliber and normal in appearance except for extensive sigmoid diverticulosis. No inflammatory changes are seen to suggest diverticulitis. Bony structures appear to be intact.     Impression: 1. Left double-J ureteral stent remains in place. New hyperdense material in the upper one half of the dilated left renal pelvis, whether clot or, less likely, tumor. 2. New anterior and superior bladder wall inflammation consistent with cystitis. 3. No evidence of acute intra-abdominal or intrapelvic disease elsewhere. Extensive sigmoid reticulosis without evidence of diverticulitis. 4. Extensive but stable changes of fibrosis of the lower lungs. Electronically Signed: Felix aSmson  5/1/2023 6:21 PM EDT  Workstation ID: [id number]      Results for orders placed in visit on 02/08/21    Adult Transthoracic Echo Complete W/ Cont if Necessary Per Protocol    Interpretation Summary  · Left ventricular wall thickness is consistent with mild concentric hypertrophy.  · Estimated left ventricular EF = 64% Left ventricular ejection fraction appears to be 61 - 65%. Left ventricular systolic function is normal.  · Left ventricular diastolic function is consistent with (grade I) impaired relaxation.  · The right ventricular cavity is mildly dilated.  · The right atrial cavity is mildly dilated.  · There is severe calcification of the aortic valve mainly affecting the non-coronary, left coronary and right coronary cusp(s).  · Estimated right ventricular systolic pressure from tricuspid regurgitation is mildly elevated (35-45 mmHg).  · Mild pulmonary hypertension is present.      Assessment & Plan   Assessment & Plan       Gross hematuria    Essential hypertension    Acquired hypothyroidism    CKD (chronic kidney disease) stage 3, GFR 30-59 ml/min    IPF (idiopathic pulmonary fibrosis)    History of pulmonary embolism    Type 2  diabetes mellitus with hyperglycemia, with long-term current use of insulin    CAD (coronary artery disease)    Diastolic congestive heart failure    Acute cystitis with hematuria      89-year-old male presents the ED with hematuria that initially started Saturday evening.     1) gross hematuria      Acute cystitis   Presence of J stent  -CT of abdomen and pelvis as mentioned above  - HOLD on CBIunless retains urine or clot--   - Consult urology in a.m.-- to eval density in the dilated left renal pelvis  - Hold Eliquis  - Trend H&H  - Type and screen  - Strict I's and O's  - UA with noted too numerous to count WBC, negative blood, too numerous to count RBC, 3+ bacteria.  Prior urine cultures reviewed positive growth for yeast and ESBL E. coli  - Urine cultures pending, will initiate Invanz  -Consider ID consult, has previously been followed by Dr. Johnson    2) CKD stage III  - Baseline creatinine 1.4-1.9  - Hold nephrotoxic agents  - Repeat labs in a.m.    3) essential hypertension  - On Cozaar and Zebeta with parameters    4) hypothyroidism  - Continue Synthroid    5) BPH  - On silodosin and finasteride    6) diabetes mellitus type 2  - Check hemoglobin A1c  - Fingersticks before meals and at bedtime  - Start low-dose sliding scale insulin    7) paroxysmal atrial fibrillation      History of PE  - On Zebeta and Eliquis  - Last Eliquis dose was this morning  - Hold Eliquis due to #1  --CONSIDER echo to eval Aorta      DVT prophylaxis:  scds    CODE STATUS:  Full Code        Expected Discharge  TBD    This note has been completed as part of a split-shared workflow.     Signature:Electronically signed by JOYCE Oscar, 05/01/23, 9:30 PM EDT.      Total time spent: 70 mins  Time spent includes time reviewing chart, face-to-face time, counseling patient/family/caregiver, ordering medications/tests/procedures, communicating with other health care professionals, documenting clinical information in the  electronic health record, and coordination of care.    Patient seen and examined with Anabella.  Patient looks great, hungry and ready to celebrate his 89st birthday.    Agree with above.  Keely Reid MD 05/01/23 21:39 EDT

## 2023-05-02 NOTE — TELEPHONE ENCOUNTER
----- Message from JOYCE Proctor sent at 5/2/2023  1:10 PM EDT -----  Regarding: Follow up  Hi,     Can we please get patient scheduled for follow up sometime early next week with Dr. Pitts. Will have caceres catheter in place.     Thank you,  Shayy Pedro      2.663 2.57

## 2023-05-03 LAB
ANION GAP SERPL CALCULATED.3IONS-SCNC: 9 MMOL/L (ref 5–15)
BACTERIA UR CULT: NO GROWTH
BACTERIA UR CULT: NORMAL
BUN SERPL-MCNC: 26 MG/DL (ref 8–23)
BUN/CREAT SERPL: 20.5 (ref 7–25)
CALCIUM SPEC-SCNC: 8.8 MG/DL (ref 8.2–9.6)
CHLORIDE SERPL-SCNC: 109 MMOL/L (ref 98–107)
CO2 SERPL-SCNC: 22 MMOL/L (ref 22–29)
CREAT SERPL-MCNC: 1.27 MG/DL (ref 0.76–1.27)
EGFRCR SERPLBLD CKD-EPI 2021: 53.7 ML/MIN/1.73
GLUCOSE BLDC GLUCOMTR-MCNC: 146 MG/DL (ref 70–130)
GLUCOSE BLDC GLUCOMTR-MCNC: 152 MG/DL (ref 70–130)
GLUCOSE BLDC GLUCOMTR-MCNC: 168 MG/DL (ref 70–130)
GLUCOSE BLDC GLUCOMTR-MCNC: 176 MG/DL (ref 70–130)
GLUCOSE SERPL-MCNC: 188 MG/DL (ref 65–99)
HCT VFR BLD AUTO: 35.7 % (ref 37.5–51)
HGB BLD-MCNC: 11 G/DL (ref 13–17.7)
POTASSIUM SERPL-SCNC: 4.6 MMOL/L (ref 3.5–5.2)
SODIUM SERPL-SCNC: 140 MMOL/L (ref 136–145)

## 2023-05-03 PROCEDURE — 80048 BASIC METABOLIC PNL TOTAL CA: CPT | Performed by: INTERNAL MEDICINE

## 2023-05-03 PROCEDURE — 99233 SBSQ HOSP IP/OBS HIGH 50: CPT | Performed by: NURSE PRACTITIONER

## 2023-05-03 PROCEDURE — 85014 HEMATOCRIT: CPT | Performed by: INTERNAL MEDICINE

## 2023-05-03 PROCEDURE — G0378 HOSPITAL OBSERVATION PER HR: HCPCS

## 2023-05-03 PROCEDURE — 63710000001 INSULIN LISPRO (HUMAN) PER 5 UNITS: Performed by: NURSE PRACTITIONER

## 2023-05-03 PROCEDURE — 82948 REAGENT STRIP/BLOOD GLUCOSE: CPT

## 2023-05-03 PROCEDURE — 85018 HEMOGLOBIN: CPT | Performed by: INTERNAL MEDICINE

## 2023-05-03 PROCEDURE — 99231 SBSQ HOSP IP/OBS SF/LOW 25: CPT | Performed by: PHYSICIAN ASSISTANT

## 2023-05-03 RX ADMIN — Medication 1 CAPSULE: at 08:23

## 2023-05-03 RX ADMIN — Medication 10 ML: at 20:55

## 2023-05-03 RX ADMIN — BISOPROLOL FUMARATE 5 MG: 5 TABLET ORAL at 08:23

## 2023-05-03 RX ADMIN — TAMSULOSIN HYDROCHLORIDE 0.4 MG: 0.4 CAPSULE ORAL at 08:23

## 2023-05-03 RX ADMIN — ALLOPURINOL 300 MG: 300 TABLET ORAL at 20:53

## 2023-05-03 RX ADMIN — LEVOTHYROXINE SODIUM 50 MCG: 0.05 TABLET ORAL at 08:23

## 2023-05-03 RX ADMIN — FINASTERIDE 5 MG: 5 TABLET, FILM COATED ORAL at 08:23

## 2023-05-03 RX ADMIN — INSULIN LISPRO 2 UNITS: 100 INJECTION, SOLUTION INTRAVENOUS; SUBCUTANEOUS at 13:37

## 2023-05-03 RX ADMIN — INSULIN LISPRO 2 UNITS: 100 INJECTION, SOLUTION INTRAVENOUS; SUBCUTANEOUS at 20:55

## 2023-05-03 RX ADMIN — INSULIN LISPRO 2 UNITS: 100 INJECTION, SOLUTION INTRAVENOUS; SUBCUTANEOUS at 08:23

## 2023-05-03 NOTE — PROGRESS NOTES
CBI on slow drip. Urine output quin/brown in caceres bag. Urine clear in tubing. He is resting in bed comfortably, he denies any pain. I turned CBI to minimal drip.

## 2023-05-03 NOTE — PROGRESS NOTES
[facility]   Urology Progress Note    Patient Name: Forrest Zepeda  : 1932  MRN: [id number]  Primary Care Physician:  Kary Wen MD  Date of admission: 2023    Subjective   Subjective     Chief Complaint: Hematuria    HPI:  Patient resting in bed. He denies pain. Son at bedside. CBI on medium drip with light red/pink urine in caceres bag. Clear urine output in tubing.    HCT 35.7.   Cr 1.27.     Urine Culture no growth.     Review of Systems   All systems were reviewed and negative except for: hematuria    Objective   Objective     Vitals:   Temp:  [97.7 °F (36.5 °C)-98.7 °F (37.1 °C)] 97.9 °F (36.6 °C)  Heart Rate:  [61-70] 68  Resp:  [16-18] 16  BP: (110-136)/(61-67) 126/67  Flow (L/min):  [2] 2  Physical Exam    Constitutional: Awake in bed, alert   Eyes: PERRLA, sclerae anicteric, no conjunctival injection   HENT: Normocephalic, atraumatic, mucous membranes moist   Neck: Supple, trachea midline   Respiratory: Equal chest rise, 2 L O2   Cardiovascular: RRR, palpable radial pulses bilaterally   Gastrointestinal: Soft, nontender, non-distended   Genitourinary: 3 way caceres catheter with CBI on medium drip. Light red/pink urine in caceres bag. Clear urine in tubing.    Musculoskeletal: No lower extremity edema bilaterally, no clubbing or cyanosis to extremities   Psychiatric: Appropriate affect, cooperative   Neurologic: Oriented x 3,  Cranial Nerves grossly intact, speech clear   Skin: No rashes     Result Review    Result Review:  I have personally reviewed the results from the time of this admission to 5/3/2023 09:02 EDT and agree with these findings:  [x]  Laboratory  [x]  Microbiology  []  Radiology  []  EKG/Telemetry   []  Cardiology/Vascular   []  Pathology  []  Old records  [x]  Other: Vital signs    Most notable findings include: Gross hematuria    Assessment & Plan   Assessment / Plan     Brief Patient Summary:  Forrest Zepeda is a 89 y.o. male with history of chronic left ureteral  ritchie who presented to [facility] for worsening gross hematuria.     Active Hospital Problems:  Active Hospital Problems    Diagnosis    • **Gross hematuria    • Acute cystitis with hematuria    • CAD (coronary artery disease)    • Diastolic congestive heart failure    • Type 2 diabetes mellitus with hyperglycemia, with long-term current use of insulin    • History of pulmonary embolism    • IPF (idiopathic pulmonary fibrosis)    • CKD (chronic kidney disease) stage 3, GFR 30-59 ml/min    • Acquired hypothyroidism    • Essential hypertension        Plan:   -I turned CBI down to slow drip, will monitor uop. Continue to wean CBI as tolerated.  -Hold Eliquis x5-7 days.   -Trend H&H, transfuse PRN.   -Keep caceres catheter in place at discharge with outpatient follow up with Dr. Pitts.    will follow, please call if any questions.   D/w Dr. Herrera.       DVT prophylaxis:  Mechanical DVT prophylaxis orders are present.    CODE STATUS:   Level Of Support Discussed With: Patient  Code Status (Patient has no pulse and is not breathing): CPR (Attempt to Resuscitate)  Medical Interventions (Patient has pulse or is breathing): Full Support    Disposition:  I expect patient to remain inpatient.    Electronically signed by JOYCE Proctor, 05/03/23, 9:02 AM EDT.

## 2023-05-03 NOTE — PROGRESS NOTES
"    [facility] Medicine Services  PROGRESS NOTE    Patient Name: Forrest Zepeda  : 1932  MRN: [id number]    Date of Admission: 2023  Primary Care Physician: Kary Wen MD    Subjective     CC: f/u hematuria    HPI:  In bed. Son at bedside. Tolerating CBI, urine color slowly improving. Wants to go home soon. On 2L NC - says he wears oxygen \"when I want to\" at home     ROS:  Gen- No fevers, chills  CV- No chest pain, palpitations  Resp- No cough, dyspnea  GI- No N/V/D, abd pain     Objective     Vital Signs:   Temp:  [97.9 °F (36.6 °C)-98.7 °F (37.1 °C)] 98.4 °F (36.9 °C)  Heart Rate:  [61-74] 66  Resp:  [16-18] 16  BP: (123-136)/(61-72) 132/72  Flow (L/min):  [2] 2     Physical Exam:  Constitutional: No acute distress, awake, alert and conversational. Laying in bed   HENT: NCAT, mucous membranes moist  Respiratory: Clear to auscultation bilaterally, respiratory effort normal on 2L NC   Cardiovascular: RRR, no murmurs, rubs, or gallops  Gastrointestinal: Positive bowel sounds, soft, nontender, nondistended  : 3-way catheter in place with CBI infusing. Red-tinged urine in caceres bag   Musculoskeletal: No bilateral ankle edema  Psychiatric: Appropriate affect, cooperative  Neurologic: Oriented x 3, moves all extremities spontaneously without focal deficits, speech clear    Results Reviewed:  LAB RESULTS:      Lab 23  0827 23  1853 23  1117 23  0611 23  0031 23  1627   WBC  --   --   --  10.64  --  12.29*   HEMOGLOBIN 11.0* 10.3* 10.9* 11.1*  11.1* 10.8* 12.4*   HEMATOCRIT 35.7* 32.8* 35.8* 36.4*  36.4* 34.5* 39.8   PLATELETS  --   --   --  200  --  241   NEUTROS ABS  --   --   --  5.61  --  7.33*   IMMATURE GRANS (ABS)  --   --   --  0.06*  --  0.08*   LYMPHS ABS  --   --   --  3.62*  --  3.54*   MONOS ABS  --   --   --  0.81  --  0.85   EOS ABS  --   --   --  0.44*  --  0.38   MCV  --   --   --  105.2*  --  101.0*         Lab " 05/03/23  0827 05/02/23  0611 05/01/23  1632 05/01/23  1631 05/01/23  1627   SODIUM 140 140  --   --  137   POTASSIUM 4.6 4.7  --   --  4.8   CHLORIDE 109* 107  --   --  106   CO2 22.0 17.0*  --   --  20.0*   ANION GAP 9.0 16.0*  --   --  11.0   BUN 26* 31*  --   --  28*   CREATININE 1.27 1.45* 1.90 1.90* 1.76*   EGFR 53.7* 45.8*  --   --  36.3*   GLUCOSE 188* 72  --   --  138*   CALCIUM 8.8 9.2  --   --  9.7*   HEMOGLOBIN A1C  --  7.30*  --   --   --          Lab 05/01/23  1627   TOTAL PROTEIN 7.8   ALBUMIN 4.0   GLOBULIN 3.8   ALT (SGPT) 14   AST (SGOT) 21   BILIRUBIN 0.4   ALK PHOS 100         Lab 05/01/23  2213   ABO TYPING O   RH TYPING Negative   ANTIBODY SCREEN Negative     Brief Urine Lab Results  (Last result in the past 365 days)      Color   Clarity   Blood   Leuk Est   Nitrite   Protein   CREAT   Urine HCG        05/01/23 1556 Yellow   Turbid   Negative   Negative   Negative   >=300 mg/dL (3+)               Microbiology Results Abnormal     Procedure Component Value - Date/Time    Urine Culture - Urine, Urine, Clean Catch [[id number]]  (Normal) Collected: 05/01/23 1556    Lab Status: Final result Specimen: Urine, Clean Catch Updated: 05/02/23 2055     Urine Culture No growth        CT Abdomen Pelvis Without Contrast    Result Date: 5/1/2023  CT ABDOMEN PELVIS WO CONTRAST Date of Exam: 5/1/2023 5:35 PM EDT Indication: hematuria. Comparison: Abdomen pelvis CT scan 1/26/2021 Technique: Axial CT images were obtained of the abdomen and pelvis without the administration of contrast. Reconstructed coronal and sagittal images were also obtained. Automated exposure control and iterative construction methods were used. Findings: Double-J left ureteral stent is again seen in place, with persistent dilatation of the left renal collecting system and ureter, but very similar in extent to the exam from 2021. No left-sided perinephric edema is seen. Left renal cortex is again noted to  be atrophic. Best appreciated on  coronal images of today's study, specifically coronal images 71 through 73, is hyperdense appearance of the upper one half of the dilated left renal pelvis, which measures 46 Hounsfield units. By contrast the lower half of the dilated renal pelvis measures fluid density at 13 H. U. Findings presumably represent some aging clot. No mass effect is seen to favor that this is tumor. On the right, there may be a few tiny cysts but the right kidney otherwise appears normal for age. No right ureteral dilatation or calculus is seen. The bladder is thick walled and shows some new pericystic inflammation anteriorly at the dome of the bladder. No hemorrhage or mass is appreciated. Elsewhere, included lungs show similar degree of fibrosis and bullous cystic change as well as mild bronchiectasis. No active lower lung disease is seen. Gallbladder is surgically absent. Liver, spleen, pancreas, and adrenal glands are unremarkable. Bowel loops are normal in caliber and normal in appearance except for extensive sigmoid diverticulosis. No inflammatory changes are seen to suggest diverticulitis. Bony structures appear to be intact.     Impression: 1. Left double-J ureteral stent remains in place. New hyperdense material in the upper one half of the dilated left renal pelvis, whether clot or, less likely, tumor. 2. New anterior and superior bladder wall inflammation consistent with cystitis. 3. No evidence of acute intra-abdominal or intrapelvic disease elsewhere. Extensive sigmoid reticulosis without evidence of diverticulitis. 4. Extensive but stable changes of fibrosis of the lower lungs. Electronically Signed: Felix Samson  5/1/2023 6:21 PM EDT  Workstation ID: [id number]    Results for orders placed in visit on 02/08/21    Adult Transthoracic Echo Complete W/ Cont if Necessary Per Protocol    Interpretation Summary  · Left ventricular wall thickness is consistent with mild concentric hypertrophy.  · Estimated left ventricular EF = 64%  Left ventricular ejection fraction appears to be 61 - 65%. Left ventricular systolic function is normal.  · Left ventricular diastolic function is consistent with (grade I) impaired relaxation.  · The right ventricular cavity is mildly dilated.  · The right atrial cavity is mildly dilated.  · There is severe calcification of the aortic valve mainly affecting the non-coronary, left coronary and right coronary cusp(s).  · Estimated right ventricular systolic pressure from tricuspid regurgitation is mildly elevated (35-45 mmHg).  · Mild pulmonary hypertension is present.    Current medications:  Scheduled Meds:allopurinol, 300 mg, Oral, Nightly  bisoprolol, 5 mg, Oral, Daily  ertapenem, 1 g, Intravenous, Q24H  finasteride, 5 mg, Oral, Daily  insulin lispro, 0-7 Units, Subcutaneous, 4x Daily With Meals & Nightly  lactobacillus acidophilus, 1 capsule, Oral, BID  levothyroxine, 50 mcg, Oral, Daily  losartan, 25 mg, Oral, QAM  sodium chloride, 10 mL, Intravenous, Q12H  tamsulosin, 0.4 mg, Oral, Daily      Continuous Infusions:   PRN Meds:.•  acetaminophen **OR** acetaminophen **OR** acetaminophen  •  dextrose  •  dextrose  •  glucagon (human recombinant)  •  sodium chloride  •  sodium chloride    Assessment & Plan     Active Hospital Problems    Diagnosis  POA   • **Gross hematuria [R31.0]  Yes   • Acute cystitis with hematuria [N30.01]  Yes   • CAD (coronary artery disease) [I25.10]  Yes   • Diastolic congestive heart failure [I50.30]  Yes   • Type 2 diabetes mellitus with hyperglycemia, with long-term current use of insulin [E11.65, Z79.4]  Not Applicable   • History of pulmonary embolism [Z86.711]  Yes   • IPF (idiopathic pulmonary fibrosis) [J84.112]  Yes   • CKD (chronic kidney disease) stage 3, GFR 30-59 ml/min [N18.30]  Yes   • Acquired hypothyroidism [E03.9]  Yes   • Essential hypertension [I10]  Yes      Resolved Hospital Problems   No resolved problems to display.     Brief Hospital Course to date:  Forrest Pineda  Duane is a 89 y.o. male with PMH significant for HTN, HFpEF, CAD, PAF, idiopathic pulmonary fibrosis, prior PE, CKD III, hypothyroidism and ureteral strictures followed by Dr. Pitts - most recently s/p cystoscopy with double-J TRIA stent exchange 1/27/23. He presented to [facility] ED 5/1/23 for gross hematuria    Gross hematuria  History of ureteral stricture with J-stent  BPH  - Appreciate urology assistance - started on CBI  - Urology recommends holding Eliquis 5-7 days  - Urine culture NGTD - stop Invanz  - Continue Tamsulosin   - H&H stable.     pAfib  Hx PE  Chronic HFpEF, compensated  CAD  - Continue Bisoprolol and Losartan  - ASA and Eliquis on hold for hematuria     CKD III  - Baseline creatinine 1.4-1.8  - Cr 1.90 on admission, improved to 1.27 today    DM type 2, a1c 7.3%, w/ long term use of insulin  - Reasonable control on SSI - continue     Hypothyroidism  - Continue Levothyroxine      Expected Discharge Location and Transportation: home  Expected Discharge Expected Discharge Date: 5/5/2023; Expected Discharge Time:      DVT prophylaxis:Mechanical DVT prophylaxis orders are present.     AM-PAC 6 Clicks Score (PT): 17 (05/02/23 2000)    CODE STATUS:   Code Status and Medical Interventions:   Ordered at: 05/01/23 9700     Level Of Support Discussed With:    Patient     Code Status (Patient has no pulse and is not breathing):    CPR (Attempt to Resuscitate)     Medical Interventions (Patient has pulse or is breathing):    Full Support     Le Lund PA-C  05/03/23

## 2023-05-03 NOTE — PLAN OF CARE
Goal Outcome Evaluation:              Outcome Evaluation: VSS.  Patient has been on room air all shift.  A&O x4.  Antibiotic infused.  No complaints of pain.  Patient with CBI and urine is changing from light red to yellow.  Will continue to follow plan of care.

## 2023-05-03 NOTE — CASE MANAGEMENT/SOCIAL WORK
Continued Stay Note  [facility]     Patient Name: Forrest Zepeda  MRN: [id number]  Today's Date: 5/3/2023    Admit Date: 5/1/2023    Plan: home   Discharge Plan     Row Name 05/03/23 1312       Plan    Plan home    Patient/Family in Agreement with Plan yes    Plan Comments Plan is still home at discharge.  CM will continue to follow for discharge planning.    Final Discharge Disposition Code 01 - home or self-care               Discharge Codes    No documentation.               Expected Discharge Date and Time     Expected Discharge Date Expected Discharge Time    May 5, 2023             Jillian Gee RN

## 2023-05-04 ENCOUNTER — READMISSION MANAGEMENT (OUTPATIENT)
Dept: CALL CENTER | Facility: HOSPITAL | Age: 88
End: 2023-05-04
Payer: MEDICARE

## 2023-05-04 ENCOUNTER — APPOINTMENT (OUTPATIENT)
Dept: ULTRASOUND IMAGING | Facility: HOSPITAL | Age: 88
End: 2023-05-04
Payer: MEDICARE

## 2023-05-04 VITALS
TEMPERATURE: 99.1 F | BODY MASS INDEX: 30.94 KG/M2 | OXYGEN SATURATION: 97 % | DIASTOLIC BLOOD PRESSURE: 65 MMHG | RESPIRATION RATE: 18 BRPM | HEART RATE: 80 BPM | SYSTOLIC BLOOD PRESSURE: 135 MMHG | HEIGHT: 69 IN | WEIGHT: 208.9 LBS

## 2023-05-04 PROBLEM — I50.32 CHRONIC HEART FAILURE WITH PRESERVED EJECTION FRACTION (HFPEF): Status: ACTIVE | Noted: 2023-05-04

## 2023-05-04 LAB
ANION GAP SERPL CALCULATED.3IONS-SCNC: 10 MMOL/L (ref 5–15)
BUN SERPL-MCNC: 23 MG/DL (ref 8–23)
BUN/CREAT SERPL: 18.7 (ref 7–25)
CALCIUM SPEC-SCNC: 9 MG/DL (ref 8.2–9.6)
CHLORIDE SERPL-SCNC: 106 MMOL/L (ref 98–107)
CO2 SERPL-SCNC: 21 MMOL/L (ref 22–29)
CREAT SERPL-MCNC: 1.23 MG/DL (ref 0.76–1.27)
DEPRECATED RDW RBC AUTO: 56.5 FL (ref 37–54)
EGFRCR SERPLBLD CKD-EPI 2021: 55.8 ML/MIN/1.73
ERYTHROCYTE [DISTWIDTH] IN BLOOD BY AUTOMATED COUNT: 15.3 % (ref 12.3–15.4)
GLUCOSE BLDC GLUCOMTR-MCNC: 166 MG/DL (ref 70–130)
GLUCOSE BLDC GLUCOMTR-MCNC: 251 MG/DL (ref 70–130)
GLUCOSE SERPL-MCNC: 162 MG/DL (ref 65–99)
HCT VFR BLD AUTO: 31.7 % (ref 37.5–51)
HGB BLD-MCNC: 10.4 G/DL (ref 13–17.7)
MCH RBC QN AUTO: 32.8 PG (ref 26.6–33)
MCHC RBC AUTO-ENTMCNC: 32.8 G/DL (ref 31.5–35.7)
MCV RBC AUTO: 100 FL (ref 79–97)
PLATELET # BLD AUTO: 197 10*3/MM3 (ref 140–450)
PMV BLD AUTO: 9.8 FL (ref 6–12)
POTASSIUM SERPL-SCNC: 4.6 MMOL/L (ref 3.5–5.2)
RBC # BLD AUTO: 3.17 10*6/MM3 (ref 4.14–5.8)
SODIUM SERPL-SCNC: 137 MMOL/L (ref 136–145)
WBC NRBC COR # BLD: 14.07 10*3/MM3 (ref 3.4–10.8)

## 2023-05-04 PROCEDURE — 85027 COMPLETE CBC AUTOMATED: CPT | Performed by: PHYSICIAN ASSISTANT

## 2023-05-04 PROCEDURE — 99233 SBSQ HOSP IP/OBS HIGH 50: CPT | Performed by: NURSE PRACTITIONER

## 2023-05-04 PROCEDURE — 99239 HOSP IP/OBS DSCHRG MGMT >30: CPT | Performed by: NURSE PRACTITIONER

## 2023-05-04 PROCEDURE — 82948 REAGENT STRIP/BLOOD GLUCOSE: CPT

## 2023-05-04 PROCEDURE — 76870 US EXAM SCROTUM: CPT

## 2023-05-04 PROCEDURE — 97162 PT EVAL MOD COMPLEX 30 MIN: CPT

## 2023-05-04 PROCEDURE — 63710000001 INSULIN LISPRO (HUMAN) PER 5 UNITS: Performed by: NURSE PRACTITIONER

## 2023-05-04 PROCEDURE — 80048 BASIC METABOLIC PNL TOTAL CA: CPT | Performed by: PHYSICIAN ASSISTANT

## 2023-05-04 RX ADMIN — BISOPROLOL FUMARATE 5 MG: 5 TABLET ORAL at 09:17

## 2023-05-04 RX ADMIN — TAMSULOSIN HYDROCHLORIDE 0.4 MG: 0.4 CAPSULE ORAL at 09:17

## 2023-05-04 RX ADMIN — FINASTERIDE 5 MG: 5 TABLET, FILM COATED ORAL at 09:17

## 2023-05-04 RX ADMIN — LEVOTHYROXINE SODIUM 50 MCG: 0.05 TABLET ORAL at 09:17

## 2023-05-04 RX ADMIN — Medication 1 CAPSULE: at 09:17

## 2023-05-04 RX ADMIN — INSULIN LISPRO 2 UNITS: 100 INJECTION, SOLUTION INTRAVENOUS; SUBCUTANEOUS at 09:16

## 2023-05-04 NOTE — OUTREACH NOTE
Prep Survey    Flowsheet Row Responses   [facility] facility patient discharged from? [place]   Is LACE score < 7 ? No   Eligibility [facility]   Date of Admission 05/01/23   Date of Discharge 05/04/23   Discharge Disposition Home or Self Care   Discharge diagnosis Gross hematuria    Does the patient have one of the following disease processes/diagnoses(primary or secondary)? Other   Does the patient have Home health ordered? No   Is there a DME ordered? Yes   What DME was ordered? Has O2 at 2L prn supplied by [employer]   Prep survey completed? Yes          DAR BRYANT - Registered Nurse

## 2023-05-04 NOTE — PLAN OF CARE
Goal Outcome Evaluation:  Plan of Care Reviewed With: patient        Progress: no change  Outcome Evaluation: PT eval completed. Patient presents below baseline for functional mobility. Patient wiht SOA with transfers and weakness with transfers. Patient completed 3 sit to stands, requiring modA for each stand. Patient with difficulty with steps initally demonstrating significant forward trunk flexion and short staggered steps requiring cues. Patient demonstrates decreased activity tolerance, deconditioning and reduced dynamic balance. Patient would continue to benefit from skilled PT services to improve dynamic balance with gait, transfers strength, and activity tolerance training in order to build endurance and reduce risk of falls. Anticipate discharge home with supervision/ HHPT.

## 2023-05-04 NOTE — DISCHARGE SUMMARY
[facility] Medicine Services  DISCHARGE SUMMARY    Patient Name: Forrest Zepeda  : 1932  MRN: [id number]    Date of Admission: 2023  Date of Discharge:  2023  Primary Care Physician: Kary Wen MD    Consults     Date and Time Order Name Status Description    2023 12:34 AM Inpatient Urology Consult Completed           Hospital Course     Presenting Problem:   Gross hematuria [R31.0]  Chronic heart failure with preserved ejection fraction (HFpEF) [I50.32]    Active Hospital Problems    Diagnosis  POA   • **Gross hematuria [R31.0]  Yes   • Chronic heart failure with preserved ejection fraction (HFpEF) [I50.32]  Yes   • Acute cystitis with hematuria [N30.01]  Yes   • CAD (coronary artery disease) [I25.10]  Yes   • Diastolic congestive heart failure [I50.30]  Yes   • Type 2 diabetes mellitus with hyperglycemia, with long-term current use of insulin [E11.65, Z79.4]  Not Applicable   • History of pulmonary embolism [Z86.711]  Yes   • IPF (idiopathic pulmonary fibrosis) [J84.112]  Yes   • CKD (chronic kidney disease) stage 3, GFR 30-59 ml/min [N18.30]  Yes   • Acquired hypothyroidism [E03.9]  Yes   • Essential hypertension [I10]  Yes      Resolved Hospital Problems   No resolved problems to display.      Hospital Course:  Forrest Zepeda is a 89 y.o. male PMH HTN, HFpEF, CAD, PAF, idiopathic pulmonary fibrosis, prior PE, CKD 3, hypothyroidism and urethral strictures followed by Dr. Pitts recently s/p cystoscopy with double J Tria stent exchange 2023.  He presented on 2023 with gross hematuria.  Urology consulted started CBI.  Eliquis/Asa held with improvement of symptoms.  Aspirin and Eliquis will restart  on 2023.      Discharge Follow Up Recommendations for labs/diagnostics:  Follow-up with PCP in 1 week  Follow-up with Dr. Pitts In 2 weeks    Day of Discharge     HPI:   Patient sitting up in bed with family at bedside.  Patient reports he is ready  to go home.    Review of Systems  Gen- No fevers, chills  CV- No chest pain, palpitations  Resp- No cough, dyspnea  GI- No N/V/D, abd pain    Otherwise ROS is negative except as mentioned in the HPI.    Vital Signs:   Temp:  [97.9 °F (36.6 °C)-99.2 °F (37.3 °C)] 99.1 °F (37.3 °C)  Heart Rate:  [66-94] 82  Resp:  [16-18] 18  BP: (120-139)/(58-74) 135/65     Physical Exam:  Constitutional: Awake, alert, NAD  HENT: NCAT, mucous membranes moist  Respiratory: Clear to auscultation bilaterally, nonlabored respirations   Cardiovascular: RRR, no murmurs, rubs, or gallop  Gastrointestinal: Positive bowel sounds, soft, nontender, nondistended  - CBI off. F/C with blood tinged urine  Musculoskeletal: No bilateral ankle edema  Psychiatric: Appropriate affect, cooperative  Neurologic: Oriented x 3, strength symmetric in all extremities, Cranial Nerves grossly intact to confrontation, speech clear  Skin: No rashes    Plan:  Gross hematuria-improving  History of urethral stricture with J stent  BPH  - Urology consulted recommending CBI.  Eliquis/aspirin held until 5/8/2023.  Urine culture showed NGTD so Invanz was stopped.  Patient continues on Flomax.  H&H stable    PAF  History of PE  Chronic HFpEF compensated  CAD  - Bisoprolol, losartan.  Aspirin and Eliquis will resume on 5/8/2023    CKD 3  - Baseline creatinine 1.4-1.5.  Today, creatinine is 1.23    DM2 with A1c 7.3    Hypothyroidism  - Levothyroxine      Pertinent  and/or Most Recent Results     Results from last 7 days   Lab Units 05/04/23  0724 05/03/23  0827 05/02/23  1853 05/02/23  1117 05/02/23  0611 05/02/23  0031 05/01/23  1632 05/01/23  1631 05/01/23  1627   WBC 10*3/mm3 14.07*  --   --   --  10.64  --   --   --  12.29*   HEMOGLOBIN g/dL 10.4* 11.0* 10.3* 10.9* 11.1*  11.1* 10.8*  --   --  12.4*   HEMATOCRIT % 31.7* 35.7* 32.8* 35.8* 36.4*  36.4* 34.5*  --   --  39.8   PLATELETS 10*3/mm3 197  --   --   --  200  --   --   --  241   SODIUM mmol/L 137 140  --   --   140  --   --   --  137   POTASSIUM mmol/L 4.6 4.6  --   --  4.7  --   --   --  4.8   CHLORIDE mmol/L 106 109*  --   --  107  --   --   --  106   CO2 mmol/L 21.0* 22.0  --   --  17.0*  --   --   --  20.0*   BUN mg/dL 23 26*  --   --  31*  --   --   --  28*   CREATININE mg/dL 1.23 1.27  --   --  1.45*  --  1.90 1.90* 1.76*   GLUCOSE mg/dL 162* 188*  --   --  72  --   --   --  138*   CALCIUM mg/dL 9.0 8.8  --   --  9.2  --   --   --  9.7*     Results from last 7 days   Lab Units 05/01/23  1627   BILIRUBIN mg/dL 0.4   ALK PHOS U/L 100   ALT (SGPT) U/L 14   AST (SGOT) U/L 21           Invalid input(s): TG, LDLCALC, LDLREALC  Results from last 7 days   Lab Units 05/02/23  0611   HEMOGLOBIN A1C % 7.30*       Brief Urine Lab Results  (Last result in the past 365 days)      Color   Clarity   Blood   Leuk Est   Nitrite   Protein   CREAT   Urine HCG        05/01/23 1556 Yellow   Turbid   Negative   Negative   Negative   >=300 mg/dL (3+)                 Microbiology Results Abnormal     Procedure Component Value - Date/Time    Urine Culture - Urine, Urine, Clean Catch [[id number]]  (Normal) Collected: 05/01/23 1556    Lab Status: Final result Specimen: Urine, Clean Catch Updated: 05/02/23 2055     Urine Culture No growth          Imaging Results (All)     Procedure Component Value Units Date/Time    CT Abdomen Pelvis Without Contrast [[id number]] Collected: 05/01/23 1806     Updated: 05/01/23 1824    Narrative:      CT ABDOMEN PELVIS WO CONTRAST    Date of Exam: 5/1/2023 5:35 PM EDT    Indication: hematuria.    Comparison: Abdomen pelvis CT scan 1/26/2021    Technique: Axial CT images were obtained of the abdomen and pelvis without the administration of contrast. Reconstructed coronal and sagittal images were also obtained. Automated exposure control and iterative construction methods were used.      Findings:  Double-J left ureteral stent is again seen in place, with persistent dilatation of the left renal collecting system and  ureter, but very similar in extent to the exam from 2021. No left-sided perinephric edema is seen. Left renal cortex is again noted to   be atrophic. Best appreciated on coronal images of today's study, specifically coronal images 71 through 73, is hyperdense appearance of the upper one half of the dilated left renal pelvis, which measures 46 Hounsfield units. By contrast the lower half   of the dilated renal pelvis measures fluid density at 13 H. U. Findings presumably represent some aging clot. No mass effect is seen to favor that this is tumor.    On the right, there may be a few tiny cysts but the right kidney otherwise appears normal for age. No right ureteral dilatation or calculus is seen.    The bladder is thick walled and shows some new pericystic inflammation anteriorly at the dome of the bladder. No hemorrhage or mass is appreciated.    Elsewhere, included lungs show similar degree of fibrosis and bullous cystic change as well as mild bronchiectasis. No active lower lung disease is seen. Gallbladder is surgically absent. Liver, spleen, pancreas, and adrenal glands are unremarkable.   Bowel loops are normal in caliber and normal in appearance except for extensive sigmoid diverticulosis. No inflammatory changes are seen to suggest diverticulitis. Bony structures appear to be intact.      Impression:      1. Left double-J ureteral stent remains in place. New hyperdense material in the upper one half of the dilated left renal pelvis, whether clot or, less likely, tumor.    2. New anterior and superior bladder wall inflammation consistent with cystitis.    3. No evidence of acute intra-abdominal or intrapelvic disease elsewhere. Extensive sigmoid reticulosis without evidence of diverticulitis.    4. Extensive but stable changes of fibrosis of the lower lungs.          Electronically Signed: Felix Samson    5/1/2023 6:21 PM EDT    Workstation ID: [id number]          Results for orders placed during the hospital  encounter of 08/22/19    Duplex Venous Lower Extremity - Bilateral CAR    Interpretation Summary  · Acute right lower extremity deep vein thrombosis noted in the distal femoral and popliteal.  · All other veins appeared normal bilaterally.      Results for orders placed during the hospital encounter of 08/22/19    Duplex Venous Lower Extremity - Bilateral CAR    Interpretation Summary  · Acute right lower extremity deep vein thrombosis noted in the distal femoral and popliteal.  · All other veins appeared normal bilaterally.      Results for orders placed in visit on 02/08/21    Adult Transthoracic Echo Complete W/ Cont if Necessary Per Protocol    Interpretation Summary  · Left ventricular wall thickness is consistent with mild concentric hypertrophy.  · Estimated left ventricular EF = 64% Left ventricular ejection fraction appears to be 61 - 65%. Left ventricular systolic function is normal.  · Left ventricular diastolic function is consistent with (grade I) impaired relaxation.  · The right ventricular cavity is mildly dilated.  · The right atrial cavity is mildly dilated.  · There is severe calcification of the aortic valve mainly affecting the non-coronary, left coronary and right coronary cusp(s).  · Estimated right ventricular systolic pressure from tricuspid regurgitation is mildly elevated (35-45 mmHg).  · Mild pulmonary hypertension is present.        Discharge Details        Discharge Medications      New Medications      Instructions Start Date   Diclofenac Sodium 1 % gel gel  Commonly known as: VOLTAREN   2 g, Topical, 4 Times Daily         Changes to Medications      Instructions Start Date   apixaban 2.5 MG tablet tablet  Commonly known as: Eliquis  What changed:   · how much to take  · These instructions start on May 8, 2023. If you are unsure what to do until then, ask your doctor or other care provider.   2.5 mg, Oral, 2 Times Daily   Start Date: May 8, 2023     aspirin 81 MG EC tablet  What  changed: These instructions start on May 8, 2023. If you are unsure what to do until then, ask your doctor or other care provider.   81 mg, Oral, Daily   Start Date: May 8, 2023     Januvia 100 MG tablet  Generic drug: SITagliptin  What changed: how much to take   TAKE ONE TABLET BY MOUTH DAILY      nitroglycerin 0.4 MG SL tablet  Commonly known as: NITROSTAT  What changed:   · how much to take  · how to take this  · when to take this  · reasons to take this   1 under the tongue as needed for angina, may repeat q5mins for up three doses         Continue These Medications      Instructions Start Date   acetaminophen 500 MG tablet  Commonly known as: TYLENOL   1,000 mg, Oral, Every 6 Hours PRN      allopurinol 300 MG tablet  Commonly known as: ZYLOPRIM   TAKE ONE TABLET BY MOUTH ONCE NIGHTLY      bisoprolol 5 MG tablet  Commonly known as: ZEBeta   5 mg, Oral, Daily      Dexcom G7 Sensor misc   1 each, Does not apply, Every 10 Days      finasteride 5 MG tablet  Commonly known as: PROSCAR   TAKE ONE TABLET BY MOUTH DAILY      levothyroxine 50 MCG tablet  Commonly known as: SYNTHROID, LEVOTHROID   50 mcg, Oral, Daily      Novofine Pen Needle 32G X 6 MM misc  Generic drug: Insulin Pen Needle   1 each, Does not apply, Daily      OCUVITE ADULT 50+ PO   1 capsule, Oral, Daily      PROBIOTIC DAILY PO   1 capsule, Oral, Daily      silodosin 8 MG capsule capsule  Commonly known as: RAPAFLO   TAKE ONE CAPSULE BY MOUTH DAILY WITH BREAKFAST         Stop These Medications    glucose blood test strip     isosorbide mononitrate 30 MG 24 hr tablet  Commonly known as: IMDUR     losartan 25 MG tablet  Commonly known as: Cozaar        ASK your doctor about these medications      Instructions Start Date   glimepiride 1 MG tablet  Commonly known as: AMARYL   1 mg, Oral, Every Morning Before Breakfast      Toujeo SoloStar 300 UNIT/ML solution pen-injector injection  Generic drug: Insulin Glargine (1 Unit Dial)   20 Units, Subcutaneous,  Nightly             Allergies   Allergen Reactions   • Metformin Diarrhea and GI Intolerance   • Statins Diarrhea and Myalgia         Discharge Disposition:  Home or Self Care    Discharge Diet:  Diet Order   Procedures   • Diet: Cardiac Diets, Diabetic Diets; Healthy Heart (2-3 Na+); Consistent Carbohydrate; Texture: Regular Texture (IDDSI 7); Fluid Consistency: Thin (IDDSI 0)         Discharge Activity:   Activity Instructions     Activity as Tolerated      Up WIth Assist              CODE STATUS:    Code Status and Medical Interventions:   Ordered at: 05/01/23 3563     Level Of Support Discussed With:    Patient     Code Status (Patient has no pulse and is not breathing):    CPR (Attempt to Resuscitate)     Medical Interventions (Patient has pulse or is breathing):    Full Support         Future Appointments   Date Time Provider Department Center   5/11/2023  2:10 PM Deanne Pitts MD [facility]   5/17/2023  3:00 PM Kary Wen MD [facility]   7/10/2023  1:00 PM Win Saha MD [facility]       Additional Instructions for the Follow-ups that You Need to Schedule     Call MD With Problems / Concerns   As directed      Instructions: Call provider for temperature greater than 100.4, nausea, vomiting, diarrhea, chest pain, heart palpitations, shortness of breath, decreased urine output with bloody urine with clots    Order Comments: Instructions: Call provider for temperature greater than 100.4, nausea, vomiting, diarrhea, chest pain, heart palpitations, shortness of breath, decreased urine output with bloody urine with clots          Discharge Follow-up with PCP   As directed       Currently Documented PCP:    Kary Wen MD    PCP Phone Number:    [phone]     Follow Up Details: Follow-up with PCP in 1 week         Discharge Follow-up with Specified Provider: Dr Pitts; 2 Weeks   As directed      To: Dr Pitts    Follow Up: 2 Weeks               Time Spent on Discharge:   45 minutes    Electronically signed by JOYCE Reynoso, 05/04/23, 12:05 PM EDT.

## 2023-05-04 NOTE — PROGRESS NOTES
[facility]   Urology Progress Note    Patient Name: Forrest Zepeda  : 1932  MRN: [id number]  Primary Care Physician:  Kary Wen MD  Date of admission: 2023    Subjective   Subjective     Chief Complaint: Hematuria    HPI:  Patient evaluated this AM. CBI was on minimal drip with quin urine output. CBI was clamped and urine output remained quin without clots. I discussed plan of care with Dr. Pitts, plan for voiding trial prior to discharge.     He is currently sitting up in chair and denies any pain. Son at bedside.     HCT stable, 31.7.     Review of Systems   All systems were reviewed and negative except for: Resolved hematuria    Objective   Objective     Vitals:   Temp:  [97.9 °F (36.6 °C)-99.2 °F (37.3 °C)] 98.3 °F (36.8 °C)  Heart Rate:  [66-94] 94  Resp:  [16-18] 16  BP: (120-139)/(58-74) 131/64  Flow (L/min):  [2] 2  Physical Exam    Constitutional: Awake in bed, alert   Eyes: PERRLA, sclerae anicteric, no conjunctival injection   HENT: Normocephalic, atraumatic, mucous membranes moist   Neck: Supple, trachea midline   Respiratory: Equal chest rise, 2 L O2   Cardiovascular: RRR   Gastrointestinal: Soft, nontender, non-distended   Genitourinary: 3 way caceres catheter with cbi clamped, quin urine output, no clots.  Right hemiscrotum firmness.   Musculoskeletal: No lower extremity edema bilaterally, no clubbing or cyanosis to extremities   Psychiatric: Appropriate affect, cooperative   Neurologic: Oriented x 3,  Cranial Nerves grossly intact, speech clear   Skin: No rashes     Result Review    Result Review:  I have personally reviewed the results from the time of this admission to 2023 11:04 EDT and agree with these findings:  [x]  Laboratory  [x]  Microbiology  []  Radiology  []  EKG/Telemetry   []  Cardiology/Vascular   []  Pathology  []  Old records  [x]  Other: Vital signs    Most notable findings include: HCT 31.7.     Assessment & Plan   Assessment / Plan     Brief  Patient Summary:  Forrest Zepeda is a 89 y.o. male with history of chronic left ureteral stent who presented to [facility] for worsening gross hematuria.     Active Hospital Problems:  Active Hospital Problems    Diagnosis    • **Gross hematuria    • Acute cystitis with hematuria    • CAD (coronary artery disease)    • Diastolic congestive heart failure    • Type 2 diabetes mellitus with hyperglycemia, with long-term current use of insulin    • History of pulmonary embolism    • IPF (idiopathic pulmonary fibrosis)    • CKD (chronic kidney disease) stage 3, GFR 30-59 ml/min    • Acquired hypothyroidism    • Essential hypertension      At the bedside, CBI was reinitiated to fill the bladder. The caceres catheter balloon was deflated and the caceres catheter was removed.     Plan for STAT scrotal US due to findings of firmness in right hemiscrotum.     Ok to discharge from Urology standpoint once voids and Scrotal US complete.     Plan for outpatient follow up in 2 weeks with Dr. Pitts.      will be available. D/w Dr. Pitts.     DVT prophylaxis:  Mechanical DVT prophylaxis orders are present.    CODE STATUS:   Level Of Support Discussed With: Patient  Code Status (Patient has no pulse and is not breathing): CPR (Attempt to Resuscitate)  Medical Interventions (Patient has pulse or is breathing): Full Support    Disposition:  I expect patient to discharge home within 24 hours.     Electronically signed by JOYCE Proctor, 05/04/23, 11:04 AM EDT.

## 2023-05-04 NOTE — PLAN OF CARE
Goal Outcome Evaluation:  Plan of Care Reviewed With: patient        Progress: improving  Outcome Evaluation: a/o x 4; VSS,  room air - 2L prn at night; pleasant, no complaints to report; slow drip CBI overnight - blood tinge with few clots; no pain; I/O in epic; will continue POC      Problem: Adult Inpatient Plan of Care  Goal: Absence of Hospital-Acquired Illness or Injury  Intervention: Identify and Manage Fall Risk  Recent Flowsheet Documentation  Taken 5/4/2023 0400 by Christiano Peterson, RN  Safety Promotion/Fall Prevention:   clutter free environment maintained   assistive device/personal items within reach   activity supervised   fall prevention program maintained   nonskid shoes/slippers when out of bed   room organization consistent   safety round/check completed  Taken 5/4/2023 0200 by Christiano Peterson, RN  Safety Promotion/Fall Prevention:   clutter free environment maintained   assistive device/personal items within reach   activity supervised   fall prevention program maintained   nonskid shoes/slippers when out of bed   room organization consistent   safety round/check completed  Taken 5/4/2023 0000 by Christiano Peterson, RN  Safety Promotion/Fall Prevention:   assistive device/personal items within reach   clutter free environment maintained   fall prevention program maintained   nonskid shoes/slippers when out of bed   room organization consistent   safety round/check completed   activity supervised  Taken 5/3/2023 2151 by Christiano Peterson, RN  Safety Promotion/Fall Prevention:   assistive device/personal items within reach   clutter free environment maintained   activity supervised   fall prevention program maintained   nonskid shoes/slippers when out of bed   room organization consistent   safety round/check completed  Taken 5/3/2023 2046 by Christiano Peterson, RN  Safety Promotion/Fall Prevention:   activity supervised   assistive device/personal items within reach   clutter free environment  maintained   fall prevention program maintained   nonskid shoes/slippers when out of bed   room organization consistent   safety round/check completed     Problem: Adult Inpatient Plan of Care  Goal: Absence of Hospital-Acquired Illness or Injury  Intervention: Prevent Skin Injury  Recent Flowsheet Documentation  Taken 5/4/2023 0400 by Christiano Peterson RN  Body Position: position changed independently  Skin Protection:   incontinence pads utilized   adhesive use limited   transparent dressing maintained   skin-to-device areas padded  Taken 5/4/2023 0200 by Christiano Peterson RN  Body Position: position changed independently  Skin Protection:   incontinence pads utilized   adhesive use limited   tubing/devices free from skin contact   transparent dressing maintained   skin-to-skin areas padded   skin-to-device areas padded  Taken 5/4/2023 0000 by Christiano Peterson RN  Body Position: position changed independently  Skin Protection:   adhesive use limited   incontinence pads utilized   tubing/devices free from skin contact   transparent dressing maintained   skin-to-device areas padded  Taken 5/3/2023 2151 by Christiano Peterson RN  Body Position: position changed independently  Skin Protection:   adhesive use limited   incontinence pads utilized   tubing/devices free from skin contact   transparent dressing maintained  Taken 5/3/2023 2046 by Christiano Peterson RN  Body Position: position changed independently  Skin Protection:   adhesive use limited   incontinence pads utilized   transparent dressing maintained   tubing/devices free from skin contact   skin-to-device areas padded

## 2023-05-04 NOTE — THERAPY EVALUATION
Patient Name: Forrest Zepeda  : 1932    MRN: [id number]                              Today's Date: 2023       Admit Date: 2023    Visit Dx:     ICD-10-CM ICD-9-CM   1. Gross hematuria  R31.0 599.71   2. Acute UTI  N39.0 599.0   3. Ureteral stent present  Z96.0 V45.89   4. Abnormal CT of the abdomen  R93.5 793.6     Patient Active Problem List   Diagnosis   • Essential hypertension   • Generalized osteoarthritis   • Diabetic neuropathy   • Gout   • Hypertriglyceridemia   • Acquired hypothyroidism   • Ureteral stricture   • Hydronephrosis of left kidney   • CKD (chronic kidney disease) stage 3, GFR 30-59 ml/min   • LEHMAN (dyspnea on exertion)   • IPF (idiopathic pulmonary fibrosis)   • Symptomatic bradycardia   • Hypoxemia requiring supplemental oxygen   • History of pulmonary embolism   • Sinus node dysfunction   • Chronic fatigue   • Urethral stricture   • Paroxysmal atrial fibrillation   • Type 2 diabetes mellitus with hyperglycemia, with long-term current use of insulin   • Type 2 diabetes mellitus with stage 3b chronic kidney disease, with long-term current use of insulin   • Abnormal cardiovascular stress test   • Pulmonary hypertension   • Cardiac pacemaker in situ   • CAD (coronary artery disease)   • Diastolic congestive heart failure   • Abnormal SPEP   • Type 2 diabetes mellitus with stage 3b chronic kidney disease, with long-term current use of insulin   • Hydronephrosis, left   • Gross hematuria   • Acute cystitis with hematuria     Past Medical History:   Diagnosis Date   • Abnormal EKG    • Abnormal PSA     Reported abnormal   • Acute deep vein thrombosis (DVT) of right lower extremity 2019   • Acute diverticulitis    • Acute hyperkalemia 2019   • Acute respiratory failure with hypoxia    • Acute saddle pulmonary embolism with acute cor pulmonale 2019   • Acute systolic right heart failure 2019   • Arthritis     KNEES,  BUT SINCE HAD REPLACEMENT   • Benign  "localized hyperplasia of prostate    • Bilateral knee pain    • C. difficile diarrhea 2010   • Cataracts, bilateral    • CKD (chronic kidney disease)    • Coronary artery disease    • Diabetic neuropathy    • Diastolic dysfunction    • Disease of thyroid gland     HYPOTHYROIDISM   • Diverticulitis    • Dyslipidemia     H/O   • Family history of malignant hyperthermia     Brother    • Full dentures    • Generalized weakness    • History of ESBL E. coli infection     most recent 4-2022 f/u with ID Dr Johnson   • History of gout    • History of hepatitis A vaccination    • History of nephrolithiasis    • History of nuclear stress test     STATES IN THE 1990'S.  \"IT WAS OK\"   • History of osteopenia    • History of recurrent UTI (urinary tract infection)    • Hydronephrosis of left kidney 07/18/2019   • Hydronephrosis with renal and ureteral calculus obstruction 10/21/2019    Added automatically from request for surgery [id number]   • Hydronephrosis with ureteral stricture    • Hypercholesterolemia    • Hyperkalemia     PT UNSURE REGARDING HX OF THIS   • Hyperlipidemia    • Hypertension    • Impaired functional mobility, balance, gait, and endurance    • Insomnia    • IPF (idiopathic pulmonary fibrosis)     per patient and son, dx at [facility], was told no treatment necessary, not to worry about it   • Malignant hyperthermia due to anesthesia     PER PT REPORT, BROTHER HAD DURING LUNG SURGERY SEVERAL YEARS AGO.  STATED THEY PACKED HIM ON ICE.  STATED HE DID SURVIVE.  PT DENIES ANY PERSONAL HX OF MALIGNANT HYPERTHERMIA HIMSELF, OR ANY ISSUES WITH ANESTHESIA.  STATES TO HIS KNOWLEDGE, NO OTHER FAMILY MEMBER HAS THIS ISSUE EXCEPT FOR HIS BROTHER.   • On supplemental oxygen therapy     2L [place] [facility]   • Other hydronephrosis 08/12/2019    Added automatically from request for surgery [id number]   • Paroxysmal atrial fibrillation    • Pulmonary hypertension, mild 02/2021    per echo per cardiology note 1/9/23, per pt's son, PCP does not " agree with this dx   • Renal insufficiency    • Sepsis 2019   • Shortness of breath     STATES WITH EXERTION, OTHERWISE, DENIES   • Sigmoid diverticulitis without abscess or perforation 08/09/2017   • Sinus node dysfunction    • Skin breakdown     fourth toe right foot noted in 2019 MD D/C note   • Skin ulcer of fourth toe of right foot, limited to breakdown of skin 07/05/2019   • Stricture of ureter    • Type 2 diabetes mellitus    • Ureteral stricture, left    • UTI (urinary tract infection) 07/18/2019   • Vitamin D deficiency    • Wears glasses      Past Surgical History:   Procedure Laterality Date   • APPENDECTOMY     • CARDIAC ELECTROPHYSIOLOGY PROCEDURE N/A 12/23/2020    Procedure: Pacemaker DC new. DNS meds.;  Surgeon: Jimy Ledezma DO;  Location:  [facility];  Service: Cardiology;  Laterality: N/A;   • CHOLECYSTECTOMY     • CIRCUMCISION N/A 10/23/2019    Procedure: CIRCUMCISIOn-DORSAL SLIT;  Surgeon: Griffin Romero MD;  Location:  [facility];  Service: Urology   • COLONOSCOPY     • CYSTOSCOPY RETROGRADE PYELOGRAM Left 6/17/2022    Procedure: CYSTOSCOPY RETROGRADE PYELOGRAM;  Surgeon: Ryne Mccann MD;  Location:  [facility];  Service: Urology;  Laterality: Left;   • CYSTOSCOPY URETEROSCOPY Left 2/11/2019    Procedure: URETEROSCOPY WITH STENT EXTRACTION, RPG;  Surgeon: Griffin Romero MD;  Location:  [facility];  Service: Urology   • CYSTOSCOPY W/ URETERAL STENT PLACEMENT Left 7/20/2019    Procedure: CYSTOSCOPY, LEFT RETROGRADE PYELOGRAM,  ATTEMPTED LEFT URETERAL STENT INSERTION;  Surgeon: Isaac Flynn MD;  Location:  [facility];  Service: Urology   • CYSTOSCOPY W/ URETERAL STENT PLACEMENT Left 6/17/2022    Procedure: CYSTOSCOPY URETERAL CATHETER/STENT INSERTION;  Surgeon: Ryne Mccann MD;  Location:  [facility];  Service: Urology;  Laterality: Left;   • CYSTOSCOPY W/ URETERAL STENT PLACEMENT Left 1/27/2023    Procedure: CYSTOSCOPY URETERAL CATHETER/STENT INSERTION;  Surgeon:  Deanne Pitts MD;  Location: [facility] OR;  Service: Urology;  Laterality: Left;   • EYE SURGERY      CATARACTS REMOVED BILATERALLY   • KNEE ARTHROPLASTY Bilateral    • KNEE ARTHROSCOPY Bilateral    • RENAL ARTERY STENT      SEVERAL TIMES EVERY SINCE 1978   • URETERAL STENT INSERTION  10/2020   • URETEROSCOPY LASER LITHOTRIPSY WITH STENT INSERTION Left 1/10/2018    Procedure:  cysto, left ureteral stent replacement ;  Surgeon: Griffin Romero MD;  Location: [facility] OR;  Service:    • URETEROSCOPY LASER LITHOTRIPSY WITH STENT INSERTION Left 8/14/2019    Procedure: URETEROSCOPY, STONE REMOVAL WITH STENT INSERTION;  Surgeon: Griffin Romero MD;  Location: [facility] OR;  Service: Urology   • URETEROSCOPY LASER LITHOTRIPSY WITH STENT INSERTION Left 10/23/2019    Procedure: Left URETEROSCOPY WITH STENT INSERTION-LEFT;  Surgeon: Griffin Romero MD;  Location: [facility] OR;  Service: Urology      General Information     Row Name 05/04/23 1024          Physical Therapy Time and Intention    Document Type evaluation  -KW     Mode of Treatment individual therapy;physical therapy  -KW     Row Name 05/04/23 1024          General Information    Patient Profile Reviewed yes  -KW     Prior Level of Function independent:;gait;transfer;bed mobility  Patient's family reports that someone usually is at his house several times a day to check on him  -KW     Barriers to Rehab medically complex  -KW     Row Name 05/04/23 1024          Living Environment    People in Home alone  -KW     Row Name 05/04/23 1024          Home Main Entrance    Number of Stairs, Main Entrance two  -KW     Stair Railings, Main Entrance none  -KW     Row Name 05/04/23 1024          Stairs Within Home, Primary    Number of Stairs, Within Home, Primary --  does not use basement  -KW     Row Name 05/04/23 1024          Cognition    Orientation Status (Cognition) oriented x 4  -KW     Row Name 05/04/23 1024          Safety Issues, Functional Mobility    Safety Issues  Affecting Function (Mobility) awareness of need for assistance;insight into deficits/self-awareness;judgment  -KW     Impairments Affecting Function (Mobility) balance;endurance/activity tolerance;range of motion (ROM);shortness of breath;strength  -KW           User Key  (r) = Recorded By, (t) = Taken By, (c) = Cosigned By    Initials Name Provider Type    KW Katty Aguirre PT Physical Therapist               Mobility     Row Name 05/04/23 1010          Bed Mobility    Bed Mobility supine-sit  -KW     Supine-Sit [place] (Bed Mobility) minimum assist (75% patient effort)  -KW     Assistive Device (Bed Mobility) bed rails;head of bed elevated  extended time  -KW     Row Name 05/04/23 1010          Bed-Chair Transfer    Bed-Chair [place] (Transfers) minimum assist (75% patient effort)  -KW     Assistive Device (Bed-Chair Transfers) walker, front-wheeled  -KW     Row Name 05/04/23 1010          Sit-Stand Transfer    Sit-Stand [place] (Transfers) moderate assist (50% patient effort)  -KW     Assistive Device (Sit-Stand Transfers) walker, front-wheeled  -KW     Row Name 05/04/23 1010          Gait/Stairs (Locomotion)    [place] Level (Gait) minimum assist (75% patient effort)  -KW     Assistive Device (Gait) walker, front-wheeled  -KW     Deviations/Abnormal Patterns (Gait) base of support, wide;melvin decreased;gait speed decreased  -KW     Bilateral Gait Deviations forward flexed posture;heel strike decreased  -KW           User Key  (r) = Recorded By, (t) = Taken By, (c) = Cosigned By    Initials Name Provider Type    KW Katty Aguirre PT Physical Therapist               Obj/Interventions     Row Name 05/04/23 1010          Range of Motion Comprehensive    General Range of Motion lower extremity range of motion deficits identified  -KW     Comment, General Range of Motion decreased ankle ROM secondary to pain/stiffness  -KW     Row Name 05/04/23 1010          Strength Comprehensive  (MMT)    General Manual Muscle Testing (MMT) Assessment lower extremity strength deficits identified  -KW     Row Name 05/04/23 1010          Balance    Balance Assessment sitting static balance;sitting dynamic balance;sit to stand dynamic balance;standing static balance;standing dynamic balance  -KW     Static Sitting Balance independent  -KW     Dynamic Sitting Balance standby assist  -KW     Position, Sitting Balance unsupported;sitting edge of bed  -KW     Sit to Stand Dynamic Balance minimal assist  -KW     Static Standing Balance minimal assist  -KW     Dynamic Standing Balance minimal assist  -KW     Position/Device Used, Standing Balance supported;walker, rolling  -KW     Balance Interventions sitting;standing;sit to stand  -KW           User Key  (r) = Recorded By, (t) = Taken By, (c) = Cosigned By    Initials Name Provider Type    KW Katty Aguirre, PT Physical Therapist               Goals/Plan     Row Name 05/04/23 1010          Bed Mobility Goal 1 (PT)    Activity/Assistive Device (Bed Mobility Goal 1, PT) sit to supine/supine to sit  -KW     [place] Level/Cues Needed (Bed Mobility Goal 1, PT) independent  -KW     Time Frame (Bed Mobility Goal 1, PT) 10 days  -KW     Row Name 05/04/23 1010          Transfer Goal 1 (PT)    Activity/Assistive Device (Transfer Goal 1, PT) sit-to-stand/stand-to-sit;bed-to-chair/chair-to-bed  -KW     [place] Level/Cues Needed (Transfer Goal 1, PT) modified independence  -KW     Time Frame (Transfer Goal 1, PT) 10 days  -KW     Row Name 05/04/23 1010          Gait Training Goal 1 (PT)    Activity/Assistive Device (Gait Training Goal 1, PT) gait (walking locomotion);assistive device use;decrease fall risk;improve balance and speed;increase endurance/gait distance  -KW     [place] Level (Gait Training Goal 1, PT) modified independence  -KW     Distance (Gait Training Goal 1, PT) 150  -KW     Time Frame (Gait Training Goal 1, PT) 10 days  -KW           User  Key  (r) = Recorded By, (t) = Taken By, (c) = Cosigned By    Initials Name Provider Type    KW Katty Aguirre, PT Physical Therapist               Clinical Impression     Row Name 05/04/23 1010          Pain    Pretreatment Pain Rating 7/10  -KW     Pain Location - Side/Orientation Bilateral  -KW     Pain Location - foot;ankle  -KW     Pain Intervention(s) Repositioned  -KW     Row Name 05/04/23 1010          Plan of Care Review    Plan of Care Reviewed With patient  -KW     Progress no change  -KW     Outcome Evaluation PT eval completed. Patient presents below baseline for functional mobility. Patient wiht SOA with transfers and weakness with transfers. Patient completed 3 sit to stands, requiring modA for each stand. Patient with difficulty with steps initally demonstrating significant forward trunk flexion and short staggered steps requiring cues. Patient demonstrates decreased activity tolerance, deconditioning and reduced dynamic balance. Patient would continue to benefit from skilled PT services to improve dynamic balance with gait, transfers strength, and activity tolerance training in order to build endurance and reduce risk of falls. Anticipate discharge home with supervision/ HHPT.  -KW     Row Name 05/04/23 1010          Vital Signs    Pre Systolic BP Rehab 131  -KW     Pre Treatment Diastolic BP 64  -KW     Pretreatment Heart Rate (beats/min) 81  -KW     Posttreatment Heart Rate (beats/min) 89  -KW     Pre SpO2 (%) 97  -KW     O2 Delivery Pre Treatment nasal cannula  -KW     Intra SpO2 (%) 87  -KW     O2 Delivery Intra Treatment nasal cannula  -KW     Post SpO2 (%) 95  -KW     O2 Delivery Post Treatment nasal cannula  -KW     Row Name 05/04/23 1010          Positioning and Restraints    Pre-Treatment Position in bed  -KW     Post Treatment Position chair  -KW     In Chair call light within reach;encouraged to call for assist;with family/caregiver  -KW           User Key  (r) = Recorded By, (t) =  Taken By, (c) = Cosigned By    Initials Name Provider Type    Katty Acosta PT Physical Therapist               Outcome Measures     Row Name 05/04/23 1010          How much help from another person do you currently need...    Turning from your back to your side while in flat bed without using bedrails? 3  -KW     Moving from lying on back to sitting on the side of a flat bed without bedrails? 3  -KW     Moving to and from a bed to a chair (including a wheelchair)? 3  -KW     Standing up from a chair using your arms (e.g., wheelchair, bedside chair)? 2  -KW     Climbing 3-5 steps with a railing? 2  -KW     To walk in hospital room? 3  -KW     AM-PAC 6 Clicks Score (PT) 16  -KW     Highest level of mobility 5 --> Static standing  -KW     Row Name 05/04/23 1010          Functional Assessment    Outcome Measure Options AM-PAC 6 Clicks Basic Mobility (PT)  -KW           User Key  (r) = Recorded By, (t) = Taken By, (c) = Cosigned By    Initials Name Provider Type    Katty Acosta PT Physical Therapist                               PT Recommendation and Plan     Plan of Care Reviewed With: patient  Progress: no change  Outcome Evaluation: PT eval completed. Patient presents below baseline for functional mobility. Patient wiht SOA with transfers and weakness with transfers. Patient completed 3 sit to stands, requiring modA for each stand. Patient with difficulty with steps initally demonstrating significant forward trunk flexion and short staggered steps requiring cues. Patient demonstrates decreased activity tolerance, deconditioning and reduced dynamic balance. Patient would continue to benefit from skilled PT services to improve dynamic balance with gait, transfers strength, and activity tolerance training in order to build endurance and reduce risk of falls. Anticipate discharge home with supervision/ HHPT.     Time Calculation:    PT Charges     Row Name 05/04/23 1010             Time Calculation     Start Time 1010  -KW      PT Received On 05/04/23  -KW      PT Goal Re-Cert Due Date 05/14/23  -KW         Untimed Charges    PT Eval/Re-eval Minutes 58  -KW         Total Minutes    Untimed Charges Total Minutes 58  -KW       Total Minutes 58  -KW            User Key  (r) = Recorded By, (t) = Taken By, (c) = Cosigned By    Initials Name Provider Type    Katty Acosta PT Physical Therapist              Therapy Charges for Today     Code Description Service Date Service Provider Modifiers Qty    27748089888 HC PT EVAL MOD COMPLEXITY 4 5/4/2023 Katty Aguirre PT GP 1          PT G-Codes  Outcome Measure Options: AM-PAC 6 Clicks Basic Mobility (PT)  AM-PAC 6 Clicks Score (PT): 16       Katty Aguirre PT  5/4/2023

## 2023-05-05 ENCOUNTER — TRANSITIONAL CARE MANAGEMENT TELEPHONE ENCOUNTER (OUTPATIENT)
Dept: CALL CENTER | Facility: HOSPITAL | Age: 88
End: 2023-05-05
Payer: MEDICARE

## 2023-05-05 ENCOUNTER — TELEPHONE (OUTPATIENT)
Dept: UROLOGY | Facility: CLINIC | Age: 88
End: 2023-05-05
Payer: MEDICARE

## 2023-05-05 NOTE — OUTREACH NOTE
Call Center TCM Note    Flowsheet Row Responses   [facility] patient discharged from? [place]   Does the patient have one of the following disease processes/diagnoses(primary or secondary)? Other   TCM attempt successful? Yes   Call start time 1007   Call end time 1009   Discharge diagnosis Gross hematuria    Is patient permission given to speak with other caregiver? Yes   List who call center can speak with Grandchild- Erika   Person spoke with today (if not patient) and relationship Erika   Meds reviewed with patient/caregiver? Yes   Is the patient having any side effects they believe may be caused by any medication additions or changes? No   Prescription comments no new meds ordered   Is the patient taking all medications as directed (includes completed medication regime)? Yes   Comments Hospital f/u with Dr. Wen on 5/10   Does the patient have an appointment with their PCP within 7 days of discharge? Yes   Has home health visited the patient within 72 hours of discharge? Unsure   What DME was ordered? Has O2 at 2L prn supplied by [employer]   Psychosocial issues? No   Did the patient receive a copy of their discharge instructions? Yes   Nursing interventions Reviewed instructions with patient   What is the patient's perception of their health status since discharge? Improving   Is the patient/caregiver able to teach back signs and symptoms related to disease process for when to call PCP? Yes   Is the patient/caregiver able to teach back signs and symptoms related to disease process for when to call 911? Yes   Is the patient/caregiver able to teach back the hierarchy of who to call/visit for symptoms/problems? PCP, Specialist, Home health nurse, Urgent Care, ED, 911 Yes   TCM call completed? Yes   Wrap up additional comments Per Grandchild, patient is doing well, no questions, confirmed f/u appt with PCP for 5/10.   Call end time 1009   Would this patient benefit from a Referral to [facility] Social Work? No    Is the patient interested in additional calls from an ambulatory ?  NOTE:  applies to high risk patients requiring additional follow-up. No          Leidy Sherman RN    5/5/2023, 10:09 EDT

## 2023-05-05 NOTE — TELEPHONE ENCOUNTER
----- Message from JOYCE Proctor sent at 5/4/2023 11:10 AM EDT -----  Regarding: Follow up  Hi,     I know patient has follow up next week with Dr. Pitts. Can we push it out one more week per Dr. Pitts. Also, we removed his catheter while inpatient.     Thank you  Shayy Pedro     
LVM to push appt out. Hub ok to schedule.   
Never smoker

## 2023-05-10 ENCOUNTER — OFFICE VISIT (OUTPATIENT)
Dept: INTERNAL MEDICINE | Facility: CLINIC | Age: 88
End: 2023-05-10
Payer: MEDICARE

## 2023-05-10 VITALS
WEIGHT: 212.4 LBS | BODY MASS INDEX: 31.46 KG/M2 | SYSTOLIC BLOOD PRESSURE: 124 MMHG | OXYGEN SATURATION: 95 % | HEIGHT: 69 IN | HEART RATE: 58 BPM | DIASTOLIC BLOOD PRESSURE: 72 MMHG | RESPIRATION RATE: 16 BRPM | TEMPERATURE: 97.8 F

## 2023-05-10 DIAGNOSIS — Z09 HOSPITAL DISCHARGE FOLLOW-UP: Primary | ICD-10-CM

## 2023-05-10 DIAGNOSIS — N18.31 TYPE 2 DIABETES MELLITUS WITH STAGE 3A CHRONIC KIDNEY DISEASE, WITH LONG-TERM CURRENT USE OF INSULIN: ICD-10-CM

## 2023-05-10 DIAGNOSIS — I48.0 PAROXYSMAL ATRIAL FIBRILLATION: ICD-10-CM

## 2023-05-10 DIAGNOSIS — Z79.4 TYPE 2 DIABETES MELLITUS WITH STAGE 3A CHRONIC KIDNEY DISEASE, WITH LONG-TERM CURRENT USE OF INSULIN: ICD-10-CM

## 2023-05-10 DIAGNOSIS — E11.65 TYPE 2 DIABETES MELLITUS WITH HYPERGLYCEMIA, WITH LONG-TERM CURRENT USE OF INSULIN: ICD-10-CM

## 2023-05-10 DIAGNOSIS — I50.32 CHRONIC HEART FAILURE WITH PRESERVED EJECTION FRACTION (HFPEF): ICD-10-CM

## 2023-05-10 DIAGNOSIS — R53.81 PHYSICAL DECONDITIONING: ICD-10-CM

## 2023-05-10 DIAGNOSIS — Z79.4 TYPE 2 DIABETES MELLITUS WITH HYPERGLYCEMIA, WITH LONG-TERM CURRENT USE OF INSULIN: ICD-10-CM

## 2023-05-10 DIAGNOSIS — E03.9 ACQUIRED HYPOTHYROIDISM: ICD-10-CM

## 2023-05-10 DIAGNOSIS — R53.82 CHRONIC FATIGUE: ICD-10-CM

## 2023-05-10 DIAGNOSIS — E11.22 TYPE 2 DIABETES MELLITUS WITH STAGE 3A CHRONIC KIDNEY DISEASE, WITH LONG-TERM CURRENT USE OF INSULIN: ICD-10-CM

## 2023-05-10 DIAGNOSIS — R31.0 GROSS HEMATURIA: ICD-10-CM

## 2023-05-10 DIAGNOSIS — Z91.81 AT HIGH RISK FOR INJURY RELATED TO FALL: ICD-10-CM

## 2023-05-10 PROBLEM — N18.32 TYPE 2 DIABETES MELLITUS WITH STAGE 3B CHRONIC KIDNEY DISEASE, WITH LONG-TERM CURRENT USE OF INSULIN: Status: RESOLVED | Noted: 2021-03-16 | Resolved: 2023-05-10

## 2023-05-10 PROBLEM — Z86.19 PERSONAL HISTORY OF OTHER INFECTIOUS AND PARASITIC DISEASES: Status: ACTIVE | Noted: 2019-07-31

## 2023-05-10 PROBLEM — N30.01 ACUTE CYSTITIS WITH HEMATURIA: Status: RESOLVED | Noted: 2023-05-01 | Resolved: 2023-05-10

## 2023-05-10 RX ORDER — GLIMEPIRIDE 1 MG/1
1 TABLET ORAL
Qty: 90 TABLET | Refills: 3 | Status: SHIPPED | OUTPATIENT
Start: 2023-05-10

## 2023-05-10 RX ORDER — INSULIN GLARGINE 300 U/ML
20 INJECTION, SOLUTION SUBCUTANEOUS NIGHTLY
Qty: 6 ML | Refills: 3 | Status: SHIPPED | OUTPATIENT
Start: 2023-05-10

## 2023-05-10 RX ORDER — BISOPROLOL FUMARATE 5 MG/1
5 TABLET, FILM COATED ORAL DAILY
Qty: 90 TABLET | Refills: 3 | Status: SHIPPED | OUTPATIENT
Start: 2023-05-10

## 2023-05-10 RX ORDER — LEVOTHYROXINE SODIUM 0.05 MG/1
50 TABLET ORAL DAILY
Qty: 90 TABLET | Refills: 3 | Status: SHIPPED | OUTPATIENT
Start: 2023-05-10

## 2023-05-10 NOTE — PROGRESS NOTES
Transitional Care Follow Up Visit  Subjective     Forrest Zepeda is a 89 y.o. male who presents for a transitional care management visit.    Within 48 business hours after discharge our office contacted him via telephone to coordinate his care and needs.      I reviewed and discussed the details of that call along with the discharge summary, hospital problems, inpatient lab results, inpatient diagnostic studies, and consultation reports with Forrest.     Current outpatient and discharge medications have been reconciled for the patient.  Reviewed by: Kary Wen MD          5/4/2023     7:34 PM   Date of TCM Phone Call   [facility]   Date of Admission 5/1/2023   Date of Discharge 5/4/2023   Discharge Disposition Home or Self Care     Risk for Readmission (LACE) Score: 9 (5/4/2023  6:00 AM)      History of Present Illness  No further bleeding since discharge.  Has urology appointment next week.   Feels weak, no energy. Any fix for that?  Not interested in home health PT.     Course During Hospital Stay:  Per discharge summary, reviewed.    Discharge Summary by Juliana Roberson APRN (05/04/2023 12:05)       The following portions of the patient's history were reviewed and updated as appropriate: allergies, current medications, past family history, past medical history, past social history, past surgical history and problem list.    Review of Systems    Objective   Physical Exam  Vitals and nursing note reviewed.   Pulmonary:      Effort: Pulmonary effort is normal.      Breath sounds: Normal breath sounds and air entry.   Neurological:      Gait: Gait abnormal (low, a bit unsteady, using a cane).   Psychiatric:         Attention and Perception: Attention normal.         Mood and Affect: Mood and affect normal.         Cognition and Memory: Cognition normal.          Lab Results   Component Value Date    HGBA1C 7.30 (H) 05/02/2023    HGBA1C 7.50 (H) 01/24/2023    HGBA1C 7.50 (H) 07/12/2022         Assessment & Plan   Diagnoses and all orders for this visit:    1. Hospital discharge follow-up (Primary)    2. Gross hematuria  Comments:  follow up with urology as scheduled, resumed anticoagulation on 5/8 as previously instructed    3. Type 2 diabetes mellitus with stage 3a chronic kidney disease, with long-term current use of insulin  Comments:  discussed a1c which was collected during his admission; repeat in 3 months in office  Orders:  -     glimepiride (AMARYL) 1 MG tablet; Take 1 tablet by mouth Every Morning Before Breakfast. Indications: Type 2 Diabetes  Dispense: 90 tablet; Refill: 3  -     Insulin Glargine, 1 Unit Dial, (Toujeo SoloStar) 300 UNIT/ML solution pen-injector injection; Inject 20 Units under the skin into the appropriate area as directed Every Night.  Dispense: 6 mL; Refill: 3    4. Type 2 diabetes mellitus with hyperglycemia, with long-term current use of insulin  -     glimepiride (AMARYL) 1 MG tablet; Take 1 tablet by mouth Every Morning Before Breakfast. Indications: Type 2 Diabetes  Dispense: 90 tablet; Refill: 3  -     Insulin Glargine, 1 Unit Dial, (Toujeo SoloStar) 300 UNIT/ML solution pen-injector injection; Inject 20 Units under the skin into the appropriate area as directed Every Night.  Dispense: 6 mL; Refill: 3    5. Acquired hypothyroidism  -     levothyroxine (SYNTHROID, LEVOTHROID) 50 MCG tablet; Take 1 tablet by mouth Daily. Indications: Underactive Thyroid  Dispense: 90 tablet; Refill: 3    6. Physical deconditioning  -     Ambulatory Referral to Physical Therapy Evaluate and treat    7. At high risk for injury related to fall  -     Ambulatory Referral to Physical Therapy Evaluate and treat    8. Chronic fatigue  -     Ambulatory Referral to Physical Therapy Evaluate and treat    9. Paroxysmal atrial fibrillation  -     bisoprolol (ZEBeta) 5 MG tablet; Take 1 tablet by mouth Daily.  Dispense: 90 tablet; Refill: 3    10. Chronic heart failure with preserved ejection  fraction (HFpEF)  -     bisoprolol (ZEBeta) 5 MG tablet; Take 1 tablet by mouth Daily.  Dispense: 90 tablet; Refill: 3        Bisoprolol was sent by cardiology 30 days 11 refills. For patient/caregivers it would be easier if it was sent in as a 90 day supply. I resent this in for them and indicated on the rx that cardiology would need to refill next time (refill provider indicated)

## 2023-05-16 ENCOUNTER — OFFICE VISIT (OUTPATIENT)
Dept: UROLOGY | Facility: CLINIC | Age: 88
End: 2023-05-16
Payer: MEDICARE

## 2023-05-16 ENCOUNTER — LAB (OUTPATIENT)
Dept: LAB | Facility: HOSPITAL | Age: 88
End: 2023-05-16
Payer: MEDICARE

## 2023-05-16 ENCOUNTER — READMISSION MANAGEMENT (OUTPATIENT)
Dept: CALL CENTER | Facility: HOSPITAL | Age: 88
End: 2023-05-16
Payer: MEDICARE

## 2023-05-16 VITALS
SYSTOLIC BLOOD PRESSURE: 136 MMHG | OXYGEN SATURATION: 97 % | WEIGHT: 212 LBS | BODY MASS INDEX: 31.4 KG/M2 | DIASTOLIC BLOOD PRESSURE: 70 MMHG | HEART RATE: 67 BPM | HEIGHT: 69 IN

## 2023-05-16 DIAGNOSIS — B37.49 YEAST UTI: ICD-10-CM

## 2023-05-16 DIAGNOSIS — N13.30 HYDRONEPHROSIS, LEFT: Primary | ICD-10-CM

## 2023-05-16 DIAGNOSIS — N13.30 HYDRONEPHROSIS, UNSPECIFIED HYDRONEPHROSIS TYPE: ICD-10-CM

## 2023-05-16 DIAGNOSIS — B37.49 YEAST UTI: Primary | ICD-10-CM

## 2023-05-16 DIAGNOSIS — N30.00 ACUTE CYSTITIS WITHOUT HEMATURIA: ICD-10-CM

## 2023-05-16 LAB
BILIRUB BLD-MCNC: NEGATIVE MG/DL
CLARITY, POC: ABNORMAL
COLOR UR: YELLOW
EXPIRATION DATE: ABNORMAL
GLUCOSE UR STRIP-MCNC: NEGATIVE MG/DL
KETONES UR QL: NEGATIVE
LEUKOCYTE EST, POC: ABNORMAL
Lab: ABNORMAL
NITRITE UR-MCNC: NEGATIVE MG/ML
PH UR: 6 [PH] (ref 5–8)
PROT UR STRIP-MCNC: ABNORMAL MG/DL
RBC # UR STRIP: ABNORMAL /UL
SP GR UR: 1.02 (ref 1–1.03)
UROBILINOGEN UR QL: NORMAL

## 2023-05-16 PROCEDURE — 87086 URINE CULTURE/COLONY COUNT: CPT

## 2023-05-16 PROCEDURE — 99214 OFFICE O/P EST MOD 30 MIN: CPT | Performed by: UROLOGY

## 2023-05-16 PROCEDURE — 1160F RVW MEDS BY RX/DR IN RCRD: CPT | Performed by: UROLOGY

## 2023-05-16 PROCEDURE — 81003 URINALYSIS AUTO W/O SCOPE: CPT | Performed by: UROLOGY

## 2023-05-16 PROCEDURE — 1159F MED LIST DOCD IN RCRD: CPT | Performed by: UROLOGY

## 2023-05-16 NOTE — OUTREACH NOTE
Medical Week 2 Survey    Flowsheet Row Responses   [facility] patient discharged from? [facility]   Does the patient have one of the following disease processes/diagnoses(primary or secondary)? Other   Week 2 attempt successful? Yes   Call start time 1012   Discharge diagnosis Gross hematuria    Call end time 1014   Person spoke with today (if not patient) and relationship Erika   Is the patient taking all medications as directed (includes completed medication regime)? Yes   Does the patient have a primary care provider?  Yes   Has the patient kept scheduled appointments due by today? Yes   Psychosocial issues? No   What is the patient's perception of their health status since discharge? Improving   Is the patient/caregiver able to teach back signs and symptoms related to disease process for when to call PCP? Yes   Is the patient/caregiver able to teach back signs and symptoms related to disease process for when to call 911? Yes   Is the patient/caregiver able to teach back the hierarchy of who to call/visit for symptoms/problems? PCP, Specialist, Home health nurse, Urgent Care, ED, 911 Yes   If the patient is a current smoker, are they able to teach back resources for cessation? Not a smoker   Additional teach back comments State he is doing well.  No more blood in his urine that she is aware of.   Week 2 Call Completed? Yes   Graduated Yes          Susan MARTIN - Licensed Nurse

## 2023-05-16 NOTE — PROGRESS NOTES
Follow Up Office Visit      Patient Name: Forrest Zepeda  : 1932   MRN: [id number]     Chief Complaint:  No chief complaint on file.      Referring Provider: No ref. provider found    History of Present Illness: Forrest Zepeda is a 89 y.o. male who presents today for follow up of gross hematuria.  He was admitted 2 weeks ago with gross hematuria requiring CBI.    He reports his hematuria has resolved.  He reports the gross hematuria started on its own.  He states it started off light and then by  it was very bloody.  He reports no pain or dysuria.  His urine culture was negative.      I reviewed his CT scan from admission and there is a likely clot in the left renal pelvis.      Subjective      Review of System:   Review of Systems   Constitutional: Negative.    HENT: Negative.    Eyes: Negative.    Respiratory: Positive for shortness of breath.    Cardiovascular: Negative.    Gastrointestinal: Negative.    Endocrine: Negative.    Genitourinary: Positive for frequency and urgency.   Musculoskeletal: Negative.    Skin: Negative.    Allergic/Immunologic: Negative.    Neurological: Positive for light-headedness.   Hematological: Negative.    Psychiatric/Behavioral: Negative.       I have reviewed the ROS documented by my clinical staff, I have updated appropriately and I agree. Deanne Pitts MD    I have reviewed and the following portions of the patient's history were updated as appropriate: past family history, past medical history, past social history, past surgical history and problem list.    Past Medical History:   Past Medical History:   Diagnosis Date   • Abnormal EKG    • Abnormal PSA     Reported abnormal   • Acute cystitis with hematuria 2023   • Acute deep vein thrombosis (DVT) of right lower extremity 2019   • Acute diverticulitis    • Acute hyperkalemia 2019   • Acute respiratory failure with hypoxia    • Acute saddle pulmonary embolism with acute cor pulmonale  "08/22/2019   • Acute systolic right heart failure 08/22/2019   • Arthritis     KNEES,  BUT SINCE HAD REPLACEMENT   • Benign localized hyperplasia of prostate    • Bilateral knee pain    • C. difficile diarrhea 2010   • Cataracts, bilateral    • CKD (chronic kidney disease)    • Coronary artery disease    • Diabetic neuropathy    • Diastolic dysfunction    • Disease of thyroid gland     HYPOTHYROIDISM   • Diverticulitis    • Dyslipidemia     H/O   • Family history of malignant hyperthermia     Brother    • Full dentures    • Generalized weakness    • History of ESBL E. coli infection     most recent 4-2022 f/u with ID Dr Johnson   • History of gout    • History of hepatitis A vaccination    • History of nephrolithiasis    • History of nuclear stress test     STATES IN THE 1990'S.  \"IT WAS OK\"   • History of osteopenia    • History of recurrent UTI (urinary tract infection)    • Hydronephrosis of left kidney 07/18/2019   • Hydronephrosis with renal and ureteral calculus obstruction 10/21/2019    Added automatically from request for surgery [id number]   • Hydronephrosis with ureteral stricture    • Hypercholesterolemia    • Hyperkalemia     PT UNSURE REGARDING HX OF THIS   • Hyperlipidemia    • Hypertension    • Impaired functional mobility, balance, gait, and endurance    • Insomnia    • IPF (idiopathic pulmonary fibrosis)     per patient and son, dx at [facility], was told no treatment necessary, not to worry about it   • Malignant hyperthermia due to anesthesia     PER PT REPORT, BROTHER HAD DURING LUNG SURGERY SEVERAL YEARS AGO.  STATED THEY PACKED HIM ON ICE.  STATED HE DID SURVIVE.  PT DENIES ANY PERSONAL HX OF MALIGNANT HYPERTHERMIA HIMSELF, OR ANY ISSUES WITH ANESTHESIA.  STATES TO HIS KNOWLEDGE, NO OTHER FAMILY MEMBER HAS THIS ISSUE EXCEPT FOR HIS BROTHER.   • On supplemental oxygen therapy     2L NC QHS   • Other hydronephrosis 08/12/2019    Added automatically from request for surgery [id number]   • Paroxysmal atrial " fibrillation    • Pulmonary hypertension, mild 02/2021    per echo per cardiology note 1/9/23, per pt's son, PCP does not agree with this dx   • Renal insufficiency    • Sepsis 2019   • Shortness of breath     STATES WITH EXERTION, OTHERWISE, DENIES   • Sigmoid diverticulitis without abscess or perforation 08/09/2017   • Sinus node dysfunction    • Skin breakdown     fourth toe right foot noted in 2019 MD D/C note   • Skin ulcer of fourth toe of right foot, limited to breakdown of skin 07/05/2019   • Stricture of ureter    • Type 2 diabetes mellitus    • Ureteral stricture, left    • UTI (urinary tract infection) 07/18/2019   • Vitamin D deficiency    • Wears glasses        Past Surgical History:  Past Surgical History:   Procedure Laterality Date   • APPENDECTOMY     • CARDIAC ELECTROPHYSIOLOGY PROCEDURE N/A 12/23/2020    Procedure: Pacemaker DC new. DNS meds.;  Surgeon: Jimy Ledezma DO;  Location:  [facility];  Service: Cardiology;  Laterality: N/A;   • CHOLECYSTECTOMY     • CIRCUMCISION N/A 10/23/2019    Procedure: CIRCUMCISIOn-DORSAL SLIT;  Surgeon: Griffin Romero MD;  Location:  [facility];  Service: Urology   • COLONOSCOPY     • CYSTOSCOPY RETROGRADE PYELOGRAM Left 6/17/2022    Procedure: CYSTOSCOPY RETROGRADE PYELOGRAM;  Surgeon: Ryne Mccann MD;  Location:  [facility];  Service: Urology;  Laterality: Left;   • CYSTOSCOPY URETEROSCOPY Left 2/11/2019    Procedure: URETEROSCOPY WITH STENT EXTRACTION, RPG;  Surgeon: Griffin Romero MD;  Location:  [facility];  Service: Urology   • CYSTOSCOPY W/ URETERAL STENT PLACEMENT Left 7/20/2019    Procedure: CYSTOSCOPY, LEFT RETROGRADE PYELOGRAM,  ATTEMPTED LEFT URETERAL STENT INSERTION;  Surgeon: Isaac Flynn MD;  Location:  [facility];  Service: Urology   • CYSTOSCOPY W/ URETERAL STENT PLACEMENT Left 6/17/2022    Procedure: CYSTOSCOPY URETERAL CATHETER/STENT INSERTION;  Surgeon: Ryne Mccann MD;  Location:  [facility];  Service: Urology;   Laterality: Left;   • CYSTOSCOPY W/ URETERAL STENT PLACEMENT Left 1/27/2023    Procedure: CYSTOSCOPY URETERAL CATHETER/STENT INSERTION;  Surgeon: Deanne Pitts MD;  Location: [facility];  Service: Urology;  Laterality: Left;   • EYE SURGERY      CATARACTS REMOVED BILATERALLY   • KNEE ARTHROPLASTY Bilateral    • KNEE ARTHROSCOPY Bilateral    • RENAL ARTERY STENT      SEVERAL TIMES EVERY SINCE 1978   • URETERAL STENT INSERTION  10/2020   • URETEROSCOPY LASER LITHOTRIPSY WITH STENT INSERTION Left 1/10/2018    Procedure:  cysto, left ureteral stent replacement ;  Surgeon: Griffin Romero MD;  Location: [facility] OR;  Service:    • URETEROSCOPY LASER LITHOTRIPSY WITH STENT INSERTION Left 8/14/2019    Procedure: URETEROSCOPY, STONE REMOVAL WITH STENT INSERTION;  Surgeon: Griffin Romero MD;  Location: [facility] OR;  Service: Urology   • URETEROSCOPY LASER LITHOTRIPSY WITH STENT INSERTION Left 10/23/2019    Procedure: Left URETEROSCOPY WITH STENT INSERTION-LEFT;  Surgeon: Griffin Romero MD;  Location: [facility] OR;  Service: Urology       Family History:   family history includes Brain cancer in his brother and sister; Heart attack in his sister; Hypertension in his sister; Lung cancer in his brother and sister; Malig Hyperthermia in his brother; Stroke in his sister.   Otherwise pertinent FHx was reviewed and not pertinent to current issue.    Social History:    reports that he quit smoking about 72 years ago. His smoking use included cigarettes. He has a 0.50 pack-year smoking history. He has been exposed to tobacco smoke. He has never used smokeless tobacco. He reports that he does not drink alcohol and does not use drugs.    Medications:     Current Outpatient Medications:   •  acetaminophen (TYLENOL) 500 MG tablet, Take 2 tablets by mouth Every 6 (Six) Hours As Needed for Mild Pain., Disp: , Rfl:   •  allopurinol (ZYLOPRIM) 300 MG tablet, TAKE ONE TABLET BY MOUTH ONCE NIGHTLY (Patient taking differently: Take 1 tablet by  mouth Every Night.), Disp: 90 tablet, Rfl: 3  •  apixaban (Eliquis) 2.5 MG tablet tablet, Take 1 tablet by mouth 2 (Two) Times a Day., Disp: 180 tablet, Rfl: 3  •  aspirin 81 MG EC tablet, Take 1 tablet by mouth Daily., Disp: 90 tablet, Rfl: 2  •  bisoprolol (ZEBeta) 5 MG tablet, Take 1 tablet by mouth Daily., Disp: 90 tablet, Rfl: 3  •  Continuous Blood Gluc Sensor (Dexcom G7 Sensor) misc, 1 each Every 10 (Ten) Days., Disp: 3 each, Rfl: 11  •  Diclofenac Sodium (VOLTAREN) 1 % gel gel, Apply 2 g topically to the appropriate area as directed 4 (Four) Times a Day., Disp: 100 g, Rfl: 0  •  finasteride (PROSCAR) 5 MG tablet, TAKE ONE TABLET BY MOUTH DAILY (Patient taking differently: Take 1 tablet by mouth Daily.), Disp: 90 tablet, Rfl: 3  •  glimepiride (AMARYL) 1 MG tablet, Take 1 tablet by mouth Every Morning Before Breakfast. Indications: Type 2 Diabetes, Disp: 90 tablet, Rfl: 3  •  Insulin Glargine, 1 Unit Dial, (Toujeo SoloStar) 300 UNIT/ML solution pen-injector injection, Inject 20 Units under the skin into the appropriate area as directed Every Night., Disp: 6 mL, Rfl: 3  •  Insulin Pen Needle (Novofine Pen Needle) 32G X 6 MM misc, 1 each Daily., Disp: 100 each, Rfl: 3  •  Januvia 100 MG tablet, TAKE ONE TABLET BY MOUTH DAILY (Patient taking differently: Take 1 tablet by mouth Daily.), Disp: 90 tablet, Rfl: 3  •  levothyroxine (SYNTHROID, LEVOTHROID) 50 MCG tablet, Take 1 tablet by mouth Daily. Indications: Underactive Thyroid, Disp: 90 tablet, Rfl: 3  •  Multiple Vitamins-Minerals (OCUVITE ADULT 50+ PO), Take 1 capsule by mouth Daily., Disp: , Rfl:   •  nitroglycerin (NITROSTAT) 0.4 MG SL tablet, 1 under the tongue as needed for angina, may repeat q5mins for up three doses (Patient taking differently: Place 1 tablet under the tongue Every 5 (Five) Minutes As Needed for Chest Pain. 1 under the tongue as needed for angina, may repeat q5mins for up three doses), Disp: 100 tablet, Rfl: 11  •  Probiotic Product  "(PROBIOTIC DAILY PO), Take 1 capsule by mouth Daily., Disp: , Rfl:   •  silodosin (RAPAFLO) 8 MG capsule capsule, TAKE ONE CAPSULE BY MOUTH DAILY WITH BREAKFAST (Patient taking differently: Take 1 capsule by mouth Daily With Breakfast.), Disp: 90 capsule, Rfl: 3    Allergies:   Allergies   Allergen Reactions   • Metformin Diarrhea and GI Intolerance   • Statins Diarrhea and Myalgia       IPSS Questionnaire (AUA-7):  Over the past month…    1)  Incomplete Emptying  How often have you had a sensation of not emptying your bladder?  5 - Almost always   2)  Frequency  How often have you had to urinate less than every two hours? 5 - Almost always   3)  Intermittency  How often have you found you stopped and started again several times when you urinated?  0 - Not at all   4) Urgency  How often have you found it difficult to postpone urination?  5 - Almost always   5) Weak Stream  How often have you had a weak urinary stream?  5 - Almost always   6) Straining  How often have you had to push or strain to begin urination?  3 - About half the time   7) Nocturia  How many times did you typically get up at night to urinate?  3 - 3 times   Total Score:  26   The International Prostate Symptom Score (IPSS) is used to screen, diagnose, track symptoms of benign prostatic hyperplasia (BPH).    0-7 pts (Mild Symptoms)  / 8-19 pts (Moderate) / 20-35 (Severe)    Quality of life due to urinary symptoms:  If you were to spend the rest of your life with your urinary condition the way it is now, how would you feel about that? 6-Terrible   Urine Leakage (Incontinence) 0-No Leakage     Objective     Physical Exam:   Vital Signs:   Vitals:    05/16/23 1331   BP: 136/70   Pulse: 67   SpO2: 97%   Weight: 96.2 kg (212 lb)   Height: 175.3 cm (69.02\")   PainSc: 0-No pain     Body mass index is 31.29 kg/m².     Physical Exam  Vitals and nursing note reviewed.   Constitutional:       General: He is awake. He is not in acute distress.     Appearance: " Normal appearance. He is well-developed.   HENT:      Head: Normocephalic and atraumatic.      Right Ear: External ear normal.      Left Ear: External ear normal.      Nose: Nose normal.   Eyes:      Conjunctiva/sclera: Conjunctivae normal.   Pulmonary:      Effort: Pulmonary effort is normal.   Abdominal:      General: There is no distension.      Palpations: Abdomen is soft. There is no mass.      Tenderness: There is no abdominal tenderness. There is no right CVA tenderness, left CVA tenderness, guarding or rebound.      Hernia: No hernia is present. There is no hernia in the left inguinal area or right inguinal area.   Genitourinary:     Pubic Area: No rash.       Rectum: No mass or tenderness. Normal anal tone.   Musculoskeletal:      Cervical back: Normal range of motion.   Lymphadenopathy:      Lower Body: No right inguinal adenopathy. No left inguinal adenopathy.   Skin:     General: Skin is warm.   Neurological:      General: No focal deficit present.      Mental Status: He is alert and oriented to person, place, and time.   Psychiatric:         Behavior: Behavior normal. Behavior is cooperative.         Labs:   Brief Urine Lab Results  (Last result in the past 365 days)      Color   Clarity   Blood   Leuk Est   Nitrite   Protein   CREAT   Urine HCG        05/01/23 1556 Yellow   Turbid   Negative   Negative   Negative   >=300 mg/dL (3+)                 Urine Culture        1/27/2023    16:38 1/31/2023    17:10 5/1/2023    08:00 5/1/2023    15:56   Urine Culture   Urine Culture Yeast isolated   No growth   Final report   No growth          Lab Results   Component Value Date    GLUCOSE 162 (H) 05/04/2023    CALCIUM 9.0 05/04/2023     05/04/2023    K 4.6 05/04/2023    CO2 21.0 (L) 05/04/2023     05/04/2023    BUN 23 05/04/2023    CREATININE 1.23 05/04/2023    EGFRIFAFRI 45 (L) 12/13/2021    EGFRIFNONA 39 (L) 12/13/2021    BCR 18.7 05/04/2023    ANIONGAP 10.0 05/04/2023       Lab Results   Component  Value Date    WBC 14.07 (H) 05/04/2023    HGB 10.4 (L) 05/04/2023    HCT 31.7 (L) 05/04/2023    .0 (H) 05/04/2023     05/04/2023       Lab Results   Component Value Date    PSA 1.160 04/15/2021    PSA 8.640 (H) 11/30/2020    PSA 1.450 08/26/2020       Images:   CT Abdomen Pelvis Without Contrast    Result Date: 5/1/2023  1. Left double-J ureteral stent remains in place. New hyperdense material in the upper one half of the dilated left renal pelvis, whether clot or, less likely, tumor. 2. New anterior and superior bladder wall inflammation consistent with cystitis. 3. No evidence of acute intra-abdominal or intrapelvic disease elsewhere. Extensive sigmoid reticulosis without evidence of diverticulitis. 4. Extensive but stable changes of fibrosis of the lower lungs. Electronically Signed: Felix Samson  5/1/2023 6:21 PM EDT  Workstation ID: [id number]    US Scrotum & Testicles    Result Date: 5/4/2023  Impression: 1. The right testicle is slightly more echogenic than the left testicle. This is a nonspecific finding and could potentially be related to prior infection. There is normal and symmetric color Doppler flow to both testicles. 2. No evidence of intratesticular mass lesion. 3. Right-sided epididymal cyst or spermatocele. Electronically Signed: Rolly Callahan  5/4/2023 12:34 PM EDT  Workstation ID: [id number]    FL C Arm During Surgery    Result Date: 1/28/2023  Impression: Intraprocedural fluoroscopy for cystoscopy/ureteral stent insertion. Electronically Signed: Cas Herrera  1/28/2023 12:05 PM EST  Workstation ID: [id number]      Measures:   Tobacco:   Forrest Zepeda  reports that he quit smoking about 72 years ago. His smoking use included cigarettes. He has a 0.50 pack-year smoking history. He has been exposed to tobacco smoke. He has never used smokeless tobacco.      Urine Incontinence: Patient reports that he is not currently experiencing any symptoms of urinary incontinence.         Assessment /  Plan      Assessment/Plan:   89 y.o. male who presented today for follow up of gross hematuria requiring CBI.  He has since cleared up and doing well.  He is voiding on his own without issue.  He will be due for stent exchange in approximately 6 weeks.  I will plan on ureteroscopy at that time.  His urgency is controlled on Myrbetriq but he is not sure if he is taking the medication.   I will have him come back in approximately 4 weeks.       Diagnoses and all orders for this visit:    1. Hydronephrosis, left (Primary)         Follow Up:   Return in about 4 weeks (around 6/13/2023) for Recheck.    I spent approximately 30 minutes providing clinical care for this patient; including review of patient's chart and provider documentation, face to face time spent with patient in examination room (obtaining history, performing physical exam, discussing diagnosis and management options), placing orders, and completing patient documentation.     Deanne Pitts MD  [facility]

## 2023-05-17 LAB — BACTERIA SPEC AEROBE CULT: NO GROWTH

## 2023-05-24 ENCOUNTER — EXTERNAL PBMM DATA (OUTPATIENT)
Dept: PHARMACY | Facility: OTHER | Age: 88
End: 2023-05-24
Payer: MEDICARE

## 2023-05-25 ENCOUNTER — OFFICE VISIT (OUTPATIENT)
Dept: UROLOGY | Facility: CLINIC | Age: 88
End: 2023-05-25
Payer: MEDICARE

## 2023-05-25 VITALS
HEIGHT: 69 IN | HEART RATE: 57 BPM | BODY MASS INDEX: 31.4 KG/M2 | OXYGEN SATURATION: 97 % | WEIGHT: 212 LBS | SYSTOLIC BLOOD PRESSURE: 124 MMHG | DIASTOLIC BLOOD PRESSURE: 62 MMHG

## 2023-05-25 DIAGNOSIS — R31.0 GROSS HEMATURIA: Primary | ICD-10-CM

## 2023-05-25 LAB
BILIRUB BLD-MCNC: NEGATIVE MG/DL
CLARITY, POC: ABNORMAL
COLOR UR: ABNORMAL
EXPIRATION DATE: ABNORMAL
GLUCOSE UR STRIP-MCNC: NEGATIVE MG/DL
KETONES UR QL: NEGATIVE
LEUKOCYTE EST, POC: ABNORMAL
Lab: ABNORMAL
NITRITE UR-MCNC: NEGATIVE MG/ML
PH UR: 6 [PH] (ref 5–8)
PROT UR STRIP-MCNC: ABNORMAL MG/DL
RBC # UR STRIP: ABNORMAL /UL
SP GR UR: 1.02 (ref 1–1.03)
UROBILINOGEN UR QL: NORMAL

## 2023-05-25 RX ORDER — CEFDINIR 300 MG/1
300 CAPSULE ORAL 2 TIMES DAILY
Qty: 20 CAPSULE | Refills: 0 | Status: SHIPPED | OUTPATIENT
Start: 2023-05-25 | End: 2023-06-04

## 2023-05-25 NOTE — H&P (VIEW-ONLY)
Follow Up Office Visit      Patient Name: Forrest Zepeda  : 1932   MRN: [id number]     Chief Complaint:    Chief Complaint   Patient presents with   • gross hematuria       Referring Provider: No ref. provider found    History of Present Illness: Forrest Zepeda is a 89 y.o. male who presents today for follow up of gross hematuria.  He reports on  he started having hematuria again.  He reports no burning.  No fever or chills.  NO mental status change.   He was clear prior to this.  He was admitted approximately 2 weeks ago with gross hematuria.   We were planning on ureteroscopy in July.     Subjective      Review of System:   Review of Systems   Constitutional: Positive for fatigue.   HENT: Negative.    Eyes: Negative.    Respiratory: Positive for shortness of breath.    Cardiovascular: Negative.    Gastrointestinal: Negative.    Endocrine: Negative.    Genitourinary: Positive for flank pain and urgency.   Skin: Negative.    Allergic/Immunologic: Negative.    Neurological: Positive for dizziness.   Hematological: Negative.    Psychiatric/Behavioral: Negative.       I have reviewed the ROS documented by my clinical staff, I have updated appropriately and I agree. Deanne Pitts MD    I have reviewed and the following portions of the patient's history were updated as appropriate: past family history, past medical history, past social history, past surgical history and problem list.    Past Medical History:   Past Medical History:   Diagnosis Date   • Abnormal EKG    • Abnormal PSA     Reported abnormal   • Acute cystitis with hematuria 2023   • Acute deep vein thrombosis (DVT) of right lower extremity 2019   • Acute diverticulitis    • Acute hyperkalemia 2019   • Acute respiratory failure with hypoxia    • Acute saddle pulmonary embolism with acute cor pulmonale 2019   • Acute systolic right heart failure 2019   • Arthritis     KNEES,  BUT SINCE HAD REPLACEMENT   •  "Benign localized hyperplasia of prostate    • Bilateral knee pain    • C. difficile diarrhea 2010   • Cataracts, bilateral    • CKD (chronic kidney disease)    • Coronary artery disease    • Diabetic neuropathy    • Diastolic dysfunction    • Disease of thyroid gland     HYPOTHYROIDISM   • Diverticulitis    • Dyslipidemia     H/O   • Family history of malignant hyperthermia     Brother    • Full dentures    • Generalized weakness    • History of ESBL E. coli infection     most recent 4-2022 f/u with ID Dr Johnson   • History of gout    • History of hepatitis A vaccination    • History of nephrolithiasis    • History of nuclear stress test     STATES IN THE 1990'S.  \"IT WAS OK\"   • History of osteopenia    • History of recurrent UTI (urinary tract infection)    • Hydronephrosis of left kidney 07/18/2019   • Hydronephrosis with renal and ureteral calculus obstruction 10/21/2019    Added automatically from request for surgery [id number]   • Hydronephrosis with ureteral stricture    • Hypercholesterolemia    • Hyperkalemia     PT UNSURE REGARDING HX OF THIS   • Hyperlipidemia    • Hypertension    • Impaired functional mobility, balance, gait, and endurance    • Insomnia    • IPF (idiopathic pulmonary fibrosis)     per patient and son, dx at [facility], was told no treatment necessary, not to worry about it   • Malignant hyperthermia due to anesthesia     PER PT REPORT, BROTHER HAD DURING LUNG SURGERY SEVERAL YEARS AGO.  STATED THEY PACKED HIM ON ICE.  STATED HE DID SURVIVE.  PT DENIES ANY PERSONAL HX OF MALIGNANT HYPERTHERMIA HIMSELF, OR ANY ISSUES WITH ANESTHESIA.  STATES TO HIS KNOWLEDGE, NO OTHER FAMILY MEMBER HAS THIS ISSUE EXCEPT FOR HIS BROTHER.   • On supplemental oxygen therapy     2L NC QHS   • Other hydronephrosis 08/12/2019    Added automatically from request for surgery [id number]   • Paroxysmal atrial fibrillation    • Pulmonary hypertension, mild 02/2021    per echo per cardiology note 1/9/23, per pt's son, PCP does " not agree with this dx   • Renal insufficiency    • Sepsis 2019   • Shortness of breath     STATES WITH EXERTION, OTHERWISE, DENIES   • Sigmoid diverticulitis without abscess or perforation 08/09/2017   • Sinus node dysfunction    • Skin breakdown     fourth toe right foot noted in 2019 MD D/C note   • Skin ulcer of fourth toe of right foot, limited to breakdown of skin 07/05/2019   • Stricture of ureter    • Type 2 diabetes mellitus    • Ureteral stricture, left    • UTI (urinary tract infection) 07/18/2019   • Vitamin D deficiency    • Wears glasses        Past Surgical History:  Past Surgical History:   Procedure Laterality Date   • APPENDECTOMY     • CARDIAC ELECTROPHYSIOLOGY PROCEDURE N/A 12/23/2020    Procedure: Pacemaker DC new. DNS meds.;  Surgeon: Jimy Ledezma DO;  Location:  [facility];  Service: Cardiology;  Laterality: N/A;   • CHOLECYSTECTOMY     • CIRCUMCISION N/A 10/23/2019    Procedure: CIRCUMCISIOn-DORSAL SLIT;  Surgeon: Griffin Romero MD;  Location:  [facility];  Service: Urology   • COLONOSCOPY     • CYSTOSCOPY RETROGRADE PYELOGRAM Left 6/17/2022    Procedure: CYSTOSCOPY RETROGRADE PYELOGRAM;  Surgeon: Ryne Mccann MD;  Location:  [facility];  Service: Urology;  Laterality: Left;   • CYSTOSCOPY URETEROSCOPY Left 2/11/2019    Procedure: URETEROSCOPY WITH STENT EXTRACTION, RPG;  Surgeon: Griffin Romero MD;  Location:  [facility];  Service: Urology   • CYSTOSCOPY W/ URETERAL STENT PLACEMENT Left 7/20/2019    Procedure: CYSTOSCOPY, LEFT RETROGRADE PYELOGRAM,  ATTEMPTED LEFT URETERAL STENT INSERTION;  Surgeon: Isaac Flynn MD;  Location:  [facility];  Service: Urology   • CYSTOSCOPY W/ URETERAL STENT PLACEMENT Left 6/17/2022    Procedure: CYSTOSCOPY URETERAL CATHETER/STENT INSERTION;  Surgeon: yRne Mccann MD;  Location:  [facility];  Service: Urology;  Laterality: Left;   • CYSTOSCOPY W/ URETERAL STENT PLACEMENT Left 1/27/2023    Procedure: CYSTOSCOPY URETERAL  CATHETER/STENT INSERTION;  Surgeon: Deanne Pitts MD;  Location: [facility] OR;  Service: Urology;  Laterality: Left;   • EYE SURGERY      CATARACTS REMOVED BILATERALLY   • KNEE ARTHROPLASTY Bilateral    • KNEE ARTHROSCOPY Bilateral    • RENAL ARTERY STENT      SEVERAL TIMES EVERY SINCE 1978   • URETERAL STENT INSERTION  10/2020   • URETEROSCOPY LASER LITHOTRIPSY WITH STENT INSERTION Left 1/10/2018    Procedure:  cysto, left ureteral stent replacement ;  Surgeon: Griffin Romero MD;  Location: [facility] OR;  Service:    • URETEROSCOPY LASER LITHOTRIPSY WITH STENT INSERTION Left 8/14/2019    Procedure: URETEROSCOPY, STONE REMOVAL WITH STENT INSERTION;  Surgeon: Griffin Romero MD;  Location: [facility] OR;  Service: Urology   • URETEROSCOPY LASER LITHOTRIPSY WITH STENT INSERTION Left 10/23/2019    Procedure: Left URETEROSCOPY WITH STENT INSERTION-LEFT;  Surgeon: Griffin Romero MD;  Location: [facility] OR;  Service: Urology       Family History:   family history includes Brain cancer in his brother and sister; Heart attack in his sister; Hypertension in his sister; Lung cancer in his brother and sister; Malig Hyperthermia in his brother; Stroke in his sister.   Otherwise pertinent FHx was reviewed and not pertinent to current issue.    Social History:    reports that he quit smoking about 72 years ago. His smoking use included cigarettes. He has a 0.50 pack-year smoking history. He has been exposed to tobacco smoke. He has never used smokeless tobacco. He reports that he does not drink alcohol and does not use drugs.    Medications:     Current Outpatient Medications:   •  acetaminophen (TYLENOL) 500 MG tablet, Take 2 tablets by mouth Every 6 (Six) Hours As Needed for Mild Pain., Disp: , Rfl:   •  allopurinol (ZYLOPRIM) 300 MG tablet, TAKE ONE TABLET BY MOUTH ONCE NIGHTLY (Patient taking differently: Take 1 tablet by mouth Every Night.), Disp: 90 tablet, Rfl: 3  •  apixaban (Eliquis) 2.5 MG tablet tablet, Take 1 tablet by  mouth 2 (Two) Times a Day., Disp: 180 tablet, Rfl: 3  •  aspirin 81 MG EC tablet, Take 1 tablet by mouth Daily., Disp: 90 tablet, Rfl: 2  •  bisoprolol (ZEBeta) 5 MG tablet, Take 1 tablet by mouth Daily., Disp: 90 tablet, Rfl: 3  •  Continuous Blood Gluc Sensor (Dexcom G7 Sensor) misc, 1 each Every 10 (Ten) Days., Disp: 3 each, Rfl: 11  •  Diclofenac Sodium (VOLTAREN) 1 % gel gel, Apply 2 g topically to the appropriate area as directed 4 (Four) Times a Day., Disp: 100 g, Rfl: 0  •  finasteride (PROSCAR) 5 MG tablet, TAKE ONE TABLET BY MOUTH DAILY (Patient taking differently: Take 1 tablet by mouth Daily.), Disp: 90 tablet, Rfl: 3  •  glimepiride (AMARYL) 1 MG tablet, Take 1 tablet by mouth Every Morning Before Breakfast. Indications: Type 2 Diabetes, Disp: 90 tablet, Rfl: 3  •  Insulin Glargine, 1 Unit Dial, (Toujeo SoloStar) 300 UNIT/ML solution pen-injector injection, Inject 20 Units under the skin into the appropriate area as directed Every Night., Disp: 6 mL, Rfl: 3  •  Insulin Pen Needle (Novofine Pen Needle) 32G X 6 MM misc, 1 each Daily., Disp: 100 each, Rfl: 3  •  Januvia 100 MG tablet, TAKE ONE TABLET BY MOUTH DAILY (Patient taking differently: Take 1 tablet by mouth Daily.), Disp: 90 tablet, Rfl: 3  •  levothyroxine (SYNTHROID, LEVOTHROID) 50 MCG tablet, Take 1 tablet by mouth Daily. Indications: Underactive Thyroid, Disp: 90 tablet, Rfl: 3  •  Multiple Vitamins-Minerals (OCUVITE ADULT 50+ PO), Take 1 capsule by mouth Daily., Disp: , Rfl:   •  nitroglycerin (NITROSTAT) 0.4 MG SL tablet, 1 under the tongue as needed for angina, may repeat q5mins for up three doses (Patient taking differently: Place 1 tablet under the tongue Every 5 (Five) Minutes As Needed for Chest Pain. 1 under the tongue as needed for angina, may repeat q5mins for up three doses), Disp: 100 tablet, Rfl: 11  •  Probiotic Product (PROBIOTIC DAILY PO), Take 1 capsule by mouth Daily., Disp: , Rfl:   •  silodosin (RAPAFLO) 8 MG capsule  "capsule, TAKE ONE CAPSULE BY MOUTH DAILY WITH BREAKFAST (Patient taking differently: Take 1 capsule by mouth Daily With Breakfast.), Disp: 90 capsule, Rfl: 3  •  cefdinir (OMNICEF) 300 MG capsule, Take 1 capsule by mouth 2 (Two) Times a Day for 10 days., Disp: 20 capsule, Rfl: 0    Allergies:   Allergies   Allergen Reactions   • Metformin Diarrhea and GI Intolerance   • Statins Diarrhea and Myalgia       IPSS Questionnaire (AUA-7):  Over the past month…    1)  Incomplete Emptying  How often have you had a sensation of not emptying your bladder?  5 - Almost always   2)  Frequency  How often have you had to urinate less than every two hours? 4 - More than half the time   3)  Intermittency  How often have you found you stopped and started again several times when you urinated?  0 - Not at all   4) Urgency  How often have you found it difficult to postpone urination?  5 - Almost always   5) Weak Stream  How often have you had a weak urinary stream?  3 - About half the time   6) Straining  How often have you had to push or strain to begin urination?  0 - Not at all   7) Nocturia  How many times did you typically get up at night to urinate?  3 - 3 times   Total Score:  20   The International Prostate Symptom Score (IPSS) is used to screen, diagnose, track symptoms of benign prostatic hyperplasia (BPH).    0-7 pts (Mild Symptoms)  / 8-19 pts (Moderate) / 20-35 (Severe)    Quality of life due to urinary symptoms:  If you were to spend the rest of your life with your urinary condition the way it is now, how would you feel about that? 6-Terrible   Urine Leakage (Incontinence) 0-No Leakage       Objective     Physical Exam:   Vital Signs:   Vitals:    05/25/23 1534   BP: 124/62   Pulse: 57   SpO2: 97%   Weight: 96.2 kg (212 lb)   Height: 175.3 cm (69.02\")   PainSc: 0-No pain     Body mass index is 31.29 kg/m².     Physical Exam  Vitals and nursing note reviewed.   Constitutional:       General: He is awake. He is not in acute " distress.     Appearance: Normal appearance. He is well-developed.   HENT:      Head: Normocephalic and atraumatic.      Right Ear: External ear normal.      Left Ear: External ear normal.      Nose: Nose normal.   Eyes:      Conjunctiva/sclera: Conjunctivae normal.   Pulmonary:      Effort: Pulmonary effort is normal.   Abdominal:      General: There is no distension.      Palpations: Abdomen is soft. There is no mass.      Tenderness: There is no abdominal tenderness. There is no right CVA tenderness, left CVA tenderness, guarding or rebound.      Hernia: No hernia is present. There is no hernia in the left inguinal area or right inguinal area.   Genitourinary:     Pubic Area: No rash.       Rectum: No mass or tenderness. Normal anal tone.   Musculoskeletal:      Cervical back: Normal range of motion.   Lymphadenopathy:      Lower Body: No right inguinal adenopathy. No left inguinal adenopathy.   Skin:     General: Skin is warm.   Neurological:      General: No focal deficit present.      Mental Status: He is alert and oriented to person, place, and time.   Psychiatric:         Behavior: Behavior normal. Behavior is cooperative.         Labs:   Brief Urine Lab Results  (Last result in the past 365 days)      Color   Clarity   Blood   Leuk Est   Nitrite   Protein   CREAT   Urine HCG        05/16/23 1513 Yellow   Cloudy   3+   Large (3+)   Negative   1+                 Urine Culture        1/31/2023    17:10 5/1/2023    08:00 5/1/2023    15:56 5/16/2023    19:00   Urine Culture   Urine Culture No growth   Final report   No growth   No growth          Lab Results   Component Value Date    GLUCOSE 162 (H) 05/04/2023    CALCIUM 9.0 05/04/2023     05/04/2023    K 4.6 05/04/2023    CO2 21.0 (L) 05/04/2023     05/04/2023    BUN 23 05/04/2023    CREATININE 1.23 05/04/2023    EGFRIFAFRI 45 (L) 12/13/2021    EGFRIFNONA 39 (L) 12/13/2021    BCR 18.7 05/04/2023    ANIONGAP 10.0 05/04/2023       Lab Results    Component Value Date    WBC 14.07 (H) 05/04/2023    HGB 10.4 (L) 05/04/2023    HCT 31.7 (L) 05/04/2023    .0 (H) 05/04/2023     05/04/2023       Lab Results   Component Value Date    PSA 1.160 04/15/2021    PSA 8.640 (H) 11/30/2020    PSA 1.450 08/26/2020       Images:   CT Abdomen Pelvis Without Contrast    Result Date: 5/1/2023  1. Left double-J ureteral stent remains in place. New hyperdense material in the upper one half of the dilated left renal pelvis, whether clot or, less likely, tumor. 2. New anterior and superior bladder wall inflammation consistent with cystitis. 3. No evidence of acute intra-abdominal or intrapelvic disease elsewhere. Extensive sigmoid reticulosis without evidence of diverticulitis. 4. Extensive but stable changes of fibrosis of the lower lungs. Electronically Signed: Felix Samson  5/1/2023 6:21 PM EDT  Workstation ID: [id number]    US Scrotum & Testicles    Result Date: 5/4/2023  Impression: 1. The right testicle is slightly more echogenic than the left testicle. This is a nonspecific finding and could potentially be related to prior infection. There is normal and symmetric color Doppler flow to both testicles. 2. No evidence of intratesticular mass lesion. 3. Right-sided epididymal cyst or spermatocele. Electronically Signed: Rolly Callahan  5/4/2023 12:34 PM EDT  Workstation ID: [id number]    FL C Arm During Surgery    Result Date: 1/28/2023  Impression: Intraprocedural fluoroscopy for cystoscopy/ureteral stent insertion. Electronically Signed: Cas Herrera  1/28/2023 12:05 PM EST  Workstation ID: [id number]      Measures:   Tobacco:   Forrest Zepeda  reports that he quit smoking about 72 years ago. His smoking use included cigarettes. He has a 0.50 pack-year smoking history. He has been exposed to tobacco smoke. He has never used smokeless tobacco..     Urine Incontinence: Patient reports that he is not currently experiencing any symptoms of urinary  incontinence.        Assessment / Plan      Assessment/Plan:   89 y.o. male who presented today for follow up of gross hematuria.  He reports he has been passing very dark urine.  We were planning on ureteroscopy in July, however, I will move this date up.  I will also start him on Omnicef as he had some LE on dipstick today.  I will obtain CBC and BMP as well.  We will have him scheduled within the next 2 weeks.     Diagnoses and all orders for this visit:    1. Gross hematuria (Primary)  -     CBC Auto Differential; Future  -     Basic Metabolic Panel; Future  -     Case Request; Standing  -     ceFAZolin (ANCEF) 2 g in sodium chloride 0.9 % 100 mL IVPB  -     CBC (No Diff); Future  -     Basic Metabolic Panel; Future  -     Protime-INR; Future  -     Case Request  -     cefdinir (OMNICEF) 300 MG capsule; Take 1 capsule by mouth 2 (Two) Times a Day for 10 days.  Dispense: 20 capsule; Refill: 0  -     Urine Culture - Urine, Urine, Clean Catch  -     POC Urinalysis Dipstick, Automated    Other orders  -     Outpatient In A Bed; Standing  -     Follow Anesthesia Guidelines / Protocol; Future  -     Provide NPO Instructions to Patient; Future  -     Chlorhexidine Skin Prep; Future  -     Follow Anesthesia Guidelines / Protocol; Standing  -     Verify NPO Status; Standing  -     Obtain Informed Consent; Standing  -     SCD (Sequential Compression Device) - Place on Patient in Pre-Op; Standing  -     Verify / Perform Chlorhexidine Skin Prep; Standing  -     Verify / Perform Chlorhexidine Skin Prep if Indicated (If Not Already Completed); Standing         Follow Up:   Return for Follow up after surgery.    I spent approximately 30 minutes providing clinical care for this patient; including review of patient's chart and provider documentation, face to face time spent with patient in examination room (obtaining history, performing physical exam, discussing diagnosis and management options), placing orders, and completing  patient documentation.     Deanne Pitts MD  [facility] [place]

## 2023-05-25 NOTE — PROGRESS NOTES
Follow Up Office Visit      Patient Name: Forrest Zepeda  : 1932   MRN: [id number]     Chief Complaint:    Chief Complaint   Patient presents with   • gross hematuria       Referring Provider: No ref. provider found    History of Present Illness: Forrest Zepeda is a 89 y.o. male who presents today for follow up of gross hematuria.  He reports on  he started having hematuria again.  He reports no burning.  No fever or chills.  NO mental status change.   He was clear prior to this.  He was admitted approximately 2 weeks ago with gross hematuria.   We were planning on ureteroscopy in July.     Subjective      Review of System:   Review of Systems   Constitutional: Positive for fatigue.   HENT: Negative.    Eyes: Negative.    Respiratory: Positive for shortness of breath.    Cardiovascular: Negative.    Gastrointestinal: Negative.    Endocrine: Negative.    Genitourinary: Positive for flank pain and urgency.   Skin: Negative.    Allergic/Immunologic: Negative.    Neurological: Positive for dizziness.   Hematological: Negative.    Psychiatric/Behavioral: Negative.       I have reviewed the ROS documented by my clinical staff, I have updated appropriately and I agree. Deanne Pitts MD    I have reviewed and the following portions of the patient's history were updated as appropriate: past family history, past medical history, past social history, past surgical history and problem list.    Past Medical History:   Past Medical History:   Diagnosis Date   • Abnormal EKG    • Abnormal PSA     Reported abnormal   • Acute cystitis with hematuria 2023   • Acute deep vein thrombosis (DVT) of right lower extremity 2019   • Acute diverticulitis    • Acute hyperkalemia 2019   • Acute respiratory failure with hypoxia    • Acute saddle pulmonary embolism with acute cor pulmonale 2019   • Acute systolic right heart failure 2019   • Arthritis     KNEES,  BUT SINCE HAD REPLACEMENT   •  "Benign localized hyperplasia of prostate    • Bilateral knee pain    • C. difficile diarrhea 2010   • Cataracts, bilateral    • CKD (chronic kidney disease)    • Coronary artery disease    • Diabetic neuropathy    • Diastolic dysfunction    • Disease of thyroid gland     HYPOTHYROIDISM   • Diverticulitis    • Dyslipidemia     H/O   • Family history of malignant hyperthermia     Brother    • Full dentures    • Generalized weakness    • History of ESBL E. coli infection     most recent 4-2022 f/u with ID Dr Johnson   • History of gout    • History of hepatitis A vaccination    • History of nephrolithiasis    • History of nuclear stress test     STATES IN THE 1990'S.  \"IT WAS OK\"   • History of osteopenia    • History of recurrent UTI (urinary tract infection)    • Hydronephrosis of left kidney 07/18/2019   • Hydronephrosis with renal and ureteral calculus obstruction 10/21/2019    Added automatically from request for surgery [id number]   • Hydronephrosis with ureteral stricture    • Hypercholesterolemia    • Hyperkalemia     PT UNSURE REGARDING HX OF THIS   • Hyperlipidemia    • Hypertension    • Impaired functional mobility, balance, gait, and endurance    • Insomnia    • IPF (idiopathic pulmonary fibrosis)     per patient and son, dx at [facility], was told no treatment necessary, not to worry about it   • Malignant hyperthermia due to anesthesia     PER PT REPORT, BROTHER HAD DURING LUNG SURGERY SEVERAL YEARS AGO.  STATED THEY PACKED HIM ON ICE.  STATED HE DID SURVIVE.  PT DENIES ANY PERSONAL HX OF MALIGNANT HYPERTHERMIA HIMSELF, OR ANY ISSUES WITH ANESTHESIA.  STATES TO HIS KNOWLEDGE, NO OTHER FAMILY MEMBER HAS THIS ISSUE EXCEPT FOR HIS BROTHER.   • On supplemental oxygen therapy     2L NC QHS   • Other hydronephrosis 08/12/2019    Added automatically from request for surgery [id number]   • Paroxysmal atrial fibrillation    • Pulmonary hypertension, mild 02/2021    per echo per cardiology note 1/9/23, per pt's son, PCP does " not agree with this dx   • Renal insufficiency    • Sepsis 2019   • Shortness of breath     STATES WITH EXERTION, OTHERWISE, DENIES   • Sigmoid diverticulitis without abscess or perforation 08/09/2017   • Sinus node dysfunction    • Skin breakdown     fourth toe right foot noted in 2019 MD D/C note   • Skin ulcer of fourth toe of right foot, limited to breakdown of skin 07/05/2019   • Stricture of ureter    • Type 2 diabetes mellitus    • Ureteral stricture, left    • UTI (urinary tract infection) 07/18/2019   • Vitamin D deficiency    • Wears glasses        Past Surgical History:  Past Surgical History:   Procedure Laterality Date   • APPENDECTOMY     • CARDIAC ELECTROPHYSIOLOGY PROCEDURE N/A 12/23/2020    Procedure: Pacemaker DC new. DNS meds.;  Surgeon: Jimy Ledezma DO;  Location:  [facility];  Service: Cardiology;  Laterality: N/A;   • CHOLECYSTECTOMY     • CIRCUMCISION N/A 10/23/2019    Procedure: CIRCUMCISIOn-DORSAL SLIT;  Surgeon: Griffin Romero MD;  Location:  [facility];  Service: Urology   • COLONOSCOPY     • CYSTOSCOPY RETROGRADE PYELOGRAM Left 6/17/2022    Procedure: CYSTOSCOPY RETROGRADE PYELOGRAM;  Surgeon: Ryne Mccann MD;  Location:  [facility];  Service: Urology;  Laterality: Left;   • CYSTOSCOPY URETEROSCOPY Left 2/11/2019    Procedure: URETEROSCOPY WITH STENT EXTRACTION, RPG;  Surgeon: Griffin Romero MD;  Location:  [facility];  Service: Urology   • CYSTOSCOPY W/ URETERAL STENT PLACEMENT Left 7/20/2019    Procedure: CYSTOSCOPY, LEFT RETROGRADE PYELOGRAM,  ATTEMPTED LEFT URETERAL STENT INSERTION;  Surgeon: Isaac Flynn MD;  Location:  [facility];  Service: Urology   • CYSTOSCOPY W/ URETERAL STENT PLACEMENT Left 6/17/2022    Procedure: CYSTOSCOPY URETERAL CATHETER/STENT INSERTION;  Surgeon: Ryne Mccann MD;  Location:  [facility];  Service: Urology;  Laterality: Left;   • CYSTOSCOPY W/ URETERAL STENT PLACEMENT Left 1/27/2023    Procedure: CYSTOSCOPY URETERAL  CATHETER/STENT INSERTION;  Surgeon: Deanne Pitts MD;  Location: [facility];  Service: Urology;  Laterality: Left;   • EYE SURGERY      CATARACTS REMOVED BILATERALLY   • KNEE ARTHROPLASTY Bilateral    • KNEE ARTHROSCOPY Bilateral    • RENAL ARTERY STENT      SEVERAL TIMES EVERY SINCE 1978   • URETERAL STENT INSERTION  10/2020   • URETEROSCOPY LASER LITHOTRIPSY WITH STENT INSERTION Left 1/10/2018    Procedure:  cysto, left ureteral stent replacement ;  Surgeon: Griffin Romero MD;  Location: [facility] OR;  Service:    • URETEROSCOPY LASER LITHOTRIPSY WITH STENT INSERTION Left 8/14/2019    Procedure: URETEROSCOPY, STONE REMOVAL WITH STENT INSERTION;  Surgeon: Griffin Romero MD;  Location: [facility] OR;  Service: Urology   • URETEROSCOPY LASER LITHOTRIPSY WITH STENT INSERTION Left 10/23/2019    Procedure: Left URETEROSCOPY WITH STENT INSERTION-LEFT;  Surgeon: Griffin Romero MD;  Location: [facility] OR;  Service: Urology       Family History:   family history includes Brain cancer in his brother and sister; Heart attack in his sister; Hypertension in his sister; Lung cancer in his brother and sister; Malig Hyperthermia in his brother; Stroke in his sister.   Otherwise pertinent FHx was reviewed and not pertinent to current issue.    Social History:    reports that he quit smoking about 72 years ago. His smoking use included cigarettes. He has a 0.50 pack-year smoking history. He has been exposed to tobacco smoke. He has never used smokeless tobacco. He reports that he does not drink alcohol and does not use drugs.    Medications:     Current Outpatient Medications:   •  acetaminophen (TYLENOL) 500 MG tablet, Take 2 tablets by mouth Every 6 (Six) Hours As Needed for Mild Pain., Disp: , Rfl:   •  allopurinol (ZYLOPRIM) 300 MG tablet, TAKE ONE TABLET BY MOUTH ONCE NIGHTLY (Patient taking differently: Take 1 tablet by mouth Every Night.), Disp: 90 tablet, Rfl: 3  •  apixaban (Eliquis) 2.5 MG tablet tablet, Take 1 tablet by  mouth 2 (Two) Times a Day., Disp: 180 tablet, Rfl: 3  •  aspirin 81 MG EC tablet, Take 1 tablet by mouth Daily., Disp: 90 tablet, Rfl: 2  •  bisoprolol (ZEBeta) 5 MG tablet, Take 1 tablet by mouth Daily., Disp: 90 tablet, Rfl: 3  •  Continuous Blood Gluc Sensor (Dexcom G7 Sensor) misc, 1 each Every 10 (Ten) Days., Disp: 3 each, Rfl: 11  •  Diclofenac Sodium (VOLTAREN) 1 % gel gel, Apply 2 g topically to the appropriate area as directed 4 (Four) Times a Day., Disp: 100 g, Rfl: 0  •  finasteride (PROSCAR) 5 MG tablet, TAKE ONE TABLET BY MOUTH DAILY (Patient taking differently: Take 1 tablet by mouth Daily.), Disp: 90 tablet, Rfl: 3  •  glimepiride (AMARYL) 1 MG tablet, Take 1 tablet by mouth Every Morning Before Breakfast. Indications: Type 2 Diabetes, Disp: 90 tablet, Rfl: 3  •  Insulin Glargine, 1 Unit Dial, (Toujeo SoloStar) 300 UNIT/ML solution pen-injector injection, Inject 20 Units under the skin into the appropriate area as directed Every Night., Disp: 6 mL, Rfl: 3  •  Insulin Pen Needle (Novofine Pen Needle) 32G X 6 MM misc, 1 each Daily., Disp: 100 each, Rfl: 3  •  Januvia 100 MG tablet, TAKE ONE TABLET BY MOUTH DAILY (Patient taking differently: Take 1 tablet by mouth Daily.), Disp: 90 tablet, Rfl: 3  •  levothyroxine (SYNTHROID, LEVOTHROID) 50 MCG tablet, Take 1 tablet by mouth Daily. Indications: Underactive Thyroid, Disp: 90 tablet, Rfl: 3  •  Multiple Vitamins-Minerals (OCUVITE ADULT 50+ PO), Take 1 capsule by mouth Daily., Disp: , Rfl:   •  nitroglycerin (NITROSTAT) 0.4 MG SL tablet, 1 under the tongue as needed for angina, may repeat q5mins for up three doses (Patient taking differently: Place 1 tablet under the tongue Every 5 (Five) Minutes As Needed for Chest Pain. 1 under the tongue as needed for angina, may repeat q5mins for up three doses), Disp: 100 tablet, Rfl: 11  •  Probiotic Product (PROBIOTIC DAILY PO), Take 1 capsule by mouth Daily., Disp: , Rfl:   •  silodosin (RAPAFLO) 8 MG capsule  "capsule, TAKE ONE CAPSULE BY MOUTH DAILY WITH BREAKFAST (Patient taking differently: Take 1 capsule by mouth Daily With Breakfast.), Disp: 90 capsule, Rfl: 3  •  cefdinir (OMNICEF) 300 MG capsule, Take 1 capsule by mouth 2 (Two) Times a Day for 10 days., Disp: 20 capsule, Rfl: 0    Allergies:   Allergies   Allergen Reactions   • Metformin Diarrhea and GI Intolerance   • Statins Diarrhea and Myalgia       IPSS Questionnaire (AUA-7):  Over the past month…    1)  Incomplete Emptying  How often have you had a sensation of not emptying your bladder?  5 - Almost always   2)  Frequency  How often have you had to urinate less than every two hours? 4 - More than half the time   3)  Intermittency  How often have you found you stopped and started again several times when you urinated?  0 - Not at all   4) Urgency  How often have you found it difficult to postpone urination?  5 - Almost always   5) Weak Stream  How often have you had a weak urinary stream?  3 - About half the time   6) Straining  How often have you had to push or strain to begin urination?  0 - Not at all   7) Nocturia  How many times did you typically get up at night to urinate?  3 - 3 times   Total Score:  20   The International Prostate Symptom Score (IPSS) is used to screen, diagnose, track symptoms of benign prostatic hyperplasia (BPH).    0-7 pts (Mild Symptoms)  / 8-19 pts (Moderate) / 20-35 (Severe)    Quality of life due to urinary symptoms:  If you were to spend the rest of your life with your urinary condition the way it is now, how would you feel about that? 6-Terrible   Urine Leakage (Incontinence) 0-No Leakage       Objective     Physical Exam:   Vital Signs:   Vitals:    05/25/23 1534   BP: 124/62   Pulse: 57   SpO2: 97%   Weight: 96.2 kg (212 lb)   Height: 175.3 cm (69.02\")   PainSc: 0-No pain     Body mass index is 31.29 kg/m².     Physical Exam  Vitals and nursing note reviewed.   Constitutional:       General: He is awake. He is not in acute " distress.     Appearance: Normal appearance. He is well-developed.   HENT:      Head: Normocephalic and atraumatic.      Right Ear: External ear normal.      Left Ear: External ear normal.      Nose: Nose normal.   Eyes:      Conjunctiva/sclera: Conjunctivae normal.   Pulmonary:      Effort: Pulmonary effort is normal.   Abdominal:      General: There is no distension.      Palpations: Abdomen is soft. There is no mass.      Tenderness: There is no abdominal tenderness. There is no right CVA tenderness, left CVA tenderness, guarding or rebound.      Hernia: No hernia is present. There is no hernia in the left inguinal area or right inguinal area.   Genitourinary:     Pubic Area: No rash.       Rectum: No mass or tenderness. Normal anal tone.   Musculoskeletal:      Cervical back: Normal range of motion.   Lymphadenopathy:      Lower Body: No right inguinal adenopathy. No left inguinal adenopathy.   Skin:     General: Skin is warm.   Neurological:      General: No focal deficit present.      Mental Status: He is alert and oriented to person, place, and time.   Psychiatric:         Behavior: Behavior normal. Behavior is cooperative.         Labs:   Brief Urine Lab Results  (Last result in the past 365 days)      Color   Clarity   Blood   Leuk Est   Nitrite   Protein   CREAT   Urine HCG        05/16/23 1513 Yellow   Cloudy   3+   Large (3+)   Negative   1+                 Urine Culture        1/31/2023    17:10 5/1/2023    08:00 5/1/2023    15:56 5/16/2023    19:00   Urine Culture   Urine Culture No growth   Final report   No growth   No growth          Lab Results   Component Value Date    GLUCOSE 162 (H) 05/04/2023    CALCIUM 9.0 05/04/2023     05/04/2023    K 4.6 05/04/2023    CO2 21.0 (L) 05/04/2023     05/04/2023    BUN 23 05/04/2023    CREATININE 1.23 05/04/2023    EGFRIFAFRI 45 (L) 12/13/2021    EGFRIFNONA 39 (L) 12/13/2021    BCR 18.7 05/04/2023    ANIONGAP 10.0 05/04/2023       Lab Results    Component Value Date    WBC 14.07 (H) 05/04/2023    HGB 10.4 (L) 05/04/2023    HCT 31.7 (L) 05/04/2023    .0 (H) 05/04/2023     05/04/2023       Lab Results   Component Value Date    PSA 1.160 04/15/2021    PSA 8.640 (H) 11/30/2020    PSA 1.450 08/26/2020       Images:   CT Abdomen Pelvis Without Contrast    Result Date: 5/1/2023  1. Left double-J ureteral stent remains in place. New hyperdense material in the upper one half of the dilated left renal pelvis, whether clot or, less likely, tumor. 2. New anterior and superior bladder wall inflammation consistent with cystitis. 3. No evidence of acute intra-abdominal or intrapelvic disease elsewhere. Extensive sigmoid reticulosis without evidence of diverticulitis. 4. Extensive but stable changes of fibrosis of the lower lungs. Electronically Signed: Felix Samson  5/1/2023 6:21 PM EDT  Workstation ID: [id number]    US Scrotum & Testicles    Result Date: 5/4/2023  Impression: 1. The right testicle is slightly more echogenic than the left testicle. This is a nonspecific finding and could potentially be related to prior infection. There is normal and symmetric color Doppler flow to both testicles. 2. No evidence of intratesticular mass lesion. 3. Right-sided epididymal cyst or spermatocele. Electronically Signed: Rolly Callahan  5/4/2023 12:34 PM EDT  Workstation ID: [id number]    FL C Arm During Surgery    Result Date: 1/28/2023  Impression: Intraprocedural fluoroscopy for cystoscopy/ureteral stent insertion. Electronically Signed: Cas Herrera  1/28/2023 12:05 PM EST  Workstation ID: [id number]      Measures:   Tobacco:   Forrest Zepeda  reports that he quit smoking about 72 years ago. His smoking use included cigarettes. He has a 0.50 pack-year smoking history. He has been exposed to tobacco smoke. He has never used smokeless tobacco..     Urine Incontinence: Patient reports that he is not currently experiencing any symptoms of urinary  incontinence.        Assessment / Plan      Assessment/Plan:   89 y.o. male who presented today for follow up of gross hematuria.  He reports he has been passing very dark urine.  We were planning on ureteroscopy in July, however, I will move this date up.  I will also start him on Omnicef as he had some LE on dipstick today.  I will obtain CBC and BMP as well.  We will have him scheduled within the next 2 weeks.     Diagnoses and all orders for this visit:    1. Gross hematuria (Primary)  -     CBC Auto Differential; Future  -     Basic Metabolic Panel; Future  -     Case Request; Standing  -     ceFAZolin (ANCEF) 2 g in sodium chloride 0.9 % 100 mL IVPB  -     CBC (No Diff); Future  -     Basic Metabolic Panel; Future  -     Protime-INR; Future  -     Case Request  -     cefdinir (OMNICEF) 300 MG capsule; Take 1 capsule by mouth 2 (Two) Times a Day for 10 days.  Dispense: 20 capsule; Refill: 0  -     Urine Culture - Urine, Urine, Clean Catch  -     POC Urinalysis Dipstick, Automated    Other orders  -     Outpatient In A Bed; Standing  -     Follow Anesthesia Guidelines / Protocol; Future  -     Provide NPO Instructions to Patient; Future  -     Chlorhexidine Skin Prep; Future  -     Follow Anesthesia Guidelines / Protocol; Standing  -     Verify NPO Status; Standing  -     Obtain Informed Consent; Standing  -     SCD (Sequential Compression Device) - Place on Patient in Pre-Op; Standing  -     Verify / Perform Chlorhexidine Skin Prep; Standing  -     Verify / Perform Chlorhexidine Skin Prep if Indicated (If Not Already Completed); Standing         Follow Up:   Return for Follow up after surgery.    I spent approximately 30 minutes providing clinical care for this patient; including review of patient's chart and provider documentation, face to face time spent with patient in examination room (obtaining history, performing physical exam, discussing diagnosis and management options), placing orders, and completing  patient documentation.     Deanne Pitts MD  [facility]

## 2023-05-26 ENCOUNTER — TELEPHONE (OUTPATIENT)
Dept: CARDIOLOGY | Facility: CLINIC | Age: 88
End: 2023-05-26
Payer: MEDICARE

## 2023-05-26 NOTE — TELEPHONE ENCOUNTER
Pt is needing cardiac clearance for URETEROSCOPY, RETROGRADE PYELOGRAM, STENT INSERTION, POSSIBLE BIOPSY. Pt clear for surgery? Last visit was 1/9/23

## 2023-05-27 DIAGNOSIS — N40.1 BPH WITH URINARY OBSTRUCTION: ICD-10-CM

## 2023-05-27 DIAGNOSIS — N13.8 BPH WITH URINARY OBSTRUCTION: ICD-10-CM

## 2023-05-27 LAB
BUN SERPL-MCNC: 28 MG/DL (ref 8–23)
BUN/CREAT SERPL: 21.4 (ref 7–25)
CALCIUM SERPL-MCNC: 10 MG/DL (ref 8.2–9.6)
CHLORIDE SERPL-SCNC: 106 MMOL/L (ref 98–107)
CO2 SERPL-SCNC: 24.7 MMOL/L (ref 22–29)
CREAT SERPL-MCNC: 1.31 MG/DL (ref 0.76–1.27)
EGFRCR SERPLBLD CKD-EPI 2021: 51.4 ML/MIN/1.73
ERYTHROCYTE [DISTWIDTH] IN BLOOD BY AUTOMATED COUNT: 14.7 % (ref 12.3–15.4)
GLUCOSE SERPL-MCNC: 137 MG/DL (ref 65–99)
HCT VFR BLD AUTO: 34.4 % (ref 37.5–51)
HGB BLD-MCNC: 11.3 G/DL (ref 13–17.7)
INR PPP: 1.14 (ref 0.9–1.1)
MCH RBC QN AUTO: 30.8 PG (ref 26.6–33)
MCHC RBC AUTO-ENTMCNC: 32.8 G/DL (ref 31.5–35.7)
MCV RBC AUTO: 93.7 FL (ref 79–97)
PLATELET # BLD AUTO: 236 10*3/MM3 (ref 140–450)
POTASSIUM SERPL-SCNC: 5.1 MMOL/L (ref 3.5–5.2)
PROTHROMBIN TIME: 15.2 SECONDS (ref 12.3–15.1)
RBC # BLD AUTO: 3.67 10*6/MM3 (ref 4.14–5.8)
SODIUM SERPL-SCNC: 141 MMOL/L (ref 136–145)
WBC # BLD AUTO: 12.09 10*3/MM3 (ref 3.4–10.8)

## 2023-05-30 RX ORDER — FINASTERIDE 5 MG/1
TABLET, FILM COATED ORAL
Qty: 90 TABLET | Refills: 3 | Status: SHIPPED | OUTPATIENT
Start: 2023-05-30

## 2023-06-02 LAB
BACTERIA UR CULT: ABNORMAL
BACTERIA UR CULT: ABNORMAL
Lab: NORMAL
OTHER ANTIBIOTIC SUSC ISLT: ABNORMAL

## 2023-06-12 ENCOUNTER — PRE-ADMISSION TESTING (OUTPATIENT)
Dept: PREADMISSION TESTING | Facility: HOSPITAL | Age: 88
End: 2023-06-12
Payer: MEDICARE

## 2023-06-12 ENCOUNTER — TELEPHONE (OUTPATIENT)
Dept: UROLOGY | Facility: CLINIC | Age: 88
End: 2023-06-12
Payer: MEDICARE

## 2023-06-12 VITALS — HEIGHT: 69 IN | BODY MASS INDEX: 31.25 KG/M2 | WEIGHT: 210.98 LBS

## 2023-06-12 DIAGNOSIS — R35.0 FREQUENCY OF URINATION: Primary | ICD-10-CM

## 2023-06-12 PROCEDURE — 80048 BASIC METABOLIC PNL TOTAL CA: CPT | Performed by: UROLOGY

## 2023-06-12 PROCEDURE — 85610 PROTHROMBIN TIME: CPT | Performed by: UROLOGY

## 2023-06-12 PROCEDURE — 85027 COMPLETE CBC AUTOMATED: CPT | Performed by: UROLOGY

## 2023-06-12 NOTE — PAT
Patient viewed general PAT education video as instructed in their preoperative information received from their surgeon.  Patient stated the general PAT education video was viewed in its entirety and survey completed.  Copies of [facility] general education handouts (Incentive Spirometry, Meds to Beds Program, Patient Belongings, Pre-op skin preparation instructions, Blood Glucose testing, Visitor policy, Surgery FAQ, Code H) distributed to patient if not printed. Education related to the PAT pass and skin preparation for surgery (if applicable) completed in [facility] as a reinforcement to PAT education video. Patient instructed to return PAT pass provided today as well as completed skin preparation sheet (if applicable) on the day of procedure.     Additionally if patient had not viewed video yet but intended to view it at home or in our waiting area, then referred them to the handout with QR code/link provided during PAT visit.  Instructed patient to complete survey after viewing the video in its entirety.  Encouraged patient/family to read [facility] general education handouts thoroughly and notify PAT staff with any questions or concerns. Patient verbalized understanding of all information and priority content.    EKG on chart from 1/24/23 with pm interrogation in epic 1/9/23.  Clearance letter in notes about blood thinner stoppage. Pt aware.     Pt doesn't have hx of malignant hyperthermia - pt's brother had it - pt's poa agrees- updated epic.

## 2023-06-12 NOTE — TELEPHONE ENCOUNTER
theres already a convo opened about the Myrbetriq refill his son is wondering if he can stop by the office and  some more samples to last until he can get them refilled

## 2023-06-14 ENCOUNTER — ANESTHESIA EVENT (OUTPATIENT)
Dept: PERIOP | Facility: HOSPITAL | Age: 88
End: 2023-06-14
Payer: MEDICARE

## 2023-06-14 RX ORDER — FAMOTIDINE 10 MG/ML
20 INJECTION, SOLUTION INTRAVENOUS ONCE
Status: CANCELLED | OUTPATIENT
Start: 2023-06-14 | End: 2023-06-14

## 2023-06-15 ENCOUNTER — HOSPITAL ENCOUNTER (OUTPATIENT)
Facility: HOSPITAL | Age: 88
Discharge: HOME | End: 2023-06-15
Attending: UROLOGY | Admitting: UROLOGY
Payer: MEDICARE

## 2023-06-15 ENCOUNTER — ANESTHESIA (OUTPATIENT)
Dept: PERIOP | Facility: HOSPITAL | Age: 88
End: 2023-06-15
Payer: MEDICARE

## 2023-06-15 ENCOUNTER — APPOINTMENT (OUTPATIENT)
Dept: GENERAL RADIOLOGY | Facility: HOSPITAL | Age: 88
End: 2023-06-15
Payer: MEDICARE

## 2023-06-15 VITALS
RESPIRATION RATE: 14 BRPM | TEMPERATURE: 97.5 F | OXYGEN SATURATION: 95 % | WEIGHT: 210 LBS | HEIGHT: 69 IN | BODY MASS INDEX: 31.1 KG/M2 | HEART RATE: 60 BPM | SYSTOLIC BLOOD PRESSURE: 135 MMHG | DIASTOLIC BLOOD PRESSURE: 71 MMHG

## 2023-06-15 DIAGNOSIS — R31.0 GROSS HEMATURIA: ICD-10-CM

## 2023-06-15 LAB — GLUCOSE BLDC GLUCOMTR-MCNC: 142 MG/DL (ref 70–130)

## 2023-06-15 PROCEDURE — 99024 POSTOP FOLLOW-UP VISIT: CPT | Performed by: UROLOGY

## 2023-06-15 PROCEDURE — 52332 CYSTOSCOPY AND TREATMENT: CPT | Performed by: UROLOGY

## 2023-06-15 PROCEDURE — 25010000002 GENTAMICIN PER 80 MG: Performed by: UROLOGY

## 2023-06-15 PROCEDURE — C1769 GUIDE WIRE: HCPCS | Performed by: UROLOGY

## 2023-06-15 PROCEDURE — 82948 REAGENT STRIP/BLOOD GLUCOSE: CPT

## 2023-06-15 PROCEDURE — 81001 URINALYSIS AUTO W/SCOPE: CPT | Performed by: UROLOGY

## 2023-06-15 PROCEDURE — 25510000001 IOPAMIDOL 61 % SOLUTION 50 ML VIAL: Performed by: UROLOGY

## 2023-06-15 PROCEDURE — C1758 CATHETER, URETERAL: HCPCS | Performed by: UROLOGY

## 2023-06-15 PROCEDURE — A9270 NON-COVERED ITEM OR SERVICE: HCPCS | Performed by: ANESTHESIOLOGY

## 2023-06-15 PROCEDURE — C2617 STENT, NON-COR, TEM W/O DEL: HCPCS | Performed by: UROLOGY

## 2023-06-15 PROCEDURE — 25010000002 CEFTRIAXONE PER 250 MG: Performed by: UROLOGY

## 2023-06-15 PROCEDURE — 25010000002 PROPOFOL 10 MG/ML EMULSION

## 2023-06-15 PROCEDURE — 74420 UROGRAPHY RTRGR +-KUB: CPT | Performed by: UROLOGY

## 2023-06-15 PROCEDURE — 25010000002 FENTANYL CITRATE (PF) 100 MCG/2ML SOLUTION

## 2023-06-15 PROCEDURE — 76000 FLUOROSCOPY <1 HR PHYS/QHP: CPT

## 2023-06-15 PROCEDURE — 52351 CYSTOURETERO & OR PYELOSCOPE: CPT | Performed by: UROLOGY

## 2023-06-15 PROCEDURE — 63710000001 FAMOTIDINE 20 MG TABLET: Performed by: ANESTHESIOLOGY

## 2023-06-15 DEVICE — URETERAL STENT WITH SIDE HOLES 8FX24CM
Type: IMPLANTABLE DEVICE | Site: URETER | Status: FUNCTIONAL
Brand: TRIA™ FIRM

## 2023-06-15 RX ORDER — PHENAZOPYRIDINE HYDROCHLORIDE 100 MG/1
100 TABLET, FILM COATED ORAL 3 TIMES DAILY PRN
Qty: 20 TABLET | Refills: 0 | Status: SHIPPED | OUTPATIENT
Start: 2023-06-15

## 2023-06-15 RX ORDER — PROPOFOL 10 MG/ML
VIAL (ML) INTRAVENOUS AS NEEDED
Status: DISCONTINUED | OUTPATIENT
Start: 2023-06-15 | End: 2023-06-15 | Stop reason: SURG

## 2023-06-15 RX ORDER — DROPERIDOL 2.5 MG/ML
0.62 INJECTION, SOLUTION INTRAMUSCULAR; INTRAVENOUS ONCE AS NEEDED
Status: DISCONTINUED | OUTPATIENT
Start: 2023-06-15 | End: 2023-06-15 | Stop reason: HOSPADM

## 2023-06-15 RX ORDER — FENTANYL CITRATE 50 UG/ML
INJECTION, SOLUTION INTRAMUSCULAR; INTRAVENOUS AS NEEDED
Status: DISCONTINUED | OUTPATIENT
Start: 2023-06-15 | End: 2023-06-15 | Stop reason: SURG

## 2023-06-15 RX ORDER — MAGNESIUM HYDROXIDE 1200 MG/15ML
LIQUID ORAL AS NEEDED
Status: DISCONTINUED | OUTPATIENT
Start: 2023-06-15 | End: 2023-06-15 | Stop reason: HOSPADM

## 2023-06-15 RX ORDER — DROPERIDOL 2.5 MG/ML
0.62 INJECTION, SOLUTION INTRAMUSCULAR; INTRAVENOUS
Status: DISCONTINUED | OUTPATIENT
Start: 2023-06-15 | End: 2023-06-15 | Stop reason: HOSPADM

## 2023-06-15 RX ORDER — FENTANYL CITRATE 50 UG/ML
50 INJECTION, SOLUTION INTRAMUSCULAR; INTRAVENOUS
Status: DISCONTINUED | OUTPATIENT
Start: 2023-06-15 | End: 2023-06-15 | Stop reason: HOSPADM

## 2023-06-15 RX ORDER — SODIUM CHLORIDE 0.9 % (FLUSH) 0.9 %
3 SYRINGE (ML) INJECTION EVERY 12 HOURS SCHEDULED
Status: DISCONTINUED | OUTPATIENT
Start: 2023-06-15 | End: 2023-06-15 | Stop reason: HOSPADM

## 2023-06-15 RX ORDER — LIDOCAINE HYDROCHLORIDE 10 MG/ML
0.5 INJECTION, SOLUTION EPIDURAL; INFILTRATION; INTRACAUDAL; PERINEURAL ONCE AS NEEDED
Status: COMPLETED | OUTPATIENT
Start: 2023-06-15 | End: 2023-06-15

## 2023-06-15 RX ORDER — SODIUM CHLORIDE, SODIUM LACTATE, POTASSIUM CHLORIDE, CALCIUM CHLORIDE 600; 310; 30; 20 MG/100ML; MG/100ML; MG/100ML; MG/100ML
9 INJECTION, SOLUTION INTRAVENOUS CONTINUOUS
Status: DISCONTINUED | OUTPATIENT
Start: 2023-06-15 | End: 2023-06-15 | Stop reason: HOSPADM

## 2023-06-15 RX ORDER — SODIUM CHLORIDE 9 MG/ML
40 INJECTION, SOLUTION INTRAVENOUS AS NEEDED
Status: DISCONTINUED | OUTPATIENT
Start: 2023-06-15 | End: 2023-06-15 | Stop reason: HOSPADM

## 2023-06-15 RX ORDER — NALOXONE HCL 0.4 MG/ML
0.4 VIAL (ML) INJECTION AS NEEDED
Status: DISCONTINUED | OUTPATIENT
Start: 2023-06-15 | End: 2023-06-15 | Stop reason: HOSPADM

## 2023-06-15 RX ORDER — LIDOCAINE HYDROCHLORIDE 10 MG/ML
INJECTION, SOLUTION EPIDURAL; INFILTRATION; INTRACAUDAL; PERINEURAL AS NEEDED
Status: DISCONTINUED | OUTPATIENT
Start: 2023-06-15 | End: 2023-06-15 | Stop reason: SURG

## 2023-06-15 RX ORDER — CEFAZOLIN SODIUM 2 G/100ML
2 INJECTION, SOLUTION INTRAVENOUS ONCE
Status: DISCONTINUED | OUTPATIENT
Start: 2023-06-15 | End: 2023-06-15

## 2023-06-15 RX ORDER — IPRATROPIUM BROMIDE AND ALBUTEROL SULFATE 2.5; .5 MG/3ML; MG/3ML
3 SOLUTION RESPIRATORY (INHALATION) ONCE AS NEEDED
Status: DISCONTINUED | OUTPATIENT
Start: 2023-06-15 | End: 2023-06-15 | Stop reason: HOSPADM

## 2023-06-15 RX ORDER — PHENYLEPHRINE HCL IN 0.9% NACL 1 MG/10 ML
SYRINGE (ML) INTRAVENOUS AS NEEDED
Status: DISCONTINUED | OUTPATIENT
Start: 2023-06-15 | End: 2023-06-15 | Stop reason: SURG

## 2023-06-15 RX ORDER — ONDANSETRON 2 MG/ML
4 INJECTION INTRAMUSCULAR; INTRAVENOUS ONCE AS NEEDED
Status: DISCONTINUED | OUTPATIENT
Start: 2023-06-15 | End: 2023-06-15 | Stop reason: HOSPADM

## 2023-06-15 RX ORDER — CEFDINIR 300 MG/1
300 CAPSULE ORAL 2 TIMES DAILY
Qty: 28 CAPSULE | Refills: 0 | Status: SHIPPED | OUTPATIENT
Start: 2023-06-15 | End: 2023-06-29

## 2023-06-15 RX ORDER — MIDAZOLAM HYDROCHLORIDE 1 MG/ML
0.5 INJECTION INTRAMUSCULAR; INTRAVENOUS
Status: DISCONTINUED | OUTPATIENT
Start: 2023-06-15 | End: 2023-06-15 | Stop reason: HOSPADM

## 2023-06-15 RX ORDER — SODIUM CHLORIDE 0.9 % (FLUSH) 0.9 %
3-10 SYRINGE (ML) INJECTION AS NEEDED
Status: DISCONTINUED | OUTPATIENT
Start: 2023-06-15 | End: 2023-06-15 | Stop reason: HOSPADM

## 2023-06-15 RX ORDER — SODIUM CHLORIDE 0.9 % (FLUSH) 0.9 %
10 SYRINGE (ML) INJECTION AS NEEDED
Status: DISCONTINUED | OUTPATIENT
Start: 2023-06-15 | End: 2023-06-15 | Stop reason: HOSPADM

## 2023-06-15 RX ORDER — SODIUM CHLORIDE 9 MG/ML
9 INJECTION, SOLUTION INTRAVENOUS CONTINUOUS
Status: DISCONTINUED | OUTPATIENT
Start: 2023-06-15 | End: 2023-06-15 | Stop reason: HOSPADM

## 2023-06-15 RX ORDER — SODIUM CHLORIDE 0.9 % (FLUSH) 0.9 %
10 SYRINGE (ML) INJECTION EVERY 12 HOURS SCHEDULED
Status: DISCONTINUED | OUTPATIENT
Start: 2023-06-15 | End: 2023-06-15 | Stop reason: HOSPADM

## 2023-06-15 RX ORDER — FAMOTIDINE 20 MG/1
20 TABLET, FILM COATED ORAL ONCE
Status: COMPLETED | OUTPATIENT
Start: 2023-06-15 | End: 2023-06-15

## 2023-06-15 RX ADMIN — PROPOFOL 125 MG: 10 INJECTION, EMULSION INTRAVENOUS at 14:15

## 2023-06-15 RX ADMIN — Medication 150 MCG: at 14:28

## 2023-06-15 RX ADMIN — Medication 100 MCG: at 14:15

## 2023-06-15 RX ADMIN — SODIUM CHLORIDE 9 ML/HR: 9 INJECTION, SOLUTION INTRAVENOUS at 13:15

## 2023-06-15 RX ADMIN — FAMOTIDINE 20 MG: 20 TABLET ORAL at 13:14

## 2023-06-15 RX ADMIN — LIDOCAINE HYDROCHLORIDE 50 MG: 10 INJECTION, SOLUTION EPIDURAL; INFILTRATION; INTRACAUDAL; PERINEURAL at 14:15

## 2023-06-15 RX ADMIN — PROPOFOL 150 MCG/KG/MIN: 10 INJECTION, EMULSION INTRAVENOUS at 14:16

## 2023-06-15 RX ADMIN — Medication 150 MCG: at 14:21

## 2023-06-15 RX ADMIN — SODIUM CHLORIDE 2 G: 900 INJECTION INTRAVENOUS at 14:20

## 2023-06-15 RX ADMIN — FENTANYL CITRATE 100 MCG: 50 INJECTION, SOLUTION INTRAMUSCULAR; INTRAVENOUS at 14:15

## 2023-06-15 RX ADMIN — LIDOCAINE HYDROCHLORIDE 0.5 ML: 10 INJECTION, SOLUTION EPIDURAL; INFILTRATION; INTRACAUDAL; PERINEURAL at 13:15

## 2023-06-15 RX ADMIN — Medication 200 MCG: at 14:35

## 2023-06-15 NOTE — ANESTHESIA PROCEDURE NOTES
Airway  Urgency: elective    Date/Time: 6/15/2023 2:16 PM  Airway not difficult    General Information and Staff    Patient location during procedure: OR  CRNA/CAA: Pelon Varghese CRNA    Indications and Patient Condition  Indications for airway management: airway protection    Preoxygenated: yes  Mask difficulty assessment: 0 - not attempted    Final Airway Details  Final airway type: supraglottic airway      Successful airway: I-gel  Size 5     Number of attempts at approach: 1  Assessment: lips, teeth, and gum same as pre-op    Additional Comments  LMA placed without difficulty, ventilation with assist, equal breath sounds and symmetric chest rise and fall

## 2023-06-15 NOTE — ANESTHESIA POSTPROCEDURE EVALUATION
Patient: Forrest Zepeda    Procedure Summary       Date: 06/15/23 Room / Location:  [facility] /  [facility]    Anesthesia Start: 1411 Anesthesia Stop: 1456    Procedure: URETEROSCOPY, RETROGRADE PYELOGRAM, STENT INSERTION, POSSIBLE BIOPSY (Left: Bladder) Diagnosis:       Gross hematuria      (Gross hematuria [R31.0])    Surgeons: Deanne Pitts MD Provider: Momo Mccullough MD    Anesthesia Type: general ASA Status: 3            Anesthesia Type: general    Vitals  Vitals Value Taken Time   /63 06/15/23 1456   Temp 97.4 °F (36.3 °C) 06/15/23 1456   Pulse 71 06/15/23 1456   Resp 12 06/15/23 1456   SpO2 94 % 06/15/23 1456           Post Anesthesia Care and Evaluation    Patient location during evaluation: PACU  Patient participation: complete - patient participated  Level of consciousness: awake and alert  Pain score: 0  Pain management: adequate    Airway patency: patent  Anesthetic complications: No anesthetic complications  PONV Status: none  Cardiovascular status: hemodynamically stable and acceptable  Respiratory status: nonlabored ventilation, acceptable and nasal cannula  Hydration status: acceptable

## 2023-06-15 NOTE — ANESTHESIA PREPROCEDURE EVALUATION
Anesthesia Evaluation     Patient summary reviewed and Nursing notes reviewed   history of anesthetic complications:  malignant hyperthermia  NPO Solid Status: > 8 hours  NPO Liquid Status: > 2 hours           Airway   Mallampati: II  TM distance: >3 FB  Neck ROM: full  No difficulty expected  Dental    (+) upper dentures and lower dentures    Pulmonary    (+) pneumonia resolved , pulmonary embolism,shortness of breath, decreased breath sounds  Cardiovascular   Exercise tolerance: poor (<4 METS)    ECG reviewed  PT is on anticoagulation therapy  Rhythm: regular  Rate: normal    (+) pacemaker pacemaker interrogated Yesterday, hypertension, CADdysrhythmias Atrial Fib, LEHMAN, DVT resolved, hyperlipidemia  (-) CHF    ROS comment: 2021:Interpretation Summary    · Left ventricular wall thickness is consistent with mild concentric hypertrophy.  · Estimated left ventricular EF = 64% Left ventricular ejection fraction appears to be 61 - 65%. Left ventricular systolic function is normal.  · Left ventricular diastolic function is consistent with (grade I) impaired relaxation.  · The right ventricular cavity is mildly dilated.  · The right atrial cavity is mildly dilated.  · There is severe calcification of the aortic valve mainly affecting the non-coronary, left coronary and right coronary cusp(s).  · Estimated right ventricular systolic pressure from tricuspid regurgitation is mildly elevated (35-45 mmHg).  · Mild pulmonary hypertension is present.  OFF ELIQUIS SINCE MONDAY      Neuro/Psych  GI/Hepatic/Renal/Endo    (+) obesity, renal disease CRI, diabetes mellitus type 2 well controlled using insulin, thyroid problem     Musculoskeletal     Abdominal   (+) obese    Abdomen: soft.   Substance History      OB/GYN          Other   arthritis,                     Anesthesia Plan    ASA 3     general   total IV anesthesia  (PT SAYS MH DIAGNOSIS IFFY, BUT WILL DO NON-TRIGGERING TECHNIQUE)  intravenous induction     Anesthetic plan,  risks, benefits, and alternatives have been provided, discussed and informed consent has been obtained with: patient.    Plan discussed with CRNA.    CODE STATUS:

## 2023-06-15 NOTE — INTERVAL H&P NOTE
"[facility] Pre-op    Full history and physical note from office is attached.    /73 (BP Location: Right arm, Patient Position: Lying)   Pulse 57   Temp 97.2 °F (36.2 °C) (Temporal)   Resp 16   Ht 175.3 cm (69\")   Wt 95.3 kg (210 lb)   SpO2 97%   BMI 31.01 kg/m²     LAB Results:  Lab Results   Component Value Date    WBC 12.73 (H) 06/12/2023    HGB 11.7 (L) 06/12/2023    HCT 39.2 06/12/2023    .3 (H) 06/12/2023     06/12/2023    NEUTROABS 5.61 05/02/2023    GLUCOSE 168 (H) 06/12/2023    BUN 32 (H) 06/12/2023    CREATININE 1.48 (H) 06/12/2023    EGFRIFNONA 39 (L) 12/13/2021    EGFRIFAFRI 45 (L) 12/13/2021     06/12/2023    K 5.1 06/12/2023     06/12/2023    CO2 24.0 06/12/2023    MG 1.9 12/07/2021    CALCIUM 9.8 (H) 06/12/2023    ALBUMIN 4.0 05/01/2023    AST 21 05/01/2023    ALT 14 05/01/2023    BILITOT 0.4 05/01/2023    PTT 41.1 (H) 06/15/2022    INR 1.13 (H) 06/12/2023       Cancer Staging (if applicable)  Cancer Patient: __ yes __no __unknown__N/A; If yes, clinical stage T:__ N:__M:__, stage group or __N/A      Impression: Gross hematuria       Plan: URETEROSCOPY, RETROGRADE PYELOGRAM, STENT INSERTION, POSSIBLE BIOPSY       JOYCE Martinez   6/15/2023   13:23 EDT  "

## 2023-06-15 NOTE — OP NOTE
CYSTOSCOPY URETEROSCOPY RETROGRADE PYELOGRAM STONE EXTRACTION STENT INSERTION  Procedure Note    Forrest Zepeda  6/15/2023    Pre-op Diagnosis:   Gross hematuria [R31.0]    Post-op Diagnosis:     Post-Op Diagnosis Codes:     * Gross hematuria [R31.0]    Procedure/CPT® Codes:      Procedure(s):  URETEROSCOPY, RETROGRADE PYELOGRAM, STENT INSERTION, POSSIBLE BIOPSY    Surgeon(s):  Deanne Pitts MD    Anesthesia: see anesthesia record    Staff:   Circulator: Jossie Foley RN; Jameel Napier RN  Radiology Technologist: Lilliana Carlos R.T.(R)  Scrub Person: Raymond De León; Pro Najera  Vendor Representative: Chris Flores    Estimated Blood Loss: none  Urine Voided: * No values recorded between 6/15/2023  2:11 PM and 6/15/2023  2:55 PM *    Specimens:                ID Type Source Tests Collected by Time   1 :  Urine Urine, Catheter URINALYSIS W/ CULTURE IF INDICATED Deanne Pitts MD 6/15/2023 1439         Drains: 8f x 24 cm TRIA JJ left ureteral stent     Findings:   Cystoscopy with no masses or lesions within the walls of the bladder.  He had a moderately trabeculated bladder  Urine was purulent based on appearance.  He had what appeared to be purulent urine draining from the left kidney  Slight difficulty in advancing wire alongside the indwelling left ureteral stent  Old indwelling ureteral stent removed in its entirety  Retrograde pyelogram showed no filling defect or concerning lesions within the left renal pelvis  Successful placement of 8 Canadian by 24 cm Tria double-J left ureteral stent    Blood: N/A    Complications: None immediate    Indication for Procedure: History Lane is a 89-year-old gentleman who has a history of left ureteral stricture.  He has been undergoing chronic stent exchanges.  Most recently he was found to have a UTI with gross hematuria and a question of clot in the left renal pelvis.  He is here today for cystoscopy and stent exchange with retrograde pyelogram    Description of  Procedure: The patient was seen and examined in the preoperative area.  The risks, benefits, and alternatives were explained to the patient and family.  Informed consent was obtained.  The patient was then taken back to the operating theater and placed on the table in a supine position.  Venodyne boots were placed on the bilateral lower extremities.  General anesthesia was induced without any complication.  They were then placed in the dorsal lithotomy position with all pressure points padded and secured.  The patient was prepped and draped in the usual sterile fashion and a timeout was taken.      A 22 Trinidadian rigid cystoscope was passed through the urethra and into the bladder.  A cystoscopy was performed in a systematic fashion there were no masses or lesions noted within the walls of the bladder.  Attention was then turned to the left ureteral orifice and an attempt was made to pass a 0.035 sensor wire alongside the left indwelling stent.  However, significant resistance was met.    A 5 Trinidadian open-ended catheter was inserted alongside the existing stent and a retrograde pyelogram was performed.  He had a tortuous left ureter as expected.  We then passed a 0.035 Glidewire which we were able to advance into the left renal pelvis.  Once the wire was in position the existing stent was removed in its entirety.      A 5 Trinidadian open-ended catheter was reinserted and a retrograde pyelogram was performed.  There were no filling defects or lesions in the left renal pelvis.    Because of the appearance of his urine and a positive culture previously the decision was made not to perform ureteroscopy.  His retrograde pyelogram did not show any concerning findings.  We then placed an 8 Trinidadian by 24 cm double-J Tria stent over the wire into the left renal pelvis.  Once in position the wire was pulled and the stent was deployed.  The patient tolerated the procedure well and there were no complications to the procedure.  He was  taken to PACU in stable and extubated condition.    Disposition: The patient will be discharge when PACU criteria is met.  He will go home with a 14-day prescription for Omnicef.  I will see him back in 6 weeks.  The intraoperative findings and postoperative plans were discussed with the patient and family.     Deanne Pitts MD     Date: 6/15/2023  Time: 15:04 EDT

## 2023-06-16 LAB
BACTERIA UR QL AUTO: NORMAL
BILIRUB UR QL STRIP: NORMAL
CLARITY UR: NORMAL
COLOR UR: NORMAL
GLUCOSE UR STRIP-MCNC: NORMAL MG/DL
HGB UR QL STRIP.AUTO: NORMAL
HYALINE CASTS UR QL AUTO: NORMAL
KETONES UR QL STRIP: NORMAL
LEUKOCYTE ESTERASE UR QL STRIP.AUTO: NORMAL
NITRITE UR QL STRIP: NORMAL
PH UR STRIP.AUTO: NORMAL [PH]
PROT UR QL STRIP: NORMAL
RBC # UR STRIP: NORMAL /UL
REF LAB TEST METHOD: NORMAL
SP GR UR STRIP: NORMAL
SQUAMOUS #/AREA URNS HPF: NORMAL /[HPF]
UROBILINOGEN UR QL STRIP: NORMAL
WBC # UR STRIP: NORMAL /UL

## 2023-07-22 DIAGNOSIS — Z79.4 TYPE 2 DIABETES MELLITUS WITH STAGE 3A CHRONIC KIDNEY DISEASE, WITH LONG-TERM CURRENT USE OF INSULIN: ICD-10-CM

## 2023-07-22 DIAGNOSIS — E11.22 TYPE 2 DIABETES MELLITUS WITH STAGE 3A CHRONIC KIDNEY DISEASE, WITH LONG-TERM CURRENT USE OF INSULIN: ICD-10-CM

## 2023-07-22 DIAGNOSIS — Z79.4 TYPE 2 DIABETES MELLITUS WITH HYPERGLYCEMIA, WITH LONG-TERM CURRENT USE OF INSULIN: ICD-10-CM

## 2023-07-22 DIAGNOSIS — E11.65 TYPE 2 DIABETES MELLITUS WITH HYPERGLYCEMIA, WITH LONG-TERM CURRENT USE OF INSULIN: ICD-10-CM

## 2023-07-22 DIAGNOSIS — E03.9 ACQUIRED HYPOTHYROIDISM: ICD-10-CM

## 2023-07-22 DIAGNOSIS — N18.31 TYPE 2 DIABETES MELLITUS WITH STAGE 3A CHRONIC KIDNEY DISEASE, WITH LONG-TERM CURRENT USE OF INSULIN: ICD-10-CM

## 2023-07-24 RX ORDER — SILODOSIN 8 MG/1
8 CAPSULE ORAL
Qty: 90 CAPSULE | Refills: 3 | Status: SHIPPED | OUTPATIENT
Start: 2023-07-24

## 2023-07-24 RX ORDER — GLIMEPIRIDE 1 MG/1
1 TABLET ORAL
Qty: 90 TABLET | Refills: 3 | Status: SHIPPED | OUTPATIENT
Start: 2023-07-24

## 2023-07-24 RX ORDER — LEVOTHYROXINE SODIUM 0.05 MG/1
50 TABLET ORAL DAILY
Qty: 90 TABLET | Refills: 3 | Status: SHIPPED | OUTPATIENT
Start: 2023-07-24

## 2023-09-11 ENCOUNTER — OFFICE VISIT (OUTPATIENT)
Dept: INTERNAL MEDICINE | Facility: CLINIC | Age: 88
End: 2023-09-11
Payer: MEDICARE

## 2023-09-11 VITALS
SYSTOLIC BLOOD PRESSURE: 122 MMHG | BODY MASS INDEX: 30.48 KG/M2 | HEART RATE: 64 BPM | TEMPERATURE: 97.1 F | WEIGHT: 205.8 LBS | HEIGHT: 69 IN | DIASTOLIC BLOOD PRESSURE: 72 MMHG | RESPIRATION RATE: 16 BRPM | OXYGEN SATURATION: 95 %

## 2023-09-11 DIAGNOSIS — I10 ESSENTIAL HYPERTENSION: ICD-10-CM

## 2023-09-11 DIAGNOSIS — G89.29 CHRONIC LOW BACK PAIN WITHOUT SCIATICA, UNSPECIFIED BACK PAIN LATERALITY: ICD-10-CM

## 2023-09-11 DIAGNOSIS — R09.02 HYPOXEMIA REQUIRING SUPPLEMENTAL OXYGEN: ICD-10-CM

## 2023-09-11 DIAGNOSIS — I25.10 CORONARY ARTERY DISEASE INVOLVING NATIVE CORONARY ARTERY OF NATIVE HEART WITHOUT ANGINA PECTORIS: ICD-10-CM

## 2023-09-11 DIAGNOSIS — M54.50 CHRONIC LOW BACK PAIN WITHOUT SCIATICA, UNSPECIFIED BACK PAIN LATERALITY: ICD-10-CM

## 2023-09-11 DIAGNOSIS — E11.65 TYPE 2 DIABETES MELLITUS WITH HYPERGLYCEMIA, WITH LONG-TERM CURRENT USE OF INSULIN: Primary | ICD-10-CM

## 2023-09-11 DIAGNOSIS — M10.9 GOUTY ARTHRITIS: ICD-10-CM

## 2023-09-11 DIAGNOSIS — Z99.81 HYPOXEMIA REQUIRING SUPPLEMENTAL OXYGEN: ICD-10-CM

## 2023-09-11 DIAGNOSIS — N35.919 STRICTURE OF MALE URETHRA, UNSPECIFIED STRICTURE TYPE: ICD-10-CM

## 2023-09-11 DIAGNOSIS — Z79.4 TYPE 2 DIABETES MELLITUS WITH HYPERGLYCEMIA, WITH LONG-TERM CURRENT USE OF INSULIN: Primary | ICD-10-CM

## 2023-09-11 LAB
EXPIRATION DATE: NORMAL
HBA1C MFR BLD: 7.6 %
Lab: NORMAL

## 2023-09-11 PROCEDURE — 99214 OFFICE O/P EST MOD 30 MIN: CPT | Performed by: FAMILY MEDICINE

## 2023-09-11 PROCEDURE — 3051F HG A1C>EQUAL 7.0%<8.0%: CPT | Performed by: FAMILY MEDICINE

## 2023-09-11 PROCEDURE — 1159F MED LIST DOCD IN RCRD: CPT | Performed by: FAMILY MEDICINE

## 2023-09-11 PROCEDURE — 83036 HEMOGLOBIN GLYCOSYLATED A1C: CPT | Performed by: FAMILY MEDICINE

## 2023-09-11 PROCEDURE — 1160F RVW MEDS BY RX/DR IN RCRD: CPT | Performed by: FAMILY MEDICINE

## 2023-09-11 RX ORDER — ASPIRIN 81 MG/1
81 TABLET ORAL DAILY
Qty: 90 TABLET | Refills: 3 | Status: SHIPPED | OUTPATIENT
Start: 2023-09-11

## 2023-09-11 RX ORDER — HYDROCODONE BITARTRATE AND ACETAMINOPHEN 5; 325 MG/1; MG/1
1 TABLET ORAL EVERY 12 HOURS PRN
Qty: 60 TABLET | Refills: 0 | Status: SHIPPED | OUTPATIENT
Start: 2023-09-11

## 2023-09-11 RX ORDER — ALLOPURINOL 300 MG/1
300 TABLET ORAL NIGHTLY
Qty: 90 TABLET | Refills: 3 | Status: SHIPPED | OUTPATIENT
Start: 2023-09-11

## 2023-09-11 RX ORDER — SILODOSIN 8 MG/1
8 CAPSULE ORAL
Qty: 90 CAPSULE | Refills: 3 | Status: SHIPPED | OUTPATIENT
Start: 2023-09-11

## 2023-09-11 NOTE — PROGRESS NOTES
"Chief Complaint  Diabetes (4 month follow up) and Back Pain (Pt states when he walks his back pain is an 8)  Answers submitted by the patient for this visit:  Primary Reason for Visit (Submitted on 9/9/2023)  What is the primary reason for your visit?: Other  Other (Submitted on 9/9/2023)  Please describe your symptoms.: Follow up 3 month check up  Have you had these symptoms before?: No  How long have you been having these symptoms?: Greater than 2 weeks  Please list any medications you are currently taking for this condition.: N/a  Please describe any probable cause for these symptoms. : N/a    Subjective        Forrest Zepeda presents to [facility]  History of Present Illness  Back pain is chronic, lower back more to the right side.  Back pain sometimes keeps him from doing things he enjoys such as going up to the porch to sit.  Patient enjoys doing that and going to [employer] weekly.  Takes Tylenol as needed for pain and it helps some.  Tries to follow diabetic diet.  Family members are monitoring his sugars and blood pressures.  Granddaughter has kept him off of losartan as he was having lightheadedness on it, that resolved with discontinuation of the medicine.  He has follow-up with cardiology soon.    Objective   Vital Signs:  /72 (BP Location: Left arm, Patient Position: Sitting, Cuff Size: Adult)   Pulse 64   Temp 97.1 °F (36.2 °C) (Temporal)   Resp 16   Ht 175.3 cm (69\")   Wt 93.4 kg (205 lb 12.8 oz)   SpO2 95%   BMI 30.39 kg/m²   Estimated body mass index is 30.39 kg/m² as calculated from the following:    Height as of this encounter: 175.3 cm (69\").    Weight as of this encounter: 93.4 kg (205 lb 12.8 oz).               Physical Exam  Vitals and nursing note reviewed.   Constitutional:       General: He is not in acute distress.     Appearance: Normal appearance. He is well-developed and well-groomed. He is obese. He is not ill-appearing, toxic-appearing or " diaphoretic.   HENT:      Head: Normocephalic and atraumatic.      Right Ear: Hearing normal.      Left Ear: Hearing normal.   Eyes:      General: Lids are normal. No scleral icterus.        Right eye: No discharge.         Left eye: No discharge.      Extraocular Movements: Extraocular movements intact.   Cardiovascular:      Rate and Rhythm: Normal rate and regular rhythm.   Pulmonary:      Effort: Pulmonary effort is normal.   Musculoskeletal:      Cervical back: Neck supple.      Lumbar back: No bony tenderness.   Skin:     Coloration: Skin is not jaundiced or pale.   Neurological:      General: No focal deficit present.      Mental Status: He is alert and oriented to person, place, and time.   Psychiatric:         Attention and Perception: Attention and perception normal.         Mood and Affect: Mood and affect normal.         Speech: Speech normal.         Behavior: Behavior normal. Behavior is cooperative.         Thought Content: Thought content normal.         Cognition and Memory: Cognition and memory normal.         Judgment: Judgment normal.      Result Review :  The following data was reviewed by: Kary Wen MD on 09/11/2023:  Lab Results   Component Value Date    HGBA1C 7.6 09/11/2023    HGBA1C 7.30 (H) 05/02/2023    HGBA1C 7.50 (H) 01/24/2023      Lab Results   Component Value Date    TSH 2.860 01/24/2023     Lab Results   Component Value Date    FREET4 1.09 01/24/2023                     Assessment and Plan   Diagnoses and all orders for this visit:    1. Type 2 diabetes mellitus with hyperglycemia, with long-term current use of insulin (Primary)  -     POC Glycosylated Hemoglobin (Hb A1C)  -     SITagliptin (Januvia) 100 MG tablet; Take 1 tablet by mouth Daily. For diabetes  Dispense: 90 tablet; Refill: 3    2. Chronic low back pain without sciatica, unspecified back pain laterality  -     HYDROcodone-acetaminophen (Norco) 5-325 MG per tablet; Take 1 tablet by mouth Every 12 (Twelve)  Hours As Needed for Severe Pain.  Dispense: 60 tablet; Refill: 0    3. Gouty arthritis  -     allopurinol (ZYLOPRIM) 300 MG tablet; Take 1 tablet by mouth Every Night. To lower risk of gout  Dispense: 90 tablet; Refill: 3    4. Essential hypertension  Assessment & Plan:  Hypertension is improving with treatment.  Continue current treatment regimen.  Blood pressure will be reassessed at the next regular appointment.      5. Stricture of male urethra, unspecified stricture type  -     silodosin (RAPAFLO) 8 MG capsule capsule; Take 1 capsule by mouth Daily With Breakfast. To help with urination  Dispense: 90 capsule; Refill: 3    6. Coronary artery disease involving native coronary artery of native heart without angina pectoris  -     aspirin 81 MG EC tablet; Take 1 tablet by mouth Daily.  Dispense: 90 tablet; Refill: 3    7. Hypoxemia requiring supplemental oxygen  Assessment & Plan:  Follow up with pulmonary      Medicine refills as requested by granddaughter completed.  Follow-up with cardiology and other specialist as scheduled.  Discussed losartan is important for kidney protection but if the medicine is causing hypotension which increases his risk of falls I agree with holding it.  Work on diet, cut out sugars, starches, sweets is much as possible.  Goal A1c for age is 7.5 and under, he is very close to that.  BP goal is less than 140/90 due to age he is meeting that, reviewed blood pressure log and most the time his blood pressures are lower outside the office.  See scanned blood pressure log.  Provided pain medicine to use as needed for severe pain.  Discussed potential for tolerance if used too frequently, watch for constipation and lightheadedness.  May try half pill as needed if he prefers.  Explained I do not want his pain limiting his quality of life, I do not believe we can eliminate all of his pain but we could try to get it better controlled.         Follow Up   Return in about 19 weeks (around  1/22/2024) for Medicare Wellness, Diabetes follow up, sooner if needed.  Patient was given instructions and counseling regarding his condition or for health maintenance advice. Please see specific information pulled into the AVS if appropriate.

## 2023-10-04 DIAGNOSIS — G89.29 CHRONIC LOW BACK PAIN WITHOUT SCIATICA, UNSPECIFIED BACK PAIN LATERALITY: ICD-10-CM

## 2023-10-04 DIAGNOSIS — M54.50 CHRONIC LOW BACK PAIN WITHOUT SCIATICA, UNSPECIFIED BACK PAIN LATERALITY: ICD-10-CM

## 2023-10-04 RX ORDER — HYDROCODONE BITARTRATE AND ACETAMINOPHEN 5; 325 MG/1; MG/1
1 TABLET ORAL EVERY 12 HOURS PRN
Qty: 60 TABLET | Refills: 0 | Status: ON HOLD | OUTPATIENT
Start: 2023-10-09

## 2023-10-09 ENCOUNTER — APPOINTMENT (OUTPATIENT)
Dept: CT IMAGING | Facility: HOSPITAL | Age: 88
End: 2023-10-09
Payer: MEDICARE

## 2023-10-09 ENCOUNTER — APPOINTMENT (OUTPATIENT)
Dept: GENERAL RADIOLOGY | Facility: HOSPITAL | Age: 88
End: 2023-10-09
Payer: MEDICARE

## 2023-10-09 ENCOUNTER — HOSPITAL ENCOUNTER (OUTPATIENT)
Facility: HOSPITAL | Age: 88
Setting detail: OBSERVATION
LOS: 1 days | Discharge: HOME HEALTH | End: 2023-10-11
Attending: EMERGENCY MEDICINE | Admitting: STUDENT IN AN ORGANIZED HEALTH CARE EDUCATION/TRAINING PROGRAM
Payer: MEDICARE

## 2023-10-09 DIAGNOSIS — N28.9 RENAL INSUFFICIENCY: ICD-10-CM

## 2023-10-09 DIAGNOSIS — D72.829 LEUKOCYTOSIS, UNSPECIFIED TYPE: ICD-10-CM

## 2023-10-09 DIAGNOSIS — N39.0 ACUTE UTI: Primary | ICD-10-CM

## 2023-10-09 DIAGNOSIS — R53.1 GENERALIZED WEAKNESS: ICD-10-CM

## 2023-10-09 PROBLEM — E87.20 LACTIC ACIDOSIS: Status: ACTIVE | Noted: 2023-10-09

## 2023-10-09 PROBLEM — E83.52 HYPERCALCEMIA: Status: ACTIVE | Noted: 2023-10-09

## 2023-10-09 PROBLEM — R79.89 ELEVATED SERUM CREATININE: Status: ACTIVE | Noted: 2023-10-09

## 2023-10-09 LAB
ALBUMIN SERPL-MCNC: 4.1 G/DL (ref 3.5–5.2)
ALBUMIN/GLOB SERPL: 1 G/DL
ALP SERPL-CCNC: 111 U/L (ref 39–117)
ALT SERPL W P-5'-P-CCNC: 9 U/L (ref 1–41)
ANION GAP SERPL CALCULATED.3IONS-SCNC: 11 MMOL/L (ref 5–15)
AST SERPL-CCNC: 18 U/L (ref 1–40)
B PARAPERT DNA SPEC QL NAA+PROBE: NOT DETECTED
B PERT DNA SPEC QL NAA+PROBE: NOT DETECTED
BACTERIA UR QL AUTO: ABNORMAL /HPF
BASOPHILS # BLD AUTO: 0.08 10*3/MM3 (ref 0–0.2)
BASOPHILS NFR BLD AUTO: 0.5 % (ref 0–1.5)
BILIRUB SERPL-MCNC: 0.4 MG/DL (ref 0–1.2)
BILIRUB UR QL STRIP: NEGATIVE
BUN SERPL-MCNC: 39 MG/DL (ref 8–23)
BUN/CREAT SERPL: 21.9 (ref 7–25)
C PNEUM DNA NPH QL NAA+NON-PROBE: NOT DETECTED
CA-I SERPL ISE-MCNC: 1.35 MMOL/L (ref 1.12–1.32)
CALCIUM SPEC-SCNC: 9.9 MG/DL (ref 8.2–9.6)
CHLORIDE SERPL-SCNC: 104 MMOL/L (ref 98–107)
CLARITY UR: ABNORMAL
CO2 SERPL-SCNC: 25 MMOL/L (ref 22–29)
COLOR UR: ABNORMAL
CREAT SERPL-MCNC: 1.78 MG/DL (ref 0.76–1.27)
D-LACTATE SERPL-SCNC: 2.4 MMOL/L (ref 0.5–2)
DEPRECATED RDW RBC AUTO: 58.9 FL (ref 37–54)
EGFRCR SERPLBLD CKD-EPI 2021: 35.6 ML/MIN/1.73
EOSINOPHIL # BLD AUTO: 0.14 10*3/MM3 (ref 0–0.4)
EOSINOPHIL NFR BLD AUTO: 0.9 % (ref 0.3–6.2)
ERYTHROCYTE [DISTWIDTH] IN BLOOD BY AUTOMATED COUNT: 16.7 % (ref 12.3–15.4)
FLUAV SUBTYP SPEC NAA+PROBE: NOT DETECTED
FLUBV RNA ISLT QL NAA+PROBE: NOT DETECTED
GLOBULIN UR ELPH-MCNC: 4.3 GM/DL
GLUCOSE BLDC GLUCOMTR-MCNC: 153 MG/DL (ref 70–130)
GLUCOSE SERPL-MCNC: 155 MG/DL (ref 65–99)
GLUCOSE UR STRIP-MCNC: ABNORMAL MG/DL
HADV DNA SPEC NAA+PROBE: NOT DETECTED
HCOV 229E RNA SPEC QL NAA+PROBE: NOT DETECTED
HCOV HKU1 RNA SPEC QL NAA+PROBE: NOT DETECTED
HCOV NL63 RNA SPEC QL NAA+PROBE: NOT DETECTED
HCOV OC43 RNA SPEC QL NAA+PROBE: NOT DETECTED
HCT VFR BLD AUTO: 44.9 % (ref 37.5–51)
HETEROPH AB SER QL LA: NEGATIVE
HGB BLD-MCNC: 13.8 G/DL (ref 13–17.7)
HGB UR QL STRIP.AUTO: ABNORMAL
HMPV RNA NPH QL NAA+NON-PROBE: NOT DETECTED
HOLD SPECIMEN: NORMAL
HPIV1 RNA ISLT QL NAA+PROBE: NOT DETECTED
HPIV2 RNA SPEC QL NAA+PROBE: NOT DETECTED
HPIV3 RNA NPH QL NAA+PROBE: NOT DETECTED
HPIV4 P GENE NPH QL NAA+PROBE: NOT DETECTED
HYALINE CASTS UR QL AUTO: ABNORMAL /LPF
IMM GRANULOCYTES # BLD AUTO: 0.07 10*3/MM3 (ref 0–0.05)
IMM GRANULOCYTES NFR BLD AUTO: 0.4 % (ref 0–0.5)
KETONES UR QL STRIP: NEGATIVE
LEUKOCYTE ESTERASE UR QL STRIP.AUTO: ABNORMAL
LYMPHOCYTES # BLD AUTO: 1.4 10*3/MM3 (ref 0.7–3.1)
LYMPHOCYTES NFR BLD AUTO: 9 % (ref 19.6–45.3)
M PNEUMO IGG SER IA-ACNC: NOT DETECTED
MAGNESIUM SERPL-MCNC: 2 MG/DL (ref 1.7–2.3)
MCH RBC QN AUTO: 30 PG (ref 26.6–33)
MCHC RBC AUTO-ENTMCNC: 30.7 G/DL (ref 31.5–35.7)
MCV RBC AUTO: 97.6 FL (ref 79–97)
MONOCYTES # BLD AUTO: 0.69 10*3/MM3 (ref 0.1–0.9)
MONOCYTES NFR BLD AUTO: 4.4 % (ref 5–12)
NEUTROPHILS NFR BLD AUTO: 13.22 10*3/MM3 (ref 1.7–7)
NEUTROPHILS NFR BLD AUTO: 84.8 % (ref 42.7–76)
NITRITE UR QL STRIP: NEGATIVE
NRBC BLD AUTO-RTO: 0 /100 WBC (ref 0–0.2)
PH UR STRIP.AUTO: 6 [PH] (ref 5–8)
PLATELET # BLD AUTO: 235 10*3/MM3 (ref 140–450)
PMV BLD AUTO: 10.2 FL (ref 6–12)
POTASSIUM SERPL-SCNC: 4.4 MMOL/L (ref 3.5–5.2)
PROCALCITONIN SERPL-MCNC: 0.15 NG/ML (ref 0–0.25)
PROT SERPL-MCNC: 8.4 G/DL (ref 6–8.5)
PROT UR QL STRIP: ABNORMAL
RBC # BLD AUTO: 4.6 10*6/MM3 (ref 4.14–5.8)
RBC # UR STRIP: ABNORMAL /HPF
REF LAB TEST METHOD: ABNORMAL
RHINOVIRUS RNA SPEC NAA+PROBE: NOT DETECTED
RSV RNA NPH QL NAA+NON-PROBE: NOT DETECTED
SARS-COV-2 RNA NPH QL NAA+NON-PROBE: NOT DETECTED
SODIUM SERPL-SCNC: 140 MMOL/L (ref 136–145)
SP GR UR STRIP: 1.01 (ref 1–1.03)
SQUAMOUS #/AREA URNS HPF: ABNORMAL /HPF
T4 FREE SERPL-MCNC: 1 NG/DL (ref 0.93–1.7)
TROPONIN T SERPL HS-MCNC: 21 NG/L
TROPONIN T SERPL HS-MCNC: 24 NG/L
TSH SERPL DL<=0.05 MIU/L-ACNC: 1.23 UIU/ML (ref 0.27–4.2)
UROBILINOGEN UR QL STRIP: ABNORMAL
WBC # UR STRIP: ABNORMAL /HPF
WBC CLUMPS # UR AUTO: ABNORMAL /HPF
WBC NRBC COR # BLD: 15.6 10*3/MM3 (ref 3.4–10.8)
WHOLE BLOOD HOLD COAG: NORMAL
WHOLE BLOOD HOLD SPECIMEN: NORMAL

## 2023-10-09 PROCEDURE — 84484 ASSAY OF TROPONIN QUANT: CPT | Performed by: NURSE PRACTITIONER

## 2023-10-09 PROCEDURE — 87077 CULTURE AEROBIC IDENTIFY: CPT | Performed by: EMERGENCY MEDICINE

## 2023-10-09 PROCEDURE — 83605 ASSAY OF LACTIC ACID: CPT | Performed by: EMERGENCY MEDICINE

## 2023-10-09 PROCEDURE — 93005 ELECTROCARDIOGRAM TRACING: CPT | Performed by: EMERGENCY MEDICINE

## 2023-10-09 PROCEDURE — 87040 BLOOD CULTURE FOR BACTERIA: CPT | Performed by: EMERGENCY MEDICINE

## 2023-10-09 PROCEDURE — 87086 URINE CULTURE/COLONY COUNT: CPT | Performed by: EMERGENCY MEDICINE

## 2023-10-09 PROCEDURE — 85025 COMPLETE CBC W/AUTO DIFF WBC: CPT | Performed by: EMERGENCY MEDICINE

## 2023-10-09 PROCEDURE — 0202U NFCT DS 22 TRGT SARS-COV-2: CPT | Performed by: EMERGENCY MEDICINE

## 2023-10-09 PROCEDURE — 83036 HEMOGLOBIN GLYCOSYLATED A1C: CPT | Performed by: NURSE PRACTITIONER

## 2023-10-09 PROCEDURE — 81001 URINALYSIS AUTO W/SCOPE: CPT | Performed by: EMERGENCY MEDICINE

## 2023-10-09 PROCEDURE — 36415 COLL VENOUS BLD VENIPUNCTURE: CPT

## 2023-10-09 PROCEDURE — 82330 ASSAY OF CALCIUM: CPT | Performed by: NURSE PRACTITIONER

## 2023-10-09 PROCEDURE — 84145 PROCALCITONIN (PCT): CPT | Performed by: EMERGENCY MEDICINE

## 2023-10-09 PROCEDURE — 86308 HETEROPHILE ANTIBODY SCREEN: CPT | Performed by: EMERGENCY MEDICINE

## 2023-10-09 PROCEDURE — 84443 ASSAY THYROID STIM HORMONE: CPT | Performed by: EMERGENCY MEDICINE

## 2023-10-09 PROCEDURE — 84484 ASSAY OF TROPONIN QUANT: CPT | Performed by: EMERGENCY MEDICINE

## 2023-10-09 PROCEDURE — 71045 X-RAY EXAM CHEST 1 VIEW: CPT

## 2023-10-09 PROCEDURE — 25810000003 SODIUM CHLORIDE 0.9 % SOLUTION: Performed by: NURSE PRACTITIONER

## 2023-10-09 PROCEDURE — 80053 COMPREHEN METABOLIC PANEL: CPT | Performed by: EMERGENCY MEDICINE

## 2023-10-09 PROCEDURE — 82948 REAGENT STRIP/BLOOD GLUCOSE: CPT

## 2023-10-09 PROCEDURE — 84439 ASSAY OF FREE THYROXINE: CPT | Performed by: EMERGENCY MEDICINE

## 2023-10-09 PROCEDURE — 99284 EMERGENCY DEPT VISIT MOD MDM: CPT

## 2023-10-09 PROCEDURE — 83735 ASSAY OF MAGNESIUM: CPT | Performed by: EMERGENCY MEDICINE

## 2023-10-09 PROCEDURE — 87186 SC STD MICRODIL/AGAR DIL: CPT | Performed by: EMERGENCY MEDICINE

## 2023-10-09 PROCEDURE — P9612 CATHETERIZE FOR URINE SPEC: HCPCS

## 2023-10-09 PROCEDURE — 70450 CT HEAD/BRAIN W/O DYE: CPT

## 2023-10-09 PROCEDURE — 25010000002 CEFTRIAXONE PER 250 MG: Performed by: EMERGENCY MEDICINE

## 2023-10-09 RX ORDER — LEVOTHYROXINE SODIUM 0.05 MG/1
50 TABLET ORAL
Status: DISCONTINUED | OUTPATIENT
Start: 2023-10-10 | End: 2023-10-11 | Stop reason: HOSPADM

## 2023-10-09 RX ORDER — DEXTROSE MONOHYDRATE 25 G/50ML
25 INJECTION, SOLUTION INTRAVENOUS
Status: DISCONTINUED | OUTPATIENT
Start: 2023-10-09 | End: 2023-10-11 | Stop reason: HOSPADM

## 2023-10-09 RX ORDER — NICOTINE POLACRILEX 4 MG
15 LOZENGE BUCCAL
Status: DISCONTINUED | OUTPATIENT
Start: 2023-10-09 | End: 2023-10-11 | Stop reason: HOSPADM

## 2023-10-09 RX ORDER — SODIUM CHLORIDE 0.9 % (FLUSH) 0.9 %
10 SYRINGE (ML) INJECTION EVERY 12 HOURS SCHEDULED
Status: DISCONTINUED | OUTPATIENT
Start: 2023-10-09 | End: 2023-10-11 | Stop reason: HOSPADM

## 2023-10-09 RX ORDER — SODIUM CHLORIDE 0.9 % (FLUSH) 0.9 %
10 SYRINGE (ML) INJECTION AS NEEDED
Status: DISCONTINUED | OUTPATIENT
Start: 2023-10-09 | End: 2023-10-09 | Stop reason: SDUPTHER

## 2023-10-09 RX ORDER — CHOLECALCIFEROL (VITAMIN D3) 125 MCG
5 CAPSULE ORAL NIGHTLY PRN
Status: DISCONTINUED | OUTPATIENT
Start: 2023-10-09 | End: 2023-10-11 | Stop reason: HOSPADM

## 2023-10-09 RX ORDER — L.ACID,PARA/B.BIFIDUM/S.THERM 8B CELL
1 CAPSULE ORAL DAILY
Status: DISCONTINUED | OUTPATIENT
Start: 2023-10-10 | End: 2023-10-11 | Stop reason: HOSPADM

## 2023-10-09 RX ORDER — SODIUM CHLORIDE 9 MG/ML
75 INJECTION, SOLUTION INTRAVENOUS CONTINUOUS
Status: ACTIVE | OUTPATIENT
Start: 2023-10-09 | End: 2023-10-10

## 2023-10-09 RX ORDER — ALLOPURINOL 300 MG/1
300 TABLET ORAL NIGHTLY
Status: DISCONTINUED | OUTPATIENT
Start: 2023-10-09 | End: 2023-10-11 | Stop reason: HOSPADM

## 2023-10-09 RX ORDER — INSULIN LISPRO 100 [IU]/ML
2-7 INJECTION, SOLUTION INTRAVENOUS; SUBCUTANEOUS
Status: DISCONTINUED | OUTPATIENT
Start: 2023-10-09 | End: 2023-10-11 | Stop reason: HOSPADM

## 2023-10-09 RX ORDER — BISOPROLOL FUMARATE 5 MG/1
5 TABLET, FILM COATED ORAL DAILY
Status: DISCONTINUED | OUTPATIENT
Start: 2023-10-10 | End: 2023-10-11 | Stop reason: HOSPADM

## 2023-10-09 RX ORDER — ASPIRIN 81 MG/1
81 TABLET ORAL DAILY
Status: DISCONTINUED | OUTPATIENT
Start: 2023-10-10 | End: 2023-10-11 | Stop reason: HOSPADM

## 2023-10-09 RX ORDER — TAMSULOSIN HYDROCHLORIDE 0.4 MG/1
0.4 CAPSULE ORAL DAILY
Status: DISCONTINUED | OUTPATIENT
Start: 2023-10-10 | End: 2023-10-11 | Stop reason: HOSPADM

## 2023-10-09 RX ORDER — SODIUM CHLORIDE 9 MG/ML
40 INJECTION, SOLUTION INTRAVENOUS AS NEEDED
Status: DISCONTINUED | OUTPATIENT
Start: 2023-10-09 | End: 2023-10-11 | Stop reason: HOSPADM

## 2023-10-09 RX ORDER — SODIUM CHLORIDE 0.9 % (FLUSH) 0.9 %
10 SYRINGE (ML) INJECTION AS NEEDED
Status: DISCONTINUED | OUTPATIENT
Start: 2023-10-09 | End: 2023-10-11 | Stop reason: HOSPADM

## 2023-10-09 RX ORDER — IBUPROFEN 600 MG/1
1 TABLET ORAL
Status: DISCONTINUED | OUTPATIENT
Start: 2023-10-09 | End: 2023-10-11 | Stop reason: HOSPADM

## 2023-10-09 RX ORDER — FINASTERIDE 5 MG/1
5 TABLET, FILM COATED ORAL DAILY
Status: DISCONTINUED | OUTPATIENT
Start: 2023-10-10 | End: 2023-10-11 | Stop reason: HOSPADM

## 2023-10-09 RX ORDER — ACETAMINOPHEN 325 MG/1
650 TABLET ORAL EVERY 6 HOURS PRN
Status: DISCONTINUED | OUTPATIENT
Start: 2023-10-09 | End: 2023-10-11 | Stop reason: HOSPADM

## 2023-10-09 RX ORDER — IPRATROPIUM BROMIDE AND ALBUTEROL SULFATE 2.5; .5 MG/3ML; MG/3ML
3 SOLUTION RESPIRATORY (INHALATION) EVERY 4 HOURS PRN
Status: DISCONTINUED | OUTPATIENT
Start: 2023-10-09 | End: 2023-10-11 | Stop reason: HOSPADM

## 2023-10-09 RX ADMIN — ALLOPURINOL 300 MG: 300 TABLET ORAL at 22:44

## 2023-10-09 RX ADMIN — Medication 5 MG: at 22:44

## 2023-10-09 RX ADMIN — CEFTRIAXONE SODIUM 2000 MG: 2 INJECTION, POWDER, FOR SOLUTION INTRAMUSCULAR; INTRAVENOUS at 23:43

## 2023-10-09 RX ADMIN — APIXABAN 2.5 MG: 2.5 TABLET, FILM COATED ORAL at 22:44

## 2023-10-09 RX ADMIN — SODIUM CHLORIDE 75 ML/HR: 9 INJECTION, SOLUTION INTRAVENOUS at 22:31

## 2023-10-09 RX ADMIN — Medication 10 ML: at 22:31

## 2023-10-09 NOTE — Clinical Note
Level of Care: Telemetry [5]   Diagnosis: Weakness [[id number]]   Admitting Physician: CLARIBEL ALEJANDRO [[id number]]   Attending Physician: CLARIBEL ALEJANDRO [[id number]]

## 2023-10-10 ENCOUNTER — APPOINTMENT (OUTPATIENT)
Dept: ULTRASOUND IMAGING | Facility: HOSPITAL | Age: 88
End: 2023-10-10
Payer: MEDICARE

## 2023-10-10 PROBLEM — N39.0 UTI (URINARY TRACT INFECTION): Status: ACTIVE | Noted: 2023-10-10

## 2023-10-10 LAB
ANION GAP SERPL CALCULATED.3IONS-SCNC: 11 MMOL/L (ref 5–15)
BASOPHILS # BLD AUTO: 0.08 10*3/MM3 (ref 0–0.2)
BASOPHILS NFR BLD AUTO: 0.7 % (ref 0–1.5)
BUN SERPL-MCNC: 39 MG/DL (ref 8–23)
BUN/CREAT SERPL: 25.2 (ref 7–25)
CALCIUM SPEC-SCNC: 9 MG/DL (ref 8.2–9.6)
CHLORIDE SERPL-SCNC: 109 MMOL/L (ref 98–107)
CO2 SERPL-SCNC: 20 MMOL/L (ref 22–29)
CREAT SERPL-MCNC: 1.55 MG/DL (ref 0.76–1.27)
DEPRECATED RDW RBC AUTO: 57.7 FL (ref 37–54)
EGFRCR SERPLBLD CKD-EPI 2021: 42 ML/MIN/1.73
EOSINOPHIL # BLD AUTO: 0.07 10*3/MM3 (ref 0–0.4)
EOSINOPHIL NFR BLD AUTO: 0.6 % (ref 0.3–6.2)
ERYTHROCYTE [DISTWIDTH] IN BLOOD BY AUTOMATED COUNT: 16.6 % (ref 12.3–15.4)
GEN 5 2HR TROPONIN T REFLEX: 22 NG/L
GLUCOSE BLDC GLUCOMTR-MCNC: 136 MG/DL (ref 70–130)
GLUCOSE BLDC GLUCOMTR-MCNC: 139 MG/DL (ref 70–130)
GLUCOSE BLDC GLUCOMTR-MCNC: 159 MG/DL (ref 70–130)
GLUCOSE BLDC GLUCOMTR-MCNC: 160 MG/DL (ref 70–130)
GLUCOSE SERPL-MCNC: 152 MG/DL (ref 65–99)
HBA1C MFR BLD: 7.3 % (ref 4.8–5.6)
HCT VFR BLD AUTO: 40.1 % (ref 37.5–51)
HEMOCCULT STL QL: NEGATIVE
HGB BLD-MCNC: 12.6 G/DL (ref 13–17.7)
IMM GRANULOCYTES # BLD AUTO: 0.05 10*3/MM3 (ref 0–0.05)
IMM GRANULOCYTES NFR BLD AUTO: 0.5 % (ref 0–0.5)
LYMPHOCYTES # BLD AUTO: 1.53 10*3/MM3 (ref 0.7–3.1)
LYMPHOCYTES NFR BLD AUTO: 14 % (ref 19.6–45.3)
MAGNESIUM SERPL-MCNC: 2 MG/DL (ref 1.7–2.3)
MCH RBC QN AUTO: 29.9 PG (ref 26.6–33)
MCHC RBC AUTO-ENTMCNC: 31.4 G/DL (ref 31.5–35.7)
MCV RBC AUTO: 95.2 FL (ref 79–97)
MONOCYTES # BLD AUTO: 0.69 10*3/MM3 (ref 0.1–0.9)
MONOCYTES NFR BLD AUTO: 6.3 % (ref 5–12)
NEUTROPHILS NFR BLD AUTO: 77.9 % (ref 42.7–76)
NEUTROPHILS NFR BLD AUTO: 8.48 10*3/MM3 (ref 1.7–7)
NRBC BLD AUTO-RTO: 0 /100 WBC (ref 0–0.2)
PLATELET # BLD AUTO: 198 10*3/MM3 (ref 140–450)
PMV BLD AUTO: 10.6 FL (ref 6–12)
POTASSIUM SERPL-SCNC: 4.6 MMOL/L (ref 3.5–5.2)
QT INTERVAL: 434 MS
QTC INTERVAL: 484 MS
RBC # BLD AUTO: 4.21 10*6/MM3 (ref 4.14–5.8)
SODIUM SERPL-SCNC: 140 MMOL/L (ref 136–145)
TROPONIN T DELTA: -2 NG/L
WBC NRBC COR # BLD: 10.9 10*3/MM3 (ref 3.4–10.8)

## 2023-10-10 PROCEDURE — 97161 PT EVAL LOW COMPLEX 20 MIN: CPT

## 2023-10-10 PROCEDURE — 85025 COMPLETE CBC W/AUTO DIFF WBC: CPT | Performed by: NURSE PRACTITIONER

## 2023-10-10 PROCEDURE — 76775 US EXAM ABDO BACK WALL LIM: CPT

## 2023-10-10 PROCEDURE — 25010000002 CEFTRIAXONE PER 250 MG: Performed by: STUDENT IN AN ORGANIZED HEALTH CARE EDUCATION/TRAINING PROGRAM

## 2023-10-10 PROCEDURE — 83735 ASSAY OF MAGNESIUM: CPT | Performed by: NURSE PRACTITIONER

## 2023-10-10 PROCEDURE — 63710000001 INSULIN LISPRO (HUMAN) PER 5 UNITS: Performed by: NURSE PRACTITIONER

## 2023-10-10 PROCEDURE — G0378 HOSPITAL OBSERVATION PER HR: HCPCS

## 2023-10-10 PROCEDURE — 84484 ASSAY OF TROPONIN QUANT: CPT | Performed by: NURSE PRACTITIONER

## 2023-10-10 PROCEDURE — 82948 REAGENT STRIP/BLOOD GLUCOSE: CPT

## 2023-10-10 PROCEDURE — 82272 OCCULT BLD FECES 1-3 TESTS: CPT | Performed by: NURSE PRACTITIONER

## 2023-10-10 PROCEDURE — 80048 BASIC METABOLIC PNL TOTAL CA: CPT | Performed by: NURSE PRACTITIONER

## 2023-10-10 PROCEDURE — 97116 GAIT TRAINING THERAPY: CPT

## 2023-10-10 RX ADMIN — FINASTERIDE 5 MG: 5 TABLET, FILM COATED ORAL at 09:15

## 2023-10-10 RX ADMIN — ASPIRIN 81 MG: 81 TABLET, COATED ORAL at 09:15

## 2023-10-10 RX ADMIN — Medication 5 MG: at 21:14

## 2023-10-10 RX ADMIN — SODIUM CHLORIDE 1000 MG: 900 INJECTION INTRAVENOUS at 21:14

## 2023-10-10 RX ADMIN — APIXABAN 2.5 MG: 2.5 TABLET, FILM COATED ORAL at 09:15

## 2023-10-10 RX ADMIN — LEVOTHYROXINE SODIUM 50 MCG: 0.05 TABLET ORAL at 05:36

## 2023-10-10 RX ADMIN — APIXABAN 2.5 MG: 2.5 TABLET, FILM COATED ORAL at 21:14

## 2023-10-10 RX ADMIN — Medication 10 ML: at 09:16

## 2023-10-10 RX ADMIN — INSULIN LISPRO 2 UNITS: 100 INJECTION, SOLUTION INTRAVENOUS; SUBCUTANEOUS at 18:15

## 2023-10-10 RX ADMIN — INSULIN LISPRO 2 UNITS: 100 INJECTION, SOLUTION INTRAVENOUS; SUBCUTANEOUS at 12:44

## 2023-10-10 RX ADMIN — Medication 10 ML: at 21:14

## 2023-10-10 RX ADMIN — BISOPROLOL FUMARATE 5 MG: 5 TABLET, FILM COATED ORAL at 09:15

## 2023-10-10 RX ADMIN — TAMSULOSIN HYDROCHLORIDE 0.4 MG: 0.4 CAPSULE ORAL at 09:15

## 2023-10-10 RX ADMIN — Medication 1 CAPSULE: at 09:15

## 2023-10-10 RX ADMIN — ALLOPURINOL 300 MG: 300 TABLET ORAL at 21:14

## 2023-10-10 NOTE — THERAPY EVALUATION
Patient Name: Forrest Zepeda  : 1932    MRN: [id number]                              Today's Date: 10/10/2023       Admit Date: 10/9/2023    Visit Dx:     ICD-10-CM ICD-9-CM   1. Acute UTI  N39.0 599.0   2. Leukocytosis, unspecified type  D72.829 288.60   3. Generalized weakness  R53.1 780.79   4. Renal insufficiency  N28.9 593.9     Patient Active Problem List   Diagnosis    Essential hypertension    Generalized osteoarthritis    Diabetic neuropathy    Gout    HLD (hyperlipidemia)    Acquired hypothyroidism    Ureteral stricture    Hydronephrosis of left kidney    CKD (chronic kidney disease) stage 3, GFR 30-59 ml/min    LEHMAN (dyspnea on exertion)    IPF (idiopathic pulmonary fibrosis)    Symptomatic bradycardia    Hypoxemia requiring supplemental oxygen    History of pulmonary embolism    Sinus node dysfunction    Chronic fatigue    Urethral stricture    Paroxysmal atrial fibrillation    Type 2 diabetes mellitus with hyperglycemia, with long-term current use of insulin    Abnormal cardiovascular stress test    Pulmonary hypertension    Cardiac pacemaker in situ    CAD (coronary artery disease)    Diastolic congestive heart failure    Abnormal SPEP    Type 2 diabetes mellitus with stage 3b chronic kidney disease, with long-term current use of insulin    Hydronephrosis, left    Gross hematuria    Chronic heart failure with preserved ejection fraction (HFpEF)    Personal history of other infectious and parasitic diseases    Weakness    Acute UTI (urinary tract infection)    Lactic acidosis    Leukocytosis    Hypercalcemia    Elevated serum creatinine    UTI (urinary tract infection)     Past Medical History:   Diagnosis Date    Abnormal EKG     Abnormal PSA     Reported abnormal    Acute cystitis with hematuria 2023    Acute deep vein thrombosis (DVT) of right lower extremity 2019    Acute diverticulitis 2019    Acute hyperkalemia 2019    Acute respiratory failure with hypoxia     Acute saddle  "pulmonary embolism with acute cor pulmonale 08/22/2019    Acute systolic right heart failure 08/22/2019    Arthritis     KNEES,  BUT SINCE HAD REPLACEMENT    Benign localized hyperplasia of prostate     Bilateral knee pain     C. difficile diarrhea 2010    Cataracts, bilateral     CKD (chronic kidney disease)     Coronary artery disease     Diabetic neuropathy     Diastolic dysfunction     Disease of thyroid gland     HYPOTHYROIDISM    Diverticulitis     Dyslipidemia     H/O    Family history of malignant hyperthermia     Brother     Full dentures     Generalized weakness     History of ESBL E. coli infection     most recent 4-2022 f/u with ID Dr Johnson    History of gout     History of hepatitis A vaccination     History of nephrolithiasis     History of nuclear stress test     STATES IN THE 1990'S.  \"IT WAS OK\"    History of osteopenia     History of recurrent UTI (urinary tract infection)     Hydronephrosis of left kidney 07/18/2019    Hydronephrosis with renal and ureteral calculus obstruction 10/21/2019    Added automatically from request for surgery [id number]    Hydronephrosis with ureteral stricture     Hypercholesterolemia     Hyperkalemia     PT UNSURE REGARDING HX OF THIS    Hyperlipidemia     Hypertension     Hypothyroidism     Impaired functional mobility, balance, gait, and endurance     Insomnia     IPF (idiopathic pulmonary fibrosis)     per patient and son, dx at [facility], was told no treatment necessary, not to worry about it    On supplemental oxygen therapy     2L NC QHS    Other hydronephrosis 08/12/2019    Added automatically from request for surgery [id number]    Paroxysmal atrial fibrillation     Pulmonary hypertension, mild 02/2021    per echo per cardiology note 1/9/23, per pt's son, PCP does not agree with this dx    Renal insufficiency     Sepsis 2019    Shortness of breath     STATES WITH EXERTION, OTHERWISE, DENIES    Sigmoid diverticulitis without abscess or perforation 08/09/2017    Sinus " node dysfunction     Skin breakdown     fourth toe right foot noted in 2019 MD D/C note    Skin ulcer of fourth toe of right foot, limited to breakdown of skin 07/05/2019    Stricture of ureter     Type 2 diabetes mellitus     Ureteral stricture, left     UTI (urinary tract infection) 07/18/2019    Vitamin D deficiency     Wears glasses      Past Surgical History:   Procedure Laterality Date    APPENDECTOMY      CARDIAC ELECTROPHYSIOLOGY PROCEDURE N/A 12/23/2020    Procedure: Pacemaker DC new. DNS meds.;  Surgeon: Jimy Ledezma DO;  Location:  [facility];  Service: Cardiology;  Laterality: N/A;    CHOLECYSTECTOMY      CIRCUMCISION N/A 10/23/2019    Procedure: CIRCUMCISIOn-DORSAL SLIT;  Surgeon: Griffin Romero MD;  Location:  [facility];  Service: Urology    COLONOSCOPY      CYSTOSCOPY RETROGRADE PYELOGRAM Left 06/17/2022    Procedure: CYSTOSCOPY RETROGRADE PYELOGRAM;  Surgeon: Ryne Mccann MD;  Location:  [facility];  Service: Urology;  Laterality: Left;    CYSTOSCOPY URETEROSCOPY Left 02/11/2019    Procedure: URETEROSCOPY WITH STENT EXTRACTION, RPG;  Surgeon: Griffin Romero MD;  Location:  [facility];  Service: Urology    CYSTOSCOPY W/ URETERAL STENT PLACEMENT Left 07/20/2019    Procedure: CYSTOSCOPY, LEFT RETROGRADE PYELOGRAM,  ATTEMPTED LEFT URETERAL STENT INSERTION;  Surgeon: Isaac Flynn MD;  Location:  [facility];  Service: Urology    CYSTOSCOPY W/ URETERAL STENT PLACEMENT Left 06/17/2022    Procedure: CYSTOSCOPY URETERAL CATHETER/STENT INSERTION;  Surgeon: Ryne Mccann MD;  Location:  [facility];  Service: Urology;  Laterality: Left;    CYSTOSCOPY W/ URETERAL STENT PLACEMENT Left 01/27/2023    Procedure: CYSTOSCOPY URETERAL CATHETER/STENT INSERTION;  Surgeon: Deanne Pitts MD;  Location:  [facility];  Service: Urology;  Laterality: Left;    CYSTOSCOPY, URETEROSCOPY, RETROGRADE PYELOGRAM, STONE EXTRACTION, STENT INSERTION Left 06/15/2023    Procedure: URETEROSCOPY, RETROGRADE  PYELOGRAM, STENT INSERTION;  Surgeon: Deanne Pitts MD;  Location: [facility] OR;  Service: Urology;  Laterality: Left;    EYE SURGERY      CATARACTS REMOVED BILATERALLY    JOINT REPLACEMENT      Bilateral knees    KNEE ARTHROPLASTY Bilateral     KNEE ARTHROSCOPY Bilateral     RENAL ARTERY STENT      SEVERAL TIMES EVERY SINCE 1978    URETERAL STENT INSERTION  10/2020    URETEROSCOPY LASER LITHOTRIPSY WITH STENT INSERTION Left 01/10/2018    Procedure:  cysto, left ureteral stent replacement ;  Surgeon: Griffin Romero MD;  Location: [facility] OR;  Service:     URETEROSCOPY LASER LITHOTRIPSY WITH STENT INSERTION Left 08/14/2019    Procedure: URETEROSCOPY, STONE REMOVAL WITH STENT INSERTION;  Surgeon: Griffin Romero MD;  Location: [facility] OR;  Service: Urology    URETEROSCOPY LASER LITHOTRIPSY WITH STENT INSERTION Left 10/23/2019    Procedure: Left URETEROSCOPY WITH STENT INSERTION-LEFT;  Surgeon: Griffin Romero MD;  Location: [facility] OR;  Service: Urology      General Information       Row Name 10/10/23 1436          Physical Therapy Time and Intention    Document Type evaluation  -CM     Mode of Treatment physical therapy;individual therapy  -CM       Row Name 10/10/23 1439          General Information    Patient Profile Reviewed yes  -CM     Prior Level of Function independent:;all household mobility;community mobility;ADL's;driving  ind no AD in home, uses SPC as needed for community mobility, still driving to [facility]  -CM     Existing Precautions/Restrictions fall;oxygen therapy device and L/min;other (see comments)  incontinent bowel movements, brief for mobility  -CM     Barriers to Rehab medically complex;previous functional deficit  -CM       Row Name 10/10/23 1439          Living Environment    People in Home alone;other (see comments)  family lives in [place] and someone checks on him daily  -CM       Row Name 10/10/23 1439          Home Main Entrance    Number of Stairs, Main Entrance two  -CM     Stair  Railings, Main Entrance none  -CM       Row Name 10/10/23 1435          Stairs Within Home, Primary    Stairs, Within Home, Primary ranch with basement, patient does not utilize basement at baseline  -CM     Number of Stairs, Within Home, Primary twelve  -CM       Row Name 10/10/23 1435          Cognition    Orientation Status (Cognition) oriented x 4  -CM       Row Name 10/10/23 1435          Safety Issues, Functional Mobility    Safety Issues Affecting Function (Mobility) awareness of need for assistance;insight into deficits/self-awareness;safety precaution awareness;safety precautions follow-through/compliance  -CM     Impairments Affecting Function (Mobility) balance;endurance/activity tolerance;shortness of breath;strength  -CM               User Key  (r) = Recorded By, (t) = Taken By, (c) = Cosigned By      Initials Name Provider Type    CM Kari Mortensen, PT Physical Therapist                   Mobility       Row Name 10/10/23 1439          Bed Mobility    Bed Mobility supine-sit  -CM     Supine-Sit [place] (Bed Mobility) supervision  -CM     Assistive Device (Bed Mobility) bed rails;head of bed elevated  -CM     Comment, (Bed Mobility) patient performs without physical assist given increased time and effort  -CM       Row Name 10/10/23 1439          Sit-Stand Transfer    Sit-Stand [place] (Transfers) contact guard  -CM     Comment, (Sit-Stand Transfer) patient performs CGA from both bed and toilet, he utilizes momentum to complete but is safe in this manner  -CM       Row Name 10/10/23 1439          Gait/Stairs (Locomotion)    [place] Level (Gait) contact guard;1 person assist;verbal cues  -CM     Distance in Feet (Gait) 15+15+110  -CM     Deviations/Abnormal Patterns (Gait) bilateral deviations;melvin decreased;festinating/shuffling;gait speed decreased;stride length decreased  -CM     Bilateral Gait Deviations forward flexed posture;heel strike decreased  -CM     Comment,  (Gait/Stairs) Patient ambulated to bathroom and back to bed with seated rest breaks on RA with notable desat to 82%. He then completed additional ambulation on 2L O2 with a shuffling step through gait pattern, cues for upright posture and PLB. His gait distance is limited by fatigue and SOA, did maintain his O2 sat at 92% on 2L O2. No LOB noted.  -CM               User Key  (r) = Recorded By, (t) = Taken By, (c) = Cosigned By      Initials Name Provider Type    Krai Painter PT Physical Therapist                   Obj/Interventions       Row Name 10/10/23 1443          Range of Motion Comprehensive    General Range of Motion bilateral lower extremity ROM WFL  -CM       Row Name 10/10/23 1443          Strength Comprehensive (MMT)    General Manual Muscle Testing (MMT) Assessment lower extremity strength deficits identified  -CM     Comment, General Manual Muscle Testing (MMT) Assessment BLE grossly 4-/5  -CM       Row Name 10/10/23 1443          Balance    Balance Assessment sitting static balance;standing static balance;standing dynamic balance  -CM     Static Sitting Balance standby assist  -CM     Position, Sitting Balance unsupported;sitting edge of bed;other (see comments)  on toilet  -CM     Static Standing Balance contact guard  -CM     Dynamic Standing Balance contact guard  -CM     Position/Device Used, Standing Balance unsupported  -CM     Comment, Balance no LOB  -CM       Row Name 10/10/23 1443          Sensory Assessment (Somatosensory)    Sensory Assessment (Somatosensory) LE sensation intact  -CM               User Key  (r) = Recorded By, (t) = Taken By, (c) = Cosigned By      Initials Name Provider Type    Kari Painter PT Physical Therapist                   Goals/Plan       Row Name 10/10/23 1457          Bed Mobility Goal 1 (PT)    Activity/Assistive Device (Bed Mobility Goal 1, PT) sit to supine/supine to sit  -CM     [place] Level/Cues Needed (Bed Mobility Goal 1, PT)  modified independence  -CM     Time Frame (Bed Mobility Goal 1, PT) long term goal (LTG);10 days  -CM     Progress/Outcomes (Bed Mobility Goal 1, PT) new goal  -CM       Row Name 10/10/23 1450          Transfer Goal 1 (PT)    Activity/Assistive Device (Transfer Goal 1, PT) sit-to-stand/stand-to-sit;bed-to-chair/chair-to-bed  -CM     [place] Level/Cues Needed (Transfer Goal 1, PT) standby assist  -CM     Time Frame (Transfer Goal 1, PT) long term goal (LTG);10 days  -CM     Progress/Outcome (Transfer Goal 1, PT) new goal  -CM       Row Name 10/10/23 1450          Gait Training Goal 1 (PT)    Activity/Assistive Device (Gait Training Goal 1, PT) gait (walking locomotion)  -CM     [place] Level (Gait Training Goal 1, PT) standby assist  -CM     Distance (Gait Training Goal 1, PT) 250  -CM     Time Frame (Gait Training Goal 1, PT) long term goal (LTG);10 days  -CM     Progress/Outcome (Gait Training Goal 1, PT) new goal  -CM       Row Name 10/10/23 9740          Stairs Goal 1 (PT)    Activity/Assistive Device (Stairs Goal 1, PT) ascending stairs;descending stairs  -CM     [place] Level/Cues Needed (Stairs Goal 1, PT) contact guard required  -CM     Number of Stairs (Stairs Goal 1, PT) 2  -CM     Time Frame (Stairs Goal 1, PT) long term goal (LTG);10 days  -CM     Progress/Outcome (Stairs Goal 1, PT) new goal  -CM       Row Name 10/10/23 6520          Therapy Assessment/Plan (PT)    Planned Therapy Interventions (PT) balance training;bed mobility training;gait training;home exercise program;stretching;strengthening;stair training;ROM (range of motion);postural re-education;patient/family education;transfer training  -CM               User Key  (r) = Recorded By, (t) = Taken By, (c) = Cosigned By      Initials Name Provider Type    CM Kari Mortensen, PT Physical Therapist                   Clinical Impression       Row Name 10/10/23 1510          Pain    Pretreatment Pain Rating 0/10 - no pain  -CM      Posttreatment Pain Rating 0/10 - no pain  -       Row Name 10/10/23 1444          Plan of Care Review    Plan of Care Reviewed With patient;family  -CM     Outcome Evaluation Patient presents with deficits in endurance, balance, and strength. He ambulated 15'+15'+110' initially on RA with desat to 82%. Following seated rest break he ambulated on 2L O2 with no desat noted. He is mobilizing below his baseline indicating IPPT intervention to address current deficits. Recommend D/C home with assist from family and HHPT, although patient did not seem interested in HHPT.  -CM       Row Name 10/10/23 1444          Therapy Assessment/Plan (PT)    Rehab Potential (PT) good, to achieve stated therapy goals  -CM     Criteria for Skilled Interventions Met (PT) yes;meets criteria;skilled treatment is necessary  -     Therapy Frequency (PT) daily  -CM       Row Name 10/10/23 1444          Vital Signs    Pre Systolic BP Rehab 143  -CM     Pre Treatment Diastolic BP 70  -CM     Pretreatment Heart Rate (beats/min) 68  -CM     Posttreatment Heart Rate (beats/min) 67  -CM     Pre SpO2 (%) 96  -CM     O2 Delivery Pre Treatment nasal cannula  -CM     Intra SpO2 (%) 82  -CM     O2 Delivery Intra Treatment room air  -CM     Post SpO2 (%) 97  -CM     O2 Delivery Post Treatment nasal cannula  -CM     Pre Patient Position Supine  -CM     Intra Patient Position Sitting  -CM     Post Patient Position Sitting  -CM       Row Name 10/10/23 1446          Positioning and Restraints    Pre-Treatment Position in bed  -CM     Post Treatment Position chair  -CM     In Chair reclined;call light within reach;encouraged to call for assist;exit alarm on;with family/caregiver;waffle cushion;notified nsg  -CM               User Key  (r) = Recorded By, (t) = Taken By, (c) = Cosigned By      Initials Name Provider Type    Kari Painter, PT Physical Therapist                   Outcome Measures       Row Name 10/10/23 1451          How much help  from another person do you currently need...    Turning from your back to your side while in flat bed without using bedrails? 4  -CM     Moving from lying on back to sitting on the side of a flat bed without bedrails? 3  -CM     Moving to and from a bed to a chair (including a wheelchair)? 3  -CM     Standing up from a chair using your arms (e.g., wheelchair, bedside chair)? 3  -CM     Climbing 3-5 steps with a railing? 3  -CM     To walk in hospital room? 3  -CM     AM-PAC 6 Clicks Score (PT) 19  -CM     Highest level of mobility 6 --> Walked 10 steps or more  -CM       Row Name 10/10/23 1451          Functional Assessment    Outcome Measure Options AM-PAC 6 Clicks Basic Mobility (PT)  -CM               User Key  (r) = Recorded By, (t) = Taken By, (c) = Cosigned By      Initials Name Provider Type    Kari Painter PT Physical Therapist                                 Physical Therapy Education       Title: PT OT SLP Therapies (In Progress)       Topic: Physical Therapy (In Progress)       Point: Mobility training (Done)       Learning Progress Summary             Patient Acceptance, E, VU by CM at 10/10/2023 1452   Family Acceptance, E, VU by CM at 10/10/2023 1452                         Point: Home exercise program (Not Started)       Learner Progress:  Not documented in this visit.              Point: Body mechanics (Done)       Learning Progress Summary             Patient Acceptance, E, VU by CM at 10/10/2023 1452   Family Acceptance, E, VU by CM at 10/10/2023 1452                         Point: Precautions (Done)       Learning Progress Summary             Patient Acceptance, E, VU by CM at 10/10/2023 1452   Family Acceptance, E, VU by CM at 10/10/2023 1452                                         User Key       Initials Effective Dates Name Provider Type Discipline     09/22/22 -  Kari Mortensen PT Physical Therapist PT                  PT Recommendation and Plan  Planned Therapy  Interventions (PT): balance training, bed mobility training, gait training, home exercise program, stretching, strengthening, stair training, ROM (range of motion), postural re-education, patient/family education, transfer training  Plan of Care Reviewed With: patient, family  Outcome Evaluation: Patient presents with deficits in endurance, balance, and strength. He ambulated 15'+15'+110' initially on RA with desat to 82%. Following seated rest break he ambulated on 2L O2 with no desat noted. He is mobilizing below his baseline indicating IPPT intervention to address current deficits. Recommend D/C home with assist from family and HHPT, although patient did not seem interested in HHPT.     Time Calculation:   PT Evaluation Complexity  History, PT Evaluation Complexity: 3 or more personal factors and/or comorbidities  Examination of Body Systems (PT Eval Complexity): total of 3 or more elements  Clinical Presentation (PT Evaluation Complexity): stable  Clinical Decision Making (PT Evaluation Complexity): low complexity  Overall Complexity (PT Evaluation Complexity): low complexity     PT Charges       Row Name 10/10/23 1452             Time Calculation    Start Time 1312  -CM      PT Received On 10/10/23  -CM      PT Goal Re-Cert Due Date 10/20/23  -CM         Timed Charges    37856 - Gait Training Minutes  10  -CM         Untimed Charges    PT Eval/Re-eval Minutes 50  -CM         Total Minutes    Timed Charges Total Minutes 10  -CM      Untimed Charges Total Minutes 50  -CM       Total Minutes 60  -CM                User Key  (r) = Recorded By, (t) = Taken By, (c) = Cosigned By      Initials Name Provider Type    CM Kari Mortensen, PT Physical Therapist                  Therapy Charges for Today       Code Description Service Date Service Provider Modifiers Qty    76571842109 HC GAIT TRAINING EA 15 MIN 10/10/2023 Kari Mortensen, PT GP 1    71900408546 HC PT EVAL LOW COMPLEXITY 4 10/10/2023 [place]  Kari, PT GP 1            PT G-Codes  Outcome Measure Options: AM-PAC 6 Clicks Basic Mobility (PT)  AM-PAC 6 Clicks Score (PT): 19  PT Discharge Summary  Anticipated Discharge Disposition (PT): home with assist, home with home health    Kari Mortensen, PT  10/10/2023

## 2023-10-10 NOTE — PROGRESS NOTES
[facility] Medicine Services  PROGRESS NOTE    Patient Name: Forrest Zepeda  : 1932  MRN: [id number]    Date of Admission: 10/9/2023  Primary Care Physician: Kary Wen MD    Subjective   Subjective     CC:  F/u generalized weakness    HPI:  Feeling better today, weakness is better      Objective   Objective     Vital Signs:   Temp:  [98.1 øF (36.7 øC)-98.4 øF (36.9 øC)] 98.2 øF (36.8 øC)  Heart Rate:  [65-79] 79  Resp:  [16-18] 16  BP: (106-154)/(56-77) 143/70     Physical Exam:  Constitutional: No acute distress, awake, alert, elderly, frail appearing  HENT: NCAT, mucous membranes moist  Respiratory: Clear to auscultation bilaterally, respiratory effort normal . Comf on 1L NC (on home req)  Cardiovascular: RRR, no murmurs, rubs, or gallops  Gastrointestinal: Positive bowel sounds, soft, nontender, nondistended  Musculoskeletal: No bilateral ankle edema  Psychiatric: Appropriate affect, cooperative  Neurologic: Oriented x 3, strength symmetric in all extremities, Cranial Nerves grossly intact to confrontation, speech clear  Skin: No rashes      Results Reviewed:  LAB RESULTS:      Lab 10/10/23  0410 10/09/23  1918   WBC 10.90* 15.60*   HEMOGLOBIN 12.6* 13.8   HEMATOCRIT 40.1 44.9   PLATELETS 198 235   NEUTROS ABS 8.48* 13.22*   IMMATURE GRANS (ABS) 0.05 0.07*   LYMPHS ABS 1.53 1.40   MONOS ABS 0.69 0.69   EOS ABS 0.07 0.14   MCV 95.2 97.6*   PROCALCITONIN  --  0.15   LACTATE  --  2.4*         Lab 10/10/23  0410 10/09/23  2307 10/09/23  1918   SODIUM 140  --  140   POTASSIUM 4.6  --  4.4   CHLORIDE 109*  --  104   CO2 20.0*  --  25.0   ANION GAP 11.0  --  11.0   BUN 39*  --  39*   CREATININE 1.55*  --  1.78*   EGFR 42.0*  --  35.6*   GLUCOSE 152*  --  155*   CALCIUM 9.0  --  9.9*   IONIZED CALCIUM  --  1.35*  --    MAGNESIUM 2.0  --  2.0   HEMOGLOBIN A1C  --  7.30*  --    TSH  --   --  1.230         Lab 10/09/23  1918   TOTAL PROTEIN 8.4   ALBUMIN 4.1   GLOBULIN 4.3   ALT  (SGPT) 9   AST (SGOT) 18   BILIRUBIN 0.4   ALK PHOS 111         Lab 10/10/23  0133 10/09/23  2307 10/09/23  1918   HSTROP T 22* 24* 21*                 Brief Urine Lab Results  (Last result in the past 365 days)        Color   Clarity   Blood   Leuk Est   Nitrite   Protein   CREAT   Urine HCG        10/09/23 1925 Orange   Turbid   Large (3+)   Large (3+)   Negative   100 mg/dL (2+)                   Microbiology Results Abnormal       Procedure Component Value - Date/Time    Urine Culture - Urine, Urine, Catheter [[id number]]  (Normal) Collected: 10/09/23 1925    Lab Status: Preliminary result Specimen: Urine, Catheter Updated: 10/10/23 0744     Urine Culture Culture in progress    Respiratory Panel PCR w/COVID-19(SARS-CoV-2) GOMEZ/JOSE/ISAH/PAD/COR/MAD/JEISON In-House, NP Swab in UTM/VTM, 3-4 HR TAT - Swab, Nasopharynx [[id number]]  (Normal) Collected: 10/09/23 1926    Lab Status: Final result Specimen: Swab from Nasopharynx Updated: 10/09/23 2022     ADENOVIRUS, PCR Not Detected     Coronavirus 229E Not Detected     Coronavirus HKU1 Not Detected     Coronavirus NL63 Not Detected     Coronavirus OC43 Not Detected     COVID19 Not Detected     Human Metapneumovirus Not Detected     Human Rhinovirus/Enterovirus Not Detected     Influenza A PCR Not Detected     Influenza B PCR Not Detected     Parainfluenza Virus 1 Not Detected     Parainfluenza Virus 2 Not Detected     Parainfluenza Virus 3 Not Detected     Parainfluenza Virus 4 Not Detected     RSV, PCR Not Detected     Bordetella pertussis pcr Not Detected     Bordetella parapertussis PCR Not Detected     Chlamydophila pneumoniae PCR Not Detected     Mycoplasma pneumo by PCR Not Detected    Narrative:      In the setting of a positive respiratory panel with a viral infection PLUS a negative procalcitonin without other underlying concern for bacterial infection, consider observing off antibiotics or discontinuation of antibiotics and continue supportive care. If the  respiratory panel is positive for atypical bacterial infection (Bordetella pertussis, Chlamydophila pneumoniae, or Mycoplasma pneumoniae), consider antibiotic de-escalation to target atypical bacterial infection.            US Renal Bilateral    Result Date: 10/10/2023  US RENAL BILATERAL Date of Exam: 10/10/2023 10:34 AM CDT Indication: OCTAVIA on CKD, hx strictures. Please include post void residual as well. Comparison: CT abdomen pelvis 5/1/2023 Technique: Grayscale and color Doppler ultrasound evaluation of the kidneys and urinary bladder was performed. Findings: RIGHT kidney measures 12.2 x 5.4 x 5.8 cm.  Kidney echogenicity, size, and vascularity appear within normal limits. There is no solid kidney mass.  No echogenic shadowing stone.  No hydronephrosis. LEFT kidney measures 9.0 x 5.3 x 3.6 cm. There is left renal cortical atrophy. There is a left ureteral stent in place. There is no solid kidney mass.  No echogenic shadowing stone. There is moderate to severe left hydronephrosis. This appears to be similar to previous CT. Left ureteral stent is noted within the urinary bladder. There is layering debris within the bladder. Prevoid volume: 415 cc Post void volume: 291 cc     Impression: Impression: 1.Persistent moderate to severe left-sided hydronephrosis with renal cortical atrophy. Left ureteral stent is noted. 2.Layering debris within the urinary bladder. 3.Post void residual of 291 cc Electronically Signed: Raheel Denney MD  10/10/2023 11:09 AM CDT  Workstation ID: [id number]    XR Chest 1 View    Result Date: 10/9/2023  XR CHEST 1 VW Date of Exam: 10/9/2023 7:10 PM EDT Indication: Weak/Dizzy/AMS triage protocol Comparison: 12/31/2020 Findings: Left subclavian transvenous pacemaker is unchanged. Heart size and pulmonary vessels are within normal limits. Right-sided PICC line has been removed. There is minimal linear atelectasis within the left lung base. There are coarsened interstitial markings in both lung  bases unchanged from prior exam suggesting changes of chronic lung disease. No pleural effusion. No evidence pneumothorax.     Impression: Impression: 1. Minimal linear atelectasis in the left lung base. No acute cardiopulmonary disease identified. Electronically Signed: Rolly Callahan MD  10/9/2023 8:19 PM EDT  Workstation ID: [id number]    CT Head Without Contrast    Result Date: 10/9/2023  CT HEAD WO CONTRAST Date of Exam: 10/9/2023 6:52 PM EDT Indication: gen weakness. Comparison: Noncontrast head CT dated 6/30/2020 Technique: Axial CT images were obtained of the head without contrast administration.  Automated exposure control and iterative construction methods were used. FINDINGS:  Foci of periventricular and subcortical white matter hypoattenuation are consistent with chronic, microvascular ischemic change. There is age-related loss of brain parenchymal volume with prominence of sulcation and ventriculomegaly. There is mild encephalomalacia within the right centrum semiovale. No significant mass effect, midline shift, intracranial hemorrhage, or hydrocephalus is identified. No extra-axial fluid collection is identified.   The calvarium and overlying soft tissues are unremarkable. The paranasal sinuses and bilateral mastoid air cells are adequately aerated. Bilateral lens prostheses are noted.     Impression: 1.No acute intracranial abnormality is identified. 2.Findings compatible with chronic microvascular ischemic change and diffuse cortical atrophy. 3.Mild encephalomalacia within the right centrum semiovale. Electronically Signed: Ramon Paredes MD  10/9/2023 7:06 PM EDT  Workstation ID: [id number]     Results for orders placed in visit on 02/08/21    Adult Transthoracic Echo Complete W/ Cont if Necessary Per Protocol    Interpretation Summary  ú Left ventricular wall thickness is consistent with mild concentric hypertrophy.  ú Estimated left ventricular EF = 64% Left ventricular ejection fraction appears to  be 61 - 65%. Left ventricular systolic function is normal.  ú Left ventricular diastolic function is consistent with (grade I) impaired relaxation.  ú The right ventricular cavity is mildly dilated.  ú The right atrial cavity is mildly dilated.  ú There is severe calcification of the aortic valve mainly affecting the non-coronary, left coronary and right coronary cusp(s).  ú Estimated right ventricular systolic pressure from tricuspid regurgitation is mildly elevated (35-45 mmHg).  ú Mild pulmonary hypertension is present.      Current medications:  Scheduled Meds:allopurinol, 300 mg, Oral, Nightly  apixaban, 2.5 mg, Oral, BID  aspirin, 81 mg, Oral, Daily  bisoprolol, 5 mg, Oral, Daily  cefTRIAXone, 1,000 mg, Intravenous, Q24H  finasteride, 5 mg, Oral, Daily  insulin lispro, 2-7 Units, Subcutaneous, 4x Daily AC & at Bedtime  lactobacillus acidophilus, 1 capsule, Oral, Daily  levothyroxine, 50 mcg, Oral, Q AM  sodium chloride, 10 mL, Intravenous, Q12H  tamsulosin, 0.4 mg, Oral, Daily      Continuous Infusions:   PRN Meds:.  acetaminophen    dextrose    dextrose    glucagon (human recombinant)    influenza vaccine    ipratropium-albuterol    melatonin    sodium chloride    sodium chloride    Assessment & Plan   Assessment & Plan     Active Hospital Problems    Diagnosis  POA    **Acute UTI (urinary tract infection) [N39.0]  Yes    UTI (urinary tract infection) [N39.0]  Yes    Weakness [R53.1]  Yes    Lactic acidosis [E87.20]  Yes    Leukocytosis [D72.829]  Yes    Hypercalcemia [E83.52]  Yes    Elevated serum creatinine [R79.89]  Yes    CAD (coronary artery disease) [I25.10]  Yes    Diastolic congestive heart failure [I50.30]  Yes    Pulmonary hypertension [I27.20]  Yes    Cardiac pacemaker in situ [Z95.0]  Yes    Type 2 diabetes mellitus with hyperglycemia, with long-term current use of insulin [E11.65, Z79.4]  Not Applicable    Paroxysmal atrial fibrillation [I48.0]  Yes    History of pulmonary embolism [Z86.711]   Yes    IPF (idiopathic pulmonary fibrosis) [J84.112]  Yes    CKD (chronic kidney disease) stage 3, GFR 30-59 ml/min [N18.30]  Yes    Acquired hypothyroidism [E03.9]  Yes    HLD (hyperlipidemia) [E78.5]  Yes    Essential hypertension [I10]  Yes    Generalized osteoarthritis [M15.9]  Yes      Resolved Hospital Problems   No resolved problems to display.        Brief Hospital Course to date:  Forrest Zepeda is a 89 y.o. male w/ a hx of HTN, HLD, HFpEF, CAD, PAF (Eliquis), s/p pacemaker, pulmonary HTN, idiopathic pulmonary fibrosis, prior PE, CKD Stage III, hypothyroidism, hx of urethral strictures (follows w/ Dr. Pitts) who presented to the ED w/ c/o generalized weakness.     UTI   Lactic acidosis   Leukocytosis   -hx of recurrent UTIs, urethral strictures   -UA c/w UTI; urine and blood culture in process  -previous urine cx + Providencia rettgeri (5/27/23); susceptible to Rocephin   -lactic acid 2.4  -procal WNL  -leukocytosis trending down  -s/p IV Rocephin given in ED; continue pending urine cx result  -will add probiotic for loose stools while getting abx     Elevated creatinine   CKD Stage III  -baseline CR ~1.2-1.4  -trending down with fluids     Generalized weakness  -pt, ot and case mgt consult   -CT head unremarkable   -fall precautions   -s/p light IVF overnight      Hypercalcemia   -ionized calcium slightly elevated  -trending down w fluids     Elevated troponin   CAD  PAF (Eliquis)  HTN, HLD   CHF  S/p pacemaker   Remote hx of PE  -troponin 21; repeat pending (denies CP)  -EKG stable   -ECHO 2/21 w/ EF 64%, grade I LVDF, RVSP 35-45mmHg  -continue routine ASA and Eliquis  -continue routine zebeta w/ hold parameters  -daily wts     Pulmonary fibrosis   -at stable baseline resp status     T2DM  -hem A1c pending   -hold Amaryl, Toujeo, Januvia  -FSBG q ac/hs w/ LDSS     Hypothyroidism   -continue synthroid         Expected Discharge Location and Transportation: home  Expected Discharge   Expected Discharge  Date: 10/11/2023; Expected Discharge Time:      DVT prophylaxis:  Medical DVT prophylaxis orders are present.     AM-PAC 6 Clicks Score (PT): 18 (10/09/23 2218)    CODE STATUS:   Code Status and Medical Interventions:   Ordered at: 10/09/23 2133     Medical Intervention Limits:    NO intubation (DNI)     Level Of Support Discussed With:    Patient     Code Status (Patient has no pulse and is not breathing):    No CPR (Do Not Attempt to Resuscitate)     Medical Interventions (Patient has pulse or is breathing):    Limited Support     Comments:    DNR/DNI       Elizabeth Watts MD  10/10/23

## 2023-10-10 NOTE — PLAN OF CARE
AOx4, VSS, RA, pleasant, slept once arriving to the floor, no c/o of pain, scds in place      Problem: Skin Injury Risk Increased  Goal: Skin Health and Integrity  Outcome: Ongoing, Progressing  Intervention: Optimize Skin Protection  Recent Flowsheet Documentation  Taken 10/10/2023 0407 by Mayra South RN  Pressure Reduction Techniques: weight shift assistance provided  Head of Bed (HOB) Positioning: HOB at 20-30 degrees  Pressure Reduction Devices: pressure-redistributing mattress utilized  Skin Protection: adhesive use limited  Taken 10/10/2023 0221 by Mayra South RN  Pressure Reduction Techniques: weight shift assistance provided  Head of Bed (HOB) Positioning: HOB at 20-30 degrees  Pressure Reduction Devices: pressure-redistributing mattress utilized  Skin Protection: adhesive use limited  Taken 10/10/2023 0000 by Mayra South RN  Pressure Reduction Techniques: weight shift assistance provided  Head of Bed (HOB) Positioning: HOB at 20-30 degrees  Pressure Reduction Devices: pressure-redistributing mattress utilized  Skin Protection: adhesive use limited  Taken 10/9/2023 2218 by Mayra South RN  Pressure Reduction Techniques: weight shift assistance provided  Head of Bed (HOB) Positioning: HOB at 20-30 degrees  Pressure Reduction Devices: pressure-redistributing mattress utilized  Skin Protection:   adhesive use limited   incontinence pads utilized     Problem: Skin Injury Risk Increased  Goal: Skin Health and Integrity  Intervention: Optimize Skin Protection  Recent Flowsheet Documentation  Taken 10/10/2023 0407 by Mayra South RN  Pressure Reduction Techniques: weight shift assistance provided  Head of Bed (HOB) Positioning: HOB at 20-30 degrees  Pressure Reduction Devices: pressure-redistributing mattress utilized  Skin Protection: adhesive use limited  Taken 10/10/2023 0221 by Mayra South RN  Pressure Reduction Techniques: weight shift assistance provided  Head of Bed (HOB) Positioning:  HOB at 20-30 degrees  Pressure Reduction Devices: pressure-redistributing mattress utilized  Skin Protection: adhesive use limited  Taken 10/10/2023 0000 by Mayra South RN  Pressure Reduction Techniques: weight shift assistance provided  Head of Bed (HOB) Positioning: HOB at 20-30 degrees  Pressure Reduction Devices: pressure-redistributing mattress utilized  Skin Protection: adhesive use limited  Taken 10/9/2023 2218 by Mayra South RN  Pressure Reduction Techniques: weight shift assistance provided  Head of Bed ([facility]) Positioning: [facility] at 20-30 degrees  Pressure Reduction Devices: pressure-redistributing mattress utilized  Skin Protection:   adhesive use limited   incontinence pads utilized   Goal Outcome Evaluation:

## 2023-10-10 NOTE — H&P
[facility]  HISTORY AND PHYSICAL    Patient Name: Forrest Zepeda  : 1932  MRN: [id number]  Primary Care Physician: Kary Wen MD  Date of admission: 10/9/2023    Subjective   Subjective     Chief Complaint:  Generalized weakness     HPI:  Forrest Zepeda is a 89 y.o. male w/ a hx of HTN, HLD, HFpEF, CAD, PAF (Eliquis), s/p pacemaker, pulmonary HTN, idiopathic pulmonary fibrosis, prior PE, CKD Stage III, hypothyroidism, hx of urethral strictures (follows w/ Dr. Pitts) who presented to the ED w/ c/o generalized weakness.  Pt c/o feeling poorly since waking this morning. He describes that when he woke he was too weak to stand up. Yesterday he felt at baseline and drove to [place] and out to eat w/ family. Pt reports that today he has had two episodes of diarrhea but denies abdominal pain. He c/o generalized weakness but has no specific complaints.   Pt denies fever/chills, chest pain, worsening dyspnea, cough, N/V, dysuria, edema, syncope, confusion.   Pt evaluated in the ED. Urinalysis c/w UTI. WBC and lactic acid both elevated. Pt admitted to the hospital medicine service for further evaluation.     Review of Systems   Constitutional:  Positive for fatigue. Negative for chills, diaphoresis, fever and unexpected weight change.        Generalized weakness.    HENT: Negative.  Negative for congestion, postnasal drip, rhinorrhea, sinus pressure, sinus pain, sneezing, sore throat and trouble swallowing.    Eyes: Negative.  Negative for visual disturbance.   Respiratory: Negative.  Negative for cough, shortness of breath and wheezing.    Cardiovascular: Negative.  Negative for chest pain, palpitations and leg swelling.   Gastrointestinal:  Positive for diarrhea. Negative for abdominal distention, abdominal pain, blood in stool, constipation, nausea and vomiting.   Endocrine: Negative.    Genitourinary: Negative.  Negative for decreased urine volume, difficulty  "urinating, dysuria, frequency, hematuria and urgency.   Musculoskeletal: Negative.  Negative for arthralgias, myalgias, neck pain and neck stiffness.   Skin: Negative.  Negative for wound.   Allergic/Immunologic: Negative.  Negative for immunocompromised state.   Neurological: Negative.  Negative for dizziness, tremors, seizures, syncope, facial asymmetry, speech difficulty, weakness, light-headedness, numbness and headaches.   Hematological: Negative.  Does not bruise/bleed easily.   Psychiatric/Behavioral: Negative.  Negative for confusion.    All other systems reviewed and are negative.     Personal History     Past Medical History:   Diagnosis Date    Abnormal EKG     Abnormal PSA     Reported abnormal    Acute cystitis with hematuria 05/01/2023    Acute deep vein thrombosis (DVT) of right lower extremity 08/22/2019    Acute diverticulitis 2019    Acute hyperkalemia 08/22/2019    Acute respiratory failure with hypoxia     Acute saddle pulmonary embolism with acute cor pulmonale 08/22/2019    Acute systolic right heart failure 08/22/2019    Arthritis     KNEES,  BUT SINCE HAD REPLACEMENT    Benign localized hyperplasia of prostate     Bilateral knee pain     C. difficile diarrhea 2010    Cataracts, bilateral     CKD (chronic kidney disease)     Coronary artery disease     Diabetic neuropathy     Diastolic dysfunction     Disease of thyroid gland     HYPOTHYROIDISM    Diverticulitis     Dyslipidemia     H/O    Family history of malignant hyperthermia     Brother     Full dentures     Generalized weakness     History of ESBL E. coli infection     most recent 4-2022 f/u with ID Dr Johnson    History of gout     History of hepatitis A vaccination     History of nephrolithiasis     History of nuclear stress test     STATES IN THE 1990'S.  \"IT WAS OK\"    History of osteopenia     History of recurrent UTI (urinary tract infection)     Hydronephrosis of left kidney 07/18/2019    Hydronephrosis with renal and ureteral " calculus obstruction 10/21/2019    Added automatically from request for surgery [id number]    Hydronephrosis with ureteral stricture     Hypercholesterolemia     Hyperkalemia     PT UNSURE REGARDING HX OF THIS    Hyperlipidemia     Hypertension     Hypothyroidism     Impaired functional mobility, balance, gait, and endurance     Insomnia     IPF (idiopathic pulmonary fibrosis)     per patient and son, dx at [facility], was told no treatment necessary, not to worry about it    On supplemental oxygen therapy     2L NC QHS    Other hydronephrosis 08/12/2019    Added automatically from request for surgery [id number]    Paroxysmal atrial fibrillation     Pulmonary hypertension, mild 02/2021    per echo per cardiology note 1/9/23, per pt's son, PCP does not agree with this dx    Renal insufficiency     Sepsis 2019    Shortness of breath     STATES WITH EXERTION, OTHERWISE, DENIES    Sigmoid diverticulitis without abscess or perforation 08/09/2017    Sinus node dysfunction     Skin breakdown     fourth toe right foot noted in 2019 MD D/C note    Skin ulcer of fourth toe of right foot, limited to breakdown of skin 07/05/2019    Stricture of ureter     Type 2 diabetes mellitus     Ureteral stricture, left     UTI (urinary tract infection) 07/18/2019    Vitamin D deficiency     Wears glasses      Past Surgical History:   Procedure Laterality Date    APPENDECTOMY      CARDIAC ELECTROPHYSIOLOGY PROCEDURE N/A 12/23/2020    Procedure: Pacemaker DC new. DNS meds.;  Surgeon: Jimy Ledezma DO;  Location:  [facility];  Service: Cardiology;  Laterality: N/A;    CHOLECYSTECTOMY      CIRCUMCISION N/A 10/23/2019    Procedure: CIRCUMCISIOn-DORSAL SLIT;  Surgeon: Griffin Romero MD;  Location:  [facility];  Service: Urology    COLONOSCOPY      CYSTOSCOPY RETROGRADE PYELOGRAM Left 06/17/2022    Procedure: CYSTOSCOPY RETROGRADE PYELOGRAM;  Surgeon: Ryne Mccann MD;  Location:  [facility];  Service: Urology;  Laterality: Left;     CYSTOSCOPY URETEROSCOPY Left 02/11/2019    Procedure: URETEROSCOPY WITH STENT EXTRACTION, RPG;  Surgeon: Griffin Romero MD;  Location:  [facility];  Service: Urology    CYSTOSCOPY W/ URETERAL STENT PLACEMENT Left 07/20/2019    Procedure: CYSTOSCOPY, LEFT RETROGRADE PYELOGRAM,  ATTEMPTED LEFT URETERAL STENT INSERTION;  Surgeon: Isaac Flynn MD;  Location:  [facility];  Service: Urology    CYSTOSCOPY W/ URETERAL STENT PLACEMENT Left 06/17/2022    Procedure: CYSTOSCOPY URETERAL CATHETER/STENT INSERTION;  Surgeon: Ryne Mccann MD;  Location:  [facility];  Service: Urology;  Laterality: Left;    CYSTOSCOPY W/ URETERAL STENT PLACEMENT Left 01/27/2023    Procedure: CYSTOSCOPY URETERAL CATHETER/STENT INSERTION;  Surgeon: Deanne Pitts MD;  Location:  [facility];  Service: Urology;  Laterality: Left;    CYSTOSCOPY, URETEROSCOPY, RETROGRADE PYELOGRAM, STONE EXTRACTION, STENT INSERTION Left 06/15/2023    Procedure: URETEROSCOPY, RETROGRADE PYELOGRAM, STENT INSERTION;  Surgeon: Deanne Pitts MD;  Location:  [facility];  Service: Urology;  Laterality: Left;    EYE SURGERY      CATARACTS REMOVED BILATERALLY    JOINT REPLACEMENT      Bilateral knees    KNEE ARTHROPLASTY Bilateral     KNEE ARTHROSCOPY Bilateral     RENAL ARTERY STENT      SEVERAL TIMES EVERY SINCE 1978    URETERAL STENT INSERTION  10/2020    URETEROSCOPY LASER LITHOTRIPSY WITH STENT INSERTION Left 01/10/2018    Procedure:  cysto, left ureteral stent replacement ;  Surgeon: Griffin Romero MD;  Location:  [facility];  Service:     URETEROSCOPY LASER LITHOTRIPSY WITH STENT INSERTION Left 08/14/2019    Procedure: URETEROSCOPY, STONE REMOVAL WITH STENT INSERTION;  Surgeon: Griffin Romero MD;  Location:  [facility];  Service: Urology    URETEROSCOPY LASER LITHOTRIPSY WITH STENT INSERTION Left 10/23/2019    Procedure: Left URETEROSCOPY WITH STENT INSERTION-LEFT;  Surgeon: Griffin Romero MD;  Location:  [facility];  Service: Urology     Family History: family  history includes Brain cancer in his brother and sister; Heart attack in his sister; Hypertension in his sister; Lung cancer in his brother and sister; Malig Hyperthermia in his brother; Stroke in his sister.     Social History:  reports that he has never smoked. He has been exposed to tobacco smoke. He has never used smokeless tobacco. He reports that he does not drink alcohol and does not use drugs.  Social History     Social History Narrative    Pt lives alone    Granddaughter Erika is nurse.     Had 7 brothers and 3 sisters.      Medications:  Diclofenac Sodium, HYDROcodone-acetaminophen, Insulin Glargine (1 Unit Dial), Mirabegron ER, Multiple Vitamins-Minerals, Probiotic Daily, SITagliptin, acetaminophen, allopurinol, apixaban, aspirin, bisoprolol, finasteride, glimepiride, levothyroxine, nitroglycerin, and silodosin    Allergies   Allergen Reactions    Metformin Diarrhea and GI Intolerance    Statins Diarrhea and Myalgia     Objective   Objective     Vital Signs:   Temp:  [98.1 øF (36.7 øC)-98.2 øF (36.8 øC)] 98.2 øF (36.8 øC)  Heart Rate:  [71-75] 71  Resp:  [18] 18  BP: (106-154)/(56-77) 125/63    Physical Exam     Constitutional: Awake, alert; non-toxic appearing   Eyes: PERRLA, sclerae anicteric, no conjunctival injection  HENT: NCAT, mucous membranes moist  Neck: Supple, no thyromegaly, no lymphadenopathy, trachea midline  Respiratory: slightly diminished lower lobes; no rhonchi or wheeze; increased respiratory effort w/ ambulation to restroom   Cardiovascular: RRR, no murmurs, rubs, or gallops, no peripheral edema   Gastrointestinal: Positive bowel sounds, soft, nontender, nondistended  Musculoskeletal: Normal ROM bilaterally   Psychiatric: Appropriate affect, cooperative  Neurologic: Oriented x 3, strength symmetric in all extremities, Cranial Nerves grossly intact to confrontation, speech clear  Skin: No rashes, lesions or wounds     Result Review:  I have personally reviewed the results from the time  of this admission to 10/10/2023 04:33 EDT and agree with these findings:  [x]  Laboratory list / accordion  []  Microbiology  [x]  Radiology  [x]  EKG/Telemetry   []  Cardiology/Vascular   []  Pathology  [x]  Old records    LAB RESULTS:      Lab 10/09/23  1918   WBC 15.60*   HEMOGLOBIN 13.8   HEMATOCRIT 44.9   PLATELETS 235   NEUTROS ABS 13.22*   IMMATURE GRANS (ABS) 0.07*   LYMPHS ABS 1.40   MONOS ABS 0.69   EOS ABS 0.14   MCV 97.6*   PROCALCITONIN 0.15   LACTATE 2.4*         Lab 10/09/23  2307 10/09/23  1918   SODIUM  --  140   POTASSIUM  --  4.4   CHLORIDE  --  104   CO2  --  25.0   ANION GAP  --  11.0   BUN  --  39*   CREATININE  --  1.78*   EGFR  --  35.6*   GLUCOSE  --  155*   CALCIUM  --  9.9*   IONIZED CALCIUM 1.35*  --    MAGNESIUM  --  2.0   HEMOGLOBIN A1C 7.30*  --    TSH  --  1.230         Lab 10/09/23  1918   TOTAL PROTEIN 8.4   ALBUMIN 4.1   GLOBULIN 4.3   ALT (SGPT) 9   AST (SGOT) 18   BILIRUBIN 0.4   ALK PHOS 111         Lab 10/10/23  0133 10/09/23  2307 10/09/23  1918   HSTROP T 22* 24* 21*                 Brief Urine Lab Results  (Last result in the past 365 days)        Color   Clarity   Blood   Leuk Est   Nitrite   Protein   CREAT   Urine HCG        10/09/23 1925 Orange   Turbid   Large (3+)   Large (3+)   Negative   100 mg/dL (2+)                 Microbiology Results (last 10 days)       Procedure Component Value - Date/Time    Respiratory Panel PCR w/COVID-19(SARS-CoV-2) [facility], NP Swab in UTM/VTM, 3-4 HR TAT - Swab, Nasopharynx [[id number]]  (Normal) Collected: 10/09/23 1926    Lab Status: Final result Specimen: Swab from Nasopharynx Updated: 10/09/23 2022     ADENOVIRUS, PCR Not Detected     Coronavirus 229E Not Detected     Coronavirus HKU1 Not Detected     Coronavirus NL63 Not Detected     Coronavirus OC43 Not Detected     COVID19 Not Detected     Human Metapneumovirus Not Detected     Human Rhinovirus/Enterovirus Not Detected     Influenza A PCR Not Detected      Influenza B PCR Not Detected     Parainfluenza Virus 1 Not Detected     Parainfluenza Virus 2 Not Detected     Parainfluenza Virus 3 Not Detected     Parainfluenza Virus 4 Not Detected     RSV, PCR Not Detected     Bordetella pertussis pcr Not Detected     Bordetella parapertussis PCR Not Detected     Chlamydophila pneumoniae PCR Not Detected     Mycoplasma pneumo by PCR Not Detected    Narrative:      In the setting of a positive respiratory panel with a viral infection PLUS a negative procalcitonin without other underlying concern for bacterial infection, consider observing off antibiotics or discontinuation of antibiotics and continue supportive care. If the respiratory panel is positive for atypical bacterial infection (Bordetella pertussis, Chlamydophila pneumoniae, or Mycoplasma pneumoniae), consider antibiotic de-escalation to target atypical bacterial infection.          XR Chest 1 View    Result Date: 10/9/2023  XR CHEST 1 VW Date of Exam: 10/9/2023 7:10 PM EDT Indication: Weak/Dizzy/AMS triage protocol Comparison: 12/31/2020 Findings: Left subclavian transvenous pacemaker is unchanged. Heart size and pulmonary vessels are within normal limits. Right-sided PICC line has been removed. There is minimal linear atelectasis within the left lung base. There are coarsened interstitial markings in both lung bases unchanged from prior exam suggesting changes of chronic lung disease. No pleural effusion. No evidence pneumothorax.     Impression: Impression: 1. Minimal linear atelectasis in the left lung base. No acute cardiopulmonary disease identified. Electronically Signed: Rolly Callahan MD  10/9/2023 8:19 PM EDT  Workstation ID: [id number]    CT Head Without Contrast    Result Date: 10/9/2023  CT HEAD WO CONTRAST Date of Exam: 10/9/2023 6:52 PM EDT Indication: gen weakness. Comparison: Noncontrast head CT dated 6/30/2020 Technique: Axial CT images were obtained of the head without contrast administration.   Automated exposure control and iterative construction methods were used. FINDINGS:  Foci of periventricular and subcortical white matter hypoattenuation are consistent with chronic, microvascular ischemic change. There is age-related loss of brain parenchymal volume with prominence of sulcation and ventriculomegaly. There is mild encephalomalacia within the right centrum semiovale. No significant mass effect, midline shift, intracranial hemorrhage, or hydrocephalus is identified. No extra-axial fluid collection is identified.   The calvarium and overlying soft tissues are unremarkable. The paranasal sinuses and bilateral mastoid air cells are adequately aerated. Bilateral lens prostheses are noted.     Impression: 1.No acute intracranial abnormality is identified. 2.Findings compatible with chronic microvascular ischemic change and diffuse cortical atrophy. 3.Mild encephalomalacia within the right centrum semiovale. Electronically Signed: Ramon Paredes MD  10/9/2023 7:06 PM EDT  Workstation ID: [id number]     Results for orders placed in visit on 02/08/21    Adult Transthoracic Echo Complete W/ Cont if Necessary Per Protocol    Interpretation Summary  ú Left ventricular wall thickness is consistent with mild concentric hypertrophy.  ú Estimated left ventricular EF = 64% Left ventricular ejection fraction appears to be 61 - 65%. Left ventricular systolic function is normal.  ú Left ventricular diastolic function is consistent with (grade I) impaired relaxation.  ú The right ventricular cavity is mildly dilated.  ú The right atrial cavity is mildly dilated.  ú There is severe calcification of the aortic valve mainly affecting the non-coronary, left coronary and right coronary cusp(s).  ú Estimated right ventricular systolic pressure from tricuspid regurgitation is mildly elevated (35-45 mmHg).  ú Mild pulmonary hypertension is present.    Assessment & Plan   Assessment & Plan       Acute UTI (urinary tract  infection)    Essential hypertension    Generalized osteoarthritis    HLD (hyperlipidemia)    Acquired hypothyroidism    CKD (chronic kidney disease) stage 3, GFR 30-59 ml/min    IPF (idiopathic pulmonary fibrosis)    History of pulmonary embolism    Paroxysmal atrial fibrillation    Type 2 diabetes mellitus with hyperglycemia, with long-term current use of insulin    Pulmonary hypertension    Cardiac pacemaker in situ    CAD (coronary artery disease)    Diastolic congestive heart failure    Weakness    Lactic acidosis    Leukocytosis    Hypercalcemia    Elevated serum creatinine    Forrest Zepeda is a 89 y.o. male w/ a hx of HTN, HLD, HFpEF, CAD, PAF (Eliquis), s/p pacemaker, pulmonary HTN, idiopathic pulmonary fibrosis, prior PE, CKD Stage III, hypothyroidism, hx of urethral strictures (follows w/ Dr. Pitts) who presented to the ED w/ c/o generalized weakness.    **UTI   **Lactic acidosis   **Leukocytosis   -hx of recurrent UTIs, urethral strictures   -UA c/w UTI; urine culture pending   -previous urine cx + Providencia rettgeri (5/27/23); susceptible to Rocephin   -lactic acid 2.4  -procal WNL  -WBC 15.60  -blood cx pending   -s/p IV Rocephin given in ED; continue pending urine cx result  -NS @ 75ml/hour x 10 hours  -symptom mgt  -continue routine Proscar, Flomax   -pt c/o 2 episodes of diarrhea this evening (no abd pain), consider GI panel if diarrhea persist; continue probiotic     **Elevated creatinine   **CKD Stage III  -baseline CR ~1.2-1.4  -currently 1.78 w/ eGFR 35.6  -UA c/w UTI   -light IVF overnight   -repeat labs in am     **Generalized weakness  -pt, ot and case mgt consult   -CT head unremarkable   -fall precautions   -light IVF overnight     **Hypercalcemia   -ionized calcium pending   -calcium 9.9 on CMP (previously 9.8 and 10.0)  -NS @ 75ml/hour x 10 hours  -repeat labs in am     **Elevated troponin   **CAD  **PAF (Eliquis)  **HTN, HLD   **CHF  **S/p pacemaker   **Remote hx of PE  -troponin 21;  repeat pending (denies CP)  -EKG stable   -ECHO 2/21 w/ EF 64%, grade I LVDF, RVSP 35-45mmHg  -continue routine ASA and Eliquis  -continue routine zebeta w/ hold parameters  -daily wts    **Pulmonary fibrosis   -pt w/ 2 liters of supplemental oxygen as needed (family reports he does not wear routinely at home)  -pt currently on 2 liters in ED  -denies worsening dyspnea  -CXR without acute findings   -prn nebs     **T2DM  -hem A1c pending   -hold Amaryl, Toujeo, Randolphuvia  -FSBG q ac/hs w/ LDSS    **Hypothyroidism   -continue synthroid     DVT prophylaxis:  Eliquis     CODE STATUS:    Medical Intervention Limits: NO intubation (DNI)  Level Of Support Discussed With: Patient  Code Status (Patient has no pulse and is not breathing): No CPR (Do Not Attempt to Resuscitate)  Medical Interventions (Patient has pulse or is breathing): Limited Support  Comments: DNR/DNI    Expected Discharge(click hyperlink to enter date then refresh the note)  Expected Discharge Date: 10/12/2023; Expected Discharge Time:     This note has been completed as part of a split-shared workflow.     Signature: Electronically signed by JOYCE Davidson, 10/09/23, 9:56 PM EDT.    Time spent: 55 minutes         Attending   Admission Attestation       I have performed an independent face-to-face diagnostic evaluation including performing an independent physical examination.  The documented plan of care above was reviewed and developed with the advanced practice clinician (APC).  I have updated the HPI as appropriate.    Brief HPI    89-year-old male with history of urethral strictures presenting with generalized weakness and urinary frequency.  UA suggestive of acute infection.    Attending Physical Exam:  Temp:  [98.1 øF (36.7 øC)-98.2 øF (36.8 øC)] 98.2 øF (36.8 øC)  Heart Rate:  [71-75] 71  Resp:  [18] 18  BP: (106-154)/(56-77) 125/63    Awake alert and oriented  Resting comfortably in bed  Moist mucous membranes  Lungs clear to auscultation  bilaterally  Heart regular rate and rhythm  No abdominal tenderness  No lower extremity edema  Cap refill less than 2 seconds    Assessment and Plan:    Acute urinary tract infection-continue ceftriaxone for now based on prior susceptibilities.  Pending blood and urine cultures.  Continue hydration overnight.  History of strictures and elevation in baseline serum creatinine, will obtain bilateral renal ultrasound with postvoid residual.    See assessment and plan documented by APC above and updated/edited by me as appropriate.    Jose Antonio Giles MD  10/10/23

## 2023-10-10 NOTE — ED PROVIDER NOTES
"Subjective   History of Present Illness  89-year-old male with extensive past medical history presents for evaluation of generalized weakness.  Of note, the patient lives at home alone independently.  This morning he began experiencing a sensation of generalized weakness and malaise that persisted throughout the day.  His weakness got to the point where he was unable to walk on his own so family called EMS and he was brought to our facility to be evaluated.  His family is concerned about a potential UTI as he has presented this way with UTIs before in the past.  No known fevers.  No known exposures to anyone with COVID-19.  The patient denies any pain whatsoever at this time but tells me that he feels \"weak all over\" and \"has no energy.\"      Review of Systems   Constitutional:  Positive for fatigue.   Neurological:  Positive for weakness.   All other systems reviewed and are negative.      Past Medical History:   Diagnosis Date    Abnormal EKG     Abnormal PSA     Reported abnormal    Acute cystitis with hematuria 05/01/2023    Acute deep vein thrombosis (DVT) of right lower extremity 08/22/2019    Acute diverticulitis 2019    Acute hyperkalemia 08/22/2019    Acute respiratory failure with hypoxia     Acute saddle pulmonary embolism with acute cor pulmonale 08/22/2019    Acute systolic right heart failure 08/22/2019    Arthritis     KNEES,  BUT SINCE HAD REPLACEMENT    Benign localized hyperplasia of prostate     Bilateral knee pain     C. difficile diarrhea 2010    Cataracts, bilateral     CKD (chronic kidney disease)     Coronary artery disease     Diabetic neuropathy     Diastolic dysfunction     Disease of thyroid gland     HYPOTHYROIDISM    Diverticulitis     Dyslipidemia     H/O    Family history of malignant hyperthermia     Brother     Full dentures     Generalized weakness     History of ESBL E. coli infection     most recent 4-2022 f/u with ID Dr Johnson    History of gout     History of hepatitis A " "vaccination     History of nephrolithiasis     History of nuclear stress test     STATES IN THE 1990'S.  \"IT WAS OK\"    History of osteopenia     History of recurrent UTI (urinary tract infection)     Hydronephrosis of left kidney 07/18/2019    Hydronephrosis with renal and ureteral calculus obstruction 10/21/2019    Added automatically from request for surgery [id number]    Hydronephrosis with ureteral stricture     Hypercholesterolemia     Hyperkalemia     PT UNSURE REGARDING HX OF THIS    Hyperlipidemia     Hypertension     Hypothyroidism     Impaired functional mobility, balance, gait, and endurance     Insomnia     IPF (idiopathic pulmonary fibrosis)     per patient and son, dx at [facility], was told no treatment necessary, not to worry about it    On supplemental oxygen therapy     2L NC QHS    Other hydronephrosis 08/12/2019    Added automatically from request for surgery [id number]    Paroxysmal atrial fibrillation     Pulmonary hypertension, mild 02/2021    per echo per cardiology note 1/9/23, per pt's son, PCP does not agree with this dx    Renal insufficiency     Sepsis 2019    Shortness of breath     STATES WITH EXERTION, OTHERWISE, DENIES    Sigmoid diverticulitis without abscess or perforation 08/09/2017    Sinus node dysfunction     Skin breakdown     fourth toe right foot noted in 2019 MD D/C note    Skin ulcer of fourth toe of right foot, limited to breakdown of skin 07/05/2019    Stricture of ureter     Type 2 diabetes mellitus     Ureteral stricture, left     UTI (urinary tract infection) 07/18/2019    Vitamin D deficiency     Wears glasses        Allergies   Allergen Reactions    Metformin Diarrhea and GI Intolerance    Statins Diarrhea and Myalgia       Past Surgical History:   Procedure Laterality Date    APPENDECTOMY      CARDIAC ELECTROPHYSIOLOGY PROCEDURE N/A 12/23/2020    Procedure: Pacemaker DC new. DNS meds.;  Surgeon: Jimy Ledezma DO;  Location: [facility] INVASIVE LOCATION;  Service: Cardiology; "  Laterality: N/A;    CHOLECYSTECTOMY      CIRCUMCISION N/A 10/23/2019    Procedure: CIRCUMCISIOn-DORSAL SLIT;  Surgeon: Griffin Romero MD;  Location:  [facility];  Service: Urology    COLONOSCOPY      CYSTOSCOPY RETROGRADE PYELOGRAM Left 06/17/2022    Procedure: CYSTOSCOPY RETROGRADE PYELOGRAM;  Surgeon: Ryne Mccann MD;  Location:  [facility];  Service: Urology;  Laterality: Left;    CYSTOSCOPY URETEROSCOPY Left 02/11/2019    Procedure: URETEROSCOPY WITH STENT EXTRACTION, RPG;  Surgeon: Griffin Romero MD;  Location:  [facility];  Service: Urology    CYSTOSCOPY W/ URETERAL STENT PLACEMENT Left 07/20/2019    Procedure: CYSTOSCOPY, LEFT RETROGRADE PYELOGRAM,  ATTEMPTED LEFT URETERAL STENT INSERTION;  Surgeon: Isaac Flynn MD;  Location:  [facility];  Service: Urology    CYSTOSCOPY W/ URETERAL STENT PLACEMENT Left 06/17/2022    Procedure: CYSTOSCOPY URETERAL CATHETER/STENT INSERTION;  Surgeon: Ryne Mccann MD;  Location:  [facility];  Service: Urology;  Laterality: Left;    CYSTOSCOPY W/ URETERAL STENT PLACEMENT Left 01/27/2023    Procedure: CYSTOSCOPY URETERAL CATHETER/STENT INSERTION;  Surgeon: Deanne Pitts MD;  Location:  [facility];  Service: Urology;  Laterality: Left;    CYSTOSCOPY, URETEROSCOPY, RETROGRADE PYELOGRAM, STONE EXTRACTION, STENT INSERTION Left 06/15/2023    Procedure: URETEROSCOPY, RETROGRADE PYELOGRAM, STENT INSERTION;  Surgeon: Deanne Pitts MD;  Location:  [facility];  Service: Urology;  Laterality: Left;    EYE SURGERY      CATARACTS REMOVED BILATERALLY    JOINT REPLACEMENT      Bilateral knees    KNEE ARTHROPLASTY Bilateral     KNEE ARTHROSCOPY Bilateral     RENAL ARTERY STENT      SEVERAL TIMES EVERY SINCE 1978    URETERAL STENT INSERTION  10/2020    URETEROSCOPY LASER LITHOTRIPSY WITH STENT INSERTION Left 01/10/2018    Procedure:  cysto, left ureteral stent replacement ;  Surgeon: Griffin Romero MD;  Location:  [facility];  Service:     URETEROSCOPY LASER LITHOTRIPSY WITH STENT  INSERTION Left 08/14/2019    Procedure: URETEROSCOPY, STONE REMOVAL WITH STENT INSERTION;  Surgeon: Griffin Romero MD;  Location: [facility];  Service: Urology    URETEROSCOPY LASER LITHOTRIPSY WITH STENT INSERTION Left 10/23/2019    Procedure: Left URETEROSCOPY WITH STENT INSERTION-LEFT;  Surgeon: Griffin Romero MD;  Location: [facility];  Service: Urology       Family History   Problem Relation Age of Onset    Heart attack Sister     Brain cancer Sister     Stroke Sister     Lung cancer Sister     Hypertension Sister     Brain cancer Brother     Lung cancer Brother     Malig Hyperthermia Brother        Social History     Socioeconomic History    Marital status:    Tobacco Use    Smoking status: Never     Passive exposure: Past    Smokeless tobacco: Never    Tobacco comments:     SMOKED SOME AS A TEEN, DENIES ANY HABIT OR ADDICTION   Vaping Use    Vaping Use: Never used   Substance and Sexual Activity    Alcohol use: No    Drug use: No    Sexual activity: Not Currently           Objective   Physical Exam  Vitals and nursing note reviewed.   Constitutional:       General: He is not in acute distress.     Appearance: He is well-developed. He is not diaphoretic.      Comments: Chronically ill-appearing elderly male   HENT:      Head: Normocephalic and atraumatic.   Neck:      Vascular: No JVD.      Comments: No meningeal signs or nuchal rigidity  Cardiovascular:      Rate and Rhythm: Normal rate and regular rhythm.      Heart sounds: Normal heart sounds. No murmur heard.     No friction rub. No gallop.   Pulmonary:      Effort: Pulmonary effort is normal. No respiratory distress.      Breath sounds: Normal breath sounds. No wheezing or rales.   Abdominal:      General: Bowel sounds are normal. There is no distension.      Palpations: Abdomen is soft. There is no mass.      Tenderness: There is no abdominal tenderness. There is no guarding.      Comments: No focal abdominal tenderness, no peritoneal signs,  no pain out of proportion to exam   Genitourinary:     Comments: No CVA tenderness noted  Musculoskeletal:         General: No signs of injury.      Cervical back: Normal range of motion.   Skin:     General: Skin is warm and dry.      Coloration: Skin is not pale.      Findings: No erythema or rash.   Neurological:      Mental Status: He is alert and oriented to person, place, and time.      Comments: Mentating well, appears globally weak   Psychiatric:         Mood and Affect: Mood normal.         Thought Content: Thought content normal.         Judgment: Judgment normal.         Procedures           ED Course  ED Course as of 10/09/23 2345   Mon Oct 09, 2023   2343 89-year-old male with extensive past medical history presents for evaluation of generalized weakness.  Of note, the patient lives at home alone independently.  This morning he began experiencing a sensation of generalized weakness and malaise that persisted throughout the day.  His weakness got to the point where he was unable to walk so he called family and was brought to our facility to be evaluated.  Family is concerned about a potential UTI as he has presented this way with UTIs before in the past.  On arrival to the ED, the patient is nontoxic-appearing.  He appears generally weak.  No meningeal signs or nuchal rigidity.  Nonsurgical abdomen.  Labs remarkable for white blood cell count of 15,000.  Lactate is mildly elevated.  Urinalysis is suggestive of infection.  Blood and urine cultures obtained.  Rocephin given. [DD]   2344 I personally and independently viewed the patient's x-ray images myself, and I am in agreement with the radiologist's reading for final interpretation--particularly, there is no pneumonia noted. [DD]   2345 I personally and independently reviewed the patient's CT images and findings, and I am in agreement with the radiologist regarding CT interpretation--particularly, there is no emergent intracranial process noted. [DD]    2345 Given the patient's overall clinical picture and inability to care for himself at home, I do not feel that he is able to be managed on an outpatient basis at this point.  As a result, I discussed the patient's case with our hospitalist, Dr. Giles, and the patient will be admitted under his care for further evaluation and treatment. [DD]   2345 The patient is hemodynamically stable at this time and family is aware/agreeable with the plan. [DD]      ED Course User Index  [DD] Pelon Mathews MD                                         Recent Results (from the past 24 hour(s))   Comprehensive Metabolic Panel    Collection Time: 10/09/23  7:18 PM    Specimen: Blood   Result Value Ref Range    Glucose 155 (H) 65 - 99 mg/dL    BUN 39 (H) 8 - 23 mg/dL    Creatinine 1.78 (H) 0.76 - 1.27 mg/dL    Sodium 140 136 - 145 mmol/L    Potassium 4.4 3.5 - 5.2 mmol/L    Chloride 104 98 - 107 mmol/L    CO2 25.0 22.0 - 29.0 mmol/L    Calcium 9.9 (H) 8.2 - 9.6 mg/dL    Total Protein 8.4 6.0 - 8.5 g/dL    Albumin 4.1 3.5 - 5.2 g/dL    ALT (SGPT) 9 1 - 41 U/L    AST (SGOT) 18 1 - 40 U/L    Alkaline Phosphatase 111 39 - 117 U/L    Total Bilirubin 0.4 0.0 - 1.2 mg/dL    Globulin 4.3 gm/dL    A/G Ratio 1.0 g/dL    BUN/Creatinine Ratio 21.9 7.0 - 25.0    Anion Gap 11.0 5.0 - 15.0 mmol/L    eGFR 35.6 (L) >60.0 mL/min/1.73   Single High Sensitivity Troponin T    Collection Time: 10/09/23  7:18 PM    Specimen: Blood   Result Value Ref Range    HS Troponin T 21 (H) <15 ng/L   Magnesium    Collection Time: 10/09/23  7:18 PM    Specimen: Blood   Result Value Ref Range    Magnesium 2.0 1.7 - 2.3 mg/dL   Green Top (Gel)    Collection Time: 10/09/23  7:18 PM   Result Value Ref Range    Extra Tube Hold for add-ons.    Lavender Top    Collection Time: 10/09/23  7:18 PM   Result Value Ref Range    Extra Tube hold for add-on    Gold Top - SST    Collection Time: 10/09/23  7:18 PM   Result Value Ref Range    Extra Tube Hold for add-ons.     Chambers Top    Collection Time: 10/09/23  7:18 PM   Result Value Ref Range    Extra Tube Hold for add-ons.    Light Blue Top    Collection Time: 10/09/23  7:18 PM   Result Value Ref Range    Extra Tube Hold for add-ons.    CBC Auto Differential    Collection Time: 10/09/23  7:18 PM    Specimen: Blood   Result Value Ref Range    WBC 15.60 (H) 3.40 - 10.80 10*3/mm3    RBC 4.60 4.14 - 5.80 10*6/mm3    Hemoglobin 13.8 13.0 - 17.7 g/dL    Hematocrit 44.9 37.5 - 51.0 %    MCV 97.6 (H) 79.0 - 97.0 fL    MCH 30.0 26.6 - 33.0 pg    MCHC 30.7 (L) 31.5 - 35.7 g/dL    RDW 16.7 (H) 12.3 - 15.4 %    RDW-SD 58.9 (H) 37.0 - 54.0 fl    MPV 10.2 6.0 - 12.0 fL    Platelets 235 140 - 450 10*3/mm3    Neutrophil % 84.8 (H) 42.7 - 76.0 %    Lymphocyte % 9.0 (L) 19.6 - 45.3 %    Monocyte % 4.4 (L) 5.0 - 12.0 %    Eosinophil % 0.9 0.3 - 6.2 %    Basophil % 0.5 0.0 - 1.5 %    Immature Grans % 0.4 0.0 - 0.5 %    Neutrophils, Absolute 13.22 (H) 1.70 - 7.00 10*3/mm3    Lymphocytes, Absolute 1.40 0.70 - 3.10 10*3/mm3    Monocytes, Absolute 0.69 0.10 - 0.90 10*3/mm3    Eosinophils, Absolute 0.14 0.00 - 0.40 10*3/mm3    Basophils, Absolute 0.08 0.00 - 0.20 10*3/mm3    Immature Grans, Absolute 0.07 (H) 0.00 - 0.05 10*3/mm3    nRBC 0.0 0.0 - 0.2 /100 WBC   TSH    Collection Time: 10/09/23  7:18 PM    Specimen: Blood   Result Value Ref Range    TSH 1.230 0.270 - 4.200 uIU/mL   T4, Free    Collection Time: 10/09/23  7:18 PM    Specimen: Blood   Result Value Ref Range    Free T4 1.00 0.93 - 1.70 ng/dL   Mononucleosis Screen    Collection Time: 10/09/23  7:18 PM    Specimen: Blood   Result Value Ref Range    Monospot Negative Negative   Lactic Acid, Plasma    Collection Time: 10/09/23  7:18 PM    Specimen: Blood   Result Value Ref Range    Lactate 2.4 (C) 0.5 - 2.0 mmol/L   Procalcitonin    Collection Time: 10/09/23  7:18 PM    Specimen: Blood   Result Value Ref Range    Procalcitonin 0.15 0.00 - 0.25 ng/mL   Urinalysis With Culture If Indicated - Urine,  Catheter    Collection Time: 10/09/23  7:25 PM    Specimen: Urine, Catheter   Result Value Ref Range    Color, UA Orange (A) Yellow, Straw    Appearance, UA Turbid (A) Clear    pH, UA 6.0 5.0 - 8.0    Specific Gravity, UA 1.014 1.001 - 1.030    Glucose,  mg/dL (Trace) (A) Negative    Ketones, UA Negative Negative    Bilirubin, UA Negative Negative    Blood, UA Large (3+) (A) Negative    Protein,  mg/dL (2+) (A) Negative    Leuk Esterase, UA Large (3+) (A) Negative    Nitrite, UA Negative Negative    Urobilinogen, UA 0.2 E.U./dL 0.2 - 1.0 E.U./dL   Urinalysis, Microscopic Only - Urine, Catheter    Collection Time: 10/09/23  7:25 PM    Specimen: Urine, Catheter   Result Value Ref Range    RBC, UA Too Numerous to Count (A) None Seen, 0-2 /HPF    WBC, UA Too Numerous to Count (A) None Seen, 0-2 /HPF    Bacteria, UA None Seen None Seen, Trace /HPF    Squamous Epithelial Cells, UA 21-30 (A) None Seen, 0-2 /HPF    Hyaline Casts, UA None Seen 0 - 6 /LPF    WBC Clumps, UA Large/3+ None Seen /HPF    Methodology Manual Light Microscopy    Respiratory Panel PCR w/COVID-19(SARS-CoV-2) [facility] In-House, NP Swab in UTM/VTM, 3-4 HR TAT - Swab, Nasopharynx    Collection Time: 10/09/23  7:26 PM    Specimen: Nasopharynx; Swab   Result Value Ref Range    ADENOVIRUS, PCR Not Detected Not Detected    Coronavirus 229E Not Detected Not Detected    Coronavirus HKU1 Not Detected Not Detected    Coronavirus NL63 Not Detected Not Detected    Coronavirus OC43 Not Detected Not Detected    COVID19 Not Detected Not Detected - Ref. Range    Human Metapneumovirus Not Detected Not Detected    Human Rhinovirus/Enterovirus Not Detected Not Detected    Influenza A PCR Not Detected Not Detected    Influenza B PCR Not Detected Not Detected    Parainfluenza Virus 1 Not Detected Not Detected    Parainfluenza Virus 2 Not Detected Not Detected    Parainfluenza Virus 3 Not Detected Not Detected    Parainfluenza Virus 4 Not  Detected Not Detected    RSV, PCR Not Detected Not Detected    Bordetella pertussis pcr Not Detected Not Detected    Bordetella parapertussis PCR Not Detected Not Detected    Chlamydophila pneumoniae PCR Not Detected Not Detected    Mycoplasma pneumo by PCR Not Detected Not Detected   ECG 12 Lead ED Triage Standing Order; Weak / Dizzy / AMS    Collection Time: 10/09/23  8:19 PM   Result Value Ref Range    QT Interval 434 ms    QTC Interval 484 ms   POC Glucose Once    Collection Time: 10/09/23 10:11 PM    Specimen: Blood   Result Value Ref Range    Glucose 153 (H) 70 - 130 mg/dL   Calcium, Ionized    Collection Time: 10/09/23 11:07 PM    Specimen: Blood   Result Value Ref Range    Ionized Calcium 1.35 (H) 1.12 - 1.32 mmol/L   Hemoglobin A1c    Collection Time: 10/09/23 11:07 PM    Specimen: Blood   Result Value Ref Range    Hemoglobin A1C 7.30 (H) 4.80 - 5.60 %   High Sensitivity Troponin T    Collection Time: 10/09/23 11:07 PM    Specimen: Blood   Result Value Ref Range    HS Troponin T 24 (H) <15 ng/L     Note: In addition to lab results from this visit, the labs listed above may include labs taken at another facility or during a different encounter within the last 24 hours. Please correlate lab times with ED admission and discharge times for further clarification of the services performed during this visit.    XR Chest 1 View   Final Result   Impression:      1. Minimal linear atelectasis in the left lung base. No acute cardiopulmonary disease identified.         Electronically Signed: Rolly Callahan MD     10/9/2023 8:19 PM EDT     Workstation ID: [id number]      CT Head Without Contrast   Final Result   1.No acute intracranial abnormality is identified.   2.Findings compatible with chronic microvascular ischemic change and diffuse cortical atrophy.   3.Mild encephalomalacia within the right centrum semiovale.      Electronically Signed: Ramon Paredes MD     10/9/2023 7:06 PM EDT     Workstation ID: [id number]     "    Vitals:    10/09/23 2111 10/09/23 2211 10/09/23 2212 10/09/23 2215   BP:  154/72 135/71 106/56   BP Location:  Left arm Left arm Left arm   Patient Position:  Lying Sitting Sitting   Pulse: 75      Resp:       Temp:       TempSrc:       SpO2: 97%      Weight:   90.6 kg (199 lb 11.2 oz)    Height:   175.3 cm (69\")      Medications   allopurinol (ZYLOPRIM) tablet 300 mg (300 mg Oral Given 10/9/23 2244)   apixaban (ELIQUIS) tablet 2.5 mg (2.5 mg Oral Given 10/9/23 2244)   aspirin EC tablet 81 mg (has no administration in time range)   bisoprolol (ZEBeta) tablet 5 mg (has no administration in time range)   finasteride (PROSCAR) tablet 5 mg (has no administration in time range)   levothyroxine (SYNTHROID, LEVOTHROID) tablet 50 mcg (has no administration in time range)   lactobacillus acidophilus (RISAQUAD) capsule 1 capsule (has no administration in time range)   tamsulosin (FLOMAX) 24 hr capsule 0.4 mg (has no administration in time range)   sodium chloride 0.9 % flush 10 mL (10 mL Intravenous Given 10/9/23 2231)   sodium chloride 0.9 % flush 10 mL (has no administration in time range)   sodium chloride 0.9 % infusion 40 mL (has no administration in time range)   dextrose (GLUTOSE) oral gel 15 g (has no administration in time range)   dextrose (D50W) (25 g/50 mL) IV injection 25 g (has no administration in time range)   glucagon (GLUCAGEN) injection 1 mg (has no administration in time range)   Insulin Lispro (humaLOG) injection 2-7 Units (0 Units Subcutaneous Hold 10/9/23 2227)   sodium chloride 0.9 % infusion (75 mL/hr Intravenous New Bag 10/9/23 2231)   melatonin tablet 5 mg (5 mg Oral Given 10/9/23 2244)   ipratropium-albuterol (DUO-NEB) nebulizer solution 3 mL (has no administration in time range)   cefTRIAXone (ROCEPHIN) 2000 mg/100 mL 0.9% NS IVPB (MBP) (has no administration in time range)   acetaminophen (TYLENOL) tablet 650 mg (has no administration in time range)   Influenza Vac High-Dose Quad (FLUZONE " HIGH DOSE) injection 0.7 mL (has no administration in time range)   cefTRIAXone (ROCEPHIN) 2000 mg/100 mL 0.9% NS IVPB (MBP) (2,000 mg Intravenous New Bag 10/9/23 6633)     ECG/EMG Results (last 24 hours)       Procedure Component Value Units Date/Time    ECG 12 Lead ED Triage Standing Order; Weak / Dizzy / AMS [[id number]] Collected: 10/09/23 2019     Updated: 10/09/23 2017     QT Interval 434 ms      QTC Interval 484 ms     Narrative:      Test Reason : ED Triage Standing Order~  Blood Pressure :   */*   mmHG  Vent. Rate :  75 BPM     Atrial Rate :  75 BPM     P-R Int : 336 ms          QRS Dur : 184 ms      QT Int : 434 ms       P-R-T Axes :   *  19 213 degrees     QTc Int : 484 ms    ** Poor data quality, interpretation may be adversely affected  Atrial-sensed ventricular-paced rhythm with prolonged AV conduction  Abnormal ECG  When compared with ECG of 24-JAN-2023 12:33,  Electronic ventricular pacemaker has replaced Sinus rhythm    Referred By: EDMD           Confirmed By:           ECG 12 Lead ED Triage Standing Order; Weak / Dizzy / AMS   Preliminary Result   Test Reason : ED Triage Standing Order~   Blood Pressure :   */*   mmHG   Vent. Rate :  75 BPM     Atrial Rate :  75 BPM      P-R Int : 336 ms          QRS Dur : 184 ms       QT Int : 434 ms       P-R-T Axes :   *  19 213 degrees      QTc Int : 484 ms      ** Poor data quality, interpretation may be adversely affected   Atrial-sensed ventricular-paced rhythm with prolonged AV conduction   Abnormal ECG   When compared with ECG of 24-JAN-2023 12:33,   Electronic ventricular pacemaker has replaced Sinus rhythm      Referred By: EDMD           Confirmed By:               Medical Decision Making  Amount and/or Complexity of Data Reviewed  Labs: ordered.  Radiology: ordered.  ECG/medicine tests: ordered.    Risk  Decision regarding hospitalization.        Final diagnoses:   Acute UTI   Leukocytosis, unspecified type   Generalized weakness   Renal insufficiency        ED Disposition  ED Disposition       ED Disposition   Decision to Admit    Condition   --    Comment   Level of Care: Telemetry [5]   Diagnosis: Weakness [715082]   Admitting Physician: CLARIBEL ALEJANDRO [[id number]]   Attending Physician: CLARIBEL ALEJANDRO [[id number]]   Certification: I Certify That Inpatient Hospital Services Are Medically Necessary For Greater Than 2 Midnights                 No follow-up provider specified.       Medication List      No changes were made to your prescriptions during this visit.            Pelon Mathews MD  10/10/23 0143

## 2023-10-10 NOTE — PLAN OF CARE
Goal Outcome Evaluation:  Plan of Care Reviewed With: patient, family           Outcome Evaluation: Patient presents with deficits in endurance, balance, and strength. He ambulated 15'+15'+110' initially on RA with desat to 82%. Following seated rest break he ambulated on 2L O2 with no desat noted. He is mobilizing below his baseline indicating IPPT intervention to address current deficits. Recommend D/C home with assist from family and HHPT, although patient did not seem interested in HHPT.      Anticipated Discharge Disposition (PT): home with assist, home with home health

## 2023-10-11 ENCOUNTER — READMISSION MANAGEMENT (OUTPATIENT)
Dept: CALL CENTER | Facility: HOSPITAL | Age: 88
End: 2023-10-11
Payer: MEDICARE

## 2023-10-11 VITALS
WEIGHT: 201.4 LBS | TEMPERATURE: 98.4 F | HEIGHT: 69 IN | HEART RATE: 64 BPM | RESPIRATION RATE: 18 BRPM | OXYGEN SATURATION: 96 % | SYSTOLIC BLOOD PRESSURE: 122 MMHG | DIASTOLIC BLOOD PRESSURE: 65 MMHG | BODY MASS INDEX: 29.83 KG/M2

## 2023-10-11 PROBLEM — E87.20 LACTIC ACIDOSIS: Status: RESOLVED | Noted: 2023-10-09 | Resolved: 2023-10-11

## 2023-10-11 PROBLEM — D72.829 LEUKOCYTOSIS: Status: RESOLVED | Noted: 2023-10-09 | Resolved: 2023-10-11

## 2023-10-11 PROBLEM — E83.52 HYPERCALCEMIA: Status: RESOLVED | Noted: 2023-10-09 | Resolved: 2023-10-11

## 2023-10-11 PROBLEM — R79.89 ELEVATED SERUM CREATININE: Status: RESOLVED | Noted: 2023-10-09 | Resolved: 2023-10-11

## 2023-10-11 LAB
GLUCOSE BLDC GLUCOMTR-MCNC: 131 MG/DL (ref 70–130)
GLUCOSE BLDC GLUCOMTR-MCNC: 150 MG/DL (ref 70–130)

## 2023-10-11 PROCEDURE — 97530 THERAPEUTIC ACTIVITIES: CPT

## 2023-10-11 PROCEDURE — G0378 HOSPITAL OBSERVATION PER HR: HCPCS

## 2023-10-11 PROCEDURE — 82948 REAGENT STRIP/BLOOD GLUCOSE: CPT

## 2023-10-11 PROCEDURE — 97165 OT EVAL LOW COMPLEX 30 MIN: CPT

## 2023-10-11 RX ORDER — L.ACID,PARA/B.BIFIDUM/S.THERM 8B CELL
1 CAPSULE ORAL DAILY
Qty: 30 CAPSULE | Refills: 0 | Status: SHIPPED | OUTPATIENT
Start: 2023-10-12

## 2023-10-11 RX ORDER — CEFDINIR 300 MG/1
300 CAPSULE ORAL 2 TIMES DAILY
Qty: 8 CAPSULE | Refills: 0 | Status: SHIPPED | OUTPATIENT
Start: 2023-10-11 | End: 2023-10-15

## 2023-10-11 RX ADMIN — ASPIRIN 81 MG: 81 TABLET, COATED ORAL at 09:08

## 2023-10-11 RX ADMIN — TAMSULOSIN HYDROCHLORIDE 0.4 MG: 0.4 CAPSULE ORAL at 09:08

## 2023-10-11 RX ADMIN — FINASTERIDE 5 MG: 5 TABLET, FILM COATED ORAL at 09:08

## 2023-10-11 RX ADMIN — APIXABAN 2.5 MG: 2.5 TABLET, FILM COATED ORAL at 09:08

## 2023-10-11 RX ADMIN — BISOPROLOL FUMARATE 5 MG: 5 TABLET, FILM COATED ORAL at 09:08

## 2023-10-11 RX ADMIN — LEVOTHYROXINE SODIUM 50 MCG: 0.05 TABLET ORAL at 05:41

## 2023-10-11 RX ADMIN — Medication 1 CAPSULE: at 09:08

## 2023-10-11 NOTE — THERAPY EVALUATION
Patient Name: Forrest Zepeda  : 1932    MRN: [id number]                              Today's Date: 10/11/2023       Admit Date: 10/9/2023    Visit Dx:     ICD-10-CM ICD-9-CM   1. Acute UTI  N39.0 599.0   2. Leukocytosis, unspecified type  D72.829 288.60   3. Generalized weakness  R53.1 780.79   4. Renal insufficiency  N28.9 593.9     Patient Active Problem List   Diagnosis    Essential hypertension    Generalized osteoarthritis    Diabetic neuropathy    Gout    HLD (hyperlipidemia)    Acquired hypothyroidism    Ureteral stricture    Hydronephrosis of left kidney    CKD (chronic kidney disease) stage 3, GFR 30-59 ml/min    LEHMAN (dyspnea on exertion)    IPF (idiopathic pulmonary fibrosis)    Symptomatic bradycardia    Hypoxemia requiring supplemental oxygen    History of pulmonary embolism    Sinus node dysfunction    Chronic fatigue    Urethral stricture    Paroxysmal atrial fibrillation    Type 2 diabetes mellitus with hyperglycemia, with long-term current use of insulin    Abnormal cardiovascular stress test    Pulmonary hypertension    Cardiac pacemaker in situ    CAD (coronary artery disease)    Diastolic congestive heart failure    Abnormal SPEP    Type 2 diabetes mellitus with stage 3b chronic kidney disease, with long-term current use of insulin    Hydronephrosis, left    Gross hematuria    Chronic heart failure with preserved ejection fraction (HFpEF)    Personal history of other infectious and parasitic diseases    Weakness    Acute UTI (urinary tract infection)    UTI (urinary tract infection)     Past Medical History:   Diagnosis Date    Abnormal EKG     Abnormal PSA     Reported abnormal    Acute cystitis with hematuria 2023    Acute deep vein thrombosis (DVT) of right lower extremity 2019    Acute diverticulitis 2019    Acute hyperkalemia 2019    Acute respiratory failure with hypoxia     Acute saddle pulmonary embolism with acute cor pulmonale 2019    Acute systolic right  "heart failure 08/22/2019    Arthritis     KNEES,  BUT SINCE HAD REPLACEMENT    Benign localized hyperplasia of prostate     Bilateral knee pain     C. difficile diarrhea 2010    Cataracts, bilateral     CKD (chronic kidney disease)     Coronary artery disease     Diabetic neuropathy     Diastolic dysfunction     Disease of thyroid gland     HYPOTHYROIDISM    Diverticulitis     Dyslipidemia     H/O    Family history of malignant hyperthermia     Brother     Full dentures     Generalized weakness     History of ESBL E. coli infection     most recent 4-2022 f/u with ID Dr Johnson    History of gout     History of hepatitis A vaccination     History of nephrolithiasis     History of nuclear stress test     STATES IN THE 1990'S.  \"IT WAS OK\"    History of osteopenia     History of recurrent UTI (urinary tract infection)     Hydronephrosis of left kidney 07/18/2019    Hydronephrosis with renal and ureteral calculus obstruction 10/21/2019    Added automatically from request for surgery [id number]    Hydronephrosis with ureteral stricture     Hypercholesterolemia     Hyperkalemia     PT UNSURE REGARDING HX OF THIS    Hyperlipidemia     Hypertension     Hypothyroidism     Impaired functional mobility, balance, gait, and endurance     Insomnia     IPF (idiopathic pulmonary fibrosis)     per patient and son, dx at [facility], was told no treatment necessary, not to worry about it    On supplemental oxygen therapy     2L NC QHS    Other hydronephrosis 08/12/2019    Added automatically from request for surgery [id number]    Paroxysmal atrial fibrillation     Pulmonary hypertension, mild 02/2021    per echo per cardiology note 1/9/23, per pt's son, PCP does not agree with this dx    Renal insufficiency     Sepsis 2019    Shortness of breath     STATES WITH EXERTION, OTHERWISE, DENIES    Sigmoid diverticulitis without abscess or perforation 08/09/2017    Sinus node dysfunction     Skin breakdown     fourth toe right foot noted in 2019 MD " D/C note    Skin ulcer of fourth toe of right foot, limited to breakdown of skin 07/05/2019    Stricture of ureter     Type 2 diabetes mellitus     Ureteral stricture, left     UTI (urinary tract infection) 07/18/2019    Vitamin D deficiency     Wears glasses      Past Surgical History:   Procedure Laterality Date    APPENDECTOMY      CARDIAC ELECTROPHYSIOLOGY PROCEDURE N/A 12/23/2020    Procedure: Pacemaker DC new. DNS meds.;  Surgeon: Jimy Ledezma DO;  Location:  [facility];  Service: Cardiology;  Laterality: N/A;    CHOLECYSTECTOMY      CIRCUMCISION N/A 10/23/2019    Procedure: CIRCUMCISIOn-DORSAL SLIT;  Surgeon: Griffin Romero MD;  Location:  [facility];  Service: Urology    COLONOSCOPY      CYSTOSCOPY RETROGRADE PYELOGRAM Left 06/17/2022    Procedure: CYSTOSCOPY RETROGRADE PYELOGRAM;  Surgeon: Ryne Mccann MD;  Location:  [facility];  Service: Urology;  Laterality: Left;    CYSTOSCOPY URETEROSCOPY Left 02/11/2019    Procedure: URETEROSCOPY WITH STENT EXTRACTION, RPG;  Surgeon: Griffin Romero MD;  Location:  [facility];  Service: Urology    CYSTOSCOPY W/ URETERAL STENT PLACEMENT Left 07/20/2019    Procedure: CYSTOSCOPY, LEFT RETROGRADE PYELOGRAM,  ATTEMPTED LEFT URETERAL STENT INSERTION;  Surgeon: Isaac Flynn MD;  Location:  [facility];  Service: Urology    CYSTOSCOPY W/ URETERAL STENT PLACEMENT Left 06/17/2022    Procedure: CYSTOSCOPY URETERAL CATHETER/STENT INSERTION;  Surgeon: Ryne Mccann MD;  Location:  [facility];  Service: Urology;  Laterality: Left;    CYSTOSCOPY W/ URETERAL STENT PLACEMENT Left 01/27/2023    Procedure: CYSTOSCOPY URETERAL CATHETER/STENT INSERTION;  Surgeon: Deanne Pitts MD;  Location:  [facility];  Service: Urology;  Laterality: Left;    CYSTOSCOPY, URETEROSCOPY, RETROGRADE PYELOGRAM, STONE EXTRACTION, STENT INSERTION Left 06/15/2023    Procedure: URETEROSCOPY, RETROGRADE PYELOGRAM, STENT INSERTION;  Surgeon: Deanne Pitts MD;  Location:  [facility];   Service: Urology;  Laterality: Left;    EYE SURGERY      CATARACTS REMOVED BILATERALLY    JOINT REPLACEMENT      Bilateral knees    KNEE ARTHROPLASTY Bilateral     KNEE ARTHROSCOPY Bilateral     RENAL ARTERY STENT      SEVERAL TIMES EVERY SINCE 1978    URETERAL STENT INSERTION  10/2020    URETEROSCOPY LASER LITHOTRIPSY WITH STENT INSERTION Left 01/10/2018    Procedure:  cysto, left ureteral stent replacement ;  Surgeon: Griffin Romero MD;  Location: [facility] OR;  Service:     URETEROSCOPY LASER LITHOTRIPSY WITH STENT INSERTION Left 08/14/2019    Procedure: URETEROSCOPY, STONE REMOVAL WITH STENT INSERTION;  Surgeon: Griffin Romero MD;  Location: [facility] OR;  Service: Urology    URETEROSCOPY LASER LITHOTRIPSY WITH STENT INSERTION Left 10/23/2019    Procedure: Left URETEROSCOPY WITH STENT INSERTION-LEFT;  Surgeon: Griffin Romero MD;  Location: [facility] OR;  Service: Urology      General Information       Row Name 10/11/23 1420          OT Time and Intention    Document Type evaluation  -AN     Mode of Treatment occupational therapy  -AN       Row Name 10/11/23 1420          General Information    Patient Profile Reviewed yes  -AN     Prior Level of Function independent:;all household mobility;community mobility;gait;ADL's  prior to admission pt reports ambulating with a cane at baseline PRN, no recent falls. son and family assist with IADLs including grocery shopping and home management  -AN     Existing Precautions/Restrictions fall;oxygen therapy device and L/min  -AN     Barriers to Rehab medically complex  -AN       Row Name 10/11/23 1420          Occupational Profile    Environmental Supports and Barriers (Occupational Profile) tub shower with shower chair; pt sponge bathes to avoid getting in and out of tub  -AN       Row Name 10/11/23 1420          Living Environment    People in Home alone;other (see comments)  family check on him daily  -AN       Row Name 10/11/23 1420          Home Main Entrance    Number  of Stairs, Main Entrance two  -AN     Stair Railings, Main Entrance none  -AN       Row Name 10/11/23 1420          Stairs Within Home, Primary    Stairs, Within Home, Primary ranch with basement  -AN     Number of Stairs, Within Home, Primary twelve  -AN       Row Name 10/11/23 1420          Cognition    Orientation Status (Cognition) oriented x 4  -AN       Row Name 10/11/23 1420          Safety Issues, Functional Mobility    Safety Issues Affecting Function (Mobility) safety precautions follow-through/compliance;safety precaution awareness;judgment;insight into deficits/self-awareness  -AN     Impairments Affecting Function (Mobility) balance;endurance/activity tolerance;shortness of breath;strength  -AN               User Key  (r) = Recorded By, (t) = Taken By, (c) = Cosigned By      Initials Name Provider Type    AN Evangelina Ferguson OT Occupational Therapist                     Mobility/ADL's       Row Name 10/11/23 1425          Bed Mobility    Comment, (Bed Mobility) UIC upon arrival  -AN       Row Name 10/11/23 1425          Transfers    Transfers sit-stand transfer;stand-sit transfer;toilet transfer  -AN       Row Name 10/11/23 1425          Sit-Stand Transfer    Sit-Stand [place] (Transfers) contact guard  -AN       Row Name 10/11/23 1425          Stand-Sit Transfer    Stand-Sit [place] (Transfers) contact guard  -AN       [facility] Name 10/11/23 1425          Toilet Transfer    Type (Toilet Transfer) sit-stand;stand-sit  -AN     [place] Level (Toilet Transfer) contact guard  -AN     Assistive Device (Toilet Transfer) commode;grab bars/safety frame  -AN       Row Name 10/11/23 1425          Functional Mobility    Functional Mobility- Ind. Level contact guard assist  -AN     Functional Mobility-Distance (Feet) --  <household distance  -AN     Functional Mobility- Safety Issues sequencing ability decreased;step length decreased  -AN     Functional Mobility- Comment Pt ambulated short distance  within the room in prep for ADLs with CGA for balance and safety. No LOB throughout.  -AN       Row Name 10/11/23 1425          Activities of Daily Living    BADL Assessment/Intervention lower body dressing;bathing;toileting  -AN       Row Name 10/11/23 1425          Lower Body Dressing Assessment/Training    [place] Level (Lower Body Dressing) don;doff;socks;supervision  -AN     Position (Lower Body Dressing) supported sitting  -AN       Row Name 10/11/23 1425          Toileting Assessment/Training    [place] Level (Toileting) adjust/manage clothing;contact guard assist  -AN     Assistive Devices (Toileting) commode  -AN     Position (Toileting) unsupported sitting;unsupported standing  -AN       Row Name 10/11/23 1425          Bathing Assessment/Intervention    Comment, (Bathing) Educated pt on use of tub transfer bench for improved performance and safety. Pt declined at this time and wants to continue with sponge bathing.  -AN               User Key  (r) = Recorded By, (t) = Taken By, (c) = Cosigned By      Initials Name Provider Type    AN Evangelina Ferguson OT Occupational Therapist                   Obj/Interventions       Row Name 10/11/23 1428          Sensory Assessment (Somatosensory)    Sensory Assessment (Somatosensory) UE sensation intact  -AN       Row Name 10/11/23 1428          Vision Assessment/Intervention    Visual Impairment/Limitations WFL  -AN       Row Name 10/11/23 1428          Range of Motion Comprehensive    General Range of Motion bilateral upper extremity ROM WFL  -AN       Row Name 10/11/23 1428          Strength Comprehensive (MMT)    General Manual Muscle Testing (MMT) Assessment upper extremity strength deficits identified  -AN     Comment, General Manual Muscle Testing (MMT) Assessment BUE grossly 4/5  -AN       Row Name 10/11/23 1428          Balance    Balance Assessment sitting static balance;sitting dynamic balance;sit to stand dynamic balance;standing static  balance;standing dynamic balance  -AN     Static Sitting Balance independent  -AN     Dynamic Sitting Balance independent  -AN     Position, Sitting Balance sitting in chair  -AN     Sit to Stand Dynamic Balance contact guard  -AN     Static Standing Balance standby assist  -AN     Dynamic Standing Balance contact guard  -AN     Position/Device Used, Standing Balance supported  -AN     Balance Interventions standing;sit to stand;supported;static;dynamic;minimal challenge;occupation based/functional task  -AN               User Key  (r) = Recorded By, (t) = Taken By, (c) = Cosigned By      Initials Name Provider Type    AN Evangelina Ferguson, WHIT Occupational Therapist                   Goals/Plan       Row Name 10/11/23 1437          Bed Mobility Goal 1 (OT)    Activity/Assistive Device (Bed Mobility Goal 1, OT) sit to supine/supine to sit  -AN     [place] Level/Cues Needed (Bed Mobility Goal 1, OT) supervision required  -AN     Time Frame (Bed Mobility Goal 1, OT) long term goal (LTG);10 days  -AN       Row Name 10/11/23 1437          Transfer Goal 1 (OT)    Activity/Assistive Device (Transfer Goal 1, OT) sit-to-stand/stand-to-sit;toilet  -AN     [place] Level/Cues Needed (Transfer Goal 1, OT) supervision required  -AN     Time Frame (Transfer Goal 1, OT) long term goal (LTG);10 days  -AN       Row Name 10/11/23 1437          Toileting Goal 1 (OT)    Activity/Device (Toileting Goal 1, OT) adjust/manage clothing;perform perineal hygiene;commode  -AN     [place] Level/Cues Needed (Toileting Goal 1, OT) supervision required  -AN     Time Frame (Toileting Goal 1, OT) long term goal (LTG);10 days  -AN       Row Name 10/11/23 1437          Therapy Assessment/Plan (OT)    Planned Therapy Interventions (OT) activity tolerance training;adaptive equipment training;BADL retraining;functional balance retraining;transfer/mobility retraining;strengthening exercise;occupation/activity based  interventions;patient/caregiver education/training  -AN               User Key  (r) = Recorded By, (t) = Taken By, (c) = Cosigned By      Initials Name Provider Type    Evangelina Mena OT Occupational Therapist                   Clinical Impression       Row Name 10/11/23 1433          Pain Assessment    Pretreatment Pain Rating 0/10 - no pain  -AN     Posttreatment Pain Rating 0/10 - no pain  -AN     Pre/Posttreatment Pain Comment asymptomatic  -AN     Pain Intervention(s) Repositioned;Ambulation/increased activity  -AN       Row Name 10/11/23 1433          Plan of Care Review    Plan of Care Reviewed With patient;family  -AN     Progress no change  -AN     Outcome Evaluation Pt presents below her functional baseline with deficits including generalized weakness, impaired balance, mobility and ADLs warranting skilled OT services. Pt ambulated household distance with CGA for balance and safety with no LOB noted. Rec home with assist and home health OT at [place].  -AN       Row Name 10/11/23 1433          Therapy Assessment/Plan (OT)    Patient/Family Therapy Goal Statement (OT) Return to PLOF  -AN     Rehab Potential (OT) good, to achieve stated therapy goals  -AN     Criteria for Skilled Therapeutic Interventions Met (OT) yes;skilled treatment is necessary  -AN     Therapy Frequency (OT) daily  -AN       Row Name 10/11/23 1433          Therapy Plan Review/Discharge Plan (OT)    Anticipated Discharge Disposition (OT) home with assist;home with home health  -AN       [facility] Name 10/11/23 1433          Vital Signs    Pre SpO2 (%) 95  -AN     O2 Delivery Pre Treatment nasal cannula  -AN     Intra SpO2 (%) 90  -AN     O2 Delivery Intra Treatment room air  -AN     Post SpO2 (%) 94  -AN     O2 Delivery Post Treatment nasal cannula  -AN     Pre Patient Position Sitting  -AN     Intra Patient Position Standing  -AN     Post Patient Position Sitting  -AN       [facility] Name 10/11/23 1433          Positioning and Restraints     Pre-Treatment Position sitting in chair/recliner  -AN     Post Treatment Position chair  -AN     In Chair notified nsg;reclined;call light within reach;encouraged to call for assist;exit alarm on;with family/caregiver  -AN               User Key  (r) = Recorded By, (t) = Taken By, (c) = Cosigned By      Initials Name Provider Type    Evangelina Mena OT Occupational Therapist                   Outcome Measures       Row Name 10/11/23 1437          How much help from another is currently needed...    Putting on and taking off regular lower body clothing? 3  -AN     Bathing (including washing, rinsing, and drying) 3  -AN     Toileting (which includes using toilet bed pan or urinal) 3  -AN     Putting on and taking off regular upper body clothing 3  -AN     Taking care of personal grooming (such as brushing teeth) 3  -AN     Eating meals 4  -AN     AM-PAC 6 Clicks Score (OT) 19  -AN       Row Name 10/11/23 1027          How much help from another person do you currently need...    Turning from your back to your side while in flat bed without using bedrails? 4  -CM     Moving from lying on back to sitting on the side of a flat bed without bedrails? 4  -CM     Moving to and from a bed to a chair (including a wheelchair)? 3  -CM     Standing up from a chair using your arms (e.g., wheelchair, bedside chair)? 3  -CM     Climbing 3-5 steps with a railing? 3  -CM     To walk in hospital room? 3  -CM     AM-PAC 6 Clicks Score (PT) 20  -CM     Highest level of mobility 6 --> Walked 10 steps or more  -CM       Row Name 10/11/23 1437 10/11/23 1027       Functional Assessment    Outcome Measure Options AM-PAC 6 Clicks Daily Activity (OT)  -AN AM-PAC 6 Clicks Basic Mobility (PT)  -CM              User Key  (r) = Recorded By, (t) = Taken By, (c) = Cosigned By      Initials Name Provider Type    Evangelina Mena OT Occupational Therapist    Kari Painter, PT Physical Therapist                    Occupational  Therapy Education       Title: PT OT SLP Therapies (In Progress)       Topic: Occupational Therapy (In Progress)       Point: ADL training (Done)       Description:   Instruct learner(s) on proper safety adaptation and remediation techniques during self care or transfers.   Instruct in proper use of assistive devices.                  Learning Progress Summary             Patient Acceptance, E, VU by AN at 10/11/2023 1440   Family Acceptance, E, VU by AN at 10/11/2023 1440                         Point: Home exercise program (Not Started)       Description:   Instruct learner(s) on appropriate technique for monitoring, assisting and/or progressing therapeutic exercises/activities.                  Learner Progress:  Not documented in this visit.              Point: Precautions (Done)       Description:   Instruct learner(s) on prescribed precautions during self-care and functional transfers.                  Learning Progress Summary             Patient Acceptance, E, VU by AN at 10/11/2023 1440   Family Acceptance, E, VU by AN at 10/11/2023 1440                         Point: Body mechanics (Done)       Description:   Instruct learner(s) on proper positioning and spine alignment during self-care, functional mobility activities and/or exercises.                  Learning Progress Summary             Patient Acceptance, E, VU by AN at 10/11/2023 1440   Family Acceptance, E, VU by AN at 10/11/2023 1440                                         User Key       Initials Effective Dates Name Provider Type Discipline     09/21/21 -  Evangelina Ferguson OT Occupational Therapist OT                  OT Recommendation and Plan  Planned Therapy Interventions (OT): activity tolerance training, adaptive equipment training, BADL retraining, functional balance retraining, transfer/mobility retraining, strengthening exercise, occupation/activity based interventions, patient/caregiver education/training  Therapy Frequency (OT):  daily  Plan of Care Review  Plan of Care Reviewed With: patient, family  Progress: no change  Outcome Evaluation: Pt presents below her functional baseline with deficits including generalized weakness, impaired balance, mobility and ADLs warranting skilled OT services. Pt ambulated household distance with CGA for balance and safety with no LOB noted. Rec home with assist and home health OT at [place].     Time Calculation:   Evaluation Complexity (OT)  Review Occupational Profile/Medical/Therapy History Complexity: brief/low complexity  Assessment, Occupational Performance/Identification of Deficit Complexity: 3-5 performance deficits  Clinical Decision Making Complexity (OT): problem focused assessment/low complexity  Overall Complexity of Evaluation (OT): low complexity     Time Calculation- OT       Row Name 10/11/23 1441             Time Calculation- OT    OT Start Time 1140  -AN      OT Received On 10/11/23  -AN      OT Goal Re-Cert Due Date 10/21/23  -AN         Untimed Charges    OT Eval/Re-eval Minutes 46  -AN         Total Minutes    Untimed Charges Total Minutes 46  -AN       Total Minutes 46  -AN                User Key  (r) = Recorded By, (t) = Taken By, (c) = Cosigned By      Initials Name Provider Type    Evangelina Mena OT Occupational Therapist                  Therapy Charges for Today       Code Description Service Date Service Provider Modifiers Qty    [id number] HC OT EVAL LOW COMPLEXITY 4 10/11/2023 Evangelina Ferguson OT GO 1                 Evangelina Ferguson OT  10/11/2023

## 2023-10-11 NOTE — CASE MANAGEMENT/SOCIAL WORK
Continued Stay Note  [facility]     Patient Name: Forrest Zepeda  MRN: [id number]  Today's Date: 10/11/2023    Admit Date: 10/9/2023    Plan: Home   Discharge Plan       Row Name 10/11/23 1444       Plan    Plan Home    Plan Comments I spoke with patient and he included his son, Eron in the conversation, in regards to discharge planning. Patient resides in [facility] in [facility]. He reports being independent with ADL's, use of cane in the home. He has the folowing DME: rolling walker, wheelchair, home oxygen continuous, provided by [employer]. I discussed rehab recommendations with him and he declines any in home or outpatient rehab at this time.    He has a daughter that provides some housekeeping for him. His insurance is [employer]. His PCP is DAKSHA Wen. He has advanced directives.    Final Discharge Disposition Code 01 - home or self-care                   Discharge Codes    No documentation.                 Expected Discharge Date and Time       Expected Discharge Date Expected Discharge Time    Oct 11, 2023               Terri Simon RN

## 2023-10-11 NOTE — DISCHARGE SUMMARY
[facility] Medicine Services  DISCHARGE SUMMARY    Patient Name: Forrest Zepeda  : 1932  MRN: [id number]    Date of Admission: 10/9/2023  8:03 PM  Date of Discharge:  10/11/2023  Primary Care Physician: Kary Wen MD    Consults       No orders found from 9/10/2023 to 10/10/2023.            Hospital Course     Presenting Problem: acute UTI    Active Hospital Problems    Diagnosis  POA    **Acute UTI (urinary tract infection) [N39.0]  Yes    UTI (urinary tract infection) [N39.0]  Yes    Weakness [R53.1]  Yes    Lactic acidosis [E87.20]  Yes    Leukocytosis [D72.829]  Yes    Hypercalcemia [E83.52]  Yes    Elevated serum creatinine [R79.89]  Yes    CAD (coronary artery disease) [I25.10]  Yes    Diastolic congestive heart failure [I50.30]  Yes    Pulmonary hypertension [I27.20]  Yes    Cardiac pacemaker in situ [Z95.0]  Yes    Type 2 diabetes mellitus with hyperglycemia, with long-term current use of insulin [E11.65, Z79.4]  Not Applicable    Paroxysmal atrial fibrillation [I48.0]  Yes    History of pulmonary embolism [Z86.711]  Yes    IPF (idiopathic pulmonary fibrosis) [J84.112]  Yes    CKD (chronic kidney disease) stage 3, GFR 30-59 ml/min [N18.30]  Yes    Acquired hypothyroidism [E03.9]  Yes    HLD (hyperlipidemia) [E78.5]  Yes    Essential hypertension [I10]  Yes    Generalized osteoarthritis [M15.9]  Yes      Resolved Hospital Problems   No resolved problems to display.          Hospital Course:  Forrest Zepeda is a 89 y.o. male w/ a hx of HTN, HLD, HFpEF, CAD, PAF (Eliquis), s/p pacemaker, pulmonary HTN, idiopathic pulmonary fibrosis, prior PE, CKD Stage III, hypothyroidism, hx of urethral strictures (follows w/ Dr. Pitts) who presented to the ED w/ c/o generalized weakness.    Work-up was significant for suspected UTI, lactic acidosis and leukocytosis.  He was placed on ceftriaxone, urine and blood cultures were drawn.  He was given IV fluids and his leukocytosis  and lactic acidosis trended down.  His weakness resolved and he is now eager to go home.  Blood cultures have remained negative, urine culture is less than 25,000 gram-negative bacilli however given his history of ureteral stent will place on empiric course of antibiotics to be completed outpatient.      Discharge Follow Up Recommendations for outpatient labs/diagnostics:  PCP, urology    Day of Discharge     HPI:   No acute events overnight, eager to go home    Review of Systems  Gen- No fevers, chills  CV- No chest pain, palpitations  Resp- No cough, dyspnea  GI- No N/V/D, abd pain      Vital Signs:   Temp:  [97.5 øF (36.4 øC)-98.9 øF (37.2 øC)] 98.4 øF (36.9 øC)  Heart Rate:  [60-67] 64  Resp:  [16-20] 18  BP: (122-143)/(56-75) 122/65      Physical Exam:  Constitutional: No acute distress, awake, alert, elderly, frail appearing  HENT: NCAT, mucous membranes moist  Respiratory: Clear to auscultation bilaterally, respiratory effort normal . Comf on 1L NC (on home req)  Cardiovascular: RRR, no murmurs, rubs, or gallops  Gastrointestinal: Positive bowel sounds, soft, nontender, nondistended  Musculoskeletal: No bilateral ankle edema  Psychiatric: Appropriate affect, cooperative  Neurologic: Oriented x 3, strength symmetric in all extremities, Cranial Nerves grossly intact to confrontation, speech clear  Skin: No rashes       Pertinent  and/or Most Recent Results     LAB RESULTS:      Lab 10/10/23  0410 10/09/23  1918   WBC 10.90* 15.60*   HEMOGLOBIN 12.6* 13.8   HEMATOCRIT 40.1 44.9   PLATELETS 198 235   NEUTROS ABS 8.48* 13.22*   IMMATURE GRANS (ABS) 0.05 0.07*   LYMPHS ABS 1.53 1.40   MONOS ABS 0.69 0.69   EOS ABS 0.07 0.14   MCV 95.2 97.6*   PROCALCITONIN  --  0.15   LACTATE  --  2.4*         Lab 10/10/23  0410 10/09/23  2307 10/09/23  1918   SODIUM 140  --  140   POTASSIUM 4.6  --  4.4   CHLORIDE 109*  --  104   CO2 20.0*  --  25.0   ANION GAP 11.0  --  11.0   BUN 39*  --  39*   CREATININE 1.55*  --  1.78*   EGFR  42.0*  --  35.6*   GLUCOSE 152*  --  155*   CALCIUM 9.0  --  9.9*   IONIZED CALCIUM  --  1.35*  --    MAGNESIUM 2.0  --  2.0   HEMOGLOBIN A1C  --  7.30*  --    TSH  --   --  1.230         Lab 10/09/23  1918   TOTAL PROTEIN 8.4   ALBUMIN 4.1   GLOBULIN 4.3   ALT (SGPT) 9   AST (SGOT) 18   BILIRUBIN 0.4   ALK PHOS 111         Lab 10/10/23  0133 10/09/23  2307 10/09/23  1918   HSTROP T 22* 24* 21*                 Brief Urine Lab Results  (Last result in the past 365 days)        Color   Clarity   Blood   Leuk Est   Nitrite   Protein   CREAT   Urine HCG        10/09/23 1925 Orange   Turbid   Large (3+)   Large (3+)   Negative   100 mg/dL (2+)                 Microbiology Results (last 10 days)       Procedure Component Value - Date/Time    Blood Culture - Blood, Arm, Left [[id number]]  (Normal) Collected: 10/09/23 2114    Lab Status: Preliminary result Specimen: Blood from Arm, Left Updated: 10/10/23 2145     Blood Culture No growth at 24 hours    Blood Culture - Blood, Arm, Right [[id number]]  (Normal) Collected: 10/09/23 2020    Lab Status: Preliminary result Specimen: Blood from Arm, Right Updated: 10/10/23 2145     Blood Culture No growth at 24 hours    Respiratory Panel PCR w/COVID-19(SARS-CoV-2) GOMEZ/JOSE/ISHA/PAD/COR/MAD/JEISON In-House, NP Swab in UTM/VTM, 3-4 HR TAT - Swab, Nasopharynx [[id number]]  (Normal) Collected: 10/09/23 1926    Lab Status: Final result Specimen: Swab from Nasopharynx Updated: 10/09/23 2022     ADENOVIRUS, PCR Not Detected     Coronavirus 229E Not Detected     Coronavirus HKU1 Not Detected     Coronavirus NL63 Not Detected     Coronavirus OC43 Not Detected     COVID19 Not Detected     Human Metapneumovirus Not Detected     Human Rhinovirus/Enterovirus Not Detected     Influenza A PCR Not Detected     Influenza B PCR Not Detected     Parainfluenza Virus 1 Not Detected     Parainfluenza Virus 2 Not Detected     Parainfluenza Virus 3 Not Detected     Parainfluenza Virus 4 Not Detected     RSV, PCR  Not Detected     Bordetella pertussis pcr Not Detected     Bordetella parapertussis PCR Not Detected     Chlamydophila pneumoniae PCR Not Detected     Mycoplasma pneumo by PCR Not Detected    Narrative:      In the setting of a positive respiratory panel with a viral infection PLUS a negative procalcitonin without other underlying concern for bacterial infection, consider observing off antibiotics or discontinuation of antibiotics and continue supportive care. If the respiratory panel is positive for atypical bacterial infection (Bordetella pertussis, Chlamydophila pneumoniae, or Mycoplasma pneumoniae), consider antibiotic de-escalation to target atypical bacterial infection.    Urine Culture - Urine, Urine, Catheter [[id number]]  (Abnormal) Collected: 10/09/23 1925    Lab Status: Preliminary result Specimen: Urine, Catheter Updated: 10/11/23 1048     Urine Culture <25,000 CFU/mL Gram Negative Bacilli    Narrative:      Colonization of the urinary tract without infection is common. Treatment is discouraged unless the patient is symptomatic, pregnant, or undergoing an invasive urologic procedure.            US Renal Bilateral    Result Date: 10/10/2023  US RENAL BILATERAL Date of Exam: 10/10/2023 10:34 AM CDT Indication: OCTAVIA on CKD, hx strictures. Please include post void residual as well. Comparison: CT abdomen pelvis 5/1/2023 Technique: Grayscale and color Doppler ultrasound evaluation of the kidneys and urinary bladder was performed. Findings: RIGHT kidney measures 12.2 x 5.4 x 5.8 cm.  Kidney echogenicity, size, and vascularity appear within normal limits. There is no solid kidney mass.  No echogenic shadowing stone.  No hydronephrosis. LEFT kidney measures 9.0 x 5.3 x 3.6 cm. There is left renal cortical atrophy. There is a left ureteral stent in place. There is no solid kidney mass.  No echogenic shadowing stone. There is moderate to severe left hydronephrosis. This appears to be similar to previous CT. Left  ureteral stent is noted within the urinary bladder. There is layering debris within the bladder. Prevoid volume: 415 cc Post void volume: 291 cc     Impression: 1.Persistent moderate to severe left-sided hydronephrosis with renal cortical atrophy. Left ureteral stent is noted. 2.Layering debris within the urinary bladder. 3.Post void residual of 291 cc Electronically Signed: Raheel Denney MD  10/10/2023 11:09 AM CDT  Workstation ID: [id number]    XR Chest 1 View    Result Date: 10/9/2023  XR CHEST 1 VW Date of Exam: 10/9/2023 7:10 PM EDT Indication: Weak/Dizzy/AMS triage protocol Comparison: 12/31/2020 Findings: Left subclavian transvenous pacemaker is unchanged. Heart size and pulmonary vessels are within normal limits. Right-sided PICC line has been removed. There is minimal linear atelectasis within the left lung base. There are coarsened interstitial markings in both lung bases unchanged from prior exam suggesting changes of chronic lung disease. No pleural effusion. No evidence pneumothorax.     Impression: 1. Minimal linear atelectasis in the left lung base. No acute cardiopulmonary disease identified. Electronically Signed: Rolly Callahan MD  10/9/2023 8:19 PM EDT  Workstation ID: [id number]    CT Head Without Contrast    Result Date: 10/9/2023  CT HEAD WO CONTRAST Date of Exam: 10/9/2023 6:52 PM EDT Indication: gen weakness. Comparison: Noncontrast head CT dated 6/30/2020 Technique: Axial CT images were obtained of the head without contrast administration.  Automated exposure control and iterative construction methods were used. FINDINGS:  Foci of periventricular and subcortical white matter hypoattenuation are consistent with chronic, microvascular ischemic change. There is age-related loss of brain parenchymal volume with prominence of sulcation and ventriculomegaly. There is mild encephalomalacia within the right centrum semiovale. No significant mass effect, midline shift, intracranial hemorrhage, or  hydrocephalus is identified. No extra-axial fluid collection is identified.   The calvarium and overlying soft tissues are unremarkable. The paranasal sinuses and bilateral mastoid air cells are adequately aerated. Bilateral lens prostheses are noted.     1.No acute intracranial abnormality is identified. 2.Findings compatible with chronic microvascular ischemic change and diffuse cortical atrophy. 3.Mild encephalomalacia within the right centrum semiovale. Electronically Signed: Ramon Paredes MD  10/9/2023 7:06 PM EDT  Workstation ID: [id number]     Results for orders placed during the hospital encounter of 08/22/19    Duplex Venous Lower Extremity - Bilateral CAR    Interpretation Summary  ú Acute right lower extremity deep vein thrombosis noted in the distal femoral and popliteal.  ú All other veins appeared normal bilaterally.      Results for orders placed during the hospital encounter of 08/22/19    Duplex Venous Lower Extremity - Bilateral CAR    Interpretation Summary  ú Acute right lower extremity deep vein thrombosis noted in the distal femoral and popliteal.  ú All other veins appeared normal bilaterally.      Results for orders placed in visit on 02/08/21    Adult Transthoracic Echo Complete W/ Cont if Necessary Per Protocol    Interpretation Summary  ú Left ventricular wall thickness is consistent with mild concentric hypertrophy.  ú Estimated left ventricular EF = 64% Left ventricular ejection fraction appears to be 61 - 65%. Left ventricular systolic function is normal.  ú Left ventricular diastolic function is consistent with (grade I) impaired relaxation.  ú The right ventricular cavity is mildly dilated.  ú The right atrial cavity is mildly dilated.  ú There is severe calcification of the aortic valve mainly affecting the non-coronary, left coronary and right coronary cusp(s).  ú Estimated right ventricular systolic pressure from tricuspid regurgitation is mildly elevated (35-45 mmHg).  ú Mild  pulmonary hypertension is present.      Plan for Follow-up of Pending Labs/Results: f/u w PCP  Pending Labs       Order Current Status    Blood Culture - Blood, Arm, Left Preliminary result    Blood Culture - Blood, Arm, Right Preliminary result    Urine Culture - Urine, Urine, Catheter Preliminary result          Discharge Details        Discharge Medications        ASK your doctor about these medications        Instructions Start Date   acetaminophen 500 MG tablet  Commonly known as: TYLENOL   1,000 mg, Every 6 Hours PRN      allopurinol 300 MG tablet  Commonly known as: ZYLOPRIM   300 mg, Oral, Nightly, To lower risk of gout      apixaban 2.5 MG tablet tablet  Commonly known as: Eliquis   2.5 mg, Oral, 2 Times Daily      aspirin 81 MG EC tablet   81 mg, Oral, Daily      bisoprolol 5 MG tablet  Commonly known as: ZEBeta   5 mg, Oral, Daily      Diclofenac Sodium 1 % gel gel  Commonly known as: VOLTAREN   2 g, Topical, 4 Times Daily      finasteride 5 MG tablet  Commonly known as: PROSCAR   TAKE ONE TABLET BY MOUTH DAILY      glimepiride 1 MG tablet  Commonly known as: AMARYL   1 mg, Oral, Every Morning Before Breakfast      HYDROcodone-acetaminophen 5-325 MG per tablet  Commonly known as: Norco   1 tablet, Oral, Every 12 Hours PRN      levothyroxine 50 MCG tablet  Commonly known as: SYNTHROID, LEVOTHROID   50 mcg, Oral, Daily      Mirabegron ER 50 MG tablet sustained-release 24 hour 24 hr tablet  Commonly known as: MYRBETRIQ   50 mg, Oral, Daily      nitroglycerin 0.4 MG SL tablet  Commonly known as: NITROSTAT   1 under the tongue as needed for angina, may repeat q5mins for up three doses      OCUVITE ADULT 50+ PO   1 capsule, Oral, Daily, OTC      Probiotic Daily capsule   1 capsule, Daily      silodosin 8 MG capsule capsule  Commonly known as: RAPAFLO   8 mg, Oral, Daily With Breakfast, To help with urination      SITagliptin 100 MG tablet  Commonly known as: Januvia   100 mg, Oral, Daily, For diabetes       Toujeo SoloStar 300 UNIT/ML solution pen-injector injection  Generic drug: Insulin Glargine (1 Unit Dial)   20 Units, Subcutaneous, Nightly               Allergies   Allergen Reactions    Metformin Diarrhea and GI Intolerance    Statins Diarrhea and Myalgia         Discharge Disposition:  Home-Health Care [facility]    Diet:  Hospital:  Diet Order   Procedures    Diet: Regular/House Diet, Cardiac Diets, Diabetic Diets; Healthy Heart (2-3 Na+); Consistent Carbohydrate; Texture: Regular Texture (IDDSI 7); Fluid Consistency: Thin (IDDSI 0)            Activity:  as tolerated    Restrictions or Other Recommendations:  none       CODE STATUS:    Code Status and Medical Interventions:   Ordered at: 10/09/23 2133     Medical Intervention Limits:    NO intubation (DNI)     Level Of Support Discussed With:    Patient     Code Status (Patient has no pulse and is not breathing):    No CPR (Do Not Attempt to Resuscitate)     Medical Interventions (Patient has pulse or is breathing):    Limited Support     Comments:    DNR/DNI       Future Appointments   Date Time Provider Department Center   10/23/2023  2:00 PM Kary Wen MD [facility] PC [facility]   11/13/2023  2:30 PM Win Saha MD [facility] CD BG R [facility]   11/13/2023  2:30 PM MGE BG CARD CUBA DEVICE CHECK MGE CD BG R [facility]   12/29/2023 10:00 AM Kary Wen MD [facility] PC [facility]   1/23/2024  1:30 PM Kary Wen MD [facility] PC [facility]                 Elizabeth Watts MD  10/11/23      Time Spent on Discharge:  I spent  45  minutes on this discharge activity which included: face-to-face encounter with the patient, reviewing the data in the system, coordination of the care with the nursing staff as well as consultants, documentation, and entering orders.

## 2023-10-11 NOTE — OUTREACH NOTE
Prep Survey      Flowsheet Row Responses   [facility] patient discharged from? May   Is LACE score < 7 ? No   Eligibility [facility]   Date of Admission 10/09/23   Date of Discharge 10/11/23   Discharge Disposition Home or Self Care   Discharge diagnosis Acute UTI (urinary tract infection)   Does the patient have one of the following disease processes/diagnoses(primary or secondary)? Other   Does the patient have Home health ordered? No   Is there a DME ordered? No   Prep survey completed? Yes            Carmelita CROOKS - Registered Nurse

## 2023-10-11 NOTE — PLAN OF CARE
Goal Outcome Evaluation:            AOx4, slept well between rounds, VSS, RA-2L at times, voiding spontaneously, no c/o of pain, NSR on monitor        Problem: Skin Injury Risk Increased  Goal: Skin Health and Integrity  Intervention: Optimize Skin Protection  Recent Flowsheet Documentation  Taken 10/11/2023 0421 by Mayra South RN  Pressure Reduction Techniques: frequent weight shift encouraged  Head of Bed (HOB) Positioning: HOB lowered  Pressure Reduction Devices: pressure-redistributing mattress utilized  Skin Protection: adhesive use limited  Taken 10/11/2023 0240 by Mayra South RN  Pressure Reduction Techniques: frequent weight shift encouraged  Head of Bed (HOB) Positioning: HOB lowered  Pressure Reduction Devices: pressure-redistributing mattress utilized  Skin Protection: adhesive use limited  Taken 10/11/2023 0010 by Mayra South RN  Pressure Reduction Techniques: frequent weight shift encouraged  Head of Bed (HOB) Positioning: HOB at 20-30 degrees  Pressure Reduction Devices: pressure-redistributing mattress utilized  Skin Protection: adhesive use limited  Taken 10/10/2023 2200 by Mayra South RN  Pressure Reduction Techniques: frequent weight shift encouraged  Head of Bed (HOB) Positioning: HOB at 20-30 degrees  Pressure Reduction Devices: pressure-redistributing mattress utilized  Skin Protection: adhesive use limited  Taken 10/10/2023 2119 by Mayra South RN  Pressure Reduction Techniques: frequent weight shift encouraged  Head of Bed (HOB) Positioning: HOB at 30-45 degrees  Pressure Reduction Devices: pressure-redistributing mattress utilized  Skin Protection: adhesive use limited     Problem: Skin Injury Risk Increased  Goal: Skin Health and Integrity  Outcome: Ongoing, Progressing  Intervention: Optimize Skin Protection  Recent Flowsheet Documentation  Taken 10/11/2023 0421 by Mayra South RN  Pressure Reduction Techniques: frequent weight shift encouraged  Head of Bed (HOB)  Positioning: HOB lowered  Pressure Reduction Devices: pressure-redistributing mattress utilized  Skin Protection: adhesive use limited  Taken 10/11/2023 0240 by Mayra South RN  Pressure Reduction Techniques: frequent weight shift encouraged  Head of Bed (HOB) Positioning: HOB lowered  Pressure Reduction Devices: pressure-redistributing mattress utilized  Skin Protection: adhesive use limited  Taken 10/11/2023 0010 by Mayra South RN  Pressure Reduction Techniques: frequent weight shift encouraged  Head of Bed (HOB) Positioning: HOB at 20-30 degrees  Pressure Reduction Devices: pressure-redistributing mattress utilized  Skin Protection: adhesive use limited  Taken 10/10/2023 2200 by Mayra South RN  Pressure Reduction Techniques: frequent weight shift encouraged  Head of Bed (HOB) Positioning: HOB at 20-30 degrees  Pressure Reduction Devices: pressure-redistributing mattress utilized  Skin Protection: adhesive use limited  Taken 10/10/2023 2119 by Mayra South RN  Pressure Reduction Techniques: frequent weight shift encouraged  Head of Bed (HOB) Positioning: HOB at 30-45 degrees  Pressure Reduction Devices: pressure-redistributing mattress utilized  Skin Protection: adhesive use limited     Problem: Fall Injury Risk  Goal: Absence of Fall and Fall-Related Injury  Intervention: Promote Injury-Free Environment  Recent Flowsheet Documentation  Taken 10/11/2023 0421 by Mayra South RN  Safety Promotion/Fall Prevention:   safety round/check completed   toileting scheduled   room organization consistent   nonskid shoes/slippers when out of bed   mobility aid in reach   lighting adjusted   gait belt   fall prevention program maintained   elopement precautions   clutter free environment maintained   activity supervised   assistive device/personal items within reach  Taken 10/11/2023 0240 by Mayra South RN  Safety Promotion/Fall Prevention:   safety round/check completed   toileting scheduled   room  organization consistent   mobility aid in reach   lighting adjusted   muscle strengthening facilitated   nonskid shoes/slippers when out of bed   gait belt   fall prevention program maintained   elopement precautions   clutter free environment maintained   assistive device/personal items within reach   activity supervised  Taken 10/11/2023 0010 by Mayra South, RN  Safety Promotion/Fall Prevention:   safety round/check completed   toileting scheduled   room organization consistent   nonskid shoes/slippers when out of bed   mobility aid in reach   lighting adjusted   gait belt   fall prevention program maintained   elopement precautions   clutter free environment maintained   activity supervised   assistive device/personal items within reach  Taken 10/10/2023 2200 by Mayra South, RN  Safety Promotion/Fall Prevention:   safety round/check completed   toileting scheduled   room organization consistent   nonskid shoes/slippers when out of bed   mobility aid in reach   gait belt   fall prevention program maintained   elopement precautions   clutter free environment maintained   assistive device/personal items within reach   activity supervised  Taken 10/10/2023 2119 by Mayra South, RN  Safety Promotion/Fall Prevention:   safety round/check completed   toileting scheduled   room organization consistent   nonskid shoes/slippers when out of bed   mobility aid in reach   lighting adjusted   gait belt   fall prevention program maintained   clutter free environment maintained   assistive device/personal items within reach   activity supervised

## 2023-10-11 NOTE — PLAN OF CARE
Goal Outcome Evaluation:  Plan of Care Reviewed With: patient, family        Progress: no change  Outcome Evaluation: Pt presents below her functional baseline with deficits including generalized weakness, impaired balance, mobility and ADLs warranting skilled OT services. Pt ambulated household distance with CGA for balance and safety with no LOB noted. Rec home with assist and home health OT at [place].      Anticipated Discharge Disposition (OT): home with assist, home with home health

## 2023-10-11 NOTE — THERAPY TREATMENT NOTE
Patient Name: Forrest Zepeda  : 1932    MRN: [id number]                              Today's Date: 10/11/2023       Admit Date: 10/9/2023    Visit Dx:     ICD-10-CM ICD-9-CM   1. Acute UTI  N39.0 599.0   2. Leukocytosis, unspecified type  D72.829 288.60   3. Generalized weakness  R53.1 780.79   4. Renal insufficiency  N28.9 593.9     Patient Active Problem List   Diagnosis    Essential hypertension    Generalized osteoarthritis    Diabetic neuropathy    Gout    HLD (hyperlipidemia)    Acquired hypothyroidism    Ureteral stricture    Hydronephrosis of left kidney    CKD (chronic kidney disease) stage 3, GFR 30-59 ml/min    LEHMAN (dyspnea on exertion)    IPF (idiopathic pulmonary fibrosis)    Symptomatic bradycardia    Hypoxemia requiring supplemental oxygen    History of pulmonary embolism    Sinus node dysfunction    Chronic fatigue    Urethral stricture    Paroxysmal atrial fibrillation    Type 2 diabetes mellitus with hyperglycemia, with long-term current use of insulin    Abnormal cardiovascular stress test    Pulmonary hypertension    Cardiac pacemaker in situ    CAD (coronary artery disease)    Diastolic congestive heart failure    Abnormal SPEP    Type 2 diabetes mellitus with stage 3b chronic kidney disease, with long-term current use of insulin    Hydronephrosis, left    Gross hematuria    Chronic heart failure with preserved ejection fraction (HFpEF)    Personal history of other infectious and parasitic diseases    Weakness    Acute UTI (urinary tract infection)    Lactic acidosis    Leukocytosis    Hypercalcemia    Elevated serum creatinine    UTI (urinary tract infection)     Past Medical History:   Diagnosis Date    Abnormal EKG     Abnormal PSA     Reported abnormal    Acute cystitis with hematuria 2023    Acute deep vein thrombosis (DVT) of right lower extremity 2019    Acute diverticulitis 2019    Acute hyperkalemia 2019    Acute respiratory failure with hypoxia     Acute saddle  "pulmonary embolism with acute cor pulmonale 08/22/2019    Acute systolic right heart failure 08/22/2019    Arthritis     KNEES,  BUT SINCE HAD REPLACEMENT    Benign localized hyperplasia of prostate     Bilateral knee pain     C. difficile diarrhea 2010    Cataracts, bilateral     CKD (chronic kidney disease)     Coronary artery disease     Diabetic neuropathy     Diastolic dysfunction     Disease of thyroid gland     HYPOTHYROIDISM    Diverticulitis     Dyslipidemia     H/O    Family history of malignant hyperthermia     Brother     Full dentures     Generalized weakness     History of ESBL E. coli infection     most recent 4-2022 f/u with ID Dr Johnson    History of gout     History of hepatitis A vaccination     History of nephrolithiasis     History of nuclear stress test     STATES IN THE 1990'S.  \"IT WAS OK\"    History of osteopenia     History of recurrent UTI (urinary tract infection)     Hydronephrosis of left kidney 07/18/2019    Hydronephrosis with renal and ureteral calculus obstruction 10/21/2019    Added automatically from request for surgery [id number]    Hydronephrosis with ureteral stricture     Hypercholesterolemia     Hyperkalemia     PT UNSURE REGARDING HX OF THIS    Hyperlipidemia     Hypertension     Hypothyroidism     Impaired functional mobility, balance, gait, and endurance     Insomnia     IPF (idiopathic pulmonary fibrosis)     per patient and son, dx at [facility], was told no treatment necessary, not to worry about it    On supplemental oxygen therapy     2L NC QHS    Other hydronephrosis 08/12/2019    Added automatically from request for surgery [id number]    Paroxysmal atrial fibrillation     Pulmonary hypertension, mild 02/2021    per echo per cardiology note 1/9/23, per pt's son, PCP does not agree with this dx    Renal insufficiency     Sepsis 2019    Shortness of breath     STATES WITH EXERTION, OTHERWISE, DENIES    Sigmoid diverticulitis without abscess or perforation 08/09/2017    Sinus " node dysfunction     Skin breakdown     fourth toe right foot noted in 2019 MD D/C note    Skin ulcer of fourth toe of right foot, limited to breakdown of skin 07/05/2019    Stricture of ureter     Type 2 diabetes mellitus     Ureteral stricture, left     UTI (urinary tract infection) 07/18/2019    Vitamin D deficiency     Wears glasses      Past Surgical History:   Procedure Laterality Date    APPENDECTOMY      CARDIAC ELECTROPHYSIOLOGY PROCEDURE N/A 12/23/2020    Procedure: Pacemaker DC new. DNS meds.;  Surgeon: Jimy Ledezma DO;  Location:  [facility];  Service: Cardiology;  Laterality: N/A;    CHOLECYSTECTOMY      CIRCUMCISION N/A 10/23/2019    Procedure: CIRCUMCISIOn-DORSAL SLIT;  Surgeon: Griffin Romero MD;  Location:  [facility];  Service: Urology    COLONOSCOPY      CYSTOSCOPY RETROGRADE PYELOGRAM Left 06/17/2022    Procedure: CYSTOSCOPY RETROGRADE PYELOGRAM;  Surgeon: Ryne Mccann MD;  Location:  [facility];  Service: Urology;  Laterality: Left;    CYSTOSCOPY URETEROSCOPY Left 02/11/2019    Procedure: URETEROSCOPY WITH STENT EXTRACTION, RPG;  Surgeon: Griffin Romero MD;  Location:  [facility];  Service: Urology    CYSTOSCOPY W/ URETERAL STENT PLACEMENT Left 07/20/2019    Procedure: CYSTOSCOPY, LEFT RETROGRADE PYELOGRAM,  ATTEMPTED LEFT URETERAL STENT INSERTION;  Surgeon: Isaac Flynn MD;  Location:  [facility];  Service: Urology    CYSTOSCOPY W/ URETERAL STENT PLACEMENT Left 06/17/2022    Procedure: CYSTOSCOPY URETERAL CATHETER/STENT INSERTION;  Surgeon: Ryne Mccann MD;  Location:  [facility];  Service: Urology;  Laterality: Left;    CYSTOSCOPY W/ URETERAL STENT PLACEMENT Left 01/27/2023    Procedure: CYSTOSCOPY URETERAL CATHETER/STENT INSERTION;  Surgeon: Deanne Pitts MD;  Location:  [facility];  Service: Urology;  Laterality: Left;    CYSTOSCOPY, URETEROSCOPY, RETROGRADE PYELOGRAM, STONE EXTRACTION, STENT INSERTION Left 06/15/2023    Procedure: URETEROSCOPY, RETROGRADE  PYELOGRAM, STENT INSERTION;  Surgeon: Deanne Pitts MD;  Location: [facility];  Service: Urology;  Laterality: Left;    EYE SURGERY      CATARACTS REMOVED BILATERALLY    JOINT REPLACEMENT      Bilateral knees    KNEE ARTHROPLASTY Bilateral     KNEE ARTHROSCOPY Bilateral     RENAL ARTERY STENT      SEVERAL TIMES EVERY SINCE 1978    URETERAL STENT INSERTION  10/2020    URETEROSCOPY LASER LITHOTRIPSY WITH STENT INSERTION Left 01/10/2018    Procedure:  cysto, left ureteral stent replacement ;  Surgeon: Griffin Romero MD;  Location: [facility] OR;  Service:     URETEROSCOPY LASER LITHOTRIPSY WITH STENT INSERTION Left 08/14/2019    Procedure: URETEROSCOPY, STONE REMOVAL WITH STENT INSERTION;  Surgeon: Griffin Romero MD;  Location: [facility] OR;  Service: Urology    URETEROSCOPY LASER LITHOTRIPSY WITH STENT INSERTION Left 10/23/2019    Procedure: Left URETEROSCOPY WITH STENT INSERTION-LEFT;  Surgeon: Griffin Romero MD;  Location: [facility] OR;  Service: Urology      General Information       Row Name 10/11/23 1017          Physical Therapy Time and Intention    Document Type therapy note (daily note)  -CM     Mode of Treatment physical therapy;individual therapy  -CM       Row Name 10/11/23 1017          General Information    Patient Profile Reviewed yes  -CM     Existing Precautions/Restrictions fall;oxygen therapy device and L/min  -CM     Barriers to Rehab medically complex;previous functional deficit  -CM       Row Name 10/11/23 1017          Cognition    Orientation Status (Cognition) oriented x 4  -CM       Row Name 10/11/23 1017          Safety Issues, Functional Mobility    Safety Issues Affecting Function (Mobility) awareness of need for assistance;insight into deficits/self-awareness;safety precaution awareness;safety precautions follow-through/compliance  -CM     Impairments Affecting Function (Mobility) balance;endurance/activity tolerance;shortness of breath;strength  -CM               User Key  (r) = Recorded  By, (t) = Taken By, (c) = Cosigned By      Initials Name Provider Type    CM Kari Mortensen, PT Physical Therapist                   Mobility       Row Name 10/11/23 1018          Bed Mobility    Bed Mobility rolling left;rolling right;supine-sit  -CM     Rolling Left Bell (Bed Mobility) modified independence  -CM     Rolling Right Bell (Bed Mobility) modified independence  -CM     Supine-Sit Bell (Bed Mobility) modified independence  -CM     Assistive Device (Bed Mobility) bed rails  -CM     Comment, (Bed Mobility) rolling performed to don brief, patient performs without difficulty  -CM       Row Name 10/11/23 1018          Sit-Stand Transfer    Sit-Stand Bell (Transfers) contact guard  -CM     Assistive Device (Sit-Stand Transfers) cane, straight  -CM     Comment, (Sit-Stand Transfer) performed from both EOB and chair, patient continues to utilize momentum safely  -CM       Row Name 10/11/23 1018          Gait/Stairs (Locomotion)    Bell Level (Gait) contact guard;1 person assist;verbal cues  -CM     Assistive Device (Gait) cane, straight  -CM     Distance in Feet (Gait) 320  -CM     Deviations/Abnormal Patterns (Gait) bilateral deviations;melvin decreased;festinating/shuffling;gait speed decreased;stride length decreased  -CM     Bilateral Gait Deviations forward flexed posture;heel strike decreased  -CM     Comment, (Gait/Stairs) Patient ambulated in el with SPC today. He requires cues for upright posture and PLB. His sequencing with the SPC is good. He had 1 minor deviation to his linear path that therapist provided Alyce to correct although not necessarily a loss in balance. Patient becamce very fatigued with desat to 87% on RA after about 250'. He completed remained ambulation on 2L O2 with labored breathing and progressively forward flexed posture. Required increased time to recover.  and O2 sat at 87% on 2L O2 following ambulation.  -CM                User Key  (r) = Recorded By, (t) = Taken By, (c) = Cosigned By      Initials Name Provider Type    Kari Painter PT Physical Therapist                   Obj/Interventions       Row Name 10/11/23 1023          Motor Skills    Therapeutic Exercise other (see comments)  deferred as patient very fatigued following ambulation  -       Row Name 10/11/23 1023          Balance    Balance Assessment sitting static balance;standing static balance;standing dynamic balance  -CM     Static Sitting Balance independent  -CM     Position, Sitting Balance unsupported;sitting edge of bed  -CM     Static Standing Balance contact guard  -CM     Dynamic Standing Balance contact guard  -CM     Position/Device Used, Standing Balance supported;cane, straight  -CM               User Key  (r) = Recorded By, (t) = Taken By, (c) = Cosigned By      Initials Name Provider Type    Kari Painter PT Physical Therapist                   Goals/Plan    No documentation.                  Clinical Impression       Row Name 10/11/23 1024          Pain    Pretreatment Pain Rating 0/10 - no pain  -CM     Posttreatment Pain Rating 0/10 - no pain  -CM       Row Name 10/11/23 1024          Plan of Care Review    Plan of Care Reviewed With patient;family  -CM     Progress improving  -CM     Outcome Evaluation Patient able to progress ambulation distance to 320' CGA with use of SPC today. He continues to be limited in his mobility by SOA and had desat to 87% on RA with ambulation today requiring 2L O2 to complete ambulation and several minutes to recover following ambulation. IPPT remains indicated to address current balance and endurance deficits. Still believe patient would benefit from HHPT at D/C.  -       Row Name 10/11/23 1024          Vital Signs    Pre Systolic BP Rehab 133  -CM     Pre Treatment Diastolic BP 56  -CM     Pretreatment Heart Rate (beats/min) 65  -CM     Intratreatment Heart Rate (beats/min) 125  -CM      Posttreatment Heart Rate (beats/min) 118  -CM     Pre SpO2 (%) 97  -CM     O2 Delivery Pre Treatment nasal cannula  -CM     Intra SpO2 (%) 87  -CM     O2 Delivery Intra Treatment nasal cannula  -CM     Post SpO2 (%) 95  -CM     O2 Delivery Post Treatment nasal cannula  -CM     Pre Patient Position Supine  -CM     Intra Patient Position Standing  -CM     Post Patient Position Sitting  -CM       Row Name 10/11/23 1024          Positioning and Restraints    Pre-Treatment Position in bed  -CM     Post Treatment Position chair  -CM     In Chair reclined;call light within reach;encouraged to call for assist;exit alarm on;with family/caregiver;waffle cushion;notified nsg  -CM               User Key  (r) = Recorded By, (t) = Taken By, (c) = Cosigned By      Initials Name Provider Type    Kari Painter PT Physical Therapist                   Outcome Measures       Row Name 10/11/23 1027          How much help from another person do you currently need...    Turning from your back to your side while in flat bed without using bedrails? 4  -CM     Moving from lying on back to sitting on the side of a flat bed without bedrails? 4  -CM     Moving to and from a bed to a chair (including a wheelchair)? 3  -CM     Standing up from a chair using your arms (e.g., wheelchair, bedside chair)? 3  -CM     Climbing 3-5 steps with a railing? 3  -CM     To walk in hospital room? 3  -CM     AM-PAC 6 Clicks Score (PT) 20  -CM     Highest level of mobility 6 --> Walked 10 steps or more  -CM       Row Name 10/11/23 1027          Functional Assessment    Outcome Measure Options AM-PAC 6 Clicks Basic Mobility (PT)  -CM               User Key  (r) = Recorded By, (t) = Taken By, (c) = Cosigned By      Initials Name Provider Type    Kari Painter PT Physical Therapist                                 Physical Therapy Education       Title: PT OT SLP Therapies (In Progress)       Topic: Physical Therapy (In Progress)       Point:  Mobility training (Done)       Learning Progress Summary             Patient Acceptance, E, VU by CM at 10/11/2023 1027    Acceptance, E, VU by CM at 10/10/2023 1452   Family Acceptance, E, VU by CM at 10/10/2023 1452                         Point: Home exercise program (Not Started)       Learner Progress:  Not documented in this visit.              Point: Body mechanics (Done)       Learning Progress Summary             Patient Acceptance, E, VU by CM at 10/11/2023 1027    Acceptance, E, VU by CM at 10/10/2023 1452   Family Acceptance, E, VU by CM at 10/10/2023 1452                         Point: Precautions (Done)       Learning Progress Summary             Patient Acceptance, E, VU by CM at 10/11/2023 1027    Acceptance, E, VU by CM at 10/10/2023 1452   Family Acceptance, E, VU by CM at 10/10/2023 1452                                         User Key       Initials Effective Dates Name Provider Type Discipline     09/22/22 -  Kari Mortensen, PT Physical Therapist PT                  PT Recommendation and Plan  Planned Therapy Interventions (PT): balance training, bed mobility training, gait training, home exercise program, stretching, strengthening, stair training, ROM (range of motion), postural re-education, patient/family education, transfer training  Plan of Care Reviewed With: patient, family  Progress: improving  Outcome Evaluation: Patient able to progress ambulation distance to 320' CGA with use of SPC today. He continues to be limited in his mobility by SOA and had desat to 87% on RA with ambulation today requiring 2L O2 to complete ambulation and several minutes to recover following ambulation. IPPT remains indicated to address current balance and endurance deficits. Still believe patient would benefit from HHPT at D/C.     Time Calculation:   PT Evaluation Complexity  History, PT Evaluation Complexity: 3 or more personal factors and/or comorbidities  Examination of Body Systems (PT Eval  Complexity): total of 3 or more elements  Clinical Presentation (PT Evaluation Complexity): stable  Clinical Decision Making (PT Evaluation Complexity): low complexity  Overall Complexity (PT Evaluation Complexity): low complexity     PT Charges       Row Name 10/11/23 1028             Time Calculation    Start Time 0944  -CM      PT Received On 10/11/23  -CM      PT Goal Re-Cert Due Date 10/20/23  -CM         Timed Charges    27332 - PT Therapeutic Activity Minutes 28  -CM         Total Minutes    Timed Charges Total Minutes 28  -CM       Total Minutes 28  -CM                User Key  (r) = Recorded By, (t) = Taken By, (c) = Cosigned By      Initials Name Provider Type    CM Kari Mortensen, PT Physical Therapist                  Therapy Charges for Today       Code Description Service Date Service Provider Modifiers Qty    41486905254 HC GAIT TRAINING EA 15 MIN 10/10/2023 Kari Mortensen, PT GP 1    53305565108 HC PT EVAL LOW COMPLEXITY 4 10/10/2023 Kari Mortensen, PT GP 1    98881420537 HC PT THERAPEUTIC ACT EA 15 MIN 10/11/2023 Kari Mortensen, PT GP 2            PT G-Codes  Outcome Measure Options: AM-PAC 6 Clicks Basic Mobility (PT)  AM-PAC 6 Clicks Score (PT): 20  PT Discharge Summary  Anticipated Discharge Disposition (PT): home with assist, home with home health    Kari Mortensen, PT  10/11/2023

## 2023-10-11 NOTE — PLAN OF CARE
Goal Outcome Evaluation:  Plan of Care Reviewed With: patient, family        Progress: improving  Outcome Evaluation: Patient able to progress ambulation distance to 320' CGA with use of SPC today. He continues to be limited in his mobility by SOA and had desat to 87% on RA with ambulation today requiring 2L O2 to complete ambulation and several minutes to recover following ambulation. IPPT remains indicated to address current balance and endurance deficits. Still believe patient would benefit from HHPT at D/C.      Anticipated Discharge Disposition (PT): home with assist, home with home health

## 2023-10-12 ENCOUNTER — TRANSITIONAL CARE MANAGEMENT TELEPHONE ENCOUNTER (OUTPATIENT)
Dept: CALL CENTER | Facility: HOSPITAL | Age: 88
End: 2023-10-12
Payer: MEDICARE

## 2023-10-12 LAB
BACTERIA SPEC AEROBE CULT: ABNORMAL
BACTERIA SPEC AEROBE CULT: ABNORMAL

## 2023-10-12 NOTE — OUTREACH NOTE
Call Center TCM Note      Flowsheet Row Responses   [facility] patient discharged from? [place]   Does the patient have one of the following disease processes/diagnoses(primary or secondary)? Other   TCM attempt successful? Yes   Call start time 1145   Call end time 1147   Discharge diagnosis Acute UTI (urinary tract infection)   Is patient permission given to speak with other caregiver? Yes   List who call center can speak with Grandchild- Erika   Person spoke with today (if not patient) and relationship Grandchild   Meds reviewed with patient/caregiver? Yes   Is the patient having any side effects they believe may be caused by any medication additions or changes? No   Does the patient have all medications ordered at discharge? Yes   Is the patient taking all medications as directed (includes completed medication regime)? Yes   Comments Hospital f/u with PCP Dr. Wen on 10/23   Does the patient have an appointment with their PCP within 7-14 days of discharge? Yes   Has home health visited the patient within 72 hours of discharge? N/A   Psychosocial issues? No   Did the patient receive a copy of their discharge instructions? Yes   Nursing interventions Reviewed instructions with patient   What is the patient's perception of their health status since discharge? Improving   Is the patient/caregiver able to teach back signs and symptoms related to disease process for when to call PCP? Yes   Is the patient/caregiver able to teach back signs and symptoms related to disease process for when to call 911? Yes   Is the patient/caregiver able to teach back the hierarchy of who to call/visit for symptoms/problems? PCP, Specialist, Home health nurse, Urgent Care, ED, 911 Yes   TCM call completed? Yes   Wrap up additional comments Per lucie, patient is doing well, no qeustions, confirmed hospital f/u appt with PCP for 10/23.   Call end time 1147   Would this patient benefit from a Referral to [facility] Social Work? No    Is the patient interested in additional calls from an ambulatory ? No            Leidy Sherman RN    10/12/2023, 11:47 EDT

## 2023-10-14 LAB
BACTERIA SPEC AEROBE CULT: NORMAL
BACTERIA SPEC AEROBE CULT: NORMAL

## 2023-10-18 LAB
QT INTERVAL: 434 MS
QTC INTERVAL: 484 MS

## 2023-10-23 ENCOUNTER — OFFICE VISIT (OUTPATIENT)
Dept: INTERNAL MEDICINE | Facility: CLINIC | Age: 88
End: 2023-10-23
Payer: MEDICARE

## 2023-10-23 VITALS
HEART RATE: 60 BPM | HEIGHT: 69 IN | RESPIRATION RATE: 16 BRPM | DIASTOLIC BLOOD PRESSURE: 70 MMHG | WEIGHT: 204.4 LBS | TEMPERATURE: 97.5 F | SYSTOLIC BLOOD PRESSURE: 116 MMHG | BODY MASS INDEX: 30.27 KG/M2 | OXYGEN SATURATION: 97 %

## 2023-10-23 DIAGNOSIS — E11.65 TYPE 2 DIABETES MELLITUS WITH HYPERGLYCEMIA, WITH LONG-TERM CURRENT USE OF INSULIN: ICD-10-CM

## 2023-10-23 DIAGNOSIS — N39.0 RECURRENT UTI (URINARY TRACT INFECTION): Primary | ICD-10-CM

## 2023-10-23 DIAGNOSIS — Z23 NEED FOR INFLUENZA VACCINATION: ICD-10-CM

## 2023-10-23 DIAGNOSIS — N39.0 URINARY TRACT INFECTION WITH HEMATURIA, SITE UNSPECIFIED: ICD-10-CM

## 2023-10-23 DIAGNOSIS — M54.50 CHRONIC LOW BACK PAIN WITHOUT SCIATICA, UNSPECIFIED BACK PAIN LATERALITY: ICD-10-CM

## 2023-10-23 DIAGNOSIS — R31.9 URINARY TRACT INFECTION WITH HEMATURIA, SITE UNSPECIFIED: ICD-10-CM

## 2023-10-23 DIAGNOSIS — Z22.358 ESBL E. COLI CARRIER: ICD-10-CM

## 2023-10-23 DIAGNOSIS — R53.1 WEAKNESS: ICD-10-CM

## 2023-10-23 DIAGNOSIS — R80.9 MICROALBUMINURIA: ICD-10-CM

## 2023-10-23 DIAGNOSIS — Z79.4 TYPE 2 DIABETES MELLITUS WITH HYPERGLYCEMIA, WITH LONG-TERM CURRENT USE OF INSULIN: ICD-10-CM

## 2023-10-23 DIAGNOSIS — Z09 HOSPITAL DISCHARGE FOLLOW-UP: ICD-10-CM

## 2023-10-23 DIAGNOSIS — G89.29 CHRONIC LOW BACK PAIN WITHOUT SCIATICA, UNSPECIFIED BACK PAIN LATERALITY: ICD-10-CM

## 2023-10-23 LAB
BILIRUB BLD-MCNC: NEGATIVE MG/DL
CLARITY, POC: ABNORMAL
COLOR UR: YELLOW
EXPIRATION DATE: ABNORMAL
EXPIRATION DATE: NORMAL
GLUCOSE UR STRIP-MCNC: NEGATIVE MG/DL
KETONES UR QL: NEGATIVE
LEUKOCYTE EST, POC: ABNORMAL
Lab: ABNORMAL
Lab: NORMAL
NITRITE UR-MCNC: NEGATIVE MG/ML
PH UR: 6 [PH] (ref 5–8)
POC CREATININE URINE: 200
POC MICROALBUMIN URINE: 150
PROT UR STRIP-MCNC: ABNORMAL MG/DL
RBC # UR STRIP: ABNORMAL /UL
SP GR UR: 1.02 (ref 1–1.03)
UROBILINOGEN UR QL: NORMAL

## 2023-10-23 RX ORDER — HYDROCODONE BITARTRATE AND ACETAMINOPHEN 5; 325 MG/1; MG/1
1 TABLET ORAL EVERY 12 HOURS PRN
Qty: 60 TABLET | Refills: 0 | Status: SHIPPED | OUTPATIENT
Start: 2023-10-23

## 2023-10-23 RX ORDER — SULFAMETHOXAZOLE AND TRIMETHOPRIM 800; 160 MG/1; MG/1
1 TABLET ORAL 2 TIMES DAILY
Qty: 28 TABLET | Refills: 0 | Status: SHIPPED | OUTPATIENT
Start: 2023-10-23 | End: 2023-11-06

## 2023-10-23 NOTE — PROGRESS NOTES
Transitional Care Follow Up Visit  Subjective     Forrest Zepeda is a 89 y.o. male who presents for a transitional care management visit.    Within 48 business hours after discharge our office contacted him via telephone to coordinate his care and needs.      I reviewed and discussed the details of that call along with the discharge summary, hospital problems, inpatient lab results, inpatient diagnostic studies, and consultation reports with Forrest.     Current outpatient and discharge medications have been reconciled for the patient.  Reviewed by: Kary Wen MD          10/11/2023     5:41 PM   Date of TCM Phone Call   [facility]   Date of Admission 10/9/2023   Date of Discharge 10/11/2023   Discharge Disposition Home or Self Care     Risk for Readmission (LACE) Score: 9 (10/11/2023  6:00 AM)      History of Present Illness  Urology follow up wasn't scheduled at discharge.  Here with family member who assists with care  Pain med helped x 2 weeks, was taking half pill  Was able to walk to mailbox, more mobile when it was helping  Now feels half pill not helping.    Continues to decline physical therapy.    Keeping log of blood pressure, heart rate, sugars, oxygen level as he normally does. See scanned media.     Course During Hospital Stay:  per discharge summary, reviewed:  Discharge Summary by Elizabeth Watts MD (10/11/2023 13:01)      The following portions of the patient's history were reviewed and updated as appropriate: allergies, current medications, past family history, past medical history, past social history, past surgical history, and problem list.    Review of Systems    Objective   Physical Exam  Vitals and nursing note reviewed.   Constitutional:       General: He is not in acute distress.     Appearance: Normal appearance. He is well-developed and well-groomed. He is not ill-appearing, toxic-appearing or diaphoretic.   HENT:      Head: Normocephalic and atraumatic.       Right Ear: Hearing normal.      Left Ear: Hearing normal.   Eyes:      General: Lids are normal. No scleral icterus.        Right eye: No discharge.         Left eye: No discharge.      Extraocular Movements: Extraocular movements intact.   Pulmonary:      Effort: Pulmonary effort is normal.   Musculoskeletal:      Cervical back: Neck supple.   Skin:     Coloration: Skin is not jaundiced or pale.   Neurological:      General: No focal deficit present.      Mental Status: He is alert and oriented to person, place, and time.      Gait: Gait abnormal (slow, using cane, a bit unsteady).   Psychiatric:         Attention and Perception: Attention and perception normal.         Mood and Affect: Mood and affect normal.         Speech: Speech normal.         Behavior: Behavior normal. Behavior is cooperative.         Thought Content: Thought content normal.         Cognition and Memory: Cognition and memory normal.         Judgment: Judgment normal.         Lab Results   Component Value Date    HGBA1C 7.30 (H) 10/09/2023    HGBA1C 7.6 09/11/2023    HGBA1C 7.30 (H) 05/02/2023      Urine Culture - Urine, Urine, Catheter (10/09/2023 19:25)     Office Visit on 10/23/2023   Component Date Value Ref Range Status    Color 10/23/2023 Yellow  Yellow, Straw, Dark Yellow, Sushila Final    Clarity, UA 10/23/2023 Turbid (A)  Clear Final    Specific Gravity  10/23/2023 1.020  1.005 - 1.030 Final    pH, Urine 10/23/2023 6.0  5.0 - 8.0 Final    Leukocytes 10/23/2023 Large (3+) (A)  Negative Final    Nitrite, UA 10/23/2023 Negative  Negative Final    Protein, POC 10/23/2023 1+ (A)  Negative mg/dL Final    Glucose, UA 10/23/2023 Negative  Negative mg/dL Final    Ketones, UA 10/23/2023 Negative  Negative Final    Urobilinogen, UA 10/23/2023 Normal  Normal, 0.2 E.U./dL Final    Bilirubin 10/23/2023 Negative  Negative Final    Blood, UA 10/23/2023 3+ (A)  Negative Final    Lot Number 10/23/2023 [id number]   Final    Expiration Date 10/23/2023  10/25/2024   Final    Microalbumin, Urine 10/23/2023 150   Final    >300 mg/g    Creatinine, Urine 10/23/2023 200   Final    Lot Number 10/23/2023 [id number]   Final    Expiration Date 10/23/2023 09/30/2024   Final        Creatinine Clearance (Cockcroft-Gault Equation) from [email or web address]  on 10/23/2023  ** All calculations should be rechecked by clinician prior to use **    RESULT SUMMARY:  41 mL/min  Creatinine clearance, original Cockcroft-Gault      INPUTS:  Sex --> 0 = Male  Age --> 89 years  Weight --> 204 lbs  Creatinine --> 1.55 mg/dL    Assessment & Plan   Diagnoses and all orders for this visit:    1. Recurrent UTI (urinary tract infection) (Primary)  -     sulfamethoxazole-trimethoprim (Bactrim DS) 800-160 MG per tablet; Take 1 tablet by mouth 2 (Two) Times a Day for 14 days.  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  Dispense: 28 tablet; Refill: 0  -     Urine Culture - , Urine, Clean Catch    3. Urinary tract infection with hematuria, site unspecified  -     POCT urinalysis dipstick, automated    4. Chronic low back pain without sciatica, unspecified back pain laterality  -     HYDROcodone-acetaminophen (Norco) 5-325 MG per tablet; Take 1 tablet by mouth Every 12 (Twelve) Hours As Needed for Severe Pain.  Dispense: 60 tablet; Refill: 0    5. Hospital discharge follow-up    6. Need for influenza vaccination  -     Fluzone High-Dose 65+yrs (2346-0938)    7. Weakness  Comments:  he continues to decline physical therapy    8. Type 2 diabetes mellitus with hyperglycemia, with long-term current use of insulin  -     POCT microalbumin    9. Microalbuminuria  Comments:  has degree of ckd, monitor      Family member will call urology to schedule follow up  Discussed urine culture results, urine today is cloudy and turbid, discussed  ESBL, has failed Macrobid, sent bactrim, discussed renally dosing, based on CrCl he is okay to take standard dose.  If he changes mind on physical therapy let us know.  Okay to take full tablet of pain med watch for side effects including sedation and/or constipation.

## 2023-10-25 LAB
BACTERIA UR CULT: NORMAL
BACTERIA UR CULT: NORMAL

## 2023-11-02 DIAGNOSIS — N32.81 OAB (OVERACTIVE BLADDER): Primary | ICD-10-CM

## 2023-11-02 RX ORDER — MIRABEGRON 50 MG/1
50 TABLET, FILM COATED, EXTENDED RELEASE ORAL DAILY
Qty: 30 TABLET | OUTPATIENT
Start: 2023-11-02

## 2023-11-02 NOTE — TELEPHONE ENCOUNTER
Rx Refill Note  Requested Prescriptions     Pending Prescriptions Disp Refills    Myrbetriq 50 MG tablet sustained-release 24 hour 24 hr tablet [Pharmacy Med Name: MYRBETRIQ ER 50 MG TABLET] 30 tablet      Sig: TAKE ONE TABLET BY MOUTH DAILY      Last office visit with prescribing clinician: 5/25/2023   Last telemedicine visit with prescribing clinician: Visit date not found   Next office visit with prescribing clinician: Visit date not found     Sadie Naqvi MA  11/02/23, 08:03 EDT

## 2023-11-13 ENCOUNTER — OFFICE VISIT (OUTPATIENT)
Dept: CARDIOLOGY | Facility: CLINIC | Age: 88
End: 2023-11-13
Payer: MEDICARE

## 2023-11-13 VITALS
DIASTOLIC BLOOD PRESSURE: 62 MMHG | WEIGHT: 202 LBS | HEART RATE: 64 BPM | BODY MASS INDEX: 29.92 KG/M2 | SYSTOLIC BLOOD PRESSURE: 104 MMHG | OXYGEN SATURATION: 92 % | HEIGHT: 69 IN

## 2023-11-13 DIAGNOSIS — Z95.0 CARDIAC PACEMAKER IN SITU: ICD-10-CM

## 2023-11-13 DIAGNOSIS — N18.32 TYPE 2 DIABETES MELLITUS WITH STAGE 3B CHRONIC KIDNEY DISEASE, WITH LONG-TERM CURRENT USE OF INSULIN: ICD-10-CM

## 2023-11-13 DIAGNOSIS — E11.65 TYPE 2 DIABETES MELLITUS WITH HYPERGLYCEMIA, WITH LONG-TERM CURRENT USE OF INSULIN: ICD-10-CM

## 2023-11-13 DIAGNOSIS — E11.22 TYPE 2 DIABETES MELLITUS WITH STAGE 3B CHRONIC KIDNEY DISEASE, WITH LONG-TERM CURRENT USE OF INSULIN: ICD-10-CM

## 2023-11-13 DIAGNOSIS — J84.112 IPF (IDIOPATHIC PULMONARY FIBROSIS): ICD-10-CM

## 2023-11-13 DIAGNOSIS — I49.5 SINUS NODE DYSFUNCTION: ICD-10-CM

## 2023-11-13 DIAGNOSIS — I48.0 PAROXYSMAL ATRIAL FIBRILLATION: ICD-10-CM

## 2023-11-13 DIAGNOSIS — Z79.4 TYPE 2 DIABETES MELLITUS WITH HYPERGLYCEMIA, WITH LONG-TERM CURRENT USE OF INSULIN: ICD-10-CM

## 2023-11-13 DIAGNOSIS — I10 ESSENTIAL HYPERTENSION: ICD-10-CM

## 2023-11-13 DIAGNOSIS — I25.10 CORONARY ARTERY DISEASE INVOLVING NATIVE CORONARY ARTERY OF NATIVE HEART WITHOUT ANGINA PECTORIS: ICD-10-CM

## 2023-11-13 DIAGNOSIS — I27.20 PULMONARY HYPERTENSION: ICD-10-CM

## 2023-11-13 DIAGNOSIS — I50.32 CHRONIC HEART FAILURE WITH PRESERVED EJECTION FRACTION (HFPEF): ICD-10-CM

## 2023-11-13 DIAGNOSIS — R55 VASOVAGAL SYNCOPE: Primary | ICD-10-CM

## 2023-11-13 DIAGNOSIS — Z79.4 TYPE 2 DIABETES MELLITUS WITH STAGE 3B CHRONIC KIDNEY DISEASE, WITH LONG-TERM CURRENT USE OF INSULIN: ICD-10-CM

## 2023-11-13 PROCEDURE — 1160F RVW MEDS BY RX/DR IN RCRD: CPT | Performed by: INTERNAL MEDICINE

## 2023-11-13 PROCEDURE — 99214 OFFICE O/P EST MOD 30 MIN: CPT | Performed by: INTERNAL MEDICINE

## 2023-11-13 PROCEDURE — 1159F MED LIST DOCD IN RCRD: CPT | Performed by: INTERNAL MEDICINE

## 2023-11-13 NOTE — PROGRESS NOTES
"    Subjective:     Encounter Date:11/13/2023      Patient ID: Forrest Zepeda is a 89 y.o. male.    Chief Complaint: Hypertension  HPI  This is a 89-year-old male patient who presents to cardiology clinic for routine follow-up.  The patient indicates that he recently had an episode of brief loss of consciousness lasting less than 1 minute after rising from a recumbent position.  He indicates he had just awakened from sleep and shortly after standing up he felt extremely dizzy and \"passed out\" falling back into bed.  He did not seek medical attention.  He reports that his home blood pressures have been recorded to be low with occasional systolic blood pressures of 80 mmHg.  He reports compliance with his medications with no perceived side effects.  He remains a non-smoker.  The following portions of the patient's history were reviewed and updated as appropriate: allergies, current medications, past family history, past medical history, past social history, past surgical history and problem  Review of Systems   Constitutional: Negative for chills, diaphoresis, fever, malaise/fatigue, weight gain and weight loss.   HENT:  Negative for ear discharge, hearing loss, hoarse voice and nosebleeds.    Eyes:  Negative for discharge, double vision, pain and photophobia.   Cardiovascular:  Positive for syncope. Negative for chest pain, claudication, cyanosis, dyspnea on exertion, irregular heartbeat, leg swelling, near-syncope, orthopnea, palpitations and paroxysmal nocturnal dyspnea.   Respiratory:  Negative for cough, hemoptysis, sputum production and wheezing.    Endocrine: Negative for cold intolerance, heat intolerance, polydipsia, polyphagia and polyuria.   Hematologic/Lymphatic: Negative for adenopathy and bleeding problem. Does not bruise/bleed easily.   Skin:  Negative for color change, flushing, itching and rash.   Musculoskeletal:  Negative for muscle cramps, muscle weakness, myalgias and stiffness. "   Gastrointestinal:  Negative for abdominal pain, diarrhea, hematemesis, hematochezia, nausea and vomiting.   Genitourinary:  Negative for dysuria, frequency and nocturia.   Neurological:  Negative for focal weakness, loss of balance, numbness, paresthesias and seizures.   Psychiatric/Behavioral:  Negative for altered mental status, hallucinations and suicidal ideas.    Allergic/Immunologic: Negative for HIV exposure, hives and persistent infections.           Current Outpatient Medications:     allopurinol (ZYLOPRIM) 300 MG tablet, Take 1 tablet by mouth Every Night. To lower risk of gout, Disp: 90 tablet, Rfl: 3    apixaban (Eliquis) 2.5 MG tablet tablet, Take 1 tablet by mouth 2 (Two) Times a Day., Disp: 180 tablet, Rfl: 3    aspirin 81 MG EC tablet, Take 1 tablet by mouth Daily. (Patient taking differently: Take 1 tablet by mouth Daily. OTC), Disp: 90 tablet, Rfl: 3    bisoprolol (ZEBeta) 5 MG tablet, Take 1 tablet by mouth Daily., Disp: 90 tablet, Rfl: 3    Diclofenac Sodium (VOLTAREN) 1 % gel gel, Apply 2 g topically to the appropriate area as directed 4 (Four) Times a Day., Disp: 100 g, Rfl: 0    finasteride (PROSCAR) 5 MG tablet, TAKE ONE TABLET BY MOUTH DAILY, Disp: 90 tablet, Rfl: 3    glimepiride (AMARYL) 1 MG tablet, Take 1 tablet by mouth Every Morning Before Breakfast. Indications: Type 2 Diabetes, Disp: 90 tablet, Rfl: 3    HYDROcodone-acetaminophen (Norco) 5-325 MG per tablet, Take 1 tablet by mouth Every 12 (Twelve) Hours As Needed for Severe Pain., Disp: 60 tablet, Rfl: 0    Insulin Glargine, 1 Unit Dial, (Toujeo SoloStar) 300 UNIT/ML solution pen-injector injection, Inject 20 Units under the skin into the appropriate area as directed Every Night., Disp: 6 mL, Rfl: 3    lactobacillus acidophilus (RISAQUAD) capsule capsule, Take 1 capsule by mouth Daily., Disp: 30 capsule, Rfl: 0    levothyroxine (SYNTHROID, LEVOTHROID) 50 MCG tablet, Take 1 tablet by mouth Daily. Indications: Underactive Thyroid  (Patient taking differently: Take 1 tablet by mouth Every Morning. Indications: Underactive Thyroid), Disp: 90 tablet, Rfl: 3    Mirabegron ER (Myrbetriq) 50 MG tablet sustained-release 24 hour 24 hr tablet, Take 50 mg by mouth Daily for 360 days., Disp: 90 tablet, Rfl: 3    Multiple Vitamins-Minerals (OCUVITE ADULT 50+ PO), Take 1 capsule by mouth Daily. OTC, Disp: , Rfl:     nitroglycerin (NITROSTAT) 0.4 MG SL tablet, 1 under the tongue as needed for angina, may repeat q5mins for up three doses (Patient taking differently: Place 1 tablet under the tongue Every 5 (Five) Minutes As Needed for Chest Pain. 1 under the tongue as needed for angina, may repeat q5mins for up three doses), Disp: 100 tablet, Rfl: 11    silodosin (RAPAFLO) 8 MG capsule capsule, Take 1 capsule by mouth Daily With Breakfast. To help with urination, Disp: 90 capsule, Rfl: 3    SITagliptin (Januvia) 100 MG tablet, Take 1 tablet by mouth Daily. For diabetes, Disp: 90 tablet, Rfl: 3    Objective:   Vitals and nursing note reviewed.   Constitutional:       Appearance: Healthy appearance. Not in distress.   Neck:      Vascular: No JVR. JVD normal.   Pulmonary:      Effort: Pulmonary effort is normal.      Breath sounds: Normal breath sounds. No wheezing. No rhonchi. No rales.   Chest:      Chest wall: Not tender to palpatation.   Cardiovascular:      PMI at left midclavicular line. Normal rate. Regular rhythm. Normal S1. Normal S2.       Murmurs: There is no murmur.      No gallop.  No click. No rub.   Pulses:     Intact distal pulses.   Edema:     Peripheral edema absent.   Abdominal:      General: Bowel sounds are normal.      Palpations: Abdomen is soft.      Tenderness: There is no abdominal tenderness.   Musculoskeletal: Normal range of motion.         General: No tenderness. Skin:     General: Skin is warm and dry.   Neurological:      General: No focal deficit present.      Mental Status: Alert and oriented to person, place and time.  "      Blood pressure 104/62, pulse 64, height 175.3 cm (69\"), weight 91.6 kg (202 lb), SpO2 92%.   Lab Review:     Assessment:       1. Cardiac pacemaker in situ      2. Coronary artery disease involving native coronary artery of native heart without angina pectoris      3. Chronic heart failure with preserved ejection fraction (HFpEF)  Stage C.  Euvolemic.  New York Heart Association functional class I symptoms.    4. Essential hypertension  Acceptable blood pressure control.  There is likely an element of symptomatic iatrogenic orthostatic hypotension.    5. Paroxysmal atrial fibrillation  Asymptomatic.  Rate control and anticoagulation strategy.  No bleeding complications on Eliquis 2.5 mg orally twice daily.  No signs or symptoms to suggest cardioembolic event.    6. Sinus node dysfunction  Formal pacemaker interrogation pending.    7. Type 2 diabetes mellitus with hyperglycemia, with long-term current use of insulin        8. IPF (idiopathic pulmonary fibrosis)      9. Type 2 diabetes mellitus with stage 3b chronic kidney disease, with long-term current use of insulin      10. Pulmonary hypertension        11. Vasovagal syncope  I suspect his syncopal episode was due to orthostatic hypotension.  - Adult Transthoracic Echo Complete W/ Cont if Necessary Per Protocol    Procedures    Plan:     Advance Care Planning   ACP discussion was held with the patient during this visit. Patient has an advance directive (not in EMR), copy requested.     I have recommended an echocardiogram.    Formal pacemaker interrogation with analysis of histogram for atrial fibrillation burden and screening for other arrhythmias pending.    No changes in medications have been made at today's visit.      "

## 2023-11-29 LAB
BH CV ECHO MEAS - AO MAX PG: 10.3 MMHG
BH CV ECHO MEAS - AO MEAN PG: 5.7 MMHG
BH CV ECHO MEAS - AO ROOT DIAM: 2.7 CM
BH CV ECHO MEAS - AO V2 MAX: 160.3 CM/SEC
BH CV ECHO MEAS - AO V2 VTI: 31.1 CM
BH CV ECHO MEAS - AVA(I,D): 1.99 CM2
BH CV ECHO MEAS - EDV(CUBED): 68.4 ML
BH CV ECHO MEAS - EDV(MOD-SP4): 179 ML
BH CV ECHO MEAS - EF(MOD-SP4): 55 %
BH CV ECHO MEAS - ESV(CUBED): 26.2 ML
BH CV ECHO MEAS - ESV(MOD-SP4): 80.6 ML
BH CV ECHO MEAS - FS: 27.4 %
BH CV ECHO MEAS - IVS/LVPW: 0.64 CM
BH CV ECHO MEAS - IVSD: 1.15 CM
BH CV ECHO MEAS - LA DIMENSION: 4.1 CM
BH CV ECHO MEAS - LAT PEAK E' VEL: 6.7 CM/SEC
BH CV ECHO MEAS - LV DIASTOLIC VOL/BSA (35-75): 86.3 CM2
BH CV ECHO MEAS - LV MASS(C)D: 235.2 GRAMS
BH CV ECHO MEAS - LV MAX PG: 2.16 MMHG
BH CV ECHO MEAS - LV MEAN PG: 1 MMHG
BH CV ECHO MEAS - LV SYSTOLIC VOL/BSA (12-30): 38.9 CM2
BH CV ECHO MEAS - LV V1 MAX: 73.4 CM/SEC
BH CV ECHO MEAS - LV V1 VTI: 13.7 CM
BH CV ECHO MEAS - LVIDD: 4.1 CM
BH CV ECHO MEAS - LVIDS: 3 CM
BH CV ECHO MEAS - LVOT AREA: 4.5 CM2
BH CV ECHO MEAS - LVOT DIAM: 2.4 CM
BH CV ECHO MEAS - LVPWD: 1.2 CM
BH CV ECHO MEAS - MED PEAK E' VEL: 5.3 CM/SEC
BH CV ECHO MEAS - MV A MAX VEL: 89.6 CM/SEC
BH CV ECHO MEAS - MV DEC SLOPE: 216 CM/SEC2
BH CV ECHO MEAS - MV DEC TIME: 0.23 SEC
BH CV ECHO MEAS - MV E MAX VEL: 51.4 CM/SEC
BH CV ECHO MEAS - MV E/A: 0.57
BH CV ECHO MEAS - MV MAX PG: 3.5 MMHG
BH CV ECHO MEAS - MV MEAN PG: 1 MMHG
BH CV ECHO MEAS - MV V2 VTI: 24.5 CM
BH CV ECHO MEAS - MVA(VTI): 2.5 CM2
BH CV ECHO MEAS - PA V2 MAX: 81.2 CM/SEC
BH CV ECHO MEAS - RAP SYSTOLE: 3 MMHG
BH CV ECHO MEAS - RVSP: 32 MMHG
BH CV ECHO MEAS - SI(MOD-SP4): 47.4 ML/M2
BH CV ECHO MEAS - SV(LVOT): 62 ML
BH CV ECHO MEAS - SV(MOD-SP4): 98.4 ML
BH CV ECHO MEAS - TR MAX PG: 28.9 MMHG
BH CV ECHO MEAS - TR MAX VEL: 269 CM/SEC
BH CV ECHO MEASUREMENTS AVERAGE E/E' RATIO: 8.57
BH CV XLRA - RV BASE: 2.5 CM
BH CV XLRA - RV LENGTH: 5.8 CM
BH CV XLRA - RV MID: 3.1 CM
LEFT ATRIUM VOLUME INDEX: 46.2 ML/M2
LV EF 2D ECHO EST: 52 %

## 2023-12-05 ENCOUNTER — TELEPHONE (OUTPATIENT)
Dept: INTERNAL MEDICINE | Facility: CLINIC | Age: 88
End: 2023-12-05

## 2023-12-05 DIAGNOSIS — Z79.4 TYPE 2 DIABETES MELLITUS WITH HYPERGLYCEMIA, WITH LONG-TERM CURRENT USE OF INSULIN: Primary | ICD-10-CM

## 2023-12-05 DIAGNOSIS — E11.65 TYPE 2 DIABETES MELLITUS WITH HYPERGLYCEMIA, WITH LONG-TERM CURRENT USE OF INSULIN: Primary | ICD-10-CM

## 2023-12-05 RX ORDER — BLOOD SUGAR DIAGNOSTIC
1 STRIP MISCELLANEOUS DAILY
Qty: 100 EACH | Refills: 3 | Status: SHIPPED | OUTPATIENT
Start: 2023-12-05

## 2023-12-05 RX ORDER — PEN NEEDLE, DIABETIC 32 GX 1/4"
NEEDLE, DISPOSABLE MISCELLANEOUS DAILY
Qty: 100 EACH | Refills: 3 | OUTPATIENT
Start: 2023-12-05

## 2023-12-05 NOTE — TELEPHONE ENCOUNTER
Caller: MARCUS DIMAS    Relationship: Emergency Contact    Best call back number: [phone]    Which medication are you concerned about:     PEN NEEDLES    What are your concerns:     PATIENT NEEDS PIN NEEDLES.  NOTE IN EPIC SAYS NOT APPROPRIATE.  GRANDDAUGHTER SAID PATIENT NEEDS THESE.  WANTS TO KNOW WHY IT IS NOT APPRORIATE.

## 2023-12-05 NOTE — TELEPHONE ENCOUNTER
Looks like the prescription was discontinued, please advise if appropriate, was unsure how to pend/fill correctly

## 2023-12-14 ENCOUNTER — PATIENT MESSAGE (OUTPATIENT)
Dept: INTERNAL MEDICINE | Facility: CLINIC | Age: 88
End: 2023-12-14
Payer: MEDICARE

## 2023-12-14 DIAGNOSIS — G89.29 CHRONIC LOW BACK PAIN WITHOUT SCIATICA, UNSPECIFIED BACK PAIN LATERALITY: ICD-10-CM

## 2023-12-14 DIAGNOSIS — M54.50 CHRONIC LOW BACK PAIN WITHOUT SCIATICA, UNSPECIFIED BACK PAIN LATERALITY: ICD-10-CM

## 2023-12-14 NOTE — TELEPHONE ENCOUNTER
From: Forrest Zepeda  To: Kary Wen  Sent: 12/14/2023 12:58 PM EST  Subject: Pain medication    Bernardo is needing a refill on his pain medication if possible. He has been taking one tablet bid some days. Do I need to make him appointment    Erika vasquez   [phone]

## 2023-12-18 RX ORDER — HYDROCODONE BITARTRATE AND ACETAMINOPHEN 5; 325 MG/1; MG/1
1 TABLET ORAL EVERY 12 HOURS PRN
Qty: 60 TABLET | Refills: 0 | Status: SHIPPED | OUTPATIENT
Start: 2023-12-18

## 2023-12-27 NOTE — PROGRESS NOTES
[facility] Cardiology Office Follow Up Note    Forrest Zepeda  [id number]  2024    Primary Care Provider: Kary Wen MD   Referring Provider: No ref. provider found    Chief Complaint: Follow-up of syncope    History of Present Illness:   Mr. Forrest Zepeda is a 89 y.o. male who presents to the Cardiology Clinic for follow up of syncope.  His last cardiology clinic visit, the patient reported a brief loss of consciousness after he woke up suddenly from sleep and stood.  At that time, the patient reported hypotensive episodes at home with some SBP's in the 80s.  An echocardiogram was ordered.  He presents today for follow-up.  A subsequent echocardiogram showed LVEF 51-55%, grade 1 diastolic dysfunction, and mild calcification of the aortic valve.  He presents today for follow-up.  The patient specifically denies recurrent syncope.  He denies chest pain, dizziness, palpitations.  He offers no other complaints or concerns at this time.    Past Cardiac Testin. Last Coronary Angio: None  2. Prior Stress Testing: 3/4/2021  Myocardial perfusion imaging indicates a medium-to-large-sized, severe area of ischemia located in the anterior wall, apex and septal wall. Impressions are consistent with an intermediate risk study.   3. Last Echo: 2023    Left ventricular systolic function is normal. Estimated left ventricular EF = 52% Left ventricular ejection fraction appears to be 51 - 55%.    Left ventricular wall thickness is consistent with mild concentric hypertrophy.    Left ventricular diastolic function is consistent with (grade I) impaired relaxation.    The right ventricular cavity is mildly dilated.    The left atrial cavity is mildly dilated.    Left atrial volume is moderately increased.    There is mild calcification of the aortic valve mainly affecting the non-coronary, left coronary and right coronary cusp(s).    Estimated right ventricular systolic pressure  from tricuspid regurgitation is normal (<35 mmHg).  4. Prior Holter Monitor: 12/10/2020 Remote (scanned into chart)    Review of Systems:   Review of Systems  As Noted in HPI.   I have reviewed and confirmed the accuracy of the ROS as documented by the MA/LPN/RN Teresa Bacon RN    I have reviewed and/or updated the patient's past medical, past surgical, family, social history, problem list and allergies as appropriate.     Medications:     Current Outpatient Medications:     allopurinol (ZYLOPRIM) 300 MG tablet, Take 1 tablet by mouth Every Night. To lower risk of gout, Disp: 90 tablet, Rfl: 3    apixaban (Eliquis) 2.5 MG tablet tablet, Take 1 tablet by mouth 2 (Two) Times a Day., Disp: 180 tablet, Rfl: 3    aspirin 81 MG EC tablet, Take 1 tablet by mouth Daily. (Patient taking differently: Take 1 tablet by mouth Daily. OTC), Disp: 90 tablet, Rfl: 3    bisoprolol (ZEBeta) 5 MG tablet, Take 1 tablet by mouth Daily., Disp: 90 tablet, Rfl: 3    Diclofenac Sodium (VOLTAREN) 1 % gel gel, Apply 2 g topically to the appropriate area as directed 4 (Four) Times a Day., Disp: 100 g, Rfl: 0    finasteride (PROSCAR) 5 MG tablet, TAKE ONE TABLET BY MOUTH DAILY, Disp: 90 tablet, Rfl: 3    glimepiride (AMARYL) 1 MG tablet, Take 1 tablet by mouth Every Morning Before Breakfast. Indications: Type 2 Diabetes, Disp: 90 tablet, Rfl: 3    HYDROcodone-acetaminophen (Norco) 5-325 MG per tablet, Take 1 tablet by mouth Every 12 (Twelve) Hours As Needed for Severe Pain., Disp: 60 tablet, Rfl: 0    Insulin Glargine, 1 Unit Dial, (Toujeo SoloStar) 300 UNIT/ML solution pen-injector injection, Inject 20 Units under the skin into the appropriate area as directed Every Night., Disp: 6 mL, Rfl: 3    Insulin Pen Needle (Pen Needles) 32G X 6 MM misc, Use 1 each Daily., Disp: 100 each, Rfl: 3    lactobacillus acidophilus (RISAQUAD) capsule capsule, Take 1 capsule by mouth Daily., Disp: 30 capsule, Rfl: 0    levothyroxine (SYNTHROID, LEVOTHROID) 50  "MCG tablet, Take 1 tablet by mouth Daily. Indications: Underactive Thyroid (Patient taking differently: Take 1 tablet by mouth Every Morning. Indications: Underactive Thyroid), Disp: 90 tablet, Rfl: 3    Mirabegron ER (Myrbetriq) 50 MG tablet sustained-release 24 hour 24 hr tablet, Take 50 mg by mouth Daily for 360 days., Disp: 90 tablet, Rfl: 3    Multiple Vitamins-Minerals (OCUVITE ADULT 50+ PO), Take 1 capsule by mouth Daily. OTC, Disp: , Rfl:     nitroglycerin (NITROSTAT) 0.4 MG SL tablet, 1 under the tongue as needed for angina, may repeat q5mins for up three doses (Patient taking differently: Place 1 tablet under the tongue Every 5 (Five) Minutes As Needed for Chest Pain. 1 under the tongue as needed for angina, may repeat q5mins for up three doses), Disp: 100 tablet, Rfl: 11    silodosin (RAPAFLO) 8 MG capsule capsule, Take 1 capsule by mouth Daily With Breakfast. To help with urination, Disp: 90 capsule, Rfl: 3    SITagliptin (Januvia) 100 MG tablet, Take 1 tablet by mouth Daily. For diabetes, Disp: 90 tablet, Rfl: 3    Physical Exam:  Vital Signs:   Vitals:    01/02/24 1350   BP: 138/60   BP Location: Left arm   Patient Position: Sitting   Cuff Size: Adult   Pulse: 82   SpO2: 91%   Weight: 91.2 kg (201 lb)   Height: 175.3 cm (69\")     Body mass index is 29.68 kg/m².    Physical Exam  Vitals and nursing note reviewed.   Constitutional:       General: He is not in acute distress.     Appearance: He is obese.   HENT:      Head: Normocephalic and atraumatic.   Neck:      Trachea: Trachea normal.   Cardiovascular:      Rate and Rhythm: Normal rate and regular rhythm.      Pulses: Normal pulses.      Heart sounds: Normal heart sounds. No murmur heard.     No friction rub. No gallop.   Pulmonary:      Effort: Pulmonary effort is normal.      Breath sounds: Normal breath sounds.   Musculoskeletal:      Cervical back: Neck supple.      Right lower leg: No edema.      Left lower leg: No edema.   Skin:     General: " Skin is warm and dry.   Neurological:      Mental Status: He is alert and oriented to person, place, and time.   Psychiatric:         Mood and Affect: Mood normal.         Behavior: Behavior normal. Behavior is cooperative.         Thought Content: Thought content does not include suicidal ideation.         Results Review:   I reviewed the patient's new clinical results.    Procedures    Assessment / Plan:     1.  Syncope  In office and remote device checks showed no sustained arrhythmias, no atrial fibrillation  Stable echocardiogram  No recurrence  Resume routine follow-up with cardiology    2. Coronary artery disease  Stable and angina free  Continue aspirin    3. Hypertension  Acceptable blood pressure today  BP goal < 140/90; patient verbalized understanding of this  Encouraged low-sodium diet and management of weight    4.  HFpEF  Stage C  NYHA functional class II  Euvolemic on exam    5.  Paroxysmal atrial fibrillation  No AF on most recent device check  Continue Eliquis for CVA prophylaxis  Continue bisoprolol for rate control    Preventative Cardiology:   Tobacco Cessation: N/A   Advance Care Planning: ACP discussion was declined by the patient. Patient has an advance directive in EMR which is still valid.      Follow Up:   Return in about 6 months (around 7/2/2024) for Follow-up with Dr. Saha.      Thank you for allowing me to participate in the care of your patient. Please do not hesitate to contact me with additional questions or concerns.     JOYCE Zamora

## 2024-01-02 ENCOUNTER — OFFICE VISIT (OUTPATIENT)
Dept: CARDIOLOGY | Facility: CLINIC | Age: 89
End: 2024-01-02
Payer: MEDICARE

## 2024-01-02 VITALS
DIASTOLIC BLOOD PRESSURE: 60 MMHG | OXYGEN SATURATION: 91 % | BODY MASS INDEX: 29.77 KG/M2 | HEIGHT: 69 IN | WEIGHT: 201 LBS | HEART RATE: 82 BPM | SYSTOLIC BLOOD PRESSURE: 138 MMHG

## 2024-01-02 DIAGNOSIS — I50.32 CHRONIC HEART FAILURE WITH PRESERVED EJECTION FRACTION (HFPEF): ICD-10-CM

## 2024-01-02 DIAGNOSIS — I25.10 CORONARY ARTERY DISEASE INVOLVING NATIVE CORONARY ARTERY OF NATIVE HEART WITHOUT ANGINA PECTORIS: Primary | ICD-10-CM

## 2024-01-02 DIAGNOSIS — I48.0 PAROXYSMAL ATRIAL FIBRILLATION: ICD-10-CM

## 2024-01-02 DIAGNOSIS — I10 ESSENTIAL HYPERTENSION: ICD-10-CM

## 2024-01-02 RX ORDER — LOSARTAN POTASSIUM 25 MG/1
25 TABLET ORAL DAILY
COMMUNITY

## 2024-01-11 ENCOUNTER — OFFICE VISIT (OUTPATIENT)
Dept: UROLOGY | Facility: CLINIC | Age: 89
End: 2024-01-11
Payer: MEDICARE

## 2024-01-11 VITALS — HEART RATE: 64 BPM | OXYGEN SATURATION: 97 % | HEIGHT: 69 IN | WEIGHT: 200 LBS | BODY MASS INDEX: 29.62 KG/M2

## 2024-01-11 DIAGNOSIS — N13.30 HYDRONEPHROSIS OF LEFT KIDNEY: Primary | ICD-10-CM

## 2024-01-11 NOTE — PROGRESS NOTES
Follow Up Office Visit      Patient Name: Forrest Zepeda  : 1932   MRN: [id number]     Chief Complaint:    Chief Complaint   Patient presents with    Hydronephrosis, left       Referring Provider: No ref. provider found    History of Present Illness: Forrest Zepeda is a 89 y.o. male who presents today for follow up of left hydronephrosis .  He has done well since his last procedure.  He reports he has done well from a UTI stand point.  He had not had any hematuria.  He has a Tria stent in place.  He would like to hold off stent exchange.     Subjective      Review of System:   Review of Systems   Constitutional:  Negative for chills, fatigue, fever and unexpected weight change.   HENT:  Negative for congestion, rhinorrhea and sore throat.    Eyes:  Negative for visual disturbance.   Respiratory:  Negative for apnea, cough, chest tightness and shortness of breath.    Cardiovascular:  Negative for chest pain.   Gastrointestinal:  Negative for abdominal pain, constipation, diarrhea, nausea and vomiting.   Endocrine: Negative for polydipsia and polyuria.   Genitourinary:  Negative for difficulty urinating, dysuria, enuresis, flank pain, frequency, genital sores, hematuria and urgency.   Musculoskeletal:  Negative for gait problem.   Skin:  Negative for rash and wound.   Allergic/Immunologic: Negative for immunocompromised state.   Neurological:  Negative for dizziness, tremors, syncope, weakness and light-headedness.   Hematological:  Does not bruise/bleed easily.      I have reviewed the ROS documented by my clinical staff, I have updated appropriately and I agree. Deanne Pitts MD    I have reviewed and the following portions of the patient's history were updated as appropriate: past family history, past medical history, past social history, past surgical history and problem list.    Past Medical History:   Past Medical History:   Diagnosis Date    Abnormal EKG     Abnormal PSA     Reported abnormal     "Acute cystitis with hematuria 05/01/2023    Acute deep vein thrombosis (DVT) of right lower extremity 08/22/2019    Acute diverticulitis 2019    Acute hyperkalemia 08/22/2019    Acute respiratory failure with hypoxia     Acute saddle pulmonary embolism with acute cor pulmonale 08/22/2019    Acute systolic right heart failure 08/22/2019    Arthritis     KNEES,  BUT SINCE HAD REPLACEMENT    Benign localized hyperplasia of prostate     Bilateral knee pain     C. difficile diarrhea 2010    Cataracts, bilateral     CKD (chronic kidney disease)     Coronary artery disease     Diabetic neuropathy     Diastolic dysfunction     Disease of thyroid gland     HYPOTHYROIDISM    Diverticulitis     Dyslipidemia     H/O    Family history of malignant hyperthermia     Brother     Full dentures     Generalized weakness     History of ESBL E. coli infection     most recent 4-2022 f/u with ID Dr Johnson    History of gout     History of hepatitis A vaccination     History of nephrolithiasis     History of nuclear stress test     STATES IN THE 1990'S.  \"IT WAS OK\"    History of osteopenia     History of recurrent UTI (urinary tract infection)     Hydronephrosis of left kidney 07/18/2019    Hydronephrosis with renal and ureteral calculus obstruction 10/21/2019    Added automatically from request for surgery [id number]    Hydronephrosis with ureteral stricture     Hypercholesterolemia     Hyperkalemia     PT UNSURE REGARDING HX OF THIS    Hyperlipidemia     Hypertension     Hypothyroidism     Impaired functional mobility, balance, gait, and endurance     Insomnia     IPF (idiopathic pulmonary fibrosis)     per patient and son, dx at [facility], was told no treatment necessary, not to worry about it    On supplemental oxygen therapy     2L NC QHS    Other hydronephrosis 08/12/2019    Added automatically from request for surgery [id number]    Paroxysmal atrial fibrillation     Pulmonary hypertension, mild 02/2021    per echo per cardiology note " 1/9/23, per pt's son, PCP does not agree with this dx    Renal insufficiency     Sepsis 2019    Shortness of breath     STATES WITH EXERTION, OTHERWISE, DENIES    Sigmoid diverticulitis without abscess or perforation 08/09/2017    Sinus node dysfunction     Skin breakdown     fourth toe right foot noted in 2019 MD D/C note    Skin ulcer of fourth toe of right foot, limited to breakdown of skin 07/05/2019    Stricture of ureter     Type 2 diabetes mellitus     Ureteral stricture, left     UTI (urinary tract infection) 07/18/2019    Vitamin D deficiency     Wears glasses        Past Surgical History:  Past Surgical History:   Procedure Laterality Date    APPENDECTOMY      CARDIAC ELECTROPHYSIOLOGY PROCEDURE N/A 12/23/2020    Procedure: Pacemaker DC new. DNS meds.;  Surgeon: Jimy Ledezma DO;  Location:  [facility];  Service: Cardiology;  Laterality: N/A;    CHOLECYSTECTOMY      CIRCUMCISION N/A 10/23/2019    Procedure: CIRCUMCISIOn-DORSAL SLIT;  Surgeon: Griffin Romero MD;  Location:  [facility];  Service: Urology    COLONOSCOPY      CYSTOSCOPY RETROGRADE PYELOGRAM Left 06/17/2022    Procedure: CYSTOSCOPY RETROGRADE PYELOGRAM;  Surgeon: Ryne Mccann MD;  Location:  [facility];  Service: Urology;  Laterality: Left;    CYSTOSCOPY URETEROSCOPY Left 02/11/2019    Procedure: URETEROSCOPY WITH STENT EXTRACTION, RPG;  Surgeon: Griffin Romero MD;  Location:  [facility];  Service: Urology    CYSTOSCOPY W/ URETERAL STENT PLACEMENT Left 07/20/2019    Procedure: CYSTOSCOPY, LEFT RETROGRADE PYELOGRAM,  ATTEMPTED LEFT URETERAL STENT INSERTION;  Surgeon: Isaac Flynn MD;  Location:  [facility];  Service: Urology    CYSTOSCOPY W/ URETERAL STENT PLACEMENT Left 06/17/2022    Procedure: CYSTOSCOPY URETERAL CATHETER/STENT INSERTION;  Surgeon: Ryne Mccann MD;  Location:  [facility];  Service: Urology;  Laterality: Left;    CYSTOSCOPY W/ URETERAL STENT PLACEMENT Left 01/27/2023    Procedure: CYSTOSCOPY  URETERAL CATHETER/STENT INSERTION;  Surgeon: Deanne Pitts MD;  Location: [facility];  Service: Urology;  Laterality: Left;    CYSTOSCOPY, URETEROSCOPY, RETROGRADE PYELOGRAM, STONE EXTRACTION, STENT INSERTION Left 06/15/2023    Procedure: URETEROSCOPY, RETROGRADE PYELOGRAM, STENT INSERTION;  Surgeon: Deanne Pitts MD;  Location: [facility];  Service: Urology;  Laterality: Left;    EYE SURGERY      CATARACTS REMOVED BILATERALLY    JOINT REPLACEMENT      Bilateral knees    KNEE ARTHROPLASTY Bilateral     KNEE ARTHROSCOPY Bilateral     RENAL ARTERY STENT      SEVERAL TIMES EVERY SINCE 1978    URETERAL STENT INSERTION  10/2020    URETEROSCOPY LASER LITHOTRIPSY WITH STENT INSERTION Left 01/10/2018    Procedure:  cysto, left ureteral stent replacement ;  Surgeon: Griffin Romero MD;  Location: [facility] OR;  Service:     URETEROSCOPY LASER LITHOTRIPSY WITH STENT INSERTION Left 08/14/2019    Procedure: URETEROSCOPY, STONE REMOVAL WITH STENT INSERTION;  Surgeon: Griffin Romero MD;  Location: [facility] OR;  Service: Urology    URETEROSCOPY LASER LITHOTRIPSY WITH STENT INSERTION Left 10/23/2019    Procedure: Left URETEROSCOPY WITH STENT INSERTION-LEFT;  Surgeon: Griffin Romero MD;  Location: [facility] OR;  Service: Urology       Family History:   family history includes Brain cancer in his brother and sister; Heart attack in his sister; Hypertension in his sister; Lung cancer in his brother and sister; Malig Hyperthermia in his brother; Stroke in his sister.   Otherwise pertinent FHx was reviewed and not pertinent to current issue.    Social History:    reports that he has never smoked. He has been exposed to tobacco smoke. He has never used smokeless tobacco. He reports that he does not drink alcohol and does not use drugs.    Medications:     Current Outpatient Medications:     allopurinol (ZYLOPRIM) 300 MG tablet, Take 1 tablet by mouth Every Night. To lower risk of gout, Disp: 90 tablet, Rfl: 3    apixaban (Eliquis) 2.5 MG  tablet tablet, Take 1 tablet by mouth 2 (Two) Times a Day., Disp: 180 tablet, Rfl: 3    aspirin 81 MG EC tablet, Take 1 tablet by mouth Daily. (Patient taking differently: Take 1 tablet by mouth Daily. OTC), Disp: 90 tablet, Rfl: 3    bisoprolol (ZEBeta) 5 MG tablet, Take 1 tablet by mouth Daily., Disp: 90 tablet, Rfl: 3    Diclofenac Sodium (VOLTAREN) 1 % gel gel, Apply 2 g topically to the appropriate area as directed 4 (Four) Times a Day., Disp: 100 g, Rfl: 0    finasteride (PROSCAR) 5 MG tablet, TAKE ONE TABLET BY MOUTH DAILY, Disp: 90 tablet, Rfl: 3    glimepiride (AMARYL) 1 MG tablet, Take 1 tablet by mouth Every Morning Before Breakfast. Indications: Type 2 Diabetes, Disp: 90 tablet, Rfl: 3    HYDROcodone-acetaminophen (Norco) 5-325 MG per tablet, Take 1 tablet by mouth Every 12 (Twelve) Hours As Needed for Severe Pain., Disp: 60 tablet, Rfl: 0    Insulin Glargine, 1 Unit Dial, (Toujeo SoloStar) 300 UNIT/ML solution pen-injector injection, Inject 20 Units under the skin into the appropriate area as directed Every Night., Disp: 6 mL, Rfl: 3    Insulin Pen Needle (Pen Needles) 32G X 6 MM misc, Use 1 each Daily., Disp: 100 each, Rfl: 3    lactobacillus acidophilus (RISAQUAD) capsule capsule, Take 1 capsule by mouth Daily., Disp: 30 capsule, Rfl: 0    levothyroxine (SYNTHROID, LEVOTHROID) 50 MCG tablet, Take 1 tablet by mouth Daily. Indications: Underactive Thyroid (Patient taking differently: Take 1 tablet by mouth Every Morning. Indications: Underactive Thyroid), Disp: 90 tablet, Rfl: 3    losartan (COZAAR) 25 MG tablet, Take 1 tablet by mouth Daily., Disp: , Rfl:     Mirabegron ER (Myrbetriq) 50 MG tablet sustained-release 24 hour 24 hr tablet, Take 50 mg by mouth Daily for 360 days., Disp: 90 tablet, Rfl: 3    Multiple Vitamins-Minerals (OCUVITE ADULT 50+ PO), Take 1 capsule by mouth Daily. OTC, Disp: , Rfl:     nitroglycerin (NITROSTAT) 0.4 MG SL tablet, 1 under the tongue as needed for angina, may repeat  q5mins for up three doses (Patient taking differently: Place 1 tablet under the tongue Every 5 (Five) Minutes As Needed for Chest Pain. 1 under the tongue as needed for angina, may repeat q5mins for up three doses), Disp: 100 tablet, Rfl: 11    silodosin (RAPAFLO) 8 MG capsule capsule, Take 1 capsule by mouth Daily With Breakfast. To help with urination, Disp: 90 capsule, Rfl: 3    SITagliptin (Januvia) 100 MG tablet, Take 1 tablet by mouth Daily. For diabetes, Disp: 90 tablet, Rfl: 3    Allergies:   Allergies   Allergen Reactions    Metformin Diarrhea and GI Intolerance    Statins Diarrhea and Myalgia       IPSS Questionnaire (AUA-7):  Over the past month…    1)  Incomplete Emptying  How often have you had a sensation of not emptying your bladder?  4 - More than half the time   2)  Frequency  How often have you had to urinate less than every two hours? 4 - More than half the time   3)  Intermittency  How often have you found you stopped and started again several times when you urinated?  0 - Not at all   4) Urgency  How often have you found it difficult to postpone urination?  5 - Almost always   5) Weak Stream  How often have you had a weak urinary stream?  0 - Not at all   6) Straining  How often have you had to push or strain to begin urination?  0 - Not at all   7) Nocturia  How many times did you typically get up at night to urinate?  2 - 2 times   Total Score:  15   The International Prostate Symptom Score (IPSS) is used to screen, diagnose, track symptoms of benign prostatic hyperplasia (BPH).    0-7 pts (Mild Symptoms)  / 8-19 pts (Moderate) / 20-35 (Severe)    Quality of life due to urinary symptoms:  If you were to spend the rest of your life with your urinary condition the way it is now, how would you feel about that? 5-Unhappy   Urine Leakage (Incontinence) 0-No Leakage           Objective     Physical Exam:   Vital Signs:   Vitals:    01/11/24 1413   Pulse: 64   SpO2: 97%   Weight: 90.7 kg (200 lb)  "  Height: 175.3 cm (69.02\")   PainSc: 0-No pain     Body mass index is 29.52 kg/m².     Physical Exam  Vitals and nursing note reviewed.   Constitutional:       General: He is awake. He is not in acute distress.     Appearance: Normal appearance. He is well-developed.   HENT:      Head: Normocephalic and atraumatic.      Right Ear: External ear normal.      Left Ear: External ear normal.      Nose: Nose normal.   Eyes:      Conjunctiva/sclera: Conjunctivae normal.   Pulmonary:      Effort: Pulmonary effort is normal.   Abdominal:      General: There is no distension.      Palpations: Abdomen is soft. There is no mass.      Tenderness: There is no abdominal tenderness. There is no right CVA tenderness, left CVA tenderness, guarding or rebound.      Hernia: No hernia is present. There is no hernia in the left inguinal area or right inguinal area.   Genitourinary:     Pubic Area: No rash.       Rectum: No mass or tenderness. Normal anal tone.   Musculoskeletal:      Cervical back: Normal range of motion.   Lymphadenopathy:      Lower Body: No right inguinal adenopathy. No left inguinal adenopathy.   Skin:     General: Skin is warm.   Neurological:      General: No focal deficit present.      Mental Status: He is alert and oriented to person, place, and time.   Psychiatric:         Behavior: Behavior normal. Behavior is cooperative.         Labs:   Brief Urine Lab Results  (Last result in the past 365 days)        Color   Clarity   Blood   Leuk Est   Nitrite   Protein   CREAT   Urine HCG        10/23/23 1417             200                 Urine Culture          5/27/2023    16:00 10/9/2023    19:25 10/23/2023    17:00   Urine Culture   Urine Culture Final report  <25,000 CFU/mL Escherichia coli ESBL     <25,000 CFU/mL Providencia rettgeri  Final report         Lab Results   Component Value Date    GLUCOSE 152 (H) 10/10/2023    CALCIUM 9.0 10/10/2023     10/10/2023    K 4.6 10/10/2023    CO2 20.0 (L) 10/10/2023    "  (H) 10/10/2023    BUN 39 (H) 10/10/2023    CREATININE 1.55 (H) 10/10/2023    EGFRIFAFRI 45 (L) 12/13/2021    EGFRIFNONA 39 (L) 12/13/2021    BCR 25.2 (H) 10/10/2023    ANIONGAP 11.0 10/10/2023       Lab Results   Component Value Date    WBC 10.90 (H) 10/10/2023    HGB 12.6 (L) 10/10/2023    HCT 40.1 10/10/2023    MCV 95.2 10/10/2023     10/10/2023       Lab Results   Component Value Date    PSA 1.160 04/15/2021    PSA 8.640 (H) 11/30/2020    PSA 1.450 08/26/2020       Images:   US Renal Bilateral    Result Date: 10/10/2023  Impression: 1.Persistent moderate to severe left-sided hydronephrosis with renal cortical atrophy. Left ureteral stent is noted. 2.Layering debris within the urinary bladder. 3.Post void residual of 291 cc Electronically Signed: Raheel Denney MD  10/10/2023 11:09 AM CDT  Workstation ID: [id number]    XR Chest 1 View    Result Date: 10/9/2023  Impression: 1. Minimal linear atelectasis in the left lung base. No acute cardiopulmonary disease identified. Electronically Signed: Rolly Callahan MD  10/9/2023 8:19 PM EDT  Workstation ID: [id number]    CT Head Without Contrast    Result Date: 10/9/2023  1.No acute intracranial abnormality is identified. 2.Findings compatible with chronic microvascular ischemic change and diffuse cortical atrophy. 3.Mild encephalomalacia within the right centrum semiovale. Electronically Signed: Ramon Paredes MD  10/9/2023 7:06 PM EDT  Workstation ID: [id number]      Measures:   Tobacco:   Forrest Zepeda  reports that he has never smoked. He has been exposed to tobacco smoke. He has never used smokeless tobacco..      Urine Incontinence: Patient reports that he is not currently experiencing any symptoms of urinary incontinence.         Assessment / Plan      Assessment/Plan:   89 y.o. male who presented today for follow up of left hydronephrosis being managed with chronic stent exchanges.  He has done well since his last exchange and he would like to hold off for  3 more months.  This is reasonable as he has a Tria stent in.    I will see him in 3 more months.     Diagnoses and all orders for this visit:    1. Hydronephrosis of left kidney (Primary)         Follow Up:   Return in about 3 months (around 4/11/2024) for Recheck.    I spent approximately 30 minutes providing clinical care for this patient; including review of patient's chart and provider documentation, face to face time spent with patient in examination room (obtaining history, performing physical exam, discussing diagnosis and management options), placing orders, and completing patient documentation.     Deanne Pitts MD  [facility]

## 2024-01-23 ENCOUNTER — OFFICE VISIT (OUTPATIENT)
Dept: INTERNAL MEDICINE | Facility: CLINIC | Age: 89
End: 2024-01-23
Payer: MEDICARE

## 2024-01-23 VITALS
BODY MASS INDEX: 29.47 KG/M2 | DIASTOLIC BLOOD PRESSURE: 70 MMHG | HEIGHT: 69 IN | HEART RATE: 63 BPM | TEMPERATURE: 97 F | SYSTOLIC BLOOD PRESSURE: 120 MMHG | OXYGEN SATURATION: 98 % | RESPIRATION RATE: 16 BRPM | WEIGHT: 199 LBS

## 2024-01-23 DIAGNOSIS — Z00.00 MEDICARE ANNUAL WELLNESS VISIT, SUBSEQUENT: Primary | ICD-10-CM

## 2024-01-23 DIAGNOSIS — G47.00 INSOMNIA, UNSPECIFIED TYPE: ICD-10-CM

## 2024-01-23 DIAGNOSIS — N18.32 TYPE 2 DIABETES MELLITUS WITH STAGE 3B CHRONIC KIDNEY DISEASE, WITH LONG-TERM CURRENT USE OF INSULIN: ICD-10-CM

## 2024-01-23 DIAGNOSIS — E11.22 TYPE 2 DIABETES MELLITUS WITH STAGE 3B CHRONIC KIDNEY DISEASE, WITH LONG-TERM CURRENT USE OF INSULIN: ICD-10-CM

## 2024-01-23 DIAGNOSIS — Z79.4 TYPE 2 DIABETES MELLITUS WITH STAGE 3B CHRONIC KIDNEY DISEASE, WITH LONG-TERM CURRENT USE OF INSULIN: ICD-10-CM

## 2024-01-23 DIAGNOSIS — G89.29 CHRONIC LOW BACK PAIN WITHOUT SCIATICA, UNSPECIFIED BACK PAIN LATERALITY: ICD-10-CM

## 2024-01-23 DIAGNOSIS — E11.65 TYPE 2 DIABETES MELLITUS WITH HYPERGLYCEMIA, WITH LONG-TERM CURRENT USE OF INSULIN: ICD-10-CM

## 2024-01-23 DIAGNOSIS — I50.32 CHRONIC HEART FAILURE WITH PRESERVED EJECTION FRACTION (HFPEF): ICD-10-CM

## 2024-01-23 DIAGNOSIS — Z79.4 TYPE 2 DIABETES MELLITUS WITH HYPERGLYCEMIA, WITH LONG-TERM CURRENT USE OF INSULIN: ICD-10-CM

## 2024-01-23 DIAGNOSIS — J84.112 IPF (IDIOPATHIC PULMONARY FIBROSIS): ICD-10-CM

## 2024-01-23 DIAGNOSIS — M54.50 CHRONIC LOW BACK PAIN WITHOUT SCIATICA, UNSPECIFIED BACK PAIN LATERALITY: ICD-10-CM

## 2024-01-23 PROBLEM — N39.0 UTI (URINARY TRACT INFECTION): Status: RESOLVED | Noted: 2023-10-10 | Resolved: 2024-01-23

## 2024-01-23 PROBLEM — N39.0 ACUTE UTI (URINARY TRACT INFECTION): Status: RESOLVED | Noted: 2023-10-09 | Resolved: 2024-01-23

## 2024-01-23 LAB
EXPIRATION DATE: ABNORMAL
HBA1C MFR BLD: 7.7 % (ref 4.5–5.7)
Lab: ABNORMAL

## 2024-01-23 PROCEDURE — G0439 PPPS, SUBSEQ VISIT: HCPCS | Performed by: FAMILY MEDICINE

## 2024-01-23 PROCEDURE — 1159F MED LIST DOCD IN RCRD: CPT | Performed by: FAMILY MEDICINE

## 2024-01-23 PROCEDURE — 1170F FXNL STATUS ASSESSED: CPT | Performed by: FAMILY MEDICINE

## 2024-01-23 PROCEDURE — 99397 PER PM REEVAL EST PAT 65+ YR: CPT | Performed by: FAMILY MEDICINE

## 2024-01-23 PROCEDURE — 3051F HG A1C>EQUAL 7.0%<8.0%: CPT | Performed by: FAMILY MEDICINE

## 2024-01-23 PROCEDURE — 83036 HEMOGLOBIN GLYCOSYLATED A1C: CPT | Performed by: FAMILY MEDICINE

## 2024-01-23 PROCEDURE — 96160 PT-FOCUSED HLTH RISK ASSMT: CPT | Performed by: FAMILY MEDICINE

## 2024-01-23 PROCEDURE — 1160F RVW MEDS BY RX/DR IN RCRD: CPT | Performed by: FAMILY MEDICINE

## 2024-01-23 RX ORDER — HYDROCODONE BITARTRATE AND ACETAMINOPHEN 5; 325 MG/1; MG/1
1 TABLET ORAL EVERY 12 HOURS PRN
Qty: 60 TABLET | Refills: 0 | Status: SHIPPED | OUTPATIENT
Start: 2024-01-23

## 2024-01-23 NOTE — PROGRESS NOTES
The ABCs of the Annual Wellness Visit  Subsequent Medicare Wellness Visit    Subjective    Forrest Zepeda is a 89 y.o. male who presents for a Subsequent Medicare Wellness Visit.    The following portions of the patient's history were reviewed and   updated as appropriate: allergies, current medications, past family history, past medical history, past social history, past surgical history, and problem list.    Compared to one year ago, the patient feels his physical   health is the same.    Compared to one year ago, the patient feels his mental   health is the same.    Recent Hospitalizations:  This patient has had a [facility] admission record on file within the last 365 days.    Current Medical Providers:  Patient Care Team:  Kary Wen MD as PCP - General (Family Medicine)  Win Saha MD as Consulting Physician (Cardiology)  Deanne Pitts MD as Consulting Physician (Urology)    Outpatient Medications Prior to Visit   Medication Sig Dispense Refill    allopurinol (ZYLOPRIM) 300 MG tablet Take 1 tablet by mouth Every Night. To lower risk of gout 90 tablet 3    apixaban (Eliquis) 2.5 MG tablet tablet Take 1 tablet by mouth 2 (Two) Times a Day. 180 tablet 3    aspirin 81 MG EC tablet Take 1 tablet by mouth Daily. (Patient taking differently: Take 1 tablet by mouth Daily. OTC) 90 tablet 3    bisoprolol (ZEBeta) 5 MG tablet Take 1 tablet by mouth Daily. 90 tablet 3    Diclofenac Sodium (VOLTAREN) 1 % gel gel Apply 2 g topically to the appropriate area as directed 4 (Four) Times a Day. 100 g 0    finasteride (PROSCAR) 5 MG tablet TAKE ONE TABLET BY MOUTH DAILY 90 tablet 3    glimepiride (AMARYL) 1 MG tablet Take 1 tablet by mouth Every Morning Before Breakfast. Indications: Type 2 Diabetes 90 tablet 3    Insulin Glargine, 1 Unit Dial, (Toujeo SoloStar) 300 UNIT/ML solution pen-injector injection Inject 20 Units under the skin into the appropriate area as directed Every Night. 6 mL 3    Insulin  Pen Needle (Pen Needles) 32G X 6 MM misc Use 1 each Daily. 100 each 3    lactobacillus acidophilus (RISAQUAD) capsule capsule Take 1 capsule by mouth Daily. 30 capsule 0    levothyroxine (SYNTHROID, LEVOTHROID) 50 MCG tablet Take 1 tablet by mouth Daily. Indications: Underactive Thyroid (Patient taking differently: Take 1 tablet by mouth Every Morning. Indications: Underactive Thyroid) 90 tablet 3    losartan (COZAAR) 25 MG tablet Take 1 tablet by mouth Daily.      Mirabegron ER (Myrbetriq) 50 MG tablet sustained-release 24 hour 24 hr tablet Take 50 mg by mouth Daily for 360 days. 90 tablet 3    Multiple Vitamins-Minerals (OCUVITE ADULT 50+ PO) Take 1 capsule by mouth Daily. OTC      nitroglycerin (NITROSTAT) 0.4 MG SL tablet 1 under the tongue as needed for angina, may repeat q5mins for up three doses (Patient taking differently: Place 1 tablet under the tongue Every 5 (Five) Minutes As Needed for Chest Pain. 1 under the tongue as needed for angina, may repeat q5mins for up three doses) 100 tablet 11    silodosin (RAPAFLO) 8 MG capsule capsule Take 1 capsule by mouth Daily With Breakfast. To help with urination 90 capsule 3    SITagliptin (Januvia) 100 MG tablet Take 1 tablet by mouth Daily. For diabetes 90 tablet 3    HYDROcodone-acetaminophen (Norco) 5-325 MG per tablet Take 1 tablet by mouth Every 12 (Twelve) Hours As Needed for Severe Pain. 60 tablet 0     No facility-administered medications prior to visit.       Opioid medication/s are on active medication list.  and I have evaluated his active treatment plan and pain score trends (see table).  Vitals:    01/23/24 1343   PainSc: 0-No pain     I have reviewed the chart for potential of high risk medication and harmful drug interactions in the elderly.          Aspirin is on active medication list. Aspirin use is indicated based on review of current medical condition/s. Pros and cons of this therapy have been discussed today. Benefits of this medication  "outweigh potential harm.  Patient has been encouraged to continue taking this medication.  .      Patient Active Problem List   Diagnosis    Essential hypertension    Generalized osteoarthritis    Diabetic neuropathy    Gout    HLD (hyperlipidemia)    Acquired hypothyroidism    Ureteral stricture    Hydronephrosis of left kidney    CKD (chronic kidney disease) stage 3, GFR 30-59 ml/min    LEHMAN (dyspnea on exertion)    IPF (idiopathic pulmonary fibrosis)    Symptomatic bradycardia    Hypoxemia requiring supplemental oxygen    History of pulmonary embolism    Sinus node dysfunction    Chronic fatigue    Urethral stricture    Paroxysmal atrial fibrillation    Type 2 diabetes mellitus with hyperglycemia, with long-term current use of insulin    Abnormal cardiovascular stress test    Pulmonary hypertension    Cardiac pacemaker in situ    CAD (coronary artery disease)    Diastolic congestive heart failure    Abnormal SPEP    Type 2 diabetes mellitus with stage 3b chronic kidney disease, with long-term current use of insulin    Hydronephrosis, left    Gross hematuria    Chronic heart failure with preserved ejection fraction (HFpEF)    Personal history of other infectious and parasitic diseases    Weakness    Vasovagal syncope     Advance Care Planning   Advance Care Planning     Advance Directive is on file.  ACP discussion was held with the patient during this visit. Patient has an advance directive in EMR which is still valid.      Objective    Vitals:    01/23/24 1343   BP: 120/70   BP Location: Left arm   Patient Position: Sitting   Cuff Size: Adult   Pulse: 63   Resp: 16   Temp: 97 °F (36.1 °C)   TempSrc: Temporal   SpO2: 98%   Weight: 90.3 kg (199 lb)   Height: 175.3 cm (69\")   PainSc: 0-No pain     Estimated body mass index is 29.39 kg/m² as calculated from the following:    Height as of this encounter: 175.3 cm (69\").    Weight as of this encounter: 90.3 kg (199 lb).           Does the patient have evidence of " cognitive impairment? No    Lab Results   Component Value Date    HGBA1C 7.7 (A) 2024        HEALTH RISK ASSESSMENT    Smoking Status:  Social History     Tobacco Use   Smoking Status Never    Passive exposure: Past   Smokeless Tobacco Never   Tobacco Comments    SMOKED SOME AS A TEEN, DENIES ANY HABIT OR ADDICTION     Alcohol Consumption:  Social History     Substance and Sexual Activity   Alcohol Use No     Fall Risk Screen:    BRIAN Fall Risk Assessment was completed, and patient is at LOW risk for falls.Assessment completed on:2024    Depression Screenin/23/2024     1:42 PM   PHQ-2/PHQ-9 Depression Screening   Little Interest or Pleasure in Doing Things 0-->not at all   Feeling Down, Depressed or Hopeless 0-->not at all   PHQ-9: Brief Depression Severity Measure Score 0       Health Habits and Functional and Cognitive Screenin/23/2024     1:40 PM   Functional & Cognitive Status   Do you have difficulty preparing food and eating? No   Do you have difficulty bathing yourself, getting dressed or grooming yourself? No   Do you have difficulty using the toilet? No   Do you have difficulty moving around from place to place? Yes   Do you have trouble with steps or getting out of a bed or a chair? Yes   Current Diet Well Balanced Diet   Dental Exam Up to date   Eye Exam Not up to date   Exercise (times per week) 0 times per week   Current Exercises Include No Regular Exercise   Do you need help using the phone?  No   Are you deaf or do you have serious difficulty hearing?  No   Do you need help to go to places out of walking distance? No   Do you need help shopping? No   Do you need help preparing meals?  No   Do you need help with housework?  No   Do you need help with laundry? No   Do you need help taking your medications? No   Do you need help managing money? No   Do you ever drive or ride in a car without wearing a seat belt? No   Have you felt unusual stress, anger or loneliness in the  last month? No   Who do you live with? Alone   If you need help, do you have trouble finding someone available to you? No   Have you been bothered in the last four weeks by sexual problems? No   Do you have difficulty concentrating, remembering or making decisions? No       Age-appropriate Screening Schedule:  Refer to the list below for future screening recommendations based on patient's age, sex and/or medical conditions. Orders for these recommended tests are listed in the plan section. The patient has been provided with a written plan.    Health Maintenance   Topic Date Due    DIABETIC EYE EXAM  03/12/2024 (Originally 11/12/2021)    COVID-19 Vaccine (5 - 2023-24 season) 04/12/2024 (Originally 9/1/2023)    LIPID PANEL  01/24/2024    BMI FOLLOWUP  05/10/2024    HEMOGLOBIN A1C  07/23/2024    URINE MICROALBUMIN  10/23/2024    ANNUAL WELLNESS VISIT  01/23/2025    TDAP/TD VACCINES (3 - Td or Tdap) 06/30/2030    INFLUENZA VACCINE  Completed    Pneumococcal Vaccine 65+  Completed    DXA SCAN  Discontinued    ZOSTER VACCINE  Discontinued                  CMS Preventative Services Quick Reference  Risk Factors Identified During Encounter  Polypharmacy: Medication List reviewed, Medications are appropriate for patient, and counseled to not take benzo and opiate together.  The above risks/problems have been discussed with the patient.  Pertinent information has been shared with the patient in the After Visit Summary.  An After Visit Summary and PPPS were made available to the patient.    Follow Up:   Next Medicare Wellness visit to be scheduled in 1 year.       Additional E&M Note during same encounter follows:  Patient has multiple medical problems which are significant and separately identifiable that require additional work above and beyond the Medicare Wellness Visit.      Chief Complaint  Medicare Wellness-subsequent (AWV and preventive care ) and Diabetes    Subjective        Here with son. Overall feeling okay. No  "recent urinary tract infections. Feeling good. Needs pain med refilled. Typically only taking once a day as more than that causes constipation, takes stool softener. Had sleeping med in past that helped and needs refilled, does not take every night. Per chart review med was last sent 9/2021.       Forrest Zepeda is also being seen today for follow up of chronic conditions.          Objective   Vital Signs:  /70 (BP Location: Left arm, Patient Position: Sitting, Cuff Size: Adult)   Pulse 63   Temp 97 °F (36.1 °C) (Temporal)   Resp 16   Ht 175.3 cm (69\")   Wt 90.3 kg (199 lb)   SpO2 98%   BMI 29.39 kg/m²     Physical Exam  Vitals and nursing note reviewed.   Constitutional:       General: He is not in acute distress.     Appearance: Normal appearance. He is well-developed, well-groomed and overweight. He is not ill-appearing, toxic-appearing or diaphoretic.   HENT:      Head: Normocephalic and atraumatic.      Right Ear: Decreased hearing noted.      Left Ear: Decreased hearing noted.   Eyes:      General: Lids are normal. No scleral icterus.        Right eye: No discharge.         Left eye: No discharge.      Extraocular Movements: Extraocular movements intact.   Cardiovascular:      Rate and Rhythm: Normal rate and regular rhythm.   Pulmonary:      Effort: Pulmonary effort is normal.   Musculoskeletal:      Cervical back: Neck supple.   Skin:     Coloration: Skin is not jaundiced or pale.   Neurological:      General: No focal deficit present.      Mental Status: He is alert and oriented to person, place, and time.      Gait: Gait abnormal (using cane; baseline for patient).   Psychiatric:         Attention and Perception: Attention and perception normal.         Mood and Affect: Mood and affect normal.         Speech: Speech normal.         Behavior: Behavior normal. Behavior is cooperative.         Thought Content: Thought content normal.         Cognition and Memory: Cognition and memory normal.    "      Judgment: Judgment normal.          The following data was reviewed by: Kary Wen MD on 01/23/2024:  Lab Results   Component Value Date    HGBA1C 7.7 (A) 01/23/2024    HGBA1C 7.30 (H) 10/09/2023    HGBA1C 7.6 09/11/2023      Basic Metabolic Panel (10/10/2023 04:10)     Lab Results   Component Value Date    TSH 1.230 10/09/2023     Lab Results   Component Value Date    FREET4 1.00 10/09/2023               Assessment and Plan   Diagnoses and all orders for this visit:    1. Medicare annual wellness visit, subsequent (Primary)    2. Type 2 diabetes mellitus with hyperglycemia, with long-term current use of insulin  -     POC Glycosylated Hemoglobin (Hb A1C)    3. 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 IPF (idiopathic pulmonary fibrosis)  Comments:  stable; has oxygen if needs it, consider follow up with specialist    5. Chronic heart failure with preserved ejection fraction (HFpEF)  Comments:  stable; info via avs; follow up with cardiology    6. Chronic low back pain without sciatica, unspecified back pain laterality  -     HYDROcodone-acetaminophen (Norco) 5-325 MG per tablet; Take 1 tablet by mouth Every 12 (Twelve) Hours As Needed for Severe Pain.  Dispense: 60 tablet; Refill: 0    7. Insomnia, unspecified type  Comments:  may resume medicine he's taken in past  Orders:  -     triazolam (HALCION) 0.25 MG tablet; Take 1 tablet by mouth At Night As Needed for Sleep.  Dispense: 90 tablet; Refill: 0      Counseling/anticipatory guidance/risk factor interventions for age provided. See AVS for further information.          Follow Up   Return in about 6 months (around 7/23/2024) for Controlled Rx Follow Up. Discussed doing labs next visit.  Patient was given instructions and counseling regarding his condition or for health maintenance advice. Please see specific information pulled into the AVS if  appropriate.

## 2024-01-23 NOTE — PATIENT INSTRUCTIONS
Medicare Wellness  Personal Prevention Plan of Service     Date of Office Visit:    Encounter Provider:  Kary Wen MD  Place of Service:  [facility]  Patient Name: Forrest Zepeda  :  1932    As part of the Medicare Wellness portion of your visit today, we are providing you with this personalized preventive plan of services (PPPS). This plan is based upon recommendations of the [place] Preventive Services Task Force (USPSTF) and the Advisory Committee on Immunization Practices (ACIP).    This lists the preventive care services that should be considered, and provides dates of when you are due. Items listed as completed are up-to-date and do not require any further intervention.    Health Maintenance   Topic Date Due    ANNUAL WELLNESS VISIT  2024    DIABETIC EYE EXAM  2024 (Originally 2021)    COVID-19 Vaccine (2023- season) 2024 (Originally 2023)    LIPID PANEL  2024    BMI FOLLOWUP  05/10/2024    HEMOGLOBIN A1C  2024    URINE MICROALBUMIN  10/23/2024    TDAP/TD VACCINES (3 - Td or Tdap) 2030    INFLUENZA VACCINE  Completed    Pneumococcal Vaccine 65+  Completed    DXA SCAN  Discontinued    ZOSTER VACCINE  Discontinued       Orders Placed This Encounter   Procedures    POC Glycosylated Hemoglobin (Hb A1C)     Order Specific Question:   Release to patient     Answer:   Routine Release [[id number]]       Return in about 6 months (around 2024) for Controlled Rx Follow Up.        Health Maintenance, Male  A healthy lifestyle and preventive care is important for your health and wellness. Ask your health care provider about what schedule of regular examinations is right for you.  What should I know about weight and diet?    Eat a Healthy Diet  Eat plenty of vegetables, fruits, whole grains, low-fat dairy products, and lean protein.  Do not eat a lot of foods high in solid fats, added sugars, or salt.      Maintain a Healthy Weight  Regular exercise can help you achieve or maintain a healthy weight. You should:  Do at least 150 minutes of exercise each week. The exercise should increase your heart rate and make you sweat (moderate-intensity exercise).  Do strength-training exercises at least twice a week.     Watch Your Levels of Cholesterol and Blood Lipids  Have your blood tested for lipids and cholesterol every 5 years starting at 35 years of age. If you are at high risk for heart disease, you should start having your blood tested when you are 20 years old. You may need to have your cholesterol levels checked more often if:  Your lipid or cholesterol levels are high.  You are older than 50 years of age.  You are at high risk for heart disease.     What should I know about cancer screening?  Many types of cancers can be detected early and may often be prevented.  Lung Cancer  You should be screened every year for lung cancer if:  You are a current smoker who has smoked for at least 30 years.  You are a former smoker who has quit within the past 15 years.  Talk to your health care provider about your screening options, when you should start screening, and how often you should be screened.     Colorectal Cancer  Routine colorectal cancer screening usually begins at 50 years of age and should be repeated every 5-10 years until you are 75 years old. You may need to be screened more often if early forms of precancerous polyps or small growths are found. Your health care provider may recommend screening at an earlier age if you have risk factors for colon cancer.  Your health care provider may recommend using home test kits to check for hidden blood in the stool.  A small camera at the end of a tube can be used to examine your colon (sigmoidoscopy or colonoscopy). This checks for the earliest forms of colorectal cancer.     Prostate and Testicular Cancer  Depending on your age and overall health, your health care  provider may do certain tests to screen for prostate and testicular cancer.  Talk to your health care provider about any symptoms or concerns you have about testicular or prostate cancer.     Skin Cancer  Check your skin from head to toe regularly.  Tell your health care provider about any new moles or changes in moles, especially if:  There is a change in a mole’s size, shape, or color.  You have a mole that is larger than a pencil eraser.  Always use sunscreen. Apply sunscreen liberally and repeat throughout the day.  Protect yourself by wearing long sleeves, pants, a wide-brimmed hat, and sunglasses when outside.     What should I know about heart disease, diabetes, and high blood pressure?  If you are 18-39 years of age, have your blood pressure checked every 3-5 years. If you are 40 years of age or older, have your blood pressure checked every year. You should have your blood pressure measured twice--once when you are at a hospital or clinic, and once when you are not at a hospital or clinic. Record the average of the two measurements. To check your blood pressure when you are not at a hospital or clinic, you can use:  An automated blood pressure machine at a pharmacy.  A home blood pressure monitor.  Talk to your health care provider about your target blood pressure.  If you are between 45-79 years old, ask your health care provider if you should take aspirin to prevent heart disease.  Have regular diabetes screenings by checking your fasting blood sugar level.  If you are at a normal weight and have a low risk for diabetes, have this test once every three years after the age of 45.  If you are overweight and have a high risk for diabetes, consider being tested at a younger age or more often.  A one-time screening for abdominal aortic aneurysm (AAA) by ultrasound is recommended for men aged 65-75 years who are current or former smokers.  What should I know about preventing infection?  Hepatitis B  If you have  a higher risk for hepatitis B, you should be screened for this virus. Talk with your health care provider to find out if you are at risk for hepatitis B infection.  Hepatitis C  Blood testing is recommended for:  Everyone born from 1945 through 1965.  Anyone with known risk factors for hepatitis C.     Sexually Transmitted Diseases (STDs)  You should be screened each year for STDs including gonorrhea and chlamydia if:  You are sexually active and are younger than 24 years of age.  You are older than 24 years of age and your health care provider tells you that you are at risk for this type of infection.  Your sexual activity has changed since you were last screened and you are at an increased risk for chlamydia or gonorrhea. Ask your health care provider if you are at risk.  Talk with your health care provider about whether you are at high risk of being infected with HIV. Your health care provider may recommend a prescription medicine to help prevent HIV infection.     What else can I do?    Schedule regular health, dental, and eye exams.  Stay current with your vaccines (immunizations).  Do not use any tobacco products, such as cigarettes, chewing tobacco, and e-cigarettes. If you need help quitting, ask your health care provider.  Limit alcohol intake to no more than 2 drinks per day. One drink equals 12 ounces of beer, 5 ounces of wine, or 1½ ounces of hard liquor.  Do not use street drugs.  Do not share needles.  Ask your health care provider for help if you need support or information about quitting drugs.  Tell your health care provider if you often feel depressed.  Tell your health care provider if you have ever been abused or do not feel safe at home.      This information is not intended to replace advice given to you by your health care provider. Make sure you discuss any questions you have with your health care provider.  Document Released: 06/15/2009 Document Revised: 08/16/2017 Document Reviewed:  09/20/2016  [employer] Patient Education © 2018 [employer]        Heart Failure  Heart failure is a condition in which the heart has trouble pumping blood. This means your heart does not pump blood efficiently for your body to work well. In some cases of heart failure, fluid may back up into your lungs or you may have swelling (edema) in your lower legs. Heart failure is usually a long-term (chronic) condition. It is important for you to take good care of yourself and follow your health care provider's treatment plan.    How to cope with Congestive Heart Failure:  Congestive Heart Failure (CHF) is not an unchanging condition.  Heart Failure may deteriorate for a variety of reasons.  For instance:  excessive salt or fluid intake, illness such as flu or pneumonia, cardiac arrhythmias, and heart attack all may worsen heart failure.  Sometimes the patient with heart failure worsens for no apparent reason.  The educated patient must know how to anticipate deterioration, and to know how to react to it in order to correct the deterioration before it becomes serious.  Just as when steering a car, the heart failure patient must adjust to changes in their condition in order to stay on course.  A little too wet and they become congested and short of breathe.  A little too dry and they become weak, fatigued and dizzy.    When your provider examines your neck, he/she is looking at your veins to assess how much fluid is in the circulatory system.  You can also help monitor your fluid status by paying attention to your condition,(particularly how you feel), by noting how much swelling is present at the ankles and monitoring your body weight daily. A little bit of swelling of the ankles at the end of the day is normal and indicates sufficient fluid in the circulatory system to allow a weakened heart to pump normally.  More a than a trace of swelling at the ankles indicates fluid excess.  This fluid may re-enter the  central circulation when you lie down, awakening you with shortness of breath or forcing you to sleep on several pillows for comfort.  Similarly if you weight goes up by more than 2-3 pounds in 1-2 days or by 5 pounds over a week, the body may be retaining too much fluid and worsening heart failure may ensue.  CAUSES   Some health conditions can cause heart failure. Those health conditions include:  High blood pressure (hypertension). Hypertension causes the heart muscle to work harder than normal. When pressure in the blood vessels is high, the heart needs to pump (contract) with more force in order to circulate blood throughout the body. High blood pressure eventually causes the heart to become stiff and weak.  Coronary artery disease (CAD). CAD is the buildup of cholesterol and fat (plaque) in the arteries of the heart. The blockage in the arteries deprives the heart muscle of oxygen and blood. This can cause chest pain and may lead to a heart attack. High blood pressure can also contribute to CAD.  Heart attack (myocardial infarction). A heart attack occurs when one or more arteries in the heart become blocked. The loss of oxygen damages the muscle tissue of the heart. When this happens, part of the heart muscle dies. The injured tissue does not contract as well and weakens the heart's ability to pump blood.  Abnormal heart valves. When the heart valves do not open and close properly, it can cause heart failure. This makes the heart muscle pump harder to keep the blood flowing.  Heart muscle disease (cardiomyopathy or myocarditis). Heart muscle disease is damage to the heart muscle from a variety of causes. These can include drug or alcohol abuse, infections, or unknown reasons. These can increase the risk of heart failure.  Lung disease. Lung disease makes the heart work harder because the lungs do not work properly. This can cause a strain on the heart, leading it to fail.  Diabetes. Diabetes increases the  risk of heart failure. High blood sugar contributes to high fat (lipid) levels in the blood. Diabetes can also cause slow damage to tiny blood vessels that carry important nutrients to the heart muscle. When the heart does not get enough oxygen and food, it can cause the heart to become weak and stiff. This leads to a heart that does not contract efficiently.  Other conditions can contribute to heart failure. These include abnormal heart rhythms, thyroid problems, and low blood counts (anemia).  Certain unhealthy behaviors can increase the risk of heart failure, including:  Being overweight.  Smoking or chewing tobacco.  Eating foods high in fat and cholesterol.  Abusing illicit drugs or alcohol.  Lacking physical activity.  SYMPTOMS   Heart failure symptoms may vary and can be hard to detect. Symptoms may include:  Shortness of breath with activity, such as climbing stairs.  Persistent cough.  Swelling of the feet, ankles, legs, or abdomen.  Unexplained weight gain.  Difficulty breathing when lying flat (orthopnea).  Waking from sleep because of the need to sit up and get more air.  Rapid heartbeat.  Fatigue and loss of energy.  Feeling light-headed, dizzy, or close to fainting.  Loss of appetite.  Nausea.  Increased urination during the night (nocturia).      DIAGNOSIS   A diagnosis of heart failure is based on your history, symptoms, physical examination, and diagnostic tests. Diagnostic tests for heart failure may include:  Echocardiography.  Electrocardiography.  Chest X-ray.  Blood tests.  Exercise stress test.  Cardiac angiography.  Radionuclide scans.  TREATMENT   Treatment is aimed at managing the symptoms of heart failure. Medicines, behavioral changes, or surgical intervention may be necessary to treat heart failure.  Medicines to help treat heart failure may include:  Angiotensin-converting enzyme (ACE) inhibitors. This type of medicine blocks the effects of a blood protein called angiotensin-converting  enzyme. ACE inhibitors relax (dilate) the blood vessels and help lower blood pressure.  Angiotensin receptor blockers (ARBs). This type of medicine blocks the actions of a blood protein called angiotensin. Angiotensin receptor blockers dilate the blood vessels and help lower blood pressure.  Water pills (diuretics). Diuretics cause the kidneys to remove salt and water from the blood. The extra fluid is removed through urination. This loss of extra fluid lowers the volume of blood the heart pumps.  Beta blockers. These prevent the heart from beating too fast and improve heart muscle strength.  Digitalis. This increases the force of the heartbeat.  Healthy behavior changes include:  Obtaining and maintaining a healthy weight.  Stopping smoking or chewing tobacco.  Eating heart-healthy foods.  Limiting or avoiding alcohol.  Stopping illicit drug use.  Physical activity as directed by your health care provider.  Surgical treatment for heart failure may include:  A procedure to open blocked arteries, repair damaged heart valves, or remove damaged heart muscle tissue.  A pacemaker to improve heart muscle function and control certain abnormal heart rhythms.  An internal cardioverter defibrillator to treat certain serious abnormal heart rhythms.  A left ventricular assist device (LVAD) to assist the pumping ability of the heart.        HOME CARE INSTRUCTIONS   Take medicines only as directed by your health care provider. Medicines are important in reducing the workload of your heart, slowing the progression of heart failure, and improving your symptoms.  Do not stop taking your medicine unless directed by your health care provider.  Do not skip any dose of medicine.  Refill your prescriptions before you run out of medicine. Your medicines are needed every day.  Engage in moderate physical activity if directed by your health care provider. Moderate physical activity can benefit some people. The elderly and people with severe  heart failure should consult with a health care provider for physical activity recommendations.  Eat heart-healthy foods. Food choices should be free of trans fat and low in saturated fat, cholesterol, and salt (sodium). Healthy choices include fresh or frozen fruits and vegetables, fish, lean meats, legumes, fat-free or low-fat dairy products, and whole grain or high fiber foods. Talk to a dietitian to learn more about heart-healthy foods.  Limit sodium if directed by your health care provider. Sodium restriction may reduce symptoms of heart failure in some people. Talk to a dietitian to learn more about heart-healthy seasonings.  Use healthy cooking methods. Healthy cooking methods include roasting, grilling, broiling, baking, poaching, steaming, or stir-frying. Talk to a dietitian to learn more about healthy cooking methods.  Limit fluids if directed by your health care provider. Fluid restriction may reduce symptoms of heart failure in some people.  Weigh yourself every day. Daily weights are important in the early recognition of excess fluid. You should weigh yourself every morning after you urinate and before you eat breakfast. Wear the same amount of clothing each time you weigh yourself. Record your daily weight. Provide your health care provider with your weight record.  Monitor and record your blood pressure if directed by your health care provider.  Check your pulse if directed by your health care provider.  Lose weight if directed by your health care provider. Weight loss may reduce symptoms of heart failure in some people.  Stop smoking or chewing tobacco. Nicotine makes your heart work harder by causing your blood vessels to constrict. Do not use nicotine gum or patches before talking to your health care provider.  Keep all follow-up visits as directed by your health care provider. This is important.  Limit alcohol intake to no more than 1 drink per day for nonpregnant women and 2 drinks per day for  men. One drink equals 12 ounces of beer, 5 ounces of wine, or 1½ ounces of hard liquor. Drinking more than that is harmful to your heart. Tell your health care provider if you drink alcohol several times a week. Talk with your health care provider about whether alcohol is safe for you. If your heart has already been damaged by alcohol or you have severe heart failure, drinking alcohol should be stopped completely.  Stop illicit drug use.  Stay up-to-date with immunizations. It is especially important to prevent respiratory infections through current pneumococcal and influenza immunizations.  Manage other health conditions such as hypertension, diabetes, thyroid disease, or abnormal heart rhythms as directed by your health care provider.  Learn to manage stress.  Plan rest periods when fatigued.  Learn strategies to manage high temperatures. If the weather is extremely hot:  Avoid vigorous physical activity.  Use air conditioning or fans or seek a cooler location.  Avoid caffeine and alcohol.  Wear loose-fitting, lightweight, and light-colored clothing.  Learn strategies to manage cold temperatures. If the weather is extremely cold:  Avoid vigorous physical activity.  Layer clothes.  Wear mittens or gloves, a hat, and a scarf when going outside.  Avoid alcohol.  Obtain ongoing education and support as needed.  Participate in or seek rehabilitation as needed to maintain or improve independence and quality of life.      Medication Type Medication Name/Dose How much to take When to take it                                                                                                           To Monitor Your Own Fluid Status at Home:   1.Weigh yourself daily   2. Weigh yourself at the same time every day-before breakfast is best   3. Use the same scale all the time   4. Wear the same amount of clothes when you weigh yourself   5. Empty your bladder before weighing   6. Record your weight on the daily record below   7.  The weight at which there is just a little bit of swelling in the ankles at the end of       the day is your ideal weight- try and maintain it   8. When taking diuretics, avoid drinking too much in the way of fluids, even if your      mouth is dry and you feel thirsty.  This could counter the effect of the diuretic           and dilute the body's salts causing weakness and confusion   9. You should drink no more than 2000 ml (8 glasses or cups) of fluid per day, or          whatever amount is prescribed for you      Date Weight Exercise Duration Symptoms Better/Worse/Same Swelling Better/Worse/Same                                                                                                                                                                Congestive Heart Failure Management Guide      Green Zone: All Clear Green Zone: Actions   Your Goal Weight____________  *No Shortness of Breath  *No Swelling  *No Weight Gain  *No Chest Pain  *No Decrease in your ability to    maintain your activity level *Your symptoms are under control  *Continue taking your medications as              ordered  *Continue daily weights  *Follow low salt diet  *Keep all physician/provider     appointments     Yellow Zone: Caution Yellow Zone: Actions   If you have any of the following signs or symptoms;    *weight gain of 3 or more pounds in 2 days  *increased cough  *increased swelling  *increased shortness of breath with activity  *increased in the number of pillows to          sleep comfortably    Call your provider's medical assistant or home health nurse or nurse coordinator *Your symptoms may indicate that you           need an adjustment of your medications  *Call your provider's medical         assistant, home health nurse, or      nurse  "coordinator  *NAME______________________    *NUMBER___________________    *INSTRUCTIONS_______________________________________________________________________________________________________       Red Zone: Medical Alert Red Zone: Actions   *Unrelieved shortness of breath   *Shortness of breath at rest  *Unrelieved chest pain  *Wheezing or chest tightness at rest  *Need to sit in chair to sleep  *Weight gain or loss of more than 5       pounds in 2 days  *Confusion This indicates that you need to be evaluated by a physician right away  Call your Doctor immediately if you are going into the red zone    Doctor:______________________________  Number:_____________________________  If unable to reach Doctor, then call [phone]                      Low-Sodium Eating Plan  Sodium, which is an element that makes up salt, helps you maintain a healthy balance of fluids in your body. Too much sodium can increase your blood pressure and cause fluid and waste to be held in your body.  Your health care provider or dietitian may recommend following this plan if you have high blood pressure (hypertension), kidney disease, liver disease, or heart failure. Eating less sodium can help lower your blood pressure, reduce swelling, and protect your heart, liver, and kidneys.  What are tips for following this plan?  Reading food labels  The Nutrition Facts label lists the amount of sodium in one serving of the food. If you eat more than one serving, you must multiply the listed amount of sodium by the number of servings.  Choose foods with less than 140 mg of sodium per serving.  Avoid foods with 300 mg of sodium or more per serving.  Shopping    Look for lower-sodium products, often labeled as \"low-sodium\" or \"no salt added.\"  Always check the sodium content, even if foods are labeled as \"unsalted\" or \"no salt added.\"  Buy fresh foods.  Avoid canned foods and pre-made or frozen meals.  Avoid canned, cured, or processed meats.  Buy breads that " "have less than 80 mg of sodium per slice.  Cooking    Eat more home-cooked food and less restaurant, buffet, and fast food.  Avoid adding salt when cooking. Use salt-free seasonings or herbs instead of table salt or sea salt. Check with your health care provider or pharmacist before using salt substitutes.  Cook with plant-based oils, such as canola, sunflower, or olive oil.  Meal planning  When eating at a restaurant, ask that your food be prepared with less salt or no salt, if possible. Avoid dishes labeled as brined, pickled, cured, smoked, or made with soy sauce, miso, or teriyaki sauce.  Avoid foods that contain MSG (monosodium glutamate). MSG is sometimes added to Chinese food, bouillon, and some canned foods.  Make meals that can be grilled, baked, poached, roasted, or steamed. These are generally made with less sodium.  General information  Most people on this plan should limit their sodium intake to 1,500-2,000 mg (milligrams) of sodium each day.  What foods should I eat?  Fruits  Fresh, frozen, or canned fruit. Fruit juice.  Vegetables  Fresh or frozen vegetables. \"No salt added\" canned vegetables. \"No salt added\" tomato sauce and paste. Low-sodium or reduced-sodium tomato and vegetable juice.  Grains  Low-sodium cereals, including oats, puffed wheat and rice, and shredded wheat. Low-sodium crackers. Unsalted rice. Unsalted pasta. Low-sodium bread. Whole-grain breads and whole-grain pasta.  Meats and other proteins  Fresh or frozen (no salt added) meat, poultry, seafood, and fish. Low-sodium canned tuna and salmon. Unsalted nuts. Dried peas, beans, and lentils without added salt. Unsalted canned beans. Eggs. Unsalted nut butters.  Dairy  Milk. Soy milk. Cheese that is naturally low in sodium, such as ricotta cheese, fresh mozzarella, or Swiss cheese. Low-sodium or reduced-sodium cheese. Cream cheese. Yogurt.  Seasonings and condiments  Fresh and dried herbs and spices. Salt-free seasonings. Low-sodium " mustard and ketchup. Sodium-free salad dressing. Sodium-free light mayonnaise. Fresh or refrigerated horseradish. Lemon juice. Vinegar.  Other foods  Homemade, reduced-sodium, or low-sodium soups. Unsalted popcorn and pretzels. Low-salt or salt-free chips.  The items listed above may not be a complete list of foods and beverages you can eat. Contact a dietitian for more information.  What foods should I avoid?  Vegetables  Sauerkraut, pickled vegetables, and relishes. Olives. French fries. Onion rings. Regular canned vegetables (not low-sodium or reduced-sodium). Regular canned tomato sauce and paste (not low-sodium or reduced-sodium). Regular tomato and vegetable juice (not low-sodium or reduced-sodium). Frozen vegetables in sauces.  Grains  Instant hot cereals. Bread stuffing, pancake, and biscuit mixes. Croutons. Seasoned rice or pasta mixes. Noodle soup cups. Boxed or frozen macaroni and cheese. Regular salted crackers. Self-rising flour.  Meats and other proteins  Meat or fish that is salted, canned, smoked, spiced, or pickled. Precooked or cured meat, such as sausages or meat loaves. Green. Ham. Pepperoni. Hot dogs. Corned beef. Chipped beef. Salt pork. Jerky. Pickled herring. Anchovies and sardines. Regular canned tuna. Salted nuts.  Dairy  Processed cheese and cheese spreads. Hard cheeses. Cheese curds. Blue cheese. Feta cheese. String cheese. Regular cottage cheese. Buttermilk. Canned milk.  Fats and oils  Salted butter. Regular margarine. Ghee. Green fat.  Seasonings and condiments  Onion salt, garlic salt, seasoned salt, table salt, and sea salt. Canned and packaged gravies. Worcestershire sauce. Tartar sauce. Barbecue sauce. Teriyaki sauce. Soy sauce, including reduced-sodium. Steak sauce. Fish sauce. Oyster sauce. Cocktail sauce. Horseradish that you find on the shelf. Regular ketchup and mustard. Meat flavorings and tenderizers. Bouillon cubes. Hot sauce. Pre-made or packaged marinades. Pre-made or  packaged taco seasonings. Relishes. Regular salad dressings. Salsa.  Other foods  Salted popcorn and pretzels. Corn chips and puffs. Potato and tortilla chips. Canned or dried soups. Pizza. Frozen entrees and pot pies.  The items listed above may not be a complete list of foods and beverages you should avoid. Contact a dietitian for more information.  Summary  Eating less sodium can help lower your blood pressure, reduce swelling, and protect your heart, liver, and kidneys.  Most people on this plan should limit their sodium intake to 1,500-2,000 mg (milligrams) of sodium each day.  Canned, boxed, and frozen foods are high in sodium. Restaurant foods, fast foods, and pizza are also very high in sodium. You also get sodium by adding salt to food.  Try to cook at home, eat more fresh fruits and vegetables, and eat less fast food and canned, processed, or prepared foods.  This information is not intended to replace advice given to you by your health care provider. Make sure you discuss any questions you have with your health care provider.  Document Revised: 11/23/2020 Document Reviewed: 11/18/2020  Elsevier Patient Education © 2023 [employer]

## 2024-02-13 ENCOUNTER — TELEPHONE (OUTPATIENT)
Dept: UROLOGY | Facility: CLINIC | Age: 89
End: 2024-02-13

## 2024-02-13 ENCOUNTER — HOSPITAL ENCOUNTER (INPATIENT)
Facility: HOSPITAL | Age: 89
LOS: 2 days | Discharge: HOME | DRG: 660 | End: 2024-02-17
Attending: EMERGENCY MEDICINE | Admitting: INTERNAL MEDICINE
Payer: MEDICARE

## 2024-02-13 ENCOUNTER — APPOINTMENT (OUTPATIENT)
Dept: CT IMAGING | Facility: HOSPITAL | Age: 89
DRG: 660 | End: 2024-02-13
Payer: MEDICARE

## 2024-02-13 DIAGNOSIS — N17.9 ACUTE KIDNEY INJURY: ICD-10-CM

## 2024-02-13 DIAGNOSIS — R31.0 GROSS HEMATURIA: Primary | ICD-10-CM

## 2024-02-13 DIAGNOSIS — Z96.0 URETERAL STENT PRESENT: ICD-10-CM

## 2024-02-13 DIAGNOSIS — R31.9 HEMATURIA: ICD-10-CM

## 2024-02-13 DIAGNOSIS — Z79.01 ANTICOAGULATED: ICD-10-CM

## 2024-02-13 LAB
ALBUMIN SERPL-MCNC: 4 G/DL (ref 3.5–5.2)
ALBUMIN/GLOB SERPL: 1 G/DL
ALP SERPL-CCNC: 106 U/L (ref 39–117)
ALT SERPL W P-5'-P-CCNC: 9 U/L (ref 1–41)
ANION GAP SERPL CALCULATED.3IONS-SCNC: 11 MMOL/L (ref 5–15)
AST SERPL-CCNC: 25 U/L (ref 1–40)
BACTERIA UR QL AUTO: ABNORMAL /HPF
BASOPHILS # BLD AUTO: 0.12 10*3/MM3 (ref 0–0.2)
BASOPHILS NFR BLD AUTO: 0.9 % (ref 0–1.5)
BILIRUB SERPL-MCNC: 0.3 MG/DL (ref 0–1.2)
BILIRUB UR QL STRIP: NEGATIVE
BUN SERPL-MCNC: 45 MG/DL (ref 8–23)
BUN/CREAT SERPL: 20.4 (ref 7–25)
CALCIUM SPEC-SCNC: 9.6 MG/DL (ref 8.2–9.6)
CHLORIDE SERPL-SCNC: 102 MMOL/L (ref 98–107)
CLARITY UR: ABNORMAL
CO2 SERPL-SCNC: 23 MMOL/L (ref 22–29)
COLOR UR: ABNORMAL
CREAT SERPL-MCNC: 2.21 MG/DL (ref 0.76–1.27)
D-LACTATE SERPL-SCNC: 1.9 MMOL/L (ref 0.5–2)
D-LACTATE SERPL-SCNC: 3.7 MMOL/L (ref 0.5–2)
DEPRECATED RDW RBC AUTO: 58.8 FL (ref 37–54)
EGFRCR SERPLBLD CKD-EPI 2021: 27.4 ML/MIN/1.73
EOSINOPHIL # BLD AUTO: 0.5 10*3/MM3 (ref 0–0.4)
EOSINOPHIL NFR BLD AUTO: 3.9 % (ref 0.3–6.2)
ERYTHROCYTE [DISTWIDTH] IN BLOOD BY AUTOMATED COUNT: 16.1 % (ref 12.3–15.4)
GLOBULIN UR ELPH-MCNC: 4 GM/DL
GLUCOSE BLDC GLUCOMTR-MCNC: 210 MG/DL (ref 70–130)
GLUCOSE BLDC GLUCOMTR-MCNC: 89 MG/DL (ref 70–130)
GLUCOSE SERPL-MCNC: 184 MG/DL (ref 65–99)
GLUCOSE UR STRIP-MCNC: NEGATIVE MG/DL
HCT VFR BLD AUTO: 41.8 % (ref 37.5–51)
HGB BLD-MCNC: 13.3 G/DL (ref 13–17.7)
HGB UR QL STRIP.AUTO: ABNORMAL
HYALINE CASTS UR QL AUTO: ABNORMAL /LPF
IMM GRANULOCYTES # BLD AUTO: 0.08 10*3/MM3 (ref 0–0.05)
IMM GRANULOCYTES NFR BLD AUTO: 0.6 % (ref 0–0.5)
KETONES UR QL STRIP: NEGATIVE
LEUKOCYTE ESTERASE UR QL STRIP.AUTO: ABNORMAL
LYMPHOCYTES # BLD AUTO: 3.64 10*3/MM3 (ref 0.7–3.1)
LYMPHOCYTES NFR BLD AUTO: 28.5 % (ref 19.6–45.3)
MCH RBC QN AUTO: 31.9 PG (ref 26.6–33)
MCHC RBC AUTO-ENTMCNC: 31.8 G/DL (ref 31.5–35.7)
MCV RBC AUTO: 100.2 FL (ref 79–97)
MONOCYTES # BLD AUTO: 0.83 10*3/MM3 (ref 0.1–0.9)
MONOCYTES NFR BLD AUTO: 6.5 % (ref 5–12)
NEUTROPHILS NFR BLD AUTO: 59.6 % (ref 42.7–76)
NEUTROPHILS NFR BLD AUTO: 7.6 10*3/MM3 (ref 1.7–7)
NITRITE UR QL STRIP: NEGATIVE
NRBC BLD AUTO-RTO: 0.2 /100 WBC (ref 0–0.2)
PH UR STRIP.AUTO: 6.5 [PH] (ref 5–8)
PLATELET # BLD AUTO: 221 10*3/MM3 (ref 140–450)
PMV BLD AUTO: 10.1 FL (ref 6–12)
POTASSIUM SERPL-SCNC: 4.9 MMOL/L (ref 3.5–5.2)
PROT SERPL-MCNC: 8 G/DL (ref 6–8.5)
PROT UR QL STRIP: ABNORMAL
RBC # BLD AUTO: 4.17 10*6/MM3 (ref 4.14–5.8)
RBC # UR STRIP: ABNORMAL /HPF
REF LAB TEST METHOD: ABNORMAL
SODIUM SERPL-SCNC: 136 MMOL/L (ref 136–145)
SP GR UR STRIP: 1.02 (ref 1–1.03)
SQUAMOUS #/AREA URNS HPF: ABNORMAL /HPF
UROBILINOGEN UR QL STRIP: ABNORMAL
WBC # UR STRIP: ABNORMAL /HPF
WBC NRBC COR # BLD AUTO: 12.77 10*3/MM3 (ref 3.4–10.8)

## 2024-02-13 PROCEDURE — 25810000003 SODIUM CHLORIDE 0.9 % SOLUTION: Performed by: NURSE PRACTITIONER

## 2024-02-13 PROCEDURE — 63710000001 INSULIN DETEMIR PER 5 UNITS: Performed by: NURSE PRACTITIONER

## 2024-02-13 PROCEDURE — 25010000002 ERTAPENEM PER 500 MG: Performed by: EMERGENCY MEDICINE

## 2024-02-13 PROCEDURE — 99285 EMERGENCY DEPT VISIT HI MDM: CPT

## 2024-02-13 PROCEDURE — 25810000003 SODIUM CHLORIDE 0.9 % SOLUTION: Performed by: EMERGENCY MEDICINE

## 2024-02-13 PROCEDURE — 80053 COMPREHEN METABOLIC PANEL: CPT | Performed by: EMERGENCY MEDICINE

## 2024-02-13 PROCEDURE — 63710000001 INSULIN LISPRO (HUMAN) PER 5 UNITS: Performed by: NURSE PRACTITIONER

## 2024-02-13 PROCEDURE — G0378 HOSPITAL OBSERVATION PER HR: HCPCS

## 2024-02-13 PROCEDURE — 74176 CT ABD & PELVIS W/O CONTRAST: CPT

## 2024-02-13 PROCEDURE — 85025 COMPLETE CBC W/AUTO DIFF WBC: CPT | Performed by: EMERGENCY MEDICINE

## 2024-02-13 PROCEDURE — 87086 URINE CULTURE/COLONY COUNT: CPT | Performed by: EMERGENCY MEDICINE

## 2024-02-13 PROCEDURE — 82948 REAGENT STRIP/BLOOD GLUCOSE: CPT

## 2024-02-13 PROCEDURE — 99223 1ST HOSP IP/OBS HIGH 75: CPT | Performed by: NURSE PRACTITIONER

## 2024-02-13 PROCEDURE — 81001 URINALYSIS AUTO W/SCOPE: CPT | Performed by: EMERGENCY MEDICINE

## 2024-02-13 PROCEDURE — 83605 ASSAY OF LACTIC ACID: CPT | Performed by: EMERGENCY MEDICINE

## 2024-02-13 RX ORDER — BISOPROLOL FUMARATE 5 MG/1
5 TABLET, FILM COATED ORAL DAILY
Status: DISCONTINUED | OUTPATIENT
Start: 2024-02-14 | End: 2024-02-17 | Stop reason: HOSPADM

## 2024-02-13 RX ORDER — NICOTINE POLACRILEX 4 MG
15 LOZENGE BUCCAL
Status: DISCONTINUED | OUTPATIENT
Start: 2024-02-13 | End: 2024-02-17 | Stop reason: HOSPADM

## 2024-02-13 RX ORDER — HYDROCODONE BITARTRATE AND ACETAMINOPHEN 5; 325 MG/1; MG/1
1 TABLET ORAL EVERY 12 HOURS PRN
Status: DISCONTINUED | OUTPATIENT
Start: 2024-02-13 | End: 2024-02-17 | Stop reason: HOSPADM

## 2024-02-13 RX ORDER — NITROGLYCERIN 0.4 MG/1
0.4 TABLET SUBLINGUAL
Status: DISCONTINUED | OUTPATIENT
Start: 2024-02-13 | End: 2024-02-17 | Stop reason: HOSPADM

## 2024-02-13 RX ORDER — ASPIRIN 81 MG/1
81 TABLET ORAL DAILY
Status: DISCONTINUED | OUTPATIENT
Start: 2024-02-13 | End: 2024-02-17 | Stop reason: HOSPADM

## 2024-02-13 RX ORDER — LEVOTHYROXINE SODIUM 0.05 MG/1
50 TABLET ORAL
Status: DISCONTINUED | OUTPATIENT
Start: 2024-02-14 | End: 2024-02-17 | Stop reason: HOSPADM

## 2024-02-13 RX ORDER — SODIUM CHLORIDE 0.9 % (FLUSH) 0.9 %
10 SYRINGE (ML) INJECTION EVERY 12 HOURS SCHEDULED
Status: DISCONTINUED | OUTPATIENT
Start: 2024-02-13 | End: 2024-02-17 | Stop reason: HOSPADM

## 2024-02-13 RX ORDER — SODIUM CHLORIDE 9 MG/ML
50 INJECTION, SOLUTION INTRAVENOUS CONTINUOUS
Status: DISCONTINUED | OUTPATIENT
Start: 2024-02-13 | End: 2024-02-17 | Stop reason: HOSPADM

## 2024-02-13 RX ORDER — BISACODYL 5 MG/1
5 TABLET, DELAYED RELEASE ORAL DAILY PRN
Status: DISCONTINUED | OUTPATIENT
Start: 2024-02-13 | End: 2024-02-17 | Stop reason: HOSPADM

## 2024-02-13 RX ORDER — DOCUSATE SODIUM 100 MG/1
100 CAPSULE, LIQUID FILLED ORAL DAILY
COMMUNITY

## 2024-02-13 RX ORDER — TAMSULOSIN HYDROCHLORIDE 0.4 MG/1
0.4 CAPSULE ORAL NIGHTLY
Status: DISCONTINUED | OUTPATIENT
Start: 2024-02-13 | End: 2024-02-17 | Stop reason: HOSPADM

## 2024-02-13 RX ORDER — CEFUROXIME AXETIL 250 MG/1
500 TABLET ORAL ONCE
Status: DISCONTINUED | OUTPATIENT
Start: 2024-02-13 | End: 2024-02-13

## 2024-02-13 RX ORDER — ALLOPURINOL 300 MG/1
300 TABLET ORAL NIGHTLY
Status: DISCONTINUED | OUTPATIENT
Start: 2024-02-13 | End: 2024-02-17 | Stop reason: HOSPADM

## 2024-02-13 RX ORDER — TEMAZEPAM 7.5 MG/1
7.5 CAPSULE ORAL NIGHTLY PRN
Status: DISCONTINUED | OUTPATIENT
Start: 2024-02-13 | End: 2024-02-17 | Stop reason: HOSPADM

## 2024-02-13 RX ORDER — SODIUM CHLORIDE 0.9 % (FLUSH) 0.9 %
10 SYRINGE (ML) INJECTION AS NEEDED
Status: DISCONTINUED | OUTPATIENT
Start: 2024-02-13 | End: 2024-02-17 | Stop reason: HOSPADM

## 2024-02-13 RX ORDER — DEXTROSE MONOHYDRATE 25 G/50ML
25 INJECTION, SOLUTION INTRAVENOUS
Status: DISCONTINUED | OUTPATIENT
Start: 2024-02-13 | End: 2024-02-17 | Stop reason: HOSPADM

## 2024-02-13 RX ORDER — DOCUSATE SODIUM 100 MG/1
100 CAPSULE, LIQUID FILLED ORAL DAILY
Status: DISCONTINUED | OUTPATIENT
Start: 2024-02-14 | End: 2024-02-17 | Stop reason: HOSPADM

## 2024-02-13 RX ORDER — SODIUM CHLORIDE 9 MG/ML
40 INJECTION, SOLUTION INTRAVENOUS AS NEEDED
Status: DISCONTINUED | OUTPATIENT
Start: 2024-02-13 | End: 2024-02-17 | Stop reason: HOSPADM

## 2024-02-13 RX ORDER — OXYBUTYNIN CHLORIDE 10 MG/1
10 TABLET, EXTENDED RELEASE ORAL DAILY
Status: DISCONTINUED | OUTPATIENT
Start: 2024-02-14 | End: 2024-02-17 | Stop reason: HOSPADM

## 2024-02-13 RX ORDER — AMOXICILLIN 250 MG
2 CAPSULE ORAL 2 TIMES DAILY PRN
Status: DISCONTINUED | OUTPATIENT
Start: 2024-02-13 | End: 2024-02-17 | Stop reason: HOSPADM

## 2024-02-13 RX ORDER — IBUPROFEN 600 MG/1
1 TABLET ORAL
Status: DISCONTINUED | OUTPATIENT
Start: 2024-02-13 | End: 2024-02-17 | Stop reason: HOSPADM

## 2024-02-13 RX ORDER — FINASTERIDE 5 MG/1
5 TABLET, FILM COATED ORAL DAILY
Status: DISCONTINUED | OUTPATIENT
Start: 2024-02-14 | End: 2024-02-17 | Stop reason: HOSPADM

## 2024-02-13 RX ORDER — POLYETHYLENE GLYCOL 3350 17 G/17G
17 POWDER, FOR SOLUTION ORAL DAILY PRN
Status: DISCONTINUED | OUTPATIENT
Start: 2024-02-13 | End: 2024-02-17 | Stop reason: HOSPADM

## 2024-02-13 RX ORDER — BISACODYL 10 MG
10 SUPPOSITORY, RECTAL RECTAL DAILY PRN
Status: DISCONTINUED | OUTPATIENT
Start: 2024-02-13 | End: 2024-02-17 | Stop reason: HOSPADM

## 2024-02-13 RX ORDER — INSULIN LISPRO 100 [IU]/ML
2-9 INJECTION, SOLUTION INTRAVENOUS; SUBCUTANEOUS
Status: DISCONTINUED | OUTPATIENT
Start: 2024-02-13 | End: 2024-02-17 | Stop reason: HOSPADM

## 2024-02-13 RX ADMIN — ERTAPENEM 1000 MG: 1 INJECTION INTRAMUSCULAR; INTRAVENOUS at 14:56

## 2024-02-13 RX ADMIN — ALLOPURINOL 300 MG: 300 TABLET ORAL at 21:23

## 2024-02-13 RX ADMIN — INSULIN LISPRO 4 UNITS: 100 INJECTION, SOLUTION INTRAVENOUS; SUBCUTANEOUS at 21:22

## 2024-02-13 RX ADMIN — Medication 10 ML: at 21:24

## 2024-02-13 RX ADMIN — SODIUM CHLORIDE 500 ML: 9 INJECTION, SOLUTION INTRAVENOUS at 12:20

## 2024-02-13 RX ADMIN — SODIUM CHLORIDE 75 ML/HR: 9 INJECTION, SOLUTION INTRAVENOUS at 17:36

## 2024-02-13 RX ADMIN — INSULIN DETEMIR 10 UNITS: 100 INJECTION, SOLUTION SUBCUTANEOUS at 21:23

## 2024-02-13 NOTE — ED PROVIDER NOTES
EMERGENCY DEPARTMENT ENCOUNTER    Pt Name: Forrest Zepeda  MRN: [id number]  Pt :   1932  Room Number:  33/33  Date of encounter:  2024  PCP: Kary Wen MD  ED Provider: Martín Perez MD    Historian: Patient and his son      HPI:  Chief Complaint: Hematuria        Context: Forrest Zepeda is a 89 y.o. male who presents to the ED c/o hematuria with a history of chronic indwelling renal stent cared for by Dr. Nova of urology.  The patient's son notes that the patient has bleeding episodes every 6 or 7 months on average.  This time the stent has been in place for roughly 8 months.  3 days ago he began having pink-tinged urine which has gradually become more and more bloody.  Since last night he has been simply passing very bloody urine.  He denies pain in his abdomen, dysuria.  He takes Eliquis secondary to atrial fibrillation.        PAST MEDICAL HISTORY  Past Medical History:   Diagnosis Date    Abnormal EKG     Abnormal PSA     Reported abnormal    Acute cystitis with hematuria 2023    Acute deep vein thrombosis (DVT) of right lower extremity 2019    Acute diverticulitis     Acute hyperkalemia 2019    Acute respiratory failure with hypoxia     Acute saddle pulmonary embolism with acute cor pulmonale 2019    Acute systolic right heart failure 2019    Acute UTI (urinary tract infection)     Arthritis     KNEES,  BUT SINCE HAD REPLACEMENT    Benign localized hyperplasia of prostate     Bilateral knee pain     C. difficile diarrhea 2010    Cataracts, bilateral     CKD (chronic kidney disease)     Coronary artery disease     Diabetic neuropathy     Diastolic dysfunction     Disease of thyroid gland     HYPOTHYROIDISM    Diverticulitis     Dyslipidemia     H/O    Family history of malignant hyperthermia     Brother     Full dentures     Generalized weakness     History of ESBL E. coli infection     most recent  f/u with ID Dr Johnson    History of gout   "   History of hepatitis A vaccination     History of nephrolithiasis     History of nuclear stress test     STATES IN THE 1990'S.  \"IT WAS OK\"    History of osteopenia     History of recurrent UTI (urinary tract infection)     Hydronephrosis of left kidney 07/18/2019    Hydronephrosis with renal and ureteral calculus obstruction 10/21/2019    Added automatically from request for surgery [id number]    Hydronephrosis with ureteral stricture     Hypercholesterolemia     Hyperkalemia     PT UNSURE REGARDING HX OF THIS    Hyperlipidemia     Hypertension     Hypothyroidism     Impaired functional mobility, balance, gait, and endurance     Insomnia     IPF (idiopathic pulmonary fibrosis)     per patient and son, dx at [facility], was told no treatment necessary, not to worry about it    On supplemental oxygen therapy     2L NC QHS    Other hydronephrosis 08/12/2019    Added automatically from request for surgery [id number]    Paroxysmal atrial fibrillation     Pulmonary hypertension, mild 02/2021    per echo per cardiology note 1/9/23, per pt's son, PCP does not agree with this dx    Renal insufficiency     Sepsis 2019    Shortness of breath     STATES WITH EXERTION, OTHERWISE, DENIES    Sigmoid diverticulitis without abscess or perforation 08/09/2017    Sinus node dysfunction     Skin breakdown     fourth toe right foot noted in 2019 MD D/C note    Skin ulcer of fourth toe of right foot, limited to breakdown of skin 07/05/2019    Stricture of ureter     Type 2 diabetes mellitus     Ureteral stricture, left     UTI (urinary tract infection) 07/18/2019    Vitamin D deficiency     Wears glasses          PAST SURGICAL HISTORY  Past Surgical History:   Procedure Laterality Date    APPENDECTOMY      CARDIAC ELECTROPHYSIOLOGY PROCEDURE N/A 12/23/2020    Procedure: Pacemaker DC new. DNS meds.;  Surgeon: Jimy Ledezma DO;  Location: [facility] INVASIVE LOC[facility];  Service: Cardiology;  Laterality: N/A;    CHOLECYSTECTOMY      CIRCUMCISION N/A " 10/23/2019    Procedure: CIRCUMCISIOn-DORSAL SLIT;  Surgeon: Griffin Romero MD;  Location:  [facility];  Service: Urology    COLONOSCOPY      CYSTOSCOPY RETROGRADE PYELOGRAM Left 06/17/2022    Procedure: CYSTOSCOPY RETROGRADE PYELOGRAM;  Surgeon: Ryne Mccann MD;  Location:  [facility];  Service: Urology;  Laterality: Left;    CYSTOSCOPY URETEROSCOPY Left 02/11/2019    Procedure: URETEROSCOPY WITH STENT EXTRACTION, RPG;  Surgeon: Griffin Romero MD;  Location:  [facility];  Service: Urology    CYSTOSCOPY W/ URETERAL STENT PLACEMENT Left 07/20/2019    Procedure: CYSTOSCOPY, LEFT RETROGRADE PYELOGRAM,  ATTEMPTED LEFT URETERAL STENT INSERTION;  Surgeon: Isaac Flynn MD;  Location:  [facility];  Service: Urology    CYSTOSCOPY W/ URETERAL STENT PLACEMENT Left 06/17/2022    Procedure: CYSTOSCOPY URETERAL CATHETER/STENT INSERTION;  Surgeon: Ryne Mccann MD;  Location:  [facility];  Service: Urology;  Laterality: Left;    CYSTOSCOPY W/ URETERAL STENT PLACEMENT Left 01/27/2023    Procedure: CYSTOSCOPY URETERAL CATHETER/STENT INSERTION;  Surgeon: Deanne Pitts MD;  Location:  [facility];  Service: Urology;  Laterality: Left;    CYSTOSCOPY, URETEROSCOPY, RETROGRADE PYELOGRAM, STONE EXTRACTION, STENT INSERTION Left 06/15/2023    Procedure: URETEROSCOPY, RETROGRADE PYELOGRAM, STENT INSERTION;  Surgeon: Deanne Pitts MD;  Location:  [facility];  Service: Urology;  Laterality: Left;    EYE SURGERY      CATARACTS REMOVED BILATERALLY    JOINT REPLACEMENT      Bilateral knees    KNEE ARTHROPLASTY Bilateral     KNEE ARTHROSCOPY Bilateral     RENAL ARTERY STENT      SEVERAL TIMES EVERY SINCE 1978    URETERAL STENT INSERTION  10/2020    URETEROSCOPY LASER LITHOTRIPSY WITH STENT INSERTION Left 01/10/2018    Procedure:  cysto, left ureteral stent replacement ;  Surgeon: Griffin Romero MD;  Location:  [facility];  Service:     URETEROSCOPY LASER LITHOTRIPSY WITH STENT INSERTION Left 08/14/2019    Procedure: URETEROSCOPY, STONE  REMOVAL WITH STENT INSERTION;  Surgeon: Griffin Romero MD;  Location: [facility] OR;  Service: Urology    URETEROSCOPY LASER LITHOTRIPSY WITH STENT INSERTION Left 10/23/2019    Procedure: Left URETEROSCOPY WITH STENT INSERTION-LEFT;  Surgeon: Griffin Romero MD;  Location: [facility] OR;  Service: Urology         FAMILY HISTORY  Family History   Problem Relation Age of Onset    Heart attack Sister     Brain cancer Sister     Stroke Sister     Lung cancer Sister     Hypertension Sister     Brain cancer Brother     Lung cancer Brother     Malig Hyperthermia Brother          SOCIAL HISTORY  Social History     Socioeconomic History    Marital status:    Tobacco Use    Smoking status: Never     Passive exposure: Past    Smokeless tobacco: Never    Tobacco comments:     SMOKED SOME AS A TEEN, DENIES ANY HABIT OR ADDICTION   Vaping Use    Vaping Use: Never used   Substance and Sexual Activity    Alcohol use: No    Drug use: No    Sexual activity: Not Currently         ALLERGIES  Metformin and Statins        REVIEW OF SYSTEMS  Review of Systems       All systems reviewed and negative except for those discussed in HPI.       PHYSICAL EXAM    I have reviewed the triage vital signs and nursing notes.    ED Triage Vitals [02/13/24 1133]   Temp Heart Rate Resp BP SpO2   97.7 °F (36.5 °C) 61 16 113/67 97 %      Temp src Heart Rate Source Patient Position BP Location FiO2 (%)   Oral Monitor Sitting Right arm --       Physical Exam  GENERAL:   Appears in no acute distress.  Pleasant, nontoxic  HENT: Nares patent.  Somewhat dry mucous membranes  EYES: No scleral icterus.  CV: Regular rhythm, regular rate.  No murmurs gallops rubs  RESPIRATORY: Normal effort.  No audible wheezes, rales or rhonchi.  Clear to auscultation  ABDOMEN: Soft, nontender to deep palpation  MUSCULOSKELETAL: No deformities.   NEURO: Alert, moves all extremities, follows commands.  SKIN: Warm, dry, no rash visualized.      LAB RESULTS  No results found for  this or any previous visit (from the past 24 hour(s)).    If labs were ordered, I independently reviewed the results and considered them in treating the patient.        RADIOLOGY  No Radiology Exams Resulted Within Past 24 Hours    I ordered and independently reviewed the above noted radiographic studies.      I viewed images of pelvis which showed left-sided hydronephrosis per my independent interpretation.    See radiologist's dictation for official interpretation.        PROCEDURES    Procedures    No orders to display       MEDICATIONS GIVEN IN ER    Medications - No data to display      MEDICAL DECISION MAKING, PROGRESS, and CONSULTS    All labs, if obtained, have been independently reviewed by me.  All radiology studies, if obtained, have been reviewed by me and the radiologist dictating the report.  All EKG's, if obtained, have been independently viewed and interpreted by me/my attending physician.      Discussion below represents my analysis of pertinent findings related to patient's condition, differential diagnosis, treatment plan and final disposition.                         Differential diagnosis:    Hematuria with anticoagulation status in addition to indwelling stent.  Consider UTI, ureteral stent trauma, etc.      Additional sources:    - Discussed/ obtained information from independent historians: Patient's son is a good historian and provides information directly to me.    - External treatment and admission non-ED) record review: I reviewed multiple records to include discharge summary dated 10/11/2023  Where patient had been admitted for acute UTI, lactic acidosis, etc.    Prior renal ultrasound dated 10/10/2023 which showed persistent moderate to severe left-sided hydronephrosis.    - Chronic or social conditions impacting care: Chronically ureteral stent.    - Shared decision making: Patient is fully      Orders placed during this visit:  No orders of the defined types were placed in this  encounter.        Additional orders considered but not ordered:  IV contrasted CT scan of the abdomen and pelvis.    ED Course:    Consultants:      ED Course as of 02/15/24 1954   Tue Feb 13, 2024   1201 I have reached out to Dr. Nova, the patient's urologist. [MS]   1205 I spoke with Dr. Nova.  Case discussed in detail.  We will await studies to include blood, urine, CT scan and bladder scan.  The patient has an appointment and Dr. Nova's office later this week if he is able to be discharged. [MS]   1411 The patient's creatinine has elevated to the level of 2.2.  On chart review I see that he is creatinine was 1.55 on 10/10/2023.  I have communicated these findings along with the patient's CT scan report to his urologist and am awaiting recommendations for inpatient versus outpatient care. [MS]   1416 Urine culture pending.  Have ordered IV Rocephin. [MS]   1503 Spoke with Dr. Nova again.  He agrees with admission.  I have contacted the hospitalist. [MS]      ED Course User Index  [MS] Martín Preez MD              Shared Decision Making:  After my consideration of clinical presentation and any laboratory/radiology studies obtained, I discussed the findings with the patient/patient representative who is in agreement with the treatment plan and the final disposition.   Risks and benefits of discharge and/or observation/admission were discussed.       AS OF 11:41 EST VITALS:    BP - 113/67  HR - 61  TEMP - 97.7 °F (36.5 °C) (Oral)  O2 SATS - 97%                  DIAGNOSIS  Final diagnoses:   Gross hematuria   Anticoagulated   Acute kidney injury   Ureteral stent present         DISPOSITION  The patient      Please note that portions of this document were completed with voice recognition software.        Martín Perez MD  02/15/24 1959

## 2024-02-13 NOTE — ED NOTES
" Forrest Zepeda    Nursing Report ED to Floor:  Mental status: A&Ox4  Ambulatory status: up at shaggy  Oxygen Therapy:  ra  Cardiac Rhythm: nsr  Admitted from: home  Safety Concerns:  no  Social Issues: no  ED Room #:  33    ED Nurse Phone Extension - [phone] or may call [phone].      HPI:   Chief Complaint   Patient presents with    Blood in Urine       Past Medical History:  Past Medical History:   Diagnosis Date    Abnormal EKG     Abnormal PSA     Reported abnormal    Acute cystitis with hematuria 05/01/2023    Acute deep vein thrombosis (DVT) of right lower extremity 08/22/2019    Acute diverticulitis 2019    Acute hyperkalemia 08/22/2019    Acute respiratory failure with hypoxia     Acute saddle pulmonary embolism with acute cor pulmonale 08/22/2019    Acute systolic right heart failure 08/22/2019    Acute UTI (urinary tract infection)     Arthritis     KNEES,  BUT SINCE HAD REPLACEMENT    Benign localized hyperplasia of prostate     Bilateral knee pain     C. difficile diarrhea 2010    Cataracts, bilateral     CKD (chronic kidney disease)     Coronary artery disease     Diabetic neuropathy     Diastolic dysfunction     Disease of thyroid gland     HYPOTHYROIDISM    Diverticulitis     Dyslipidemia     H/O    Family history of malignant hyperthermia     Brother     Full dentures     Generalized weakness     History of ESBL E. coli infection     most recent 4-2022 f/u with ID Dr Johnson    History of gout     History of hepatitis A vaccination     History of nephrolithiasis     History of nuclear stress test     STATES IN THE 1990'S.  \"IT WAS OK\"    History of osteopenia     History of recurrent UTI (urinary tract infection)     Hydronephrosis of left kidney 07/18/2019    Hydronephrosis with renal and ureteral calculus obstruction 10/21/2019    Added automatically from request for surgery [id number]    Hydronephrosis with ureteral stricture     Hypercholesterolemia     Hyperkalemia     PT UNSURE REGARDING HX OF THIS    " Hyperlipidemia     Hypertension     Hypothyroidism     Impaired functional mobility, balance, gait, and endurance     Insomnia     IPF (idiopathic pulmonary fibrosis)     per patient and son, dx at [facility], was told no treatment necessary, not to worry about it    On supplemental oxygen therapy     2L NC QHS    Other hydronephrosis 08/12/2019    Added automatically from request for surgery [id number]    Paroxysmal atrial fibrillation     Pulmonary hypertension, mild 02/2021    per echo per cardiology note 1/9/23, per pt's son, PCP does not agree with this dx    Renal insufficiency     Sepsis 2019    Shortness of breath     STATES WITH EXERTION, OTHERWISE, DENIES    Sigmoid diverticulitis without abscess or perforation 08/09/2017    Sinus node dysfunction     Skin breakdown     fourth toe right foot noted in 2019 MD D/C note    Skin ulcer of fourth toe of right foot, limited to breakdown of skin 07/05/2019    Stricture of ureter     Type 2 diabetes mellitus     Ureteral stricture, left     UTI (urinary tract infection) 07/18/2019    Vitamin D deficiency     Wears glasses         Past Surgical History:  Past Surgical History:   Procedure Laterality Date    APPENDECTOMY      CARDIAC ELECTROPHYSIOLOGY PROCEDURE N/A 12/23/2020    Procedure: Pacemaker DC new. DNS meds.;  Surgeon: Jimy Ledezma DO;  Location:  [facility];  Service: Cardiology;  Laterality: N/A;    CHOLECYSTECTOMY      CIRCUMCISION N/A 10/23/2019    Procedure: CIRCUMCISIOn-DORSAL SLIT;  Surgeon: Griffin Romero MD;  Location:  [facility];  Service: Urology    COLONOSCOPY      CYSTOSCOPY RETROGRADE PYELOGRAM Left 06/17/2022    Procedure: CYSTOSCOPY RETROGRADE PYELOGRAM;  Surgeon: Ryne Mccann MD;  Location:  [facility];  Service: Urology;  Laterality: Left;    CYSTOSCOPY URETEROSCOPY Left 02/11/2019    Procedure: URETEROSCOPY WITH STENT EXTRACTION, RPG;  Surgeon: Griffin Romero MD;  Location: [facility] OR;  Service: Urology    CYSTOSCOPY W/  URETERAL STENT PLACEMENT Left 07/20/2019    Procedure: CYSTOSCOPY, LEFT RETROGRADE PYELOGRAM,  ATTEMPTED LEFT URETERAL STENT INSERTION;  Surgeon: Isaac Flynn MD;  Location:  [facility];  Service: Urology    CYSTOSCOPY W/ URETERAL STENT PLACEMENT Left 06/17/2022    Procedure: CYSTOSCOPY URETERAL CATHETER/STENT INSERTION;  Surgeon: Ryne Mccann MD;  Location:  [facility];  Service: Urology;  Laterality: Left;    CYSTOSCOPY W/ URETERAL STENT PLACEMENT Left 01/27/2023    Procedure: CYSTOSCOPY URETERAL CATHETER/STENT INSERTION;  Surgeon: Deanne Pitts MD;  Location:  [facility];  Service: Urology;  Laterality: Left;    CYSTOSCOPY, URETEROSCOPY, RETROGRADE PYELOGRAM, STONE EXTRACTION, STENT INSERTION Left 06/15/2023    Procedure: URETEROSCOPY, RETROGRADE PYELOGRAM, STENT INSERTION;  Surgeon: Deanne Pitts MD;  Location:  [facility];  Service: Urology;  Laterality: Left;    EYE SURGERY      CATARACTS REMOVED BILATERALLY    JOINT REPLACEMENT      Bilateral knees    KNEE ARTHROPLASTY Bilateral     KNEE ARTHROSCOPY Bilateral     RENAL ARTERY STENT      SEVERAL TIMES EVERY SINCE 1978    URETERAL STENT INSERTION  10/2020    URETEROSCOPY LASER LITHOTRIPSY WITH STENT INSERTION Left 01/10/2018    Procedure:  cysto, left ureteral stent replacement ;  Surgeon: Griffin Romero MD;  Location:  [facility];  Service:     URETEROSCOPY LASER LITHOTRIPSY WITH STENT INSERTION Left 08/14/2019    Procedure: URETEROSCOPY, STONE REMOVAL WITH STENT INSERTION;  Surgeon: Griffin Romero MD;  Location:  [facility];  Service: Urology    URETEROSCOPY LASER LITHOTRIPSY WITH STENT INSERTION Left 10/23/2019    Procedure: Left URETEROSCOPY WITH STENT INSERTION-LEFT;  Surgeon: Griffin Romero MD;  Location:  [facility];  Service: Urology        Admitting Doctor:   Terri Gifford DO    Consulting Provider(s):  Consults       No orders found from 1/15/2024 to 2/14/2024.             Admitting Diagnosis:   The primary encounter diagnosis was  Gross hematuria. Diagnoses of Anticoagulated, Acute kidney injury, and Ureteral stent present were also pertinent to this visit.    Most Recent Vitals:   Vitals:    02/13/24 1240 02/13/24 1300 02/13/24 1330 02/13/24 1359   BP: 130/67 119/87 132/69 120/61   BP Location:       Patient Position:       Pulse: 60 60 60 60   Resp:       Temp:       TempSrc:       SpO2:   95% 94%   Weight:       Height:           Active LDAs/IV Access:   Lines, Drains & Airways       Active LDAs       Name Placement date Placement time Site Days    Peripheral IV 02/13/24 1200 Anterior;Right Forearm 02/13/24  1200  Forearm  less than 1    Ureteral Drain/Stent Left ureter 8 Fr. 01/27/23  1640  Left ureter  381                    Labs (abnormal labs have a star):   Labs Reviewed   COMPREHENSIVE METABOLIC PANEL - Abnormal; Notable for the following components:       Result Value    Glucose 184 (*)     BUN 45 (*)     Creatinine 2.21 (*)     eGFR 27.4 (*)     All other components within normal limits    Narrative:     GFR Normal >60  Chronic Kidney Disease <60  Kidney Failure <15    The GFR formula is only valid for adults with stable renal function between ages 18 and 70.   URINALYSIS W/ CULTURE IF INDICATED - Abnormal; Notable for the following components:    Color, UA Red (*)     Appearance, UA Turbid (*)     Blood, UA Large (3+) (*)     Protein, UA >=300 mg/dL (3+) (*)     Leuk Esterase, UA Large (3+) (*)     All other components within normal limits    Narrative:     In absence of clinical symptoms, the presence of pyuria, bacteria, and/or nitrites on the urinalysis result does not correlate with infection.   CBC WITH AUTO DIFFERENTIAL - Abnormal; Notable for the following components:    WBC 12.77 (*)     .2 (*)     RDW 16.1 (*)     RDW-SD 58.8 (*)     Immature Grans % 0.6 (*)     Neutrophils, Absolute 7.60 (*)     Lymphocytes, Absolute 3.64 (*)     Eosinophils, Absolute 0.50 (*)     Immature Grans, Absolute 0.08 (*)     All other  components within normal limits   URINALYSIS, MICROSCOPIC ONLY - Abnormal; Notable for the following components:    RBC, UA Too Numerous to Count (*)     WBC, UA Unable to determine due to loaded field (*)     Bacteria, UA Unable to determine due to loaded field (*)     Squamous Epithelial Cells, UA Unable to determine due to loaded field (*)     All other components within normal limits   LACTIC ACID, PLASMA - Abnormal; Notable for the following components:    Lactate 3.7 (*)     All other components within normal limits   URINE CULTURE   LACTIC ACID, REFLEX   CBC AND DIFFERENTIAL    Narrative:     The following orders were created for panel order CBC & Differential.  Procedure                               Abnormality         Status                     ---------                               -----------         ------                     CBC Auto Differential[[id number]]        Abnormal            Final result                 Please view results for these tests on the individual orders.       Meds Given in ED:   Medications   sodium chloride 0.9 % bolus 500 mL (has no administration in time range)   sodium chloride 0.9 % bolus 500 mL (0 mL Intravenous Stopped 2/13/24 1329)   ertapenem (INVanz) 1,000 mg in sodium chloride 0.9 % 100 mL IVPB (1,000 mg Intravenous New Bag 2/13/24 1456)

## 2024-02-13 NOTE — TELEPHONE ENCOUNTER
Caller: MARCUS DIMAS     Relationship to patient: GRANDCHILD     Best call back number: [phone]     Patient is needing: PTS GRANDCHILD CALLED AND STATED THAT SHE SENT A MESSAGE IN [employer] LAST NIGHT ABOUT BLOOD IN URINE. SHE STATED THAT THE PT HAS AGREED TO GO TO THE ER. PLEASE REACH OUT TO THE PTS GRANDCHILD TO DISCUSS

## 2024-02-13 NOTE — H&P
[facility] Medicine Services  HISTORY AND PHYSICAL    Patient Name: Forrest Zepeda  : 1932  MRN: [id number]  Primary Care Physician: Kary Wen MD  Date of admission: 2024    Subjective   Subjective     Chief Complaint:  hematuria    HPI:  Forrest Zepeda is a 89 y.o. male with PMH of chronic indwelling stent, due for exchange, who presented to the ED with worsening hematuria and clotting over the last week.  He denies difficulty urinating.  Son states he has these episodes every 6-7 months.  He is being admitted to the hospitalist service for further treatment.  Urology will see in am.        Review of Systems   Constitutional:  Negative for activity change, appetite change and fever.   HENT:  Negative for congestion and hearing loss.    Eyes:  Negative for visual disturbance.   Respiratory:  Positive for shortness of breath. Negative for cough.    Cardiovascular:  Negative for chest pain and leg swelling.   Gastrointestinal:  Negative for abdominal pain, diarrhea, nausea and vomiting.   Genitourinary:  Positive for hematuria. Negative for dysuria.   Musculoskeletal:  Negative for back pain.   Skin:  Negative for wound.   Neurological:  Negative for dizziness and weakness.   Psychiatric/Behavioral:  Negative for behavioral problems and confusion.           Personal History     Past Medical History:   Diagnosis Date    Abnormal EKG     Abnormal PSA     Reported abnormal    Acute cystitis with hematuria 2023    Acute deep vein thrombosis (DVT) of right lower extremity 2019    Acute diverticulitis 2019    Acute hyperkalemia 2019    Acute respiratory failure with hypoxia     Acute saddle pulmonary embolism with acute cor pulmonale 2019    Acute systolic right heart failure 2019    Acute UTI (urinary tract infection)     Arthritis     KNEES,  BUT SINCE HAD REPLACEMENT    Benign localized hyperplasia of prostate     Bilateral knee pain     C.  "difficile diarrhea 2010    Cataracts, bilateral     CKD (chronic kidney disease)     Coronary artery disease     Diabetic neuropathy     Diastolic dysfunction     Disease of thyroid gland     HYPOTHYROIDISM    Diverticulitis     Dyslipidemia     H/O    Family history of malignant hyperthermia     Brother     Full dentures     Generalized weakness     History of ESBL E. coli infection     most recent 4-2022 f/u with ID Dr Johnson    History of gout     History of hepatitis A vaccination     History of nephrolithiasis     History of nuclear stress test     STATES IN THE 1990'S.  \"IT WAS OK\"    History of osteopenia     History of recurrent UTI (urinary tract infection)     Hydronephrosis of left kidney 07/18/2019    Hydronephrosis with renal and ureteral calculus obstruction 10/21/2019    Added automatically from request for surgery [id number]    Hydronephrosis with ureteral stricture     Hypercholesterolemia     Hyperkalemia     PT UNSURE REGARDING HX OF THIS    Hyperlipidemia     Hypertension     Hypothyroidism     Impaired functional mobility, balance, gait, and endurance     Insomnia     IPF (idiopathic pulmonary fibrosis)     per patient and son, dx at [facility], was told no treatment necessary, not to worry about it    On supplemental oxygen therapy     2L NC QHS    Other hydronephrosis 08/12/2019    Added automatically from request for surgery [id number]    Paroxysmal atrial fibrillation     Pulmonary hypertension, mild 02/2021    per echo per cardiology note 1/9/23, per pt's son, PCP does not agree with this dx    Renal insufficiency     Sepsis 2019    Shortness of breath     STATES WITH EXERTION, OTHERWISE, DENIES    Sigmoid diverticulitis without abscess or perforation 08/09/2017    Sinus node dysfunction     Skin breakdown     fourth toe right foot noted in 2019 MD D/C note    Skin ulcer of fourth toe of right foot, limited to breakdown of skin 07/05/2019    Stricture of ureter     Type 2 diabetes mellitus     " Ureteral stricture, left     UTI (urinary tract infection) 07/18/2019    Vitamin D deficiency     Wears glasses              Past Surgical History:   Procedure Laterality Date    APPENDECTOMY      CARDIAC ELECTROPHYSIOLOGY PROCEDURE N/A 12/23/2020    Procedure: Pacemaker DC new. DNS meds.;  Surgeon: Jimy Ledezma DO;  [facility]:  [facility];  Service: Cardiology;  Laterality: N/A;    CHOLECYSTECTOMY      CIRCUMCISION N/A 10/23/2019    Procedure: CIRCUMCISIOn-DORSAL SLIT;  Surgeon: Griffin Romero MD;  Location:  [facility];  Service: Urology    COLONOSCOPY      CYSTOSCOPY RETROGRADE PYELOGRAM Left 06/17/2022    Procedure: CYSTOSCOPY RETROGRADE PYELOGRAM;  Surgeon: Ryne Mccann MD;  Location:  [facility];  Service: Urology;  Laterality: Left;    CYSTOSCOPY URETEROSCOPY Left 02/11/2019    Procedure: URETEROSCOPY WITH STENT EXTRACTION, RPG;  Surgeon: Griffin Romero MD;  Location:  [facility];  Service: Urology    CYSTOSCOPY W/ URETERAL STENT PLACEMENT Left 07/20/2019    Procedure: CYSTOSCOPY, LEFT RETROGRADE PYELOGRAM,  ATTEMPTED LEFT URETERAL STENT INSERTION;  Surgeon: Isaac Flynn MD;  Location:  [facility];  Service: Urology    CYSTOSCOPY W/ URETERAL STENT PLACEMENT Left 06/17/2022    Procedure: CYSTOSCOPY URETERAL CATHETER/STENT INSERTION;  Surgeon: yRne Mccann MD;  Location:  [facility];  Service: Urology;  Laterality: Left;    CYSTOSCOPY W/ URETERAL STENT PLACEMENT Left 01/27/2023    Procedure: CYSTOSCOPY URETERAL CATHETER/STENT INSERTION;  Surgeon: Deanne Pitts MD;  Location:  [facility];  Service: Urology;  Laterality: Left;    CYSTOSCOPY, URETEROSCOPY, RETROGRADE PYELOGRAM, STONE EXTRACTION, STENT INSERTION Left 06/15/2023    Procedure: URETEROSCOPY, RETROGRADE PYELOGRAM, STENT INSERTION;  Surgeon: Deanne Pitts MD;  Location:  [facility];  Service: Urology;  Laterality: Left;    EYE SURGERY      CATARACTS REMOVED BILATERALLY    JOINT REPLACEMENT      Bilateral knees    KNEE  ARTHROPLASTY Bilateral     KNEE ARTHROSCOPY Bilateral     RENAL ARTERY STENT      SEVERAL TIMES EVERY SINCE 1978    URETERAL STENT INSERTION  10/2020    URETEROSCOPY LASER LITHOTRIPSY WITH STENT INSERTION Left 01/10/2018    Procedure:  cysto, left ureteral stent replacement ;  Surgeon: Griffin Romero MD;  Location: [facility] OR;  Service:     URETEROSCOPY LASER LITHOTRIPSY WITH STENT INSERTION Left 08/14/2019    Procedure: URETEROSCOPY, STONE REMOVAL WITH STENT INSERTION;  Surgeon: Griffin Romero MD;  Location: [facility] OR;  Service: Urology    URETEROSCOPY LASER LITHOTRIPSY WITH STENT INSERTION Left 10/23/2019    Procedure: Left URETEROSCOPY WITH STENT INSERTION-LEFT;  Surgeon: Griffin Romero MD;  Location: [facility] OR;  Service: Urology       Family History: family history includes Brain cancer in his brother and sister; Heart attack in his sister; Hypertension in his sister; Lung cancer in his brother and sister; Malig Hyperthermia in his brother; No Known Problems in his mother; Stomach cancer in his father; Stroke in his sister.     Social History:  reports that he has never smoked. He has been exposed to tobacco smoke. He has never used smokeless tobacco. He reports that he does not drink alcohol and does not use drugs.  Social History     Social History Narrative    Pt lives alone in [place]    Granddaughter Erika is nurse.     Had 7 brothers and 3 sisters.     Uses a cane and oxygen as needed    Still drives       Medications:  Diclofenac Sodium, HYDROcodone-acetaminophen, Insulin Glargine (1 Unit Dial), Mirabegron ER, Multiple Vitamins-Minerals, SITagliptin, allopurinol, apixaban, aspirin, bisoprolol, docusate sodium, finasteride, glimepiride, levothyroxine, silodosin, and triazolam    Allergies   Allergen Reactions    Metformin Diarrhea and GI Intolerance    Statins Diarrhea and Myalgia       Objective   Objective     Vital Signs:   Temp:  [97.7 °F (36.5 °C)] 97.7 °F (36.5 °C)  Heart Rate:  [60-72]  60  Resp:  [16] 16  BP: (113-143)/(61-87) 120/61    Physical Exam   Constitutional: Awake, alert, son at bedside  Eyes: PERRLA, sclerae anicteric, no conjunctival injection  HENT: NCAT, mucous membranes moist  Neck: Supple, no thyromegaly, no lymphadenopathy, trachea midline  Respiratory: Clear to auscultation bilaterally, nonlabored respirations   Cardiovascular: RRR, no murmurs, rubs, or gallops, palpable pedal pulses bilaterally  Gastrointestinal: Positive bowel sounds, soft, nontender, nondistended  Musculoskeletal: No bilateral ankle edema, no clubbing or cyanosis to extremities  Psychiatric: Appropriate affect, cooperative  Neurologic: Oriented x 3, GARRETT, speech clear  Skin: No rashes      Result Review:  I have personally reviewed the results from the time of this admission to 2/13/2024 17:04 EST and agree with these findings:  [x]  Laboratory list / accordion  []  Microbiology  [x]  Radiology  []  EKG/Telemetry   []  Cardiology/Vascular   []  Pathology  [x]  Old records  []  Other:  Most notable findings include:H/H stable, lactate >3    LAB RESULTS:      Lab 02/13/24  1621 02/13/24  1159   WBC  --  12.77*   HEMOGLOBIN  --  13.3   HEMATOCRIT  --  41.8   PLATELETS  --  221   NEUTROS ABS  --  7.60*   IMMATURE GRANS (ABS)  --  0.08*   LYMPHS ABS  --  3.64*   MONOS ABS  --  0.83   EOS ABS  --  0.50*   MCV  --  100.2*   LACTATE 1.9 3.7*         Lab 02/13/24  1159   SODIUM 136   POTASSIUM 4.9   CHLORIDE 102   CO2 23.0   ANION GAP 11.0   BUN 45*   CREATININE 2.21*   EGFR 27.4*   GLUCOSE 184*   CALCIUM 9.6         Lab 02/13/24  1159   TOTAL PROTEIN 8.0   ALBUMIN 4.0   GLOBULIN 4.0   ALT (SGPT) 9   AST (SGOT) 25   BILIRUBIN 0.3   ALK PHOS 106                     Brief Urine Lab Results  (Last result in the past 365 days)        Color   Clarity   Blood   Leuk Est   Nitrite   Protein   CREAT   Urine HCG        02/13/24 1150 Red   Turbid   Large (3+)   Large (3+)   Negative   >=300 mg/dL (3+)                 Microbiology  Results (last 10 days)       ** No results found for the last 240 hours. **            CT Abdomen Pelvis Without Contrast    Result Date: 2/13/2024  CT ABDOMEN PELVIS WO CONTRAST Date of Exam: 2/13/2024 11:59 AM EST Indication: hematuria with long term stent present. Comparison: CT abdomen pelvis dated 5/1/2023 Technique: Axial CT images were obtained of the abdomen and pelvis without the administration of contrast. Reconstructed coronal and sagittal images were also obtained. Automated exposure control and iterative construction methods were used. Findings: There is fibrosis of the lung bases. Cardiomegaly with distal pacemaker leads and coronary artery calcifications. The liver shows homogeneous density. Cholecystectomy changes diffuse pancreatic atrophy. The spleen and adrenal glands are unremarkable. The right kidney shows no hydronephrosis or nephrolithiasis. The left kidney shows stable hydroureteronephrosis with a double-J ureteral stent in place. The left renal cortex is atrophic Persistent diffuse enlargement of the prostate with mass effect over the bladder. Persistent diffuse thickening of the bladder wall with mild surrounding fat stranding stable in appearance. The stomach and small bowel loops are normal in caliber. The appendix within normal limits. The large bowel is normal in caliber. There is diffuse colon diverticulosis most advanced in the sigmoid with no pericolonic inflammatory changes. There is atherosclerosis aorta and main branches. No pathologic lymphadenopathy by size criteria. No free fluid or free air no acute fractures or bone lesions     Impression: Impression: 1. Stable moderate left hydroureteronephrosis with a double-J ureteral stent in place. 2. Stable diffuse enlargement of the prostate and diffuse bladder wall thickening 3. No evidence of acute CT abnormalities in the abdomen or pelvis 4. Extensive sigmoid diverticulosis with no diverticulitis Electronically Signed: Redd  MD Cassie  2/13/2024 12:22 PM EST  Workstation ID: [id number]     Results for orders placed in visit on 11/13/23    Adult Transthoracic Echo Complete W/ Cont if Necessary Per Protocol    Interpretation Summary    Left ventricular systolic function is normal. Estimated left ventricular EF = 52% Left ventricular ejection fraction appears to be 51 - 55%.    Left ventricular wall thickness is consistent with mild concentric hypertrophy.    Left ventricular diastolic function is consistent with (grade I) impaired relaxation.    The right ventricular cavity is mildly dilated.    The left atrial cavity is mildly dilated.    Left atrial volume is moderately increased.    There is mild calcification of the aortic valve mainly affecting the non-coronary, left coronary and right coronary cusp(s).    Estimated right ventricular systolic pressure from tricuspid regurgitation is normal (<35 mmHg).      Assessment & Plan   Assessment & Plan       Gross hematuria    Hydronephrosis of left kidney    CKD (chronic kidney disease) stage 3, GFR 30-59 ml/min    IPF (idiopathic pulmonary fibrosis)    Paroxysmal atrial fibrillation    Type 2 diabetes mellitus with hyperglycemia, with long-term current use of insulin    Gross hematuria  --urology to see in am  --hold on CBI for now as pt urinating without difficulty  --received invanz in ED, will transition to rocephin. Follow urine culture  --hold eliquis and asa for now    CKD  --appears baseline 1.5-1.7  --IVF overnight, recheck in am    Left kidney hydronephrosis  --double J stent in place    Lactic Acidosis  --recheck in am  --IVF overnight    PAF  --eliquis and asa on hold  --continue BB    History of IPF  --uses oxygen as needed    Total time spent: 55 minutes  Time spent includes time reviewing chart, face-to-face time, counseling patient/family/caregiver, ordering medications/tests/procedures, communicating with other health care professionals, documenting clinical information in the  electronic health record, and coordination of care.     DVT prophylaxis: eliquis on hold    CODE STATUS:    Medical Intervention Limits: NO intubation (DNI)  Level Of Support Discussed With: Patient  Code Status (Patient has no pulse and is not breathing): No CPR (Do Not Attempt to Resuscitate)  Medical Interventions (Patient has pulse or is breathing): Limited Support      Expected Dischargehome +/- HH  Expected Discharge Date: 2/15/2024; Expected Discharge Time:           Signature: Electronically signed by JOYCE Shah, 02/13/24, 5:04 PM EST    Attending Bbii ESTRELLA supervised care of the patient on day of service with direct care provided by the advanced care provider (APC).    Terri Gifford,   02/14/24

## 2024-02-13 NOTE — Clinical Note
Level of Care: Telemetry [5]   Diagnosis: Hematuria [129818]   Admitting Physician: JASON DAILY [[id number]]

## 2024-02-14 LAB
ANION GAP SERPL CALCULATED.3IONS-SCNC: 12 MMOL/L (ref 5–15)
BACTERIA SPEC AEROBE CULT: NO GROWTH
BASOPHILS # BLD AUTO: 0.1 10*3/MM3 (ref 0–0.2)
BASOPHILS NFR BLD AUTO: 1.1 % (ref 0–1.5)
BUN SERPL-MCNC: 37 MG/DL (ref 8–23)
BUN/CREAT SERPL: 24.5 (ref 7–25)
CALCIUM SPEC-SCNC: 9 MG/DL (ref 8.2–9.6)
CHLORIDE SERPL-SCNC: 109 MMOL/L (ref 98–107)
CO2 SERPL-SCNC: 20 MMOL/L (ref 22–29)
CREAT SERPL-MCNC: 1.51 MG/DL (ref 0.76–1.27)
D-LACTATE SERPL-SCNC: 0.9 MMOL/L (ref 0.5–2)
DEPRECATED RDW RBC AUTO: 57.6 FL (ref 37–54)
EGFRCR SERPLBLD CKD-EPI 2021: 43.3 ML/MIN/1.73
EOSINOPHIL # BLD AUTO: 0.5 10*3/MM3 (ref 0–0.4)
EOSINOPHIL NFR BLD AUTO: 5.3 % (ref 0.3–6.2)
ERYTHROCYTE [DISTWIDTH] IN BLOOD BY AUTOMATED COUNT: 15.9 % (ref 12.3–15.4)
GLUCOSE BLDC GLUCOMTR-MCNC: 115 MG/DL (ref 70–130)
GLUCOSE BLDC GLUCOMTR-MCNC: 133 MG/DL (ref 70–130)
GLUCOSE BLDC GLUCOMTR-MCNC: 153 MG/DL (ref 70–130)
GLUCOSE BLDC GLUCOMTR-MCNC: 162 MG/DL (ref 70–130)
GLUCOSE SERPL-MCNC: 82 MG/DL (ref 65–99)
HCT VFR BLD AUTO: 36.2 % (ref 37.5–51)
HGB BLD-MCNC: 11.8 G/DL (ref 13–17.7)
IMM GRANULOCYTES # BLD AUTO: 0.04 10*3/MM3 (ref 0–0.05)
IMM GRANULOCYTES NFR BLD AUTO: 0.4 % (ref 0–0.5)
LYMPHOCYTES # BLD AUTO: 3.04 10*3/MM3 (ref 0.7–3.1)
LYMPHOCYTES NFR BLD AUTO: 32.5 % (ref 19.6–45.3)
MCH RBC QN AUTO: 32.1 PG (ref 26.6–33)
MCHC RBC AUTO-ENTMCNC: 32.6 G/DL (ref 31.5–35.7)
MCV RBC AUTO: 98.4 FL (ref 79–97)
MONOCYTES # BLD AUTO: 0.74 10*3/MM3 (ref 0.1–0.9)
MONOCYTES NFR BLD AUTO: 7.9 % (ref 5–12)
NEUTROPHILS NFR BLD AUTO: 4.93 10*3/MM3 (ref 1.7–7)
NEUTROPHILS NFR BLD AUTO: 52.8 % (ref 42.7–76)
NRBC BLD AUTO-RTO: 0 /100 WBC (ref 0–0.2)
PLATELET # BLD AUTO: 187 10*3/MM3 (ref 140–450)
PMV BLD AUTO: 11.2 FL (ref 6–12)
POTASSIUM SERPL-SCNC: 5 MMOL/L (ref 3.5–5.2)
RBC # BLD AUTO: 3.68 10*6/MM3 (ref 4.14–5.8)
SODIUM SERPL-SCNC: 141 MMOL/L (ref 136–145)
WBC NRBC COR # BLD AUTO: 9.35 10*3/MM3 (ref 3.4–10.8)

## 2024-02-14 PROCEDURE — 80048 BASIC METABOLIC PNL TOTAL CA: CPT | Performed by: NURSE PRACTITIONER

## 2024-02-14 PROCEDURE — 63710000001 INSULIN DETEMIR PER 5 UNITS: Performed by: NURSE PRACTITIONER

## 2024-02-14 PROCEDURE — 99222 1ST HOSP IP/OBS MODERATE 55: CPT | Performed by: PHYSICIAN ASSISTANT

## 2024-02-14 PROCEDURE — 99024 POSTOP FOLLOW-UP VISIT: CPT | Performed by: PHYSICIAN ASSISTANT

## 2024-02-14 PROCEDURE — 25810000003 SODIUM CHLORIDE 0.9 % SOLUTION: Performed by: NURSE PRACTITIONER

## 2024-02-14 PROCEDURE — 83605 ASSAY OF LACTIC ACID: CPT | Performed by: NURSE PRACTITIONER

## 2024-02-14 PROCEDURE — 25010000002 ERTAPENEM PER 500 MG: Performed by: NURSE PRACTITIONER

## 2024-02-14 PROCEDURE — 82948 REAGENT STRIP/BLOOD GLUCOSE: CPT

## 2024-02-14 PROCEDURE — 97162 PT EVAL MOD COMPLEX 30 MIN: CPT

## 2024-02-14 PROCEDURE — 99232 SBSQ HOSP IP/OBS MODERATE 35: CPT | Performed by: HOSPITALIST

## 2024-02-14 PROCEDURE — G0378 HOSPITAL OBSERVATION PER HR: HCPCS

## 2024-02-14 PROCEDURE — 63710000001 INSULIN LISPRO (HUMAN) PER 5 UNITS: Performed by: NURSE PRACTITIONER

## 2024-02-14 PROCEDURE — 99221 1ST HOSP IP/OBS SF/LOW 40: CPT | Performed by: PHYSICIAN ASSISTANT

## 2024-02-14 PROCEDURE — 85025 COMPLETE CBC W/AUTO DIFF WBC: CPT | Performed by: NURSE PRACTITIONER

## 2024-02-14 RX ADMIN — Medication 10 ML: at 20:14

## 2024-02-14 RX ADMIN — SODIUM CHLORIDE 75 ML/HR: 9 INJECTION, SOLUTION INTRAVENOUS at 08:03

## 2024-02-14 RX ADMIN — INSULIN LISPRO 2 UNITS: 100 INJECTION, SOLUTION INTRAVENOUS; SUBCUTANEOUS at 18:05

## 2024-02-14 RX ADMIN — INSULIN DETEMIR 10 UNITS: 100 INJECTION, SOLUTION SUBCUTANEOUS at 20:14

## 2024-02-14 RX ADMIN — LINAGLIPTIN 5 MG: 5 TABLET, FILM COATED ORAL at 08:15

## 2024-02-14 RX ADMIN — BISOPROLOL FUMARATE 5 MG: 5 TABLET, FILM COATED ORAL at 08:14

## 2024-02-14 RX ADMIN — ALLOPURINOL 300 MG: 300 TABLET ORAL at 20:14

## 2024-02-14 RX ADMIN — DOCUSATE SODIUM 100 MG: 100 CAPSULE, LIQUID FILLED ORAL at 08:15

## 2024-02-14 RX ADMIN — LEVOTHYROXINE SODIUM 50 MCG: 0.05 TABLET ORAL at 06:07

## 2024-02-14 RX ADMIN — INSULIN LISPRO 2 UNITS: 100 INJECTION, SOLUTION INTRAVENOUS; SUBCUTANEOUS at 11:58

## 2024-02-14 RX ADMIN — OXYBUTYNIN CHLORIDE 10 MG: 10 TABLET, EXTENDED RELEASE ORAL at 08:14

## 2024-02-14 RX ADMIN — Medication 10 ML: at 08:15

## 2024-02-14 RX ADMIN — FINASTERIDE 5 MG: 5 TABLET, FILM COATED ORAL at 08:14

## 2024-02-14 RX ADMIN — ERTAPENEM 500 MG: 1 INJECTION INTRAMUSCULAR; INTRAVENOUS at 12:34

## 2024-02-14 NOTE — PLAN OF CARE
Goal Outcome Evaluation:  Plan of Care Reviewed With: patient        Progress: no change  Outcome Evaluation: PT eval completed. Patient presents w/ generalized weakness, decreased functional endurance, mild gait instability, and is below his baseline. He ambulated 45ft w/ SPC and SBA with mild instability, but no overt LOB. Pt. would benefit from acute PT to improve pt's safety and independence w/ functional mobility. Recommend home w/ assist and HHPT at D/C.      Anticipated Discharge Disposition (PT): home with assist, home with home health

## 2024-02-14 NOTE — THERAPY EVALUATION
Patient Name: Forrest Zepeda  : 1932    MRN: [id number]                              Today's Date: 2024       Admit Date: 2024    Visit Dx:     ICD-10-CM ICD-9-CM   1. Gross hematuria  R31.0 599.71   2. Anticoagulated  Z79.01 V58.61   3. Acute kidney injury  N17.9 584.9   4. Ureteral stent present  Z96.0 V45.89     Patient Active Problem List   Diagnosis    Essential hypertension    Generalized osteoarthritis    Diabetic neuropathy    Gout    HLD (hyperlipidemia)    Acquired hypothyroidism    Ureteral stricture    Hydronephrosis of left kidney    CKD (chronic kidney disease) stage 3, GFR 30-59 ml/min    LEHMAN (dyspnea on exertion)    IPF (idiopathic pulmonary fibrosis)    Symptomatic bradycardia    Hypoxemia requiring supplemental oxygen    History of pulmonary embolism    Sinus node dysfunction    Chronic fatigue    Urethral stricture    Paroxysmal atrial fibrillation    Type 2 diabetes mellitus with hyperglycemia, with long-term current use of insulin    Abnormal cardiovascular stress test    Pulmonary hypertension    Cardiac pacemaker in situ    CAD (coronary artery disease)    Diastolic congestive heart failure    Abnormal SPEP    Type 2 diabetes mellitus with stage 3b chronic kidney disease, with long-term current use of insulin    Hydronephrosis, left    Gross hematuria    Chronic heart failure with preserved ejection fraction (HFpEF)    Personal history of other infectious and parasitic diseases    Weakness    Vasovagal syncope     Past Medical History:   Diagnosis Date    Abnormal EKG     Abnormal PSA     Reported abnormal    Acute cystitis with hematuria 2023    Acute deep vein thrombosis (DVT) of right lower extremity 2019    Acute diverticulitis 2019    Acute hyperkalemia 2019    Acute respiratory failure with hypoxia     Acute saddle pulmonary embolism with acute cor pulmonale 2019    Acute systolic right heart failure 2019    Acute UTI (urinary tract  "infection)     Arthritis     KNEES,  BUT SINCE HAD REPLACEMENT    Benign localized hyperplasia of prostate     Bilateral knee pain     C. difficile diarrhea 2010    Cataracts, bilateral     CKD (chronic kidney disease)     Coronary artery disease     Diabetic neuropathy     Diastolic dysfunction     Disease of thyroid gland     HYPOTHYROIDISM    Diverticulitis     Dyslipidemia     H/O    Family history of malignant hyperthermia     Brother     Full dentures     Generalized weakness     History of ESBL E. coli infection     most recent 4-2022 f/u with ID Dr Johnson    History of gout     History of hepatitis A vaccination     History of nephrolithiasis     History of nuclear stress test     STATES IN THE 1990'S.  \"IT WAS OK\"    History of osteopenia     History of recurrent UTI (urinary tract infection)     Hydronephrosis of left kidney 07/18/2019    Hydronephrosis with renal and ureteral calculus obstruction 10/21/2019    Added automatically from request for surgery [id number]    Hydronephrosis with ureteral stricture     Hypercholesterolemia     Hyperkalemia     PT UNSURE REGARDING HX OF THIS    Hyperlipidemia     Hypertension     Hypothyroidism     Impaired functional mobility, balance, gait, and endurance     Insomnia     IPF (idiopathic pulmonary fibrosis)     per patient and son, dx at [facility], was told no treatment necessary, not to worry about it    On supplemental oxygen therapy     2L NC QHS    Other hydronephrosis 08/12/2019    Added automatically from request for surgery [id number]    Paroxysmal atrial fibrillation     Pulmonary hypertension, mild 02/2021    per echo per cardiology note 1/9/23, per pt's son, PCP does not agree with this dx    Renal insufficiency     Sepsis 2019    Shortness of breath     STATES WITH EXERTION, OTHERWISE, DENIES    Sigmoid diverticulitis without abscess or perforation 08/09/2017    Sinus node dysfunction     Skin breakdown     fourth toe right foot noted in 2019 MD D/C note    " Skin ulcer of fourth toe of right foot, limited to breakdown of skin 07/05/2019    Stricture of ureter     Type 2 diabetes mellitus     Ureteral stricture, left     UTI (urinary tract infection) 07/18/2019    Vitamin D deficiency     Wears glasses      Past Surgical History:   Procedure Laterality Date    APPENDECTOMY      CARDIAC ELECTROPHYSIOLOGY PROCEDURE N/A 12/23/2020    Procedure: Pacemaker DC new. DNS meds.;  Surgeon: Jimy Ledezma DO;  Location:  [facility];  Service: Cardiology;  Laterality: N/A;    CHOLECYSTECTOMY      CIRCUMCISION N/A 10/23/2019    Procedure: CIRCUMCISIOn-DORSAL SLIT;  Surgeon: Griffin Romero MD;  Location:  [facility];  Service: Urology    COLONOSCOPY      CYSTOSCOPY RETROGRADE PYELOGRAM Left 06/17/2022    Procedure: CYSTOSCOPY RETROGRADE PYELOGRAM;  Surgeon: Ryne Mccann MD;  Location:  [facility];  Service: Urology;  Laterality: Left;    CYSTOSCOPY URETEROSCOPY Left 02/11/2019    Procedure: URETEROSCOPY WITH STENT EXTRACTION, RPG;  Surgeon: Griffin Romero MD;  Location:  [facility];  Service: Urology    CYSTOSCOPY W/ URETERAL STENT PLACEMENT Left 07/20/2019    Procedure: CYSTOSCOPY, LEFT RETROGRADE PYELOGRAM,  ATTEMPTED LEFT URETERAL STENT INSERTION;  Surgeon: Isaac Flynn MD;  Location:  [facility];  Service: Urology    CYSTOSCOPY W/ URETERAL STENT PLACEMENT Left 06/17/2022    Procedure: CYSTOSCOPY URETERAL CATHETER/STENT INSERTION;  Surgeon: Ryne Mccann MD;  Location:  [facility];  Service: Urology;  Laterality: Left;    CYSTOSCOPY W/ URETERAL STENT PLACEMENT Left 01/27/2023    Procedure: CYSTOSCOPY URETERAL CATHETER/STENT INSERTION;  Surgeon: Deanne Pitts MD;  Location:  [facility];  Service: Urology;  Laterality: Left;    CYSTOSCOPY, URETEROSCOPY, RETROGRADE PYELOGRAM, STONE EXTRACTION, STENT INSERTION Left 06/15/2023    Procedure: URETEROSCOPY, RETROGRADE PYELOGRAM, STENT INSERTION;  Surgeon: Deanne Pitts MD;  Location:  [facility];  Service: Urology;   Laterality: Left;    EYE SURGERY      CATARACTS REMOVED BILATERALLY    JOINT REPLACEMENT      Bilateral knees    KNEE ARTHROPLASTY Bilateral     KNEE ARTHROSCOPY Bilateral     RENAL ARTERY STENT      SEVERAL TIMES EVERY SINCE 1978    URETERAL STENT INSERTION  10/2020    URETEROSCOPY LASER LITHOTRIPSY WITH STENT INSERTION Left 01/10/2018    Procedure:  cysto, left ureteral stent replacement ;  Surgeon: Griffin Romero MD;  Location: [facility];  Service:     URETEROSCOPY LASER LITHOTRIPSY WITH STENT INSERTION Left 08/14/2019    Procedure: URETEROSCOPY, STONE REMOVAL WITH STENT INSERTION;  Surgeon: Griffin Romero MD;  Location: [facility] OR;  Service: Urology    URETEROSCOPY LASER LITHOTRIPSY WITH STENT INSERTION Left 10/23/2019    Procedure: Left URETEROSCOPY WITH STENT INSERTION-LEFT;  Surgeon: Griffin Romero MD;  Location: [facility] OR;  Service: Urology      General Information       Row Name 02/14/24 1106          Physical Therapy Time and Intention    Document Type evaluation  -MB     Mode of Treatment physical therapy  -MB       Row Name 02/14/24 1106          General Information    Patient Profile Reviewed yes  -MB     Prior Level of Function independent:;bed mobility;ADL's;transfer;gait;all household mobility;driving;using stairs  -MB     Existing Precautions/Restrictions fall  -MB     Barriers to Rehab medically complex;previous functional deficit  -MB       Row Name 02/14/24 1106          Living Environment    People in Home alone  Dtr and son live nearby and assist as needed.  -MB       Row Name 02/14/24 1106          Home Main Entrance    Number of Stairs, Main Entrance two  -MB     Stair Railings, Main Entrance none  -MB       Row Name 02/14/24 1106          Stairs Within Home, Primary    Number of Stairs, Within Home, Primary none  -MB       Row Name 02/14/24 1106          Cognition    Orientation Status (Cognition) oriented x 4  -MB       Row Name 02/14/24 1106          Safety Issues, Functional  Mobility    Safety Issues Affecting Function (Mobility) insight into deficits/self-awareness;safety precaution awareness  -MB     Impairments Affecting Function (Mobility) balance;endurance/activity tolerance;strength  -MB               User Key  (r) = Recorded By, (t) = Taken By, (c) = Cosigned By      Initials Name Provider Type    MB Samia Perez, PT Physical Therapist                   Mobility       Row Name 02/14/24 1422          Bed Mobility    Bed Mobility supine-sit  -MB     Supine-Sit [place] (Bed Mobility) modified independence  -MB     Assistive Device (Bed Mobility) head of bed elevated  -MB     Comment, (Bed Mobility) Pt. advanced to EOB w/out physical assist w/ increased time required to promote independence.  -MB       Row Name 02/14/24 1422          Transfers    Comment, (Transfers) VCs for upright posture and to reach back for armrests for controlled sit.  -MB       Row Name 02/14/24 1422          Bed-Chair Transfer    Bed-Chair [place] (Transfers) standby assist;verbal cues  -MB     Assistive Device (Bed-Chair Transfers) cane, straight  -MB       Row Name 02/14/24 1422          Sit-Stand Transfer    Sit-Stand [place] (Transfers) standby assist;verbal cues  -MB     Assistive Device (Sit-Stand Transfers) cane, straight  -MB       Row Name 02/14/24 1422          Gait/Stairs (Locomotion)    [place] Level (Gait) standby assist;verbal cues  -MB     Assistive Device (Gait) cane, straight  -MB     Distance in Feet (Gait) 45  -MB     Deviations/Abnormal Patterns (Gait) melvin decreased;gait speed decreased;stride length decreased  -MB     Bilateral Gait Deviations forward flexed posture;heel strike decreased  -MB     [place] Level (Stairs) not tested  -MB     Comment, (Gait/Stairs) Pt. ambulated w/ step through gait pattern w/ slower pace and increased forward flexion. VCs for upright posture/forward gaze and increased B heelstrike. Pt. mildly unsteady, but no overt LOB.   -MB               User Key  (r) = Recorded By, (t) = Taken By, (c) = Cosigned By      Initials Name Provider Type    Samia Goff, PT Physical Therapist                   Obj/Interventions       Row Name 02/14/24 1425          Range of Motion Comprehensive    General Range of Motion no range of motion deficits identified  -MB     Comment, General Range of Motion BUEs/BLEs WFL for age.  -MB       Row Name 02/14/24 1425          Strength Comprehensive (MMT)    General Manual Muscle Testing (MMT) Assessment lower extremity strength deficits identified;upper extremity strength deficits identified  -MB     Comment, General Manual Muscle Testing (MMT) Assessment BLEs and BUEs grossly 4/5  -MB       Row Name 02/14/24 1425          Balance    Balance Assessment sitting static balance;standing static balance;standing dynamic balance  -MB     Static Standing Balance supervision  -MB     Dynamic Standing Balance standby assist  -MB     Position/Device Used, Standing Balance cane, straight  -MB     Balance Interventions standing;sit to stand;occupation based/functional task;weight shifting activity;dynamic reaching  -[facility] Name 02/14/24 1425          Sensory Assessment (Somatosensory)    Sensory Assessment (Somatosensory) LE sensation intact;UE sensation intact  -MB               User Key  (r) = Recorded By, (t) = Taken By, (c) = Cosigned By      Initials Name Provider Type    Samia Goff, PT Physical Therapist                   Goals/Plan       [facility] Name 02/14/24 1430          Bed Mobility Goal 1 (PT)    Activity/Assistive Device (Bed Mobility Goal 1, PT) sit to supine/supine to sit  -MB     [place] Level/Cues Needed (Bed Mobility Goal 1, PT) independent  bed flat  -MB     Time Frame (Bed Mobility Goal 1, PT) 1 week  -[facility] Name 02/14/24 1430          Transfer Goal 1 (PT)    Activity/Assistive Device (Transfer Goal 1, PT) sit-to-stand/stand-to-sit;bed-to-chair/chair-to-bed;cane, straight   -MB     [place] Level/Cues Needed (Transfer Goal 1, PT) modified independence  -MB     Time Frame (Transfer Goal 1, PT) 1 week  -MB       Row Name 02/14/24 1430          Gait Training Goal 1 (PT)    Activity/Assistive Device (Gait Training Goal 1, PT) gait (walking locomotion);improve balance and speed;increase endurance/gait distance;decrease fall risk;cane, straight  -MB     [place] Level (Gait Training Goal 1, PT) modified independence  -MB     Distance (Gait Training Goal 1, PT) 150  -MB     Time Frame (Gait Training Goal 1, PT) 10 days  -MB       Row Name 02/14/24 1430          Stairs Goal 1 (PT)    Activity/Assistive Device (Stairs Goal 1, PT) ascending stairs;descending stairs;cane, straight  -MB     [place] Level/Cues Needed (Stairs Goal 1, PT) supervision required  -MB     Number of Stairs (Stairs Goal 1, PT) 2  -MB     Time Frame (Stairs Goal 1, PT) by discharge  -MB       Row Name 02/14/24 1430          Patient Education Goal (PT)    Activity (Patient Education Goal, PT) HEP  -MB     [place]/Cues/Accuracy (Memory Goal 2, PT) demonstrates adequately  -MB     Time Frame (Patient Education Goal, PT) 1 week  -MB       Row Name 02/14/24 1430          Therapy Assessment/Plan (PT)    Planned Therapy Interventions (PT) balance training;bed mobility training;gait training;home exercise program;patient/family education;postural re-education;stair training;transfer training;strengthening  -MB               User Key  (r) = Recorded By, (t) = Taken By, (c) = Cosigned By      Initials Name Provider Type    Samia Goff, PT Physical Therapist                   Clinical Impression       Row Name 02/14/24 1426          Pain    Pretreatment Pain Rating 0/10 - no pain  -MB     Posttreatment Pain Rating 0/10 - no pain  -MB       Row Name 02/14/24 1426          Plan of Care Review    Plan of Care Reviewed With patient  -MB     Progress no change  -MB     Outcome Evaluation PT eval completed.  Patient presents w/ generalized weakness, decreased functional endurance, mild gait instability, and is below his baseline. He ambulated 45ft w/ SPC and SBA with mild instability, but no overt LOB. Pt. would benefit from acute PT to improve pt's safety and independence w/ functional mobility. Recommend home w/ assist and HHPT at D/C.  -MB       Row Name 02/14/24 1426          Therapy Assessment/Plan (PT)    Patient/Family Therapy Goals Statement (PT) Return to home, driving, PLOF.  -MB     Rehab Potential (PT) good, to achieve stated therapy goals  -MB     Criteria for Skilled Interventions Met (PT) yes;meets criteria;skilled treatment is necessary  -MB     Therapy Frequency (PT) daily  -MB       Row Name 02/14/24 1426          Vital Signs    Pre Systolic BP Rehab 136  -MB     Pre Treatment Diastolic BP 62  -MB     Pretreatment Heart Rate (beats/min) 63  -MB     Pre SpO2 (%) 96  -MB     O2 Delivery Pre Treatment room air  -MB     O2 Delivery Intra Treatment room air  -MB     Post SpO2 (%) 95  -MB     O2 Delivery Post Treatment room air  -MB     Pre Patient Position Supine  -MB     Intra Patient Position Standing  -MB     Post Patient Position Sitting  -MB       Row Name 02/14/24 1426          Positioning and Restraints    Pre-Treatment Position in bed  -MB     Post Treatment Position chair  -MB     In Chair notified nsg;reclined;call light within reach;encouraged to call for assist;exit alarm on;waffle cushion;legs elevated  -MB               User Key  (r) = Recorded By, (t) = Taken By, (c) = Cosigned By      Initials Name Provider Type    Samia Goff, PT Physical Therapist                   Outcome Measures       Row Name 02/14/24 1432 02/14/24 0742       How much help from another person do you currently need...    Turning from your back to your side while in flat bed without using bedrails? 3  -MB 3  -RK    Moving from lying on back to sitting on the side of a flat bed without bedrails? 3  -MB 3  -RK     Moving to and from a bed to a chair (including a wheelchair)? 3  -MB 3  -RK    Standing up from a chair using your arms (e.g., wheelchair, bedside chair)? 3  -MB 3  -RK    Climbing 3-5 steps with a railing? 3  -MB 3  -RK    To walk in hospital room? 3  -MB 3  -RK    AM-PAC 6 Clicks Score (PT) 18  -MB 18  -RK    Highest Level of Mobility Goal 6 --> Walk 10 steps or more  -MB 6 --> Walk 10 steps or more  -RK      Row Name 02/14/24 [facility]          Functional Assessment    Outcome Measure Options AM-PAC 6 Clicks Basic Mobility (PT)  -MB               User Key  (r) = Recorded By, (t) = Taken By, (c) = Cosigned By      Initials Name Provider Type    Samia Goff, PT Physical Therapist    Cristiano Ambrose RN Registered Nurse                                 Physical Therapy Education       Title: PT OT SLP Therapies (In Progress)       Topic: Physical Therapy (Done)       Point: Mobility training (Done)       Learning Progress Summary             Patient Acceptance, E,D, VU by MB at 2/14/2024 1432                         Point: Home exercise program (Done)       Learning Progress Summary             Patient Acceptance, E,D, VU by MB at 2/14/2024 1432                         Point: Body mechanics (Done)       Learning Progress Summary             Patient Acceptance, E,D, VU by MB at 2/14/2024 1432                         Point: Precautions (Done)       Learning Progress Summary             Patient Acceptance, E,D, VU by MB at 2/14/2024 1432                                         User Key       Initials Effective Dates Name Provider Type Discipline    MB 06/16/21 -  Samia Perez, PT Physical Therapist PT                  PT Recommendation and Plan  Planned Therapy Interventions (PT): balance training, bed mobility training, gait training, home exercise program, patient/family education, postural re-education, stair training, transfer training, strengthening  Plan of Care Reviewed With: patient  Progress: no  change  Outcome Evaluation: PT eval completed. Patient presents w/ generalized weakness, decreased functional endurance, mild gait instability, and is below his baseline. He ambulated 45ft w/ SPC and SBA with mild instability, but no overt LOB. Pt. would benefit from acute PT to improve pt's safety and independence w/ functional mobility. Recommend home w/ assist and HHPT at D/C.     Time Calculation:   PT Evaluation Complexity  History, PT Evaluation Complexity: 1-2 personal factors and/or comorbidities  Examination of Body Systems (PT Eval Complexity): total of 3 or more elements  Clinical Presentation (PT Evaluation Complexity): evolving  Clinical Decision Making (PT Evaluation Complexity): moderate complexity  Overall Complexity (PT Evaluation Complexity): moderate complexity     PT Charges       Row Name 02/14/24 1433             Time Calculation    Start Time 1106  -MB      PT Received On 02/14/24  -MB      PT Goal Re-Cert Due Date 02/24/24  -MB         Untimed Charges    PT Eval/Re-eval Minutes 46  -MB         Total Minutes    Untimed Charges Total Minutes 46  -MB       Total Minutes 46  -MB                User Key  (r) = Recorded By, (t) = Taken By, (c) = Cosigned By      Initials Name Provider Type    Samia Goff, PT Physical Therapist                  Therapy Charges for Today       Code Description Service Date Service Provider Modifiers Qty    84948396780 HC PT EVAL MOD COMPLEXITY 4 2/14/2024 Samai Perez, PT GP 1            PT G-Codes  Outcome Measure Options: AM-PAC 6 Clicks Basic Mobility (PT)  AM-PAC 6 Clicks Score (PT): 18  PT Discharge Summary  Anticipated Discharge Disposition (PT): home with assist, home with home health    Samia Perez, PT  2/14/2024

## 2024-02-14 NOTE — CASE MANAGEMENT/SOCIAL WORK
Discharge Planning Assessment  [facility]     Patient Name: Forrest Zepeda  MRN: [id number]  Today's Date: 2/14/2024    Admit Date: 2/13/2024    Plan: Home with family   Discharge Needs Assessment       Row Name 02/14/24 0827       Living Environment    People in Home alone    Current Living Arrangements home    Potentially Unsafe Housing Conditions none    Primary Care Provided by self    Provides Primary Care For no one    Family Caregiver if Needed child(shital), adult    Family Caregiver Names Eron (son/POA) [phone]    Quality of Family Relationships helpful;involved;supportive       Resource/Environmental Concerns    Resource/Environmental Concerns none    Transportation Concerns none       Transition Planning    Patient/Family Anticipates Transition to home with family    Patient/Family Anticipated Services at Transition none    Transportation Anticipated family or friend will provide       Discharge Needs Assessment    Readmission Within the Last 30 Days no previous admission in last 30 days    Equipment Currently Used at Home cane, straight;walker, rolling;wheelchair    Concerns to be Addressed denies needs/concerns at this time    Anticipated Changes Related to Illness none    Equipment Needed After Discharge none                   Discharge Plan       Row Name 02/14/24 5795       Plan    Plan Home with family    Patient/Family in Agreement with Plan yes    Plan Comments Spoke to patient at bedside. Lives alone in [facility]. Contact is Eron (/Afshan) [phone]. Is independent with ADL's. No problems with [employer] Medicare or medications. Uses a straight cane, rolling walker and wheelchair. Has advanced directives. PCP is Kary Wen MD. Plan is home with son. CM will continue to follow    Final Discharge Disposition Code 01 - home or self-care                  Continued Care and Services - Admitted Since 2/13/2024    Coordination has not been started for this encounter.       Expected Discharge Date  and Time       Expected Discharge Date Expected Discharge Time    Feb 15, 2024            Demographic Summary       Row Name 02/14/24 0827       General Information    Admission Type observation    Arrived From emergency department    Referral Source admission list    Reason for Consult discharge planning    Preferred Language English       Contact Information    Permission Granted to Share Info With     Contact Information Obtained for                    Functional Status       Row Name 02/14/24 0827       Functional Status    Usual Activity Tolerance moderate    Current Activity Tolerance moderate       Functional Status, IADL    Medications independent    Meal Preparation independent    Housekeeping independent    Laundry independent    Shopping independent       Mental Status    General Appearance WDL WDL       Mental Status Summary    Recent Changes in Mental Status/Cognitive Functioning no changes       Employment/    Employment Status retired                   Psychosocial    No documentation.                  Abuse/Neglect    No documentation.                  Legal    No documentation.                  Substance Abuse    No documentation.                  Patient Forms    No documentation.                     Richard Garcia RN

## 2024-02-14 NOTE — CONSULTS
[facility]   Urology consultation note    Patient Name: Forrest Zepeda  : 1932  MRN: [id number]  Primary Care Physician:  Kary Wen MD  Date of admission: 2024    Subjective   Subjective     Chief Complaint: Gross hematuria    HPI:  I am seeing Forrest Flynn at the request of the medicine service for evaluation of gross hematuria.  He is a pleasant 89-year-old gentleman well-known to our service.  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  Medical history significant for PAF on Eliquis and baby aspirin and CKD.  He was last seen in our office back in January.  He had stable left hydronephrosis and was doing well, without any concerns for UTI or gross hematuria.  His family called the office yesterday advising that patient had been having several days of progressively worsening gross hematuria.  They agreed to go to the ED last night for further evaluation.  Workup in the ER revealed leukocytosis with a white count of 12.7, acute on chronic kidney disease with a creatinine of 2.21, and elevated lactate of 3.7.  Urinalysis revealed too numerous to count RBCs, positive leukocytes, no nitrites.  No bacteria could be visualized on micro due to the amount of blood in the urine.  CT of the abdomen pelvis revealed stable, moderate left hydronephrosis with no evidence of clot in the bladder.  He received Invanz in the ED and was subsequently admitted to the medicine service.  Urology consulted for further evaluation.    Patient was seen this morning on the floor, he appeared well and in good spirits.  He was nontoxic-appearing.  He was stating that he was continuing to void a mixture of blood and urine.  He did not think it had improved any since coming into the hospital.  He did state he is able to void adequately and feels he is emptying his bladder.  He denies seeing any blood clots in his urine.  He denied  "any dysuria, suprapubic pain, flank pain.    Review of Systems   All systems were reviewed and negative except for: Where noted in HPI above.    Personal History     Past Medical History:   Diagnosis Date    Abnormal EKG     Abnormal PSA     Reported abnormal    Acute cystitis with hematuria 05/01/2023    Acute deep vein thrombosis (DVT) of right lower extremity 08/22/2019    Acute diverticulitis 2019    Acute hyperkalemia 08/22/2019    Acute respiratory failure with hypoxia     Acute saddle pulmonary embolism with acute cor pulmonale 08/22/2019    Acute systolic right heart failure 08/22/2019    Acute UTI (urinary tract infection)     Arthritis     KNEES,  BUT SINCE HAD REPLACEMENT    Benign localized hyperplasia of prostate     Bilateral knee pain     C. difficile diarrhea 2010    Cataracts, bilateral     CKD (chronic kidney disease)     Coronary artery disease     Diabetic neuropathy     Diastolic dysfunction     Disease of thyroid gland     HYPOTHYROIDISM    Diverticulitis     Dyslipidemia     H/O    Family history of malignant hyperthermia     Brother     Full dentures     Generalized weakness     History of ESBL E. coli infection     most recent 4-2022 f/u with ID Dr Johnson    History of gout     History of hepatitis A vaccination     History of nephrolithiasis     History of nuclear stress test     STATES IN THE 1990'S.  \"IT WAS OK\"    History of osteopenia     History of recurrent UTI (urinary tract infection)     Hydronephrosis of left kidney 07/18/2019    Hydronephrosis with renal and ureteral calculus obstruction 10/21/2019    Added automatically from request for surgery [id number]    Hydronephrosis with ureteral stricture     Hypercholesterolemia     Hyperkalemia     PT UNSURE REGARDING HX OF THIS    Hyperlipidemia     Hypertension     Hypothyroidism     Impaired functional mobility, balance, gait, and endurance     Insomnia     IPF (idiopathic pulmonary fibrosis)     per patient and son, dx at [facility], was " told no treatment necessary, not to worry about it    On supplemental oxygen therapy     2L NC QHS    Other hydronephrosis 08/12/2019    Added automatically from request for surgery [id number]    Paroxysmal atrial fibrillation     Pulmonary hypertension, mild 02/2021    per echo per cardiology note 1/9/23, per pt's son, PCP does not agree with this dx    Renal insufficiency     Sepsis 2019    Shortness of breath     STATES WITH EXERTION, OTHERWISE, DENIES    Sigmoid diverticulitis without abscess or perforation 08/09/2017    Sinus node dysfunction     Skin breakdown     fourth toe right foot noted in 2019 MD D/C note    Skin ulcer of fourth toe of right foot, limited to breakdown of skin 07/05/2019    Stricture of ureter     Type 2 diabetes mellitus     Ureteral stricture, left     UTI (urinary tract infection) 07/18/2019    Vitamin D deficiency     Wears glasses        Past Surgical History:   Procedure Laterality Date    APPENDECTOMY      CARDIAC ELECTROPHYSIOLOGY PROCEDURE N/A 12/23/2020    Procedure: Pacemaker DC new. DNS meds.;  Surgeon: Jimy Ledezma DO;  Location:  [facility];  Service: Cardiology;  Laterality: N/A;    CHOLECYSTECTOMY      CIRCUMCISION N/A 10/23/2019    Procedure: CIRCUMCISIOn-DORSAL SLIT;  Surgeon: Griffin Romero MD;  Location:  [facility];  Service: Urology    COLONOSCOPY      CYSTOSCOPY RETROGRADE PYELOGRAM Left 06/17/2022    Procedure: CYSTOSCOPY RETROGRADE PYELOGRAM;  Surgeon: Ryne Mccann MD;  Location:  [facility];  Service: Urology;  Laterality: Left;    CYSTOSCOPY URETEROSCOPY Left 02/11/2019    Procedure: URETEROSCOPY WITH STENT EXTRACTION, RPG;  Surgeon: Griffin Romero MD;  Location:  [facility];  Service: Urology    CYSTOSCOPY W/ URETERAL STENT PLACEMENT Left 07/20/2019    Procedure: CYSTOSCOPY, LEFT RETROGRADE PYELOGRAM,  ATTEMPTED LEFT URETERAL STENT INSERTION;  Surgeon: Isaac Flynn MD;  Location:  [facility];  Service: Urology    CYSTOSCOPY W/ URETERAL  STENT PLACEMENT Left 06/17/2022    Procedure: CYSTOSCOPY URETERAL CATHETER/STENT INSERTION;  Surgeon: Ryne Mccann MD;  Location:  [facility];  Service: Urology;  Laterality: Left;    CYSTOSCOPY W/ URETERAL STENT PLACEMENT Left 01/27/2023    Procedure: CYSTOSCOPY URETERAL CATHETER/STENT INSERTION;  Surgeon: Deanne Pitts MD;  Location:  [facility];  Service: Urology;  Laterality: Left;    CYSTOSCOPY, URETEROSCOPY, RETROGRADE PYELOGRAM, STONE EXTRACTION, STENT INSERTION Left 06/15/2023    Procedure: URETEROSCOPY, RETROGRADE PYELOGRAM, STENT INSERTION;  Surgeon: Deanne Pitts MD;  Location:  [facility];  Service: Urology;  Laterality: Left;    EYE SURGERY      CATARACTS REMOVED BILATERALLY    JOINT REPLACEMENT      Bilateral knees    KNEE ARTHROPLASTY Bilateral     KNEE ARTHROSCOPY Bilateral     RENAL ARTERY STENT      SEVERAL TIMES EVERY SINCE 1978    URETERAL STENT INSERTION  10/2020    URETEROSCOPY LASER LITHOTRIPSY WITH STENT INSERTION Left 01/10/2018    Procedure:  cysto, left ureteral stent replacement ;  Surgeon: Griffin Romero MD;  Location:  [facility];  Service:     URETEROSCOPY LASER LITHOTRIPSY WITH STENT INSERTION Left 08/14/2019    Procedure: URETEROSCOPY, STONE REMOVAL WITH STENT INSERTION;  Surgeon: Griffin Romero MD;  Location:  [facility];  Service: Urology    URETEROSCOPY LASER LITHOTRIPSY WITH STENT INSERTION Left 10/23/2019    Procedure: Left URETEROSCOPY WITH STENT INSERTION-LEFT;  Surgeon: Griffin Romero MD;  Location:  [facility];  Service: Urology       Family History: family history includes Brain cancer in his brother and sister; Heart attack in his sister; Hypertension in his sister; Lung cancer in his brother and sister; Malig Hyperthermia in his brother; No Known Problems in his mother; Stomach cancer in his father; Stroke in his sister. Otherwise pertinent FHx was reviewed and not pertinent to current issue.    Social History:  reports that he has never smoked. He has been exposed to  tobacco smoke. He has never used smokeless tobacco. He reports that he does not drink alcohol and does not use drugs.    Home Medications:  Diclofenac Sodium, HYDROcodone-acetaminophen, Insulin Glargine (1 Unit Dial), Mirabegron ER, Multiple Vitamins-Minerals, SITagliptin, allopurinol, apixaban, aspirin, bisoprolol, docusate sodium, finasteride, glimepiride, levothyroxine, silodosin, and triazolam      Allergies:  Allergies   Allergen Reactions    Metformin Diarrhea and GI Intolerance    Statins Diarrhea and Myalgia       Objective   Objective     Vitals:   Temp:  [97.5 °F (36.4 °C)-98.3 °F (36.8 °C)] 98.1 °F (36.7 °C)  Heart Rate:  [60-72] 69  Resp:  [16-18] 18  BP: (113-146)/(55-87) 146/63  Physical Exam    Constitutional: Awake in bed, alert    Eyes: PERRLA, sclerae anicteric, no conjunctival injection   HENT: Normocephalic, atraumatic, mucous membranes moist   Neck: Supple, trachea midline   Respiratory:Equal chest rise, non-labored respirations    Cardiovascular: RRR, palpable radial pulses bilaterally   Gastrointestinal: Soft, nontender, non-distended, no flank pain to palpation    Musculoskeletal: No bilateral ankle edema, no clubbing or cyanosis to extremities   Genitourinary: Unircumcised phallus, orthotopic meatus, bilaterally descended testicles palpable without masses   Psychiatric: Appropriate affect, cooperative   Neurologic: Oriented x 3, Cranial Nerves grossly intact, speech clear   Skin: No rashes     Result Review    Result Review:  I have personally reviewed the results from the time of this admission to 2/14/2024 07:36 EST and agree with these findings:  [x]  Laboratory  [x]  Microbiology  [x]  Radiology  []  EKG/Telemetry   []  Cardiology/Vascular   []  Pathology  []  Old records  []  Other:    Most notable findings include:   WBC:  12.7 -> 9.35  Hct:  41.8 -> 36.2  Lactate:  3.7 -> 0.9  Cr:  2.21 -> 1.51  U/a:  TNTC RBC, +leuks, no nitrites  Ucx:  pending    Narrative & Impression   CT ABDOMEN  PELVIS WO CONTRAST     Date of Exam: 2/13/2024 11:59 AM EST     Indication: hematuria with long term stent present.     Comparison: CT abdomen pelvis dated 5/1/2023     Technique: Axial CT images were obtained of the abdomen and pelvis without the administration of contrast. Reconstructed coronal and sagittal images were also obtained. Automated exposure control and iterative construction methods were used.        Findings:  There is fibrosis of the lung bases. Cardiomegaly with distal pacemaker leads and coronary artery calcifications.     The liver shows homogeneous density. Cholecystectomy changes diffuse pancreatic atrophy. The spleen and adrenal glands are unremarkable.     The right kidney shows no hydronephrosis or nephrolithiasis.     The left kidney shows stable hydroureteronephrosis with a double-J ureteral stent in place. The left renal cortex is atrophic     Persistent diffuse enlargement of the prostate with mass effect over the bladder. Persistent diffuse thickening of the bladder wall with mild surrounding fat stranding stable in appearance.     The stomach and small bowel loops are normal in caliber. The appendix within normal limits. The large bowel is normal in caliber. There is diffuse colon diverticulosis most advanced in the sigmoid with no pericolonic inflammatory changes. There is   atherosclerosis aorta and main branches. No pathologic lymphadenopathy by size criteria. No free fluid or free air no acute fractures or bone lesions     IMPRESSION:  Impression:     1. Stable moderate left hydroureteronephrosis with a double-J ureteral stent in place.     2. Stable diffuse enlargement of the prostate and diffuse bladder wall thickening     3. No evidence of acute CT abnormalities in the abdomen or pelvis     4. Extensive sigmoid diverticulosis with no diverticulitis         Assessment & Plan   Assessment / Plan     Brief Patient Summary:  Forrest Zepeda is a 89 y.o. male with a history of left  ureteral stricture and chronic ureteral stent, last exchanged in June 2023, now admitted to the medicine service after several days of progressively worsening gross hematuria.  He was started on IV antibiotics and IV fluids overnight.  Leukocytosis resolved.  Lactate returned to normal limits.  Creatinine has returned to his baseline.  Hematocrit decreased some, however could partially be dilutional.  He is able to void and appears to be emptying his bladder.  His CT of the abdomen pelvis last night shows stable, moderate left hydronephrosis.  The stent appears to be in proper position.  Do not see any evidence of clot in the bladder.  As long as he is able to continue to void, we can hold off on Ascencio catheter and CBI.  Continue Flomax.  Agree with holding Eliquis and baby aspirin for now.  Continue to monitor H&H, and transfuse if indicated.  Agree with continued IV antibiotics.  Follow-up on urine culture.  Will continue to monitor see how he does today.  Possible stent exchange tomorrow depending on how he does today.    Active Hospital Problems:  Active Hospital Problems    Diagnosis     **Gross hematuria     Type 2 diabetes mellitus with hyperglycemia, with long-term current use of insulin     Paroxysmal atrial fibrillation     IPF (idiopathic pulmonary fibrosis)     CKD (chronic kidney disease) stage 3, GFR 30-59 ml/min     Hydronephrosis of left kidney      Plan:   -hold off on CBI as long as pt can still void  -continue flomax  -check PVR for incomplete bladder emptying  -continue to hold eliquis/asa if ok with medicine  -continue abx  -f/u Ucx  -possibly will change out stent tomorrow pending pt condition  -will follow  -d/w Dr. Pitts    DVT prophylaxis:  Medical DVT prophylaxis orders are present.      CODE STATUS:    Medical Intervention Limits: NO intubation (DNI)  Level Of Support Discussed With: Patient  Code Status (Patient has no pulse and is not breathing): No CPR (Do Not Attempt to  Resuscitate)  Medical Interventions (Patient has pulse or is breathing): Limited Support      Electronically signed by Herson Cuevas PA-C, 02/14/24, 7:36 AM EST.

## 2024-02-14 NOTE — PROGRESS NOTES
[facility] Medicine Services  PROGRESS NOTE    Patient Name: Forrest Zepeda  : 1932  MRN: [id number]    Date of Admission: 2024  Primary Care Physician: Kary Wen MD    Subjective   Subjective     CC: Hematuria    HPI: Hematuria persists. No f/c. No pain.       Objective   Objective     Vital Signs:   Temp:  [97.5 °F (36.4 °C)-98.3 °F (36.8 °C)] 97.8 °F (36.6 °C)  Heart Rate:  [60-72] 63  Resp:  [16-18] 18  BP: (113-146)/(55-87) 136/62     Physical Exam:  NAD, alert and oriented  OP clear, dry MM  Neck supple  No LAD  RRR  CTAB  +BS, soft  GARRETT  Normal affect    Results Reviewed:  LAB RESULTS:      Lab 24  0347 24  1621 24  1159   WBC 9.35  --  12.77*   HEMOGLOBIN 11.8*  --  13.3   HEMATOCRIT 36.2*  --  41.8   PLATELETS 187  --  221   NEUTROS ABS 4.93  --  7.60*   IMMATURE GRANS (ABS) 0.04  --  0.08*   LYMPHS ABS 3.04  --  3.64*   MONOS ABS 0.74  --  0.83   EOS ABS 0.50*  --  0.50*   MCV 98.4*  --  100.2*   LACTATE 0.9 1.9 3.7*         Lab 24  0347 24  1159   SODIUM 141 136   POTASSIUM 5.0 4.9   CHLORIDE 109* 102   CO2 20.0* 23.0   ANION GAP 12.0 11.0   BUN 37* 45*   CREATININE 1.51* 2.21*   EGFR 43.3* 27.4*   GLUCOSE 82 184*   CALCIUM 9.0 9.6         Lab 24  1159   TOTAL PROTEIN 8.0   ALBUMIN 4.0   GLOBULIN 4.0   ALT (SGPT) 9   AST (SGOT) 25   BILIRUBIN 0.3   ALK PHOS 106                     Brief Urine Lab Results  (Last result in the past 365 days)        Color   Clarity   Blood   Leuk Est   Nitrite   Protein   CREAT   Urine HCG        24 1150 Red   Turbid   Large (3+)   Large (3+)   Negative   >=300 mg/dL (3+)                   Microbiology Results Abnormal       None            CT Abdomen Pelvis Without Contrast    Result Date: 2024  CT ABDOMEN PELVIS WO CONTRAST Date of Exam: 2024 11:59 AM EST Indication: hematuria with long term stent present. Comparison: CT abdomen pelvis dated 2023 Technique: Axial CT  images were obtained of the abdomen and pelvis without the administration of contrast. Reconstructed coronal and sagittal images were also obtained. Automated exposure control and iterative construction methods were used. Findings: There is fibrosis of the lung bases. Cardiomegaly with distal pacemaker leads and coronary artery calcifications. The liver shows homogeneous density. Cholecystectomy changes diffuse pancreatic atrophy. The spleen and adrenal glands are unremarkable. The right kidney shows no hydronephrosis or nephrolithiasis. The left kidney shows stable hydroureteronephrosis with a double-J ureteral stent in place. The left renal cortex is atrophic Persistent diffuse enlargement of the prostate with mass effect over the bladder. Persistent diffuse thickening of the bladder wall with mild surrounding fat stranding stable in appearance. The stomach and small bowel loops are normal in caliber. The appendix within normal limits. The large bowel is normal in caliber. There is diffuse colon diverticulosis most advanced in the sigmoid with no pericolonic inflammatory changes. There is atherosclerosis aorta and main branches. No pathologic lymphadenopathy by size criteria. No free fluid or free air no acute fractures or bone lesions     Impression: Impression: 1. Stable moderate left hydroureteronephrosis with a double-J ureteral stent in place. 2. Stable diffuse enlargement of the prostate and diffuse bladder wall thickening 3. No evidence of acute CT abnormalities in the abdomen or pelvis 4. Extensive sigmoid diverticulosis with no diverticulitis Electronically Signed: Redd Matthews MD  2/13/2024 12:22 PM EST  Workstation ID: [id number]     Results for orders placed in visit on 11/13/23    Adult Transthoracic Echo Complete W/ Cont if Necessary Per Protocol    Interpretation Summary    Left ventricular systolic function is normal. Estimated left ventricular EF = 52% Left ventricular ejection fraction appears  to be 51 - 55%.    Left ventricular wall thickness is consistent with mild concentric hypertrophy.    Left ventricular diastolic function is consistent with (grade I) impaired relaxation.    The right ventricular cavity is mildly dilated.    The left atrial cavity is mildly dilated.    Left atrial volume is moderately increased.    There is mild calcification of the aortic valve mainly affecting the non-coronary, left coronary and right coronary cusp(s).    Estimated right ventricular systolic pressure from tricuspid regurgitation is normal (<35 mmHg).      Current medications:  Scheduled Meds:allopurinol, 300 mg, Oral, Nightly  [Held by provider] apixaban, 2.5 mg, Oral, BID  [Held by provider] aspirin, 81 mg, Oral, Daily  bisoprolol, 5 mg, Oral, Daily  docusate sodium, 100 mg, Oral, Daily  ertapenem, 500 mg, Intravenous, Q24H  finasteride, 5 mg, Oral, Daily  insulin detemir, 10 Units, Subcutaneous, Nightly  insulin lispro, 2-9 Units, Subcutaneous, 4x Daily AC & at Bedtime  levothyroxine, 50 mcg, Oral, Q AM  linagliptin, 5 mg, Oral, Daily  oxybutynin XL, 10 mg, Oral, Daily  sodium chloride, 500 mL, Intravenous, Once  sodium chloride, 10 mL, Intravenous, Q12H  tamsulosin, 0.4 mg, Oral, Nightly      Continuous Infusions:sodium chloride, 75 mL/hr, Last Rate: 75 mL/hr (02/14/24 0803)      PRN Meds:.  senna-docusate sodium **AND** polyethylene glycol **AND** bisacodyl **AND** bisacodyl    dextrose    dextrose    Diclofenac Sodium    glucagon (human recombinant)    HYDROcodone-acetaminophen    nitroglycerin    sodium chloride    sodium chloride    temazepam    Assessment & Plan   Assessment & Plan     Active Hospital Problems    Diagnosis  POA    **Gross hematuria [R31.0]  Yes    Type 2 diabetes mellitus with hyperglycemia, with long-term current use of insulin [E11.65, Z79.4]  Not Applicable    Paroxysmal atrial fibrillation [I48.0]  Yes    IPF (idiopathic pulmonary fibrosis) [J84.112]  Yes    CKD (chronic kidney  disease) stage 3, GFR 30-59 ml/min [N18.30]  Yes    Hydronephrosis of left kidney [N13.30]  Yes      Resolved Hospital Problems   No resolved problems to display.        Brief Hospital Course to date:    L ureteral stricture with L stent, with chronic hydronephrosis  Hematuria  -hold AC/aspirin  -continue abx for UTI  -urology consult, considering cystoscopy with stent exchange    Anemia  -due to hematuria    OCTAVIA/CKD  -better after fluids    LA  -resolved    PAF  -on asa/eliquis, held    Hx of IPF  -on oxygen prn    Expected Discharge Location and Transportation: Home  Expected Discharge   Expected Discharge Date: 2/15/2024; Expected Discharge Time:      DVT prophylaxis:  Medical DVT prophylaxis orders are present.         AM-PAC 6 Clicks Score (PT): 18 (02/13/24 2030)    CODE STATUS:   Code Status and Medical Interventions:   Ordered at: 02/13/24 6794     Medical Intervention Limits:    NO intubation (DNI)     Level Of Support Discussed With:    Patient     Code Status (Patient has no pulse and is not breathing):    No CPR (Do Not Attempt to Resuscitate)     Medical Interventions (Patient has pulse or is breathing):    Limited Support       Felix Denton MD  02/14/24

## 2024-02-15 ENCOUNTER — APPOINTMENT (OUTPATIENT)
Dept: GENERAL RADIOLOGY | Facility: HOSPITAL | Age: 89
DRG: 660 | End: 2024-02-15
Payer: MEDICARE

## 2024-02-15 ENCOUNTER — ANESTHESIA (OUTPATIENT)
Dept: PERIOP | Facility: HOSPITAL | Age: 89
End: 2024-02-15
Payer: MEDICARE

## 2024-02-15 ENCOUNTER — ANESTHESIA EVENT (OUTPATIENT)
Dept: PERIOP | Facility: HOSPITAL | Age: 89
End: 2024-02-15
Payer: MEDICARE

## 2024-02-15 PROBLEM — R31.9 HEMATURIA: Status: ACTIVE | Noted: 2024-02-15

## 2024-02-15 LAB
ANION GAP SERPL CALCULATED.3IONS-SCNC: 10 MMOL/L (ref 5–15)
BUN SERPL-MCNC: 35 MG/DL (ref 8–23)
BUN/CREAT SERPL: 24.8 (ref 7–25)
CALCIUM SPEC-SCNC: 8.8 MG/DL (ref 8.2–9.6)
CHLORIDE SERPL-SCNC: 112 MMOL/L (ref 98–107)
CO2 SERPL-SCNC: 18 MMOL/L (ref 22–29)
CREAT SERPL-MCNC: 1.41 MG/DL (ref 0.76–1.27)
DEPRECATED RDW RBC AUTO: 57.9 FL (ref 37–54)
EGFRCR SERPLBLD CKD-EPI 2021: 47 ML/MIN/1.73
ERYTHROCYTE [DISTWIDTH] IN BLOOD BY AUTOMATED COUNT: 16.3 % (ref 12.3–15.4)
GLUCOSE BLDC GLUCOMTR-MCNC: 119 MG/DL (ref 70–130)
GLUCOSE BLDC GLUCOMTR-MCNC: 134 MG/DL (ref 70–130)
GLUCOSE BLDC GLUCOMTR-MCNC: 153 MG/DL (ref 70–130)
GLUCOSE BLDC GLUCOMTR-MCNC: 166 MG/DL (ref 70–130)
GLUCOSE SERPL-MCNC: 128 MG/DL (ref 65–99)
HCT VFR BLD AUTO: 36 % (ref 37.5–51)
HGB BLD-MCNC: 11.4 G/DL (ref 13–17.7)
MCH RBC QN AUTO: 30.6 PG (ref 26.6–33)
MCHC RBC AUTO-ENTMCNC: 31.7 G/DL (ref 31.5–35.7)
MCV RBC AUTO: 96.8 FL (ref 79–97)
PLATELET # BLD AUTO: 183 10*3/MM3 (ref 140–450)
PMV BLD AUTO: 10 FL (ref 6–12)
POTASSIUM SERPL-SCNC: 4.6 MMOL/L (ref 3.5–5.2)
RBC # BLD AUTO: 3.72 10*6/MM3 (ref 4.14–5.8)
SODIUM SERPL-SCNC: 140 MMOL/L (ref 136–145)
WBC NRBC COR # BLD AUTO: 9.61 10*3/MM3 (ref 3.4–10.8)

## 2024-02-15 PROCEDURE — C1758 CATHETER, URETERAL: HCPCS | Performed by: UROLOGY

## 2024-02-15 PROCEDURE — 0T778DZ DILATION OF LEFT URETER WITH INTRALUMINAL DEVICE, VIA NATURAL OR ARTIFICIAL OPENING ENDOSCOPIC: ICD-10-PCS | Performed by: UROLOGY

## 2024-02-15 PROCEDURE — C1769 GUIDE WIRE: HCPCS | Performed by: UROLOGY

## 2024-02-15 PROCEDURE — 93005 ELECTROCARDIOGRAM TRACING: CPT | Performed by: HOSPITALIST

## 2024-02-15 PROCEDURE — BT1FYZZ FLUOROSCOPY OF LEFT KIDNEY, URETER AND BLADDER USING OTHER CONTRAST: ICD-10-PCS | Performed by: UROLOGY

## 2024-02-15 PROCEDURE — 76000 FLUOROSCOPY <1 HR PHYS/QHP: CPT

## 2024-02-15 PROCEDURE — 76000 FLUOROSCOPY <1 HR PHYS/QHP: CPT | Performed by: UROLOGY

## 2024-02-15 PROCEDURE — 52332 CYSTOSCOPY AND TREATMENT: CPT | Performed by: UROLOGY

## 2024-02-15 PROCEDURE — 25010000002 PROPOFOL 10 MG/ML EMULSION: Performed by: ANESTHESIOLOGY

## 2024-02-15 PROCEDURE — 25010000002 GENTAMICIN PER 80 MG: Performed by: UROLOGY

## 2024-02-15 PROCEDURE — 99232 SBSQ HOSP IP/OBS MODERATE 35: CPT | Performed by: UROLOGY

## 2024-02-15 PROCEDURE — 0TP98DZ REMOVAL OF INTRALUMINAL DEVICE FROM URETER, VIA NATURAL OR ARTIFICIAL OPENING ENDOSCOPIC: ICD-10-PCS | Performed by: UROLOGY

## 2024-02-15 PROCEDURE — 25010000002 FLUCONAZOLE PER 200 MG: Performed by: UROLOGY

## 2024-02-15 PROCEDURE — 63710000001 INSULIN DETEMIR PER 5 UNITS: Performed by: UROLOGY

## 2024-02-15 PROCEDURE — 52351 CYSTOURETERO & OR PYELOSCOPE: CPT | Performed by: UROLOGY

## 2024-02-15 PROCEDURE — 80048 BASIC METABOLIC PNL TOTAL CA: CPT | Performed by: HOSPITALIST

## 2024-02-15 PROCEDURE — 82948 REAGENT STRIP/BLOOD GLUCOSE: CPT

## 2024-02-15 PROCEDURE — 25510000001: Performed by: UROLOGY

## 2024-02-15 PROCEDURE — 25810000003 SODIUM CHLORIDE 0.9 % SOLUTION: Performed by: NURSE PRACTITIONER

## 2024-02-15 PROCEDURE — 25010000002 ERTAPENEM PER 500 MG: Performed by: NURSE PRACTITIONER

## 2024-02-15 PROCEDURE — 99231 SBSQ HOSP IP/OBS SF/LOW 25: CPT | Performed by: PHYSICIAN ASSISTANT

## 2024-02-15 PROCEDURE — 87086 URINE CULTURE/COLONY COUNT: CPT | Performed by: UROLOGY

## 2024-02-15 PROCEDURE — C2617 STENT, NON-COR, TEM W/O DEL: HCPCS | Performed by: UROLOGY

## 2024-02-15 PROCEDURE — 85027 COMPLETE CBC AUTOMATED: CPT | Performed by: HOSPITALIST

## 2024-02-15 DEVICE — URETERAL STENT WITH SIDE HOLES 8FX24CM
Type: IMPLANTABLE DEVICE | Site: BLADDER | Status: FUNCTIONAL
Brand: TRIA™ FIRM

## 2024-02-15 RX ORDER — LIDOCAINE HYDROCHLORIDE 10 MG/ML
INJECTION, SOLUTION EPIDURAL; INFILTRATION; INTRACAUDAL; PERINEURAL AS NEEDED
Status: DISCONTINUED | OUTPATIENT
Start: 2024-02-15 | End: 2024-02-15 | Stop reason: SURG

## 2024-02-15 RX ORDER — HYDROMORPHONE HYDROCHLORIDE 1 MG/ML
0.5 INJECTION, SOLUTION INTRAMUSCULAR; INTRAVENOUS; SUBCUTANEOUS
Status: DISCONTINUED | OUTPATIENT
Start: 2024-02-15 | End: 2024-02-15 | Stop reason: HOSPADM

## 2024-02-15 RX ORDER — FLUCONAZOLE 2 MG/ML
200 INJECTION, SOLUTION INTRAVENOUS ONCE
Status: COMPLETED | OUTPATIENT
Start: 2024-02-15 | End: 2024-02-15

## 2024-02-15 RX ORDER — SODIUM CHLORIDE 0.9 % (FLUSH) 0.9 %
10 SYRINGE (ML) INJECTION AS NEEDED
Status: DISCONTINUED | OUTPATIENT
Start: 2024-02-15 | End: 2024-02-15 | Stop reason: HOSPADM

## 2024-02-15 RX ORDER — MAGNESIUM HYDROXIDE 1200 MG/15ML
LIQUID ORAL AS NEEDED
Status: DISCONTINUED | OUTPATIENT
Start: 2024-02-15 | End: 2024-02-15 | Stop reason: HOSPADM

## 2024-02-15 RX ORDER — PROPOFOL 10 MG/ML
VIAL (ML) INTRAVENOUS AS NEEDED
Status: DISCONTINUED | OUTPATIENT
Start: 2024-02-15 | End: 2024-02-15 | Stop reason: SURG

## 2024-02-15 RX ORDER — MIDAZOLAM HYDROCHLORIDE 1 MG/ML
0.5 INJECTION INTRAMUSCULAR; INTRAVENOUS
Status: DISCONTINUED | OUTPATIENT
Start: 2024-02-15 | End: 2024-02-15 | Stop reason: HOSPADM

## 2024-02-15 RX ORDER — SODIUM CHLORIDE 0.9 % (FLUSH) 0.9 %
10 SYRINGE (ML) INJECTION EVERY 12 HOURS SCHEDULED
Status: DISCONTINUED | OUTPATIENT
Start: 2024-02-15 | End: 2024-02-15 | Stop reason: HOSPADM

## 2024-02-15 RX ORDER — LIDOCAINE HYDROCHLORIDE 20 MG/ML
JELLY TOPICAL AS NEEDED
Status: DISCONTINUED | OUTPATIENT
Start: 2024-02-15 | End: 2024-02-15 | Stop reason: HOSPADM

## 2024-02-15 RX ORDER — FENTANYL CITRATE 50 UG/ML
50 INJECTION, SOLUTION INTRAMUSCULAR; INTRAVENOUS
Status: DISCONTINUED | OUTPATIENT
Start: 2024-02-15 | End: 2024-02-15 | Stop reason: HOSPADM

## 2024-02-15 RX ORDER — FAMOTIDINE 20 MG/1
20 TABLET, FILM COATED ORAL ONCE
Status: DISCONTINUED | OUTPATIENT
Start: 2024-02-15 | End: 2024-02-15 | Stop reason: HOSPADM

## 2024-02-15 RX ORDER — SODIUM CHLORIDE 9 MG/ML
40 INJECTION, SOLUTION INTRAVENOUS AS NEEDED
Status: DISCONTINUED | OUTPATIENT
Start: 2024-02-15 | End: 2024-02-15 | Stop reason: HOSPADM

## 2024-02-15 RX ORDER — FAMOTIDINE 10 MG/ML
20 INJECTION, SOLUTION INTRAVENOUS ONCE
Status: DISCONTINUED | OUTPATIENT
Start: 2024-02-15 | End: 2024-02-15 | Stop reason: HOSPADM

## 2024-02-15 RX ORDER — SODIUM CHLORIDE 9 MG/ML
INJECTION, SOLUTION INTRAVENOUS CONTINUOUS PRN
Status: DISCONTINUED | OUTPATIENT
Start: 2024-02-15 | End: 2024-02-15 | Stop reason: SURG

## 2024-02-15 RX ORDER — LIDOCAINE HYDROCHLORIDE 10 MG/ML
0.5 INJECTION, SOLUTION EPIDURAL; INFILTRATION; INTRACAUDAL; PERINEURAL ONCE AS NEEDED
Status: DISCONTINUED | OUTPATIENT
Start: 2024-02-15 | End: 2024-02-15 | Stop reason: HOSPADM

## 2024-02-15 RX ORDER — SODIUM CHLORIDE, SODIUM LACTATE, POTASSIUM CHLORIDE, CALCIUM CHLORIDE 600; 310; 30; 20 MG/100ML; MG/100ML; MG/100ML; MG/100ML
9 INJECTION, SOLUTION INTRAVENOUS CONTINUOUS
Status: DISCONTINUED | OUTPATIENT
Start: 2024-02-15 | End: 2024-02-17 | Stop reason: HOSPADM

## 2024-02-15 RX ADMIN — PROPOFOL 100 MG: 10 INJECTION, EMULSION INTRAVENOUS at 16:20

## 2024-02-15 RX ADMIN — FLUCONAZOLE 200 MG: 200 INJECTION, SOLUTION INTRAVENOUS at 17:23

## 2024-02-15 RX ADMIN — Medication 10 ML: at 20:09

## 2024-02-15 RX ADMIN — ALLOPURINOL 300 MG: 300 TABLET ORAL at 20:09

## 2024-02-15 RX ADMIN — ERTAPENEM 500 MG: 1 INJECTION INTRAMUSCULAR; INTRAVENOUS at 11:15

## 2024-02-15 RX ADMIN — LIDOCAINE HYDROCHLORIDE 100 MG: 10 INJECTION, SOLUTION EPIDURAL; INFILTRATION; INTRACAUDAL; PERINEURAL at 16:20

## 2024-02-15 RX ADMIN — PROPOFOL 150 MCG/KG/MIN: 10 INJECTION, EMULSION INTRAVENOUS at 16:21

## 2024-02-15 RX ADMIN — FINASTERIDE 5 MG: 5 TABLET, FILM COATED ORAL at 09:56

## 2024-02-15 RX ADMIN — INSULIN DETEMIR 10 UNITS: 100 INJECTION, SOLUTION SUBCUTANEOUS at 20:09

## 2024-02-15 RX ADMIN — SODIUM CHLORIDE: 900 INJECTION INTRAVENOUS at 16:14

## 2024-02-15 RX ADMIN — SODIUM CHLORIDE 75 ML/HR: 9 INJECTION, SOLUTION INTRAVENOUS at 08:16

## 2024-02-15 RX ADMIN — BISOPROLOL FUMARATE 5 MG: 5 TABLET, FILM COATED ORAL at 09:56

## 2024-02-15 RX ADMIN — LINAGLIPTIN 5 MG: 5 TABLET, FILM COATED ORAL at 09:55

## 2024-02-15 RX ADMIN — OXYBUTYNIN CHLORIDE 10 MG: 10 TABLET, EXTENDED RELEASE ORAL at 09:56

## 2024-02-15 NOTE — OP NOTE
CYSTOSCOPY URETERAL CATHETER/STENT INSERTION  Procedure Note    Forrest Zepeda  2/15/2024    Pre-op Diagnosis:   Left hydronephrosis and gross hematuria     Post-op Diagnosis:     Same    Procedure/CPT® Codes:      Procedure(s):  CYSTOSCOPY LEFT STENT EXCHANGE  RETROGRADE PYELOGRAM    Surgeon(s):  Deanne Pitts MD Teplitsky, Seth L, MD    Anesthesia: see anesthesia record    Staff:   Circulator: Tita Gonzalez RN  Scrub Person: Carlyle Burt    Estimated Blood Loss: minimal  Urine Voided: * No values recorded between 2/15/2024  4:15 PM and 2/15/2024  4:45 PM *    Specimens:                None      Drains: 8f x 24 cm JJ left TRIA ureteral stent    Findings:   Cystoscopy with no masses or lesions noted within the walls of the bladder  Left retrograde pyelogram shows consistent hydronephrosis  Successful left stent exchange    Blood: N/A    Complications: None  immediate    Indication for Procedure: Mr Zepeda is a 89-year-old gentleman who has a history of left ureteral stricture. He has been undergoing chronic stent exchanges. Most recently he was found to have a UTI with gross hematuria. He is here today for cystoscopy and stent exchange with retrograde pyelogram     Description of Procedure: The patient was seen and examined in the preoperative area.  The risks, benefits, and alternatives were explained to the patient and family.  Informed consent was obtained.  The patient was then taken back to the operating theater and placed on the table in a supine position.  Venodyne boots were placed on the bilateral lower extremities.  General anesthesia was induced without any complication.  They were then placed in the dorsal lithotomy position with all pressure points padded and secured.  The patient was prepped and draped in the usual sterile fashion and a timeout was taken.      A 22 Maori rigid cystoscope was advanced through the urethra and into the bladder.  A cystoscopy was performed in a systematic fashion  there were no masses or lesions noted within the walls of the bladder.  Attention was then turned to the left ureteral orifice and a 0.035 sensor wire was advanced alongside the existing stent into the left renal pelvis.  The existing stent was grasped and removed in its entirety.      A 5 Wallisian open-ended catheter was then advanced over the wire into the left renal pelvis and a retrograde pyelogram was performed.  There was consistent hydronephrosis without any filling defect.    The open-ended catheter was withdrawn and a 8 Wallisian by 24 cm Tria double-J ureteral stent was advanced over the wire into the left renal pelvis.  Once in position the wire was pulled and the stent was deployed.  Position was confirmed with direct visualization as well as fluoroscopy.  The bladder was drained and he was given lidocaine jelly.  The patient tolerated the procedure well and there were no complications to the procedure.  He was taken to PACU in stable next very condition.    Disposition: The patient will be transferred back to the floor once PACU criteria is met.  He is clear for discharge tomorrow from a urologic standpoint.  The intraoperative findings and postoperative plans were discussed with the patient and family.     Deanne Pitts MD     Date: 2/15/2024  Time: 16:45 EST

## 2024-02-15 NOTE — PROGRESS NOTES
[facility]   Urology progress note    Patient Name: Forrest Zepeda  : 1932  MRN: [id number]  Primary Care Physician:  Kary Wen MD  Date of admission: 2024    Subjective   Subjective     Chief Complaint: Gross hematuria/chronic ureteral stent    HPI:  Pt. Seen.  Chart reviewed.  NAEO.  AFVSS.   Voiding spont.  Urine clearing.  No clots.  No new complaints this AM.  Denies dysuria, feelings of incomplete bladder emptying/suprapubic pain, or flank pain.      Objective   Objective     Vitals:   Temp:  [96.6 °F (35.9 °C)-98.1 °F (36.7 °C)] 97.7 °F (36.5 °C)  Heart Rate:  [60-66] 60  Resp:  [16-18] 16  BP: (132-167)/(62-71) 140/71  Physical Exam    Constitutional: Awake in bed, alert    Eyes: PERRLA, sclerae anicteric, no conjunctival injection   HENT: Normocephalic, atraumatic, mucous membranes moist   Neck: Supple, trachea midline   Respiratory:Equal chest rise, non-labored respirations    Cardiovascular: RRR, palpable radial pulses bilaterally   Gastrointestinal: Soft, nontender, non-distended, no flank pain to palpation    Musculoskeletal: No bilateral ankle edema, no clubbing or cyanosis to extremities   Genitourinary: Uncircumcised phallus, orthotopic meatus, bilaterally descended testicles palpable without masses   Psychiatric: Appropriate affect, cooperative   Neurologic: Oriented x 3, Cranial Nerves grossly intact, speech clear   Skin: No rashes     Result Review    Result Review:  I have personally reviewed the results from the time of this admission to 2/15/2024 07:09 EST and agree with these findings:  [x]  Laboratory  [x]  Microbiology  []  Radiology  []  EKG/Telemetry   []  Cardiology/Vascular   []  Pathology  []  Old records  []  Other:    Most notable findings include:   WBC:  9.61  Hct:  11.4  Plts:  183  Cr:  1.41   Ucx:  No growth    Assessment & Plan   Assessment / Plan     Brief Patient Summary:  Forrest Zepeda is a 89 y.o. male with a history of left ureteral  stricture and chronic ureteral stent, last exchanged in June 2023, now admitted to the medicine service after several days of progressively worsening gross hematuria. His urine has continued to clear overnight. He is voiding appropriately w/o concerns retention.  H&H stable.  Ucx with no growth.      Active Hospital Problems:  Active Hospital Problems    Diagnosis     **Gross hematuria     Type 2 diabetes mellitus with hyperglycemia, with long-term current use of insulin     Paroxysmal atrial fibrillation     IPF (idiopathic pulmonary fibrosis)     CKD (chronic kidney disease) stage 3, GFR 30-59 ml/min     Hydronephrosis of left kidney      Plan:   -NPO  -OR this afternoon for stent exchange  -hold off on CBI as long as pt can still void  -continue flomax  -check PVR for incomplete bladder emptying  -continue to hold eliquis/asa for now  -continue abx for now given stent exchange today  -will follow  -d/w Dr. Pitts    DVT prophylaxis:  Medical DVT prophylaxis orders are present.        CODE STATUS:    Medical Intervention Limits: NO intubation (DNI)  Level Of Support Discussed With: Patient  Code Status (Patient has no pulse and is not breathing): No CPR (Do Not Attempt to Resuscitate)  Medical Interventions (Patient has pulse or is breathing): Limited Support    Dispo:  Per primary team.    Electronically signed by Herson Cuevas PA-C, 02/15/24, 7:09 AM EST.

## 2024-02-15 NOTE — CASE MANAGEMENT/SOCIAL WORK
Continued Stay Note  [facility]     Patient Name: Forrest Zepeda  MRN: [id number]  Today's Date: 2/15/2024    Admit Date: 2/13/2024    Plan: Home with family   Discharge Plan       Row Name 02/15/24 0824       Plan    Plan Home with family    Patient/Family in Agreement with Plan yes    Plan Comments Spoke to patient and son at bedside. IMM given today. Plan is home with son at discharge. CM will continue to follow.    Final Discharge Disposition Code 01 - home or self-care                   Discharge Codes    No documentation.                 Expected Discharge Date and Time       Expected Discharge Date Expected Discharge Time    Feb 15, 2024               Richard Garcia RN

## 2024-02-15 NOTE — PROGRESS NOTES
[facility] Medicine Services  PROGRESS NOTE    Patient Name: Forrest Zepeda  : 1932  MRN: [id number]    Date of Admission: 2024  Primary Care Physician: Kary Wen MD    Subjective     CC: f/u hematuria     HPI: Sitting in chair. Son in room and RN placing IV. He is feeling well. Denies further hematuria. Cystoscopy with stent exchange planned for this afternoon.     Objective     Vital Signs:   Temp:  [97 °F (36.1 °C)-98.1 °F (36.7 °C)] 97.7 °F (36.5 °C)  Heart Rate:  [60-66] 60  Resp:  [16-18] 18  BP: (134-167)/(62-71) 144/67     Physical Exam:  Constitutional: No acute distress, awake, alert and conversational. Sitting in chair   HENT: NCAT, mucous membranes moist  Respiratory: Clear to auscultation bilaterally, respiratory effort normal   Cardiovascular: RRR, no murmurs, rubs, or gallops  Gastrointestinal: Positive bowel sounds, soft, nontender, nondistended  : Deferred   Musculoskeletal: No bilateral ankle edema  Psychiatric: Appropriate affect, cooperative  Neurologic: Oriented x 3, moves all extremities spontaneously without focal deficits, speech clear    Results Reviewed:  LAB RESULTS:      Lab 02/15/24  0416 02/14/24  0347 02/13/24  1621 24  1159   WBC 9.61 9.35  --  12.77*   HEMOGLOBIN 11.4* 11.8*  --  13.3   HEMATOCRIT 36.0* 36.2*  --  41.8   PLATELETS 183 187  --  221   NEUTROS ABS  --  4.93  --  7.60*   IMMATURE GRANS (ABS)  --  0.04  --  0.08*   LYMPHS ABS  --  3.04  --  3.64*   MONOS ABS  --  0.74  --  0.83   EOS ABS  --  0.50*  --  0.50*   MCV 96.8 98.4*  --  100.2*   LACTATE  --  0.9 1.9 3.7*         Lab 02/15/24  0416 24  0347 24  1159   SODIUM 140 141 136   POTASSIUM 4.6 5.0 4.9   CHLORIDE 112* 109* 102   CO2 18.0* 20.0* 23.0   ANION GAP 10.0 12.0 11.0   BUN 35* 37* 45*   CREATININE 1.41* 1.51* 2.21*   EGFR 47.0* 43.3* 27.4*   GLUCOSE 128* 82 184*   CALCIUM 8.8 9.0 9.6         Lab 24  1159   TOTAL PROTEIN 8.0   ALBUMIN  4.0   GLOBULIN 4.0   ALT (SGPT) 9   AST (SGOT) 25   BILIRUBIN 0.3   ALK PHOS 106     Brief Urine Lab Results  (Last result in the past 365 days)        Color   Clarity   Blood   Leuk Est   Nitrite   Protein   CREAT   Urine HCG        02/13/24 1150 Red   Turbid   Large (3+)   Large (3+)   Negative   >=300 mg/dL (3+)                 Microbiology Results Abnormal       Procedure Component Value - Date/Time    Urine Culture - Urine, Urine, Clean Catch [[id number]]  (Normal) Collected: 02/13/24 1150    Lab Status: Final result Specimen: Urine, Clean Catch Updated: 02/14/24 2233     Urine Culture No growth          No radiology results from the last 24 hrs    Results for orders placed in visit on 11/13/23    Adult Transthoracic Echo Complete W/ Cont if Necessary Per Protocol    Interpretation Summary    Left ventricular systolic function is normal. Estimated left ventricular EF = 52% Left ventricular ejection fraction appears to be 51 - 55%.    Left ventricular wall thickness is consistent with mild concentric hypertrophy.    Left ventricular diastolic function is consistent with (grade I) impaired relaxation.    The right ventricular cavity is mildly dilated.    The left atrial cavity is mildly dilated.    Left atrial volume is moderately increased.    There is mild calcification of the aortic valve mainly affecting the non-coronary, left coronary and right coronary cusp(s).    Estimated right ventricular systolic pressure from tricuspid regurgitation is normal (<35 mmHg).    Current medications:  Scheduled Meds:allopurinol, 300 mg, Oral, Nightly  [Held by provider] apixaban, 2.5 mg, Oral, BID  [Held by provider] aspirin, 81 mg, Oral, Daily  bisoprolol, 5 mg, Oral, Daily  docusate sodium, 100 mg, Oral, Daily  ertapenem, 500 mg, Intravenous, Q24H  finasteride, 5 mg, Oral, Daily  insulin detemir, 10 Units, Subcutaneous, Nightly  insulin lispro, 2-9 Units, Subcutaneous, 4x Daily AC & at Bedtime  levothyroxine, 50 mcg, Oral,  Q AM  linagliptin, 5 mg, Oral, Daily  oxybutynin XL, 10 mg, Oral, Daily  sodium chloride, 500 mL, Intravenous, Once  sodium chloride, 10 mL, Intravenous, Q12H  tamsulosin, 0.4 mg, Oral, Nightly      Continuous Infusions:sodium chloride, 75 mL/hr, Last Rate: 75 mL/hr (02/15/24 0816)      PRN Meds:.  senna-docusate sodium **AND** polyethylene glycol **AND** bisacodyl **AND** bisacodyl    dextrose    dextrose    Diclofenac Sodium    glucagon (human recombinant)    HYDROcodone-acetaminophen    nitroglycerin    sodium chloride    sodium chloride    temazepam    Assessment & Plan     Active Hospital Problems    Diagnosis  POA    **Gross hematuria [R31.0]  Yes    Hematuria [R31.9]  Yes    Type 2 diabetes mellitus with hyperglycemia, with long-term current use of insulin [E11.65, Z79.4]  Not Applicable    Paroxysmal atrial fibrillation [I48.0]  Yes    IPF (idiopathic pulmonary fibrosis) [J84.112]  Yes    CKD (chronic kidney disease) stage 3, GFR 30-59 ml/min [N18.30]  Yes    Hydronephrosis of left kidney [N13.30]  Yes      Resolved Hospital Problems   No resolved problems to display.     Brief Hospital Course to date:  Mr. Forrest Zepeda is a 89yoM with chronic indwelling ureteral stent (due for exchange), CKD III, and IPF who presented to [facility] ED on 2/13/24 for evaluation of gross hematuria / clotting over the past week.     L ureteral stricture with L stent, with chronic hydronephrosis  Hematuria  - Holding home ASA / Eliquis  - Urine culture NGTD - on IV Invanz. Will continue for now   - Urology following - cystoscopy with stent exchange planned for this afternoon    Anemia  - Secondary to hematuria. Hgb stable    OCTAVIA on CKD III  - Baseline creatinine 1.5-1.8  - Creatinine 2.1 on admission  - s/p IV fluids with resolution - creatinine 1.4 today     DMII  - A1c 7.7%  - Good control on Levemir 10 units QHS + SSI    PAF  - Holding Eliquis / ASA     Hx of IPF  - Uses O2 PRN     Expected Discharge Location and  Transportation: Home  Expected Discharge Expected Discharge Date: 2/16/2024; Expected Discharge Time:      DVT prophylaxis: Medical DVT prophylaxis orders are present.    AM-PAC 6 Clicks Score (PT): 23 (02/15/24 0800)    CODE STATUS:   Code Status and Medical Interventions:   Ordered at: 02/13/24 1657     Medical Intervention Limits:    NO intubation (DNI)     Level Of Support Discussed With:    Patient     Code Status (Patient has no pulse and is not breathing):    No CPR (Do Not Attempt to Resuscitate)     Medical Interventions (Patient has pulse or is breathing):    Limited Support     Le Lund PA-C  02/15/24

## 2024-02-16 LAB
ANION GAP SERPL CALCULATED.3IONS-SCNC: 9 MMOL/L (ref 5–15)
BUN SERPL-MCNC: 33 MG/DL (ref 8–23)
BUN/CREAT SERPL: 22.9 (ref 7–25)
CALCIUM SPEC-SCNC: 8.8 MG/DL (ref 8.2–9.6)
CHLORIDE SERPL-SCNC: 114 MMOL/L (ref 98–107)
CO2 SERPL-SCNC: 19 MMOL/L (ref 22–29)
CREAT SERPL-MCNC: 1.44 MG/DL (ref 0.76–1.27)
DEPRECATED RDW RBC AUTO: 59.2 FL (ref 37–54)
EGFRCR SERPLBLD CKD-EPI 2021: 45.9 ML/MIN/1.73
ERYTHROCYTE [DISTWIDTH] IN BLOOD BY AUTOMATED COUNT: 16.4 % (ref 12.3–15.4)
GLUCOSE BLDC GLUCOMTR-MCNC: 136 MG/DL (ref 70–130)
GLUCOSE BLDC GLUCOMTR-MCNC: 144 MG/DL (ref 70–130)
GLUCOSE BLDC GLUCOMTR-MCNC: 144 MG/DL (ref 70–130)
GLUCOSE BLDC GLUCOMTR-MCNC: 147 MG/DL (ref 70–130)
GLUCOSE SERPL-MCNC: 148 MG/DL (ref 65–99)
HCT VFR BLD AUTO: 36.7 % (ref 37.5–51)
HGB BLD-MCNC: 11.4 G/DL (ref 13–17.7)
MCH RBC QN AUTO: 31 PG (ref 26.6–33)
MCHC RBC AUTO-ENTMCNC: 31.1 G/DL (ref 31.5–35.7)
MCV RBC AUTO: 99.7 FL (ref 79–97)
PLATELET # BLD AUTO: 189 10*3/MM3 (ref 140–450)
PMV BLD AUTO: 9.6 FL (ref 6–12)
POTASSIUM SERPL-SCNC: 4.4 MMOL/L (ref 3.5–5.2)
RBC # BLD AUTO: 3.68 10*6/MM3 (ref 4.14–5.8)
SODIUM SERPL-SCNC: 142 MMOL/L (ref 136–145)
WBC NRBC COR # BLD AUTO: 9.6 10*3/MM3 (ref 3.4–10.8)

## 2024-02-16 PROCEDURE — 25010000002 ERTAPENEM PER 500 MG: Performed by: NURSE PRACTITIONER

## 2024-02-16 PROCEDURE — 99232 SBSQ HOSP IP/OBS MODERATE 35: CPT | Performed by: INTERNAL MEDICINE

## 2024-02-16 PROCEDURE — 82948 REAGENT STRIP/BLOOD GLUCOSE: CPT

## 2024-02-16 PROCEDURE — 80048 BASIC METABOLIC PNL TOTAL CA: CPT | Performed by: UROLOGY

## 2024-02-16 PROCEDURE — 99232 SBSQ HOSP IP/OBS MODERATE 35: CPT | Performed by: UROLOGY

## 2024-02-16 PROCEDURE — 63710000001 INSULIN DETEMIR PER 5 UNITS: Performed by: UROLOGY

## 2024-02-16 PROCEDURE — 97530 THERAPEUTIC ACTIVITIES: CPT

## 2024-02-16 PROCEDURE — 85027 COMPLETE CBC AUTOMATED: CPT | Performed by: UROLOGY

## 2024-02-16 RX ADMIN — Medication 10 ML: at 20:49

## 2024-02-16 RX ADMIN — FINASTERIDE 5 MG: 5 TABLET, FILM COATED ORAL at 07:32

## 2024-02-16 RX ADMIN — INSULIN DETEMIR 10 UNITS: 100 INJECTION, SOLUTION SUBCUTANEOUS at 20:49

## 2024-02-16 RX ADMIN — APIXABAN 2.5 MG: 2.5 TABLET, FILM COATED ORAL at 11:12

## 2024-02-16 RX ADMIN — ERTAPENEM 500 MG: 1 INJECTION INTRAMUSCULAR; INTRAVENOUS at 11:12

## 2024-02-16 RX ADMIN — OXYBUTYNIN CHLORIDE 10 MG: 10 TABLET, EXTENDED RELEASE ORAL at 07:32

## 2024-02-16 RX ADMIN — LINAGLIPTIN 5 MG: 5 TABLET, FILM COATED ORAL at 07:32

## 2024-02-16 RX ADMIN — ALLOPURINOL 300 MG: 300 TABLET ORAL at 20:49

## 2024-02-16 RX ADMIN — BISOPROLOL FUMARATE 5 MG: 5 TABLET, FILM COATED ORAL at 07:33

## 2024-02-16 RX ADMIN — LEVOTHYROXINE SODIUM 50 MCG: 0.05 TABLET ORAL at 05:45

## 2024-02-16 RX ADMIN — APIXABAN 2.5 MG: 2.5 TABLET, FILM COATED ORAL at 20:49

## 2024-02-16 RX ADMIN — TAMSULOSIN HYDROCHLORIDE 0.4 MG: 0.4 CAPSULE ORAL at 20:49

## 2024-02-16 NOTE — CASE MANAGEMENT/SOCIAL WORK
Continued Stay Note   [facility]     Patient Name: Forrest Zepeda  MRN: [id number]  Today's Date: 2/16/2024    Admit Date: 2/13/2024    Plan: home with family   Discharge Plan       Row Name 02/16/24 1403       Plan    Plan home with family    Patient/Family in Agreement with Plan yes    Plan Comments I spoke with the patient at the bedside. Patient still planning to return home. Patient denied needs for medical equipment at home. Patient stated that his granddaughter works for a home health agency and she will be looking after him at discharge. Patient declined for me to set up home health. Patient stated that his son will transport him home when medically ready. CM following.    Final Discharge Disposition Code 01 - home or self-care                   Discharge Codes    No documentation.                 Expected Discharge Date and Time       Expected Discharge Date Expected Discharge Time    Feb 17, 2024               Kacey Dumont RN

## 2024-02-16 NOTE — PROGRESS NOTES
[facility] Medicine Services  PROGRESS NOTE    Patient Name: Forrest Zepeda  : 1932  MRN: [id number]    Date of Admission: 2024  Primary Care Physician: Kary Wen MD    Subjective   Subjective     CC: Hematuria    HPI: hematuria resolved.  No fevers       Objective   Objective     Vital Signs:   Temp:  [97.4 °F (36.3 °C)-97.9 °F (36.6 °C)] 97.8 °F (36.6 °C)  Heart Rate:  [60-62] 61  Resp:  [14-18] 18  BP: (108-157)/(57-79) 152/79  Flow (L/min):  [2-3] 2     Physical Exam:  NAD, in bed  MM moist  RRR  CTAB  Abd soft, NT  No LE edema  Slightly flat affect  Awake, speech clear    Results Reviewed:  LAB RESULTS:      Lab 02/16/24  0402 02/15/24  0416 02/14/24  0347 02/13/24  1621 24  1159   WBC 9.60 9.61 9.35  --  12.77*   HEMOGLOBIN 11.4* 11.4* 11.8*  --  13.3   HEMATOCRIT 36.7* 36.0* 36.2*  --  41.8   PLATELETS 189 183 187  --  221   NEUTROS ABS  --   --  4.93  --  7.60*   IMMATURE GRANS (ABS)  --   --  0.04  --  0.08*   LYMPHS ABS  --   --  3.04  --  3.64*   MONOS ABS  --   --  0.74  --  0.83   EOS ABS  --   --  0.50*  --  0.50*   MCV 99.7* 96.8 98.4*  --  100.2*   LACTATE  --   --  0.9 1.9 3.7*         Lab 24  0402 02/15/24  04124  03424  1159   SODIUM 142 140 141 136   POTASSIUM 4.4 4.6 5.0 4.9   CHLORIDE 114* 112* 109* 102   CO2 19.0* 18.0* 20.0* 23.0   ANION GAP 9.0 10.0 12.0 11.0   BUN 33* 35* 37* 45*   CREATININE 1.44* 1.41* 1.51* 2.21*   EGFR 45.9* 47.0* 43.3* 27.4*   GLUCOSE 148* 128* 82 184*   CALCIUM 8.8 8.8 9.0 9.6         Lab 24  1159   TOTAL PROTEIN 8.0   ALBUMIN 4.0   GLOBULIN 4.0   ALT (SGPT) 9   AST (SGOT) 25   BILIRUBIN 0.3   ALK PHOS 106                     Brief Urine Lab Results  (Last result in the past 365 days)        Color   Clarity   Blood   Leuk Est   Nitrite   Protein   CREAT   Urine HCG        24 1150 Red   Turbid   Large (3+)   Large (3+)   Negative   >=300 mg/dL (3+)                   Microbiology  Results Abnormal       Procedure Component Value - Date/Time    Urine Culture - Urine, Urine, Catheter [[id number]]  (Normal) Collected: 02/15/24 1646    Lab Status: Preliminary result Specimen: Urine, Catheter Updated: 02/16/24 1316     Urine Culture No growth    Urine Culture - Urine, Urine, Clean Catch [[id number]]  (Normal) Collected: 02/13/24 1150    Lab Status: Final result Specimen: Urine, Clean Catch Updated: 02/14/24 2238     Urine Culture No growth            FL C Arm During Surgery    Result Date: 2/15/2024  This procedure was auto-finalized with no dictation required.     Results for orders placed in visit on 11/13/23    Adult Transthoracic Echo Complete W/ Cont if Necessary Per Protocol    Interpretation Summary    Left ventricular systolic function is normal. Estimated left ventricular EF = 52% Left ventricular ejection fraction appears to be 51 - 55%.    Left ventricular wall thickness is consistent with mild concentric hypertrophy.    Left ventricular diastolic function is consistent with (grade I) impaired relaxation.    The right ventricular cavity is mildly dilated.    The left atrial cavity is mildly dilated.    Left atrial volume is moderately increased.    There is mild calcification of the aortic valve mainly affecting the non-coronary, left coronary and right coronary cusp(s).    Estimated right ventricular systolic pressure from tricuspid regurgitation is normal (<35 mmHg).      Current medications:  Scheduled Meds:allopurinol, 300 mg, Oral, Nightly  apixaban, 2.5 mg, Oral, BID  aspirin, 81 mg, Oral, Daily  bisoprolol, 5 mg, Oral, Daily  docusate sodium, 100 mg, Oral, Daily  ertapenem, 500 mg, Intravenous, Q24H  finasteride, 5 mg, Oral, Daily  insulin detemir, 10 Units, Subcutaneous, Nightly  insulin lispro, 2-9 Units, Subcutaneous, 4x Daily AC & at Bedtime  levothyroxine, 50 mcg, Oral, Q AM  linagliptin, 5 mg, Oral, Daily  oxybutynin XL, 10 mg, Oral, Daily  sodium chloride, 500 mL,  Intravenous, Once  sodium chloride, 10 mL, Intravenous, Q12H  tamsulosin, 0.4 mg, Oral, Nightly      Continuous Infusions:lactated ringers, 9 mL/hr  sodium chloride, 75 mL/hr, Last Rate: 75 mL/hr (02/15/24 0816)      PRN Meds:.  senna-docusate sodium **AND** polyethylene glycol **AND** bisacodyl **AND** bisacodyl    dextrose    dextrose    Diclofenac Sodium    glucagon (human recombinant)    HYDROcodone-acetaminophen    nitroglycerin    sodium chloride    sodium chloride    temazepam    Assessment & Plan   Assessment & Plan     Active Hospital Problems    Diagnosis  POA    **Gross hematuria [R31.0]  Yes    Hematuria [R31.9]  Yes    Type 2 diabetes mellitus with hyperglycemia, with long-term current use of insulin [E11.65, Z79.4]  Not Applicable    Paroxysmal atrial fibrillation [I48.0]  Yes    IPF (idiopathic pulmonary fibrosis) [J84.112]  Yes    CKD (chronic kidney disease) stage 3, GFR 30-59 ml/min [N18.30]  Yes    Hydronephrosis of left kidney [N13.30]  Yes      Resolved Hospital Problems   No resolved problems to display.        Brief Hospital Course to date:    L ureteral stricture with L stent, with chronic hydronephrosis  Hematuria  Possible UTI  -left ureteral stent exchange 2/15  -no further hematuria  -resume eliquis today and monitor tonight for re-bleeding  -continue Invanz -- h/o ESBL E coli UTIs.  Initial urine culture no growth.  Repeat culture from stent exchange pending.  Would prefer to at least complete 5 days of invanz (day 5 tomorrow) prior to discharge. Discussed with ID and with patient and family.    Anemia  -due to hematuria  - hemoblobin 11.4    OCTAVIA/CKD  -creatinine improved from admission and stable today at 1.4  - lower IV Fluid rate    LA  -resolved    PAF  -resume eliquis today, asa in am    Hx of IPF  -on oxygen prn    Expected Discharge Location and Transportation: Home  Expected Discharge   Expected Discharge Date: 2/17/2024; Expected Discharge Time:      DVT prophylaxis:  Medical  DVT prophylaxis orders are present.         AM-PAC 6 Clicks Score (PT): 17 (02/16/24 1385)    CODE STATUS:   Code Status and Medical Interventions:   Ordered at: 02/13/24 7528     Medical Intervention Limits:    NO intubation (DNI)     Level Of Support Discussed With:    Patient     Code Status (Patient has no pulse and is not breathing):    No CPR (Do Not Attempt to Resuscitate)     Medical Interventions (Patient has pulse or is breathing):    Limited Support       Kobe Denney MD  02/16/24

## 2024-02-16 NOTE — PLAN OF CARE
Problem: Adult Inpatient Plan of Care  Goal: Plan of Care Review  Outcome: Ongoing, Progressing  Flowsheets (Taken 2/16/2024 1818)  Plan of Care Reviewed With: patient  Goal: Patient-Specific Goal (Individualized)  Outcome: Ongoing, Progressing  Goal: Absence of Hospital-Acquired Illness or Injury  Outcome: Ongoing, Progressing  Intervention: Identify and Manage Fall Risk  Description: Perform standard risk assessment on admission using a validated tool or comprehensive approach appropriate to the patient; reassess fall risk frequently, with change in status or transfer to another level of care.  Communicate fall injury risk to interprofessional healthcare team.  Determine need for increased observation, equipment and environmental modification, such as low bed, signage and supportive, nonskid footwear.  Adjust safety measures to individual developmental age, stage and identified risk factors.  Reinforce the importance of safety and physical activity with patient and family.  Perform regular intentional rounding to assess need for position change, pain assessment and personal needs, including assistance with toileting.  Recent Flowsheet Documentation  Taken 2/16/2024 0800 by Brandy Handley RN  Safety Promotion/Fall Prevention: nonskid shoes/slippers when out of bed  Goal: Optimal Comfort and Wellbeing  Outcome: Ongoing, Progressing  Goal: Readiness for Transition of Care  Outcome: Ongoing, Progressing     Problem: Fall Injury Risk  Goal: Absence of Fall and Fall-Related Injury  Outcome: Ongoing, Progressing  Intervention: Promote Injury-Free Environment  Description: Provide a safe, barrier-free environment that encourages independent activity.  Keep care area uncluttered and well-lighted.  Determine need for increased observation or monitoring.  Avoid use of devices that minimize mobility, such as restraints or indwelling urinary catheter.  Recent Flowsheet Documentation  Taken 2/16/2024 0800 by Emmanuelle  Brandy MATA RN  Safety Promotion/Fall Prevention: nonskid shoes/slippers when out of bed   Goal Outcome Evaluation:  Plan of Care Reviewed With: patient

## 2024-02-16 NOTE — PLAN OF CARE
Goal Outcome Evaluation:  Plan of Care Reviewed With: patient        Progress: no change  Outcome Evaluation: Pt continues to present with decreased functional mobility and decreased balance with activity. Pt ambulated 75ft with CGA-min A and SPC for support. Balance worsened with fatigue and multiple LOB noted. Pt may benefit from rehab if discharging home alone.      Anticipated Discharge Disposition (PT): inpatient rehabilitation facility (vs home with initial 24/7 assist and HHPT)

## 2024-02-16 NOTE — THERAPY TREATMENT NOTE
Patient Name: Forrest Zepeda  : 1932    MRN: [id number]                              Today's Date: 2024       Admit Date: 2024    Visit Dx:     ICD-10-CM ICD-9-CM   1. Gross hematuria  R31.0 599.71   2. Anticoagulated  Z79.01 V58.61   3. Acute kidney injury  N17.9 584.9   4. Ureteral stent present  Z96.0 V45.89   5. Hematuria  R31.9 599.70     Patient Active Problem List   Diagnosis    Essential hypertension    Generalized osteoarthritis    Diabetic neuropathy    Gout    HLD (hyperlipidemia)    Acquired hypothyroidism    Ureteral stricture    Hydronephrosis of left kidney    CKD (chronic kidney disease) stage 3, GFR 30-59 ml/min    LEHMAN (dyspnea on exertion)    IPF (idiopathic pulmonary fibrosis)    Symptomatic bradycardia    Hypoxemia requiring supplemental oxygen    History of pulmonary embolism    Sinus node dysfunction    Chronic fatigue    Urethral stricture    Paroxysmal atrial fibrillation    Type 2 diabetes mellitus with hyperglycemia, with long-term current use of insulin    Abnormal cardiovascular stress test    Pulmonary hypertension    Cardiac pacemaker in situ    CAD (coronary artery disease)    Diastolic congestive heart failure    Abnormal SPEP    Type 2 diabetes mellitus with stage 3b chronic kidney disease, with long-term current use of insulin    Hydronephrosis, left    Gross hematuria    Chronic heart failure with preserved ejection fraction (HFpEF)    Personal history of other infectious and parasitic diseases    Weakness    Vasovagal syncope    Hematuria     Past Medical History:   Diagnosis Date    Abnormal EKG     Abnormal PSA     Reported abnormal    Acute cystitis with hematuria 2023    Acute deep vein thrombosis (DVT) of right lower extremity 2019    Acute diverticulitis 2019    Acute hyperkalemia 2019    Acute respiratory failure with hypoxia     Acute saddle pulmonary embolism with acute cor pulmonale 2019    Acute systolic right heart failure  "08/22/2019    Acute UTI (urinary tract infection)     Arthritis     KNEES,  BUT SINCE HAD REPLACEMENT    Benign localized hyperplasia of prostate     Bilateral knee pain     C. difficile diarrhea 2010    Cataracts, bilateral     CKD (chronic kidney disease)     Coronary artery disease     Diabetic neuropathy     Diastolic dysfunction     Disease of thyroid gland     HYPOTHYROIDISM    Diverticulitis     Dyslipidemia     H/O    Family history of malignant hyperthermia     Brother     Full dentures     Generalized weakness     History of ESBL E. coli infection     most recent 4-2022 f/u with ID Dr Johnson    History of gout     History of hepatitis A vaccination     History of nephrolithiasis     History of nuclear stress test     STATES IN THE 1990'S.  \"IT WAS OK\"    History of osteopenia     History of recurrent UTI (urinary tract infection)     Hydronephrosis of left kidney 07/18/2019    Hydronephrosis with renal and ureteral calculus obstruction 10/21/2019    Added automatically from request for surgery [id number]    Hydronephrosis with ureteral stricture     Hypercholesterolemia     Hyperkalemia     PT UNSURE REGARDING HX OF THIS    Hyperlipidemia     Hypertension     Hypothyroidism     Impaired functional mobility, balance, gait, and endurance     Insomnia     IPF (idiopathic pulmonary fibrosis)     per patient and son, dx at [facility], was told no treatment necessary, not to worry about it    On supplemental oxygen therapy     2L NC QHS    Other hydronephrosis 08/12/2019    Added automatically from request for surgery [id number]    Paroxysmal atrial fibrillation     Pulmonary hypertension, mild 02/2021    per echo per cardiology note 1/9/23, per pt's son, PCP does not agree with this dx    Renal insufficiency     Sepsis 2019    Shortness of breath     STATES WITH EXERTION, OTHERWISE, DENIES    Sigmoid diverticulitis without abscess or perforation 08/09/2017    Sinus node dysfunction     Skin breakdown     fourth toe " right foot noted in 2019 MD D/C note    Skin ulcer of fourth toe of right foot, limited to breakdown of skin 07/05/2019    Stricture of ureter     Type 2 diabetes mellitus     Ureteral stricture, left     UTI (urinary tract infection) 07/18/2019    Vitamin D deficiency     Wears glasses      Past Surgical History:   Procedure Laterality Date    APPENDECTOMY      CARDIAC ELECTROPHYSIOLOGY PROCEDURE N/A 12/23/2020    Procedure: Pacemaker DC new. DNS meds.;  Surgeon: Jimy Ledezma DO;  Location:  [facility];  Service: Cardiology;  Laterality: N/A;    CHOLECYSTECTOMY      CIRCUMCISION N/A 10/23/2019    Procedure: CIRCUMCISIOn-DORSAL SLIT;  Surgeon: Griffin Romero MD;  Location:  [facility];  Service: Urology    COLONOSCOPY      CYSTOSCOPY RETROGRADE PYELOGRAM Left 06/17/2022    Procedure: CYSTOSCOPY RETROGRADE PYELOGRAM;  Surgeon: Ryne Mccann MD;  Location:  [facility];  Service: Urology;  Laterality: Left;    CYSTOSCOPY URETEROSCOPY Left 02/11/2019    Procedure: URETEROSCOPY WITH STENT EXTRACTION, RPG;  Surgeon: Griffin Romero MD;  Location:  [facility];  Service: Urology    CYSTOSCOPY W/ URETERAL STENT PLACEMENT Left 07/20/2019    Procedure: CYSTOSCOPY, LEFT RETROGRADE PYELOGRAM,  ATTEMPTED LEFT URETERAL STENT INSERTION;  Surgeon: Isaac Flynn MD;  Location:  [facility];  Service: Urology    CYSTOSCOPY W/ URETERAL STENT PLACEMENT Left 06/17/2022    Procedure: CYSTOSCOPY URETERAL CATHETER/STENT INSERTION;  Surgeon: Ryne Mccann MD;  Location:  [facility];  Service: Urology;  Laterality: Left;    CYSTOSCOPY W/ URETERAL STENT PLACEMENT Left 01/27/2023    Procedure: CYSTOSCOPY URETERAL CATHETER/STENT INSERTION;  Surgeon: Deanne Pitts MD;  Location:  [facility];  Service: Urology;  Laterality: Left;    CYSTOSCOPY W/ URETERAL STENT PLACEMENT Left 2/15/2024    Procedure: CYSTOSCOPY LEFT STENT EXCHANGE;  Surgeon: Deanne Pitts MD;  Location:  [facility];  Service: Urology;  Laterality: Left;     CYSTOSCOPY, URETEROSCOPY, RETROGRADE PYELOGRAM, STONE EXTRACTION, STENT INSERTION Left 06/15/2023    Procedure: URETEROSCOPY, RETROGRADE PYELOGRAM, STENT INSERTION;  Surgeon: Deanne Pitts MD;  [facility];  Service: Urology;  Laterality: Left;    EYE SURGERY      CATARACTS REMOVED BILATERALLY    JOINT REPLACEMENT      Bilateral knees    KNEE ARTHROPLASTY Bilateral     KNEE ARTHROSCOPY Bilateral     RENAL ARTERY STENT      SEVERAL TIMES EVERY SINCE 1978    URETERAL STENT INSERTION  10/2020    URETEROSCOPY LASER LITHOTRIPSY WITH STENT INSERTION Left 01/10/2018    Procedure:  cysto, left ureteral stent replacement ;  Surgeon: Griffin Romero MD;  Location: [facility] OR;  Service:     URETEROSCOPY LASER LITHOTRIPSY WITH STENT INSERTION Left 08/14/2019    Procedure: URETEROSCOPY, STONE REMOVAL WITH STENT INSERTION;  Surgeon: Griffin Romero MD;  Location: [facility] OR;  Service: Urology    URETEROSCOPY LASER LITHOTRIPSY WITH STENT INSERTION Left 10/23/2019    Procedure: Left URETEROSCOPY WITH STENT INSERTION-LEFT;  Surgeon: Griffin Romero MD;  Location: [facility];  Service: Urology      General Information       Row Name 02/16/24 3590          Physical Therapy Time and Intention    Document Type therapy note (daily note)  -AE     Mode of Treatment physical therapy  -AE       Row Name 02/16/24 133          General Information    Patient Profile Reviewed yes  -AE     Existing Precautions/Restrictions fall  -AE     Barriers to Rehab medically complex;previous functional deficit  -AE       Row Name 02/16/24 133          Cognition    Orientation Status (Cognition) oriented x 4  -AE       Row Name 02/16/24 1339          Safety Issues, Functional Mobility    Safety Issues Affecting Function (Mobility) awareness of need for assistance;insight into deficits/self-awareness;safety precaution awareness;sequencing abilities  -AE     Impairments Affecting Function (Mobility) balance;endurance/activity  tolerance;strength;coordination  -AE               User Key  (r) = Recorded By, (t) = Taken By, (c) = Cosigned By      Initials Name Provider Type    AE Demar Dozier PT Physical Therapist                   Mobility       Row Name 02/16/24 1337          Bed Mobility    Bed Mobility supine-sit  -AE     Supine-Sit Kimble (Bed Mobility) standby assist  -AE     Assistive Device (Bed Mobility) head of bed elevated  -AE     Comment, (Bed Mobility) VCs for hand placement and sequencing. Pt required increased time to complete bed mobility.  -AE       Row Name 02/16/24 1337          Transfers    Comment, (Transfers) VCs for hand placement and sequencing. Pt demo moderate unsteadiness with STS without AD. Minimal improvement in balance despite use of SPC.  -AE       Row Name 02/16/24 1337          Sit-Stand Transfer    Sit-Stand Kimble (Transfers) contact guard;1 person assist;verbal cues  -AE     Assistive Device (Sit-Stand Transfers) cane, straight  -AE       Row Name 02/16/24 1337          Gait/Stairs (Locomotion)    Kimble Level (Gait) contact guard;minimum assist (75% patient effort);1 person assist;verbal cues  -AE     Assistive Device (Gait) cane, straight  -AE     Distance in Feet (Gait) 75  -AE     Deviations/Abnormal Patterns (Gait) melvin decreased;gait speed decreased;stride length decreased;bilateral deviations  -AE     Bilateral Gait Deviations forward flexed posture;heel strike decreased  -AE     Comment, (Gait/Stairs) Pt demo step through gait pattern with slowed melvin and forward flexed posture. Flexed posture became worse with fatigue and further mobility resulting in requiring min A at gait belt to improve. Pt fatigues easily and balance also diminishes. Further distance limited by SOA/weakness.  -AE               User Key  (r) = Recorded By, (t) = Taken By, (c) = Cosigned By      Initials Name Provider Type    Demar Charles PT Physical Therapist                    Obj/Interventions       Row Name 02/16/24 1342          Balance    Balance Assessment sitting static balance;sitting dynamic balance;sit to stand dynamic balance;standing static balance;standing dynamic balance  -AE     Static Sitting Balance standby assist  -AE     Dynamic Sitting Balance contact guard  -AE     Position, Sitting Balance unsupported;sitting edge of bed  -AE     Sit to Stand Dynamic Balance contact guard;1-person assist;verbal cues  -AE     Static Standing Balance contact guard  -AE     Dynamic Standing Balance contact guard;minimal assist  -AE     Position/Device Used, Standing Balance cane, straight  -AE               User Key  (r) = Recorded By, (t) = Taken By, (c) = Cosigned By      Initials Name Provider Type    AE Demar Dozier, PT Physical Therapist                   Goals/Plan    No documentation.                  Clinical Impression       Row Name 02/16/24 1343          Pain    Pretreatment Pain Rating 0/10 - no pain  -AE     Posttreatment Pain Rating 0/10 - no pain  -AE       Row Name 02/16/24 1343          Plan of Care Review    Plan of Care Reviewed With patient  -AE     Progress no change  -AE     Outcome Evaluation Pt continues to present with decreased functional mobility and decreased balance with activity. Pt ambulated 75ft with CGA-min A and SPC for support. Balance worsened with fatigue and multiple LOB noted. Pt may benefit from rehab if discharging home alone.  -AE       Row Name 02/16/24 1343          Vital Signs    Pre Systolic BP Rehab 152  -AE     Pre Treatment Diastolic BP 79  -AE     Pretreatment Heart Rate (beats/min) 62  -AE     Posttreatment Heart Rate (beats/min) 66  -AE     Pre SpO2 (%) 92  -AE     O2 Delivery Pre Treatment room air  -AE     Intra SpO2 (%) 86  -AE     O2 Delivery Intra Treatment room air  -AE     Post SpO2 (%) 94  -AE     O2 Delivery Post Treatment room air  -AE     Pre Patient Position Supine  -AE     Intra Patient Position Standing  -AE     Post  Patient Position Sitting  -AE       Row Name 02/16/24 1343          Positioning and Restraints    Pre-Treatment Position in bed  -AE     Post Treatment Position chair  -AE     In Chair notified nsg;sitting;call light within reach;encouraged to call for assist;exit alarm on  -AE               User Key  (r) = Recorded By, (t) = Taken By, (c) = Cosigned By      Initials Name Provider Type    Demar Charles, PT Physical Therapist                   Outcome Measures       Row Name 02/16/24 1345 02/16/24 0800       How much help from another person do you currently need...    Turning from your back to your side while in flat bed without using bedrails? 3  -AE 4  -EL    Moving from lying on back to sitting on the side of a flat bed without bedrails? 3  -AE 4  -EL    Moving to and from a bed to a chair (including a wheelchair)? 3  -AE 4  -EL    Standing up from a chair using your arms (e.g., wheelchair, bedside chair)? 3  -AE 3  -EL    Climbing 3-5 steps with a railing? 2  -AE 3  -EL    To walk in hospital room? 3  -AE 4  -EL    AM-PAC 6 Clicks Score (PT) 17  -AE 22  -EL    Highest Level of Mobility Goal 5 --> Static standing  -AE 7 --> Walk 25 feet or more  -EL      Row Name 02/16/24 1345          Functional Assessment    Outcome Measure Options AM-PAC 6 Clicks Basic Mobility (PT)  -AE               User Key  (r) = Recorded By, (t) = Taken By, (c) = Cosigned By      Initials Name Provider Type    Brandy Gloria RN Registered Nurse    Demar Charles, PT Physical Therapist                                 Physical Therapy Education       Title: PT OT SLP Therapies (In Progress)       Topic: Physical Therapy (In Progress)       Point: Mobility training (In Progress)       Learning Progress Summary             Patient Acceptance, E, NR by AE at 2/16/2024 1305    Acceptance, E,D, VU by MB at 2/14/2024 1432                         Point: Home exercise program (In Progress)       Learning Progress Summary              Patient Acceptance, E, NR by AE at 2/16/2024 1305    Acceptance, E,D, VU by MB at 2/14/2024 1432                         Point: Body mechanics (In Progress)       Learning Progress Summary             Patient Acceptance, E, NR by AE at 2/16/2024 1305    Acceptance, E,D, VU by MB at 2/14/2024 1432                         Point: Precautions (In Progress)       Learning Progress Summary             Patient Acceptance, E, NR by AE at 2/16/2024 1305    Acceptance, E,D, VU by MB at 2/14/2024 1432                                         User Key       Initials Effective Dates Name Provider Type Discipline    MB 06/16/21 -  Samia Perez PT Physical Therapist PT    AE 09/21/21 -  Demar Dozier PT Physical Therapist PT                  PT Recommendation and Plan     Plan of Care Reviewed With: patient  Progress: no change  Outcome Evaluation: Pt continues to present with decreased functional mobility and decreased balance with activity. Pt ambulated 75ft with CGA-min A and SPC for support. Balance worsened with fatigue and multiple LOB noted. Pt may benefit from rehab if discharging home alone.     Time Calculation:         PT Charges       Row Name 02/16/24 1346             Time Calculation    Start Time 1305  -AE      PT Received On 02/16/24  -AE      PT Goal Re-Cert Due Date 02/24/24  -AE         Timed Charges    22475 - PT Therapeutic Activity Minutes 23  -AE         Total Minutes    Timed Charges Total Minutes 23  -AE       Total Minutes 23  -AE                User Key  (r) = Recorded By, (t) = Taken By, (c) = Cosigned By      Initials Name Provider Type    AE Demar Dozier PT Physical Therapist                  Therapy Charges for Today       Code Description Service Date Service Provider Modifiers Qty    70302813941 HC PT THERAPEUTIC ACT EA 15 MIN 2/16/2024 Demar Dozier PT GP 2            PT G-Codes  Outcome Measure Options: AM-PAC 6 Clicks Basic Mobility (PT)  AM-PAC 6 Clicks Score (PT): 17  PT  Discharge Summary  Anticipated Discharge Disposition (PT): inpatient rehabilitation facility (vs home with initial 24/7 assist and HHPT)    Demar Dozier, PT  2/16/2024

## 2024-02-17 ENCOUNTER — READMISSION MANAGEMENT (OUTPATIENT)
Dept: CALL CENTER | Facility: HOSPITAL | Age: 89
End: 2024-02-17
Payer: MEDICARE

## 2024-02-17 VITALS
TEMPERATURE: 97.9 F | RESPIRATION RATE: 17 BRPM | DIASTOLIC BLOOD PRESSURE: 69 MMHG | SYSTOLIC BLOOD PRESSURE: 136 MMHG | HEIGHT: 69 IN | BODY MASS INDEX: 29.47 KG/M2 | HEART RATE: 60 BPM | OXYGEN SATURATION: 92 % | WEIGHT: 199 LBS

## 2024-02-17 LAB
BACTERIA SPEC AEROBE CULT: NO GROWTH
GLUCOSE BLDC GLUCOMTR-MCNC: 133 MG/DL (ref 70–130)

## 2024-02-17 PROCEDURE — 99239 HOSP IP/OBS DSCHRG MGMT >30: CPT | Performed by: INTERNAL MEDICINE

## 2024-02-17 PROCEDURE — 93010 ELECTROCARDIOGRAM REPORT: CPT | Performed by: INTERNAL MEDICINE

## 2024-02-17 PROCEDURE — 25010000002 ERTAPENEM PER 500 MG: Performed by: NURSE PRACTITIONER

## 2024-02-17 PROCEDURE — 82948 REAGENT STRIP/BLOOD GLUCOSE: CPT

## 2024-02-17 RX ADMIN — LINAGLIPTIN 5 MG: 5 TABLET, FILM COATED ORAL at 08:06

## 2024-02-17 RX ADMIN — BISOPROLOL FUMARATE 5 MG: 5 TABLET, FILM COATED ORAL at 08:06

## 2024-02-17 RX ADMIN — OXYBUTYNIN CHLORIDE 10 MG: 10 TABLET, EXTENDED RELEASE ORAL at 08:06

## 2024-02-17 RX ADMIN — ERTAPENEM 500 MG: 1 INJECTION INTRAMUSCULAR; INTRAVENOUS at 09:55

## 2024-02-17 RX ADMIN — FINASTERIDE 5 MG: 5 TABLET, FILM COATED ORAL at 08:06

## 2024-02-17 RX ADMIN — LEVOTHYROXINE SODIUM 50 MCG: 0.05 TABLET ORAL at 06:18

## 2024-02-17 RX ADMIN — APIXABAN 2.5 MG: 2.5 TABLET, FILM COATED ORAL at 08:06

## 2024-02-17 RX ADMIN — ASPIRIN 81 MG: 81 TABLET, COATED ORAL at 08:06

## 2024-02-17 NOTE — DISCHARGE SUMMARY
[facility] Medicine Services  DISCHARGE SUMMARY    Patient Name: Forrest Zepeda  : 1932  MRN: [id number]    Date of Admission: 2024 11:36 AM  Date of Discharge:  24  Primary Care Physician: Kary Wen MD    Consults       Date and Time Order Name Status Description    2024  4:57 PM Inpatient Urology Consult Completed             Hospital Course     Presenting Problem: hematuria    Active Hospital Problems    Diagnosis  POA    **Gross hematuria [R31.0]  Yes    Hematuria [R31.9]  Yes    Type 2 diabetes mellitus with hyperglycemia, with long-term current use of insulin [E11.65, Z79.4]  Not Applicable    Paroxysmal atrial fibrillation [I48.0]  Yes    IPF (idiopathic pulmonary fibrosis) [J84.112]  Yes    CKD (chronic kidney disease) stage 3, GFR 30-59 ml/min [N18.30]  Yes    Hydronephrosis of left kidney [N13.30]  Yes      Resolved Hospital Problems   No resolved problems to display.        Forrest Zepeda is a 89 y.o. male with PMH of chronic indwelling stent, due for exchange, who presented to the ED with worsening hematuria and clotting over prior week.  He denies difficulty urinating.  Son states he has these episodes every 6-7 months.        L ureteral stricture with L stent, with chronic hydronephrosis  Hematuria  Possible UTI  -patient seen by Urology with left ureteral stent exchange 2/15  -no further hematuria post procedure  -Invanz x 5 days provided -- h/o ESBL E coli UTIs.  Initial urine culture no growth.  Repeat culture from stent exchange no growth at time of discharge.       Anemia  -due to hematuria  - hemoblobin 11.4     OCTAVIA/CKD  -creatinine improved from admission and stable at 1.4     PAF  -resumed eliquis post stent exchange with no further hematuria per patient     Hx of IPF  -on oxygen prn      Discharge Follow Up Recommendations for outpatient labs/diagnostics:       Day of Discharge     HPI:   Feels fine.  No hematuria.  Denies fever or  chills. No dysuria.  Would like to go home today    Review of Systems  Gen: no fever    Vital Signs:   Temp:  [97.9 °F (36.6 °C)-98 °F (36.7 °C)] 97.9 °F (36.6 °C)  Heart Rate:  [60-61] 60  Resp:  [17-18] 17  BP: (136-170)/(68-79) 136/69      Physical Exam:  NAD, in bed  MM moist  RRR  CTAB  Abd soft, NT  No edema  Normal affect  Alert, speech clear    Pertinent  and/or Most Recent Results     LAB RESULTS:      Lab 02/16/24  0402 02/15/24  0416 02/14/24  0347 02/13/24  1621 02/13/24  1159   WBC 9.60 9.61 9.35  --  12.77*   HEMOGLOBIN 11.4* 11.4* 11.8*  --  13.3   HEMATOCRIT 36.7* 36.0* 36.2*  --  41.8   PLATELETS 189 183 187  --  221   NEUTROS ABS  --   --  4.93  --  7.60*   IMMATURE GRANS (ABS)  --   --  0.04  --  0.08*   LYMPHS ABS  --   --  3.04  --  3.64*   MONOS ABS  --   --  0.74  --  0.83   EOS ABS  --   --  0.50*  --  0.50*   MCV 99.7* 96.8 98.4*  --  100.2*   LACTATE  --   --  0.9 1.9 3.7*         Lab 02/16/24  0402 02/15/24  0416 02/14/24  0347 02/13/24  1159   SODIUM 142 140 141 136   POTASSIUM 4.4 4.6 5.0 4.9   CHLORIDE 114* 112* 109* 102   CO2 19.0* 18.0* 20.0* 23.0   ANION GAP 9.0 10.0 12.0 11.0   BUN 33* 35* 37* 45*   CREATININE 1.44* 1.41* 1.51* 2.21*   EGFR 45.9* 47.0* 43.3* 27.4*   GLUCOSE 148* 128* 82 184*   CALCIUM 8.8 8.8 9.0 9.6         Lab 02/13/24  1159   TOTAL PROTEIN 8.0   ALBUMIN 4.0   GLOBULIN 4.0   ALT (SGPT) 9   AST (SGOT) 25   BILIRUBIN 0.3   ALK PHOS 106                     Brief Urine Lab Results  (Last result in the past 365 days)        Color   Clarity   Blood   Leuk Est   Nitrite   Protein   CREAT   Urine HCG        02/13/24 1150 Red   Turbid   Large (3+)   Large (3+)   Negative   >=300 mg/dL (3+)                 Microbiology Results (last 10 days)       Procedure Component Value - Date/Time    Urine Culture - Urine, Urine, Catheter [[id number]]  (Normal) Collected: 02/15/24 1646    Lab Status: Preliminary result Specimen: Urine, Catheter Updated: 02/16/24 1316     Urine Culture No  growth    Urine Culture - Urine, Urine, Clean Catch [[id number]]  (Normal) Collected: 02/13/24 1150    Lab Status: Final result Specimen: Urine, Clean Catch Updated: 02/14/24 2238     Urine Culture No growth            FL C Arm During Surgery    Result Date: 2/15/2024  This procedure was auto-finalized with no dictation required.    CT Abdomen Pelvis Without Contrast    Result Date: 2/13/2024  CT ABDOMEN PELVIS WO CONTRAST Date of Exam: 2/13/2024 11:59 AM EST Indication: hematuria with long term stent present. Comparison: CT abdomen pelvis dated 5/1/2023 Technique: Axial CT images were obtained of the abdomen and pelvis without the administration of contrast. Reconstructed coronal and sagittal images were also obtained. Automated exposure control and iterative construction methods were used. Findings: There is fibrosis of the lung bases. Cardiomegaly with distal pacemaker leads and coronary artery calcifications. The liver shows homogeneous density. Cholecystectomy changes diffuse pancreatic atrophy. The spleen and adrenal glands are unremarkable. The right kidney shows no hydronephrosis or nephrolithiasis. The left kidney shows stable hydroureteronephrosis with a double-J ureteral stent in place. The left renal cortex is atrophic Persistent diffuse enlargement of the prostate with mass effect over the bladder. Persistent diffuse thickening of the bladder wall with mild surrounding fat stranding stable in appearance. The stomach and small bowel loops are normal in caliber. The appendix within normal limits. The large bowel is normal in caliber. There is diffuse colon diverticulosis most advanced in the sigmoid with no pericolonic inflammatory changes. There is atherosclerosis aorta and main branches. No pathologic lymphadenopathy by size criteria. No free fluid or free air no acute fractures or bone lesions     Impression: 1. Stable moderate left hydroureteronephrosis with a double-J ureteral stent in place. 2.  Stable diffuse enlargement of the prostate and diffuse bladder wall thickening 3. No evidence of acute CT abnormalities in the abdomen or pelvis 4. Extensive sigmoid diverticulosis with no diverticulitis Electronically Signed: Redd Matthews MD  2/13/2024 12:22 PM EST  Workstation ID: [id number]     Results for orders placed during the hospital encounter of 08/22/19    Duplex Venous Lower Extremity - Bilateral CAR    Interpretation Summary  · Acute right lower extremity deep vein thrombosis noted in the distal femoral and popliteal.  · All other veins appeared normal bilaterally.      Results for orders placed during the hospital encounter of 08/22/19    Duplex Venous Lower Extremity - Bilateral CAR    Interpretation Summary  · Acute right lower extremity deep vein thrombosis noted in the distal femoral and popliteal.  · All other veins appeared normal bilaterally.      Results for orders placed in visit on 11/13/23    Adult Transthoracic Echo Complete W/ Cont if Necessary Per Protocol    Interpretation Summary    Left ventricular systolic function is normal. Estimated left ventricular EF = 52% Left ventricular ejection fraction appears to be 51 - 55%.    Left ventricular wall thickness is consistent with mild concentric hypertrophy.    Left ventricular diastolic function is consistent with (grade I) impaired relaxation.    The right ventricular cavity is mildly dilated.    The left atrial cavity is mildly dilated.    Left atrial volume is moderately increased.    There is mild calcification of the aortic valve mainly affecting the non-coronary, left coronary and right coronary cusp(s).    Estimated right ventricular systolic pressure from tricuspid regurgitation is normal (<35 mmHg).      Plan for Follow-up of Pending Labs/Results:   Pending Labs       Order Current Status    Urine Culture - Urine, Urine, Catheter Preliminary result          Discharge Details        Discharge Medications        Changes to  Medications        Instructions Start Date   levothyroxine 50 MCG tablet  Commonly known as: SYNTHROID, LEVOTHROID  What changed: when to take this   50 mcg, Oral, Daily             Continue These Medications        Instructions Start Date   allopurinol 300 MG tablet  Commonly known as: ZYLOPRIM   300 mg, Oral, Nightly, To lower risk of gout      apixaban 2.5 MG tablet tablet  Commonly known as: Eliquis   2.5 mg, Oral, 2 Times Daily      aspirin 81 MG EC tablet   81 mg, Oral, Daily      bisoprolol 5 MG tablet  Commonly known as: ZEBeta   5 mg, Oral, Daily      Diclofenac Sodium 1 % gel gel  Commonly known as: VOLTAREN   2 g, Topical, 4 Times Daily      docusate sodium 100 MG capsule  Commonly known as: COLACE   100 mg, Oral, Daily, OTC      finasteride 5 MG tablet  Commonly known as: PROSCAR   TAKE ONE TABLET BY MOUTH DAILY      glimepiride 1 MG tablet  Commonly known as: AMARYL   1 mg, Oral, Every Morning Before Breakfast      HYDROcodone-acetaminophen 5-325 MG per tablet  Commonly known as: Norco   1 tablet, Oral, Every 12 Hours PRN      Mirabegron ER 50 MG tablet sustained-release 24 hour 24 hr tablet  Commonly known as: Myrbetriq   50 mg, Oral, Daily      OCUVITE ADULT 50+ PO   1 capsule, Oral, Daily, OTC      silodosin 8 MG capsule capsule  Commonly known as: RAPAFLO   8 mg, Oral, Daily With Breakfast, To help with urination      SITagliptin 100 MG tablet  Commonly known as: Januvia   100 mg, Oral, Daily, For diabetes      Toujeo SoloStar 300 UNIT/ML solution pen-injector injection  Generic drug: Insulin Glargine (1 Unit Dial)   20 Units, Subcutaneous, Nightly      triazolam 0.25 MG tablet  Commonly known as: HALCION   0.25 mg, Oral, Nightly PRN               Allergies   Allergen Reactions    Metformin Diarrhea and GI Intolerance    Statins Diarrhea and Myalgia         Discharge Disposition:  Home or Self Care    Diet:  Hospital:  Diet Order   Procedures    Diet: Diabetic Diets; Consistent Carbohydrate;  Texture: Regular Texture (IDDSI 7); Fluid Consistency: Thin (IDDSI 0)            Activity:      Restrictions or Other Recommendations:         CODE STATUS:    Code Status and Medical Interventions:   Ordered at: 02/13/24 1657     Medical Intervention Limits:    NO intubation (DNI)     Level Of Support Discussed With:    Patient     Code Status (Patient has no pulse and is not breathing):    No CPR (Do Not Attempt to Resuscitate)     Medical Interventions (Patient has pulse or is breathing):    Limited Support       Future Appointments   Date Time Provider Department Center   4/11/2024  2:00 PM Deanne Pitts MD [facility]   7/9/2024  1:00 PM Win Saha MD [facility]   7/16/2024  1:00 PM [facility] CARD CUBA DEVICE CHECK [facility]   7/23/2024  1:00 PM Kary Wen MD [facility] PC [facility]       Additional Instructions for the Follow-ups that You Need to Schedule       Discharge Follow-up with PCP   As directed       Currently Documented PCP:    Kary Wen MD    PCP Phone Number:    [phone]     Follow Up Details: follow up with PCP in one week        Discharge Follow-up with Specialty: follow up with Urology Dr Pitts in 4 months   As directed      Specialty: follow up with Urology Dr Pitts in 4 months                      Kobe Denney MD  02/17/24      Time Spent on Discharge:  I spent  35  minutes on this discharge activity which included: face-to-face encounter with the patient, reviewing the data in the system, coordination of the care with the nursing staff as well as consultants, documentation, and entering orders.

## 2024-02-18 LAB
QT INTERVAL: 402 MS
QTC INTERVAL: 460 MS

## 2024-02-19 ENCOUNTER — TRANSITIONAL CARE MANAGEMENT TELEPHONE ENCOUNTER (OUTPATIENT)
Dept: CALL CENTER | Facility: HOSPITAL | Age: 89
End: 2024-02-19
Payer: MEDICARE

## 2024-02-19 ENCOUNTER — TELEPHONE (OUTPATIENT)
Dept: UROLOGY | Facility: CLINIC | Age: 89
End: 2024-02-19
Payer: MEDICARE

## 2024-02-19 NOTE — TELEPHONE ENCOUNTER
As I was scheduling f/u appt she asked if he needed an antibiotics sent in from recent visit.  She said she was told one would be sent in but nothing was at his pharmacy. Please contact PT to inform if a RX is needed/being sent in for an antibiotics.

## 2024-02-19 NOTE — OUTREACH NOTE
Call Center TCM Note      Flowsheet Row Responses   [facility] patient discharged from? [place]   Does the patient have one of the following disease processes/diagnoses(primary or secondary)? Other   TCM attempt successful? Yes   Call start time 1126   Call end time 1136   Discharge diagnosis Gross hematuria   Person spoke with today (if not patient) and relationship Erika   Medication alerts for this patient Granddaughter Erika has a call into urology office to see if he needs antibiotics as no script was sent in at discharge. There are no notes in chart stating he will have antibitoics but Erika has a call in to make sure.   Meds reviewed with patient/caregiver? Yes   Is the patient having any side effects they believe may be caused by any medication additions or changes? No   Does the patient have all medications ordered at discharge? Yes   Is the patient taking all medications as directed (includes completed medication regime)? Yes   Comments Patient will be following up with Urology on 13th.   Does the patient have an appointment with their PCP within 7-14 days of discharge? Other   Nursing Interventions Patient desires to follow up with specialty only, Patient declined scheduling/rescheduling appointment at this time   Psychosocial issues? No   Did the patient receive a copy of their discharge instructions? Yes   Nursing interventions Reviewed instructions with patient   What is the patient's perception of their health status since discharge? Improving   Is the patient/caregiver able to teach back signs and symptoms related to disease process for when to call PCP? Yes   Is the patient/caregiver able to teach back signs and symptoms related to disease process for when to call 911? Yes   Is the patient/caregiver able to teach back the hierarchy of who to call/visit for symptoms/problems? PCP, Specialist, Home health nurse, Urgent Care, ED, 911 Yes   If the patient is a current smoker, are they able to  teach back resources for cessation? Not a smoker   TCM call completed? Yes   Wrap up additional comments Doing well. No needs at this time.   Call end time 1136   Would this patient benefit from a Referral to [facility] Social Work? No   Is the patient interested in additional calls from an ambulatory ? No            Misha Cruz RN    2/19/2024, 11:36 EST

## 2024-02-19 NOTE — TELEPHONE ENCOUNTER
Called Erika back to inform her that Dr. Pitts stated he didn't need any additional abx as he had 5 days IV abx in hospital. She verbalized understanding

## 2024-02-26 ENCOUNTER — READMISSION MANAGEMENT (OUTPATIENT)
Dept: CALL CENTER | Facility: HOSPITAL | Age: 89
End: 2024-02-26
Payer: MEDICARE

## 2024-02-26 NOTE — OUTREACH NOTE
Medical Week 2 Survey      Flowsheet Row Responses   [facility] patient discharged from? [place]   Does the patient have one of the following disease processes/diagnoses(primary or secondary)? Other   Week 2 attempt successful? Yes   Call start time 1050   Discharge diagnosis Gross hematuria   Call end time 1051   Person spoke with today (if not patient) and relationship Erika   Meds reviewed with patient/caregiver? Yes   Is the patient having any side effects they believe may be caused by any medication additions or changes? No   Does the patient have all medications ordered at discharge? Yes   Is the patient taking all medications as directed (includes completed medication regime)? Yes   Does the patient have a primary care provider?  Yes   Does the patient have an appointment with their PCP within 7 days of discharge? Yes   Has the patient kept scheduled appointments due by today? N/A   Has home health visited the patient within 72 hours of discharge? N/A   Psychosocial issues? No   Did the patient receive a copy of their discharge instructions? Yes   Nursing interventions Reviewed instructions with patient   What is the patient's perception of their health status since discharge? Improving   Week 2 Call Completed? Yes   Graduated Yes   Call end time 1051            Belia COBOS - Registered Nurse

## 2024-03-21 ENCOUNTER — PATIENT MESSAGE (OUTPATIENT)
Dept: INTERNAL MEDICINE | Facility: CLINIC | Age: 89
End: 2024-03-21
Payer: MEDICARE

## 2024-03-21 DIAGNOSIS — M54.50 CHRONIC LOW BACK PAIN WITHOUT SCIATICA, UNSPECIFIED BACK PAIN LATERALITY: ICD-10-CM

## 2024-03-21 DIAGNOSIS — G89.29 CHRONIC LOW BACK PAIN WITHOUT SCIATICA, UNSPECIFIED BACK PAIN LATERALITY: ICD-10-CM

## 2024-03-21 RX ORDER — HYDROCODONE BITARTRATE AND ACETAMINOPHEN 5; 325 MG/1; MG/1
1 TABLET ORAL EVERY 12 HOURS PRN
Qty: 60 TABLET | Refills: 0 | Status: SHIPPED | OUTPATIENT
Start: 2024-03-21

## 2024-03-21 NOTE — TELEPHONE ENCOUNTER
From: Forrest Zepeda  To: Kary Wen  Sent: 3/21/2024 9:08 AM EDT  Subject: Med refill     Bernardo called me this morning and said he has about a week left of his pain medication. I was checking to see if you could send in a refill for him.     Thank you  Erika vasquez

## 2024-05-08 ENCOUNTER — PATIENT MESSAGE (OUTPATIENT)
Dept: INTERNAL MEDICINE | Facility: CLINIC | Age: 89
End: 2024-05-08
Payer: MEDICARE

## 2024-05-08 DIAGNOSIS — G89.29 CHRONIC LOW BACK PAIN WITHOUT SCIATICA, UNSPECIFIED BACK PAIN LATERALITY: ICD-10-CM

## 2024-05-08 DIAGNOSIS — M54.50 CHRONIC LOW BACK PAIN WITHOUT SCIATICA, UNSPECIFIED BACK PAIN LATERALITY: ICD-10-CM

## 2024-05-08 RX ORDER — HYDROCODONE BITARTRATE AND ACETAMINOPHEN 5; 325 MG/1; MG/1
1 TABLET ORAL EVERY 12 HOURS PRN
Qty: 60 TABLET | Refills: 0 | Status: SHIPPED | OUTPATIENT
Start: 2024-05-08

## 2024-05-09 NOTE — PROGRESS NOTES
"Subjective   Forrest Zepeda is a 86 y.o. male seen today for Skin Ulcer (toe) and Diabetes.     History of Present Illness   The patient is here today to follow up on Diabetes and great toe ulcer.     States he is doing much better. Skin is almost completely healed.  Denies any chest pain or shortness of breath.    A1C today is 10.6%. This is down from 11/05/18 at 12.3%.  Taking Januvia 100 mg and Glimepiride 2 mg daily.    The following portions of the patient's history were reviewed and updated as appropriate: allergies, current medications, past social history and problem list.    Review of Systems   Respiratory: Negative for shortness of breath.    Cardiovascular: Negative for chest pain.   Skin:        Ulcer of right great toe is almost healed.       Objective   /78   Pulse 82   Temp 97.1 °F (36.2 °C) (Temporal)   Resp 22   Ht 170.2 cm (67\")   Wt 102 kg (224 lb 12.8 oz)   SpO2 94%   BMI 35.21 kg/m²   Physical Exam   Constitutional: He appears well-developed and well-nourished.   Cardiovascular: Normal rate and regular rhythm.   Pulmonary/Chest: Effort normal and breath sounds normal.   Nursing note and vitals reviewed.      Assessment/Plan   Problem List Items Addressed This Visit        Endocrine    Uncontrolled type 2 diabetes mellitus (CMS/HCC) - Primary    Relevant Orders    POCT Glucose (Completed)      Other Visit Diagnoses     Skin ulcer of fourth toe of right foot, limited to breakdown of skin (CMS/HCC)                  Drink plenty fluids.  Continue to work on diet and weight loss.    Continue to change bandage daily.    Continue medications as doing.    Follow up in 3 months. Sooner if needed.              Scribed for Dr Ryne Lofton by Zahida Townsend CMA.          I, Ryne Lofton MD, personally performed the services described in this documentation, as scribed by Zahida Townsend in my presence, and is both accurate and complete.        (Please note that portions of this note were " completed with a voice recognition program. Efforts were made to edit the dictations,but occasionally words are mis transcribed.)       Patient admitted for hypoxemia with pmhx of HTN, HLD, schizophrenia, and dementia.

## 2024-06-07 ENCOUNTER — PATIENT MESSAGE (OUTPATIENT)
Dept: INTERNAL MEDICINE | Facility: CLINIC | Age: 89
End: 2024-06-07
Payer: MEDICARE

## 2024-06-07 DIAGNOSIS — G89.29 CHRONIC LOW BACK PAIN WITHOUT SCIATICA, UNSPECIFIED BACK PAIN LATERALITY: ICD-10-CM

## 2024-06-07 DIAGNOSIS — M54.50 CHRONIC LOW BACK PAIN WITHOUT SCIATICA, UNSPECIFIED BACK PAIN LATERALITY: ICD-10-CM

## 2024-06-07 RX ORDER — HYDROCODONE BITARTRATE AND ACETAMINOPHEN 5; 325 MG/1; MG/1
1 TABLET ORAL EVERY 12 HOURS PRN
Qty: 60 TABLET | Refills: 0 | Status: SHIPPED | OUTPATIENT
Start: 2024-06-07

## 2024-06-07 RX ORDER — HYDROCODONE BITARTRATE AND ACETAMINOPHEN 5; 325 MG/1; MG/1
1 TABLET ORAL EVERY 12 HOURS PRN
Qty: 60 TABLET | Refills: 0 | Status: CANCELLED | OUTPATIENT
Start: 2024-06-07

## 2024-06-07 NOTE — TELEPHONE ENCOUNTER
Rx Refill Note  Requested Prescriptions     Pending Prescriptions Disp Refills    HYDROcodone-acetaminophen (Norco) 5-325 MG per tablet 60 tablet 0     Sig: Take 1 tablet by mouth Every 12 (Twelve) Hours As Needed for Severe Pain.      Last office visit with prescribing clinician: 1/23/2024   Next office visit with prescribing clinician: 7/23/2024     Blayne Horan MA  06/07/24, 10:26 EDT    UDS: NA

## 2024-06-07 NOTE — TELEPHONE ENCOUNTER
From: Forrest Zepeda  To: Kary Wen  Sent: 6/7/2024 10:19 AM EDT  Subject: Pain medication     Hey Bernardo Sales just called me and said he only has 4 of his Norco left and is needing a refill. Thanks     Erika

## 2024-06-08 DIAGNOSIS — N40.1 BPH WITH URINARY OBSTRUCTION: ICD-10-CM

## 2024-06-08 DIAGNOSIS — N13.8 BPH WITH URINARY OBSTRUCTION: ICD-10-CM

## 2024-06-10 RX ORDER — FINASTERIDE 5 MG/1
5 TABLET, FILM COATED ORAL DAILY
Qty: 90 TABLET | Refills: 3 | Status: SHIPPED | OUTPATIENT
Start: 2024-06-10

## 2024-06-10 NOTE — TELEPHONE ENCOUNTER
Rx Refill Note  Requested Prescriptions     Pending Prescriptions Disp Refills    finasteride (PROSCAR) 5 MG tablet [Pharmacy Med Name: FINASTERIDE 5 MG TABLET] 90 tablet 3     Sig: TAKE 1 TABLET BY MOUTH DAILY      Last office visit with prescribing clinician: 1/23/2024   Next office visit with prescribing clinician: 7/23/2024       Blayne Horan MA  06/10/24, 08:48 EDT

## 2024-06-13 DIAGNOSIS — I48.0 PAROXYSMAL ATRIAL FIBRILLATION: ICD-10-CM

## 2024-06-13 DIAGNOSIS — I50.32 CHRONIC HEART FAILURE WITH PRESERVED EJECTION FRACTION (HFPEF): ICD-10-CM

## 2024-06-13 RX ORDER — BISOPROLOL FUMARATE 5 MG/1
5 TABLET, FILM COATED ORAL DAILY
Qty: 90 TABLET | Refills: 3 | Status: SHIPPED | OUTPATIENT
Start: 2024-06-13

## 2024-06-26 ENCOUNTER — OFFICE VISIT (OUTPATIENT)
Dept: UROLOGY | Facility: CLINIC | Age: 89
End: 2024-06-26
Payer: MEDICARE

## 2024-06-26 VITALS — BODY MASS INDEX: 29.62 KG/M2 | WEIGHT: 200 LBS | HEIGHT: 69 IN

## 2024-06-26 DIAGNOSIS — N13.30 HYDRONEPHROSIS OF LEFT KIDNEY: Primary | ICD-10-CM

## 2024-06-26 DIAGNOSIS — R31.9 HEMATURIA, UNSPECIFIED TYPE: ICD-10-CM

## 2024-06-26 PROCEDURE — 99214 OFFICE O/P EST MOD 30 MIN: CPT | Performed by: UROLOGY

## 2024-06-26 PROCEDURE — 87086 URINE CULTURE/COLONY COUNT: CPT | Performed by: UROLOGY

## 2024-06-26 PROCEDURE — 1159F MED LIST DOCD IN RCRD: CPT | Performed by: UROLOGY

## 2024-06-26 PROCEDURE — 81003 URINALYSIS AUTO W/O SCOPE: CPT | Performed by: UROLOGY

## 2024-06-26 PROCEDURE — 1160F RVW MEDS BY RX/DR IN RCRD: CPT | Performed by: UROLOGY

## 2024-06-26 RX ORDER — SULFAMETHOXAZOLE AND TRIMETHOPRIM 800; 160 MG/1; MG/1
1 TABLET ORAL 2 TIMES DAILY
Qty: 10 TABLET | Refills: 0 | Status: SHIPPED | OUTPATIENT
Start: 2024-06-26 | End: 2024-07-05

## 2024-06-26 NOTE — PROGRESS NOTES
Follow Up Office Visit      Patient Name: Forrest Zepeda  : 1932   MRN: [id number]     Chief Complaint:    Chief Complaint   Patient presents with    Hydronephrosis       Referring Provider: No ref. provider found    History of Present Illness: Forrest Zepeda is a 89 y.o. male who presents today for follow up of left hydronephrosis/left ureteral stricture, managed with chronic ureteral stents.  Last exchanged 2/15/24.  He has a Tria stent in place.       He had been doing well until Last Sun.  Recurrent episode of hematuria and dysuria for 2 or 3 days.  Reports continued cloudy urine, but no further dysuria, hematuria, or any suprapubic or flank pain.      U/a:  3+ bld, 1+prot, 3+ lueks    Subjective      Review of System:   Review of Systems   Genitourinary:  Negative for decreased urine volume, difficulty urinating, dysuria, enuresis, flank pain, frequency, hematuria and urgency.      I have reviewed the ROS documented by my clinical staff, I have updated appropriately and I agree. Deanne Pitts MD    I have reviewed and the following portions of the patient's history were updated as appropriate: past family history, past medical history, past social history, past surgical history and problem list.    Past Medical History:   Past Medical History:   Diagnosis Date    Abnormal EKG     Abnormal PSA     Reported abnormal    Acute cystitis with hematuria 2023    Acute deep vein thrombosis (DVT) of right lower extremity 2019    Acute diverticulitis     Acute hyperkalemia 2019    Acute respiratory failure with hypoxia     Acute saddle pulmonary embolism with acute cor pulmonale 2019    Acute systolic right heart failure 2019    Acute UTI (urinary tract infection)     Arthritis     KNEES,  BUT SINCE HAD REPLACEMENT    Benign localized hyperplasia of prostate     Bilateral knee pain     C. difficile diarrhea 2010    Cataracts, bilateral     CKD (chronic kidney disease)      "Coronary artery disease     Diabetic neuropathy     Diastolic dysfunction     Disease of thyroid gland     HYPOTHYROIDISM    Diverticulitis     Dyslipidemia     H/O    Erectile dysfunction     Family history of malignant hyperthermia     Brother     Full dentures     Generalized weakness     History of ESBL E. coli infection     most recent 4-2022 f/u with ID Dr Johnson    History of gout     History of hepatitis A vaccination     History of nephrolithiasis     History of nuclear stress test     STATES IN THE 1990'S.  \"IT WAS OK\"    History of osteopenia     History of recurrent UTI (urinary tract infection)     Hydronephrosis of left kidney 07/18/2019    Hydronephrosis with renal and ureteral calculus obstruction 10/21/2019    Added automatically from request for surgery [id number]    Hydronephrosis with ureteral stricture     Hypercholesterolemia     Hyperkalemia     PT UNSURE REGARDING HX OF THIS    Hyperlipidemia     Hypertension     Hypothyroidism     Impaired functional mobility, balance, gait, and endurance     Insomnia     IPF (idiopathic pulmonary fibrosis)     per patient and son, dx at [facility], was told no treatment necessary, not to worry about it    On supplemental oxygen therapy     2L NC QHS    Other hydronephrosis 08/12/2019    Added automatically from request for surgery [id number]    Paroxysmal atrial fibrillation     Pulmonary hypertension, mild 02/2021    per echo per cardiology note 1/9/23, per pt's son, PCP does not agree with this dx    Renal insufficiency     Sepsis 2019    Shortness of breath     STATES WITH EXERTION, OTHERWISE, DENIES    Sigmoid diverticulitis without abscess or perforation 08/09/2017    Sinus node dysfunction     Skin breakdown     fourth toe right foot noted in 2019 MD D/C note    Skin ulcer of fourth toe of right foot, limited to breakdown of skin 07/05/2019    Stricture of ureter     Type 2 diabetes mellitus     Ureteral stricture, left     Urinary incontinence     UTI " (urinary tract infection) 07/18/2019    Vitamin D deficiency     Wears glasses        Past Surgical History:  Past Surgical History:   Procedure Laterality Date    APPENDECTOMY      CARDIAC ELECTROPHYSIOLOGY PROCEDURE N/A 12/23/2020    Procedure: Pacemaker DC new. DNS meds.;  Surgeon: Jimy Ledezma DO;  Location:  [facility];  Service: Cardiology;  Laterality: N/A;    CHOLECYSTECTOMY      CIRCUMCISION N/A 10/23/2019    Procedure: CIRCUMCISIOn-DORSAL SLIT;  Surgeon: Griffin Romero MD;  Location:  [facility];  Service: Urology    COLONOSCOPY      CYSTOSCOPY RETROGRADE PYELOGRAM Left 06/17/2022    Procedure: CYSTOSCOPY RETROGRADE PYELOGRAM;  Surgeon: Ryne Mccann MD;  Location:  [facility];  Service: Urology;  Laterality: Left;    CYSTOSCOPY URETEROSCOPY Left 02/11/2019    Procedure: URETEROSCOPY WITH STENT EXTRACTION, RPG;  Surgeon: Griffin Romero MD;  Location:  [facility];  Service: Urology    CYSTOSCOPY W/ URETERAL STENT PLACEMENT Left 07/20/2019    Procedure: CYSTOSCOPY, LEFT RETROGRADE PYELOGRAM,  ATTEMPTED LEFT URETERAL STENT INSERTION;  Surgeon: Isaac Flynn MD;  Location:  [facility];  Service: Urology    CYSTOSCOPY W/ URETERAL STENT PLACEMENT Left 06/17/2022    Procedure: CYSTOSCOPY URETERAL CATHETER/STENT INSERTION;  Surgeon: Ryne Mccann MD;  Location:  [facility];  Service: Urology;  Laterality: Left;    CYSTOSCOPY W/ URETERAL STENT PLACEMENT Left 01/27/2023    Procedure: CYSTOSCOPY URETERAL CATHETER/STENT INSERTION;  Surgeon: Deanne Pitts MD;  Location:  [facility];  Service: Urology;  Laterality: Left;    CYSTOSCOPY W/ URETERAL STENT PLACEMENT Left 02/15/2024    Procedure: CYSTOSCOPY LEFT STENT EXCHANGE;  Surgeon: Deanne Pitts MD;  Location:  [facility];  Service: Urology;  Laterality: Left;    CYSTOSCOPY, URETEROSCOPY, RETROGRADE PYELOGRAM, STONE EXTRACTION, STENT INSERTION Left 06/15/2023    Procedure: URETEROSCOPY, RETROGRADE PYELOGRAM, STENT INSERTION;  Surgeon: Hong  MD Deanne;  Location: [facility];  Service: Urology;  Laterality: Left;    EYE SURGERY      CATARACTS REMOVED BILATERALLY    JOINT REPLACEMENT      Bilateral knees    KNEE ARTHROPLASTY Bilateral     KNEE ARTHROSCOPY Bilateral     RENAL ARTERY STENT      SEVERAL TIMES EVERY SINCE 1978    URETERAL STENT INSERTION  10/2020    URETEROSCOPY LASER LITHOTRIPSY WITH STENT INSERTION Left 01/10/2018    Procedure:  cysto, left ureteral stent replacement ;  Surgeon: Griffin Romero MD;  Location: [facility];  Service:     URETEROSCOPY LASER LITHOTRIPSY WITH STENT INSERTION Left 08/14/2019    Procedure: URETEROSCOPY, STONE REMOVAL WITH STENT INSERTION;  Surgeon: Griffin Romero MD;  Location: [facility];  Service: Urology    URETEROSCOPY LASER LITHOTRIPSY WITH STENT INSERTION Left 10/23/2019    Procedure: Left URETEROSCOPY WITH STENT INSERTION-LEFT;  Surgeon: Griffin Romero MD;  Location: [facility];  Service: Urology       Family History:   family history includes Brain cancer in his brother and sister; Heart attack in his sister; Hypertension in his sister; Lung cancer in his brother and sister; Malig Hyperthermia in his brother; No Known Problems in his mother; Stomach cancer in his father; Stroke in his sister.   Otherwise pertinent FHx was reviewed and not pertinent to current issue.    Social History:    reports that he quit smoking about 73 years ago. His smoking use included cigarettes. He has a 0.5 pack-year smoking history. He has been exposed to tobacco smoke. He has never used smokeless tobacco. He reports that he does not drink alcohol and does not use drugs.    Medications:     Current Outpatient Medications:     allopurinol (ZYLOPRIM) 300 MG tablet, Take 1 tablet by mouth Every Night. To lower risk of gout, Disp: 90 tablet, Rfl: 3    apixaban (Eliquis) 2.5 MG tablet tablet, Take 1 tablet by mouth 2 (Two) Times a Day., Disp: 180 tablet, Rfl: 3    aspirin 81 MG EC tablet, Take 1 tablet by mouth Daily. (Patient  taking differently: Take 1 tablet by mouth Daily. OTC), Disp: 90 tablet, Rfl: 3    bisoprolol (ZEBeta) 5 MG tablet, Take 1 tablet by mouth Daily., Disp: 90 tablet, Rfl: 3    Diclofenac Sodium (VOLTAREN) 1 % gel gel, Apply 2 g topically to the appropriate area as directed 4 (Four) Times a Day. (Patient taking differently: Apply 2 g topically to the appropriate area as directed 2 (Two) Times a Day As Needed (Joint pain).), Disp: 100 g, Rfl: 0    docusate sodium (COLACE) 100 MG capsule, Take 1 capsule by mouth Daily. OTC, Disp: , Rfl:     finasteride (PROSCAR) 5 MG tablet, TAKE 1 TABLET BY MOUTH DAILY, Disp: 90 tablet, Rfl: 3    glimepiride (AMARYL) 1 MG tablet, Take 1 tablet by mouth Every Morning Before Breakfast. Indications: Type 2 Diabetes, Disp: 90 tablet, Rfl: 3    HYDROcodone-acetaminophen (Norco) 5-325 MG per tablet, Take 1 tablet by mouth Every 12 (Twelve) Hours As Needed for Severe Pain., Disp: 60 tablet, Rfl: 0    Insulin Glargine, 1 Unit Dial, (Toujeo SoloStar) 300 UNIT/ML solution pen-injector injection, Inject 20 Units under the skin into the appropriate area as directed Every Night., Disp: 6 mL, Rfl: 3    levothyroxine (SYNTHROID, LEVOTHROID) 50 MCG tablet, Take 1 tablet by mouth Daily. Indications: Underactive Thyroid (Patient taking differently: Take 1 tablet by mouth Every Morning. Indications: Underactive Thyroid), Disp: 90 tablet, Rfl: 3    Mirabegron ER (Myrbetriq) 50 MG tablet sustained-release 24 hour 24 hr tablet, Take 50 mg by mouth Daily for 360 days., Disp: 90 tablet, Rfl: 3    Multiple Vitamins-Minerals (OCUVITE ADULT 50+ PO), Take 1 capsule by mouth Daily. OTC, Disp: , Rfl:     silodosin (RAPAFLO) 8 MG capsule capsule, Take 1 capsule by mouth Daily With Breakfast. To help with urination, Disp: 90 capsule, Rfl: 3    SITagliptin (Januvia) 100 MG tablet, Take 1 tablet by mouth Daily. For diabetes, Disp: 90 tablet, Rfl: 3    triazolam (HALCION) 0.25 MG tablet, Take 1 tablet by mouth At Night  "As Needed for Sleep., Disp: 90 tablet, Rfl: 0    sulfamethoxazole-trimethoprim (Bactrim DS) 800-160 MG per tablet, Take 1 tablet by mouth 2 (Two) Times a Day., Disp: 10 tablet, Rfl: 0    Allergies:   Allergies   Allergen Reactions    Metformin Diarrhea and GI Intolerance    Statins Diarrhea and Myalgia       IPSS Questionnaire (AUA-7):  Over the past month…    1)  Incomplete Emptying  How often have you had a sensation of not emptying your bladder?  5 - Almost always   2)  Frequency  How often have you had to urinate less than every two hours? 3 - About half the time   3)  Intermittency  How often have you found you stopped and started again several times when you urinated?  0 - Not at all   4) Urgency  How often have you found it difficult to postpone urination?  5 - Almost always   5) Weak Stream  How often have you had a weak urinary stream?  1 - Less than 1 time in 5   6) Straining  How often have you had to push or strain to begin urination?  0 - Not at all   7) Nocturia  How many times did you typically get up at night to urinate?  2 - 2 times   Total Score:  16   The International Prostate Symptom Score (IPSS) is used to screen, diagnose, track symptoms of benign prostatic hyperplasia (BPH).    0-7 pts (Mild Symptoms)  / 8-19 pts (Moderate) / 20-35 (Severe)    Quality of life due to urinary symptoms:  If you were to spend the rest of your life with your urinary condition the way it is now, how would you feel about that? 5-Unhappy   Urine Leakage (Incontinence) 0-No Leakage       Objective     Physical Exam:   Vital Signs:   Vitals:    06/26/24 1322   Weight: 90.7 kg (200 lb)   Height: 175.3 cm (69\")   PainSc: 0-No pain     Body mass index is 29.53 kg/m².     Physical Exam  Constitutional:       Appearance: Normal appearance.   HENT:      Head: Normocephalic and atraumatic.      Nose: Nose normal.   Eyes:      Extraocular Movements: Extraocular movements intact.      Conjunctiva/sclera: Conjunctivae normal.    "   Pupils: Pupils are equal, round, and reactive to light.   Musculoskeletal:         General: Normal range of motion.      Cervical back: Normal range of motion and neck supple.      Comments: In wheelchair   Skin:     General: Skin is warm and dry.      Findings: No lesion or rash.   Neurological:      General: No focal deficit present.      Mental Status: He is alert and oriented to person, place, and time. Mental status is at baseline.   Psychiatric:         Mood and Affect: Mood normal.         Behavior: Behavior normal.         Labs:   Brief Urine Lab Results  (Last result in the past 365 days)        Color   Clarity   Blood   Leuk Est   Nitrite   Protein   CREAT   Urine HCG        06/26/24 1330 Yellow   Turbid   3+   Large (3+)   Negative   1+                   Urine Culture          2/13/2024    11:50 2/15/2024    16:46 6/26/2024    16:33   Urine Culture   Urine Culture No growth  No growth  25,000 CFU/mL Normal Urogenital [place]         Lab Results   Component Value Date    GLUCOSE 148 (H) 02/16/2024    CALCIUM 8.8 02/16/2024     02/16/2024    K 4.4 02/16/2024    CO2 19.0 (L) 02/16/2024     (H) 02/16/2024    BUN 33 (H) 02/16/2024    CREATININE 1.44 (H) 02/16/2024    EGFRIFAFRI 45 (L) 12/13/2021    EGFRIFNONA 39 (L) 12/13/2021    BCR 22.9 02/16/2024    ANIONGAP 9.0 02/16/2024       Lab Results   Component Value Date    WBC 9.60 02/16/2024    HGB 11.4 (L) 02/16/2024    HCT 36.7 (L) 02/16/2024    MCV 99.7 (H) 02/16/2024     02/16/2024       Lab Results   Component Value Date    PSA 1.160 04/15/2021    PSA 8.640 (H) 11/30/2020    PSA 1.450 08/26/2020       Images:   No Images in the past 120 days found..    Measures:   Tobacco:   Forrest Zepeda  reports that he quit smoking about 73 years ago. His smoking use included cigarettes. He has a 0.5 pack-year smoking history. He has been exposed to tobacco smoke. He has never used smokeless tobacco.    Assessment / Plan      Assessment/Plan:   89 y.o.  male who presented today for follow up of  left hydronephrosis being managed with chronic stent exchanges, last 2/15/24.  He has a Tria stent.  He has had recurrent sxs of UTI with dysuria and hematuria since Sun.  We check a urine culture empirically start Bactrim based on prior sensitivities.  Will schedule him for a stent exchange in the near future.  R/B/A discussed.  All questions answered.  He wishes to proceed.      Diagnoses and all orders for this visit:    1. Hydronephrosis of left kidney (Primary)  -     POC Urinalysis Dipstick, Automated  -     Case Request; Standing  -     CBC (No Diff); Future  -     Basic Metabolic Panel; Future  -     Protime-INR; Future  -     ECG 12 Lead; Future  -     Case Request  -     Urine Culture - Urine, Urine, Clean Catch; Future  -     Urine Culture - Urine, Urine, Clean Catch    2. Hematuria, unspecified type  -     Protime-INR; Future  -     sulfamethoxazole-trimethoprim (Bactrim DS) 800-160 MG per tablet; Take 1 tablet by mouth 2 (Two) Times a Day.  Dispense: 10 tablet; Refill: 0  -     Urine Culture - Urine, Urine, Clean Catch; Future  -     Urine Culture - Urine, Urine, Clean Catch    Other orders  -     Outpatient In A Bed; Standing  -     Follow Anesthesia Guidelines / Protocol; Future  -     Follow Anesthesia Guidelines / Protocol; Standing  -     Verify NPO Status; Standing  -     Obtain Informed Consent; Standing  -     Verify / Perform Chlorhexidine Skin Prep; Standing  -     Provide NPO Instructions to Patient; Future  -     Chlorhexidine Skin Prep; Future  -     Place Sequential Compression Device; Standing  -     Maintain Sequential Compression Device; Standing         Follow Up:   Return for Follow up after surgery.    I spent approximately 30 minutes providing clinical care for this patient; including review of patient's chart and provider documentation, face to face time spent with patient in examination room (obtaining history, performing physical exam,  discussing diagnosis and management options), placing orders, and completing patient documentation.     Deanne Pitts MD  [facility] [place]

## 2024-06-26 NOTE — H&P (VIEW-ONLY)
Follow Up Office Visit      Patient Name: Forrest Zepeda  : 1932   MRN: [id number]     Chief Complaint:    Chief Complaint   Patient presents with    Hydronephrosis       Referring Provider: No ref. provider found    History of Present Illness: Forrest Zepeda is a 89 y.o. male who presents today for follow up of left hydronephrosis/left ureteral stricture, managed with chronic ureteral stents.  Last exchanged 2/15/24.  He has a Tria stent in place.       He had been doing well until Last Sun.  Recurrent episode of hematuria and dysuria for 2 or 3 days.  Reports continued cloudy urine, but no further dysuria, hematuria, or any suprapubic or flank pain.      U/a:  3+ bld, 1+prot, 3+ lueks    Subjective      Review of System:   Review of Systems   Genitourinary:  Negative for decreased urine volume, difficulty urinating, dysuria, enuresis, flank pain, frequency, hematuria and urgency.      I have reviewed the ROS documented by my clinical staff, I have updated appropriately and I agree. Deanne Pitts MD    I have reviewed and the following portions of the patient's history were updated as appropriate: past family history, past medical history, past social history, past surgical history and problem list.    Past Medical History:   Past Medical History:   Diagnosis Date    Abnormal EKG     Abnormal PSA     Reported abnormal    Acute cystitis with hematuria 2023    Acute deep vein thrombosis (DVT) of right lower extremity 2019    Acute diverticulitis     Acute hyperkalemia 2019    Acute respiratory failure with hypoxia     Acute saddle pulmonary embolism with acute cor pulmonale 2019    Acute systolic right heart failure 2019    Acute UTI (urinary tract infection)     Arthritis     KNEES,  BUT SINCE HAD REPLACEMENT    Benign localized hyperplasia of prostate     Bilateral knee pain     C. difficile diarrhea 2010    Cataracts, bilateral     CKD (chronic kidney disease)      "Coronary artery disease     Diabetic neuropathy     Diastolic dysfunction     Disease of thyroid gland     HYPOTHYROIDISM    Diverticulitis     Dyslipidemia     H/O    Erectile dysfunction     Family history of malignant hyperthermia     Brother     Full dentures     Generalized weakness     History of ESBL E. coli infection     most recent 4-2022 f/u with ID Dr Johnson    History of gout     History of hepatitis A vaccination     History of nephrolithiasis     History of nuclear stress test     STATES IN THE 1990'S.  \"IT WAS OK\"    History of osteopenia     History of recurrent UTI (urinary tract infection)     Hydronephrosis of left kidney 07/18/2019    Hydronephrosis with renal and ureteral calculus obstruction 10/21/2019    Added automatically from request for surgery [id number]    Hydronephrosis with ureteral stricture     Hypercholesterolemia     Hyperkalemia     PT UNSURE REGARDING HX OF THIS    Hyperlipidemia     Hypertension     Hypothyroidism     Impaired functional mobility, balance, gait, and endurance     Insomnia     IPF (idiopathic pulmonary fibrosis)     per patient and son, dx at [facility], was told no treatment necessary, not to worry about it    On supplemental oxygen therapy     2L NC QHS    Other hydronephrosis 08/12/2019    Added automatically from request for surgery [id number]    Paroxysmal atrial fibrillation     Pulmonary hypertension, mild 02/2021    per echo per cardiology note 1/9/23, per pt's son, PCP does not agree with this dx    Renal insufficiency     Sepsis 2019    Shortness of breath     STATES WITH EXERTION, OTHERWISE, DENIES    Sigmoid diverticulitis without abscess or perforation 08/09/2017    Sinus node dysfunction     Skin breakdown     fourth toe right foot noted in 2019 MD D/C note    Skin ulcer of fourth toe of right foot, limited to breakdown of skin 07/05/2019    Stricture of ureter     Type 2 diabetes mellitus     Ureteral stricture, left     Urinary incontinence     UTI " (urinary tract infection) 07/18/2019    Vitamin D deficiency     Wears glasses        Past Surgical History:  Past Surgical History:   Procedure Laterality Date    APPENDECTOMY      CARDIAC ELECTROPHYSIOLOGY PROCEDURE N/A 12/23/2020    Procedure: Pacemaker DC new. DNS meds.;  Surgeon: Jimy Ledezma DO;  Location:  [facility];  Service: Cardiology;  Laterality: N/A;    CHOLECYSTECTOMY      CIRCUMCISION N/A 10/23/2019    Procedure: CIRCUMCISIOn-DORSAL SLIT;  Surgeon: Griffin Romero MD;  Location:  [facility];  Service: Urology    COLONOSCOPY      CYSTOSCOPY RETROGRADE PYELOGRAM Left 06/17/2022    Procedure: CYSTOSCOPY RETROGRADE PYELOGRAM;  Surgeon: Ryne Mccann MD;  Location:  [facility];  Service: Urology;  Laterality: Left;    CYSTOSCOPY URETEROSCOPY Left 02/11/2019    Procedure: URETEROSCOPY WITH STENT EXTRACTION, RPG;  Surgeon: Griffin Romero MD;  Location:  [facility];  Service: Urology    CYSTOSCOPY W/ URETERAL STENT PLACEMENT Left 07/20/2019    Procedure: CYSTOSCOPY, LEFT RETROGRADE PYELOGRAM,  ATTEMPTED LEFT URETERAL STENT INSERTION;  Surgeon: Isaac Flynn MD;  Location:  [facility];  Service: Urology    CYSTOSCOPY W/ URETERAL STENT PLACEMENT Left 06/17/2022    Procedure: CYSTOSCOPY URETERAL CATHETER/STENT INSERTION;  Surgeon: Ryne Mccann MD;  Location:  [facility];  Service: Urology;  Laterality: Left;    CYSTOSCOPY W/ URETERAL STENT PLACEMENT Left 01/27/2023    Procedure: CYSTOSCOPY URETERAL CATHETER/STENT INSERTION;  Surgeon: Deanne Pitts MD;  Location:  [facility];  Service: Urology;  Laterality: Left;    CYSTOSCOPY W/ URETERAL STENT PLACEMENT Left 02/15/2024    Procedure: CYSTOSCOPY LEFT STENT EXCHANGE;  Surgeon: Deanne Pitts MD;  Location:  [facility];  Service: Urology;  Laterality: Left;    CYSTOSCOPY, URETEROSCOPY, RETROGRADE PYELOGRAM, STONE EXTRACTION, STENT INSERTION Left 06/15/2023    Procedure: URETEROSCOPY, RETROGRADE PYELOGRAM, STENT INSERTION;  Surgeon: Hong  MD Deanne;  Location: [facility] OR;  Service: Urology;  Laterality: Left;    EYE SURGERY      CATARACTS REMOVED BILATERALLY    JOINT REPLACEMENT      Bilateral knees    KNEE ARTHROPLASTY Bilateral     KNEE ARTHROSCOPY Bilateral     RENAL ARTERY STENT      SEVERAL TIMES EVERY SINCE 1978    URETERAL STENT INSERTION  10/2020    URETEROSCOPY LASER LITHOTRIPSY WITH STENT INSERTION Left 01/10/2018    Procedure:  cysto, left ureteral stent replacement ;  Surgeon: Griffin Romero MD;  Location: [facility] OR;  Service:     URETEROSCOPY LASER LITHOTRIPSY WITH STENT INSERTION Left 08/14/2019    Procedure: URETEROSCOPY, STONE REMOVAL WITH STENT INSERTION;  Surgeon: Griffin Romero MD;  Location: [facility] OR;  Service: Urology    URETEROSCOPY LASER LITHOTRIPSY WITH STENT INSERTION Left 10/23/2019    Procedure: Left URETEROSCOPY WITH STENT INSERTION-LEFT;  Surgeon: Griffin Romero MD;  Location: [facility] OR;  Service: Urology       Family History:   family history includes Brain cancer in his brother and sister; Heart attack in his sister; Hypertension in his sister; Lung cancer in his brother and sister; Malig Hyperthermia in his brother; No Known Problems in his mother; Stomach cancer in his father; Stroke in his sister.   Otherwise pertinent FHx was reviewed and not pertinent to current issue.    Social History:    reports that he quit smoking about 73 years ago. His smoking use included cigarettes. He has a 0.5 pack-year smoking history. He has been exposed to tobacco smoke. He has never used smokeless tobacco. He reports that he does not drink alcohol and does not use drugs.    Medications:     Current Outpatient Medications:     allopurinol (ZYLOPRIM) 300 MG tablet, Take 1 tablet by mouth Every Night. To lower risk of gout, Disp: 90 tablet, Rfl: 3    apixaban (Eliquis) 2.5 MG tablet tablet, Take 1 tablet by mouth 2 (Two) Times a Day., Disp: 180 tablet, Rfl: 3    aspirin 81 MG EC tablet, Take 1 tablet by mouth Daily. (Patient  taking differently: Take 1 tablet by mouth Daily. OTC), Disp: 90 tablet, Rfl: 3    bisoprolol (ZEBeta) 5 MG tablet, Take 1 tablet by mouth Daily., Disp: 90 tablet, Rfl: 3    Diclofenac Sodium (VOLTAREN) 1 % gel gel, Apply 2 g topically to the appropriate area as directed 4 (Four) Times a Day. (Patient taking differently: Apply 2 g topically to the appropriate area as directed 2 (Two) Times a Day As Needed (Joint pain).), Disp: 100 g, Rfl: 0    docusate sodium (COLACE) 100 MG capsule, Take 1 capsule by mouth Daily. OTC, Disp: , Rfl:     finasteride (PROSCAR) 5 MG tablet, TAKE 1 TABLET BY MOUTH DAILY, Disp: 90 tablet, Rfl: 3    glimepiride (AMARYL) 1 MG tablet, Take 1 tablet by mouth Every Morning Before Breakfast. Indications: Type 2 Diabetes, Disp: 90 tablet, Rfl: 3    HYDROcodone-acetaminophen (Norco) 5-325 MG per tablet, Take 1 tablet by mouth Every 12 (Twelve) Hours As Needed for Severe Pain., Disp: 60 tablet, Rfl: 0    Insulin Glargine, 1 Unit Dial, (Toujeo SoloStar) 300 UNIT/ML solution pen-injector injection, Inject 20 Units under the skin into the appropriate area as directed Every Night., Disp: 6 mL, Rfl: 3    levothyroxine (SYNTHROID, LEVOTHROID) 50 MCG tablet, Take 1 tablet by mouth Daily. Indications: Underactive Thyroid (Patient taking differently: Take 1 tablet by mouth Every Morning. Indications: Underactive Thyroid), Disp: 90 tablet, Rfl: 3    Mirabegron ER (Myrbetriq) 50 MG tablet sustained-release 24 hour 24 hr tablet, Take 50 mg by mouth Daily for 360 days., Disp: 90 tablet, Rfl: 3    Multiple Vitamins-Minerals (OCUVITE ADULT 50+ PO), Take 1 capsule by mouth Daily. OTC, Disp: , Rfl:     silodosin (RAPAFLO) 8 MG capsule capsule, Take 1 capsule by mouth Daily With Breakfast. To help with urination, Disp: 90 capsule, Rfl: 3    SITagliptin (Januvia) 100 MG tablet, Take 1 tablet by mouth Daily. For diabetes, Disp: 90 tablet, Rfl: 3    triazolam (HALCION) 0.25 MG tablet, Take 1 tablet by mouth At Night  "As Needed for Sleep., Disp: 90 tablet, Rfl: 0    sulfamethoxazole-trimethoprim (Bactrim DS) 800-160 MG per tablet, Take 1 tablet by mouth 2 (Two) Times a Day., Disp: 10 tablet, Rfl: 0    Allergies:   Allergies   Allergen Reactions    Metformin Diarrhea and GI Intolerance    Statins Diarrhea and Myalgia       IPSS Questionnaire (AUA-7):  Over the past month…    1)  Incomplete Emptying  How often have you had a sensation of not emptying your bladder?  5 - Almost always   2)  Frequency  How often have you had to urinate less than every two hours? 3 - About half the time   3)  Intermittency  How often have you found you stopped and started again several times when you urinated?  0 - Not at all   4) Urgency  How often have you found it difficult to postpone urination?  5 - Almost always   5) Weak Stream  How often have you had a weak urinary stream?  1 - Less than 1 time in 5   6) Straining  How often have you had to push or strain to begin urination?  0 - Not at all   7) Nocturia  How many times did you typically get up at night to urinate?  2 - 2 times   Total Score:  16   The International Prostate Symptom Score (IPSS) is used to screen, diagnose, track symptoms of benign prostatic hyperplasia (BPH).    0-7 pts (Mild Symptoms)  / 8-19 pts (Moderate) / 20-35 (Severe)    Quality of life due to urinary symptoms:  If you were to spend the rest of your life with your urinary condition the way it is now, how would you feel about that? 5-Unhappy   Urine Leakage (Incontinence) 0-No Leakage       Objective     Physical Exam:   Vital Signs:   Vitals:    06/26/24 1322   Weight: 90.7 kg (200 lb)   Height: 175.3 cm (69\")   PainSc: 0-No pain     Body mass index is 29.53 kg/m².     Physical Exam  Constitutional:       Appearance: Normal appearance.   HENT:      Head: Normocephalic and atraumatic.      Nose: Nose normal.   Eyes:      Extraocular Movements: Extraocular movements intact.      Conjunctiva/sclera: Conjunctivae normal.    "   Pupils: Pupils are equal, round, and reactive to light.   Musculoskeletal:         General: Normal range of motion.      Cervical back: Normal range of motion and neck supple.      Comments: In wheelchair   Skin:     General: Skin is warm and dry.      Findings: No lesion or rash.   Neurological:      General: No focal deficit present.      Mental Status: He is alert and oriented to person, place, and time. Mental status is at baseline.   Psychiatric:         Mood and Affect: Mood normal.         Behavior: Behavior normal.         Labs:   Brief Urine Lab Results  (Last result in the past 365 days)        Color   Clarity   Blood   Leuk Est   Nitrite   Protein   CREAT   Urine HCG        06/26/24 1330 Yellow   Turbid   3+   Large (3+)   Negative   1+                   Urine Culture          2/13/2024    11:50 2/15/2024    16:46 6/26/2024    16:33   Urine Culture   Urine Culture No growth  No growth  25,000 CFU/mL Normal Urogenital [place]         Lab Results   Component Value Date    GLUCOSE 148 (H) 02/16/2024    CALCIUM 8.8 02/16/2024     02/16/2024    K 4.4 02/16/2024    CO2 19.0 (L) 02/16/2024     (H) 02/16/2024    BUN 33 (H) 02/16/2024    CREATININE 1.44 (H) 02/16/2024    EGFRIFAFRI 45 (L) 12/13/2021    EGFRIFNONA 39 (L) 12/13/2021    BCR 22.9 02/16/2024    ANIONGAP 9.0 02/16/2024       Lab Results   Component Value Date    WBC 9.60 02/16/2024    HGB 11.4 (L) 02/16/2024    HCT 36.7 (L) 02/16/2024    MCV 99.7 (H) 02/16/2024     02/16/2024       Lab Results   Component Value Date    PSA 1.160 04/15/2021    PSA 8.640 (H) 11/30/2020    PSA 1.450 08/26/2020       Images:   No Images in the past 120 days found..    Measures:   Tobacco:   Forrest Zepeda  reports that he quit smoking about 73 years ago. His smoking use included cigarettes. He has a 0.5 pack-year smoking history. He has been exposed to tobacco smoke. He has never used smokeless tobacco.    Assessment / Plan      Assessment/Plan:   89 y.o.  male who presented today for follow up of  left hydronephrosis being managed with chronic stent exchanges, last 2/15/24.  He has a Tria stent.  He has had recurrent sxs of UTI with dysuria and hematuria since Sun.  We check a urine culture empirically start Bactrim based on prior sensitivities.  Will schedule him for a stent exchange in the near future.  R/B/A discussed.  All questions answered.  He wishes to proceed.      Diagnoses and all orders for this visit:    1. Hydronephrosis of left kidney (Primary)  -     POC Urinalysis Dipstick, Automated  -     Case Request; Standing  -     CBC (No Diff); Future  -     Basic Metabolic Panel; Future  -     Protime-INR; Future  -     ECG 12 Lead; Future  -     Case Request  -     Urine Culture - Urine, Urine, Clean Catch; Future  -     Urine Culture - Urine, Urine, Clean Catch    2. Hematuria, unspecified type  -     Protime-INR; Future  -     sulfamethoxazole-trimethoprim (Bactrim DS) 800-160 MG per tablet; Take 1 tablet by mouth 2 (Two) Times a Day.  Dispense: 10 tablet; Refill: 0  -     Urine Culture - Urine, Urine, Clean Catch; Future  -     Urine Culture - Urine, Urine, Clean Catch    Other orders  -     Outpatient In A Bed; Standing  -     Follow Anesthesia Guidelines / Protocol; Future  -     Follow Anesthesia Guidelines / Protocol; Standing  -     Verify NPO Status; Standing  -     Obtain Informed Consent; Standing  -     Verify / Perform Chlorhexidine Skin Prep; Standing  -     Provide NPO Instructions to Patient; Future  -     Chlorhexidine Skin Prep; Future  -     Place Sequential Compression Device; Standing  -     Maintain Sequential Compression Device; Standing         Follow Up:   Return for Follow up after surgery.    I spent approximately 30 minutes providing clinical care for this patient; including review of patient's chart and provider documentation, face to face time spent with patient in examination room (obtaining history, performing physical exam,  discussing diagnosis and management options), placing orders, and completing patient documentation.     Deanne Pitts MD  [facility] [place]

## 2024-06-28 LAB — BACTERIA SPEC AEROBE CULT: NORMAL

## 2024-07-05 ENCOUNTER — ANESTHESIA EVENT (OUTPATIENT)
Dept: PERIOP | Facility: HOSPITAL | Age: 89
End: 2024-07-05
Payer: MEDICARE

## 2024-07-05 ENCOUNTER — PRE-ADMISSION TESTING (OUTPATIENT)
Dept: PREADMISSION TESTING | Facility: HOSPITAL | Age: 89
End: 2024-07-05
Payer: MEDICARE

## 2024-07-05 VITALS — WEIGHT: 185 LBS | BODY MASS INDEX: 27.4 KG/M2 | HEIGHT: 69 IN

## 2024-07-05 DIAGNOSIS — R31.9 HEMATURIA, UNSPECIFIED TYPE: ICD-10-CM

## 2024-07-05 DIAGNOSIS — N13.30 HYDRONEPHROSIS OF LEFT KIDNEY: ICD-10-CM

## 2024-07-05 LAB
ANION GAP SERPL CALCULATED.3IONS-SCNC: 11 MMOL/L (ref 5–15)
BUN SERPL-MCNC: 62 MG/DL (ref 8–23)
BUN/CREAT SERPL: 26.7 (ref 7–25)
CALCIUM SPEC-SCNC: 9.8 MG/DL (ref 8.2–9.6)
CHLORIDE SERPL-SCNC: 107 MMOL/L (ref 98–107)
CO2 SERPL-SCNC: 21 MMOL/L (ref 22–29)
CREAT SERPL-MCNC: 2.32 MG/DL (ref 0.76–1.27)
DEPRECATED RDW RBC AUTO: 59.7 FL (ref 37–54)
EGFRCR SERPLBLD CKD-EPI 2021: 25.7 ML/MIN/1.73
ERYTHROCYTE [DISTWIDTH] IN BLOOD BY AUTOMATED COUNT: 16.8 % (ref 12.3–15.4)
GLUCOSE SERPL-MCNC: 192 MG/DL (ref 65–99)
HCT VFR BLD AUTO: 38.2 % (ref 37.5–51)
HGB BLD-MCNC: 12.1 G/DL (ref 13–17.7)
INR PPP: 1.18 (ref 0.89–1.12)
MCH RBC QN AUTO: 30.9 PG (ref 26.6–33)
MCHC RBC AUTO-ENTMCNC: 31.7 G/DL (ref 31.5–35.7)
MCV RBC AUTO: 97.4 FL (ref 79–97)
PLATELET # BLD AUTO: 190 10*3/MM3 (ref 140–450)
PMV BLD AUTO: 9.1 FL (ref 6–12)
POTASSIUM SERPL-SCNC: 5 MMOL/L (ref 3.5–5.2)
PROTHROMBIN TIME: 15.2 SECONDS (ref 12.2–14.5)
RBC # BLD AUTO: 3.92 10*6/MM3 (ref 4.14–5.8)
SODIUM SERPL-SCNC: 139 MMOL/L (ref 136–145)
WBC NRBC COR # BLD AUTO: 11.48 10*3/MM3 (ref 3.4–10.8)

## 2024-07-05 PROCEDURE — 80048 BASIC METABOLIC PNL TOTAL CA: CPT

## 2024-07-05 PROCEDURE — 85027 COMPLETE CBC AUTOMATED: CPT

## 2024-07-05 PROCEDURE — 36415 COLL VENOUS BLD VENIPUNCTURE: CPT

## 2024-07-05 PROCEDURE — 85610 PROTHROMBIN TIME: CPT

## 2024-07-05 RX ORDER — ISOSORBIDE MONONITRATE 30 MG/1
30 TABLET, EXTENDED RELEASE ORAL DAILY
COMMUNITY

## 2024-07-05 RX ORDER — LOSARTAN POTASSIUM 25 MG/1
25 TABLET ORAL DAILY
COMMUNITY

## 2024-07-05 NOTE — PAT
Patient did not review general PAT education video as instructed in their preoperative information received from their surgeon.  One-on-one Pre Admission Testing general education provided during PAT visit.  Copies of PAT general education handouts (Incentive Spirometry, Meds to Beds Program, Patient Belongings, Pre-op skin preparation instructions, Blood Glucose testing, Visitor policy, Surgery FAQ, Code H) distributed to patient. Encouraged patient/family to read PAT general education handouts thoroughly and notify PAT staff with any questions or concerns. Patient instructed to bring PAT pass and completed skin prep sheet (if applicable) on the day of procedure. Patient verbalized understanding of all information and priority content.     Per Anesthesia Request, patient instructed not to take their ACE/ARB medications on the AM of surgery.      Dr. Walker reviewed EKG from 2-, previous EKG, ECHO from , and last cardiology note from 1/2024.  Pt denies chest pain, but reports the same SOB that he has had for years.   Cleared by Dr. Walker.     Spoke with Noemi, surgery scheduler, she will clarify if Dr. Pitts wants to hold Eliquis and then obtain permission to hold from Dr. Saha.  She will notify pt of instructions.   Pt verbalized understanding to expect her call.     Pacemaker last interrogated 5-, battery life 5 years.

## 2024-07-07 DIAGNOSIS — N18.31 TYPE 2 DIABETES MELLITUS WITH STAGE 3A CHRONIC KIDNEY DISEASE, WITH LONG-TERM CURRENT USE OF INSULIN: ICD-10-CM

## 2024-07-07 DIAGNOSIS — E11.65 TYPE 2 DIABETES MELLITUS WITH HYPERGLYCEMIA, WITH LONG-TERM CURRENT USE OF INSULIN: ICD-10-CM

## 2024-07-07 DIAGNOSIS — E11.22 TYPE 2 DIABETES MELLITUS WITH STAGE 3A CHRONIC KIDNEY DISEASE, WITH LONG-TERM CURRENT USE OF INSULIN: ICD-10-CM

## 2024-07-07 DIAGNOSIS — Z79.4 TYPE 2 DIABETES MELLITUS WITH HYPERGLYCEMIA, WITH LONG-TERM CURRENT USE OF INSULIN: ICD-10-CM

## 2024-07-07 DIAGNOSIS — Z79.4 TYPE 2 DIABETES MELLITUS WITH STAGE 3A CHRONIC KIDNEY DISEASE, WITH LONG-TERM CURRENT USE OF INSULIN: ICD-10-CM

## 2024-07-08 RX ORDER — INSULIN GLARGINE 300 U/ML
20 INJECTION, SOLUTION SUBCUTANEOUS NIGHTLY
Qty: 6 ML | Refills: 3 | Status: SHIPPED | OUTPATIENT
Start: 2024-07-08

## 2024-07-08 NOTE — TELEPHONE ENCOUNTER
Rx Refill Note  Requested Prescriptions     Pending Prescriptions Disp Refills    Toujeo SoloStar 300 UNIT/ML solution pen-injector injection [Pharmacy Med Name: TOUJEO SOLOSTAR 300 UNIT/ML] 6 mL 3     Sig: INJECT 20 UNITS UNDER THE SKIN INTO THE APPROPRIATE AREA AS DIRECTED EVERY NIGHT      Last office visit with prescribing clinician: 1/23/2024   Next office visit with prescribing clinician: 7/23/2024                         Would you like a call back once the refill request has been completed: [] Yes [] No    If the office needs to give you a call back, can they leave a voicemail: [] Yes [] No    Blayne Horan MA  07/08/24, 08:58 EDT

## 2024-07-08 NOTE — PAT
Per Dr Hong Franklin at his office, patient does not need to stop eliquis. She will notify patient.

## 2024-07-09 RX ORDER — SODIUM CHLORIDE 0.9 % (FLUSH) 0.9 %
10 SYRINGE (ML) INJECTION EVERY 12 HOURS SCHEDULED
Status: CANCELLED | OUTPATIENT
Start: 2024-07-09

## 2024-07-09 RX ORDER — FAMOTIDINE 10 MG/ML
20 INJECTION, SOLUTION INTRAVENOUS ONCE
Status: CANCELLED | OUTPATIENT
Start: 2024-07-09 | End: 2024-07-09

## 2024-07-10 ENCOUNTER — APPOINTMENT (OUTPATIENT)
Dept: GENERAL RADIOLOGY | Facility: HOSPITAL | Age: 89
End: 2024-07-10
Payer: MEDICARE

## 2024-07-10 ENCOUNTER — HOSPITAL ENCOUNTER (OUTPATIENT)
Facility: HOSPITAL | Age: 89
Discharge: HOME | End: 2024-07-10
Attending: UROLOGY | Admitting: UROLOGY
Payer: MEDICARE

## 2024-07-10 ENCOUNTER — ANESTHESIA (OUTPATIENT)
Dept: PERIOP | Facility: HOSPITAL | Age: 89
End: 2024-07-10
Payer: MEDICARE

## 2024-07-10 VITALS
DIASTOLIC BLOOD PRESSURE: 74 MMHG | SYSTOLIC BLOOD PRESSURE: 133 MMHG | TEMPERATURE: 97.2 F | OXYGEN SATURATION: 93 % | RESPIRATION RATE: 16 BRPM | HEART RATE: 110 BPM

## 2024-07-10 DIAGNOSIS — N13.30 HYDRONEPHROSIS OF LEFT KIDNEY: ICD-10-CM

## 2024-07-10 DIAGNOSIS — N13.5 URETERAL STRICTURE: Primary | ICD-10-CM

## 2024-07-10 LAB
GLUCOSE BLDC GLUCOMTR-MCNC: 118 MG/DL (ref 70–130)
GLUCOSE BLDC GLUCOMTR-MCNC: 118 MG/DL (ref 70–130)

## 2024-07-10 PROCEDURE — 25010000002 FENTANYL CITRATE (PF) 100 MCG/2ML SOLUTION: Performed by: NURSE ANESTHETIST, CERTIFIED REGISTERED

## 2024-07-10 PROCEDURE — 25510000001 IOPAMIDOL 61 % SOLUTION: Performed by: UROLOGY

## 2024-07-10 PROCEDURE — 25010000002 PROPOFOL 10 MG/ML EMULSION: Performed by: NURSE ANESTHETIST, CERTIFIED REGISTERED

## 2024-07-10 PROCEDURE — 74420 UROGRAPHY RTRGR +-KUB: CPT

## 2024-07-10 PROCEDURE — 87086 URINE CULTURE/COLONY COUNT: CPT | Performed by: UROLOGY

## 2024-07-10 PROCEDURE — 25010000002 PIPERACILLIN SOD-TAZOBACTAM PER 1 G: Performed by: UROLOGY

## 2024-07-10 PROCEDURE — 63710000001 FAMOTIDINE 20 MG TABLET: Performed by: ANESTHESIOLOGY

## 2024-07-10 PROCEDURE — A9270 NON-COVERED ITEM OR SERVICE: HCPCS | Performed by: ANESTHESIOLOGY

## 2024-07-10 PROCEDURE — C1758 CATHETER, URETERAL: HCPCS | Performed by: UROLOGY

## 2024-07-10 PROCEDURE — 82948 REAGENT STRIP/BLOOD GLUCOSE: CPT

## 2024-07-10 PROCEDURE — 25810000003 SODIUM CHLORIDE 0.9 % SOLUTION: Performed by: ANESTHESIOLOGY

## 2024-07-10 PROCEDURE — 25010000002 GENTAMICIN PER 80 MG: Performed by: UROLOGY

## 2024-07-10 PROCEDURE — C2617 STENT, NON-COR, TEM W/O DEL: HCPCS | Performed by: UROLOGY

## 2024-07-10 PROCEDURE — C1769 GUIDE WIRE: HCPCS | Performed by: UROLOGY

## 2024-07-10 DEVICE — URETERAL STENT WITH SIDE HOLES 8FX24CM
Type: IMPLANTABLE DEVICE | Site: URETER | Status: FUNCTIONAL
Brand: TRIA™ FIRM

## 2024-07-10 RX ORDER — SODIUM CHLORIDE 0.9 % (FLUSH) 0.9 %
3 SYRINGE (ML) INJECTION EVERY 12 HOURS SCHEDULED
Status: DISCONTINUED | OUTPATIENT
Start: 2024-07-10 | End: 2024-07-10 | Stop reason: HOSPADM

## 2024-07-10 RX ORDER — SODIUM CHLORIDE, SODIUM LACTATE, POTASSIUM CHLORIDE, CALCIUM CHLORIDE 600; 310; 30; 20 MG/100ML; MG/100ML; MG/100ML; MG/100ML
9 INJECTION, SOLUTION INTRAVENOUS CONTINUOUS
Status: DISCONTINUED | OUTPATIENT
Start: 2024-07-10 | End: 2024-07-10

## 2024-07-10 RX ORDER — DROPERIDOL 2.5 MG/ML
0.62 INJECTION, SOLUTION INTRAMUSCULAR; INTRAVENOUS ONCE AS NEEDED
Status: DISCONTINUED | OUTPATIENT
Start: 2024-07-10 | End: 2024-07-10 | Stop reason: HOSPADM

## 2024-07-10 RX ORDER — GENTAMICIN 40 MG/ML
INJECTION, SOLUTION INTRAMUSCULAR; INTRAVENOUS AS NEEDED
Status: DISCONTINUED | OUTPATIENT
Start: 2024-07-10 | End: 2024-07-10 | Stop reason: HOSPADM

## 2024-07-10 RX ORDER — SODIUM CHLORIDE 9 MG/ML
40 INJECTION, SOLUTION INTRAVENOUS AS NEEDED
Status: DISCONTINUED | OUTPATIENT
Start: 2024-07-10 | End: 2024-07-10 | Stop reason: HOSPADM

## 2024-07-10 RX ORDER — PROMETHAZINE HYDROCHLORIDE 25 MG/1
25 SUPPOSITORY RECTAL ONCE AS NEEDED
Status: DISCONTINUED | OUTPATIENT
Start: 2024-07-10 | End: 2024-07-10 | Stop reason: HOSPADM

## 2024-07-10 RX ORDER — FENTANYL CITRATE 50 UG/ML
INJECTION, SOLUTION INTRAMUSCULAR; INTRAVENOUS AS NEEDED
Status: DISCONTINUED | OUTPATIENT
Start: 2024-07-10 | End: 2024-07-10 | Stop reason: SURG

## 2024-07-10 RX ORDER — FAMOTIDINE 20 MG/1
20 TABLET, FILM COATED ORAL ONCE
Status: COMPLETED | OUTPATIENT
Start: 2024-07-10 | End: 2024-07-10

## 2024-07-10 RX ORDER — NALOXONE HCL 0.4 MG/ML
0.4 VIAL (ML) INJECTION AS NEEDED
Status: DISCONTINUED | OUTPATIENT
Start: 2024-07-10 | End: 2024-07-10 | Stop reason: HOSPADM

## 2024-07-10 RX ORDER — LIDOCAINE HYDROCHLORIDE 10 MG/ML
INJECTION, SOLUTION EPIDURAL; INFILTRATION; INTRACAUDAL; PERINEURAL AS NEEDED
Status: DISCONTINUED | OUTPATIENT
Start: 2024-07-10 | End: 2024-07-10 | Stop reason: SURG

## 2024-07-10 RX ORDER — HYDROCODONE BITARTRATE AND ACETAMINOPHEN 5; 325 MG/1; MG/1
1 TABLET ORAL ONCE AS NEEDED
Status: DISCONTINUED | OUTPATIENT
Start: 2024-07-10 | End: 2024-07-10 | Stop reason: HOSPADM

## 2024-07-10 RX ORDER — DROPERIDOL 2.5 MG/ML
0.62 INJECTION, SOLUTION INTRAMUSCULAR; INTRAVENOUS
Status: DISCONTINUED | OUTPATIENT
Start: 2024-07-10 | End: 2024-07-10 | Stop reason: HOSPADM

## 2024-07-10 RX ORDER — MIDAZOLAM HYDROCHLORIDE 1 MG/ML
0.5 INJECTION INTRAMUSCULAR; INTRAVENOUS
Status: DISCONTINUED | OUTPATIENT
Start: 2024-07-10 | End: 2024-07-10 | Stop reason: HOSPADM

## 2024-07-10 RX ORDER — HYDRALAZINE HYDROCHLORIDE 20 MG/ML
5 INJECTION INTRAMUSCULAR; INTRAVENOUS
Status: DISCONTINUED | OUTPATIENT
Start: 2024-07-10 | End: 2024-07-10 | Stop reason: HOSPADM

## 2024-07-10 RX ORDER — SODIUM CHLORIDE 0.9 % (FLUSH) 0.9 %
3-10 SYRINGE (ML) INJECTION AS NEEDED
Status: DISCONTINUED | OUTPATIENT
Start: 2024-07-10 | End: 2024-07-10 | Stop reason: HOSPADM

## 2024-07-10 RX ORDER — SODIUM CHLORIDE 9 MG/ML
9 INJECTION, SOLUTION INTRAVENOUS CONTINUOUS
Status: DISCONTINUED | OUTPATIENT
Start: 2024-07-10 | End: 2024-07-10 | Stop reason: HOSPADM

## 2024-07-10 RX ORDER — LIDOCAINE HYDROCHLORIDE 10 MG/ML
0.5 INJECTION, SOLUTION EPIDURAL; INFILTRATION; INTRACAUDAL; PERINEURAL ONCE AS NEEDED
Status: DISCONTINUED | OUTPATIENT
Start: 2024-07-10 | End: 2024-07-10 | Stop reason: HOSPADM

## 2024-07-10 RX ORDER — FLUCONAZOLE 100 MG/1
100 TABLET ORAL DAILY
Qty: 15 TABLET | Refills: 0 | Status: SHIPPED | OUTPATIENT
Start: 2024-07-10 | End: 2024-07-24

## 2024-07-10 RX ORDER — PROPOFOL 10 MG/ML
VIAL (ML) INTRAVENOUS AS NEEDED
Status: DISCONTINUED | OUTPATIENT
Start: 2024-07-10 | End: 2024-07-10 | Stop reason: SURG

## 2024-07-10 RX ORDER — MAGNESIUM HYDROXIDE 1200 MG/15ML
LIQUID ORAL AS NEEDED
Status: DISCONTINUED | OUTPATIENT
Start: 2024-07-10 | End: 2024-07-10 | Stop reason: HOSPADM

## 2024-07-10 RX ORDER — HYDROMORPHONE HYDROCHLORIDE 1 MG/ML
0.5 INJECTION, SOLUTION INTRAMUSCULAR; INTRAVENOUS; SUBCUTANEOUS
Status: DISCONTINUED | OUTPATIENT
Start: 2024-07-10 | End: 2024-07-10 | Stop reason: HOSPADM

## 2024-07-10 RX ORDER — FENTANYL CITRATE 50 UG/ML
50 INJECTION, SOLUTION INTRAMUSCULAR; INTRAVENOUS
Status: DISCONTINUED | OUTPATIENT
Start: 2024-07-10 | End: 2024-07-10 | Stop reason: HOSPADM

## 2024-07-10 RX ORDER — LABETALOL HYDROCHLORIDE 5 MG/ML
5 INJECTION, SOLUTION INTRAVENOUS
Status: DISCONTINUED | OUTPATIENT
Start: 2024-07-10 | End: 2024-07-10 | Stop reason: HOSPADM

## 2024-07-10 RX ORDER — PROMETHAZINE HYDROCHLORIDE 25 MG/1
25 TABLET ORAL ONCE AS NEEDED
Status: DISCONTINUED | OUTPATIENT
Start: 2024-07-10 | End: 2024-07-10 | Stop reason: HOSPADM

## 2024-07-10 RX ORDER — ONDANSETRON 2 MG/ML
4 INJECTION INTRAMUSCULAR; INTRAVENOUS ONCE AS NEEDED
Status: DISCONTINUED | OUTPATIENT
Start: 2024-07-10 | End: 2024-07-10 | Stop reason: HOSPADM

## 2024-07-10 RX ORDER — IPRATROPIUM BROMIDE AND ALBUTEROL SULFATE 2.5; .5 MG/3ML; MG/3ML
3 SOLUTION RESPIRATORY (INHALATION) ONCE AS NEEDED
Status: DISCONTINUED | OUTPATIENT
Start: 2024-07-10 | End: 2024-07-10 | Stop reason: HOSPADM

## 2024-07-10 RX ORDER — SODIUM CHLORIDE 0.9 % (FLUSH) 0.9 %
10 SYRINGE (ML) INJECTION AS NEEDED
Status: DISCONTINUED | OUTPATIENT
Start: 2024-07-10 | End: 2024-07-10 | Stop reason: HOSPADM

## 2024-07-10 RX ADMIN — FENTANYL CITRATE 25 MCG: 50 INJECTION, SOLUTION INTRAMUSCULAR; INTRAVENOUS at 07:41

## 2024-07-10 RX ADMIN — LIDOCAINE HYDROCHLORIDE 50 MG: 10 INJECTION, SOLUTION EPIDURAL; INFILTRATION; INTRACAUDAL; PERINEURAL at 07:28

## 2024-07-10 RX ADMIN — FAMOTIDINE 20 MG: 20 TABLET, FILM COATED ORAL at 06:53

## 2024-07-10 RX ADMIN — PROPOFOL 100 MG: 10 INJECTION, EMULSION INTRAVENOUS at 07:28

## 2024-07-10 RX ADMIN — SODIUM CHLORIDE 9 ML/HR: 9 INJECTION, SOLUTION INTRAVENOUS at 06:53

## 2024-07-10 RX ADMIN — PIPERACILLIN AND TAZOBACTAM 3.38 G: 3; .375 INJECTION, POWDER, LYOPHILIZED, FOR SOLUTION INTRAVENOUS; PARENTERAL at 07:39

## 2024-07-10 RX ADMIN — PROPOFOL 100 MCG/KG/MIN: 10 INJECTION, EMULSION INTRAVENOUS at 07:29

## 2024-07-10 NOTE — ANESTHESIA PREPROCEDURE EVALUATION
Anesthesia Evaluation     Patient summary reviewed and Nursing notes reviewed   history of anesthetic complications:  malignant hyperthermia  NPO Solid Status: > 8 hours  NPO Liquid Status: > 2 hours           Airway   Mallampati: I  TM distance: >3 FB  Neck ROM: full  No difficulty expected  Dental      Pulmonary     breath sounds clear to auscultation  (+) pneumonia , pulmonary embolism,shortness of breath  Cardiovascular     ECG reviewed  Rhythm: regular  Rate: normal    (+) hypertension, CAD, dysrhythmias, CHF , orthopnea, LEHMAN, DVT, hyperlipidemia      Neuro/Psych  (+) syncope, weakness  GI/Hepatic/Renal/Endo    (+) renal disease- CRI, diabetes mellitus using insulin, thyroid problem hypothyroidism  (-) GERD    Musculoskeletal     Abdominal    Substance History      OB/GYN          Other   arthritis,     ROS/Med Hx Other: Left ventricular systolic function is normal. Estimated left ventricular EF = 52% Left ventricular ejection fraction appears to be 51 - 55%.  ·  Left ventricular wall thickness is consistent with mild concentric hypertrophy.  ·  Left ventricular diastolic function is consistent with (grade I) impaired relaxation.  ·  The right ventricular cavity is mildly dilated.  ·  The left atrial cavity is mildly dilated.  ·  Left atrial volume is moderately increased.  ·  There is mild calcification of the aortic valve mainly affecting the non-coronary, left coronary and right coronary cusp(s).  ·  Estimated right ventricular systolic pressure from tricuspid regurgitation is normal (<35 mmHg).                     Anesthesia Plan    ASA 4     general     (? Susceptibility to MH; non-triggering anesthetic)  intravenous induction     Anesthetic plan, risks, benefits, and alternatives have been provided, discussed and informed consent has been obtained with: patient.    Plan discussed with CRNA.    CODE STATUS:

## 2024-07-10 NOTE — ANESTHESIA PROCEDURE NOTES
Airway  Urgency: elective    Date/Time: 7/10/2024 7:30 AM  Airway not difficult    General Information and Staff    Patient location during procedure: OR  CRNA/CAA: Alma Snell CRNA    Indications and Patient Condition  Indications for airway management: airway protection    Preoxygenated: yes  Mask difficulty assessment: 1 - vent by mask    Final Airway Details  Final airway type: supraglottic airway      Successful airway: I-gel  Size 5     Number of attempts at approach: 1  Assessment: lips, teeth, and gum same as pre-op    Additional Comments  LMA placed without difficulty, ventilation with assist, equal breath sounds and symmetric chest rise and fall.

## 2024-07-10 NOTE — OP NOTE
CYSTOSCOPY URETERAL CATHETER/STENT INSERTION  Procedure Note    Forrest Zepeda  7/10/2024    Pre-op Diagnosis:   Hydronephrosis of left kidney [N13.30]    Post-op Diagnosis:     Post-Op Diagnosis Codes:     * Hydronephrosis of left kidney [N13.30]    Procedure/CPT® Codes:      Procedure(s):  CYSTOSCOPY RETROGRADE PYELOGRAM AND STENT INSERTION    Surgeon(s):  Deanne Pitts MD    Anesthesia: see anesthesia record    Staff:   Circulator: Nabeel Moreno RN; Lucina Lofton RN  Radiology Technologist: Neftaly Johns RT  Scrub Person: Pro Najera    Estimated Blood Loss: minimal  Urine Voided: * No values recorded between 7/10/2024  7:23 AM and 7/10/2024  7:58 AM *    Specimens:                ID Type Source Tests Collected by Time   1 : URINE FOR CULTURE Urine Urine, Catheter URINE CULTURE Deanne Pitts MD 7/10/2024 0741         Drains: 8f x 24 cm JJ TRIA STENT    Findings:   Cystoscopy with what appeared to be yeast in the urine.  There was white plaque-like substance on the posterior wall and dome of bladder.  Heavily trabeculated bladder with no other lesions identified.  Left indwelling ureteral stent removed in its entirety  Left retrograde pyelogram confirmed placement of wire in the left renal pelvis with mild hydronephrosis  Successful placement of 8 Armenian by 24 cm double-J Tria ureteral stent    Blood: N/A    Complications: None immediate    Indication for Procedure: Mr. Zepeda is a 89-year-old gentleman who has a history of left ureteral stricture and has been undergoing chronic stent exchanges.  He is here today for his next exchange    Description of Procedure: The patient was seen and examined in the preoperative area.  The risks, benefits, and alternatives were explained to the patient and family.  Informed consent was obtained.  The patient was then taken back to the operating theater and placed on the table in a supine position.  Venodyne boots were placed on the bilateral lower extremities.  General  anesthesia was induced without any complication.  They were then placed in the dorsal lithotomy position with all pressure points padded and secured.  The patient was prepped and draped in the usual sterile fashion and a timeout was taken.      A 22 Togolese rigid cystoscope was advanced through the urethra and into the bladder.  Cystoscopy was performed in a systematic fashion and there was a white plaque like substance on the posterior wall and dome of the bladder.  This was irrigated out.  Attention was then turned to the left ureteral orifice and a 0.035 sensor wire was advanced through the scope into the left renal pelvis under fluoroscopic guidance.  Once in position the existing stent was grasped and removed in its entirety.  The scope was withdrawn and a 5 Togolese open-ended catheter was then advanced into the left renal pelvis.    Retrograde pyelogram was then performed and there was mild hydronephrosis and confirmation of placement of wire in the left renal pelvis.    An 8 Togolese by 24 cm double-J ureteral stent was advanced over the wire into the left renal pelvis under fluoroscopic guidance.  Once in position the wire was pulled and the stent was deployed.  The bladder was drained and he was given lidocaine jelly.  The patient tolerated the procedure well and there were no complications to the procedure.  He was taken to PACU in stable and extubated condition.    Disposition: The patient will be discharge when PACU criteria is met.  He was given prescription for fluconazole x 14 days.    The intraoperative findings and postoperative plans were discussed with the patient and family.     Deanne Pitts MD     Date: 7/10/2024  Time: 08:07 EDT

## 2024-07-10 NOTE — ANESTHESIA POSTPROCEDURE EVALUATION
Patient: Forrest Zepeda    Procedure Summary       Date: 07/10/24 Room / Location:  [facility]    Anesthesia Start: 0723 Anesthesia Stop: 0804    Procedure: CYSTOSCOPY RETROGRADE PYELOGRAM AND STENT INSERTION (Left: Bladder) Diagnosis:       Hydronephrosis of left kidney      (Hydronephrosis of left kidney [N13.30])    Surgeons: Deanne Pitts MD Provider: Noah Philip MD    Anesthesia Type: general ASA Status: 4            Anesthesia Type: general    Vitals  Vitals Value Taken Time   /74 07/10/24 0830   Temp 97.2 °F (36.2 °C) 07/10/24 0830   Pulse 109 07/10/24 0832   Resp 16 07/10/24 0830   SpO2 94 % 07/10/24 0832   Vitals shown include unfiled device data.        Post Anesthesia Care and Evaluation    Patient location during evaluation: PACU  Patient participation: complete - patient participated  Level of consciousness: awake and alert  Pain management: adequate    Airway patency: patent  Anesthetic complications: No anesthetic complications  PONV Status: none  Cardiovascular status: hemodynamically stable and acceptable  Respiratory status: nonlabored ventilation, acceptable and nasal cannula  Hydration status: acceptable

## 2024-07-11 LAB — BACTERIA SPEC AEROBE CULT: NO GROWTH

## 2024-07-23 ENCOUNTER — OFFICE VISIT (OUTPATIENT)
Dept: CARDIOLOGY | Facility: CLINIC | Age: 89
End: 2024-07-23
Payer: MEDICARE

## 2024-07-23 ENCOUNTER — CLINICAL SUPPORT NO REQUIREMENTS (OUTPATIENT)
Dept: CARDIOLOGY | Facility: CLINIC | Age: 89
End: 2024-07-23
Payer: MEDICARE

## 2024-07-23 ENCOUNTER — OFFICE VISIT (OUTPATIENT)
Dept: INTERNAL MEDICINE | Facility: CLINIC | Age: 89
End: 2024-07-23
Payer: MEDICARE

## 2024-07-23 VITALS
TEMPERATURE: 97 F | SYSTOLIC BLOOD PRESSURE: 116 MMHG | OXYGEN SATURATION: 97 % | BODY MASS INDEX: 28.44 KG/M2 | RESPIRATION RATE: 16 BRPM | WEIGHT: 192 LBS | DIASTOLIC BLOOD PRESSURE: 68 MMHG | HEART RATE: 78 BPM | HEIGHT: 69 IN

## 2024-07-23 VITALS
HEART RATE: 63 BPM | BODY MASS INDEX: 28.44 KG/M2 | WEIGHT: 192 LBS | SYSTOLIC BLOOD PRESSURE: 128 MMHG | DIASTOLIC BLOOD PRESSURE: 60 MMHG | HEIGHT: 69 IN | OXYGEN SATURATION: 92 %

## 2024-07-23 DIAGNOSIS — Z79.4 TYPE 2 DIABETES MELLITUS WITH HYPERGLYCEMIA, WITH LONG-TERM CURRENT USE OF INSULIN: ICD-10-CM

## 2024-07-23 DIAGNOSIS — N18.32 STAGE 3B CHRONIC KIDNEY DISEASE: ICD-10-CM

## 2024-07-23 DIAGNOSIS — Z95.0 CARDIAC PACEMAKER IN SITU: Primary | ICD-10-CM

## 2024-07-23 DIAGNOSIS — Z51.81 MEDICATION MONITORING ENCOUNTER: ICD-10-CM

## 2024-07-23 DIAGNOSIS — I25.10 CORONARY ARTERY DISEASE INVOLVING NATIVE CORONARY ARTERY OF NATIVE HEART WITHOUT ANGINA PECTORIS: ICD-10-CM

## 2024-07-23 DIAGNOSIS — I48.0 PAROXYSMAL ATRIAL FIBRILLATION: ICD-10-CM

## 2024-07-23 DIAGNOSIS — M54.50 CHRONIC LOW BACK PAIN WITHOUT SCIATICA, UNSPECIFIED BACK PAIN LATERALITY: ICD-10-CM

## 2024-07-23 DIAGNOSIS — R29.6 MULTIPLE FALLS: ICD-10-CM

## 2024-07-23 DIAGNOSIS — E11.65 TYPE 2 DIABETES MELLITUS WITH HYPERGLYCEMIA, WITH LONG-TERM CURRENT USE OF INSULIN: ICD-10-CM

## 2024-07-23 DIAGNOSIS — Z95.0 CARDIAC PACEMAKER IN SITU: ICD-10-CM

## 2024-07-23 DIAGNOSIS — I10 ESSENTIAL HYPERTENSION: ICD-10-CM

## 2024-07-23 DIAGNOSIS — G89.29 CHRONIC LOW BACK PAIN WITHOUT SCIATICA, UNSPECIFIED BACK PAIN LATERALITY: ICD-10-CM

## 2024-07-23 DIAGNOSIS — Z91.81 AT HIGH RISK FOR INJURY RELATED TO FALL: ICD-10-CM

## 2024-07-23 DIAGNOSIS — R79.9 ABNORMAL BLOOD CHEMISTRY: ICD-10-CM

## 2024-07-23 DIAGNOSIS — G47.00 INSOMNIA, UNSPECIFIED TYPE: ICD-10-CM

## 2024-07-23 DIAGNOSIS — E03.9 ACQUIRED HYPOTHYROIDISM: ICD-10-CM

## 2024-07-23 DIAGNOSIS — R42 LIGHTHEADEDNESS: Primary | ICD-10-CM

## 2024-07-23 DIAGNOSIS — M10.30 GOUT DUE TO RENAL IMPAIRMENT, UNSPECIFIED CHRONICITY, UNSPECIFIED SITE: ICD-10-CM

## 2024-07-23 DIAGNOSIS — I50.32 CHRONIC HEART FAILURE WITH PRESERVED EJECTION FRACTION (HFPEF): ICD-10-CM

## 2024-07-23 DIAGNOSIS — I25.10 CORONARY ARTERY DISEASE INVOLVING NATIVE CORONARY ARTERY OF NATIVE HEART WITHOUT ANGINA PECTORIS: Primary | ICD-10-CM

## 2024-07-23 PROBLEM — Z86.19 PERSONAL HISTORY OF OTHER INFECTIOUS AND PARASITIC DISEASES: Status: RESOLVED | Noted: 2019-07-31 | Resolved: 2024-07-23

## 2024-07-23 PROCEDURE — 1160F RVW MEDS BY RX/DR IN RCRD: CPT | Performed by: FAMILY MEDICINE

## 2024-07-23 PROCEDURE — 99214 OFFICE O/P EST MOD 30 MIN: CPT | Performed by: FAMILY MEDICINE

## 2024-07-23 PROCEDURE — G2211 COMPLEX E/M VISIT ADD ON: HCPCS | Performed by: FAMILY MEDICINE

## 2024-07-23 PROCEDURE — 1126F AMNT PAIN NOTED NONE PRSNT: CPT | Performed by: FAMILY MEDICINE

## 2024-07-23 PROCEDURE — 93280 PM DEVICE PROGR EVAL DUAL: CPT | Performed by: INTERNAL MEDICINE

## 2024-07-23 PROCEDURE — 1159F MED LIST DOCD IN RCRD: CPT | Performed by: FAMILY MEDICINE

## 2024-07-23 RX ORDER — TEMAZEPAM 7.5 MG/1
7.5 CAPSULE ORAL NIGHTLY PRN
Qty: 30 CAPSULE | Refills: 2 | Status: SHIPPED | OUTPATIENT
Start: 2024-07-23

## 2024-07-23 RX ORDER — HYDROCODONE BITARTRATE AND ACETAMINOPHEN 5; 325 MG/1; MG/1
1 TABLET ORAL EVERY 12 HOURS PRN
Qty: 60 TABLET | Refills: 0 | Status: SHIPPED | OUTPATIENT
Start: 2024-07-23

## 2024-07-23 RX ORDER — PEN NEEDLE, DIABETIC 32 GX 1/4"
NEEDLE, DISPOSABLE MISCELLANEOUS
COMMUNITY
Start: 2024-07-07

## 2024-07-23 NOTE — PROGRESS NOTES
Subjective:     Encounter Date:07/23/2024      Patient ID: Forrest Zepeda is a 89 y.o. male.    Chief Complaint: Dizziness  HPI  This is a 89-year-old male patient who presents to cardiology clinic for routine follow-up.  Since his last visit he has reported episodes of dizziness.  He has his home blood pressure recordings which have been acceptable.  He does demonstrate orthostatic hypotension with systolic blood pressure dropping to 80 mmHg on standing.  He reports compliance with his medications with no perceived side effects.  He remains a non-smoker.  Pacemaker interrogation shows a normally functioning Runscope dual-chamber rate responsive pacemaker model number: 311. [id number].  He is paced 52% of the time the right atrium and 59% of the time in the right ventricle.  Right atrial and right ventricular lead interrogation shows normal function based on sensing, pacing threshold and lead impedance.  He has had no mode switches.  He has had no high heart rate events.  He has an estimated 5.5 years of generator longevity.  The following portions of the patient's history were reviewed and updated as appropriate: allergies, current medications, past family history, past medical history, past social history, past surgical history and problem  Review of Systems   Constitutional: Negative for chills, diaphoresis, fever, malaise/fatigue, weight gain and weight loss.   HENT:  Negative for ear discharge, hearing loss, hoarse voice and nosebleeds.    Eyes:  Negative for discharge, double vision, pain and photophobia.   Cardiovascular:  Negative for chest pain, claudication, cyanosis, dyspnea on exertion, irregular heartbeat, leg swelling, near-syncope, orthopnea, palpitations, paroxysmal nocturnal dyspnea and syncope.   Respiratory:  Negative for cough, hemoptysis, sputum production and wheezing.    Endocrine: Negative for cold intolerance, heat intolerance, polydipsia, polyphagia and polyuria.    Hematologic/Lymphatic: Negative for adenopathy and bleeding problem. Does not bruise/bleed easily.   Skin:  Negative for color change, flushing, itching and rash.   Musculoskeletal:  Negative for muscle cramps, muscle weakness, myalgias and stiffness.   Gastrointestinal:  Negative for abdominal pain, diarrhea, hematemesis, hematochezia, nausea and vomiting.   Genitourinary:  Negative for dysuria, frequency and nocturia.   Neurological:  Positive for dizziness. Negative for focal weakness, loss of balance, numbness, paresthesias and seizures.   Psychiatric/Behavioral:  Negative for altered mental status, hallucinations and suicidal ideas.    Allergic/Immunologic: Negative for HIV exposure, hives and persistent infections.           Current Outpatient Medications:     allopurinol (ZYLOPRIM) 300 MG tablet, Take 1 tablet by mouth Every Night. To lower risk of gout, Disp: 90 tablet, Rfl: 3    apixaban (Eliquis) 2.5 MG tablet tablet, Take 1 tablet by mouth 2 (Two) Times a Day., Disp: 180 tablet, Rfl: 3    aspirin 81 MG EC tablet, Take 1 tablet by mouth Daily. (Patient taking differently: Take 1 tablet by mouth Daily. OTC), Disp: 90 tablet, Rfl: 3    BD Pen Needle Micro U/F 32G X 6 MM misc, , Disp: , Rfl:     bisoprolol (ZEBeta) 5 MG tablet, Take 1 tablet by mouth Daily., Disp: 90 tablet, Rfl: 3    Diclofenac Sodium (VOLTAREN) 1 % gel gel, Apply 2 g topically to the appropriate area as directed 4 (Four) Times a Day. (Patient taking differently: Apply 2 g topically to the appropriate area as directed 2 (Two) Times a Day As Needed (Joint pain).), Disp: 100 g, Rfl: 0    docusate sodium (COLACE) 100 MG capsule, Take 1 capsule by mouth Daily. OTC, Disp: , Rfl:     finasteride (PROSCAR) 5 MG tablet, TAKE 1 TABLET BY MOUTH DAILY, Disp: 90 tablet, Rfl: 3    fluconazole (Diflucan) 100 MG tablet, Take 1 tablet by mouth Daily for 14 days. Take 2 pills on day 1, then take 1 pill per day.  (200 mg day 1, then 100 mg every day after  that), Disp: 15 tablet, Rfl: 0    glimepiride (AMARYL) 1 MG tablet, Take 1 tablet by mouth Every Morning Before Breakfast. Indications: Type 2 Diabetes, Disp: 90 tablet, Rfl: 3    HYDROcodone-acetaminophen (Norco) 5-325 MG per tablet, Take 1 tablet by mouth Every 12 (Twelve) Hours As Needed for Severe Pain., Disp: 60 tablet, Rfl: 0    Insulin Glargine, 1 Unit Dial, (Toujeo SoloStar) 300 UNIT/ML solution pen-injector injection, INJECT 20 UNITS UNDER THE SKIN INTO THE APPROPRIATE AREA AS DIRECTED EVERY NIGHT, Disp: 6 mL, Rfl: 3    isosorbide mononitrate (IMDUR) 30 MG 24 hr tablet, Take 1 tablet by mouth Daily., Disp: , Rfl:     levothyroxine (SYNTHROID, LEVOTHROID) 50 MCG tablet, Take 1 tablet by mouth Daily. Indications: Underactive Thyroid (Patient taking differently: Take 1 tablet by mouth Every Morning. Indications: Underactive Thyroid), Disp: 90 tablet, Rfl: 3    Mirabegron ER (Myrbetriq) 50 MG tablet sustained-release 24 hour 24 hr tablet, Take 50 mg by mouth Daily for 360 days., Disp: 90 tablet, Rfl: 3    Multiple Vitamins-Minerals (OCUVITE ADULT 50+ PO), Take 1 capsule by mouth Daily. OTC, Disp: , Rfl:     silodosin (RAPAFLO) 8 MG capsule capsule, Take 1 capsule by mouth Daily With Breakfast. To help with urination, Disp: 90 capsule, Rfl: 3    SITagliptin (Januvia) 100 MG tablet, Take 1 tablet by mouth Daily. For diabetes, Disp: 90 tablet, Rfl: 3    temazepam (RESTORIL) 7.5 MG capsule, Take 1 capsule by mouth At Night As Needed for Sleep., Disp: 30 capsule, Rfl: 2    Objective:   Vitals and nursing note reviewed.   Constitutional:       Appearance: Healthy appearance. Not in distress.   Neck:      Vascular: No JVR. JVD normal.   Pulmonary:      Effort: Pulmonary effort is normal.      Breath sounds: Normal breath sounds. No wheezing. No rhonchi. No rales.   Chest:      Chest wall: Not tender to palpatation.   Cardiovascular:      PMI at left midclavicular line. Normal rate. Regular rhythm. Normal S1. Normal  "S2.       Murmurs: There is no murmur.      No gallop.  No click. No rub.   Pulses:     Intact distal pulses.   Edema:     Peripheral edema absent.   Abdominal:      General: Bowel sounds are normal.      Palpations: Abdomen is soft.      Tenderness: There is no abdominal tenderness.   Musculoskeletal: Normal range of motion.         General: No tenderness. Skin:     General: Skin is warm and dry.   Neurological:      General: No focal deficit present.      Mental Status: Alert and oriented to person, place and time.       Blood pressure 128/60, pulse 63, height 175.3 cm (69.02\"), weight 87.1 kg (192 lb), SpO2 92%.   Lab Review:     Assessment:       1. Coronary artery disease involving native coronary artery of native heart without angina pectoris      2. Chronic heart failure with preserved ejection fraction (HFpEF)  Stage C.  New York Heart Association functional class I-II symptoms.  Euvolemic.    3. Cardiac pacemaker in situ  Normal pacemaker function.    4. Essential hypertension  Acceptable blood pressure control.  The patient does demonstrate orthostatic hypotension with symptoms.    5. Paroxysmal atrial fibrillation  Rate control and anticoagulation strategy.  Uncomplicated anticoagulation with Eliquis.  Asymptomatic.  No signs or symptoms to suggest cardioembolic event.  Recurrence of atrial fibrillation noted since his last pacemaker interrogation.    6. Type 2 diabetes mellitus with hyperglycemia, with long-term current use of insulin      7. Stage 3b chronic kidney disease      Procedures    Plan:     Advance Care Planning   ACP discussion was held with the patient during this visit. Patient has an advance directive (not in EMR), copy requested.     No changes in medications made at today's visit.    I am reluctant to \"back off\" of his antihypertensive therapy as this will almost certainly precipitate congestive heart failure.    The patient has been counseled and educated regarding posture change " precautions.    No cardiovascular testing indicated at this time.

## 2024-07-23 NOTE — PROGRESS NOTES
"Chief Complaint  Back Pain (Chronic back pain, medication follow up) and Hypertension (6 month follow up, log )    Subjective        Forrest Zepeda presents to [facility]  History of Present Illness  Here with son for follow up  Continues to have trouble sleeping  Does not recall triazolam rx    Blood pressure log provided for review  Having lightheadedness  Has fallen at least twice.  Granddaughter did orthostatics which were concerning  Losartan still showing on med list but per son he's not taking, confirmed with family member via text  Goes to cardiologist after this visit    Does not feel up to labs today but agreeable to return to clinic for labs in few days.    Continues to use oxygen for his chronic lung disease.    Stopped opiate for approx 2 weeks as he felt it wasn't helping with pain, realized with increased pain it was helping some and resumed taking as needed.      CLINICAL PHOTOGRAPHS OTHER - BP log (07/23/2024)     Objective   Vital Signs:  /68 (BP Location: Left arm, Patient Position: Sitting, Cuff Size: Adult)   Pulse 78   Temp 97 °F (36.1 °C) (Temporal)   Resp 16   Ht 175.3 cm (69\")   Wt 87.1 kg (192 lb)   SpO2 97%   BMI 28.35 kg/m²   Estimated body mass index is 28.35 kg/m² as calculated from the following:    Height as of this encounter: 175.3 cm (69\").    Weight as of this encounter: 87.1 kg (192 lb).       BMI is >= 25 and <30. (Overweight) The following options were offered after discussion;: weight loss educational material (shared in after visit summary)      Physical Exam  Vitals and nursing note reviewed.   Constitutional:       General: He is not in acute distress.     Appearance: Normal appearance. He is well-developed, well-groomed and overweight. He is not ill-appearing, toxic-appearing or diaphoretic.   HENT:      Head: Normocephalic and atraumatic.      Right Ear: Decreased hearing noted.      Left Ear: Decreased hearing noted.   Eyes:     "  General: Lids are normal. No scleral icterus.        Right eye: No discharge.         Left eye: No discharge.      Extraocular Movements: Extraocular movements intact.   Cardiovascular:      Rate and Rhythm: Normal rate and regular rhythm.   Pulmonary:      Effort: Pulmonary effort is normal.      Breath sounds: Examination of the right-lower field reveals rales. Examination of the left-lower field reveals rales. Rales (dry--has pulm fibrosis) present. No wheezing or rhonchi.   Musculoskeletal:      Cervical back: Neck supple.   Skin:     Coloration: Skin is not jaundiced or pale.   Neurological:      General: No focal deficit present.      Mental Status: He is alert and oriented to person, place, and time.      Gait: Gait abnormal.   Psychiatric:         Attention and Perception: Attention and perception normal.         Mood and Affect: Mood and affect normal.         Speech: Speech normal.         Behavior: Behavior normal. Behavior is cooperative.         Thought Content: Thought content normal.         Cognition and Memory: Cognition and memory normal.         Judgment: Judgment normal.        Result Review :    The following data was reviewed by: Kary Wen MD on 07/23/2024:  Basic Metabolic Panel (07/05/2024 14:09)   CBC (No Diff) (07/05/2024 14:09)     Lab Results   Component Value Date    HGBA1C 7.7 (A) 01/23/2024    HGBA1C 7.30 (H) 10/09/2023    HGBA1C 7.6 09/11/2023      Lab Results   Component Value Date    CHOL 179 01/24/2023    TRIG 386 (H) 01/24/2023    HDL 33 (L) 01/24/2023    LDL 83 01/24/2023      Lab Results   Component Value Date    TSH 1.230 10/09/2023     Lab Results   Component Value Date    FREET4 1.00 10/09/2023     Lab Results   Component Value Date    URICACID 4.5 07/12/2022      Lab Results   Component Value Date    LUSVGAEQ63 693 01/24/2023      Lab Results   Component Value Date    FOLATE >20.00 01/24/2023                 Assessment and Plan     Diagnoses and all orders for  this visit:    1. Lightheadedness (Primary)  -     TSH Rfx On Abnormal To Free T4  -     CBC (No Diff)  -     Comprehensive Metabolic Panel  -     Vitamin B12 & Folate    2. Multiple falls  Comments:  possilby due to hypotension?    3. At high risk for injury related to fall  Comments:  discuss bp values with cardiologist    4. Coronary artery disease involving native coronary artery of native heart without angina pectoris  -     Lipid Panel  -     Comprehensive Metabolic Panel    5. Insomnia, unspecified type  -     temazepam (RESTORIL) 7.5 MG capsule; Take 1 capsule by mouth At Night As Needed for Sleep.  Dispense: 30 capsule; Refill: 2    6. Chronic low back pain without sciatica, unspecified back pain laterality  -     HYDROcodone-acetaminophen (Norco) 5-325 MG per tablet; Take 1 tablet by mouth Every 12 (Twelve) Hours As Needed for Severe Pain.  Dispense: 60 tablet; Refill: 0    7. Type 2 diabetes mellitus with hyperglycemia, with long-term current use of insulin  -     Lipid Panel  -     Hemoglobin A1c  -     Comprehensive Metabolic Panel    8. 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 Stage 3b chronic kidney disease  -     CBC (No Diff)  -     Comprehensive Metabolic Panel    10. Acquired hypothyroidism  -     TSH Rfx On Abnormal To Free T4    11. Abnormal blood chemistry  -     Lipid Panel  -     Hemoglobin A1c  -     TSH Rfx On Abnormal To Free T4  -     Uric Acid  -     CBC (No Diff)  -     Comprehensive Metabolic Panel  -     Vitamin B12 & Folate    12. Medication monitoring encounter  -     Lipid Panel  -     Hemoglobin A1c  -     TSH Rfx On Abnormal To Free T4  -     Uric Acid  -     CBC (No Diff)  -     Comprehensive Metabolic Panel  -     Vitamin B12 & Folate    Return to clinic for labs, does not have to be fasting  Reviewed blood pressure log. Discuss further with cardiology at appointment (after this one going  there next) but likely needs antihypertensive lowered or stopped. Has low blood pressures at times and is high risk for falls. Patient is on chronic anticoagulation. Losartan deleted from med list as family states that med was d/c'd a while back.  Discussed sleep med. Triazolam d/c'd from med list, if still has on hand stop taking. Trial of temazepam, appears he tried it previously during an admission. Discussed need to monitor for gait abnormalities, do not want to do medicine that will increase risk of falling but want to help him get sleep. If possible separate out opiate and benzo by a couple of hours due to potential to decrease respiratory drive when taken together.         Follow Up     Return in about 6 months (around 1/23/2025) for Medicare Wellness.  Patient was given instructions and counseling regarding his condition or for health maintenance advice. Please see specific information pulled into the AVS if appropriate.

## 2024-07-26 DIAGNOSIS — N18.31 TYPE 2 DIABETES MELLITUS WITH STAGE 3A CHRONIC KIDNEY DISEASE, WITH LONG-TERM CURRENT USE OF INSULIN: ICD-10-CM

## 2024-07-26 DIAGNOSIS — Z79.4 TYPE 2 DIABETES MELLITUS WITH HYPERGLYCEMIA, WITH LONG-TERM CURRENT USE OF INSULIN: ICD-10-CM

## 2024-07-26 DIAGNOSIS — E03.9 ACQUIRED HYPOTHYROIDISM: ICD-10-CM

## 2024-07-26 DIAGNOSIS — Z79.4 TYPE 2 DIABETES MELLITUS WITH STAGE 3A CHRONIC KIDNEY DISEASE, WITH LONG-TERM CURRENT USE OF INSULIN: ICD-10-CM

## 2024-07-26 DIAGNOSIS — E11.65 TYPE 2 DIABETES MELLITUS WITH HYPERGLYCEMIA, WITH LONG-TERM CURRENT USE OF INSULIN: ICD-10-CM

## 2024-07-26 DIAGNOSIS — E11.22 TYPE 2 DIABETES MELLITUS WITH STAGE 3A CHRONIC KIDNEY DISEASE, WITH LONG-TERM CURRENT USE OF INSULIN: ICD-10-CM

## 2024-07-26 DIAGNOSIS — M10.9 GOUTY ARTHRITIS: ICD-10-CM

## 2024-07-26 LAB
ALBUMIN SERPL-MCNC: 4.1 G/DL (ref 3.5–5.2)
ALBUMIN/GLOB SERPL: 1.3 G/DL
ALP SERPL-CCNC: 92 U/L (ref 39–117)
ALT SERPL-CCNC: 16 U/L (ref 1–41)
AST SERPL-CCNC: 19 U/L (ref 1–40)
BILIRUB SERPL-MCNC: 0.3 MG/DL (ref 0–1.2)
BUN SERPL-MCNC: 46 MG/DL (ref 8–23)
BUN/CREAT SERPL: 22.7 (ref 7–25)
CALCIUM SERPL-MCNC: 9.7 MG/DL (ref 8.2–9.6)
CHLORIDE SERPL-SCNC: 110 MMOL/L (ref 98–107)
CHOLEST SERPL-MCNC: 156 MG/DL (ref 0–200)
CO2 SERPL-SCNC: 20.8 MMOL/L (ref 22–29)
CREAT SERPL-MCNC: 2.03 MG/DL (ref 0.76–1.27)
EGFRCR SERPLBLD CKD-EPI 2021: 30.2 ML/MIN/1.73
ERYTHROCYTE [DISTWIDTH] IN BLOOD BY AUTOMATED COUNT: 16.4 % (ref 12.3–15.4)
FOLATE SERPL-MCNC: 13.5 NG/ML (ref 4.78–24.2)
GLOBULIN SER CALC-MCNC: 3.1 GM/DL
GLUCOSE SERPL-MCNC: 167 MG/DL (ref 65–99)
HBA1C MFR BLD: 6.7 % (ref 4.8–5.6)
HCT VFR BLD AUTO: 37.1 % (ref 37.5–51)
HDLC SERPL-MCNC: 34 MG/DL (ref 40–60)
HGB BLD-MCNC: 12.2 G/DL (ref 13–17.7)
LDLC SERPL CALC-MCNC: 77 MG/DL (ref 0–100)
MCH RBC QN AUTO: 31.6 PG (ref 26.6–33)
MCHC RBC AUTO-ENTMCNC: 32.9 G/DL (ref 31.5–35.7)
MCV RBC AUTO: 96.1 FL (ref 79–97)
PLATELET # BLD AUTO: 200 10*3/MM3 (ref 140–450)
POTASSIUM SERPL-SCNC: 4.8 MMOL/L (ref 3.5–5.2)
PROT SERPL-MCNC: 7.2 G/DL (ref 6–8.5)
RBC # BLD AUTO: 3.86 10*6/MM3 (ref 4.14–5.8)
SODIUM SERPL-SCNC: 143 MMOL/L (ref 136–145)
TRIGL SERPL-MCNC: 274 MG/DL (ref 0–150)
TSH SERPL DL<=0.005 MIU/L-ACNC: 3.82 UIU/ML (ref 0.27–4.2)
URATE SERPL-MCNC: 4.1 MG/DL (ref 3.4–7)
VIT B12 SERPL-MCNC: 669 PG/ML (ref 211–946)
VLDLC SERPL CALC-MCNC: 45 MG/DL (ref 5–40)
WBC # BLD AUTO: 12.67 10*3/MM3 (ref 3.4–10.8)

## 2024-07-26 RX ORDER — ALLOPURINOL 300 MG/1
300 TABLET ORAL NIGHTLY
Qty: 90 TABLET | Refills: 3 | Status: SHIPPED | OUTPATIENT
Start: 2024-07-26

## 2024-07-26 RX ORDER — LEVOTHYROXINE SODIUM 0.05 MG/1
50 TABLET ORAL
Qty: 90 TABLET | Refills: 3 | Status: SHIPPED | OUTPATIENT
Start: 2024-07-26

## 2024-07-26 RX ORDER — GLIMEPIRIDE 1 MG/1
1 TABLET ORAL
Qty: 90 TABLET | Refills: 3 | Status: SHIPPED | OUTPATIENT
Start: 2024-07-26

## 2024-07-27 DIAGNOSIS — I48.0 PAROXYSMAL ATRIAL FIBRILLATION: ICD-10-CM

## 2024-07-29 NOTE — TELEPHONE ENCOUNTER
Rx Refill Note  Requested Prescriptions     Pending Prescriptions Disp Refills    Eliquis 2.5 MG tablet tablet [Pharmacy Med Name: ELIQUIS 2.5 MG TABLET] 180 tablet 3     Sig: TAKE ONE TABLET BY MOUTH TWICE A DAY      SENT TO WRONG OFFICE    Jackie Roberson MA  07/29/24, 09:21 EDT

## 2024-07-30 RX ORDER — APIXABAN 2.5 MG/1
2.5 TABLET, FILM COATED ORAL 2 TIMES DAILY
Qty: 180 TABLET | Refills: 3 | Status: SHIPPED | OUTPATIENT
Start: 2024-07-30

## 2024-08-31 ENCOUNTER — PATIENT MESSAGE (OUTPATIENT)
Dept: INTERNAL MEDICINE | Facility: CLINIC | Age: 89
End: 2024-08-31
Payer: MEDICARE

## 2024-08-31 DIAGNOSIS — G89.29 CHRONIC LOW BACK PAIN WITHOUT SCIATICA, UNSPECIFIED BACK PAIN LATERALITY: ICD-10-CM

## 2024-08-31 DIAGNOSIS — M54.50 CHRONIC LOW BACK PAIN WITHOUT SCIATICA, UNSPECIFIED BACK PAIN LATERALITY: ICD-10-CM

## 2024-09-03 RX ORDER — HYDROCODONE BITARTRATE AND ACETAMINOPHEN 5; 325 MG/1; MG/1
1 TABLET ORAL EVERY 12 HOURS PRN
Qty: 60 TABLET | Refills: 0 | Status: SHIPPED | OUTPATIENT
Start: 2024-09-03

## 2024-09-03 NOTE — TELEPHONE ENCOUNTER
From: Forerst Zepeda  To: Kary Wen  Sent: 8/31/2024 9:58 AM EDT  Subject: Hydrocodone refill    I was filling papaw medicine this morning and he is down to 5 pills. Do you mind to send in a refill.

## 2024-09-03 NOTE — TELEPHONE ENCOUNTER
No CSA or UDS on file.         Rx Refill Note  Requested Prescriptions     Pending Prescriptions Disp Refills    HYDROcodone-acetaminophen (Norco) 5-325 MG per tablet 60 tablet 0     Sig: Take 1 tablet by mouth Every 12 (Twelve) Hours As Needed for Severe Pain.      Last office visit with prescribing clinician: 7/23/2024   Last telemedicine visit with prescribing clinician: Visit date not found   Next office visit with prescribing clinician: 1/24/2025                         Emilee Joseph MA  09/03/24, 09:18 EDT

## 2024-10-05 DIAGNOSIS — M10.9 GOUTY ARTHRITIS: ICD-10-CM

## 2024-10-05 RX ORDER — ALLOPURINOL 300 MG/1
TABLET ORAL
Qty: 90 TABLET | Refills: 3 | OUTPATIENT
Start: 2024-10-05

## 2024-10-20 DIAGNOSIS — E11.22 TYPE 2 DIABETES MELLITUS WITH STAGE 3A CHRONIC KIDNEY DISEASE, WITH LONG-TERM CURRENT USE OF INSULIN: ICD-10-CM

## 2024-10-20 DIAGNOSIS — E11.65 TYPE 2 DIABETES MELLITUS WITH HYPERGLYCEMIA, WITH LONG-TERM CURRENT USE OF INSULIN: ICD-10-CM

## 2024-10-20 DIAGNOSIS — Z79.4 TYPE 2 DIABETES MELLITUS WITH HYPERGLYCEMIA, WITH LONG-TERM CURRENT USE OF INSULIN: ICD-10-CM

## 2024-10-20 DIAGNOSIS — Z79.4 TYPE 2 DIABETES MELLITUS WITH STAGE 3A CHRONIC KIDNEY DISEASE, WITH LONG-TERM CURRENT USE OF INSULIN: ICD-10-CM

## 2024-10-20 DIAGNOSIS — N18.31 TYPE 2 DIABETES MELLITUS WITH STAGE 3A CHRONIC KIDNEY DISEASE, WITH LONG-TERM CURRENT USE OF INSULIN: ICD-10-CM

## 2024-10-20 DIAGNOSIS — N35.919 STRICTURE OF MALE URETHRA, UNSPECIFIED STRICTURE TYPE: ICD-10-CM

## 2024-10-21 RX ORDER — SILODOSIN 8 MG/1
8 CAPSULE ORAL
Qty: 90 CAPSULE | Refills: 3 | Status: SHIPPED | OUTPATIENT
Start: 2024-10-21

## 2024-10-21 RX ORDER — GLIMEPIRIDE 1 MG/1
1 TABLET ORAL
Qty: 90 TABLET | Refills: 3 | Status: SHIPPED | OUTPATIENT
Start: 2024-10-21

## 2024-11-04 DIAGNOSIS — M54.50 CHRONIC LOW BACK PAIN WITHOUT SCIATICA, UNSPECIFIED BACK PAIN LATERALITY: ICD-10-CM

## 2024-11-04 DIAGNOSIS — G89.29 CHRONIC LOW BACK PAIN WITHOUT SCIATICA, UNSPECIFIED BACK PAIN LATERALITY: ICD-10-CM

## 2024-11-04 RX ORDER — HYDROCODONE BITARTRATE AND ACETAMINOPHEN 5; 325 MG/1; MG/1
1 TABLET ORAL EVERY 12 HOURS PRN
Qty: 60 TABLET | Refills: 0 | Status: SHIPPED | OUTPATIENT
Start: 2024-11-04

## 2024-11-11 ENCOUNTER — TELEPHONE (OUTPATIENT)
Dept: UROLOGY | Facility: CLINIC | Age: 89
End: 2024-11-11

## 2024-11-11 NOTE — TELEPHONE ENCOUNTER
Caller: Eron Zepeda    Relationship:  Emergency Contact    Best call back number: [phone]    PATIENT CALLED REQUESTING TO CANCEL SAME DAY APPT.    Did the patient call AFTER the start time of their scheduled appointment?  []YES  [x]NO    Was the patient's appointment rescheduled? [x]YES  []NO

## 2024-11-13 ENCOUNTER — LAB REQUISITION (OUTPATIENT)
Dept: LAB | Facility: HOSPITAL | Age: 89
End: 2024-11-13
Payer: MEDICARE

## 2024-11-13 DIAGNOSIS — N39.0 URINARY TRACT INFECTION, SITE NOT SPECIFIED: ICD-10-CM

## 2024-11-13 DIAGNOSIS — N30.00 ACUTE CYSTITIS WITHOUT HEMATURIA: Primary | ICD-10-CM

## 2024-11-13 DIAGNOSIS — R30.0 DYSURIA: ICD-10-CM

## 2024-11-13 DIAGNOSIS — N32.81 OAB (OVERACTIVE BLADDER): Primary | ICD-10-CM

## 2024-11-13 PROCEDURE — 87077 CULTURE AEROBIC IDENTIFY: CPT | Performed by: UROLOGY

## 2024-11-13 PROCEDURE — 81001 URINALYSIS AUTO W/SCOPE: CPT | Performed by: UROLOGY

## 2024-11-13 PROCEDURE — 87086 URINE CULTURE/COLONY COUNT: CPT | Performed by: UROLOGY

## 2024-11-13 RX ORDER — SULFAMETHOXAZOLE AND TRIMETHOPRIM 800; 160 MG/1; MG/1
1 TABLET ORAL 2 TIMES DAILY
Qty: 14 TABLET | Refills: 0 | Status: SHIPPED | OUTPATIENT
Start: 2024-11-13 | End: 2024-11-20

## 2024-11-15 LAB — BACTERIA SPEC AEROBE CULT: NORMAL

## 2024-11-20 ENCOUNTER — APPOINTMENT (OUTPATIENT)
Dept: GENERAL RADIOLOGY | Facility: HOSPITAL | Age: 89
End: 2024-11-20
Payer: MEDICARE

## 2024-11-20 ENCOUNTER — HOSPITAL ENCOUNTER (INPATIENT)
Facility: HOSPITAL | Age: 89
LOS: 2 days | Discharge: HOME | End: 2024-11-23
Attending: EMERGENCY MEDICINE | Admitting: INTERNAL MEDICINE
Payer: MEDICARE

## 2024-11-20 ENCOUNTER — APPOINTMENT (OUTPATIENT)
Dept: CT IMAGING | Facility: HOSPITAL | Age: 89
End: 2024-11-20
Payer: MEDICARE

## 2024-11-20 DIAGNOSIS — N13.30 HYDRONEPHROSIS OF LEFT KIDNEY: ICD-10-CM

## 2024-11-20 DIAGNOSIS — R55 NEAR SYNCOPE: ICD-10-CM

## 2024-11-20 DIAGNOSIS — N17.9 ACUTE KIDNEY INJURY: ICD-10-CM

## 2024-11-20 DIAGNOSIS — N39.0 ACUTE UTI: Primary | ICD-10-CM

## 2024-11-20 DIAGNOSIS — R31.0 GROSS HEMATURIA: ICD-10-CM

## 2024-11-20 LAB
ABO GROUP BLD: NORMAL
ALBUMIN SERPL-MCNC: 3.8 G/DL (ref 3.5–5.2)
ALBUMIN/GLOB SERPL: 0.9 G/DL
ALP SERPL-CCNC: 105 U/L (ref 39–117)
ALT SERPL W P-5'-P-CCNC: 11 U/L (ref 1–41)
ANION GAP SERPL CALCULATED.3IONS-SCNC: 11 MMOL/L (ref 5–15)
AST SERPL-CCNC: 19 U/L (ref 1–40)
B PARAPERT DNA SPEC QL NAA+PROBE: NOT DETECTED
B PERT DNA SPEC QL NAA+PROBE: NOT DETECTED
BACTERIA UR QL AUTO: ABNORMAL /HPF
BASOPHILS # BLD AUTO: 0.08 10*3/MM3 (ref 0–0.2)
BASOPHILS NFR BLD AUTO: 0.7 % (ref 0–1.5)
BILIRUB SERPL-MCNC: 0.2 MG/DL (ref 0–1.2)
BILIRUB UR QL STRIP: ABNORMAL
BLD GP AB SCN SERPL QL: NEGATIVE
BUN SERPL-MCNC: 52 MG/DL (ref 8–23)
BUN/CREAT SERPL: 17.2 (ref 7–25)
C PNEUM DNA NPH QL NAA+NON-PROBE: NOT DETECTED
CALCIUM SPEC-SCNC: 9.7 MG/DL (ref 8.2–9.6)
CHLORIDE SERPL-SCNC: 103 MMOL/L (ref 98–107)
CLARITY UR: ABNORMAL
CO2 SERPL-SCNC: 20 MMOL/L (ref 22–29)
COLOR UR: ABNORMAL
CREAT SERPL-MCNC: 3.03 MG/DL (ref 0.76–1.27)
D-LACTATE SERPL-SCNC: 1.5 MMOL/L (ref 0.5–2)
DEPRECATED RDW RBC AUTO: 54 FL (ref 37–54)
EGFRCR SERPLBLD CKD-EPI 2021: 18.7 ML/MIN/1.73
EOSINOPHIL # BLD AUTO: 0.27 10*3/MM3 (ref 0–0.4)
EOSINOPHIL NFR BLD AUTO: 2.5 % (ref 0.3–6.2)
ERYTHROCYTE [DISTWIDTH] IN BLOOD BY AUTOMATED COUNT: 15.2 % (ref 12.3–15.4)
FLUAV SUBTYP SPEC NAA+PROBE: NOT DETECTED
FLUBV RNA ISLT QL NAA+PROBE: NOT DETECTED
GLOBULIN UR ELPH-MCNC: 4.1 GM/DL
GLUCOSE SERPL-MCNC: 164 MG/DL (ref 65–99)
GLUCOSE UR STRIP-MCNC: NEGATIVE MG/DL
HADV DNA SPEC NAA+PROBE: NOT DETECTED
HCOV 229E RNA SPEC QL NAA+PROBE: NOT DETECTED
HCOV HKU1 RNA SPEC QL NAA+PROBE: NOT DETECTED
HCOV NL63 RNA SPEC QL NAA+PROBE: NOT DETECTED
HCOV OC43 RNA SPEC QL NAA+PROBE: NOT DETECTED
HCT VFR BLD AUTO: 36.4 % (ref 37.5–51)
HGB BLD-MCNC: 11.5 G/DL (ref 13–17.7)
HGB UR QL STRIP.AUTO: ABNORMAL
HMPV RNA NPH QL NAA+NON-PROBE: NOT DETECTED
HOLD SPECIMEN: NORMAL
HPIV1 RNA ISLT QL NAA+PROBE: NOT DETECTED
HPIV2 RNA SPEC QL NAA+PROBE: NOT DETECTED
HPIV3 RNA NPH QL NAA+PROBE: NOT DETECTED
HPIV4 P GENE NPH QL NAA+PROBE: NOT DETECTED
HYALINE CASTS UR QL AUTO: ABNORMAL /LPF
IMM GRANULOCYTES # BLD AUTO: 0.06 10*3/MM3 (ref 0–0.05)
IMM GRANULOCYTES NFR BLD AUTO: 0.6 % (ref 0–0.5)
KETONES UR QL STRIP: ABNORMAL
LEUKOCYTE ESTERASE UR QL STRIP.AUTO: ABNORMAL
LYMPHOCYTES # BLD AUTO: 2.94 10*3/MM3 (ref 0.7–3.1)
LYMPHOCYTES NFR BLD AUTO: 27.3 % (ref 19.6–45.3)
M PNEUMO IGG SER IA-ACNC: NOT DETECTED
MAGNESIUM SERPL-MCNC: 2.2 MG/DL (ref 1.7–2.3)
MCH RBC QN AUTO: 30.9 PG (ref 26.6–33)
MCHC RBC AUTO-ENTMCNC: 31.6 G/DL (ref 31.5–35.7)
MCV RBC AUTO: 97.8 FL (ref 79–97)
MONOCYTES # BLD AUTO: 0.67 10*3/MM3 (ref 0.1–0.9)
MONOCYTES NFR BLD AUTO: 6.2 % (ref 5–12)
NEUTROPHILS NFR BLD AUTO: 6.76 10*3/MM3 (ref 1.7–7)
NEUTROPHILS NFR BLD AUTO: 62.7 % (ref 42.7–76)
NITRITE UR QL STRIP: NEGATIVE
NRBC BLD AUTO-RTO: 0 /100 WBC (ref 0–0.2)
PH UR STRIP.AUTO: 5 [PH] (ref 5–8)
PLATELET # BLD AUTO: 293 10*3/MM3 (ref 140–450)
PMV BLD AUTO: 9.2 FL (ref 6–12)
POTASSIUM SERPL-SCNC: 5.6 MMOL/L (ref 3.5–5.2)
PROCALCITONIN SERPL-MCNC: 0.2 NG/ML (ref 0–0.25)
PROT SERPL-MCNC: 7.9 G/DL (ref 6–8.5)
PROT UR QL STRIP: ABNORMAL
RBC # BLD AUTO: 3.72 10*6/MM3 (ref 4.14–5.8)
RBC # UR STRIP: ABNORMAL /HPF
REF LAB TEST METHOD: ABNORMAL
RH BLD: NEGATIVE
RHINOVIRUS RNA SPEC NAA+PROBE: NOT DETECTED
RSV RNA NPH QL NAA+NON-PROBE: NOT DETECTED
SARS-COV-2 RNA NPH QL NAA+NON-PROBE: NOT DETECTED
SODIUM SERPL-SCNC: 134 MMOL/L (ref 136–145)
SP GR UR STRIP: 1.02 (ref 1–1.03)
SQUAMOUS #/AREA URNS HPF: ABNORMAL /HPF
T&S EXPIRATION DATE: NORMAL
UROBILINOGEN UR QL STRIP: ABNORMAL
WBC # UR STRIP: ABNORMAL /HPF
WBC NRBC COR # BLD AUTO: 10.78 10*3/MM3 (ref 3.4–10.8)
WHOLE BLOOD HOLD COAG: NORMAL
WHOLE BLOOD HOLD SPECIMEN: NORMAL

## 2024-11-20 PROCEDURE — 0202U NFCT DS 22 TRGT SARS-COV-2: CPT | Performed by: EMERGENCY MEDICINE

## 2024-11-20 PROCEDURE — 86850 RBC ANTIBODY SCREEN: CPT | Performed by: EMERGENCY MEDICINE

## 2024-11-20 PROCEDURE — 83735 ASSAY OF MAGNESIUM: CPT | Performed by: EMERGENCY MEDICINE

## 2024-11-20 PROCEDURE — 74176 CT ABD & PELVIS W/O CONTRAST: CPT

## 2024-11-20 PROCEDURE — 80053 COMPREHEN METABOLIC PANEL: CPT | Performed by: EMERGENCY MEDICINE

## 2024-11-20 PROCEDURE — 36415 COLL VENOUS BLD VENIPUNCTURE: CPT

## 2024-11-20 PROCEDURE — 87077 CULTURE AEROBIC IDENTIFY: CPT | Performed by: EMERGENCY MEDICINE

## 2024-11-20 PROCEDURE — 71045 X-RAY EXAM CHEST 1 VIEW: CPT

## 2024-11-20 PROCEDURE — 93005 ELECTROCARDIOGRAM TRACING: CPT | Performed by: EMERGENCY MEDICINE

## 2024-11-20 PROCEDURE — 85025 COMPLETE CBC W/AUTO DIFF WBC: CPT | Performed by: EMERGENCY MEDICINE

## 2024-11-20 PROCEDURE — 87040 BLOOD CULTURE FOR BACTERIA: CPT | Performed by: EMERGENCY MEDICINE

## 2024-11-20 PROCEDURE — 25010000002 ERTAPENEM PER 500 MG: Performed by: EMERGENCY MEDICINE

## 2024-11-20 PROCEDURE — 81001 URINALYSIS AUTO W/SCOPE: CPT | Performed by: EMERGENCY MEDICINE

## 2024-11-20 PROCEDURE — 99285 EMERGENCY DEPT VISIT HI MDM: CPT

## 2024-11-20 PROCEDURE — 83605 ASSAY OF LACTIC ACID: CPT | Performed by: EMERGENCY MEDICINE

## 2024-11-20 PROCEDURE — 84145 PROCALCITONIN (PCT): CPT | Performed by: EMERGENCY MEDICINE

## 2024-11-20 PROCEDURE — 0T9B70Z DRAINAGE OF BLADDER WITH DRAINAGE DEVICE, VIA NATURAL OR ARTIFICIAL OPENING: ICD-10-PCS | Performed by: EMERGENCY MEDICINE

## 2024-11-20 PROCEDURE — 87086 URINE CULTURE/COLONY COUNT: CPT | Performed by: EMERGENCY MEDICINE

## 2024-11-20 PROCEDURE — 86900 BLOOD TYPING SEROLOGIC ABO: CPT | Performed by: EMERGENCY MEDICINE

## 2024-11-20 PROCEDURE — 51702 INSERT TEMP BLADDER CATH: CPT

## 2024-11-20 PROCEDURE — 25810000003 SODIUM CHLORIDE 0.9 % SOLUTION: Performed by: EMERGENCY MEDICINE

## 2024-11-20 PROCEDURE — 86901 BLOOD TYPING SEROLOGIC RH(D): CPT | Performed by: EMERGENCY MEDICINE

## 2024-11-20 RX ORDER — SODIUM CHLORIDE 0.9 % (FLUSH) 0.9 %
10 SYRINGE (ML) INJECTION AS NEEDED
Status: DISCONTINUED | OUTPATIENT
Start: 2024-11-20 | End: 2024-11-23 | Stop reason: HOSPADM

## 2024-11-20 RX ADMIN — Medication 10 ML: at 18:37

## 2024-11-20 RX ADMIN — SODIUM CHLORIDE 500 ML: 9 INJECTION, SOLUTION INTRAVENOUS at 22:01

## 2024-11-20 RX ADMIN — ERTAPENEM 1000 MG: 1 INJECTION INTRAMUSCULAR; INTRAVENOUS at 22:48

## 2024-11-20 NOTE — Clinical Note
Level of Care: Telemetry [5]   Diagnosis: Acute kidney injury [220851]   Admitting Physician: LINA DSOUZA III [[id number]   Attending Physician: LINA DSOUZA III [[id number]]   Bed Request Comments: tele obs not cdu

## 2024-11-21 DIAGNOSIS — N13.30 HYDRONEPHROSIS OF LEFT KIDNEY: Primary | ICD-10-CM

## 2024-11-21 PROBLEM — N39.0 UTI (URINARY TRACT INFECTION): Status: ACTIVE | Noted: 2024-11-21

## 2024-11-21 PROBLEM — E11.9 T2DM (TYPE 2 DIABETES MELLITUS): Status: ACTIVE | Noted: 2024-11-21

## 2024-11-21 PROBLEM — N17.9 ACUTE KIDNEY INJURY SUPERIMPOSED ON CHRONIC KIDNEY DISEASE: Status: ACTIVE | Noted: 2024-11-21

## 2024-11-21 PROBLEM — D63.8 ANEMIA, CHRONIC DISEASE: Status: ACTIVE | Noted: 2024-11-21

## 2024-11-21 PROBLEM — N39.0 ACUTE UTI (URINARY TRACT INFECTION): Status: ACTIVE | Noted: 2024-11-21

## 2024-11-21 PROBLEM — N17.9 ACUTE KIDNEY INJURY: Status: ACTIVE | Noted: 2024-11-21

## 2024-11-21 PROBLEM — N18.9 ACUTE KIDNEY INJURY SUPERIMPOSED ON CHRONIC KIDNEY DISEASE: Status: ACTIVE | Noted: 2024-11-21

## 2024-11-21 PROBLEM — R53.1 GENERALIZED WEAKNESS: Status: ACTIVE | Noted: 2024-11-21

## 2024-11-21 LAB
ANION GAP SERPL CALCULATED.3IONS-SCNC: 10 MMOL/L (ref 5–15)
BASOPHILS # BLD AUTO: 0.08 10*3/MM3 (ref 0–0.2)
BASOPHILS NFR BLD AUTO: 0.9 % (ref 0–1.5)
BUN SERPL-MCNC: 49 MG/DL (ref 8–23)
BUN/CREAT SERPL: 17.3 (ref 7–25)
CA-I SERPL ISE-MCNC: 1.23 MMOL/L (ref 1.15–1.3)
CALCIUM SPEC-SCNC: 8.8 MG/DL (ref 8.2–9.6)
CHLORIDE SERPL-SCNC: 110 MMOL/L (ref 98–107)
CO2 SERPL-SCNC: 17 MMOL/L (ref 22–29)
CREAT SERPL-MCNC: 2.83 MG/DL (ref 0.76–1.27)
DEPRECATED RDW RBC AUTO: 59.1 FL (ref 37–54)
EGFRCR SERPLBLD CKD-EPI 2021: 20.3 ML/MIN/1.73
EOSINOPHIL # BLD AUTO: 0.26 10*3/MM3 (ref 0–0.4)
EOSINOPHIL NFR BLD AUTO: 3 % (ref 0.3–6.2)
ERYTHROCYTE [DISTWIDTH] IN BLOOD BY AUTOMATED COUNT: 15.5 % (ref 12.3–15.4)
GLUCOSE BLDC GLUCOMTR-MCNC: 133 MG/DL (ref 70–130)
GLUCOSE BLDC GLUCOMTR-MCNC: 139 MG/DL (ref 70–130)
GLUCOSE BLDC GLUCOMTR-MCNC: 140 MG/DL (ref 70–130)
GLUCOSE BLDC GLUCOMTR-MCNC: 179 MG/DL (ref 70–130)
GLUCOSE BLDC GLUCOMTR-MCNC: 99 MG/DL (ref 70–130)
GLUCOSE SERPL-MCNC: 119 MG/DL (ref 65–99)
HCT VFR BLD AUTO: 31.3 % (ref 37.5–51)
HCT VFR BLD AUTO: 32.2 % (ref 37.5–51)
HCT VFR BLD AUTO: 33.5 % (ref 37.5–51)
HGB BLD-MCNC: 10.4 G/DL (ref 13–17.7)
HGB BLD-MCNC: 9.6 G/DL (ref 13–17.7)
HGB BLD-MCNC: 9.9 G/DL (ref 13–17.7)
IMM GRANULOCYTES # BLD AUTO: 0.05 10*3/MM3 (ref 0–0.05)
IMM GRANULOCYTES NFR BLD AUTO: 0.6 % (ref 0–0.5)
LYMPHOCYTES # BLD AUTO: 3.07 10*3/MM3 (ref 0.7–3.1)
LYMPHOCYTES NFR BLD AUTO: 35.2 % (ref 19.6–45.3)
MCH RBC QN AUTO: 31.6 PG (ref 26.6–33)
MCHC RBC AUTO-ENTMCNC: 29.8 G/DL (ref 31.5–35.7)
MCV RBC AUTO: 105.9 FL (ref 79–97)
MONOCYTES # BLD AUTO: 0.57 10*3/MM3 (ref 0.1–0.9)
MONOCYTES NFR BLD AUTO: 6.5 % (ref 5–12)
NEUTROPHILS NFR BLD AUTO: 4.7 10*3/MM3 (ref 1.7–7)
NEUTROPHILS NFR BLD AUTO: 53.8 % (ref 42.7–76)
NRBC BLD AUTO-RTO: 0 /100 WBC (ref 0–0.2)
PLATELET # BLD AUTO: 232 10*3/MM3 (ref 140–450)
PMV BLD AUTO: 9.5 FL (ref 6–12)
POTASSIUM SERPL-SCNC: 5.3 MMOL/L (ref 3.5–5.2)
POTASSIUM SERPL-SCNC: 5.3 MMOL/L (ref 3.5–5.2)
QT INTERVAL: 398 MS
QT INTERVAL: 414 MS
QTC INTERVAL: 400 MS
QTC INTERVAL: 414 MS
RBC # BLD AUTO: 3.04 10*6/MM3 (ref 4.14–5.8)
SODIUM SERPL-SCNC: 137 MMOL/L (ref 136–145)
WBC NRBC COR # BLD AUTO: 8.73 10*3/MM3 (ref 3.4–10.8)

## 2024-11-21 PROCEDURE — 97161 PT EVAL LOW COMPLEX 20 MIN: CPT

## 2024-11-21 PROCEDURE — 97535 SELF CARE MNGMENT TRAINING: CPT | Performed by: OCCUPATIONAL THERAPIST

## 2024-11-21 PROCEDURE — 85014 HEMATOCRIT: CPT | Performed by: NURSE PRACTITIONER

## 2024-11-21 PROCEDURE — 97165 OT EVAL LOW COMPLEX 30 MIN: CPT | Performed by: OCCUPATIONAL THERAPIST

## 2024-11-21 PROCEDURE — 85018 HEMOGLOBIN: CPT | Performed by: INTERNAL MEDICINE

## 2024-11-21 PROCEDURE — 99222 1ST HOSP IP/OBS MODERATE 55: CPT | Performed by: NURSE PRACTITIONER

## 2024-11-21 PROCEDURE — 85025 COMPLETE CBC W/AUTO DIFF WBC: CPT | Performed by: NURSE PRACTITIONER

## 2024-11-21 PROCEDURE — 99222 1ST HOSP IP/OBS MODERATE 55: CPT | Performed by: UROLOGY

## 2024-11-21 PROCEDURE — 82330 ASSAY OF CALCIUM: CPT | Performed by: NURSE PRACTITIONER

## 2024-11-21 PROCEDURE — 25810000003 SODIUM CHLORIDE 0.9 % SOLUTION: Performed by: INTERNAL MEDICINE

## 2024-11-21 PROCEDURE — 82948 REAGENT STRIP/BLOOD GLUCOSE: CPT

## 2024-11-21 PROCEDURE — 25810000003 SODIUM CHLORIDE 0.9 % SOLUTION: Performed by: NURSE PRACTITIONER

## 2024-11-21 PROCEDURE — 85018 HEMOGLOBIN: CPT | Performed by: NURSE PRACTITIONER

## 2024-11-21 PROCEDURE — 97116 GAIT TRAINING THERAPY: CPT

## 2024-11-21 PROCEDURE — 93005 ELECTROCARDIOGRAM TRACING: CPT | Performed by: NURSE PRACTITIONER

## 2024-11-21 PROCEDURE — 80048 BASIC METABOLIC PNL TOTAL CA: CPT | Performed by: NURSE PRACTITIONER

## 2024-11-21 PROCEDURE — 84132 ASSAY OF SERUM POTASSIUM: CPT | Performed by: NURSE PRACTITIONER

## 2024-11-21 PROCEDURE — 63710000001 INSULIN LISPRO (HUMAN) PER 5 UNITS: Performed by: NURSE PRACTITIONER

## 2024-11-21 PROCEDURE — 93010 ELECTROCARDIOGRAM REPORT: CPT | Performed by: INTERNAL MEDICINE

## 2024-11-21 PROCEDURE — 85014 HEMATOCRIT: CPT | Performed by: INTERNAL MEDICINE

## 2024-11-21 PROCEDURE — 25010000002 ERTAPENEM PER 500 MG: Performed by: NURSE PRACTITIONER

## 2024-11-21 RX ORDER — HYDROCODONE BITARTRATE AND ACETAMINOPHEN 5; 325 MG/1; MG/1
1 TABLET ORAL EVERY 12 HOURS PRN
Status: DISCONTINUED | OUTPATIENT
Start: 2024-11-21 | End: 2024-11-23

## 2024-11-21 RX ORDER — HYDROCODONE BITARTRATE AND ACETAMINOPHEN 5; 325 MG/1; MG/1
1 TABLET ORAL EVERY 6 HOURS PRN
COMMUNITY

## 2024-11-21 RX ORDER — ACETAMINOPHEN 325 MG/1
650 TABLET ORAL EVERY 4 HOURS PRN
Status: DISCONTINUED | OUTPATIENT
Start: 2024-11-21 | End: 2024-11-23 | Stop reason: HOSPADM

## 2024-11-21 RX ORDER — BISACODYL 10 MG
10 SUPPOSITORY, RECTAL RECTAL DAILY PRN
Status: DISCONTINUED | OUTPATIENT
Start: 2024-11-21 | End: 2024-11-23 | Stop reason: HOSPADM

## 2024-11-21 RX ORDER — POLYETHYLENE GLYCOL 3350 17 G/17G
17 POWDER, FOR SOLUTION ORAL DAILY PRN
Status: DISCONTINUED | OUTPATIENT
Start: 2024-11-21 | End: 2024-11-23 | Stop reason: HOSPADM

## 2024-11-21 RX ORDER — SODIUM CHLORIDE 0.9 % (FLUSH) 0.9 %
10 SYRINGE (ML) INJECTION AS NEEDED
Status: DISCONTINUED | OUTPATIENT
Start: 2024-11-21 | End: 2024-11-23 | Stop reason: HOSPADM

## 2024-11-21 RX ORDER — INSULIN LISPRO 100 [IU]/ML
2-7 INJECTION, SOLUTION INTRAVENOUS; SUBCUTANEOUS
Status: DISCONTINUED | OUTPATIENT
Start: 2024-11-21 | End: 2024-11-23 | Stop reason: HOSPADM

## 2024-11-21 RX ORDER — BISOPROLOL FUMARATE 5 MG/1
5 TABLET, FILM COATED ORAL DAILY
Status: DISCONTINUED | OUTPATIENT
Start: 2024-11-21 | End: 2024-11-23 | Stop reason: HOSPADM

## 2024-11-21 RX ORDER — FINASTERIDE 5 MG/1
5 TABLET, FILM COATED ORAL DAILY
Status: DISCONTINUED | OUTPATIENT
Start: 2024-11-21 | End: 2024-11-23 | Stop reason: HOSPADM

## 2024-11-21 RX ORDER — SODIUM CHLORIDE 9 MG/ML
75 INJECTION, SOLUTION INTRAVENOUS CONTINUOUS
Status: DISCONTINUED | OUTPATIENT
Start: 2024-11-21 | End: 2024-11-21

## 2024-11-21 RX ORDER — DEXTROSE MONOHYDRATE 25 G/50ML
25 INJECTION, SOLUTION INTRAVENOUS
Status: DISCONTINUED | OUTPATIENT
Start: 2024-11-21 | End: 2024-11-23 | Stop reason: HOSPADM

## 2024-11-21 RX ORDER — LEVOTHYROXINE SODIUM 50 UG/1
50 TABLET ORAL
Status: DISCONTINUED | OUTPATIENT
Start: 2024-11-21 | End: 2024-11-23 | Stop reason: HOSPADM

## 2024-11-21 RX ORDER — SODIUM CHLORIDE 0.9 % (FLUSH) 0.9 %
10 SYRINGE (ML) INJECTION EVERY 12 HOURS SCHEDULED
Status: DISCONTINUED | OUTPATIENT
Start: 2024-11-21 | End: 2024-11-23 | Stop reason: HOSPADM

## 2024-11-21 RX ORDER — TAMSULOSIN HYDROCHLORIDE 0.4 MG/1
0.4 CAPSULE ORAL NIGHTLY
Status: DISCONTINUED | OUTPATIENT
Start: 2024-11-21 | End: 2024-11-23 | Stop reason: HOSPADM

## 2024-11-21 RX ORDER — AMOXICILLIN 250 MG
2 CAPSULE ORAL 2 TIMES DAILY PRN
Status: DISCONTINUED | OUTPATIENT
Start: 2024-11-21 | End: 2024-11-23 | Stop reason: HOSPADM

## 2024-11-21 RX ORDER — SODIUM CHLORIDE 9 MG/ML
40 INJECTION, SOLUTION INTRAVENOUS AS NEEDED
Status: DISCONTINUED | OUTPATIENT
Start: 2024-11-21 | End: 2024-11-23 | Stop reason: HOSPADM

## 2024-11-21 RX ORDER — NICOTINE POLACRILEX 4 MG
15 LOZENGE BUCCAL
Status: DISCONTINUED | OUTPATIENT
Start: 2024-11-21 | End: 2024-11-23 | Stop reason: HOSPADM

## 2024-11-21 RX ORDER — IBUPROFEN 600 MG/1
1 TABLET ORAL
Status: DISCONTINUED | OUTPATIENT
Start: 2024-11-21 | End: 2024-11-23 | Stop reason: HOSPADM

## 2024-11-21 RX ORDER — SODIUM CHLORIDE 9 MG/ML
75 INJECTION, SOLUTION INTRAVENOUS CONTINUOUS
Status: ACTIVE | OUTPATIENT
Start: 2024-11-21 | End: 2024-11-22

## 2024-11-21 RX ORDER — BISACODYL 5 MG/1
5 TABLET, DELAYED RELEASE ORAL DAILY PRN
Status: DISCONTINUED | OUTPATIENT
Start: 2024-11-21 | End: 2024-11-23 | Stop reason: HOSPADM

## 2024-11-21 RX ADMIN — FINASTERIDE 5 MG: 5 TABLET, FILM COATED ORAL at 07:43

## 2024-11-21 RX ADMIN — SODIUM CHLORIDE 75 ML/HR: 9 INJECTION, SOLUTION INTRAVENOUS at 03:06

## 2024-11-21 RX ADMIN — BISOPROLOL FUMARATE 5 MG: 5 TABLET ORAL at 07:43

## 2024-11-21 RX ADMIN — LEVOTHYROXINE SODIUM 50 MCG: 0.05 TABLET ORAL at 06:37

## 2024-11-21 RX ADMIN — SODIUM CHLORIDE 500 ML: 9 INJECTION, SOLUTION INTRAVENOUS at 01:21

## 2024-11-21 RX ADMIN — SODIUM CHLORIDE 75 ML/HR: 9 INJECTION, SOLUTION INTRAVENOUS at 18:04

## 2024-11-21 RX ADMIN — Medication 5 MG: at 03:15

## 2024-11-21 RX ADMIN — ERTAPENEM 500 MG: 1 INJECTION INTRAMUSCULAR; INTRAVENOUS at 21:26

## 2024-11-21 RX ADMIN — TAMSULOSIN HYDROCHLORIDE 0.4 MG: 0.4 CAPSULE ORAL at 03:04

## 2024-11-21 RX ADMIN — HYDROCODONE BITARTRATE AND ACETAMINOPHEN 1 TABLET: 5; 325 TABLET ORAL at 03:15

## 2024-11-21 RX ADMIN — Medication 10 ML: at 03:31

## 2024-11-21 RX ADMIN — SODIUM CHLORIDE 75 ML/HR: 9 INJECTION, SOLUTION INTRAVENOUS at 12:19

## 2024-11-21 RX ADMIN — INSULIN LISPRO 2 UNITS: 100 INJECTION, SOLUTION INTRAVENOUS; SUBCUTANEOUS at 18:01

## 2024-11-21 NOTE — PLAN OF CARE
Problem: Adult Inpatient Plan of Care  Goal: Plan of Care Review  11/21/2024 0356 by Fallon Mendieta RN  Outcome: Progressing  Flowsheets (Taken 11/21/2024 0356)  Progress: improving  Outcome Evaluation: Ascencio in place, Pt able to rest tonight.  Plan of Care Reviewed With:   patient   family  11/21/2024 0238 by Fallon Mendieta RN  Flowsheets (Taken 11/21/2024 0238)  Plan of Care Reviewed With: patient  Goal: Absence of Hospital-Acquired Illness or Injury  Outcome: Progressing  Intervention: Identify and Manage Fall Risk  Recent Flowsheet Documentation  Taken 11/21/2024 0315 by Fallon Mendieta RN  Safety Promotion/Fall Prevention:   activity supervised   clutter free environment maintained   fall prevention program maintained   nonskid shoes/slippers when out of bed   room organization consistent   safety round/check completed  Intervention: Prevent Skin Injury  Recent Flowsheet Documentation  Taken 11/21/2024 0315 by Fallon Mendieta RN  Body Position: head facing, left  Skin Protection:   incontinence pads utilized   silicone foam dressing in place  Intervention: Prevent and Manage VTE (Venous Thromboembolism) Risk  Recent Flowsheet Documentation  Taken 11/21/2024 0315 by Fallon Mendieta RN  VTE Prevention/Management: SCDs (sequential compression devices) off  Intervention: Prevent Infection  Recent Flowsheet Documentation  Taken 11/21/2024 0315 by Fallon Mendieta RN  Infection Prevention:   hand hygiene promoted   equipment surfaces disinfected   rest/sleep promoted  Goal: Optimal Comfort and Wellbeing  Outcome: Progressing  Intervention: Monitor Pain and Promote Comfort  Recent Flowsheet Documentation  Taken 11/21/2024 0315 by Fallon Mendieta RN  Pain Management Interventions: pain medication given  Intervention: Provide Person-Centered Care  Recent Flowsheet Documentation  Taken 11/21/2024 0315 by Fallon Mendieta RN  Trust Relationship/Rapport:   care explained   choices provided   emotional support  provided  Goal: Patient-Specific Goal (Individualized)  Outcome: Progressing   Goal Outcome Evaluation:  Plan of Care Reviewed With: patient, family        Progress: improving  Outcome Evaluation: Ascencio in place, Pt able to rest tonight.

## 2024-11-21 NOTE — PLAN OF CARE
Goal Outcome Evaluation:  Plan of Care Reviewed With: patient        Progress: improving  Outcome Evaluation: Patient ambulated 125 feet with RW, CGA, step through gait pattern, limited by fatigue, weakness, and SOA. O2 sats in high 80% at end of ambulation. Mild LE weakness, very diminished light touch sensation in plantar aspect of B feet. Patient currently below functional baseline, demonstrating decreased functional mobility status, impaired balance, decreased endurance, and decreased strength. Will address these deficits to promote return to PLOF. Recommend d/c home with 24/7 assist and HHPT.    Anticipated Discharge Disposition (PT): home with 24/7 care, home with home health

## 2024-11-21 NOTE — PROGRESS NOTES
[facility] Medicine Services  ADMISSION FOLLOW-UP NOTE          Patient admitted after midnight, H&P by my partner performed earlier on today's date reviewed.  Interim findings, labs, and charting also reviewed.        The [facility] Hospital Problem List has been managed and updated to include any new diagnoses:  Active Hospital Problems    Diagnosis  POA    **Acute UTI (urinary tract infection) [N39.0]  Yes    Acute kidney injury superimposed on chronic kidney disease [N17.9, N18.9]  Yes    Anemia, chronic disease [D63.8]  Yes    T2DM (type 2 diabetes mellitus) [E11.9]  Yes    Generalized weakness [R53.1]  Yes    Acute kidney injury [N17.9]  Yes    UTI (urinary tract infection) [N39.0]  Yes    Hematuria [R31.9]  Yes    CAD (coronary artery disease) [I25.10]  Yes    Cardiac pacemaker in situ [Z95.0]  Yes    Paroxysmal atrial fibrillation [I48.0]  Yes    Urethral stricture [N35.919]  Yes    History of pulmonary embolism [Z86.711]  Yes    Pulmonary fibrosis [J84.10]  Yes    Hydronephrosis of left kidney [N13.30]  Unknown    Acquired hypothyroidism [E03.9]  Yes    HLD (hyperlipidemia) [E78.5]  Yes      Resolved Hospital Problems   No resolved problems to display.     In summary, this is a 89 yo gentleman  w PAF s/p pacemaker on eliquis, CHFpEF, idiopathic pulmonary fibrosis, recurrent UTI w h/o chronic hydronephrosis s/p stenting of left ureteral stricture ([place]) who presents w ongoing dysuria + hematuria.  S/p bactrim for presumed UTI 11/13 x 7 days (urine >100,000 mixed abena, not speciated at that time)     Gross hematuria w bladder retention  Possible acute cystitis, persistent despite bactrim  -caceres in place w resolution of patient symtpoms. Dark red/brown urine in bag without discrete clots. Q4 bladder scans despite caceres given risk of retention/clot  -repeat H&H monitoring (still dropping)  -NPO after 12 for cystoscopy w left ureteral stent exchange w Dr Pitts on 11/22  -invanz  pending culture data given patient's history of ESBL urinary infections. F/u urine culture     Post-renal OCTAVIA on CKDIIIa c/b hyperkalemia - mildly improved  -eating/drinking well now, ensure good PO intake and euvolemic for day     Paroxysmal Afib s/p PPM - holding eliquis 2/2 above gross hematuria. No clear indication for aspirin in addition (no h/o recent PCI, etc) so would d/c on discharge after this episode    Pulmonary fibrosis w PRN o2 need  Hypothyroidism  DMII, insulin dept - not requiring SSI currently, monitor    At baseline, active and independent. Will work hard to preserve mobility      Expected Discharge 11/26 (Discharge date is tentative pending patient's medical condition and is subject to change)  Expected Discharge Date: 11/26/2024; Expected Discharge Time:      Bridget Lofton MD  11/21/24

## 2024-11-21 NOTE — THERAPY EVALUATION
Patient Name: Forrest Zepeda  : 1932    MRN: [id number]                              Today's Date: 2024       Admit Date: 2024    Visit Dx:     ICD-10-CM ICD-9-CM   1. Acute UTI  N39.0 599.0   2. Acute kidney injury  N17.9 584.9   3. Gross hematuria  R31.0 599.71   4. Near syncope  R55 780.2     Patient Active Problem List   Diagnosis    Essential hypertension    Generalized osteoarthritis    Diabetic neuropathy    Gout    HLD (hyperlipidemia)    Acquired hypothyroidism    Ureteral stricture    Hydronephrosis of left kidney    CKD (chronic kidney disease) stage 3, GFR 30-59 ml/min    LEHMAN (dyspnea on exertion)    Pulmonary fibrosis    Symptomatic bradycardia    Hypoxemia requiring supplemental oxygen    History of pulmonary embolism    Sinus node dysfunction    Chronic fatigue    Urethral stricture    Paroxysmal atrial fibrillation    Type 2 diabetes mellitus with hyperglycemia, with long-term current use of insulin    Abnormal cardiovascular stress test    Pulmonary hypertension    Cardiac pacemaker in situ    CAD (coronary artery disease)    Diastolic congestive heart failure    Abnormal SPEP    Type 2 diabetes mellitus with stage 3b chronic kidney disease, with long-term current use of insulin    Hydronephrosis, left    Gross hematuria    Chronic heart failure with preserved ejection fraction (HFpEF)    Weakness    Vasovagal syncope    Hematuria    Acute UTI (urinary tract infection)    Acute kidney injury superimposed on chronic kidney disease    Anemia, chronic disease    T2DM (type 2 diabetes mellitus)    Generalized weakness    Acute kidney injury     Past Medical History:   Diagnosis Date    Abnormal EKG     Abnormal PSA     Reported abnormal    Acute cystitis with hematuria 2023    Acute deep vein thrombosis (DVT) of right lower extremity 2019    Acute diverticulitis 2019    Acute hyperkalemia 2019    Acute respiratory failure with hypoxia     Acute saddle pulmonary  "embolism with acute cor pulmonale 08/22/2019    Acute systolic right heart failure 08/22/2019    Acute UTI (urinary tract infection)     Arthritis     KNEES,  BUT SINCE HAD REPLACEMENT    Benign localized hyperplasia of prostate     Bilateral knee pain     C. difficile diarrhea 2010    Cataracts, bilateral     CKD (chronic kidney disease)     Coronary artery disease     Diabetic neuropathy     Diastolic dysfunction     Disease of thyroid gland     HYPOTHYROIDISM    Diverticulitis     Dyslipidemia     H/O    Erectile dysfunction     Family history of malignant hyperthermia     Brother     Full dentures     Generalized weakness     History of ESBL E. coli infection     most recent 4-2022 f/u with ID Dr Johnson    History of gout     History of hepatitis A vaccination     History of nephrolithiasis     History of nuclear stress test     STATES IN THE 1990'S.  \"IT WAS OK\"    History of osteopenia     History of recurrent UTI (urinary tract infection)     Hydronephrosis of left kidney 07/18/2019    Hydronephrosis with renal and ureteral calculus obstruction 10/21/2019    Added automatically from request for surgery [id number]    Hydronephrosis with ureteral stricture     Hypercholesterolemia     Hyperkalemia     PT UNSURE REGARDING HX OF THIS    Hyperlipidemia     Hypertension     Hypothyroidism     Impaired functional mobility, balance, gait, and endurance     Insomnia     IPF (idiopathic pulmonary fibrosis)     per patient and son, dx at [facility], was told no treatment necessary, not to worry about it    On supplemental oxygen therapy     2L NC QHS    Other hydronephrosis 08/12/2019    Added automatically from request for surgery [id number]    Paroxysmal atrial fibrillation     Pulmonary fibrosis     Pulmonary hypertension, mild 02/2021    per echo per cardiology note 1/9/23, per pt's son, PCP does not agree with this dx    Renal insufficiency     Sepsis 2019    Shortness of breath     STATES WITH EXERTION, OTHERWISE, DENIES "    Sigmoid diverticulitis without abscess or perforation 08/09/2017    Sinus node dysfunction     Skin breakdown     fourth toe right foot noted in 2019 MD D/C note    Skin ulcer of fourth toe of right foot, limited to breakdown of skin 07/05/2019    Stricture of ureter     Type 2 diabetes mellitus     Ureteral stricture, left     Urinary incontinence     UTI (urinary tract infection) 07/18/2019    Vitamin D deficiency     Wears glasses      Past Surgical History:   Procedure Laterality Date    APPENDECTOMY      CARDIAC ELECTROPHYSIOLOGY PROCEDURE N/A 12/23/2020    Procedure: Pacemaker DC new. DNS meds.;  Surgeon: Jimy Ledezma DO;  Location:  [facility];  Service: Cardiology;  Laterality: N/A;    CHOLECYSTECTOMY      CIRCUMCISION N/A 10/23/2019    Procedure: CIRCUMCISIOn-DORSAL SLIT;  Surgeon: Griffin Romero MD;  Location:  [facility];  Service: Urology    COLONOSCOPY      CYSTOSCOPY RETROGRADE PYELOGRAM Left 06/17/2022    Procedure: CYSTOSCOPY RETROGRADE PYELOGRAM;  Surgeon: Ryne Mccann MD;  Location:  [facility];  Service: Urology;  Laterality: Left;    CYSTOSCOPY RETROGRADE PYELOGRAM Left 7/10/2024    Procedure: CYSTOSCOPY RETROGRADE PYELOGRAM AND STENT INSERTION LEFT;  Surgeon: Deanne Pitts MD;  Location:  [facility];  Service: Urology;  Laterality: Left;    CYSTOSCOPY URETEROSCOPY Left 02/11/2019    Procedure: URETEROSCOPY WITH STENT EXTRACTION, RPG;  Surgeon: Griffin Romero MD;  Location:  [facility];  Service: Urology    CYSTOSCOPY W/ URETERAL STENT PLACEMENT Left 07/20/2019    Procedure: CYSTOSCOPY, LEFT RETROGRADE PYELOGRAM,  ATTEMPTED LEFT URETERAL STENT INSERTION;  Surgeon: Isaac Flynn MD;  Location:  [facility];  Service: Urology    CYSTOSCOPY W/ URETERAL STENT PLACEMENT Left 06/17/2022    Procedure: CYSTOSCOPY URETERAL CATHETER/STENT INSERTION;  Surgeon: Ryne Mccann MD;  Location:  [facility];  Service: Urology;  Laterality: Left;    CYSTOSCOPY W/ URETERAL STENT  PLACEMENT Left 01/27/2023    Procedure: CYSTOSCOPY URETERAL CATHETER/STENT INSERTION;  Surgeon: Deanne Pitts MD;  Location:  [facility];  Service: Urology;  Laterality: Left;    CYSTOSCOPY W/ URETERAL STENT PLACEMENT Left 02/15/2024    Procedure: CYSTOSCOPY LEFT STENT EXCHANGE;  Surgeon: Deanne Pitts MD;  Location:  [facility];  Service: Urology;  Laterality: Left;    CYSTOSCOPY, URETEROSCOPY, RETROGRADE PYELOGRAM, STONE EXTRACTION, STENT INSERTION Left 06/15/2023    Procedure: URETEROSCOPY, RETROGRADE PYELOGRAM, STENT INSERTION;  Surgeon: Deanne Pitts MD;  Location:  [facility];  Service: Urology;  Laterality: Left;    EYE SURGERY      CATARACTS REMOVED BILATERALLY    JOINT REPLACEMENT      Bilateral knees    KNEE ARTHROPLASTY Bilateral     KNEE ARTHROSCOPY Bilateral     RENAL ARTERY STENT      SEVERAL TIMES EVERY SINCE 1978    URETERAL STENT INSERTION  10/2020    URETEROSCOPY LASER LITHOTRIPSY WITH STENT INSERTION Left 01/10/2018    Procedure:  cysto, left ureteral stent replacement ;  Surgeon: Griffin Romero MD;  Location:  [facility];  Service:     URETEROSCOPY LASER LITHOTRIPSY WITH STENT INSERTION Left 08/14/2019    Procedure: URETEROSCOPY, STONE REMOVAL WITH STENT INSERTION;  Surgeon: Griffin Romero MD;  Location:  [facility];  Service: Urology    URETEROSCOPY LASER LITHOTRIPSY WITH STENT INSERTION Left 10/23/2019    Procedure: Left URETEROSCOPY WITH STENT INSERTION-LEFT;  Surgeon: Griffin Romero MD;  Location:  [facility];  Service: Urology      General Information       Row Name 11/21/24 0902          Physical Therapy Time and Intention    Document Type evaluation  -LR     Mode of Treatment physical therapy  -LR       Row Name 11/21/24 0902          General Information    Patient Profile Reviewed yes  -LR     Prior Level of Function independent:;all household mobility;gait;transfer;bed mobility;ADL's  children assist with home management and groceries, uses RW vs SPC at baseline depending on how he feels,  has O2 at home but rarely uses it  -LR     Existing Precautions/Restrictions fall;other (see comments)  caceres  -LR     Barriers to Rehab medically complex  -LR       Row Name 11/21/24 0902          Living Environment    People in Home alone  -LR       Row Name 11/21/24 0902          Home Main Entrance    Number of Stairs, Main Entrance two  -LR     Stair Railings, Main Entrance none  -LR       Row Name 11/21/24 0902          Stairs Within Home, Primary    Number of Stairs, Within Home, Primary none  -LR       Row Name 11/21/24 0902          Cognition    Orientation Status (Cognition) oriented x 4  -LR       Row Name 11/21/24 0902          Safety Issues/Impairments Affecting Functional Mobility    Safety Issues Affecting Function (Mobility) safety precautions follow-through/compliance;safety precaution awareness;positioning of assistive device;insight into deficits/self-awareness;awareness of need for assistance  -LR     Impairments Affecting Function (Mobility) balance;strength;endurance/activity tolerance;postural/trunk control;shortness of breath;sensation/sensory awareness  -LR               User Key  (r) = Recorded By, (t) = Taken By, (c) = Cosigned By      Initials Name Provider Type    LR Shayy Mcmillan, PT Physical Therapist                   Mobility       Row Name 11/21/24 0902          Bed Mobility    Bed Mobility supine-sit  -LR     Supine-Sit [place] (Bed Mobility) verbal cues;minimum assist (75% patient effort)  -LR     Assistive Device (Bed Mobility) head of bed elevated  -LR     Comment, (Bed Mobility) Verbal cues to move LEs towards EOB and to push up from bed to raise trunk into sitting and to scoot hips out to get feet on floor. Min assist to pull trunk into sitting. Cues to then push from bed to scoot hips out to get feet on floor. Denied dizziness upon sitting up.  -LR       Row Name 11/21/24 0902          Transfers    Comment, (Transfers) Verbal cues to push up from bed to stand and  to reach back for chair to lower into sitting. Patient braced legs against bed for support, weight shifted posteriorly upon standing with great toes extended and off floor. Patient corrected quickly. Patient did not reach back for chair to sit despite cues.  -LR       Row Name 11/21/24 0902          Bed-Chair Transfer    Bed-Chair [place] (Transfers) not tested  -LR       Row Name 11/21/24 0902          Sit-Stand Transfer    Sit-Stand [place] (Transfers) verbal cues;minimum assist (75% patient effort);1 person to manage equipment  -LR     Assistive Device (Sit-Stand Transfers) walker, front-wheeled  -LR       Row Name 11/21/24 0902          Gait/Stairs (Locomotion)    [place] Level (Gait) verbal cues;contact guard  -LR     Assistive Device (Gait) walker, front-wheeled  -LR     Patient was able to Ambulate yes  -LR     Distance in Feet (Gait) 125  -LR     Deviations/Abnormal Patterns (Gait) bilateral deviations;melvin decreased;gait speed decreased;stride length decreased  -LR     Bilateral Gait Deviations forward flexed posture;heel strike decreased  -LR     [place] Level (Stairs) not tested  -LR     Comment, (Gait/Stairs) Patient ambulated with step through gait pattern at slow pace with forward flexed posture. Mildly unsteady at first but this improved as gait progressed. Remained forward flexed throughout mobility, unable to correct. Cues for increased LE weight bearing and decreased UE weight bearing. Patient SOA with mobility, which he reports occurs at baseline and that he feels less SOA today than usual. Gait limited by fatigue, weakness, and SOA. O2 sats in high 80% at end of ambulation.  -LR               User Key  (r) = Recorded By, (t) = Taken By, (c) = Cosigned By      Initials Name Provider Type    LR Shayy Mcmillan, PT Physical Therapist                   Obj/Interventions       Row Name 11/21/24 0902          Range of Motion Comprehensive    General Range of Motion  bilateral lower extremity ROM WFL  -LR       Row Name 11/21/24 0902          Strength Comprehensive (MMT)    General Manual Muscle Testing (MMT) Assessment lower extremity strength deficits identified  -LR       Row Name 11/21/24 0902          Balance    Balance Assessment sitting static balance;standing static balance;sitting dynamic balance;standing dynamic balance  -LR     Static Sitting Balance standby assist  -LR     Dynamic Sitting Balance contact guard  -LR     Position, Sitting Balance unsupported;sitting edge of bed  -LR     Static Standing Balance contact guard;1 person to manage equipment  -LR     Dynamic Standing Balance contact guard;1 person to manage equipment  -LR     Position/Device Used, Standing Balance supported;walker, rolling  -LR     Comment, Balance posterior lean with dynamic sitting balance at EOB.  -LR       Row Name 11/21/24 0902          Sensory Assessment (Somatosensory)    Sensory Assessment (Somatosensory) other (see comments)  denies numbness/tingling in B LEs; reports very diminished light touch in plantar aspect of feet upon assessment  -LR       Row Name 11/21/24 0902          Lower Extremity (Manual Muscle Testing)    Comment, MMT: Lower Extremity R hip flexor: 4-/5, L hip flexor 4/5, B knee extensors: 4+/5, B knee flexors: 4+/5, B ankle DFs: 5/5  -LR               User Key  (r) = Recorded By, (t) = Taken By, (c) = Cosigned By      Initials Name Provider Type    LR Shayy Mcmillan, PT Physical Therapist                   Goals/Plan       Row Name 11/21/24 0902          Bed Mobility Goal 1 (PT)    Activity/Assistive Device (Bed Mobility Goal 1, PT) sit to supine/supine to sit  -LR     [place] Level/Cues Needed (Bed Mobility Goal 1, PT) independent  -LR     Time Frame (Bed Mobility Goal 1, PT) short term goal (STG);3 days  -LR     Progress/Outcomes (Bed Mobility Goal 1, PT) goal ongoing  -       Row Name 11/21/24 0902          Transfer Goal 1 (PT)     Activity/Assistive Device (Transfer Goal 1, PT) sit-to-stand/stand-to-sit;walker, rolling  -LR     [place] Level/Cues Needed (Transfer Goal 1, PT) modified independence  -LR     Time Frame (Transfer Goal 1, PT) long term goal (LTG);5 days  -LR     Progress/Outcome (Transfer Goal 1, PT) goal ongoing  -LR       Row Name 11/21/24 0902          Gait Training Goal 1 (PT)    Activity/Assistive Device (Gait Training Goal 1, PT) gait (walking locomotion);walker, rolling  -LR     [place] Level (Gait Training Goal 1, PT) modified independence  -LR     Distance (Gait Training Goal 1, PT) 150 feet  -LR     Time Frame (Gait Training Goal 1, PT) long term goal (LTG);5 days  -LR     Progress/Outcome (Gait Training Goal 1, PT) goal ongoing  -LR       Row Name 11/21/24 0902          Stairs Goal 1 (PT)    Activity/Assistive Device (Stairs Goal 1, PT) ascending stairs;descending stairs;step-to-step  -LR     [place] Level/Cues Needed (Stairs Goal 1, PT) contact guard required  -LR     Number of Stairs (Stairs Goal 1, PT) 2  -LR     Time Frame (Stairs Goal 1, PT) long term goal (LTG);5 days  -LR     Progress/Outcome (Stairs Goal 1, PT) goal ongoing  -LR       Row Name 11/21/24 0902          Therapy Assessment/Plan (PT)    Planned Therapy Interventions (PT) balance training;bed mobility training;gait training;home exercise program;patient/family education;transfer training;strengthening;stair training  -LR               User Key  (r) = Recorded By, (t) = Taken By, (c) = Cosigned By      Initials Name Provider Type    LR Shayy Mcmillan, PT Physical Therapist                   Clinical Impression       Row Name 11/21/24 0902          Pain    Pretreatment Pain Rating 0/10 - no pain  -LR     Posttreatment Pain Rating 0/10 - no pain  -LR       Row Name 11/21/24 0902          Plan of Care Review    Plan of Care Reviewed With patient  -LR     Progress improving  -LR     Outcome Evaluation Patient ambulated 125 feet with  RW, CGA, step through gait pattern, limited by fatigue, weakness, and SOA. O2 sats in high 80% at end of ambulation. Mild LE weakness, very diminished light touch sensation in plantar aspect of B feet. Patient currently below functional baseline, demonstrating decreased functional mobility status, impaired balance, decreased endurance, and decreased strength. Will address these deficits to promote return to PLOF. Recommend d/c home with 24/7 assist and HHPT.  -LR       Row Name 11/21/24 0902          Therapy Assessment/Plan (PT)    Patient/Family Therapy Goals Statement (PT) go home, feel better  -LR     Rehab Potential (PT) good  -LR     Criteria for Skilled Interventions Met (PT) yes;meets criteria;skilled treatment is necessary  -LR     Therapy Frequency (PT) daily  -LR     Predicted Duration of Therapy Intervention (PT) 5 days  -LR       Row Name 11/21/24 0902          Vital Signs    Pre Systolic BP Rehab 121  -LR     Pre Treatment Diastolic BP 61  -LR     Pretreatment Heart Rate (beats/min) 60  -LR     Posttreatment Heart Rate (beats/min) 71  -LR     Pre SpO2 (%) 94  -LR     O2 Delivery Pre Treatment room air  -LR     Intra SpO2 (%) 88  -LR     O2 Delivery Intra Treatment room air  -LR     Post SpO2 (%) 92  -LR     O2 Delivery Post Treatment room air  -LR     Pre Patient Position Supine  -LR     Intra Patient Position Sitting  -LR     Post Patient Position Sitting  -LR       Row Name 11/21/24 0902          Positioning and Restraints    Pre-Treatment Position in bed  -LR     Post Treatment Position chair  -LR     In Chair notified nsg;reclined;sitting;call light within reach;encouraged to call for assist;exit alarm on;legs elevated;waffle cushion;on mechanical lift sling  -LR               User Key  (r) = Recorded By, (t) = Taken By, (c) = Cosigned By      Initials Name Provider Type    LR Shayy Mcmillan, PT Physical Therapist                   Outcome Measures       Row Name 11/21/24 0902 11/21/24 0703        How much help from another person do you currently need...    Turning from your back to your side while in flat bed without using bedrails? 4  -LR 4  -GS    Moving from lying on back to sitting on the side of a flat bed without bedrails? 3  -LR 3  -GS    Moving to and from a bed to a chair (including a wheelchair)? 3  -LR 3  -GS    Standing up from a chair using your arms (e.g., wheelchair, bedside chair)? 3  -LR 3  -GS    Climbing 3-5 steps with a railing? 2  -LR 2  -GS    To walk in hospital room? 3  -LR 3  -GS    AM-PAC 6 Clicks Score (PT) 18  -LR 18  -GS    Highest Level of Mobility Goal 6 --> Walk 10 steps or more  -LR 6 --> Walk 10 steps or more  -GS      Row Name 11/21/24 0315 11/21/24 0242       How much help from another person do you currently need...    Turning from your back to your side while in flat bed without using bedrails? 3  -CM 3  -CM    Moving from lying on back to sitting on the side of a flat bed without bedrails? 3  -CM 3  -CM    Moving to and from a bed to a chair (including a wheelchair)? 3  -CM 3  -CM    Standing up from a chair using your arms (e.g., wheelchair, bedside chair)? 3  -CM 3  -CM    Climbing 3-5 steps with a railing? 1  -CM 3  -CM    To walk in hospital room? 2  -CM 3  -CM    AM-PAC 6 Clicks Score (PT) 15  -CM 18  -CM    Highest Level of Mobility Goal 4 --> Transfer to chair/commode  -CM 6 --> Walk 10 steps or more  -CM      Row Name 11/21/24 0231          How much help from another person do you currently need...    Turning from your back to your side while in flat bed without using bedrails? 3  -CM     Moving from lying on back to sitting on the side of a flat bed without bedrails? 3  -CM     Moving to and from a bed to a chair (including a wheelchair)? 3  -CM     Standing up from a chair using your arms (e.g., wheelchair, bedside chair)? 3  -CM     Climbing 3-5 steps with a railing? 3  -CM     To walk in hospital room? 3  -CM     AM-PAC 6 Clicks Score (PT) 18  -CM      Highest Level of Mobility Goal 6 --> Walk 10 steps or more  -CM       Row Name 11/21/24 0902          Functional Assessment    Outcome Measure Options AM-PAC 6 Clicks Basic Mobility (PT)  -LR               User Key  (r) = Recorded By, (t) = Taken By, (c) = Cosigned By      Initials Name Provider Type    LR Shayy Mcmillan, PT Physical Therapist    Vivian Carlson RN Registered Nurse    Fallon Cody RN Registered Nurse                                 Physical Therapy Education       Title: PT OT SLP Therapies (In Progress)       Topic: Physical Therapy (Done)       Point: Mobility training (Done)       Learning Progress Summary            Patient Acceptance, E,D, VU,NR by LR at 11/21/2024 0902    Comment: Educated on safety with mobility, correct supine to sit t/f technique, correct sit<->stand t/f technique, correct gait mechanics, and progression of POC.                      Point: Home exercise program (Done)       Learning Progress Summary            Patient Acceptance, E,D, VU,NR by LR at 11/21/2024 0902    Comment: Educated on safety with mobility, correct supine to sit t/f technique, correct sit<->stand t/f technique, correct gait mechanics, and progression of POC.                      Point: Body mechanics (Done)       Learning Progress Summary            Patient Acceptance, E,D, VU,NR by LR at 11/21/2024 0902    Comment: Educated on safety with mobility, correct supine to sit t/f technique, correct sit<->stand t/f technique, correct gait mechanics, and progression of POC.                      Point: Precautions (Done)       Learning Progress Summary            Patient Acceptance, E,D, VU,NR by LR at 11/21/2024 0902    Comment: Educated on safety with mobility, correct supine to sit t/f technique, correct sit<->stand t/f technique, correct gait mechanics, and progression of POC.                                      User Key       Initials Effective Dates Name Provider Type Discipline    LR  02/03/23 -  Shayy Mcmillan, PT Physical Therapist PT                  PT Recommendation and Plan  Planned Therapy Interventions (PT): balance training, bed mobility training, gait training, home exercise program, patient/family education, transfer training, strengthening, stair training  Progress: improving  Outcome Evaluation: Patient ambulated 125 feet with RW, CGA, step through gait pattern, limited by fatigue, weakness, and SOA. O2 sats in high 80% at end of ambulation. Mild LE weakness, very diminished light touch sensation in plantar aspect of B feet. Patient currently below functional baseline, demonstrating decreased functional mobility status, impaired balance, decreased endurance, and decreased strength. Will address these deficits to promote return to PLOF. Recommend d/c home with 24/7 assist and HHPT.     Time Calculation:   PT Evaluation Complexity  History, PT Evaluation Complexity: 3 or more personal factors and/or comorbidities  Examination of Body Systems (PT Eval Complexity): total of 3 or more elements  Clinical Presentation (PT Evaluation Complexity): stable  Clinical Decision Making (PT Evaluation Complexity): low complexity  Overall Complexity (PT Evaluation Complexity): low complexity     PT Charges       Row Name 11/21/24 0902             Time Calculation    Start Time 0902  -LR      PT Received On 11/21/24  -LR      PT Goal Re-Cert Due Date 12/01/24  -LR         Timed Charges    89002 - Gait Training Minutes  10  -LR         Untimed Charges    PT Eval/Re-eval Minutes 40  -LR         Total Minutes    Timed Charges Total Minutes 10  -LR      Untimed Charges Total Minutes 40  -LR       Total Minutes 50  -LR                User Key  (r) = Recorded By, (t) = Taken By, (c) = Cosigned By      Initials Name Provider Type    LR Shayy Mcmillan, PT Physical Therapist                  Therapy Charges for Today       Code Description Service Date Service Provider Modifiers Qty     80680125545  GAIT TRAINING EA 15 MIN 11/21/2024 Shayy Mcmillan, PT GP 1    [id number] HC PT EVAL LOW COMPLEXITY 3 11/21/2024 Shayy Mcmillan, PT GP 1            PT G-Codes  Outcome Measure Options: AM-PAC 6 Clicks Basic Mobility (PT)  AM-PAC 6 Clicks Score (PT): 18  PT Discharge Summary  Anticipated Discharge Disposition (PT): home with 24/7 care, home with home health    Shayy Mcmillan, PT  11/21/2024

## 2024-11-21 NOTE — CONSULTS
[facility]   HISTORY AND PHYSICAL    Patient Name: Forrest Zepeda  : 1932  MRN: [id number]  Primary Care Physician:  Kary Wen MD  Date of admission: 2024    Subjective   Subjective     Chief Complaint: Gross Hematuria    HPI:    Forrest Zepeda is a 89 y.o. male with significant past medical history of HTN, HLD, HFpEF, CAD, PAF (Eliquis), s/p pacemaker, pulmonary HTN, pulmonary fibrosis, CKD Stage 3, hypothyroidism, recurrent UTI's and history of left ureteral stricture with chronic hydronephrosis followed by Dr. Pitts. Patient presented to [facility] on  for gross hematuria. Patient reports he was treated with Bactrim for UTI 2 weeks prior. He continued to feel ill despite antibiotic treatment. Patient developed gross hematuria on  with difficulty urinating and suprapubic pain and pressure. Patient denies fever. Patient admitted inpatient for further evaluation.     Review of Systems   All systems were reviewed and negative except for: gross hematuria    Personal History     Past Medical History:   Diagnosis Date    Abnormal EKG     Abnormal PSA     Reported abnormal    Acute cystitis with hematuria 2023    Acute deep vein thrombosis (DVT) of right lower extremity 2019    Acute diverticulitis     Acute hyperkalemia 2019    Acute respiratory failure with hypoxia     Acute saddle pulmonary embolism with acute cor pulmonale 2019    Acute systolic right heart failure 2019    Acute UTI (urinary tract infection)     Arthritis     KNEES,  BUT SINCE HAD REPLACEMENT    Benign localized hyperplasia of prostate     Bilateral knee pain     C. difficile diarrhea 2010    Cataracts, bilateral     CKD (chronic kidney disease)     Coronary artery disease     Diabetic neuropathy     Diastolic dysfunction     Disease of thyroid gland     HYPOTHYROIDISM    Diverticulitis     Dyslipidemia     H/O    Erectile dysfunction     Family history of malignant hyperthermia  "    Brother     Full dentures     Generalized weakness     History of ESBL E. coli infection     most recent 4-2022 f/u with ID Dr Johnson    History of gout     History of hepatitis A vaccination     History of nephrolithiasis     History of nuclear stress test     STATES IN THE 1990'S.  \"IT WAS OK\"    History of osteopenia     History of recurrent UTI (urinary tract infection)     Hydronephrosis of left kidney 07/18/2019    Hydronephrosis with renal and ureteral calculus obstruction 10/21/2019    Added automatically from request for surgery [id number]    Hydronephrosis with ureteral stricture     Hypercholesterolemia     Hyperkalemia     PT UNSURE REGARDING HX OF THIS    Hyperlipidemia     Hypertension     Hypothyroidism     Impaired functional mobility, balance, gait, and endurance     Insomnia     IPF (idiopathic pulmonary fibrosis)     per patient and son, dx at [facility], was told no treatment necessary, not to worry about it    On supplemental oxygen therapy     2L NC QHS    Other hydronephrosis 08/12/2019    Added automatically from request for surgery [id number]    Paroxysmal atrial fibrillation     Pulmonary fibrosis     Pulmonary hypertension, mild 02/2021    per echo per cardiology note 1/9/23, per pt's son, PCP does not agree with this dx    Renal insufficiency     Sepsis 2019    Shortness of breath     STATES WITH EXERTION, OTHERWISE, DENIES    Sigmoid diverticulitis without abscess or perforation 08/09/2017    Sinus node dysfunction     Skin breakdown     fourth toe right foot noted in 2019 MD D/C note    Skin ulcer of fourth toe of right foot, limited to breakdown of skin 07/05/2019    Stricture of ureter     Type 2 diabetes mellitus     Ureteral stricture, left     Urinary incontinence     UTI (urinary tract infection) 07/18/2019    Vitamin D deficiency     Wears glasses        Past Surgical History:   Procedure Laterality Date    APPENDECTOMY      CARDIAC ELECTROPHYSIOLOGY PROCEDURE N/A 12/23/2020    " Procedure: Pacemaker DC new. DNS meds.;  Surgeon: Jimy Ledezma DO;  Location:  [facility];  Service: Cardiology;  Laterality: N/A;    CHOLECYSTECTOMY      CIRCUMCISION N/A 10/23/2019    Procedure: CIRCUMCISIOn-DORSAL SLIT;  Surgeon: Griffin Romero MD;  Location:  [facility];  Service: Urology    COLONOSCOPY      CYSTOSCOPY RETROGRADE PYELOGRAM Left 06/17/2022    Procedure: CYSTOSCOPY RETROGRADE PYELOGRAM;  Surgeon: Ryne Mccann MD;  Location:  [facility];  Service: Urology;  Laterality: Left;    CYSTOSCOPY RETROGRADE PYELOGRAM Left 7/10/2024    Procedure: CYSTOSCOPY RETROGRADE PYELOGRAM AND STENT INSERTION LEFT;  Surgeon: Deanne Pitts MD;  Location:  [facility];  Service: Urology;  Laterality: Left;    CYSTOSCOPY URETEROSCOPY Left 02/11/2019    Procedure: URETEROSCOPY WITH STENT EXTRACTION, RPG;  Surgeon: Griffin Romero MD;  Location:  [facility];  Service: Urology    CYSTOSCOPY W/ URETERAL STENT PLACEMENT Left 07/20/2019    Procedure: CYSTOSCOPY, LEFT RETROGRADE PYELOGRAM,  ATTEMPTED LEFT URETERAL STENT INSERTION;  Surgeon: Isaac Flynn MD;  Location:  [facility];  Service: Urology    CYSTOSCOPY W/ URETERAL STENT PLACEMENT Left 06/17/2022    Procedure: CYSTOSCOPY URETERAL CATHETER/STENT INSERTION;  Surgeon: Ryne Mccann MD;  Location:  [facility];  Service: Urology;  Laterality: Left;    CYSTOSCOPY W/ URETERAL STENT PLACEMENT Left 01/27/2023    Procedure: CYSTOSCOPY URETERAL CATHETER/STENT INSERTION;  Surgeon: Deanne iPtts MD;  Location:  [facility];  Service: Urology;  Laterality: Left;    CYSTOSCOPY W/ URETERAL STENT PLACEMENT Left 02/15/2024    Procedure: CYSTOSCOPY LEFT STENT EXCHANGE;  Surgeon: Deanne Pitts MD;  Location:  [facility];  Service: Urology;  Laterality: Left;    CYSTOSCOPY, URETEROSCOPY, RETROGRADE PYELOGRAM, STONE EXTRACTION, STENT INSERTION Left 06/15/2023    Procedure: URETEROSCOPY, RETROGRADE PYELOGRAM, STENT INSERTION;  Surgeon: Deanne Pitts MD;  Location:   [facility];  Service: Urology;  Laterality: Left;    EYE SURGERY      CATARACTS REMOVED BILATERALLY    JOINT REPLACEMENT      Bilateral knees    KNEE ARTHROPLASTY Bilateral     KNEE ARTHROSCOPY Bilateral     RENAL ARTERY STENT      SEVERAL TIMES EVERY SINCE 1978    URETERAL STENT INSERTION  10/2020    URETEROSCOPY LASER LITHOTRIPSY WITH STENT INSERTION Left 01/10/2018    Procedure:  cysto, left ureteral stent replacement ;  Surgeon: Griffin Romero MD;  Location: [facility] OR;  Service:     URETEROSCOPY LASER LITHOTRIPSY WITH STENT INSERTION Left 08/14/2019    Procedure: URETEROSCOPY, STONE REMOVAL WITH STENT INSERTION;  Surgeon: Griffin Romero MD;  Location: [facility] OR;  Service: Urology    URETEROSCOPY LASER LITHOTRIPSY WITH STENT INSERTION Left 10/23/2019    Procedure: Left URETEROSCOPY WITH STENT INSERTION-LEFT;  Surgeon: Griffin Romero MD;  Location: [facility] OR;  Service: Urology       Family History: family history includes Brain cancer in his brother and sister; Heart attack in his sister; Hypertension in his sister; Lung cancer in his brother and sister; Malig Hyperthermia in his brother; No Known Problems in his mother; Stomach cancer in his father; Stroke in his sister. Otherwise pertinent FHx was reviewed and not pertinent to current issue.    Social History:  reports that he quit smoking about 73 years ago. His smoking use included cigarettes. He has a 0.5 pack-year smoking history. He has been exposed to tobacco smoke. He has never used smokeless tobacco. He reports that he does not drink alcohol and does not use drugs.    Home Medications:  HYDROcodone-acetaminophen, Insulin Glargine (1 Unit Dial), Insulin Pen Needle, Multiple Vitamins-Minerals, SITagliptin, allopurinol, apixaban, aspirin, finasteride, glimepiride, isosorbide mononitrate, levothyroxine, and silodosin      Allergies:  Allergies   Allergen Reactions    Metformin Diarrhea and GI Intolerance    Statins Diarrhea and Myalgia       Objective    Objective     Vitals:   Temp:  [97.9 °F (36.6 °C)-98 °F (36.7 °C)] 97.9 °F (36.6 °C)  Heart Rate:  [60-72] 65  Resp:  [18-20] 18  BP: (101-140)/(45-70) 101/45  Physical Exam    Constitutional: Awake in bed, alert    Eyes: PERRLA, sclerae anicteric, no conjunctival injection   HENT: Normocephalic, atraumatic, mucous membranes moist   Neck: Supple, trachea midline   Respiratory:Equal chest rise    Cardiovascular: RRR   Gastrointestinal: Soft, nontender, non-distended   Musculoskeletal: No bilateral ankle edema, no clubbing or cyanosis to extremities   Genitourinary: 3-way 20 Fr. Catheter in place, draining red/tea-colored output . No clots to note.   Psychiatric: Appropriate affect, cooperative   Neurologic: Oriented x 3   Skin: No rashes     Result Review    Result Review:  I have personally reviewed the results from the time of this admission to 11/21/2024 09:11 EST and agree with these findings:  [x]  Laboratory  [x]  Microbiology  [x]  Radiology  []  EKG/Telemetry   []  Cardiology/Vascular   []  Pathology  [x]  Old records  []  Other:Vital Signs    Most notable findings include: Creatinine 3.03, WBC 10.78, H&H 10.4, 33.5. Caceres catheter draining red/ tea- colored output, CT AP without contrast revealed left ureteral stent in place. Urine and blood cultures pending.     Assessment & Plan   Assessment / Plan     Brief Patient Summary:  Forrest Zepeda is a 89 y.o. [facility]. Patient admitted  for further evaluation. Patient reports he is feeling well since caceres catheter placed. We discussed cystoscopy left ureteral stent exchange with Dr. Pitts on 11/22/24.Patient is understanding and agreeable with plan.     Active Hospital Problems:  Active Hospital Problems    Diagnosis     **Acute UTI (urinary tract infection)     Acute kidney injury superimposed on chronic kidney disease     Anemia, chronic disease     T2DM (type 2 diabetes mellitus)     Generalized weakness      Acute kidney injury     Hematuria     CAD (coronary artery disease)     Cardiac pacemaker in situ     Paroxysmal atrial fibrillation     Urethral stricture     History of pulmonary embolism     Pulmonary fibrosis     Acquired hypothyroidism     HLD (hyperlipidemia)      Plan:  -NPO after MN  -Obtain informed consent  - Continue Q4 bladder scans  -Continue ABX coverage  -Urology will follow. Please call with questions or concerns.   -Discussed with Dr. Pitts      VTE Prophylaxis:  Mechanical VTE prophylaxis orders are present.        CODE STATUS:    Medical Intervention Limits: No intubation (DNI)  Code Status (Patient has no pulse and is not breathing): No CPR (Do Not Attempt to Resuscitate)  Medical Interventions (Patient has pulse or is breathing): Limited Support  Comments: DNR/DNI    Admission Status:  I believe this patient meets inpatient status.    Electronically signed by JOYCE Bennett, 11/21/24, 8:42 AM EST.

## 2024-11-21 NOTE — H&P
[facility] Medicine Services  HISTORY AND PHYSICAL    Patient Name: Forrest Zepeda  : 1932  MRN: [id number]  Primary Care Physician: Kary Wen MD  Date of admission: 2024    Subjective   Subjective     Chief Complaint:  Hematuria     HPI:  Forrest Zepeda is a 89 y.o. male w/ a hx of  HTN, HLD, HFpEF, CAD, PAF (Eliquis), s/p pacemaker, pulmonary HTN, idiopathic pulmonary fibrosis, prior PE, CKD Stage III, hypothyroidism, recurrent UTIs, hx of left ureteral stricture w/ chronic hydronephrosis s/p stent (follows w/ Dr. Pitts) who presented to the ED w/ c/o hematuria.   Pt c/o not feeling well for several weeks. Pt was diagnosed w/ a UTI ~ 2 weeks ago and started on Bactrim. Despite taking the Bactrim, pt has continued to feel poorly w/ generalized weakness, fatigue, dysuria, urinary urgency and frequency. Pt developed hematuria on  and has since had continued hematuria w/ difficulty urinating and pelvic pressure and fullness. Pt denies fever.   Pt evaluated in the ED. UA c/w hematuria and UTI. CT abd/pel concerning for Circumferential bladder wall thickening. Creatinine elevated at 3.03, K 5.6. Pt admitted to the hospital medicine service for further evaluation.     Review of Systems   Constitutional:  Positive for fatigue. Negative for chills and fever.   HENT: Negative.  Negative for congestion, postnasal drip, rhinorrhea, sinus pain and trouble swallowing.    Eyes: Negative.  Negative for visual disturbance.   Respiratory: Negative.  Negative for cough and shortness of breath.    Cardiovascular: Negative.  Negative for chest pain, palpitations and leg swelling.   Gastrointestinal:  Positive for abdominal distention and abdominal pain. Negative for constipation, diarrhea, nausea and vomiting.   Endocrine: Negative.    Genitourinary:  Positive for difficulty urinating, dysuria, frequency, hematuria and urgency.   Musculoskeletal: Negative.  Negative for  "arthralgias and myalgias.   Skin: Negative.  Negative for wound.   Allergic/Immunologic: Negative.  Negative for immunocompromised state.   Neurological: Negative.  Negative for dizziness, syncope, weakness, light-headedness and headaches.   Hematological: Negative.  Does not bruise/bleed easily.   Psychiatric/Behavioral: Negative.  Negative for confusion.    All other systems reviewed and are negative.     Personal History     Past Medical History:   Diagnosis Date    Abnormal EKG     Abnormal PSA     Reported abnormal    Acute cystitis with hematuria 05/01/2023    Acute deep vein thrombosis (DVT) of right lower extremity 08/22/2019    Acute diverticulitis 2019    Acute hyperkalemia 08/22/2019    Acute respiratory failure with hypoxia     Acute saddle pulmonary embolism with acute cor pulmonale 08/22/2019    Acute systolic right heart failure 08/22/2019    Acute UTI (urinary tract infection)     Arthritis     KNEES,  BUT SINCE HAD REPLACEMENT    Benign localized hyperplasia of prostate     Bilateral knee pain     C. difficile diarrhea 2010    Cataracts, bilateral     CKD (chronic kidney disease)     Coronary artery disease     Diabetic neuropathy     Diastolic dysfunction     Disease of thyroid gland     HYPOTHYROIDISM    Diverticulitis     Dyslipidemia     H/O    Erectile dysfunction     Family history of malignant hyperthermia     Brother     Full dentures     Generalized weakness     History of ESBL E. coli infection     most recent 4-2022 f/u with ID Dr Johnson    History of gout     History of hepatitis A vaccination     History of nephrolithiasis     History of nuclear stress test     STATES IN THE 1990'S.  \"IT WAS OK\"    History of osteopenia     History of recurrent UTI (urinary tract infection)     Hydronephrosis of left kidney 07/18/2019    Hydronephrosis with renal and ureteral calculus obstruction 10/21/2019    Added automatically from request for surgery [id number]    Hydronephrosis with ureteral " stricture     Hypercholesterolemia     Hyperkalemia     PT UNSURE REGARDING HX OF THIS    Hyperlipidemia     Hypertension     Hypothyroidism     Impaired functional mobility, balance, gait, and endurance     Insomnia     IPF (idiopathic pulmonary fibrosis)     per patient and son, dx at [facility], was told no treatment necessary, not to worry about it    On supplemental oxygen therapy     2L NC QHS    Other hydronephrosis 08/12/2019    Added automatically from request for surgery [id number]    Paroxysmal atrial fibrillation     Pulmonary fibrosis     Pulmonary hypertension, mild 02/2021    per echo per cardiology note 1/9/23, per pt's son, PCP does not agree with this dx    Renal insufficiency     Sepsis 2019    Shortness of breath     STATES WITH EXERTION, OTHERWISE, DENIES    Sigmoid diverticulitis without abscess or perforation 08/09/2017    Sinus node dysfunction     Skin breakdown     fourth toe right foot noted in 2019 MD D/C note    Skin ulcer of fourth toe of right foot, limited to breakdown of skin 07/05/2019    Stricture of ureter     Type 2 diabetes mellitus     Ureteral stricture, left     Urinary incontinence     UTI (urinary tract infection) 07/18/2019    Vitamin D deficiency     Wears glasses      Past Surgical History:   Procedure Laterality Date    APPENDECTOMY      CARDIAC ELECTROPHYSIOLOGY PROCEDURE N/A 12/23/2020    Procedure: Pacemaker DC new. DNS meds.;  Surgeon: Jimy Ledezma DO;  [facility]:  [facility];  Service: Cardiology;  Laterality: N/A;    CHOLECYSTECTOMY      CIRCUMCISION N/A 10/23/2019    Procedure: CIRCUMCISIOn-DORSAL SLIT;  Surgeon: Griffin Romero MD;  Location:  [facility];  Service: Urology    COLONOSCOPY      CYSTOSCOPY RETROGRADE PYELOGRAM Left 06/17/2022    Procedure: CYSTOSCOPY RETROGRADE PYELOGRAM;  Surgeon: Ryne Mccann MD;  Location:  [facility];  Service: Urology;  Laterality: Left;    CYSTOSCOPY RETROGRADE PYELOGRAM Left 7/10/2024    Procedure: CYSTOSCOPY  RETROGRADE PYELOGRAM AND STENT INSERTION LEFT;  Surgeon: Deanne Pitts MD;  Location:  [facility];  Service: Urology;  Laterality: Left;    CYSTOSCOPY URETEROSCOPY Left 02/11/2019    Procedure: URETEROSCOPY WITH STENT EXTRACTION, RPG;  Surgeon: Griffin Romero MD;  Location:  [facility];  Service: Urology    CYSTOSCOPY W/ URETERAL STENT PLACEMENT Left 07/20/2019    Procedure: CYSTOSCOPY, LEFT RETROGRADE PYELOGRAM,  ATTEMPTED LEFT URETERAL STENT INSERTION;  Surgeon: Isaac Flynn MD;  Location:  [facility];  Service: Urology    CYSTOSCOPY W/ URETERAL STENT PLACEMENT Left 06/17/2022    Procedure: CYSTOSCOPY URETERAL CATHETER/STENT INSERTION;  Surgeon: Ryne Mccann MD;  Location:  [facility];  Service: Urology;  Laterality: Left;    CYSTOSCOPY W/ URETERAL STENT PLACEMENT Left 01/27/2023    Procedure: CYSTOSCOPY URETERAL CATHETER/STENT INSERTION;  Surgeon: Deanne Pitts MD;  Location:  [facility];  Service: Urology;  Laterality: Left;    CYSTOSCOPY W/ URETERAL STENT PLACEMENT Left 02/15/2024    Procedure: CYSTOSCOPY LEFT STENT EXCHANGE;  Surgeon: Deanne Pitts MD;  Location:  [facility];  Service: Urology;  Laterality: Left;    CYSTOSCOPY, URETEROSCOPY, RETROGRADE PYELOGRAM, STONE EXTRACTION, STENT INSERTION Left 06/15/2023    Procedure: URETEROSCOPY, RETROGRADE PYELOGRAM, STENT INSERTION;  Surgeon: Deanne Pitts MD;  Location:  [facility];  Service: Urology;  Laterality: Left;    EYE SURGERY      CATARACTS REMOVED BILATERALLY    JOINT REPLACEMENT      Bilateral knees    KNEE ARTHROPLASTY Bilateral     KNEE ARTHROSCOPY Bilateral     RENAL ARTERY STENT      SEVERAL TIMES EVERY SINCE 1978    URETERAL STENT INSERTION  10/2020    URETEROSCOPY LASER LITHOTRIPSY WITH STENT INSERTION Left 01/10/2018    Procedure:  cysto, left ureteral stent replacement ;  Surgeon: Griffin Romero MD;  Location:  [facility];  Service:     URETEROSCOPY LASER LITHOTRIPSY WITH STENT INSERTION Left 08/14/2019    Procedure: URETEROSCOPY, STONE  REMOVAL WITH STENT INSERTION;  Surgeon: Griffin Romero MD;  Location: [facility];  Service: Urology    URETEROSCOPY LASER LITHOTRIPSY WITH STENT INSERTION Left 10/23/2019    Procedure: Left URETEROSCOPY WITH STENT INSERTION-LEFT;  Surgeon: Griffin Romero MD;  Location: [facility];  Service: Urology     Family History:  family history includes Brain cancer in his brother and sister; Heart attack in his sister; Hypertension in his sister; Lung cancer in his brother and sister; Malig Hyperthermia in his brother; No Known Problems in his mother; Stomach cancer in his father; Stroke in his sister.     Social History:  reports that he quit smoking about 73 years ago. His smoking use included cigarettes. He has a 0.5 pack-year smoking history. He has been exposed to tobacco smoke. He has never used smokeless tobacco. He reports that he does not drink alcohol and does not use drugs.  Social History     Social History Narrative    Pt lives alone in [place]    Granddaughter Erika is nurse.     Had 7 brothers and 3 sisters.     Uses a cane and oxygen as needed    Still drives     Medications:  Diclofenac Sodium, HYDROcodone-acetaminophen, Insulin Glargine (1 Unit Dial), Insulin Pen Needle, Multiple Vitamins-Minerals, SITagliptin, allopurinol, apixaban, aspirin, bisoprolol, docusate sodium, finasteride, glimepiride, isosorbide mononitrate, levothyroxine, silodosin, and temazepam    Allergies   Allergen Reactions    Metformin Diarrhea and GI Intolerance    Statins Diarrhea and Myalgia     Objective   Objective     Vital Signs:   Temp:  [98 °F (36.7 °C)] 98 °F (36.7 °C)  Heart Rate:  [60-72] 72  Resp:  [18] 18  BP: (116-140)/(61-70) 140/65    Physical Exam     Constitutional: Awake, alert; non-toxic appearing   Eyes: PERRLA, sclerae anicteric, no conjunctival injection  HENT: NCAT, mucous membranes dry   Neck: Supple, no thyromegaly, no lymphadenopathy, trachea midline  Respiratory: Clear to auscultation bilaterally,  nonlabored respirations   Cardiovascular: RRR, no murmurs, rubs, or gallops, no peripheral edema   Gastrointestinal: Positive bowel sounds, soft, nontender, nondistended  Musculoskeletal: Normal ROM bilaterally   Psychiatric: Appropriate affect, cooperative  Neurologic: Oriented x 3, strength symmetric in all extremities, speech clear  Skin: No rashes, lesions or wounds     Result Review:  I have personally reviewed the results from the time of this admission to 11/21/2024 01:11 EST and agree with these findings:  [x]  Laboratory list / accordion  []  Microbiology  [x]  Radiology  []  EKG/Telemetry   []  Cardiology/Vascular   []  Pathology  [x]  Old records    LAB RESULTS:      Lab 11/20/24 1833   WBC 10.78   HEMOGLOBIN 11.5*   HEMATOCRIT 36.4*   PLATELETS 293   NEUTROS ABS 6.76   IMMATURE GRANS (ABS) 0.06*   LYMPHS ABS 2.94   MONOS ABS 0.67   EOS ABS 0.27   MCV 97.8*   PROCALCITONIN 0.20   LACTATE 1.5         Lab 11/20/24 1833   SODIUM 134*   POTASSIUM 5.6*   CHLORIDE 103   CO2 20.0*   ANION GAP 11.0   BUN 52*   CREATININE 3.03*   EGFR 18.7*   GLUCOSE 164*   CALCIUM 9.7*   MAGNESIUM 2.2         Lab 11/20/24  1833   TOTAL PROTEIN 7.9   ALBUMIN 3.8   GLOBULIN 4.1   ALT (SGPT) 11   AST (SGOT) 19   BILIRUBIN 0.2   ALK PHOS 105                 Lab 11/20/24 2235   ABO TYPING O   RH TYPING Negative   ANTIBODY SCREEN Negative         Brief Urine Lab Results  (Last result in the past 365 days)        Color   Clarity   Blood   Leuk Est   Nitrite   Protein   CREAT   Urine HCG        11/20/24 2031 Red   Turbid   Large (3+)   Moderate (2+)   Negative   30 mg/dL (1+)                 Microbiology Results (last 10 days)       Procedure Component Value - Date/Time    Respiratory Panel PCR w/COVID-19(SARS-CoV-2) [facility], NP Swab in UTM/VTM, 2 HR TAT - Swab, Nasopharynx [[id number]]  (Normal) Collected: 11/20/24 1834    Lab Status: Final result Specimen: Swab from Nasopharynx Updated: 11/20/24 1931      ADENOVIRUS, PCR Not Detected     Coronavirus 229E Not Detected     Coronavirus HKU1 Not Detected     Coronavirus NL63 Not Detected     Coronavirus OC43 Not Detected     COVID19 Not Detected     Human Metapneumovirus Not Detected     Human Rhinovirus/Enterovirus Not Detected     Influenza A PCR Not Detected     Influenza B PCR Not Detected     Parainfluenza Virus 1 Not Detected     Parainfluenza Virus 2 Not Detected     Parainfluenza Virus 3 Not Detected     Parainfluenza Virus 4 Not Detected     RSV, PCR Not Detected     Bordetella pertussis pcr Not Detected     Bordetella parapertussis PCR Not Detected     Chlamydophila pneumoniae PCR Not Detected     Mycoplasma pneumo by PCR Not Detected    Narrative:      In the setting of a positive respiratory panel with a viral infection PLUS a negative procalcitonin without other underlying concern for bacterial infection, consider observing off antibiotics or discontinuation of antibiotics and continue supportive care. If the respiratory panel is positive for atypical bacterial infection (Bordetella pertussis, Chlamydophila pneumoniae, or Mycoplasma pneumoniae), consider antibiotic de-escalation to target atypical bacterial infection.    Urine Culture - Urine, Urine, Clean Catch [[id number]] Collected: 11/13/24 1300    Lab Status: Final result Specimen: Urine, Clean Catch Updated: 11/15/24 1451     Urine Culture >100,000 CFU/mL Mixed Mary Grace Isolated    Narrative:      Colonization of the urinary tract without infection is common. Treatment is discouraged unless the patient is symptomatic, pregnant, or undergoing an invasive urologic procedure.  Specimen contains mixed organisms of questionable pathogenicity suggestive of contamination. If symptoms persist, suggest recollection.          CT Abdomen Pelvis Without Contrast    Result Date: 11/20/2024  CT ABDOMEN PELVIS WO CONTRAST Date of Exam: 11/20/2024 11:11 PM EST Indication: weakness, hematuria, triston. Comparison: 2/13/2024.  Technique: Axial CT images were obtained of the abdomen and pelvis without the administration of contrast. Reconstructed coronal and sagittal images were also obtained. Automated exposure control and iterative construction methods were used. Findings: Lung Bases:   The visualized lung bases and lower mediastinal structures demonstrate no acute process. Emphysematous changes present. Limited evaluation of the solid organs due to lack of intravenous contrast.. Liver: Liver is normal in size and CT density. No focal lesions. Biliary/Gallbladder:  The gallbladder is normal without evidence of radiopaque stones. The biliary tree is nondilated. Spleen: Spleen is normal in size and CT density. Pancreas:  Pancreas is normal. There is no evidence of pancreatic mass or peripancreatic fluid. Kidneys:  Atrophy of the left kidney present. Left ureteral stent in place. Enlargement of the left renal pelvis present which is likely patulous, improved as compared to the previous study. No significant calyceal dilatation identified to suggest gross hydronephrosis.. Right kidney appears unremarkable. Small cyst present. No evidence of stones or hydronephrosis. Adrenals:  Adrenal glands are unremarkable. Retroperitoneal/Lymph Nodes/Vasculature:  No retroperitoneal adenopathy is identified. Gastrointestinal/Mesentery:  The bowel loops are non-dilated without wall thickening or mass. The appendix appears within normal limits. No evidence of obstruction. No free air. No mesenteric fluid collections identified. Diverticulosis present. No significant inflammatory changes. Bladder:  Ascencio catheter present within the bladder. Circumferential bladder wall thickening is present which appears to have progressed as compared to the previous study. Stranding is seen within the adjacent fat, most pronounced along the superior margin. No evidence of stones. No evidence of stranding surrounding the ureters. Genital:   Unremarkable       Bony  Structures:   Visualized bony structures are consistent with the patient's age.     Impression: Impression: 1.Circumferential bladder wall thickening present which appears to have progressed as compared to the previous study. Stranding is seen within the adjacent fat, most pronounced along the superior margin. Findings are likely related to cystitis. Correlate  with urinalysis. 2.Left ureteral stent in place. Enlargement of the left renal pelvis present which is likely patulous, improved as compared to the previous study. No significant calyceal dilatation identified to suggest gross hydronephrosis. No evidence of stones. 3.Ancillary findings as described above. Electronically Signed: Jennifer Mendoza MD  11/20/2024 11:25 PM EST  Workstation ID: [id number]    XR Chest 1 View    Result Date: 11/20/2024  XR CHEST 1 VW Date of Exam: 11/20/2024 8:12 PM EST Indication: Simple Sepsis Protocol Comparison: Portable chest dated 10/9/2023 and 12/31/2020 Findings: Cardiac and mediastinal silhouettes are stable there is cardiomegaly and a dual-lead pacemaker from a left-sided approach, unchanged. Pulmonary vascularity is normal. The lungs are poorly inflated with mild bibasilar atelectasis. There are areas of scarring in each lung. No acute infiltrates or suspicious focal opacities. Trachea is midline. No pneumothorax or pleural effusion. No acute bony abnormality.     Impression: Impression: Stable cardiomegaly with pacemaker in place. There are areas of scarring in each lung. No active cardiopulmonary disease. Electronically Signed: Beto Bullock DO  11/20/2024 9:30 PM EST  Workstation ID: [id number]     Results for orders placed in visit on 11/13/23    Adult Transthoracic Echo Complete W/ Cont if Necessary Per Protocol    Interpretation Summary    Left ventricular systolic function is normal. Estimated left ventricular EF = 52% Left ventricular ejection fraction appears to be 51 - 55%.    Left ventricular wall thickness is  consistent with mild concentric hypertrophy.    Left ventricular diastolic function is consistent with (grade I) impaired relaxation.    The right ventricular cavity is mildly dilated.    The left atrial cavity is mildly dilated.    Left atrial volume is moderately increased.    There is mild calcification of the aortic valve mainly affecting the non-coronary, left coronary and right coronary cusp(s).    Estimated right ventricular systolic pressure from tricuspid regurgitation is normal (<35 mmHg).    Assessment & Plan   Assessment & Plan       Acute UTI (urinary tract infection)    HLD (hyperlipidemia)    Acquired hypothyroidism    Pulmonary fibrosis    History of pulmonary embolism    Urethral stricture    Paroxysmal atrial fibrillation    Cardiac pacemaker in situ    CAD (coronary artery disease)    Hematuria    Acute kidney injury superimposed on chronic kidney disease    Anemia, chronic disease    T2DM (type 2 diabetes mellitus)    Generalized weakness    Acute kidney injury    Forrest Zepeda is a 89 y.o. male w/ a hx of  HTN, HLD, HFpEF, CAD, PAF (Eliquis), s/p pacemaker, pulmonary HTN, idiopathic pulmonary fibrosis, prior PE, CKD Stage III, hypothyroidism, recurrent UTIs, hx of left ureteral stricture w/ chronic hydronephrosis s/p stent (follows w/ Dr. Pitts) who presented to the ED w/ c/o hematuria.     **Acute cystitis w/ hematuria   **Hx of left ureteral stricture w/ chronic hydronephrosis s/p ureteral stent   -pt follows w/ Dr. Pitts w/ Urology; pt has ureteral stent exchange q 6 months- last exchanged in June, next appointment December 11th   -pt dx w/ UTI ~2 weeks ago and started on Bactrim; despite taking Bactrim symptoms have persisted and pt developed hematuria and difficulty urinating on Sunday   -urine cx 10/9/2023 + ESBL E.Coli and Providencia rettgeri   -urine cx 5/2023 + Providencia rettgeri   -cultures from 11/13/24- normal abena, 7/10/24- no growth   -CT abd/pel obtained; results above    -H/H stable; monitor closely   -UA c/w UTI; urine culture pending   -blood cx pending   -lactic acid, procal and WBC all WNL   -s/p Invanz given in ED; continue for now pending urine cx  -s/p 500ml NS bolus given in ED; repeat 500ml NS bolus now   -NS @ 75ml/hour x 16 hours  -caceres catheter placed in ED; bladder scan q 4 hours 2/2 risk for clotting (pt has required CBI in the past; currently no clot matter noted in bladder)   -holding routine Eliquis and ASA for now pending Urology recommendations   -continue routine Proscar, Flomax   -symptom mgt   -Urology consult in am; known to Dr. Pitts     **OCTAVIA on CKD Stage III  **Hyperkalemia   -caceres catheter placed in ED for retention   -baseline CR ~1.2-1.4  prior to July 2024; CR 2.03-2.32 in July 2024  -current CR 3.03 w/ eGFR 18.7  -K elevated @ 5.6; repeat pending now and repeat BMP in am   -EKG pending now; repeat at 9am   -UA c/w UTI   -s/p 500ml NS bolus given in ED; repeat 500ml NS bolus now  -continue NS @ 75ml/hour x 16 hours   -holding nephrotoxic meds   -repeat labs in am      **Generalized weakness  -fall precautions   -pt/ot consult   -IVF; symptom mgt     **CAD  **PAF (Eliquis)  **HTN, HLD   **CHF  **S/p pacemaker   **Remote hx of PE  -ECHO 11/2023: EF 51-55%, grade I LVDF  -CXR c/w stable cardiomegaly   -holding Eliquis (last dose Wednesday morning) and ASA pending Urology recommendations   -continue Bisoprolol w/ hold parameters   -monitor I/Os     **Pulmonary fibrosis   -pt w/ 2 liters of supplemental oxygen as needed (family reports he does not wear routinely at home)  -pt currently on room air   -denies worsening dyspnea  -CXR without acute findings      **T2DM  -hem A1c6.7% in July   -hold Amjairo, Randolphuvia, Toujeo   -FSBG q ac/hs w/ LDSS     **Hypothyroidism   -continue synthroid      DVT prophylaxis:  Eliquis on HOLD; Mechanical ordered     CODE STATUS:    Medical Intervention Limits: No intubation (DNI)  Code Status (Patient has no pulse and is  not breathing): No CPR (Do Not Attempt to Resuscitate)  Medical Interventions (Patient has pulse or is breathing): Limited Support  Comments: DNR/DNI    Expected Discharge  Expected Discharge Date: 11/23/2024; Expected Discharge Time:     Signature: Electronically signed by JOYCE Davidson, 11/21/24, 1:11 AM EST.    Time spent: 55 minutes     ------------------------------    The patient was seen independently by the APC.  I was available for any questions or concerns.     Electronically signed by Sawyer Rogers III, DO, 11/21/24, 2:11 AM EST.

## 2024-11-21 NOTE — ED PROVIDER NOTES
"Subjective   History of Present Illness  Pt is a pleasant 89 yo male with an extensive past medical history who presents to the ED with hematuria.  He states that the blood has been present for the past 3-4 days.  It was worse today, prompting his visit to the ED.  He states that he was diagnosed with UTI 2 weeks ago and started on Bactrim, but symptoms have persisted.  Pt has a history of left ureter stricture which is managed/stended by Dr. Pitts, urology.  Pt denies other associated symptoms.  Denies fever, chills, chest pain, soa, abdominal pain, nausea, vomiting, diarrhea, or other acute complaint.        Review of Systems   All other systems reviewed and are negative.      Past Medical History:   Diagnosis Date    Abnormal EKG     Abnormal PSA     Reported abnormal    Acute cystitis with hematuria 05/01/2023    Acute deep vein thrombosis (DVT) of right lower extremity 08/22/2019    Acute diverticulitis 2019    Acute hyperkalemia 08/22/2019    Acute respiratory failure with hypoxia     Acute saddle pulmonary embolism with acute cor pulmonale 08/22/2019    Acute systolic right heart failure 08/22/2019    Acute UTI (urinary tract infection)     Arthritis     KNEES,  BUT SINCE HAD REPLACEMENT    Benign localized hyperplasia of prostate     Bilateral knee pain     C. difficile diarrhea 2010    Cataracts, bilateral     CKD (chronic kidney disease)     Coronary artery disease     Diabetic neuropathy     Diastolic dysfunction     Disease of thyroid gland     HYPOTHYROIDISM    Diverticulitis     Dyslipidemia     H/O    Erectile dysfunction     Family history of malignant hyperthermia     Brother     Full dentures     Generalized weakness     History of ESBL E. coli infection     most recent 4-2022 f/u with ID Dr Johnson    History of gout     History of hepatitis A vaccination     History of nephrolithiasis     History of nuclear stress test     STATES IN THE 1990'S.  \"IT WAS OK\"    History of osteopenia     " History of recurrent UTI (urinary tract infection)     Hydronephrosis of left kidney 07/18/2019    Hydronephrosis with renal and ureteral calculus obstruction 10/21/2019    Added automatically from request for surgery [id number]    Hydronephrosis with ureteral stricture     Hypercholesterolemia     Hyperkalemia     PT UNSURE REGARDING HX OF THIS    Hyperlipidemia     Hypertension     Hypothyroidism     Impaired functional mobility, balance, gait, and endurance     Insomnia     IPF (idiopathic pulmonary fibrosis)     per patient and son, dx at [facility], was told no treatment necessary, not to worry about it    On supplemental oxygen therapy     2L NC QHS    Other hydronephrosis 08/12/2019    Added automatically from request for surgery [id number]    Paroxysmal atrial fibrillation     Pulmonary fibrosis     Pulmonary hypertension, mild 02/2021    per echo per cardiology note 1/9/23, per pt's son, PCP does not agree with this dx    Renal insufficiency     Sepsis 2019    Shortness of breath     STATES WITH EXERTION, OTHERWISE, DENIES    Sigmoid diverticulitis without abscess or perforation 08/09/2017    Sinus node dysfunction     Skin breakdown     fourth toe right foot noted in 2019 MD D/C note    Skin ulcer of fourth toe of right foot, limited to breakdown of skin 07/05/2019    Stricture of ureter     Type 2 diabetes mellitus     Ureteral stricture, left     Urinary incontinence     UTI (urinary tract infection) 07/18/2019    Vitamin D deficiency     Wears glasses        Allergies   Allergen Reactions    Metformin Diarrhea and GI Intolerance    Statins Diarrhea and Myalgia       Past Surgical History:   Procedure Laterality Date    APPENDECTOMY      CARDIAC ELECTROPHYSIOLOGY PROCEDURE N/A 12/23/2020    Procedure: Pacemaker DC new. DNS meds.;  Surgeon: Jimy Ledezma DO;  Location: [facility] INVASIVE LOCATION;  Service: Cardiology;  Laterality: N/A;    CHOLECYSTECTOMY      CIRCUMCISION N/A 10/23/2019    Procedure:  CIRCUMCISIOn-DORSAL SLIT;  Surgeon: Griffin Romero MD;  Location:  [facility];  Service: Urology    COLONOSCOPY      CYSTOSCOPY RETROGRADE PYELOGRAM Left 06/17/2022    Procedure: CYSTOSCOPY RETROGRADE PYELOGRAM;  Surgeon: Ryne Mccann MD;  Location:  [facility];  Service: Urology;  Laterality: Left;    CYSTOSCOPY RETROGRADE PYELOGRAM Left 7/10/2024    Procedure: CYSTOSCOPY RETROGRADE PYELOGRAM AND STENT INSERTION LEFT;  Surgeon: Deanne Pitts MD;  Location:  [facility];  Service: Urology;  Laterality: Left;    CYSTOSCOPY URETEROSCOPY Left 02/11/2019    Procedure: URETEROSCOPY WITH STENT EXTRACTION, RPG;  Surgeon: Griffin Romero MD;  Location:  [facility];  Service: Urology    CYSTOSCOPY W/ URETERAL STENT PLACEMENT Left 07/20/2019    Procedure: CYSTOSCOPY, LEFT RETROGRADE PYELOGRAM,  ATTEMPTED LEFT URETERAL STENT INSERTION;  Surgeon: Isaac Flynn MD;  Location:  [facility];  Service: Urology    CYSTOSCOPY W/ URETERAL STENT PLACEMENT Left 06/17/2022    Procedure: CYSTOSCOPY URETERAL CATHETER/STENT INSERTION;  Surgeon: Ryne Mccann MD;  Location:  [facility];  Service: Urology;  Laterality: Left;    CYSTOSCOPY W/ URETERAL STENT PLACEMENT Left 01/27/2023    Procedure: CYSTOSCOPY URETERAL CATHETER/STENT INSERTION;  Surgeon: Deanne Pitts MD;  Location:  [facility];  Service: Urology;  Laterality: Left;    CYSTOSCOPY W/ URETERAL STENT PLACEMENT Left 02/15/2024    Procedure: CYSTOSCOPY LEFT STENT EXCHANGE;  Surgeon: Deanne Pitts MD;  Location:  [facility];  Service: Urology;  Laterality: Left;    CYSTOSCOPY, URETEROSCOPY, RETROGRADE PYELOGRAM, STONE EXTRACTION, STENT INSERTION Left 06/15/2023    Procedure: URETEROSCOPY, RETROGRADE PYELOGRAM, STENT INSERTION;  Surgeon: Deanne Pitts MD;  Location:  [facility];  Service: Urology;  Laterality: Left;    EYE SURGERY      CATARACTS REMOVED BILATERALLY    JOINT REPLACEMENT      Bilateral knees    KNEE ARTHROPLASTY Bilateral     KNEE ARTHROSCOPY Bilateral     RENAL ARTERY  STENT      SEVERAL TIMES EVERY SINCE     URETERAL STENT INSERTION  10/2020    URETEROSCOPY LASER LITHOTRIPSY WITH STENT INSERTION Left 01/10/2018    Procedure:  cysto, left ureteral stent replacement ;  Surgeon: Griffin Romero MD;  Location: [facility] OR;  Service:     URETEROSCOPY LASER LITHOTRIPSY WITH STENT INSERTION Left 2019    Procedure: URETEROSCOPY, STONE REMOVAL WITH STENT INSERTION;  Surgeon: Griffin Romero MD;  Location: [facility] OR;  Service: Urology    URETEROSCOPY LASER LITHOTRIPSY WITH STENT INSERTION Left 10/23/2019    Procedure: Left URETEROSCOPY WITH STENT INSERTION-LEFT;  Surgeon: Griffin Romero MD;  Location: [facility] OR;  Service: Urology       Family History   Problem Relation Age of Onset    No Known Problems Mother     Stomach cancer Father     Heart attack Sister     Brain cancer Sister     Stroke Sister     Lung cancer Sister     Hypertension Sister     Brain cancer Brother     Lung cancer Brother     Malig Hyperthermia Brother        Social History     Socioeconomic History    Marital status:    Tobacco Use    Smoking status: Former     Current packs/day: 0.00     Average packs/day: 0.3 packs/day for 2.0 years (0.5 ttl pk-yrs)     Types: Cigarettes     Quit date: 1950     Years since quittin.9     Passive exposure: Past    Smokeless tobacco: Never    Tobacco comments:     SMOKED SOME AS A TEEN, DENIES ANY HABIT OR ADDICTION   Vaping Use    Vaping status: Never Used   Substance and Sexual Activity    Alcohol use: No    Drug use: No    Sexual activity: Not Currently     Partners: Female           Objective   Physical Exam  Vitals and nursing note reviewed.   Constitutional:       General: He is not in acute distress.  HENT:      Head: Normocephalic and atraumatic.   Eyes:      Conjunctiva/sclera: Conjunctivae normal.      Pupils: Pupils are equal, round, and reactive to light.   Neck:      Thyroid: No thyromegaly.   Cardiovascular:      Rate and Rhythm: Normal  rate. Rhythm irregular.      Heart sounds: Normal heart sounds. No murmur heard.     No friction rub. No gallop.   Pulmonary:      Effort: Pulmonary effort is normal. No respiratory distress.      Breath sounds: Normal breath sounds.   Abdominal:      General: Bowel sounds are normal.      Palpations: Abdomen is soft.      Tenderness: There is no abdominal tenderness.   Musculoskeletal:         General: Normal range of motion.      Cervical back: Normal range of motion.      Right lower leg: Edema present.      Left lower leg: Edema present.   Lymphadenopathy:      Cervical: No cervical adenopathy.   Skin:     General: Skin is warm and dry.      Capillary Refill: Capillary refill takes less than 2 seconds.   Neurological:      General: No focal deficit present.      Mental Status: He is alert and oriented to person, place, and time.       Procedures           ED Course  ED Course as of 11/22/24 0230 Wed Nov 20, 2024 2222 Anion Gap: 11.0 [CP]      ED Course User Index  [CP] Darryl Shrestha DO         Latest Reference Range & Units 11/20/24 18:33   Lactate 0.5 - 2.0 mmol/L 1.5   Procalcitonin 0.00 - 0.25 ng/mL 0.20   WBC 3.40 - 10.80 10*3/mm3 10.78   RBC 4.14 - 5.80 10*6/mm3 3.72 (L)   Hemoglobin 13.0 - 17.7 g/dL 11.5 (L)   Hematocrit 37.5 - 51.0 % 36.4 (L)   Platelets 140 - 450 10*3/mm3 293   RDW 12.3 - 15.4 % 15.2   MCV 79.0 - 97.0 fL 97.8 (H)   MCH 26.6 - 33.0 pg 30.9   MCHC 31.5 - 35.7 g/dL 31.6   MPV 6.0 - 12.0 fL 9.2   RDW-SD 37.0 - 54.0 fl 54.0   Neutrophil Rel % 42.7 - 76.0 % 62.7   Lymphocyte Rel % 19.6 - 45.3 % 27.3   Monocyte Rel % 5.0 - 12.0 % 6.2   Eosinophil Rel % 0.3 - 6.2 % 2.5   Basophil Rel % 0.0 - 1.5 % 0.7   Immature Granulocyte Rel % 0.0 - 0.5 % 0.6 (H)   Neutrophils Absolute 1.70 - 7.00 10*3/mm3 6.76   Lymphocytes Absolute 0.70 - 3.10 10*3/mm3 2.94   Monocytes Absolute 0.10 - 0.90 10*3/mm3 0.67   Eosinophils Absolute 0.00 - 0.40 10*3/mm3 0.27   Basophils Absolute 0.00 - 0.20 10*3/mm3 0.08    Immature Grans, Absolute 0.00 - 0.05 10*3/mm3 0.06 (H)   nRBC 0.0 - 0.2 /100 WBC 0.0   (L): Data is abnormally low  (H): Data is abnormally high     Latest Reference Range & Units 11/20/24 18:33 11/20/24 22:35   ABO Type   O   RH type   Negative   Antibody Screen   Negative   T&S Expiration Date   11/23/2024 11:59:59 PM   Sodium 136 - 145 mmol/L 134 (L)    Potassium 3.5 - 5.2 mmol/L 5.6 (H)    Chloride 98 - 107 mmol/L 103    CO2 22.0 - 29.0 mmol/L 20.0 (L)    Anion Gap 5.0 - 15.0 mmol/L 11.0    BUN 8 - 23 mg/dL 52 (H)    Creatinine 0.76 - 1.27 mg/dL 3.03 (H)    BUN/Creatinine Ratio 7.0 - 25.0  17.2    eGFR >60.0 mL/min/1.73 18.7 (L)    Glucose 65 - 99 mg/dL 164 (H)    Calcium 8.2 - 9.6 mg/dL 9.7 (H)    Magnesium 1.7 - 2.3 mg/dL 2.2    Alkaline Phosphatase 39 - 117 U/L 105    Total Protein 6.0 - 8.5 g/dL 7.9    Albumin 3.5 - 5.2 g/dL 3.8    Globulin gm/dL 4.1    A/G Ratio g/dL 0.9    AST (SGOT) 1 - 40 U/L 19    ALT (SGPT) 1 - 41 U/L 11    Total Bilirubin 0.0 - 1.2 mg/dL 0.2    (L): Data is abnormally low  (H): Data is abnormally high     Latest Reference Range & Units 11/20/24 20:31   Color, UA Yellow, Straw  Red !   Appearance, UA Clear  Turbid !   Specific Gravity, UA 1.001 - 1.030  1.025   pH, UA 5.0 - 8.0  5.0   Glucose Negative  Negative   Ketones, UA Negative  40 mg/dL (2+) !   Blood, UA Negative  Large (3+) !   Nitrite, UA Negative  Negative   Leukocytes, UA Negative  Moderate (2+) !   Protein, UA Negative  30 mg/dL (1+) !   Bilirubin, UA Negative  Large (3+) !   Urobilinogen, UA 0.2 - 1.0 E.U./dL  0.2 E.U./dL   RBC, UA None Seen, 0-2 /HPF Too Numerous to Count !   WBC, UA None Seen, 0-2 /HPF Too Numerous to Count !   Bacteria, UA None Seen, Trace /HPF 4+ !   Squamous Epithelial Cells, UA None Seen, 0-2 /HPF 0-2   Hyaline Casts, UA 0 - 6 /LPF 0-6   Methodology:  Manual Light Microscopy   !: Data is abnormal      FL C Arm During Surgery   Final Result      CT Abdomen Pelvis Without Contrast   Final Result    Impression:   1.Circumferential bladder wall thickening present which appears to have progressed as compared to the previous study. Stranding is seen within the adjacent fat, most pronounced along the superior margin. Findings are likely related to cystitis. Correlate    with urinalysis.   2.Left ureteral stent in place. Enlargement of the left renal pelvis present which is likely patulous, improved as compared to the previous study. No significant calyceal dilatation identified to suggest gross hydronephrosis. No evidence of stones.   3.Ancillary findings as described above.            Electronically Signed: Jennifer Mendoza MD     11/20/2024 11:25 PM EST     Workstation ID: [id number]      XR Chest 1 View   Final Result   Impression:   Stable cardiomegaly with pacemaker in place. There are areas of scarring in each lung. No active cardiopulmonary disease.         Electronically Signed: Beto Bullock DO     11/20/2024 9:30 PM EST     Workstation ID: [id number]        Vitals:    11/23/24 0334 11/23/24 0806 11/23/24 0844 11/23/24 1055   BP: 138/59 134/56 145/64 141/64   BP Location: Left arm Left arm  Left arm   Patient Position: Lying Lying  Lying   Pulse: 62 61 63 61   Resp: 16 16  16   Temp: 97.7 °F (36.5 °C) 95.8 °F (35.4 °C)  98.3 °F (36.8 °C)   TempSrc: Oral Oral  Oral   SpO2: 94% 92%  93%   Weight:       Height:         Medications   sodium chloride 0.9 % infusion (75 mL/hr Intravenous Currently Infusing 11/22/24 0801)   sodium chloride 0.9 % bolus 500 mL (0 mL Intravenous Stopped 11/20/24 2236)   ertapenem (INVanz) 1,000 mg in sodium chloride 0.9 % 100 mL MBP (0 mg Intravenous Stopped 11/20/24 2348)   sodium chloride 0.9 % bolus 500 mL (500 mL Intravenous New Bag 11/21/24 0121)   famotidine (PEPCID) tablet 20 mg (20 mg Oral Given 11/22/24 1259)     ECG/EMG Results (last 24 hours)       ** No results found for the last 24 hours. **          ECG 12 Lead Electrolyte Imbalance   Final Result   Test Reason :  Electrolyte Imbalance   Blood Pressure :   */*   mmHG   Vent. Rate :  60 BPM     Atrial Rate :  58 BPM      P-R Int : 260 ms          QRS Dur : 102 ms       QT Int : 414 ms       P-R-T Axes :   * -16  57 degrees     QTcB Int : 414 ms      Atrial-paced rhythm with prolonged AV conduction   Minimal voltage criteria for LVH, may be normal variant   Abnormal ECG   When compared with ECG of 21-Nov-2024 02:32, (Unconfirmed)   Electronic atrial pacemaker has replaced Sinus rhythm   Questionable change in QRS duration   Criteria for Anterior infarct are no longer present   Criteria for Anterolateral infarct are no longer present   Confirmed by CRYSTAL LA MD ([id number]) on 11/21/2024 1:19:20 PM      Referred By:            Confirmed By: CRYSTAL LA MD      ECG 12 Lead Electrolyte Imbalance   Final Result   Test Reason : Electrolyte Imbalance   Blood Pressure :   */*   mmHG   Vent. Rate :  61 BPM     Atrial Rate :  61 BPM      P-R Int : 236 ms          QRS Dur :  82 ms       QT Int : 398 ms       P-R-T Axes :  86 203 137 degrees     QTcB Int : 400 ms      Sinus rhythm with 1st degree AV block   Poor R wave progression    Abnormal ECG   When compared with ECG of 21-Nov-2024 00:37, (Unconfirmed)   Sinus rhythm has replaced Electronic ventricular pacemaker   Confirmed by CRYSTAL LA MD ([id number]) on 11/21/2024 1:11:57 PM      Referred By:            Confirmed By: CRYSTAL LA MD      ECG 12 Lead Syncope   Final Result   Test Reason : Syncope   Blood Pressure :   */*   mmHG   Vent. Rate :  62 BPM     Atrial Rate :  62 BPM      P-R Int : 352 ms          QRS Dur : 152 ms       QT Int : 436 ms       P-R-T Axes :   *  11 202 degrees     QTcB Int : 442 ms      AV dual-paced rhythm with prolonged AV conduction   Abnormal ECG   When compared with ECG of 17-Feb-2024 06:55,   Electronic ventricular pacemaker has replaced Sinus rhythm   Confirmed by MD CORTEZ CORY ([id number]) on 11/22/2024 12:00:43 AM      Referred By: DIEGO            Confirmed By: KETAN CORTEZ MD                                                           Medical Decision Making  Pt discussed with urology and internal medicine attendings.  Pt admitted for further evaluation and management.        Recent Results (from the past 24 hours)  -ECG 12 Lead Electrolyte Imbalance:   Collection Time: 11/21/24  2:32 AM       Result                      Value             Ref Range           QT Interval                 398               ms                  QTC Interval                400               ms             -POC Glucose Once:   Collection Time: 11/21/24  7:35 AM  Specimen: Blood       Result                      Value             Ref Range           Glucose                     133 (H)           70 - 130 mg/*  -Basic Metabolic Panel:   Collection Time: 11/21/24  7:49 AM  Specimen: Blood       Result                      Value             Ref Range           Glucose                     119 (H)           65 - 99 mg/dL       BUN                         49 (H)            8 - 23 mg/dL        Creatinine                  2.83 (H)          0.76 - 1.27 *       Sodium                      137               136 - 145 mm*       Potassium                   5.3 (H)           3.5 - 5.2 mm*       Chloride                    110 (H)           98 - 107 mmo*       CO2                         17.0 (L)          22.0 - 29.0 *       Calcium                     8.8               8.2 - 9.6 mg*       BUN/Creatinine Ratio        17.3              7.0 - 25.0          Anion Gap                   10.0              5.0 - 15.0 m*       eGFR                        20.3 (L)          >60.0 mL/min*  -Calcium, Ionized:   Collection Time: 11/21/24  7:49 AM  Specimen: Blood       Result                      Value             Ref Range           Ionized Calcium             1.23              1.15 - 1.30 *  -CBC Auto Differential:   Collection Time: 11/21/24  7:49 AM  Specimen: Blood       Result                      Value              Ref Range           WBC                         8.73              3.40 - 10.80*       RBC                         3.04 (L)          4.14 - 5.80 *       Hemoglobin                  9.6 (L)           13.0 - 17.7 *       Hematocrit                  32.2 (L)          37.5 - 51.0 %       MCV                         105.9 (H)         79.0 - 97.0 *       MCH                         31.6              26.6 - 33.0 *       MCHC                        29.8 (L)          31.5 - 35.7 *       RDW                         15.5 (H)          12.3 - 15.4 %       RDW-SD                      59.1 (H)          37.0 - 54.0 *       MPV                         9.5               6.0 - 12.0 fL       Platelets                   232               140 - 450 10*       Neutrophil %                53.8              42.7 - 76.0 %       Lymphocyte %                35.2              19.6 - 45.3 %       Monocyte %                  6.5               5.0 - 12.0 %        Eosinophil %                3.0               0.3 - 6.2 %         Basophil %                  0.9               0.0 - 1.5 %         Immature Grans %            0.6 (H)           0.0 - 0.5 %         Neutrophils, Absolute       4.70              1.70 - 7.00 *       Lymphocytes, Absolute       3.07              0.70 - 3.10 *       Monocytes, Absolute         0.57              0.10 - 0.90 *       Eosinophils, Absolute       0.26              0.00 - 0.40 *       Basophils, Absolute         0.08              0.00 - 0.20 *       Immature Grans, Absolu*     0.05              0.00 - 0.05 *       nRBC                        0.0               0.0 - 0.2 /1*  -ECG 12 Lead Electrolyte Imbalance:   Collection Time: 11/21/24  9:20 AM       Result                      Value             Ref Range           QT Interval                 414               ms                  QTC Interval                414               ms             -POC Glucose Once:   Collection Time: 11/21/24 11:06 AM  Specimen:  Blood       Result                      Value             Ref Range           Glucose                     140 (H)           70 - 130 mg/*  -POC Glucose Once:   Collection Time: 11/21/24  4:38 PM  Specimen: Blood       Result                      Value             Ref Range           Glucose                     179 (H)           70 - 130 mg/*  -Hemoglobin & Hematocrit, Blood:   Collection Time: 11/21/24  8:26 PM  Specimen: Blood       Result                      Value             Ref Range           Hemoglobin                  9.9 (L)           13.0 - 17.7 *       Hematocrit                  31.3 (L)          37.5 - 51.0 %  -POC Glucose Once:   Collection Time: 11/21/24  9:32 PM  Specimen: Blood       Result                      Value             Ref Range           Glucose                     139 (H)           70 - 130 mg/*  Note: In addition to lab results from this visit, the labs listed above may include labs taken at another facility or during a different encounter within the last 24 hours. Please correlate lab times with ED admission and discharge times for further clarification of the services performed during this visit.    CT Abdomen Pelvis Without Contrast   Final Result    Impression:    1.Circumferential bladder wall thickening present which appears to have progressed as compared to the previous study. Stranding is seen within the adjacent fat, most pronounced along the superior margin. Findings are likely related to cystitis. Correlate     with urinalysis.    2.Left ureteral stent in place. Enlargement of the left renal pelvis present which is likely patulous, improved as compared to the previous study. No significant calyceal dilatation identified to suggest gross hydronephrosis. No evidence of stones.    3.Ancillary findings as described above.                Electronically Signed: Jennifer Mendoza MD      11/20/2024 11:25 PM EST      Workstation ID: [id number]     XR Chest 1 View   Final Result     Impression:    Stable cardiomegaly with pacemaker in place. There are areas of scarring in each lung. No active cardiopulmonary disease.            Electronically Signed: Beto Bullock DO      11/20/2024 9:30 PM EST      Workstation ID: [id number]     ---------------------------------------------------------------------------------               11/21/24 11/21/24 11/21/24 11/21/24                      0735             1105             1524             2309         ---------------------------------------------------------------------------------   BP:          121/61           105/60           130/69           114/48         BP Location:    Left arm         Left arm         Right arm        Right arm       Patient Position:      Lying           Sitting          Sitting           Lying         Pulse:         60               60               63               62           Resp:          18               18               18               18           Temp:   97.1 °F (36.2 °C)97.3 °F (36.3 °C)96.9 °F (36.1 °C)97.7 °F (36.5 °C)   TempSrc:      Oral             Oral             Oral             Oral          SpO2:          92%              94%              93%              91%          Weight:                                                                        Height:                                                                       ---------------------------------------------------------------------------------  Medications  sodium chloride 0.9 % flush 10 mL (10 mL Intravenous Given 11/20/24 7283)  bisoprolol (ZEBeta) tablet 5 mg ( Oral Canceled Entry 11/21/24 0900)  finasteride (PROSCAR) tablet 5 mg ( Oral Canceled Entry 11/21/24 0900)  levothyroxine (SYNTHROID, LEVOTHROID) tablet 50 mcg (50 mcg Oral Given 11/21/24 0637)  tamsulosin (FLOMAX) 24 hr capsule 0.4 mg (0.4 mg Oral Given 11/22/24 0229)  sodium chloride  0.9 % flush 10 mL ( Intravenous Canceled Entry 11/21/24 2100)  sodium chloride 0.9 % flush 10 mL (has no administration in time range)  sodium chloride 0.9 % infusion 40 mL (has no administration in time range)  dextrose (GLUTOSE) oral gel 15 g (has no administration in time range)  dextrose (D50W) (25 g/50 mL) IV injection 25 g (has no administration in time range)  glucagon (GLUCAGEN) injection 1 mg (has no administration in time range)  Insulin Lispro (humaLOG) injection 2-7 Units ( Subcutaneous Not Given 11/22/24 0231)  acetaminophen (TYLENOL) tablet 650 mg (has no administration in time range)  melatonin tablet 5 mg (5 mg Oral Given 11/21/24 0315)  sennosides-docusate (PERICOLACE) 8.6-50 MG per tablet 2 tablet (has no administration in time range)    And  polyethylene glycol (MIRALAX) packet 17 g (has no administration in time range)    And  bisacodyl (DULCOLAX) EC tablet 5 mg (has no administration in time range)    And  bisacodyl (DULCOLAX) suppository 10 mg (has no administration in time range)   ertapenem (INVanz) 500 mg in sodium chloride 0.9 % 50 mL IVPB (500 mg Intravenous New Bag 11/21/24 2126)  HYDROcodone-acetaminophen (NORCO) 5-325 MG per tablet 1 tablet (1 tablet Oral Given 11/21/24 0315)  sodium chloride 0.9 % infusion (75 mL/hr Intravenous New Bag 11/21/24 1804)  sodium chloride 0.9 % bolus 500 mL (0 mL Intravenous Stopped 11/20/24 2236)  ertapenem (INVanz) 1,000 mg in sodium chloride 0.9 % 100 mL MBP (0 mg Intravenous Stopped 11/20/24 2348)  sodium chloride 0.9 % bolus 500 mL (500 mL Intravenous New Bag 11/21/24 0121)  ECG/EMG Results (last 24 hours)     Procedure Component Value Units Date/Time    ECG 12 Lead Electrolyte Imbalance [[id number]] Collected: 11/21/24 0232     Updated: 11/21/24 1312     QT Interval 398 ms      QTC Interval 400 ms     Narrative:      Test Reason : Electrolyte Imbalance  Blood Pressure :   */*   mmHG  Vent. Rate :  61 BPM     Atrial Rate :  61 BPM     P-R Int : 236 ms           QRS Dur :  82 ms      QT Int : 398 ms       P-R-T Axes :  86 203 137 degrees    QTcB Int : 400 ms    Sinus rhythm with 1st degree AV block  Poor R wave progression   Abnormal ECG  When compared with ECG of 21-Nov-2024 00:37, (Unconfirmed)  Sinus rhythm has replaced Electronic ventricular pacemaker  Confirmed by CRYSTAL LA MD ([id number]) on 11/21/2024 1:11:57 PM    Referred By:            Confirmed By: CRYSTAL LA MD    ECG 12 Lead Electrolyte Imbalance [[id number]] Collected: 11/21/24 0920     Updated: 11/21/24 1319     QT Interval 414 ms      QTC Interval 414 ms     Narrative:      Test Reason : Electrolyte Imbalance  Blood Pressure :   */*   mmHG  Vent. Rate :  60 BPM     Atrial Rate :  58 BPM     P-R Int : 260 ms          QRS Dur : 102 ms      QT Int : 414 ms       P-R-T Axes :   * -16  57 degrees    QTcB Int : 414 ms    Atrial-paced rhythm with prolonged AV conduction  Minimal voltage criteria for LVH, may be normal variant  Abnormal ECG  When compared with ECG of 21-Nov-2024 02:32, (Unconfirmed)  Electronic atrial pacemaker has replaced Sinus rhythm  Questionable change in QRS duration  Criteria for Anterior infarct are no longer present  Criteria for Anterolateral infarct are no longer present  Confirmed by CRYSTAL LA MD ([id number]) on 11/21/2024 1:19:20 PM    Referred By:            Confirmed By: CRYSTAL LA MD    ECG 12 Lead Syncope [[id number]] Collected: 11/21/24 0037     Updated: 11/22/24 0001     QT Interval 436 ms      QTC Interval 442 ms     Narrative:      Test Reason : Syncope  Blood Pressure :   */*   mmHG  Vent. Rate :  62 BPM     Atrial Rate :  62 BPM     P-R Int : 352 ms          QRS Dur : 152 ms      QT Int : 436 ms       P-R-T Axes :   *  11 202 degrees    QTcB Int : 442 ms    AV dual-paced rhythm with prolonged AV conduction  Abnormal ECG  When compared with ECG of 17-Feb-2024 06:55,  Electronic ventricular pacemaker has replaced Sinus rhythm  Confirmed by MD DIEGO, KETAN ([id number]) on  11/22/2024 12:00:43 AM    Referred By: DIEGO           Confirmed By: KETAN CORTEZ MD      ECG 12 Lead Electrolyte Imbalance   Final Result    Test Reason : Electrolyte Imbalance    Blood Pressure :   */*   mmHG    Vent. Rate :  60 BPM     Atrial Rate :  58 BPM       P-R Int : 260 ms          QRS Dur : 102 ms        QT Int : 414 ms       P-R-T Axes :   * -16  57 degrees      QTcB Int : 414 ms        Atrial-paced rhythm with prolonged AV conduction    Minimal voltage criteria for LVH, may be normal variant    Abnormal ECG    When compared with ECG of 21-Nov-2024 02:32, (Unconfirmed)    Electronic atrial pacemaker has replaced Sinus rhythm    Questionable change in QRS duration    Criteria for Anterior infarct are no longer present    Criteria for Anterolateral infarct are no longer present    Confirmed by CRYSTAL LA MD ([id number]) on 11/21/2024 1:19:20 PM        Referred By:            Confirmed By: CRYSTAL LA MD     ECG 12 Lead Electrolyte Imbalance   Final Result    Test Reason : Electrolyte Imbalance    Blood Pressure :   */*   mmHG    Vent. Rate :  61 BPM     Atrial Rate :  61 BPM       P-R Int : 236 ms          QRS Dur :  82 ms        QT Int : 398 ms       P-R-T Axes :  86 203 137 degrees      QTcB Int : 400 ms        Sinus rhythm with 1st degree AV block    Poor R wave progression     Abnormal ECG    When compared with ECG of 21-Nov-2024 00:37, (Unconfirmed)    Sinus rhythm has replaced Electronic ventricular pacemaker    Confirmed by CRYSTAL LA MD ([id number]) on 11/21/2024 1:11:57 PM        Referred By:            Confirmed By: CRYSTAL LA MD     ECG 12 Lead Syncope   Final Result    Test Reason : Syncope    Blood Pressure :   */*   mmHG    Vent. Rate :  62 BPM     Atrial Rate :  62 BPM       P-R Int : 352 ms          QRS Dur : 152 ms        QT Int : 436 ms       P-R-T Axes :   *  11 202 degrees      QTcB Int : 442 ms        AV dual-paced rhythm with prolonged AV conduction    Abnormal ECG    When  compared with ECG of 17-Feb-2024 06:55,    Electronic ventricular pacemaker has replaced Sinus rhythm    Confirmed by MD CORTEZ CORY ([id number]) on 11/22/2024 12:00:43 AM        Referred By: DIEGO           Confirmed By: KETAN CORTEZ MD             Problems Addressed:  Acute kidney injury: complicated acute illness or injury  Acute UTI: complicated acute illness or injury  Gross hematuria: complicated acute illness or injury  Near syncope: complicated acute illness or injury    Amount and/or Complexity of Data Reviewed  External Data Reviewed: notes.  Labs: ordered. Decision-making details documented in ED Course.  Radiology: ordered and independent interpretation performed. Decision-making details documented in ED Course.  ECG/medicine tests: ordered and independent interpretation performed. Decision-making details documented in ED Course.    Risk  Prescription drug management.  Decision regarding hospitalization.        Final diagnoses:   Acute UTI   Acute kidney injury   Gross hematuria   Near syncope       ED Disposition  ED Disposition       ED Disposition   Decision to Admit    Condition   --    Comment   Level of Care: Telemetry [5]   Diagnosis: Acute kidney injury [400035]   Admitting Physician: LINA DSOUZA III [[id number]]   Attending Physician: LINA DSOUZA III [[id number]]   Is patient appropriate for Inpatient Observation Unit?: No [0]   Bed Request Comments: tele obs not cdu                  Ketan Cortez, DO  11/25/24 1839       Ketan Cortez,   11/25/24 2227

## 2024-11-21 NOTE — THERAPY EVALUATION
Patient Name: Forrest Zepeda  : 1932    MRN: [id number]                              Today's Date: 2024       Admit Date: 2024    Visit Dx:     ICD-10-CM ICD-9-CM   1. Acute UTI  N39.0 599.0   2. Acute kidney injury  N17.9 584.9   3. Gross hematuria  R31.0 599.71   4. Near syncope  R55 780.2     Patient Active Problem List   Diagnosis    Essential hypertension    Generalized osteoarthritis    Diabetic neuropathy    Gout    HLD (hyperlipidemia)    Acquired hypothyroidism    Ureteral stricture    Hydronephrosis of left kidney    CKD (chronic kidney disease) stage 3, GFR 30-59 ml/min    LEHMAN (dyspnea on exertion)    Pulmonary fibrosis    Symptomatic bradycardia    Hypoxemia requiring supplemental oxygen    History of pulmonary embolism    Sinus node dysfunction    Chronic fatigue    Urethral stricture    Paroxysmal atrial fibrillation    Type 2 diabetes mellitus with hyperglycemia, with long-term current use of insulin    Abnormal cardiovascular stress test    Pulmonary hypertension    Cardiac pacemaker in situ    CAD (coronary artery disease)    Diastolic congestive heart failure    Abnormal SPEP    Type 2 diabetes mellitus with stage 3b chronic kidney disease, with long-term current use of insulin    Hydronephrosis, left    Gross hematuria    Chronic heart failure with preserved ejection fraction (HFpEF)    Weakness    Vasovagal syncope    Hematuria    Acute UTI (urinary tract infection)    Acute kidney injury superimposed on chronic kidney disease    Anemia, chronic disease    T2DM (type 2 diabetes mellitus)    Generalized weakness    Acute kidney injury     Past Medical History:   Diagnosis Date    Abnormal EKG     Abnormal PSA     Reported abnormal    Acute cystitis with hematuria 2023    Acute deep vein thrombosis (DVT) of right lower extremity 2019    Acute diverticulitis 2019    Acute hyperkalemia 2019    Acute respiratory failure with hypoxia     Acute saddle pulmonary  "embolism with acute cor pulmonale 08/22/2019    Acute systolic right heart failure 08/22/2019    Acute UTI (urinary tract infection)     Arthritis     KNEES,  BUT SINCE HAD REPLACEMENT    Benign localized hyperplasia of prostate     Bilateral knee pain     C. difficile diarrhea 2010    Cataracts, bilateral     CKD (chronic kidney disease)     Coronary artery disease     Diabetic neuropathy     Diastolic dysfunction     Disease of thyroid gland     HYPOTHYROIDISM    Diverticulitis     Dyslipidemia     H/O    Erectile dysfunction     Family history of malignant hyperthermia     Brother     Full dentures     Generalized weakness     History of ESBL E. coli infection     most recent 4-2022 f/u with ID Dr Johnson    History of gout     History of hepatitis A vaccination     History of nephrolithiasis     History of nuclear stress test     STATES IN THE 1990'S.  \"IT WAS OK\"    History of osteopenia     History of recurrent UTI (urinary tract infection)     Hydronephrosis of left kidney 07/18/2019    Hydronephrosis with renal and ureteral calculus obstruction 10/21/2019    Added automatically from request for surgery [id number]    Hydronephrosis with ureteral stricture     Hypercholesterolemia     Hyperkalemia     PT UNSURE REGARDING HX OF THIS    Hyperlipidemia     Hypertension     Hypothyroidism     Impaired functional mobility, balance, gait, and endurance     Insomnia     IPF (idiopathic pulmonary fibrosis)     per patient and son, dx at [facility], was told no treatment necessary, not to worry about it    On supplemental oxygen therapy     2L NC QHS    Other hydronephrosis 08/12/2019    Added automatically from request for surgery [id number]    Paroxysmal atrial fibrillation     Pulmonary fibrosis     Pulmonary hypertension, mild 02/2021    per echo per cardiology note 1/9/23, per pt's son, PCP does not agree with this dx    Renal insufficiency     Sepsis 2019    Shortness of breath     STATES WITH EXERTION, OTHERWISE, DENIES "    Sigmoid diverticulitis without abscess or perforation 08/09/2017    Sinus node dysfunction     Skin breakdown     fourth toe right foot noted in 2019 MD D/C note    Skin ulcer of fourth toe of right foot, limited to breakdown of skin 07/05/2019    Stricture of ureter     Type 2 diabetes mellitus     Ureteral stricture, left     Urinary incontinence     UTI (urinary tract infection) 07/18/2019    Vitamin D deficiency     Wears glasses      Past Surgical History:   Procedure Laterality Date    APPENDECTOMY      CARDIAC ELECTROPHYSIOLOGY PROCEDURE N/A 12/23/2020    Procedure: Pacemaker DC new. DNS meds.;  Surgeon: Jimy Ledezma DO;  Location:  [facility];  Service: Cardiology;  Laterality: N/A;    CHOLECYSTECTOMY      CIRCUMCISION N/A 10/23/2019    Procedure: CIRCUMCISIOn-DORSAL SLIT;  Surgeon: Griffin Romero MD;  Location:  [facility];  Service: Urology    COLONOSCOPY      CYSTOSCOPY RETROGRADE PYELOGRAM Left 06/17/2022    Procedure: CYSTOSCOPY RETROGRADE PYELOGRAM;  Surgeon: Ryne Mccann MD;  Location:  [facility];  Service: Urology;  Laterality: Left;    CYSTOSCOPY RETROGRADE PYELOGRAM Left 7/10/2024    Procedure: CYSTOSCOPY RETROGRADE PYELOGRAM AND STENT INSERTION LEFT;  Surgeon: Deanne Pitts MD;  Location:  [facility];  Service: Urology;  Laterality: Left;    CYSTOSCOPY URETEROSCOPY Left 02/11/2019    Procedure: URETEROSCOPY WITH STENT EXTRACTION, RPG;  Surgeon: Griffin Romero MD;  Location:  [facility];  Service: Urology    CYSTOSCOPY W/ URETERAL STENT PLACEMENT Left 07/20/2019    Procedure: CYSTOSCOPY, LEFT RETROGRADE PYELOGRAM,  ATTEMPTED LEFT URETERAL STENT INSERTION;  Surgeon: Isaac Flynn MD;  Location:  [facility];  Service: Urology    CYSTOSCOPY W/ URETERAL STENT PLACEMENT Left 06/17/2022    Procedure: CYSTOSCOPY URETERAL CATHETER/STENT INSERTION;  Surgeon: Ryne Mccann MD;  Location:  [facility];  Service: Urology;  Laterality: Left;    CYSTOSCOPY W/ URETERAL STENT  PLACEMENT Left 01/27/2023    Procedure: CYSTOSCOPY URETERAL CATHETER/STENT INSERTION;  Surgeon: Deanne Pitts MD;  Location:  [facility];  Service: Urology;  Laterality: Left;    CYSTOSCOPY W/ URETERAL STENT PLACEMENT Left 02/15/2024    Procedure: CYSTOSCOPY LEFT STENT EXCHANGE;  Surgeon: Deanne Pitts MD;  Location:  [facility];  Service: Urology;  Laterality: Left;    CYSTOSCOPY, URETEROSCOPY, RETROGRADE PYELOGRAM, STONE EXTRACTION, STENT INSERTION Left 06/15/2023    Procedure: URETEROSCOPY, RETROGRADE PYELOGRAM, STENT INSERTION;  Surgeon: Deanne Pitts MD;  Location:  [facility];  Service: Urology;  Laterality: Left;    EYE SURGERY      CATARACTS REMOVED BILATERALLY    JOINT REPLACEMENT      Bilateral knees    KNEE ARTHROPLASTY Bilateral     KNEE ARTHROSCOPY Bilateral     RENAL ARTERY STENT      SEVERAL TIMES EVERY SINCE 1978    URETERAL STENT INSERTION  10/2020    URETEROSCOPY LASER LITHOTRIPSY WITH STENT INSERTION Left 01/10/2018    Procedure:  cysto, left ureteral stent replacement ;  Surgeon: Griffin Romero MD;  Location:  [facility];  Service:     URETEROSCOPY LASER LITHOTRIPSY WITH STENT INSERTION Left 08/14/2019    Procedure: URETEROSCOPY, STONE REMOVAL WITH STENT INSERTION;  Surgeon: Griffin Romero MD;  Location:  [facility];  Service: Urology    URETEROSCOPY LASER LITHOTRIPSY WITH STENT INSERTION Left 10/23/2019    Procedure: Left URETEROSCOPY WITH STENT INSERTION-LEFT;  Surgeon: Griffin Romero MD;  Location:  [facility];  Service: Urology      General Information       Row Name 11/21/24 1000          OT Time and Intention    Document Type evaluation  -AR     Mode of Treatment individual therapy;occupational therapy  -AR       Row Name 11/21/24 1000          General Information    Patient Profile Reviewed yes  -AR     Prior Level of Function independent:;all household mobility;community mobility;gait;transfer;ADL's  Pt uses RW/cane as needed, family assists with home management  -AR     Existing  Precautions/Restrictions fall;other (see comments)  caceres catheter  -AR     Barriers to Rehab medically complex  -AR       Row Name 11/21/24 1000          Living Environment    People in Home alone  -AR       Row Name 11/21/24 1000          Home Main Entrance    Number of Stairs, Main Entrance two  -AR     Stair Railings, Main Entrance none  -AR       Row Name 11/21/24 1000          Stairs Within Home, Primary    Stairs, Within Home, Primary Pt says on main level of home, has basement he does not access. He has tub/shower but sponge bathes and uses raised commode.  -AR       Row Name 11/21/24 1000          Cognition    Orientation Status (Cognition) oriented x 4  -AR       Row Name 11/21/24 1000          Safety Issues/Impairments Affecting Functional Mobility    Safety Issues Affecting Function (Mobility) insight into deficits/self-awareness;positioning of assistive device;problem-solving;safety precaution awareness;safety precautions follow-through/compliance  -AR     Impairments Affecting Function (Mobility) balance;strength;endurance/activity tolerance;postural/trunk control;shortness of breath;sensation/sensory awareness  -AR               User Key  (r) = Recorded By, (t) = Taken By, (c) = Cosigned By      Initials Name Provider Type    AR Aarti Wu, OT Occupational Therapist                     Mobility/ADL's       Row Name 11/21/24 1003          Bed Mobility    Bed Mobility supine-sit;scooting/bridging  -AR     Scooting/Bridging [place] (Bed Mobility) supervision  -AR     Supine-Sit [place] (Bed Mobility) minimum assist (75% patient effort);verbal cues  -AR     Assistive Device (Bed Mobility) head of bed elevated  -AR       Row Name 11/21/24 1003          Transfers    Transfers sit-stand transfer;stand-sit transfer  -AR     Comment, (Transfers) Cues for hand placement and chair approach.  -AR       Row Name 11/21/24 1003          Sit-Stand Transfer    Sit-Stand [place] (Transfers)  verbal cues;minimum assist (75% patient effort);1 person to manage equipment  -AR     Assistive Device (Sit-Stand Transfers) walker, front-wheeled  -AR       Row Name 11/21/24 1003          Stand-Sit Transfer    Stand-Sit [place] (Transfers) contact guard;verbal cues  -AR     Assistive Device (Stand-Sit Transfers) walker, front-wheeled  -AR       Row Name 11/21/24 1003          Functional Mobility    Functional Mobility- Ind. Level verbal cues required;contact guard assist  -AR     Functional Mobility- Device walker, front-wheeled  -AR       Row Name 11/21/24 1003          Activities of Daily Living    BADL Assessment/Intervention upper body dressing;lower body dressing;bathing  -AR       Row Name 11/21/24 1003          Upper Body Dressing Assessment/Training    [place] Level (Upper Body Dressing) don;pajama/robe;minimum assist (75% patient effort)  -AR     Position (Upper Body Dressing) edge of bed sitting  -AR       Row Name 11/21/24 1003          Lower Body Dressing Assessment/Training    [place] Level (Lower Body Dressing) don;socks;contact guard assist  -AR     Position (Lower Body Dressing) edge of bed sitting  -AR     Comment, (Lower Body Dressing) Pt reports becoming winded with LB dressing at home, issued AE to assist with EC/WS and educated on use.  -AR       Row Name 11/21/24 1003          Bathing Assessment/Intervention    Comment, (Bathing) Issued LH sponge to assist with bathing and educated on use  -AR               User Key  (r) = Recorded By, (t) = Taken By, (c) = Cosigned By      Initials Name Provider Type    Aarti Mackey OT Occupational Therapist                   Obj/Interventions       Row Name 11/21/24 1006          Sensory Assessment (Somatosensory)    Sensory Assessment (Somatosensory) UE sensation intact  -AR       Row Name 11/21/24 1006          Vision Assessment/Intervention    Visual Impairment/Limitations WNL;corrective lenses full-time  -AR       Row Name  11/21/24 1006          Range of Motion Comprehensive    General Range of Motion no range of motion deficits identified  -AR       Row Name 11/21/24 1006          Strength Comprehensive (MMT)    General Manual Muscle Testing (MMT) Assessment no strength deficits identified  -AR     Comment, General Manual Muscle Testing (MMT) Assessment BUE 5/5  -AR       Row Name 11/21/24 1006          Balance    Balance Assessment sitting static balance;sitting dynamic balance;standing static balance;standing dynamic balance  -AR     Static Sitting Balance supervision  -AR     Dynamic Sitting Balance supervision  -AR     Position, Sitting Balance unsupported;sitting edge of bed  -AR     Static Standing Balance contact guard  -AR     Dynamic Standing Balance contact guard  -AR     Position/Device Used, Standing Balance supported;walker, rolling  -AR               User Key  (r) = Recorded By, (t) = Taken By, (c) = Cosigned By      Initials Name Provider Type    Aarti Mackey, OT Occupational Therapist                   Goals/Plan       Row Name 11/21/24 1011          Transfer Goal 1 (OT)    Activity/Assistive Device (Transfer Goal 1, OT) sit-to-stand/stand-to-sit;toilet;walker, rolling  -AR     [place] Level/Cues Needed (Transfer Goal 1, OT) contact guard required;verbal cues required  -AR     Time Frame (Transfer Goal 1, OT) short term goal (STG);5 days  -AR     Progress/Outcome (Transfer Goal 1, OT) goal ongoing  -AR       Row Name 11/21/24 1011          Dressing Goal 1 (OT)    Activity/Device (Dressing Goal 1, OT) lower body dressing;reacher;long-handled shoe horn  -AR     [place]/Cues Needed (Dressing Goal 1, OT) supervision required;verbal cues required  -AR     Time Frame (Dressing Goal 1, OT) long term goal (LTG);10 days  -AR     Progress/Outcome (Dressing Goal 1, OT) goal ongoing  -AR       Row Name 11/21/24 1011          Toileting Goal 1 (OT)    Activity/Device (Toileting Goal 1, OT) toileting skills,  all;grab bar/safety frame;raised toilet seat  -AR     [place] Level/Cues Needed (Toileting Goal 1, OT) supervision required;verbal cues required  -AR     Time Frame (Toileting Goal 1, OT) short term goal (STG);5 days  -AR     Progress/Outcome (Toileting Goal 1, OT) goal ongoing  -AR       Row Name 11/21/24 1011          Therapy Assessment/Plan (OT)    Planned Therapy Interventions (OT) activity tolerance training;adaptive equipment training;BADL retraining;functional balance retraining;IADL retraining;occupation/activity based interventions;patient/caregiver education/training;ROM/therapeutic exercise;strengthening exercise;transfer/mobility retraining  -AR               User Key  (r) = Recorded By, (t) = Taken By, (c) = Cosigned By      Initials Name Provider Type    Aarti Mackey, OT Occupational Therapist                   Clinical Impression       Row Name 11/21/24 1007          Pain Assessment    Pretreatment Pain Rating 0/10 - no pain  -AR     Posttreatment Pain Rating 0/10 - no pain  -AR       Row Name 11/21/24 1007          Plan of Care Review    Plan of Care Reviewed With patient  -AR     Outcome Evaluation Pt alert, Ox4 and no c/o pain. He completed bed mobility with min assist, UB dressing task with min assist, LB dressing with CGA and transfer training with min assist. Pt on RA, mild desat to 89% during exertion. Pt limited with decreased occupational endurance, mild balance deficit, dyspnea with exertion and is performing below baseline status. Recommend DC home with initial 24/7 assist from family and HHOT.  -AR       Row Name 11/21/24 1007          Therapy Assessment/Plan (OT)    Rehab Potential (OT) good  -AR     Criteria for Skilled Therapeutic Interventions Met (OT) yes  -AR     Therapy Frequency (OT) daily  -AR       Row Name 11/21/24 1007          Therapy Plan Review/Discharge Plan (OT)    Anticipated Discharge Disposition (OT) home with 24/7 care;home with home health  -AR        Row Name 11/21/24 1007          Vital Signs    Pre SpO2 (%) 94  -AR     O2 Delivery Pre Treatment room air  -AR     Intra SpO2 (%) 89  -AR     O2 Delivery Intra Treatment room air  -AR     Post SpO2 (%) 94  -AR     O2 Delivery Post Treatment room air  -AR     Pre Patient Position Supine  -AR     Intra Patient Position Standing  -AR     Post Patient Position Sitting  -AR       Row Name 11/21/24 1007          Positioning and Restraints    Pre-Treatment Position in bed  -AR     Post Treatment Position chair  -AR     In Chair reclined;call light within reach;encouraged to call for assist;exit alarm on;waffle cushion;on mechanical lift sling;legs elevated  -AR               User Key  (r) = Recorded By, (t) = Taken By, (c) = Cosigned By      Initials Name Provider Type    Aarti Mackey, OT Occupational Therapist                   Outcome Measures       Row Name 11/21/24 1013          How much help from another is currently needed...    Putting on and taking off regular lower body clothing? 3  -AR     Bathing (including washing, rinsing, and drying) 3  -AR     Toileting (which includes using toilet bed pan or urinal) 3  -AR     Putting on and taking off regular upper body clothing 3  -AR     Taking care of personal grooming (such as brushing teeth) 3  -AR     Eating meals 3  -AR     AM-PAC 6 Clicks Score (OT) 18  -AR       Row Name 11/21/24 0902 11/21/24 0746       How much help from another person do you currently need...    Turning from your back to your side while in flat bed without using bedrails? 4  -LR 4  -GS    Moving from lying on back to sitting on the side of a flat bed without bedrails? 3  -LR 3  -GS    Moving to and from a bed to a chair (including a wheelchair)? 3  -LR 3  -GS    Standing up from a chair using your arms (e.g., wheelchair, bedside chair)? 3  -LR 3  -GS    Climbing 3-5 steps with a railing? 2  -LR 2  -GS    To walk in hospital room? 3  -LR 3  -GS    AM-PAC 6 Clicks Score (PT) 18  -LR 18   -GS    Highest Level of Mobility Goal 6 --> Walk 10 steps or more  -LR 6 --> Walk 10 steps or more  -GS      Row Name 11/21/24 0315 11/21/24 0242       How much help from another person do you currently need...    Turning from your back to your side while in flat bed without using bedrails? 3  -CM 3  -CM    Moving from lying on back to sitting on the side of a flat bed without bedrails? 3  -CM 3  -CM    Moving to and from a bed to a chair (including a wheelchair)? 3  -CM 3  -CM    Standing up from a chair using your arms (e.g., wheelchair, bedside chair)? 3  -CM 3  -CM    Climbing 3-5 steps with a railing? 1  -CM 3  -CM    To walk in hospital room? 2  -CM 3  -CM    AM-PAC 6 Clicks Score (PT) 15  -CM 18  -CM    Highest Level of Mobility Goal 4 --> Transfer to chair/commode  -CM 6 --> Walk 10 steps or more  -CM      Row Name 11/21/24 0231          How much help from another person do you currently need...    Turning from your back to your side while in flat bed without using bedrails? 3  -CM     Moving from lying on back to sitting on the side of a flat bed without bedrails? 3  -CM     Moving to and from a bed to a chair (including a wheelchair)? 3  -CM     Standing up from a chair using your arms (e.g., wheelchair, bedside chair)? 3  -CM     Climbing 3-5 steps with a railing? 3  -CM     To walk in hospital room? 3  -CM     AM-PAC 6 Clicks Score (PT) 18  -CM     Highest Level of Mobility Goal 6 --> Walk 10 steps or more  -CM       Row Name 11/21/24 1013 11/21/24 0902       Functional Assessment    Outcome Measure Options AM-PAC 6 Clicks Daily Activity (OT)  -AR AM-PAC 6 Clicks Basic Mobility (PT)  -LR              User Key  (r) = Recorded By, (t) = Taken By, (c) = Cosigned By      Initials Name Provider Type    LR Shayy Mcmillan, PT Physical Therapist    Aarti Mackey, OT Occupational Therapist    Vivian Carlson RN Registered Nurse    Fallon Cody RN Registered Nurse                     Occupational Therapy Education       Title: PT OT SLP Therapies (Done)       Topic: Occupational Therapy (Done)       Point: ADL training (Done)       Description:   Instruct learner(s) on proper safety adaptation and remediation techniques during self care or transfers.   Instruct in proper use of assistive devices.                  Learning Progress Summary            Patient Lester GODWIN,ROSALIE, VU,NR by AR at 11/21/2024 1013                      Point: Home exercise program (Done)       Description:   Instruct learner(s) on appropriate technique for monitoring, assisting and/or progressing therapeutic exercises/activities.                  Learning Progress Summary            Patient GODWIN Batista D, VU,NR by AR at 11/21/2024 1013                      Point: Precautions (Done)       Description:   Instruct learner(s) on prescribed precautions during self-care and functional transfers.                  Learning Progress Summary            Patient GODWIN Batista,ROSALIE, VU,NR by AR at 11/21/2024 1013                      Point: Body mechanics (Done)       Description:   Instruct learner(s) on proper positioning and spine alignment during self-care, functional mobility activities and/or exercises.                  Learning Progress Summary            Patient GODWIN Batista D, VU,NR by AR at 11/21/2024 1013                                      User Key       Initials Effective Dates Name Provider Type Discipline    AR 07/11/23 -  Aarti Wu, OT Occupational Therapist OT                  OT Recommendation and Plan  Planned Therapy Interventions (OT): activity tolerance training, adaptive equipment training, BADL retraining, functional balance retraining, IADL retraining, occupation/activity based interventions, patient/caregiver education/training, ROM/therapeutic exercise, strengthening exercise, transfer/mobility retraining  Therapy Frequency (OT): daily  Plan of Care Review  Plan of Care Reviewed With: patient  Outcome  Evaluation: Pt alert, Ox4 and no c/o pain. He completed bed mobility with min assist, UB dressing task with min assist, LB dressing with CGA and transfer training with min assist. Pt on RA, mild desat to 89% during exertion. Pt limited with decreased occupational endurance, mild balance deficit, dyspnea with exertion and is performing below baseline status. Recommend DC home with initial 24/7 assist from family and HHOT.     Time Calculation:   Evaluation Complexity (OT)  Review Occupational Profile/Medical/Therapy History Complexity: brief/low complexity  Assessment, Occupational Performance/Identification of Deficit Complexity: 1-3 performance deficits  Clinical Decision Making Complexity (OT): problem focused assessment/low complexity  Overall Complexity of Evaluation (OT): low complexity     Time Calculation- OT       Row Name 11/21/24 1014 11/21/24 0902          Time Calculation- OT    OT Start Time 0900  -AR --     OT Received On 11/21/24  -AR --     OT Goal Re-Cert Due Date 12/01/24  -AR --        Timed Charges    69976 - Gait Training Minutes  -- 10  -LR     50278 - OT Self Care/Mgmt Minutes 12  -AR --        Untimed Charges    OT Eval/Re-eval Minutes 59  -AR --        Total Minutes    Timed Charges Total Minutes 12  -AR 10  -LR     Untimed Charges Total Minutes 59  -AR --      Total Minutes 71  -AR 10  -LR               User Key  (r) = Recorded By, (t) = Taken By, (c) = Cosigned By      Initials Name Provider Type    LR Shayy Mcmillan, PT Physical Therapist    Aarti Mackey OT Occupational Therapist                  Therapy Charges for Today       Code Description Service Date Service Provider Modifiers Qty    28929901523 HC OT SELF CARE/MGMT/TRAIN EA 15 MIN 11/21/2024 Aarti Wu OT GO 1    27602525860 HC OT EVAL LOW COMPLEXITY 4 11/21/2024 Aarti Wu OT GO 1                 Aarti Wu OT  11/21/2024

## 2024-11-21 NOTE — CASE MANAGEMENT/SOCIAL WORK
Discharge Planning Assessment  [facility]     Patient Name: Forrest Zepeda  MRN: [id number]  Today's Date: 11/21/2024    Admit Date: 11/20/2024    Plan: home with family   Discharge Needs Assessment       Row Name 11/21/24 0932       Living Environment    People in Home alone    Current Living Arrangements home    Potentially Unsafe Housing Conditions none    In the past 12 months has the electric, gas, oil, or water company threatened to shut off services in your home? No    Primary Care Provided by self;child(shital)    Provides Primary Care For no one    Family Caregiver if Needed child(shital), adult;grandchild(shital), adult    Quality of Family Relationships helpful;involved;supportive    Able to Return to Prior Arrangements yes       Resource/Environmental Concerns    Resource/Environmental Concerns none    Transportation Concerns none       Transportation Needs    In the past 12 months, has lack of transportation kept you from medical appointments or from getting medications? no    In the past 12 months, has lack of transportation kept you from meetings, work, or from getting things needed for daily living? No       Food Insecurity    Within the past 12 months, you worried that your food would run out before you got the money to buy more. Never true    Within the past 12 months, the food you bought just didn't last and you didn't have money to get more. Never true       Transition Planning    Patient/Family Anticipates Transition to home with family;home with help/services    Patient/Family Anticipated Services at Transition none    Transportation Anticipated family or friend will provide       Discharge Needs Assessment    Readmission Within the Last 30 Days no previous admission in last 30 days    Equipment Currently Used at Home walker, rolling;cane, straight;bath bench    Concerns to be Addressed discharge planning    Do you want help finding or keeping work or a job? I do not need or want help    Do you want  help with school or training? For example, starting or completing job training or getting a high school diploma, GED or equivalent No    Anticipated Changes Related to Illness none    Equipment Needed After Discharge none                   Discharge Plan       Row Name 11/21/24 0933       Plan    Plan home with family    Patient/Family in Agreement with Plan yes    Plan Comments Pt lives in [facility] alone. His adult children and grandchildren assist with ADLs if needed. He was independent with a walker for mobility prior to admit. Pt is followed by his PCP and has drug coverage. At this time pt reports he hopes to return home at discharge. CM will follow for dc needs    Final Discharge Disposition Code 01 - home or self-care                  Continued Care and Services - Admitted Since 11/20/2024    No active coordination exists for this encounter.       Expected Discharge Date and Time       Expected Discharge Date Expected Discharge Time    Nov 23, 2024            Demographic Summary       Row Name 11/21/24 0931       General Information    Referral Source physician    Reason for Consult discharge planning    Preferred Language English    General Information Comments PCP Kary Wne       Contact Information    Permission Granted to Share Info With family/designee    Contact Information Comments Eron COOK [phone]                   Functional Status       Row Name 11/21/24 0931       Functional Status    Usual Activity Tolerance moderate    Current Activity Tolerance moderate       Physical Activity    On average, how many days per week do you engage in moderate to strenuous exercise (like a brisk walk)? 0 days    On average, how many minutes do you engage in exercise at this level? 0 min    Number of minutes of exercise per week 0       Functional Status, IADL    Medications independent;assistive person    Meal Preparation independent;assistive person    Housekeeping independent;assistive person     Laundry independent;assistive person    Shopping independent;assistive person    If for any reason you need help with day-to-day activities such as bathing, preparing meals, shopping, managing finances, etc., do you get the help you need? I get all the help I need    IADL Comments Pts adult children and grandchildren assist as needed       Mental Status Summary    Recent Changes in Mental Status/Cognitive Functioning no changes       Employment/    Employment Status retired    Current or Previous Occupation other (see comments)                   Psychosocial    No documentation.                  Abuse/Neglect    No documentation.                  Legal    No documentation.                  Substance Abuse    No documentation.                  Patient Forms    No documentation.                     Yuki Castillo RN

## 2024-11-21 NOTE — ED NOTES
" Forrest Zepeda    Nursing Report ED to Floor:  Mental status: a&ox4    Ambulatory status: 1-2  Oxygen Therapy:  ra  Cardiac Rhythm: paced  Admitted from: home  Safety Concerns:  none  Social Issues: none  ED Room #:  17    ED Nurse Phone Extension - [phone]   or may call [phone].      HPI:   Chief Complaint   Patient presents with    Blood in Urine       Past Medical History:  Past Medical History:   Diagnosis Date    Abnormal EKG     Abnormal PSA     Reported abnormal    Acute cystitis with hematuria 05/01/2023    Acute deep vein thrombosis (DVT) of right lower extremity 08/22/2019    Acute diverticulitis 2019    Acute hyperkalemia 08/22/2019    Acute respiratory failure with hypoxia     Acute saddle pulmonary embolism with acute cor pulmonale 08/22/2019    Acute systolic right heart failure 08/22/2019    Acute UTI (urinary tract infection)     Arthritis     KNEES,  BUT SINCE HAD REPLACEMENT    Benign localized hyperplasia of prostate     Bilateral knee pain     C. difficile diarrhea 2010    Cataracts, bilateral     CKD (chronic kidney disease)     Coronary artery disease     Diabetic neuropathy     Diastolic dysfunction     Disease of thyroid gland     HYPOTHYROIDISM    Diverticulitis     Dyslipidemia     H/O    Erectile dysfunction     Family history of malignant hyperthermia     Brother     Full dentures     Generalized weakness     History of ESBL E. coli infection     most recent 4-2022 f/u with ID Dr Johnson    History of gout     History of hepatitis A vaccination     History of nephrolithiasis     History of nuclear stress test     STATES IN THE 1990'S.  \"IT WAS OK\"    History of osteopenia     History of recurrent UTI (urinary tract infection)     Hydronephrosis of left kidney 07/18/2019    Hydronephrosis with renal and ureteral calculus obstruction 10/21/2019    Added automatically from request for surgery [id number]    Hydronephrosis with ureteral stricture     Hypercholesterolemia     Hyperkalemia     PT " UNSURE REGARDING HX OF THIS    Hyperlipidemia     Hypertension     Hypothyroidism     Impaired functional mobility, balance, gait, and endurance     Insomnia     IPF (idiopathic pulmonary fibrosis)     per patient and son, dx at [facility], was told no treatment necessary, not to worry about it    On supplemental oxygen therapy     2L NC QHS    Other hydronephrosis 08/12/2019    Added automatically from request for surgery [id number]    Paroxysmal atrial fibrillation     Pulmonary fibrosis     Pulmonary hypertension, mild 02/2021    per echo per cardiology note 1/9/23, per pt's son, PCP does not agree with this dx    Renal insufficiency     Sepsis 2019    Shortness of breath     STATES WITH EXERTION, OTHERWISE, DENIES    Sigmoid diverticulitis without abscess or perforation 08/09/2017    Sinus node dysfunction     Skin breakdown     fourth toe right foot noted in 2019 MD D/C note    Skin ulcer of fourth toe of right foot, limited to breakdown of skin 07/05/2019    Stricture of ureter     Type 2 diabetes mellitus     Ureteral stricture, left     Urinary incontinence     UTI (urinary tract infection) 07/18/2019    Vitamin D deficiency     Wears glasses         Past Surgical History:  Past Surgical History:   Procedure Laterality Date    APPENDECTOMY      CARDIAC ELECTROPHYSIOLOGY PROCEDURE N/A 12/23/2020    Procedure: Pacemaker DC new. DNS meds.;  Surgeon: Jimy Ledezma DO;  [facility]:  [facility];  Service: Cardiology;  Laterality: N/A;    CHOLECYSTECTOMY      CIRCUMCISION N/A 10/23/2019    Procedure: CIRCUMCISIOn-DORSAL SLIT;  Surgeon: Griffin Romero MD;  Location:  [facility];  Service: Urology    COLONOSCOPY      CYSTOSCOPY RETROGRADE PYELOGRAM Left 06/17/2022    Procedure: CYSTOSCOPY RETROGRADE PYELOGRAM;  Surgeon: Ryne Mccann MD;  Location:  [facility];  Service: Urology;  Laterality: Left;    CYSTOSCOPY RETROGRADE PYELOGRAM Left 7/10/2024    Procedure: CYSTOSCOPY RETROGRADE PYELOGRAM AND STENT  INSERTION LEFT;  Surgeon: Deanne Pitts MD;  Location:  [facility];  Service: Urology;  Laterality: Left;    CYSTOSCOPY URETEROSCOPY Left 02/11/2019    Procedure: URETEROSCOPY WITH STENT EXTRACTION, RPG;  Surgeon: Griffin Romero MD;  Location:  [facility];  Service: Urology    CYSTOSCOPY W/ URETERAL STENT PLACEMENT Left 07/20/2019    Procedure: CYSTOSCOPY, LEFT RETROGRADE PYELOGRAM,  ATTEMPTED LEFT URETERAL STENT INSERTION;  Surgeon: Isaac Flynn MD;  Location:  [facility];  Service: Urology    CYSTOSCOPY W/ URETERAL STENT PLACEMENT Left 06/17/2022    Procedure: CYSTOSCOPY URETERAL CATHETER/STENT INSERTION;  Surgeon: Ryne Mccann MD;  Location:  [facility];  Service: Urology;  Laterality: Left;    CYSTOSCOPY W/ URETERAL STENT PLACEMENT Left 01/27/2023    Procedure: CYSTOSCOPY URETERAL CATHETER/STENT INSERTION;  Surgeon: Deanne Pitts MD;  Location:  [facility];  Service: Urology;  Laterality: Left;    CYSTOSCOPY W/ URETERAL STENT PLACEMENT Left 02/15/2024    Procedure: CYSTOSCOPY LEFT STENT EXCHANGE;  Surgeon: Deanne Pitts MD;  Location:  [facility];  Service: Urology;  Laterality: Left;    CYSTOSCOPY, URETEROSCOPY, RETROGRADE PYELOGRAM, STONE EXTRACTION, STENT INSERTION Left 06/15/2023    Procedure: URETEROSCOPY, RETROGRADE PYELOGRAM, STENT INSERTION;  Surgeon: Deanne Pitts MD;  Location:  [facility];  Service: Urology;  Laterality: Left;    EYE SURGERY      CATARACTS REMOVED BILATERALLY    JOINT REPLACEMENT      Bilateral knees    KNEE ARTHROPLASTY Bilateral     KNEE ARTHROSCOPY Bilateral     RENAL ARTERY STENT      SEVERAL TIMES EVERY SINCE 1978    URETERAL STENT INSERTION  10/2020    URETEROSCOPY LASER LITHOTRIPSY WITH STENT INSERTION Left 01/10/2018    Procedure:  cysto, left ureteral stent replacement ;  Surgeon: Griffin Romero MD;  Location:  [facility];  Service:     URETEROSCOPY LASER LITHOTRIPSY WITH STENT INSERTION Left 08/14/2019    Procedure: URETEROSCOPY, STONE REMOVAL WITH STENT INSERTION;   Surgeon: Griffin Romero MD;  Location: [facility];  Service: Urology    URETEROSCOPY LASER LITHOTRIPSY WITH STENT INSERTION Left 10/23/2019    Procedure: Left URETEROSCOPY WITH STENT INSERTION-LEFT;  Surgeon: Griffin Romero MD;  Location: [facility];  Service: Urology        Admitting Doctor:   No admitting provider for patient encounter.    Consulting Provider(s):  Consults       No orders found for last 30 day(s).             Admitting Diagnosis:   The primary encounter diagnosis was Acute UTI. Diagnoses of Acute kidney injury, Gross hematuria, and Near syncope were also pertinent to this visit.    Most Recent Vitals:   Vitals:    11/20/24 2215 11/20/24 2230 11/20/24 2330 11/21/24 0000   BP: 116/70 130/63 134/61 128/64   BP Location:       Patient Position:       Pulse: 62 63 61 64   Resp:       Temp:       TempSrc:       SpO2: 95% 94% 94% 96%   Weight:       Height:           Active LDAs/IV Access:   Lines, Drains & Airways       Active LDAs       Name Placement date Placement time Site Days    Peripheral IV 11/20/24 1836 Right Antecubital 11/20/24  1836  Antecubital  less than 1    Urethral Catheter 20 Fr. 11/20/24 2045  -- less than 1    Ureteral Drain/Stent Left ureter 8 Fr. 07/10/24  --  Left ureter  134                    Labs (abnormal labs have a star):   Labs Reviewed   COMPREHENSIVE METABOLIC PANEL - Abnormal; Notable for the following components:       Result Value    Glucose 164 (*)     BUN 52 (*)     Creatinine 3.03 (*)     Sodium 134 (*)     Potassium 5.6 (*)     CO2 20.0 (*)     Calcium 9.7 (*)     eGFR 18.7 (*)     All other components within normal limits    Narrative:     GFR Normal >60  Chronic Kidney Disease <60  Kidney Failure <15    The GFR formula is only valid for adults with stable renal function between ages 18 and 70.   URINALYSIS W/ CULTURE IF INDICATED - Abnormal; Notable for the following components:    Color, UA Red (*)     Appearance, UA Turbid (*)     Ketones, UA 40 mg/dL (2+)  (*)     Bilirubin, UA Large (3+) (*)     Blood, UA Large (3+) (*)     Protein, UA 30 mg/dL (1+) (*)     Leuk Esterase, UA Moderate (2+) (*)     All other components within normal limits    Narrative:     In absence of clinical symptoms, the presence of pyuria, bacteria, and/or nitrites on the urinalysis result does not correlate with infection.    Any Substance that causes an abnormal urine color can alter the accuracy of the chemical reactions.    CBC WITH AUTO DIFFERENTIAL - Abnormal; Notable for the following components:    RBC 3.72 (*)     Hemoglobin 11.5 (*)     Hematocrit 36.4 (*)     MCV 97.8 (*)     Immature Grans % 0.6 (*)     Immature Grans, Absolute 0.06 (*)     All other components within normal limits   URINALYSIS, MICROSCOPIC ONLY - Abnormal; Notable for the following components:    RBC, UA Too Numerous to Count (*)     WBC, UA Too Numerous to Count (*)     Bacteria, UA 4+ (*)     All other components within normal limits   RESPIRATORY PANEL PCR W/ COVID-19 (SARS-COV-2), NP SWAB IN UTM/VTP, 2 HR TAT - Normal    Narrative:     In the setting of a positive respiratory panel with a viral infection PLUS a negative procalcitonin without other underlying concern for bacterial infection, consider observing off antibiotics or discontinuation of antibiotics and continue supportive care. If the respiratory panel is positive for atypical bacterial infection (Bordetella pertussis, Chlamydophila pneumoniae, or Mycoplasma pneumoniae), consider antibiotic de-escalation to target atypical bacterial infection.   LACTIC ACID, PLASMA - Normal   MAGNESIUM - Normal   PROCALCITONIN - Normal    Narrative:     As a Marker for Sepsis (Non-Neonates):    1. <0.5 ng/mL represents a low risk of severe sepsis and/or septic shock.  2. >2 ng/mL represents a high risk of severe sepsis and/or septic shock.    As a Marker for Lower Respiratory Tract Infections that require antibiotic therapy:    PCT on Admission    Antibiotic Therapy     "   6-12 Hrs later    >0.5                Strongly Recommended  >0.25 - <0.5        Recommended   0.1 - 0.25          Discouraged              Remeasure/reassess PCT  <0.1                Strongly Discouraged     Remeasure/reassess PCT    As 28 day mortality risk marker: \"Change in Procalcitonin Result\" (>80% or <=80%) if Day 0 (or Day 1) and Day 4 values are available. Refer to [email or web address]    Change in PCT <=80%  A decrease of PCT levels below or equal to 80% defines a positive change in PCT test result representing a higher risk for 28-day all-cause mortality of patients diagnosed with severe sepsis for septic shock.    Change in PCT >80%  A decrease of PCT levels of more than 80% defines a negative change in PCT result representing a lower risk for 28-day all-cause mortality of patients diagnosed with severe sepsis or septic shock.      BLOOD CULTURE   BLOOD CULTURE   URINE CULTURE   RAINBOW DRAW    Narrative:     The following orders were created for panel order [facility] Draw.  Procedure                               Abnormality         Status                     ---------                               -----------         ------                     Green Top (Gel)[[id number]]                                  Final result               Lavender Top[[id number]]                                     Final result               Gold Top - SST[[id number]]                                   Final result               Gray Top[[id number]]                                         Final result               Light Blue Top[[id number]]                                   Final result                 Please view results for these tests on the individual orders.   TYPE AND SCREEN   CBC AND DIFFERENTIAL    Narrative:     The following orders were created for panel order CBC & Differential.  Procedure                               Abnormality         Status                     ---------                               " -----------         ------                     CBC Auto Differential[[id number]]        Abnormal            Final result                 Please view results for these tests on the individual orders.   GREEN TOP   LAVENDER TOP   GOLD TOP - SST   GRAY TOP   LIGHT BLUE TOP       Meds Given in ED:   Medications   sodium chloride 0.9 % flush 10 mL (10 mL Intravenous Given 11/20/24 1837)   sodium chloride 0.9 % bolus 500 mL (0 mL Intravenous Stopped 11/20/24 2236)   ertapenem (INVanz) 1,000 mg in sodium chloride 0.9 % 100 mL MBP (0 mg Intravenous Stopped 11/20/24 2348)           Last NIH score:                                                          Dysphagia screening results:  Patient Factors Component (Dysphagia:Stroke or Rule-out)  Best Eye Response: 4-->(E4) spontaneous (11/20/24 2053)  Best Motor Response: 6-->(M6) obeys commands (11/20/24 2053)  Best Verbal Response: 5-->(V5) oriented (11/20/24 2053)  Rosa Coma Scale Score: 15 (11/20/24 2053)     Rosa Coma Scale:  No data recorded     CIWA:        Restraint Type:            Isolation Status:  No active isolations

## 2024-11-21 NOTE — PLAN OF CARE
Goal Outcome Evaluation:  Plan of Care Reviewed With: patient           Outcome Evaluation: Pt alert, Ox4 and no c/o pain. He completed bed mobility with min assist, UB dressing task with min assist, LB dressing with CGA and transfer training with min assist. Pt on RA, mild desat to 89% during exertion. Pt limited with decreased occupational endurance, mild balance deficit, dyspnea with exertion and is performing below baseline status. Recommend DC home with initial 24/7 assist from family and HHOT.    Anticipated Discharge Disposition (OT): home with 24/7 care, home with home health

## 2024-11-22 ENCOUNTER — ANESTHESIA (OUTPATIENT)
Dept: PERIOP | Facility: HOSPITAL | Age: 89
End: 2024-11-22
Payer: MEDICARE

## 2024-11-22 ENCOUNTER — APPOINTMENT (OUTPATIENT)
Dept: GENERAL RADIOLOGY | Facility: HOSPITAL | Age: 89
End: 2024-11-22
Payer: MEDICARE

## 2024-11-22 ENCOUNTER — ANESTHESIA EVENT (OUTPATIENT)
Dept: PERIOP | Facility: HOSPITAL | Age: 89
End: 2024-11-22
Payer: MEDICARE

## 2024-11-22 LAB
ANION GAP SERPL CALCULATED.3IONS-SCNC: 11 MMOL/L (ref 5–15)
BUN SERPL-MCNC: 41 MG/DL (ref 8–23)
BUN/CREAT SERPL: 20.7 (ref 7–25)
CALCIUM SPEC-SCNC: 8.6 MG/DL (ref 8.2–9.6)
CHLORIDE SERPL-SCNC: 114 MMOL/L (ref 98–107)
CO2 SERPL-SCNC: 16 MMOL/L (ref 22–29)
CREAT SERPL-MCNC: 1.98 MG/DL (ref 0.76–1.27)
DEPRECATED RDW RBC AUTO: 55.8 FL (ref 37–54)
EGFRCR SERPLBLD CKD-EPI 2021: 31.1 ML/MIN/1.73
ERYTHROCYTE [DISTWIDTH] IN BLOOD BY AUTOMATED COUNT: 15.4 % (ref 12.3–15.4)
GLUCOSE BLDC GLUCOMTR-MCNC: 100 MG/DL (ref 70–130)
GLUCOSE BLDC GLUCOMTR-MCNC: 103 MG/DL (ref 70–130)
GLUCOSE BLDC GLUCOMTR-MCNC: 141 MG/DL (ref 70–130)
GLUCOSE BLDC GLUCOMTR-MCNC: 152 MG/DL (ref 70–130)
GLUCOSE SERPL-MCNC: 94 MG/DL (ref 65–99)
HCT VFR BLD AUTO: 29.1 % (ref 37.5–51)
HGB BLD-MCNC: 9.3 G/DL (ref 13–17.7)
MCH RBC QN AUTO: 31.7 PG (ref 26.6–33)
MCHC RBC AUTO-ENTMCNC: 32 G/DL (ref 31.5–35.7)
MCV RBC AUTO: 99.3 FL (ref 79–97)
PLATELET # BLD AUTO: 255 10*3/MM3 (ref 140–450)
PMV BLD AUTO: 9.7 FL (ref 6–12)
POTASSIUM SERPL-SCNC: 5.3 MMOL/L (ref 3.5–5.2)
QT INTERVAL: 436 MS
QTC INTERVAL: 442 MS
RBC # BLD AUTO: 2.93 10*6/MM3 (ref 4.14–5.8)
SODIUM SERPL-SCNC: 141 MMOL/L (ref 136–145)
WBC NRBC COR # BLD AUTO: 9.79 10*3/MM3 (ref 3.4–10.8)

## 2024-11-22 PROCEDURE — C2617 STENT, NON-COR, TEM W/O DEL: HCPCS | Performed by: UROLOGY

## 2024-11-22 PROCEDURE — C1769 GUIDE WIRE: HCPCS | Performed by: UROLOGY

## 2024-11-22 PROCEDURE — 25510000001: Performed by: UROLOGY

## 2024-11-22 PROCEDURE — 25010000002 GENTAMICIN PER 80 MG: Performed by: UROLOGY

## 2024-11-22 PROCEDURE — 74420 UROGRAPHY RTRGR +-KUB: CPT | Performed by: UROLOGY

## 2024-11-22 PROCEDURE — 0T778DZ DILATION OF LEFT URETER WITH INTRALUMINAL DEVICE, VIA NATURAL OR ARTIFICIAL OPENING ENDOSCOPIC: ICD-10-PCS | Performed by: UROLOGY

## 2024-11-22 PROCEDURE — 99232 SBSQ HOSP IP/OBS MODERATE 35: CPT | Performed by: INTERNAL MEDICINE

## 2024-11-22 PROCEDURE — 99024 POSTOP FOLLOW-UP VISIT: CPT | Performed by: UROLOGY

## 2024-11-22 PROCEDURE — 25010000002 LIDOCAINE PF 1% 1 % SOLUTION: Performed by: NURSE ANESTHETIST, CERTIFIED REGISTERED

## 2024-11-22 PROCEDURE — C1758 CATHETER, URETERAL: HCPCS | Performed by: UROLOGY

## 2024-11-22 PROCEDURE — 85027 COMPLETE CBC AUTOMATED: CPT | Performed by: INTERNAL MEDICINE

## 2024-11-22 PROCEDURE — 25810000003 LACTATED RINGERS PER 1000 ML: Performed by: ANESTHESIOLOGY

## 2024-11-22 PROCEDURE — 80048 BASIC METABOLIC PNL TOTAL CA: CPT | Performed by: INTERNAL MEDICINE

## 2024-11-22 PROCEDURE — 52351 CYSTOURETERO & OR PYELOSCOPE: CPT | Performed by: UROLOGY

## 2024-11-22 PROCEDURE — 25010000002 FENTANYL CITRATE (PF) 100 MCG/2ML SOLUTION: Performed by: NURSE ANESTHETIST, CERTIFIED REGISTERED

## 2024-11-22 PROCEDURE — 76000 FLUOROSCOPY <1 HR PHYS/QHP: CPT

## 2024-11-22 PROCEDURE — 63710000001 INSULIN LISPRO (HUMAN) PER 5 UNITS: Performed by: UROLOGY

## 2024-11-22 PROCEDURE — 0TC78ZZ EXTIRPATION OF MATTER FROM LEFT URETER, VIA NATURAL OR ARTIFICIAL OPENING ENDOSCOPIC: ICD-10-PCS | Performed by: UROLOGY

## 2024-11-22 PROCEDURE — 52332 CYSTOSCOPY AND TREATMENT: CPT | Performed by: UROLOGY

## 2024-11-22 PROCEDURE — 99232 SBSQ HOSP IP/OBS MODERATE 35: CPT | Performed by: UROLOGY

## 2024-11-22 PROCEDURE — 25810000003 SODIUM CHLORIDE 0.9 % SOLUTION: Performed by: INTERNAL MEDICINE

## 2024-11-22 PROCEDURE — 0TP98DZ REMOVAL OF INTRALUMINAL DEVICE FROM URETER, VIA NATURAL OR ARTIFICIAL OPENING ENDOSCOPIC: ICD-10-PCS | Performed by: UROLOGY

## 2024-11-22 PROCEDURE — 25010000002 PROPOFOL 10 MG/ML EMULSION: Performed by: NURSE ANESTHETIST, CERTIFIED REGISTERED

## 2024-11-22 PROCEDURE — 82948 REAGENT STRIP/BLOOD GLUCOSE: CPT

## 2024-11-22 PROCEDURE — BT1F1ZZ FLUOROSCOPY OF LEFT KIDNEY, URETER AND BLADDER USING LOW OSMOLAR CONTRAST: ICD-10-PCS | Performed by: UROLOGY

## 2024-11-22 DEVICE — URETERAL STENT WITH SIDE HOLES 8FX24CM
Type: IMPLANTABLE DEVICE | Site: URETER | Status: FUNCTIONAL
Brand: TRIA™ FIRM

## 2024-11-22 RX ORDER — MIDAZOLAM HYDROCHLORIDE 1 MG/ML
0.5 INJECTION, SOLUTION INTRAMUSCULAR; INTRAVENOUS
Status: DISCONTINUED | OUTPATIENT
Start: 2024-11-22 | End: 2024-11-22 | Stop reason: HOSPADM

## 2024-11-22 RX ORDER — FAMOTIDINE 10 MG/ML
20 INJECTION, SOLUTION INTRAVENOUS ONCE
Status: DISCONTINUED | OUTPATIENT
Start: 2024-11-22 | End: 2024-11-22 | Stop reason: HOSPADM

## 2024-11-22 RX ORDER — SODIUM CHLORIDE, SODIUM LACTATE, POTASSIUM CHLORIDE, CALCIUM CHLORIDE 600; 310; 30; 20 MG/100ML; MG/100ML; MG/100ML; MG/100ML
9 INJECTION, SOLUTION INTRAVENOUS CONTINUOUS
Status: DISCONTINUED | OUTPATIENT
Start: 2024-11-23 | End: 2024-11-23

## 2024-11-22 RX ORDER — SODIUM CHLORIDE 0.9 % (FLUSH) 0.9 %
10 SYRINGE (ML) INJECTION EVERY 12 HOURS SCHEDULED
Status: DISCONTINUED | OUTPATIENT
Start: 2024-11-22 | End: 2024-11-22 | Stop reason: HOSPADM

## 2024-11-22 RX ORDER — LABETALOL HYDROCHLORIDE 5 MG/ML
5 INJECTION, SOLUTION INTRAVENOUS
Status: DISCONTINUED | OUTPATIENT
Start: 2024-11-22 | End: 2024-11-22 | Stop reason: HOSPADM

## 2024-11-22 RX ORDER — DROPERIDOL 2.5 MG/ML
0.62 INJECTION, SOLUTION INTRAMUSCULAR; INTRAVENOUS
Status: DISCONTINUED | OUTPATIENT
Start: 2024-11-22 | End: 2024-11-22 | Stop reason: HOSPADM

## 2024-11-22 RX ORDER — MAGNESIUM HYDROXIDE 1200 MG/15ML
LIQUID ORAL AS NEEDED
Status: DISCONTINUED | OUTPATIENT
Start: 2024-11-22 | End: 2024-11-22 | Stop reason: HOSPADM

## 2024-11-22 RX ORDER — LIDOCAINE HYDROCHLORIDE 10 MG/ML
0.5 INJECTION, SOLUTION EPIDURAL; INFILTRATION; INTRACAUDAL; PERINEURAL ONCE AS NEEDED
Status: DISCONTINUED | OUTPATIENT
Start: 2024-11-22 | End: 2024-11-22 | Stop reason: HOSPADM

## 2024-11-22 RX ORDER — PROMETHAZINE HYDROCHLORIDE 25 MG/1
25 TABLET ORAL ONCE AS NEEDED
Status: DISCONTINUED | OUTPATIENT
Start: 2024-11-22 | End: 2024-11-22 | Stop reason: HOSPADM

## 2024-11-22 RX ORDER — PROPOFOL 10 MG/ML
VIAL (ML) INTRAVENOUS AS NEEDED
Status: DISCONTINUED | OUTPATIENT
Start: 2024-11-22 | End: 2024-11-22 | Stop reason: SURG

## 2024-11-22 RX ORDER — HYDROCODONE BITARTRATE AND ACETAMINOPHEN 5; 325 MG/1; MG/1
1 TABLET ORAL ONCE AS NEEDED
Status: DISCONTINUED | OUTPATIENT
Start: 2024-11-22 | End: 2024-11-22 | Stop reason: HOSPADM

## 2024-11-22 RX ORDER — SODIUM CHLORIDE 9 MG/ML
9 INJECTION, SOLUTION INTRAVENOUS AS NEEDED
Status: DISCONTINUED | OUTPATIENT
Start: 2024-11-22 | End: 2024-11-22 | Stop reason: HOSPADM

## 2024-11-22 RX ORDER — FAMOTIDINE 20 MG/1
20 TABLET, FILM COATED ORAL ONCE
Status: COMPLETED | OUTPATIENT
Start: 2024-11-22 | End: 2024-11-22

## 2024-11-22 RX ORDER — SODIUM CHLORIDE 0.9 % (FLUSH) 0.9 %
10 SYRINGE (ML) INJECTION AS NEEDED
Status: DISCONTINUED | OUTPATIENT
Start: 2024-11-22 | End: 2024-11-22 | Stop reason: HOSPADM

## 2024-11-22 RX ORDER — SODIUM CHLORIDE 0.9 % (FLUSH) 0.9 %
3-10 SYRINGE (ML) INJECTION AS NEEDED
Status: DISCONTINUED | OUTPATIENT
Start: 2024-11-22 | End: 2024-11-22 | Stop reason: HOSPADM

## 2024-11-22 RX ORDER — SODIUM CHLORIDE 0.9 % (FLUSH) 0.9 %
3 SYRINGE (ML) INJECTION EVERY 12 HOURS SCHEDULED
Status: DISCONTINUED | OUTPATIENT
Start: 2024-11-22 | End: 2024-11-22 | Stop reason: HOSPADM

## 2024-11-22 RX ORDER — ONDANSETRON 2 MG/ML
4 INJECTION INTRAMUSCULAR; INTRAVENOUS ONCE AS NEEDED
Status: DISCONTINUED | OUTPATIENT
Start: 2024-11-22 | End: 2024-11-22 | Stop reason: HOSPADM

## 2024-11-22 RX ORDER — DROPERIDOL 2.5 MG/ML
0.62 INJECTION, SOLUTION INTRAMUSCULAR; INTRAVENOUS ONCE AS NEEDED
Status: DISCONTINUED | OUTPATIENT
Start: 2024-11-22 | End: 2024-11-22 | Stop reason: HOSPADM

## 2024-11-22 RX ORDER — IPRATROPIUM BROMIDE AND ALBUTEROL SULFATE 2.5; .5 MG/3ML; MG/3ML
3 SOLUTION RESPIRATORY (INHALATION) ONCE AS NEEDED
Status: DISCONTINUED | OUTPATIENT
Start: 2024-11-22 | End: 2024-11-22 | Stop reason: HOSPADM

## 2024-11-22 RX ORDER — LIDOCAINE HYDROCHLORIDE 20 MG/ML
JELLY TOPICAL AS NEEDED
Status: DISCONTINUED | OUTPATIENT
Start: 2024-11-22 | End: 2024-11-22 | Stop reason: HOSPADM

## 2024-11-22 RX ORDER — HYDROMORPHONE HYDROCHLORIDE 1 MG/ML
0.5 INJECTION, SOLUTION INTRAMUSCULAR; INTRAVENOUS; SUBCUTANEOUS
Status: DISCONTINUED | OUTPATIENT
Start: 2024-11-22 | End: 2024-11-22 | Stop reason: HOSPADM

## 2024-11-22 RX ORDER — SODIUM CHLORIDE, SODIUM LACTATE, POTASSIUM CHLORIDE, CALCIUM CHLORIDE 600; 310; 30; 20 MG/100ML; MG/100ML; MG/100ML; MG/100ML
9 INJECTION, SOLUTION INTRAVENOUS CONTINUOUS
Status: DISCONTINUED | OUTPATIENT
Start: 2024-11-22 | End: 2024-11-23

## 2024-11-22 RX ORDER — FENTANYL CITRATE 50 UG/ML
INJECTION, SOLUTION INTRAMUSCULAR; INTRAVENOUS AS NEEDED
Status: DISCONTINUED | OUTPATIENT
Start: 2024-11-22 | End: 2024-11-22 | Stop reason: SURG

## 2024-11-22 RX ORDER — PROMETHAZINE HYDROCHLORIDE 25 MG/1
25 SUPPOSITORY RECTAL ONCE AS NEEDED
Status: DISCONTINUED | OUTPATIENT
Start: 2024-11-22 | End: 2024-11-22 | Stop reason: HOSPADM

## 2024-11-22 RX ORDER — NALOXONE HCL 0.4 MG/ML
0.4 VIAL (ML) INJECTION AS NEEDED
Status: DISCONTINUED | OUTPATIENT
Start: 2024-11-22 | End: 2024-11-22 | Stop reason: HOSPADM

## 2024-11-22 RX ORDER — FENTANYL CITRATE 50 UG/ML
50 INJECTION, SOLUTION INTRAMUSCULAR; INTRAVENOUS
Status: DISCONTINUED | OUTPATIENT
Start: 2024-11-22 | End: 2024-11-22 | Stop reason: HOSPADM

## 2024-11-22 RX ORDER — LIDOCAINE HYDROCHLORIDE 10 MG/ML
INJECTION, SOLUTION EPIDURAL; INFILTRATION; INTRACAUDAL; PERINEURAL AS NEEDED
Status: DISCONTINUED | OUTPATIENT
Start: 2024-11-22 | End: 2024-11-22 | Stop reason: SURG

## 2024-11-22 RX ORDER — HYDROCODONE BITARTRATE AND ACETAMINOPHEN 5; 325 MG/1; MG/1
1 TABLET ORAL EVERY 6 HOURS PRN
Status: DISCONTINUED | OUTPATIENT
Start: 2024-11-22 | End: 2024-11-23 | Stop reason: HOSPADM

## 2024-11-22 RX ORDER — HYDRALAZINE HYDROCHLORIDE 20 MG/ML
5 INJECTION INTRAMUSCULAR; INTRAVENOUS
Status: DISCONTINUED | OUTPATIENT
Start: 2024-11-22 | End: 2024-11-22 | Stop reason: HOSPADM

## 2024-11-22 RX ADMIN — PROPOFOL 10 MG: 10 INJECTION, EMULSION INTRAVENOUS at 13:56

## 2024-11-22 RX ADMIN — FENTANYL CITRATE 25 MCG: 50 INJECTION, SOLUTION INTRAMUSCULAR; INTRAVENOUS at 13:48

## 2024-11-22 RX ADMIN — TAMSULOSIN HYDROCHLORIDE 0.4 MG: 0.4 CAPSULE ORAL at 02:29

## 2024-11-22 RX ADMIN — LIDOCAINE HYDROCHLORIDE 100 MG: 10 INJECTION, SOLUTION EPIDURAL; INFILTRATION; INTRACAUDAL; PERINEURAL at 13:39

## 2024-11-22 RX ADMIN — PROPOFOL 10 MG: 10 INJECTION, EMULSION INTRAVENOUS at 13:51

## 2024-11-22 RX ADMIN — PROPOFOL 10 MG: 10 INJECTION, EMULSION INTRAVENOUS at 13:42

## 2024-11-22 RX ADMIN — PROPOFOL 20 MG: 10 INJECTION, EMULSION INTRAVENOUS at 13:47

## 2024-11-22 RX ADMIN — SODIUM CHLORIDE 75 ML/HR: 9 INJECTION, SOLUTION INTRAVENOUS at 07:06

## 2024-11-22 RX ADMIN — FENTANYL CITRATE 25 MCG: 50 INJECTION, SOLUTION INTRAMUSCULAR; INTRAVENOUS at 13:55

## 2024-11-22 RX ADMIN — PROPOFOL 10 MG: 10 INJECTION, EMULSION INTRAVENOUS at 13:53

## 2024-11-22 RX ADMIN — INSULIN LISPRO 2 UNITS: 100 INJECTION, SOLUTION INTRAVENOUS; SUBCUTANEOUS at 17:41

## 2024-11-22 RX ADMIN — FAMOTIDINE 20 MG: 20 TABLET, FILM COATED ORAL at 12:59

## 2024-11-22 RX ADMIN — TAMSULOSIN HYDROCHLORIDE 0.4 MG: 0.4 CAPSULE ORAL at 21:51

## 2024-11-22 RX ADMIN — PROPOFOL 20 MG: 10 INJECTION, EMULSION INTRAVENOUS at 13:40

## 2024-11-22 RX ADMIN — PROPOFOL 10 MG: 10 INJECTION, EMULSION INTRAVENOUS at 13:49

## 2024-11-22 RX ADMIN — PROPOFOL 10 MG: 10 INJECTION, EMULSION INTRAVENOUS at 13:44

## 2024-11-22 RX ADMIN — FENTANYL CITRATE 50 MCG: 50 INJECTION, SOLUTION INTRAMUSCULAR; INTRAVENOUS at 13:40

## 2024-11-22 RX ADMIN — SODIUM CHLORIDE, POTASSIUM CHLORIDE, SODIUM LACTATE AND CALCIUM CHLORIDE: 600; 310; 30; 20 INJECTION, SOLUTION INTRAVENOUS at 13:22

## 2024-11-22 RX ADMIN — Medication 10 ML: at 21:51

## 2024-11-22 NOTE — PROGRESS NOTES
[facility]   Urology Progress Note    Patient Name: Forrest Zepeda  : 1932  MRN: [id number]  Primary Care Physician:  Kary Wen MD  Date of admission: 2024    Subjective   Subjective     Chief Complaint: Gross hematuria    HPI:  NAEON. Patient resting comfortably in bed. Tolerating catheter with light pink urine. No flank pain. No fever.    Review of Systems   All systems were reviewed and negative except for: above    Objective   Objective     Vitals:   Temp:  [96.9 °F (36.1 °C)-97.7 °F (36.5 °C)] 97.5 °F (36.4 °C)  Heart Rate:  [60-63] 61  Resp:  [18-20] 20  BP: (105-130)/(48-69) 124/56    GEN: NAD  HEENT: NCAT, EOMI  PULM: No respiratory distress  CV: Appears well perfused  ABD: Non-distended  : Cath in place with light pink urine  EXT: No obvious deformities  MSK: Normal ROM BL UE  NEURO: No focal deficits, AOx3  PSYCH: Appropriate mood and affect      Result Review    Result Review:  I have personally reviewed the results from the time of this admission to 2024 07:18 EST and agree with these findings:  [x]  Laboratory  [x]  Microbiology  []  Radiology  []  EKG/Telemetry   []  Cardiology/Vascular   []  Pathology  [x]  Old records  []  Other:      Assessment & Plan   Assessment / Plan     Brief Patient Summary:  Forrest Zepeda is a 89 y.o. male who presented to [facility] ED on  for gross hematuria. Patient admitted  for further evaluation. Patient reports he is feeling well since caceres catheter placed. Cath today draining light pink. Will plan for cysto and left ureteral stent exchange today with Dr. Pitts.     Active Hospital Problems:  Active Hospital Problems    Diagnosis     **Acute UTI (urinary tract infection)     Acute kidney injury superimposed on chronic kidney disease     Anemia, chronic disease     T2DM (type 2 diabetes mellitus)     Generalized weakness     Acute kidney injury     UTI (urinary tract infection)     Hematuria     CAD (coronary artery disease)      Cardiac pacemaker in situ     Paroxysmal atrial fibrillation     Urethral stricture     History of pulmonary embolism     Pulmonary fibrosis     Hydronephrosis of left kidney     Acquired hypothyroidism     HLD (hyperlipidemia)        Plan:   - NPO   - Continue ABX coverage  - Urology will follow. Please call with questions or concerns.     VTE Prophylaxis:  Mechanical VTE prophylaxis orders are present.        CODE STATUS:   Medical Intervention Limits: No intubation (DNI)  Code Status (Patient has no pulse and is not breathing): No CPR (Do Not Attempt to Resuscitate)  Medical Interventions (Patient has pulse or is breathing): Limited Support  Comments: DNR/DNI    Disposition:  per primary    Electronically signed by Fidencio Hamilton MD, 11/22/24, 7:18 AM EST.

## 2024-11-22 NOTE — ANESTHESIA POSTPROCEDURE EVALUATION
Patient: Forrest Zepeda    Procedure Summary       Date: 11/22/24 Room / Location:  [facility] /  [facility]    Anesthesia Start: 1329 Anesthesia Stop: 1409    Procedure: CYSTOSCOPY RETROGRADE PYELOGRAM AND STENT INSERTIO (Left) Diagnosis:       Hydronephrosis of left kidney      (Hydronephrosis of left kidney [N13.30])    Surgeons: Deanne Pitts MD Provider: Mike Gomez MD    Anesthesia Type: general ASA Status: 4            Anesthesia Type: general    Vitals  Vitals Value Taken Time   /52 11/22/24 1405   Temp     Pulse 60 11/22/24 1408   Resp     SpO2 97 % 11/22/24 1408   Vitals shown include unfiled device data.        Post Anesthesia Care and Evaluation    Patient location during evaluation: PACU  Patient participation: complete - patient participated  Level of consciousness: awake and alert  Pain management: adequate    Airway patency: patent  Anesthetic complications: No anesthetic complications  PONV Status: none  Cardiovascular status: hemodynamically stable and acceptable  Respiratory status: nonlabored ventilation, acceptable and nasal cannula  Hydration status: acceptable

## 2024-11-22 NOTE — PROGRESS NOTES
[facility] Medicine Services  PROGRESS NOTE    Patient Name: Forrest Zepeda  : 1932  MRN: [id number]    Date of Admission: 2024  Primary Care Physician: Kary Wen MD    Subjective   Subjective     CC: gross hematuria    HPI:  Patient seen post-OP  Urine now near clear  Hopeful for home soon      Objective   Objective     Vital Signs:   Temp:  [97 °F (36.1 °C)-98.2 °F (36.8 °C)] 97.6 °F (36.4 °C)  Heart Rate:  [60-63] 60  Resp:  [14-20] 16  BP: (113-137)/(48-73) 132/65  Flow (L/min) (Oxygen Therapy):  [2] 2     Physical Exam:  Constitutional: No acute distress, awake, alert male sitting up in bed. Well appearing post procedure  Respiratory: Clear to auscultation bilaterally, respiratory effort normal   Cardiovascular: RRR, no murmurs, rubs, or gallops  Gastrointestinal: Soft, nontender, nondistended  : caceres draining yellow/pink urine  Musculoskeletal: Muscle tone within normal limits, no joint effusions appreciated  Psychiatric: Appropriate affect, cooperative  Neurologic: Alert and oriented, facial movements symmetric and spontaneous movement of all 4 extremities grossly equal bilaterally, speech clear  Skin: No rashes    Results Reviewed:  LAB RESULTS:      Lab 24  0844 24  0749 24  0203 24  1833   WBC 9.79  --  8.73  --  10.78   HEMOGLOBIN 9.3* 9.9* 9.6* 10.4* 11.5*   HEMATOCRIT 29.1* 31.3* 32.2* 33.5* 36.4*   PLATELETS 255  --  232  --  293   NEUTROS ABS  --   --  4.70  --  6.76   IMMATURE GRANS (ABS)  --   --  0.05  --  0.06*   LYMPHS ABS  --   --  3.07  --  2.94   MONOS ABS  --   --  0.57  --  0.67   EOS ABS  --   --  0.26  --  0.27   MCV 99.3*  --  105.9*  --  97.8*   PROCALCITONIN  --   --   --   --  0.20   LACTATE  --   --   --   --  1.5         Lab 24  0844 24  0749 24  0204 24  1833   SODIUM 141 137  --  134*   POTASSIUM 5.3* 5.3* 5.3* 5.6*   CHLORIDE 114* 110*  --  103   CO2 16.0* 17.0*   --  20.0*   ANION GAP 11.0 10.0  --  11.0   BUN 41* 49*  --  52*   CREATININE 1.98* 2.83*  --  3.03*   EGFR 31.1* 20.3*  --  18.7*   GLUCOSE 94 119*  --  164*   CALCIUM 8.6 8.8  --  9.7*   IONIZED CALCIUM  --  1.23  --   --    MAGNESIUM  --   --   --  2.2         Lab 11/20/24  1833   TOTAL PROTEIN 7.9   ALBUMIN 3.8   GLOBULIN 4.1   ALT (SGPT) 11   AST (SGOT) 19   BILIRUBIN 0.2   ALK PHOS 105                 Lab 11/20/24  2235   ABO TYPING O   RH TYPING Negative   ANTIBODY SCREEN Negative         Brief Urine Lab Results  (Last result in the past 365 days)        Color   Clarity   Blood   Leuk Est   Nitrite   Protein   CREAT   Urine HCG        11/20/24 2031 Red   Turbid   Large (3+)   Moderate (2+)   Negative   30 mg/dL (1+)                   Microbiology Results Abnormal       Procedure Component Value - Date/Time    Urine Culture - Urine, Indwelling Urethral Catheter [[id number]]  (Abnormal) Collected: 11/20/24 2031    Lab Status: Preliminary result Specimen: Urine from Indwelling Urethral Catheter Updated: 11/22/24 1251     Urine Culture >100,000 CFU/mL Gram Positive Cocci    Narrative:      Colonization of the urinary tract without infection is common. Treatment is discouraged unless the patient is symptomatic, pregnant, or undergoing an invasive urologic procedure.            FL C Arm During Surgery    Result Date: 11/22/2024  This procedure was auto-finalized with no dictation required.    CT Abdomen Pelvis Without Contrast    Result Date: 11/20/2024  CT ABDOMEN PELVIS WO CONTRAST Date of Exam: 11/20/2024 11:11 PM EST Indication: weakness, hematuria, triston. Comparison: 2/13/2024. Technique: Axial CT images were obtained of the abdomen and pelvis without the administration of contrast. Reconstructed coronal and sagittal images were also obtained. Automated exposure control and iterative construction methods were used. Findings: Lung Bases:   The visualized lung bases and lower mediastinal structures demonstrate no  acute process. Emphysematous changes present. Limited evaluation of the solid organs due to lack of intravenous contrast.. Liver: Liver is normal in size and CT density. No focal lesions. Biliary/Gallbladder:  The gallbladder is normal without evidence of radiopaque stones. The biliary tree is nondilated. Spleen: Spleen is normal in size and CT density. Pancreas:  Pancreas is normal. There is no evidence of pancreatic mass or peripancreatic fluid. Kidneys:  Atrophy of the left kidney present. Left ureteral stent in place. Enlargement of the left renal pelvis present which is likely patulous, improved as compared to the previous study. No significant calyceal dilatation identified to suggest gross hydronephrosis.. Right kidney appears unremarkable. Small cyst present. No evidence of stones or hydronephrosis. Adrenals:  Adrenal glands are unremarkable. Retroperitoneal/Lymph Nodes/Vasculature:  No retroperitoneal adenopathy is identified. Gastrointestinal/Mesentery:  The bowel loops are non-dilated without wall thickening or mass. The appendix appears within normal limits. No evidence of obstruction. No free air. No mesenteric fluid collections identified. Diverticulosis present. No significant inflammatory changes. Bladder:  Ascencio catheter present within the bladder. Circumferential bladder wall thickening is present which appears to have progressed as compared to the previous study. Stranding is seen within the adjacent fat, most pronounced along the superior margin. No evidence of stones. No evidence of stranding surrounding the ureters. Genital:   Unremarkable       Bony Structures:   Visualized bony structures are consistent with the patient's age.     Impression: Impression: 1.Circumferential bladder wall thickening present which appears to have progressed as compared to the previous study. Stranding is seen within the adjacent fat, most pronounced along the superior margin. Findings are likely related to  cystitis. Correlate  with urinalysis. 2.Left ureteral stent in place. Enlargement of the left renal pelvis present which is likely patulous, improved as compared to the previous study. No significant calyceal dilatation identified to suggest gross hydronephrosis. No evidence of stones. 3.Ancillary findings as described above. Electronically Signed: Jennifer Mendoza MD  11/20/2024 11:25 PM EST  Workstation ID: [id number]    XR Chest 1 View    Result Date: 11/20/2024  XR CHEST 1 VW Date of Exam: 11/20/2024 8:12 PM EST Indication: Simple Sepsis Protocol Comparison: Portable chest dated 10/9/2023 and 12/31/2020 Findings: Cardiac and mediastinal silhouettes are stable there is cardiomegaly and a dual-lead pacemaker from a left-sided approach, unchanged. Pulmonary vascularity is normal. The lungs are poorly inflated with mild bibasilar atelectasis. There are areas of scarring in each lung. No acute infiltrates or suspicious focal opacities. Trachea is midline. No pneumothorax or pleural effusion. No acute bony abnormality.     Impression: Impression: Stable cardiomegaly with pacemaker in place. There are areas of scarring in each lung. No active cardiopulmonary disease. Electronically Signed: Beto Bullock DO  11/20/2024 9:30 PM EST  Workstation ID: [id number]     Results for orders placed in visit on 11/13/23    Adult Transthoracic Echo Complete W/ Cont if Necessary Per Protocol    Interpretation Summary    Left ventricular systolic function is normal. Estimated left ventricular EF = 52% Left ventricular ejection fraction appears to be 51 - 55%.    Left ventricular wall thickness is consistent with mild concentric hypertrophy.    Left ventricular diastolic function is consistent with (grade I) impaired relaxation.    The right ventricular cavity is mildly dilated.    The left atrial cavity is mildly dilated.    Left atrial volume is moderately increased.    There is mild calcification of the aortic valve mainly affecting the  non-coronary, left coronary and right coronary cusp(s).    Estimated right ventricular systolic pressure from tricuspid regurgitation is normal (<35 mmHg).      Current medications:  Scheduled Meds:bisoprolol, 5 mg, Oral, Daily  finasteride, 5 mg, Oral, Daily  insulin lispro, 2-7 Units, Subcutaneous, 4x Daily AC & at Bedtime  levothyroxine, 50 mcg, Oral, Q AM  sodium chloride, 10 mL, Intravenous, Q12H  tamsulosin, 0.4 mg, Oral, Nightly      Continuous Infusions:[START ON 11/23/2024] lactated ringers, 9 mL/hr, Last Rate: 400 mL/hr (11/22/24 1329)  lactated ringers, 9 mL/hr      PRN Meds:.  acetaminophen    senna-docusate sodium **AND** polyethylene glycol **AND** bisacodyl **AND** bisacodyl    dextrose    dextrose    glucagon (human recombinant)    HYDROcodone-acetaminophen    HYDROcodone-acetaminophen    melatonin    sodium chloride    sodium chloride    sodium chloride    Assessment & Plan   Assessment & Plan     Active Hospital Problems    Diagnosis  POA    **Acute UTI (urinary tract infection) [N39.0]  Yes    Acute kidney injury superimposed on chronic kidney disease [N17.9, N18.9]  Yes    Anemia, chronic disease [D63.8]  Yes    T2DM (type 2 diabetes mellitus) [E11.9]  Yes    Generalized weakness [R53.1]  Yes    Acute kidney injury [N17.9]  Yes    UTI (urinary tract infection) [N39.0]  Yes    Hematuria [R31.9]  Yes    CAD (coronary artery disease) [I25.10]  Yes    Cardiac pacemaker in situ [Z95.0]  Yes    Paroxysmal atrial fibrillation [I48.0]  Yes    Urethral stricture [N35.919]  Yes    History of pulmonary embolism [Z86.711]  Yes    Pulmonary fibrosis [J84.10]  Yes    Hydronephrosis of left kidney [N13.30]  Unknown    Acquired hypothyroidism [E03.9]  Yes    HLD (hyperlipidemia) [E78.5]  Yes      Resolved Hospital Problems   No resolved problems to display.        Brief Hospital Course to date:  Forrest Zepeda is a 89 y.o. male w PAF s/p pacemaker on eliquis, CHFpEF, idiopathic pulmonary fibrosis, recurrent UTI  w h/o chronic hydronephrosis s/p stenting of left ureteral stricture (alodi) who presents w ongoing dysuria + hematuria.  S/p bactrim for presumed UTI 11/13 x 7 days (urine >100,000 mixed abena, not speciated at that time). Empiric GPC.     Gross hematuria w bladder retention - improved  Urine culture + GPC  Chronic stent, s/p replacement 11/22  -[facility] and [facility]  -Ascencio removal in AM if no axel hematuria (OK by urology to manage per primary team)  -gram positive cocci on urine culture, follow-up culture tmrw without vanc for now given risk of nephrotoxicity w recent OCTAVIA (see below) and patient not clinically ill. If fevers, s/s of sepsis overnight, give vancomycin     Post-renal OCTAVIA on CKDIIIa c/b hyperkalemia - improved significantly  -f/u AM labs     Paroxysmal Afib s/p PPM - restart eliquis 11/23 if OK by urology. No clear indication for ASA (denies h/o PCI) so would likely dc after this episode     Pulmonary fibrosis w PRN o2 need  Hypothyroidism  DMII, insulin dept - not requiring SSI currently, monitor     Expected Discharge Location and Transportation: home  Expected Discharge 11/23 (Discharge date is tentative pending patient's medical condition and is subject to change)  Expected Discharge Date: 11/23/2024; Expected Discharge Time:      VTE Prophylaxis:  Mechanical VTE prophylaxis orders are present.         AM-PAC 6 Clicks Score (PT): 17 (11/22/24 0800)    CODE STATUS:   Code Status and Medical Interventions: No CPR (Do Not Attempt to Resuscitate); Limited Support; No intubation (DNI); DNR/DNI   Ordered at: 11/21/24 0042     Medical Intervention Limits:    No intubation (DNI)     Code Status (Patient has no pulse and is not breathing):    No CPR (Do Not Attempt to Resuscitate)     Medical Interventions (Patient has pulse or is breathing):    Limited Support     Comments:    DNR/DNI       Bridget Lofton MD  11/22/24

## 2024-11-22 NOTE — PLAN OF CARE
Goal Outcome Evaluation:  Plan of Care Reviewed With: patient, family        Progress: improving  Outcome Evaluation: Pt oob  to chair. Has been NPO for procedure tomorrow.

## 2024-11-22 NOTE — CASE MANAGEMENT/SOCIAL WORK
Continued Stay Note   Aimee     Patient Name: Forrest Zepeda  MRN: [id number]  Today's Date: 11/22/2024    Admit Date: 11/20/2024    Plan: home with family   Discharge Plan       Row Name 11/22/24 1056       Plan    Plan Comments Plan for stent later today. CM will follow for any dc needs                   Discharge Codes    No documentation.                 Expected Discharge Date and Time       Expected Discharge Date Expected Discharge Time    Nov 26, 2024               Yuki Castillo RN

## 2024-11-22 NOTE — OP NOTE
CYSTOSCOPY URETEROSCOPY RETROGRADE PYELOGRAM STONE EXTRACTION STENT INSERTION  Procedure Note    Forrest Zepeda  11/22/2024    Pre-op Diagnosis:   Hydronephrosis of left kidney [N13.30]    Post-op Diagnosis:     Post-Op Diagnosis Codes:     * Hydronephrosis of left kidney [N13.30]    Procedure/CPT® Codes:      Procedure(s):  CYSTOSCOPY RETROGRADE PYELOGRAM AND STENT INSERTIO    Surgeon(s):  Deanne Pitts MD    Anesthesia: see anesthesia record    Staff:   Circulator: Cyndi Jay RN; Janice Stephens RN  Scrub Person: Alice Zendejas  Vendor Representative: Adam Nj    Estimated Blood Loss: minimal  Urine Voided: * No values recorded between 11/22/2024  1:29 PM and 11/22/2024  2:02 PM *    Specimens:                None      Drains: 8f x 24 cm JJ TRIA stent    Findings:    Cystoscopy with no masses or lesions  Retrograde pyelogram with moderate hydronephrosis.  No filling defect  Successful stent exchange    Blood: N/A    Complications: None immediate    Indication for Procedure: Mr. Zepeda is a 89-year-old gentleman who has a history of left ureteral stricture and has been undergoing chronic stent exchanges.  He is here today for his next exchange     Description of Procedure: The patient was seen and examined in the preoperative area.  The risks, benefits, and alternatives were explained to the patient and family.  Informed consent was obtained.  The patient was then taken back to the operating theater and placed on the table in a supine position.  Venodyne boots were placed on the bilateral lower extremities.  General anesthesia was induced without any complication.  They were then placed in the dorsal lithotomy position with all pressure points padded and secured.  The patient was prepped and draped in the usual sterile fashion and a timeout was taken.       A 22 Bahamian rigid cystoscope was advanced through the urethra and into the bladder.  Cystoscopy was performed in a systematic fashion and there  was a white plaque like substance on the posterior wall and dome of the bladder.  This was irrigated out.  Attention was then turned to the left ureteral orifice and a 0.035 sensor wire was advanced through the scope into the left renal pelvis under fluoroscopic guidance.  Once in position the existing stent was grasped and removed in its entirety.  The scope was withdrawn and a 5 Tunisian open-ended catheter was then advanced into the left renal pelvis.     Retrograde pyelogram was then performed and there was mild hydronephrosis and confirmation of placement of wire in the left renal pelvis.     An 8 Tunisian by 24 cm double-J ureteral stent was advanced over the wire into the left renal pelvis under fluoroscopic guidance.  Once in position the wire was pulled and the stent was deployed.  The bladder was drained and he was given lidocaine jelly.  The patient tolerated the procedure well and there were no complications to the procedure.  He was taken to PACU in stable and extubated condition.       Disposition: The patient will be discharge when PACU criteria is met.  He will follow up with me in 4 months for scheduling next exchange.  Ascencio catheter can be removed per primary team  The intraoperative findings and postoperative plans were discussed with the patient and family.     Deanne Pitts MD     Date: 11/22/2024  Time: 14:08 EST

## 2024-11-22 NOTE — ANESTHESIA PREPROCEDURE EVALUATION
Anesthesia Evaluation     Patient summary reviewed and Nursing notes reviewed   history of anesthetic complications:  malignant hyperthermia  NPO Solid Status: > 8 hours  NPO Liquid Status: > 2 hours           Airway   Mallampati: I  TM distance: >3 FB  Neck ROM: full  No difficulty expected  Dental    (+) edentulous    Pulmonary     breath sounds clear to auscultation  (+) pneumonia , pulmonary embolism, a smoker Former,shortness of breath  Cardiovascular     ECG reviewed  Rhythm: regular  Rate: normal    (+) pacemaker pacemaker interrogated 1-3 months ago, hypertension, CAD, dysrhythmias, CHF , orthopnea, LEHMAN, murmur, DVT, hyperlipidemia    ROS comment:   11/29/24  Interpretation Summary       ·  Left ventricular systolic function is normal. Estimated left ventricular EF = 52% Left ventricular ejection fraction appears to be 51 - 55%.  ·  Left ventricular wall thickness is consistent with mild concentric hypertrophy.  ·  Left ventricular diastolic function is consistent with (grade I) impaired relaxation.  ·  The right ventricular cavity is mildly dilated.  ·  The left atrial cavity is mildly dilated.  ·  Left atrial volume is moderately increased.  ·  There is mild calcification of the aortic valve mainly affecting the non-coronary, left coronary and right coronary cusp(s).  ·  Estimated right ventricular systolic pressure from tricuspid regurgitation is normal (<35 mmHg).         Neuro/Psych  (+) syncope, weakness  (-) seizures, TIA, CVA  GI/Hepatic/Renal/Endo    (+) renal disease- CRI, diabetes mellitus using insulin, thyroid problem hypothyroidism  (-) GERD    Musculoskeletal     Abdominal    Substance History      OB/GYN          Other   arthritis, blood dyscrasia anemia,                   Anesthesia Plan    ASA 4     general   total IV anesthesia  ( Susceptibility to MH; non-triggering anesthetic  Brother with MH)  intravenous induction     Anesthetic plan, risks, benefits, and alternatives have been  provided, discussed and informed consent has been obtained with: patient.    Plan discussed with CRNA.    CODE STATUS:    Medical Intervention Limits: No intubation (DNI)  Code Status (Patient has no pulse and is not breathing): No CPR (Do Not Attempt to Resuscitate)  Medical Interventions (Patient has pulse or is breathing): Limited Support  Comments: DNR/DNI

## 2024-11-23 ENCOUNTER — READMISSION MANAGEMENT (OUTPATIENT)
Dept: CALL CENTER | Facility: HOSPITAL | Age: 89
End: 2024-11-23
Payer: MEDICARE

## 2024-11-23 VITALS
HEIGHT: 69 IN | HEART RATE: 61 BPM | OXYGEN SATURATION: 93 % | TEMPERATURE: 98.3 F | WEIGHT: 193 LBS | RESPIRATION RATE: 16 BRPM | BODY MASS INDEX: 28.58 KG/M2 | DIASTOLIC BLOOD PRESSURE: 64 MMHG | SYSTOLIC BLOOD PRESSURE: 141 MMHG

## 2024-11-23 LAB
ANION GAP SERPL CALCULATED.3IONS-SCNC: 9 MMOL/L (ref 5–15)
BACTERIA SPEC AEROBE CULT: ABNORMAL
BUN SERPL-MCNC: 30 MG/DL (ref 8–23)
BUN/CREAT SERPL: 21.4 (ref 7–25)
CALCIUM SPEC-SCNC: 8.8 MG/DL (ref 8.2–9.6)
CHLORIDE SERPL-SCNC: 114 MMOL/L (ref 98–107)
CO2 SERPL-SCNC: 18 MMOL/L (ref 22–29)
CREAT SERPL-MCNC: 1.4 MG/DL (ref 0.76–1.27)
DEPRECATED RDW RBC AUTO: 55.7 FL (ref 37–54)
EGFRCR SERPLBLD CKD-EPI 2021: 47.2 ML/MIN/1.73
ERYTHROCYTE [DISTWIDTH] IN BLOOD BY AUTOMATED COUNT: 15.3 % (ref 12.3–15.4)
GLUCOSE BLDC GLUCOMTR-MCNC: 102 MG/DL (ref 70–130)
GLUCOSE BLDC GLUCOMTR-MCNC: 152 MG/DL (ref 70–130)
GLUCOSE SERPL-MCNC: 104 MG/DL (ref 65–99)
HCT VFR BLD AUTO: 31.7 % (ref 37.5–51)
HGB BLD-MCNC: 9.9 G/DL (ref 13–17.7)
MCH RBC QN AUTO: 31.2 PG (ref 26.6–33)
MCHC RBC AUTO-ENTMCNC: 31.2 G/DL (ref 31.5–35.7)
MCV RBC AUTO: 100 FL (ref 79–97)
PLATELET # BLD AUTO: 267 10*3/MM3 (ref 140–450)
PMV BLD AUTO: 9.5 FL (ref 6–12)
POTASSIUM SERPL-SCNC: 5.5 MMOL/L (ref 3.5–5.2)
RBC # BLD AUTO: 3.17 10*6/MM3 (ref 4.14–5.8)
SODIUM SERPL-SCNC: 141 MMOL/L (ref 136–145)
WBC NRBC COR # BLD AUTO: 10.42 10*3/MM3 (ref 3.4–10.8)

## 2024-11-23 PROCEDURE — 63710000001 INSULIN LISPRO (HUMAN) PER 5 UNITS: Performed by: UROLOGY

## 2024-11-23 PROCEDURE — 85027 COMPLETE CBC AUTOMATED: CPT | Performed by: INTERNAL MEDICINE

## 2024-11-23 PROCEDURE — 99232 SBSQ HOSP IP/OBS MODERATE 35: CPT | Performed by: UROLOGY

## 2024-11-23 PROCEDURE — 80048 BASIC METABOLIC PNL TOTAL CA: CPT | Performed by: INTERNAL MEDICINE

## 2024-11-23 PROCEDURE — 99239 HOSP IP/OBS DSCHRG MGMT >30: CPT | Performed by: PHYSICIAN ASSISTANT

## 2024-11-23 PROCEDURE — 82948 REAGENT STRIP/BLOOD GLUCOSE: CPT

## 2024-11-23 RX ORDER — CEFUROXIME AXETIL 250 MG/1
250 TABLET ORAL EVERY 12 HOURS SCHEDULED
Qty: 14 TABLET | Refills: 0 | Status: SHIPPED | OUTPATIENT
Start: 2024-11-23 | End: 2024-11-30

## 2024-11-23 RX ORDER — CEFUROXIME AXETIL 250 MG/1
250 TABLET ORAL EVERY 12 HOURS SCHEDULED
Qty: 14 TABLET | Refills: 0 | Status: CANCELLED | OUTPATIENT
Start: 2024-11-23 | End: 2024-11-30

## 2024-11-23 RX ORDER — CEFUROXIME AXETIL 250 MG/1
250 TABLET ORAL EVERY 12 HOURS SCHEDULED
Status: DISCONTINUED | OUTPATIENT
Start: 2024-11-23 | End: 2024-11-23 | Stop reason: HOSPADM

## 2024-11-23 RX ADMIN — LEVOTHYROXINE SODIUM 50 MCG: 0.05 TABLET ORAL at 05:31

## 2024-11-23 RX ADMIN — BISOPROLOL FUMARATE 5 MG: 5 TABLET ORAL at 08:44

## 2024-11-23 RX ADMIN — INSULIN LISPRO 2 UNITS: 100 INJECTION, SOLUTION INTRAVENOUS; SUBCUTANEOUS at 12:12

## 2024-11-23 RX ADMIN — FINASTERIDE 5 MG: 5 TABLET, FILM COATED ORAL at 08:44

## 2024-11-23 RX ADMIN — Medication 10 ML: at 08:44

## 2024-11-23 RX ADMIN — CEFUROXIME AXETIL 250 MG: 250 TABLET, FILM COATED ORAL at 14:22

## 2024-11-23 NOTE — PLAN OF CARE
Goal Outcome Evaluation:              Outcome Evaluation: Ascencio removed per order this a.m. Bladder scan performed per order. Patient voided spontaneously via urinal. Abx administered. Patient discharged home with son.

## 2024-11-23 NOTE — PROGRESS NOTES
[facility]   Urology Progress Note    Patient Name: Forrest Zepeda  : 1932  MRN: [id number]  Date of admission: 2024  Surgical Procedures Since Admission:  Procedure(s):  CYSTOSCOPY RETROGRADE PYELOGRAM AND STENT INSERTIO  Surgeon:  Deanne Pitts MD  Status:  1 Day Post-Op  -------------------    Subjective   Subjective     Chief Complaint: s/p left stent exchange    Blood in Urine       Pt reports he is doing well.  Feels tired today.  No pain or discomfort.  Urine clear in caceres       Objective   Objective     Vitals:   Temp:  [95.8 °F (35.4 °C)-98.2 °F (36.8 °C)] 95.8 °F (35.4 °C)  Heart Rate:  [60-63] 63  Resp:  [14-18] 16  BP: (118-145)/(52-73) 145/64  Flow (L/min) (Oxygen Therapy):  [2] 2  Output by Drain (mL) 24 0701 - 24 1900 24 1901 - 24 0700 24 0701 - 24 0938 Range Total   Urethral Catheter Non-latex 16 Fr. [phone]       Physical Exam  Vitals and nursing note reviewed.   Constitutional:       General: He is awake. He is not in acute distress.     Appearance: Normal appearance. He is well-developed.   HENT:      Head: Normocephalic and atraumatic.      Right Ear: External ear normal.      Left Ear: External ear normal.      Nose: Nose normal.   Eyes:      Conjunctiva/sclera: Conjunctivae normal.   Pulmonary:      Effort: Pulmonary effort is normal.   Abdominal:      General: There is no distension.      Palpations: Abdomen is soft. There is no mass.      Tenderness: There is no abdominal tenderness. There is no right CVA tenderness, left CVA tenderness, guarding or rebound.      Hernia: No hernia is present. There is no hernia in the left inguinal area or right inguinal area.   Genitourinary:     Pubic Area: No rash.       Penis: Normal.       Testes: Normal.      Rectum: No mass or tenderness. Normal anal tone.   Musculoskeletal:      Cervical back: Normal range of motion.   Lymphadenopathy:      Lower Body: No right inguinal adenopathy. No left  inguinal adenopathy.   Skin:     General: Skin is warm.   Neurological:      General: No focal deficit present.      Mental Status: He is alert and oriented to person, place, and time.   Psychiatric:         Behavior: Behavior normal. Behavior is cooperative.           [unfilled]     Result Review    Result Review:  I have personally reviewed the results from the time of this admission to 11/23/2024 09:38 EST and agree with these findings:  [x]  Laboratory  []  Microbiology  []  Radiology  []  EKG/Telemetry   []  Cardiology/Vascular   []  Pathology  []  Old records  []  Other:  Assessment & Plan   Assessment / Plan     Brief Patient Summary:  Forrest Zepeda is a 89 y.o. male who was admitted with UTI and is now s/p left stent exchange.      Active Hospital Problems:  Active Hospital Problems    Diagnosis     **Acute UTI (urinary tract infection)     Acute kidney injury superimposed on chronic kidney disease     Anemia, chronic disease     T2DM (type 2 diabetes mellitus)     Generalized weakness     Acute kidney injury     UTI (urinary tract infection)     Hematuria     CAD (coronary artery disease)     Cardiac pacemaker in situ     Paroxysmal atrial fibrillation     Urethral stricture     History of pulmonary embolism     Pulmonary fibrosis     Hydronephrosis of left kidney     Acquired hypothyroidism     HLD (hyperlipidemia)      Plan:    OK to d/c caceres catheter today for TOV  Continue broad spectrum antibiotics until sensitivities return  Will plan on patient to follow up in 4 months for next stent exchange  OK to restart anticoagulation today from urologic perspective.      '

## 2024-11-23 NOTE — DISCHARGE SUMMARY
[facility] Medicine Services  DISCHARGE SUMMARY    Patient Name: Forrest Zepeda  : 1932  MRN: [id number]    Date of Admission: 2024  8:07 PM  Date of Discharge:  2024  Primary Care Physician: Kary Wen MD    Consults       Date and Time Order Name Status Description    2024  1:48 AM Inpatient Urology Consult            Hospital Course     Active Hospital Problems    Diagnosis  POA    **Acute UTI (urinary tract infection) [N39.0]  Yes    Acute kidney injury superimposed on chronic kidney disease [N17.9, N18.9]  Yes    Anemia, chronic disease [D63.8]  Yes    T2DM (type 2 diabetes mellitus) [E11.9]  Yes    Generalized weakness [R53.1]  Yes    Acute kidney injury [N17.9]  Yes    UTI (urinary tract infection) [N39.0]  Yes    Hematuria [R31.9]  Yes    CAD (coronary artery disease) [I25.10]  Yes    Cardiac pacemaker in situ [Z95.0]  Yes    Paroxysmal atrial fibrillation [I48.0]  Yes    Urethral stricture [N35.919]  Yes    History of pulmonary embolism [Z86.711]  Yes    Pulmonary fibrosis [J84.10]  Yes    Hydronephrosis of left kidney [N13.30]  Unknown    Acquired hypothyroidism [E03.9]  Yes    HLD (hyperlipidemia) [E78.5]  Yes      Resolved Hospital Problems   No resolved problems to display.      Hospital Course:  Forrest Zepeda is a 89 y.o. male with PMH significant for HTN, chronic HFpEF, CAD, PAD, idiopathic pulmonary fibrosis, prior PE, CKD III, hypothyroidism and L ureteral stricture with chronic hydronephrosis and recurrent UTI (followed by Dr. Pitts). Recently treated for UTI with Bactrim (Rx'd on 1324). Presented to [facility] ED on 24 for evaluation of continued malaise despite antibiotics as well as new gross hematuria with associated difficulty urinating and suprapubic pain. He was admitted to the hospital medicine service. Urology was consulted. He is s/p CBI and cystoscopy / retrograde pyelogram and L ureteral stent  insertion. Urine culture grew Aerococcus.     Gross hematuria with acute urinary retention  Aerococcus UTI  Chronic L ureteral stricture with hydronephrosis  - s/p cystoscopy with retrograde pyelogram and L ureteral stent exchange by Dr. Pitts on 11/22/24  - s/p IV Vanc x 1 dose, IV Invanz x 1 dose. Urine culture growing Aerococcus  - IN on Cefuroxine 250mg PO BID x 7 days  - Ascencio removed on 11/23 - voiding trial successful. PVR 150mL  - Per urology, OK to resume Eliquis tonight   - Follow up with Dr Pitts as scheduled in 4 months for stent exchange     Post-renal OCTAVIA on CKD III with mild hyperkalemia, resolved   - Baseline creatinine 1.4-1.6  - Creatinine 3.03 on arrival - s/p IV fluids and resolution of hematuria with improvement  - Creatinine 1.40 on day of DC  - Hyperkalemia mild 5.5-5.3 during this admission - not treated. No T wave changes on EKG     DMII, A1c 6.7% on 7/25/24 - not repeated this admission. Can resume home regimen at IN   Hypothyroidism, TSH 3.82 on 7/25/24. Not repeated this admission. Continue Synthroid  Pulmonary fibrosis, on O2 PRN.     Day of Discharge     HPI:   Feeling good. Hematuria has resolved. Successful voiding trial. Feels ready to go home     Review of Systems  Gen- No fevers, chills  CV- No chest pain, palpitations  Resp- No cough, dyspnea  GI- No N/V/D, abd pain    Vital Signs:   Temp:  [95.8 °F (35.4 °C)-98.3 °F (36.8 °C)] 98.3 °F (36.8 °C)  Heart Rate:  [60-63] 61  Resp:  [14-18] 16  BP: (118-145)/(52-73) 141/64  Flow (L/min) (Oxygen Therapy):  [2] 2    Physical Exam:  Constitutional: No acute distress, awake, alert and conversant  HENT: NCAT, mucous membranes moist  Respiratory: Clear to auscultation bilaterally, respiratory effort normal   Cardiovascular: RRR  Gastrointestinal: Positive bowel sounds, soft, nontender, nondistended  Musculoskeletal: No bilateral ankle edema  Psychiatric: Appropriate affect, cooperative with exam  Neurologic: Oriented x 3, moves all  extremities spontaneously without focal deficits, speech clear  Skin: No rashes to exposed surfaces     Pertinent  and/or Most Recent Results     LAB RESULTS:      Lab 11/23/24  0558 11/22/24  0844 11/21/24 2026 11/21/24  0749 11/21/24  0203 11/20/24  1833   WBC 10.42 9.79  --  8.73  --  10.78   HEMOGLOBIN 9.9* 9.3* 9.9* 9.6* 10.4* 11.5*   HEMATOCRIT 31.7* 29.1* 31.3* 32.2* 33.5* 36.4*   PLATELETS 267 255  --  232  --  293   NEUTROS ABS  --   --   --  4.70  --  6.76   IMMATURE GRANS (ABS)  --   --   --  0.05  --  0.06*   LYMPHS ABS  --   --   --  3.07  --  2.94   MONOS ABS  --   --   --  0.57  --  0.67   EOS ABS  --   --   --  0.26  --  0.27   .0* 99.3*  --  105.9*  --  97.8*   PROCALCITONIN  --   --   --   --   --  0.20   LACTATE  --   --   --   --   --  1.5         Lab 11/23/24  0558 11/22/24 0844 11/21/24 0749 11/21/24 0204 11/20/24 1833   SODIUM 141 141 137  --  134*   POTASSIUM 5.5* 5.3* 5.3* 5.3* 5.6*   CHLORIDE 114* 114* 110*  --  103   CO2 18.0* 16.0* 17.0*  --  20.0*   ANION GAP 9.0 11.0 10.0  --  11.0   BUN 30* 41* 49*  --  52*   CREATININE 1.40* 1.98* 2.83*  --  3.03*   EGFR 47.2* 31.1* 20.3*  --  18.7*   GLUCOSE 104* 94 119*  --  164*   CALCIUM 8.8 8.6 8.8  --  9.7*   IONIZED CALCIUM  --   --  1.23  --   --    MAGNESIUM  --   --   --   --  2.2         Lab 11/20/24  1833   TOTAL PROTEIN 7.9   ALBUMIN 3.8   GLOBULIN 4.1   ALT (SGPT) 11   AST (SGOT) 19   BILIRUBIN 0.2   ALK PHOS 105         Lab 11/20/24 2235   ABO TYPING O   RH TYPING Negative   ANTIBODY SCREEN Negative     Brief Urine Lab Results  (Last result in the past 365 days)        Color   Clarity   Blood   Leuk Est   Nitrite   Protein   CREAT   Urine HCG        11/20/24 2031 Red   Turbid   Large (3+)   Moderate (2+)   Negative   30 mg/dL (1+)                 Microbiology Results (last 10 days)       Procedure Component Value - Date/Time    Urine Culture - Urine, Indwelling Urethral Catheter [[id number]]  (Abnormal) Collected: 11/20/24  2031    Lab Status: Final result Specimen: Urine from Indwelling Urethral Catheter Updated: 11/23/24 1133     Urine Culture >100,000 CFU/mL Aerococcus urinae     Comment:   Aerococcus spp. are usually susceptible to beta-lactams and vancomycin. Resistance has been reported to the fluoroquinolones. Routine susceptibility testing is not recommended. Please call lab to request further workup.       Narrative:      Colonization of the urinary tract without infection is common. Treatment is discouraged unless the patient is symptomatic, pregnant, or undergoing an invasive urologic procedure.    Respiratory Panel PCR w/COVID-19(SARS-CoV-2) GOMEZ/[facility]SE/ISHA/PAD/[facility] In-House, NP Swab in UTM/VTM, 2 HR TAT - Swab, Nasopharynx [[id number]]  (Normal) Collected: 11/20/24 1834    Lab Status: Final result Specimen: Swab from Nasopharynx Updated: 11/20/24 1931     ADENOVIRUS, PCR Not Detected     Coronavirus 229E Not Detected     Coronavirus HKU1 Not Detected     Coronavirus NL63 Not Detected     Coronavirus OC43 Not Detected     COVID19 Not Detected     Human Metapneumovirus Not Detected     Human Rhinovirus/Enterovirus Not Detected     Influenza A PCR Not Detected     Influenza B PCR Not Detected     Parainfluenza Virus 1 Not Detected     Parainfluenza Virus 2 Not Detected     Parainfluenza Virus 3 Not Detected     Parainfluenza Virus 4 Not Detected     RSV, PCR Not Detected     Bordetella pertussis pcr Not Detected     Bordetella parapertussis PCR Not Detected     Chlamydophila pneumoniae PCR Not Detected     Mycoplasma pneumo by PCR Not Detected    Narrative:      In the setting of a positive respiratory panel with a viral infection PLUS a negative procalcitonin without other underlying concern for bacterial infection, consider observing off antibiotics or discontinuation of antibiotics and continue supportive care. If the respiratory panel is positive for atypical bacterial infection (Bordetella pertussis, Chlamydophila  pneumoniae, or Mycoplasma pneumoniae), consider antibiotic de-escalation to target atypical bacterial infection.    Blood Culture - Blood, Arm, Right [[id number]]  (Normal) Collected: 11/20/24 1833    Lab Status: Preliminary result Specimen: Blood from Arm, Right Updated: 11/22/24 1900     Blood Culture No growth at 2 days    Blood Culture - Blood, Arm, Left [[id number]]  (Normal) Collected: 11/20/24 1828    Lab Status: Preliminary result Specimen: Blood from Arm, Left Updated: 11/22/24 1900     Blood Culture No growth at 2 days    Narrative:      Less than seven (7) mL's of blood was collected.  Insufficient quantity may yield false negative results.          FL C Arm During Surgery    Result Date: 11/22/2024  This procedure was auto-finalized with no dictation required.    CT Abdomen Pelvis Without Contrast    Result Date: 11/20/2024  CT ABDOMEN PELVIS WO CONTRAST Date of Exam: 11/20/2024 11:11 PM EST Indication: weakness, hematuria, triston. Comparison: 2/13/2024. Technique: Axial CT images were obtained of the abdomen and pelvis without the administration of contrast. Reconstructed coronal and sagittal images were also obtained. Automated exposure control and iterative construction methods were used. Findings: Lung Bases:   The visualized lung bases and lower mediastinal structures demonstrate no acute process. Emphysematous changes present. Limited evaluation of the solid organs due to lack of intravenous contrast.. Liver: Liver is normal in size and CT density. No focal lesions. Biliary/Gallbladder:  The gallbladder is normal without evidence of radiopaque stones. The biliary tree is nondilated. Spleen: Spleen is normal in size and CT density. Pancreas:  Pancreas is normal. There is no evidence of pancreatic mass or peripancreatic fluid. Kidneys:  Atrophy of the left kidney present. Left ureteral stent in place. Enlargement of the left renal pelvis present which is likely patulous, improved as compared to the  previous study. No significant calyceal dilatation identified to suggest gross hydronephrosis.. Right kidney appears unremarkable. Small cyst present. No evidence of stones or hydronephrosis. Adrenals:  Adrenal glands are unremarkable. Retroperitoneal/Lymph Nodes/Vasculature:  No retroperitoneal adenopathy is identified. Gastrointestinal/Mesentery:  The bowel loops are non-dilated without wall thickening or mass. The appendix appears within normal limits. No evidence of obstruction. No free air. No mesenteric fluid collections identified. Diverticulosis present. No significant inflammatory changes. Bladder:  Ascencio catheter present within the bladder. Circumferential bladder wall thickening is present which appears to have progressed as compared to the previous study. Stranding is seen within the adjacent fat, most pronounced along the superior margin. No evidence of stones. No evidence of stranding surrounding the ureters. Genital:   Unremarkable       Bony Structures:   Visualized bony structures are consistent with the patient's age.     Impression: 1.Circumferential bladder wall thickening present which appears to have progressed as compared to the previous study. Stranding is seen within the adjacent fat, most pronounced along the superior margin. Findings are likely related to cystitis. Correlate  with urinalysis. 2.Left ureteral stent in place. Enlargement of the left renal pelvis present which is likely patulous, improved as compared to the previous study. No significant calyceal dilatation identified to suggest gross hydronephrosis. No evidence of stones. 3.Ancillary findings as described above. Electronically Signed: Jennifer Mendoza MD  11/20/2024 11:25 PM EST  Workstation ID: [id number]    XR Chest 1 View    Result Date: 11/20/2024  XR CHEST 1 VW Date of Exam: 11/20/2024 8:12 PM EST Indication: Simple Sepsis Protocol Comparison: Portable chest dated 10/9/2023 and 12/31/2020 Findings: Cardiac and mediastinal  silhouettes are stable there is cardiomegaly and a dual-lead pacemaker from a left-sided approach, unchanged. Pulmonary vascularity is normal. The lungs are poorly inflated with mild bibasilar atelectasis. There are areas of scarring in each lung. No acute infiltrates or suspicious focal opacities. Trachea is midline. No pneumothorax or pleural effusion. No acute bony abnormality.     Impression: Stable cardiomegaly with pacemaker in place. There are areas of scarring in each lung. No active cardiopulmonary disease. Electronically Signed: Beto Bullock DO  11/20/2024 9:30 PM EST  Workstation ID: [id number]     Results for orders placed during the hospital encounter of 08/22/19    Duplex Venous Lower Extremity - Bilateral CAR    Interpretation Summary  · Acute right lower extremity deep vein thrombosis noted in the distal femoral and popliteal.  · All other veins appeared normal bilaterally.    Results for orders placed in visit on 11/13/23    Adult Transthoracic Echo Complete W/ Cont if Necessary Per Protocol    Interpretation Summary    Left ventricular systolic function is normal. Estimated left ventricular EF = 52% Left ventricular ejection fraction appears to be 51 - 55%.    Left ventricular wall thickness is consistent with mild concentric hypertrophy.    Left ventricular diastolic function is consistent with (grade I) impaired relaxation.    The right ventricular cavity is mildly dilated.    The left atrial cavity is mildly dilated.    Left atrial volume is moderately increased.    There is mild calcification of the aortic valve mainly affecting the non-coronary, left coronary and right coronary cusp(s).    Estimated right ventricular systolic pressure from tricuspid regurgitation is normal (<35 mmHg).    Discharge Details        Discharge Medications        New Medications        Instructions Start Date   cefuroxime 250 MG tablet  Commonly known as: CEFTIN   250 mg, Oral, Every 12 Hours Scheduled              Continue These Medications        Instructions Start Date   allopurinol 300 MG tablet  Commonly known as: ZYLOPRIM   300 mg, Oral, Nightly, To lower risk of gout      BD Pen Needle Micro U/F 32G X 6 MM misc  Generic drug: Insulin Pen Needle       Eliquis 2.5 MG tablet tablet  Generic drug: apixaban   2.5 mg, Oral, 2 Times Daily      finasteride 5 MG tablet  Commonly known as: PROSCAR   5 mg, Oral, Daily      glimepiride 1 MG tablet  Commonly known as: AMARYL   1 mg, Oral, Every Morning Before Breakfast      HYDROcodone-acetaminophen 5-325 MG per tablet  Commonly known as: NORCO   1 tablet, Every 6 Hours PRN      levothyroxine 50 MCG tablet  Commonly known as: SYNTHROID, LEVOTHROID   50 mcg, Oral, Every Early Morning      OCUVITE ADULT 50+ PO   1 capsule, Daily      silodosin 8 MG capsule capsule  Commonly known as: RAPAFLO   8 mg, Oral, Daily With Breakfast      SITagliptin 25 MG tablet  Commonly known as: Januvia   25 mg, Oral, Daily, For diabetes      Toujeo SoloStar 300 UNIT/ML solution pen-injector injection  Generic drug: Insulin Glargine (1 Unit Dial)   20 Units, Subcutaneous, Nightly             Stop These Medications      aspirin 81 MG EC tablet            Allergies   Allergen Reactions    Metformin Diarrhea and GI Intolerance    Statins Diarrhea and Myalgia     Discharge Disposition:Home or Self Care    Diet:  Diet Instructions       Diet: Regular/House Diet, Cardiac Diets, Diabetic Diets; Healthy Heart (2-3 Na+); Regular (IDDSI 7); Thin (IDDSI 0); Consistent Carbohydrate      Discharge Diet:  Regular/House Diet  Cardiac Diets  Diabetic Diets       Cardiac Diet: Healthy Heart (2-3 Na+)    Texture: Regular (IDDSI 7)    Fluid Consistency: Thin (IDDSI 0)    Diabetic Diet: Consistent Carbohydrate           Activity:  Activity Instructions       Activity as Tolerated            CODE STATUS:    Code Status and Medical Interventions: No CPR (Do Not Attempt to Resuscitate); Limited Support; No intubation (DNI);  DNR/DNI   Ordered at: 11/21/24 0042     Medical Intervention Limits:    No intubation (DNI)     Code Status (Patient has no pulse and is not breathing):    No CPR (Do Not Attempt to Resuscitate)     Medical Interventions (Patient has pulse or is breathing):    Limited Support     Comments:    DNR/DNI     Future Appointments   Date Time Provider Department Center   12/11/2024  3:10 PM Deanne Pitts MD [facility]   1/24/2025  1:30 PM Kary Wen MD [facility]   7/7/2025  1:00 PM Win Saha MD [facility]   7/7/2025  1:00 PM [facility] CARD CUBA DEVICE CHECK [facility]     Le Lund PA-C  11/23/24    Time Spent on Discharge:  I spent 35 minutes on this discharge activity which included: face-to-face encounter with the patient, reviewing the data in the system, coordination of the care with the nursing staff as well as consultants, documentation, and entering orders.

## 2024-11-23 NOTE — PLAN OF CARE
Problem: Adult Inpatient Plan of Care  Goal: Plan of Care Review  Outcome: Progressing  Flowsheets (Taken 11/23/2024 0623)  Progress: improving  Plan of Care Reviewed With: patient  Goal: Optimal Comfort and Wellbeing  Outcome: Progressing  Intervention: Provide Person-Centered Care  Recent Flowsheet Documentation  Taken 11/23/2024 0400 by Jose Larios RN  Trust Relationship/Rapport: care explained  Taken 11/23/2024 0200 by Jose Larios RN  Trust Relationship/Rapport: care explained  Taken 11/23/2024 0000 by Jose Larios RN  Trust Relationship/Rapport: care explained  Taken 11/22/2024 2200 by Jose Larios RN  Trust Relationship/Rapport: care explained  Taken 11/22/2024 2000 by Jose Larios RN  Trust Relationship/Rapport:   care explained   choices provided   empathic listening provided   questions answered   questions encouraged   reassurance provided     Problem: Comorbidity Management  Goal: Blood Glucose Level Within Target Range  Outcome: Progressing  Intervention: Monitor and Manage Glycemia  Recent Flowsheet Documentation  Taken 11/23/2024 0400 by Jose Larios RN  Medication Review/Management: medications reviewed  Taken 11/23/2024 0200 by Jose Larois RN  Medication Review/Management: medications reviewed  Taken 11/23/2024 0000 by Jose Larios RN  Medication Review/Management: medications reviewed  Taken 11/22/2024 2200 by Jose Larios RN  Medication Review/Management: medications reviewed  Taken 11/22/2024 2000 by Jose Larios RN  Medication Review/Management: medications reviewed     Problem: Fall Injury Risk  Goal: Absence of Fall and Fall-Related Injury  Outcome: Progressing  Intervention: Identify and Manage Contributors  Recent Flowsheet Documentation  Taken 11/23/2024 0400 by Jose Larios RN  Medication Review/Management: medications reviewed  Taken 11/23/2024 0200 by Jose Larios RN  Medication Review/Management:  medications reviewed  Taken 11/23/2024 0000 by Jose Larios, RN  Medication Review/Management: medications reviewed  Taken 11/22/2024 2200 by Jose Larios RN  Medication Review/Management: medications reviewed  Taken 11/22/2024 2000 by Jose Larios RN  Medication Review/Management: medications reviewed  Intervention: Promote Injury-Free Environment  Recent Flowsheet Documentation  Taken 11/23/2024 0400 by Jose Larios, RN  Safety Promotion/Fall Prevention:   activity supervised   assistive device/personal items within reach   clutter free environment maintained   fall prevention program maintained   gait belt   nonskid shoes/slippers when out of bed   room organization consistent   safety round/check completed  Taken 11/23/2024 0200 by Jose Larios RN  Safety Promotion/Fall Prevention:   activity supervised   assistive device/personal items within reach   clutter free environment maintained   fall prevention program maintained   gait belt   nonskid shoes/slippers when out of bed   room organization consistent   safety round/check completed  Taken 11/23/2024 0000 by Jose Larios RN  Safety Promotion/Fall Prevention:   activity supervised   assistive device/personal items within reach   clutter free environment maintained   fall prevention program maintained   gait belt   nonskid shoes/slippers when out of bed   room organization consistent   safety round/check completed  Taken 11/22/2024 2200 by Jose Larios, RN  Safety Promotion/Fall Prevention:   activity supervised   assistive device/personal items within reach   clutter free environment maintained   fall prevention program maintained   gait belt   nonskid shoes/slippers when out of bed   room organization consistent   safety round/check completed  Taken 11/22/2024 2000 by Jose Larios, RN  Safety Promotion/Fall Prevention:   activity supervised   assistive device/personal items within reach   clutter free  environment maintained   fall prevention program maintained   gait belt   nonskid shoes/slippers when out of bed   room organization consistent   safety round/check completed     Problem: Adult Inpatient Plan of Care  Goal: Absence of Hospital-Acquired Illness or Injury  Intervention: Identify and Manage Fall Risk  Recent Flowsheet Documentation  Taken 11/23/2024 0400 by Jose Larios, RN  Safety Promotion/Fall Prevention:   activity supervised   assistive device/personal items within reach   clutter free environment maintained   fall prevention program maintained   gait belt   nonskid shoes/slippers when out of bed   room organization consistent   safety round/check completed  Taken 11/23/2024 0200 by Jose Larios, RN  Safety Promotion/Fall Prevention:   activity supervised   assistive device/personal items within reach   clutter free environment maintained   fall prevention program maintained   gait belt   nonskid shoes/slippers when out of bed   room organization consistent   safety round/check completed  Taken 11/23/2024 0000 by Jose Larios, RN  Safety Promotion/Fall Prevention:   activity supervised   assistive device/personal items within reach   clutter free environment maintained   fall prevention program maintained   gait belt   nonskid shoes/slippers when out of bed   room organization consistent   safety round/check completed  Taken 11/22/2024 2200 by Jose Larios, RN  Safety Promotion/Fall Prevention:   activity supervised   assistive device/personal items within reach   clutter free environment maintained   fall prevention program maintained   gait belt   nonskid shoes/slippers when out of bed   room organization consistent   safety round/check completed  Taken 11/22/2024 2000 by Jose Larios, RN  Safety Promotion/Fall Prevention:   activity supervised   assistive device/personal items within reach   clutter free environment maintained   fall prevention program maintained    gait belt   nonskid shoes/slippers when out of bed   room organization consistent   safety round/check completed  Intervention: Prevent Skin Injury  Recent Flowsheet Documentation  Taken 11/23/2024 0400 by Jose Larios RN  Body Position: position changed independently  Skin Protection:   incontinence pads utilized   transparent dressing maintained  Taken 11/23/2024 0200 by Jose Larios RN  Body Position:   position changed independently   right   side-lying  Skin Protection:   incontinence pads utilized   transparent dressing maintained  Taken 11/23/2024 0000 by Jose Larios RN  Body Position: position changed independently  Skin Protection:   incontinence pads utilized   transparent dressing maintained  Taken 11/22/2024 2200 by Jose Larios RN  Body Position: position changed independently  Skin Protection:   incontinence pads utilized   transparent dressing maintained  Taken 11/22/2024 2000 by Jose Larios RN  Body Position: position changed independently  Skin Protection:   incontinence pads utilized   transparent dressing maintained  Intervention: Prevent and Manage VTE (Venous Thromboembolism) Risk  Recent Flowsheet Documentation  Taken 11/23/2024 0400 by Jose Larios RN  VTE Prevention/Management:   bilateral   SCDs (sequential compression devices) on  Taken 11/23/2024 0000 by Jose Larios RN  VTE Prevention/Management:   bilateral   SCDs (sequential compression devices) on  Taken 11/22/2024 2000 by Jose Larios RN  VTE Prevention/Management:   bilateral   SCDs (sequential compression devices) on  Intervention: Prevent Infection  Recent Flowsheet Documentation  Taken 11/23/2024 0400 by Jose Larios RN  Infection Prevention:   environmental surveillance performed   hand hygiene promoted   rest/sleep promoted   single patient room provided  Taken 11/23/2024 0200 by Jose Larios RN  Infection Prevention:   environmental surveillance performed    hand hygiene promoted   rest/sleep promoted   single patient room provided  Taken 11/23/2024 0000 by Jose Larios, RN  Infection Prevention:   environmental surveillance performed   hand hygiene promoted   rest/sleep promoted   single patient room provided  Taken 11/22/2024 2200 by Jose Larios RN  Infection Prevention:   environmental surveillance performed   hand hygiene promoted   rest/sleep promoted   single patient room provided  Taken 11/22/2024 2000 by Jose Larios RN  Infection Prevention:   environmental surveillance performed   hand hygiene promoted   rest/sleep promoted   single patient room provided  Goal: Plan of Care Review  Recent Flowsheet Documentation  Taken 11/23/2024 0623 by Jose Larios, RN  Progress: improving  Plan of Care Reviewed With: patient  Goal: Absence of Hospital-Acquired Illness or Injury  Intervention: Identify and Manage Fall Risk  Recent Flowsheet Documentation  Taken 11/23/2024 0400 by Jose Larios, RN  Safety Promotion/Fall Prevention:   activity supervised   assistive device/personal items within reach   clutter free environment maintained   fall prevention program maintained   gait belt   nonskid shoes/slippers when out of bed   room organization consistent   safety round/check completed  Taken 11/23/2024 0200 by Jose Larios RN  Safety Promotion/Fall Prevention:   activity supervised   assistive device/personal items within reach   clutter free environment maintained   fall prevention program maintained   gait belt   nonskid shoes/slippers when out of bed   room organization consistent   safety round/check completed  Taken 11/23/2024 0000 by Jose Larios, RN  Safety Promotion/Fall Prevention:   activity supervised   assistive device/personal items within reach   clutter free environment maintained   fall prevention program maintained   gait belt   nonskid shoes/slippers when out of bed   room organization consistent   safety  round/check completed  Taken 11/22/2024 2200 by Jose Larios RN  Safety Promotion/Fall Prevention:   activity supervised   assistive device/personal items within reach   clutter free environment maintained   fall prevention program maintained   gait belt   nonskid shoes/slippers when out of bed   room organization consistent   safety round/check completed  Taken 11/22/2024 2000 by Jose Larios RN  Safety Promotion/Fall Prevention:   activity supervised   assistive device/personal items within reach   clutter free environment maintained   fall prevention program maintained   gait belt   nonskid shoes/slippers when out of bed   room organization consistent   safety round/check completed  Intervention: Prevent Skin Injury  Recent Flowsheet Documentation  Taken 11/23/2024 0400 by Jose Larios RN  Body Position: position changed independently  Skin Protection:   incontinence pads utilized   transparent dressing maintained  Taken 11/23/2024 0200 by Jose Larios RN  Body Position:   position changed independently   right   side-lying  Skin Protection:   incontinence pads utilized   transparent dressing maintained  Taken 11/23/2024 0000 by Jose Larios RN  Body Position: position changed independently  Skin Protection:   incontinence pads utilized   transparent dressing maintained  Taken 11/22/2024 2200 by Jose Larios RN  Body Position: position changed independently  Skin Protection:   incontinence pads utilized   transparent dressing maintained  Taken 11/22/2024 2000 by Jose Larios RN  Body Position: position changed independently  Skin Protection:   incontinence pads utilized   transparent dressing maintained  Intervention: Prevent and Manage VTE (Venous Thromboembolism) Risk  Recent Flowsheet Documentation  Taken 11/23/2024 0400 by Jose Larios RN  VTE Prevention/Management:   bilateral   SCDs (sequential compression devices) on  Taken 11/23/2024 0000 by  Baggarly, Jose D, RN  VTE Prevention/Management:   bilateral   SCDs (sequential compression devices) on  Taken 11/22/2024 2000 by Jose Larios RN  VTE Prevention/Management:   bilateral   SCDs (sequential compression devices) on  Intervention: Prevent Infection  Recent Flowsheet Documentation  Taken 11/23/2024 0400 by Jose Larios RN  Infection Prevention:   environmental surveillance performed   hand hygiene promoted   rest/sleep promoted   single patient room provided  Taken 11/23/2024 0200 by Jose Larios RN  Infection Prevention:   environmental surveillance performed   hand hygiene promoted   rest/sleep promoted   single patient room provided  Taken 11/23/2024 0000 by Jose Larios RN  Infection Prevention:   environmental surveillance performed   hand hygiene promoted   rest/sleep promoted   single patient room provided  Taken 11/22/2024 2200 by Jose Larios RN  Infection Prevention:   environmental surveillance performed   hand hygiene promoted   rest/sleep promoted   single patient room provided  Taken 11/22/2024 2000 by Jose Larios RN  Infection Prevention:   environmental surveillance performed   hand hygiene promoted   rest/sleep promoted   single patient room provided  Goal: Optimal Comfort and Wellbeing  Intervention: Provide Person-Centered Care  Recent Flowsheet Documentation  Taken 11/23/2024 0400 by Jose Larios RN  Trust Relationship/Rapport: care explained  Taken 11/23/2024 0200 by Jose Larios RN  Trust Relationship/Rapport: care explained  Taken 11/23/2024 0000 by Jose Larios RN  Trust Relationship/Rapport: care explained  Taken 11/22/2024 2200 by Jose Larios RN  Trust Relationship/Rapport: care explained  Taken 11/22/2024 2000 by Jose Larios RN  Trust Relationship/Rapport:   care explained   choices provided   empathic listening provided   questions answered   questions encouraged   reassurance provided      Problem: Comorbidity Management  Goal: Maintenance of Heart Failure Symptom Control  Intervention: Maintain Heart Failure Management  Recent Flowsheet Documentation  Taken 11/23/2024 0400 by Jose Larios RN  Medication Review/Management: medications reviewed  Taken 11/23/2024 0200 by Jose Larios RN  Medication Review/Management: medications reviewed  Taken 11/23/2024 0000 by Jose Larios RN  Medication Review/Management: medications reviewed  Taken 11/22/2024 2200 by Jose Larios RN  Medication Review/Management: medications reviewed  Taken 11/22/2024 2000 by Jose Larios RN  Medication Review/Management: medications reviewed  Goal: Blood Pressure in Desired Range  Intervention: Maintain Blood Pressure Management  Recent Flowsheet Documentation  Taken 11/23/2024 0400 by Jose Larios RN  Medication Review/Management: medications reviewed  Taken 11/23/2024 0200 by Jose Larios RN  Medication Review/Management: medications reviewed  Taken 11/23/2024 0000 by Jose Larios RN  Medication Review/Management: medications reviewed  Taken 11/22/2024 2200 by Jose Larios RN  Medication Review/Management: medications reviewed  Taken 11/22/2024 2000 by Jose Larios RN  Medication Review/Management: medications reviewed  Goal: Maintenance of Osteoarthritis Symptom Control  Intervention: Maintain Osteoarthritis Symptom Control  Recent Flowsheet Documentation  Taken 11/23/2024 0400 by Jose Larios RN  Activity Management: activity encouraged  Medication Review/Management: medications reviewed  Taken 11/23/2024 0200 by Jose Larios RN  Activity Management: activity encouraged  Medication Review/Management: medications reviewed  Taken 11/23/2024 0000 by Jose Larios RN  Activity Management: activity encouraged  Medication Review/Management: medications reviewed  Taken 11/22/2024 2200 by Jose Larios RN  Activity Management: activity  encouraged  Medication Review/Management: medications reviewed  Taken 11/22/2024 2000 by Jose Larios, RN  Activity Management: activity encouraged  Medication Review/Management: medications reviewed   Goal Outcome Evaluation:  Plan of Care Reviewed With: patient        Progress: improving        Pt a/o x4. VSS. Telemetry A-paced 60s. Denies pain. Ascencio draining pink tinged urine to bedside bag. Pt rested well. Denying needs this am.

## 2024-11-24 NOTE — OUTREACH NOTE
Prep Survey      Flowsheet Row Responses   [facility] patient discharged from? [place]   Is LACE score < 7 ? No   Eligibility [facility]   Date of Admission 11/20/24   Date of Discharge 11/23/24   Discharge Disposition Home or Self Care   Discharge diagnosis Acute UTI (urinary tract infection), CYSTOSCOPY RETROGRADE PYELOGRAM AND STENT INSERTIO   Does the patient have one of the following disease processes/diagnoses(primary or secondary)? Other   Does the patient have Home health ordered? No   Is there a DME ordered? No   Prep survey completed? Yes            Brisa Botello Registered Nurse

## 2024-11-25 ENCOUNTER — TRANSITIONAL CARE MANAGEMENT TELEPHONE ENCOUNTER (OUTPATIENT)
Dept: CALL CENTER | Facility: HOSPITAL | Age: 89
End: 2024-11-25
Payer: MEDICARE

## 2024-11-25 LAB
BACTERIA SPEC AEROBE CULT: NORMAL
BACTERIA SPEC AEROBE CULT: NORMAL

## 2024-11-25 NOTE — OUTREACH NOTE
Call Center TCM Note      Flowsheet Row Responses   [facility] patient discharged from? [place]   Does the patient have one of the following disease processes/diagnoses(primary or secondary)? Other   TCM attempt successful? Yes   Call start time 1145   Call end time 1148   Discharge diagnosis Acute UTI (urinary tract infection), CYSTOSCOPY RETROGRADE PYELOGRAM AND STENT INSERTIO   Is patient permission given to speak with other caregiver? Yes   List who call center can speak with grandchild- Erika   Person spoke with today (if not patient) and relationship grandchild- Erika   Meds reviewed with patient/caregiver? Yes   Is the patient having any side effects they believe may be caused by any medication additions or changes? No   Does the patient have all medications ordered at discharge? Yes   Is the patient taking all medications as directed (includes completed medication regime)? Yes   Does the patient have an appointment with their PCP within 7-14 days of discharge? No   Nursing Interventions Patient declined scheduling/rescheduling appointment at this time   Has home health visited the patient within 72 hours of discharge? N/A   Psychosocial issues? No   Did the patient receive a copy of their discharge instructions? Yes   Nursing interventions Reviewed instructions with patient   What is the patient's perception of their health status since discharge? Improving   Is the patient/caregiver able to teach back signs and symptoms related to disease process for when to call PCP? Yes   Is the patient/caregiver able to teach back signs and symptoms related to disease process for when to call 911? Yes   Is the patient/caregiver able to teach back the hierarchy of who to call/visit for symptoms/problems? PCP, Specialist, Home health nurse, Urgent Care, ED, 911 Yes   TCM call completed? Yes   Wrap up additional comments Per grandreinier James, patient is doing well, denies any questions or concerns, declined to  schedule a hospital follow up appt with PCP at this time.   Call end time 1148   Would this patient benefit from a Referral to [facility] Social Work? No   Is the patient interested in additional calls from an ambulatory ? No            Leidy Sherman RN    11/25/2024, 11:48 EST

## 2024-12-11 ENCOUNTER — OFFICE VISIT (OUTPATIENT)
Dept: UROLOGY | Facility: CLINIC | Age: 89
End: 2024-12-11
Payer: MEDICARE

## 2024-12-11 VITALS — WEIGHT: 192.9 LBS | BODY MASS INDEX: 28.57 KG/M2 | HEIGHT: 69 IN

## 2024-12-11 DIAGNOSIS — N13.30 HYDRONEPHROSIS OF LEFT KIDNEY: Primary | ICD-10-CM

## 2024-12-11 LAB
BILIRUB BLD-MCNC: NEGATIVE MG/DL
CLARITY, POC: CLEAR
COLOR UR: YELLOW
EXPIRATION DATE: ABNORMAL
GLUCOSE UR STRIP-MCNC: NEGATIVE MG/DL
KETONES UR QL: NEGATIVE
LEUKOCYTE EST, POC: ABNORMAL
Lab: ABNORMAL
NITRITE UR-MCNC: NEGATIVE MG/ML
PH UR: 6 [PH] (ref 5–8)
PROT UR STRIP-MCNC: ABNORMAL MG/DL
RBC # UR STRIP: ABNORMAL /UL
SP GR UR: 1.01 (ref 1–1.03)
UROBILINOGEN UR QL: NORMAL

## 2024-12-11 NOTE — PROGRESS NOTES
Follow Up Office Visit      Patient Name: Forrest Zepeda  : 1932   MRN: [id number]     Chief Complaint:    Chief Complaint   Patient presents with    Hydronephrosis of left kidney       Referring Provider: No ref. provider found    History of Present Illness: Forrest Zepeda is a 89 y.o. male who presents today for follow up after stent exchange.  He was recently hospitalized with UTI.  He was discharged on Ceftin.      Subjective      Review of System:   Review of Systems   I have reviewed the ROS documented by my clinical staff, I have updated appropriately and I agree. Deanne Pitts MD    I have reviewed and the following portions of the patient's history were updated as appropriate: past family history, past medical history, past social history, past surgical history and problem list.    Past Medical History:   Past Medical History:   Diagnosis Date    Abnormal EKG     Abnormal PSA     Reported abnormal    Acute cystitis with hematuria 2023    Acute deep vein thrombosis (DVT) of right lower extremity 2019    Acute diverticulitis     Acute hyperkalemia 2019    Acute respiratory failure with hypoxia     Acute saddle pulmonary embolism with acute cor pulmonale 2019    Acute systolic right heart failure 2019    Acute UTI (urinary tract infection)     Arthritis     KNEES,  BUT SINCE HAD REPLACEMENT    Benign localized hyperplasia of prostate     Bilateral knee pain     C. difficile diarrhea 2010    Cataracts, bilateral     CKD (chronic kidney disease)     Coronary artery disease     Diabetic neuropathy     Diastolic dysfunction     Disease of thyroid gland     HYPOTHYROIDISM    Diverticulitis     Dyslipidemia     H/O    Erectile dysfunction     Family history of malignant hyperthermia     Brother     Full dentures     Generalized weakness     History of ESBL E. coli infection     most recent  f/u with ID Dr Johnson    History of gout     History of hepatitis A  "vaccination     History of nephrolithiasis     History of nuclear stress test     STATES IN THE 1990'S.  \"IT WAS OK\"    History of osteopenia     History of recurrent UTI (urinary tract infection)     Hydronephrosis of left kidney 07/18/2019    Hydronephrosis with renal and ureteral calculus obstruction 10/21/2019    Added automatically from request for surgery [id number]    Hydronephrosis with ureteral stricture     Hypercholesterolemia     Hyperkalemia     PT UNSURE REGARDING HX OF THIS    Hyperlipidemia     Hypertension     Hypothyroidism     Impaired functional mobility, balance, gait, and endurance     Insomnia     IPF (idiopathic pulmonary fibrosis)     per patient and son, dx at [facility], was told no treatment necessary, not to worry about it    On supplemental oxygen therapy     2L NC QHS    Other hydronephrosis 08/12/2019    Added automatically from request for surgery [id number]    Paroxysmal atrial fibrillation     Pulmonary fibrosis     Pulmonary hypertension, mild 02/2021    per echo per cardiology note 1/9/23, per pt's son, PCP does not agree with this dx    Renal insufficiency     Sepsis 2019    Shortness of breath     STATES WITH EXERTION, OTHERWISE, DENIES    Sigmoid diverticulitis without abscess or perforation 08/09/2017    Sinus node dysfunction     Skin breakdown     fourth toe right foot noted in 2019 MD D/C note    Skin ulcer of fourth toe of right foot, limited to breakdown of skin 07/05/2019    Stricture of ureter     Type 2 diabetes mellitus     Ureteral stricture, left     Urinary incontinence     UTI (urinary tract infection) 07/18/2019    Vitamin D deficiency     Wears glasses        Past Surgical History:  Past Surgical History:   Procedure Laterality Date    APPENDECTOMY      CARDIAC ELECTROPHYSIOLOGY PROCEDURE N/A 12/23/2020    Procedure: Pacemaker DC new. DNS meds.;  Surgeon: Jimy Ledezma DO;  Location: [facility] INVASIVE LOCATION;  Service: Cardiology;  Laterality: N/A;    CHOLECYSTECTOMY  "     CIRCUMCISION N/A 10/23/2019    Procedure: CIRCUMCISIOn-DORSAL SLIT;  Surgeon: Griffin Romero MD;  Location:  [facility];  Service: Urology    COLONOSCOPY      CYSTOSCOPY RETROGRADE PYELOGRAM Left 06/17/2022    Procedure: CYSTOSCOPY RETROGRADE PYELOGRAM;  Surgeon: Ryne Mccann MD;  Location:  [facility];  Service: Urology;  Laterality: Left;    CYSTOSCOPY RETROGRADE PYELOGRAM Left 7/10/2024    Procedure: CYSTOSCOPY RETROGRADE PYELOGRAM AND STENT INSERTION LEFT;  Surgeon: Deanne Pitts MD;  Location:  [facility];  Service: Urology;  Laterality: Left;    CYSTOSCOPY URETEROSCOPY Left 02/11/2019    Procedure: URETEROSCOPY WITH STENT EXTRACTION, RPG;  Surgeon: Griffin Romero MD;  Location:  [facility];  Service: Urology    CYSTOSCOPY W/ URETERAL STENT PLACEMENT Left 07/20/2019    Procedure: CYSTOSCOPY, LEFT RETROGRADE PYELOGRAM,  ATTEMPTED LEFT URETERAL STENT INSERTION;  Surgeon: Isaac Flynn MD;  Location:  [facility];  Service: Urology    CYSTOSCOPY W/ URETERAL STENT PLACEMENT Left 06/17/2022    Procedure: CYSTOSCOPY URETERAL CATHETER/STENT INSERTION;  Surgeon: Ryne Mccann MD;  Location:  [facility];  Service: Urology;  Laterality: Left;    CYSTOSCOPY W/ URETERAL STENT PLACEMENT Left 01/27/2023    Procedure: CYSTOSCOPY URETERAL CATHETER/STENT INSERTION;  Surgeon: Deanne Ptits MD;  Location:  [facility];  Service: Urology;  Laterality: Left;    CYSTOSCOPY W/ URETERAL STENT PLACEMENT Left 02/15/2024    Procedure: CYSTOSCOPY LEFT STENT EXCHANGE;  Surgeon: Deanne Pitts MD;  Location:  [facility];  Service: Urology;  Laterality: Left;    CYSTOSCOPY, URETEROSCOPY, RETROGRADE PYELOGRAM, STONE EXTRACTION, STENT INSERTION Left 06/15/2023    Procedure: URETEROSCOPY, RETROGRADE PYELOGRAM, STENT INSERTION;  Surgeon: Deanne Pitts MD;  Location:  [facility];  Service: Urology;  Laterality: Left;    CYSTOSCOPY, URETEROSCOPY, RETROGRADE PYELOGRAM, STONE EXTRACTION, STENT INSERTION Left 11/22/2024    Procedure: CYSTOSCOPY  RETROGRADE PYELOGRAM AND STENT INSERTION LEFT;  Surgeon: Deanne Pitts MD;  Location: [facility] OR;  Service: Urology;  Laterality: Left;    EYE SURGERY      CATARACTS REMOVED BILATERALLY    JOINT REPLACEMENT      Bilateral knees    KNEE ARTHROPLASTY Bilateral     KNEE ARTHROSCOPY Bilateral     RENAL ARTERY STENT      SEVERAL TIMES EVERY SINCE 1978    URETERAL STENT INSERTION  10/2020    URETEROSCOPY LASER LITHOTRIPSY WITH STENT INSERTION Left 01/10/2018    Procedure:  cysto, left ureteral stent replacement ;  Surgeon: Griffin Romero MD;  Location: [facility] OR;  Service:     URETEROSCOPY LASER LITHOTRIPSY WITH STENT INSERTION Left 08/14/2019    Procedure: URETEROSCOPY, STONE REMOVAL WITH STENT INSERTION;  Surgeon: Griffin Romero MD;  Location: [facility] OR;  Service: Urology    URETEROSCOPY LASER LITHOTRIPSY WITH STENT INSERTION Left 10/23/2019    Procedure: Left URETEROSCOPY WITH STENT INSERTION-LEFT;  Surgeon: Griffin Romero MD;  Location: [facility] OR;  Service: Urology       Family History:   family history includes Brain cancer in his brother and sister; Heart attack in his sister; Hypertension in his sister; Lung cancer in his brother and sister; Malig Hyperthermia in his brother; No Known Problems in his mother; Stomach cancer in his father; Stroke in his sister.   Otherwise pertinent FHx was reviewed and not pertinent to current issue.    Social History:    reports that he quit smoking about 74 years ago. His smoking use included cigarettes. He has a 0.5 pack-year smoking history. He has been exposed to tobacco smoke. He has never used smokeless tobacco. He reports that he does not drink alcohol and does not use drugs.    Medications:     Current Outpatient Medications:     allopurinol (ZYLOPRIM) 300 MG tablet, Take 1 tablet by mouth Every Night. To lower risk of gout, Disp: 90 tablet, Rfl: 3    BD Pen Needle Micro U/F 32G X 6 MM misc, , Disp: , Rfl:     Eliquis 2.5 MG tablet tablet, TAKE ONE TABLET BY MOUTH  "TWICE A DAY, Disp: 180 tablet, Rfl: 3    finasteride (PROSCAR) 5 MG tablet, TAKE 1 TABLET BY MOUTH DAILY, Disp: 90 tablet, Rfl: 3    glimepiride (AMARYL) 1 MG tablet, TAKE ONE TABLET BY MOUTH EVERY MORNING BEFORE BREAKFAST, Disp: 90 tablet, Rfl: 3    HYDROcodone-acetaminophen (NORCO) 5-325 MG per tablet, Take 1 tablet by mouth Every 6 (Six) Hours As Needed for Mild Pain., Disp: , Rfl:     Insulin Glargine, 1 Unit Dial, (Toujeo SoloStar) 300 UNIT/ML solution pen-injector injection, INJECT 20 UNITS UNDER THE SKIN INTO THE APPROPRIATE AREA AS DIRECTED EVERY NIGHT, Disp: 6 mL, Rfl: 3    levothyroxine (SYNTHROID, LEVOTHROID) 50 MCG tablet, Take 1 tablet by mouth Every Morning. Indications: Underactive Thyroid, Disp: 90 tablet, Rfl: 3    Multiple Vitamins-Minerals (OCUVITE ADULT 50+ PO), Take 1 capsule by mouth Daily. OTC, Disp: , Rfl:     silodosin (RAPAFLO) 8 MG capsule capsule, TAKE 1 CAPSULE BY MOUTH DAILY WITH BREAKFAST, Disp: 90 capsule, Rfl: 3    SITagliptin (Januvia) 25 MG tablet, Take 1 tablet by mouth Daily. For diabetes, Disp: 90 tablet, Rfl: 3    Allergies:   Allergies   Allergen Reactions    Metformin Diarrhea and GI Intolerance    Statins Diarrhea and Myalgia         Objective     Physical Exam:   Vital Signs:   Vitals:    12/11/24 1509   Weight: 87.5 kg (192 lb 14.4 oz)   Height: 175.3 cm (69\")     Body mass index is 28.49 kg/m².     Physical Exam  Vitals and nursing note reviewed.   Constitutional:       General: He is awake. He is not in acute distress.     Appearance: Normal appearance. He is well-developed.   HENT:      Head: Normocephalic and atraumatic.      Right Ear: External ear normal.      Left Ear: External ear normal.      Nose: Nose normal.   Eyes:      Conjunctiva/sclera: Conjunctivae normal.   Pulmonary:      Effort: Pulmonary effort is normal.   Abdominal:      General: There is no distension.      Palpations: Abdomen is soft. There is no mass.      Tenderness: There is no abdominal " tenderness. There is no right CVA tenderness, left CVA tenderness, guarding or rebound.      Hernia: No hernia is present. There is no hernia in the left inguinal area or right inguinal area.   Genitourinary:     Pubic Area: No rash.       Rectum: No mass or tenderness. Normal anal tone.   Musculoskeletal:      Cervical back: Normal range of motion.   Lymphadenopathy:      Lower Body: No right inguinal adenopathy. No left inguinal adenopathy.   Skin:     General: Skin is warm.   Neurological:      General: No focal deficit present.      Mental Status: He is alert and oriented to person, place, and time.   Psychiatric:         Behavior: Behavior normal. Behavior is cooperative.         Labs:   Brief Urine Lab Results  (Last result in the past 365 days)        Color   Clarity   Blood   Leuk Est   Nitrite   Protein   CREAT   Urine HCG        12/11/24 1512 Yellow   Clear   3+   Large (3+)   Negative   1+                   Urine Culture          7/10/2024    07:41 11/13/2024    13:00 11/20/2024    20:31   Urine Culture   Urine Culture No growth  >100,000 CFU/mL Mixed [place] Isolated  >100,000 CFU/mL Aerococcus urinae         Lab Results   Component Value Date    GLUCOSE 104 (H) 11/23/2024    CALCIUM 8.8 11/23/2024     11/23/2024    K 5.5 (H) 11/23/2024    CO2 18.0 (L) 11/23/2024     (H) 11/23/2024    BUN 30 (H) 11/23/2024    CREATININE 1.40 (H) 11/23/2024    EGFRIFAFRI 45 (L) 12/13/2021    EGFRIFNONA 39 (L) 12/13/2021    BCR 21.4 11/23/2024    ANIONGAP 9.0 11/23/2024       Lab Results   Component Value Date    WBC 10.42 11/23/2024    HGB 9.9 (L) 11/23/2024    HCT 31.7 (L) 11/23/2024    .0 (H) 11/23/2024     11/23/2024       Lab Results   Component Value Date    PSA 1.160 04/15/2021    PSA 8.640 (H) 11/30/2020    PSA 1.450 08/26/2020       Images:   CT Abdomen Pelvis Without Contrast    Result Date: 11/20/2024  Impression: 1.Circumferential bladder wall thickening present which appears to have  progressed as compared to the previous study. Stranding is seen within the adjacent fat, most pronounced along the superior margin. Findings are likely related to cystitis. Correlate  with urinalysis. 2.Left ureteral stent in place. Enlargement of the left renal pelvis present which is likely patulous, improved as compared to the previous study. No significant calyceal dilatation identified to suggest gross hydronephrosis. No evidence of stones. 3.Ancillary findings as described above. Electronically Signed: Jennifer Mendoza MD  11/20/2024 11:25 PM EST  Workstation ID: [id number]    XR Chest 1 View    Result Date: 11/20/2024  Impression: Stable cardiomegaly with pacemaker in place. There are areas of scarring in each lung. No active cardiopulmonary disease. Electronically Signed: Beto Bullock DO  11/20/2024 9:30 PM EST  Workstation ID: [id number]      Measures:   Tobacco:   Forrest Zepeda  reports that he quit smoking about 74 years ago. His smoking use included cigarettes. He has a 0.5 pack-year smoking history. He has been exposed to tobacco smoke. He has never used smokeless tobacco.     Urine Incontinence: Patient reports that he is not currently experiencing any symptoms of urinary incontinence.         Assessment / Plan      Assessment/Plan:   89 y.o. male who presented today for follow up of stent exchange.  He has done well and is doing much better.  I will send his urine for Guidance today.  I will see him back in 4 months to schedule his stent exchange.     Diagnoses and all orders for this visit:    1. Hydronephrosis of left kidney (Primary)  -     POC Urinalysis Dipstick, Automated         Follow Up:   Return in about 4 months (around 4/11/2025) for Recheck.    I spent approximately 20 minutes providing clinical care for this patient; including review of patient's chart and provider documentation, face to face time spent with patient in examination room (obtaining history, performing physical exam, discussing  diagnosis and management options), placing orders, and completing patient documentation.     Deanne Pitts MD  [facility] [place]

## 2024-12-12 RX ORDER — ASPIRIN 81 MG/1
81 TABLET, COATED ORAL DAILY
Qty: 90 TABLET | Refills: 3 | Status: SHIPPED | OUTPATIENT
Start: 2024-12-12

## 2024-12-17 DIAGNOSIS — N30.00 ACUTE CYSTITIS WITHOUT HEMATURIA: Primary | ICD-10-CM

## 2024-12-22 DIAGNOSIS — E11.22 TYPE 2 DIABETES MELLITUS WITH STAGE 3A CHRONIC KIDNEY DISEASE, WITH LONG-TERM CURRENT USE OF INSULIN: ICD-10-CM

## 2024-12-22 DIAGNOSIS — N18.31 TYPE 2 DIABETES MELLITUS WITH STAGE 3A CHRONIC KIDNEY DISEASE, WITH LONG-TERM CURRENT USE OF INSULIN: ICD-10-CM

## 2024-12-22 DIAGNOSIS — Z79.4 TYPE 2 DIABETES MELLITUS WITH HYPERGLYCEMIA, WITH LONG-TERM CURRENT USE OF INSULIN: ICD-10-CM

## 2024-12-22 DIAGNOSIS — E11.65 TYPE 2 DIABETES MELLITUS WITH HYPERGLYCEMIA, WITH LONG-TERM CURRENT USE OF INSULIN: ICD-10-CM

## 2024-12-22 DIAGNOSIS — Z79.4 TYPE 2 DIABETES MELLITUS WITH STAGE 3A CHRONIC KIDNEY DISEASE, WITH LONG-TERM CURRENT USE OF INSULIN: ICD-10-CM

## 2024-12-23 RX ORDER — CALCIUM CITRATE/VITAMIN D3 200MG-6.25
TABLET ORAL 4 TIMES DAILY
Qty: 300 EACH | Refills: 0 | Status: SHIPPED | OUTPATIENT
Start: 2024-12-23

## 2024-12-29 DIAGNOSIS — G89.29 CHRONIC LOW BACK PAIN WITHOUT SCIATICA, UNSPECIFIED BACK PAIN LATERALITY: Primary | ICD-10-CM

## 2024-12-29 DIAGNOSIS — M54.50 CHRONIC LOW BACK PAIN WITHOUT SCIATICA, UNSPECIFIED BACK PAIN LATERALITY: Primary | ICD-10-CM

## 2024-12-30 DIAGNOSIS — N32.81 OAB (OVERACTIVE BLADDER): ICD-10-CM

## 2024-12-30 RX ORDER — HYDROCODONE BITARTRATE AND ACETAMINOPHEN 5; 325 MG/1; MG/1
1 TABLET ORAL EVERY 6 HOURS PRN
Qty: 60 TABLET | Refills: 0 | Status: SHIPPED | OUTPATIENT
Start: 2024-12-30

## 2024-12-30 NOTE — TELEPHONE ENCOUNTER
Rx Refill Note  Requested Prescriptions     Pending Prescriptions Disp Refills    HYDROcodone-acetaminophen (NORCO) 5-325 MG per tablet       Sig: Take 1 tablet by mouth Every 6 (Six) Hours As Needed for Mild Pain.      Last office visit with prescribing clinician: 7/23/2024     Next office visit with prescribing clinician: 1/24/2025     No UDS on file, NO CSA on file

## 2024-12-30 NOTE — TELEPHONE ENCOUNTER
Rx Refill Note  Requested Prescriptions     Pending Prescriptions Disp Refills    Myrbetriq 50 MG tablet sustained-release 24 hour 24 hr tablet [Pharmacy Med Name: MYRBETRIQ ER 50 MG TABLET] 90 tablet 3     Sig: TAKE 1 TABLET BY MOUTH DAILY      Last office visit with prescribing clinician: 12/11/2024   Next office visit with prescribing clinician: 4/14/2025       Sadie Naqvi MA  12/30/24, 10:26 EST

## 2025-01-01 ENCOUNTER — HOSPITAL ENCOUNTER (INPATIENT)
Facility: HOSPITAL | Age: OVER 89
LOS: 2 days | Discharge: HOSPICE | DRG: 871 | End: 2025-03-30
Attending: EMERGENCY MEDICINE | Admitting: INTERNAL MEDICINE
Payer: MEDICARE

## 2025-01-01 ENCOUNTER — APPOINTMENT (OUTPATIENT)
Dept: CARDIOLOGY | Facility: HOSPITAL | Age: OVER 89
DRG: 871 | End: 2025-01-01
Payer: MEDICARE

## 2025-01-01 ENCOUNTER — APPOINTMENT (OUTPATIENT)
Dept: GENERAL RADIOLOGY | Facility: HOSPITAL | Age: OVER 89
DRG: 871 | End: 2025-01-01
Payer: MEDICARE

## 2025-01-01 ENCOUNTER — HOSPITAL ENCOUNTER (INPATIENT)
Facility: HOSPITAL | Age: OVER 89
LOS: 2 days | End: 2025-04-01
Attending: INTERNAL MEDICINE | Admitting: INTERNAL MEDICINE
Payer: COMMERCIAL

## 2025-01-01 VITALS
SYSTOLIC BLOOD PRESSURE: 111 MMHG | RESPIRATION RATE: 17 BRPM | WEIGHT: 205.91 LBS | HEIGHT: 69 IN | BODY MASS INDEX: 30.5 KG/M2 | DIASTOLIC BLOOD PRESSURE: 59 MMHG | TEMPERATURE: 98.5 F | HEART RATE: 72 BPM | OXYGEN SATURATION: 94 %

## 2025-01-01 DIAGNOSIS — J12.82 PNEUMONIA DUE TO COVID-19 VIRUS: Primary | ICD-10-CM

## 2025-01-01 DIAGNOSIS — U07.1 PNEUMONIA DUE TO COVID-19 VIRUS: Primary | ICD-10-CM

## 2025-01-01 DIAGNOSIS — A41.9 SEPSIS DUE TO UNDETERMINED ORGANISM: ICD-10-CM

## 2025-01-01 LAB
ALBUMIN SERPL-MCNC: 2.6 G/DL (ref 3.5–5.2)
ALBUMIN SERPL-MCNC: 2.7 G/DL (ref 3.5–5.2)
ALBUMIN SERPL-MCNC: 3.2 G/DL (ref 3.5–5.2)
ALBUMIN/GLOB SERPL: 0.9 G/DL
ALBUMIN/GLOB SERPL: 1 G/DL
ALBUMIN/GLOB SERPL: 1 G/DL
ALP SERPL-CCNC: 102 U/L (ref 39–117)
ALP SERPL-CCNC: 125 U/L (ref 39–117)
ALP SERPL-CCNC: 96 U/L (ref 39–117)
ALT SERPL W P-5'-P-CCNC: 18 U/L (ref 1–41)
ALT SERPL W P-5'-P-CCNC: 19 U/L (ref 1–41)
ALT SERPL W P-5'-P-CCNC: 20 U/L (ref 1–41)
ANION GAP SERPL CALCULATED.3IONS-SCNC: 18 MMOL/L (ref 5–15)
ANION GAP SERPL CALCULATED.3IONS-SCNC: 18 MMOL/L (ref 5–15)
ANION GAP SERPL CALCULATED.3IONS-SCNC: 20 MMOL/L (ref 5–15)
AORTIC DIMENSIONLESS INDEX: 0.42 (DI)
APTT PPP: 36.5 SECONDS (ref 60–90)
AST SERPL-CCNC: 58 U/L (ref 1–40)
AST SERPL-CCNC: 62 U/L (ref 1–40)
AST SERPL-CCNC: 68 U/L (ref 1–40)
AV MEAN PRESS GRAD SYS DOP V1V2: 5.3 MMHG
AV VMAX SYS DOP: 149.5 CM/SEC
BACTERIA SPEC AEROBE CULT: NO GROWTH
BACTERIA UR QL AUTO: ABNORMAL /HPF
BASOPHILS # BLD AUTO: 0.04 10*3/MM3 (ref 0–0.2)
BASOPHILS # BLD AUTO: 0.05 10*3/MM3 (ref 0–0.2)
BASOPHILS # BLD AUTO: 0.07 10*3/MM3 (ref 0–0.2)
BASOPHILS NFR BLD AUTO: 0.3 % (ref 0–1.5)
BASOPHILS NFR BLD AUTO: 0.4 % (ref 0–1.5)
BASOPHILS NFR BLD AUTO: 0.4 % (ref 0–1.5)
BH CV ECHO MEAS - AO MAX PG: 9 MMHG
BH CV ECHO MEAS - AO ROOT DIAM: 3.7 CM
BH CV ECHO MEAS - AO V2 VTI: 22.7 CM
BH CV ECHO MEAS - AVA(I,D): 1.89 CM2
BH CV ECHO MEAS - EDV(CUBED): 46.7 ML
BH CV ECHO MEAS - EDV(MOD-SP2): 62.9 ML
BH CV ECHO MEAS - EDV(MOD-SP4): 68.2 ML
BH CV ECHO MEAS - EF(MOD-SP2): 45.5 %
BH CV ECHO MEAS - EF(MOD-SP4): 45.2 %
BH CV ECHO MEAS - ESV(CUBED): 22 ML
BH CV ECHO MEAS - ESV(MOD-SP2): 34.3 ML
BH CV ECHO MEAS - ESV(MOD-SP4): 37.4 ML
BH CV ECHO MEAS - FS: 22.2 %
BH CV ECHO MEAS - IVS/LVPW: 1.31 CM
BH CV ECHO MEAS - IVSD: 1.2 CM
BH CV ECHO MEAS - LAT PEAK E' VEL: 8.6 CM/SEC
BH CV ECHO MEAS - LV MASS(C)D: 201 GRAMS
BH CV ECHO MEAS - LV MAX PG: 1.97 MMHG
BH CV ECHO MEAS - LV MEAN PG: 1 MMHG
BH CV ECHO MEAS - LV V1 MAX: 70.1 CM/SEC
BH CV ECHO MEAS - LV V1 VTI: 9.5 CM
BH CV ECHO MEAS - LVIDD: 4.2 CM
BH CV ECHO MEAS - LVIDS: 2.8 CM
BH CV ECHO MEAS - LVOT AREA: 4.5 CM2
BH CV ECHO MEAS - LVOT DIAM: 2.4 CM
BH CV ECHO MEAS - LVPWD: 1.2 CM
BH CV ECHO MEAS - MED PEAK E' VEL: 7.1 CM/SEC
BH CV ECHO MEAS - MR MAX PG: 57.8 MMHG
BH CV ECHO MEAS - MR MAX VEL: 380 CM/SEC
BH CV ECHO MEAS - MR MEAN PG: 42 MMHG
BH CV ECHO MEAS - MR MEAN VEL: 308 CM/SEC
BH CV ECHO MEAS - MR VTI: 91.9 CM
BH CV ECHO MEAS - MV A MAX VEL: 54.4 CM/SEC
BH CV ECHO MEAS - MV DEC SLOPE: 375.5 CM/SEC2
BH CV ECHO MEAS - MV DEC TIME: 0.2 SEC
BH CV ECHO MEAS - MV E MAX VEL: 79.1 CM/SEC
BH CV ECHO MEAS - MV E/A: 1.45
BH CV ECHO MEAS - MV MAX PG: 3.3 MMHG
BH CV ECHO MEAS - MV MEAN PG: 1.5 MMHG
BH CV ECHO MEAS - MV P1/2T: 69.3 MSEC
BH CV ECHO MEAS - MV V2 VTI: 21 CM
BH CV ECHO MEAS - MVA(P1/2T): 3.2 CM2
BH CV ECHO MEAS - MVA(VTI): 2.04 CM2
BH CV ECHO MEAS - PA ACC TIME: 0.1 SEC
BH CV ECHO MEAS - RAP SYSTOLE: 8 MMHG
BH CV ECHO MEAS - RVSP: 52 MMHG
BH CV ECHO MEAS - SV(LVOT): 42.8 ML
BH CV ECHO MEAS - SV(MOD-SP2): 28.6 ML
BH CV ECHO MEAS - SV(MOD-SP4): 30.8 ML
BH CV ECHO MEAS - TAPSE (>1.6): 1.58 CM
BH CV ECHO MEAS - TR MAX PG: 44 MMHG
BH CV ECHO MEAS - TR MAX VEL: 331 CM/SEC
BH CV ECHO MEASUREMENTS AVERAGE E/E' RATIO: 10.08
BH CV VAS BP RIGHT ARM: NORMAL MMHG
BH CV XLRA - RV BASE: 3.8 CM
BH CV XLRA - RV LENGTH: 7.9 CM
BH CV XLRA - RV MID: 3.1 CM
BH CV XLRA - TDI S': 7.4 CM/SEC
BILIRUB SERPL-MCNC: 0.2 MG/DL (ref 0–1.2)
BILIRUB SERPL-MCNC: 0.4 MG/DL (ref 0–1.2)
BILIRUB SERPL-MCNC: 0.4 MG/DL (ref 0–1.2)
BILIRUB UR QL STRIP: NEGATIVE
BUN SERPL-MCNC: 102 MG/DL (ref 8–23)
BUN SERPL-MCNC: 103 MG/DL (ref 8–23)
BUN SERPL-MCNC: 103 MG/DL (ref 8–23)
BUN/CREAT SERPL: 29.7 (ref 7–25)
BUN/CREAT SERPL: 33.3 (ref 7–25)
BUN/CREAT SERPL: 36.1 (ref 7–25)
CALCIUM SPEC-SCNC: 7.7 MG/DL (ref 8.2–9.6)
CALCIUM SPEC-SCNC: 8.5 MG/DL (ref 8.2–9.6)
CALCIUM SPEC-SCNC: 9.4 MG/DL (ref 8.2–9.6)
CHLORIDE SERPL-SCNC: 101 MMOL/L (ref 98–107)
CHLORIDE SERPL-SCNC: 106 MMOL/L (ref 98–107)
CHLORIDE SERPL-SCNC: 108 MMOL/L (ref 98–107)
CLARITY UR: ABNORMAL
CO2 SERPL-SCNC: 11 MMOL/L (ref 22–29)
CO2 SERPL-SCNC: 13 MMOL/L (ref 22–29)
CO2 SERPL-SCNC: 16 MMOL/L (ref 22–29)
COLOR UR: ABNORMAL
CREAT SERPL-MCNC: 2.85 MG/DL (ref 0.76–1.27)
CREAT SERPL-MCNC: 3.09 MG/DL (ref 0.76–1.27)
CREAT SERPL-MCNC: 3.43 MG/DL (ref 0.76–1.27)
D-LACTATE SERPL-SCNC: 1.7 MMOL/L (ref 0.5–2)
D-LACTATE SERPL-SCNC: 3.3 MMOL/L (ref 0.5–2)
DEPRECATED RDW RBC AUTO: 58.6 FL (ref 37–54)
DEPRECATED RDW RBC AUTO: 59.1 FL (ref 37–54)
DEPRECATED RDW RBC AUTO: 59.2 FL (ref 37–54)
EGFRCR SERPLBLD CKD-EPI 2021: 16.1 ML/MIN/1.73
EGFRCR SERPLBLD CKD-EPI 2021: 18.2 ML/MIN/1.73
EGFRCR SERPLBLD CKD-EPI 2021: 20.1 ML/MIN/1.73
EOSINOPHIL # BLD AUTO: 0.02 10*3/MM3 (ref 0–0.4)
EOSINOPHIL # BLD AUTO: 0.16 10*3/MM3 (ref 0–0.4)
EOSINOPHIL # BLD AUTO: 0.18 10*3/MM3 (ref 0–0.4)
EOSINOPHIL NFR BLD AUTO: 0.1 % (ref 0.3–6.2)
EOSINOPHIL NFR BLD AUTO: 1.4 % (ref 0.3–6.2)
EOSINOPHIL NFR BLD AUTO: 1.5 % (ref 0.3–6.2)
ERYTHROCYTE [DISTWIDTH] IN BLOOD BY AUTOMATED COUNT: 16.7 % (ref 12.3–15.4)
ERYTHROCYTE [DISTWIDTH] IN BLOOD BY AUTOMATED COUNT: 16.8 % (ref 12.3–15.4)
ERYTHROCYTE [DISTWIDTH] IN BLOOD BY AUTOMATED COUNT: 16.9 % (ref 12.3–15.4)
FLUAV SUBTYP SPEC NAA+PROBE: NOT DETECTED
FLUBV RNA ISLT QL NAA+PROBE: NOT DETECTED
GEN 5 1HR TROPONIN T REFLEX: 139 NG/L
GLOBULIN UR ELPH-MCNC: 2.7 GM/DL
GLOBULIN UR ELPH-MCNC: 2.8 GM/DL
GLOBULIN UR ELPH-MCNC: 3.7 GM/DL
GLUCOSE BLDC GLUCOMTR-MCNC: 101 MG/DL (ref 70–130)
GLUCOSE BLDC GLUCOMTR-MCNC: 103 MG/DL (ref 70–130)
GLUCOSE BLDC GLUCOMTR-MCNC: 105 MG/DL (ref 70–130)
GLUCOSE BLDC GLUCOMTR-MCNC: 109 MG/DL (ref 70–130)
GLUCOSE BLDC GLUCOMTR-MCNC: 131 MG/DL (ref 70–130)
GLUCOSE BLDC GLUCOMTR-MCNC: 132 MG/DL (ref 70–130)
GLUCOSE BLDC GLUCOMTR-MCNC: 133 MG/DL (ref 70–130)
GLUCOSE SERPL-MCNC: 100 MG/DL (ref 65–99)
GLUCOSE SERPL-MCNC: 205 MG/DL (ref 65–99)
GLUCOSE SERPL-MCNC: 81 MG/DL (ref 65–99)
GLUCOSE UR STRIP-MCNC: NEGATIVE MG/DL
HCT VFR BLD AUTO: 26 % (ref 37.5–51)
HCT VFR BLD AUTO: 27.5 % (ref 37.5–51)
HCT VFR BLD AUTO: 30.9 % (ref 37.5–51)
HGB BLD-MCNC: 8 G/DL (ref 13–17.7)
HGB BLD-MCNC: 8.6 G/DL (ref 13–17.7)
HGB BLD-MCNC: 9.7 G/DL (ref 13–17.7)
HGB UR QL STRIP.AUTO: ABNORMAL
HOLD SPECIMEN: NORMAL
HYALINE CASTS UR QL AUTO: ABNORMAL /LPF
IMM GRANULOCYTES # BLD AUTO: 0.16 10*3/MM3 (ref 0–0.05)
IMM GRANULOCYTES # BLD AUTO: 0.19 10*3/MM3 (ref 0–0.05)
IMM GRANULOCYTES # BLD AUTO: 0.35 10*3/MM3 (ref 0–0.05)
IMM GRANULOCYTES NFR BLD AUTO: 1.3 % (ref 0–0.5)
IMM GRANULOCYTES NFR BLD AUTO: 1.6 % (ref 0–0.5)
IMM GRANULOCYTES NFR BLD AUTO: 2 % (ref 0–0.5)
INR PPP: 1.3 (ref 0.89–1.12)
IVRT: 92 MS
KETONES UR QL STRIP: NEGATIVE
LEFT ATRIUM VOLUME INDEX: 22.3 ML/M2
LEFT ATRIUM VOLUME: 46.6 ML
LEUKOCYTE ESTERASE UR QL STRIP.AUTO: ABNORMAL
LV EF BIPLANE MOD: 44 %
LYMPHOCYTES # BLD AUTO: 0.85 10*3/MM3 (ref 0.7–3.1)
LYMPHOCYTES # BLD AUTO: 1.01 10*3/MM3 (ref 0.7–3.1)
LYMPHOCYTES # BLD AUTO: 1.54 10*3/MM3 (ref 0.7–3.1)
LYMPHOCYTES NFR BLD AUTO: 12.7 % (ref 19.6–45.3)
LYMPHOCYTES NFR BLD AUTO: 4.9 % (ref 19.6–45.3)
LYMPHOCYTES NFR BLD AUTO: 8.6 % (ref 19.6–45.3)
MCH RBC QN AUTO: 29.5 PG (ref 26.6–33)
MCH RBC QN AUTO: 30.1 PG (ref 26.6–33)
MCH RBC QN AUTO: 30.2 PG (ref 26.6–33)
MCHC RBC AUTO-ENTMCNC: 30.8 G/DL (ref 31.5–35.7)
MCHC RBC AUTO-ENTMCNC: 31.3 G/DL (ref 31.5–35.7)
MCHC RBC AUTO-ENTMCNC: 31.4 G/DL (ref 31.5–35.7)
MCV RBC AUTO: 95.9 FL (ref 79–97)
MCV RBC AUTO: 96.2 FL (ref 79–97)
MCV RBC AUTO: 96.3 FL (ref 79–97)
MONOCYTES # BLD AUTO: 0.73 10*3/MM3 (ref 0.1–0.9)
MONOCYTES # BLD AUTO: 1.1 10*3/MM3 (ref 0.1–0.9)
MONOCYTES # BLD AUTO: 1.32 10*3/MM3 (ref 0.1–0.9)
MONOCYTES NFR BLD AUTO: 6.2 % (ref 5–12)
MONOCYTES NFR BLD AUTO: 7.6 % (ref 5–12)
MONOCYTES NFR BLD AUTO: 9 % (ref 5–12)
NEUTROPHILS NFR BLD AUTO: 14.65 10*3/MM3 (ref 1.7–7)
NEUTROPHILS NFR BLD AUTO: 75.1 % (ref 42.7–76)
NEUTROPHILS NFR BLD AUTO: 81.9 % (ref 42.7–76)
NEUTROPHILS NFR BLD AUTO: 85 % (ref 42.7–76)
NEUTROPHILS NFR BLD AUTO: 9.14 10*3/MM3 (ref 1.7–7)
NEUTROPHILS NFR BLD AUTO: 9.6 10*3/MM3 (ref 1.7–7)
NITRITE UR QL STRIP: NEGATIVE
NRBC BLD AUTO-RTO: 0.8 /100 WBC (ref 0–0.2)
NRBC BLD AUTO-RTO: 1.6 /100 WBC (ref 0–0.2)
NRBC BLD AUTO-RTO: 2.3 /100 WBC (ref 0–0.2)
NT-PROBNP SERPL-MCNC: ABNORMAL PG/ML (ref 0–1800)
PH UR STRIP.AUTO: 5.5 [PH] (ref 5–8)
PLATELET # BLD AUTO: 226 10*3/MM3 (ref 140–450)
PLATELET # BLD AUTO: 237 10*3/MM3 (ref 140–450)
PLATELET # BLD AUTO: 252 10*3/MM3 (ref 140–450)
PMV BLD AUTO: 10 FL (ref 6–12)
PMV BLD AUTO: 10.3 FL (ref 6–12)
PMV BLD AUTO: 10.4 FL (ref 6–12)
POTASSIUM SERPL-SCNC: 4.8 MMOL/L (ref 3.5–5.2)
POTASSIUM SERPL-SCNC: 4.8 MMOL/L (ref 3.5–5.2)
POTASSIUM SERPL-SCNC: 5.1 MMOL/L (ref 3.5–5.2)
PROCALCITONIN SERPL-MCNC: 1.51 NG/ML (ref 0–0.25)
PROCALCITONIN SERPL-MCNC: 16.46 NG/ML (ref 0–0.25)
PROT SERPL-MCNC: 5.3 G/DL (ref 6–8.5)
PROT SERPL-MCNC: 5.5 G/DL (ref 6–8.5)
PROT SERPL-MCNC: 6.9 G/DL (ref 6–8.5)
PROT UR QL STRIP: ABNORMAL
PROTHROMBIN TIME: 16.3 SECONDS (ref 12.2–14.5)
QT INTERVAL: 390 MS
QT INTERVAL: 404 MS
QT INTERVAL: 414 MS
QTC INTERVAL: 445 MS
QTC INTERVAL: 546 MS
QTC INTERVAL: 575 MS
RBC # BLD AUTO: 2.71 10*6/MM3 (ref 4.14–5.8)
RBC # BLD AUTO: 2.86 10*6/MM3 (ref 4.14–5.8)
RBC # BLD AUTO: 3.21 10*6/MM3 (ref 4.14–5.8)
RBC # UR STRIP: ABNORMAL /HPF
REF LAB TEST METHOD: ABNORMAL
SARS-COV-2 RNA RESP QL NAA+PROBE: DETECTED
SODIUM SERPL-SCNC: 135 MMOL/L (ref 136–145)
SODIUM SERPL-SCNC: 137 MMOL/L (ref 136–145)
SODIUM SERPL-SCNC: 139 MMOL/L (ref 136–145)
SP GR UR STRIP: 1.01 (ref 1–1.03)
SQUAMOUS #/AREA URNS HPF: ABNORMAL /HPF
TROPONIN T % DELTA: -3
TROPONIN T NUMERIC DELTA: -5 NG/L
TROPONIN T SERPL HS-MCNC: 144 NG/L
UROBILINOGEN UR QL STRIP: ABNORMAL
WBC # UR STRIP: ABNORMAL /HPF
WBC NRBC COR # BLD AUTO: 11.73 10*3/MM3 (ref 3.4–10.8)
WBC NRBC COR # BLD AUTO: 12.17 10*3/MM3 (ref 3.4–10.8)
WBC NRBC COR # BLD AUTO: 17.26 10*3/MM3 (ref 3.4–10.8)
WHOLE BLOOD HOLD COAG: NORMAL
WHOLE BLOOD HOLD SPECIMEN: NORMAL

## 2025-01-01 PROCEDURE — 25010000002 MIDAZOLAM PER 1 MG: Performed by: NURSE PRACTITIONER

## 2025-01-01 PROCEDURE — 80053 COMPREHEN METABOLIC PANEL: CPT | Performed by: INTERNAL MEDICINE

## 2025-01-01 PROCEDURE — 25010000002 HYDROMORPHONE 1 MG/ML SOLUTION: Performed by: NURSE PRACTITIONER

## 2025-01-01 PROCEDURE — 93306 TTE W/DOPPLER COMPLETE: CPT

## 2025-01-01 PROCEDURE — 25010000002 HALOPERIDOL LACTATE PER 5 MG

## 2025-01-01 PROCEDURE — 25010000002 GLYCOPYRROLATE 0.2 MG/ML SOLUTION: Performed by: NURSE PRACTITIONER

## 2025-01-01 PROCEDURE — 25010000002 HYDROMORPHONE PER 4 MG

## 2025-01-01 PROCEDURE — 87636 SARSCOV2 & INF A&B AMP PRB: CPT | Performed by: EMERGENCY MEDICINE

## 2025-01-01 PROCEDURE — 71045 X-RAY EXAM CHEST 1 VIEW: CPT

## 2025-01-01 PROCEDURE — 83605 ASSAY OF LACTIC ACID: CPT | Performed by: EMERGENCY MEDICINE

## 2025-01-01 PROCEDURE — 93306 TTE W/DOPPLER COMPLETE: CPT | Performed by: INTERNAL MEDICINE

## 2025-01-01 PROCEDURE — 93005 ELECTROCARDIOGRAM TRACING: CPT | Performed by: EMERGENCY MEDICINE

## 2025-01-01 PROCEDURE — 25010000002 MORPHINE PER 10 MG: Performed by: INTERNAL MEDICINE

## 2025-01-01 PROCEDURE — 85025 COMPLETE CBC W/AUTO DIFF WBC: CPT | Performed by: INTERNAL MEDICINE

## 2025-01-01 PROCEDURE — 84145 PROCALCITONIN (PCT): CPT | Performed by: INTERNAL MEDICINE

## 2025-01-01 PROCEDURE — 80053 COMPREHEN METABOLIC PANEL: CPT | Performed by: EMERGENCY MEDICINE

## 2025-01-01 PROCEDURE — 99222 1ST HOSP IP/OBS MODERATE 55: CPT | Performed by: INTERNAL MEDICINE

## 2025-01-01 PROCEDURE — 25010000002 VANCOMYCIN 1.75-0.9 GM/500ML-% SOLUTION: Performed by: EMERGENCY MEDICINE

## 2025-01-01 PROCEDURE — 93010 ELECTROCARDIOGRAM REPORT: CPT | Performed by: INTERNAL MEDICINE

## 2025-01-01 PROCEDURE — 87086 URINE CULTURE/COLONY COUNT: CPT | Performed by: INTERNAL MEDICINE

## 2025-01-01 PROCEDURE — 99233 SBSQ HOSP IP/OBS HIGH 50: CPT | Performed by: INTERNAL MEDICINE

## 2025-01-01 PROCEDURE — 25810000003 SODIUM CHLORIDE 0.9 % SOLUTION: Performed by: INTERNAL MEDICINE

## 2025-01-01 PROCEDURE — 85730 THROMBOPLASTIN TIME PARTIAL: CPT | Performed by: EMERGENCY MEDICINE

## 2025-01-01 PROCEDURE — 25010000002 MIDAZOLAM PER 1 MG

## 2025-01-01 PROCEDURE — 99291 CRITICAL CARE FIRST HOUR: CPT

## 2025-01-01 PROCEDURE — 36415 COLL VENOUS BLD VENIPUNCTURE: CPT

## 2025-01-01 PROCEDURE — 87040 BLOOD CULTURE FOR BACTERIA: CPT | Performed by: EMERGENCY MEDICINE

## 2025-01-01 PROCEDURE — 85610 PROTHROMBIN TIME: CPT | Performed by: EMERGENCY MEDICINE

## 2025-01-01 PROCEDURE — 82948 REAGENT STRIP/BLOOD GLUCOSE: CPT

## 2025-01-01 PROCEDURE — 83880 ASSAY OF NATRIURETIC PEPTIDE: CPT | Performed by: EMERGENCY MEDICINE

## 2025-01-01 PROCEDURE — P9047 ALBUMIN (HUMAN), 25%, 50ML: HCPCS | Performed by: PHYSICIAN ASSISTANT

## 2025-01-01 PROCEDURE — 25010000002 ALBUMIN HUMAN 25% PER 50 ML: Performed by: PHYSICIAN ASSISTANT

## 2025-01-01 PROCEDURE — 25810000003 LACTATED RINGERS SOLUTION: Performed by: EMERGENCY MEDICINE

## 2025-01-01 PROCEDURE — 25010000002 CEFEPIME PER 500 MG: Performed by: EMERGENCY MEDICINE

## 2025-01-01 PROCEDURE — 85025 COMPLETE CBC W/AUTO DIFF WBC: CPT | Performed by: EMERGENCY MEDICINE

## 2025-01-01 PROCEDURE — 93005 ELECTROCARDIOGRAM TRACING: CPT | Performed by: INTERNAL MEDICINE

## 2025-01-01 PROCEDURE — 84484 ASSAY OF TROPONIN QUANT: CPT | Performed by: EMERGENCY MEDICINE

## 2025-01-01 PROCEDURE — 25010000002 CEFTRIAXONE PER 250 MG: Performed by: INTERNAL MEDICINE

## 2025-01-01 PROCEDURE — 99232 SBSQ HOSP IP/OBS MODERATE 35: CPT | Performed by: INTERNAL MEDICINE

## 2025-01-01 PROCEDURE — 84145 PROCALCITONIN (PCT): CPT | Performed by: EMERGENCY MEDICINE

## 2025-01-01 PROCEDURE — 25010000002 ENOXAPARIN PER 10 MG: Performed by: EMERGENCY MEDICINE

## 2025-01-01 PROCEDURE — 25010000002 HALOPERIDOL LACTATE PER 5 MG: Performed by: NURSE PRACTITIONER

## 2025-01-01 PROCEDURE — 81001 URINALYSIS AUTO W/SCOPE: CPT | Performed by: INTERNAL MEDICINE

## 2025-01-01 RX ORDER — ACETAMINOPHEN 650 MG/1
650 SUPPOSITORY RECTAL EVERY 4 HOURS PRN
Status: DISCONTINUED | OUTPATIENT
Start: 2025-01-01 | End: 2025-01-01 | Stop reason: HOSPADM

## 2025-01-01 RX ORDER — MIDAZOLAM HYDROCHLORIDE 1 MG/ML
0.5 INJECTION, SOLUTION INTRAMUSCULAR; INTRAVENOUS EVERY 4 HOURS PRN
Status: DISCONTINUED | OUTPATIENT
Start: 2025-01-01 | End: 2025-01-01 | Stop reason: HOSPADM

## 2025-01-01 RX ORDER — SODIUM CHLORIDE 0.9 % (FLUSH) 0.9 %
10 SYRINGE (ML) INJECTION AS NEEDED
Status: DISCONTINUED | OUTPATIENT
Start: 2025-01-01 | End: 2025-01-01 | Stop reason: HOSPADM

## 2025-01-01 RX ORDER — INSULIN LISPRO 100 [IU]/ML
2-7 INJECTION, SOLUTION INTRAVENOUS; SUBCUTANEOUS
Status: DISCONTINUED | OUTPATIENT
Start: 2025-01-01 | End: 2025-01-01 | Stop reason: HOSPADM

## 2025-01-01 RX ORDER — OXYBUTYNIN CHLORIDE 5 MG/1
10 TABLET, EXTENDED RELEASE ORAL DAILY
Status: DISCONTINUED | OUTPATIENT
Start: 2025-01-01 | End: 2025-01-01 | Stop reason: HOSPADM

## 2025-01-01 RX ORDER — MIDAZOLAM HYDROCHLORIDE 1 MG/ML
0.5 INJECTION, SOLUTION INTRAMUSCULAR; INTRAVENOUS
Status: DISCONTINUED | OUTPATIENT
Start: 2025-01-01 | End: 2025-01-01 | Stop reason: HOSPADM

## 2025-01-01 RX ORDER — MORPHINE SULFATE 2 MG/ML
2 INJECTION, SOLUTION INTRAMUSCULAR; INTRAVENOUS
Status: DISCONTINUED | OUTPATIENT
Start: 2025-01-01 | End: 2025-01-01

## 2025-01-01 RX ORDER — ONDANSETRON 2 MG/ML
4 INJECTION INTRAMUSCULAR; INTRAVENOUS EVERY 6 HOURS PRN
Status: DISCONTINUED | OUTPATIENT
Start: 2025-01-01 | End: 2025-01-01 | Stop reason: HOSPADM

## 2025-01-01 RX ORDER — ENOXAPARIN SODIUM 100 MG/ML
1 INJECTION SUBCUTANEOUS ONCE
Status: COMPLETED | OUTPATIENT
Start: 2025-01-01 | End: 2025-01-01

## 2025-01-01 RX ORDER — HALOPERIDOL 5 MG/ML
1 INJECTION INTRAMUSCULAR EVERY 6 HOURS PRN
Status: DISCONTINUED | OUTPATIENT
Start: 2025-01-01 | End: 2025-01-01 | Stop reason: HOSPADM

## 2025-01-01 RX ORDER — MIDAZOLAM HYDROCHLORIDE 1 MG/ML
0.5 INJECTION, SOLUTION INTRAMUSCULAR; INTRAVENOUS EVERY 4 HOURS
Status: DISCONTINUED | OUTPATIENT
Start: 2025-01-01 | End: 2025-01-01 | Stop reason: HOSPADM

## 2025-01-01 RX ORDER — SCOPOLAMINE 1 MG/3D
1 PATCH, EXTENDED RELEASE TRANSDERMAL
Status: DISCONTINUED | OUTPATIENT
Start: 2025-01-01 | End: 2025-01-01 | Stop reason: HOSPADM

## 2025-01-01 RX ORDER — HYDROCODONE BITARTRATE AND ACETAMINOPHEN 5; 325 MG/1; MG/1
1 TABLET ORAL EVERY 6 HOURS PRN
Refills: 0 | Status: DISCONTINUED | OUTPATIENT
Start: 2025-01-01 | End: 2025-01-01 | Stop reason: HOSPADM

## 2025-01-01 RX ORDER — SODIUM CHLORIDE 9 MG/ML
100 INJECTION, SOLUTION INTRAVENOUS CONTINUOUS
Status: ACTIVE | OUTPATIENT
Start: 2025-01-01 | End: 2025-01-01

## 2025-01-01 RX ORDER — NITROGLYCERIN 0.4 MG/1
0.4 TABLET SUBLINGUAL
Status: DISCONTINUED | OUTPATIENT
Start: 2025-01-01 | End: 2025-01-01 | Stop reason: HOSPADM

## 2025-01-01 RX ORDER — ACETAMINOPHEN 325 MG/1
650 TABLET ORAL EVERY 4 HOURS PRN
Status: DISCONTINUED | OUTPATIENT
Start: 2025-01-01 | End: 2025-01-01 | Stop reason: HOSPADM

## 2025-01-01 RX ORDER — POLYETHYLENE GLYCOL 3350 17 G/17G
17 POWDER, FOR SOLUTION ORAL DAILY PRN
Status: DISCONTINUED | OUTPATIENT
Start: 2025-01-01 | End: 2025-01-01 | Stop reason: HOSPADM

## 2025-01-01 RX ORDER — BISACODYL 5 MG/1
5 TABLET, DELAYED RELEASE ORAL DAILY PRN
Status: DISCONTINUED | OUTPATIENT
Start: 2025-01-01 | End: 2025-01-01 | Stop reason: HOSPADM

## 2025-01-01 RX ORDER — BISOPROLOL FUMARATE 5 MG/1
2.5 TABLET, FILM COATED ORAL DAILY
Status: DISCONTINUED | OUTPATIENT
Start: 2025-01-01 | End: 2025-01-01 | Stop reason: HOSPADM

## 2025-01-01 RX ORDER — IBUPROFEN 600 MG/1
1 TABLET ORAL
Status: DISCONTINUED | OUTPATIENT
Start: 2025-01-01 | End: 2025-01-01 | Stop reason: HOSPADM

## 2025-01-01 RX ORDER — MIRABEGRON 50 MG/1
50 TABLET, FILM COATED, EXTENDED RELEASE ORAL DAILY
Qty: 90 TABLET | Refills: 3 | Status: SHIPPED | OUTPATIENT
Start: 2025-01-01

## 2025-01-01 RX ORDER — SODIUM CHLORIDE 9 MG/ML
40 INJECTION, SOLUTION INTRAVENOUS AS NEEDED
Status: DISCONTINUED | OUTPATIENT
Start: 2025-01-01 | End: 2025-01-01 | Stop reason: HOSPADM

## 2025-01-01 RX ORDER — ACETAMINOPHEN 160 MG/5ML
650 SOLUTION ORAL EVERY 4 HOURS PRN
Status: DISCONTINUED | OUTPATIENT
Start: 2025-01-01 | End: 2025-01-01 | Stop reason: HOSPADM

## 2025-01-01 RX ORDER — FINASTERIDE 5 MG/1
5 TABLET, FILM COATED ORAL DAILY
Status: DISCONTINUED | OUTPATIENT
Start: 2025-01-01 | End: 2025-01-01 | Stop reason: HOSPADM

## 2025-01-01 RX ORDER — BISACODYL 10 MG
10 SUPPOSITORY, RECTAL RECTAL DAILY PRN
Status: DISCONTINUED | OUTPATIENT
Start: 2025-01-01 | End: 2025-01-01 | Stop reason: HOSPADM

## 2025-01-01 RX ORDER — NICOTINE POLACRILEX 4 MG
15 LOZENGE BUCCAL
Status: DISCONTINUED | OUTPATIENT
Start: 2025-01-01 | End: 2025-01-01 | Stop reason: HOSPADM

## 2025-01-01 RX ORDER — ASPIRIN 81 MG/1
81 TABLET ORAL DAILY
Status: DISCONTINUED | OUTPATIENT
Start: 2025-01-01 | End: 2025-01-01 | Stop reason: HOSPADM

## 2025-01-01 RX ORDER — SODIUM CHLORIDE 9 MG/ML
50 INJECTION, SOLUTION INTRAVENOUS CONTINUOUS
Status: ACTIVE | OUTPATIENT
Start: 2025-01-01 | End: 2025-01-01

## 2025-01-01 RX ORDER — VANCOMYCIN 1.75 GRAM/500 ML IN 0.9 % SODIUM CHLORIDE INTRAVENOUS
20 ONCE
Status: COMPLETED | OUTPATIENT
Start: 2025-01-01 | End: 2025-01-01

## 2025-01-01 RX ORDER — MORPHINE SULFATE 2 MG/ML
2 INJECTION, SOLUTION INTRAMUSCULAR; INTRAVENOUS EVERY 4 HOURS PRN
Status: DISCONTINUED | OUTPATIENT
Start: 2025-01-01 | End: 2025-01-01

## 2025-01-01 RX ORDER — HYDROMORPHONE HYDROCHLORIDE 1 MG/ML
0.5 INJECTION, SOLUTION INTRAMUSCULAR; INTRAVENOUS; SUBCUTANEOUS
Status: DISCONTINUED | OUTPATIENT
Start: 2025-01-01 | End: 2025-01-01 | Stop reason: HOSPADM

## 2025-01-01 RX ORDER — BISACODYL 10 MG
10 SUPPOSITORY, RECTAL RECTAL ONCE
Status: DISCONTINUED | OUTPATIENT
Start: 2025-01-01 | End: 2025-01-01 | Stop reason: HOSPADM

## 2025-01-01 RX ORDER — SODIUM CHLORIDE 0.9 % (FLUSH) 0.9 %
10 SYRINGE (ML) INJECTION EVERY 12 HOURS SCHEDULED
Status: DISCONTINUED | OUTPATIENT
Start: 2025-01-01 | End: 2025-01-01 | Stop reason: HOSPADM

## 2025-01-01 RX ORDER — ALBUMIN (HUMAN) 12.5 G/50ML
12.5 SOLUTION INTRAVENOUS ONCE
Status: COMPLETED | OUTPATIENT
Start: 2025-01-01 | End: 2025-01-01

## 2025-01-01 RX ORDER — AMOXICILLIN 250 MG
2 CAPSULE ORAL 2 TIMES DAILY PRN
Status: DISCONTINUED | OUTPATIENT
Start: 2025-01-01 | End: 2025-01-01 | Stop reason: HOSPADM

## 2025-01-01 RX ORDER — GLYCOPYRROLATE 0.2 MG/ML
0.4 INJECTION INTRAMUSCULAR; INTRAVENOUS EVERY 4 HOURS PRN
Status: DISCONTINUED | OUTPATIENT
Start: 2025-01-01 | End: 2025-01-01 | Stop reason: HOSPADM

## 2025-01-01 RX ORDER — LEVOTHYROXINE SODIUM 50 UG/1
50 TABLET ORAL
Status: DISCONTINUED | OUTPATIENT
Start: 2025-01-01 | End: 2025-01-01 | Stop reason: HOSPADM

## 2025-01-01 RX ORDER — DEXTROSE MONOHYDRATE 25 G/50ML
25 INJECTION, SOLUTION INTRAVENOUS
Status: DISCONTINUED | OUTPATIENT
Start: 2025-01-01 | End: 2025-01-01 | Stop reason: HOSPADM

## 2025-01-01 RX ADMIN — HALOPERIDOL LACTATE 1 MG: 5 INJECTION, SOLUTION INTRAMUSCULAR at 14:30

## 2025-01-01 RX ADMIN — HYDROMORPHONE HYDROCHLORIDE 0.75 MG: 1 INJECTION, SOLUTION INTRAMUSCULAR; INTRAVENOUS; SUBCUTANEOUS at 02:24

## 2025-01-01 RX ADMIN — APIXABAN 2.5 MG: 2.5 TABLET, FILM COATED ORAL at 20:53

## 2025-01-01 RX ADMIN — FINASTERIDE 5 MG: 5 TABLET, FILM COATED ORAL at 22:52

## 2025-01-01 RX ADMIN — MORPHINE SULFATE 2 MG: 2 INJECTION, SOLUTION INTRAMUSCULAR; INTRAVENOUS at 09:34

## 2025-01-01 RX ADMIN — Medication 1750 MG: at 17:48

## 2025-01-01 RX ADMIN — HYDROMORPHONE HYDROCHLORIDE 0.75 MG: 1 INJECTION, SOLUTION INTRAMUSCULAR; INTRAVENOUS; SUBCUTANEOUS at 06:17

## 2025-01-01 RX ADMIN — HYDROMORPHONE HYDROCHLORIDE 0.5 MG: 1 INJECTION, SOLUTION INTRAMUSCULAR; INTRAVENOUS; SUBCUTANEOUS at 13:00

## 2025-01-01 RX ADMIN — OXYBUTYNIN CHLORIDE 10 MG: 5 TABLET, EXTENDED RELEASE ORAL at 08:15

## 2025-01-01 RX ADMIN — MIDAZOLAM HYDROCHLORIDE 0.5 MG: 1 INJECTION, SOLUTION INTRAMUSCULAR; INTRAVENOUS at 06:17

## 2025-01-01 RX ADMIN — MINERAL OIL: 999 LIQUID ORAL at 19:41

## 2025-01-01 RX ADMIN — HALOPERIDOL LACTATE 1 MG: 5 INJECTION, SOLUTION INTRAMUSCULAR at 03:24

## 2025-01-01 RX ADMIN — MIDAZOLAM HYDROCHLORIDE 0.5 MG: 1 INJECTION, SOLUTION INTRAMUSCULAR; INTRAVENOUS at 03:39

## 2025-01-01 RX ADMIN — MINERAL OIL: 999 LIQUID ORAL at 14:35

## 2025-01-01 RX ADMIN — HYDROCODONE BITARTRATE AND ACETAMINOPHEN 1 TABLET: 5; 325 TABLET ORAL at 01:30

## 2025-01-01 RX ADMIN — HYDROMORPHONE HYDROCHLORIDE 0.75 MG: 1 INJECTION, SOLUTION INTRAMUSCULAR; INTRAVENOUS; SUBCUTANEOUS at 02:37

## 2025-01-01 RX ADMIN — MIDAZOLAM HYDROCHLORIDE 0.5 MG: 1 INJECTION, SOLUTION INTRAMUSCULAR; INTRAVENOUS at 11:12

## 2025-01-01 RX ADMIN — HYDROMORPHONE HYDROCHLORIDE 0.5 MG: 1 INJECTION, SOLUTION INTRAMUSCULAR; INTRAVENOUS; SUBCUTANEOUS at 17:52

## 2025-01-01 RX ADMIN — HYDROMORPHONE HYDROCHLORIDE 0.5 MG: 1 INJECTION, SOLUTION INTRAMUSCULAR; INTRAVENOUS; SUBCUTANEOUS at 05:43

## 2025-01-01 RX ADMIN — HYDROMORPHONE HYDROCHLORIDE 0.75 MG: 1 INJECTION, SOLUTION INTRAMUSCULAR; INTRAVENOUS; SUBCUTANEOUS at 00:25

## 2025-01-01 RX ADMIN — MIDAZOLAM HYDROCHLORIDE 0.5 MG: 1 INJECTION, SOLUTION INTRAMUSCULAR; INTRAVENOUS at 09:40

## 2025-01-01 RX ADMIN — HYDROMORPHONE HYDROCHLORIDE 0.75 MG: 1 INJECTION, SOLUTION INTRAMUSCULAR; INTRAVENOUS; SUBCUTANEOUS at 00:05

## 2025-01-01 RX ADMIN — HYDROMORPHONE HYDROCHLORIDE 0.75 MG: 1 INJECTION, SOLUTION INTRAMUSCULAR; INTRAVENOUS; SUBCUTANEOUS at 13:47

## 2025-01-01 RX ADMIN — HYDROMORPHONE HYDROCHLORIDE 0.75 MG: 1 INJECTION, SOLUTION INTRAMUSCULAR; INTRAVENOUS; SUBCUTANEOUS at 21:51

## 2025-01-01 RX ADMIN — GLYCOPYRROLATE 0.4 MG: 0.2 INJECTION, SOLUTION INTRAMUSCULAR; INTRAVENOUS at 06:20

## 2025-01-01 RX ADMIN — HYDROMORPHONE HYDROCHLORIDE 0.75 MG: 1 INJECTION, SOLUTION INTRAMUSCULAR; INTRAVENOUS; SUBCUTANEOUS at 16:23

## 2025-01-01 RX ADMIN — Medication 10 ML: at 20:53

## 2025-01-01 RX ADMIN — MINERAL OIL: 999 LIQUID ORAL at 20:21

## 2025-01-01 RX ADMIN — HYDROMORPHONE HYDROCHLORIDE 0.75 MG: 1 INJECTION, SOLUTION INTRAMUSCULAR; INTRAVENOUS; SUBCUTANEOUS at 13:49

## 2025-01-01 RX ADMIN — HYDROMORPHONE HYDROCHLORIDE 0.75 MG: 1 INJECTION, SOLUTION INTRAMUSCULAR; INTRAVENOUS; SUBCUTANEOUS at 04:17

## 2025-01-01 RX ADMIN — MIDAZOLAM HYDROCHLORIDE 0.5 MG: 1 INJECTION, SOLUTION INTRAMUSCULAR; INTRAVENOUS at 04:07

## 2025-01-01 RX ADMIN — SODIUM CHLORIDE 2000 MG: 900 INJECTION INTRAVENOUS at 14:30

## 2025-01-01 RX ADMIN — SODIUM CHLORIDE 100 ML/HR: 9 INJECTION, SOLUTION INTRAVENOUS at 08:17

## 2025-01-01 RX ADMIN — MINERAL OIL: 999 LIQUID ORAL at 16:26

## 2025-01-01 RX ADMIN — MIDAZOLAM HYDROCHLORIDE 0.5 MG: 1 INJECTION, SOLUTION INTRAMUSCULAR; INTRAVENOUS at 00:05

## 2025-01-01 RX ADMIN — HALOPERIDOL LACTATE 1 MG: 5 INJECTION, SOLUTION INTRAMUSCULAR at 13:51

## 2025-01-01 RX ADMIN — LEVOTHYROXINE SODIUM 50 MCG: 0.05 TABLET ORAL at 05:38

## 2025-01-01 RX ADMIN — BISOPROLOL FUMARATE 2.5 MG: 5 TABLET, FILM COATED ORAL at 08:15

## 2025-01-01 RX ADMIN — SODIUM CHLORIDE, SODIUM LACTATE, POTASSIUM CHLORIDE, CALCIUM CHLORIDE 1000 ML: 20; 30; 600; 310 INJECTION, SOLUTION INTRAVENOUS at 17:06

## 2025-01-01 RX ADMIN — MIDAZOLAM HYDROCHLORIDE 0.5 MG: 1 INJECTION, SOLUTION INTRAMUSCULAR; INTRAVENOUS at 19:41

## 2025-01-01 RX ADMIN — MIDAZOLAM HYDROCHLORIDE 0.5 MG: 1 INJECTION, SOLUTION INTRAMUSCULAR; INTRAVENOUS at 16:22

## 2025-01-01 RX ADMIN — HYDROMORPHONE HYDROCHLORIDE 0.75 MG: 1 INJECTION, SOLUTION INTRAMUSCULAR; INTRAVENOUS; SUBCUTANEOUS at 09:41

## 2025-01-01 RX ADMIN — APIXABAN 2.5 MG: 2.5 TABLET, FILM COATED ORAL at 10:00

## 2025-01-01 RX ADMIN — Medication 10 ML: at 22:43

## 2025-01-01 RX ADMIN — POLYVINYL ALCOHOL, POVIDONE 1 DROP: 14; 6 SOLUTION/ DROPS OPHTHALMIC at 08:02

## 2025-01-01 RX ADMIN — HYDROMORPHONE HYDROCHLORIDE 0.5 MG: 1 INJECTION, SOLUTION INTRAMUSCULAR; INTRAVENOUS; SUBCUTANEOUS at 11:32

## 2025-01-01 RX ADMIN — SODIUM CHLORIDE 100 ML/HR: 9 INJECTION, SOLUTION INTRAVENOUS at 22:43

## 2025-01-01 RX ADMIN — HYDROMORPHONE HYDROCHLORIDE 0.75 MG: 1 INJECTION, SOLUTION INTRAMUSCULAR; INTRAVENOUS; SUBCUTANEOUS at 20:16

## 2025-01-01 RX ADMIN — HYDROMORPHONE HYDROCHLORIDE 0.75 MG: 1 INJECTION, SOLUTION INTRAMUSCULAR; INTRAVENOUS; SUBCUTANEOUS at 04:06

## 2025-01-01 RX ADMIN — POLYVINYL ALCOHOL, POVIDONE 1 DROP: 14; 6 SOLUTION/ DROPS OPHTHALMIC at 14:36

## 2025-01-01 RX ADMIN — MINERAL OIL: 999 LIQUID ORAL at 07:57

## 2025-01-01 RX ADMIN — APIXABAN 2.5 MG: 2.5 TABLET, FILM COATED ORAL at 09:00

## 2025-01-01 RX ADMIN — MIDAZOLAM HYDROCHLORIDE 0.5 MG: 1 INJECTION, SOLUTION INTRAMUSCULAR; INTRAVENOUS at 11:30

## 2025-01-01 RX ADMIN — POLYVINYL ALCOHOL, POVIDONE 1 DROP: 14; 6 SOLUTION/ DROPS OPHTHALMIC at 20:21

## 2025-01-01 RX ADMIN — ASPIRIN 81 MG: 81 TABLET, COATED ORAL at 09:00

## 2025-01-01 RX ADMIN — HYDROMORPHONE HYDROCHLORIDE 0.75 MG: 1 INJECTION, SOLUTION INTRAMUSCULAR; INTRAVENOUS; SUBCUTANEOUS at 19:41

## 2025-01-01 RX ADMIN — Medication 10 ML: at 09:02

## 2025-01-01 RX ADMIN — POLYVINYL ALCOHOL, POVIDONE 1 DROP: 14; 6 SOLUTION/ DROPS OPHTHALMIC at 16:26

## 2025-01-01 RX ADMIN — BISACODYL 10 MG: 10 SUPPOSITORY RECTAL at 21:41

## 2025-01-01 RX ADMIN — MIDAZOLAM HYDROCHLORIDE 0.5 MG: 1 INJECTION, SOLUTION INTRAMUSCULAR; INTRAVENOUS at 17:40

## 2025-01-01 RX ADMIN — CEFEPIME HYDROCHLORIDE 1000 MG: 1 INJECTION, POWDER, FOR SOLUTION INTRAMUSCULAR; INTRAVENOUS at 17:25

## 2025-01-01 RX ADMIN — SODIUM CHLORIDE, SODIUM LACTATE, POTASSIUM CHLORIDE, CALCIUM CHLORIDE 1000 ML: 20; 30; 600; 310 INJECTION, SOLUTION INTRAVENOUS at 19:28

## 2025-01-01 RX ADMIN — HYDROMORPHONE HYDROCHLORIDE 0.75 MG: 1 INJECTION, SOLUTION INTRAMUSCULAR; INTRAVENOUS; SUBCUTANEOUS at 21:48

## 2025-01-01 RX ADMIN — Medication 10 ML: at 08:17

## 2025-01-01 RX ADMIN — ASPIRIN 81 MG: 81 TABLET, COATED ORAL at 08:15

## 2025-01-01 RX ADMIN — SCOPOLAMINE 1 PATCH: 1.5 PATCH, EXTENDED RELEASE TRANSDERMAL at 07:56

## 2025-01-01 RX ADMIN — HYDROMORPHONE HYDROCHLORIDE 0.5 MG: 1 INJECTION, SOLUTION INTRAMUSCULAR; INTRAVENOUS; SUBCUTANEOUS at 09:00

## 2025-01-01 RX ADMIN — POLYVINYL ALCOHOL, POVIDONE 1 DROP: 14; 6 SOLUTION/ DROPS OPHTHALMIC at 19:41

## 2025-01-01 RX ADMIN — MINERAL OIL: 999 LIQUID ORAL at 02:37

## 2025-01-01 RX ADMIN — MIDAZOLAM HYDROCHLORIDE 0.5 MG: 1 INJECTION, SOLUTION INTRAMUSCULAR; INTRAVENOUS at 21:51

## 2025-01-01 RX ADMIN — HALOPERIDOL LACTATE 1 MG: 5 INJECTION, SOLUTION INTRAMUSCULAR at 21:48

## 2025-01-01 RX ADMIN — ALBUMIN (HUMAN) 12.5 G: 0.25 INJECTION, SOLUTION INTRAVENOUS at 05:56

## 2025-01-01 RX ADMIN — HYDROMORPHONE HYDROCHLORIDE 0.75 MG: 1 INJECTION, SOLUTION INTRAMUSCULAR; INTRAVENOUS; SUBCUTANEOUS at 07:47

## 2025-01-01 RX ADMIN — POLYVINYL ALCOHOL, POVIDONE 1 DROP: 14; 6 SOLUTION/ DROPS OPHTHALMIC at 02:37

## 2025-01-01 RX ADMIN — ENOXAPARIN SODIUM 90 MG: 100 INJECTION SUBCUTANEOUS at 17:29

## 2025-01-24 ENCOUNTER — OFFICE VISIT (OUTPATIENT)
Dept: INTERNAL MEDICINE | Facility: CLINIC | Age: OVER 89
End: 2025-01-24
Payer: MEDICARE

## 2025-01-24 VITALS
DIASTOLIC BLOOD PRESSURE: 60 MMHG | OXYGEN SATURATION: 99 % | HEART RATE: 58 BPM | WEIGHT: 188.4 LBS | HEIGHT: 69 IN | BODY MASS INDEX: 27.91 KG/M2 | SYSTOLIC BLOOD PRESSURE: 114 MMHG | TEMPERATURE: 98.5 F

## 2025-01-24 DIAGNOSIS — Z79.4 TYPE 2 DIABETES MELLITUS WITH STAGE 3A CHRONIC KIDNEY DISEASE, WITH LONG-TERM CURRENT USE OF INSULIN: ICD-10-CM

## 2025-01-24 DIAGNOSIS — Z51.81 MEDICATION MONITORING ENCOUNTER: ICD-10-CM

## 2025-01-24 DIAGNOSIS — M10.9 GOUTY ARTHRITIS: ICD-10-CM

## 2025-01-24 DIAGNOSIS — I10 ESSENTIAL HYPERTENSION: ICD-10-CM

## 2025-01-24 DIAGNOSIS — J84.10 PULMONARY FIBROSIS: ICD-10-CM

## 2025-01-24 DIAGNOSIS — E03.9 ACQUIRED HYPOTHYROIDISM: ICD-10-CM

## 2025-01-24 DIAGNOSIS — H61.92 EARLOBE LESION, LEFT: ICD-10-CM

## 2025-01-24 DIAGNOSIS — J42 CHRONIC BRONCHITIS, UNSPECIFIED CHRONIC BRONCHITIS TYPE: ICD-10-CM

## 2025-01-24 DIAGNOSIS — I50.32 CHRONIC HEART FAILURE WITH PRESERVED EJECTION FRACTION (HFPEF): ICD-10-CM

## 2025-01-24 DIAGNOSIS — I25.10 CORONARY ARTERY DISEASE INVOLVING NATIVE CORONARY ARTERY OF NATIVE HEART WITHOUT ANGINA PECTORIS: ICD-10-CM

## 2025-01-24 DIAGNOSIS — Z00.01 ENCOUNTER FOR MEDICARE ANNUAL EXAMINATION WITH ABNORMAL FINDINGS: ICD-10-CM

## 2025-01-24 DIAGNOSIS — E11.22 TYPE 2 DIABETES MELLITUS WITH STAGE 3A CHRONIC KIDNEY DISEASE, WITH LONG-TERM CURRENT USE OF INSULIN: ICD-10-CM

## 2025-01-24 DIAGNOSIS — I48.0 PAROXYSMAL ATRIAL FIBRILLATION: ICD-10-CM

## 2025-01-24 DIAGNOSIS — Z91.81 AT HIGH RISK FOR FALLS: ICD-10-CM

## 2025-01-24 DIAGNOSIS — R79.9 ABNORMAL BLOOD CHEMISTRY: ICD-10-CM

## 2025-01-24 DIAGNOSIS — M54.2 NECK PAIN: ICD-10-CM

## 2025-01-24 DIAGNOSIS — Z00.00 MEDICARE ANNUAL WELLNESS VISIT, SUBSEQUENT: Primary | ICD-10-CM

## 2025-01-24 DIAGNOSIS — D63.8 ANEMIA, CHRONIC DISEASE: ICD-10-CM

## 2025-01-24 DIAGNOSIS — N18.31 TYPE 2 DIABETES MELLITUS WITH STAGE 3A CHRONIC KIDNEY DISEASE, WITH LONG-TERM CURRENT USE OF INSULIN: ICD-10-CM

## 2025-01-24 PROBLEM — N39.0 UTI (URINARY TRACT INFECTION): Status: RESOLVED | Noted: 2024-11-21 | Resolved: 2025-01-24

## 2025-01-24 PROBLEM — R53.1 WEAKNESS: Status: RESOLVED | Noted: 2023-10-09 | Resolved: 2025-01-24

## 2025-01-24 PROBLEM — N18.32 TYPE 2 DIABETES MELLITUS WITH STAGE 3B CHRONIC KIDNEY DISEASE, WITH LONG-TERM CURRENT USE OF INSULIN: Status: RESOLVED | Noted: 2022-07-12 | Resolved: 2025-01-24

## 2025-01-24 PROBLEM — N17.9 ACUTE KIDNEY INJURY: Status: RESOLVED | Noted: 2024-11-21 | Resolved: 2025-01-24

## 2025-01-24 LAB
EXPIRATION DATE: ABNORMAL
HBA1C MFR BLD: 6.9 % (ref 4.5–5.7)
Lab: ABNORMAL

## 2025-01-24 PROCEDURE — 1159F MED LIST DOCD IN RCRD: CPT | Performed by: FAMILY MEDICINE

## 2025-01-24 PROCEDURE — 96160 PT-FOCUSED HLTH RISK ASSMT: CPT | Performed by: FAMILY MEDICINE

## 2025-01-24 PROCEDURE — G0439 PPPS, SUBSEQ VISIT: HCPCS | Performed by: FAMILY MEDICINE

## 2025-01-24 PROCEDURE — 99397 PER PM REEVAL EST PAT 65+ YR: CPT | Performed by: FAMILY MEDICINE

## 2025-01-24 PROCEDURE — 3044F HG A1C LEVEL LT 7.0%: CPT | Performed by: FAMILY MEDICINE

## 2025-01-24 PROCEDURE — 1170F FXNL STATUS ASSESSED: CPT | Performed by: FAMILY MEDICINE

## 2025-01-24 PROCEDURE — 1160F RVW MEDS BY RX/DR IN RCRD: CPT | Performed by: FAMILY MEDICINE

## 2025-01-24 PROCEDURE — 83036 HEMOGLOBIN GLYCOSYLATED A1C: CPT | Performed by: FAMILY MEDICINE

## 2025-01-24 PROCEDURE — 1125F AMNT PAIN NOTED PAIN PRSNT: CPT | Performed by: FAMILY MEDICINE

## 2025-01-24 PROCEDURE — 99214 OFFICE O/P EST MOD 30 MIN: CPT | Performed by: FAMILY MEDICINE

## 2025-01-24 RX ORDER — BISOPROLOL FUMARATE 5 MG/1
TABLET, FILM COATED ORAL
COMMUNITY
Start: 2024-12-07

## 2025-01-24 NOTE — ASSESSMENT & PLAN NOTE
Continue oxygen supplementation.  Saw   12/4/2020, no action was desired by patient at that time.   Continue to monitor.

## 2025-01-24 NOTE — PATIENT INSTRUCTIONS
Medicare Wellness  Personal Prevention Plan of Service     Date of Office Visit:    Encounter Provider:  Kary Wen MD  Place of Service:  [facility]  Patient Name: Forrest Zepeda  :  1932    As part of the Medicare Wellness portion of your visit today, we are providing you with this personalized preventive plan of services (PPPS). This plan is based upon recommendations of the [place] Preventive Services Task Force (USPSTF) and the Advisory Committee on Immunization Practices (ACIP).    This lists the preventive care services that should be considered, and provides dates of when you are due. Items listed as completed are up-to-date and do not require any further intervention.    Health Maintenance   Topic Date Due   • RSV Vaccine - Adults (1 - 1-dose 75+ series) Never done   • DIABETIC EYE EXAM  2021   • ANNUAL WELLNESS VISIT  2025   • HEMOGLOBIN A1C  2025   • BMI FOLLOWUP  2025   • LIPID PANEL  2025   • TDAP/TD VACCINES (3 - Td or Tdap) 2030   • COVID-19 Vaccine  Completed   • INFLUENZA VACCINE  Completed   • Pneumococcal Vaccine 65+  Completed   • URINE MICROALBUMIN  Discontinued   • ZOSTER VACCINE  Discontinued       Orders Placed This Encounter   Procedures   • POC Glycosylated Hemoglobin (Hb A1C)     Order Specific Question:   Release to patient     Answer:   Routine Release [[id number]]         No follow-ups on file.        Health Maintenance After Age 65    After age 65, you are at a higher risk for certain long-term diseases and infections as well as injuries from falls. Falls are a major cause of broken bones and head injuries in people who are older than age 65. Getting regular preventive care can help to keep you healthy and well. Preventive care includes getting regular testing and making lifestyle changes as recommended by your health care provider. Talk with your health care provider about:  Which screenings  and tests you should have. A screening is a test that checks for a disease when you have no symptoms.  A diet and exercise plan that is right for you.    What should I know about screenings and tests to prevent falls?  Screening and testing are the best ways to find a health problem early. Early diagnosis and treatment give you the best chance of managing medical conditions that are common after age 65. Certain conditions and lifestyle choices may make you more likely to have a fall. Your health care provider may recommend:  Regular vision checks. Poor vision and conditions such as cataracts can make you more likely to have a fall. If you wear glasses, make sure to get your prescription updated if your vision changes.  Medicine review. Work with your health care provider to regularly review all of the medicines you are taking, including over-the-counter medicines. Ask your health care provider about any side effects that may make you more likely to have a fall. Tell your health care provider if any medicines that you take make you feel dizzy or sleepy.  Strength and balance checks. Your health care provider may recommend certain tests to check your strength and balance while standing, walking, or changing positions.  Foot health exam. Foot pain and numbness, as well as not wearing proper footwear, can make you more likely to have a fall.  Screenings, including:  Osteoporosis screening. Osteoporosis is a condition that causes the bones to get weaker and break more easily.  Blood pressure screening. Blood pressure changes and medicines to control blood pressure can make you feel dizzy.  Depression screening. You may be more likely to have a fall if you have a fear of falling, feel depressed, or feel unable to do activities that you used to do.  Alcohol use screening. Using too much alcohol can affect your balance and may make you more likely to have a fall.    Follow these instructions at home:  Lifestyle  Do not  drink alcohol if:  Your health care provider tells you not to drink.  If you drink alcohol:  Limit how much you have to:  0-1 drink a day for women.  0-2 drinks a day for men.  Know how much alcohol is in your drink. In the [place] one drink equals one 12 oz bottle of beer (355 mL), one 5 oz glass of wine (148 mL), or one 1½ oz glass of hard liquor (44 mL).  Do not use any products that contain nicotine or tobacco. These products include cigarettes, chewing tobacco, and vaping devices, such as e-cigarettes. If you need help quitting, ask your health care provider.    Activity    Follow a regular exercise program to stay fit. This will help you maintain your balance. Ask your health care provider what types of exercise are appropriate for you.  If you need a cane or walker, use it as recommended by your health care provider.  Wear supportive shoes that have nonskid soles.  Safety    Remove any tripping hazards, such as rugs, cords, and clutter.  Install safety equipment such as grab bars in bathrooms and safety rails on stairs.  Keep rooms and walkways well-lit.    General instructions  Talk with your health care provider about your risks for falling. Tell your health care provider if:  You fall. Be sure to tell your health care provider about all falls, even ones that seem minor.  You feel dizzy, tiredness (fatigue), or off-balance.  Take over-the-counter and prescription medicines only as told by your health care provider. These include supplements.  Eat a healthy diet and maintain a healthy weight. A healthy diet includes low-fat dairy products, low-fat (lean) meats, and fiber from whole grains, beans, and lots of fruits and vegetables.  Stay current with your vaccines.  Schedule regular health, dental, and eye exams.    Summary  Having a healthy lifestyle and getting preventive care can help to protect your health and wellness after age 65.  Screening and testing are the best way to find a health problem early and  help you avoid having a fall. Early diagnosis and treatment give you the best chance for managing medical conditions that are more common for people who are older than age 65.  Falls are a major cause of broken bones and head injuries in people who are older than age 65. Take precautions to prevent a fall at home.  Work with your health care provider to learn what changes you can make to improve your health and wellness and to prevent falls.    This information is not intended to replace advice given to you by your health care provider. Make sure you discuss any questions you have with your health care provider.  Document Revised: 05/09/2022 Document Reviewed: 05/09/2022  [employer] Patient Education © 2023 [employer]  Updated 2/29/24 tc     Preventing Unhealthy Weight Gain, Adult  Staying at a healthy weight is important to your overall health. When fat builds up in your body, you may become overweight or obese. Being overweight or obese increases your risk of developing various health problems.  Unhealthy weight gain is often the result of making unhealthy food choices or not getting enough exercise. You can make changes to your lifestyle to prevent obesity and stay as healthy as possible.  How can unhealthy weight gain affect me?  Being overweight or obese can cause you to develop joint or bone problems, which can make it hard for you to stay active or do activities you enjoy. Being overweight also puts stress on your heart and lungs and can lead to health problems such as:  Diabetes.  Heart disease.  Some types of cancer.  Stroke.  Eating healthy, staying active, and having healthy habits can help to prevent unhealthy weight gain and lower your risk for health problems. These lifestyle changes will also help you manage stress and emotions, improve your self-esteem, and connect with friends and family.  What can increase my risk?  In addition to certain eating and lifestyle choices, some other factors that may  make you more likely to have unhealthy weight gain include:  Having a family history of obesity.  Living in an area with limited access to:  [place], recreation centers, or sidewalks.  Healthy food choices, such as grocery stores and farmers' markets.  What actions can I take to prevent unhealthy weight gain?  Nutrition  A plate with examples of foods in a healthy diet.      Eat only as much as your body needs. To do this:  Pay attention to signs that you are hungry or full. Stop eating as soon as you feel full.  If you feel hungry, try drinking water first before eating. Drink enough water so your urine is pale yellow.  Eat smaller portions. Pay attention to portion sizes when eating out.  Look at serving sizes on food labels. Most foods contain more than one serving per container.  Eat the recommended number of calories for your gender and activity level. For most active people, a daily total of 2,000 calories is appropriate. If you are trying to lose weight or are not very active, you may need to eat fewer calories. Talk with your health care provider or a dietitian about how many calories you need each day.  Choose healthy foods, such as:  Fruits and vegetables. At each meal, try to fill at least half of your plate with fruits and vegetables.  Whole grains, such as whole-wheat bread, brown rice, and quinoa.  Lean meats, such as chicken or fish.  Other healthy proteins, such as beans, eggs, or tofu.  Healthy fats, such as nuts, seeds, fatty fish, and olive oil.  Low-fat or fat-free dairy products.  Check food labels, and avoid food and drinks that:  Are high in calories.  Have added sugar.  Are high in sodium.  Have saturated fats or trans fats.  Cook foods in healthier ways, such as by baking, broiling, or grilling.  Make a meal plan for the week, and shop with a grocery list to help you stay on track with your purchases. Try to avoid going to the grocery store when you are hungry.  When grocery shopping, try to  shop around the outside of the store first, where the fresh foods are. Doing this helps you avoid prepackaged foods, which can be high in sugar, salt (sodium), and fat.  Lifestyle  A person riding a bicycle wearing a safety helmet.      Exercise for 30 or more minutes on 5 or more days each week. Exercising may include brisk walking, yard work, biking, running, swimming, and team sports like basketball and soccer. Ask your health care provider which exercises are safe for you.  Do activities that strengthen the muscles, such as lifting weights or using resistance bands, on 2 or more days a week.  Do not use any products that contain nicotine or tobacco. These products include cigarettes, chewing tobacco, and vaping devices, such as e-cigarettes. If you need help quitting, ask your health care provider.  If you drink alcohol:  Limit how much you have to:  0-1 drink a day for women who are not pregnant.  0-2 drinks a day for men.  Know how much alcohol is in a drink. In the [place] one drink equals one 12 oz bottle of beer (355 mL), one 5 oz glass of wine (148 mL), or one 1½ oz glass of hard liquor (44 mL).  Try to get 7-9 hours of sleep each night.  Other changes  Keep a food and activity journal to keep track of:  What you ate and how many calories you had. Remember to count the calories in sauces, dressings, and side dishes.  Whether you were active, and what exercises you did.  Your calorie, weight, and activity goals.  Check your weight regularly. Track any changes. If you notice that you have gained weight, make changes to your diet or activity routine.  Avoid taking weight-loss medicines or supplements. Talk to your health care provider before starting any new medicine or supplement.  Talk to your health care provider before trying any new diet or exercise plan.  Where to find more information  Talk with your health care provider or a dietitian about healthy eating and healthy lifestyle choices. You may also find  information from:  [place] Department of Agriculture, MyPlate: www.choosemyplate.gov  American Heart Association: www.heart.org  Centers for Disease Control and Prevention: www.cdc.gov  Summary  Eating healthy, staying active, and having healthy habits can help to prevent unhealthy weight gain and lower your risk for health problems such as heart disease, diabetes, some types of cancer, and stroke.  Being overweight or obese can cause you to develop joint or bone problems, which can make it hard for you to stay active or do activities you enjoy.  You can prevent unhealthy weight gain by eating a healthy diet, exercising regularly, not smoking, limiting alcohol, and getting enough sleep.  Talk with your health care provider or a dietitian for guidance about healthy eating and healthy lifestyle choices.  This information is not intended to replace advice given to you by your health care provider. Make sure you discuss any questions you have with your health care provider.  Document Revised: 07/15/2022 Document Reviewed: 07/15/2022  [employer] Patient Education © 2022 [employer]   Fall Prevention in the Home, Adult  Falls can cause injuries and affect people of all ages. There are many simple things that you can do to make your home safe and to help prevent falls.  If you need it, ask for help making these changes.  What actions can I take to prevent falls?  General information  Use good lighting in all rooms. Make sure to:  Replace any light bulbs that burn out.  Turn on lights if it is dark and use night-lights.  Keep items that you use often in easy-to-reach places. Lower the shelves around your home if needed.  Move furniture so that there are clear paths around it.  Do not keep throw rugs or other things on the floor that can make you trip.  If any of your floors are uneven, fix them.  Add color or contrast paint or tape to clearly trung and help you see:  Grab bars or handrails.  First and last steps of  staircases.  Where the edge of each step is.  If you use a ladder or stepladder:  Make sure that it is fully opened. Do not climb a closed ladder.  Make sure the sides of the ladder are locked in place.  Have someone hold the ladder while you use it.  Know where your pets are as you move through your home.  What can I do in the bathroom?         Keep the floor dry. Clean up any water that is on the floor right away.  Remove soap buildup in the bathtub or shower. Buildup makes bathtubs and showers slippery.  Use non-skid mats or decals on the floor of the bathtub or shower.  Attach bath mats securely with double-sided, non-slip rug tape.  If you need to sit down while you are in the shower, use a non-slip stool.  Install grab bars by the toilet and in the bathtub and shower. Do not use towel bars as grab bars.  What can I do in the bedroom?  Make sure that you have a light by your bed that is easy to reach.  Do not use any sheets or blankets on your bed that hang to the floor.  Have a firm bench or chair with side arms that you can use for support when you get dressed.  What can I do in the kitchen?  Clean up any spills right away.  If you need to reach something above you, use a sturdy step stool that has a grab bar.  Keep electrical cables out of the way.  Do not use floor polish or wax that makes floors slippery.  What can I do with my stairs?  Do not leave anything on the stairs.  Make sure that you have a light switch at the top and the bottom of the stairs. Have them installed if you do not have them.  Make sure that there are handrails on both sides of the stairs. Fix handrails that are broken or loose. Make sure that handrails are as long as the staircases.  Install non-slip stair treads on all stairs in your home if they do not have carpet.  Avoid having throw rugs at the top or bottom of stairs, or secure the rugs with carpet tape to prevent them from moving.  Choose a carpet design that does not hide the  edge of steps on the stairs. Make sure that carpet is firmly attached to the stairs. Fix any carpet that is loose or worn.  What can I do on the outside of my home?  Use bright outdoor lighting.  Repair the edges of walkways and driveways and fix any cracks. Clear paths of anything that can make you trip, such as tools or rocks.  Add color or contrast paint or tape to clearly trung and help you see high doorway thresholds.  Trim any bushes or trees on the main path into your home.  Check that handrails are securely fastened and in good repair. Both sides of all steps should have handrails.  Install guardrails along the edges of any raised decks or porches.  Have leaves, snow, and ice cleared regularly. Use sand, salt, or ice melt on walkways during winter months if you live where there is ice and snow.  In the garage, clean up any spills right away, including grease or oil spills.  What other actions can I take?  Review your medicines with your health care provider. Some medicines can make you confused or feel dizzy. This can increase your chance of falling.  Wear closed-toe shoes that fit well and support your feet. Wear shoes that have rubber soles and low heels.  Use a cane, walker, scooter, or crutches that help you move around if needed.  Talk with your provider about other ways that you can decrease your risk of falls. This may include seeing a physical therapist to learn to do exercises to improve movement and strength.  Where to find more information  Centers for Disease Control and Prevention, STEADI: cdc.gov  National [place] on Aging: vinicio.nih.gov  National [place] on Aging: vinicio.nih.gov  Contact a health care provider if:  You are afraid of falling at home.  You feel weak, drowsy, or dizzy at home.  You fall at home.  Get help right away if you:  Lose consciousness or have trouble moving after a fall.  Have a fall that causes a head injury.  These symptoms may be an emergency. Get help right away. Call  911.  Do not wait to see if the symptoms will go away.  Do not drive yourself to the hospital.  This information is not intended to replace advice given to you by your health care provider. Make sure you discuss any questions you have with your health care provider.  Document Revised: 08/21/2023 Document Reviewed: 08/21/2023  [employer] Patient Education © 2024 [employer]  Sit-to-Stand Exercise    The sit-to-stand exercise (also known as the chair stand or chair rise exercise) strengthens your lower body and helps you maintain or improve your mobility and independence. The end goal is to do the sit-to-stand exercise without using your hands. This will be easier as you become stronger. You should always talk with your health care provider before starting any exercise program, especially if you have had recent surgery.  Do the exercise exactly as told by your health care provider and adjust it as directed. It is normal to feel mild stretching, pulling, tightness, or discomfort as you do this exercise, but you should stop right away if you feel sudden pain or your pain gets worse. Do not begin doing this exercise until told by your health care provider.  What the sit-to-stand exercise does  The sit-to-stand exercise helps to strengthen the muscles in your thighs and the muscles in the center of your body that give you stability (core muscles). This exercise is especially helpful if:  You have had knee or hip surgery.  You have trouble getting up from a chair, out of a car, or off the toilet due to muscle weakness.  How to do the sit-to-stand exercise  Sit toward the front edge of a sturdy chair without armrests. Your knees should be bent and your feet should be flat on the floor and shoulder-width apart and underneath your hips.  Place your hands lightly on each side of the seat. Keep your back and neck as straight as possible, with your chest slightly forward.  Breathe in slowly. Lean forward and slightly shift your  weight to the front of your feet.  Breathe out as you slowly stand up. Try not to support any weight with your hands.  Stand and pause for a full breath in and out.  Breathe in as you sit down slowly. Tighten your core and abdominal muscles to control your lowering as much as possible. You should lower yourself back to the chair slowly, not just drop back into the seat.  Breathe out slowly.  Do this exercise 10-15 times. If needed, do it fewer times until you build up strength.  Rest for 1 minute, then do another set of 10-15 repetitions.  To change the difficulty of the sit-to-stand exercise  If the exercise is too difficult, use a chair with sturdy armrests, and push off the armrests to help you come to the standing position. You can also use the armrests to help slowly lower yourself back to sitting. As this gets easier, try to use your arms less. You can also place a firm cushion or pillow on the chair to make the surface higher.  If this exercise is too easy, do not use your arms to help raise or lower yourself. You can also wear a weighted vest, use hand weights, increase your repetitions, or try a lower chair.  General tips  You may feel tired when starting an exercise routine. This is normal.  You may have muscle soreness that lasts a few days. This is normal. As you get stronger, you may not feel muscle soreness.  Use smooth, steady movements.  Do not  hold your breath during strength exercises. This can cause unsafe changes in your blood pressure.  Breathe in slowly through your nose, and breathe out slowly through your mouth.  Summary  Strengthening your lower body is an important step to help you move safely and independently.  The sit-to-stand exercise helps strengthen the muscles in your thighs and core.  You should always talk with your health care provider before starting any exercise program, especially if you have had recent surgery.  This information is not intended to replace advice given to you  by your health care provider. Make sure you discuss any questions you have with your health care provider.  Document Revised: 04/10/2022 Document Reviewed: 04/10/2022  [employer] Patient Education © 2024 [employer]  Exercising to Stay Healthy  To become healthy and stay healthy, it is recommended that you do moderate-intensity and vigorous-intensity exercise. You can tell that you are exercising at a moderate intensity if your heart starts beating faster and you start breathing faster but can still hold a conversation. You can tell that you are exercising at a vigorous intensity if you are breathing much harder and faster and cannot hold a conversation while exercising.  How can exercise benefit me?  Exercising regularly is important. It has many health benefits, such as:  Improving overall fitness, flexibility, and endurance.  Increasing bone density.  Helping with weight control.  Decreasing body fat.  Increasing muscle strength and endurance.  Reducing stress and tension, anxiety, depression, or anger.  Improving overall health.  What guidelines should I follow while exercising?  Before you start a new exercise program, talk with your health care provider.  Do not exercise so much that you hurt yourself, feel dizzy, or get very short of breath.  Wear comfortable clothes and wear shoes with good support.  Drink plenty of water while you exercise to prevent dehydration or heat stroke.  Work out until your breathing and your heartbeat get faster (moderate intensity).  How often should I exercise?  Choose an activity that you enjoy, and set realistic goals. Your health care provider can help you make an activity plan that is individually designed and works best for you.  Exercise regularly as told by your health care provider. This may include:  Doing strength training two times a week, such as:  Lifting weights.  Using resistance bands.  Push-ups.  Sit-ups.  Yoga.  Doing a certain intensity of exercise for a given  amount of time. Choose from these options:  A total of 150 minutes of moderate-intensity exercise every week.  A total of 75 minutes of vigorous-intensity exercise every week.  A mix of moderate-intensity and vigorous-intensity exercise every week.  Children, pregnant women, people who have not exercised regularly, people who are overweight, and older adults may need to talk with a health care provider about what activities are safe to perform. If you have a medical condition, be sure to talk with your health care provider before you start a new exercise program.  What are some exercise ideas?  Moderate-intensity exercise ideas include:  Walking 1 mile (1.6 km) in about 15 minutes.  Biking.  Hiking.  Golfing.  Dancing.  Water aerobics.  Vigorous-intensity exercise ideas include:  Walking 4.5 miles (7.2 km) or more in about 1 hour.  Jogging or running 5 miles (8 km) in about 1 hour.  Biking 10 miles (16.1 km) or more in about 1 hour.  Lap swimming.  Roller-skating or in-line skating.  Cross-country skiing.  Vigorous competitive sports, such as football, basketball, and soccer.  Jumping rope.  Aerobic dancing.  What are some everyday activities that can help me get exercise?  Yard work, such as:  Pushing a .  Raking and bagging leaves.  Washing your car.  Pushing a stroller.  Shoveling snow.  Gardening.  Washing windows or floors.  How can I be more active in my day-to-day activities?  Use stairs instead of an elevator.  Take a walk during your lunch break.  If you drive, park your car farther away from your work or school.  If you take public transportation, get off one stop early and walk the rest of the way.  Stand up or walk around during all of your indoor phone calls.  Get up, stretch, and walk around every 30 minutes throughout the day.  Enjoy exercise with a friend. Support to continue exercising will help you keep a regular routine of activity.  Where to find more information  You can find more  information about exercising to stay healthy from:  [place] Department of Health and Human Services: www.hhs.gov  Centers for Disease Control and Prevention (CDC): www.cdc.gov  Summary  Exercising regularly is important. It will improve your overall fitness, flexibility, and endurance.  Regular exercise will also improve your overall health. It can help you control your weight, reduce stress, and improve your bone density.  Do not exercise so much that you hurt yourself, feel dizzy, or get very short of breath.  Before you start a new exercise program, talk with your health care provider.  This information is not intended to replace advice given to you by your health care provider. Make sure you discuss any questions you have with your health care provider.  Document Revised: 04/15/2022 Document Reviewed: 04/15/2022  Elsevier Patient Education © 2024 [employer]

## 2025-01-24 NOTE — ASSESSMENT & PLAN NOTE
Reviewed last cardiology note, no change on antihypertensives as dose deescalation could cause CHF exacerbation. Follow up with cardiology.

## 2025-01-24 NOTE — PROGRESS NOTES
Subjective   The ABCs of the Annual Wellness Visit  Medicare Wellness Visit      Forrest Zepeda is a 89 y.o. patient who presents for a Medicare Wellness Visit.    The following portions of the patient's history were reviewed and   updated as appropriate: allergies, current medications, past family history, past medical history, past social history, past surgical history, and problem list.    Compared to one year ago, the patient's physical   health is the same.  Compared to one year ago, the patient's mental   health is the same.    Recent Hospitalizations:  This patient has had a [facility] admission record on file within the last 365 days.  Current Medical Providers:  Patient Care Team:  Kary Wen MD as PCP - General (Family Medicine)  Win Saha MD as Consulting Physician (Cardiology)  Deanne Pitts MD as Consulting Physician (Urology)    Outpatient Medications Prior to Visit   Medication Sig Dispense Refill    allopurinol (ZYLOPRIM) 300 MG tablet Take 1 tablet by mouth Every Night. To lower risk of gout 90 tablet 3    aspirin (Aspirin Low Dose) 81 MG EC tablet TAKE 1 TABLET BY MOUTH DAILY 90 tablet 3    BD Pen Needle Micro U/F 32G X 6 MM misc       bisoprolol (ZEBeta) 5 MG tablet       Eliquis 2.5 MG tablet tablet TAKE ONE TABLET BY MOUTH TWICE A  tablet 3    finasteride (PROSCAR) 5 MG tablet TAKE 1 TABLET BY MOUTH DAILY 90 tablet 3    glimepiride (AMARYL) 1 MG tablet TAKE ONE TABLET BY MOUTH EVERY MORNING BEFORE BREAKFAST 90 tablet 3    glucose blood (True Metrix Blood Glucose Test) test strip TEST FOUR TIMES A  each 0    HYDROcodone-acetaminophen (NORCO) 5-325 MG per tablet Take 1 tablet by mouth Every 6 (Six) Hours As Needed for Severe Pain. 60 tablet 0    Insulin Glargine, 1 Unit Dial, (Tozaina Laddar) 300 UNIT/ML solution pen-injector injection INJECT 20 UNITS UNDER THE SKIN INTO THE APPROPRIATE AREA AS DIRECTED EVERY NIGHT 6 mL 3    levothyroxine (SYNTHROID,  LEVOTHROID) 50 MCG tablet Take 1 tablet by mouth Every Morning. Indications: Underactive Thyroid 90 tablet 3    Multiple Vitamins-Minerals (OCUVITE ADULT 50+ PO) Take 1 capsule by mouth Daily. OTC      Myrbetriq 50 MG tablet sustained-release 24 hour 24 hr tablet TAKE 1 TABLET BY MOUTH DAILY 90 tablet 3    SITagliptin (Januvia) 25 MG tablet Take 1 tablet by mouth Daily. For diabetes 90 tablet 3    silodosin (RAPAFLO) 8 MG capsule capsule TAKE 1 CAPSULE BY MOUTH DAILY WITH BREAKFAST (Patient not taking: Reported on 1/24/2025) 90 capsule 3     No facility-administered medications prior to visit.     Opioid medication/s are on active medication list.  and I have evaluated his active treatment plan and pain score trends (see table).  Vitals:    01/24/25 1333   PainSc:   2     I have reviewed the chart for potential of high risk medication and harmful drug interactions in the elderly.        Aspirin is on active medication list. Aspirin use is indicated based on review of current medical condition/s. Pros and cons of this therapy have been discussed today. Benefits of this medication outweigh potential harm.  Patient has been encouraged to continue taking this medication.  .      Patient Active Problem List   Diagnosis    Essential hypertension    Generalized osteoarthritis    Diabetic neuropathy    Gout    HLD (hyperlipidemia)    Acquired hypothyroidism    Ureteral stricture    Hydronephrosis of left kidney    CKD (chronic kidney disease) stage 3, GFR 30-59 ml/min    LEHMAN (dyspnea on exertion)    Pulmonary fibrosis    Symptomatic bradycardia    Hypoxemia requiring supplemental oxygen    History of pulmonary embolism    Sinus node dysfunction    Chronic fatigue    Urethral stricture    Paroxysmal atrial fibrillation    Type 2 diabetes mellitus with hyperglycemia, with long-term current use of insulin    Abnormal cardiovascular stress test    Pulmonary hypertension    Cardiac pacemaker in situ    CAD (coronary artery  "disease)    Diastolic congestive heart failure    Abnormal SPEP    Hydronephrosis, left    Gross hematuria    Chronic heart failure with preserved ejection fraction (HFpEF)    Vasovagal syncope    Hematuria    Acute UTI (urinary tract infection)    Acute kidney injury superimposed on chronic kidney disease    Anemia, chronic disease    T2DM (type 2 diabetes mellitus)    Generalized weakness     Advance Care Planning Advance Directive is on file.  ACP discussion was held with the patient during this visit. Patient has an advance directive in EMR which is still valid.             Objective   Vitals:    25 1333   BP: 114/60   Pulse: 58   Temp: 98.5 °F (36.9 °C)   SpO2: 99%   Weight: 85.5 kg (188 lb 6.4 oz)   Height: 175.3 cm (69\")   PainSc:   2       Estimated body mass index is 27.82 kg/m² as calculated from the following:    Height as of this encounter: 175.3 cm (69\").    Weight as of this encounter: 85.5 kg (188 lb 6.4 oz).           Does the patient have evidence of cognitive impairment? No  Lab Results   Component Value Date    HGBA1C 6.9 (A) 2025                                                                                                Health  Risk Assessment    Smoking Status:  Social History     Tobacco Use   Smoking Status Former    Current packs/day: 0.00    Average packs/day: 0.3 packs/day for 2.0 years (0.5 ttl pk-yrs)    Types: Cigarettes    Quit date: 1950    Years since quittin.1    Passive exposure: Past   Smokeless Tobacco Never   Tobacco Comments    SMOKED SOME AS A TEEN, DENIES ANY HABIT OR ADDICTION     Alcohol Consumption:  Social History     Substance and Sexual Activity   Alcohol Use No       Fall Risk Screen  STEADI Fall Risk Assessment was completed, and patient is at MODERATE risk for falls. Assessment completed on:2025    Depression Screening   Little interest or pleasure in doing things? Not at all   Feeling down, depressed, or hopeless? Not at all   PHQ-2 " Total Score 0      Health Habits and Functional and Cognitive Screenin/24/2025     1:27 PM   Functional & Cognitive Status   Do you have difficulty preparing food and eating? No   Do you have difficulty bathing yourself, getting dressed or grooming yourself? No   Do you have difficulty using the toilet? No   Do you have difficulty moving around from place to place? Yes   Do you have trouble with steps or getting out of a bed or a chair? Yes   Current Diet Well Balanced Diet   Dental Exam Other   Eye Exam Not up to date   Exercise (times per week) 1 times per week   Current Exercises Include Other   Do you need help using the phone?  No   Are you deaf or do you have serious difficulty hearing?  No   Do you need help to go to places out of walking distance? No   Do you need help shopping? No   Do you need help preparing meals?  Yes   Do you need help with housework?  No   Do you need help with laundry? No   Do you need help taking your medications? No   Do you need help managing money? No   Do you ever drive or ride in a car without wearing a seat belt? No   Have you felt unusual stress, anger or loneliness in the last month? No   Who do you live with? Alone   If you need help, do you have trouble finding someone available to you? No   Have you been bothered in the last four weeks by sexual problems? No   Do you have difficulty concentrating, remembering or making decisions? Yes           Age-appropriate Screening Schedule:  Refer to the list below for future screening recommendations based on patient's age, sex and/or medical conditions. Orders for these recommended tests are listed in the plan section. The patient has been provided with a written plan.    Health Maintenance List  Health Maintenance   Topic Date Due    RSV Vaccine - Adults (1 - 1-dose 75+ series) Never done    DIABETIC EYE EXAM  2021    BMI FOLLOWUP  2025    HEMOGLOBIN A1C  2025    LIPID PANEL  2025    ANNUAL WELLNESS  VISIT  01/24/2026    TDAP/TD VACCINES (3 - Td or Tdap) 06/30/2030    COVID-19 Vaccine  Completed    INFLUENZA VACCINE  Completed    Pneumococcal Vaccine 65+  Completed    URINE MICROALBUMIN  Discontinued    ZOSTER VACCINE  Discontinued                                                                                                                                                CMS Preventative Services Quick Reference  Risk Factors Identified During Encounter  Fall Risk-High or Moderate: Information on Fall Prevention Shared in After Visit Summary and Sit to Stand Exercise Information Shared in After Visit Summary  Immunizations Discussed/Encouraged: RSV (Respiratory Syncytial Virus)    The above risks/problems have been discussed with the patient.  Pertinent information has been shared with the patient in the After Visit Summary.  An After Visit Summary and PPPS were made available to the patient.    Follow Up:   Next Medicare Wellness visit to be scheduled in 1 year.         Additional E&M Note during same encounter follows:  Patient has additional, significant, and separately identifiable condition(s)/problem(s) that require work above and beyond the Medicare Wellness Visit     Chief Complaint  Medicare Wellness-subsequent, Annual Exam, and Diabetes    Jarrett is also being seen today for an annual adult preventative physical exam.  and Jarrett is also being seen today for additional medical problem/s.    Subjective   Here with son.  Overall doing well  No issues with low sugars  Continues to have low blood pressures at times and feels dizzy/lightheaded when that happens  Has had increased pain in neck, feels like he just got arthritis.   Per son has not been as physically active, may have led to this starting  Son has topical Voltaren, discussed, will let him try it. Has ruler to measure out dose.  Ear--patient reports he lays on left side so it looks like it does        December blood pressure log:      January  "blood pressure log:                  Objective   Vital Signs:  /60   Pulse 58   Temp 98.5 °F (36.9 °C)   Ht 175.3 cm (69\")   Wt 85.5 kg (188 lb 6.4 oz)   SpO2 99%   BMI 27.82 kg/m²   Physical Exam  Vitals and nursing note reviewed.   Constitutional:       General: He is not in acute distress.     Appearance: Normal appearance. He is well-developed and well-groomed. He is not ill-appearing, toxic-appearing or diaphoretic.   HENT:      Head: Normocephalic and atraumatic.      Ears:     Eyes:      General: Lids are normal. No scleral icterus.        Right eye: No discharge.         Left eye: No discharge.      Extraocular Movements: Extraocular movements intact.   Cardiovascular:      Rate and Rhythm: Normal rate and regular rhythm.   Pulmonary:      Effort: Pulmonary effort is normal.      Breath sounds: Normal breath sounds.   Musculoskeletal:      Cervical back: Neck supple. No spinous process tenderness or muscular tenderness.   Skin:     Coloration: Skin is not jaundiced or pale.   Neurological:      General: No focal deficit present.      Mental Status: He is alert and oriented to person, place, and time.      Gait: Gait abnormal.   Psychiatric:         Attention and Perception: Attention and perception normal.         Mood and Affect: Mood and affect normal.         Speech: Speech normal.         Behavior: Behavior normal. Behavior is cooperative.         Thought Content: Thought content normal.         Cognition and Memory: Cognition and memory normal.         Judgment: Judgment normal.       CLINICAL PHOTOGRAPHS OTHER - Left ear (01/24/2025)     The following data was reviewed by: Kary Wen MD on 01/24/2025:  Lab Results   Component Value Date    HGBA1C 6.9 (A) 01/24/2025    HGBA1C 6.70 (H) 07/25/2024    HGBA1C 7.7 (A) 01/23/2024     Lab Results   Component Value Date    TSH 3.820 07/25/2024     Basic Metabolic Panel (11/23/2024 05:58)   CBC (No Diff) (11/23/2024 05:58)   Vitamin B12 & " Folate (07/25/2024 13:13)   Uric Acid (07/25/2024 13:13)     Progress Notes by Deanne Pitts MD (12/11/2024 15:10) (urology follow up)  Progress Notes by Win Saha MD (07/23/2024 14:30)  (cardiology follow up)          Assessment and Plan Additional age appropriate preventative wellness advice topics were discussed during today's preventative wellness exam(some topics already addressed during AWV portion of the note above):   Nutrition: Discussed nutrition plan with patient. Information shared in after visit summary. Goal is for a well balanced diet to enhance overall health.     Healthy Weight: Discussed current and goal BMI with patient. Steps to attain this goal discussed. Information shared in after visit summary.      Diagnoses and all orders for this visit:    1. Medicare annual wellness visit, subsequent (Primary)    2. Encounter for Medicare annual examination with abnormal findings    3. Type 2 diabetes mellitus with stage 3a chronic kidney disease, with long-term current use of insulin  Assessment & Plan:  Diabetes is improving with treatment.   Continue current treatment regimen.  Reminded to get yearly retinal exam.  Diabetes will be reassessed in 6 months    Orders:  -     POC Glycosylated Hemoglobin (Hb A1C)  -     Hemoglobin A1c; Future  -     Comprehensive Metabolic Panel; Future  -     Vitamin D,25-Hydroxy; Future  -     Uric Acid; Future    4. Neck pain  Comments:  no oral nsaids, try voltaren bid, if rx desired let us know will send, discussed possible xray, not pursuing at this time as likely OA    5. Earlobe lesion, left  Comments:  monitor; due to anticoagulation will bruise easier    6. Essential hypertension  Assessment & Plan:  Reviewed last cardiology note, no change on antihypertensives as dose deescalation could cause CHF exacerbation. Follow up with cardiology.  Stay hydrated      Orders:  -     CBC (No Diff); Future  -     Comprehensive Metabolic Panel; Future  -     TSH Rfx  On Abnormal To Free T4; Future    7. Paroxysmal atrial fibrillation  -     CBC (No Diff); Future  -     TSH Rfx On Abnormal To Free T4; Future    8. Coronary artery disease involving native coronary artery of native heart without angina pectoris  -     Lipid Panel; Future    9. Chronic heart failure with preserved ejection fraction (HFpEF)  Assessment & Plan:  Reviewed last cardiology note, no change on antihypertensives as dose deescalation could cause CHF exacerbation. Follow up with cardiology.    Orders:  -     TSH Rfx On Abnormal To Free T4; Future    10. Chronic bronchitis, unspecified chronic bronchitis type  -     CBC (No Diff); Future    11. Pulmonary fibrosis  Assessment & Plan:  Continue oxygen supplementation.  Saw   12/4/2020, no action was desired by patient at that time.   Continue to monitor.    Orders:  -     CBC (No Diff); Future    12. Anemia, chronic disease  -     CBC (No Diff); Future  -     Vitamin B12; Future  -     Folate; Future  -     TSH Rfx On Abnormal To Free T4; Future  -     Iron Profile; Future    13. Acquired hypothyroidism  -     CBC (No Diff); Future  -     TSH Rfx On Abnormal To Free T4; Future    14. Gouty arthritis  -     Comprehensive Metabolic Panel; Future  -     Uric Acid; Future    15. At high risk for falls  Comments:  information via avs; stay hydrated. assistive devices ideal    16. Abnormal blood chemistry  -     Hemoglobin A1c; Future  -     CBC (No Diff); Future  -     Comprehensive Metabolic Panel; Future  -     Vitamin B12; Future  -     Folate; Future  -     Lipid Panel; Future  -     TSH Rfx On Abnormal To Free T4; Future  -     Vitamin D,25-Hydroxy; Future  -     Uric Acid; Future  -     Iron Profile; Future    17. Medication monitoring encounter  -     Hemoglobin A1c; Future  -     CBC (No Diff); Future  -     Comprehensive Metabolic Panel; Future  -     Vitamin B12; Future  -     Folate; Future  -     Lipid Panel; Future  -     TSH Rfx On Abnormal  To Free T4; Future  -     Vitamin D,25-Hydroxy; Future  -     Uric Acid; Future  -     Iron Profile; Future             I spent 43 minutes caring for Forrest on this date of service. This time includes time spent by me in the following activities:preparing for the visit, reviewing tests, obtaining and/or reviewing a separately obtained history, performing a medically appropriate examination and/or evaluation , counseling and educating the patient/family/caregiver, ordering medications, tests, or procedures, and documenting information in the medical record    I spent 35 minutes on the separately reported service of [id number]. This time is not included in the time used to support the E/M service also reported today.     Follow Up   Return in about 6 months (around 7/24/2025) for Medicare Wellness.  Patient was given instructions and counseling regarding his condition or for health maintenance advice. Please see specific information pulled into the AVS if appropriate.

## 2025-01-24 NOTE — ASSESSMENT & PLAN NOTE
Reviewed last cardiology note, no change on antihypertensives as dose deescalation could cause CHF exacerbation. Follow up with cardiology.  Stay hydrated

## 2025-02-07 DIAGNOSIS — G89.29 CHRONIC LOW BACK PAIN WITHOUT SCIATICA, UNSPECIFIED BACK PAIN LATERALITY: ICD-10-CM

## 2025-02-07 DIAGNOSIS — M54.50 CHRONIC LOW BACK PAIN WITHOUT SCIATICA, UNSPECIFIED BACK PAIN LATERALITY: ICD-10-CM

## 2025-02-07 RX ORDER — HYDROCODONE BITARTRATE AND ACETAMINOPHEN 5; 325 MG/1; MG/1
1 TABLET ORAL EVERY 6 HOURS PRN
Qty: 60 TABLET | Refills: 0 | Status: SHIPPED | OUTPATIENT
Start: 2025-02-07

## 2025-02-08 DIAGNOSIS — E11.65 TYPE 2 DIABETES MELLITUS WITH HYPERGLYCEMIA, WITH LONG-TERM CURRENT USE OF INSULIN: ICD-10-CM

## 2025-02-08 DIAGNOSIS — Z79.4 TYPE 2 DIABETES MELLITUS WITH HYPERGLYCEMIA, WITH LONG-TERM CURRENT USE OF INSULIN: ICD-10-CM

## 2025-02-10 RX ORDER — PEN NEEDLE, DIABETIC 32 GX 1/4"
NEEDLE, DISPOSABLE MISCELLANEOUS DAILY
Qty: 100 EACH | Refills: 2 | Status: SHIPPED | OUTPATIENT
Start: 2025-02-10

## 2025-03-11 ENCOUNTER — PATIENT MESSAGE (OUTPATIENT)
Dept: INTERNAL MEDICINE | Facility: CLINIC | Age: OVER 89
End: 2025-03-11
Payer: MEDICARE

## 2025-03-11 DIAGNOSIS — M54.50 CHRONIC LOW BACK PAIN WITHOUT SCIATICA, UNSPECIFIED BACK PAIN LATERALITY: ICD-10-CM

## 2025-03-11 DIAGNOSIS — G89.29 CHRONIC LOW BACK PAIN WITHOUT SCIATICA, UNSPECIFIED BACK PAIN LATERALITY: ICD-10-CM

## 2025-03-11 RX ORDER — HYDROCODONE BITARTRATE AND ACETAMINOPHEN 5; 325 MG/1; MG/1
1 TABLET ORAL EVERY 6 HOURS PRN
Qty: 60 TABLET | Refills: 0 | Status: SHIPPED | OUTPATIENT
Start: 2025-03-11

## 2025-03-21 ENCOUNTER — APPOINTMENT (OUTPATIENT)
Dept: GENERAL RADIOLOGY | Facility: HOSPITAL | Age: OVER 89
End: 2025-03-21
Payer: MEDICARE

## 2025-03-21 ENCOUNTER — HOSPITAL ENCOUNTER (INPATIENT)
Facility: HOSPITAL | Age: OVER 89
LOS: 5 days | Discharge: NURSING, REHAB OR LONG-TERM CARE | End: 2025-03-26
Attending: EMERGENCY MEDICINE | Admitting: FAMILY MEDICINE
Payer: MEDICARE

## 2025-03-21 DIAGNOSIS — M54.50 CHRONIC LOW BACK PAIN WITHOUT SCIATICA, UNSPECIFIED BACK PAIN LATERALITY: ICD-10-CM

## 2025-03-21 DIAGNOSIS — S72.001A CLOSED FRACTURE OF RIGHT HIP, INITIAL ENCOUNTER: ICD-10-CM

## 2025-03-21 DIAGNOSIS — S72.144A CLOSED NONDISPLACED INTERTROCHANTERIC FRACTURE OF RIGHT FEMUR, INITIAL ENCOUNTER: Primary | ICD-10-CM

## 2025-03-21 DIAGNOSIS — Z79.01 ANTICOAGULATED: ICD-10-CM

## 2025-03-21 DIAGNOSIS — N30.01 ACUTE CYSTITIS WITH HEMATURIA: ICD-10-CM

## 2025-03-21 DIAGNOSIS — E11.22 TYPE 2 DIABETES MELLITUS WITH STAGE 3A CHRONIC KIDNEY DISEASE, WITH LONG-TERM CURRENT USE OF INSULIN: ICD-10-CM

## 2025-03-21 DIAGNOSIS — M10.9 GOUTY ARTHRITIS: ICD-10-CM

## 2025-03-21 DIAGNOSIS — N18.31 TYPE 2 DIABETES MELLITUS WITH STAGE 3A CHRONIC KIDNEY DISEASE, WITH LONG-TERM CURRENT USE OF INSULIN: ICD-10-CM

## 2025-03-21 DIAGNOSIS — J84.9 INTERSTITIAL LUNG DISEASE: ICD-10-CM

## 2025-03-21 DIAGNOSIS — Z79.4 TYPE 2 DIABETES MELLITUS WITH HYPERGLYCEMIA, WITH LONG-TERM CURRENT USE OF INSULIN: ICD-10-CM

## 2025-03-21 DIAGNOSIS — Z79.4 TYPE 2 DIABETES MELLITUS WITH STAGE 3A CHRONIC KIDNEY DISEASE, WITH LONG-TERM CURRENT USE OF INSULIN: ICD-10-CM

## 2025-03-21 DIAGNOSIS — G89.29 CHRONIC LOW BACK PAIN WITHOUT SCIATICA, UNSPECIFIED BACK PAIN LATERALITY: ICD-10-CM

## 2025-03-21 DIAGNOSIS — N17.9 ACUTE KIDNEY INJURY: ICD-10-CM

## 2025-03-21 DIAGNOSIS — E11.65 TYPE 2 DIABETES MELLITUS WITH HYPERGLYCEMIA, WITH LONG-TERM CURRENT USE OF INSULIN: ICD-10-CM

## 2025-03-21 LAB
ABO GROUP BLD: NORMAL
ALBUMIN SERPL-MCNC: 4 G/DL (ref 3.5–5.2)
ALBUMIN/GLOB SERPL: 1.1 G/DL
ALP SERPL-CCNC: 108 U/L (ref 39–117)
ALT SERPL W P-5'-P-CCNC: 13 U/L (ref 1–41)
ANION GAP SERPL CALCULATED.3IONS-SCNC: 15 MMOL/L (ref 5–15)
AST SERPL-CCNC: 24 U/L (ref 1–40)
BACTERIA UR QL AUTO: ABNORMAL /HPF
BASOPHILS # BLD AUTO: 0.06 10*3/MM3 (ref 0–0.2)
BASOPHILS NFR BLD AUTO: 0.4 % (ref 0–1.5)
BILIRUB SERPL-MCNC: 0.6 MG/DL (ref 0–1.2)
BILIRUB UR QL STRIP: NEGATIVE
BLD GP AB SCN SERPL QL: NEGATIVE
BUN SERPL-MCNC: 52 MG/DL (ref 8–23)
BUN/CREAT SERPL: 25.7 (ref 7–25)
CALCIUM SPEC-SCNC: 10 MG/DL (ref 8.2–9.6)
CHLORIDE SERPL-SCNC: 106 MMOL/L (ref 98–107)
CLARITY UR: ABNORMAL
CO2 SERPL-SCNC: 21 MMOL/L (ref 22–29)
COLOR UR: YELLOW
CREAT SERPL-MCNC: 2.02 MG/DL (ref 0.76–1.27)
DEPRECATED RDW RBC AUTO: 56.3 FL (ref 37–54)
EGFRCR SERPLBLD CKD-EPI 2021: 30.4 ML/MIN/1.73
EOSINOPHIL # BLD AUTO: 0.1 10*3/MM3 (ref 0–0.4)
EOSINOPHIL NFR BLD AUTO: 0.6 % (ref 0.3–6.2)
ERYTHROCYTE [DISTWIDTH] IN BLOOD BY AUTOMATED COUNT: 16.4 % (ref 12.3–15.4)
GLOBULIN UR ELPH-MCNC: 3.7 GM/DL
GLUCOSE BLDC GLUCOMTR-MCNC: 168 MG/DL (ref 70–130)
GLUCOSE SERPL-MCNC: 258 MG/DL (ref 65–99)
GLUCOSE UR STRIP-MCNC: NEGATIVE MG/DL
HCT VFR BLD AUTO: 43.1 % (ref 37.5–51)
HGB BLD-MCNC: 13.7 G/DL (ref 13–17.7)
HGB UR QL STRIP.AUTO: ABNORMAL
HYALINE CASTS UR QL AUTO: ABNORMAL /LPF
IMM GRANULOCYTES # BLD AUTO: 0.11 10*3/MM3 (ref 0–0.05)
IMM GRANULOCYTES NFR BLD AUTO: 0.7 % (ref 0–0.5)
KETONES UR QL STRIP: NEGATIVE
LEUKOCYTE ESTERASE UR QL STRIP.AUTO: ABNORMAL
LYMPHOCYTES # BLD AUTO: 1.04 10*3/MM3 (ref 0.7–3.1)
LYMPHOCYTES NFR BLD AUTO: 6.5 % (ref 19.6–45.3)
MCH RBC QN AUTO: 30 PG (ref 26.6–33)
MCHC RBC AUTO-ENTMCNC: 31.8 G/DL (ref 31.5–35.7)
MCV RBC AUTO: 94.5 FL (ref 79–97)
MONOCYTES # BLD AUTO: 0.54 10*3/MM3 (ref 0.1–0.9)
MONOCYTES NFR BLD AUTO: 3.4 % (ref 5–12)
NEUTROPHILS NFR BLD AUTO: 14.07 10*3/MM3 (ref 1.7–7)
NEUTROPHILS NFR BLD AUTO: 88.4 % (ref 42.7–76)
NITRITE UR QL STRIP: NEGATIVE
NRBC BLD AUTO-RTO: 0.1 /100 WBC (ref 0–0.2)
PH UR STRIP.AUTO: 6 [PH] (ref 5–8)
PLATELET # BLD AUTO: 193 10*3/MM3 (ref 140–450)
PMV BLD AUTO: 10.7 FL (ref 6–12)
POTASSIUM SERPL-SCNC: 5.7 MMOL/L (ref 3.5–5.2)
PROT SERPL-MCNC: 7.7 G/DL (ref 6–8.5)
PROT UR QL STRIP: ABNORMAL
RBC # BLD AUTO: 4.56 10*6/MM3 (ref 4.14–5.8)
RBC # UR STRIP: ABNORMAL /HPF
REF LAB TEST METHOD: ABNORMAL
RH BLD: NEGATIVE
SODIUM SERPL-SCNC: 142 MMOL/L (ref 136–145)
SP GR UR STRIP: 1.01 (ref 1–1.03)
SQUAMOUS #/AREA URNS HPF: ABNORMAL /HPF
T&S EXPIRATION DATE: NORMAL
UROBILINOGEN UR QL STRIP: ABNORMAL
WBC # UR STRIP: ABNORMAL /HPF
WBC NRBC COR # BLD AUTO: 15.92 10*3/MM3 (ref 3.4–10.8)
YEAST URNS QL MICRO: ABNORMAL /HPF

## 2025-03-21 PROCEDURE — 25010000002 MORPHINE PER 10 MG: Performed by: EMERGENCY MEDICINE

## 2025-03-21 PROCEDURE — 25010000002 ONDANSETRON PER 1 MG: Performed by: EMERGENCY MEDICINE

## 2025-03-21 PROCEDURE — 71045 X-RAY EXAM CHEST 1 VIEW: CPT

## 2025-03-21 PROCEDURE — 25810000003 SODIUM CHLORIDE 0.9 % SOLUTION: Performed by: NURSE PRACTITIONER

## 2025-03-21 PROCEDURE — 73552 X-RAY EXAM OF FEMUR 2/>: CPT

## 2025-03-21 PROCEDURE — 25810000003 SODIUM CHLORIDE 0.9 % SOLUTION: Performed by: EMERGENCY MEDICINE

## 2025-03-21 PROCEDURE — 25010000002 MORPHINE PER 10 MG: Performed by: FAMILY MEDICINE

## 2025-03-21 PROCEDURE — 63710000001 INSULIN LISPRO (HUMAN) PER 5 UNITS: Performed by: NURSE PRACTITIONER

## 2025-03-21 PROCEDURE — 86900 BLOOD TYPING SEROLOGIC ABO: CPT | Performed by: EMERGENCY MEDICINE

## 2025-03-21 PROCEDURE — P9612 CATHETERIZE FOR URINE SPEC: HCPCS

## 2025-03-21 PROCEDURE — 86850 RBC ANTIBODY SCREEN: CPT | Performed by: EMERGENCY MEDICINE

## 2025-03-21 PROCEDURE — 80053 COMPREHEN METABOLIC PANEL: CPT | Performed by: EMERGENCY MEDICINE

## 2025-03-21 PROCEDURE — 63710000001 INSULIN GLARGINE PER 5 UNITS: Performed by: NURSE PRACTITIONER

## 2025-03-21 PROCEDURE — 86923 COMPATIBILITY TEST ELECTRIC: CPT

## 2025-03-21 PROCEDURE — 87086 URINE CULTURE/COLONY COUNT: CPT | Performed by: EMERGENCY MEDICINE

## 2025-03-21 PROCEDURE — 93005 ELECTROCARDIOGRAM TRACING: CPT | Performed by: EMERGENCY MEDICINE

## 2025-03-21 PROCEDURE — 82948 REAGENT STRIP/BLOOD GLUCOSE: CPT

## 2025-03-21 PROCEDURE — 86901 BLOOD TYPING SEROLOGIC RH(D): CPT | Performed by: EMERGENCY MEDICINE

## 2025-03-21 PROCEDURE — 99285 EMERGENCY DEPT VISIT HI MDM: CPT

## 2025-03-21 PROCEDURE — 85025 COMPLETE CBC W/AUTO DIFF WBC: CPT | Performed by: EMERGENCY MEDICINE

## 2025-03-21 PROCEDURE — 72170 X-RAY EXAM OF PELVIS: CPT

## 2025-03-21 PROCEDURE — 99222 1ST HOSP IP/OBS MODERATE 55: CPT | Performed by: NURSE PRACTITIONER

## 2025-03-21 PROCEDURE — 36415 COLL VENOUS BLD VENIPUNCTURE: CPT

## 2025-03-21 PROCEDURE — 81001 URINALYSIS AUTO W/SCOPE: CPT | Performed by: EMERGENCY MEDICINE

## 2025-03-21 PROCEDURE — 99222 1ST HOSP IP/OBS MODERATE 55: CPT | Performed by: ORTHOPAEDIC SURGERY

## 2025-03-21 PROCEDURE — 25010000002 CEFTRIAXONE PER 250 MG: Performed by: EMERGENCY MEDICINE

## 2025-03-21 RX ORDER — HYDROCODONE BITARTRATE AND ACETAMINOPHEN 5; 325 MG/1; MG/1
1 TABLET ORAL EVERY 6 HOURS PRN
Refills: 0 | Status: DISCONTINUED | OUTPATIENT
Start: 2025-03-21 | End: 2025-03-26 | Stop reason: HOSPADM

## 2025-03-21 RX ORDER — AMOXICILLIN 250 MG
2 CAPSULE ORAL 2 TIMES DAILY PRN
Status: DISCONTINUED | OUTPATIENT
Start: 2025-03-21 | End: 2025-03-25

## 2025-03-21 RX ORDER — MORPHINE SULFATE 2 MG/ML
2 INJECTION, SOLUTION INTRAMUSCULAR; INTRAVENOUS ONCE
Status: COMPLETED | OUTPATIENT
Start: 2025-03-21 | End: 2025-03-21

## 2025-03-21 RX ORDER — ONDANSETRON 2 MG/ML
4 INJECTION INTRAMUSCULAR; INTRAVENOUS EVERY 6 HOURS PRN
Status: DISCONTINUED | OUTPATIENT
Start: 2025-03-21 | End: 2025-03-26 | Stop reason: HOSPADM

## 2025-03-21 RX ORDER — OXYBUTYNIN CHLORIDE 10 MG/1
10 TABLET, EXTENDED RELEASE ORAL DAILY
Status: DISCONTINUED | OUTPATIENT
Start: 2025-03-22 | End: 2025-03-26 | Stop reason: HOSPADM

## 2025-03-21 RX ORDER — BISACODYL 10 MG
10 SUPPOSITORY, RECTAL RECTAL DAILY PRN
Status: DISCONTINUED | OUTPATIENT
Start: 2025-03-21 | End: 2025-03-25

## 2025-03-21 RX ORDER — ACETAMINOPHEN 325 MG/1
650 TABLET ORAL EVERY 4 HOURS PRN
Status: DISCONTINUED | OUTPATIENT
Start: 2025-03-21 | End: 2025-03-26 | Stop reason: HOSPADM

## 2025-03-21 RX ORDER — MORPHINE SULFATE 2 MG/ML
2 INJECTION, SOLUTION INTRAMUSCULAR; INTRAVENOUS EVERY 4 HOURS PRN
Status: DISCONTINUED | OUTPATIENT
Start: 2025-03-21 | End: 2025-03-26 | Stop reason: HOSPADM

## 2025-03-21 RX ORDER — SODIUM CHLORIDE 9 MG/ML
40 INJECTION, SOLUTION INTRAVENOUS AS NEEDED
Status: DISCONTINUED | OUTPATIENT
Start: 2025-03-21 | End: 2025-03-26 | Stop reason: HOSPADM

## 2025-03-21 RX ORDER — POLYETHYLENE GLYCOL 3350 17 G/17G
17 POWDER, FOR SOLUTION ORAL DAILY PRN
Status: DISCONTINUED | OUTPATIENT
Start: 2025-03-21 | End: 2025-03-25

## 2025-03-21 RX ORDER — BISACODYL 5 MG/1
5 TABLET, DELAYED RELEASE ORAL DAILY PRN
Status: DISCONTINUED | OUTPATIENT
Start: 2025-03-21 | End: 2025-03-25

## 2025-03-21 RX ORDER — IBUPROFEN 600 MG/1
1 TABLET ORAL
Status: DISCONTINUED | OUTPATIENT
Start: 2025-03-21 | End: 2025-03-26 | Stop reason: HOSPADM

## 2025-03-21 RX ORDER — ACETAMINOPHEN 160 MG/5ML
650 SOLUTION ORAL EVERY 4 HOURS PRN
Status: DISCONTINUED | OUTPATIENT
Start: 2025-03-21 | End: 2025-03-26 | Stop reason: HOSPADM

## 2025-03-21 RX ORDER — NICOTINE POLACRILEX 4 MG
15 LOZENGE BUCCAL
Status: DISCONTINUED | OUTPATIENT
Start: 2025-03-21 | End: 2025-03-26 | Stop reason: HOSPADM

## 2025-03-21 RX ORDER — DEXTROSE MONOHYDRATE 25 G/50ML
25 INJECTION, SOLUTION INTRAVENOUS
Status: DISCONTINUED | OUTPATIENT
Start: 2025-03-21 | End: 2025-03-26 | Stop reason: HOSPADM

## 2025-03-21 RX ORDER — SODIUM CHLORIDE 0.9 % (FLUSH) 0.9 %
10 SYRINGE (ML) INJECTION EVERY 12 HOURS SCHEDULED
Status: DISCONTINUED | OUTPATIENT
Start: 2025-03-21 | End: 2025-03-26 | Stop reason: HOSPADM

## 2025-03-21 RX ORDER — NITROGLYCERIN 0.4 MG/1
0.4 TABLET SUBLINGUAL
Status: DISCONTINUED | OUTPATIENT
Start: 2025-03-21 | End: 2025-03-26 | Stop reason: HOSPADM

## 2025-03-21 RX ORDER — SODIUM CHLORIDE 0.9 % (FLUSH) 0.9 %
10 SYRINGE (ML) INJECTION AS NEEDED
Status: DISCONTINUED | OUTPATIENT
Start: 2025-03-21 | End: 2025-03-26 | Stop reason: HOSPADM

## 2025-03-21 RX ORDER — LEVOTHYROXINE SODIUM 50 UG/1
50 TABLET ORAL
Status: DISCONTINUED | OUTPATIENT
Start: 2025-03-22 | End: 2025-03-26 | Stop reason: HOSPADM

## 2025-03-21 RX ORDER — ACETAMINOPHEN 650 MG/1
650 SUPPOSITORY RECTAL EVERY 4 HOURS PRN
Status: DISCONTINUED | OUTPATIENT
Start: 2025-03-21 | End: 2025-03-26 | Stop reason: HOSPADM

## 2025-03-21 RX ORDER — SODIUM CHLORIDE 9 MG/ML
100 INJECTION, SOLUTION INTRAVENOUS CONTINUOUS
Status: DISCONTINUED | OUTPATIENT
Start: 2025-03-21 | End: 2025-03-22

## 2025-03-21 RX ORDER — FINASTERIDE 5 MG/1
5 TABLET, FILM COATED ORAL DAILY
Status: DISCONTINUED | OUTPATIENT
Start: 2025-03-22 | End: 2025-03-26 | Stop reason: HOSPADM

## 2025-03-21 RX ORDER — BISOPROLOL FUMARATE 5 MG/1
5 TABLET, FILM COATED ORAL DAILY
Status: DISCONTINUED | OUTPATIENT
Start: 2025-03-22 | End: 2025-03-26 | Stop reason: HOSPADM

## 2025-03-21 RX ORDER — INSULIN LISPRO 100 [IU]/ML
2-9 INJECTION, SOLUTION INTRAVENOUS; SUBCUTANEOUS
Status: DISCONTINUED | OUTPATIENT
Start: 2025-03-21 | End: 2025-03-26 | Stop reason: HOSPADM

## 2025-03-21 RX ORDER — ONDANSETRON 2 MG/ML
4 INJECTION INTRAMUSCULAR; INTRAVENOUS ONCE
Status: COMPLETED | OUTPATIENT
Start: 2025-03-21 | End: 2025-03-21

## 2025-03-21 RX ORDER — ONDANSETRON 4 MG/1
4 TABLET, ORALLY DISINTEGRATING ORAL EVERY 6 HOURS PRN
Status: DISCONTINUED | OUTPATIENT
Start: 2025-03-21 | End: 2025-03-26 | Stop reason: HOSPADM

## 2025-03-21 RX ORDER — ASPIRIN 81 MG/1
81 TABLET ORAL DAILY
Status: DISCONTINUED | OUTPATIENT
Start: 2025-03-22 | End: 2025-03-26 | Stop reason: HOSPADM

## 2025-03-21 RX ADMIN — ONDANSETRON 4 MG: 2 INJECTION INTRAMUSCULAR; INTRAVENOUS at 16:57

## 2025-03-21 RX ADMIN — SODIUM ZIRCONIUM CYCLOSILICATE 10 G: 10 POWDER, FOR SUSPENSION ORAL at 21:55

## 2025-03-21 RX ADMIN — INSULIN LISPRO 2 UNITS: 100 INJECTION, SOLUTION INTRAVENOUS; SUBCUTANEOUS at 21:56

## 2025-03-21 RX ADMIN — SODIUM CHLORIDE 1000 MG: 900 INJECTION INTRAVENOUS at 17:02

## 2025-03-21 RX ADMIN — MORPHINE SULFATE 2 MG: 2 INJECTION, SOLUTION INTRAMUSCULAR; INTRAVENOUS at 16:57

## 2025-03-21 RX ADMIN — Medication 10 ML: at 21:56

## 2025-03-21 RX ADMIN — SODIUM CHLORIDE 100 ML/HR: 9 INJECTION, SOLUTION INTRAVENOUS at 21:56

## 2025-03-21 RX ADMIN — INSULIN GLARGINE 10 UNITS: 100 INJECTION, SOLUTION SUBCUTANEOUS at 21:55

## 2025-03-21 RX ADMIN — HYDROCODONE BITARTRATE AND ACETAMINOPHEN 1 TABLET: 5; 325 TABLET ORAL at 20:22

## 2025-03-21 RX ADMIN — SODIUM CHLORIDE 1000 ML: 9 INJECTION, SOLUTION INTRAVENOUS at 16:22

## 2025-03-21 RX ADMIN — MORPHINE SULFATE 2 MG: 2 INJECTION, SOLUTION INTRAMUSCULAR; INTRAVENOUS at 23:37

## 2025-03-21 NOTE — H&P
[facility] Medicine Services  HISTORY AND PHYSICAL    Patient Name: Forrest Zepeda  : 1932  MRN: [id number]  Primary Care Physician: Kary Wen MD  Date of admission: 3/21/2025    Subjective   Subjective     Chief Complaint:  Fall, right hip pain     HPI:  Forrest Zepeda is a 89 y.o. male with past medical history significant interstitial lung disease, saddle PE, BPH, HFpEF, chronic hydronephrosis d/t urinary stricture, CKD 3, T2DM, hypothyroidism, and PAF who presented to [facility] due to fall with severe right hip pain.  Patient reports that he got up last night to use the restroom and when he went to sit back down in bed he slipped off the bed and fell to the floor.  Patient denies head injury or loss of consciousness.  States that he landed on his right hip/leg.  Patient was unable to bear weight following the fall.  EMS was called and is brought to the ED for further evaluation.  Patient reports history of chronic hydronephrosis due to urinary stricture.  He follows with urologist Dr. Pitts typically has ureteral stent replaced every few months.    In the ED, pelvic xray revealed intertrochanteric right femur fracture.  Labs were reviewed and significant for potassium 5.7, creatinine 2.02, glucose 258, and WBC 15.92.  UA revealed turbid appearance with 3+ blood, 3+ leukocytes,  and 1+ bacteria.  Vital signs were reviewed and are currently unremarkable. The patient will be admitted by hospital medicine for further evaluation and treatment.     Personal History     Past Medical History:   Diagnosis Date    Abnormal EKG     Abnormal PSA     Reported abnormal    Acute cystitis with hematuria 2023    Acute deep vein thrombosis (DVT) of right lower extremity 2019    Acute diverticulitis 2019    Acute hyperkalemia 2019    Acute respiratory failure with hypoxia     Acute saddle pulmonary embolism with acute cor pulmonale 2019    Acute systolic right heart  "failure 08/22/2019    Acute UTI (urinary tract infection)     Arthritis     KNEES,  BUT SINCE HAD REPLACEMENT    Benign localized hyperplasia of prostate     Bilateral knee pain     C. difficile diarrhea 2010    Cataracts, bilateral     CKD (chronic kidney disease)     Coronary artery disease     Diabetic neuropathy     Diastolic dysfunction     Disease of thyroid gland     HYPOTHYROIDISM    Diverticulitis     Dyslipidemia     H/O    Erectile dysfunction     Family history of malignant hyperthermia     Brother     Full dentures     Generalized weakness     History of ESBL E. coli infection     most recent 4-2022 f/u with ID Dr Johnson    History of gout     History of hepatitis A vaccination     History of nephrolithiasis     History of nuclear stress test     STATES IN THE 1990'S.  \"IT WAS OK\"    History of osteopenia     History of recurrent UTI (urinary tract infection)     Hydronephrosis of left kidney 07/18/2019    Hydronephrosis with renal and ureteral calculus obstruction 10/21/2019    Added automatically from request for surgery [id number]    Hydronephrosis with ureteral stricture     Hypercholesterolemia     Hyperkalemia     PT UNSURE REGARDING HX OF THIS    Hyperlipidemia     Hypertension     Hypothyroidism     Impaired functional mobility, balance, gait, and endurance     Insomnia     IPF (idiopathic pulmonary fibrosis)     per patient and son, dx at [facility], was told no treatment necessary, not to worry about it    On supplemental oxygen therapy     2L NC QHS    Other hydronephrosis 08/12/2019    Added automatically from request for surgery [id number]    Paroxysmal atrial fibrillation     Pulmonary fibrosis     Pulmonary hypertension, mild 02/2021    per echo per cardiology note 1/9/23, per pt's son, PCP does not agree with this dx    Renal insufficiency     Sepsis 2019    Shortness of breath     STATES WITH EXERTION, OTHERWISE, DENIES    Sigmoid diverticulitis without abscess or perforation 08/09/2017    Sinus " node dysfunction     Skin breakdown     fourth toe right foot noted in 2019 MD D/C note    Skin ulcer of fourth toe of right foot, limited to breakdown of skin 07/05/2019    Stricture of ureter     Type 2 diabetes mellitus     Ureteral stricture, left     Urinary incontinence     UTI (urinary tract infection) 07/18/2019    Vitamin D deficiency     Wears glasses              Past Surgical History:   Procedure Laterality Date    APPENDECTOMY      CARDIAC ELECTROPHYSIOLOGY PROCEDURE N/A 12/23/2020    Procedure: Pacemaker DC new. DNS meds.;  Surgeon: Jimy Ledezma DO;  Location:  [facility];  Service: Cardiology;  Laterality: N/A;    CHOLECYSTECTOMY      CIRCUMCISION N/A 10/23/2019    Procedure: CIRCUMCISIOn-DORSAL SLIT;  Surgeon: Griffin Romero MD;  Location:  [facility];  Service: Urology    COLONOSCOPY      CYSTOSCOPY RETROGRADE PYELOGRAM Left 06/17/2022    Procedure: CYSTOSCOPY RETROGRADE PYELOGRAM;  Surgeon: Ryne Mccann MD;  Location:  [facility];  Service: Urology;  Laterality: Left;    CYSTOSCOPY RETROGRADE PYELOGRAM Left 7/10/2024    Procedure: CYSTOSCOPY RETROGRADE PYELOGRAM AND STENT INSERTION LEFT;  Surgeon: Deanne Pitts MD;  Location:  [facility];  Service: Urology;  Laterality: Left;    CYSTOSCOPY URETEROSCOPY Left 02/11/2019    Procedure: URETEROSCOPY WITH STENT EXTRACTION, RPG;  Surgeon: Griffin Romero MD;  Location:  [facility];  Service: Urology    CYSTOSCOPY W/ URETERAL STENT PLACEMENT Left 07/20/2019    Procedure: CYSTOSCOPY, LEFT RETROGRADE PYELOGRAM,  ATTEMPTED LEFT URETERAL STENT INSERTION;  Surgeon: Isaac Flynn MD;  Location:  [facility];  Service: Urology    CYSTOSCOPY W/ URETERAL STENT PLACEMENT Left 06/17/2022    Procedure: CYSTOSCOPY URETERAL CATHETER/STENT INSERTION;  Surgeon: Ryne Mccann MD;  Location:  [facility];  Service: Urology;  Laterality: Left;    CYSTOSCOPY W/ URETERAL STENT PLACEMENT Left 01/27/2023    Procedure: CYSTOSCOPY URETERAL CATHETER/STENT  INSERTION;  Surgeon: Deanne Pitts MD;  Location:  [facility];  Service: Urology;  Laterality: Left;    CYSTOSCOPY W/ URETERAL STENT PLACEMENT Left 02/15/2024    Procedure: CYSTOSCOPY LEFT STENT EXCHANGE;  Surgeon: Deanne Pitts MD;  Location:  [facility];  Service: Urology;  Laterality: Left;    CYSTOSCOPY, URETEROSCOPY, RETROGRADE PYELOGRAM, STONE EXTRACTION, STENT INSERTION Left 06/15/2023    Procedure: URETEROSCOPY, RETROGRADE PYELOGRAM, STENT INSERTION;  Surgeon: Deanne Pitts MD;  Location:  [facility];  Service: Urology;  Laterality: Left;    CYSTOSCOPY, URETEROSCOPY, RETROGRADE PYELOGRAM, STONE EXTRACTION, STENT INSERTION Left 11/22/2024    Procedure: CYSTOSCOPY RETROGRADE PYELOGRAM AND STENT INSERTION LEFT;  Surgeon: Deanne Pitts MD;  Location:  [facility];  Service: Urology;  Laterality: Left;    EYE SURGERY      CATARACTS REMOVED BILATERALLY    JOINT REPLACEMENT      Bilateral knees    KNEE ARTHROPLASTY Bilateral     KNEE ARTHROSCOPY Bilateral     RENAL ARTERY STENT      SEVERAL TIMES EVERY SINCE 1978    URETERAL STENT INSERTION  10/2020    URETEROSCOPY LASER LITHOTRIPSY WITH STENT INSERTION Left 01/10/2018    Procedure:  cysto, left ureteral stent replacement ;  Surgeon: Griffin Romero MD;  Location:  [facility];  Service:     URETEROSCOPY LASER LITHOTRIPSY WITH STENT INSERTION Left 08/14/2019    Procedure: URETEROSCOPY, STONE REMOVAL WITH STENT INSERTION;  Surgeon: Griffin Romero MD;  Location:  [facility];  Service: Urology    URETEROSCOPY LASER LITHOTRIPSY WITH STENT INSERTION Left 10/23/2019    Procedure: Left URETEROSCOPY WITH STENT INSERTION-LEFT;  Surgeon: Griffin Romero MD;  Location:  [facility];  Service: Urology       Family History:  family history includes Brain cancer in his brother and sister; Heart attack in his sister; Hypertension in his sister; Lung cancer in his brother and sister; Malig Hyperthermia in his brother; No Known Problems in his mother; Stomach cancer in his father; Stroke  in his sister.     Social History:  reports that he quit smoking about 74 years ago. His smoking use included cigarettes. He has a 0.5 pack-year smoking history. He has been exposed to tobacco smoke. He has never used smokeless tobacco. He reports that he does not drink alcohol and does not use drugs.  Social History     Social History Narrative    Pt lives alone in [place]    Granddaughter Erika is nurse.     Had 7 brothers and 3 sisters.     Uses a cane and oxygen as needed    Still drives       Medications:  HYDROcodone-acetaminophen, Insulin Glargine (1 Unit Dial), Mirabegron ER, Multiple Vitamins-Minerals, SITagliptin, allopurinol, apixaban, aspirin, bisoprolol, finasteride, glimepiride, and levothyroxine    Allergies   Allergen Reactions    Statins Diarrhea and Myalgia    Metformin Diarrhea and GI Intolerance       Objective   Objective     Vital Signs:   Temp:  [98.5 °F (36.9 °C)] 98.5 °F (36.9 °C)  Heart Rate:  [73-80] 73  Resp:  [18] 18  BP: (113-149)/(67-81) 149/77    Physical Exam   Constitutional: Awake, alert, chronically ill appearing, pleasant   Eyes: PERRLA, sclerae anicteric, no conjunctival injection  HENT: NCAT, mucous membranes moist  Neck: Supple, no thyromegaly, no lymphadenopathy, trachea midline  Respiratory: Clear to auscultation bilaterally, nonlabored respirations   Cardiovascular: RRR, no murmurs, rubs, or gallops, palpable pedal pulses bilaterally  Gastrointestinal: Positive bowel sounds, soft, nontender, nondistended  Musculoskeletal: no bilateral ankle edema, right hip tender to palpation  Psychiatric: Appropriate affect, cooperative  Neurologic: Oriented x 3, moves all extremities, speech clear  Skin: warm, dry, no visible rash     Result Review:  I have personally reviewed the results from the time of this admission to 3/21/2025 18:50 EDT and agree with these findings:  [x]  Laboratory list / accordion  [x]  Microbiology  [x]  Radiology  [x]  EKG/Telemetry   []  Cardiology/Vascular    []  Pathology  [x]  Old records  []  Other:  Most notable findings include:     LAB RESULTS:      Lab 03/21/25  1525   WBC 15.92*   HEMOGLOBIN 13.7   HEMATOCRIT 43.1   PLATELETS 193   NEUTROS ABS 14.07*   IMMATURE GRANS (ABS) 0.11*   LYMPHS ABS 1.04   MONOS ABS 0.54   EOS ABS 0.10   MCV 94.5         Lab 03/21/25  1525   SODIUM 142   POTASSIUM 5.7*   CHLORIDE 106   CO2 21.0*   ANION GAP 15.0   BUN 52*   CREATININE 2.02*   EGFR 30.4*   GLUCOSE 258*   CALCIUM 10.0*         Lab 03/21/25  1525   TOTAL PROTEIN 7.7   ALBUMIN 4.0   GLOBULIN 3.7   ALT (SGPT) 13   AST (SGOT) 24   BILIRUBIN 0.6   ALK PHOS 108                 Lab 03/21/25  1526   ABO TYPING O   RH TYPING Negative   ANTIBODY SCREEN Negative         Brief Urine Lab Results  (Last result in the past 365 days)        Color   Clarity   Blood   Leuk Est   Nitrite   Protein   CREAT   Urine HCG        03/21/25 1618 Yellow   Turbid   Large (3+)   Large (3+)   Negative   100 mg/dL (2+)                 Microbiology Results (last 10 days)       ** No results found for the last 240 hours. **            XR Chest 1 View  Result Date: 3/21/2025  XR CHEST 1 VW Date of Exam: 3/21/2025 3:48 PM EDT Indication: HIP FX Comparison: 11/20/2024 Findings: There is a dual-chamber pacemaker. There is incomplete inspiration and exaggerated lordotic positioning which essentially obscures the heart border. The pulmonary vasculature appears within normal limits. There are reticular interstitial markings in the peripheral lungs and lung bases. No acute infiltrate is demonstrated     Impression: Impression: Interstitial lung disease/pulmonary fibrosis. No acute process demonstrated Electronically Signed: Golden Ngo  3/21/2025 3:57 PM EDT  Workstation ID: [id number]    XR Femur 2 View Right  Result Date: 3/21/2025  XR FEMUR 2 VW RIGHT Date of Exam: 3/21/2025 3:48 PM EDT Indication: HIP FX Comparison: None available. Findings/    Impression: Impression: There is a mildly displaced and mildly  comminuted intertrochanteric fracture of the right proximal femur. Bones are demineralized. Right knee arthroplasty is noted. No hardware complication in the provided views. The visualized bony pelvis appears unremarkable. Partially visualized left ureteral stent. Vascular calcifications are seen. Electronically Signed: Ramon Paredes MD  3/21/2025 3:53 PM EDT  Workstation ID: [id number]    XR Pelvis 1 or 2 View  Result Date: 3/21/2025  XR PELVIS 1 OR 2 VW Date of Exam: 3/21/2025 3:48 PM EDT Indication: HIP FX Comparison: None available. Findings: The bones appear somewhat osteopenic. There is a fracture of the intertrochanteric right femur without significant displacement on this single view. The femoral head is not dislocated. No fracture of the pelvis is visualized. A left ureteral stent is present. There is degenerative disease in the lower lumbar spine     Impression: Impression: Intertrochanteric right femur fracture Electronically Signed: Golden Jeni  3/21/2025 3:53 PM EDT  Workstation ID: [id number]      Results for orders placed in visit on 11/13/23    Adult Transthoracic Echo Complete W/ Cont if Necessary Per Protocol    Interpretation Summary    Left ventricular systolic function is normal. Estimated left ventricular EF = 52% Left ventricular ejection fraction appears to be 51 - 55%.    Left ventricular wall thickness is consistent with mild concentric hypertrophy.    Left ventricular diastolic function is consistent with (grade I) impaired relaxation.    The right ventricular cavity is mildly dilated.    The left atrial cavity is mildly dilated.    Left atrial volume is moderately increased.    There is mild calcification of the aortic valve mainly affecting the non-coronary, left coronary and right coronary cusp(s).    Estimated right ventricular systolic pressure from tricuspid regurgitation is normal (<35 mmHg).      Assessment & Plan   Assessment & Plan       Closed right hip fracture     Hyperkalemia    Leukocytosis    T2DM (type 2 diabetes mellitus)    OCTAVIA (acute kidney injury)    Forrest Zepeda is a 89 y.o. male with past medical history significant interstitial lung disease, saddle PE, BPH, HFpEF, chronic hydronephrosis d/t urinary stricture, CKD 3, T2DM, hypothyroidism, and PAF who presented to [facility] due to fall with severe right hip pain.     Right hip fracture  -Pelvic x-ray revealed intertrochanteric right femur fracture  -Orthopedic surgeon Dr. Baltazar has seen, tentative plan for surgery on Lusi 3/23  -Tylenol, Norco, morphine as needed for pain  -Zofran as needed for nausea  -AM labs    Abnormal UA  Leukocytosis  -Urine culture pending  -Started on empiric Rocephin    OCTAVIA on CKD III  Chronic hydronephrosis  -Follows with urologist Dr. Pitts   -Baseline Cr ~1.5  -Creatinine 2.02 on presentation  -s/p 1L NS bolus in ED  -Continue NS at 100 ml/hr x 10 hours  -avoid nephrotoxins as able  -AM labs    Hyperkalemia  -Potassium 5.7 on presentation  -Lokelma x1 ordered    PAF  Hx PE/DVT  -Eliquis on hold for planned procedure, last dose AM of 3/21  -continue Bisoprolol     T2DM  -hold home glimepiride, Januvia, Toujeo  -Lantus 10 units at HS  -Mod dose SSI  -Hgb A1C in AM     BPH  -continue Proscar    Hypothyroidism  -Synthroid     DVT prophylaxis:  SCDs    CODE STATUS:    Code Status (Patient has no pulse and is not breathing): No CPR (Do Not Attempt to Resuscitate)  Medical Interventions (Patient has pulse or is breathing): Limited Support  Medical Intervention Limits: No intubation (DNI)  Level Of Support Discussed With: Patient    Expected Discharge TBD  Expected discharge date/ time has not been documented.      Signature: Electronically signed by JOYCE Roth, 03/21/25, 6:50 PM EDT

## 2025-03-21 NOTE — CONSULTS
Orthopaedic Consult Note  Patient Care Team:  Kary Wen MD as PCP - General (Family Medicine)  Win Saha MD as Consulting Physician (Cardiology)  Deanne Pitts MD as Consulting Physician (Urology)    Chief complaint   Right hip pain    Subjective .     History of present illness:    89-year-old male who presented to emergency department earlier today following mechanical fall out of bed onto right hip.  Denies head injury or any other injuries associated with fall.  Takes Eliquis for history of previous saddle embolus.  Reports took Eliquis this morning.  Denies any other injuries or sources of pain.    Review of Systems:  All systems reviewed are negative except for what is stated in the HPI       History  Family History   Problem Relation Age of Onset    No Known Problems Mother     Stomach cancer Father     Heart attack Sister     Brain cancer Sister     Stroke Sister     Lung cancer Sister     Hypertension Sister     Brain cancer Brother     Lung cancer Brother     Malig Hyperthermia Brother      Social History     Socioeconomic History    Marital status:    Tobacco Use    Smoking status: Former     Current packs/day: 0.00     Average packs/day: 0.3 packs/day for 2.0 years (0.5 ttl pk-yrs)     Types: Cigarettes     Quit date: 1950     Years since quittin.2     Passive exposure: Past    Smokeless tobacco: Never    Tobacco comments:     SMOKED SOME AS A TEEN, DENIES ANY HABIT OR ADDICTION   Vaping Use    Vaping status: Never Used   Substance and Sexual Activity    Alcohol use: No    Drug use: No    Sexual activity: Not Currently     Partners: Female     Past Surgical History:   Procedure Laterality Date    APPENDECTOMY      CARDIAC ELECTROPHYSIOLOGY PROCEDURE N/A 2020    Procedure: Pacemaker DC new. DNS meds.;  Surgeon: Jimy Ledezma DO;  Location: [facility] INVASIVE LOCATION;  Service: Cardiology;  Laterality: N/A;    CHOLECYSTECTOMY      CIRCUMCISION N/A 10/23/2019     Procedure: CIRCUMCISIOn-DORSAL SLIT;  Surgeon: Griffin Romero MD;  Location:  [facility];  Service: Urology    COLONOSCOPY      CYSTOSCOPY RETROGRADE PYELOGRAM Left 06/17/2022    Procedure: CYSTOSCOPY RETROGRADE PYELOGRAM;  Surgeon: Ryne Mccann MD;  Location:  [facility];  Service: Urology;  Laterality: Left;    CYSTOSCOPY RETROGRADE PYELOGRAM Left 7/10/2024    Procedure: CYSTOSCOPY RETROGRADE PYELOGRAM AND STENT INSERTION LEFT;  Surgeon: Deanne Pitts MD;  Location:  [facility];  Service: Urology;  Laterality: Left;    CYSTOSCOPY URETEROSCOPY Left 02/11/2019    Procedure: URETEROSCOPY WITH STENT EXTRACTION, RPG;  Surgeon: Griffin Romero MD;  Location:  [facility];  Service: Urology    CYSTOSCOPY W/ URETERAL STENT PLACEMENT Left 07/20/2019    Procedure: CYSTOSCOPY, LEFT RETROGRADE PYELOGRAM,  ATTEMPTED LEFT URETERAL STENT INSERTION;  Surgeon: Isaac Flynn MD;  Location:  [facility];  Service: Urology    CYSTOSCOPY W/ URETERAL STENT PLACEMENT Left 06/17/2022    Procedure: CYSTOSCOPY URETERAL CATHETER/STENT INSERTION;  Surgeon: Ryne Mccann MD;  Location:  [facility];  Service: Urology;  Laterality: Left;    CYSTOSCOPY W/ URETERAL STENT PLACEMENT Left 01/27/2023    Procedure: CYSTOSCOPY URETERAL CATHETER/STENT INSERTION;  Surgeon: Deanne Pitts MD;  Location:  [facility];  Service: Urology;  Laterality: Left;    CYSTOSCOPY W/ URETERAL STENT PLACEMENT Left 02/15/2024    Procedure: CYSTOSCOPY LEFT STENT EXCHANGE;  Surgeon: Deanne Pitts MD;  Location:  [facility];  Service: Urology;  Laterality: Left;    CYSTOSCOPY, URETEROSCOPY, RETROGRADE PYELOGRAM, STONE EXTRACTION, STENT INSERTION Left 06/15/2023    Procedure: URETEROSCOPY, RETROGRADE PYELOGRAM, STENT INSERTION;  Surgeon: Deanne Pitts MD;  Location:  [facility];  Service: Urology;  Laterality: Left;    CYSTOSCOPY, URETEROSCOPY, RETROGRADE PYELOGRAM, STONE EXTRACTION, STENT INSERTION Left 11/22/2024    Procedure: CYSTOSCOPY RETROGRADE PYELOGRAM AND STENT  INSERTION LEFT;  Surgeon: Deanne Pitts MD;  Location: [facility] OR;  Service: Urology;  Laterality: Left;    EYE SURGERY      CATARACTS REMOVED BILATERALLY    JOINT REPLACEMENT      Bilateral knees    KNEE ARTHROPLASTY Bilateral     KNEE ARTHROSCOPY Bilateral     RENAL ARTERY STENT      SEVERAL TIMES EVERY SINCE 1978    URETERAL STENT INSERTION  10/2020    URETEROSCOPY LASER LITHOTRIPSY WITH STENT INSERTION Left 01/10/2018    Procedure:  cysto, left ureteral stent replacement ;  Surgeon: Griffin Romero MD;  Location: [facility] OR;  Service:     URETEROSCOPY LASER LITHOTRIPSY WITH STENT INSERTION Left 08/14/2019    Procedure: URETEROSCOPY, STONE REMOVAL WITH STENT INSERTION;  Surgeon: Griffin Romero MD;  Location: [facility] OR;  Service: Urology    URETEROSCOPY LASER LITHOTRIPSY WITH STENT INSERTION Left 10/23/2019    Procedure: Left URETEROSCOPY WITH STENT INSERTION-LEFT;  Surgeon: Griffin Romero MD;  Location: [facility] OR;  Service: Urology     Past Medical History:   Diagnosis Date    Abnormal EKG     Abnormal PSA     Reported abnormal    Acute cystitis with hematuria 05/01/2023    Acute deep vein thrombosis (DVT) of right lower extremity 08/22/2019    Acute diverticulitis 2019    Acute hyperkalemia 08/22/2019    Acute respiratory failure with hypoxia     Acute saddle pulmonary embolism with acute cor pulmonale 08/22/2019    Acute systolic right heart failure 08/22/2019    Acute UTI (urinary tract infection)     Arthritis     KNEES,  BUT SINCE HAD REPLACEMENT    Benign localized hyperplasia of prostate     Bilateral knee pain     C. difficile diarrhea 2010    Cataracts, bilateral     CKD (chronic kidney disease)     Coronary artery disease     Diabetic neuropathy     Diastolic dysfunction     Disease of thyroid gland     HYPOTHYROIDISM    Diverticulitis     Dyslipidemia     H/O    Erectile dysfunction     Family history of malignant hyperthermia     Brother     Full dentures     Generalized weakness     History of  "ESBL E. coli infection     most recent 4-2022 f/u with ID Dr Johnson    History of gout     History of hepatitis A vaccination     History of nephrolithiasis     History of nuclear stress test     STATES IN THE 1990'S.  \"IT WAS OK\"    History of osteopenia     History of recurrent UTI (urinary tract infection)     Hydronephrosis of left kidney 07/18/2019    Hydronephrosis with renal and ureteral calculus obstruction 10/21/2019    Added automatically from request for surgery [id number]    Hydronephrosis with ureteral stricture     Hypercholesterolemia     Hyperkalemia     PT UNSURE REGARDING HX OF THIS    Hyperlipidemia     Hypertension     Hypothyroidism     Impaired functional mobility, balance, gait, and endurance     Insomnia     IPF (idiopathic pulmonary fibrosis)     per patient and son, dx at [facility], was told no treatment necessary, not to worry about it    On supplemental oxygen therapy     2L NC QHS    Other hydronephrosis 08/12/2019    Added automatically from request for surgery [id number]    Paroxysmal atrial fibrillation     Pulmonary fibrosis     Pulmonary hypertension, mild 02/2021    per echo per cardiology note 1/9/23, per pt's son, PCP does not agree with this dx    Renal insufficiency     Sepsis 2019    Shortness of breath     STATES WITH EXERTION, OTHERWISE, DENIES    Sigmoid diverticulitis without abscess or perforation 08/09/2017    Sinus node dysfunction     Skin breakdown     fourth toe right foot noted in 2019 MD D/C note    Skin ulcer of fourth toe of right foot, limited to breakdown of skin 07/05/2019    Stricture of ureter     Type 2 diabetes mellitus     Ureteral stricture, left     Urinary incontinence     UTI (urinary tract infection) 07/18/2019    Vitamin D deficiency     Wears glasses      Allergies   Allergen Reactions    Statins Diarrhea and Myalgia    Metformin Diarrhea and GI Intolerance     No current facility-administered medications for this encounter.    Current Outpatient " Medications:     allopurinol (ZYLOPRIM) 300 MG tablet, Take 1 tablet by mouth Every Night. To lower risk of gout, Disp: 90 tablet, Rfl: 3    aspirin (Aspirin Low Dose) 81 MG EC tablet, TAKE 1 TABLET BY MOUTH DAILY, Disp: 90 tablet, Rfl: 3    bisoprolol (ZEBeta) 5 MG tablet, , Disp: , Rfl:     Eliquis 2.5 MG tablet tablet, TAKE ONE TABLET BY MOUTH TWICE A DAY, Disp: 180 tablet, Rfl: 3    finasteride (PROSCAR) 5 MG tablet, TAKE 1 TABLET BY MOUTH DAILY, Disp: 90 tablet, Rfl: 3    glimepiride (AMARYL) 1 MG tablet, TAKE ONE TABLET BY MOUTH EVERY MORNING BEFORE BREAKFAST, Disp: 90 tablet, Rfl: 3    glucose blood (True Metrix Blood Glucose Test) test strip, TEST FOUR TIMES A DAY, Disp: 300 each, Rfl: 0    HYDROcodone-acetaminophen (NORCO) 5-325 MG per tablet, Take 1 tablet by mouth Every 6 (Six) Hours As Needed for Severe Pain., Disp: 60 tablet, Rfl: 0    Insulin Glargine, 1 Unit Dial, (Toujeo SoloStar) 300 UNIT/ML solution pen-injector injection, INJECT 20 UNITS UNDER THE SKIN INTO THE APPROPRIATE AREA AS DIRECTED EVERY NIGHT, Disp: 6 mL, Rfl: 3    Insulin Pen Needle (BD Pen Needle Micro U/F) 32G X 6 MM misc, USE ONE DAILY, Disp: 100 each, Rfl: 2    levothyroxine (SYNTHROID, LEVOTHROID) 50 MCG tablet, Take 1 tablet by mouth Every Morning. Indications: Underactive Thyroid, Disp: 90 tablet, Rfl: 3    Multiple Vitamins-Minerals (OCUVITE ADULT 50+ PO), Take 1 capsule by mouth Daily. OTC, Disp: , Rfl:     Myrbetriq 50 MG tablet sustained-release 24 hour 24 hr tablet, TAKE 1 TABLET BY MOUTH DAILY, Disp: 90 tablet, Rfl: 3    silodosin (RAPAFLO) 8 MG capsule capsule, TAKE 1 CAPSULE BY MOUTH DAILY WITH BREAKFAST (Patient not taking: Reported on 1/24/2025), Disp: 90 capsule, Rfl: 3    SITagliptin (Januvia) 25 MG tablet, Take 1 tablet by mouth Daily. For diabetes, Disp: 90 tablet, Rfl: 3    Objective     Vital Signs   Temp:  [98.5 °F (36.9 °C)] 98.5 °F (36.9 °C)  Heart Rate:  [73-80] 73  Resp:  [18] 18  BP: (113-149)/(67-81)  149/77  There is no height or weight on file to calculate BMI.      Physical Exam:  right LE: skin intact, compartments soft/compressible thigh and leg   Tenderness to palpation over hip   + Actively wiggles toes as well as plantar flexes and dorsiflexes ankle and also actively flexes and extends knee   SILT DP/SP/SN/Saph/Tib   Foot warm and well-perfused    Labs:  Lab Results (last 24 hours)       Procedure Component Value Units Date/Time    Urinalysis, Microscopic Only - Urine, Catheter [[id number]]  (Abnormal) Collected: 03/21/25 1618    Specimen: Urine, Catheter Updated: 03/21/25 1644     RBC, UA None Seen /HPF      WBC, UA Too Numerous to Count /HPF      Bacteria, UA 1+ /HPF      Squamous Epithelial Cells, UA 3-6 /HPF      Yeast, UA Small/1+ Budding Yeast /HPF      Hyaline Casts, UA 0-6 /LPF      Methodology Manual Light Microscopy    Urinalysis With Culture If Indicated - Urine, Catheter [[id number]]  (Abnormal) Collected: 03/21/25 1618    Specimen: Urine, Catheter Updated: 03/21/25 1634     Color, UA Yellow     Appearance, UA Turbid     pH, UA 6.0     Specific Gravity, UA 1.013     Glucose, UA Negative     Ketones, UA Negative     Bilirubin, UA Negative     Blood, UA Large (3+)     Protein,  mg/dL (2+)     Leuk Esterase, UA Large (3+)     Nitrite, UA Negative     Urobilinogen, UA 0.2 E.U./dL    Narrative:      In absence of clinical symptoms, the presence of pyuria, bacteria, and/or nitrites on the urinalysis result does not correlate with infection.    Urine Culture - Urine, Urine, Catheter [[id number]] Collected: 03/21/25 1618    Specimen: Urine, Catheter Updated: 03/21/25 1631    Comprehensive Metabolic Panel [[id number]]  (Abnormal) Collected: 03/21/25 1525    Specimen: Blood Updated: 03/21/25 1603     Glucose 258 mg/dL      BUN 52 mg/dL      Creatinine 2.02 mg/dL      Sodium 142 mmol/L      Potassium 5.7 mmol/L      Chloride 106 mmol/L      CO2 21.0 mmol/L      Calcium 10.0 mg/dL      Total Protein 7.7  g/dL      Albumin 4.0 g/dL      ALT (SGPT) 13 U/L      AST (SGOT) 24 U/L      Alkaline Phosphatase 108 U/L      Total Bilirubin 0.6 mg/dL      Globulin 3.7 gm/dL      Comment: Calculated Result        A/G Ratio 1.1 g/dL      BUN/Creatinine Ratio 25.7     Anion Gap 15.0 mmol/L      eGFR 30.4 mL/min/1.73     Narrative:      GFR Categories in Chronic Kidney Disease (CKD)      GFR Category          GFR (mL/min/1.73)    Interpretation  G1                     90 or greater         Normal or high (1)  G2                      60-89                Mild decrease (1)  G3a                   45-59                Mild to moderate decrease  G3b                   30-44                Moderate to severe decrease  G4                    15-29                Severe decrease  G5                    14 or less           Kidney failure          (1)In the absence of evidence of kidney disease, neither GFR category G1 or G2 fulfill the criteria for CKD.    eGFR calculation 2021 CKD-EPI creatinine equation, which does not include race as a factor    CBC & Differential [[id number]]  (Abnormal) Collected: 03/21/25 1525    Specimen: Blood Updated: 03/21/25 1545    Narrative:      The following orders were created for panel order CBC & Differential.  Procedure                               Abnormality         Status                     ---------                               -----------         ------                     CBC Auto Differential[[id number]]        Abnormal            Final result                 Please view results for these tests on the individual orders.    CBC Auto Differential [[id number]]  (Abnormal) Collected: 03/21/25 1525    Specimen: Blood Updated: 03/21/25 1545     WBC 15.92 10*3/mm3      RBC 4.56 10*6/mm3      Hemoglobin 13.7 g/dL      Hematocrit 43.1 %      MCV 94.5 fL      MCH 30.0 pg      MCHC 31.8 g/dL      RDW 16.4 %      RDW-SD 56.3 fl      MPV 10.7 fL      Platelets 193 10*3/mm3      Neutrophil % 88.4 %       Lymphocyte % 6.5 %      Monocyte % 3.4 %      Eosinophil % 0.6 %      Basophil % 0.4 %      Immature Grans % 0.7 %      Neutrophils, Absolute 14.07 10*3/mm3      Lymphocytes, Absolute 1.04 10*3/mm3      Monocytes, Absolute 0.54 10*3/mm3      Eosinophils, Absolute 0.10 10*3/mm3      Basophils, Absolute 0.06 10*3/mm3      Immature Grans, Absolute 0.11 10*3/mm3      nRBC 0.1 /100 WBC             Imaging:  XR FEMUR 2 VW RIGHT     Date of Exam: 3/21/2025 3:48 PM EDT     Indication: HIP FX     Comparison: None available.     Findings/  IMPRESSION:  Impression:  There is a mildly displaced and mildly comminuted intertrochanteric fracture of the right proximal femur.      Bones are demineralized. Right knee arthroplasty is noted. No hardware complication in the provided views. The visualized bony pelvis appears unremarkable. Partially visualized left ureteral stent. Vascular calcifications are seen.              Electronically Signed: Ramon Paredes MD    3/21/2025 3:53 PM EDT    Workstation ID: [id number]        Assessment & Plan   89 yo M w/ Right intertrochanteric fracture      Closed right hip fracture    -Urinalysis concerning for UTI, management per primary team, given Rocephin in the ED  -Hold eliquis for OR, chemical bridge anticoagulant prior to surgery per primary team  -Bedrest  -Pain control  -Ice right hip  -Diet for now, NPO prior to OR  -Patient took Eliquis this morning, will tentatively plan for surgery Sunday, March 23  -Rest of management per primary team    I discussed the patient's findings and my recommendations as well as treatment options and alternatives with patient and family.  Surgical versus non surgical treatment options were discussed as well.  We reviewed the nature of the planned procedure including the risks, benefits and potential complications.  Understanding was expressed and the decision was made to proceed with surgery by both patient and patient's family.      Brodie Baltazar,  MD  03/21/25  17:56 EDT

## 2025-03-21 NOTE — LETTER
EMS Transport Request  For use at [facility], [place], [place], [place], and [place] only   Patient Name: Forrest Zepeda : 1932   Weight:83.9 kg (185 lb) Pick-up Location: [facility] BLS/ALS: BLS/ALS: BLS   Insurance: [employer] MEDICARE REPLACEMENT Auth End Date: 3/36/2025   Pre-Cert #: D/C Summary complete:    Destination: Other [facility]  [address] [facility]    Contact Precautions: None   Equipment (O2, Fluids, etc.): O2, settings 6   Arrive By Date/Time: 3/26/2025 Stretcher/WC: Stretcher   CM Requesting: Sushila Kennedy RN Ext: [phone]   Notes/Medical Necessity: hip fx. Impaired functional mobility, gait, balance and endurance     ______________________________________________________________________    *Only 2 patient bags OR 1 carry-on size bag are permitted.  Wheelchairs and walkers CANNOT transported with the patient. Acknowledge: Yes

## 2025-03-21 NOTE — ED PROVIDER NOTES
[facility]    EMERGENCY DEPARTMENT ENCOUNTER      Pt Name: Forrest Zepeda  MRN: [id number]  YOB: 1932  Date of evaluation: 3/21/2025  Provider: Aamir Bacon MD    CHIEF COMPLAINT       Chief Complaint   Patient presents with    Fall         HISTORY OF PRESENT ILLNESS   Forrest Zepeda is a 89 y.o. male who presents to the emergency department after mechanical fall out of bed this morning onto his right hip.  He denies any head injury or any other injury associated with the fall.  He is on Eliquis.  Vital signs stable during transport for EMS and no medications were administered.      Nursing notes were reviewed.    REVIEW OF SYSTEMS     ROS:  A chief complaint appropriate review of systems was completed and is negative except as noted in the HPI.      PAST MEDICAL HISTORY     Past Medical History:   Diagnosis Date    Abnormal EKG     Abnormal PSA     Reported abnormal    Acute cystitis with hematuria 05/01/2023    Acute deep vein thrombosis (DVT) of right lower extremity 08/22/2019    Acute diverticulitis 2019    Acute hyperkalemia 08/22/2019    Acute respiratory failure with hypoxia     Acute saddle pulmonary embolism with acute cor pulmonale 08/22/2019    Acute systolic right heart failure 08/22/2019    Acute UTI (urinary tract infection)     Arthritis     KNEES,  BUT SINCE HAD REPLACEMENT    Benign localized hyperplasia of prostate     Bilateral knee pain     C. difficile diarrhea 2010    Cataracts, bilateral     CKD (chronic kidney disease)     Coronary artery disease     Diabetic neuropathy     Diastolic dysfunction     Disease of thyroid gland     HYPOTHYROIDISM    Diverticulitis     Dyslipidemia     H/O    Erectile dysfunction     Family history of malignant hyperthermia     Brother     Full dentures     Generalized weakness     History of ESBL E. coli infection     most recent 4-2022 f/u with ID Dr Johnson    History of gout     History of hepatitis A vaccination     History of  "nephrolithiasis     History of nuclear stress test     STATES IN THE 1990'S.  \"IT WAS OK\"    History of osteopenia     History of recurrent UTI (urinary tract infection)     Hydronephrosis of left kidney 07/18/2019    Hydronephrosis with renal and ureteral calculus obstruction 10/21/2019    Added automatically from request for surgery [id number]    Hydronephrosis with ureteral stricture     Hypercholesterolemia     Hyperkalemia     PT UNSURE REGARDING HX OF THIS    Hyperlipidemia     Hypertension     Hypothyroidism     Impaired functional mobility, balance, gait, and endurance     Insomnia     IPF (idiopathic pulmonary fibrosis)     per patient and son, dx at [facility], was told no treatment necessary, not to worry about it    On supplemental oxygen therapy     2L NC QHS    Other hydronephrosis 08/12/2019    Added automatically from request for surgery [id number]    Paroxysmal atrial fibrillation     Pulmonary fibrosis     Pulmonary hypertension, mild 02/2021    per echo per cardiology note 1/9/23, per pt's son, PCP does not agree with this dx    Renal insufficiency     Sepsis 2019    Shortness of breath     STATES WITH EXERTION, OTHERWISE, DENIES    Sigmoid diverticulitis without abscess or perforation 08/09/2017    Sinus node dysfunction     Skin breakdown     fourth toe right foot noted in 2019 MD D/C note    Skin ulcer of fourth toe of right foot, limited to breakdown of skin 07/05/2019    Stricture of ureter     Type 2 diabetes mellitus     Ureteral stricture, left     Urinary incontinence     UTI (urinary tract infection) 07/18/2019    Vitamin D deficiency     Wears glasses          SURGICAL HISTORY       Past Surgical History:   Procedure Laterality Date    APPENDECTOMY      CARDIAC ELECTROPHYSIOLOGY PROCEDURE N/A 12/23/2020    Procedure: Pacemaker DC new. DNS meds.;  Surgeon: Jimy Ledezma DO;  Location: [facility] INVASIVE LOCATION;  Service: Cardiology;  Laterality: N/A;    CHOLECYSTECTOMY      CIRCUMCISION N/A " 10/23/2019    Procedure: CIRCUMCISIOn-DORSAL SLIT;  Surgeon: Griffin Romero MD;  Location:  [facility] OR;  Service: Urology    COLONOSCOPY      CYSTOSCOPY RETROGRADE PYELOGRAM Left 06/17/2022    Procedure: CYSTOSCOPY RETROGRADE PYELOGRAM;  Surgeon: Ryne Mccann MD;  Location:  [facility];  Service: Urology;  Laterality: Left;    CYSTOSCOPY RETROGRADE PYELOGRAM Left 7/10/2024    Procedure: CYSTOSCOPY RETROGRADE PYELOGRAM AND STENT INSERTION LEFT;  Surgeon: Deanne Pitts MD;  Location:  [facility];  Service: Urology;  Laterality: Left;    CYSTOSCOPY URETEROSCOPY Left 02/11/2019    Procedure: URETEROSCOPY WITH STENT EXTRACTION, RPG;  Surgeon: Griffin Romero MD;  Location:  [facility];  Service: Urology    CYSTOSCOPY W/ URETERAL STENT PLACEMENT Left 07/20/2019    Procedure: CYSTOSCOPY, LEFT RETROGRADE PYELOGRAM,  ATTEMPTED LEFT URETERAL STENT INSERTION;  Surgeon: Isaac Flynn MD;  Location:  [facility];  Service: Urology    CYSTOSCOPY W/ URETERAL STENT PLACEMENT Left 06/17/2022    Procedure: CYSTOSCOPY URETERAL CATHETER/STENT INSERTION;  Surgeon: Ryne Mccann MD;  Location:  [facility];  Service: Urology;  Laterality: Left;    CYSTOSCOPY W/ URETERAL STENT PLACEMENT Left 01/27/2023    Procedure: CYSTOSCOPY URETERAL CATHETER/STENT INSERTION;  Surgeon: Deanne Pitts MD;  Location:  [facility];  Service: Urology;  Laterality: Left;    CYSTOSCOPY W/ URETERAL STENT PLACEMENT Left 02/15/2024    Procedure: CYSTOSCOPY LEFT STENT EXCHANGE;  Surgeon: Deanne Pitts MD;  Location:  [facility];  Service: Urology;  Laterality: Left;    CYSTOSCOPY, URETEROSCOPY, RETROGRADE PYELOGRAM, STONE EXTRACTION, STENT INSERTION Left 06/15/2023    Procedure: URETEROSCOPY, RETROGRADE PYELOGRAM, STENT INSERTION;  Surgeon: Deanne Pitts MD;  Location:  [facility];  Service: Urology;  Laterality: Left;    CYSTOSCOPY, URETEROSCOPY, RETROGRADE PYELOGRAM, STONE EXTRACTION, STENT INSERTION Left 11/22/2024    Procedure: CYSTOSCOPY RETROGRADE PYELOGRAM  AND STENT INSERTION LEFT;  Surgeon: Deanne Pitts MD;  Location: [facility] OR;  Service: Urology;  Laterality: Left;    EYE SURGERY      CATARACTS REMOVED BILATERALLY    JOINT REPLACEMENT      Bilateral knees    KNEE ARTHROPLASTY Bilateral     KNEE ARTHROSCOPY Bilateral     RENAL ARTERY STENT      SEVERAL TIMES EVERY SINCE 1978    URETERAL STENT INSERTION  10/2020    URETEROSCOPY LASER LITHOTRIPSY WITH STENT INSERTION Left 01/10/2018    Procedure:  cysto, left ureteral stent replacement ;  Surgeon: Griffin Romero MD;  Location: [facility] OR;  Service:     URETEROSCOPY LASER LITHOTRIPSY WITH STENT INSERTION Left 08/14/2019    Procedure: URETEROSCOPY, STONE REMOVAL WITH STENT INSERTION;  Surgeon: Griffin Romero MD;  Location: [facility] OR;  Service: Urology    URETEROSCOPY LASER LITHOTRIPSY WITH STENT INSERTION Left 10/23/2019    Procedure: Left URETEROSCOPY WITH STENT INSERTION-LEFT;  Surgeon: Griffin Romero MD;  Location: [facility] OR;  Service: Urology         CURRENT MEDICATIONS       Current Facility-Administered Medications:     cefTRIAXone (ROCEPHIN) 1,000 mg in sodium chloride 0.9 % 100 mL MBP, 1,000 mg, Intravenous, Once, Aamir Bacon MD, Last Rate: 200 mL/hr at 03/21/25 1702, 1,000 mg at 03/21/25 1702    Current Outpatient Medications:     allopurinol (ZYLOPRIM) 300 MG tablet, Take 1 tablet by mouth Every Night. To lower risk of gout, Disp: 90 tablet, Rfl: 3    aspirin (Aspirin Low Dose) 81 MG EC tablet, TAKE 1 TABLET BY MOUTH DAILY, Disp: 90 tablet, Rfl: 3    bisoprolol (ZEBeta) 5 MG tablet, , Disp: , Rfl:     Eliquis 2.5 MG tablet tablet, TAKE ONE TABLET BY MOUTH TWICE A DAY, Disp: 180 tablet, Rfl: 3    finasteride (PROSCAR) 5 MG tablet, TAKE 1 TABLET BY MOUTH DAILY, Disp: 90 tablet, Rfl: 3    glimepiride (AMARYL) 1 MG tablet, TAKE ONE TABLET BY MOUTH EVERY MORNING BEFORE BREAKFAST, Disp: 90 tablet, Rfl: 3    glucose blood (True Metrix Blood Glucose Test) test strip, TEST FOUR TIMES A DAY, Disp: 300  each, Rfl: 0    HYDROcodone-acetaminophen (NORCO) 5-325 MG per tablet, Take 1 tablet by mouth Every 6 (Six) Hours As Needed for Severe Pain., Disp: 60 tablet, Rfl: 0    Insulin Glargine, 1 Unit Dial, (Toujeo SoloStar) 300 UNIT/ML solution pen-injector injection, INJECT 20 UNITS UNDER THE SKIN INTO THE APPROPRIATE AREA AS DIRECTED EVERY NIGHT, Disp: 6 mL, Rfl: 3    Insulin Pen Needle (BD Pen Needle Micro U/F) 32G X 6 MM misc, USE ONE DAILY, Disp: 100 each, Rfl: 2    levothyroxine (SYNTHROID, LEVOTHROID) 50 MCG tablet, Take 1 tablet by mouth Every Morning. Indications: Underactive Thyroid, Disp: 90 tablet, Rfl: 3    Multiple Vitamins-Minerals (OCUVITE ADULT 50+ PO), Take 1 capsule by mouth Daily. OTC, Disp: , Rfl:     Myrbetriq 50 MG tablet sustained-release 24 hour 24 hr tablet, TAKE 1 TABLET BY MOUTH DAILY, Disp: 90 tablet, Rfl: 3    silodosin (RAPAFLO) 8 MG capsule capsule, TAKE 1 CAPSULE BY MOUTH DAILY WITH BREAKFAST (Patient not taking: Reported on 2025), Disp: 90 capsule, Rfl: 3    SITagliptin (Januvia) 25 MG tablet, Take 1 tablet by mouth Daily. For diabetes, Disp: 90 tablet, Rfl: 3    ALLERGIES     Statins and Metformin    FAMILY HISTORY       Family History   Problem Relation Age of Onset    No Known Problems Mother     Stomach cancer Father     Heart attack Sister     Brain cancer Sister     Stroke Sister     Lung cancer Sister     Hypertension Sister     Brain cancer Brother     Lung cancer Brother     Malig Hyperthermia Brother           SOCIAL HISTORY       Social History     Socioeconomic History    Marital status:    Tobacco Use    Smoking status: Former     Current packs/day: 0.00     Average packs/day: 0.3 packs/day for 2.0 years (0.5 ttl pk-yrs)     Types: Cigarettes     Quit date: 1950     Years since quittin.2     Passive exposure: Past    Smokeless tobacco: Never    Tobacco comments:     SMOKED SOME AS A TEEN, DENIES ANY HABIT OR ADDICTION   Vaping Use    Vaping status:  Never Used   Substance and Sexual Activity    Alcohol use: No    Drug use: No    Sexual activity: Not Currently     Partners: Female         PHYSICAL EXAM    (up to 7 for level 4, 8 or more for level 5)     Vitals:    03/21/25 1529 03/21/25 1530 03/21/25 1545 03/21/25 1645   BP:  120/81 115/73 149/77   Pulse:  75 79 73   Resp: 18      Temp: 98.5 °F (36.9 °C)      TempSrc: Oral      SpO2:  96% 93% 98%       General: Awake, alert, no acute distress.  HEENT: Conjunctivae normal.  Neck: Trachea midline.  Cardiac: Heart regular rate, rhythm, no murmurs, rubs, or gallops  Lungs: Lungs are clear to auscultation, there is no wheezing, rhonchi, or rales. There is no use of accessory muscles.  Chest wall: There is no tenderness to palpation over the chest wall or over ribs  Abdomen: Abdomen is soft, nontender, nondistended. There are no firm or pulsatile masses, no rebound rigidity or guarding.   Musculoskeletal: Shortening and external rotation of the right hip.  No additional areas of focal bone tenderness.  Distally, motor and sensory function intact.  Strong DP and PT pulses.  Neuro: Alert and oriented x 4.  Dermatology: Skin is warm and dry  Psych: Mentation is grossly normal, cognition is grossly normal. Affect is appropriate.        DIAGNOSTIC RESULTS     EKG: All EKGs are interpreted by the Emergency Department Physician who either signs or Co-signs this chart in the absence of a cardiologist.    ECG 12 Lead Pre-Op / Pre-Procedure   Preliminary Result   Test Reason : pre op   Blood Pressure :   */*   mmHG   Vent. Rate :  78 BPM     Atrial Rate :  75 BPM      P-R Int :   * ms          QRS Dur : 172 ms       QT Int : 436 ms       P-R-T Axes :   *  12 230 degrees     QTcB Int : 497 ms      Ventricular-paced rhythm   Abnormal ECG   When compared with ECG of 21-Nov-2024 09:20,   Electronic ventricular pacemaker has replaced Electronic atrial pacemaker      Referred By: natty ruiz           Confirmed By:             RADIOLOGY:    [x] Radiologist's Report Reviewed:  XR Chest 1 View   Final Result   Impression:   Interstitial lung disease/pulmonary fibrosis. No acute process demonstrated         Electronically Signed: Golden Jeni     3/21/2025 3:57 PM EDT     Workstation ID: [id number]      XR Femur 2 View Right   Final Result   Impression:   There is a mildly displaced and mildly comminuted intertrochanteric fracture of the right proximal femur.       Bones are demineralized. Right knee arthroplasty is noted. No hardware complication in the provided views. The visualized bony pelvis appears unremarkable. Partially visualized left ureteral stent. Vascular calcifications are seen.               Electronically Signed: Ramon Paredes MD     3/21/2025 3:53 PM EDT     Workstation ID: [id number]      XR Pelvis 1 or 2 View   Final Result   Impression:   Intertrochanteric right femur fracture         Electronically Signed: Golden Ngo     3/21/2025 3:53 PM EDT     Workstation ID: [id number]          I ordered and independently reviewed the above noted radiographic studies.        LABS:    I have reviewed and interpreted all of the currently available lab results from this visit (if applicable):  Results for orders placed or performed during the hospital encounter of 03/21/25   Comprehensive Metabolic Panel    Collection Time: 03/21/25  3:25 PM    Specimen: Blood   Result Value Ref Range    Glucose 258 (H) 65 - 99 mg/dL    BUN 52 (H) 8 - 23 mg/dL    Creatinine 2.02 (H) 0.76 - 1.27 mg/dL    Sodium 142 136 - 145 mmol/L    Potassium 5.7 (H) 3.5 - 5.2 mmol/L    Chloride 106 98 - 107 mmol/L    CO2 21.0 (L) 22.0 - 29.0 mmol/L    Calcium 10.0 (H) 8.2 - 9.6 mg/dL    Total Protein 7.7 6.0 - 8.5 g/dL    Albumin 4.0 3.5 - 5.2 g/dL    ALT (SGPT) 13 1 - 41 U/L    AST (SGOT) 24 1 - 40 U/L    Alkaline Phosphatase 108 39 - 117 U/L    Total Bilirubin 0.6 0.0 - 1.2 mg/dL    Globulin 3.7 gm/dL    A/G Ratio 1.1 g/dL    BUN/Creatinine Ratio 25.7 (H) 7.0 - 25.0    Anion  Gap 15.0 5.0 - 15.0 mmol/L    eGFR 30.4 (L) >60.0 mL/min/1.73   CBC Auto Differential    Collection Time: 03/21/25  3:25 PM    Specimen: Blood   Result Value Ref Range    WBC 15.92 (H) 3.40 - 10.80 10*3/mm3    RBC 4.56 4.14 - 5.80 10*6/mm3    Hemoglobin 13.7 13.0 - 17.7 g/dL    Hematocrit 43.1 37.5 - 51.0 %    MCV 94.5 79.0 - 97.0 fL    MCH 30.0 26.6 - 33.0 pg    MCHC 31.8 31.5 - 35.7 g/dL    RDW 16.4 (H) 12.3 - 15.4 %    RDW-SD 56.3 (H) 37.0 - 54.0 fl    MPV 10.7 6.0 - 12.0 fL    Platelets 193 140 - 450 10*3/mm3    Neutrophil % 88.4 (H) 42.7 - 76.0 %    Lymphocyte % 6.5 (L) 19.6 - 45.3 %    Monocyte % 3.4 (L) 5.0 - 12.0 %    Eosinophil % 0.6 0.3 - 6.2 %    Basophil % 0.4 0.0 - 1.5 %    Immature Grans % 0.7 (H) 0.0 - 0.5 %    Neutrophils, Absolute 14.07 (H) 1.70 - 7.00 10*3/mm3    Lymphocytes, Absolute 1.04 0.70 - 3.10 10*3/mm3    Monocytes, Absolute 0.54 0.10 - 0.90 10*3/mm3    Eosinophils, Absolute 0.10 0.00 - 0.40 10*3/mm3    Basophils, Absolute 0.06 0.00 - 0.20 10*3/mm3    Immature Grans, Absolute 0.11 (H) 0.00 - 0.05 10*3/mm3    nRBC 0.1 0.0 - 0.2 /100 WBC   Type & Screen    Collection Time: 03/21/25  3:26 PM    Specimen: Arm, Right; Blood   Result Value Ref Range    ABO Type O     RH type Negative     Antibody Screen Negative     T&S Expiration Date 3/24/2025 11:59:59 PM    ECG 12 Lead Pre-Op / Pre-Procedure    Collection Time: 03/21/25  3:33 PM   Result Value Ref Range    QT Interval 436 ms    QTC Interval 497 ms   Urinalysis With Culture If Indicated - Urine, Catheter    Collection Time: 03/21/25  4:18 PM    Specimen: Urine, Catheter   Result Value Ref Range    Color, UA Yellow Yellow, Straw    Appearance, UA Turbid (A) Clear    pH, UA 6.0 5.0 - 8.0    Specific Gravity, UA 1.013 1.001 - 1.030    Glucose, UA Negative Negative    Ketones, UA Negative Negative    Bilirubin, UA Negative Negative    Blood, UA Large (3+) (A) Negative    Protein,  mg/dL (2+) (A) Negative    Leuk Esterase, UA Large (3+) (A)  Negative    Nitrite, UA Negative Negative    Urobilinogen, UA 0.2 E.U./dL 0.2 - 1.0 E.U./dL   Urinalysis, Microscopic Only - Urine, Catheter    Collection Time: 03/21/25  4:18 PM    Specimen: Urine, Catheter   Result Value Ref Range    RBC, UA None Seen None Seen, 0-2 /HPF    WBC, UA Too Numerous to Count (A) None Seen, 0-2 /HPF    Bacteria, UA 1+ (A) None Seen, Trace /HPF    Squamous Epithelial Cells, UA 3-6 (A) None Seen, 0-2 /HPF    Yeast, UA Small/1+ Budding Yeast (A) None Seen /HPF    Hyaline Casts, UA 0-6 0 - 6 /LPF    Methodology Manual Light Microscopy         If labs were ordered, I independently reviewed the results and considered them in treating the patient.      EMERGENCY DEPARTMENT COURSE and DIFFERENTIAL DIAGNOSIS/MDM:   Vitals:  AS OF 17:27 EDT    BP - 149/77  HR - 73  TEMP - 98.5 °F (36.9 °C) (Oral)  O2 SATS - 98%        Discussion below represents my analysis of pertinent findings related to patient's condition, differential diagnosis, treatment plan and final disposition.      Differential diagnosis:  The differential diagnosis associated with the patient's presentation includes: Hip fracture, hip dislocation, hip contusion, femur fracture      Independent interpretations (ECG/rhythm strip/X-ray/US/CT scan): I dependently interpreted the patient's right hip x-ray and cardiac monitor.  Patient is in sinus rhythm and there is an inotrope fracture of the right femur.      Additional sources:  Discussed/obtained information from independent historians:   [] Spouse:   [] Parent:   [] Friend:   [x] EMS: Report was taken from EMS.  Vital signs stable during transport.   [] Other:  External (non-ED) record review:   [] Inpatient record:   [] Office record:   [] Outpatient record:   [] Prior Outpatient labs:   [] Prior Outpatient radiology:   [] Primary Care record:   [] Outside ED record:   [] Other:       Patient's care impacted by:   [x] Diabetes   [] Hypertension   [] Coronary Artery Disease   []  Cancer   [x] Other: Anticoagulated, interstitial lung disease    Care significantly affected by Social Determinants of Health (housing and economic circumstances, unemployment)    [] Yes     [x] No   If yes, Patient's care significantly limited by  Social Determinants of Health including:    [] Inadequate housing    [] Low income    [] Alcoholism and drug addiction in family    [] Problems related to primary support group    [] Unemployment    [] Problems related to employment    [] Other Social Determinants of Health:       Consideration of admission/observation vs discharge: Patient presents with acute hip fracture and requires admission      I considered prescription management with:    [x] Pain medication: Patient given IV morphine with improvement in pain   [] Antiviral:   [x] Antibiotic: Patient started on Rocephin   [] Other:        ED Course:    ED Course as of 03/21/25 1727   Fri Mar 21, 2025   1714 Dr. Baltazar with ortho has been paged x 2 [NS]   1720 I discussed case with Ortho Dr. Baltazar.  Discussed history, presentation, workup.  Will consult.  Surgery likely on Sunday. [NS]   1722 Patient presents after mechanical fall with right hip fracture. Patient slid off of the edge of the bed last night onto his right hip. Did not hit his head and has no other injuries. He has intertrochanteric fracture of the hip. Have spoken with Dr. Baltazar with the plan likely for surgery on Sunday given that he is on Eliquis. He also has an OCTAVIA with a creatinine of around 2, white count of 15, and possible urinary tract infection although he has no symptoms. I have given him a liter of IV fluids and ordered a dose of Rocephin. He also has interstitial lung disease and is intermittently on supplemental oxygen. [NS]   1722 I discussed case with hospitalist Dr. Zendejas.  Discussed history, presentation, workup.  Accepts patient for admission. [NS]      ED Course User Index  [NS] Aamir Bacon MD          PROCEDURES:  Procedures    CRITICAL CARE TIME        FINAL IMPRESSION      1. Closed nondisplaced intertrochanteric fracture of right femur, initial encounter    2. Acute cystitis with hematuria    3. Acute kidney injury    4. Anticoagulated    5. Interstitial lung disease          DISPOSITION/PLAN     ED Disposition       ED Disposition   Decision to Admit    Condition   --    Comment   --                 Comment: Please note this report has been produced using speech recognition software.      Aamir Bacon MD  Attending Emergency Physician             Aamir Bacon MD  03/21/25 1566

## 2025-03-22 ENCOUNTER — ANESTHESIA (OUTPATIENT)
Dept: PERIOP | Facility: HOSPITAL | Age: OVER 89
End: 2025-03-22
Payer: MEDICARE

## 2025-03-22 ENCOUNTER — APPOINTMENT (OUTPATIENT)
Dept: GENERAL RADIOLOGY | Facility: HOSPITAL | Age: OVER 89
End: 2025-03-22
Payer: MEDICARE

## 2025-03-22 ENCOUNTER — PREP FOR SURGERY (OUTPATIENT)
Dept: OTHER | Facility: HOSPITAL | Age: OVER 89
End: 2025-03-22
Payer: MEDICARE

## 2025-03-22 ENCOUNTER — ANESTHESIA EVENT (OUTPATIENT)
Dept: PERIOP | Facility: HOSPITAL | Age: OVER 89
End: 2025-03-22
Payer: MEDICARE

## 2025-03-22 DIAGNOSIS — S72.001A CLOSED FRACTURE OF RIGHT HIP, INITIAL ENCOUNTER: Primary | ICD-10-CM

## 2025-03-22 LAB
ALBUMIN SERPL-MCNC: 3.1 G/DL (ref 3.5–5.2)
ALBUMIN/GLOB SERPL: 1 G/DL
ALP SERPL-CCNC: 79 U/L (ref 39–117)
ALT SERPL W P-5'-P-CCNC: 10 U/L (ref 1–41)
ANION GAP SERPL CALCULATED.3IONS-SCNC: 12 MMOL/L (ref 5–15)
AST SERPL-CCNC: 19 U/L (ref 1–40)
B PARAPERT DNA SPEC QL NAA+PROBE: NOT DETECTED
B PERT DNA SPEC QL NAA+PROBE: NOT DETECTED
BASOPHILS # BLD AUTO: 0.09 10*3/MM3 (ref 0–0.2)
BASOPHILS NFR BLD AUTO: 0.6 % (ref 0–1.5)
BILIRUB SERPL-MCNC: 0.5 MG/DL (ref 0–1.2)
BUN SERPL-MCNC: 52 MG/DL (ref 8–23)
BUN/CREAT SERPL: 24.1 (ref 7–25)
C PNEUM DNA NPH QL NAA+NON-PROBE: NOT DETECTED
CALCIUM SPEC-SCNC: 8.6 MG/DL (ref 8.2–9.6)
CHLORIDE SERPL-SCNC: 106 MMOL/L (ref 98–107)
CO2 SERPL-SCNC: 18 MMOL/L (ref 22–29)
CREAT SERPL-MCNC: 2.16 MG/DL (ref 0.76–1.27)
DEPRECATED RDW RBC AUTO: 58.9 FL (ref 37–54)
EGFRCR SERPLBLD CKD-EPI 2021: 28 ML/MIN/1.73
EOSINOPHIL # BLD AUTO: 0.72 10*3/MM3 (ref 0–0.4)
EOSINOPHIL NFR BLD AUTO: 4.9 % (ref 0.3–6.2)
ERYTHROCYTE [DISTWIDTH] IN BLOOD BY AUTOMATED COUNT: 16.5 % (ref 12.3–15.4)
FLUAV SUBTYP SPEC NAA+PROBE: NOT DETECTED
FLUBV RNA ISLT QL NAA+PROBE: NOT DETECTED
GLOBULIN UR ELPH-MCNC: 3 GM/DL
GLUCOSE BLDC GLUCOMTR-MCNC: 111 MG/DL (ref 70–130)
GLUCOSE BLDC GLUCOMTR-MCNC: 133 MG/DL (ref 70–130)
GLUCOSE BLDC GLUCOMTR-MCNC: 141 MG/DL (ref 70–130)
GLUCOSE BLDC GLUCOMTR-MCNC: 164 MG/DL (ref 70–130)
GLUCOSE SERPL-MCNC: 135 MG/DL (ref 65–99)
HADV DNA SPEC NAA+PROBE: NOT DETECTED
HBA1C MFR BLD: 6.9 % (ref 4.8–5.6)
HCOV 229E RNA SPEC QL NAA+PROBE: NOT DETECTED
HCOV HKU1 RNA SPEC QL NAA+PROBE: NOT DETECTED
HCOV NL63 RNA SPEC QL NAA+PROBE: NOT DETECTED
HCOV OC43 RNA SPEC QL NAA+PROBE: NOT DETECTED
HCT VFR BLD AUTO: 34.8 % (ref 37.5–51)
HGB BLD-MCNC: 10.7 G/DL (ref 13–17.7)
HMPV RNA NPH QL NAA+NON-PROBE: NOT DETECTED
HPIV1 RNA ISLT QL NAA+PROBE: NOT DETECTED
HPIV2 RNA SPEC QL NAA+PROBE: NOT DETECTED
HPIV3 RNA NPH QL NAA+PROBE: NOT DETECTED
HPIV4 P GENE NPH QL NAA+PROBE: NOT DETECTED
IMM GRANULOCYTES # BLD AUTO: 0.09 10*3/MM3 (ref 0–0.05)
IMM GRANULOCYTES NFR BLD AUTO: 0.6 % (ref 0–0.5)
LYMPHOCYTES # BLD AUTO: 3.04 10*3/MM3 (ref 0.7–3.1)
LYMPHOCYTES NFR BLD AUTO: 20.9 % (ref 19.6–45.3)
M PNEUMO IGG SER IA-ACNC: NOT DETECTED
MAGNESIUM SERPL-MCNC: 1.8 MG/DL (ref 1.7–2.3)
MCH RBC QN AUTO: 30.2 PG (ref 26.6–33)
MCHC RBC AUTO-ENTMCNC: 30.7 G/DL (ref 31.5–35.7)
MCV RBC AUTO: 98.3 FL (ref 79–97)
MONOCYTES # BLD AUTO: 1.01 10*3/MM3 (ref 0.1–0.9)
MONOCYTES NFR BLD AUTO: 6.9 % (ref 5–12)
MRSA DNA SPEC QL NAA+PROBE: NEGATIVE
NEUTROPHILS NFR BLD AUTO: 66.1 % (ref 42.7–76)
NEUTROPHILS NFR BLD AUTO: 9.61 10*3/MM3 (ref 1.7–7)
NRBC BLD AUTO-RTO: 0.1 /100 WBC (ref 0–0.2)
NT-PROBNP SERPL-MCNC: 573.4 PG/ML (ref 0–1800)
PLATELET # BLD AUTO: 146 10*3/MM3 (ref 140–450)
PMV BLD AUTO: 10.3 FL (ref 6–12)
POTASSIUM SERPL-SCNC: 4.8 MMOL/L (ref 3.5–5.2)
PROCALCITONIN SERPL-MCNC: 0.45 NG/ML (ref 0–0.25)
PROT SERPL-MCNC: 6.1 G/DL (ref 6–8.5)
RBC # BLD AUTO: 3.54 10*6/MM3 (ref 4.14–5.8)
RHINOVIRUS RNA SPEC NAA+PROBE: NOT DETECTED
RSV RNA NPH QL NAA+NON-PROBE: NOT DETECTED
SARS-COV-2 RNA NPH QL NAA+NON-PROBE: NOT DETECTED
SODIUM SERPL-SCNC: 136 MMOL/L (ref 136–145)
TSH SERPL DL<=0.05 MIU/L-ACNC: 1.74 UIU/ML (ref 0.27–4.2)
WBC NRBC COR # BLD AUTO: 14.56 10*3/MM3 (ref 3.4–10.8)

## 2025-03-22 PROCEDURE — 80053 COMPREHEN METABOLIC PANEL: CPT | Performed by: NURSE PRACTITIONER

## 2025-03-22 PROCEDURE — 93005 ELECTROCARDIOGRAM TRACING: CPT | Performed by: NURSE PRACTITIONER

## 2025-03-22 PROCEDURE — 87641 MR-STAPH DNA AMP PROBE: CPT | Performed by: INTERNAL MEDICINE

## 2025-03-22 PROCEDURE — 0202U NFCT DS 22 TRGT SARS-COV-2: CPT | Performed by: INTERNAL MEDICINE

## 2025-03-22 PROCEDURE — 63710000001 INSULIN LISPRO (HUMAN) PER 5 UNITS: Performed by: NURSE PRACTITIONER

## 2025-03-22 PROCEDURE — 94640 AIRWAY INHALATION TREATMENT: CPT

## 2025-03-22 PROCEDURE — 83036 HEMOGLOBIN GLYCOSYLATED A1C: CPT | Performed by: NURSE PRACTITIONER

## 2025-03-22 PROCEDURE — 25010000002 CEFTRIAXONE PER 250 MG: Performed by: NURSE PRACTITIONER

## 2025-03-22 PROCEDURE — 84443 ASSAY THYROID STIM HORMONE: CPT | Performed by: NURSE PRACTITIONER

## 2025-03-22 PROCEDURE — 99024 POSTOP FOLLOW-UP VISIT: CPT | Performed by: ORTHOPAEDIC SURGERY

## 2025-03-22 PROCEDURE — 82948 REAGENT STRIP/BLOOD GLUCOSE: CPT

## 2025-03-22 PROCEDURE — 84145 PROCALCITONIN (PCT): CPT | Performed by: INTERNAL MEDICINE

## 2025-03-22 PROCEDURE — 93010 ELECTROCARDIOGRAM REPORT: CPT | Performed by: INTERNAL MEDICINE

## 2025-03-22 PROCEDURE — 25010000002 MORPHINE PER 10 MG: Performed by: FAMILY MEDICINE

## 2025-03-22 PROCEDURE — 99233 SBSQ HOSP IP/OBS HIGH 50: CPT | Performed by: INTERNAL MEDICINE

## 2025-03-22 PROCEDURE — 87449 NOS EACH ORGANISM AG IA: CPT | Performed by: INTERNAL MEDICINE

## 2025-03-22 PROCEDURE — 71045 X-RAY EXAM CHEST 1 VIEW: CPT

## 2025-03-22 PROCEDURE — 87040 BLOOD CULTURE FOR BACTERIA: CPT | Performed by: INTERNAL MEDICINE

## 2025-03-22 PROCEDURE — 63710000001 INSULIN GLARGINE PER 5 UNITS: Performed by: NURSE PRACTITIONER

## 2025-03-22 PROCEDURE — 83880 ASSAY OF NATRIURETIC PEPTIDE: CPT | Performed by: NURSE PRACTITIONER

## 2025-03-22 PROCEDURE — 85025 COMPLETE CBC W/AUTO DIFF WBC: CPT | Performed by: NURSE PRACTITIONER

## 2025-03-22 PROCEDURE — 83735 ASSAY OF MAGNESIUM: CPT | Performed by: NURSE PRACTITIONER

## 2025-03-22 RX ORDER — IPRATROPIUM BROMIDE AND ALBUTEROL SULFATE 2.5; .5 MG/3ML; MG/3ML
3 SOLUTION RESPIRATORY (INHALATION) EVERY 4 HOURS PRN
Status: DISCONTINUED | OUTPATIENT
Start: 2025-03-22 | End: 2025-03-25

## 2025-03-22 RX ORDER — LIDOCAINE HYDROCHLORIDE 10 MG/ML
0.5 INJECTION, SOLUTION EPIDURAL; INFILTRATION; INTRACAUDAL; PERINEURAL ONCE AS NEEDED
Status: CANCELLED | OUTPATIENT
Start: 2025-03-22

## 2025-03-22 RX ORDER — TRANEXAMIC ACID 10 MG/ML
1000 INJECTION, SOLUTION INTRAVENOUS ONCE
Status: CANCELLED | OUTPATIENT
Start: 2025-03-22 | End: 2025-03-22

## 2025-03-22 RX ADMIN — FINASTERIDE 5 MG: 5 TABLET, FILM COATED ORAL at 05:21

## 2025-03-22 RX ADMIN — INSULIN GLARGINE 10 UNITS: 100 INJECTION, SOLUTION SUBCUTANEOUS at 20:21

## 2025-03-22 RX ADMIN — SODIUM CHLORIDE 1000 MG: 900 INJECTION INTRAVENOUS at 18:13

## 2025-03-22 RX ADMIN — Medication 10 ML: at 20:21

## 2025-03-22 RX ADMIN — HYDROCODONE BITARTRATE AND ACETAMINOPHEN 1 TABLET: 5; 325 TABLET ORAL at 20:21

## 2025-03-22 RX ADMIN — MORPHINE SULFATE 2 MG: 2 INJECTION, SOLUTION INTRAMUSCULAR; INTRAVENOUS at 13:28

## 2025-03-22 RX ADMIN — OXYBUTYNIN CHLORIDE 10 MG: 10 TABLET, EXTENDED RELEASE ORAL at 05:21

## 2025-03-22 RX ADMIN — LEVOTHYROXINE SODIUM 50 MCG: 0.05 TABLET ORAL at 05:21

## 2025-03-22 RX ADMIN — ASPIRIN 81 MG: 81 TABLET, COATED ORAL at 08:34

## 2025-03-22 RX ADMIN — Medication 10 ML: at 08:35

## 2025-03-22 RX ADMIN — IPRATROPIUM BROMIDE AND ALBUTEROL SULFATE 3 ML: .5; 2.5 SOLUTION RESPIRATORY (INHALATION) at 03:05

## 2025-03-22 RX ADMIN — HYDROCODONE BITARTRATE AND ACETAMINOPHEN 1 TABLET: 5; 325 TABLET ORAL at 02:54

## 2025-03-22 RX ADMIN — MORPHINE SULFATE 2 MG: 2 INJECTION, SOLUTION INTRAMUSCULAR; INTRAVENOUS at 18:15

## 2025-03-22 RX ADMIN — BISOPROLOL FUMARATE 5 MG: 5 TABLET ORAL at 08:33

## 2025-03-22 RX ADMIN — INSULIN LISPRO 2 UNITS: 100 INJECTION, SOLUTION INTRAVENOUS; SUBCUTANEOUS at 18:15

## 2025-03-22 NOTE — PROGRESS NOTES
"      Orthopaedic Surgery Progress Note  Chief complaint  Right hip pain  Subjective   Interval History:   Increased oxygen demand overnight.  On 6 L upon interview.  Conversing normally without work of breathing.  Patient's granddaughter at bedside stating has oxygen at home but does not use it.  Denies hip pain.  Otherwise comfortable with no new complaints.    ROS: Denies fever, chills, nausea or vomiting    Objective     Vital Signs:  Temp (24hrs), Av.6 °F (37 °C), Min:97.3 °F (36.3 °C), Max:99.3 °F (37.4 °C)    /49 (BP Location: Right arm, Patient Position: Lying)   Pulse (P) 82   Temp 97.3 °F (36.3 °C) (Oral)   Resp 18   Ht 175.3 cm (69\")   Wt 83.9 kg (185 lb)   SpO2 94%   BMI 27.32 kg/m²   Body mass index is 27.32 kg/m².    Physical Exam:  right LE:           skin intact, compartments soft/compressible thigh and leg              Tenderness to palpation over hip              + Actively wiggles toes as well as plantar flexes and dorsiflexes ankle and also actively flexes and extends knee              SILT DP/SP/SN/Saph/Tib              Foot warm and well-perfused    Assessment and Plan:  89 yo M w/ Right intertrochanteric fracture       Closed right hip fracture     -Urinalysis concerning for UTI, management per primary team, given Rocephin in the ED  -Increased oxygen demand, discussed with patient and family sometimes this can create issues with extubation following surgery, patient and patient's family expressed understanding  -Hold eliquis for OR, chemical bridge anticoagulant prior to surgery per primary team  -Bedrest  -Pain control  -Ice right hip  -Diet, NPO at midnight, plan for OR 3/23  -Rest of management per primary team    Brodie Baltazar MD  25  08:41 EDT       "

## 2025-03-22 NOTE — ED NOTES
" Forrest Zepeda    Nursing Report ED to Floor:  Mental status: A & O x 4   Ambulatory status: Not ambulatory in ED  Oxygen Therapy:  4L NC (wears o2 PRN at home)  Cardiac Rhythm: V-Paced  Admitted from: ED/ Home  Safety Concerns:  Fall Risk   Precautions: None  Social Issues: None, family at bedside  ED Room #:  29    ED Nurse Phone Extension - [phone] or may call [phone].      HPI:   Chief Complaint   Patient presents with    Fall       Past Medical History:  Past Medical History:   Diagnosis Date    Abnormal EKG     Abnormal PSA     Reported abnormal    Acute cystitis with hematuria 05/01/2023    Acute deep vein thrombosis (DVT) of right lower extremity 08/22/2019    Acute diverticulitis 2019    Acute hyperkalemia 08/22/2019    Acute respiratory failure with hypoxia     Acute saddle pulmonary embolism with acute cor pulmonale 08/22/2019    Acute systolic right heart failure 08/22/2019    Acute UTI (urinary tract infection)     Arthritis     KNEES,  BUT SINCE HAD REPLACEMENT    Benign localized hyperplasia of prostate     Bilateral knee pain     C. difficile diarrhea 2010    Cataracts, bilateral     CKD (chronic kidney disease)     Coronary artery disease     Diabetic neuropathy     Diastolic dysfunction     Disease of thyroid gland     HYPOTHYROIDISM    Diverticulitis     Dyslipidemia     H/O    Erectile dysfunction     Family history of malignant hyperthermia     Brother     Full dentures     Generalized weakness     History of ESBL E. coli infection     most recent 4-2022 f/u with ID Dr Johnson    History of gout     History of hepatitis A vaccination     History of nephrolithiasis     History of nuclear stress test     STATES IN THE 1990'S.  \"IT WAS OK\"    History of osteopenia     History of recurrent UTI (urinary tract infection)     Hydronephrosis of left kidney 07/18/2019    Hydronephrosis with renal and ureteral calculus obstruction 10/21/2019    Added automatically from request for surgery [id number]    " Hydronephrosis with ureteral stricture     Hypercholesterolemia     Hyperkalemia     PT UNSURE REGARDING HX OF THIS    Hyperlipidemia     Hypertension     Hypothyroidism     Impaired functional mobility, balance, gait, and endurance     Insomnia     IPF (idiopathic pulmonary fibrosis)     per patient and son, dx at [facility], was told no treatment necessary, not to worry about it    On supplemental oxygen therapy     2L NC QHS    Other hydronephrosis 08/12/2019    Added automatically from request for surgery [id number]    Paroxysmal atrial fibrillation     Pulmonary fibrosis     Pulmonary hypertension, mild 02/2021    per echo per cardiology note 1/9/23, per pt's son, PCP does not agree with this dx    Renal insufficiency     Sepsis 2019    Shortness of breath     STATES WITH EXERTION, OTHERWISE, DENIES    Sigmoid diverticulitis without abscess or perforation 08/09/2017    Sinus node dysfunction     Skin breakdown     fourth toe right foot noted in 2019 MD D/C note    Skin ulcer of fourth toe of right foot, limited to breakdown of skin 07/05/2019    Stricture of ureter     Type 2 diabetes mellitus     Ureteral stricture, left     Urinary incontinence     UTI (urinary tract infection) 07/18/2019    Vitamin D deficiency     Wears glasses         Past Surgical History:  Past Surgical History:   Procedure Laterality Date    APPENDECTOMY      CARDIAC ELECTROPHYSIOLOGY PROCEDURE N/A 12/23/2020    Procedure: Pacemaker DC new. DNS meds.;  Surgeon: Jimy Ledezma DO;  Location:  [facility];  Service: Cardiology;  Laterality: N/A;    CHOLECYSTECTOMY      CIRCUMCISION N/A 10/23/2019    Procedure: CIRCUMCISIOn-DORSAL SLIT;  Surgeon: Griffin Romero MD;  Location:  [facility];  Service: Urology    COLONOSCOPY      CYSTOSCOPY RETROGRADE PYELOGRAM Left 06/17/2022    Procedure: CYSTOSCOPY RETROGRADE PYELOGRAM;  Surgeon: Ryne Mccann MD;  Location:  [facility];  Service: Urology;  Laterality: Left;    CYSTOSCOPY RETROGRADE  PYELOGRAM Left 7/10/2024    Procedure: CYSTOSCOPY RETROGRADE PYELOGRAM AND STENT INSERTION LEFT;  Surgeon: Deanne Pitts MD;  Location:  [facility];  Service: Urology;  Laterality: Left;    CYSTOSCOPY URETEROSCOPY Left 02/11/2019    Procedure: URETEROSCOPY WITH STENT EXTRACTION, RPG;  Surgeon: Griffin Romero MD;  Location:  [facility];  Service: Urology    CYSTOSCOPY W/ URETERAL STENT PLACEMENT Left 07/20/2019    Procedure: CYSTOSCOPY, LEFT RETROGRADE PYELOGRAM,  ATTEMPTED LEFT URETERAL STENT INSERTION;  Surgeon: Isaac Flynn MD;  Location:  [facility];  Service: Urology    CYSTOSCOPY W/ URETERAL STENT PLACEMENT Left 06/17/2022    Procedure: CYSTOSCOPY URETERAL CATHETER/STENT INSERTION;  Surgeon: Ryne Mccann MD;  Location:  [facility];  Service: Urology;  Laterality: Left;    CYSTOSCOPY W/ URETERAL STENT PLACEMENT Left 01/27/2023    Procedure: CYSTOSCOPY URETERAL CATHETER/STENT INSERTION;  Surgeon: Deanne Pitts MD;  Location:  [facility];  Service: Urology;  Laterality: Left;    CYSTOSCOPY W/ URETERAL STENT PLACEMENT Left 02/15/2024    Procedure: CYSTOSCOPY LEFT STENT EXCHANGE;  Surgeon: Deanne Pitts MD;  Location:  [facility];  Service: Urology;  Laterality: Left;    CYSTOSCOPY, URETEROSCOPY, RETROGRADE PYELOGRAM, STONE EXTRACTION, STENT INSERTION Left 06/15/2023    Procedure: URETEROSCOPY, RETROGRADE PYELOGRAM, STENT INSERTION;  Surgeon: Deanne Pitts MD;  Location:  [facility];  Service: Urology;  Laterality: Left;    CYSTOSCOPY, URETEROSCOPY, RETROGRADE PYELOGRAM, STONE EXTRACTION, STENT INSERTION Left 11/22/2024    Procedure: CYSTOSCOPY RETROGRADE PYELOGRAM AND STENT INSERTION LEFT;  Surgeon: Deanne Pitts MD;  Location:  [facility];  Service: Urology;  Laterality: Left;    EYE SURGERY      CATARACTS REMOVED BILATERALLY    JOINT REPLACEMENT      Bilateral knees    KNEE ARTHROPLASTY Bilateral     KNEE ARTHROSCOPY Bilateral     RENAL ARTERY STENT      SEVERAL TIMES EVERY SINCE 1978    URETERAL STENT  "INSERTION  10/2020    URETEROSCOPY LASER LITHOTRIPSY WITH STENT INSERTION Left 01/10/2018    Procedure:  cysto, left ureteral stent replacement ;  Surgeon: Grififn Romero MD;  Location: [facility];  Service:     URETEROSCOPY LASER LITHOTRIPSY WITH STENT INSERTION Left 08/14/2019    Procedure: URETEROSCOPY, STONE REMOVAL WITH STENT INSERTION;  Surgeon: Griffin Romero MD;  Location: [facility];  Service: Urology    URETEROSCOPY LASER LITHOTRIPSY WITH STENT INSERTION Left 10/23/2019    Procedure: Left URETEROSCOPY WITH STENT INSERTION-LEFT;  Surgeon: Griffin Romero MD;  Location: [facility];  Service: Urology        Admitting Doctor:   Shannon Zendejas MD    Consulting Provider(s):  Consults       Date and Time Order Name Status Description    3/21/2025  8:26 PM Inpatient Orthopedic Surgery Consult               Admitting Diagnosis:   The primary encounter diagnosis was Closed nondisplaced intertrochanteric fracture of right femur, initial encounter. Diagnoses of Acute cystitis with hematuria, Acute kidney injury, Anticoagulated, and Interstitial lung disease were also pertinent to this visit.    Most Recent Vitals:   Vitals:    03/21/25 2014 03/21/25 2044 03/21/25 2059 03/21/25 2114   BP: 118/69 103/85 110/64    Pulse: 76 75 76    Resp:       Temp:       TempSrc:       SpO2: 93% 94% 94%    Weight:    83.9 kg (185 lb)   Height:    175.3 cm (69\")       Active LDAs/IV Access:   Lines, Drains & Airways       Active LDAs       Name Placement date Placement time Site Days    Peripheral IV 03/21/25 1622 Anterior;Proximal;Right Forearm 03/21/25  1622  Forearm  less than 1    Ureteral Drain/Stent Left ureter 8 Fr. 07/10/24  --  Left ureter  254    Ureteral Drain/Stent Left ureter 8 Fr. 11/22/24  1346  Left ureter  119                    Labs (abnormal labs have a star):   Labs Reviewed   COMPREHENSIVE METABOLIC PANEL - Abnormal; Notable for the following components:       Result Value    Glucose 258 (*)     BUN 52 (*)     " Creatinine 2.02 (*)     Potassium 5.7 (*)     CO2 21.0 (*)     Calcium 10.0 (*)     BUN/Creatinine Ratio 25.7 (*)     eGFR 30.4 (*)     All other components within normal limits    Narrative:     GFR Categories in Chronic Kidney Disease (CKD)      GFR Category          GFR (mL/min/1.73)    Interpretation  G1                     90 or greater         Normal or high (1)  G2                      60-89                Mild decrease (1)  G3a                   45-59                Mild to moderate decrease  G3b                   30-44                Moderate to severe decrease  G4                    15-29                Severe decrease  G5                    14 or less           Kidney failure          (1)In the absence of evidence of kidney disease, neither GFR category G1 or G2 fulfill the criteria for CKD.    eGFR calculation 2021 CKD-EPI creatinine equation, which does not include race as a factor   CBC WITH AUTO DIFFERENTIAL - Abnormal; Notable for the following components:    WBC 15.92 (*)     RDW 16.4 (*)     RDW-SD 56.3 (*)     Neutrophil % 88.4 (*)     Lymphocyte % 6.5 (*)     Monocyte % 3.4 (*)     Immature Grans % 0.7 (*)     Neutrophils, Absolute 14.07 (*)     Immature Grans, Absolute 0.11 (*)     All other components within normal limits   URINALYSIS W/ CULTURE IF INDICATED - Abnormal; Notable for the following components:    Appearance, UA Turbid (*)     Blood, UA Large (3+) (*)     Protein,  mg/dL (2+) (*)     Leuk Esterase, UA Large (3+) (*)     All other components within normal limits    Narrative:     In absence of clinical symptoms, the presence of pyuria, bacteria, and/or nitrites on the urinalysis result does not correlate with infection.   URINALYSIS, MICROSCOPIC ONLY - Abnormal; Notable for the following components:    WBC, UA Too Numerous to Count (*)     Bacteria, UA 1+ (*)     Squamous Epithelial Cells, UA 3-6 (*)     Yeast, UA Small/1+ Budding Yeast (*)     All other components within  normal limits   POCT GLUCOSE FINGERSTICK - Abnormal; Notable for the following components:    Glucose 168 (*)     All other components within normal limits   URINE CULTURE   POCT GLUCOSE FINGERSTICK   POCT GLUCOSE FINGERSTICK   POCT GLUCOSE FINGERSTICK   POCT GLUCOSE FINGERSTICK   TYPE AND SCREEN   CBC AND DIFFERENTIAL    Narrative:     The following orders were created for panel order CBC & Differential.  Procedure                               Abnormality         Status                     ---------                               -----------         ------                     CBC Auto Differential[[id number]]        Abnormal            Final result                 Please view results for these tests on the individual orders.       Meds Given in ED:   Medications   aspirin EC tablet 81 mg (has no administration in time range)   bisoprolol (ZEBeta) tablet 5 mg (has no administration in time range)   apixaban (ELIQUIS) tablet 2.5 mg ( Oral Dose Auto Held 3/29/25 2100)   finasteride (PROSCAR) tablet 5 mg (has no administration in time range)   levothyroxine (SYNTHROID, LEVOTHROID) tablet 50 mcg (has no administration in time range)   oxybutynin XL (DITROPAN-XL) 24 hr tablet 10 mg (has no administration in time range)   sodium chloride 0.9 % flush 10 mL (has no administration in time range)   sodium chloride 0.9 % flush 10 mL (has no administration in time range)   sodium chloride 0.9 % infusion 40 mL (has no administration in time range)   dextrose (GLUTOSE) oral gel 15 g (has no administration in time range)   dextrose (D50W) (25 g/50 mL) IV injection 25 g (has no administration in time range)   glucagon (GLUCAGEN) injection 1 mg (has no administration in time range)   insulin glargine (LANTUS, SEMGLEE) injection 10 Units (has no administration in time range)   Insulin Lispro (humaLOG) injection 2-9 Units (has no administration in time range)   nitroglycerin (NITROSTAT) SL tablet 0.4 mg (has no administration in time  range)   sodium chloride 0.9 % infusion (has no administration in time range)   Potassium Replacement - Follow Nurse / BPA Driven Protocol (has no administration in time range)   Magnesium Low Dose Replacement - Follow Nurse / BPA Driven Protocol (has no administration in time range)   Phosphorus Replacement - Follow Nurse / BPA Driven Protocol (has no administration in time range)   Calcium Replacement - Follow Nurse / BPA Driven Protocol (has no administration in time range)   sodium zirconium cyclosilicate (LOKELMA) packet 10 g (has no administration in time range)   acetaminophen (TYLENOL) tablet 650 mg (has no administration in time range)     Or   acetaminophen (TYLENOL) 160 MG/5ML oral solution 650 mg (has no administration in time range)     Or   acetaminophen (TYLENOL) suppository 650 mg (has no administration in time range)   sennosides-docusate (PERICOLACE) 8.6-50 MG per tablet 2 tablet (has no administration in time range)     And   polyethylene glycol (MIRALAX) packet 17 g (has no administration in time range)     And   bisacodyl (DULCOLAX) EC tablet 5 mg (has no administration in time range)     And   bisacodyl (DULCOLAX) suppository 10 mg (has no administration in time range)   ondansetron ODT (ZOFRAN-ODT) disintegrating tablet 4 mg (has no administration in time range)     Or   ondansetron (ZOFRAN) injection 4 mg (has no administration in time range)   cefTRIAXone (ROCEPHIN) 1,000 mg in sodium chloride 0.9 % 100 mL MBP (has no administration in time range)   HYDROcodone-acetaminophen (NORCO) 5-325 MG per tablet 1 tablet (1 tablet Oral Given 3/21/25 2022)   morphine injection 2 mg (has no administration in time range)   morphine injection 2 mg (2 mg Intravenous Given 3/21/25 1657)   ondansetron (ZOFRAN) injection 4 mg (4 mg Intravenous Given 3/21/25 1657)   sodium chloride 0.9 % bolus 1,000 mL (0 mL Intravenous Stopped 3/21/25 1854)   cefTRIAXone (ROCEPHIN) 1,000 mg in sodium chloride 0.9 % 100 mL MBP  (0 mg Intravenous Stopped 3/21/25 0482)     sodium chloride, 100 mL/hr         Last NIH score:                                                          Dysphagia screening results:        [place] Coma Scale:  No data recorded     CIWA:        Restraint Type:            Isolation Status:  No active isolations

## 2025-03-22 NOTE — SIGNIFICANT NOTE
Called per nursing secondary to increasing oxygen demand. Pt arrived to floor on 4L now increased to 6L. CXR notes worsening bilateral lower airspace disease. Pt already on antibiotic coverage. He denies dyspnea. No distress, mild lower lobe crackles.

## 2025-03-22 NOTE — PROGRESS NOTES
[facility]  PROGRESS NOTE    Patient Name: Forrest Zepeda  : 1932  MRN: [id number]    Date of Admission: 3/21/2025  Primary Care Physician: Kary Wen MD    Subjective   Subjective     CC:  Hip pain     HPI:  Pt had increasing O2 demands overnight- see Ms. Calle's significant event note for further details.  Currently, pt is doing well and denies any SOA. Doing okay otherwise, daughter at bedside. Pt reports he is supposed to wear 2L at all times but is non-compliant.       Objective   Objective     Vital Signs:   Temp:  [97.3 °F (36.3 °C)-99.3 °F (37.4 °C)] 97.3 °F (36.3 °C)  Heart Rate:  [73-82] (P) 82  Resp:  [18] 18  BP: ()/(48-96) 106/49  Flow (L/min) (Oxygen Therapy):  [4-6] 6     Physical Exam:  Constitutional: No acute distress, awake, alert  HENT: NCAT, mucous membranes moist  Respiratory: Clear to auscultation bilaterally, respiratory effort normal   Cardiovascular: RRR, no murmurs, rubs, or gallops  Gastrointestinal: Positive bowel sounds, soft, nontender, nondistended  Musculoskeletal: No bilateral ankle edema  Psychiatric: Appropriate affect, cooperative  Neurologic: Oriented x 3, strength symmetric in all extremities, Cranial Nerves grossly intact to confrontation, speech clear  Skin: No rashes     Results Reviewed:  LAB RESULTS:      Lab 25  0623 25  1525   WBC 14.56* 15.92*   HEMOGLOBIN 10.7* 13.7   HEMATOCRIT 34.8* 43.1   PLATELETS 146 193   NEUTROS ABS 9.61* 14.07*   IMMATURE GRANS (ABS) 0.09* 0.11*   LYMPHS ABS 3.04 1.04   MONOS ABS 1.01* 0.54   EOS ABS 0.72* 0.10   MCV 98.3* 94.5         Lab 25  0623 25  1525   SODIUM 136 142   POTASSIUM 4.8 5.7*   CHLORIDE 106 106   CO2 18.0* 21.0*   ANION GAP 12.0 15.0   BUN 52* 52*   CREATININE 2.16* 2.02*   EGFR 28.0* 30.4*   GLUCOSE 135* 258*   CALCIUM 8.6 10.0*   MAGNESIUM 1.8  --    HEMOGLOBIN A1C 6.90*  --    TSH 1.740  --          Lab 25  0617  03/21/25  1525   TOTAL PROTEIN 6.1 7.7   ALBUMIN 3.1* 4.0   GLOBULIN 3.0 3.7   ALT (SGPT) 10 13   AST (SGOT) 19 24   BILIRUBIN 0.5 0.6   ALK PHOS 79 108         Lab 03/22/25  0623   PROBNP 573.4             Lab 03/21/25  1526   ABO TYPING O   RH TYPING Negative   ANTIBODY SCREEN Negative         Brief Urine Lab Results  (Last result in the past 365 days)        Color   Clarity   Blood   Leuk Est   Nitrite   Protein   CREAT   Urine HCG        03/21/25 1618 Yellow   Turbid   Large (3+)   Large (3+)   Negative   100 mg/dL (2+)                   Microbiology Results Abnormal       None            XR Chest 1 View  Result Date: 3/22/2025  XR CHEST 1 VW Date of Exam: 3/22/2025 5:38 AM EDT Indication: dyspnea Comparison: 3/21/2025. Findings: Mild patchy airspace disease is present within the lung bases bilaterally which appears new/progressed as compared to the previous study. Stable left subclavian AICD/pacemaker device. Stable chronic interstitial changes.. No pleural fluid. No pneumothorax. The pulmonary vasculature appears within normal limits. The cardiac and mediastinal silhouette appear unremarkable. No acute osseous abnormality identified.     Impression: Impression: Mild patchy airspace disease present within the lung bases bilaterally which appears new/progressed as compared to the previous study, likely related to a mild pneumonia. Electronically Signed: Jennifer Mendoza MD  3/22/2025 6:02 AM EDT  Workstation ID: [id number]    XR Chest 1 View  Result Date: 3/21/2025  XR CHEST 1 VW Date of Exam: 3/21/2025 3:48 PM EDT Indication: HIP FX Comparison: 11/20/2024 Findings: There is a dual-chamber pacemaker. There is incomplete inspiration and exaggerated lordotic positioning which essentially obscures the heart border. The pulmonary vasculature appears within normal limits. There are reticular interstitial markings in the peripheral lungs and lung bases. No acute infiltrate is demonstrated     Impression: Impression:  Interstitial lung disease/pulmonary fibrosis. No acute process demonstrated Electronically Signed: Golden Ngo  3/21/2025 3:57 PM EDT  Workstation ID: [id number]    XR Femur 2 View Right  Result Date: 3/21/2025  XR FEMUR 2 VW RIGHT Date of Exam: 3/21/2025 3:48 PM EDT Indication: HIP FX Comparison: None available. Findings/    Impression: Impression: There is a mildly displaced and mildly comminuted intertrochanteric fracture of the right proximal femur. Bones are demineralized. Right knee arthroplasty is noted. No hardware complication in the provided views. The visualized bony pelvis appears unremarkable. Partially visualized left ureteral stent. Vascular calcifications are seen. Electronically Signed: Ramon Paredes MD  3/21/2025 3:53 PM EDT  Workstation ID: [id number]    XR Pelvis 1 or 2 View  Result Date: 3/21/2025  XR PELVIS 1 OR 2 VW Date of Exam: 3/21/2025 3:48 PM EDT Indication: HIP FX Comparison: None available. Findings: The bones appear somewhat osteopenic. There is a fracture of the intertrochanteric right femur without significant displacement on this single view. The femoral head is not dislocated. No fracture of the pelvis is visualized. A left ureteral stent is present. There is degenerative disease in the lower lumbar spine     Impression: Impression: Intertrochanteric right femur fracture Electronically Signed: Golden Ngo  3/21/2025 3:53 PM EDT  Workstation ID: [id number]      Results for orders placed in visit on 11/13/23    Adult Transthoracic Echo Complete W/ Cont if Necessary Per Protocol    Interpretation Summary    Left ventricular systolic function is normal. Estimated left ventricular EF = 52% Left ventricular ejection fraction appears to be 51 - 55%.    Left ventricular wall thickness is consistent with mild concentric hypertrophy.    Left ventricular diastolic function is consistent with (grade I) impaired relaxation.    The right ventricular cavity is mildly dilated.    The left atrial  cavity is mildly dilated.    Left atrial volume is moderately increased.    There is mild calcification of the aortic valve mainly affecting the non-coronary, left coronary and right coronary cusp(s).    Estimated right ventricular systolic pressure from tricuspid regurgitation is normal (<35 mmHg).      Current medications:  Scheduled Meds:[Held by provider] apixaban, 2.5 mg, Oral, BID  aspirin, 81 mg, Oral, Daily  bisoprolol, 5 mg, Oral, Daily  cefTRIAXone, 1,000 mg, Intravenous, Q24H  finasteride, 5 mg, Oral, Daily  insulin glargine, 10 Units, Subcutaneous, Nightly  insulin lispro, 2-9 Units, Subcutaneous, 4x Daily AC & at Bedtime  levothyroxine, 50 mcg, Oral, Q AM  oxybutynin XL, 10 mg, Oral, Daily  sodium chloride, 10 mL, Intravenous, Q12H      Continuous Infusions:   PRN Meds:.  acetaminophen **OR** acetaminophen **OR** acetaminophen    senna-docusate sodium **AND** polyethylene glycol **AND** bisacodyl **AND** bisacodyl    Calcium Replacement - Follow Nurse / BPA Driven Protocol    dextrose    dextrose    glucagon (human recombinant)    HYDROcodone-acetaminophen    ipratropium-albuterol    Magnesium Low Dose Replacement - Follow Nurse / BPA Driven Protocol    Morphine    nitroglycerin    ondansetron ODT **OR** ondansetron    Phosphorus Replacement - Follow Nurse / BPA Driven Protocol    Potassium Replacement - Follow Nurse / BPA Driven Protocol    sodium chloride    sodium chloride    Assessment & Plan   Assessment & Plan     Active Hospital Problems    Diagnosis  POA    **Closed right hip fracture [S72.001A]  Yes    OCTAVIA (acute kidney injury) [N17.9]  Yes    T2DM (type 2 diabetes mellitus) [E11.9]  Yes    Leukocytosis [D72.829]  Yes    Hyperkalemia [E87.5]  Yes      Resolved Hospital Problems   No resolved problems to display.        Brief Hospital Course to date:  Forrest Zepeda is a 89 y.o. male with past medical history significant interstitial lung disease, saddle PE, BPH, HFpEF, chronic hydronephrosis  d/t urinary stricture, CKD 3, T2DM, hypothyroidism, and PAF who presented to [facility] due to fall with severe right hip pain.      Right hip fracture  -Pelvic x-ray revealed intertrochanteric right femur fracture  -Orthopedic surgeon Dr. Baltazar has seen, tentative plan for surgery on Luis 3/23  -Tylenol, Norco, morphine as needed for pain  -Zofran as needed for nausea  -- NPO after midnight for OR in am   -AM labs     Abnormal UA  Leukocytosis  -Urine culture pending  -Started on empiric Rocephin     OCTAVIA on CKD III  Chronic hydronephrosis  -Follows with urologist Dr. Pitts   -Baseline Cr ~1.5  -Creatinine 2.02 on presentation, slightly worse today at 2.16  -s/p 1L NS bolus in ED and then NS at 100 ml/hr x 10 hours  -avoid nephrotoxins as able  -AM labs     Hyperkalemia  -Potassium 5.7 on presentation  -Lokelma x1 ordered  -- resolved today      PAF  HFpEF  -- holding Eliquis as noted below  -- continue rate controlling meds  --appears euvolemic to slightly hypovolemic s/p IVFs as noted above. Hold diuretics. proBNP wnl when checked overnight on night of admission (due to hypoxia).    Acute on chronic hypoxia  PNA  ILD  -- unsure if pt wears O2 at baseline but currently on 6L BNC  -- CXR with patchy appearance bilaterally likely c/w PNA.  Continue ABX as mentioned above. Add on Doxycycline  -- send urine strep and legionella Ags, MRSA PCR, full respiratory viral panel  -- encourage incentive spirometry  -- wean O2 as able     Hx PE/DVT  -Eliquis on hold for planned procedure, last dose AM of 3/21  -continue Bisoprolol      T2DM  -hold home glimepiride, Januvia, Toujeo  -Lantus 10 units at HS  -Mod dose SSI  -Hgb A1C 6.9%     BPH  -continue Proscar     Hypothyroidism  -Synthroid     Total time spent: Time Spent: Time Spent: 35 minutes  Time spent includes time reviewing chart, face-to-face time, counseling patient/family/caregiver, ordering medications/tests/procedures, communicating with other health care  professionals, documenting clinical information in the electronic health record, and coordination of care.      Expected Discharge Location and Transportation: TBD  Expected Discharge   Expected Discharge Date: 3/25/2025; Expected Discharge Time:      VTE Prophylaxis:  Pharmacologic & mechanical VTE prophylaxis orders are present.         AM-PAC 6 Clicks Score (PT): 10 (03/21/25 2598)    CODE STATUS:   Code Status and Medical Interventions: No CPR (Do Not Attempt to Resuscitate); Limited Support; No intubation (DNI)   Ordered at: 03/21/25 8250     Code Status (Patient has no pulse and is not breathing):    No CPR (Do Not Attempt to Resuscitate)     Medical Interventions (Patient has pulse or is breathing):    Limited Support     Medical Intervention Limits:    No intubation (DNI)     Level Of Support Discussed With:    Patient       Aleah Burt MD  03/22/25

## 2025-03-23 ENCOUNTER — ANESTHESIA EVENT CONVERTED (OUTPATIENT)
Dept: ANESTHESIOLOGY | Facility: HOSPITAL | Age: OVER 89
End: 2025-03-23
Payer: MEDICARE

## 2025-03-23 ENCOUNTER — APPOINTMENT (OUTPATIENT)
Dept: GENERAL RADIOLOGY | Facility: HOSPITAL | Age: OVER 89
End: 2025-03-23
Payer: MEDICARE

## 2025-03-23 LAB
BACTERIA SPEC AEROBE CULT: NO GROWTH
GLUCOSE BLDC GLUCOMTR-MCNC: 129 MG/DL (ref 70–130)
GLUCOSE BLDC GLUCOMTR-MCNC: 189 MG/DL (ref 70–130)
GLUCOSE BLDC GLUCOMTR-MCNC: 287 MG/DL (ref 70–130)
GLUCOSE BLDC GLUCOMTR-MCNC: 332 MG/DL (ref 70–130)
L PNEUMO1 AG UR QL IA: NEGATIVE
S PNEUM AG SPEC QL LA: NEGATIVE

## 2025-03-23 PROCEDURE — 25010000002 ROPIVACAINE PER 1 MG: Performed by: NURSE ANESTHETIST, CERTIFIED REGISTERED

## 2025-03-23 PROCEDURE — C1713 ANCHOR/SCREW BN/BN,TIS/BN: HCPCS | Performed by: ORTHOPAEDIC SURGERY

## 2025-03-23 PROCEDURE — 25010000002 CEFTRIAXONE PER 250 MG: Performed by: ORTHOPAEDIC SURGERY

## 2025-03-23 PROCEDURE — 94799 UNLISTED PULMONARY SVC/PX: CPT

## 2025-03-23 PROCEDURE — 25010000002 LIDOCAINE PF 1% 1 % SOLUTION: Performed by: NURSE ANESTHETIST, CERTIFIED REGISTERED

## 2025-03-23 PROCEDURE — 25010000002 PROPOFOL 10 MG/ML EMULSION: Performed by: NURSE ANESTHETIST, CERTIFIED REGISTERED

## 2025-03-23 PROCEDURE — 25810000003 SODIUM CHLORIDE 0.9 % SOLUTION: Performed by: ORTHOPAEDIC SURGERY

## 2025-03-23 PROCEDURE — 25010000002 SUGAMMADEX 200 MG/2ML SOLUTION: Performed by: NURSE ANESTHETIST, CERTIFIED REGISTERED

## 2025-03-23 PROCEDURE — C1769 GUIDE WIRE: HCPCS | Performed by: ORTHOPAEDIC SURGERY

## 2025-03-23 PROCEDURE — 94664 DEMO&/EVAL PT USE INHALER: CPT

## 2025-03-23 PROCEDURE — 25010000002 FENTANYL CITRATE (PF) 100 MCG/2ML SOLUTION: Performed by: NURSE ANESTHETIST, CERTIFIED REGISTERED

## 2025-03-23 PROCEDURE — 94761 N-INVAS EAR/PLS OXIMETRY MLT: CPT

## 2025-03-23 PROCEDURE — 99233 SBSQ HOSP IP/OBS HIGH 50: CPT | Performed by: INTERNAL MEDICINE

## 2025-03-23 PROCEDURE — 0QH634Z INSERTION OF INTERNAL FIXATION DEVICE INTO RIGHT UPPER FEMUR, PERCUTANEOUS APPROACH: ICD-10-PCS | Performed by: ORTHOPAEDIC SURGERY

## 2025-03-23 PROCEDURE — 25810000003 LACTATED RINGERS PER 1000 ML: Performed by: ANESTHESIOLOGY

## 2025-03-23 PROCEDURE — 99024 POSTOP FOLLOW-UP VISIT: CPT | Performed by: ORTHOPAEDIC SURGERY

## 2025-03-23 PROCEDURE — 25010000002 DEXAMETHASONE PER 1 MG: Performed by: NURSE ANESTHETIST, CERTIFIED REGISTERED

## 2025-03-23 PROCEDURE — 25010000002 MORPHINE PER 10 MG: Performed by: FAMILY MEDICINE

## 2025-03-23 PROCEDURE — 76000 FLUOROSCOPY <1 HR PHYS/QHP: CPT

## 2025-03-23 PROCEDURE — 63710000001 INSULIN LISPRO (HUMAN) PER 5 UNITS: Performed by: ORTHOPAEDIC SURGERY

## 2025-03-23 PROCEDURE — 25010000002 PHENYLEPHRINE 10 MG/ML SOLUTION 1 ML VIAL: Performed by: NURSE ANESTHETIST, CERTIFIED REGISTERED

## 2025-03-23 PROCEDURE — 25010000002 CEFAZOLIN PER 500 MG: Performed by: ORTHOPAEDIC SURGERY

## 2025-03-23 PROCEDURE — 27245 TREAT THIGH FRACTURE: CPT | Performed by: ORTHOPAEDIC SURGERY

## 2025-03-23 PROCEDURE — 25010000002 ONDANSETRON PER 1 MG: Performed by: NURSE ANESTHETIST, CERTIFIED REGISTERED

## 2025-03-23 PROCEDURE — 63710000001 INSULIN GLARGINE PER 5 UNITS: Performed by: ORTHOPAEDIC SURGERY

## 2025-03-23 PROCEDURE — 82948 REAGENT STRIP/BLOOD GLUCOSE: CPT

## 2025-03-23 DEVICE — NAIL FEM TFN ADV PROX 130D SHRT 12X170MM STRL: Type: IMPLANTABLE DEVICE | Site: TROCHANTER | Status: FUNCTIONAL

## 2025-03-23 DEVICE — BLD FEM FIX HELI TFN ADV PERF 105MM STRL: Type: IMPLANTABLE DEVICE | Site: TROCHANTER | Status: FUNCTIONAL

## 2025-03-23 DEVICE — IMPLANTABLE DEVICE: Type: IMPLANTABLE DEVICE | Site: TROCHANTER | Status: FUNCTIONAL

## 2025-03-23 RX ORDER — DOCUSATE SODIUM 100 MG/1
100 CAPSULE, LIQUID FILLED ORAL 2 TIMES DAILY PRN
Status: DISCONTINUED | OUTPATIENT
Start: 2025-03-23 | End: 2025-03-26 | Stop reason: HOSPADM

## 2025-03-23 RX ORDER — MAGNESIUM HYDROXIDE 1200 MG/15ML
LIQUID ORAL AS NEEDED
Status: DISCONTINUED | OUTPATIENT
Start: 2025-03-23 | End: 2025-03-23 | Stop reason: HOSPADM

## 2025-03-23 RX ORDER — BUPIVACAINE HCL/0.9 % NACL/PF 0.125 %
PLASTIC BAG, INJECTION (ML) EPIDURAL AS NEEDED
Status: DISCONTINUED | OUTPATIENT
Start: 2025-03-23 | End: 2025-03-23 | Stop reason: SURG

## 2025-03-23 RX ORDER — ROCURONIUM BROMIDE 10 MG/ML
INJECTION, SOLUTION INTRAVENOUS AS NEEDED
Status: DISCONTINUED | OUTPATIENT
Start: 2025-03-23 | End: 2025-03-23 | Stop reason: SURG

## 2025-03-23 RX ORDER — SODIUM CHLORIDE 0.9 % (FLUSH) 0.9 %
3-10 SYRINGE (ML) INJECTION AS NEEDED
Status: DISCONTINUED | OUTPATIENT
Start: 2025-03-23 | End: 2025-03-26 | Stop reason: HOSPADM

## 2025-03-23 RX ORDER — DOXYCYCLINE 100 MG/1
100 CAPSULE ORAL EVERY 12 HOURS SCHEDULED
Status: DISCONTINUED | OUTPATIENT
Start: 2025-03-23 | End: 2025-03-26 | Stop reason: HOSPADM

## 2025-03-23 RX ORDER — ONDANSETRON 2 MG/ML
4 INJECTION INTRAMUSCULAR; INTRAVENOUS ONCE AS NEEDED
Status: DISCONTINUED | OUTPATIENT
Start: 2025-03-23 | End: 2025-03-23 | Stop reason: HOSPADM

## 2025-03-23 RX ORDER — FAMOTIDINE 10 MG/ML
INJECTION, SOLUTION INTRAVENOUS
Status: COMPLETED
Start: 2025-03-23 | End: 2025-03-23

## 2025-03-23 RX ORDER — IPRATROPIUM BROMIDE AND ALBUTEROL SULFATE 2.5; .5 MG/3ML; MG/3ML
SOLUTION RESPIRATORY (INHALATION)
Status: COMPLETED
Start: 2025-03-23 | End: 2025-03-23

## 2025-03-23 RX ORDER — SODIUM CHLORIDE 9 MG/ML
40 INJECTION, SOLUTION INTRAVENOUS AS NEEDED
Status: DISCONTINUED | OUTPATIENT
Start: 2025-03-23 | End: 2025-03-26 | Stop reason: HOSPADM

## 2025-03-23 RX ORDER — FENTANYL CITRATE 50 UG/ML
INJECTION, SOLUTION INTRAMUSCULAR; INTRAVENOUS AS NEEDED
Status: DISCONTINUED | OUTPATIENT
Start: 2025-03-23 | End: 2025-03-23 | Stop reason: SURG

## 2025-03-23 RX ORDER — ONDANSETRON 2 MG/ML
4 INJECTION INTRAMUSCULAR; INTRAVENOUS EVERY 6 HOURS PRN
Status: DISCONTINUED | OUTPATIENT
Start: 2025-03-23 | End: 2025-03-23 | Stop reason: SDUPTHER

## 2025-03-23 RX ORDER — IPRATROPIUM BROMIDE AND ALBUTEROL SULFATE 2.5; .5 MG/3ML; MG/3ML
3 SOLUTION RESPIRATORY (INHALATION) ONCE AS NEEDED
Status: DISCONTINUED | OUTPATIENT
Start: 2025-03-23 | End: 2025-03-23 | Stop reason: HOSPADM

## 2025-03-23 RX ORDER — FENTANYL CITRATE 50 UG/ML
50 INJECTION, SOLUTION INTRAMUSCULAR; INTRAVENOUS
Status: DISCONTINUED | OUTPATIENT
Start: 2025-03-23 | End: 2025-03-23 | Stop reason: HOSPADM

## 2025-03-23 RX ORDER — DOXYCYCLINE 100 MG/1
100 CAPSULE ORAL EVERY 12 HOURS SCHEDULED
Status: DISCONTINUED | OUTPATIENT
Start: 2025-03-23 | End: 2025-03-23

## 2025-03-23 RX ORDER — ONDANSETRON 2 MG/ML
INJECTION INTRAMUSCULAR; INTRAVENOUS AS NEEDED
Status: DISCONTINUED | OUTPATIENT
Start: 2025-03-23 | End: 2025-03-23 | Stop reason: SURG

## 2025-03-23 RX ORDER — IPRATROPIUM BROMIDE AND ALBUTEROL SULFATE 2.5; .5 MG/3ML; MG/3ML
3 SOLUTION RESPIRATORY (INHALATION) ONCE
Status: COMPLETED | OUTPATIENT
Start: 2025-03-23 | End: 2025-03-23

## 2025-03-23 RX ORDER — TRANEXAMIC ACID 10 MG/ML
1000 INJECTION, SOLUTION INTRAVENOUS ONCE
Status: COMPLETED | OUTPATIENT
Start: 2025-03-23 | End: 2025-03-23

## 2025-03-23 RX ORDER — SODIUM CHLORIDE 0.9 % (FLUSH) 0.9 %
10 SYRINGE (ML) INJECTION EVERY 12 HOURS SCHEDULED
Status: DISCONTINUED | OUTPATIENT
Start: 2025-03-23 | End: 2025-03-23 | Stop reason: HOSPADM

## 2025-03-23 RX ORDER — LIDOCAINE HYDROCHLORIDE 10 MG/ML
INJECTION, SOLUTION EPIDURAL; INFILTRATION; INTRACAUDAL; PERINEURAL AS NEEDED
Status: DISCONTINUED | OUTPATIENT
Start: 2025-03-23 | End: 2025-03-23 | Stop reason: SURG

## 2025-03-23 RX ORDER — ROPIVACAINE HYDROCHLORIDE 5 MG/ML
INJECTION, SOLUTION EPIDURAL; INFILTRATION; PERINEURAL
Status: COMPLETED | OUTPATIENT
Start: 2025-03-23 | End: 2025-03-23

## 2025-03-23 RX ORDER — SODIUM CHLORIDE 9 MG/ML
100 INJECTION, SOLUTION INTRAVENOUS CONTINUOUS
Status: ACTIVE | OUTPATIENT
Start: 2025-03-23 | End: 2025-03-24

## 2025-03-23 RX ORDER — DEXAMETHASONE SODIUM PHOSPHATE 4 MG/ML
INJECTION, SOLUTION INTRA-ARTICULAR; INTRALESIONAL; INTRAMUSCULAR; INTRAVENOUS; SOFT TISSUE AS NEEDED
Status: DISCONTINUED | OUTPATIENT
Start: 2025-03-23 | End: 2025-03-23 | Stop reason: SURG

## 2025-03-23 RX ORDER — IPRATROPIUM BROMIDE AND ALBUTEROL SULFATE 2.5; .5 MG/3ML; MG/3ML
SOLUTION RESPIRATORY (INHALATION)
Status: DISPENSED
Start: 2025-03-23 | End: 2025-03-23

## 2025-03-23 RX ORDER — FAMOTIDINE 10 MG/ML
20 INJECTION, SOLUTION INTRAVENOUS ONCE
Status: COMPLETED | OUTPATIENT
Start: 2025-03-23 | End: 2025-03-23

## 2025-03-23 RX ORDER — FAMOTIDINE 20 MG/1
20 TABLET, FILM COATED ORAL ONCE
Status: DISCONTINUED | OUTPATIENT
Start: 2025-03-23 | End: 2025-03-23 | Stop reason: HOSPADM

## 2025-03-23 RX ORDER — BISACODYL 10 MG
10 SUPPOSITORY, RECTAL RECTAL DAILY PRN
Status: DISCONTINUED | OUTPATIENT
Start: 2025-03-23 | End: 2025-03-23 | Stop reason: SDUPTHER

## 2025-03-23 RX ORDER — MIDAZOLAM HYDROCHLORIDE 1 MG/ML
0.5 INJECTION, SOLUTION INTRAMUSCULAR; INTRAVENOUS
Status: DISCONTINUED | OUTPATIENT
Start: 2025-03-23 | End: 2025-03-23 | Stop reason: HOSPADM

## 2025-03-23 RX ORDER — ONDANSETRON 4 MG/1
4 TABLET, ORALLY DISINTEGRATING ORAL EVERY 6 HOURS PRN
Status: DISCONTINUED | OUTPATIENT
Start: 2025-03-23 | End: 2025-03-23 | Stop reason: SDUPTHER

## 2025-03-23 RX ORDER — SODIUM CHLORIDE 0.9 % (FLUSH) 0.9 %
3 SYRINGE (ML) INJECTION EVERY 12 HOURS SCHEDULED
Status: DISCONTINUED | OUTPATIENT
Start: 2025-03-23 | End: 2025-03-26 | Stop reason: HOSPADM

## 2025-03-23 RX ORDER — EPHEDRINE SULFATE 50 MG/ML
INJECTION INTRAVENOUS AS NEEDED
Status: DISCONTINUED | OUTPATIENT
Start: 2025-03-23 | End: 2025-03-23 | Stop reason: SURG

## 2025-03-23 RX ORDER — PROPOFOL 10 MG/ML
VIAL (ML) INTRAVENOUS AS NEEDED
Status: DISCONTINUED | OUTPATIENT
Start: 2025-03-23 | End: 2025-03-23 | Stop reason: SURG

## 2025-03-23 RX ORDER — SODIUM CHLORIDE, SODIUM LACTATE, POTASSIUM CHLORIDE, CALCIUM CHLORIDE 600; 310; 30; 20 MG/100ML; MG/100ML; MG/100ML; MG/100ML
9 INJECTION, SOLUTION INTRAVENOUS CONTINUOUS
Status: ACTIVE | OUTPATIENT
Start: 2025-03-24 | End: 2025-03-24

## 2025-03-23 RX ORDER — SODIUM CHLORIDE 0.9 % (FLUSH) 0.9 %
10 SYRINGE (ML) INJECTION AS NEEDED
Status: DISCONTINUED | OUTPATIENT
Start: 2025-03-23 | End: 2025-03-23 | Stop reason: HOSPADM

## 2025-03-23 RX ADMIN — Medication 3 ML: at 13:31

## 2025-03-23 RX ADMIN — TRANEXAMIC ACID 1000 MG: 10 INJECTION, SOLUTION INTRAVENOUS at 08:33

## 2025-03-23 RX ADMIN — IPRATROPIUM BROMIDE AND ALBUTEROL SULFATE 3 ML: 2.5; .5 SOLUTION RESPIRATORY (INHALATION) at 10:35

## 2025-03-23 RX ADMIN — BISOPROLOL FUMARATE 5 MG: 5 TABLET ORAL at 13:06

## 2025-03-23 RX ADMIN — DOXYCYCLINE 100 MG: 100 CAPSULE ORAL at 20:57

## 2025-03-23 RX ADMIN — DEXAMETHASONE SODIUM PHOSPHATE 4 MG: 4 INJECTION INTRA-ARTICULAR; INTRALESIONAL; INTRAMUSCULAR; INTRAVENOUS; SOFT TISSUE at 08:30

## 2025-03-23 RX ADMIN — Medication 300 MCG: at 08:53

## 2025-03-23 RX ADMIN — INSULIN GLARGINE 10 UNITS: 100 INJECTION, SOLUTION SUBCUTANEOUS at 20:57

## 2025-03-23 RX ADMIN — IPRATROPIUM BROMIDE AND ALBUTEROL SULFATE 3 ML: 2.5; .5 SOLUTION RESPIRATORY (INHALATION) at 08:08

## 2025-03-23 RX ADMIN — Medication 10 ML: at 09:00

## 2025-03-23 RX ADMIN — ROPIVACAINE HYDROCHLORIDE 30 ML: 5 INJECTION, SOLUTION EPIDURAL; INFILTRATION; PERINEURAL at 08:24

## 2025-03-23 RX ADMIN — Medication 300 MCG: at 08:35

## 2025-03-23 RX ADMIN — FENTANYL CITRATE 50 MCG: 0.05 INJECTION, SOLUTION INTRAMUSCULAR; INTRAVENOUS at 08:21

## 2025-03-23 RX ADMIN — DOXYCYCLINE 100 MG: 100 CAPSULE ORAL at 13:05

## 2025-03-23 RX ADMIN — SODIUM CHLORIDE 2 G: 900 INJECTION INTRAVENOUS at 08:32

## 2025-03-23 RX ADMIN — Medication 300 MCG: at 08:41

## 2025-03-23 RX ADMIN — Medication 3 ML: at 20:57

## 2025-03-23 RX ADMIN — INSULIN LISPRO 6 UNITS: 100 INJECTION, SOLUTION INTRAVENOUS; SUBCUTANEOUS at 17:35

## 2025-03-23 RX ADMIN — FINASTERIDE 5 MG: 5 TABLET, FILM COATED ORAL at 13:07

## 2025-03-23 RX ADMIN — PROPOFOL 100 MCG/KG/MIN: 10 INJECTION, EMULSION INTRAVENOUS at 08:23

## 2025-03-23 RX ADMIN — TRANEXAMIC ACID 1000 MG: 10 INJECTION, SOLUTION INTRAVENOUS at 09:30

## 2025-03-23 RX ADMIN — LIDOCAINE HYDROCHLORIDE 50 MG: 10 INJECTION, SOLUTION EPIDURAL; INFILTRATION; INTRACAUDAL; PERINEURAL at 08:21

## 2025-03-23 RX ADMIN — Medication 300 MCG: at 08:30

## 2025-03-23 RX ADMIN — ONDANSETRON 4 MG: 2 INJECTION INTRAMUSCULAR; INTRAVENOUS at 09:32

## 2025-03-23 RX ADMIN — EPHEDRINE SULFATE 20 MG: 50 INJECTION INTRAVENOUS at 09:28

## 2025-03-23 RX ADMIN — SODIUM CHLORIDE 100 ML/HR: 9 INJECTION, SOLUTION INTRAVENOUS at 13:09

## 2025-03-23 RX ADMIN — HYDROCODONE BITARTRATE AND ACETAMINOPHEN 1 TABLET: 5; 325 TABLET ORAL at 20:56

## 2025-03-23 RX ADMIN — PHENYLEPHRINE HYDROCHLORIDE 0.7 MCG/KG/MIN: 10 INJECTION INTRAVENOUS at 08:35

## 2025-03-23 RX ADMIN — SODIUM CHLORIDE, POTASSIUM CHLORIDE, SODIUM LACTATE AND CALCIUM CHLORIDE: 600; 310; 30; 20 INJECTION, SOLUTION INTRAVENOUS at 08:18

## 2025-03-23 RX ADMIN — SODIUM CHLORIDE 1000 MG: 900 INJECTION INTRAVENOUS at 15:50

## 2025-03-23 RX ADMIN — Medication 500 MCG: at 08:29

## 2025-03-23 RX ADMIN — OXYBUTYNIN CHLORIDE 10 MG: 10 TABLET, EXTENDED RELEASE ORAL at 13:07

## 2025-03-23 RX ADMIN — SUGAMMADEX 200 MG: 100 INJECTION, SOLUTION INTRAVENOUS at 10:02

## 2025-03-23 RX ADMIN — INSULIN LISPRO 7 UNITS: 100 INJECTION, SOLUTION INTRAVENOUS; SUBCUTANEOUS at 21:06

## 2025-03-23 RX ADMIN — Medication 200 MCG: at 08:26

## 2025-03-23 RX ADMIN — Medication 100 MCG: at 08:21

## 2025-03-23 RX ADMIN — Medication 10 ML: at 20:57

## 2025-03-23 RX ADMIN — ROCURONIUM 50 MG: 50 INJECTION, SOLUTION INTRAVENOUS at 08:21

## 2025-03-23 RX ADMIN — FAMOTIDINE 20 MG: 10 INJECTION, SOLUTION INTRAVENOUS at 07:48

## 2025-03-23 RX ADMIN — ASPIRIN 81 MG: 81 TABLET, COATED ORAL at 13:06

## 2025-03-23 RX ADMIN — PROPOFOL 100 MG: 10 INJECTION, EMULSION INTRAVENOUS at 08:21

## 2025-03-23 RX ADMIN — MORPHINE SULFATE 2 MG: 2 INJECTION, SOLUTION INTRAMUSCULAR; INTRAVENOUS at 04:55

## 2025-03-23 NOTE — ANESTHESIA POSTPROCEDURE EVALUATION
Patient: Forrest Zepeda    Procedure Summary       Date: 03/23/25 Room / Location:  [facility] /  [facility]    Anesthesia Start: 0818 Anesthesia Stop: 1018    Procedure: RIGHT HIP TROCHANTERIC NAILING (Right: Hip) Diagnosis:     Surgeons: Brodie Baltazar MD Provider: Noah Philip MD    Anesthesia Type: general with block ASA Status: 4            Anesthesia Type: general with block    Vitals  Vitals Value Taken Time   BP     Temp     Pulse 87 03/23/25 10:17   Resp     SpO2 94 % 03/23/25 10:17   Vitals shown include unfiled device data.        Post Anesthesia Care and Evaluation    Patient location during evaluation: PACU  Patient participation: complete - patient participated  Level of consciousness: awake and alert  Pain management: adequate    Airway patency: patent  Anesthetic complications: No anesthetic complications  PONV Status: none  Cardiovascular status: hemodynamically stable and acceptable  Respiratory status: nonlabored ventilation, acceptable and nasal cannula  Hydration status: acceptable

## 2025-03-23 NOTE — PROGRESS NOTES
[facility] Medicine Services  PROGRESS NOTE    Patient Name: Forrest Zepeda  : 1932  MRN: [id number]    Date of Admission: 3/21/2025  Primary Care Physician: Kary Wen MD    Subjective   Subjective     CC:  Hip pain     HPI:  Doing well post-op.  No new issues overnight.       Objective   Objective     Vital Signs:   Temp:  [97.5 °F (36.4 °C)-98.2 °F (36.8 °C)] 98 °F (36.7 °C)  Heart Rate:  [] 110  Resp:  [16-20] 16  BP: (100-138)/(50-70) 101/58  Flow (L/min) (Oxygen Therapy):  [3-6] 4     Physical Exam:  Constitutional: No acute distress, awake, alert  HENT: NCAT, mucous membranes moist  Respiratory: Clear to auscultation bilaterally, respiratory effort normal on 4L   Cardiovascular: RRR, no murmurs, rubs, or gallops  Gastrointestinal: Positive bowel sounds, soft, nontender, nondistended  Musculoskeletal: No bilateral ankle edema  Psychiatric: Appropriate affect, cooperative  Neurologic: Oriented x 3, strength symmetric in all extremities, Cranial Nerves grossly intact to confrontation, speech clear  Skin: No rashes     Results Reviewed:  LAB RESULTS:      Lab 25  0625  1525   WBC 14.56* 15.92*   HEMOGLOBIN 10.7* 13.7   HEMATOCRIT 34.8* 43.1   PLATELETS 146 193   NEUTROS ABS 9.61* 14.07*   IMMATURE GRANS (ABS) 0.09* 0.11*   LYMPHS ABS 3.04 1.04   MONOS ABS 1.01* 0.54   EOS ABS 0.72* 0.10   MCV 98.3* 94.5   PROCALCITONIN 0.45*  --          Lab 25  0623 25  1525   SODIUM 136 142   POTASSIUM 4.8 5.7*   CHLORIDE 106 106   CO2 18.0* 21.0*   ANION GAP 12.0 15.0   BUN 52* 52*   CREATININE 2.16* 2.02*   EGFR 28.0* 30.4*   GLUCOSE 135* 258*   CALCIUM 8.6 10.0*   MAGNESIUM 1.8  --    HEMOGLOBIN A1C 6.90*  --    TSH 1.740  --          Lab 25  0623 25  1525   TOTAL PROTEIN 6.1 7.7   ALBUMIN 3.1* 4.0   GLOBULIN 3.0 3.7   ALT (SGPT) 10 13   AST (SGOT) 19 24   BILIRUBIN 0.5 0.6   ALK PHOS 79 108         Lab 25  0623   PROBNP 573.4              Lab 03/21/25  1526   ABO TYPING O   RH TYPING Negative   ANTIBODY SCREEN Negative         Brief Urine Lab Results  (Last result in the past 365 days)        Color   Clarity   Blood   Leuk Est   Nitrite   Protein   CREAT   Urine HCG        03/21/25 1618 Yellow   Turbid   Large (3+)   Large (3+)   Negative   100 mg/dL (2+)                   Microbiology Results Abnormal       None            RIGHT FI SS  Result Date: 3/23/2025  Gregorio BrodieHELADIO     3/23/2025  7:55 AM RIGHT FI SS Patient reassessed immediately prior to procedure Reason for block: at surgeon's request and post-op pain management Preanesthetic Checklist Completed: patient identified, IV checked, site marked, risks and benefits discussed, surgical consent, monitors and equipment checked, pre-op evaluation and timeout performed Prep: Pt Position: supine Sterile barriers:cap, gloves, mask and washed/disinfected hands Prep: ChloraPrep Patient monitoring: blood pressure monitoring, continuous pulse oximetry and EKG Procedure Sedation: no Performed under: general Guidance:ultrasound guided ULTRASOUND INTERPRETATION.  Using ultrasound guidance a 20 G gauge needle was placed in close proximity to the nerve, at which point, under ultrasound guidance anesthetic was injected in the area of the nerve and spread of the anesthesia was seen on ultrasound in close proximity thereto.  There were no abnormalities seen on ultrasound; a digital image was taken; and the patient tolerated the procedure with no complications. Images:still images obtained, printed/placed on chart Laterality:right Block Type:fascia iliaca compartment Injection Technique:single-shot Needle Type:echogenic and Tuohy Needle Gauge:18 G Resistance on Injection: none Catheter Size:20 G (20g) Post Assessment Injection Assessment: negative aspiration for heme, no paresthesia on injection and incremental injection Patient Tolerance:comfortable throughout block Complications:no Additional  "Notes CKAFASCIAILIACA: SINGLE shot A high-frequency linear transducer, with sterile cover, was placed in parasagittal plane on top of the Anterior Superior Iliac Spine (ASIS) and moved medially to identify the Internal Oblique muscle, Sartorius muscle, Iliacus Muscle, Fascia Iliaca (FI) and Fascia Latae. The insertion site was prepped and draped in sterile fashion. Skin and cutaneous tissue was infiltrated with 2-5 ml of 1% Lidocaine. Using ultrasound-guidance, a 20-gauge B-Issa 4\" Ultraplex 360 non-stimulating echogenic needle was advanced in plane from caudad to cephalad. Preservative-free normal saline was utilized for hydro-dissection of tissue, advancement of needle, and to confirm final needle placement below FI. Local anesthetic in incremental 3-5 ml injections. Aspiration every 5 ml to prevent intravascular injection. Injection was completed with negative aspiration of blood and negative intravascular injection. Injection pressures were normal with minimal resistance. Performed by: Brodie Perkins CRNA     XR Chest 1 View  Result Date: 3/22/2025  XR CHEST 1 VW Date of Exam: 3/22/2025 5:38 AM EDT Indication: dyspnea Comparison: 3/21/2025. Findings: Mild patchy airspace disease is present within the lung bases bilaterally which appears new/progressed as compared to the previous study. Stable left subclavian AICD/pacemaker device. Stable chronic interstitial changes.. No pleural fluid. No pneumothorax. The pulmonary vasculature appears within normal limits. The cardiac and mediastinal silhouette appear unremarkable. No acute osseous abnormality identified.     Impression: Impression: Mild patchy airspace disease present within the lung bases bilaterally which appears new/progressed as compared to the previous study, likely related to a mild pneumonia. Electronically Signed: Jennifer Mendoza MD  3/22/2025 6:02 AM EDT  Workstation ID: [id number]    XR Chest 1 View  Result Date: 3/21/2025  XR CHEST 1 VW Date of Exam: " 3/21/2025 3:48 PM EDT Indication: HIP FX Comparison: 11/20/2024 Findings: There is a dual-chamber pacemaker. There is incomplete inspiration and exaggerated lordotic positioning which essentially obscures the heart border. The pulmonary vasculature appears within normal limits. There are reticular interstitial markings in the peripheral lungs and lung bases. No acute infiltrate is demonstrated     Impression: Impression: Interstitial lung disease/pulmonary fibrosis. No acute process demonstrated Electronically Signed: Golden Ngo  3/21/2025 3:57 PM EDT  Workstation ID: [id number]    XR Femur 2 View Right  Result Date: 3/21/2025  XR FEMUR 2 VW RIGHT Date of Exam: 3/21/2025 3:48 PM EDT Indication: HIP FX Comparison: None available. Findings/    Impression: Impression: There is a mildly displaced and mildly comminuted intertrochanteric fracture of the right proximal femur. Bones are demineralized. Right knee arthroplasty is noted. No hardware complication in the provided views. The visualized bony pelvis appears unremarkable. Partially visualized left ureteral stent. Vascular calcifications are seen. Electronically Signed: Ramon Paredes MD  3/21/2025 3:53 PM EDT  Workstation ID: [id number]    XR Pelvis 1 or 2 View  Result Date: 3/21/2025  XR PELVIS 1 OR 2 VW Date of Exam: 3/21/2025 3:48 PM EDT Indication: HIP FX Comparison: None available. Findings: The bones appear somewhat osteopenic. There is a fracture of the intertrochanteric right femur without significant displacement on this single view. The femoral head is not dislocated. No fracture of the pelvis is visualized. A left ureteral stent is present. There is degenerative disease in the lower lumbar spine     Impression: Impression: Intertrochanteric right femur fracture Electronically Signed: Golden Ngo  3/21/2025 3:53 PM EDT  Workstation ID: [id number]      Results for orders placed in visit on 11/13/23    Adult Transthoracic Echo Complete W/ Cont if Necessary  Per Protocol    Interpretation Summary    Left ventricular systolic function is normal. Estimated left ventricular EF = 52% Left ventricular ejection fraction appears to be 51 - 55%.    Left ventricular wall thickness is consistent with mild concentric hypertrophy.    Left ventricular diastolic function is consistent with (grade I) impaired relaxation.    The right ventricular cavity is mildly dilated.    The left atrial cavity is mildly dilated.    Left atrial volume is moderately increased.    There is mild calcification of the aortic valve mainly affecting the non-coronary, left coronary and right coronary cusp(s).    Estimated right ventricular systolic pressure from tricuspid regurgitation is normal (<35 mmHg).      Current medications:  Scheduled Meds:[Held by provider] apixaban, 2.5 mg, Oral, BID  [Transfer Hold] aspirin, 81 mg, Oral, Daily  bisoprolol, 5 mg, Oral, Daily  ceFAZolin, 2 g, Intravenous, Once  cefTRIAXone, 1,000 mg, Intravenous, Q24H  ethyl alcohol, 2 Swab, Nasal, Once  famotidine, 20 mg, Oral, Once  [Transfer Hold] finasteride, 5 mg, Oral, Daily  [Transfer Hold] insulin glargine, 10 Units, Subcutaneous, Nightly  [Transfer Hold] insulin lispro, 2-9 Units, Subcutaneous, 4x Daily AC & at Bedtime  ipratropium-albuterol, 3 mL, Nebulization, Once  [Transfer Hold] levothyroxine, 50 mcg, Oral, Q AM  [Transfer Hold] oxybutynin XL, 10 mg, Oral, Daily  [Transfer Hold] sodium chloride, 10 mL, Intravenous, Q12H  sodium chloride, 10 mL, Intravenous, Q12H  tranexamic acid, 1,000 mg, Intravenous, Once  tranexamic acid, 1,000 mg, Intravenous, Once      Continuous Infusions:[START ON 3/24/2025] lactated ringers, 9 mL/hr      PRN Meds:.  [Transfer Hold] acetaminophen **OR** [Transfer Hold] acetaminophen **OR** [Transfer Hold] acetaminophen    [Transfer Hold] senna-docusate sodium **AND** [Transfer Hold] polyethylene glycol **AND** [Transfer Hold] bisacodyl **AND** [Transfer Hold] bisacodyl    [Transfer Hold]  Calcium Replacement - Follow Nurse / BPA Driven Protocol    [Transfer Hold] dextrose    [Transfer Hold] dextrose    [Transfer Hold] glucagon (human recombinant)    [Transfer Hold] HYDROcodone-acetaminophen    [Transfer Hold] ipratropium-albuterol    [Transfer Hold] Magnesium Low Dose Replacement - Follow Nurse / BPA Driven Protocol    midazolam    [Transfer Hold] Morphine    [Transfer Hold] nitroglycerin    [Transfer Hold] ondansetron ODT **OR** [Transfer Hold] ondansetron    [Transfer Hold] Phosphorus Replacement - Follow Nurse / BPA Driven Protocol    Potassium Replacement - Follow Nurse / BPA Driven Protocol    [Transfer Hold] sodium chloride    sodium chloride    [Transfer Hold] sodium chloride    Assessment & Plan   Assessment & Plan     Active Hospital Problems    Diagnosis  POA    **Closed right hip fracture [S72.001A]  Yes    OCTAVIA (acute kidney injury) [N17.9]  Yes    T2DM (type 2 diabetes mellitus) [E11.9]  Yes    Leukocytosis [D72.829]  Yes    Hyperkalemia [E87.5]  Yes      Resolved Hospital Problems   No resolved problems to display.        Brief Hospital Course to date:  Forrest Zepeda is a 89 y.o. male with past medical history significant interstitial lung disease, saddle PE, BPH, HFpEF, chronic hydronephrosis d/t urinary stricture, CKD 3, T2DM, hypothyroidism, and PAF who presented to [facility] due to fall with severe right hip pain.      Right hip fracture  -Pelvic x-ray revealed intertrochanteric right femur fracture  -Orthopedic surgeon Dr. Baltazar has seen, tentative plan for surgery today,  3/23  -Tylenol, Norco, morphine as needed for pain  -Zofran as needed for nausea  -- NPO after midnight for OR today  -AM labs     Abnormal UA  Leukocytosis  -Urine culture pending  -Started on empiric Rocephin     OCTAVIA on CKD III  Chronic hydronephrosis  -Follows with urologist Dr. Pitts   -Baseline Cr ~1.5  -Creatinine 2.02 on presentation, slightly worse yesterday at 2.16  -s/p 1L NS bolus in ED and then NS at 100  ml/hr x 10 hours  -avoid nephrotoxins as able  -AM labs     Hyperkalemia  -Potassium 5.7 on presentation  -Lokelma x1 ordered  -- resolved on recheck      PAF  HFpEF  -- holding Eliquis as noted below  -- continue rate controlling meds  --appears euvolemic to slightly hypovolemic s/p IVFs as noted above. Hold diuretics. proBNP wnl when checked overnight on night of admission (due to hypoxia).    Acute on chronic hypoxia  PNA  ILD  -- pt is supposed to be on 2L at baseline but is noncompliant, was up to 6L BNC  -- CXR with patchy appearance bilaterally likely c/w PNA.  Continue ABX as mentioned above. Add on Doxycycline today, 3/23  -- urine strep and legionella Ags, MRSA PCR, full respiratory viral panel all NEGATIVE   -- encourage incentive spirometry  -- wean O2 as able (now down to 4L)    Hx PE/DVT  -Eliquis on hold for planned procedure, last dose AM of 3/21  -continue Bisoprolol      T2DM  -hold home glimepiride, Januvia, Toujeo  -Lantus 10 units at HS  -Mod dose SSI  -Hgb A1C 6.9%     BPH  -continue Proscar     Hypothyroidism  -Synthroid     Total time spent: Time Spent: Time Spent: 35 minutes  Time spent includes time reviewing chart, face-to-face time, counseling patient/family/caregiver, ordering medications/tests/procedures, communicating with other health care professionals, documenting clinical information in the electronic health record, and coordination of care.      Expected Discharge Location and Transportation: TBD post-op   Expected Discharge   Expected Discharge Date: 3/25/2025; Expected Discharge Time:      VTE Prophylaxis:  Pharmacologic & mechanical VTE prophylaxis orders are present.         AM-PAC 6 Clicks Score (PT): 10 (03/22/25 2021)    CODE STATUS:   Code Status and Medical Interventions: No CPR (Do Not Attempt to Resuscitate); Limited Support; No intubation (DNI)   Ordered at: 03/21/25 8789     Code Status (Patient has no pulse and is not breathing):    No CPR (Do Not Attempt to  Resuscitate)     Medical Interventions (Patient has pulse or is breathing):    Limited Support     Medical Intervention Limits:    No intubation (DNI)     Level Of Support Discussed With:    Patient       Aleah Burt MD  03/23/25

## 2025-03-23 NOTE — ANESTHESIA PROCEDURE NOTES
Airway  Reason: elective    Date/Time: 3/23/2025 8:23 AM  Airway not difficult    General Information and Staff    Patient location during procedure: OR  CRNA/CAA: Brodie Perkins CRNA    Indications and Patient Condition  Indications for airway management: airway protection    Preoxygenated: yes  MILS not maintained throughout    Mask difficulty assessment: 1 - vent by mask    Final Airway Details    Final airway type: endotracheal airway      Successful airway: ETT  Cuffed: yes   Successful intubation technique: direct laryngoscopy  Endotracheal tube insertion site: oral  Blade: Calle  Blade size: 2  ETT size (mm): 7.0  Cormack-Lehane Classification: grade I - full view of glottis  Placement verified by: chest auscultation and capnometry   Cuff volume (mL): 5  Measured from: lips  ETT/EBT  to lips (cm): 20  Number of attempts at approach: 1  Assessment: lips, teeth, and gum same as pre-op and atraumatic intubation    Additional Comments  Negative epigastric sounds, Breath sound equal bilaterally with symmetric chest rise and fall

## 2025-03-23 NOTE — PROGRESS NOTES
"      Orthopaedic Surgery Progress Note  Chief complaint  Right hip pain  Subjective   Interval History:   Continues to be on oxygen.  No work of breathing on interview.  N.p.o. for OR.  Ready for OR today.  No new complaints.    ROS: Denies fever, chills, nausea or vomiting    Objective     Vital Signs:  Temp (24hrs), Av.7 °F (36.5 °C), Min:97.5 °F (36.4 °C), Max:98 °F (36.7 °C)    /64 (BP Location: Right arm, Patient Position: Lying)   Pulse 89   Temp 97.5 °F (36.4 °C) (Temporal)   Resp 20   Ht 175.3 cm (69\")   Wt 83.9 kg (185 lb)   SpO2 92%   BMI 27.32 kg/m²   Body mass index is 27.32 kg/m².    Physical Exam:  right LE:           skin intact, compartments soft/compressible thigh and leg              + Actively wiggles toes as well as plantar flexes and dorsiflexes ankle               SILT DP/SP/SN/Saph/Tib              Foot warm and well-perfused, palpable PT pulse    Assessment and Plan:  89 yo M w/ Right intertrochanteric fracture       Closed right hip fracture     -Urinalysis concerning for UTI, management per primary team, on rochephin  -Increased oxygen demand, discussed with patient and family sometimes this can create issues with extubation following surgery, patient and patient's family expressed understanding  -Hold eliquis for OR, chemical bridge anticoagulant prior to surgery per primary team  -Bedrest  -Pain control  -Ice right hip  -NPO for OR today, surgical fixation right hip   -Rest of management per primary team    Brodie Baltazar MD  25  08:06 EDT       "

## 2025-03-23 NOTE — DISCHARGE INSTRUCTIONS
Brodie Baltazar MD  Orthopedic Foot & Ankle Surgeon  [facility]    Diagnosis:   Chief Complaint   Patient presents with    Fall      Procedure Performed: Procedure(s) (LRB):  RIGHT HIP TROCHANTERIC NAILING (Right)    What to Expect After Surgery  Diet after surgery:  Resume your diet gradually.  Begin with clear liquids and gradually progress as you feel ready.  Surgical Site Care:  Continue Aquacel dressing for 1 week after surgery and then remove and replace with a Covaderm or dry dressing every other day.  Keep incision covered at all times.  Follow Up Appointment: Please call the [facility] at [phone] or Dr. Baltazar's schedulers within two (2) days of your discharge to /confirm/verify your follow-up appointment date/time with Dr. Baltazar.  Typically, Dr. Baltazar will want to see you 14-21 days after surgery for a post-operative follow-up appointment - before 14 days, the sutures may have to stay in and you will need to return in the 14-21 day window again.  Please arrive ~15 minutes prior to your appointment time to take care of any paperwork.    Activity Precautions:  You may be weightbearing on your operative lower extremity   Use ice packs intermittently (20 minutes on, 20 minutes off) to reduce pain and swelling, however, use extreme caution with icing if your nerve block is still in effect.  Make sure to have a barrier between your skin and the ice pack to avoid frost bite.    Pain Management  Nerve Blocks:  to help control pain after surgery, you may have received a nerve block where the anesthesiologist injected numbing medication around the nerves that send pain signals from your leg to your brain.  This helps you feel little to no pain.   The time a nerve block lasts varies from person to person.  Often, they will last between 12 and 24 hours but could last days.  You may not be able to move your foot and/or ankle during this time.  As the block  medication wears off, you may feel a tingling sensation as the nerves start to wake up.  Keep your leg safe while it is numb.  Avoid excessive heating,  scratching, etc. until the block has completely worn off.  If icing the hip, watch the toes of that leg closely to ensure that they do not become discolored (i.e., white, red or blue)  Even if you still feel no pain in your leg before you go to bed, take a dose of your narcotic medication before you go to sleep.  You do not want to wake up with the block having worn off and being behind on pain control - it is quite difficult to 'catch up' again.  Pain Medication:    Acetaminophen (Tylenol) 500 mg tablets are typically your baseline pain medication.  Take this tablet up to once every 4 hours. Do not exceed 3,000 mg of acetaminophen daily.  You have been provided a narcotic pain reliever. Take doses of this medication if you are having severe breakthrough pain uncontrolled by the Tylenol alone. Typically, patients are taking it every 4 hours for the first day or two after surgery.  Actively wean down your use of this medication after the third day after surgery.  Note that it takes about 30-45 minutes for oral pain medication to take effect.  DO NOT drink alcoholic beverages, use recreational drugs, or use prescription sedative medications when taking pain medication.  DO NOT operate heavy machinery/drive while taking opioids.  To avoid the risk of nausea, please make sure that you are taking your medication with food.  You may experience light headedness while taking your pain medication.  Make sure you drink plenty of water while taking narcotic pain medication to stay well hydrated and help avoid constipation.    You have been provided a stool softener to help with constipation that can be a side effect of taking narcotics.  Take that medication as needed.  An antinausea prescription medications can also be given to help with nausea that you can take as  needed.  Itching is a common side effect to pain medication usage.  If you have an associated rash with the itching, please contact your provider.       When to Call Your Physician  Common or Normal Post-Operative Reactions:  Low grade fever (approximately 100.5 degrees) for up to one week.  Small amount of blood or fluid leaking from the surgical site or dressing  Bruising along the surgical extremity  Swelling around the surgical site and surrounding area  The reactions listed above are NORMAL, but you want to call your surgeon if any of these reactions persist.  Symptoms to Report:  Please report any of the following signs to your surgeon:  Signs of infection:  Redness or pain around your incision (if you cannot see your incision, red streaking up your extremity should be reported).  Thick, dark yellow or foul-smelling drainage at the incision site or from your dressing.  Temperature measured over 101.5 degrees for more than 24 hours despite Tylenol.  Signs of Decreased Circulation to the Ankle and Foot:  Coldness of ankle and foot  Foot or leg turning pale  Tingling/numbness (after the block has fully resolved)  Sustained increases in pain uncontrolled by medication  Toenail bed turns blue in color  Blood Clots:  Although rare, please be aware and utilize the recommendations below to avoid getting a blood clot.  Prevention of deep vein thrombus DVT (blood clots):  Continue Eliquis for DVT prophylaxis per previous dosage.  Get up 4-5 times during the daytime and walk/crutch/walker across the room to keep the blood circulating in your legs. (Maintain any weightbearing restrictions, of course)  Wiggle your toes frequently if you are in a splint or case  Notify your physician if you are a nicotine user, on hormone replacement therapy medications, are taking birth control, or have a history of blood clots as these can increase your risk of developing a blood clot.  Notify your provider if you develop pain or  tenderness in your calf muscle    If you have any additional questions, please contact Dr. Baltazar's staff the [facility] at [phone]    IF YOU HAVE AN EMERGENCY, i.e., SHORTNESS OF BREATH, CHEST PAIN, OR SYMPTOMS SEVERE IN NATURE, PLEASE CALL 911 OR VISIT YOUR LOCAL EMERGENCY ROOM

## 2025-03-23 NOTE — ANESTHESIA PROCEDURE NOTES
RIGHT FI SS      Patient reassessed immediately prior to procedure    Start time: 3/23/2025 8:24 AM  Reason for block: at surgeon's request and post-op pain management  Performed by  CRNA/CAA: Brodie Perkins CRNA  Preanesthetic Checklist  Completed: patient identified, IV checked, site marked, risks and benefits discussed, surgical consent, monitors and equipment checked, pre-op evaluation and timeout performed  Prep:  Pt Position: supine  Sterile barriers:cap, gloves, mask and washed/disinfected hands  Prep: ChloraPrep  Patient monitoring: blood pressure monitoring, continuous pulse oximetry and EKG  Procedure    Sedation: no  Performed under: general  Guidance:ultrasound guided    ULTRASOUND INTERPRETATION.  Using ultrasound guidance a 20 G gauge needle was placed in close proximity to the nerve, at which point, under ultrasound guidance anesthetic was injected in the area of the nerve and spread of the anesthesia was seen on ultrasound in close proximity thereto.  There were no abnormalities seen on ultrasound; a digital image was taken; and the patient tolerated the procedure with no complications. Images:still images obtained, printed/placed on chart    Laterality:right  Block Type:fascia iliaca compartment  Injection Technique:single-shot  Needle Type:echogenic and Tuohy  Needle Gauge:18 G  Resistance on Injection: none  Catheter Size:20 G (20g)    Medications Used: ropivacaine (NAROPIN) 0.5 % injection - Injection   30 mL - 3/23/2025 8:24:00 AM      Medications  Preservative Free Saline:30ml    Post Assessment  Injection Assessment: negative aspiration for heme, no paresthesia on injection and incremental injection  Patient Tolerance:comfortable throughout block  Complications:no  Additional Notes  CKAFASCIAILIACA: SINGLE shot   A high-frequency linear transducer, with sterile cover, was placed in parasagittal plane on top of the Anterior Superior Iliac Spine (ASIS) and moved medially to identify the Internal  "Oblique muscle, Sartorius muscle, Iliacus Muscle, Fascia Iliaca (FI) and Fascia Latae. The insertion site was prepped and draped in sterile fashion. Skin and cutaneous tissue was infiltrated with 2-5 ml of 1% Lidocaine. Using ultrasound-guidance, a 20-gauge B-Issa 4\" Ultraplex 360 non-stimulating echogenic needle was advanced in plane from caudad to cephalad. Preservative-free normal saline was utilized for hydro-dissection of tissue, advancement of needle, and to confirm final needle placement below FI. Local anesthetic in incremental 3-5 ml injections. Aspiration every 5 ml to prevent intravascular injection. Injection was completed with negative aspiration of blood and negative intravascular injection. Injection pressures were normal with minimal resistance.   Performed by: Brodie Perkins, HELADIO          "

## 2025-03-23 NOTE — OP NOTE
ORTHOPAEDIC SURGERY OPERATIVE REPORT    Location of Surgery: [facility]    Patient Name:  Forrest Zepeda  YOB: 1932    Date of Surgery:  3/23/2025     Pre-op Diagnosis:   Right intertrochanteric proximal femur fracture    Post-op Diagnosis:      Right intertrochanteric proximal femur fracture    Procedure/CPT® Codes:  1. HIP TROCHANTERIC NAILING WITH INTRAMEDULLARY HIP SCREW 37420     Staff:  Surgeon(s):  Brodie Baltazar MD       Anesthesia: General with Block    Estimated Blood Loss: minimal    Specimen:          None    Complications:   None    CLINICAL HISTORY:  Forrest Zepeda is a 89 y.o. male with the above condition. Discussed operative and non-operative treatment options and risks including but not limited to pain, infection, bleeding, damage to surrounding structures, and need for additional procedures.  After all questions were fully answered, Forrest Zepeda understood the risks and elected to proceed, and informed consent was obtained. The patient was marked with indelible marking pen after verifying correct side, site, and procedure.      DESCRIPTION OF PROCEDURE:   The patient was identified in the pre-operative area where correct procedure, site, and patient were verified. The patient was brought to the operating theater in stable condition and was transferred to the operative table where they remained in the supine position for the entirety of the case. Preoperative timeout was taken to ensure the correct identity, operative procedure, and location. General anesthesia was then administered. The patient received appropriate weight based IV antibiotics within 30 minutes of skin incision.  All bony prominences well-padded. The right leg was then prepped and draped in the standard sterile fashion.      Operative leg was prepped and draped in the typical fashion with a 'shower curtain' draped over the patient. Fluoroscopy was used to visualize the reduction of the IT fracture with  some traction and adduction. The trajectories of guidewires and femoral nail were marked out and the guide wire was introduced along the line of the proximal femoral shaft, proximal to the greater trochanter. The starting point was checked on fluoroscopy and the pin introduced into the hip. The entry reamer was used to ream down to the lesser trochanter. A long guidewire was sent through the channel down the femoral shaft and lodged in the femur. The final nail was selected and advanced down the guidewire in the typical fashion. Once the blade channel seemed lined up with the femoral neck the guidewire was advanced into the head. The blade length was measured, planning for a small amount of compression.  The lateral cortex channel was reamed. The blade was then introduced. After the blade was placed the fracture was compressed, with reduction was checked on fluoro.     Next, the jig was used to place the distal locking screw. The trochar for this screw was placed in the jig.  A 10 blade scalpel was again used to incise skin and fascia.  The trochar was placed down to bone.  Next the drill was used through the jig and the screw length was measured based on the calibrated markings on the drill.  The screw was opened and placed through the trochar and down to bone.       Final x-rays were taken.  Hardware was seen to be in proper alignment in both blade plate and distal screws were in proper position and of appropriate length.  The wounds were copiously irrigated and then closed with 0 vicryl for the IT band and deep tissues, 2-0 vicryl for subcutaneous layers, and staples for the skin    The patient tolerated the procedure well no with acute complications identified.  All sponge & needle counts were correct x2 prior to procedure conclusion.  Patient was awoken from anesthesia and transferred back to the PACU without complication.     Counts:    Sponge: Correct    Needle: Correct    Sharp: Correct    Instrument:  Correct     POSTOPERATIVE PLAN:   -Anemia, acute blood loss anemia following hip fracture  -Weight bearing as tolerated with assistance and support devices (walker, wheelchair) as needed  -Antibiotics - complete 24 hour postoperative course of IV antibioitics  -Advance diet as tolerated, diet per primary  -Continue Covaderm dressing, change of saturated  -PT/OT Consult - Weight bearing as tolerated  -Ice hip  -DVT Prophylaxis - I recommend resuming Eliquis tomorrow 3/24, recommend anticoagulant chemical bridge until resumption of Eliquis per primary team  -Osteoporotic hip fracture - I recommend informing the patient's PCP regarding osteoporotic fracture/consideration of enrolling patient in osteoporotic care program postoperatively  -Follow-up, upon discharge, patient will need follow-up with me in the clinic in 2-3 weeks' time.  Continue DVT ppx for 30 days.  Continue covaderm dressing for 1 week after surgery and then remove and replace with a covaderm or dry dressing every other day.  Keep incision covered at all times.  DC staples on or around 2-3 weeks. The  will need to call my office/[facility] to arrange for a discharge follow-up appointment in 2-3 weeks.     Future Appointments   Date Time Provider Department Center   4/14/2025  3:30 PM Deanne Pitts MD [facility]   7/7/2025  1:00 PM Win Shaa MD [facility]   7/7/2025  1:00 PM [facility] CARD CUBA DEVICE CHECK [facility]       Please have the schedulers call our office at [phone] for the clinic appointment as needed. Please call with questions or concerns.       Implants:    Implant Name Type Inv. Item Serial No.  Lot No. LRB No. Used Action   BLD FEM FIX RODGER TFN ADV PERF 105MM STRL - [id number] Implant BLD FEM FIX RODGER TFN ADV PERF 105MM STRL  DEPBN[employer] [id number] Right 1 Implanted   NAIL FEM TFN ADV PROX 130D SHRT [id number] STRL - [id number] Implant NAIL FEM TFN ADV PROX 130D SHRT  [id number] STRL  [employer] [id number] Right 1 Implanted   SCRW MEDUL TFNADVANCED LK XL25 TI/ALLOY 5X40MM LITE/GRN STRL - [id number] Implant SCRW MEDUL TFNADVANCED LK XL25 TI/ALLOY 5X40MM LITE/GRN STRL  [employer] [id number] Right 1 Implanted         Brodie Baltazar MD     Date: 3/23/2025  Time: 10:37 EDT  Electronically signed by Brodie Baltazar MD, 03/23/25, 10:37 AM EDT.

## 2025-03-23 NOTE — ANESTHESIA PREPROCEDURE EVALUATION
Anesthesia Evaluation     Patient summary reviewed and Nursing notes reviewed   NPO Solid Status: > 8 hours  NPO Liquid Status: > 8 hours           Airway   Mallampati: I  TM distance: >3 FB  Neck ROM: full  No difficulty expected  Dental      Pulmonary    (+) pulmonary embolism,shortness of breath, decreased breath sounds  Cardiovascular     ECG reviewed  Rhythm: regular  Rate: normal    (+) pacemaker pacemaker interrogated 3-6 months ago, hypertension, CAD, dysrhythmias Paroxysmal Atrial Fib, CHF , LEHMAN, DVT, hyperlipidemia      Neuro/Psych  (+) syncope  GI/Hepatic/Renal/Endo    (+) renal disease-, diabetes mellitus, thyroid problem hypothyroidism    Musculoskeletal     Abdominal    Substance History      OB/GYN          Other   arthritis,     ROS/Med Hx Other:  Left ventricular systolic function is normal. Estimated left ventricular EF = 52% Left ventricular ejection fraction appears to be 51 - 55%.  ·  Left ventricular wall thickness is consistent with mild concentric hypertrophy.  ·  Left ventricular diastolic function is consistent with (grade I) impaired relaxation.  ·  The right ventricular cavity is mildly dilated.  ·  The left atrial cavity is mildly dilated.  ·  Left atrial volume is moderately increased.  ·  There is mild calcification of the aortic valve mainly affecting the non-coronary, left coronary and right coronary cusp(s).  ·  Estimated right ventricular systolic pressure from tricuspid regurgitation is normal (<35 mmHg).         11/24:  pacer battery life 4 y                Anesthesia Plan    ASA 4     general with block     intravenous induction     Anesthetic plan, risks, benefits, and alternatives have been provided, discussed and informed consent has been obtained with: patient.    Plan discussed with CRNA.    CODE STATUS:    Code Status (Patient has no pulse and is not breathing): No CPR (Do Not Attempt to Resuscitate)  Medical Interventions (Patient has pulse or is breathing): Limited  Support  Medical Intervention Limits: No intubation (DNI)  Level Of Support Discussed With: Patient

## 2025-03-24 PROBLEM — D69.6 THROMBOCYTOPENIA: Status: ACTIVE | Noted: 2025-01-01

## 2025-03-24 LAB
ANION GAP SERPL CALCULATED.3IONS-SCNC: 14 MMOL/L (ref 5–15)
BASOPHILS # BLD AUTO: 0.03 10*3/MM3 (ref 0–0.2)
BASOPHILS NFR BLD AUTO: 0.3 % (ref 0–1.5)
BUN SERPL-MCNC: 59 MG/DL (ref 8–23)
BUN/CREAT SERPL: 27.7 (ref 7–25)
CALCIUM SPEC-SCNC: 8.6 MG/DL (ref 8.2–9.6)
CHLORIDE SERPL-SCNC: 107 MMOL/L (ref 98–107)
CO2 SERPL-SCNC: 16 MMOL/L (ref 22–29)
CREAT SERPL-MCNC: 2.13 MG/DL (ref 0.76–1.27)
DEPRECATED RDW RBC AUTO: 57.5 FL (ref 37–54)
EGFRCR SERPLBLD CKD-EPI 2021: 28.5 ML/MIN/1.73
EOSINOPHIL # BLD AUTO: 0.03 10*3/MM3 (ref 0–0.4)
EOSINOPHIL NFR BLD AUTO: 0.3 % (ref 0.3–6.2)
ERYTHROCYTE [DISTWIDTH] IN BLOOD BY AUTOMATED COUNT: 16.1 % (ref 12.3–15.4)
GLUCOSE BLDC GLUCOMTR-MCNC: 120 MG/DL (ref 70–130)
GLUCOSE BLDC GLUCOMTR-MCNC: 145 MG/DL (ref 70–130)
GLUCOSE BLDC GLUCOMTR-MCNC: 168 MG/DL (ref 70–130)
GLUCOSE BLDC GLUCOMTR-MCNC: 185 MG/DL (ref 70–130)
GLUCOSE SERPL-MCNC: 165 MG/DL (ref 65–99)
HCT VFR BLD AUTO: 28.3 % (ref 37.5–51)
HGB BLD-MCNC: 8.8 G/DL (ref 13–17.7)
IMM GRANULOCYTES # BLD AUTO: 0.04 10*3/MM3 (ref 0–0.05)
IMM GRANULOCYTES NFR BLD AUTO: 0.4 % (ref 0–0.5)
LYMPHOCYTES # BLD AUTO: 1.03 10*3/MM3 (ref 0.7–3.1)
LYMPHOCYTES NFR BLD AUTO: 10.1 % (ref 19.6–45.3)
MCH RBC QN AUTO: 30.4 PG (ref 26.6–33)
MCHC RBC AUTO-ENTMCNC: 31.1 G/DL (ref 31.5–35.7)
MCV RBC AUTO: 97.9 FL (ref 79–97)
MONOCYTES # BLD AUTO: 0.87 10*3/MM3 (ref 0.1–0.9)
MONOCYTES NFR BLD AUTO: 8.5 % (ref 5–12)
NEUTROPHILS NFR BLD AUTO: 8.22 10*3/MM3 (ref 1.7–7)
NEUTROPHILS NFR BLD AUTO: 80.4 % (ref 42.7–76)
NRBC BLD AUTO-RTO: 0.2 /100 WBC (ref 0–0.2)
PLATELET # BLD AUTO: 117 10*3/MM3 (ref 140–450)
PMV BLD AUTO: 10.5 FL (ref 6–12)
POTASSIUM SERPL-SCNC: 5.3 MMOL/L (ref 3.5–5.2)
POTASSIUM SERPL-SCNC: 6.1 MMOL/L (ref 3.5–5.2)
QT INTERVAL: 364 MS
QT INTERVAL: 372 MS
QT INTERVAL: 416 MS
QTC INTERVAL: 447 MS
QTC INTERVAL: 479 MS
QTC INTERVAL: 500 MS
RBC # BLD AUTO: 2.89 10*6/MM3 (ref 4.14–5.8)
SODIUM SERPL-SCNC: 137 MMOL/L (ref 136–145)
WBC NRBC COR # BLD AUTO: 10.22 10*3/MM3 (ref 3.4–10.8)

## 2025-03-24 PROCEDURE — 25810000003 SODIUM CHLORIDE 0.9 % SOLUTION: Performed by: ORTHOPAEDIC SURGERY

## 2025-03-24 PROCEDURE — 82948 REAGENT STRIP/BLOOD GLUCOSE: CPT

## 2025-03-24 PROCEDURE — 63710000001 INSULIN GLARGINE PER 5 UNITS: Performed by: ORTHOPAEDIC SURGERY

## 2025-03-24 PROCEDURE — 99233 SBSQ HOSP IP/OBS HIGH 50: CPT | Performed by: INTERNAL MEDICINE

## 2025-03-24 PROCEDURE — 97535 SELF CARE MNGMENT TRAINING: CPT | Performed by: OCCUPATIONAL THERAPIST

## 2025-03-24 PROCEDURE — 25010000002 CEFTRIAXONE PER 250 MG: Performed by: ORTHOPAEDIC SURGERY

## 2025-03-24 PROCEDURE — 93005 ELECTROCARDIOGRAM TRACING: CPT | Performed by: NURSE PRACTITIONER

## 2025-03-24 PROCEDURE — 97165 OT EVAL LOW COMPLEX 30 MIN: CPT | Performed by: OCCUPATIONAL THERAPIST

## 2025-03-24 PROCEDURE — 97162 PT EVAL MOD COMPLEX 30 MIN: CPT

## 2025-03-24 PROCEDURE — 93010 ELECTROCARDIOGRAM REPORT: CPT | Performed by: INTERNAL MEDICINE

## 2025-03-24 PROCEDURE — 93005 ELECTROCARDIOGRAM TRACING: CPT | Performed by: INTERNAL MEDICINE

## 2025-03-24 PROCEDURE — 85025 COMPLETE CBC W/AUTO DIFF WBC: CPT | Performed by: ORTHOPAEDIC SURGERY

## 2025-03-24 PROCEDURE — 84132 ASSAY OF SERUM POTASSIUM: CPT | Performed by: NURSE PRACTITIONER

## 2025-03-24 PROCEDURE — 80048 BASIC METABOLIC PNL TOTAL CA: CPT | Performed by: ORTHOPAEDIC SURGERY

## 2025-03-24 PROCEDURE — 99024 POSTOP FOLLOW-UP VISIT: CPT | Performed by: ORTHOPAEDIC SURGERY

## 2025-03-24 PROCEDURE — 25010000002 MORPHINE PER 10 MG: Performed by: ORTHOPAEDIC SURGERY

## 2025-03-24 PROCEDURE — 63710000001 INSULIN LISPRO (HUMAN) PER 5 UNITS: Performed by: ORTHOPAEDIC SURGERY

## 2025-03-24 RX ORDER — CALCIUM GLUCONATE 20 MG/ML
1000 INJECTION, SOLUTION INTRAVENOUS ONCE
Status: DISCONTINUED | OUTPATIENT
Start: 2025-03-24 | End: 2025-03-24

## 2025-03-24 RX ORDER — INDOMETHACIN 25 MG/1
50 CAPSULE ORAL ONCE
Status: DISCONTINUED | OUTPATIENT
Start: 2025-03-24 | End: 2025-03-24

## 2025-03-24 RX ORDER — DEXTROSE MONOHYDRATE 25 G/50ML
25 INJECTION, SOLUTION INTRAVENOUS ONCE
Status: DISCONTINUED | OUTPATIENT
Start: 2025-03-24 | End: 2025-03-24

## 2025-03-24 RX ADMIN — SODIUM CHLORIDE 100 ML/HR: 9 INJECTION, SOLUTION INTRAVENOUS at 00:56

## 2025-03-24 RX ADMIN — OXYBUTYNIN CHLORIDE 10 MG: 10 TABLET, EXTENDED RELEASE ORAL at 09:05

## 2025-03-24 RX ADMIN — MORPHINE SULFATE 2 MG: 2 INJECTION, SOLUTION INTRAMUSCULAR; INTRAVENOUS at 00:12

## 2025-03-24 RX ADMIN — APIXABAN 2.5 MG: 2.5 TABLET, FILM COATED ORAL at 12:04

## 2025-03-24 RX ADMIN — ASPIRIN 81 MG: 81 TABLET, COATED ORAL at 09:06

## 2025-03-24 RX ADMIN — LEVOTHYROXINE SODIUM 50 MCG: 0.05 TABLET ORAL at 05:58

## 2025-03-24 RX ADMIN — Medication 3 ML: at 20:48

## 2025-03-24 RX ADMIN — INSULIN LISPRO 2 UNITS: 100 INJECTION, SOLUTION INTRAVENOUS; SUBCUTANEOUS at 12:04

## 2025-03-24 RX ADMIN — Medication 10 ML: at 09:00

## 2025-03-24 RX ADMIN — Medication 10 ML: at 20:41

## 2025-03-24 RX ADMIN — BISOPROLOL FUMARATE 5 MG: 5 TABLET ORAL at 09:07

## 2025-03-24 RX ADMIN — APIXABAN 2.5 MG: 2.5 TABLET, FILM COATED ORAL at 20:41

## 2025-03-24 RX ADMIN — DOXYCYCLINE 100 MG: 100 CAPSULE ORAL at 20:41

## 2025-03-24 RX ADMIN — HYDROCODONE BITARTRATE AND ACETAMINOPHEN 1 TABLET: 5; 325 TABLET ORAL at 09:19

## 2025-03-24 RX ADMIN — FINASTERIDE 5 MG: 5 TABLET, FILM COATED ORAL at 09:06

## 2025-03-24 RX ADMIN — SODIUM ZIRCONIUM CYCLOSILICATE 10 G: 10 POWDER, FOR SUSPENSION ORAL at 09:08

## 2025-03-24 RX ADMIN — INSULIN GLARGINE 10 UNITS: 100 INJECTION, SOLUTION SUBCUTANEOUS at 20:48

## 2025-03-24 RX ADMIN — HYDROCODONE BITARTRATE AND ACETAMINOPHEN 1 TABLET: 5; 325 TABLET ORAL at 03:03

## 2025-03-24 RX ADMIN — SODIUM CHLORIDE 1000 MG: 900 INJECTION INTRAVENOUS at 16:59

## 2025-03-24 RX ADMIN — INSULIN LISPRO 2 UNITS: 100 INJECTION, SOLUTION INTRAVENOUS; SUBCUTANEOUS at 20:47

## 2025-03-24 RX ADMIN — DOXYCYCLINE 100 MG: 100 CAPSULE ORAL at 09:07

## 2025-03-24 NOTE — DISCHARGE PLACEMENT REQUEST
"Forrest Zepeda \"Jarrett\" (89 y.o. Male)     Sushila [phone]      Date of Birth   1932    Social Security Number       Address   [address] [id number]    Home Phone   [phone]    MRN   [id number]       [facility]    Marital Status                               Admission Date   3/21/2025    Admission Type   Emergency    Admitting Provider   Aleah Burt MD    Attending Provider   Aleah Burt MD    Department, Room/Bed   [facility] 3F, S327/1       Discharge Date       Discharge Disposition       Discharge Destination                                 Attending Provider: Aleah Burt MD    Allergies: Statins, Metformin    Isolation: None   Infection: None   Code Status: No CPR    Ht: 175.3 cm (69\")   Wt: 83.9 kg (185 lb)    Admission Cmt: None   Principal Problem: Closed right hip fracture [S72.001A]                   Active Insurance as of 3/21/2025       Primary Coverage       Payor Plan Insurance Group Employer/Plan Group    HUMANA MEDICARE REPLACEMENT HU[employer] MEDICARE ADVANTAGE GROUP [id number]       Payor Plan Address Payor Plan Phone Number Payor Plan Fax Number Effective Dates    [address] [address] [phone]  2018 - None Entered    [facility] [id number]         Subscriber Name Subscriber Birth Date Member ID       FORREST ZEPEDA 1932 [id number]                     Emergency Contacts        (Rel.) Home Phone Work Phone Mobile Phone    CHRISTIANORA PLATTY (Grandchild) [phone] -- --    Eron Zepeda (Power of ) [phone] -- --    Mariam Dior (Daughter) [phone] -- --                 History & Physical        Ethan Martinez APRN at 25 181       Attestation signed by Shannon Zendejas MD at 25    I have reviewed this documentation and agree.                      [facility] Medicine Services  HISTORY AND PHYSICAL    Patient Name: Forrest Zepeda  : " 5/6/1932  MRN: [id number]  Primary Care Physician: Kary Wen MD  Date of admission: 3/21/2025    Subjective  Subjective     Chief Complaint:  Fall, right hip pain     HPI:  Forrest Zepeda is a 89 y.o. male with past medical history significant interstitial lung disease, saddle PE, BPH, HFpEF, chronic hydronephrosis d/t urinary stricture, CKD 3, T2DM, hypothyroidism, and PAF who presented to [facility] due to fall with severe right hip pain.  Patient reports that he got up last night to use the restroom and when he went to sit back down in bed he slipped off the bed and fell to the floor.  Patient denies head injury or loss of consciousness.  States that he landed on his right hip/leg.  Patient was unable to bear weight following the fall.  EMS was called and is brought to the ED for further evaluation.  Patient reports history of chronic hydronephrosis due to urinary stricture.  He follows with urologist Dr. Pitts typically has ureteral stent replaced every few months.    In the ED, pelvic xray revealed intertrochanteric right femur fracture.  Labs were reviewed and significant for potassium 5.7, creatinine 2.02, glucose 258, and WBC 15.92.  UA revealed turbid appearance with 3+ blood, 3+ leukocytes,  and 1+ bacteria.  Vital signs were reviewed and are currently unremarkable. The patient will be admitted by hospital medicine for further evaluation and treatment.     Personal History     Past Medical History:   Diagnosis Date    Abnormal EKG     Abnormal PSA     Reported abnormal    Acute cystitis with hematuria 05/01/2023    Acute deep vein thrombosis (DVT) of right lower extremity 08/22/2019    Acute diverticulitis 2019    Acute hyperkalemia 08/22/2019    Acute respiratory failure with hypoxia     Acute saddle pulmonary embolism with acute cor pulmonale 08/22/2019    Acute systolic right heart failure 08/22/2019    Acute UTI (urinary tract infection)     Arthritis     KNEES,  BUT SINCE HAD REPLACEMENT     "Benign localized hyperplasia of prostate     Bilateral knee pain     C. difficile diarrhea 2010    Cataracts, bilateral     CKD (chronic kidney disease)     Coronary artery disease     Diabetic neuropathy     Diastolic dysfunction     Disease of thyroid gland     HYPOTHYROIDISM    Diverticulitis     Dyslipidemia     H/O    Erectile dysfunction     Family history of malignant hyperthermia     Brother     Full dentures     Generalized weakness     History of ESBL E. coli infection     most recent 4-2022 f/u with ID Dr Johnson    History of gout     History of hepatitis A vaccination     History of nephrolithiasis     History of nuclear stress test     STATES IN THE 1990'S.  \"IT WAS OK\"    History of osteopenia     History of recurrent UTI (urinary tract infection)     Hydronephrosis of left kidney 07/18/2019    Hydronephrosis with renal and ureteral calculus obstruction 10/21/2019    Added automatically from request for surgery [id number]    Hydronephrosis with ureteral stricture     Hypercholesterolemia     Hyperkalemia     PT UNSURE REGARDING HX OF THIS    Hyperlipidemia     Hypertension     Hypothyroidism     Impaired functional mobility, balance, gait, and endurance     Insomnia     IPF (idiopathic pulmonary fibrosis)     per patient and son, dx at [facility], was told no treatment necessary, not to worry about it    On supplemental oxygen therapy     2L NC QHS    Other hydronephrosis 08/12/2019    Added automatically from request for surgery [id number]    Paroxysmal atrial fibrillation     Pulmonary fibrosis     Pulmonary hypertension, mild 02/2021    per echo per cardiology note 1/9/23, per pt's son, PCP does not agree with this dx    Renal insufficiency     Sepsis 2019    Shortness of breath     STATES WITH EXERTION, OTHERWISE, DENIES    Sigmoid diverticulitis without abscess or perforation 08/09/2017    Sinus node dysfunction     Skin breakdown     fourth toe right foot noted in 2019 MD D/C note    Skin ulcer of fourth " toe of right foot, limited to breakdown of skin 07/05/2019    Stricture of ureter     Type 2 diabetes mellitus     Ureteral stricture, left     Urinary incontinence     UTI (urinary tract infection) 07/18/2019    Vitamin D deficiency     Wears glasses              Past Surgical History:   Procedure Laterality Date    APPENDECTOMY      CARDIAC ELECTROPHYSIOLOGY PROCEDURE N/A 12/23/2020    Procedure: Pacemaker DC new. DNS meds.;  Surgeon: Jimy Ledezma DO;  Location:  [facility];  Service: Cardiology;  Laterality: N/A;    CHOLECYSTECTOMY      CIRCUMCISION N/A 10/23/2019    Procedure: CIRCUMCISIOn-DORSAL SLIT;  Surgeon: Griffin Romero MD;  Location:  [facility];  Service: Urology    COLONOSCOPY      CYSTOSCOPY RETROGRADE PYELOGRAM Left 06/17/2022    Procedure: CYSTOSCOPY RETROGRADE PYELOGRAM;  Surgeon: Ryne Mccann MD;  Location:  [facility];  Service: Urology;  Laterality: Left;    CYSTOSCOPY RETROGRADE PYELOGRAM Left 7/10/2024    Procedure: CYSTOSCOPY RETROGRADE PYELOGRAM AND STENT INSERTION LEFT;  Surgeon: Deanne Pitts MD;  Location:  [facility];  Service: Urology;  Laterality: Left;    CYSTOSCOPY URETEROSCOPY Left 02/11/2019    Procedure: URETEROSCOPY WITH STENT EXTRACTION, RPG;  Surgeon: Griffin Romero MD;  Location:  [facility];  Service: Urology    CYSTOSCOPY W/ URETERAL STENT PLACEMENT Left 07/20/2019    Procedure: CYSTOSCOPY, LEFT RETROGRADE PYELOGRAM,  ATTEMPTED LEFT URETERAL STENT INSERTION;  Surgeon: Isaac Flynn MD;  Location:  [facility];  Service: Urology    CYSTOSCOPY W/ URETERAL STENT PLACEMENT Left 06/17/2022    Procedure: CYSTOSCOPY URETERAL CATHETER/STENT INSERTION;  Surgeon: Ryne Mccann MD;  Location:  [facility];  Service: Urology;  Laterality: Left;    CYSTOSCOPY W/ URETERAL STENT PLACEMENT Left 01/27/2023    Procedure: CYSTOSCOPY URETERAL CATHETER/STENT INSERTION;  Surgeon: Deanne Pitts MD;  Location:  [facility];  Service: Urology;  Laterality: Left;    CYSTOSCOPY  W/ URETERAL STENT PLACEMENT Left 02/15/2024    Procedure: CYSTOSCOPY LEFT STENT EXCHANGE;  Surgeon: Deanne Pitts MD;  Location:  [facility];  Service: Urology;  Laterality: Left;    CYSTOSCOPY, URETEROSCOPY, RETROGRADE PYELOGRAM, STONE EXTRACTION, STENT INSERTION Left 06/15/2023    Procedure: URETEROSCOPY, RETROGRADE PYELOGRAM, STENT INSERTION;  Surgeon: Deanne Pitts MD;  Location:  [facility];  Service: Urology;  Laterality: Left;    CYSTOSCOPY, URETEROSCOPY, RETROGRADE PYELOGRAM, STONE EXTRACTION, STENT INSERTION Left 11/22/2024    Procedure: CYSTOSCOPY RETROGRADE PYELOGRAM AND STENT INSERTION LEFT;  Surgeon: Deanne Pitts MD;  Location:  [facility];  Service: Urology;  Laterality: Left;    EYE SURGERY      CATARACTS REMOVED BILATERALLY    JOINT REPLACEMENT      Bilateral knees    KNEE ARTHROPLASTY Bilateral     KNEE ARTHROSCOPY Bilateral     RENAL ARTERY STENT      SEVERAL TIMES EVERY SINCE 1978    URETERAL STENT INSERTION  10/2020    URETEROSCOPY LASER LITHOTRIPSY WITH STENT INSERTION Left 01/10/2018    Procedure:  cysto, left ureteral stent replacement ;  Surgeon: Griffin Romero MD;  Location:  [facility];  Service:     URETEROSCOPY LASER LITHOTRIPSY WITH STENT INSERTION Left 08/14/2019    Procedure: URETEROSCOPY, STONE REMOVAL WITH STENT INSERTION;  Surgeon: Griffin Romero MD;  Location:  [facility];  Service: Urology    URETEROSCOPY LASER LITHOTRIPSY WITH STENT INSERTION Left 10/23/2019    Procedure: Left URETEROSCOPY WITH STENT INSERTION-LEFT;  Surgeon: Griffin Romero MD;  Location:  [facility];  Service: Urology       Family History:  family history includes Brain cancer in his brother and sister; Heart attack in his sister; Hypertension in his sister; Lung cancer in his brother and sister; Malig Hyperthermia in his brother; No Known Problems in his mother; Stomach cancer in his father; Stroke in his sister.     Social History:  reports that he quit smoking about 74 years ago. His smoking use included  cigarettes. He has a 0.5 pack-year smoking history. He has been exposed to tobacco smoke. He has never used smokeless tobacco. He reports that he does not drink alcohol and does not use drugs.  Social History     Social History Narrative    Pt lives alone in [place]    Granddaughter Erika is nurse.     Had 7 brothers and 3 sisters.     Uses a cane and oxygen as needed    Still drives       Medications:  HYDROcodone-acetaminophen, Insulin Glargine (1 Unit Dial), Mirabegron ER, Multiple Vitamins-Minerals, SITagliptin, allopurinol, apixaban, aspirin, bisoprolol, finasteride, glimepiride, and levothyroxine    Allergies   Allergen Reactions    Statins Diarrhea and Myalgia    Metformin Diarrhea and GI Intolerance       Objective  Objective     Vital Signs:   Temp:  [98.5 °F (36.9 °C)] 98.5 °F (36.9 °C)  Heart Rate:  [73-80] 73  Resp:  [18] 18  BP: (113-149)/(67-81) 149/77    Physical Exam   Constitutional: Awake, alert, chronically ill appearing, pleasant   Eyes: PERRLA, sclerae anicteric, no conjunctival injection  HENT: NCAT, mucous membranes moist  Neck: Supple, no thyromegaly, no lymphadenopathy, trachea midline  Respiratory: Clear to auscultation bilaterally, nonlabored respirations   Cardiovascular: RRR, no murmurs, rubs, or gallops, palpable pedal pulses bilaterally  Gastrointestinal: Positive bowel sounds, soft, nontender, nondistended  Musculoskeletal: no bilateral ankle edema, right hip tender to palpation  Psychiatric: Appropriate affect, cooperative  Neurologic: Oriented x 3, moves all extremities, speech clear  Skin: warm, dry, no visible rash     Result Review:  I have personally reviewed the results from the time of this admission to 3/21/2025 18:50 EDT and agree with these findings:  [x]  Laboratory list / accordion  [x]  Microbiology  [x]  Radiology  [x]  EKG/Telemetry   []  Cardiology/Vascular   []  Pathology  [x]  Old records  []  Other:  Most notable findings include:     LAB RESULTS:      Lab  03/21/25  1525   WBC 15.92*   HEMOGLOBIN 13.7   HEMATOCRIT 43.1   PLATELETS 193   NEUTROS ABS 14.07*   IMMATURE GRANS (ABS) 0.11*   LYMPHS ABS 1.04   MONOS ABS 0.54   EOS ABS 0.10   MCV 94.5         Lab 03/21/25  1525   SODIUM 142   POTASSIUM 5.7*   CHLORIDE 106   CO2 21.0*   ANION GAP 15.0   BUN 52*   CREATININE 2.02*   EGFR 30.4*   GLUCOSE 258*   CALCIUM 10.0*         Lab 03/21/25  1525   TOTAL PROTEIN 7.7   ALBUMIN 4.0   GLOBULIN 3.7   ALT (SGPT) 13   AST (SGOT) 24   BILIRUBIN 0.6   ALK PHOS 108                 Lab 03/21/25  1526   ABO TYPING O   RH TYPING Negative   ANTIBODY SCREEN Negative         Brief Urine Lab Results  (Last result in the past 365 days)        Color   Clarity   Blood   Leuk Est   Nitrite   Protein   CREAT   Urine HCG        03/21/25 1618 Yellow   Turbid   Large (3+)   Large (3+)   Negative   100 mg/dL (2+)                 Microbiology Results (last 10 days)       ** No results found for the last 240 hours. **            XR Chest 1 View  Result Date: 3/21/2025  XR CHEST 1 VW Date of Exam: 3/21/2025 3:48 PM EDT Indication: HIP FX Comparison: 11/20/2024 Findings: There is a dual-chamber pacemaker. There is incomplete inspiration and exaggerated lordotic positioning which essentially obscures the heart border. The pulmonary vasculature appears within normal limits. There are reticular interstitial markings in the peripheral lungs and lung bases. No acute infiltrate is demonstrated     Impression: Impression: Interstitial lung disease/pulmonary fibrosis. No acute process demonstrated Electronically Signed: Golden Ngo  3/21/2025 3:57 PM EDT  Workstation ID: [id number]    XR Femur 2 View Right  Result Date: 3/21/2025  XR FEMUR 2 VW RIGHT Date of Exam: 3/21/2025 3:48 PM EDT Indication: HIP FX Comparison: None available. Findings/    Impression: Impression: There is a mildly displaced and mildly comminuted intertrochanteric fracture of the right proximal femur. Bones are demineralized. Right knee  arthroplasty is noted. No hardware complication in the provided views. The visualized bony pelvis appears unremarkable. Partially visualized left ureteral stent. Vascular calcifications are seen. Electronically Signed: Ramon Paredes MD  3/21/2025 3:53 PM EDT  Workstation ID: [id number]    XR Pelvis 1 or 2 View  Result Date: 3/21/2025  XR PELVIS 1 OR 2 VW Date of Exam: 3/21/2025 3:48 PM EDT Indication: HIP FX Comparison: None available. Findings: The bones appear somewhat osteopenic. There is a fracture of the intertrochanteric right femur without significant displacement on this single view. The femoral head is not dislocated. No fracture of the pelvis is visualized. A left ureteral stent is present. There is degenerative disease in the lower lumbar spine     Impression: Impression: Intertrochanteric right femur fracture Electronically Signed: Golden Ngo  3/21/2025 3:53 PM EDT  Workstation ID: [id number]      Results for orders placed in visit on 11/13/23    Adult Transthoracic Echo Complete W/ Cont if Necessary Per Protocol    Interpretation Summary    Left ventricular systolic function is normal. Estimated left ventricular EF = 52% Left ventricular ejection fraction appears to be 51 - 55%.    Left ventricular wall thickness is consistent with mild concentric hypertrophy.    Left ventricular diastolic function is consistent with (grade I) impaired relaxation.    The right ventricular cavity is mildly dilated.    The left atrial cavity is mildly dilated.    Left atrial volume is moderately increased.    There is mild calcification of the aortic valve mainly affecting the non-coronary, left coronary and right coronary cusp(s).    Estimated right ventricular systolic pressure from tricuspid regurgitation is normal (<35 mmHg).      Assessment & Plan  Assessment & Plan       Closed right hip fracture    Hyperkalemia    Leukocytosis    T2DM (type 2 diabetes mellitus)    OCTAVIA (acute kidney injury)    Forrest Zepeda is a  89 y.o. male with past medical history significant interstitial lung disease, saddle PE, BPH, HFpEF, chronic hydronephrosis d/t urinary stricture, CKD 3, T2DM, hypothyroidism, and PAF who presented to [facility] due to fall with severe right hip pain.     Right hip fracture  -Pelvic x-ray revealed intertrochanteric right femur fracture  -Orthopedic surgeon Dr. Baltazar has seen, tentative plan for surgery on Luis 3/23  -Tylenol, Norco, morphine as needed for pain  -Zofran as needed for nausea  -AM labs    Abnormal UA  Leukocytosis  -Urine culture pending  -Started on empiric Rocephin    OCTAVIA on CKD III  Chronic hydronephrosis  -Follows with urologist Dr. Pitts   -Baseline Cr ~1.5  -Creatinine 2.02 on presentation  -s/p 1L NS bolus in ED  -Continue NS at 100 ml/hr x 10 hours  -avoid nephrotoxins as able  -AM labs    Hyperkalemia  -Potassium 5.7 on presentation  -Lokelma x1 ordered    PAF  Hx PE/DVT  -Eliquis on hold for planned procedure, last dose AM of 3/21  -continue Bisoprolol     T2DM  -hold home glimepiride, Januvia, Toujeo  -Lantus 10 units at HS  -Mod dose SSI  -Hgb A1C in AM     BPH  -continue Proscar    Hypothyroidism  -Synthroid     DVT prophylaxis:  SCDs    CODE STATUS:    Code Status (Patient has no pulse and is not breathing): No CPR (Do Not Attempt to Resuscitate)  Medical Interventions (Patient has pulse or is breathing): Limited Support  Medical Intervention Limits: No intubation (DNI)  Level Of Support Discussed With: Patient    Expected Discharge TBD  Expected discharge date/ time has not been documented.      Signature: Electronically signed by JOYCE Roth, 03/21/25, 6:50 PM EDT              Electronically signed by Shannon Zendejas MD at 03/21/25 2053       Current Facility-Administered Medications   Medication Dose Route Frequency Provider Last Rate Last Admin    acetaminophen (TYLENOL) tablet 650 mg  650 mg Oral Q4H PRN Brodie Baltazar MD        Or    acetaminophen (TYLENOL) 160 MG/5ML oral  solution 650 mg  650 mg Oral Q4H PRN Brodie Baltazar MD        Or    acetaminophen (TYLENOL) suppository 650 mg  650 mg Rectal Q4H PRN Brodie Baltazar MD        apixaban (ELIQUIS) tablet 2.5 mg  2.5 mg Oral BID Aleah Burt MD   2.5 mg at 03/24/25 1204    aspirin EC tablet 81 mg  81 mg Oral Daily Brodie Baltazar MD   81 mg at 03/24/25 0906    sennosides-docusate (PERICOLACE) 8.6-50 MG per tablet 2 tablet  2 tablet Oral BID PRN Brodie Baltazar MD        And    polyethylene glycol (MIRALAX) packet 17 g  17 g Oral Daily PRN Brodie Baltazar MD        And    bisacodyl (DULCOLAX) EC tablet 5 mg  5 mg Oral Daily PRN Brodie Baltazar MD        And    bisacodyl (DULCOLAX) suppository 10 mg  10 mg Rectal Daily PRN Brodie Baltazar MD        bisoprolol (ZEBeta) tablet 5 mg  5 mg Oral Daily Brodie Baltazar MD   5 mg at 03/24/25 0907    Calcium Replacement - Follow Nurse / BPA Driven Protocol   Not Applicable PRN Brodie Baltazar MD        cefTRIAXone (ROCEPHIN) 1,000 mg in sodium chloride 0.9 % 100 mL MBP  1,000 mg Intravenous Q24H Brodie Baltazar  mL/hr at 03/23/25 1550 1,000 mg at 03/23/25 1550    dextrose (D50W) (25 g/50 mL) IV injection 25 g  25 g Intravenous Q15 Min PRN Brodie Baltazar MD        dextrose (GLUTOSE) oral gel 15 g  15 g Oral Q15 Min PRN Brodie Baltazar MD        docusate sodium (COLACE) capsule 100 mg  100 mg Oral BID PRN Brodie Baltazar MD        doxycycline (MONODOX) capsule 100 mg  100 mg Oral Q12H Brodie Baltazar MD   100 mg at 03/24/25 0907    finasteride (PROSCAR) tablet 5 mg  5 mg Oral Daily Brodie Baltazar MD   5 mg at 03/24/25 0906    glucagon (GLUCAGEN) injection 1 mg  1 mg Intramuscular Q15 Min PRN Brodie Baltazar MD        HYDROcodone-acetaminophen (NORCO) 5-325 MG per tablet 1 tablet  1 tablet Oral Q6H PRN Brodie Baltazar MD   1 tablet at 03/24/25 0919    insulin glargine (LANTUS, SEMGLEE) injection 10 Units  10 Units Subcutaneous Nightly Brodie Baltazar MD   10 Units at 03/23/25 2057    Insulin  Lispro (humaLOG) injection 2-9 Units  2-9 Units Subcutaneous 4x Daily AC & at Bedtime Brodie Baltazar MD   2 Units at 03/24/25 1204    ipratropium-albuterol (DUO-NEB) nebulizer solution 3 mL  3 mL Nebulization Q4H PRN Brodie Baltazar MD   3 mL at 03/22/25 0305    levothyroxine (SYNTHROID, LEVOTHROID) tablet 50 mcg  50 mcg Oral Q AM Brodie Baltazar MD   50 mcg at 03/24/25 0558    Magnesium Low Dose Replacement - Follow Nurse / BPA Driven Protocol   Not Applicable PRN Brodie Baltazar MD        morphine injection 2 mg  2 mg Intravenous Q4H PRN Bordie Baltazar MD   2 mg at 03/24/25 0012    nitroglycerin (NITROSTAT) SL tablet 0.4 mg  0.4 mg Sublingual Q5 Min PRN Brodie Baltazar MD        ondansetron ODT (ZOFRAN-ODT) disintegrating tablet 4 mg  4 mg Oral Q6H PRN Brodie Baltazar MD        Or    ondansetron (ZOFRAN) injection 4 mg  4 mg Intravenous Q6H PRN Brodie Baltazar MD        oxybutynin XL (DITROPAN-XL) 24 hr tablet 10 mg  10 mg Oral Daily Brodie Baltazar MD   10 mg at 03/24/25 0905    Phosphorus Replacement - Follow Nurse / BPA Driven Protocol   Not Applicable PRN Brodie Baltazar MD        Potassium Replacement - Follow Nurse / BPA Driven Protocol   Not Applicable PRBrodie Santos MD        sodium chloride 0.9 % flush 10 mL  10 mL Intravenous Q12H Brodie Baltazar MD   10 mL at 03/24/25 0900    sodium chloride 0.9 % flush 10 mL  10 mL Intravenous PRN Brodie Baltazar MD        sodium chloride 0.9 % flush 3 mL  3 mL Intravenous Q12H Brodie Baltazar MD   3 mL at 03/23/25 2057    sodium chloride 0.9 % flush 3-10 mL  3-10 mL Intravenous PRN Brodie Baltazar MD        sodium chloride 0.9 % infusion 40 mL  40 mL Intravenous PRN Brodie Baltazar MD        sodium chloride 0.9 % infusion 40 mL  40 mL Intravenous PRN Brodie Baltazar MD            Physician Progress Notes (most recent note)        Aleah Burt MD at 03/24/25 0824              [facility] Medicine Services  PROGRESS NOTE    Patient Name: Forrest Miriam  Duane  : 1932  MRN: [id number]    Date of Admission: 3/21/2025  Primary Care Physician: Kary Wen MD    Subjective   Subjective     CC:  Hip pain     HPI:  Doing okay today, but a little confused per my assessment and family's.       Objective   Objective     Vital Signs:   Temp:  [97.9 °F (36.6 °C)-98.1 °F (36.7 °C)] 98.1 °F (36.7 °C)  Heart Rate:  [] 101  Resp:  [16-18] 16  BP: ()/(47-73) 94/67  Flow (L/min) (Oxygen Therapy):  [4-6] 6     Physical Exam:  Constitutional: No acute distress, awake, alert  HENT: NCAT, mucous membranes moist  Respiratory: Clear to auscultation bilaterally, respiratory effort normal on 6L   Cardiovascular: RRR, no murmurs, rubs, or gallops  Gastrointestinal: Positive bowel sounds, soft, nontender, nondistended  Musculoskeletal: No bilateral ankle edema  Psychiatric: Appropriate affect, cooperative  Neurologic: Oriented but confused at times, strength symmetric in all extremities, Cranial Nerves grossly intact to confrontation, speech clear  Skin: No rashes     Results Reviewed:  LAB RESULTS:      Lab 25  0457 25  0625  1525   WBC 10.22 14.56* 15.92*   HEMOGLOBIN 8.8* 10.7* 13.7   HEMATOCRIT 28.3* 34.8* 43.1   PLATELETS 117* 146 193   NEUTROS ABS 8.22* 9.61* 14.07*   IMMATURE GRANS (ABS) 0.04 0.09* 0.11*   LYMPHS ABS 1.03 3.04 1.04   MONOS ABS 0.87 1.01* 0.54   EOS ABS 0.03 0.72* 0.10   MCV 97.9* 98.3* 94.5   PROCALCITONIN  --  0.45*  --          Lab 25  0738 25  0457 25  0623 25  1525   SODIUM  --  137 136 142   POTASSIUM 5.3* 6.1* 4.8 5.7*   CHLORIDE  --  107 106 106   CO2  --  16.0* 18.0* 21.0*   ANION GAP  --  14.0 12.0 15.0   BUN  --  59* 52* 52*   CREATININE  --  2.13* 2.16* 2.02*   EGFR  --  28.5* 28.0* 30.4*   GLUCOSE  --  165* 135* 258*   CALCIUM  --  8.6 8.6 10.0*   MAGNESIUM  --   --  1.8  --    HEMOGLOBIN A1C  --   --  6.90*  --    TSH  --   --  1.740  --          Lab 25  0623 25  1520    TOTAL PROTEIN 6.1 7.7   ALBUMIN 3.1* 4.0   GLOBULIN 3.0 3.7   ALT (SGPT) 10 13   AST (SGOT) 19 24   BILIRUBIN 0.5 0.6   ALK PHOS 79 108         Lab 03/22/25  0623   PROBNP 573.4             Lab 03/21/25  1526   ABO TYPING O   RH TYPING Negative   ANTIBODY SCREEN Negative         Brief Urine Lab Results  (Last result in the past 365 days)        Color   Clarity   Blood   Leuk Est   Nitrite   Protein   CREAT   Urine HCG        03/21/25 1618 Yellow   Turbid   Large (3+)   Large (3+)   Negative   100 mg/dL (2+)                   Microbiology Results Abnormal       None            FL C Arm During Surgery  Result Date: 3/23/2025  This procedure was auto-finalized with no dictation required.    RIGHT FI SS  Result Date: 3/23/2025  Brodie Perkins CRNA     3/23/2025  8:46 AM RIGHT FI SS Patient reassessed immediately prior to procedure Start time: 3/23/2025 8:24 AM Reason for block: at surgeon's request and post-op pain management Performed by CRNA/CAA: Brodie Perkins CRNA Preanesthetic Checklist Completed: patient identified, IV checked, site marked, risks and benefits discussed, surgical consent, monitors and equipment checked, pre-op evaluation and timeout performed Prep: Pt Position: supine Sterile barriers:cap, gloves, mask and washed/disinfected hands Prep: ChloraPrep Patient monitoring: blood pressure monitoring, continuous pulse oximetry and EKG Procedure Sedation: no Performed under: general Guidance:ultrasound guided ULTRASOUND INTERPRETATION.  Using ultrasound guidance a 20 G gauge needle was placed in close proximity to the nerve, at which point, under ultrasound guidance anesthetic was injected in the area of the nerve and spread of the anesthesia was seen on ultrasound in close proximity thereto.  There were no abnormalities seen on ultrasound; a digital image was taken; and the patient tolerated the procedure with no complications. Images:still images obtained, printed/placed on chart Laterality:right Block  "Type:fascia iliaca compartment Injection Technique:single-shot Needle Type:echogenic and Tuohy Needle Gauge:18 G Resistance on Injection: none Catheter Size:20 G (20g) Medications Used: ropivacaine (NAROPIN) 0.5 % injection - Injection  30 mL - 3/23/2025 8:24:00 AM Medications Preservative Free Saline:30ml Post Assessment Injection Assessment: negative aspiration for heme, no paresthesia on injection and incremental injection Patient Tolerance:comfortable throughout block Complications:no Additional Notes CKAFASCIAILIACA: SINGLE shot A high-frequency linear transducer, with sterile cover, was placed in parasagittal plane on top of the Anterior Superior Iliac Spine (ASIS) and moved medially to identify the Internal Oblique muscle, Sartorius muscle, Iliacus Muscle, Fascia Iliaca (FI) and Fascia Latae. The insertion site was prepped and draped in sterile fashion. Skin and cutaneous tissue was infiltrated with 2-5 ml of 1% Lidocaine. Using ultrasound-guidance, a 20-gauge B-Issa 4\" Ultraplex 360 non-stimulating echogenic needle was advanced in plane from caudad to cephalad. Preservative-free normal saline was utilized for hydro-dissection of tissue, advancement of needle, and to confirm final needle placement below FI. Local anesthetic in incremental 3-5 ml injections. Aspiration every 5 ml to prevent intravascular injection. Injection was completed with negative aspiration of blood and negative intravascular injection. Injection pressures were normal with minimal resistance. Performed by: Brodie Perkins CRNA      Results for orders placed in visit on 11/13/23    Adult Transthoracic Echo Complete W/ Cont if Necessary Per Protocol    Interpretation Summary    Left ventricular systolic function is normal. Estimated left ventricular EF = 52% Left ventricular ejection fraction appears to be 51 - 55%.    Left ventricular wall thickness is consistent with mild concentric hypertrophy.    Left ventricular diastolic function is " consistent with (grade I) impaired relaxation.    The right ventricular cavity is mildly dilated.    The left atrial cavity is mildly dilated.    Left atrial volume is moderately increased.    There is mild calcification of the aortic valve mainly affecting the non-coronary, left coronary and right coronary cusp(s).    Estimated right ventricular systolic pressure from tricuspid regurgitation is normal (<35 mmHg).      Current medications:  Scheduled Meds:apixaban, 2.5 mg, Oral, BID  aspirin, 81 mg, Oral, Daily  bisoprolol, 5 mg, Oral, Daily  cefTRIAXone, 1,000 mg, Intravenous, Q24H  doxycycline, 100 mg, Oral, Q12H  finasteride, 5 mg, Oral, Daily  insulin glargine, 10 Units, Subcutaneous, Nightly  insulin lispro, 2-9 Units, Subcutaneous, 4x Daily AC & at Bedtime  levothyroxine, 50 mcg, Oral, Q AM  oxybutynin XL, 10 mg, Oral, Daily  sodium chloride, 10 mL, Intravenous, Q12H  sodium chloride, 3 mL, Intravenous, Q12H      Continuous Infusions:sodium chloride, 100 mL/hr, Last Rate: Stopped (03/24/25 0557)      PRN Meds:.  acetaminophen **OR** acetaminophen **OR** acetaminophen    senna-docusate sodium **AND** polyethylene glycol **AND** bisacodyl **AND** bisacodyl    Calcium Replacement - Follow Nurse / BPA Driven Protocol    dextrose    dextrose    docusate sodium    glucagon (human recombinant)    HYDROcodone-acetaminophen    ipratropium-albuterol    Magnesium Low Dose Replacement - Follow Nurse / BPA Driven Protocol    Morphine    nitroglycerin    ondansetron ODT **OR** ondansetron    Phosphorus Replacement - Follow Nurse / BPA Driven Protocol    Potassium Replacement - Follow Nurse / BPA Driven Protocol    sodium chloride    sodium chloride    sodium chloride    sodium chloride    Assessment & Plan   Assessment & Plan     Active Hospital Problems    Diagnosis  POA    **Closed right hip fracture [S72.001A]  Yes    Thrombocytopenia [D69.6]  Unknown     Priority: Medium    OCTAVIA (acute kidney injury) [N17.9]  Yes    T2DM  (type 2 diabetes mellitus) [E11.9]  Yes    Leukocytosis [D72.829]  Yes    Hyperkalemia [E87.5]  Yes      Resolved Hospital Problems   No resolved problems to display.        Brief Hospital Course to date:  Forrest Zepeda is a 89 y.o. male with past medical history significant for interstitial lung disease, saddle PE, BPH, HFpEF, chronic hydronephrosis d/t urinary stricture, CKD 3, T2DM, hypothyroidism, and PAF who presented to [facility] due to fall with severe right hip pain.      Right hip fracture  -Pelvic x-ray revealed intertrochanteric right femur fracture  -Orthopedic surgeon Dr. Baltazar has seen, s/p repair on 3/23  -- post-op ABX for 24 hours per Dr. Baltazar  -- WBAT per Ortho  -- resume Eliquis today per Ortho  -Tylenol, Norco, morphine as needed for pain  -Zofran as needed for nausea  -AM labs     Abnormal UA  Leukocytosis  -Urine culture with no growth   -Started on empiric Rocephin     OCTAVIA on CKD III  Chronic hydronephrosis  -Follows with urologist Dr. Pitts   -Baseline Cr ~1.5  -Creatinine 2.02 on presentation, slightly better today at 2.13  -s/p 1L NS bolus in ED and then NS at 100 ml/hr x 10 hours  -avoid nephrotoxins as able  -AM labs    TCP  -- new today, likely post-op related. Monitor    Acute blood loss anemia  -- Hgb was 13.7 on admission, Hgb 8.8 today, likely due to perioperative blood loss  -- monitor and transfuse PRN HgB<7     Hyperkalemia  -Potassium 5.7 on presentation, given Lokelma and had improved, however, labs this am showed K+ of 6.7 with no hemolysis, but repeat labs (without intervention) showed K+ of 5.3.  EKG without peaked T waves  -- repeat Lokelma today and repeat labs in am.      PAF  HFpEF  -- resume Eliquis  -- continue rate controlling meds, Bisoprolol   --appears euvolemic s/p IVFs as noted above. Hold diuretics. proBNP wnl when checked overnight on night of admission (due to hypoxia).    Acute on chronic hypoxia  PNA  ILD  -- pt is supposed to be on 2L at baseline but is  noncompliant, was up to 6L BNC  -- CXR with patchy appearance bilaterally likely c/w PNA.  Continue ABX as mentioned above. Added on Doxycycline 3/23  -- urine strep and legionella Ags, MRSA PCR, full respiratory viral panel all NEGATIVE   -- encourage incentive spirometry  -- wean O2 as able (now down to 4L)    Hx PE/DVT  -Eliquis has been on hold for planned procedure, last dose AM of 3/21  -- resume Eliquis today     T2DM  -hold home glimepiride, Januvia, Toujeo  -Lantus 10 units at HS  -Mod dose SSI  -Hgb A1C 6.9%     BPH  -continue Proscar     Hypothyroidism  -Synthroid     Total time spent: Time Spent: Time Spent: 35 minutes  Time spent includes time reviewing chart, face-to-face time, counseling patient/family/caregiver, ordering medications/tests/procedures, communicating with other health care professionals, documenting clinical information in the electronic health record, and coordination of care.      Expected Discharge Location and Transportation: likely will need rehab, PT/OT to assess today    Expected Discharge   Expected Discharge Date: 3/26/2025; Expected Discharge Time:      VTE Prophylaxis:  Pharmacologic & mechanical VTE prophylaxis orders are present.         AM-PAC 6 Clicks Score (PT): 9 (03/24/25 1046)    CODE STATUS:   Code Status and Medical Interventions: No CPR (Do Not Attempt to Resuscitate); Limited Support; No intubation (DNI)   Ordered at: 03/21/25 6126     Code Status (Patient has no pulse and is not breathing):    No CPR (Do Not Attempt to Resuscitate)     Medical Interventions (Patient has pulse or is breathing):    Limited Support     Medical Intervention Limits:    No intubation (DNI)     Level Of Support Discussed With:    Patient       Aleah Burt MD  03/24/25       Electronically signed by Aleah Burt MD at 03/24/25 3382          Physical Therapy Notes (most recent note)        Terri Cabrera, PT at 03/24/25 4833  Version 1 of 1         Patient Name: Forrest  Miriam Zepeda  : 1932    MRN: [id number]                              Today's Date: 3/24/2025       Admit Date: 3/21/2025    Visit Dx:     ICD-10-CM ICD-9-CM   1. Closed nondisplaced intertrochanteric fracture of right femur, initial encounter  S72.144A 820.21   2. Acute cystitis with hematuria  N30.01 595.0   3. Acute kidney injury  N17.9 584.9   4. Anticoagulated  Z79.01 V58.61   5. Interstitial lung disease  J84.9 515   6. Closed fracture of right hip, initial encounter  S72.001A 820.8     Patient Active Problem List   Diagnosis    Essential hypertension    Hyperkalemia    Generalized osteoarthritis    Diabetic neuropathy    Gout    HLD (hyperlipidemia)    Acquired hypothyroidism    Leukocytosis    Ureteral stricture    Hydronephrosis of left kidney    CKD (chronic kidney disease) stage 3, GFR 30-59 ml/min    LEHMAN (dyspnea on exertion)    Pulmonary fibrosis    Symptomatic bradycardia    Hypoxemia requiring supplemental oxygen    History of pulmonary embolism    Sinus node dysfunction    Chronic fatigue    Urethral stricture    Paroxysmal atrial fibrillation    Type 2 diabetes mellitus with hyperglycemia, with long-term current use of insulin    Abnormal cardiovascular stress test    Pulmonary hypertension    Cardiac pacemaker in situ    CAD (coronary artery disease)    Diastolic congestive heart failure    Abnormal SPEP    Hydronephrosis, left    Gross hematuria    Chronic heart failure with preserved ejection fraction (HFpEF)    Vasovagal syncope    Hematuria    Acute UTI (urinary tract infection)    Acute kidney injury superimposed on chronic kidney disease    Anemia, chronic disease    T2DM (type 2 diabetes mellitus)    Generalized weakness    Closed right hip fracture    OCTAVIA (acute kidney injury)    Thrombocytopenia     Past Medical History:   Diagnosis Date    Abnormal EKG     Abnormal PSA     Reported abnormal    Acute cystitis with hematuria 2023    Acute deep vein thrombosis (DVT) of right lower  "extremity 08/22/2019    Acute diverticulitis 2019    Acute hyperkalemia 08/22/2019    Acute respiratory failure with hypoxia     Acute saddle pulmonary embolism with acute cor pulmonale 08/22/2019    Acute systolic right heart failure 08/22/2019    Acute UTI (urinary tract infection) 10/9/2023    Arthritis     KNEES,  BUT SINCE HAD REPLACEMENT    Benign localized hyperplasia of prostate     Bilateral knee pain     C. difficile diarrhea 2010    Cataracts, bilateral     CKD (chronic kidney disease)     Coronary artery disease     Diabetic neuropathy     Diastolic dysfunction     Disease of thyroid gland     HYPOTHYROIDISM    Diverticulitis     Dyslipidemia     H/O    Erectile dysfunction     Family history of malignant hyperthermia     Brother     Full dentures     Generalized weakness     History of ESBL E. coli infection     most recent 4-2022 f/u with ID Dr Johnson    History of gout     History of hepatitis A vaccination     History of nephrolithiasis     History of nuclear stress test     STATES IN THE 1990'S.  \"IT WAS OK\"    History of osteopenia     History of recurrent UTI (urinary tract infection)     Hydronephrosis of left kidney 07/18/2019    Hydronephrosis with renal and ureteral calculus obstruction 10/21/2019    Added automatically from request for surgery [id number]    Hydronephrosis with ureteral stricture     Hypercholesterolemia     Hyperkalemia     PT UNSURE REGARDING HX OF THIS    Hyperlipidemia     Hypertension     Hypothyroidism     Impaired functional mobility, balance, gait, and endurance     Insomnia     IPF (idiopathic pulmonary fibrosis)     per patient and son, dx at [facility], was told no treatment necessary, not to worry about it    On supplemental oxygen therapy     2L NC QHS    Other hydronephrosis 08/12/2019    Added automatically from request for surgery [id number]    Paroxysmal atrial fibrillation     Pulmonary fibrosis     Pulmonary hypertension, mild 02/2021    per echo per cardiology note " 1/9/23, per pt's son, PCP does not agree with this dx    Renal insufficiency     Sepsis 2019    Shortness of breath     STATES WITH EXERTION, OTHERWISE, DENIES    Sigmoid diverticulitis without abscess or perforation 08/09/2017    Sinus node dysfunction     Skin breakdown     fourth toe right foot noted in 2019 MD D/C note    Skin ulcer of fourth toe of right foot, limited to breakdown of skin 07/05/2019    Stricture of ureter     Type 2 diabetes mellitus     Ureteral stricture, left     Urinary incontinence     UTI (urinary tract infection) 07/18/2019    Vitamin D deficiency     Wears glasses      Past Surgical History:   Procedure Laterality Date    APPENDECTOMY      CARDIAC ELECTROPHYSIOLOGY PROCEDURE N/A 12/23/2020    Procedure: Pacemaker DC new. DNS meds.;  Surgeon: Jimy Ledezma DO;  Location:  [facility];  Service: Cardiology;  Laterality: N/A;    CHOLECYSTECTOMY      CIRCUMCISION N/A 10/23/2019    Procedure: CIRCUMCISIOn-DORSAL SLIT;  Surgeon: Griffin Romero MD;  Location:  [facility];  Service: Urology    COLONOSCOPY      CYSTOSCOPY RETROGRADE PYELOGRAM Left 06/17/2022    Procedure: CYSTOSCOPY RETROGRADE PYELOGRAM;  Surgeon: Ryne Mccann MD;  Location:  [facility];  Service: Urology;  Laterality: Left;    CYSTOSCOPY RETROGRADE PYELOGRAM Left 7/10/2024    Procedure: CYSTOSCOPY RETROGRADE PYELOGRAM AND STENT INSERTION LEFT;  Surgeon: Deanne Pitts MD;  Location:  [facility];  Service: Urology;  Laterality: Left;    CYSTOSCOPY URETEROSCOPY Left 02/11/2019    Procedure: URETEROSCOPY WITH STENT EXTRACTION, RPG;  Surgeon: Griffin Romero MD;  Location:  [facility];  Service: Urology    CYSTOSCOPY W/ URETERAL STENT PLACEMENT Left 07/20/2019    Procedure: CYSTOSCOPY, LEFT RETROGRADE PYELOGRAM,  ATTEMPTED LEFT URETERAL STENT INSERTION;  Surgeon: Isaac Flynn MD;  Location:  [facility];  Service: Urology    CYSTOSCOPY W/ URETERAL STENT PLACEMENT Left 06/17/2022    Procedure: CYSTOSCOPY  URETERAL CATHETER/STENT INSERTION;  Surgeon: Ryne Mccann MD;  Location:  [facility];  Service: Urology;  Laterality: Left;    CYSTOSCOPY W/ URETERAL STENT PLACEMENT Left 01/27/2023    Procedure: CYSTOSCOPY URETERAL CATHETER/STENT INSERTION;  Surgeon: Deanne Pitts MD;  Location:  [facility];  Service: Urology;  Laterality: Left;    CYSTOSCOPY W/ URETERAL STENT PLACEMENT Left 02/15/2024    Procedure: CYSTOSCOPY LEFT STENT EXCHANGE;  Surgeon: Deanne Pitts MD;  Location:  [facility];  Service: Urology;  Laterality: Left;    CYSTOSCOPY, URETEROSCOPY, RETROGRADE PYELOGRAM, STONE EXTRACTION, STENT INSERTION Left 06/15/2023    Procedure: URETEROSCOPY, RETROGRADE PYELOGRAM, STENT INSERTION;  Surgeon: Deanne Pitts MD;  Location:  [facility];  Service: Urology;  Laterality: Left;    CYSTOSCOPY, URETEROSCOPY, RETROGRADE PYELOGRAM, STONE EXTRACTION, STENT INSERTION Left 11/22/2024    Procedure: CYSTOSCOPY RETROGRADE PYELOGRAM AND STENT INSERTION LEFT;  Surgeon: Deanne Pitts MD;  Location:  [facility];  Service: Urology;  Laterality: Left;    EYE SURGERY      CATARACTS REMOVED BILATERALLY    HIP TROCHANTERIC NAILING WITH INTRAMEDULLARY HIP SCREW Right 3/23/2025    Procedure: HIP TROCHANTERIC NAILING RIGHT;  Surgeon: Brodie Baltazar MD;  Location:  [facility];  Service: Orthopedics;  Laterality: Right;    JOINT REPLACEMENT      Bilateral knees    KNEE ARTHROPLASTY Bilateral     KNEE ARTHROSCOPY Bilateral     RENAL ARTERY STENT      SEVERAL TIMES EVERY SINCE 1978    URETERAL STENT INSERTION  10/2020    URETEROSCOPY LASER LITHOTRIPSY WITH STENT INSERTION Left 01/10/2018    Procedure:  cysto, left ureteral stent replacement ;  Surgeon: Griffin Romero MD;  Location:  [facility];  Service:     URETEROSCOPY LASER LITHOTRIPSY WITH STENT INSERTION Left 08/14/2019    Procedure: URETEROSCOPY, STONE REMOVAL WITH STENT INSERTION;  Surgeon: Griffin Romero MD;  Location:  [facility];  Service: Urology    URETEROSCOPY LASER LITHOTRIPSY WITH  STENT INSERTION Left 10/23/2019    Procedure: Left URETEROSCOPY WITH STENT INSERTION-LEFT;  Surgeon: Griffin Romero MD;  Location: [facility];  Service: Urology      General Information       Row Name 03/24/25 0948          Physical Therapy Time and Intention    Document Type evaluation  -     Mode of Treatment physical therapy  -       Row Name 03/24/25 0948          General Information    Patient Profile Reviewed yes  -SS     Prior Level of Function independent:;all household mobility;gait;transfer;bed mobility;driving;using stairs  pt reports use of cane or walker PRN, declines any other acute falls  -     Existing Precautions/Restrictions fall;orthostatic hypotension;oxygen therapy device and L/min;other (see comments)  s/p R hip IMN, WBAT  -     Barriers to Rehab medically complex  -       Row Name 03/24/25 0948          Living Environment    Current Living Arrangements home  -     People in Home alone  family nearby to assist as needed  -       Row Name 03/24/25 0948          Home Main Entrance    Number of Stairs, Main Entrance two  -SS     Stair Railings, Main Entrance none  -       Row Name 03/24/25 0948          Stairs Within Home, Primary    Stairs, Within Home, Primary basement - does not need to navigate  -SS       Row Name 03/24/25 0948          Cognition    Orientation Status (Cognition) oriented x 4  -       Row Name 03/24/25 0948          Safety Issues/Impairments Affecting Functional Mobility    Safety Issues Affecting Function (Mobility) awareness of need for assistance;insight into deficits/self-awareness;judgment;positioning of assistive device;problem-solving;safety precaution awareness;safety precautions follow-through/compliance;sequencing abilities  -     Impairments Affecting Function (Mobility) balance;endurance/activity tolerance;pain;postural/trunk control;range of motion (ROM);strength;shortness of breath;motor control  -               User Key  (r) = Recorded  By, (t) = Taken By, (c) = Cosigned By      Initials Name Provider Type     Terri Cabrera, PT Physical Therapist                   Mobility       Row Name 03/24/25 1006          Bed Mobility    Bed Mobility scooting/bridging;supine-sit;sit-supine;rolling right;rolling left  -     Rolling Left [place] (Bed Mobility) maximum assist (25% patient effort);2 person assist;verbal cues;nonverbal cues (demo/gesture)  -     Rolling Right [place] (Bed Mobility) maximum assist (25% patient effort);2 person assist;verbal cues;nonverbal cues (demo/gesture)  -SS     Scooting/Bridging [place] (Bed Mobility) verbal cues;nonverbal cues (demo/gesture);maximum assist (25% patient effort);2 person assist  -     Supine-Sit [place] (Bed Mobility) maximum assist (25% patient effort);2 person assist;verbal cues;nonverbal cues (demo/gesture)  -     Sit-Supine [place] (Bed Mobility) dependent (less than 25% patient effort);2 person assist;verbal cues;nonverbal cues (demo/gesture)  -     Assistive Device (Bed Mobility) bed rails;head of bed elevated;repositioning sheet  -     Comment, (Bed Mobility) V/TC for sequencing  -       Row Name 03/24/25 1006          Bed-Chair Transfer    Bed-Chair [place] (Transfers) maximum assist (25% patient effort);2 person assist;verbal cues;nonverbal cues (demo/gesture)  -     Assistive Device (Bed-Chair Transfers) other (see comments)  BUE support  -     Comment, (Bed-Chair Transfer) V/TC for LE set up, hand placement, sequencing; pt. attempted to take steps to chair, RLE demonstrated buckling despite being blocked w/minimal recovery and return to full RLE extension. Pt. returned to sitting EOB w/L lateral lean and c/o feeling dizzy and short of air. Assisted back to bed and notified RN for further management.  -       Row Name 03/24/25 1006          Sit-Stand Transfer    Sit-Stand [place] (Transfers) moderate assist (50% patient effort);2 person  assist;verbal cues;nonverbal cues (demo/gesture)  -     Assistive Device (Sit-Stand Transfers) other (see comments)  BUE support  -     Comment, (Sit-Stand Transfer) V/TC for LE set up, hand placement  -       Row Name 03/24/25 1006          Gait/Stairs (Locomotion)    [place] Level (Gait) unable to assess  -       Row Name 03/24/25 1006          Mobility    Extremity Weight-bearing Status right lower extremity  -     Right Lower Extremity (Weight-bearing Status) weight-bearing as tolerated (WBAT)  recommend KI per quad weakness  -               User Key  (r) = Recorded By, (t) = Taken By, (c) = Cosigned By      Initials Name Provider Type     Terri Cabrera PT Physical Therapist                   Obj/Interventions       Row Name 03/24/25 1012          Range of Motion Comprehensive    General Range of Motion bilateral lower extremity ROM WFL  -[facility] Name 03/24/25 1012          Strength Comprehensive (MMT)    Comment, General Manual Muscle Testing (MMT) Assessment LLE gross 4-/5, RLE gross 3+/5, deferred KI initially due to good quad activation; recommend utilizing KI in subsequent sessions secondary to buckling w/limited ability to return to full RLE extension  -       Row Name 03/24/25 1012 03/24/25 1008       Motor Skills    Therapeutic Exercise knee  - knee  -      Row Name 03/24/25 1012 03/24/25 1008       Knee (Therapeutic Exercise)    Knee (Therapeutic Exercise) isometric exercises  - isometric exercises  -    Knee Isometrics (Therapeutic Exercise) right;quad sets;10 repetitions  - right;quad sets;10 repetitions  -      Row Name 03/24/25 1012          Balance    Balance Assessment sitting static balance;sitting dynamic balance;standing static balance;standing dynamic balance  -     Static Sitting Balance contact guard;maximum assist  -     Dynamic Sitting Balance minimal assist;dependent  -     Position, Sitting Balance unsupported;sitting edge of bed  -      Static Standing Balance maximum assist;2-person assist  -SS     Dynamic Standing Balance maximum assist;2-person assist  -SS     Position/Device Used, Standing Balance supported;other (see comments)  BUE support  -     Balance Interventions sitting;standing;sit to stand;supported;static;dynamic  -       Row Name 03/24/25 1012          Sensory Assessment (Somatosensory)    Sensory Assessment (Somatosensory) LE sensation intact  -               User Key  (r) = Recorded By, (t) = Taken By, (c) = Cosigned By      Initials Name Provider Type     Terri Cabrera, PT Physical Therapist                   Goals/Plan       Row Name 03/24/25 1048          Bed Mobility Goal 1 (PT)    Activity/Assistive Device (Bed Mobility Goal 1, PT) bed mobility activities, all  -SS     [place] Level/Cues Needed (Bed Mobility Goal 1, PT) moderate assist (50-74% patient effort)  -SS     Time Frame (Bed Mobility Goal 1, PT) long term goal (LTG);5 days  -       Row Name 03/24/25 1048          Transfer Goal 1 (PT)    Activity/Assistive Device (Transfer Goal 1, PT) sit-to-stand/stand-to-sit;bed-to-chair/chair-to-bed;walker, rolling  -SS     [place] Level/Cues Needed (Transfer Goal 1, PT) moderate assist (50-74% patient effort)  -SS     Time Frame (Transfer Goal 1, PT) long term goal (LTG);5 days  -       Row Name 03/24/25 1048          Gait Training Goal 1 (PT)    Activity/Assistive Device (Gait Training Goal 1, PT) gait (walking locomotion);assistive device use;walker, rolling  -SS     [place] Level (Gait Training Goal 1, PT) moderate assist (50-74% patient effort)  -SS     Distance (Gait Training Goal 1, PT) 10  -SS     Time Frame (Gait Training Goal 1, PT) long term goal (LTG);10 days  -       Row Name 03/24/25 1048          Balance Goal 1 (PT)    Activity/Assistive Device (Balance Goal) standing static balance;standing dynamic balance;walker, rolling  -SS     [place] Level/Cues Needed (Balance Goal 1, PT)  modified independence  -     Time Frame (Balance Goal 1, PT) long-term goal (LTG);1 week  -       Row Name 03/24/25 1048          Therapy Assessment/Plan (PT)    Planned Therapy Interventions (PT) balance training;bed mobility training;gait training;home exercise program;neuromuscular re-education;patient/family education;postural re-education;motor coordination training;ROM (range of motion);stair training;strengthening;stretching;transfer training;wheelchair management/propulsion training  -               User Key  (r) = Recorded By, (t) = Taken By, (c) = Cosigned By      Initials Name Provider Type     Terri Cabrera, PT Physical Therapist                   Clinical Impression       Row Name 03/24/25 1040          Pain    Pretreatment Pain Rating 6/10  -SS     Posttreatment Pain Rating 8/10  -SS     Pain Location hip  -     Pain Side/Orientation right;generalized  -     Pain Management Interventions activity modification encouraged;exercise or physical activity utilized;movement retraining implemented;complementary health approaches promoted;nursing notified  -     Response to Pain Interventions activity participation with increased pain;nonverbal indicators present  -       Row Name 03/24/25 1040          Plan of Care Review    Plan of Care Reviewed With patient;family  -     Outcome Evaluation Pt. presents below baseline function w/generalized weakness, balance deficits and decreased functional endurance affecting his ability to safely participate in functional mobility. He performed bed mobility w/max assist of 2 and transfers w/mod-max assist of 2. Upon standing, pt. RLE demonstrated buckling w/limited ability to return to full extension, therefore, pt. returned to sitting EOB with c/o dizziness and shortness of air. Pt. demonstrated delayed response w/strong L lateral lean. Pt. returned to supine w/BP drop to 92/47 and O2 desat to 78% on 6LNC w/return to >90% w/in ~2 minutes. RN notified  and assummed further management. Pt. would benefit from acute PT services to address stated deficits. Recommend SNF when medically appropriate.  -       Row Name 03/24/25 1040          Therapy Assessment/Plan (PT)    Patient/Family Therapy Goals Statement (PT) to get stronger  -     Rehab Potential (PT) fair  -SS     Criteria for Skilled Interventions Met (PT) yes;meets criteria;skilled treatment is necessary  -     Therapy Frequency (PT) daily  -     Predicted Duration of Therapy Intervention (PT) 10 days  -       Row Name 03/24/25 1040          Vital Signs    Pre Systolic BP Rehab 104  -SS     Pre Treatment Diastolic BP 77  -SS     Intra Systolic BP Rehab 92  -SS     Intra Treatment Diastolic BP 47  -SS     Post Systolic BP Rehab 113  -SS     Post Treatment Diastolic BP 71  -SS     Pretreatment Heart Rate (beats/min) 77  -SS     Intratreatment Heart Rate (beats/min) 114  -SS     Posttreatment Heart Rate (beats/min) 109  -SS     Pre SpO2 (%) 94  -SS     O2 Delivery Pre Treatment nasal cannula  6L  -SS     Intra SpO2 (%) 78  RN notified and deferred increasing O2 supply as pt. has a lengthy recovery period  -     O2 Delivery Intra Treatment nasal cannula  6L  -SS     Post SpO2 (%) 94  -SS     O2 Delivery Post Treatment nasal cannula  6L  -SS     Pre Patient Position Supine  -SS     Intra Patient Position Sitting  -SS     Post Patient Position Supine  -SS       Row Name 03/24/25 1040          Positioning and Restraints    Pre-Treatment Position in bed  -SS     Post Treatment Position bed  -SS     In Bed notified nsg;fowlers;with nsg;with family/caregiver;patient within staff view  RN staff present and assessing patient  -SS               User Key  (r) = Recorded By, (t) = Taken By, (c) = Cosigned By      Initials Name Provider Type    SS Terri Cabrera, PT Physical Therapist                   Outcome Measures       Row Name 03/24/25 1049          How much help from another person do you currently need...     Turning from your back to your side while in flat bed without using bedrails? 2  -SS     Moving from lying on back to sitting on the side of a flat bed without bedrails? 2  -SS     Moving to and from a bed to a chair (including a wheelchair)? 1  -SS     Standing up from a chair using your arms (e.g., wheelchair, bedside chair)? 2  -SS     Climbing 3-5 steps with a railing? 1  -SS     To walk in hospital room? 1  -SS     AM-PAC 6 Clicks Score (PT) 9  -SS     Highest Level of Mobility Goal 3 --> Sit at edge of bed  -SS       Row Name 03/24/25 1049          PADD    Diagnosis 2  -SS     Gender 2  -SS     Age Group 0  -SS     Gait Distance 0  -SS     Assist Level 0  -SS     Home Support 0  -SS     PADD Score 4  -SS     Patient Preference extended rehabilitation  -SS     Prediction by PADD Score extended rehabilitation  -       Row Name 03/24/25 1049 03/24/25 1021       Functional Assessment    Outcome Measure Options AM-PAC 6 Clicks Basic Mobility (PT);PADD  -SS AM-PAC 6 Clicks Daily Activity (OT)  -AR              User Key  (r) = Recorded By, (t) = Taken By, (c) = Cosigned By      Initials Name Provider Type    Aarti Mackey, OT Occupational Therapist    Terri Webber, PT Physical Therapist                                 Physical Therapy Education       Title: PT OT SLP Therapies (Done)       Topic: Physical Therapy (Done)       Point: Mobility training (Done)       Learning Progress Summary            Patient Eager, E, VU,DU,NR by  at 3/24/2025 1050    Comment: Educated pt. safety/technique w/bed mobility, transfers, HEP, PT POC   Family Eager, E, VU,DU,NR by  at 3/24/2025 1050    Comment: Educated pt. safety/technique w/bed mobility, transfers, HEP, PT POC                      Point: Home exercise program (Done)       Learning Progress Summary            Patient Eager, E, VU,DU,NR by  at 3/24/2025 1050    Comment: Educated pt. safety/technique w/bed mobility, transfers, HEP, PT POC   Family  Eager, E, VU,DU,NR by  at 3/24/2025 1050    Comment: Educated pt. safety/technique w/bed mobility, transfers, HEP, PT POC                      Point: Body mechanics (Done)       Learning Progress Summary            Patient Eager, E, VU,DU,NR by  at 3/24/2025 1050    Comment: Educated pt. safety/technique w/bed mobility, transfers, HEP, PT POC   Family Eager, E, VU,DU,NR by  at 3/24/2025 1050    Comment: Educated pt. safety/technique w/bed mobility, transfers, HEP, PT POC                      Point: Precautions (Done)       Learning Progress Summary            Patient Eager, E, VU,DU,NR by  at 3/24/2025 1050    Comment: Educated pt. safety/technique w/bed mobility, transfers, HEP, PT POC   Family Eager, E, VU,DU,NR by  at 3/24/2025 1050    Comment: Educated pt. safety/technique w/bed mobility, transfers, HEP, PT POC                                      User Key       Initials Effective Dates Name Provider Type Discipline     06/01/21 -  Terri Cabrera, PT Physical Therapist PT                  PT Recommendation and Plan  Planned Therapy Interventions (PT): balance training, bed mobility training, gait training, home exercise program, neuromuscular re-education, patient/family education, postural re-education, motor coordination training, ROM (range of motion), stair training, strengthening, stretching, transfer training, wheelchair management/propulsion training  Outcome Evaluation: Pt. presents below baseline function w/generalized weakness, balance deficits and decreased functional endurance affecting his ability to safely participate in functional mobility. He performed bed mobility w/max assist of 2 and transfers w/mod-max assist of 2. Upon standing, pt. RLE demonstrated buckling w/limited ability to return to full extension, therefore, pt. returned to sitting EOB with c/o dizziness and shortness of air. Pt. demonstrated delayed response w/strong L lateral lean. Pt. returned to supine w/BP drop to  92/47 and O2 desat to 78% on 6LNC w/return to >90% w/in ~2 minutes. RN notified and assummed further management. Pt. would benefit from acute PT services to address stated deficits. Recommend SNF when medically appropriate.     Time Calculation:   PT Evaluation Complexity  History, PT Evaluation Complexity: 3 or more personal factors and/or comorbidities  Examination of Body Systems (PT Eval Complexity): total of 4 or more elements  Clinical Presentation (PT Evaluation Complexity): evolving  Clinical Decision Making (PT Evaluation Complexity): moderate complexity  Overall Complexity (PT Evaluation Complexity): moderate complexity     PT Charges       Row Name 25 1051             Time Calculation    Start Time 0831  -SS      PT Received On 25  -SS      PT Goal Re-Cert Due Date 25  -SS         Untimed Charges    PT Eval/Re-eval Minutes 52  -SS         Total Minutes    Untimed Charges Total Minutes 52  -SS       Total Minutes 52  -SS                User Key  (r) = Recorded By, (t) = Taken By, (c) = Cosigned By      Initials Name Provider Type    SS Terri Cabrera, PT Physical Therapist                  Therapy Charges for Today       Code Description Service Date Service Provider Modifiers Qty    39802687923 HC PT EVAL MOD COMPLEXITY 4 3/24/2025 Terir Cabrera, PT GP 1            PT G-Codes  Outcome Measure Options: AM-PAC 6 Clicks Basic Mobility (PT), PADD  AM-PAC 6 Clicks Score (PT): 9  AM-PAC 6 Clicks Score (OT): 12  PT Discharge Summary  Anticipated Discharge Disposition (PT): skilled nursing facility    Terri Cabrera PT  3/24/2025      Electronically signed by Terri Cabrera, PT at 25 1051          Occupational Therapy Notes (most recent note)        Aarti Wu, OT at 25 1022          Patient Name: Forrest Zepeda  : 1932    MRN: [id number]                              Today's Date: 3/24/2025       Admit Date: 3/21/2025    Visit Dx:     ICD-10-CM ICD-9-CM   1. Closed  nondisplaced intertrochanteric fracture of right femur, initial encounter  S72.144A 820.21   2. Acute cystitis with hematuria  N30.01 595.0   3. Acute kidney injury  N17.9 584.9   4. Anticoagulated  Z79.01 V58.61   5. Interstitial lung disease  J84.9 515   6. Closed fracture of right hip, initial encounter  S72.001A 820.8     Patient Active Problem List   Diagnosis    Essential hypertension    Hyperkalemia    Generalized osteoarthritis    Diabetic neuropathy    Gout    HLD (hyperlipidemia)    Acquired hypothyroidism    Leukocytosis    Ureteral stricture    Hydronephrosis of left kidney    CKD (chronic kidney disease) stage 3, GFR 30-59 ml/min    LEHMAN (dyspnea on exertion)    Pulmonary fibrosis    Symptomatic bradycardia    Hypoxemia requiring supplemental oxygen    History of pulmonary embolism    Sinus node dysfunction    Chronic fatigue    Urethral stricture    Paroxysmal atrial fibrillation    Type 2 diabetes mellitus with hyperglycemia, with long-term current use of insulin    Abnormal cardiovascular stress test    Pulmonary hypertension    Cardiac pacemaker in situ    CAD (coronary artery disease)    Diastolic congestive heart failure    Abnormal SPEP    Hydronephrosis, left    Gross hematuria    Chronic heart failure with preserved ejection fraction (HFpEF)    Vasovagal syncope    Hematuria    Acute UTI (urinary tract infection)    Acute kidney injury superimposed on chronic kidney disease    Anemia, chronic disease    T2DM (type 2 diabetes mellitus)    Generalized weakness    Closed right hip fracture    OCTAVIA (acute kidney injury)    Thrombocytopenia     Past Medical History:   Diagnosis Date    Abnormal EKG     Abnormal PSA     Reported abnormal    Acute cystitis with hematuria 05/01/2023    Acute deep vein thrombosis (DVT) of right lower extremity 08/22/2019    Acute diverticulitis 2019    Acute hyperkalemia 08/22/2019    Acute respiratory failure with hypoxia     Acute saddle pulmonary embolism with acute  "cor pulmonale 08/22/2019    Acute systolic right heart failure 08/22/2019    Acute UTI (urinary tract infection) 10/9/2023    Arthritis     KNEES,  BUT SINCE HAD REPLACEMENT    Benign localized hyperplasia of prostate     Bilateral knee pain     C. difficile diarrhea 2010    Cataracts, bilateral     CKD (chronic kidney disease)     Coronary artery disease     Diabetic neuropathy     Diastolic dysfunction     Disease of thyroid gland     HYPOTHYROIDISM    Diverticulitis     Dyslipidemia     H/O    Erectile dysfunction     Family history of malignant hyperthermia     Brother     Full dentures     Generalized weakness     History of ESBL E. coli infection     most recent 4-2022 f/u with ID Dr Johnson    History of gout     History of hepatitis A vaccination     History of nephrolithiasis     History of nuclear stress test     STATES IN THE 1990'S.  \"IT WAS OK\"    History of osteopenia     History of recurrent UTI (urinary tract infection)     Hydronephrosis of left kidney 07/18/2019    Hydronephrosis with renal and ureteral calculus obstruction 10/21/2019    Added automatically from request for surgery [id number]    Hydronephrosis with ureteral stricture     Hypercholesterolemia     Hyperkalemia     PT UNSURE REGARDING HX OF THIS    Hyperlipidemia     Hypertension     Hypothyroidism     Impaired functional mobility, balance, gait, and endurance     Insomnia     IPF (idiopathic pulmonary fibrosis)     per patient and son, dx at [facility], was told no treatment necessary, not to worry about it    On supplemental oxygen therapy     2L NC QHS    Other hydronephrosis 08/12/2019    Added automatically from request for surgery [id number]    Paroxysmal atrial fibrillation     Pulmonary fibrosis     Pulmonary hypertension, mild 02/2021    per echo per cardiology note 1/9/23, per pt's son, PCP does not agree with this dx    Renal insufficiency     Sepsis 2019    Shortness of breath     STATES WITH EXERTION, OTHERWISE, DENIES    Sigmoid " diverticulitis without abscess or perforation 08/09/2017    Sinus node dysfunction     Skin breakdown     fourth toe right foot noted in 2019 MD D/C note    Skin ulcer of fourth toe of right foot, limited to breakdown of skin 07/05/2019    Stricture of ureter     Type 2 diabetes mellitus     Ureteral stricture, left     Urinary incontinence     UTI (urinary tract infection) 07/18/2019    Vitamin D deficiency     Wears glasses      Past Surgical History:   Procedure Laterality Date    APPENDECTOMY      CARDIAC ELECTROPHYSIOLOGY PROCEDURE N/A 12/23/2020    Procedure: Pacemaker DC new. DNS meds.;  Surgeon: Jimy Ledezma DO;  Location:  [facility];  Service: Cardiology;  Laterality: N/A;    CHOLECYSTECTOMY      CIRCUMCISION N/A 10/23/2019    Procedure: CIRCUMCISIOn-DORSAL SLIT;  Surgeon: Griffin Romero MD;  Location:  [facility];  Service: Urology    COLONOSCOPY      CYSTOSCOPY RETROGRADE PYELOGRAM Left 06/17/2022    Procedure: CYSTOSCOPY RETROGRADE PYELOGRAM;  Surgeon: Ryne Mccann MD;  Location:  [facility];  Service: Urology;  Laterality: Left;    CYSTOSCOPY RETROGRADE PYELOGRAM Left 7/10/2024    Procedure: CYSTOSCOPY RETROGRADE PYELOGRAM AND STENT INSERTION LEFT;  Surgeon: Deanne Pitts MD;  Location:  [facility];  Service: Urology;  Laterality: Left;    CYSTOSCOPY URETEROSCOPY Left 02/11/2019    Procedure: URETEROSCOPY WITH STENT EXTRACTION, RPG;  Surgeon: Griffin Romero MD;  Location:  [facility];  Service: Urology    CYSTOSCOPY W/ URETERAL STENT PLACEMENT Left 07/20/2019    Procedure: CYSTOSCOPY, LEFT RETROGRADE PYELOGRAM,  ATTEMPTED LEFT URETERAL STENT INSERTION;  Surgeon: Isaac Flynn MD;  Location:  [facility];  Service: Urology    CYSTOSCOPY W/ URETERAL STENT PLACEMENT Left 06/17/2022    Procedure: CYSTOSCOPY URETERAL CATHETER/STENT INSERTION;  Surgeon: Ryne Mccann MD;  Location:  [facility];  Service: Urology;  Laterality: Left;    CYSTOSCOPY W/ URETERAL STENT PLACEMENT Left  01/27/2023    Procedure: CYSTOSCOPY URETERAL CATHETER/STENT INSERTION;  Surgeon: Deanne Pitts MD;  Location:  [facility];  Service: Urology;  Laterality: Left;    CYSTOSCOPY W/ URETERAL STENT PLACEMENT Left 02/15/2024    Procedure: CYSTOSCOPY LEFT STENT EXCHANGE;  Surgeon: Deanne Pitts MD;  Location:  [facility];  Service: Urology;  Laterality: Left;    CYSTOSCOPY, URETEROSCOPY, RETROGRADE PYELOGRAM, STONE EXTRACTION, STENT INSERTION Left 06/15/2023    Procedure: URETEROSCOPY, RETROGRADE PYELOGRAM, STENT INSERTION;  Surgeon: Deanne Pitts MD;  Location:  [facility];  Service: Urology;  Laterality: Left;    CYSTOSCOPY, URETEROSCOPY, RETROGRADE PYELOGRAM, STONE EXTRACTION, STENT INSERTION Left 11/22/2024    Procedure: CYSTOSCOPY RETROGRADE PYELOGRAM AND STENT INSERTION LEFT;  Surgeon: Deanne Pitts MD;  Location:  [facility];  Service: Urology;  Laterality: Left;    EYE SURGERY      CATARACTS REMOVED BILATERALLY    HIP TROCHANTERIC NAILING WITH INTRAMEDULLARY HIP SCREW Right 3/23/2025    Procedure: HIP TROCHANTERIC NAILING RIGHT;  Surgeon: rBodie Baltazar MD;  Location:  [facility];  Service: Orthopedics;  Laterality: Right;    JOINT REPLACEMENT      Bilateral knees    KNEE ARTHROPLASTY Bilateral     KNEE ARTHROSCOPY Bilateral     RENAL ARTERY STENT      SEVERAL TIMES EVERY SINCE 1978    URETERAL STENT INSERTION  10/2020    URETEROSCOPY LASER LITHOTRIPSY WITH STENT INSERTION Left 01/10/2018    Procedure:  cysto, left ureteral stent replacement ;  Surgeon: Griffin Romero MD;  Location:  [facility];  Service:     URETEROSCOPY LASER LITHOTRIPSY WITH STENT INSERTION Left 08/14/2019    Procedure: URETEROSCOPY, STONE REMOVAL WITH STENT INSERTION;  Surgeon: Griffin Romero MD;  Location:  [facility];  Service: Urology    URETEROSCOPY LASER LITHOTRIPSY WITH STENT INSERTION Left 10/23/2019    Procedure: Left URETEROSCOPY WITH STENT INSERTION-LEFT;  Surgeon: Griffin Romero MD;  Location:  [facility];  Service: Urology      General  Information       Row Name 03/24/25 0959          OT Time and Intention    Document Type evaluation  -AR     Mode of Treatment co-treatment;occupational therapy  -AR       Row Name 03/24/25 0959          General Information    Patient Profile Reviewed yes  -AR     Prior Level of Function independent:;ADL's;all household mobility;community mobility;gait;transfer;driving  -AR     Existing Precautions/Restrictions fall;oxygen therapy device and L/min;other (see comments)   RLE buckling with WB  -AR     Barriers to Rehab medically complex  -AR       Row Name 03/24/25 0959          Living Environment    Current Living Arrangements home  -AR     People in Home alone  -AR       Row Name 03/24/25 0959          Home Main Entrance    Number of Stairs, Main Entrance two  -AR     Stair Railings, Main Entrance railing on right side (ascending)  -AR       Row Name 03/24/25 0959          Stairs Within Home, Primary    Number of Stairs, Within Home, Primary none  -AR       Row Name 03/24/25 0959          Cognition    Orientation Status (Cognition) oriented x 4  -AR       Row Name 03/24/25 0959          Safety Issues/Impairments Affecting Functional Mobility    Safety Issues Affecting Function (Mobility) awareness of need for assistance;insight into deficits/self-awareness;judgment;problem-solving;safety precaution awareness;safety precautions follow-through/compliance;sequencing abilities  -AR     Impairments Affecting Function (Mobility) balance;cognition;endurance/activity tolerance;motor control;pain;range of motion (ROM);shortness of breath;strength  -AR     Cognitive Impairments, Mobility Safety/Performance awareness, need for assistance;insight into deficits/self-awareness;judgment;problem-solving/reasoning;safety precaution awareness;safety precaution follow-through  -AR               User Key  (r) = Recorded By, (t) = Taken By, (c) = Cosigned By      Initials Name Provider Type    Aarti Mackey, OT Occupational  Therapist                     Mobility/ADL's       Row Name 03/24/25 1002          Bed Mobility    Bed Mobility scooting/bridging;supine-sit;sit-supine;rolling left;rolling right  -AR     Rolling Left [place] (Bed Mobility) dependent (less than 25% patient effort)  -AR     Rolling Right [place] (Bed Mobility) dependent (less than 25% patient effort)  -AR     Scooting/Bridging [place] (Bed Mobility) maximum assist (25% patient effort);2 person assist;verbal cues  -AR     Supine-Sit [place] (Bed Mobility) maximum assist (25% patient effort);2 person assist;verbal cues  -AR     Sit-Supine [place] (Bed Mobility) dependent (less than 25% patient effort);2 person assist  -AR     Comment, (Bed Mobility) Pt assisted to EOB and following standing trial, returned to EOB sitting for seated level rest break. Pt desat to 70's (difficult to obtain accurate reading despite 2 pulse oximeters and finger probe( and significant left lateral lean noted. Pt was responsive with delays. RN notified and arrived to assess pt when he was supine. Rolled R/L of midline for sling placement once xygen recovered to 90% or above. BP 92/47 and session ended d/t medical status and EKG arrived at bedside.  -AR       Row Name 03/24/25 1002          Transfers    Transfers sit-stand transfer;stand-sit transfer  -AR     Comment, (Transfers) Pt transitioned from sit-to-stand with mod assist x2 from elevated bed surface. RLE knee buckling noted despite adequate quad function supine and pt unable to safely pivot to chair.  -AR       Row Name 03/24/25 1002          Sit-Stand Transfer    Sit-Stand [place] (Transfers) moderate assist (50% patient effort);2 person assist;verbal cues  -AR     Assistive Device (Sit-Stand Transfers) other (see comments)  -AR       Row Name 03/24/25 1002          Stand-Sit Transfer    Stand-Sit [place] (Transfers) maximum assist (25% patient effort);2 person assist;verbal cues  -AR     Assistive  Device (Stand-Sit Transfers) other (see comments)  BUE HHA  -AR       Row Name 03/24/25 1002          Functional Mobility    Patient was able to Ambulate no, other medical factors prevent ambulation  -AR     Reason Patient was unable to Ambulate Excessive Weakness;Hypotension;Cardiac Dysfunction;Dizziness;Hypoxia/Respiratory Distress  -AR       Row Name 03/24/25 1002          Activities of Daily Living    BADL Assessment/Intervention upper body dressing;lower body dressing  -AR       Row Name 03/24/25 1002          Mobility    Extremity Weight-bearing Status right lower extremity  -AR     Right Lower Extremity (Weight-bearing Status) weight-bearing as tolerated (WBAT)  -AR       Row Name 03/24/25 1002          Upper Body Dressing Assessment/Training    [place] Level (Upper Body Dressing) don;pajama/robe;maximum assist (25% patient effort)  -AR     Position (Upper Body Dressing) edge of bed sitting  -AR       Row Name 03/24/25 1002          Lower Body Dressing Assessment/Training    [place] Level (Lower Body Dressing) socks;dependent (less than 25% patient effort)  -AR     Position (Lower Body Dressing) supine  -AR     Comment, (Lower Body Dressing) Issued self-care kit, teaching deferred d/t medical status.  -AR               User Key  (r) = Recorded By, (t) = Taken By, (c) = Cosigned By      Initials Name Provider Type    Aarti Mackey, OT Occupational Therapist                   Obj/Interventions       Row Name 03/24/25 1008          Sensory Assessment (Somatosensory)    Sensory Assessment (Somatosensory) UE sensation intact  -AR       Row Name 03/24/25 1008          Vision Assessment/Intervention    Visual Impairment/Limitations corrective lenses full-time  -AR       Row Name 03/24/25 1008          Range of Motion Comprehensive    General Range of Motion no range of motion deficits identified  Simultaneous filing. User may be unaware of other data.  -AR       Row Name 03/24/25 1008           Strength Comprehensive (MMT)    General Manual Muscle Testing (MMT) Assessment upper extremity strength deficits identified  -AR     Comment, General Manual Muscle Testing (MMT) Assessment BUE 4/5  Simultaneous filing. User may be unaware of other data.  -AR       Row Name 03/24/25 1008          Balance    Balance Assessment sitting static balance;sitting dynamic balance;standing static balance;standing dynamic balance  -AR     Static Sitting Balance maximum assist  initially CGA  -AR     Dynamic Sitting Balance dependent  initially min assist  -AR     Position, Sitting Balance unsupported;sitting edge of bed  -AR     Static Standing Balance maximum assist;2-person assist;verbal cues  -AR     Position/Device Used, Standing Balance supported  -AR               User Key  (r) = Recorded By, (t) = Taken By, (c) = Cosigned By      Initials Name Provider Type    Aarti Mackey, OT Occupational Therapist                   Goals/Plan       Row Name 03/24/25 1019          Transfer Goal 1 (OT)    Activity/Assistive Device (Transfer Goal 1, OT) sit-to-stand/stand-to-sit;commode, bedside with drop arms  -AR     [place] Level/Cues Needed (Transfer Goal 1, OT) verbal cues required;maximum assist (25-49% patient effort)  -AR     Time Frame (Transfer Goal 1, OT) short term goal (STG);5 days  -AR     Progress/Outcome (Transfer Goal 1, OT) goal ongoing  -AR       Row Name 03/24/25 1019          Dressing Goal 1 (OT)    Activity/Device (Dressing Goal 1, OT) lower body dressing;sock-aid;reacher  -AR     [place]/Cues Needed (Dressing Goal 1, OT) moderate assist (50-74% patient effort);verbal cues required  -AR     Time Frame (Dressing Goal 1, OT) long term goal (LTG);10 days  -AR     Progress/Outcome (Dressing Goal 1, OT) goal ongoing  -AR       Row Name 03/24/25 1019          Toileting Goal 1 (OT)    Activity/Device (Toileting Goal 1, OT) toileting skills, all;commode, bedside without drop arms  -AR     [place]  Level/Cues Needed (Toileting Goal 1, OT) maximum assist (25-49% patient effort);verbal cues required  -AR     Time Frame (Toileting Goal 1, OT) long term goal (LTG);10 days  -AR     Progress/Outcome (Toileting Goal 1, OT) goal ongoing  -AR       Row Name 03/24/25 1019          Problem Specific Goal 1 (OT)    Problem Specific Goal 1 (OT) Pt will maintain static sitting balance x 3 minutes with CGA during ADL activity  -AR     Time Frame (Problem Specific Goal 1, OT) short term goal (STG);5 days  -AR     Progress/Outcome (Problem Specific Goal 1, OT) goal ongoing  -AR       Row Name 03/24/25 1019          Therapy Assessment/Plan (OT)    Planned Therapy Interventions (OT) activity tolerance training;adaptive equipment training;BADL retraining;edema control/reduction;functional balance retraining;IADL retraining;occupation/activity based interventions;patient/caregiver education/training;ROM/therapeutic exercise;strengthening exercise;transfer/mobility retraining;cognitive/visual perception retraining  -AR               User Key  (r) = Recorded By, (t) = Taken By, (c) = Cosigned By      Initials Name Provider Type    AR Aarti Wu, OT Occupational Therapist                   Clinical Impression       Row Name 03/24/25 1009          Pain Assessment    Pretreatment Pain Rating 6/10  -AR     Posttreatment Pain Rating 8/10  -AR     Pain Location hip  -AR     Pain Side/Orientation right;generalized  -AR     Pain Management Interventions exercise or physical activity utilized;movement retraining implemented;nursing notified;positioning techniques utilized  -AR     Response to Pain Interventions activity participation with increased pain  -AR       Row Name 03/24/25 1009          Plan of Care Review    Plan of Care Reviewed With patient;family  -AR     Outcome Evaluation Pt alert, Ox4 and transitioned to EOB with max assist x2. He completed UB dressing taks with max assist and LB dressing with dependence. He  transitioned sit-to-stand with mod assist x2 from elevated bed surface and was unable to pivot to chair d/t RLE knee buckling and fatigue. Pt returned to EOB sitting, desat into 70s on 6L NC and had episode of delayed responses with strong left lateral lean. Pt was assisted to supine with dependence and BP 92/47, oxygen returned to 90% or above within 2 minutes. RN notified and at bedside to assess him. Sling left under him, deferred transfer to chair d/t medical status and RN notified of placement of sling. Pt limited with dyspnea and desaturation on 6L NC, acute pain, poor occupational endurance, impaired balance, confusion and is performing below baseline. Recommend SNF.  -AR       Row Name 03/24/25 1009          Therapy Assessment/Plan (OT)    Rehab Potential (OT) fair  -AR     Criteria for Skilled Therapeutic Interventions Met (OT) yes  -AR     Therapy Frequency (OT) daily  -AR       Row Name 03/24/25 1009          Therapy Plan Review/Discharge Plan (OT)    Anticipated Discharge Disposition (OT) skilled nursing facility  -AR       Row Name 03/24/25 1009          Vital Signs    Pre Systolic BP Rehab 104  -AR     Pre Treatment Diastolic BP 71  -AR     Intra Systolic BP Rehab 92  -AR     Intra Treatment Diastolic BP 47  -AR     Post Systolic BP Rehab 113  -AR     Post Treatment Diastolic BP 71  -AR     Intratreatment Heart Rate (beats/min) 114  -AR     Posttreatment Heart Rate (beats/min) 107  -AR     Pre SpO2 (%) 90  -AR     O2 Delivery Pre Treatment supplemental O2  -AR     Intra SpO2 (%) 78  RN notified and she did not want OT to increase oxygen reporting that he is slow to rebound, arrived to room shortly after called  -AR     O2 Delivery Intra Treatment supplemental O2  -AR     Post SpO2 (%) 91  -AR     O2 Delivery Post Treatment supplemental O2  -AR     Pre Patient Position Supine  -AR     Intra Patient Position Sitting  -AR     Post Patient Position Supine  -AR       Row Name 03/24/25 1009           Positioning and Restraints    Pre-Treatment Position in bed  -AR     Post Treatment Position bed  -AR     In Bed supine;call light within reach;encouraged to call for assist;exit alarm on;with nsg  deferred SCD and cold pack d/t multiple nurses at bedside to assess pt  -AR               User Key  (r) = Recorded By, (t) = Taken By, (c) = Cosigned By      Initials Name Provider Type    Aarti Mackey OT Occupational Therapist                   Outcome Measures       Row Name 03/24/25 1021          How much help from another is currently needed...    Putting on and taking off regular lower body clothing? 1  -AR     Bathing (including washing, rinsing, and drying) 2  -AR     Toileting (which includes using toilet bed pan or urinal) 1  -AR     Putting on and taking off regular upper body clothing 2  -AR     Taking care of personal grooming (such as brushing teeth) 3  -AR     Eating meals 3  -AR     AM-PAC 6 Clicks Score (OT) 12  -AR       Row Name 03/24/25 1021          Functional Assessment    Outcome Measure Options AM-PAC 6 Clicks Daily Activity (OT)  -AR               User Key  (r) = Recorded By, (t) = Taken By, (c) = Cosigned By      Initials Name Provider Type    Aarti Mackey OT Occupational Therapist                    Occupational Therapy Education       Title: PT OT SLP Therapies (Done)       Topic: Occupational Therapy (Done)       Point: ADL training (Done)       Learning Progress Summary            Patient Eager, E,TB,D, VU,NR by AR at 3/24/2025 1021                      Point: Home exercise program (Done)       Learning Progress Summary            Patient Eager, E,TB,D, VU,NR by AR at 3/24/2025 1021                      Point: Precautions (Done)       Learning Progress Summary            Patient Eager, E,TB,D, VU,NR by AR at 3/24/2025 1021                      Point: Body mechanics (Done)       Learning Progress Summary            Patient Eager, E,TB,D, VU,NR by AR at 3/24/2025 1021                                       User Key       Initials Effective Dates Name Provider Type Discipline    AR 07/11/23 -  Aarti Wu, WHIT Occupational Therapist OT                  OT Recommendation and Plan  Planned Therapy Interventions (OT): activity tolerance training, adaptive equipment training, BADL retraining, edema control/reduction, functional balance retraining, IADL retraining, occupation/activity based interventions, patient/caregiver education/training, ROM/therapeutic exercise, strengthening exercise, transfer/mobility retraining, cognitive/visual perception retraining  Therapy Frequency (OT): daily  Plan of Care Review  Plan of Care Reviewed With: patient, family  Outcome Evaluation: Pt alert, Ox4 and transitioned to EOB with max assist x2. He completed UB dressing taks with max assist and LB dressing with dependence. He transitioned sit-to-stand with mod assist x2 from elevated bed surface and was unable to pivot to chair d/t RLE knee buckling and fatigue. Pt returned to EOB sitting, desat into 70s on 6L NC and had episode of delayed responses with strong left lateral lean. Pt was assisted to supine with dependence and BP 92/47, oxygen returned to 90% or above within 2 minutes. RN notified and at bedside to assess him. Sling left under him, deferred transfer to chair d/t medical status and RN notified of placement of sling. Pt limited with dyspnea and desaturation on 6L NC, acute pain, poor occupational endurance, impaired balance, confusion and is performing below baseline. Recommend SNF.     Time Calculation:   Evaluation Complexity (OT)  Review Occupational Profile/Medical/Therapy History Complexity: brief/low complexity  Assessment, Occupational Performance/Identification of Deficit Complexity: 1-3 performance deficits  Clinical Decision Making Complexity (OT): problem focused assessment/low complexity  Overall Complexity of Evaluation (OT): low complexity     Time Calculation- OT        Row Name 03/24/25 1021             Time Calculation- OT    OT Start Time 0826  -AR      OT Received On 03/24/25  -AR      OT Goal Re-Cert Due Date 04/03/25  -AR         Timed Charges    04229 - OT Self Care/Mgmt Minutes 11  -AR         Untimed Charges    OT Eval/Re-eval Minutes 59  -AR         Total Minutes    Timed Charges Total Minutes 11  -AR      Untimed Charges Total Minutes 59  -AR       Total Minutes 70  -AR                User Key  (r) = Recorded By, (t) = Taken By, (c) = Cosigned By      Initials Name Provider Type    AR Aarti Wu OT Occupational Therapist                  Therapy Charges for Today       Code Description Service Date Service Provider Modifiers Qty    11998821958 HC OT SELF CARE/MGMT/TRAIN EA 15 MIN 3/24/2025 Aarti Wu OT GO 1    68335622368 HC OT EVAL LOW COMPLEXITY 4 3/24/2025 Aarti Wu OT GO 1    01482807832 HC OT THER SUPP EA 15 MIN 3/24/2025 Aarti Wu OT GO 4                 Aarti Wu OT  3/24/2025    Electronically signed by Aarti Wu OT at 03/24/25 1022

## 2025-03-24 NOTE — THERAPY EVALUATION
Patient Name: Forrest Zepeda  : 1932    MRN: [id number]                              Today's Date: 3/24/2025       Admit Date: 3/21/2025    Visit Dx:     ICD-10-CM ICD-9-CM   1. Closed nondisplaced intertrochanteric fracture of right femur, initial encounter  S72.144A 820.21   2. Acute cystitis with hematuria  N30.01 595.0   3. Acute kidney injury  N17.9 584.9   4. Anticoagulated  Z79.01 V58.61   5. Interstitial lung disease  J84.9 515   6. Closed fracture of right hip, initial encounter  S72.001A 820.8     Patient Active Problem List   Diagnosis    Essential hypertension    Hyperkalemia    Generalized osteoarthritis    Diabetic neuropathy    Gout    HLD (hyperlipidemia)    Acquired hypothyroidism    Leukocytosis    Ureteral stricture    Hydronephrosis of left kidney    CKD (chronic kidney disease) stage 3, GFR 30-59 ml/min    LEHMAN (dyspnea on exertion)    Pulmonary fibrosis    Symptomatic bradycardia    Hypoxemia requiring supplemental oxygen    History of pulmonary embolism    Sinus node dysfunction    Chronic fatigue    Urethral stricture    Paroxysmal atrial fibrillation    Type 2 diabetes mellitus with hyperglycemia, with long-term current use of insulin    Abnormal cardiovascular stress test    Pulmonary hypertension    Cardiac pacemaker in situ    CAD (coronary artery disease)    Diastolic congestive heart failure    Abnormal SPEP    Hydronephrosis, left    Gross hematuria    Chronic heart failure with preserved ejection fraction (HFpEF)    Vasovagal syncope    Hematuria    Acute UTI (urinary tract infection)    Acute kidney injury superimposed on chronic kidney disease    Anemia, chronic disease    T2DM (type 2 diabetes mellitus)    Generalized weakness    Closed right hip fracture    OCTAVIA (acute kidney injury)    Thrombocytopenia     Past Medical History:   Diagnosis Date    Abnormal EKG     Abnormal PSA     Reported abnormal    Acute cystitis with hematuria 2023    Acute deep vein thrombosis  "(DVT) of right lower extremity 08/22/2019    Acute diverticulitis 2019    Acute hyperkalemia 08/22/2019    Acute respiratory failure with hypoxia     Acute saddle pulmonary embolism with acute cor pulmonale 08/22/2019    Acute systolic right heart failure 08/22/2019    Acute UTI (urinary tract infection) 10/9/2023    Arthritis     KNEES,  BUT SINCE HAD REPLACEMENT    Benign localized hyperplasia of prostate     Bilateral knee pain     C. difficile diarrhea 2010    Cataracts, bilateral     CKD (chronic kidney disease)     Coronary artery disease     Diabetic neuropathy     Diastolic dysfunction     Disease of thyroid gland     HYPOTHYROIDISM    Diverticulitis     Dyslipidemia     H/O    Erectile dysfunction     Family history of malignant hyperthermia     Brother     Full dentures     Generalized weakness     History of ESBL E. coli infection     most recent 4-2022 f/u with ID Dr Johnson    History of gout     History of hepatitis A vaccination     History of nephrolithiasis     History of nuclear stress test     STATES IN THE 1990'S.  \"IT WAS OK\"    History of osteopenia     History of recurrent UTI (urinary tract infection)     Hydronephrosis of left kidney 07/18/2019    Hydronephrosis with renal and ureteral calculus obstruction 10/21/2019    Added automatically from request for surgery [id number]    Hydronephrosis with ureteral stricture     Hypercholesterolemia     Hyperkalemia     PT UNSURE REGARDING HX OF THIS    Hyperlipidemia     Hypertension     Hypothyroidism     Impaired functional mobility, balance, gait, and endurance     Insomnia     IPF (idiopathic pulmonary fibrosis)     per patient and son, dx at [facility], was told no treatment necessary, not to worry about it    On supplemental oxygen therapy     2L NC QHS    Other hydronephrosis 08/12/2019    Added automatically from request for surgery [id number]    Paroxysmal atrial fibrillation     Pulmonary fibrosis     Pulmonary hypertension, mild 02/2021    per echo " per cardiology note 1/9/23, per pt's son, PCP does not agree with this dx    Renal insufficiency     Sepsis 2019    Shortness of breath     STATES WITH EXERTION, OTHERWISE, DENIES    Sigmoid diverticulitis without abscess or perforation 08/09/2017    Sinus node dysfunction     Skin breakdown     fourth toe right foot noted in 2019 MD D/C note    Skin ulcer of fourth toe of right foot, limited to breakdown of skin 07/05/2019    Stricture of ureter     Type 2 diabetes mellitus     Ureteral stricture, left     Urinary incontinence     UTI (urinary tract infection) 07/18/2019    Vitamin D deficiency     Wears glasses      Past Surgical History:   Procedure Laterality Date    APPENDECTOMY      CARDIAC ELECTROPHYSIOLOGY PROCEDURE N/A 12/23/2020    Procedure: Pacemaker DC new. DNS meds.;  Surgeon: Jimy Ledezma DO;  Location:  [facility];  Service: Cardiology;  Laterality: N/A;    CHOLECYSTECTOMY      CIRCUMCISION N/A 10/23/2019    Procedure: CIRCUMCISIOn-DORSAL SLIT;  Surgeon: Griffin Romero MD;  Location:  [facility];  Service: Urology    COLONOSCOPY      CYSTOSCOPY RETROGRADE PYELOGRAM Left 06/17/2022    Procedure: CYSTOSCOPY RETROGRADE PYELOGRAM;  Surgeon: Ryne Mccann MD;  Location:  [facility];  Service: Urology;  Laterality: Left;    CYSTOSCOPY RETROGRADE PYELOGRAM Left 7/10/2024    Procedure: CYSTOSCOPY RETROGRADE PYELOGRAM AND STENT INSERTION LEFT;  Surgeon: Deanne Pitts MD;  Location:  [facility];  Service: Urology;  Laterality: Left;    CYSTOSCOPY URETEROSCOPY Left 02/11/2019    Procedure: URETEROSCOPY WITH STENT EXTRACTION, RPG;  Surgeon: Griffin Romero MD;  Location:  [facility];  Service: Urology    CYSTOSCOPY W/ URETERAL STENT PLACEMENT Left 07/20/2019    Procedure: CYSTOSCOPY, LEFT RETROGRADE PYELOGRAM,  ATTEMPTED LEFT URETERAL STENT INSERTION;  Surgeon: Isaac Flynn MD;  Location:  [facility];  Service: Urology    CYSTOSCOPY W/ URETERAL STENT PLACEMENT Left 06/17/2022     Procedure: CYSTOSCOPY URETERAL CATHETER/STENT INSERTION;  Surgeon: Ryne Mccann MD;  Location:  [facility];  Service: Urology;  Laterality: Left;    CYSTOSCOPY W/ URETERAL STENT PLACEMENT Left 01/27/2023    Procedure: CYSTOSCOPY URETERAL CATHETER/STENT INSERTION;  Surgeon: Deanne Pitts MD;  Location:  [facility];  Service: Urology;  Laterality: Left;    CYSTOSCOPY W/ URETERAL STENT PLACEMENT Left 02/15/2024    Procedure: CYSTOSCOPY LEFT STENT EXCHANGE;  Surgeon: Deanne Pitts MD;  Location:  [facility];  Service: Urology;  Laterality: Left;    CYSTOSCOPY, URETEROSCOPY, RETROGRADE PYELOGRAM, STONE EXTRACTION, STENT INSERTION Left 06/15/2023    Procedure: URETEROSCOPY, RETROGRADE PYELOGRAM, STENT INSERTION;  Surgeon: Deanne Pitts MD;  Location:  [facility];  Service: Urology;  Laterality: Left;    CYSTOSCOPY, URETEROSCOPY, RETROGRADE PYELOGRAM, STONE EXTRACTION, STENT INSERTION Left 11/22/2024    Procedure: CYSTOSCOPY RETROGRADE PYELOGRAM AND STENT INSERTION LEFT;  Surgeon: Deanne Pitts MD;  Location:  [facility];  Service: Urology;  Laterality: Left;    EYE SURGERY      CATARACTS REMOVED BILATERALLY    HIP TROCHANTERIC NAILING WITH INTRAMEDULLARY HIP SCREW Right 3/23/2025    Procedure: HIP TROCHANTERIC NAILING RIGHT;  Surgeon: Brodie Baltazar MD;  Location:  [facility];  Service: Orthopedics;  Laterality: Right;    JOINT REPLACEMENT      Bilateral knees    KNEE ARTHROPLASTY Bilateral     KNEE ARTHROSCOPY Bilateral     RENAL ARTERY STENT      SEVERAL TIMES EVERY SINCE 1978    URETERAL STENT INSERTION  10/2020    URETEROSCOPY LASER LITHOTRIPSY WITH STENT INSERTION Left 01/10/2018    Procedure:  cysto, left ureteral stent replacement ;  Surgeon: Griffin Romero MD;  Location:  [facility];  Service:     URETEROSCOPY LASER LITHOTRIPSY WITH STENT INSERTION Left 08/14/2019    Procedure: URETEROSCOPY, STONE REMOVAL WITH STENT INSERTION;  Surgeon: Griffin Romero MD;  Location:  [facility];  Service: Urology    URETEROSCOPY LASER  LITHOTRIPSY WITH STENT INSERTION Left 10/23/2019    Procedure: Left URETEROSCOPY WITH STENT INSERTION-LEFT;  Surgeon: Griffin Romero MD;  Location: [facility];  Service: Urology      General Information       Row Name 03/24/25 0948          Physical Therapy Time and Intention    Document Type evaluation  -     Mode of Treatment physical therapy  -       Row Name 03/24/25 0948          General Information    Patient Profile Reviewed yes  -SS     Prior Level of Function independent:;all household mobility;gait;transfer;bed mobility;driving;using stairs  pt reports use of cane or walker PRN, declines any other acute falls  -     Existing Precautions/Restrictions fall;orthostatic hypotension;oxygen therapy device and L/min;other (see comments)  s/p R hip IMN, WBAT  -     Barriers to Rehab medically complex  -       Row Name 03/24/25 0948          Living Environment    Current Living Arrangements home  -     People in Home alone  family nearby to assist as needed  -       Row Name 03/24/25 0948          Home Main Entrance    Number of Stairs, Main Entrance two  -SS     Stair Railings, Main Entrance none  -       Row Name 03/24/25 0948          Stairs Within Home, Primary    Stairs, Within Home, Primary basement - does not need to navigate  -SS       Row Name 03/24/25 0948          Cognition    Orientation Status (Cognition) oriented x 4  -       Row Name 03/24/25 0948          Safety Issues/Impairments Affecting Functional Mobility    Safety Issues Affecting Function (Mobility) awareness of need for assistance;insight into deficits/self-awareness;judgment;positioning of assistive device;problem-solving;safety precaution awareness;safety precautions follow-through/compliance;sequencing abilities  -     Impairments Affecting Function (Mobility) balance;endurance/activity tolerance;pain;postural/trunk control;range of motion (ROM);strength;shortness of breath;motor control  -               User Key   (r) = Recorded By, (t) = Taken By, (c) = Cosigned By      Initials Name Provider Type     Terri Cabrera, PT Physical Therapist                   Mobility       Row Name 03/24/25 1006          Bed Mobility    Bed Mobility scooting/bridging;supine-sit;sit-supine;rolling right;rolling left  -     Rolling Left [place] (Bed Mobility) maximum assist (25% patient effort);2 person assist;verbal cues;nonverbal cues (demo/gesture)  -     Rolling Right [place] (Bed Mobility) maximum assist (25% patient effort);2 person assist;verbal cues;nonverbal cues (demo/gesture)  -SS     Scooting/Bridging [place] (Bed Mobility) verbal cues;nonverbal cues (demo/gesture);maximum assist (25% patient effort);2 person assist  -     Supine-Sit [place] (Bed Mobility) maximum assist (25% patient effort);2 person assist;verbal cues;nonverbal cues (demo/gesture)  -     Sit-Supine [place] (Bed Mobility) dependent (less than 25% patient effort);2 person assist;verbal cues;nonverbal cues (demo/gesture)  -     Assistive Device (Bed Mobility) bed rails;head of bed elevated;repositioning sheet  -     Comment, (Bed Mobility) V/TC for sequencing  -       Row Name 03/24/25 1006          Bed-Chair Transfer    Bed-Chair [place] (Transfers) maximum assist (25% patient effort);2 person assist;verbal cues;nonverbal cues (demo/gesture)  -     Assistive Device (Bed-Chair Transfers) other (see comments)  BUE support  -     Comment, (Bed-Chair Transfer) V/TC for LE set up, hand placement, sequencing; pt. attempted to take steps to chair, RLE demonstrated buckling despite being blocked w/minimal recovery and return to full RLE extension. Pt. returned to sitting EOB w/L lateral lean and c/o feeling dizzy and short of air. Assisted back to bed and notified RN for further management.  -       Row Name 03/24/25 1006          Sit-Stand Transfer    Sit-Stand [place] (Transfers) moderate assist (50% patient effort);2  person assist;verbal cues;nonverbal cues (demo/gesture)  -     Assistive Device (Sit-Stand Transfers) other (see comments)  BUE support  -     Comment, (Sit-Stand Transfer) V/TC for LE set up, hand placement  -       Row Name 03/24/25 1006          Gait/Stairs (Locomotion)    [place] Level (Gait) unable to assess  -       Row Name 03/24/25 1006          Mobility    Extremity Weight-bearing Status right lower extremity  -     Right Lower Extremity (Weight-bearing Status) weight-bearing as tolerated (WBAT)  recommend KI per quad weakness  -               User Key  (r) = Recorded By, (t) = Taken By, (c) = Cosigned By      Initials Name Provider Type     Terri Cabrera PT Physical Therapist                   Obj/Interventions       Row Name 03/24/25 1012          Range of Motion Comprehensive    General Range of Motion bilateral lower extremity ROM WFL  -[facility] Name 03/24/25 1012          Strength Comprehensive (MMT)    Comment, General Manual Muscle Testing (MMT) Assessment LLE gross 4-/5, RLE gross 3+/5, deferred KI initially due to good quad activation; recommend utilizing KI in subsequent sessions secondary to buckling w/limited ability to return to full RLE extension  -       Row Name 03/24/25 1012 03/24/25 1008       Motor Skills    Therapeutic Exercise knee  - knee  -      Row Name 03/24/25 1012 03/24/25 1008       Knee (Therapeutic Exercise)    Knee (Therapeutic Exercise) isometric exercises  - isometric exercises  -    Knee Isometrics (Therapeutic Exercise) right;quad sets;10 repetitions  - right;quad sets;10 repetitions  -      Row Name 03/24/25 1012          Balance    Balance Assessment sitting static balance;sitting dynamic balance;standing static balance;standing dynamic balance  -     Static Sitting Balance contact guard;maximum assist  -     Dynamic Sitting Balance minimal assist;dependent  -     Position, Sitting Balance unsupported;sitting edge of bed  -      Static Standing Balance maximum assist;2-person assist  -SS     Dynamic Standing Balance maximum assist;2-person assist  -SS     Position/Device Used, Standing Balance supported;other (see comments)  BUE support  -     Balance Interventions sitting;standing;sit to stand;supported;static;dynamic  -       Row Name 03/24/25 1012          Sensory Assessment (Somatosensory)    Sensory Assessment (Somatosensory) LE sensation intact  -               User Key  (r) = Recorded By, (t) = Taken By, (c) = Cosigned By      Initials Name Provider Type     Terri Cabrera, PT Physical Therapist                   Goals/Plan       Row Name 03/24/25 1048          Bed Mobility Goal 1 (PT)    Activity/Assistive Device (Bed Mobility Goal 1, PT) bed mobility activities, all  -SS     [place] Level/Cues Needed (Bed Mobility Goal 1, PT) moderate assist (50-74% patient effort)  -SS     Time Frame (Bed Mobility Goal 1, PT) long term goal (LTG);5 days  -       Row Name 03/24/25 1048          Transfer Goal 1 (PT)    Activity/Assistive Device (Transfer Goal 1, PT) sit-to-stand/stand-to-sit;bed-to-chair/chair-to-bed;walker, rolling  -SS     [place] Level/Cues Needed (Transfer Goal 1, PT) moderate assist (50-74% patient effort)  -SS     Time Frame (Transfer Goal 1, PT) long term goal (LTG);5 days  -       Row Name 03/24/25 1048          Gait Training Goal 1 (PT)    Activity/Assistive Device (Gait Training Goal 1, PT) gait (walking locomotion);assistive device use;walker, rolling  -SS     [place] Level (Gait Training Goal 1, PT) moderate assist (50-74% patient effort)  -SS     Distance (Gait Training Goal 1, PT) 10  -SS     Time Frame (Gait Training Goal 1, PT) long term goal (LTG);10 days  -       Row Name 03/24/25 1048          Balance Goal 1 (PT)    Activity/Assistive Device (Balance Goal) standing static balance;standing dynamic balance;walker, rolling  -SS     [place] Level/Cues Needed (Balance Goal 1, PT)  modified independence  -     Time Frame (Balance Goal 1, PT) long-term goal (LTG);1 week  -       Row Name 03/24/25 1048          Therapy Assessment/Plan (PT)    Planned Therapy Interventions (PT) balance training;bed mobility training;gait training;home exercise program;neuromuscular re-education;patient/family education;postural re-education;motor coordination training;ROM (range of motion);stair training;strengthening;stretching;transfer training;wheelchair management/propulsion training  -               User Key  (r) = Recorded By, (t) = Taken By, (c) = Cosigned By      Initials Name Provider Type     Terri Cabrera, PT Physical Therapist                   Clinical Impression       Row Name 03/24/25 1040          Pain    Pretreatment Pain Rating 6/10  -SS     Posttreatment Pain Rating 8/10  -SS     Pain Location hip  -     Pain Side/Orientation right;generalized  -     Pain Management Interventions activity modification encouraged;exercise or physical activity utilized;movement retraining implemented;complementary health approaches promoted;nursing notified  -     Response to Pain Interventions activity participation with increased pain;nonverbal indicators present  -       Row Name 03/24/25 1040          Plan of Care Review    Plan of Care Reviewed With patient;family  -     Outcome Evaluation Pt. presents below baseline function w/generalized weakness, balance deficits and decreased functional endurance affecting his ability to safely participate in functional mobility. He performed bed mobility w/max assist of 2 and transfers w/mod-max assist of 2. Upon standing, pt. RLE demonstrated buckling w/limited ability to return to full extension, therefore, pt. returned to sitting EOB with c/o dizziness and shortness of air. Pt. demonstrated delayed response w/strong L lateral lean. Pt. returned to supine w/BP drop to 92/47 and O2 desat to 78% on 6LNC w/return to >90% w/in ~2 minutes. RN notified  and assummed further management. Pt. would benefit from acute PT services to address stated deficits. Recommend SNF when medically appropriate.  -       Row Name 03/24/25 1040          Therapy Assessment/Plan (PT)    Patient/Family Therapy Goals Statement (PT) to get stronger  -     Rehab Potential (PT) fair  -SS     Criteria for Skilled Interventions Met (PT) yes;meets criteria;skilled treatment is necessary  -     Therapy Frequency (PT) daily  -     Predicted Duration of Therapy Intervention (PT) 10 days  -       Row Name 03/24/25 1040          Vital Signs    Pre Systolic BP Rehab 104  -SS     Pre Treatment Diastolic BP 77  -SS     Intra Systolic BP Rehab 92  -SS     Intra Treatment Diastolic BP 47  -SS     Post Systolic BP Rehab 113  -SS     Post Treatment Diastolic BP 71  -SS     Pretreatment Heart Rate (beats/min) 77  -SS     Intratreatment Heart Rate (beats/min) 114  -SS     Posttreatment Heart Rate (beats/min) 109  -SS     Pre SpO2 (%) 94  -SS     O2 Delivery Pre Treatment nasal cannula  6L  -SS     Intra SpO2 (%) 78  RN notified and deferred increasing O2 supply as pt. has a lengthy recovery period  -     O2 Delivery Intra Treatment nasal cannula  6L  -SS     Post SpO2 (%) 94  -SS     O2 Delivery Post Treatment nasal cannula  6L  -SS     Pre Patient Position Supine  -SS     Intra Patient Position Sitting  -SS     Post Patient Position Supine  -SS       Row Name 03/24/25 1040          Positioning and Restraints    Pre-Treatment Position in bed  -SS     Post Treatment Position bed  -SS     In Bed notified nsg;fowlers;with nsg;with family/caregiver;patient within staff view  RN staff present and assessing patient  -SS               User Key  (r) = Recorded By, (t) = Taken By, (c) = Cosigned By      Initials Name Provider Type    SS Terri Cabrera, PT Physical Therapist                   Outcome Measures       Row Name 03/24/25 1049          How much help from another person do you currently need...     Turning from your back to your side while in flat bed without using bedrails? 2  -SS     Moving from lying on back to sitting on the side of a flat bed without bedrails? 2  -SS     Moving to and from a bed to a chair (including a wheelchair)? 1  -SS     Standing up from a chair using your arms (e.g., wheelchair, bedside chair)? 2  -SS     Climbing 3-5 steps with a railing? 1  -SS     To walk in hospital room? 1  -SS     AM-PAC 6 Clicks Score (PT) 9  -SS     Highest Level of Mobility Goal 3 --> Sit at edge of bed  -SS       Row Name 03/24/25 1049          PADD    Diagnosis 2  -SS     Gender 2  -SS     Age Group 0  -SS     Gait Distance 0  -SS     Assist Level 0  -SS     Home Support 0  -SS     PADD Score 4  -SS     Patient Preference extended rehabilitation  -SS     Prediction by PADD Score extended rehabilitation  -       Row Name 03/24/25 1049 03/24/25 1021       Functional Assessment    Outcome Measure Options AM-PAC 6 Clicks Basic Mobility (PT);PADD  -SS AM-PAC 6 Clicks Daily Activity (OT)  -AR              User Key  (r) = Recorded By, (t) = Taken By, (c) = Cosigned By      Initials Name Provider Type    Aarti Mackey, OT Occupational Therapist    Terri Webber, PT Physical Therapist                                 Physical Therapy Education       Title: PT OT SLP Therapies (Done)       Topic: Physical Therapy (Done)       Point: Mobility training (Done)       Learning Progress Summary            Patient Eager, E, VU,DU,NR by  at 3/24/2025 1050    Comment: Educated pt. safety/technique w/bed mobility, transfers, HEP, PT POC   Family Eager, E, VU,DU,NR by  at 3/24/2025 1050    Comment: Educated pt. safety/technique w/bed mobility, transfers, HEP, PT POC                      Point: Home exercise program (Done)       Learning Progress Summary            Patient Eager, E, VU,DU,NR by  at 3/24/2025 1050    Comment: Educated pt. safety/technique w/bed mobility, transfers, HEP, PT POC   Family  Eager, E, VU,DU,NR by  at 3/24/2025 1050    Comment: Educated pt. safety/technique w/bed mobility, transfers, HEP, PT POC                      Point: Body mechanics (Done)       Learning Progress Summary            Patient Eager, E, VU,DU,NR by  at 3/24/2025 1050    Comment: Educated pt. safety/technique w/bed mobility, transfers, HEP, PT POC   Family Eager, E, VU,DU,NR by  at 3/24/2025 1050    Comment: Educated pt. safety/technique w/bed mobility, transfers, HEP, PT POC                      Point: Precautions (Done)       Learning Progress Summary            Patient Eager, E, VU,DU,NR by  at 3/24/2025 1050    Comment: Educated pt. safety/technique w/bed mobility, transfers, HEP, PT POC   Family Eager, E, VU,DU,NR by  at 3/24/2025 1050    Comment: Educated pt. safety/technique w/bed mobility, transfers, HEP, PT POC                                      User Key       Initials Effective Dates Name Provider Type Discipline     06/01/21 -  Terri Cabrera, PT Physical Therapist PT                  PT Recommendation and Plan  Planned Therapy Interventions (PT): balance training, bed mobility training, gait training, home exercise program, neuromuscular re-education, patient/family education, postural re-education, motor coordination training, ROM (range of motion), stair training, strengthening, stretching, transfer training, wheelchair management/propulsion training  Outcome Evaluation: Pt. presents below baseline function w/generalized weakness, balance deficits and decreased functional endurance affecting his ability to safely participate in functional mobility. He performed bed mobility w/max assist of 2 and transfers w/mod-max assist of 2. Upon standing, pt. RLE demonstrated buckling w/limited ability to return to full extension, therefore, pt. returned to sitting EOB with c/o dizziness and shortness of air. Pt. demonstrated delayed response w/strong L lateral lean. Pt. returned to supine w/BP drop to  92/47 and O2 desat to 78% on 6LNC w/return to >90% w/in ~2 minutes. RN notified and assummed further management. Pt. would benefit from acute PT services to address stated deficits. Recommend SNF when medically appropriate.     Time Calculation:   PT Evaluation Complexity  History, PT Evaluation Complexity: 3 or more personal factors and/or comorbidities  Examination of Body Systems (PT Eval Complexity): total of 4 or more elements  Clinical Presentation (PT Evaluation Complexity): evolving  Clinical Decision Making (PT Evaluation Complexity): moderate complexity  Overall Complexity (PT Evaluation Complexity): moderate complexity     PT Charges       Row Name 03/24/25 1051             Time Calculation    Start Time 0831  -SS      PT Received On 03/24/25  -SS      PT Goal Re-Cert Due Date 04/03/25  -SS         Untimed Charges    PT Eval/Re-eval Minutes 52  -SS         Total Minutes    Untimed Charges Total Minutes 52  -SS       Total Minutes 52  -SS                User Key  (r) = Recorded By, (t) = Taken By, (c) = Cosigned By      Initials Name Provider Type     Terri Cabrera, PT Physical Therapist                  Therapy Charges for Today       Code Description Service Date Service Provider Modifiers Qty    [id number] HC PT EVAL MOD COMPLEXITY 4 3/24/2025 Terri Cabrera, PT GP 1            PT G-Codes  Outcome Measure Options: AM-PAC 6 Clicks Basic Mobility (PT), PADD  AM-PAC 6 Clicks Score (PT): 9  AM-PAC 6 Clicks Score (OT): 12  PT Discharge Summary  Anticipated Discharge Disposition (PT): skilled nursing facility    Terri Cabrera PT  3/24/2025

## 2025-03-24 NOTE — PROGRESS NOTES
[facility] Medicine Services  PROGRESS NOTE    Patient Name: Forrest Zepeda  : 1932  MRN: [id number]    Date of Admission: 3/21/2025  Primary Care Physician: Kary Wen MD    Subjective   Subjective     CC:  Hip pain     HPI:  Doing okay today, but a little confused per my assessment and family's.       Objective   Objective     Vital Signs:   Temp:  [97.9 °F (36.6 °C)-98.1 °F (36.7 °C)] 98.1 °F (36.7 °C)  Heart Rate:  [] 101  Resp:  [16-18] 16  BP: ()/(47-73) 94/67  Flow (L/min) (Oxygen Therapy):  [4-6] 6     Physical Exam:  Constitutional: No acute distress, awake, alert  HENT: NCAT, mucous membranes moist  Respiratory: Clear to auscultation bilaterally, respiratory effort normal on 6L   Cardiovascular: RRR, no murmurs, rubs, or gallops  Gastrointestinal: Positive bowel sounds, soft, nontender, nondistended  Musculoskeletal: No bilateral ankle edema  Psychiatric: Appropriate affect, cooperative  Neurologic: Oriented but confused at times, strength symmetric in all extremities, Cranial Nerves grossly intact to confrontation, speech clear  Skin: No rashes     Results Reviewed:  LAB RESULTS:      Lab 25  0457 25  0625  1525   WBC 10.22 14.56* 15.92*   HEMOGLOBIN 8.8* 10.7* 13.7   HEMATOCRIT 28.3* 34.8* 43.1   PLATELETS 117* 146 193   NEUTROS ABS 8.22* 9.61* 14.07*   IMMATURE GRANS (ABS) 0.04 0.09* 0.11*   LYMPHS ABS 1.03 3.04 1.04   MONOS ABS 0.87 1.01* 0.54   EOS ABS 0.03 0.72* 0.10   MCV 97.9* 98.3* 94.5   PROCALCITONIN  --  0.45*  --          Lab 25  0738 25  0457 25  0623 25  1525   SODIUM  --  137 136 142   POTASSIUM 5.3* 6.1* 4.8 5.7*   CHLORIDE  --  107 106 106   CO2  --  16.0* 18.0* 21.0*   ANION GAP  --  14.0 12.0 15.0   BUN  --  59* 52* 52*   CREATININE  --  2.13* 2.16* 2.02*   EGFR  --  28.5* 28.0* 30.4*   GLUCOSE  --  165* 135* 258*   CALCIUM  --  8.6 8.6 10.0*   MAGNESIUM  --   --  1.8  --    HEMOGLOBIN  A1C  --   --  6.90*  --    TSH  --   --  1.740  --          Lab 03/22/25  0623 03/21/25  1525   TOTAL PROTEIN 6.1 7.7   ALBUMIN 3.1* 4.0   GLOBULIN 3.0 3.7   ALT (SGPT) 10 13   AST (SGOT) 19 24   BILIRUBIN 0.5 0.6   ALK PHOS 79 108         Lab 03/22/25  0623   PROBNP 573.4             Lab 03/21/25  1526   ABO TYPING O   RH TYPING Negative   ANTIBODY SCREEN Negative         Brief Urine Lab Results  (Last result in the past 365 days)        Color   Clarity   Blood   Leuk Est   Nitrite   Protein   CREAT   Urine HCG        03/21/25 1618 Yellow   Turbid   Large (3+)   Large (3+)   Negative   100 mg/dL (2+)                   Microbiology Results Abnormal       None            FL C Arm During Surgery  Result Date: 3/23/2025  This procedure was auto-finalized with no dictation required.    RIGHT FI SS  Result Date: 3/23/2025  Brodie Perkins CRNA     3/23/2025  8:46 AM RIGHT FI SS Patient reassessed immediately prior to procedure Start time: 3/23/2025 8:24 AM Reason for block: at surgeon's request and post-op pain management Performed by CRNA/CAA: Brodie Perkins CRNA Preanesthetic Checklist Completed: patient identified, IV checked, site marked, risks and benefits discussed, surgical consent, monitors and equipment checked, pre-op evaluation and timeout performed Prep: Pt Position: supine Sterile barriers:cap, gloves, mask and washed/disinfected hands Prep: ChloraPrep Patient monitoring: blood pressure monitoring, continuous pulse oximetry and EKG Procedure Sedation: no Performed under: general Guidance:ultrasound guided ULTRASOUND INTERPRETATION.  Using ultrasound guidance a 20 G gauge needle was placed in close proximity to the nerve, at which point, under ultrasound guidance anesthetic was injected in the area of the nerve and spread of the anesthesia was seen on ultrasound in close proximity thereto.  There were no abnormalities seen on ultrasound; a digital image was taken; and the patient tolerated the procedure with no  "complications. Images:still images obtained, printed/placed on chart Laterality:right Block Type:fascia iliaca compartment Injection Technique:single-shot Needle Type:echogenic and Tuohy Needle Gauge:18 G Resistance on Injection: none Catheter Size:20 G (20g) Medications Used: ropivacaine (NAROPIN) 0.5 % injection - Injection  30 mL - 3/23/2025 8:24:00 AM Medications Preservative Free Saline:30ml Post Assessment Injection Assessment: negative aspiration for heme, no paresthesia on injection and incremental injection Patient Tolerance:comfortable throughout block Complications:no Additional Notes CKAFASCIAILIACA: SINGLE shot A high-frequency linear transducer, with sterile cover, was placed in parasagittal plane on top of the Anterior Superior Iliac Spine (ASIS) and moved medially to identify the Internal Oblique muscle, Sartorius muscle, Iliacus Muscle, Fascia Iliaca (FI) and Fascia Latae. The insertion site was prepped and draped in sterile fashion. Skin and cutaneous tissue was infiltrated with 2-5 ml of 1% Lidocaine. Using ultrasound-guidance, a 20-gauge B-Issa 4\" Ultraplex 360 non-stimulating echogenic needle was advanced in plane from caudad to cephalad. Preservative-free normal saline was utilized for hydro-dissection of tissue, advancement of needle, and to confirm final needle placement below FI. Local anesthetic in incremental 3-5 ml injections. Aspiration every 5 ml to prevent intravascular injection. Injection was completed with negative aspiration of blood and negative intravascular injection. Injection pressures were normal with minimal resistance. Performed by: Brodie Perkins CRNA      Results for orders placed in visit on 11/13/23    Adult Transthoracic Echo Complete W/ Cont if Necessary Per Protocol    Interpretation Summary    Left ventricular systolic function is normal. Estimated left ventricular EF = 52% Left ventricular ejection fraction appears to be 51 - 55%.    Left ventricular wall thickness " is consistent with mild concentric hypertrophy.    Left ventricular diastolic function is consistent with (grade I) impaired relaxation.    The right ventricular cavity is mildly dilated.    The left atrial cavity is mildly dilated.    Left atrial volume is moderately increased.    There is mild calcification of the aortic valve mainly affecting the non-coronary, left coronary and right coronary cusp(s).    Estimated right ventricular systolic pressure from tricuspid regurgitation is normal (<35 mmHg).      Current medications:  Scheduled Meds:apixaban, 2.5 mg, Oral, BID  aspirin, 81 mg, Oral, Daily  bisoprolol, 5 mg, Oral, Daily  cefTRIAXone, 1,000 mg, Intravenous, Q24H  doxycycline, 100 mg, Oral, Q12H  finasteride, 5 mg, Oral, Daily  insulin glargine, 10 Units, Subcutaneous, Nightly  insulin lispro, 2-9 Units, Subcutaneous, 4x Daily AC & at Bedtime  levothyroxine, 50 mcg, Oral, Q AM  oxybutynin XL, 10 mg, Oral, Daily  sodium chloride, 10 mL, Intravenous, Q12H  sodium chloride, 3 mL, Intravenous, Q12H      Continuous Infusions:sodium chloride, 100 mL/hr, Last Rate: Stopped (03/24/25 0557)      PRN Meds:.  acetaminophen **OR** acetaminophen **OR** acetaminophen    senna-docusate sodium **AND** polyethylene glycol **AND** bisacodyl **AND** bisacodyl    Calcium Replacement - Follow Nurse / BPA Driven Protocol    dextrose    dextrose    docusate sodium    glucagon (human recombinant)    HYDROcodone-acetaminophen    ipratropium-albuterol    Magnesium Low Dose Replacement - Follow Nurse / BPA Driven Protocol    Morphine    nitroglycerin    ondansetron ODT **OR** ondansetron    Phosphorus Replacement - Follow Nurse / BPA Driven Protocol    Potassium Replacement - Follow Nurse / BPA Driven Protocol    sodium chloride    sodium chloride    sodium chloride    sodium chloride    Assessment & Plan   Assessment & Plan     Active Hospital Problems    Diagnosis  POA    **Closed right hip fracture [S72.001A]  Yes     Thrombocytopenia [D69.6]  Unknown     Priority: Medium    OCTAVIA (acute kidney injury) [N17.9]  Yes    T2DM (type 2 diabetes mellitus) [E11.9]  Yes    Leukocytosis [D72.829]  Yes    Hyperkalemia [E87.5]  Yes      Resolved Hospital Problems   No resolved problems to display.        Brief Hospital Course to date:  Forrest Zepeda is a 89 y.o. male with past medical history significant for interstitial lung disease, saddle PE, BPH, HFpEF, chronic hydronephrosis d/t urinary stricture, CKD 3, T2DM, hypothyroidism, and PAF who presented to [facility] due to fall with severe right hip pain.      Right hip fracture  -Pelvic x-ray revealed intertrochanteric right femur fracture  -Orthopedic surgeon Dr. Baltazar has seen, s/p repair on 3/23  -- post-op ABX for 24 hours per Dr. Baltazar  -- WBAT per Ortho  -- resume Eliquis today per Ortho  -Tylenol, Norco, morphine as needed for pain  -Zofran as needed for nausea  -AM labs     Abnormal UA  Leukocytosis  -Urine culture with no growth   -Started on empiric Rocephin     OCTAVIA on CKD III  Chronic hydronephrosis  -Follows with urologist Dr. Pitts   -Baseline Cr ~1.5  -Creatinine 2.02 on presentation, slightly better today at 2.13  -s/p 1L NS bolus in ED and then NS at 100 ml/hr x 10 hours  -avoid nephrotoxins as able  -AM labs    TCP  -- new today, likely post-op related. Monitor    Acute blood loss anemia  -- Hgb was 13.7 on admission, Hgb 8.8 today, likely due to perioperative blood loss  -- monitor and transfuse PRN HgB<7     Hyperkalemia  -Potassium 5.7 on presentation, given Lokelma and had improved, however, labs this am showed K+ of 6.7 with no hemolysis, but repeat labs (without intervention) showed K+ of 5.3.  EKG without peaked T waves  -- repeat Lokelma today and repeat labs in am.      PAF  HFpEF  -- resume Eliquis  -- continue rate controlling meds, Bisoprolol   --appears euvolemic s/p IVFs as noted above. Hold diuretics. proBNP wnl when checked overnight on night of admission (due to  hypoxia).    Acute on chronic hypoxia  PNA  ILD  -- pt is supposed to be on 2L at baseline but is noncompliant, was up to 6L BNC  -- CXR with patchy appearance bilaterally likely c/w PNA.  Continue ABX as mentioned above. Added on Doxycycline 3/23  -- urine strep and legionella Ags, MRSA PCR, full respiratory viral panel all NEGATIVE   -- encourage incentive spirometry  -- wean O2 as able (now down to 4L)    Hx PE/DVT  -Eliquis has been on hold for planned procedure, last dose AM of 3/21  -- resume Eliquis today     T2DM  -hold home glimepiride, Januvia, Toujeo  -Lantus 10 units at HS  -Mod dose SSI  -Hgb A1C 6.9%     BPH  -continue Proscar     Hypothyroidism  -Synthroid     Total time spent: Time Spent: Time Spent: 35 minutes  Time spent includes time reviewing chart, face-to-face time, counseling patient/family/caregiver, ordering medications/tests/procedures, communicating with other health care professionals, documenting clinical information in the electronic health record, and coordination of care.      Expected Discharge Location and Transportation: likely will need rehab, PT/OT to assess today    Expected Discharge   Expected Discharge Date: 3/26/2025; Expected Discharge Time:      VTE Prophylaxis:  Pharmacologic & mechanical VTE prophylaxis orders are present.         AM-PAC 6 Clicks Score (PT): 9 (03/24/25 1049)    CODE STATUS:   Code Status and Medical Interventions: No CPR (Do Not Attempt to Resuscitate); Limited Support; No intubation (DNI)   Ordered at: 03/21/25 2579     Code Status (Patient has no pulse and is not breathing):    No CPR (Do Not Attempt to Resuscitate)     Medical Interventions (Patient has pulse or is breathing):    Limited Support     Medical Intervention Limits:    No intubation (DNI)     Level Of Support Discussed With:    Patient       Aleah Burt MD  03/24/25

## 2025-03-24 NOTE — PLAN OF CARE
Goal Outcome Evaluation:  Plan of Care Reviewed With: patient, family           Outcome Evaluation: Pt. presents below baseline function w/generalized weakness, balance deficits and decreased functional endurance affecting his ability to safely participate in functional mobility. He performed bed mobility w/max assist of 2 and transfers w/mod-max assist of 2. Upon standing, pt. RLE demonstrated buckling w/limited ability to return to full extension, therefore, pt. returned to sitting EOB with c/o dizziness and shortness of air. Pt. demonstrated delayed response w/strong L lateral lean. Pt. returned to supine w/BP drop to 92/47 and O2 desat to 78% on 6LNC w/return to >90% w/in ~2 minutes. RN notified and assummed further management. Pt. would benefit from acute PT services to address stated deficits. Recommend SNF when medically appropriate.    Anticipated Discharge Disposition (PT): skilled nursing facility

## 2025-03-24 NOTE — CASE MANAGEMENT/SOCIAL WORK
Discharge Planning Assessment  [facility]     Patient Name: Forrest Zepeda  MRN: [id number]  Today's Date: 3/24/2025    Admit Date: 3/21/2025    Plan: IDP   Discharge Needs Assessment       Row Name 03/24/25 1520       Living Environment    People in Home alone    Current Living Arrangements home    Potentially Unsafe Housing Conditions none    In the past 12 months has the electric, gas, oil, or water company threatened to shut off services in your home? No    Primary Care Provided by self    Provides Primary Care For no one    Family Caregiver if Needed child(shital), adult;grandchild(shital), adult    Family Caregiver Names Eron and Erika (POA)    Quality of Family Relationships supportive;involved;helpful    Able to Return to Prior Arrangements yes       Resource/Environmental Concerns    Resource/Environmental Concerns none    Transportation Concerns none       Transportation Needs    In the past 12 months, has lack of transportation kept you from medical appointments or from getting medications? no    In the past 12 months, has lack of transportation kept you from meetings, work, or from getting things needed for daily living? No       Transition Planning    Patient/Family Anticipates Transition to inpatient rehabilitation facility    Patient/Family Anticipated Services at Transition none    Transportation Anticipated family or friend will provide       Discharge Needs Assessment    Readmission Within the Last 30 Days no previous admission in last 30 days    Equipment Currently Used at Home walker, standard;cane, straight;cane, quad;glucometer    Concerns to be Addressed no discharge needs identified;denies needs/concerns at this time    Do you want help finding or keeping work or a job? I do not need or want help    Do you want help with school or training? For example, starting or completing job training or getting a high school diploma, GED or equivalent No    Anticipated Changes Related to Illness none     Equipment Needed After Discharge none                   Discharge Plan       Row Name 03/24/25 1521       Plan    Plan IDP    Patient/Family in Agreement with Plan yes    Plan Comments Spoke with pt and SNL at bedside for IDP. Pt lives in [facility] in [facility] home alone. Not required to go into basement. IADL. Pt has RW, cane, bath bench and O2 @ 2L that he doesnt use and unsure of DME company. No HH/OPPT. PCP Tabithat Behzad. [employer] Adv with scripts filled at [facility]. After completing IDP recieved call from Erika-granddaughter what states she is an RN and that her and her father have medical POA of pt. Discussed with her the Pt choice list that I left at bedside with referrals given per discussion. Returned call to Erika to inform her that [facility] would like to attempt acute rehab for pt and she stated that she did not feel he could participte for that length of time. CM will cont to follow                  Continued Care and Services - Admitted Since 3/21/2025       Destination       Service Provider Request Status Services Address Phone Fax Patient Preferred    [facility] - [facility] Sent [facility] [address] [address] [phone] --    [facility] - Request Sent [facility] [address] [facility] [address] [phone] --    [facility] [address] [facility] [address] [phone] --    [facility] Pending - No Request Sent -- [address] [facility] [address] [phone] --                  Expected Discharge Date and Time       Expected Discharge Date Expected Discharge Time    Mar 26, 2025            Demographic Summary       Row Name 03/24/25 1518       General Information    Admission Type inpatient    Arrived From emergency department    Referral Source emergency department    Reason for  Consult discharge planning    Preferred Language English                   Functional Status       Row Name 03/24/25 1519       Functional Status    Usual Activity Tolerance moderate    Current Activity Tolerance fair       Physical Activity    On average, how many days per week do you engage in moderate to strenuous exercise (like a brisk walk)? 0 days    On average, how many minutes do you engage in exercise at this level? 0 min    Number of minutes of exercise per week 0       Functional Status, IADL    Medications independent    Meal Preparation independent    Housekeeping independent    Laundry independent    Shopping independent    If for any reason you need help with day-to-day activities such as bathing, preparing meals, shopping, managing finances, etc., do you get the help you need? I get all the help I need                   Psychosocial    No documentation.                  Abuse/Neglect    No documentation.                  Legal    No documentation.                  Substance Abuse    No documentation.                  Patient Forms    No documentation.                     Sushila Kennedy RN

## 2025-03-24 NOTE — PROGRESS NOTES
"          Orthopaedic Surgery Progress Note    LOS: 3 days   Patient Care Team:  Kary Wen MD as PCP - General (Family Medicine)  Yifan, Win CROOKS MD as Consulting Physician (Cardiology)  Deanne Pitts MD as Consulting Physician (Urology)  1 Day Post-Op   Procedure(s):  RIGHT HIP TROCHANTERIC NAILING  Subjective     Interval History:   Resting comfortably in bed.  States having some hip pain but well-controlled with medication.  No other complaints.    Objective     Vital Signs:  Temp (24hrs), Av °F (36.7 °C), Min:97.8 °F (36.6 °C), Max:98.2 °F (36.8 °C)    /62 (BP Location: Right arm, Patient Position: Lying)   Pulse 88   Temp 98.1 °F (36.7 °C) (Oral)   Resp 18   Ht 175.3 cm (69\")   Wt 83.9 kg (185 lb)   SpO2 93%   BMI 27.32 kg/m²   Body mass index is 27.32 kg/m².    Labs:  Lab Results (last 24 hours)       Procedure Component Value Units Date/Time    Potassium [[id number]] Collected: 25 0738    Specimen: Blood Updated: 25 0753    POC Glucose Once [[id number]]  (Abnormal) Collected: 25 0739    Specimen: Blood Updated: 25 0741     Glucose 145 mg/dL     Basic Metabolic Panel [[id number]]  (Abnormal) Collected: 25 0457    Specimen: Blood Updated: 25 0646     Glucose 165 mg/dL      BUN 59 mg/dL      Creatinine 2.13 mg/dL      Sodium 137 mmol/L      Potassium 6.1 mmol/L      Chloride 107 mmol/L      CO2 16.0 mmol/L      Calcium 8.6 mg/dL      BUN/Creatinine Ratio 27.7     Anion Gap 14.0 mmol/L      eGFR 28.5 mL/min/1.73     Narrative:      GFR Categories in Chronic Kidney Disease (CKD)      GFR Category          GFR (mL/min/1.73)    Interpretation  G1                     90 or greater         Normal or high (1)  G2                      60-89                Mild decrease (1)  G3a                   45-59                Mild to moderate decrease  G3b                   30-44                Moderate to severe decrease  G4                    15-29                " Severe decrease  G5                    14 or less           Kidney failure          (1)In the absence of evidence of kidney disease, neither GFR category G1 or G2 fulfill the criteria for CKD.    eGFR calculation 2021 CKD-EPI creatinine equation, which does not include race as a factor    CBC & Differential [[id number]]  (Abnormal) Collected: 03/24/25 0457    Specimen: Blood Updated: 03/24/25 0515    Narrative:      The following orders were created for panel order CBC & Differential.  Procedure                               Abnormality         Status                     ---------                               -----------         ------                     CBC Auto Differential[[id number]]        Abnormal            Final result                 Please view results for these tests on the individual orders.    CBC Auto Differential [[id number]]  (Abnormal) Collected: 03/24/25 0457    Specimen: Blood Updated: 03/24/25 0515     WBC 10.22 10*3/mm3      RBC 2.89 10*6/mm3      Hemoglobin 8.8 g/dL      Hematocrit 28.3 %      MCV 97.9 fL      MCH 30.4 pg      MCHC 31.1 g/dL      RDW 16.1 %      RDW-SD 57.5 fl      MPV 10.5 fL      Platelets 117 10*3/mm3      Neutrophil % 80.4 %      Lymphocyte % 10.1 %      Monocyte % 8.5 %      Eosinophil % 0.3 %      Basophil % 0.3 %      Immature Grans % 0.4 %      Neutrophils, Absolute 8.22 10*3/mm3      Lymphocytes, Absolute 1.03 10*3/mm3      Monocytes, Absolute 0.87 10*3/mm3      Eosinophils, Absolute 0.03 10*3/mm3      Basophils, Absolute 0.03 10*3/mm3      Immature Grans, Absolute 0.04 10*3/mm3      nRBC 0.2 /100 WBC     POC Glucose Once [[id number]]  (Abnormal) Collected: 03/23/25 1922    Specimen: Blood Updated: 03/23/25 1928     Glucose 332 mg/dL     POC Glucose Once [[id number]]  (Abnormal) Collected: 03/23/25 1620    Specimen: Blood Updated: 03/23/25 1621     Glucose 287 mg/dL     Blood Culture - Blood, Hand, Right [[id number]]  (Normal) Collected: 03/22/25 1245    Specimen: Blood  from Hand, Right Updated: 03/23/25 1316     Blood Culture No growth at 24 hours    Narrative:      Less than seven (7) mL's of blood was collected.  Insufficient quantity may yield false negative results.    Blood Culture - Blood, Hand, Left [[id number]]  (Normal) Collected: 03/22/25 1249    Specimen: Blood from Hand, Left Updated: 03/23/25 1316     Blood Culture No growth at 24 hours    Narrative:      Less than seven (7) mL's of blood was collected.  Insufficient quantity may yield false negative results.    POC Glucose Once [[id number]]  (Abnormal) Collected: 03/23/25 1122    Specimen: Blood Updated: 03/23/25 1126     Glucose 189 mg/dL     Urine Culture - Urine, Urine, Catheter [[id number]]  (Normal) Collected: 03/21/25 1618    Specimen: Urine, Catheter Updated: 03/23/25 1054     Urine Culture No growth            Physical Exam:  right LE: Covaderm dressing in place, clean dry and intact with no spotting  compartments soft/compressible leg and thigh   Actively wiggling toes as well as dorsiflexing and plantar flexing ankle and knee   SILT in foot in all distributions   Palpable PT pulse, CR brisk in all toes    Assessment/Plan:  1 Day Post-Op status post:  Procedure(s):  RIGHT HIP TROCHANTERIC NAILING    -Anemia, acute blood loss anemia following hip fracture  -Weight bearing as tolerated with assistance and support devices (walker, wheelchair) as needed  -Antibiotics - complete 24 hour postoperative course of IV antibioitics  -Advance diet as tolerated, diet per primary  -Continue Covaderm dressing, change of saturated  -PT/OT Consult - Weight bearing as tolerated  -Ice hip  -DVT Prophylaxis - I recommend resuming Eliquis today 3/24  -Osteoporotic hip fracture - I recommend informing the patient's PCP regarding osteoporotic fracture/consideration of enrolling patient in osteoporotic care program postoperatively  -Follow-up, upon discharge, patient will need follow-up with me in the clinic in 2-3 weeks' time.   Continue DVT ppx for 30 days.  Continue covaderm dressing for 1 week after surgery and then remove and replace with a covaderm or dry dressing every other day.  Keep incision covered at all times.  DC staples on or around 2-3 weeks. The  will need to call my office/[facility] to arrange for a discharge follow-up appointment in 2-3 weeks.     Brodie Baltazar MD  03/24/25  07:55 EDT

## 2025-03-24 NOTE — THERAPY EVALUATION
Patient Name: Forrest Zepeda  : 1932    MRN: [id number]                              Today's Date: 3/24/2025       Admit Date: 3/21/2025    Visit Dx:     ICD-10-CM ICD-9-CM   1. Closed nondisplaced intertrochanteric fracture of right femur, initial encounter  S72.144A 820.21   2. Acute cystitis with hematuria  N30.01 595.0   3. Acute kidney injury  N17.9 584.9   4. Anticoagulated  Z79.01 V58.61   5. Interstitial lung disease  J84.9 515   6. Closed fracture of right hip, initial encounter  S72.001A 820.8     Patient Active Problem List   Diagnosis    Essential hypertension    Hyperkalemia    Generalized osteoarthritis    Diabetic neuropathy    Gout    HLD (hyperlipidemia)    Acquired hypothyroidism    Leukocytosis    Ureteral stricture    Hydronephrosis of left kidney    CKD (chronic kidney disease) stage 3, GFR 30-59 ml/min    LEHMAN (dyspnea on exertion)    Pulmonary fibrosis    Symptomatic bradycardia    Hypoxemia requiring supplemental oxygen    History of pulmonary embolism    Sinus node dysfunction    Chronic fatigue    Urethral stricture    Paroxysmal atrial fibrillation    Type 2 diabetes mellitus with hyperglycemia, with long-term current use of insulin    Abnormal cardiovascular stress test    Pulmonary hypertension    Cardiac pacemaker in situ    CAD (coronary artery disease)    Diastolic congestive heart failure    Abnormal SPEP    Hydronephrosis, left    Gross hematuria    Chronic heart failure with preserved ejection fraction (HFpEF)    Vasovagal syncope    Hematuria    Acute UTI (urinary tract infection)    Acute kidney injury superimposed on chronic kidney disease    Anemia, chronic disease    T2DM (type 2 diabetes mellitus)    Generalized weakness    Closed right hip fracture    OCTAVIA (acute kidney injury)    Thrombocytopenia     Past Medical History:   Diagnosis Date    Abnormal EKG     Abnormal PSA     Reported abnormal    Acute cystitis with hematuria 2023    Acute deep vein thrombosis  "(DVT) of right lower extremity 08/22/2019    Acute diverticulitis 2019    Acute hyperkalemia 08/22/2019    Acute respiratory failure with hypoxia     Acute saddle pulmonary embolism with acute cor pulmonale 08/22/2019    Acute systolic right heart failure 08/22/2019    Acute UTI (urinary tract infection) 10/9/2023    Arthritis     KNEES,  BUT SINCE HAD REPLACEMENT    Benign localized hyperplasia of prostate     Bilateral knee pain     C. difficile diarrhea 2010    Cataracts, bilateral     CKD (chronic kidney disease)     Coronary artery disease     Diabetic neuropathy     Diastolic dysfunction     Disease of thyroid gland     HYPOTHYROIDISM    Diverticulitis     Dyslipidemia     H/O    Erectile dysfunction     Family history of malignant hyperthermia     Brother     Full dentures     Generalized weakness     History of ESBL E. coli infection     most recent 4-2022 f/u with ID Dr Johnson    History of gout     History of hepatitis A vaccination     History of nephrolithiasis     History of nuclear stress test     STATES IN THE 1990'S.  \"IT WAS OK\"    History of osteopenia     History of recurrent UTI (urinary tract infection)     Hydronephrosis of left kidney 07/18/2019    Hydronephrosis with renal and ureteral calculus obstruction 10/21/2019    Added automatically from request for surgery [id number]    Hydronephrosis with ureteral stricture     Hypercholesterolemia     Hyperkalemia     PT UNSURE REGARDING HX OF THIS    Hyperlipidemia     Hypertension     Hypothyroidism     Impaired functional mobility, balance, gait, and endurance     Insomnia     IPF (idiopathic pulmonary fibrosis)     per patient and son, dx at [facility], was told no treatment necessary, not to worry about it    On supplemental oxygen therapy     2L NC QHS    Other hydronephrosis 08/12/2019    Added automatically from request for surgery [id number]    Paroxysmal atrial fibrillation     Pulmonary fibrosis     Pulmonary hypertension, mild 02/2021    per echo " per cardiology note 1/9/23, per pt's son, PCP does not agree with this dx    Renal insufficiency     Sepsis 2019    Shortness of breath     STATES WITH EXERTION, OTHERWISE, DENIES    Sigmoid diverticulitis without abscess or perforation 08/09/2017    Sinus node dysfunction     Skin breakdown     fourth toe right foot noted in 2019 MD D/C note    Skin ulcer of fourth toe of right foot, limited to breakdown of skin 07/05/2019    Stricture of ureter     Type 2 diabetes mellitus     Ureteral stricture, left     Urinary incontinence     UTI (urinary tract infection) 07/18/2019    Vitamin D deficiency     Wears glasses      Past Surgical History:   Procedure Laterality Date    APPENDECTOMY      CARDIAC ELECTROPHYSIOLOGY PROCEDURE N/A 12/23/2020    Procedure: Pacemaker DC new. DNS meds.;  Surgeon: Jimy Ledezma DO;  Location:  [facility];  Service: Cardiology;  Laterality: N/A;    CHOLECYSTECTOMY      CIRCUMCISION N/A 10/23/2019    Procedure: CIRCUMCISIOn-DORSAL SLIT;  Surgeon: Griffin Romero MD;  Location:  [facility];  Service: Urology    COLONOSCOPY      CYSTOSCOPY RETROGRADE PYELOGRAM Left 06/17/2022    Procedure: CYSTOSCOPY RETROGRADE PYELOGRAM;  Surgeon: Ryne Mccann MD;  Location:  [facility];  Service: Urology;  Laterality: Left;    CYSTOSCOPY RETROGRADE PYELOGRAM Left 7/10/2024    Procedure: CYSTOSCOPY RETROGRADE PYELOGRAM AND STENT INSERTION LEFT;  Surgeon: Deanne Pitts MD;  Location:  [facility];  Service: Urology;  Laterality: Left;    CYSTOSCOPY URETEROSCOPY Left 02/11/2019    Procedure: URETEROSCOPY WITH STENT EXTRACTION, RPG;  Surgeon: Griffin Romero MD;  Location:  [facility];  Service: Urology    CYSTOSCOPY W/ URETERAL STENT PLACEMENT Left 07/20/2019    Procedure: CYSTOSCOPY, LEFT RETROGRADE PYELOGRAM,  ATTEMPTED LEFT URETERAL STENT INSERTION;  Surgeon: Isaac Flynn MD;  Location:  [facility];  Service: Urology    CYSTOSCOPY W/ URETERAL STENT PLACEMENT Left 06/17/2022     Procedure: CYSTOSCOPY URETERAL CATHETER/STENT INSERTION;  Surgeon: Ryne Mccann MD;  Location:  [facility];  Service: Urology;  Laterality: Left;    CYSTOSCOPY W/ URETERAL STENT PLACEMENT Left 01/27/2023    Procedure: CYSTOSCOPY URETERAL CATHETER/STENT INSERTION;  Surgeon: Deanne Pitts MD;  Location:  [facility];  Service: Urology;  Laterality: Left;    CYSTOSCOPY W/ URETERAL STENT PLACEMENT Left 02/15/2024    Procedure: CYSTOSCOPY LEFT STENT EXCHANGE;  Surgeon: Deanne Pitts MD;  Location:  [facility];  Service: Urology;  Laterality: Left;    CYSTOSCOPY, URETEROSCOPY, RETROGRADE PYELOGRAM, STONE EXTRACTION, STENT INSERTION Left 06/15/2023    Procedure: URETEROSCOPY, RETROGRADE PYELOGRAM, STENT INSERTION;  Surgeon: Deanne Pitts MD;  Location:  [facility];  Service: Urology;  Laterality: Left;    CYSTOSCOPY, URETEROSCOPY, RETROGRADE PYELOGRAM, STONE EXTRACTION, STENT INSERTION Left 11/22/2024    Procedure: CYSTOSCOPY RETROGRADE PYELOGRAM AND STENT INSERTION LEFT;  Surgeon: Deanne Pitts MD;  Location:  [facility];  Service: Urology;  Laterality: Left;    EYE SURGERY      CATARACTS REMOVED BILATERALLY    HIP TROCHANTERIC NAILING WITH INTRAMEDULLARY HIP SCREW Right 3/23/2025    Procedure: HIP TROCHANTERIC NAILING RIGHT;  Surgeon: Brodie Baltazar MD;  Location:  [facility];  Service: Orthopedics;  Laterality: Right;    JOINT REPLACEMENT      Bilateral knees    KNEE ARTHROPLASTY Bilateral     KNEE ARTHROSCOPY Bilateral     RENAL ARTERY STENT      SEVERAL TIMES EVERY SINCE 1978    URETERAL STENT INSERTION  10/2020    URETEROSCOPY LASER LITHOTRIPSY WITH STENT INSERTION Left 01/10/2018    Procedure:  cysto, left ureteral stent replacement ;  Surgeon: Griffin Romero MD;  Location:  [facility];  Service:     URETEROSCOPY LASER LITHOTRIPSY WITH STENT INSERTION Left 08/14/2019    Procedure: URETEROSCOPY, STONE REMOVAL WITH STENT INSERTION;  Surgeon: Griffin Romero MD;  Location:  [facility];  Service: Urology    URETEROSCOPY LASER  LITHOTRIPSY WITH STENT INSERTION Left 10/23/2019    Procedure: Left URETEROSCOPY WITH STENT INSERTION-LEFT;  Surgeon: Griffin Romero MD;  Location: [facility];  Service: Urology      General Information       Row Name 03/24/25 0959          OT Time and Intention    Document Type evaluation  -AR     Mode of Treatment co-treatment;occupational therapy  -AR       Row Name 03/24/25 0959          General Information    Patient Profile Reviewed yes  -AR     Prior Level of Function independent:;ADL's;all household mobility;community mobility;gait;transfer;driving  -AR     Existing Precautions/Restrictions fall;oxygen therapy device and L/min;other (see comments)   RLE buckling with WB  -AR     Barriers to Rehab medically complex  -AR       Row Name 03/24/25 0959          Living Environment    Current Living Arrangements home  -AR     People in Home alone  -AR       Row Name 03/24/25 0959          Home Main Entrance    Number of Stairs, Main Entrance two  -AR     Stair Railings, Main Entrance railing on right side (ascending)  -AR       Row Name 03/24/25 0959          Stairs Within Home, Primary    Number of Stairs, Within Home, Primary none  -AR       Row Name 03/24/25 0959          Cognition    Orientation Status (Cognition) oriented x 4  -AR       Row Name 03/24/25 0959          Safety Issues/Impairments Affecting Functional Mobility    Safety Issues Affecting Function (Mobility) awareness of need for assistance;insight into deficits/self-awareness;judgment;problem-solving;safety precaution awareness;safety precautions follow-through/compliance;sequencing abilities  -AR     Impairments Affecting Function (Mobility) balance;cognition;endurance/activity tolerance;motor control;pain;range of motion (ROM);shortness of breath;strength  -AR     Cognitive Impairments, Mobility Safety/Performance awareness, need for assistance;insight into deficits/self-awareness;judgment;problem-solving/reasoning;safety precaution  awareness;safety precaution follow-through  -AR               User Key  (r) = Recorded By, (t) = Taken By, (c) = Cosigned By      Initials Name Provider Type    Aarti Mackey OT Occupational Therapist                     Mobility/ADL's       Row Name 03/24/25 1002          Bed Mobility    Bed Mobility scooting/bridging;supine-sit;sit-supine;rolling left;rolling right  -AR     Rolling Left [place] (Bed Mobility) dependent (less than 25% patient effort)  -AR     Rolling Right [place] (Bed Mobility) dependent (less than 25% patient effort)  -AR     Scooting/Bridging [place] (Bed Mobility) maximum assist (25% patient effort);2 person assist;verbal cues  -AR     Supine-Sit [place] (Bed Mobility) maximum assist (25% patient effort);2 person assist;verbal cues  -AR     Sit-Supine [place] (Bed Mobility) dependent (less than 25% patient effort);2 person assist  -AR     Comment, (Bed Mobility) Pt assisted to EOB and following standing trial, returned to EOB sitting for seated level rest break. Pt desat to 70's (difficult to obtain accurate reading despite 2 pulse oximeters and finger probe( and significant left lateral lean noted. Pt was responsive with delays. RN notified and arrived to assess pt when he was supine. Rolled R/L of midline for sling placement once xygen recovered to 90% or above. BP 92/47 and session ended d/t medical status and EKG arrived at bedside.  -AR       Row Name 03/24/25 1002          Transfers    Transfers sit-stand transfer;stand-sit transfer  -AR     Comment, (Transfers) Pt transitioned from sit-to-stand with mod assist x2 from elevated bed surface. RLE knee buckling noted despite adequate quad function supine and pt unable to safely pivot to chair.  -AR       Row Name 03/24/25 1002          Sit-Stand Transfer    Sit-Stand [place] (Transfers) moderate assist (50% patient effort);2 person assist;verbal cues  -AR     Assistive Device (Sit-Stand Transfers)  other (see comments)  -AR       Row Name 03/24/25 1002          Stand-Sit Transfer    Stand-Sit [place] (Transfers) maximum assist (25% patient effort);2 person assist;verbal cues  -AR     Assistive Device (Stand-Sit Transfers) other (see comments)  BUE HHA  -AR       Row Name 03/24/25 1002          Functional Mobility    Patient was able to Ambulate no, other medical factors prevent ambulation  -AR     Reason Patient was unable to Ambulate Excessive Weakness;Hypotension;Cardiac Dysfunction;Dizziness;Hypoxia/Respiratory Distress  -AR       Row Name 03/24/25 1002          Activities of Daily Living    BADL Assessment/Intervention upper body dressing;lower body dressing  -AR       Row Name 03/24/25 1002          Mobility    Extremity Weight-bearing Status right lower extremity  -AR     Right Lower Extremity (Weight-bearing Status) weight-bearing as tolerated (WBAT)  -AR       Row Name 03/24/25 1002          Upper Body Dressing Assessment/Training    [place] Level (Upper Body Dressing) don;pajama/robe;maximum assist (25% patient effort)  -AR     Position (Upper Body Dressing) edge of bed sitting  -AR       Row Name 03/24/25 1002          Lower Body Dressing Assessment/Training    [place] Level (Lower Body Dressing) socks;dependent (less than 25% patient effort)  -AR     Position (Lower Body Dressing) supine  -AR     Comment, (Lower Body Dressing) Issued self-care kit, teaching deferred d/t medical status.  -AR               User Key  (r) = Recorded By, (t) = Taken By, (c) = Cosigned By      Initials Name Provider Type    Aarti Mackey, OT Occupational Therapist                   Obj/Interventions       Row Name 03/24/25 1008          Sensory Assessment (Somatosensory)    Sensory Assessment (Somatosensory) UE sensation intact  -AR       Row Name 03/24/25 1008          Vision Assessment/Intervention    Visual Impairment/Limitations corrective lenses full-time  -AR       Row Name 03/24/25 1008           Range of Motion Comprehensive    General Range of Motion no range of motion deficits identified  Simultaneous filing. User may be unaware of other data.  -AR       Row Name 03/24/25 1008          Strength Comprehensive (MMT)    General Manual Muscle Testing (MMT) Assessment upper extremity strength deficits identified  -AR     Comment, General Manual Muscle Testing (MMT) Assessment BUE 4/5  Simultaneous filing. User may be unaware of other data.  -AR       Row Name 03/24/25 1008          Balance    Balance Assessment sitting static balance;sitting dynamic balance;standing static balance;standing dynamic balance  -AR     Static Sitting Balance maximum assist  initially CGA  -AR     Dynamic Sitting Balance dependent  initially min assist  -AR     Position, Sitting Balance unsupported;sitting edge of bed  -AR     Static Standing Balance maximum assist;2-person assist;verbal cues  -AR     Position/Device Used, Standing Balance supported  -AR               User Key  (r) = Recorded By, (t) = Taken By, (c) = Cosigned By      Initials Name Provider Type    Aarti Mackey, OT Occupational Therapist                   Goals/Plan       Row Name 03/24/25 1019          Transfer Goal 1 (OT)    Activity/Assistive Device (Transfer Goal 1, OT) sit-to-stand/stand-to-sit;commode, bedside with drop arms  -AR     [place] Level/Cues Needed (Transfer Goal 1, OT) verbal cues required;maximum assist (25-49% patient effort)  -AR     Time Frame (Transfer Goal 1, OT) short term goal (STG);5 days  -AR     Progress/Outcome (Transfer Goal 1, OT) goal ongoing  -AR       Row Name 03/24/25 1019          Dressing Goal 1 (OT)    Activity/Device (Dressing Goal 1, OT) lower body dressing;sock-aid;reacher  -AR     [place]/Cues Needed (Dressing Goal 1, OT) moderate assist (50-74% patient effort);verbal cues required  -AR     Time Frame (Dressing Goal 1, OT) long term goal (LTG);10 days  -AR     Progress/Outcome (Dressing Goal 1, OT)  goal ongoing  -AR       Row Name 03/24/25 1019          Toileting Goal 1 (OT)    Activity/Device (Toileting Goal 1, OT) toileting skills, all;commode, bedside without drop arms  -AR     [place] Level/Cues Needed (Toileting Goal 1, OT) maximum assist (25-49% patient effort);verbal cues required  -AR     Time Frame (Toileting Goal 1, OT) long term goal (LTG);10 days  -AR     Progress/Outcome (Toileting Goal 1, OT) goal ongoing  -AR       Row Name 03/24/25 1019          Problem Specific Goal 1 (OT)    Problem Specific Goal 1 (OT) Pt will maintain static sitting balance x 3 minutes with CGA during ADL activity  -AR     Time Frame (Problem Specific Goal 1, OT) short term goal (STG);5 days  -AR     Progress/Outcome (Problem Specific Goal 1, OT) goal ongoing  -AR       Row Name 03/24/25 1019          Therapy Assessment/Plan (OT)    Planned Therapy Interventions (OT) activity tolerance training;adaptive equipment training;BADL retraining;edema control/reduction;functional balance retraining;IADL retraining;occupation/activity based interventions;patient/caregiver education/training;ROM/therapeutic exercise;strengthening exercise;transfer/mobility retraining;cognitive/visual perception retraining  -AR               User Key  (r) = Recorded By, (t) = Taken By, (c) = Cosigned By      Initials Name Provider Type    Aarti Mackey, OT Occupational Therapist                   Clinical Impression       Row Name 03/24/25 1009          Pain Assessment    Pretreatment Pain Rating 6/10  -AR     Posttreatment Pain Rating 8/10  -AR     Pain Location hip  -AR     Pain Side/Orientation right;generalized  -AR     Pain Management Interventions exercise or physical activity utilized;movement retraining implemented;nursing notified;positioning techniques utilized  -AR     Response to Pain Interventions activity participation with increased pain  -AR       Row Name 03/24/25 1009          Plan of Care Review    Plan of Care  Reviewed With patient;family  -AR     Outcome Evaluation Pt alert, Ox4 and transitioned to EOB with max assist x2. He completed UB dressing taks with max assist and LB dressing with dependence. He transitioned sit-to-stand with mod assist x2 from elevated bed surface and was unable to pivot to chair d/t RLE knee buckling and fatigue. Pt returned to EOB sitting, desat into 70s on 6L NC and had episode of delayed responses with strong left lateral lean. Pt was assisted to supine with dependence and BP 92/47, oxygen returned to 90% or above within 2 minutes. RN notified and at bedside to assess him. Sling left under him, deferred transfer to chair d/t medical status and RN notified of placement of sling. Pt limited with dyspnea and desaturation on 6L NC, acute pain, poor occupational endurance, impaired balance, confusion and is performing below baseline. Recommend SNF.  -AR       Row Name 03/24/25 1009          Therapy Assessment/Plan (OT)    Rehab Potential (OT) fair  -AR     Criteria for Skilled Therapeutic Interventions Met (OT) yes  -AR     Therapy Frequency (OT) daily  -AR       Row Name 03/24/25 1009          Therapy Plan Review/Discharge Plan (OT)    Anticipated Discharge Disposition (OT) skilled nursing facility  -AR       Row Name 03/24/25 1009          Vital Signs    Pre Systolic BP Rehab 104  -AR     Pre Treatment Diastolic BP 71  -AR     Intra Systolic BP Rehab 92  -AR     Intra Treatment Diastolic BP 47  -AR     Post Systolic BP Rehab 113  -AR     Post Treatment Diastolic BP 71  -AR     Intratreatment Heart Rate (beats/min) 114  -AR     Posttreatment Heart Rate (beats/min) 107  -AR     Pre SpO2 (%) 90  -AR     O2 Delivery Pre Treatment supplemental O2  -AR     Intra SpO2 (%) 78  RN notified and she did not want OT to increase oxygen reporting that he is slow to rebound, arrived to room shortly after called  -AR     O2 Delivery Intra Treatment supplemental O2  -AR     Post SpO2 (%) 91  -AR     O2 Delivery  Post Treatment supplemental O2  -AR     Pre Patient Position Supine  -AR     Intra Patient Position Sitting  -AR     Post Patient Position Supine  -AR       Row Name 03/24/25 1009          Positioning and Restraints    Pre-Treatment Position in bed  -AR     Post Treatment Position bed  -AR     In Bed supine;call light within reach;encouraged to call for assist;exit alarm on;with nsg  deferred SCD and cold pack d/t multiple nurses at bedside to assess pt  -AR               User Key  (r) = Recorded By, (t) = Taken By, (c) = Cosigned By      Initials Name Provider Type    Aarti Mackey OT Occupational Therapist                   Outcome Measures       Row Name 03/24/25 1021          How much help from another is currently needed...    Putting on and taking off regular lower body clothing? 1  -AR     Bathing (including washing, rinsing, and drying) 2  -AR     Toileting (which includes using toilet bed pan or urinal) 1  -AR     Putting on and taking off regular upper body clothing 2  -AR     Taking care of personal grooming (such as brushing teeth) 3  -AR     Eating meals 3  -AR     AM-PAC 6 Clicks Score (OT) 12  -AR       Row Name 03/24/25 1021          Functional Assessment    Outcome Measure Options AM-PAC 6 Clicks Daily Activity (OT)  -AR               User Key  (r) = Recorded By, (t) = Taken By, (c) = Cosigned By      Initials Name Provider Type    Aarti Mackey OT Occupational Therapist                    Occupational Therapy Education       Title: PT OT SLP Therapies (Done)       Topic: Occupational Therapy (Done)       Point: ADL training (Done)       Learning Progress Summary            Patient Eager, E,TB,D, VU,NR by AR at 3/24/2025 1021                      Point: Home exercise program (Done)       Learning Progress Summary            Patient Eager, E,TB,D, VU,NR by AR at 3/24/2025 1021                      Point: Precautions (Done)       Learning Progress Summary            Patient Eager,  E,TB,D, VU,NR by AR at 3/24/2025 1021                      Point: Body mechanics (Done)       Learning Progress Summary            Patient Mariselaer, E,TB,D, VU,NR by AR at 3/24/2025 1021                                      User Key       Initials Effective Dates Name Provider Type Discipline    AR 07/11/23 -  Aarti Wu, WHIT Occupational Therapist OT                  OT Recommendation and Plan  Planned Therapy Interventions (OT): activity tolerance training, adaptive equipment training, BADL retraining, edema control/reduction, functional balance retraining, IADL retraining, occupation/activity based interventions, patient/caregiver education/training, ROM/therapeutic exercise, strengthening exercise, transfer/mobility retraining, cognitive/visual perception retraining  Therapy Frequency (OT): daily  Plan of Care Review  Plan of Care Reviewed With: patient, family  Outcome Evaluation: Pt alert, Ox4 and transitioned to EOB with max assist x2. He completed UB dressing taks with max assist and LB dressing with dependence. He transitioned sit-to-stand with mod assist x2 from elevated bed surface and was unable to pivot to chair d/t RLE knee buckling and fatigue. Pt returned to EOB sitting, desat into 70s on 6L NC and had episode of delayed responses with strong left lateral lean. Pt was assisted to supine with dependence and BP 92/47, oxygen returned to 90% or above within 2 minutes. RN notified and at bedside to assess him. Sling left under him, deferred transfer to chair d/t medical status and RN notified of placement of sling. Pt limited with dyspnea and desaturation on 6L NC, acute pain, poor occupational endurance, impaired balance, confusion and is performing below baseline. Recommend SNF.     Time Calculation:   Evaluation Complexity (OT)  Review Occupational Profile/Medical/Therapy History Complexity: brief/low complexity  Assessment, Occupational Performance/Identification of Deficit Complexity: 1-3  performance deficits  Clinical Decision Making Complexity (OT): problem focused assessment/low complexity  Overall Complexity of Evaluation (OT): low complexity     Time Calculation- OT       Row Name 03/24/25 1021             Time Calculation- OT    OT Start Time 0826  -AR      OT Received On 03/24/25  -AR      OT Goal Re-Cert Due Date 04/03/25  -AR         Timed Charges    28078 - OT Self Care/Mgmt Minutes 11  -AR         Untimed Charges    OT Eval/Re-eval Minutes 59  -AR         Total Minutes    Timed Charges Total Minutes 11  -AR      Untimed Charges Total Minutes 59  -AR       Total Minutes 70  -AR                User Key  (r) = Recorded By, (t) = Taken By, (c) = Cosigned By      Initials Name Provider Type    AR Aarti Wu OT Occupational Therapist                  Therapy Charges for Today       Code Description Service Date Service Provider Modifiers Qty    49321336808 HC OT SELF CARE/MGMT/TRAIN EA 15 MIN 3/24/2025 Aarti Wu, OT GO 1    71624811039 HC OT EVAL LOW COMPLEXITY 4 3/24/2025 Aarti Wu OT GO 1    [id number] HC OT THER SUPP EA 15 MIN 3/24/2025 Aarti Wu OT GO 4                 Aarti Wu OT  3/24/2025

## 2025-03-24 NOTE — PLAN OF CARE
Goal Outcome Evaluation:  Plan of Care Reviewed With: patient, family           Outcome Evaluation: Pt alert, Ox4 and transitioned to EOB with max assist x2. He completed UB dressing taks with max assist and LB dressing with dependence. He transitioned sit-to-stand with mod assist x2 from elevated bed surface and was unable to pivot to chair d/t RLE knee buckling and fatigue. Pt returned to EOB sitting, desat into 70s on 6L NC and had episode of delayed responses with strong left lateral lean. Pt was assisted to supine with dependence and BP 92/47, oxygen returned to 90% or above within 2 minutes. RN notified and at bedside to assess him. Sling left under him, deferred transfer to chair d/t medical status and RN notified of placement of sling. Pt limited with dyspnea and desaturation on 6L NC, acute pain, poor occupational endurance, impaired balance, confusion and is performing below baseline. Recommend SNF.    Anticipated Discharge Disposition (OT): skilled nursing facility

## 2025-03-25 LAB
ANION GAP SERPL CALCULATED.3IONS-SCNC: 9 MMOL/L (ref 5–15)
BASOPHILS # BLD AUTO: 0.05 10*3/MM3 (ref 0–0.2)
BASOPHILS NFR BLD AUTO: 0.6 % (ref 0–1.5)
BH BB BLOOD EXPIRATION DATE: NORMAL
BH BB BLOOD EXPIRATION DATE: NORMAL
BH BB BLOOD TYPE BARCODE: 9500
BH BB BLOOD TYPE BARCODE: 9500
BH BB DISPENSE STATUS: NORMAL
BH BB DISPENSE STATUS: NORMAL
BH BB PRODUCT CODE: NORMAL
BH BB PRODUCT CODE: NORMAL
BH BB UNIT NUMBER: NORMAL
BH BB UNIT NUMBER: NORMAL
BUN SERPL-MCNC: 65 MG/DL (ref 8–23)
BUN/CREAT SERPL: 31.9 (ref 7–25)
CALCIUM SPEC-SCNC: 8.5 MG/DL (ref 8.2–9.6)
CHLORIDE SERPL-SCNC: 108 MMOL/L (ref 98–107)
CO2 SERPL-SCNC: 20 MMOL/L (ref 22–29)
CREAT SERPL-MCNC: 2.04 MG/DL (ref 0.76–1.27)
CROSSMATCH INTERPRETATION: NORMAL
CROSSMATCH INTERPRETATION: NORMAL
DEPRECATED RDW RBC AUTO: 57.2 FL (ref 37–54)
EGFRCR SERPLBLD CKD-EPI 2021: 30 ML/MIN/1.73
EOSINOPHIL # BLD AUTO: 0.58 10*3/MM3 (ref 0–0.4)
EOSINOPHIL NFR BLD AUTO: 7 % (ref 0.3–6.2)
ERYTHROCYTE [DISTWIDTH] IN BLOOD BY AUTOMATED COUNT: 16.3 % (ref 12.3–15.4)
GLUCOSE BLDC GLUCOMTR-MCNC: 139 MG/DL (ref 70–130)
GLUCOSE BLDC GLUCOMTR-MCNC: 149 MG/DL (ref 70–130)
GLUCOSE BLDC GLUCOMTR-MCNC: 156 MG/DL (ref 70–130)
GLUCOSE BLDC GLUCOMTR-MCNC: 165 MG/DL (ref 70–130)
GLUCOSE SERPL-MCNC: 148 MG/DL (ref 65–99)
HCT VFR BLD AUTO: 30.5 % (ref 37.5–51)
HGB BLD-MCNC: 9.3 G/DL (ref 13–17.7)
IMM GRANULOCYTES # BLD AUTO: 0.04 10*3/MM3 (ref 0–0.05)
IMM GRANULOCYTES NFR BLD AUTO: 0.5 % (ref 0–0.5)
LYMPHOCYTES # BLD AUTO: 1.42 10*3/MM3 (ref 0.7–3.1)
LYMPHOCYTES NFR BLD AUTO: 17.1 % (ref 19.6–45.3)
MCH RBC QN AUTO: 29.6 PG (ref 26.6–33)
MCHC RBC AUTO-ENTMCNC: 30.5 G/DL (ref 31.5–35.7)
MCV RBC AUTO: 97.1 FL (ref 79–97)
MONOCYTES # BLD AUTO: 0.84 10*3/MM3 (ref 0.1–0.9)
MONOCYTES NFR BLD AUTO: 10.1 % (ref 5–12)
NEUTROPHILS NFR BLD AUTO: 5.39 10*3/MM3 (ref 1.7–7)
NEUTROPHILS NFR BLD AUTO: 64.7 % (ref 42.7–76)
NRBC BLD AUTO-RTO: 0.5 /100 WBC (ref 0–0.2)
PLATELET # BLD AUTO: 159 10*3/MM3 (ref 140–450)
PMV BLD AUTO: 11.1 FL (ref 6–12)
POTASSIUM SERPL-SCNC: 5.2 MMOL/L (ref 3.5–5.2)
RBC # BLD AUTO: 3.14 10*6/MM3 (ref 4.14–5.8)
SODIUM SERPL-SCNC: 137 MMOL/L (ref 136–145)
UNIT  ABO: NORMAL
UNIT  ABO: NORMAL
UNIT  RH: NORMAL
UNIT  RH: NORMAL
WBC NRBC COR # BLD AUTO: 8.32 10*3/MM3 (ref 3.4–10.8)

## 2025-03-25 PROCEDURE — 99233 SBSQ HOSP IP/OBS HIGH 50: CPT | Performed by: FAMILY MEDICINE

## 2025-03-25 PROCEDURE — 82948 REAGENT STRIP/BLOOD GLUCOSE: CPT

## 2025-03-25 PROCEDURE — 25010000002 MORPHINE PER 10 MG: Performed by: ORTHOPAEDIC SURGERY

## 2025-03-25 PROCEDURE — 85025 COMPLETE CBC W/AUTO DIFF WBC: CPT | Performed by: INTERNAL MEDICINE

## 2025-03-25 PROCEDURE — 97530 THERAPEUTIC ACTIVITIES: CPT

## 2025-03-25 PROCEDURE — 63710000001 INSULIN LISPRO (HUMAN) PER 5 UNITS: Performed by: ORTHOPAEDIC SURGERY

## 2025-03-25 PROCEDURE — 63710000001 INSULIN GLARGINE PER 5 UNITS: Performed by: ORTHOPAEDIC SURGERY

## 2025-03-25 PROCEDURE — 99024 POSTOP FOLLOW-UP VISIT: CPT | Performed by: ORTHOPAEDIC SURGERY

## 2025-03-25 PROCEDURE — 94799 UNLISTED PULMONARY SVC/PX: CPT

## 2025-03-25 PROCEDURE — 80048 BASIC METABOLIC PNL TOTAL CA: CPT | Performed by: INTERNAL MEDICINE

## 2025-03-25 PROCEDURE — 25010000002 CEFTRIAXONE PER 250 MG: Performed by: ORTHOPAEDIC SURGERY

## 2025-03-25 RX ORDER — IPRATROPIUM BROMIDE AND ALBUTEROL SULFATE 2.5; .5 MG/3ML; MG/3ML
3 SOLUTION RESPIRATORY (INHALATION)
Status: DISCONTINUED | OUTPATIENT
Start: 2025-03-25 | End: 2025-03-26 | Stop reason: HOSPADM

## 2025-03-25 RX ORDER — POLYETHYLENE GLYCOL 3350 17 G/17G
17 POWDER, FOR SOLUTION ORAL DAILY PRN
Status: DISCONTINUED | OUTPATIENT
Start: 2025-03-25 | End: 2025-03-26 | Stop reason: HOSPADM

## 2025-03-25 RX ORDER — AMOXICILLIN 250 MG
2 CAPSULE ORAL 2 TIMES DAILY
Status: DISCONTINUED | OUTPATIENT
Start: 2025-03-25 | End: 2025-03-26 | Stop reason: HOSPADM

## 2025-03-25 RX ORDER — BISACODYL 10 MG
10 SUPPOSITORY, RECTAL RECTAL DAILY PRN
Status: DISCONTINUED | OUTPATIENT
Start: 2025-03-25 | End: 2025-03-26 | Stop reason: HOSPADM

## 2025-03-25 RX ORDER — BISACODYL 5 MG/1
5 TABLET, DELAYED RELEASE ORAL DAILY PRN
Status: DISCONTINUED | OUTPATIENT
Start: 2025-03-25 | End: 2025-03-26 | Stop reason: HOSPADM

## 2025-03-25 RX ADMIN — MORPHINE SULFATE 2 MG: 2 INJECTION, SOLUTION INTRAMUSCULAR; INTRAVENOUS at 10:41

## 2025-03-25 RX ADMIN — DOCUSATE SODIUM 50MG AND SENNOSIDES 8.6MG 2 TABLET: 8.6; 5 TABLET, FILM COATED ORAL at 22:13

## 2025-03-25 RX ADMIN — APIXABAN 2.5 MG: 2.5 TABLET, FILM COATED ORAL at 22:13

## 2025-03-25 RX ADMIN — DOXYCYCLINE 100 MG: 100 CAPSULE ORAL at 22:13

## 2025-03-25 RX ADMIN — Medication 10 ML: at 09:42

## 2025-03-25 RX ADMIN — Medication 3 ML: at 09:42

## 2025-03-25 RX ADMIN — DOCUSATE SODIUM 50MG AND SENNOSIDES 8.6MG 2 TABLET: 8.6; 5 TABLET, FILM COATED ORAL at 09:45

## 2025-03-25 RX ADMIN — INSULIN LISPRO 2 UNITS: 100 INJECTION, SOLUTION INTRAVENOUS; SUBCUTANEOUS at 12:34

## 2025-03-25 RX ADMIN — ASPIRIN 81 MG: 81 TABLET, COATED ORAL at 09:41

## 2025-03-25 RX ADMIN — APIXABAN 2.5 MG: 2.5 TABLET, FILM COATED ORAL at 09:41

## 2025-03-25 RX ADMIN — INSULIN GLARGINE 10 UNITS: 100 INJECTION, SOLUTION SUBCUTANEOUS at 22:13

## 2025-03-25 RX ADMIN — BISOPROLOL FUMARATE 5 MG: 5 TABLET ORAL at 09:41

## 2025-03-25 RX ADMIN — LEVOTHYROXINE SODIUM 50 MCG: 0.05 TABLET ORAL at 05:10

## 2025-03-25 RX ADMIN — HYDROCODONE BITARTRATE AND ACETAMINOPHEN 1 TABLET: 5; 325 TABLET ORAL at 22:27

## 2025-03-25 RX ADMIN — SODIUM CHLORIDE 1000 MG: 900 INJECTION INTRAVENOUS at 17:10

## 2025-03-25 RX ADMIN — DOXYCYCLINE 100 MG: 100 CAPSULE ORAL at 09:41

## 2025-03-25 RX ADMIN — INSULIN LISPRO 2 UNITS: 100 INJECTION, SOLUTION INTRAVENOUS; SUBCUTANEOUS at 22:13

## 2025-03-25 RX ADMIN — IPRATROPIUM BROMIDE AND ALBUTEROL SULFATE 3 ML: 2.5; .5 SOLUTION RESPIRATORY (INHALATION) at 19:15

## 2025-03-25 RX ADMIN — FINASTERIDE 5 MG: 5 TABLET, FILM COATED ORAL at 09:41

## 2025-03-25 RX ADMIN — HYDROCODONE BITARTRATE AND ACETAMINOPHEN 1 TABLET: 5; 325 TABLET ORAL at 13:39

## 2025-03-25 RX ADMIN — OXYBUTYNIN CHLORIDE 10 MG: 10 TABLET, EXTENDED RELEASE ORAL at 09:41

## 2025-03-25 NOTE — PROGRESS NOTES
[facility] Medicine Services  PROGRESS NOTE    Patient Name: Forrest Zepeda  : 1932  MRN: [id number]    Date of Admission: 3/21/2025  Primary Care Physician: Kary Wen MD    Subjective   Subjective     CC:  F/U Hip Fx     HPI:  Patient seen and examined. Family at bedside. Patient having some delirium. Not eating much. Pain controlled.     Objective   Objective     Vital Signs:   Temp:  [98 °F (36.7 °C)-98.7 °F (37.1 °C)] 98 °F (36.7 °C)  Heart Rate:  [69-90] 90  Resp:  [16-19] 18  BP: ()/(59-96) 107/59  Flow (L/min) (Oxygen Therapy):  [6] 6     Physical Exam:  Constitutional: No acute distress, awake, alert, elderly male, pleasant   HENT: NCAT, mucous membranes moist  Respiratory: Decreased in bases bilaterally with exp wheezes, respiratory effort normal on 6L   Cardiovascular: RRR, no murmurs, rubs, or gallops  Gastrointestinal: Positive bowel sounds, soft, nontender, nondistended  Musculoskeletal: No bilateral ankle edema  Psychiatric: Appropriate affect, cooperative  Neurologic: Intermittent confusion, no focal deficits   Skin: Hip incision dressed     Results Reviewed:  LAB RESULTS:      Lab 25  0826 25  0625  1525   WBC 8.32 10.22 14.56* 15.92*   HEMOGLOBIN 9.3* 8.8* 10.7* 13.7   HEMATOCRIT 30.5* 28.3* 34.8* 43.1   PLATELETS 159 117* 146 193   NEUTROS ABS 5.39 8.22* 9.61* 14.07*   IMMATURE GRANS (ABS) 0.04 0.04 0.09* 0.11*   LYMPHS ABS 1.42 1.03 3.04 1.04   MONOS ABS 0.84 0.87 1.01* 0.54   EOS ABS 0.58* 0.03 0.72* 0.10   MCV 97.1* 97.9* 98.3* 94.5   PROCALCITONIN  --   --  0.45*  --          Lab 25  0826 25  0738 257 25  0623 25  1525   SODIUM 137  --  137 136 142   POTASSIUM 5.2 5.3* 6.1* 4.8 5.7*   CHLORIDE 108*  --  107 106 106   CO2 20.0*  --  16.0* 18.0* 21.0*   ANION GAP 9.0  --  14.0 12.0 15.0   BUN 65*  --  59* 52* 52*   CREATININE 2.04*  --  2.13* 2.16* 2.02*   EGFR 30.0*  --   28.5* 28.0* 30.4*   GLUCOSE 148*  --  165* 135* 258*   CALCIUM 8.5  --  8.6 8.6 10.0*   MAGNESIUM  --   --   --  1.8  --    HEMOGLOBIN A1C  --   --   --  6.90*  --    TSH  --   --   --  1.740  --          Lab 03/22/25  0623 03/21/25  1525   TOTAL PROTEIN 6.1 7.7   ALBUMIN 3.1* 4.0   GLOBULIN 3.0 3.7   ALT (SGPT) 10 13   AST (SGOT) 19 24   BILIRUBIN 0.5 0.6   ALK PHOS 79 108         Lab 03/22/25  0623   PROBNP 573.4             Lab 03/21/25  1526   ABO TYPING O   RH TYPING Negative   ANTIBODY SCREEN Negative         Brief Urine Lab Results  (Last result in the past 365 days)        Color   Clarity   Blood   Leuk Est   Nitrite   Protein   CREAT   Urine HCG        03/21/25 1618 Yellow   Turbid   Large (3+)   Large (3+)   Negative   100 mg/dL (2+)                   Microbiology Results Abnormal       None            No radiology results from the last 24 hrs      Results for orders placed in visit on 11/13/23    Adult Transthoracic Echo Complete W/ Cont if Necessary Per Protocol    Interpretation Summary    Left ventricular systolic function is normal. Estimated left ventricular EF = 52% Left ventricular ejection fraction appears to be 51 - 55%.    Left ventricular wall thickness is consistent with mild concentric hypertrophy.    Left ventricular diastolic function is consistent with (grade I) impaired relaxation.    The right ventricular cavity is mildly dilated.    The left atrial cavity is mildly dilated.    Left atrial volume is moderately increased.    There is mild calcification of the aortic valve mainly affecting the non-coronary, left coronary and right coronary cusp(s).    Estimated right ventricular systolic pressure from tricuspid regurgitation is normal (<35 mmHg).      Current medications:  Scheduled Meds:apixaban, 2.5 mg, Oral, BID  aspirin, 81 mg, Oral, Daily  bisoprolol, 5 mg, Oral, Daily  cefTRIAXone, 1,000 mg, Intravenous, Q24H  doxycycline, 100 mg, Oral, Q12H  finasteride, 5 mg, Oral, Daily  insulin  glargine, 10 Units, Subcutaneous, Nightly  insulin lispro, 2-9 Units, Subcutaneous, 4x Daily AC & at Bedtime  levothyroxine, 50 mcg, Oral, Q AM  oxybutynin XL, 10 mg, Oral, Daily  senna-docusate sodium, 2 tablet, Oral, BID  sodium chloride, 10 mL, Intravenous, Q12H  sodium chloride, 3 mL, Intravenous, Q12H      Continuous Infusions:     PRN Meds:.  acetaminophen **OR** acetaminophen **OR** acetaminophen    senna-docusate sodium **AND** polyethylene glycol **AND** bisacodyl **AND** bisacodyl    Calcium Replacement - Follow Nurse / BPA Driven Protocol    dextrose    dextrose    docusate sodium    glucagon (human recombinant)    HYDROcodone-acetaminophen    ipratropium-albuterol    Magnesium Low Dose Replacement - Follow Nurse / BPA Driven Protocol    Morphine    nitroglycerin    ondansetron ODT **OR** ondansetron    Phosphorus Replacement - Follow Nurse / BPA Driven Protocol    Potassium Replacement - Follow Nurse / BPA Driven Protocol    sodium chloride    sodium chloride    sodium chloride    sodium chloride    Assessment & Plan   Assessment & Plan     Active Hospital Problems    Diagnosis  POA    **Closed right hip fracture [S72.001A]  Yes    Thrombocytopenia [D69.6]  Unknown    OCTAVIA (acute kidney injury) [N17.9]  Yes    T2DM (type 2 diabetes mellitus) [E11.9]  Yes    Leukocytosis [D72.829]  Yes    Hyperkalemia [E87.5]  Yes      Resolved Hospital Problems   No resolved problems to display.        Brief Hospital Course to date:  Forrest Zepeda is a 89 y.o. male with past medical history significant for interstitial lung disease, saddle PE, BPH, HFpEF, chronic hydronephrosis d/t urinary stricture, CKD 3, T2DM, hypothyroidism, and PAF who presented to [facility] due to fall with severe right hip pain.     This patient's problems and plans were partially entered by my partner and updated as appropriate by me 03/25/25.   All problems are new to me today     Right hip fracture  -s/p repair on 3/23 per Dr Baltazar   -WBAT per  Ortho  -Eliquis for DVT PPX   -Pain Control   -Follow-up in 2-3 weeks with Dr Baltazar  -Continue covaderm dressing for 1 week after surgery and then remove and replace with a covaderm or dry dressing every other day. Keep incision covered at all times. DC staples on or around 2-3 weeks.      OCTAVIA on CKD III  Chronic hydronephrosis  -Follows with urologist Dr. Pitts   -Cr stable    TCP  -resolved     Acute blood loss anemia  -H&H stable     Hyperkalemia  -resolved     PAF  HFpEF  -continue Eliquis  -continue rate controlling meds, Bisoprolol     Acute on chronic hypoxia  PNA  ILD  -pt is supposed to be on 2L at baseline but is noncompliant  -schedule DuoNebs   -Continue Rocephin + Doxy  -RVP negative  -Blood Cx NGTD   -Incentive Spirometer   -Wean O2 as tolerated     Hx PE/DVT  -Continue Eliquis      T2DM  -hold home glimepiride, Januvia, Toujeo  -Lantus 10 units at HS  -Mod dose SSI  -Hgb A1C 6.9%     BPH  -continue Proscar     Hypothyroidism  -Synthroid     Decreased oral intake  -Add Boost TID     Constipation  -Bowel regimen     Expected Discharge Location and Transportation: Rehab   Expected Discharge   Expected Discharge Date: 3/26/2025; Expected Discharge Time:      VTE Prophylaxis:  Pharmacologic & mechanical VTE prophylaxis orders are present.         AM-PAC 6 Clicks Score (PT): 10 (03/25/25 1122)    CODE STATUS:   Code Status and Medical Interventions: No CPR (Do Not Attempt to Resuscitate); Limited Support; No intubation (DNI)   Ordered at: 03/21/25 0682     Code Status (Patient has no pulse and is not breathing):    No CPR (Do Not Attempt to Resuscitate)     Medical Interventions (Patient has pulse or is breathing):    Limited Support     Medical Intervention Limits:    No intubation (DNI)     Level Of Support Discussed With:    Patient       Nancy D Papi,   03/25/25

## 2025-03-25 NOTE — PLAN OF CARE
Goal Outcome Evaluation:  Plan of Care Reviewed With: patient, grandchild(shital)        Progress: improving  Outcome Evaluation: Pt demonstrated improvements in activity tolerance this session. Bed>chair transfer with max Ax2 and BUE support. No R knee buckling noted this session. Increased pain w/ all mobility. Pt with symptomatic orthostatic hypotension, RN aware. Further IPPT is warrented. PT will progress as able per POC.    Anticipated Discharge Disposition (PT): skilled nursing facility

## 2025-03-25 NOTE — CASE MANAGEMENT/SOCIAL WORK
Case Management Discharge Note      Final Note: Pt to [facility] Wed 3/26/2025. Nurse to call report [phone] Option 1 ask for South. Pt going to room 108. Discharge summary to be faxed to [phone]. EMS to pickup at bedside at 0930. PCS faxed and on chart. Erika COOK notified         Selected Continued Care - Admitted Since 3/21/2025       Destination Coordination complete.      Service Provider Services Address Phone Fax Patient Preferred    [facility] [address] [place] [id number] [phone] --              Durable Medical Equipment    No services have been selected for the patient.                Dialysis/Infusion    No services have been selected for the patient.                Home Medical Care    No services have been selected for the patient.                Therapy    No services have been selected for the patient.                Community Resources    No services have been selected for the patient.                Community & DME    No services have been selected for the patient.                    Transportation Services  Ambulance: [facility]    Final Discharge Disposition Code: 03 - skilled nursing facility (SNF)

## 2025-03-25 NOTE — THERAPY TREATMENT NOTE
Patient Name: Forrest Zepeda  : 1932    MRN: [id number]                              Today's Date: 3/25/2025       Admit Date: 3/21/2025    Visit Dx:     ICD-10-CM ICD-9-CM   1. Closed nondisplaced intertrochanteric fracture of right femur, initial encounter  S72.144A 820.21   2. Acute cystitis with hematuria  N30.01 595.0   3. Acute kidney injury  N17.9 584.9   4. Anticoagulated  Z79.01 V58.61   5. Interstitial lung disease  J84.9 515   6. Closed fracture of right hip, initial encounter  S72.001A 820.8     Patient Active Problem List   Diagnosis    Essential hypertension    Hyperkalemia    Generalized osteoarthritis    Diabetic neuropathy    Gout    HLD (hyperlipidemia)    Acquired hypothyroidism    Leukocytosis    Ureteral stricture    Hydronephrosis of left kidney    CKD (chronic kidney disease) stage 3, GFR 30-59 ml/min    LEHMAN (dyspnea on exertion)    Pulmonary fibrosis    Symptomatic bradycardia    Hypoxemia requiring supplemental oxygen    History of pulmonary embolism    Sinus node dysfunction    Chronic fatigue    Urethral stricture    Paroxysmal atrial fibrillation    Type 2 diabetes mellitus with hyperglycemia, with long-term current use of insulin    Abnormal cardiovascular stress test    Pulmonary hypertension    Cardiac pacemaker in situ    CAD (coronary artery disease)    Diastolic congestive heart failure    Abnormal SPEP    Hydronephrosis, left    Gross hematuria    Chronic heart failure with preserved ejection fraction (HFpEF)    Vasovagal syncope    Hematuria    Acute UTI (urinary tract infection)    Acute kidney injury superimposed on chronic kidney disease    Anemia, chronic disease    T2DM (type 2 diabetes mellitus)    Generalized weakness    Closed right hip fracture    OCTAVIA (acute kidney injury)    Thrombocytopenia     Past Medical History:   Diagnosis Date    Abnormal EKG     Abnormal PSA     Reported abnormal    Acute cystitis with hematuria 2023    Acute deep vein thrombosis  "(DVT) of right lower extremity 08/22/2019    Acute diverticulitis 2019    Acute hyperkalemia 08/22/2019    Acute respiratory failure with hypoxia     Acute saddle pulmonary embolism with acute cor pulmonale 08/22/2019    Acute systolic right heart failure 08/22/2019    Acute UTI (urinary tract infection) 10/9/2023    Arthritis     KNEES,  BUT SINCE HAD REPLACEMENT    Benign localized hyperplasia of prostate     Bilateral knee pain     C. difficile diarrhea 2010    Cataracts, bilateral     CKD (chronic kidney disease)     Coronary artery disease     Diabetic neuropathy     Diastolic dysfunction     Disease of thyroid gland     HYPOTHYROIDISM    Diverticulitis     Dyslipidemia     H/O    Erectile dysfunction     Family history of malignant hyperthermia     Brother     Full dentures     Generalized weakness     History of ESBL E. coli infection     most recent 4-2022 f/u with ID Dr Johnson    History of gout     History of hepatitis A vaccination     History of nephrolithiasis     History of nuclear stress test     STATES IN THE 1990'S.  \"IT WAS OK\"    History of osteopenia     History of recurrent UTI (urinary tract infection)     Hydronephrosis of left kidney 07/18/2019    Hydronephrosis with renal and ureteral calculus obstruction 10/21/2019    Added automatically from request for surgery [id number]    Hydronephrosis with ureteral stricture     Hypercholesterolemia     Hyperkalemia     PT UNSURE REGARDING HX OF THIS    Hyperlipidemia     Hypertension     Hypothyroidism     Impaired functional mobility, balance, gait, and endurance     Insomnia     IPF (idiopathic pulmonary fibrosis)     per patient and son, dx at [facility], was told no treatment necessary, not to worry about it    On supplemental oxygen therapy     2L NC QHS    Other hydronephrosis 08/12/2019    Added automatically from request for surgery [id number]    Paroxysmal atrial fibrillation     Pulmonary fibrosis     Pulmonary hypertension, mild 02/2021    per echo " per cardiology note 1/9/23, per pt's son, PCP does not agree with this dx    Renal insufficiency     Sepsis 2019    Shortness of breath     STATES WITH EXERTION, OTHERWISE, DENIES    Sigmoid diverticulitis without abscess or perforation 08/09/2017    Sinus node dysfunction     Skin breakdown     fourth toe right foot noted in 2019 MD D/C note    Skin ulcer of fourth toe of right foot, limited to breakdown of skin 07/05/2019    Stricture of ureter     Type 2 diabetes mellitus     Ureteral stricture, left     Urinary incontinence     UTI (urinary tract infection) 07/18/2019    Vitamin D deficiency     Wears glasses      Past Surgical History:   Procedure Laterality Date    APPENDECTOMY      CARDIAC ELECTROPHYSIOLOGY PROCEDURE N/A 12/23/2020    Procedure: Pacemaker DC new. DNS meds.;  Surgeon: Jimy Ledezma DO;  Location:  [facility];  Service: Cardiology;  Laterality: N/A;    CHOLECYSTECTOMY      CIRCUMCISION N/A 10/23/2019    Procedure: CIRCUMCISIOn-DORSAL SLIT;  Surgeon: Griffin Romero MD;  Location:  [facility];  Service: Urology    COLONOSCOPY      CYSTOSCOPY RETROGRADE PYELOGRAM Left 06/17/2022    Procedure: CYSTOSCOPY RETROGRADE PYELOGRAM;  Surgeon: Ryne Mccann MD;  Location:  [facility];  Service: Urology;  Laterality: Left;    CYSTOSCOPY RETROGRADE PYELOGRAM Left 7/10/2024    Procedure: CYSTOSCOPY RETROGRADE PYELOGRAM AND STENT INSERTION LEFT;  Surgeon: Deanne Pitts MD;  Location:  [facility];  Service: Urology;  Laterality: Left;    CYSTOSCOPY URETEROSCOPY Left 02/11/2019    Procedure: URETEROSCOPY WITH STENT EXTRACTION, RPG;  Surgeon: Griffin Romero MD;  Location:  [facility];  Service: Urology    CYSTOSCOPY W/ URETERAL STENT PLACEMENT Left 07/20/2019    Procedure: CYSTOSCOPY, LEFT RETROGRADE PYELOGRAM,  ATTEMPTED LEFT URETERAL STENT INSERTION;  Surgeon: Isaac Flynn MD;  Location:  [facility];  Service: Urology    CYSTOSCOPY W/ URETERAL STENT PLACEMENT Left 06/17/2022     Procedure: CYSTOSCOPY URETERAL CATHETER/STENT INSERTION;  Surgeon: Ryne Mccann MD;  Location:  [facility];  Service: Urology;  Laterality: Left;    CYSTOSCOPY W/ URETERAL STENT PLACEMENT Left 01/27/2023    Procedure: CYSTOSCOPY URETERAL CATHETER/STENT INSERTION;  Surgeon: Deanne Pitts MD;  Location:  [facility];  Service: Urology;  Laterality: Left;    CYSTOSCOPY W/ URETERAL STENT PLACEMENT Left 02/15/2024    Procedure: CYSTOSCOPY LEFT STENT EXCHANGE;  Surgeon: Deanne Pitts MD;  Location:  [facility];  Service: Urology;  Laterality: Left;    CYSTOSCOPY, URETEROSCOPY, RETROGRADE PYELOGRAM, STONE EXTRACTION, STENT INSERTION Left 06/15/2023    Procedure: URETEROSCOPY, RETROGRADE PYELOGRAM, STENT INSERTION;  Surgeon: Deanne Pitts MD;  Location:  [facility];  Service: Urology;  Laterality: Left;    CYSTOSCOPY, URETEROSCOPY, RETROGRADE PYELOGRAM, STONE EXTRACTION, STENT INSERTION Left 11/22/2024    Procedure: CYSTOSCOPY RETROGRADE PYELOGRAM AND STENT INSERTION LEFT;  Surgeon: Deanne Pitts MD;  Location:  [facility];  Service: Urology;  Laterality: Left;    EYE SURGERY      CATARACTS REMOVED BILATERALLY    HIP TROCHANTERIC NAILING WITH INTRAMEDULLARY HIP SCREW Right 3/23/2025    Procedure: HIP TROCHANTERIC NAILING RIGHT;  Surgeon: Brodie Baltazar MD;  Location:  [facility];  Service: Orthopedics;  Laterality: Right;    JOINT REPLACEMENT      Bilateral knees    KNEE ARTHROPLASTY Bilateral     KNEE ARTHROSCOPY Bilateral     RENAL ARTERY STENT      SEVERAL TIMES EVERY SINCE 1978    URETERAL STENT INSERTION  10/2020    URETEROSCOPY LASER LITHOTRIPSY WITH STENT INSERTION Left 01/10/2018    Procedure:  cysto, left ureteral stent replacement ;  Surgeon: Griffin Romero MD;  Location:  [facility];  Service:     URETEROSCOPY LASER LITHOTRIPSY WITH STENT INSERTION Left 08/14/2019    Procedure: URETEROSCOPY, STONE REMOVAL WITH STENT INSERTION;  Surgeon: Griffin Romero MD;  Location:  [facility];  Service: Urology    URETEROSCOPY LASER  LITHOTRIPSY WITH STENT INSERTION Left 10/23/2019    Procedure: Left URETEROSCOPY WITH STENT INSERTION-LEFT;  Surgeon: Griffin Romero MD;  Location: [facility];  Service: Urology      General Information       Row Name 03/25/25 1112          Physical Therapy Time and Intention    Document Type therapy note (daily note)  -AB     Mode of Treatment physical therapy  -AB       Row Name 03/25/25 1112          General Information    Patient Profile Reviewed yes  -AB     Existing Precautions/Restrictions fall;oxygen therapy device and L/min;other (see comments);orthostatic hypotension   monitor BP, s/p R IMN WBAT  -AB     Barriers to Rehab medically complex;cognitive status  conversational confusion  -AB       Row Name 03/25/25 1112          Cognition    Orientation Status (Cognition) oriented to;place;person;verbal cues/prompts needed for orientation;time  -AB       Row Name 03/25/25 1112          Safety Issues/Impairments Affecting Functional Mobility    Safety Issues Affecting Function (Mobility) awareness of need for assistance;insight into deficits/self-awareness;safety precaution awareness;safety precautions follow-through/compliance;sequencing abilities;problem-solving;judgment  -AB     Impairments Affecting Function (Mobility) balance;cognition;endurance/activity tolerance;motor control;pain;range of motion (ROM);shortness of breath;strength  -AB     Cognitive Impairments, Mobility Safety/Performance awareness, need for assistance;insight into deficits/self-awareness;judgment;problem-solving/reasoning;sequencing abilities;safety precaution follow-through;safety precaution awareness  -AB     Comment, Safety Issues/Impairments (Mobility) Alert and following all commands. Intermittent conversational confusion noted  -AB               User Key  (r) = Recorded By, (t) = Taken By, (c) = Cosigned By      Initials Name Provider Type    AB Leela Modi, PT Physical Therapist                   Mobility       Row Name  03/25/25 1114          Bed Mobility    Bed Mobility scooting/bridging;supine-sit  -AB     Scooting/Bridging [place] (Bed Mobility) verbal cues;nonverbal cues (demo/gesture);2 person assist;moderate assist (50% patient effort)  -AB     Supine-Sit [place] (Bed Mobility) maximum assist (25% patient effort);2 person assist;verbal cues;nonverbal cues (demo/gesture)  -AB     Assistive Device (Bed Mobility) bed rails;head of bed elevated;repositioning sheet  -AB     Comment, (Bed Mobility) Assist provided at trunk and w/ BLE management. Increased pain w/ movement. Able to use BLE for assist scooting hips to EOB.  -AB       Row Name 03/25/25 1114          Transfers    Comment, (Transfers) Cues for hand placement, sequencing, and PLB. Pt w/ symptomatic orthostatic hypotension, RN aware. BP are as follows: supine 142/67, sitting 121/59, post transfer to chair 107/59.  -AB       Row Name 03/25/25 1114          Bed-Chair Transfer    Bed-Chair [place] (Transfers) maximum assist (25% patient effort);2 person assist;verbal cues;nonverbal cues (demo/gesture)  -AB     Assistive Device (Bed-Chair Transfers) other (see comments)  BUE support  -AB     Comment, (Bed-Chair Transfer) Pt took short shuffling steps to chair w/ max A for improved stability. No RLE buckling noted this session, however pt demonstrated limited WBing.  -AB       Row Name 03/25/25 1114          Sit-Stand Transfer    Sit-Stand [place] (Transfers) moderate assist (50% patient effort);2 person assist;verbal cues;nonverbal cues (demo/gesture)  -AB     Assistive Device (Sit-Stand Transfers) other (see comments)  BUE support  -AB     Comment, (Sit-Stand Transfer) Strong boost to stand w/ b/l knee buckling.  -AB       Row Name 03/25/25 1114          Gait/Stairs (Locomotion)    [place] Level (Gait) maximum assist (25% patient effort);2 person assist;verbal cues;nonverbal cues (demo/gesture)  -AB     Assistive Device (Gait) other (see comments)   COLIN suppport  -AB     Patient was able to Ambulate yes  -AB     Distance in Feet (Gait) 2  -AB     Deviations/Abnormal Patterns (Gait) bilateral deviations;base of support, narrow;stride length decreased  -AB     Bilateral Gait Deviations heel strike decreased  -AB     Right Sided Gait Deviations decreased knee extension;weight shift ability decreased  -AB     Comment, (Gait/Stairs) See above for details.  -AB       Row Name 03/25/25 1114          Mobility    Extremity Weight-bearing Status right lower extremity  -AB     Right Lower Extremity (Weight-bearing Status) weight-bearing as tolerated (WBAT)  -AB               User Key  (r) = Recorded By, (t) = Taken By, (c) = Cosigned By      Initials Name Provider Type    AB Leela Modi, PT Physical Therapist                   Obj/Interventions       Row Name 03/25/25 1119          Balance    Balance Assessment sitting static balance;sitting dynamic balance;standing static balance;standing dynamic balance  -AB     Static Sitting Balance contact guard  -AB     Dynamic Sitting Balance contact guard  -AB     Position, Sitting Balance unsupported;sitting edge of bed  -AB     Static Standing Balance verbal cues;non-verbal cues (demo/gesture);moderate assist  -AB     Dynamic Standing Balance maximum assist;2-person assist  -AB     Position/Device Used, Standing Balance supported  -AB     Balance Interventions sitting;standing;sit to stand;supported;dynamic;static;occupation based/functional task  -AB     Comment, Balance No R knee buckling this session. Continues to require max A to maintain standing.  -AB               User Key  (r) = Recorded By, (t) = Taken By, (c) = Cosigned By      Initials Name Provider Type    AB Leela Modi, PT Physical Therapist                   Goals/Plan    No documentation.                  Clinical Impression       Row Name 03/25/25 1119          Pain    Pain Location hip  -AB     Pain Side/Orientation right;generalized  -AB     Pain  Management Interventions activity modification encouraged;exercise or physical activity utilized;positioning techniques utilized;nursing notified  -AB     Response to Pain Interventions activity participation with increased pain  -AB     Pre/Posttreatment Pain Comment Increased pain w/ mobility, RN aware  -AB     Additional Documentation Pain Scale: FACES Pre/Post-Treatment (Group)  -AB       Row Name 03/25/25 1119          Pain Scale: FACES Pre/Post-Treatment    Pain: FACES Scale, Pretreatment 0-->no hurt  -AB     Posttreatment Pain Rating 6-->hurts even more  -AB       Row Name 03/25/25 1119          Plan of Care Review    Plan of Care Reviewed With patient;tawana(shital)  -AB     Progress improving  -AB     Outcome Evaluation Pt demonstrated improvements in activity tolerance this session. Bed>chair transfer with max Ax2 and BUE support. No R knee buckling noted this session. Increased pain w/ all mobility. Pt with symptomatic orthostatic hypotension, RN aware. Further IPPT is warrented. PT will progress as able per POC.  -AB       Row Name 03/25/25 1119          Vital Signs    Pre Systolic BP Rehab 142  -AB     Pre Treatment Diastolic BP 67  -AB     Intra Systolic BP Rehab 121  -AB     Intra Treatment Diastolic BP 59  -AB     Post Systolic BP Rehab 107  -AB     Post Treatment Diastolic BP 59  -AB     Pretreatment Heart Rate (beats/min) 80  -AB     Posttreatment Heart Rate (beats/min) 82  -AB     Pre SpO2 (%) 92  -AB     O2 Delivery Pre Treatment nasal cannula  -AB     Intra SpO2 (%) 88   on 6 L  -AB     O2 Delivery Intra Treatment nasal cannula  -AB     Post SpO2 (%) 96  -AB     O2 Delivery Post Treatment nasal cannula  -AB     Pre Patient Position Supine  -AB     Intra Patient Position Standing  -AB     Post Patient Position Sitting  -AB       Row Name 03/25/25 1119          Positioning and Restraints    Pre-Treatment Position in bed  -AB     Post Treatment Position chair  -AB     In Chair notified  nsg;reclined;sitting;call light within reach;encouraged to call for assist;exit alarm on;with family/caregiver;legs elevated;waffle cushion;on mechanical lift sling;with nsg  -AB               User Key  (r) = Recorded By, (t) = Taken By, (c) = Cosigned By      Initials Name Provider Type    Leela Jeffrey, PT Physical Therapist                   Outcome Measures       Row Name 03/25/25 1122          How much help from another person do you currently need...    Turning from your back to your side while in flat bed without using bedrails? 2  -AB     Moving from lying on back to sitting on the side of a flat bed without bedrails? 2  -AB     Moving to and from a bed to a chair (including a wheelchair)? 2  -AB     Standing up from a chair using your arms (e.g., wheelchair, bedside chair)? 2  -AB     Climbing 3-5 steps with a railing? 1  -AB     To walk in hospital room? 1  -AB     AM-PAC 6 Clicks Score (PT) 10  -AB     Highest Level of Mobility Goal 4 --> Transfer to chair/commode  -AB       Row Name 03/25/25 1122          Functional Assessment    Outcome Measure Options AM-PAC 6 Clicks Basic Mobility (PT)  -AB               User Key  (r) = Recorded By, (t) = Taken By, (c) = Cosigned By      Initials Name Provider Type    Leela Jeffrey, PT Physical Therapist                                 Physical Therapy Education       Title: PT OT SLP Therapies (Done)       Topic: Physical Therapy (Done)       Point: Mobility training (Done)       Learning Progress Summary            Patient Acceptance, E,D, VU,NR by AB at 3/25/2025 1122    Eager, E, VU,DU,NR by  at 3/24/2025 1050    Comment: Educated pt. safety/technique w/bed mobility, transfers, HEP, PT POC   Family Eager, E, VU,DU,NR by  at 3/24/2025 1050    Comment: Educated pt. safety/technique w/bed mobility, transfers, HEP, PT POC                      Point: Home exercise program (Done)       Learning Progress Summary            Patient Eager, E, VU,DU,NR by   at 3/24/2025 1050    Comment: Educated pt. safety/technique w/bed mobility, transfers, HEP, PT POC   Family Eager, E, VU,DU,NR by  at 3/24/2025 1050    Comment: Educated pt. safety/technique w/bed mobility, transfers, HEP, PT POC                      Point: Body mechanics (Done)       Learning Progress Summary            Patient Acceptance, E,D, VU,NR by AB at 3/25/2025 1122    Eager, E, VU,DU,NR by  at 3/24/2025 1050    Comment: Educated pt. safety/technique w/bed mobility, transfers, HEP, PT POC   Family Eager, E, VU,DU,NR by  at 3/24/2025 1050    Comment: Educated pt. safety/technique w/bed mobility, transfers, HEP, PT POC                      Point: Precautions (Done)       Learning Progress Summary            Patient Acceptance, E,D, VU,NR by AB at 3/25/2025 1122    Eager, E, VU,DU,NR by  at 3/24/2025 1050    Comment: Educated pt. safety/technique w/bed mobility, transfers, HEP, PT POC   Family Eager, E, VU,DU,NR by  at 3/24/2025 1050    Comment: Educated pt. safety/technique w/bed mobility, transfers, HEP, PT POC                                      User Key       Initials Effective Dates Name Provider Type Discipline     06/01/21 -  Terri Cabrera, PT Physical Therapist PT    AB 09/22/22 -  Leela Modi, PT Physical Therapist PT                  PT Recommendation and Plan     Progress: improving  Outcome Evaluation: Pt demonstrated improvements in activity tolerance this session. Bed>chair transfer with max Ax2 and BUE support. No R knee buckling noted this session. Increased pain w/ all mobility. Pt with symptomatic orthostatic hypotension, RN aware. Further IPPT is warrented. PT will progress as able per POC.     Time Calculation:         PT Charges       Row Name 03/25/25 1123             Time Calculation    Start Time 1019  -AB      PT Received On 03/25/25  -AB         Timed Charges    88989 - PT Therapeutic Activity Minutes 24  -AB         Total Minutes    Timed Charges Total Minutes 24   -AB       Total Minutes 24  -AB                User Key  (r) = Recorded By, (t) = Taken By, (c) = Cosigned By      Initials Name Provider Type    AB Leela Modi, PT Physical Therapist                  Therapy Charges for Today       Code Description Service Date Service Provider Modifiers Qty    25178213746  PT THERAPEUTIC ACT EA 15 MIN 3/25/2025 Leela Modi, PT GP 2    84186353073 HC PT THER SUPP EA 15 MIN 3/25/2025 Leela Modi, PT GP 2            PT G-Codes  Outcome Measure Options: AM-PAC 6 Clicks Basic Mobility (PT)  AM-PAC 6 Clicks Score (PT): 10  AM-PAC 6 Clicks Score (OT): 12  PT Discharge Summary  Anticipated Discharge Disposition (PT): skilled nursing facility    Leela Modi PT  3/25/2025

## 2025-03-25 NOTE — PROGRESS NOTES
"          Orthopaedic Surgery Progress Note    LOS: 4 days   Patient Care Team:  Kary Wen MD as PCP - General (Family Medicine)  Breeding, Win CROOKS MD as Consulting Physician (Cardiology)  Deanne Pitts MD as Consulting Physician (Urology)  2 Days Post-Op   Procedure(s):  RIGHT HIP TROCHANTERIC NAILING  Subjective     Interval History:   Patient lying in bed.  Reports pain is well-controlled.  Granddaughter at bedside.  Reports did not work with therapy yesterday due to oxygen desaturation.  No other complaints.    Objective     Vital Signs:  Temp (24hrs), Av.3 °F (36.8 °C), Min:98.1 °F (36.7 °C), Max:98.7 °F (37.1 °C)    /60 (BP Location: Right arm, Patient Position: Lying)   Pulse 77   Temp 98.1 °F (36.7 °C) (Oral)   Resp 18   Ht 175.3 cm (69\")   Wt 83.9 kg (185 lb)   SpO2 93%   BMI 27.32 kg/m²   Body mass index is 27.32 kg/m².    Labs:  Lab Results (last 24 hours)       Procedure Component Value Units Date/Time    POC Glucose Once [[id number]]  (Abnormal) Collected: 25 0754    Specimen: Blood Updated: 25 0755     Glucose 149 mg/dL     POC Glucose Once [[id number]]  (Abnormal) Collected: 25 2041    Specimen: Blood Updated: 25 2044     Glucose 168 mg/dL     POC Glucose Once [[id number]]  (Normal) Collected: 25 1600    Specimen: Blood Updated: 25 1602     Glucose 120 mg/dL     Blood Culture - Blood, Hand, Right [[id number]]  (Normal) Collected: 25 1245    Specimen: Blood from Hand, Right Updated: 25 1315     Blood Culture No growth at 2 days    Narrative:      Less than seven (7) mL's of blood was collected.  Insufficient quantity may yield false negative results.    Blood Culture - Blood, Hand, Left [[id number]]  (Normal) Collected: 25 1249    Specimen: Blood from Hand, Left Updated: 25 1315     Blood Culture No growth at 2 days    Narrative:      Less than seven (7) mL's of blood was collected.  Insufficient quantity may yield " false negative results.    POC Glucose Once [[id number]]  (Abnormal) Collected: 03/24/25 1121    Specimen: Blood Updated: 03/24/25 1125     Glucose 185 mg/dL             Physical Exam:  right LE: Covaderm dressing in place, clean dry and intact with no spotting  compartments soft/compressible leg and thigh   Actively wiggling toes as well as dorsiflexing and plantar flexing ankle and knee   SILT in foot in all distributions   Palpable PT pulse, CR brisk in all toes    Assessment/Plan:  2 Days Post-Op status post:  Procedure(s):  RIGHT HIP TROCHANTERIC NAILING    -Anemia, acute blood loss anemia following hip fracture  -Weight bearing as tolerated with assistance and support devices (walker, wheelchair) as needed  -Antibiotics - complete 24 hour postoperative course of IV antibioitics  -Advance diet as tolerated, diet per primary  -Continue Covaderm dressing, change of saturated  -PT/OT Consult - Weight bearing as tolerated  -Ice hip  -DVT Prophylaxis - Eliquis   -Osteoporotic hip fracture - I recommend informing the patient's PCP regarding osteoporotic fracture/consideration of enrolling patient in osteoporotic care program postoperatively  -Follow-up, upon discharge, patient will need follow-up with me in the clinic in 2-3 weeks' time.  Continue DVT ppx for 30 days.  Continue covaderm dressing for 1 week after surgery and then remove and replace with a covaderm or dry dressing every other day.  Keep incision covered at all times.  DC staples on or around 2-3 weeks. The  will need to call my office/[facility] to arrange for a discharge follow-up appointment in 2-3 weeks.     Brodie Baltazar MD  03/25/25  08:17 EDT

## 2025-03-25 NOTE — DISCHARGE PLACEMENT REQUEST
"Forrest Zepeda \"Jarrett\" (89 y.o. Male)       Date of Birth   1932    Social Security Number       Address   [address] [place], [place] [address]    Home Phone   [phone]    MRN   [id number]       [facility]    Marital Status                               Admission Date   3/21/2025    Admission Type   Emergency    Admitting Provider   Nancy Turpin DO    Attending Provider   Nancy Turpin DO    Department, Room/Bed   [facility] 3F, S327/1       Discharge Date       Discharge Disposition       Discharge Destination                                 Attending Provider: Nancy Turpin DO    Allergies: Statins, Metformin    Isolation: None   Infection: None   Code Status: No CPR    Ht: 175.3 cm (69\")   Wt: 83.9 kg (185 lb)    Admission Cmt: None   Principal Problem: Closed right hip fracture [S72.001A]                   Active Insurance as of 3/21/2025       Primary Coverage       Payor Plan Insurance Group Employer/Plan Group    HUMANA MEDICARE REPLACEMENT [employer] MEDICARE ADVANTAGE GROUP [id number]       Payor Plan Address Payor Plan Phone Number Payor Plan Fax Number Effective Dates    [address] [address] [phone]  2018 - None Entered    [facility] [id number]         Subscriber Name Subscriber Birth Date Member ID       FORREST ZEPEDA 1932 [id number]                     Emergency Contacts        (Rel.) Home Phone Work Phone Mobile Phone    MARCUS DIMAS (Grandchild) [phone] -- --    Eron Zepeda (Power of ) [phone] -- --    Mariam Dior (Daughter) [phone] -- --                 History & Physical        Ethan Martinez APRN at 25 181       Attestation signed by Shannon Zendejas MD at 25    I have reviewed this documentation and agree.                      [facility] Medicine Services  HISTORY AND PHYSICAL    Patient Name: Forrest Zepeda  : 1932  MRN: [id number]  Primary Care " Physician: Kary Wen MD  Date of admission: 3/21/2025    Subjective  Subjective     Chief Complaint:  Fall, right hip pain     HPI:  Forrest Zepeda is a 89 y.o. male with past medical history significant interstitial lung disease, saddle PE, BPH, HFpEF, chronic hydronephrosis d/t urinary stricture, CKD 3, T2DM, hypothyroidism, and PAF who presented to [facility] due to fall with severe right hip pain.  Patient reports that he got up last night to use the restroom and when he went to sit back down in bed he slipped off the bed and fell to the floor.  Patient denies head injury or loss of consciousness.  States that he landed on his right hip/leg.  Patient was unable to bear weight following the fall.  EMS was called and is brought to the ED for further evaluation.  Patient reports history of chronic hydronephrosis due to urinary stricture.  He follows with urologist Dr. Pitts typically has ureteral stent replaced every few months.    In the ED, pelvic xray revealed intertrochanteric right femur fracture.  Labs were reviewed and significant for potassium 5.7, creatinine 2.02, glucose 258, and WBC 15.92.  UA revealed turbid appearance with 3+ blood, 3+ leukocytes,  and 1+ bacteria.  Vital signs were reviewed and are currently unremarkable. The patient will be admitted by hospital medicine for further evaluation and treatment.     Personal History     Past Medical History:   Diagnosis Date    Abnormal EKG     Abnormal PSA     Reported abnormal    Acute cystitis with hematuria 05/01/2023    Acute deep vein thrombosis (DVT) of right lower extremity 08/22/2019    Acute diverticulitis 2019    Acute hyperkalemia 08/22/2019    Acute respiratory failure with hypoxia     Acute saddle pulmonary embolism with acute cor pulmonale 08/22/2019    Acute systolic right heart failure 08/22/2019    Acute UTI (urinary tract infection)     Arthritis     KNEES,  BUT SINCE HAD REPLACEMENT    Benign localized hyperplasia of prostate   "   Bilateral knee pain     C. difficile diarrhea 2010    Cataracts, bilateral     CKD (chronic kidney disease)     Coronary artery disease     Diabetic neuropathy     Diastolic dysfunction     Disease of thyroid gland     HYPOTHYROIDISM    Diverticulitis     Dyslipidemia     H/O    Erectile dysfunction     Family history of malignant hyperthermia     Brother     Full dentures     Generalized weakness     History of ESBL E. coli infection     most recent 4-2022 f/u with ID Dr Johnson    History of gout     History of hepatitis A vaccination     History of nephrolithiasis     History of nuclear stress test     STATES IN THE 1990'S.  \"IT WAS OK\"    History of osteopenia     History of recurrent UTI (urinary tract infection)     Hydronephrosis of left kidney 07/18/2019    Hydronephrosis with renal and ureteral calculus obstruction 10/21/2019    Added automatically from request for surgery [id number]    Hydronephrosis with ureteral stricture     Hypercholesterolemia     Hyperkalemia     PT UNSURE REGARDING HX OF THIS    Hyperlipidemia     Hypertension     Hypothyroidism     Impaired functional mobility, balance, gait, and endurance     Insomnia     IPF (idiopathic pulmonary fibrosis)     per patient and son, dx at [facility], was told no treatment necessary, not to worry about it    On supplemental oxygen therapy     2L NC QHS    Other hydronephrosis 08/12/2019    Added automatically from request for surgery [id number]    Paroxysmal atrial fibrillation     Pulmonary fibrosis     Pulmonary hypertension, mild 02/2021    per echo per cardiology note 1/9/23, per pt's son, PCP does not agree with this dx    Renal insufficiency     Sepsis 2019    Shortness of breath     STATES WITH EXERTION, OTHERWISE, DENIES    Sigmoid diverticulitis without abscess or perforation 08/09/2017    Sinus node dysfunction     Skin breakdown     fourth toe right foot noted in 2019 MD D/C note    Skin ulcer of fourth toe of right foot, limited to breakdown of " skin 07/05/2019    Stricture of ureter     Type 2 diabetes mellitus     Ureteral stricture, left     Urinary incontinence     UTI (urinary tract infection) 07/18/2019    Vitamin D deficiency     Wears glasses              Past Surgical History:   Procedure Laterality Date    APPENDECTOMY      CARDIAC ELECTROPHYSIOLOGY PROCEDURE N/A 12/23/2020    Procedure: Pacemaker DC new. DNS meds.;  Surgeon: Jimy Ledezma DO;  Location:  [facility];  Service: Cardiology;  Laterality: N/A;    CHOLECYSTECTOMY      CIRCUMCISION N/A 10/23/2019    Procedure: CIRCUMCISIOn-DORSAL SLIT;  Surgeon: Griffin Romero MD;  Location:  [facility];  Service: Urology    COLONOSCOPY      CYSTOSCOPY RETROGRADE PYELOGRAM Left 06/17/2022    Procedure: CYSTOSCOPY RETROGRADE PYELOGRAM;  Surgeon: Ryne Mccann MD;  Location:  [facility];  Service: Urology;  Laterality: Left;    CYSTOSCOPY RETROGRADE PYELOGRAM Left 7/10/2024    Procedure: CYSTOSCOPY RETROGRADE PYELOGRAM AND STENT INSERTION LEFT;  Surgeon: Deanne Pitts MD;  Location:  [facility];  Service: Urology;  Laterality: Left;    CYSTOSCOPY URETEROSCOPY Left 02/11/2019    Procedure: URETEROSCOPY WITH STENT EXTRACTION, RPG;  Surgeon: Griffin Romero MD;  Location:  [facility];  Service: Urology    CYSTOSCOPY W/ URETERAL STENT PLACEMENT Left 07/20/2019    Procedure: CYSTOSCOPY, LEFT RETROGRADE PYELOGRAM,  ATTEMPTED LEFT URETERAL STENT INSERTION;  Surgeon: Isaac Flynn MD;  Location:  [facility];  Service: Urology    CYSTOSCOPY W/ URETERAL STENT PLACEMENT Left 06/17/2022    Procedure: CYSTOSCOPY URETERAL CATHETER/STENT INSERTION;  Surgeon: Ryne Mccann MD;  Location:  [facility];  Service: Urology;  Laterality: Left;    CYSTOSCOPY W/ URETERAL STENT PLACEMENT Left 01/27/2023    Procedure: CYSTOSCOPY URETERAL CATHETER/STENT INSERTION;  Surgeon: Deanne Pitts MD;  Location:  [facility];  Service: Urology;  Laterality: Left;    CYSTOSCOPY W/ URETERAL STENT PLACEMENT Left 02/15/2024     Procedure: CYSTOSCOPY LEFT STENT EXCHANGE;  Surgeon: Deanne Pitts MD;  Location:  [facility];  Service: Urology;  Laterality: Left;    CYSTOSCOPY, URETEROSCOPY, RETROGRADE PYELOGRAM, STONE EXTRACTION, STENT INSERTION Left 06/15/2023    Procedure: URETEROSCOPY, RETROGRADE PYELOGRAM, STENT INSERTION;  Surgeon: Deanne Pitts MD;  Location:  [facility];  Service: Urology;  Laterality: Left;    CYSTOSCOPY, URETEROSCOPY, RETROGRADE PYELOGRAM, STONE EXTRACTION, STENT INSERTION Left 11/22/2024    Procedure: CYSTOSCOPY RETROGRADE PYELOGRAM AND STENT INSERTION LEFT;  Surgeon: Deanne Pitts MD;  Location:  [facility];  Service: Urology;  Laterality: Left;    EYE SURGERY      CATARACTS REMOVED BILATERALLY    JOINT REPLACEMENT      Bilateral knees    KNEE ARTHROPLASTY Bilateral     KNEE ARTHROSCOPY Bilateral     RENAL ARTERY STENT      SEVERAL TIMES EVERY SINCE 1978    URETERAL STENT INSERTION  10/2020    URETEROSCOPY LASER LITHOTRIPSY WITH STENT INSERTION Left 01/10/2018    Procedure:  cysto, left ureteral stent replacement ;  Surgeon: Griffin Romero MD;  Location:  [facility];  Service:     URETEROSCOPY LASER LITHOTRIPSY WITH STENT INSERTION Left 08/14/2019    Procedure: URETEROSCOPY, STONE REMOVAL WITH STENT INSERTION;  Surgeon: Griffin Romero MD;  Location:  [facility];  Service: Urology    URETEROSCOPY LASER LITHOTRIPSY WITH STENT INSERTION Left 10/23/2019    Procedure: Left URETEROSCOPY WITH STENT INSERTION-LEFT;  Surgeon: Griffin Romero MD;  Location:  [facility];  Service: Urology       Family History:  family history includes Brain cancer in his brother and sister; Heart attack in his sister; Hypertension in his sister; Lung cancer in his brother and sister; Malig Hyperthermia in his brother; No Known Problems in his mother; Stomach cancer in his father; Stroke in his sister.     Social History:  reports that he quit smoking about 74 years ago. His smoking use included cigarettes. He has a 0.5 pack-year smoking  history. He has been exposed to tobacco smoke. He has never used smokeless tobacco. He reports that he does not drink alcohol and does not use drugs.  Social History     Social History Narrative    Pt lives alone in [place]    Granddaughter Erika is nurse.     Had 7 brothers and 3 sisters.     Uses a cane and oxygen as needed    Still drives       Medications:  HYDROcodone-acetaminophen, Insulin Glargine (1 Unit Dial), Mirabegron ER, Multiple Vitamins-Minerals, SITagliptin, allopurinol, apixaban, aspirin, bisoprolol, finasteride, glimepiride, and levothyroxine    Allergies   Allergen Reactions    Statins Diarrhea and Myalgia    Metformin Diarrhea and GI Intolerance       Objective  Objective     Vital Signs:   Temp:  [98.5 °F (36.9 °C)] 98.5 °F (36.9 °C)  Heart Rate:  [73-80] 73  Resp:  [18] 18  BP: (113-149)/(67-81) 149/77    Physical Exam   Constitutional: Awake, alert, chronically ill appearing, pleasant   Eyes: PERRLA, sclerae anicteric, no conjunctival injection  HENT: NCAT, mucous membranes moist  Neck: Supple, no thyromegaly, no lymphadenopathy, trachea midline  Respiratory: Clear to auscultation bilaterally, nonlabored respirations   Cardiovascular: RRR, no murmurs, rubs, or gallops, palpable pedal pulses bilaterally  Gastrointestinal: Positive bowel sounds, soft, nontender, nondistended  Musculoskeletal: no bilateral ankle edema, right hip tender to palpation  Psychiatric: Appropriate affect, cooperative  Neurologic: Oriented x 3, moves all extremities, speech clear  Skin: warm, dry, no visible rash     Result Review:  I have personally reviewed the results from the time of this admission to 3/21/2025 18:50 EDT and agree with these findings:  [x]  Laboratory list / accordion  [x]  Microbiology  [x]  Radiology  [x]  EKG/Telemetry   []  Cardiology/Vascular   []  Pathology  [x]  Old records  []  Other:  Most notable findings include:     LAB RESULTS:      Lab 03/21/25  1525   WBC 15.92*   HEMOGLOBIN 13.7    HEMATOCRIT 43.1   PLATELETS 193   NEUTROS ABS 14.07*   IMMATURE GRANS (ABS) 0.11*   LYMPHS ABS 1.04   MONOS ABS 0.54   EOS ABS 0.10   MCV 94.5         Lab 03/21/25  1525   SODIUM 142   POTASSIUM 5.7*   CHLORIDE 106   CO2 21.0*   ANION GAP 15.0   BUN 52*   CREATININE 2.02*   EGFR 30.4*   GLUCOSE 258*   CALCIUM 10.0*         Lab 03/21/25  1525   TOTAL PROTEIN 7.7   ALBUMIN 4.0   GLOBULIN 3.7   ALT (SGPT) 13   AST (SGOT) 24   BILIRUBIN 0.6   ALK PHOS 108                 Lab 03/21/25  1526   ABO TYPING O   RH TYPING Negative   ANTIBODY SCREEN Negative         Brief Urine Lab Results  (Last result in the past 365 days)        Color   Clarity   Blood   Leuk Est   Nitrite   Protein   CREAT   Urine HCG        03/21/25 1618 Yellow   Turbid   Large (3+)   Large (3+)   Negative   100 mg/dL (2+)                 Microbiology Results (last 10 days)       ** No results found for the last 240 hours. **            XR Chest 1 View  Result Date: 3/21/2025  XR CHEST 1 VW Date of Exam: 3/21/2025 3:48 PM EDT Indication: HIP FX Comparison: 11/20/2024 Findings: There is a dual-chamber pacemaker. There is incomplete inspiration and exaggerated lordotic positioning which essentially obscures the heart border. The pulmonary vasculature appears within normal limits. There are reticular interstitial markings in the peripheral lungs and lung bases. No acute infiltrate is demonstrated     Impression: Impression: Interstitial lung disease/pulmonary fibrosis. No acute process demonstrated Electronically Signed: Golden Ngo  3/21/2025 3:57 PM EDT  Workstation ID: [id number]    XR Femur 2 View Right  Result Date: 3/21/2025  XR FEMUR 2 VW RIGHT Date of Exam: 3/21/2025 3:48 PM EDT Indication: HIP FX Comparison: None available. Findings/    Impression: Impression: There is a mildly displaced and mildly comminuted intertrochanteric fracture of the right proximal femur. Bones are demineralized. Right knee arthroplasty is noted. No hardware complication  in the provided views. The visualized bony pelvis appears unremarkable. Partially visualized left ureteral stent. Vascular calcifications are seen. Electronically Signed: Ramon Paredes MD  3/21/2025 3:53 PM EDT  Workstation ID: [id number]    XR Pelvis 1 or 2 View  Result Date: 3/21/2025  XR PELVIS 1 OR 2 VW Date of Exam: 3/21/2025 3:48 PM EDT Indication: HIP FX Comparison: None available. Findings: The bones appear somewhat osteopenic. There is a fracture of the intertrochanteric right femur without significant displacement on this single view. The femoral head is not dislocated. No fracture of the pelvis is visualized. A left ureteral stent is present. There is degenerative disease in the lower lumbar spine     Impression: Impression: Intertrochanteric right femur fracture Electronically Signed: Golden Ngo  3/21/2025 3:53 PM EDT  Workstation ID: [id number]      Results for orders placed in visit on 11/13/23    Adult Transthoracic Echo Complete W/ Cont if Necessary Per Protocol    Interpretation Summary    Left ventricular systolic function is normal. Estimated left ventricular EF = 52% Left ventricular ejection fraction appears to be 51 - 55%.    Left ventricular wall thickness is consistent with mild concentric hypertrophy.    Left ventricular diastolic function is consistent with (grade I) impaired relaxation.    The right ventricular cavity is mildly dilated.    The left atrial cavity is mildly dilated.    Left atrial volume is moderately increased.    There is mild calcification of the aortic valve mainly affecting the non-coronary, left coronary and right coronary cusp(s).    Estimated right ventricular systolic pressure from tricuspid regurgitation is normal (<35 mmHg).      Assessment & Plan  Assessment & Plan       Closed right hip fracture    Hyperkalemia    Leukocytosis    T2DM (type 2 diabetes mellitus)    OCTAVIA (acute kidney injury)    Forrest Zepeda is a 89 y.o. male with past medical history  significant interstitial lung disease, saddle PE, BPH, HFpEF, chronic hydronephrosis d/t urinary stricture, CKD 3, T2DM, hypothyroidism, and PAF who presented to [facility] due to fall with severe right hip pain.     Right hip fracture  -Pelvic x-ray revealed intertrochanteric right femur fracture  -Orthopedic surgeon Dr. Baltazar has seen, tentative plan for surgery on Luis 3/23  -Tylenol, Norco, morphine as needed for pain  -Zofran as needed for nausea  -AM labs    Abnormal UA  Leukocytosis  -Urine culture pending  -Started on empiric Rocephin    OCTAVIA on CKD III  Chronic hydronephrosis  -Follows with urologist Dr. Pitts   -Baseline Cr ~1.5  -Creatinine 2.02 on presentation  -s/p 1L NS bolus in ED  -Continue NS at 100 ml/hr x 10 hours  -avoid nephrotoxins as able  -AM labs    Hyperkalemia  -Potassium 5.7 on presentation  -Lokelma x1 ordered    PAF  Hx PE/DVT  -Eliquis on hold for planned procedure, last dose AM of 3/21  -continue Bisoprolol     T2DM  -hold home glimepiride, Januvia, Toujeo  -Lantus 10 units at HS  -Mod dose SSI  -Hgb A1C in AM     BPH  -continue Proscar    Hypothyroidism  -Synthroid     DVT prophylaxis:  SCDs    CODE STATUS:    Code Status (Patient has no pulse and is not breathing): No CPR (Do Not Attempt to Resuscitate)  Medical Interventions (Patient has pulse or is breathing): Limited Support  Medical Intervention Limits: No intubation (DNI)  Level Of Support Discussed With: Patient    Expected Discharge TBD  Expected discharge date/ time has not been documented.      Signature: Electronically signed by JOYCE Roth, 03/21/25, 6:50 PM EDT              Electronically signed by Shannon Zendejas MD at 03/21/25 2053          Physician Progress Notes (most recent note)        Brodie Baltazar MD at 03/25/25 0817                    Orthopaedic Surgery Progress Note    LOS: 4 days   Patient Care Team:  Kary Wen MD as PCP - General (Family Medicine)  Win Saha MD as Consulting  "Physician (Cardiology)  Deanne Pitts MD as Consulting Physician (Urology)  2 Days Post-Op   Procedure(s):  RIGHT HIP TROCHANTERIC NAILING  Subjective     Interval History:   Patient lying in bed.  Reports pain is well-controlled.  Granddaughter at bedside.  Reports did not work with therapy yesterday due to oxygen desaturation.  No other complaints.    Objective     Vital Signs:  Temp (24hrs), Av.3 °F (36.8 °C), Min:98.1 °F (36.7 °C), Max:98.7 °F (37.1 °C)    /60 (BP Location: Right arm, Patient Position: Lying)   Pulse 77   Temp 98.1 °F (36.7 °C) (Oral)   Resp 18   Ht 175.3 cm (69\")   Wt 83.9 kg (185 lb)   SpO2 93%   BMI 27.32 kg/m²   Body mass index is 27.32 kg/m².    Labs:  Lab Results (last 24 hours)       Procedure Component Value Units Date/Time    POC Glucose Once [[id number]]  (Abnormal) Collected: 25 0754    Specimen: Blood Updated: 25 0755     Glucose 149 mg/dL     POC Glucose Once [[id number]]  (Abnormal) Collected: 25 2041    Specimen: Blood Updated: 25 2044     Glucose 168 mg/dL     POC Glucose Once [[id number]]  (Normal) Collected: 25 1600    Specimen: Blood Updated: 25 1602     Glucose 120 mg/dL     Blood Culture - Blood, Hand, Right [[id number]]  (Normal) Collected: 25 1245    Specimen: Blood from Hand, Right Updated: 25 1315     Blood Culture No growth at 2 days    Narrative:      Less than seven (7) mL's of blood was collected.  Insufficient quantity may yield false negative results.    Blood Culture - Blood, Hand, Left [[id number]]  (Normal) Collected: 25 1249    Specimen: Blood from Hand, Left Updated: 25 1315     Blood Culture No growth at 2 days    Narrative:      Less than seven (7) mL's of blood was collected.  Insufficient quantity may yield false negative results.    POC Glucose Once [[id number]]  (Abnormal) Collected: 25 1121    Specimen: Blood Updated: 25 1125     Glucose 185 mg/dL             Physical " Exam:  right LE: Covaderm dressing in place, clean dry and intact with no spotting  compartments soft/compressible leg and thigh   Actively wiggling toes as well as dorsiflexing and plantar flexing ankle and knee   SILT in foot in all distributions   Palpable PT pulse, CR brisk in all toes    Assessment/Plan:  2 Days Post-Op status post:  Procedure(s):  RIGHT HIP TROCHANTERIC NAILING    -Anemia, acute blood loss anemia following hip fracture  -Weight bearing as tolerated with assistance and support devices (walker, wheelchair) as needed  -Antibiotics - complete 24 hour postoperative course of IV antibioitics  -Advance diet as tolerated, diet per primary  -Continue Covaderm dressing, change of saturated  -PT/OT Consult - Weight bearing as tolerated  -Ice hip  -DVT Prophylaxis - Eliquis   -Osteoporotic hip fracture - I recommend informing the patient's PCP regarding osteoporotic fracture/consideration of enrolling patient in osteoporotic care program postoperatively  -Follow-up, upon discharge, patient will need follow-up with me in the clinic in 2-3 weeks' time.  Continue DVT ppx for 30 days.  Continue covaderm dressing for 1 week after surgery and then remove and replace with a covaderm or dry dressing every other day.  Keep incision covered at all times.  DC staples on or around 2-3 weeks. The  will need to call my office/[facility] to arrange for a discharge follow-up appointment in 2-3 weeks.     Brodie Baltazar MD  25  08:17 EDT         Electronically signed by Brodie Baltazar MD at 25 0836          Physical Therapy Notes (most recent note)        Leela Modi, PT at 25 1019  Version 1 of 1         Patient Name: Forrest Zepeda  : 1932    MRN: [id number]                              Today's Date: 3/25/2025       Admit Date: 3/21/2025    Visit Dx:     ICD-10-CM ICD-9-CM   1. Closed nondisplaced intertrochanteric fracture of right femur, initial encounter   S72.144A 820.21   2. Acute cystitis with hematuria  N30.01 595.0   3. Acute kidney injury  N17.9 584.9   4. Anticoagulated  Z79.01 V58.61   5. Interstitial lung disease  J84.9 515   6. Closed fracture of right hip, initial encounter  S72.001A 820.8     Patient Active Problem List   Diagnosis    Essential hypertension    Hyperkalemia    Generalized osteoarthritis    Diabetic neuropathy    Gout    HLD (hyperlipidemia)    Acquired hypothyroidism    Leukocytosis    Ureteral stricture    Hydronephrosis of left kidney    CKD (chronic kidney disease) stage 3, GFR 30-59 ml/min    LEHMAN (dyspnea on exertion)    Pulmonary fibrosis    Symptomatic bradycardia    Hypoxemia requiring supplemental oxygen    History of pulmonary embolism    Sinus node dysfunction    Chronic fatigue    Urethral stricture    Paroxysmal atrial fibrillation    Type 2 diabetes mellitus with hyperglycemia, with long-term current use of insulin    Abnormal cardiovascular stress test    Pulmonary hypertension    Cardiac pacemaker in situ    CAD (coronary artery disease)    Diastolic congestive heart failure    Abnormal SPEP    Hydronephrosis, left    Gross hematuria    Chronic heart failure with preserved ejection fraction (HFpEF)    Vasovagal syncope    Hematuria    Acute UTI (urinary tract infection)    Acute kidney injury superimposed on chronic kidney disease    Anemia, chronic disease    T2DM (type 2 diabetes mellitus)    Generalized weakness    Closed right hip fracture    OCTAVIA (acute kidney injury)    Thrombocytopenia     Past Medical History:   Diagnosis Date    Abnormal EKG     Abnormal PSA     Reported abnormal    Acute cystitis with hematuria 05/01/2023    Acute deep vein thrombosis (DVT) of right lower extremity 08/22/2019    Acute diverticulitis 2019    Acute hyperkalemia 08/22/2019    Acute respiratory failure with hypoxia     Acute saddle pulmonary embolism with acute cor pulmonale 08/22/2019    Acute systolic right heart failure 08/22/2019     "Acute UTI (urinary tract infection) 10/9/2023    Arthritis     KNEES,  BUT SINCE HAD REPLACEMENT    Benign localized hyperplasia of prostate     Bilateral knee pain     C. difficile diarrhea 2010    Cataracts, bilateral     CKD (chronic kidney disease)     Coronary artery disease     Diabetic neuropathy     Diastolic dysfunction     Disease of thyroid gland     HYPOTHYROIDISM    Diverticulitis     Dyslipidemia     H/O    Erectile dysfunction     Family history of malignant hyperthermia     Brother     Full dentures     Generalized weakness     History of ESBL E. coli infection     most recent 4-2022 f/u with ID Dr Johnson    History of gout     History of hepatitis A vaccination     History of nephrolithiasis     History of nuclear stress test     STATES IN THE 1990'S.  \"IT WAS OK\"    History of osteopenia     History of recurrent UTI (urinary tract infection)     Hydronephrosis of left kidney 07/18/2019    Hydronephrosis with renal and ureteral calculus obstruction 10/21/2019    Added automatically from request for surgery [id number]    Hydronephrosis with ureteral stricture     Hypercholesterolemia     Hyperkalemia     PT UNSURE REGARDING HX OF THIS    Hyperlipidemia     Hypertension     Hypothyroidism     Impaired functional mobility, balance, gait, and endurance     Insomnia     IPF (idiopathic pulmonary fibrosis)     per patient and son, dx at [facility], was told no treatment necessary, not to worry about it    On supplemental oxygen therapy     2L NC QHS    Other hydronephrosis 08/12/2019    Added automatically from request for surgery [id number]    Paroxysmal atrial fibrillation     Pulmonary fibrosis     Pulmonary hypertension, mild 02/2021    per echo per cardiology note 1/9/23, per pt's son, PCP does not agree with this dx    Renal insufficiency     Sepsis 2019    Shortness of breath     STATES WITH EXERTION, OTHERWISE, DENIES    Sigmoid diverticulitis without abscess or perforation 08/09/2017    Sinus node " dysfunction     Skin breakdown     fourth toe right foot noted in 2019 MD D/C note    Skin ulcer of fourth toe of right foot, limited to breakdown of skin 07/05/2019    Stricture of ureter     Type 2 diabetes mellitus     Ureteral stricture, left     Urinary incontinence     UTI (urinary tract infection) 07/18/2019    Vitamin D deficiency     Wears glasses      Past Surgical History:   Procedure Laterality Date    APPENDECTOMY      CARDIAC ELECTROPHYSIOLOGY PROCEDURE N/A 12/23/2020    Procedure: Pacemaker DC new. DNS meds.;  Surgeon: Jimy Ledezma DO;  [facility];  Service: Cardiology;  Laterality: N/A;    CHOLECYSTECTOMY      CIRCUMCISION N/A 10/23/2019    Procedure: CIRCUMCISIOn-DORSAL SLIT;  Surgeon: Griffin Romero MD;  Location:  [facility];  Service: Urology    COLONOSCOPY      CYSTOSCOPY RETROGRADE PYELOGRAM Left 06/17/2022    Procedure: CYSTOSCOPY RETROGRADE PYELOGRAM;  Surgeon: Ryne Mccann MD;  Location:  [facility];  Service: Urology;  Laterality: Left;    CYSTOSCOPY RETROGRADE PYELOGRAM Left 7/10/2024    Procedure: CYSTOSCOPY RETROGRADE PYELOGRAM AND STENT INSERTION LEFT;  Surgeon: Deanne Pitts MD;  Location:  [facility];  Service: Urology;  Laterality: Left;    CYSTOSCOPY URETEROSCOPY Left 02/11/2019    Procedure: URETEROSCOPY WITH STENT EXTRACTION, RPG;  Surgeon: Griffin Romero MD;  Location:  [facility];  Service: Urology    CYSTOSCOPY W/ URETERAL STENT PLACEMENT Left 07/20/2019    Procedure: CYSTOSCOPY, LEFT RETROGRADE PYELOGRAM,  ATTEMPTED LEFT URETERAL STENT INSERTION;  Surgeon: Isaac Flynn MD;  [facility]:  [facility];  Service: Urology    CYSTOSCOPY W/ URETERAL STENT PLACEMENT Left 06/17/2022    Procedure: CYSTOSCOPY URETERAL CATHETER/STENT INSERTION;  Surgeon: Ryne Mccann MD;  [facility]:  [facility];  Service: Urology;  Laterality: Left;    CYSTOSCOPY W/ URETERAL STENT PLACEMENT Left 01/27/2023    Procedure: CYSTOSCOPY URETERAL CATHETER/STENT INSERTION;   Surgeon: Deanne Pitts MD;  [facility]:  [facility];  Service: Urology;  Laterality: Left;    CYSTOSCOPY W/ URETERAL STENT PLACEMENT Left 02/15/2024    Procedure: CYSTOSCOPY LEFT STENT EXCHANGE;  Surgeon: Deanne Pitts MD;  [facility]:  [facility];  Service: Urology;  Laterality: Left;    CYSTOSCOPY, URETEROSCOPY, RETROGRADE PYELOGRAM, STONE EXTRACTION, STENT INSERTION Left 06/15/2023    Procedure: URETEROSCOPY, RETROGRADE PYELOGRAM, STENT INSERTION;  Surgeon: Deanne Pitts MD;  [facility];  Service: Urology;  Laterality: Left;    CYSTOSCOPY, URETEROSCOPY, RETROGRADE PYELOGRAM, STONE EXTRACTION, STENT INSERTION Left 11/22/2024    Procedure: CYSTOSCOPY RETROGRADE PYELOGRAM AND STENT INSERTION LEFT;  Surgeon: Deanne Pitts MD;  [facility]:  [facility];  Service: Urology;  Laterality: Left;    EYE SURGERY      CATARACTS REMOVED BILATERALLY    HIP TROCHANTERIC NAILING WITH INTRAMEDULLARY HIP SCREW Right 3/23/2025    Procedure: HIP TROCHANTERIC NAILING RIGHT;  Surgeon: Brodie Baltazar MD;  [facility]:  [facility];  Service: Orthopedics;  Laterality: Right;    JOINT REPLACEMENT      Bilateral knees    KNEE ARTHROPLASTY Bilateral     KNEE ARTHROSCOPY Bilateral     RENAL ARTERY STENT      SEVERAL TIMES EVERY SINCE 1978    URETERAL STENT INSERTION  10/2020    URETEROSCOPY LASER LITHOTRIPSY WITH STENT INSERTION Left 01/10/2018    Procedure:  cysto, left ureteral stent replacement ;  Surgeon: Griffin Romero MD;  Location:  [facility];  Service:     URETEROSCOPY LASER LITHOTRIPSY WITH STENT INSERTION Left 08/14/2019    Procedure: URETEROSCOPY, STONE REMOVAL WITH STENT INSERTION;  Surgeon: Griffin Romero MD;  Location:  [facility];  Service: Urology    URETEROSCOPY LASER LITHOTRIPSY WITH STENT INSERTION Left 10/23/2019    Procedure: Left URETEROSCOPY WITH STENT INSERTION-LEFT;  Surgeon: Griffin Romero MD;  Location:  [facility];  Service: Urology      General Information       Row Name 03/25/25 1112          Physical Therapy Time  and Intention    Document Type therapy note (daily note)  -AB     Mode of Treatment physical therapy  -AB       Row Name 03/25/25 1112          General Information    Patient Profile Reviewed yes  -AB     Existing Precautions/Restrictions fall;oxygen therapy device and L/min;other (see comments);orthostatic hypotension   monitor BP, s/p R IMN WBAT  -AB     Barriers to Rehab medically complex;cognitive status  conversational confusion  -AB       Row Name 03/25/25 1112          Cognition    Orientation Status (Cognition) oriented to;place;person;verbal cues/prompts needed for orientation;time  -AB       Row Name 03/25/25 1112          Safety Issues/Impairments Affecting Functional Mobility    Safety Issues Affecting Function (Mobility) awareness of need for assistance;insight into deficits/self-awareness;safety precaution awareness;safety precautions follow-through/compliance;sequencing abilities;problem-solving;judgment  -AB     Impairments Affecting Function (Mobility) balance;cognition;endurance/activity tolerance;motor control;pain;range of motion (ROM);shortness of breath;strength  -AB     Cognitive Impairments, Mobility Safety/Performance awareness, need for assistance;insight into deficits/self-awareness;judgment;problem-solving/reasoning;sequencing abilities;safety precaution follow-through;safety precaution awareness  -AB     Comment, Safety Issues/Impairments (Mobility) Alert and following all commands. Intermittent conversational confusion noted  -AB               User Key  (r) = Recorded By, (t) = Taken By, (c) = Cosigned By      Initials Name Provider Type    AB Leela Modi PT Physical Therapist                   Mobility       Row Name 03/25/25 1114          Bed Mobility    Bed Mobility scooting/bridging;supine-sit  -AB     Scooting/Bridging [place] (Bed Mobility) verbal cues;nonverbal cues (demo/gesture);2 person assist;moderate assist (50% patient effort)  -AB     Supine-Sit [place] (Bed  Mobility) maximum assist (25% patient effort);2 person assist;verbal cues;nonverbal cues (demo/gesture)  -AB     Assistive Device (Bed Mobility) bed rails;head of bed elevated;repositioning sheet  -AB     Comment, (Bed Mobility) Assist provided at trunk and w/ BLE management. Increased pain w/ movement. Able to use BLE for assist scooting hips to EOB.  -AB       Row Name 03/25/25 1114          Transfers    Comment, (Transfers) Cues for hand placement, sequencing, and PLB. Pt w/ symptomatic orthostatic hypotension, RN aware. BP are as follows: supine 142/67, sitting 121/59, post transfer to chair 107/59.  -AB       Row Name 03/25/25 1114          Bed-Chair Transfer    Bed-Chair [place] (Transfers) maximum assist (25% patient effort);2 person assist;verbal cues;nonverbal cues (demo/gesture)  -AB     Assistive Device (Bed-Chair Transfers) other (see comments)  BUE support  -AB     Comment, (Bed-Chair Transfer) Pt took short shuffling steps to chair w/ max A for improved stability. No RLE buckling noted this session, however pt demonstrated limited WBing.  -AB       Row Name 03/25/25 1114          Sit-Stand Transfer    Sit-Stand [place] (Transfers) moderate assist (50% patient effort);2 person assist;verbal cues;nonverbal cues (demo/gesture)  -AB     Assistive Device (Sit-Stand Transfers) other (see comments)  BUE support  -AB     Comment, (Sit-Stand Transfer) Strong boost to stand w/ b/l knee buckling.  -AB       Row Name 03/25/25 1114          Gait/Stairs (Locomotion)    [place] Level (Gait) maximum assist (25% patient effort);2 person assist;verbal cues;nonverbal cues (demo/gesture)  -AB     Assistive Device (Gait) other (see comments)  BUE suppport  -AB     Patient was able to Ambulate yes  -AB     Distance in Feet (Gait) 2  -AB     Deviations/Abnormal Patterns (Gait) bilateral deviations;base of support, narrow;stride length decreased  -AB     Bilateral Gait Deviations heel strike decreased  -AB      Right Sided Gait Deviations decreased knee extension;weight shift ability decreased  -AB     Comment, (Gait/Stairs) See above for details.  -AB       Row Name 03/25/25 1114          Mobility    Extremity Weight-bearing Status right lower extremity  -AB     Right Lower Extremity (Weight-bearing Status) weight-bearing as tolerated (WBAT)  -AB               User Key  (r) = Recorded By, (t) = Taken By, (c) = Cosigned By      Initials Name Provider Type    AB Leela Modi, PT Physical Therapist                   Obj/Interventions       Row Name 03/25/25 1119          Balance    Balance Assessment sitting static balance;sitting dynamic balance;standing static balance;standing dynamic balance  -AB     Static Sitting Balance contact guard  -AB     Dynamic Sitting Balance contact guard  -AB     Position, Sitting Balance unsupported;sitting edge of bed  -AB     Static Standing Balance verbal cues;non-verbal cues (demo/gesture);moderate assist  -AB     Dynamic Standing Balance maximum assist;2-person assist  -AB     Position/Device Used, Standing Balance supported  -AB     Balance Interventions sitting;standing;sit to stand;supported;dynamic;static;occupation based/functional task  -AB     Comment, Balance No R knee buckling this session. Continues to require max A to maintain standing.  -AB               User Key  (r) = Recorded By, (t) = Taken By, (c) = Cosigned By      Initials Name Provider Type    AB Leela Modi, PT Physical Therapist                   Goals/Plan    No documentation.                  Clinical Impression       Row Name 03/25/25 1119          Pain    Pain Location hip  -AB     Pain Side/Orientation right;generalized  -AB     Pain Management Interventions activity modification encouraged;exercise or physical activity utilized;positioning techniques utilized;nursing notified  -AB     Response to Pain Interventions activity participation with increased pain  -AB     Pre/Posttreatment Pain Comment  Increased pain w/ mobility, RN aware  -AB     Additional Documentation Pain Scale: FACES Pre/Post-Treatment (Group)  -AB       Row Name 03/25/25 1119          Pain Scale: FACES Pre/Post-Treatment    Pain: FACES Scale, Pretreatment 0-->no hurt  -AB     Posttreatment Pain Rating 6-->hurts even more  -AB       Row Name 03/25/25 1119          Plan of Care Review    Plan of Care Reviewed With patient;grandchild(shital)  -AB     Progress improving  -AB     Outcome Evaluation Pt demonstrated improvements in activity tolerance this session. Bed>chair transfer with max Ax2 and BUE support. No R knee buckling noted this session. Increased pain w/ all mobility. Pt with symptomatic orthostatic hypotension, RN aware. Further IPPT is warrented. PT will progress as able per POC.  -AB       Row Name 03/25/25 1119          Vital Signs    Pre Systolic BP Rehab 142  -AB     Pre Treatment Diastolic BP 67  -AB     Intra Systolic BP Rehab 121  -AB     Intra Treatment Diastolic BP 59  -AB     Post Systolic BP Rehab 107  -AB     Post Treatment Diastolic BP 59  -AB     Pretreatment Heart Rate (beats/min) 80  -AB     Posttreatment Heart Rate (beats/min) 82  -AB     Pre SpO2 (%) 92  -AB     O2 Delivery Pre Treatment nasal cannula  -AB     Intra SpO2 (%) 88   on 6 L  -AB     O2 Delivery Intra Treatment nasal cannula  -AB     Post SpO2 (%) 96  -AB     O2 Delivery Post Treatment nasal cannula  -AB     Pre Patient Position Supine  -AB     Intra Patient Position Standing  -AB     Post Patient Position Sitting  -AB       Row Name 03/25/25 1119          Positioning and Restraints    Pre-Treatment Position in bed  -AB     Post Treatment Position chair  -AB     In Chair notified nsg;reclined;sitting;call light within reach;encouraged to call for assist;exit alarm on;with family/caregiver;legs elevated;waffle cushion;on mechanical lift sling;with nsg  -AB               User Key  (r) = Recorded By, (t) = Taken By, (c) = Cosigned By      Initials Name  Provider Type    AB Leela Modi, PT Physical Therapist                   Outcome Measures       Row Name 03/25/25 1122          How much help from another person do you currently need...    Turning from your back to your side while in flat bed without using bedrails? 2  -AB     Moving from lying on back to sitting on the side of a flat bed without bedrails? 2  -AB     Moving to and from a bed to a chair (including a wheelchair)? 2  -AB     Standing up from a chair using your arms (e.g., wheelchair, bedside chair)? 2  -AB     Climbing 3-5 steps with a railing? 1  -AB     To walk in hospital room? 1  -AB     AM-PAC 6 Clicks Score (PT) 10  -AB     Highest Level of Mobility Goal 4 --> Transfer to chair/commode  -AB       Row Name 03/25/25 1122          Functional Assessment    Outcome Measure Options AM-PAC 6 Clicks Basic Mobility (PT)  -AB               User Key  (r) = Recorded By, (t) = Taken By, (c) = Cosigned By      Initials Name Provider Type    Leela Jeffrey, PT Physical Therapist                                 Physical Therapy Education       Title: PT OT SLP Therapies (Done)       Topic: Physical Therapy (Done)       Point: Mobility training (Done)       Learning Progress Summary            Patient Acceptance, E,D, VU,NR by AB at 3/25/2025 1122    Eager, E, VU,DU,NR by  at 3/24/2025 1050    Comment: Educated pt. safety/technique w/bed mobility, transfers, HEP, PT POC   Family Eager, E, VU,DU,NR by  at 3/24/2025 1050    Comment: Educated pt. safety/technique w/bed mobility, transfers, HEP, PT POC                      Point: Home exercise program (Done)       Learning Progress Summary            Patient Eager, E, VU,DU,NR by  at 3/24/2025 1050    Comment: Educated pt. safety/technique w/bed mobility, transfers, HEP, PT POC   Family Eager, E, VU,DU,NR by  at 3/24/2025 1050    Comment: Educated pt. safety/technique w/bed mobility, transfers, HEP, PT POC                      Point: Body mechanics  (Done)       Learning Progress Summary            Patient Acceptance, E,D, VU,NR by AB at 3/25/2025 1122    Eager, E, VU,DU,NR by  at 3/24/2025 1050    Comment: Educated pt. safety/technique w/bed mobility, transfers, HEP, PT POC   Family Eager, E, VU,DU,NR by SS at 3/24/2025 1050    Comment: Educated pt. safety/technique w/bed mobility, transfers, HEP, PT POC                      Point: Precautions (Done)       Learning Progress Summary            Patient Acceptance, E,D, VU,NR by AB at 3/25/2025 1122    Eager, E, VU,DU,NR by  at 3/24/2025 1050    Comment: Educated pt. safety/technique w/bed mobility, transfers, HEP, PT POC   Family Eager, E, VU,DU,NR by  at 3/24/2025 1050    Comment: Educated pt. safety/technique w/bed mobility, transfers, HEP, PT POC                                      User Key       Initials Effective Dates Name Provider Type Discipline     06/01/21 -  Terri Cabrera, PT Physical Therapist PT    AB 09/22/22 -  Leela Modi PT Physical Therapist PT                  PT Recommendation and Plan     Progress: improving  Outcome Evaluation: Pt demonstrated improvements in activity tolerance this session. Bed>chair transfer with max Ax2 and BUE support. No R knee buckling noted this session. Increased pain w/ all mobility. Pt with symptomatic orthostatic hypotension, RN aware. Further IPPT is warrented. PT will progress as able per POC.     Time Calculation:         PT Charges       Row Name 03/25/25 1123             Time Calculation    Start Time 1019  -AB      PT Received On 03/25/25  -AB         Timed Charges    09066 - PT Therapeutic Activity Minutes 24  -AB         Total Minutes    Timed Charges Total Minutes 24  -AB       Total Minutes 24  -AB                User Key  (r) = Recorded By, (t) = Taken By, (c) = Cosigned By      Initials Name Provider Type    AB Leela Modi PT Physical Therapist                  Therapy Charges for Today       Code Description Service Date Service  Provider Modifiers Qty    62170034946 HC PT THERAPEUTIC ACT EA 15 MIN 3/25/2025 Leela Modi, PT GP 2    80159569102 HC PT THER SUPP EA 15 MIN 3/25/2025 Leela Modi, PT GP 2            PT G-Codes  Outcome Measure Options: AM-PAC 6 Clicks Basic Mobility (PT)  AM-PAC 6 Clicks Score (PT): 10  AM-PAC 6 Clicks Score (OT): 12  PT Discharge Summary  Anticipated Discharge Disposition (PT): skilled nursing facility    Leela Modi, PT  3/25/2025      Electronically signed by Leela Modi, PT at 25 1123          Occupational Therapy Notes (most recent note)        Aarti Wu, OT at 25 1022          Patient Name: Forrest Zepeda  : 1932    MRN: [id number]                              Today's Date: 3/24/2025       Admit Date: 3/21/2025    Visit Dx:     ICD-10-CM ICD-9-CM   1. Closed nondisplaced intertrochanteric fracture of right femur, initial encounter  S72.144A 820.21   2. Acute cystitis with hematuria  N30.01 595.0   3. Acute kidney injury  N17.9 584.9   4. Anticoagulated  Z79.01 V58.61   5. Interstitial lung disease  J84.9 515   6. Closed fracture of right hip, initial encounter  S72.001A 820.8     Patient Active Problem List   Diagnosis    Essential hypertension    Hyperkalemia    Generalized osteoarthritis    Diabetic neuropathy    Gout    HLD (hyperlipidemia)    Acquired hypothyroidism    Leukocytosis    Ureteral stricture    Hydronephrosis of left kidney    CKD (chronic kidney disease) stage 3, GFR 30-59 ml/min    LEHMAN (dyspnea on exertion)    Pulmonary fibrosis    Symptomatic bradycardia    Hypoxemia requiring supplemental oxygen    History of pulmonary embolism    Sinus node dysfunction    Chronic fatigue    Urethral stricture    Paroxysmal atrial fibrillation    Type 2 diabetes mellitus with hyperglycemia, with long-term current use of insulin    Abnormal cardiovascular stress test    Pulmonary hypertension    Cardiac pacemaker in situ    CAD (coronary artery disease)     "Diastolic congestive heart failure    Abnormal SPEP    Hydronephrosis, left    Gross hematuria    Chronic heart failure with preserved ejection fraction (HFpEF)    Vasovagal syncope    Hematuria    Acute UTI (urinary tract infection)    Acute kidney injury superimposed on chronic kidney disease    Anemia, chronic disease    T2DM (type 2 diabetes mellitus)    Generalized weakness    Closed right hip fracture    OCTAVIA (acute kidney injury)    Thrombocytopenia     Past Medical History:   Diagnosis Date    Abnormal EKG     Abnormal PSA     Reported abnormal    Acute cystitis with hematuria 05/01/2023    Acute deep vein thrombosis (DVT) of right lower extremity 08/22/2019    Acute diverticulitis 2019    Acute hyperkalemia 08/22/2019    Acute respiratory failure with hypoxia     Acute saddle pulmonary embolism with acute cor pulmonale 08/22/2019    Acute systolic right heart failure 08/22/2019    Acute UTI (urinary tract infection) 10/9/2023    Arthritis     KNEES,  BUT SINCE HAD REPLACEMENT    Benign localized hyperplasia of prostate     Bilateral knee pain     C. difficile diarrhea 2010    Cataracts, bilateral     CKD (chronic kidney disease)     Coronary artery disease     Diabetic neuropathy     Diastolic dysfunction     Disease of thyroid gland     HYPOTHYROIDISM    Diverticulitis     Dyslipidemia     H/O    Erectile dysfunction     Family history of malignant hyperthermia     Brother     Full dentures     Generalized weakness     History of ESBL E. coli infection     most recent 4-2022 f/u with ID Dr Johnson    History of gout     History of hepatitis A vaccination     History of nephrolithiasis     History of nuclear stress test     STATES IN THE 1990'S.  \"IT WAS OK\"    History of osteopenia     History of recurrent UTI (urinary tract infection)     Hydronephrosis of left kidney 07/18/2019    Hydronephrosis with renal and ureteral calculus obstruction 10/21/2019    Added automatically from request for surgery " [id number]    Hydronephrosis with ureteral stricture     Hypercholesterolemia     Hyperkalemia     PT UNSURE REGARDING HX OF THIS    Hyperlipidemia     Hypertension     Hypothyroidism     Impaired functional mobility, balance, gait, and endurance     Insomnia     IPF (idiopathic pulmonary fibrosis)     per patient and son, dx at [facility], was told no treatment necessary, not to worry about it    On supplemental oxygen therapy     2L NC QHS    Other hydronephrosis 08/12/2019    Added automatically from request for surgery [id number]    Paroxysmal atrial fibrillation     Pulmonary fibrosis     Pulmonary hypertension, mild 02/2021    per echo per cardiology note 1/9/23, per pt's son, PCP does not agree with this dx    Renal insufficiency     Sepsis 2019    Shortness of breath     STATES WITH EXERTION, OTHERWISE, DENIES    Sigmoid diverticulitis without abscess or perforation 08/09/2017    Sinus node dysfunction     Skin breakdown     fourth toe right foot noted in 2019 MD D/C note    Skin ulcer of fourth toe of right foot, limited to breakdown of skin 07/05/2019    Stricture of ureter     Type 2 diabetes mellitus     Ureteral stricture, left     Urinary incontinence     UTI (urinary tract infection) 07/18/2019    Vitamin D deficiency     Wears glasses      Past Surgical History:   Procedure Laterality Date    APPENDECTOMY      CARDIAC ELECTROPHYSIOLOGY PROCEDURE N/A 12/23/2020    Procedure: Pacemaker DC new. DNS meds.;  Surgeon: Jimy Ledezma DO;  [facility];  Service: Cardiology;  Laterality: N/A;    CHOLECYSTECTOMY      CIRCUMCISION N/A 10/23/2019    Procedure: CIRCUMCISIOn-DORSAL SLIT;  Surgeon: Griffin Romero MD;  Location:  [facility];  Service: Urology    COLONOSCOPY      CYSTOSCOPY RETROGRADE PYELOGRAM Left 06/17/2022    Procedure: CYSTOSCOPY RETROGRADE PYELOGRAM;  Surgeon: Ryne Mccann MD;  [facility];  Service: Urology;  Laterality: Left;    CYSTOSCOPY RETROGRADE PYELOGRAM Left  7/10/2024    Procedure: CYSTOSCOPY RETROGRADE PYELOGRAM AND STENT INSERTION LEFT;  Surgeon: Deanne Pitts MD;  [facility];  Service: Urology;  Laterality: Left;    CYSTOSCOPY URETEROSCOPY Left 02/11/2019    Procedure: URETEROSCOPY WITH STENT EXTRACTION, RPG;  Surgeon: Griffin Romero MD;  Location:  [facility];  Service: Urology    CYSTOSCOPY W/ URETERAL STENT PLACEMENT Left 07/20/2019    Procedure: CYSTOSCOPY, LEFT RETROGRADE PYELOGRAM,  ATTEMPTED LEFT URETERAL STENT INSERTION;  Surgeon: Isaac Flynn MD;  [facility];  Service: Urology    CYSTOSCOPY W/ URETERAL STENT PLACEMENT Left 06/17/2022    Procedure: CYSTOSCOPY URETERAL CATHETER/STENT INSERTION;  Surgeon: Ryne Mccann MD;  [facility];  Service: Urology;  Laterality: Left;    CYSTOSCOPY W/ URETERAL STENT PLACEMENT Left 01/27/2023    Procedure: CYSTOSCOPY URETERAL CATHETER/STENT INSERTION;  Surgeon: Deanne Pitts MD;  [facility];  Service: Urology;  Laterality: Left;    CYSTOSCOPY W/ URETERAL STENT PLACEMENT Left 02/15/2024    Procedure: CYSTOSCOPY LEFT STENT EXCHANGE;  Surgeon: Deanne Pitts MD;  [facility];  Service: Urology;  Laterality: Left;    CYSTOSCOPY, URETEROSCOPY, RETROGRADE PYELOGRAM, STONE EXTRACTION, STENT INSERTION Left 06/15/2023    Procedure: URETEROSCOPY, RETROGRADE PYELOGRAM, STENT INSERTION;  Surgeon: Deanne Pitts MD;  [facility];  Service: Urology;  Laterality: Left;    CYSTOSCOPY, URETEROSCOPY, RETROGRADE PYELOGRAM, STONE EXTRACTION, STENT INSERTION Left 11/22/2024    Procedure: CYSTOSCOPY RETROGRADE PYELOGRAM AND STENT INSERTION LEFT;  Surgeon: Deanne Pitts MD;  Location:  [facility];  Service: Urology;  Laterality: Left;    EYE SURGERY      CATARACTS REMOVED BILATERALLY    HIP TROCHANTERIC NAILING WITH INTRAMEDULLARY HIP SCREW Right 3/23/2025    Procedure: HIP TROCHANTERIC NAILING RIGHT;  Surgeon: Brodie Baltazar MD;  Location:  [facility];  Service: Orthopedics;   Laterality: Right;    JOINT REPLACEMENT      Bilateral knees    KNEE ARTHROPLASTY Bilateral     KNEE ARTHROSCOPY Bilateral     RENAL ARTERY STENT      SEVERAL TIMES EVERY SINCE 1978    URETERAL STENT INSERTION  10/2020    URETEROSCOPY LASER LITHOTRIPSY WITH STENT INSERTION Left 01/10/2018    Procedure:  cysto, left ureteral stent replacement ;  Surgeon: Griffin Romero MD;  Location: [facility];  Service:     URETEROSCOPY LASER LITHOTRIPSY WITH STENT INSERTION Left 08/14/2019    Procedure: URETEROSCOPY, STONE REMOVAL WITH STENT INSERTION;  Surgeon: Griffin Romero MD;  Location: [facility] OR;  Service: Urology    URETEROSCOPY LASER LITHOTRIPSY WITH STENT INSERTION Left 10/23/2019    Procedure: Left URETEROSCOPY WITH STENT INSERTION-LEFT;  Surgeon: Griffin Romero MD;  Location: [facility] OR;  Service: Urology      General Information       Row Name 03/24/25 0959          OT Time and Intention    Document Type evaluation  -AR     Mode of Treatment co-treatment;occupational therapy  -AR       Row Name 03/24/25 0959          General Information    Patient Profile Reviewed yes  -AR     Prior Level of Function independent:;ADL's;all household mobility;community mobility;gait;transfer;driving  -AR     Existing Precautions/Restrictions fall;oxygen therapy device and L/min;other (see comments)   RLE buckling with WB  -AR     Barriers to Rehab medically complex  -AR       Row Name 03/24/25 0959          Living Environment    Current Living Arrangements home  -AR     People in Home alone  -AR       Row Name 03/24/25 0959          Home Main Entrance    Number of Stairs, Main Entrance two  -AR     Stair Railings, Main Entrance railing on right side (ascending)  -AR       Row Name 03/24/25 0959          Stairs Within Home, Primary    Number of Stairs, Within Home, Primary none  -AR       Row Name 03/24/25 0981          Cognition    Orientation Status (Cognition) oriented x 4  -AR       Row Name 03/24/25 0970          Safety  Issues/Impairments Affecting Functional Mobility    Safety Issues Affecting Function (Mobility) awareness of need for assistance;insight into deficits/self-awareness;judgment;problem-solving;safety precaution awareness;safety precautions follow-through/compliance;sequencing abilities  -AR     Impairments Affecting Function (Mobility) balance;cognition;endurance/activity tolerance;motor control;pain;range of motion (ROM);shortness of breath;strength  -AR     Cognitive Impairments, Mobility Safety/Performance awareness, need for assistance;insight into deficits/self-awareness;judgment;problem-solving/reasoning;safety precaution awareness;safety precaution follow-through  -AR               User Key  (r) = Recorded By, (t) = Taken By, (c) = Cosigned By      Initials Name Provider Type    Aarti Mackey OT Occupational Therapist                     Mobility/ADL's       Row Name 03/24/25 1002          Bed Mobility    Bed Mobility scooting/bridging;supine-sit;sit-supine;rolling left;rolling right  -AR     Rolling Left [place] (Bed Mobility) dependent (less than 25% patient effort)  -AR     Rolling Right [place] (Bed Mobility) dependent (less than 25% patient effort)  -AR     Scooting/Bridging [place] (Bed Mobility) maximum assist (25% patient effort);2 person assist;verbal cues  -AR     Supine-Sit [place] (Bed Mobility) maximum assist (25% patient effort);2 person assist;verbal cues  -AR     Sit-Supine [place] (Bed Mobility) dependent (less than 25% patient effort);2 person assist  -AR     Comment, (Bed Mobility) Pt assisted to EOB and following standing trial, returned to EOB sitting for seated level rest break. Pt desat to 70's (difficult to obtain accurate reading despite 2 pulse oximeters and finger probe( and significant left lateral lean noted. Pt was responsive with delays. RN notified and arrived to assess pt when he was supine. Rolled R/L of midline for sling placement once xygen  recovered to 90% or above. BP 92/47 and session ended d/t medical status and EKG arrived at bedside.  -AR       Row Name 03/24/25 1002          Transfers    Transfers sit-stand transfer;stand-sit transfer  -AR     Comment, (Transfers) Pt transitioned from sit-to-stand with mod assist x2 from elevated bed surface. RLE knee buckling noted despite adequate quad function supine and pt unable to safely pivot to chair.  -AR       Row Name 03/24/25 1002          Sit-Stand Transfer    Sit-Stand [place] (Transfers) moderate assist (50% patient effort);2 person assist;verbal cues  -AR     Assistive Device (Sit-Stand Transfers) other (see comments)  -AR       Row Name 03/24/25 1002          Stand-Sit Transfer    Stand-Sit [place] (Transfers) maximum assist (25% patient effort);2 person assist;verbal cues  -AR     Assistive Device (Stand-Sit Transfers) other (see comments)  BUE HHA  -AR       Row Name 03/24/25 1002          Functional Mobility    Patient was able to Ambulate no, other medical factors prevent ambulation  -AR     Reason Patient was unable to Ambulate Excessive Weakness;Hypotension;Cardiac Dysfunction;Dizziness;Hypoxia/Respiratory Distress  -AR       Row Name 03/24/25 1002          Activities of Daily Living    BADL Assessment/Intervention upper body dressing;lower body dressing  -AR       Row Name 03/24/25 1002          Mobility    Extremity Weight-bearing Status right lower extremity  -AR     Right Lower Extremity (Weight-bearing Status) weight-bearing as tolerated (WBAT)  -AR       Row Name 03/24/25 1002          Upper Body Dressing Assessment/Training    [place] Level (Upper Body Dressing) don;pajama/robe;maximum assist (25% patient effort)  -AR     Position (Upper Body Dressing) edge of bed sitting  -AR       Row Name 03/24/25 1002          Lower Body Dressing Assessment/Training    [place] Level (Lower Body Dressing) socks;dependent (less than 25% patient effort)  -AR     Position (Lower  Body Dressing) supine  -AR     Comment, (Lower Body Dressing) Issued self-care kit, teaching deferred d/t medical status.  -AR               User Key  (r) = Recorded By, (t) = Taken By, (c) = Cosigned By      Initials Name Provider Type    Aarti Mackey OT Occupational Therapist                   Obj/Interventions       Row Name 03/24/25 1008          Sensory Assessment (Somatosensory)    Sensory Assessment (Somatosensory) UE sensation intact  -AR       Row Name 03/24/25 1008          Vision Assessment/Intervention    Visual Impairment/Limitations corrective lenses full-time  -AR       Row Name 03/24/25 1008          Range of Motion Comprehensive    General Range of Motion no range of motion deficits identified  Simultaneous filing. User may be unaware of other data.  -AR       Row Name 03/24/25 1008          Strength Comprehensive (MMT)    General Manual Muscle Testing (MMT) Assessment upper extremity strength deficits identified  -AR     Comment, General Manual Muscle Testing (MMT) Assessment BUE 4/5  Simultaneous filing. User may be unaware of other data.  -AR       Row Name 03/24/25 1008          Balance    Balance Assessment sitting static balance;sitting dynamic balance;standing static balance;standing dynamic balance  -AR     Static Sitting Balance maximum assist  initially CGA  -AR     Dynamic Sitting Balance dependent  initially min assist  -AR     Position, Sitting Balance unsupported;sitting edge of bed  -AR     Static Standing Balance maximum assist;2-person assist;verbal cues  -AR     Position/Device Used, Standing Balance supported  -AR               User Key  (r) = Recorded By, (t) = Taken By, (c) = Cosigned By      Initials Name Provider Type    Aarti Mackey OT Occupational Therapist                   Goals/Plan       Row Name 03/24/25 1019          Transfer Goal 1 (OT)    Activity/Assistive Device (Transfer Goal 1, OT) sit-to-stand/stand-to-sit;commode, bedside with drop arms   -AR     [place] Level/Cues Needed (Transfer Goal 1, OT) verbal cues required;maximum assist (25-49% patient effort)  -AR     Time Frame (Transfer Goal 1, OT) short term goal (STG);5 days  -AR     Progress/Outcome (Transfer Goal 1, OT) goal ongoing  -AR       Row Name 03/24/25 1019          Dressing Goal 1 (OT)    Activity/Device (Dressing Goal 1, OT) lower body dressing;sock-aid;reacher  -AR     [place]/Cues Needed (Dressing Goal 1, OT) moderate assist (50-74% patient effort);verbal cues required  -AR     Time Frame (Dressing Goal 1, OT) long term goal (LTG);10 days  -AR     Progress/Outcome (Dressing Goal 1, OT) goal ongoing  -AR       Row Name 03/24/25 1019          Toileting Goal 1 (OT)    Activity/Device (Toileting Goal 1, OT) toileting skills, all;commode, bedside without drop arms  -AR     [place] Level/Cues Needed (Toileting Goal 1, OT) maximum assist (25-49% patient effort);verbal cues required  -AR     Time Frame (Toileting Goal 1, OT) long term goal (LTG);10 days  -AR     Progress/Outcome (Toileting Goal 1, OT) goal ongoing  -AR       Row Name 03/24/25 1019          Problem Specific Goal 1 (OT)    Problem Specific Goal 1 (OT) Pt will maintain static sitting balance x 3 minutes with CGA during ADL activity  -AR     Time Frame (Problem Specific Goal 1, OT) short term goal (STG);5 days  -AR     Progress/Outcome (Problem Specific Goal 1, OT) goal ongoing  -AR       Row Name 03/24/25 1019          Therapy Assessment/Plan (OT)    Planned Therapy Interventions (OT) activity tolerance training;adaptive equipment training;BADL retraining;edema control/reduction;functional balance retraining;IADL retraining;occupation/activity based interventions;patient/caregiver education/training;ROM/therapeutic exercise;strengthening exercise;transfer/mobility retraining;cognitive/visual perception retraining  -AR               User Key  (r) = Recorded By, (t) = Taken By, (c) = Cosigned By      Initials Name  Provider Type    AR Aarti Wu, WHIT Occupational Therapist                   Clinical Impression       Row Name 03/24/25 1009          Pain Assessment    Pretreatment Pain Rating 6/10  -AR     Posttreatment Pain Rating 8/10  -AR     Pain Location hip  -AR     Pain Side/Orientation right;generalized  -AR     Pain Management Interventions exercise or physical activity utilized;movement retraining implemented;nursing notified;positioning techniques utilized  -AR     Response to Pain Interventions activity participation with increased pain  -AR       Row Name 03/24/25 1009          Plan of Care Review    Plan of Care Reviewed With patient;family  -AR     Outcome Evaluation Pt alert, Ox4 and transitioned to EOB with max assist x2. He completed UB dressing taks with max assist and LB dressing with dependence. He transitioned sit-to-stand with mod assist x2 from elevated bed surface and was unable to pivot to chair d/t RLE knee buckling and fatigue. Pt returned to EOB sitting, desat into 70s on 6L NC and had episode of delayed responses with strong left lateral lean. Pt was assisted to supine with dependence and BP 92/47, oxygen returned to 90% or above within 2 minutes. RN notified and at bedside to assess him. Sling left under him, deferred transfer to chair d/t medical status and RN notified of placement of sling. Pt limited with dyspnea and desaturation on 6L NC, acute pain, poor occupational endurance, impaired balance, confusion and is performing below baseline. Recommend SNF.  -AR       Row Name 03/24/25 1009          Therapy Assessment/Plan (OT)    Rehab Potential (OT) fair  -AR     Criteria for Skilled Therapeutic Interventions Met (OT) yes  -AR     Therapy Frequency (OT) daily  -AR       Row Name 03/24/25 1009          Therapy Plan Review/Discharge Plan (OT)    Anticipated Discharge Disposition (OT) skilled nursing facility  -AR       Row Name 03/24/25 1009          Vital Signs    Pre Systolic BP Rehab  104  -AR     Pre Treatment Diastolic BP 71  -AR     Intra Systolic BP Rehab 92  -AR     Intra Treatment Diastolic BP 47  -AR     Post Systolic BP Rehab 113  -AR     Post Treatment Diastolic BP 71  -AR     Intratreatment Heart Rate (beats/min) 114  -AR     Posttreatment Heart Rate (beats/min) 107  -AR     Pre SpO2 (%) 90  -AR     O2 Delivery Pre Treatment supplemental O2  -AR     Intra SpO2 (%) 78  RN notified and she did not want OT to increase oxygen reporting that he is slow to rebound, arrived to room shortly after called  -AR     O2 Delivery Intra Treatment supplemental O2  -AR     Post SpO2 (%) 91  -AR     O2 Delivery Post Treatment supplemental O2  -AR     Pre Patient Position Supine  -AR     Intra Patient Position Sitting  -AR     Post Patient Position Supine  -AR       Row Name 03/24/25 1009          Positioning and Restraints    Pre-Treatment Position in bed  -AR     Post Treatment Position bed  -AR     In Bed supine;call light within reach;encouraged to call for assist;exit alarm on;with nsg  deferred SCD and cold pack d/t multiple nurses at bedside to assess pt  -AR               User Key  (r) = Recorded By, (t) = Taken By, (c) = Cosigned By      Initials Name Provider Type    Aarti Mackey, OT Occupational Therapist                   Outcome Measures       Row Name 03/24/25 1021          How much help from another is currently needed...    Putting on and taking off regular lower body clothing? 1  -AR     Bathing (including washing, rinsing, and drying) 2  -AR     Toileting (which includes using toilet bed pan or urinal) 1  -AR     Putting on and taking off regular upper body clothing 2  -AR     Taking care of personal grooming (such as brushing teeth) 3  -AR     Eating meals 3  -AR     AM-PAC 6 Clicks Score (OT) 12  -AR       Row Name 03/24/25 1021          Functional Assessment    Outcome Measure Options AM-PAC 6 Clicks Daily Activity (OT)  -AR               User Key  (r) = Recorded By, (t) =  Taken By, (c) = Cosigned By      Initials Name Provider Type    Aarti Mackey OT Occupational Therapist                    Occupational Therapy Education       Title: PT OT SLP Therapies (Done)       Topic: Occupational Therapy (Done)       Point: ADL training (Done)       Learning Progress Summary            Patient Eager, E,TB,D, VU,NR by AR at 3/24/2025 1021                      Point: Home exercise program (Done)       Learning Progress Summary            Patient Eager, E,TB,D, VU,NR by AR at 3/24/2025 1021                      Point: Precautions (Done)       Learning Progress Summary            Patient Eager, E,TB,D, VU,NR by AR at 3/24/2025 1021                      Point: Body mechanics (Done)       Learning Progress Summary            Patient Eager, E,TB,D, VU,NR by AR at 3/24/2025 1021                                      User Key       Initials Effective Dates Name Provider Type Discipline    AR 07/11/23 -  Aarti Wu OT Occupational Therapist OT                  OT Recommendation and Plan  Planned Therapy Interventions (OT): activity tolerance training, adaptive equipment training, BADL retraining, edema control/reduction, functional balance retraining, IADL retraining, occupation/activity based interventions, patient/caregiver education/training, ROM/therapeutic exercise, strengthening exercise, transfer/mobility retraining, cognitive/visual perception retraining  Therapy Frequency (OT): daily  Plan of Care Review  Plan of Care Reviewed With: patient, family  Outcome Evaluation: Pt alert, Ox4 and transitioned to EOB with max assist x2. He completed UB dressing taks with max assist and LB dressing with dependence. He transitioned sit-to-stand with mod assist x2 from elevated bed surface and was unable to pivot to chair d/t RLE knee buckling and fatigue. Pt returned to EOB sitting, desat into 70s on 6L NC and had episode of delayed responses with strong left lateral lean. Pt was  assisted to supine with dependence and BP 92/47, oxygen returned to 90% or above within 2 minutes. RN notified and at bedside to assess him. Sling left under him, deferred transfer to chair d/t medical status and RN notified of placement of sling. Pt limited with dyspnea and desaturation on 6L NC, acute pain, poor occupational endurance, impaired balance, confusion and is performing below baseline. Recommend SNF.     Time Calculation:   Evaluation Complexity (OT)  Review Occupational Profile/Medical/Therapy History Complexity: brief/low complexity  Assessment, Occupational Performance/Identification of Deficit Complexity: 1-3 performance deficits  Clinical Decision Making Complexity (OT): problem focused assessment/low complexity  Overall Complexity of Evaluation (OT): low complexity     Time Calculation- OT       Row Name 03/24/25 1021             Time Calculation- OT    OT Start Time 0826  -AR      OT Received On 03/24/25  -AR      OT Goal Re-Cert Due Date 04/03/25  -AR         Timed Charges    89252 - OT Self Care/Mgmt Minutes 11  -AR         Untimed Charges    OT Eval/Re-eval Minutes 59  -AR         Total Minutes    Timed Charges Total Minutes 11  -AR      Untimed Charges Total Minutes 59  -AR       Total Minutes 70  -AR                User Key  (r) = Recorded By, (t) = Taken By, (c) = Cosigned By      Initials Name Provider Type    AR Aarti Wu OT Occupational Therapist                  Therapy Charges for Today       Code Description Service Date Service Provider Modifiers Qty    42610597298 HC OT SELF CARE/MGMT/TRAIN EA 15 MIN 3/24/2025 Aarti Wu OT GO 1    09660972077 HC OT EVAL LOW COMPLEXITY 4 3/24/2025 Aarti Wu OT GO 1    10362218568 HC OT THER SUPP EA 15 MIN 3/24/2025 Aarti Wu OT GO 4                 Aarti Wu OT  3/24/2025    Electronically signed by Aarti Wu OT at 03/24/25 1022

## 2025-03-26 ENCOUNTER — APPOINTMENT (OUTPATIENT)
Facility: HOSPITAL | Age: OVER 89
End: 2025-03-26
Payer: MEDICARE

## 2025-03-26 VITALS
WEIGHT: 206 LBS | TEMPERATURE: 98.7 F | HEIGHT: 69 IN | OXYGEN SATURATION: 91 % | RESPIRATION RATE: 18 BRPM | DIASTOLIC BLOOD PRESSURE: 63 MMHG | SYSTOLIC BLOOD PRESSURE: 122 MMHG | HEART RATE: 94 BPM | BODY MASS INDEX: 30.51 KG/M2

## 2025-03-26 DIAGNOSIS — G89.29 CHRONIC LOW BACK PAIN WITHOUT SCIATICA, UNSPECIFIED BACK PAIN LATERALITY: ICD-10-CM

## 2025-03-26 DIAGNOSIS — M54.50 CHRONIC LOW BACK PAIN WITHOUT SCIATICA, UNSPECIFIED BACK PAIN LATERALITY: ICD-10-CM

## 2025-03-26 PROBLEM — D72.829 LEUKOCYTOSIS: Status: RESOLVED | Noted: 2017-08-09 | Resolved: 2025-03-26

## 2025-03-26 PROBLEM — D69.6 THROMBOCYTOPENIA: Status: RESOLVED | Noted: 2025-03-24 | Resolved: 2025-03-26

## 2025-03-26 LAB
GLUCOSE BLDC GLUCOMTR-MCNC: 173 MG/DL (ref 70–130)
QT INTERVAL: 436 MS
QTC INTERVAL: 497 MS

## 2025-03-26 PROCEDURE — 99024 POSTOP FOLLOW-UP VISIT: CPT | Performed by: ORTHOPAEDIC SURGERY

## 2025-03-26 PROCEDURE — 94799 UNLISTED PULMONARY SVC/PX: CPT

## 2025-03-26 PROCEDURE — 82948 REAGENT STRIP/BLOOD GLUCOSE: CPT

## 2025-03-26 PROCEDURE — 99239 HOSP IP/OBS DSCHRG MGMT >30: CPT | Performed by: NURSE PRACTITIONER

## 2025-03-26 PROCEDURE — 94761 N-INVAS EAR/PLS OXIMETRY MLT: CPT

## 2025-03-26 PROCEDURE — 63710000001 INSULIN LISPRO (HUMAN) PER 5 UNITS: Performed by: ORTHOPAEDIC SURGERY

## 2025-03-26 RX ORDER — HYDROCODONE BITARTRATE AND ACETAMINOPHEN 5; 325 MG/1; MG/1
1 TABLET ORAL EVERY 6 HOURS PRN
Qty: 120 TABLET | Refills: 0 | Status: ON HOLD | OUTPATIENT
Start: 2025-03-26

## 2025-03-26 RX ORDER — DOXYCYCLINE 100 MG/1
100 CAPSULE ORAL EVERY 12 HOURS SCHEDULED
Status: ON HOLD
Start: 2025-03-26 | End: 2025-03-29

## 2025-03-26 RX ORDER — INSULIN GLARGINE 300 U/ML
10 INJECTION, SOLUTION SUBCUTANEOUS NIGHTLY
Qty: 6 ML | Refills: 3 | Status: SHIPPED | OUTPATIENT
Start: 2025-03-26 | End: 2025-04-03

## 2025-03-26 RX ORDER — HYDROCODONE BITARTRATE AND ACETAMINOPHEN 5; 325 MG/1; MG/1
1 TABLET ORAL EVERY 6 HOURS PRN
Qty: 12 TABLET | Refills: 0 | Status: SHIPPED | OUTPATIENT
Start: 2025-03-26 | End: 2025-03-26 | Stop reason: SDUPTHER

## 2025-03-26 RX ORDER — CEFDINIR 300 MG/1
300 CAPSULE ORAL DAILY
Status: ON HOLD
Start: 2025-03-26 | End: 2025-03-29

## 2025-03-26 RX ORDER — INSULIN GLARGINE 300 U/ML
10 INJECTION, SOLUTION SUBCUTANEOUS NIGHTLY
Qty: 6 ML | Refills: 3 | Status: SHIPPED | OUTPATIENT
Start: 2025-03-26 | End: 2025-03-26

## 2025-03-26 RX ORDER — ALLOPURINOL 100 MG/1
300 TABLET ORAL DAILY
Start: 2025-03-26 | End: 2025-04-03

## 2025-03-26 RX ORDER — IPRATROPIUM BROMIDE AND ALBUTEROL SULFATE 2.5; .5 MG/3ML; MG/3ML
3 SOLUTION RESPIRATORY (INHALATION)
Start: 2025-03-26 | End: 2025-03-31

## 2025-03-26 RX ADMIN — Medication 10 ML: at 08:14

## 2025-03-26 RX ADMIN — ASPIRIN 81 MG: 81 TABLET, COATED ORAL at 08:13

## 2025-03-26 RX ADMIN — DOXYCYCLINE 100 MG: 100 CAPSULE ORAL at 08:12

## 2025-03-26 RX ADMIN — BISOPROLOL FUMARATE 5 MG: 5 TABLET ORAL at 08:13

## 2025-03-26 RX ADMIN — HYDROCODONE BITARTRATE AND ACETAMINOPHEN 1 TABLET: 5; 325 TABLET ORAL at 08:13

## 2025-03-26 RX ADMIN — INSULIN LISPRO 2 UNITS: 100 INJECTION, SOLUTION INTRAVENOUS; SUBCUTANEOUS at 08:13

## 2025-03-26 RX ADMIN — OXYBUTYNIN CHLORIDE 10 MG: 10 TABLET, EXTENDED RELEASE ORAL at 08:13

## 2025-03-26 RX ADMIN — FINASTERIDE 5 MG: 5 TABLET, FILM COATED ORAL at 08:13

## 2025-03-26 RX ADMIN — APIXABAN 2.5 MG: 2.5 TABLET, FILM COATED ORAL at 08:13

## 2025-03-26 RX ADMIN — Medication 3 ML: at 08:14

## 2025-03-26 RX ADMIN — ACETAMINOPHEN 650 MG: 325 TABLET, FILM COATED ORAL at 00:46

## 2025-03-26 RX ADMIN — LEVOTHYROXINE SODIUM 50 MCG: 0.05 TABLET ORAL at 05:37

## 2025-03-26 RX ADMIN — IPRATROPIUM BROMIDE AND ALBUTEROL SULFATE 3 ML: 2.5; .5 SOLUTION RESPIRATORY (INHALATION) at 06:20

## 2025-03-26 RX ADMIN — DOCUSATE SODIUM 50MG AND SENNOSIDES 8.6MG 2 TABLET: 8.6; 5 TABLET, FILM COATED ORAL at 08:12

## 2025-03-26 NOTE — NURSING NOTE
Patient discharged to [facility] via EMS. Report called to Venus Villeda at bedside and all questions answered. All belongings sent with son and patient.

## 2025-03-26 NOTE — PROGRESS NOTES
"          Orthopaedic Surgery Progress Note    LOS: 5 days   Patient Care Team:  Kary Wen MD as PCP - General (Family Medicine)  Breeding, Win CROOKS MD as Consulting Physician (Cardiology)  Deanne Pitts MD as Consulting Physician (Urology)  3 Days Post-Op   Procedure(s):  RIGHT HIP TROCHANTERIC NAILING  Subjective     Interval History:   Patient resting in bed.  Hip pain well-controlled.  No new complaints.  Planning for IPR today.    Objective     Vital Signs:  Temp (24hrs), Av.3 °F (37.4 °C), Min:98 °F (36.7 °C), Max:100.6 °F (38.1 °C)    /63 (BP Location: Right arm, Patient Position: Lying)   Pulse 97   Temp 98.7 °F (37.1 °C) (Oral)   Resp 18   Ht 175.3 cm (69\")   Wt 93.4 kg (206 lb)   SpO2 92%   BMI 30.42 kg/m²   Body mass index is 30.42 kg/m².    Labs:  Lab Results (last 24 hours)       Procedure Component Value Units Date/Time    POC Glucose Once [[id number]]  (Abnormal) Collected: 25 0728    Specimen: Blood Updated: 25 0729     Glucose 173 mg/dL     POC Glucose Once [[id number]]  (Abnormal) Collected: 25    Specimen: Blood Updated: 25     Glucose 156 mg/dL     POC Glucose Once [[id number]]  (Abnormal) Collected: 25 1613    Specimen: Blood Updated: 25 1615     Glucose 139 mg/dL     Blood Culture - Blood, Hand, Right [[id number]]  (Normal) Collected: 25 1245    Specimen: Blood from Hand, Right Updated: 25 1315     Blood Culture No growth at 3 days    Narrative:      Less than seven (7) mL's of blood was collected.  Insufficient quantity may yield false negative results.    Blood Culture - Blood, Hand, Left [[id number]]  (Normal) Collected: 25 1249    Specimen: Blood from Hand, Left Updated: 25 1315     Blood Culture No growth at 3 days    Narrative:      Less than seven (7) mL's of blood was collected.  Insufficient quantity may yield false negative results.    POC Glucose Once [[id number]]  (Abnormal) Collected: " 03/25/25 1120    Specimen: Blood Updated: 03/25/25 1122     Glucose 165 mg/dL     Basic Metabolic Panel [[id number]]  (Abnormal) Collected: 03/25/25 0826    Specimen: Blood Updated: 03/25/25 0906     Glucose 148 mg/dL      BUN 65 mg/dL      Creatinine 2.04 mg/dL      Sodium 137 mmol/L      Potassium 5.2 mmol/L      Chloride 108 mmol/L      CO2 20.0 mmol/L      Calcium 8.5 mg/dL      BUN/Creatinine Ratio 31.9     Anion Gap 9.0 mmol/L      eGFR 30.0 mL/min/1.73     Narrative:      GFR Categories in Chronic Kidney Disease (CKD)      GFR Category          GFR (mL/min/1.73)    Interpretation  G1                     90 or greater         Normal or high (1)  G2                      60-89                Mild decrease (1)  G3a                   45-59                Mild to moderate decrease  G3b                   30-44                Moderate to severe decrease  G4                    15-29                Severe decrease  G5                    14 or less           Kidney failure          (1)In the absence of evidence of kidney disease, neither GFR category G1 or G2 fulfill the criteria for CKD.    eGFR calculation 2021 CKD-EPI creatinine equation, which does not include race as a factor            Physical Exam:  right LE: Covaderm dressing in place, clean dry and intact, couple dime sized areas of spotting  compartments soft/compressible leg and thigh   Actively wiggling toes as well as dorsiflexing and plantar flexing ankle and knee   SILT in foot in all distributions   Palpable PT pulse, CR brisk in all toes    Assessment/Plan:  3 Days Post-Op status post:  Procedure(s):  RIGHT HIP TROCHANTERIC NAILING    -Anemia, acute blood loss anemia following hip fracture  -Weight bearing as tolerated with assistance and support devices (walker, wheelchair) as needed  -Advance diet as tolerated, diet per primary  -Continue Covaderm dressing, asked nurse to remove old dressing in place new dressing March 26 prior to patient's discharge  to IPR  -PT/OT Consult - Weight bearing as tolerated  -Ice hip  -DVT Prophylaxis - Eliquis   -Osteoporotic hip fracture - I recommend informing the patient's PCP regarding osteoporotic fracture/consideration of enrolling patient in osteoporotic care program postoperatively  -Follow-up, upon discharge, patient will need follow-up with me in the clinic in 2-3 weeks' time.  Continue DVT ppx for 30 days.  Continue covaderm dressing for 1 week after surgery and then remove and replace with a covaderm or dry dressing every other day.  Keep incision covered at all times.  DC staples on or around 2-3 weeks. The  will need to call my office/[facility] to arrange for a discharge follow-up appointment in 2-3 weeks.     Brodie Baltazar MD  03/26/25  09:02 EDT

## 2025-03-26 NOTE — DISCHARGE PLACEMENT REQUEST
"Forrest Zepeda \"Jarrett\" (89 y.o. Male)      Barb  [phone]      Date of Birth   1932    Social Security Number       Address   [address] [place], [place] [address]    Home Phone   [phone]    MRN   [id number]       [facility]    Marital Status                               Admission Date   3/21/2025    Admission Type   Emergency    Admitting Provider   Nancy Turpin DO    Attending Provider   Nancy Turpin DO    Department, Room/Bed   [facility] 3F, S327/1       Discharge Date       Discharge Disposition   Skilled Nursing Facility (DC - External)    Discharge Destination                                 Attending Provider: Nancy Turpin DO    Allergies: Statins, Metformin    Isolation: None   Infection: None   Code Status: No CPR    Ht: 175.3 cm (69\")   Wt: 93.4 kg (206 lb)    Admission Cmt: None   Principal Problem: Closed right hip fracture [S72.001A]                   Active Insurance as of 3/21/2025       Primary Coverage       Payor Plan Insurance Group Employer/Plan Group    HUMANA MEDICARE REPLACEMENT HU[employer] MEDICARE ADVANTAGE GROUP [id number]       Payor Plan Address Payor Plan Phone Number Payor Plan Fax Number Effective Dates    [address] [address] [phone]  2018 - None Entered    Formerly [facility] [id number]         Subscriber Name Subscriber Birth Date Member ID       FORREST ZEPEDA 1932 [id number]                     Emergency Contacts        (Rel.) Home Phone Work Phone Mobile Phone    JUDIEMARCUS (Grandchild) [phone] -- --    Eron Zepeda (Power of ) [phone] -- --    Mariam Dior (Daughter) [phone] -- --                 Discharge Summary        Betty Covarrubias APRN at [address] [place] [address] Medicine Services  DISCHARGE SUMMARY    Patient Name: Forrest Zepeda  : 1932  MRN: [id number]    Date of Admission: 3/21/2025  3:15 PM  Date of Discharge:  " 3/26/2025  Primary Care Physician: Kary Wen MD    Consults       Date and Time Order Name Status Description    3/21/2025  8:26 PM Inpatient Orthopedic Surgery Consult              Hospital Course     Presenting Problem: Fall, right hip pain    Active Hospital Problems    Diagnosis  POA    **Closed right hip fracture [S72.001A]  Yes    T2DM (type 2 diabetes mellitus) [E11.9]  Yes      Resolved Hospital Problems    Diagnosis Date Resolved POA    Thrombocytopenia [D69.6] 03/26/2025 No    Leukocytosis [D72.829] 03/26/2025 Yes    Hyperkalemia [E87.5] 03/26/2025 Yes          Hospital Course:  Forrest Zepeda is a 89 y.o. male  with past medical history significant for interstitial lung disease, saddle PE, BPH, HFpEF, chronic hydronephrosis d/t urinary stricture, CKD 3, T2DM, hypothyroidism, and PAF who presented to [facility] due to fall with severe right hip pain. X-ray revealed right intertrochanteric femur fracture. He underwent right hip repair with Dr. Baltazar on 3/23. Patient had increasing O2 demands overnight on 3/22. CXR was concerning for pneumonia and patient was treated with Rocephin and doxycycline.        Right hip fracture with repair 3/23/2025  -WBAT per Ortho  -Eliquis for DVT PPX   -Follow-up in 2-3 weeks with Dr Baltazar  -Dr. Baltazar recommends enrolling patient in osteoporotic care program post-operatively, PCP to make referral  -Continue covaderm dressing for 1 week after surgery and then remove and replace with a covaderm or dry dressing every other day. Keep incision covered at all times. DC staples on or around 2-3 weeks.      CKD III  Chronic hydronephrosis  -unknown baseline creatinine  -Follows with urologist Dr. Pitts   -Brayden stable     Acute on chronic hypoxia  PNA  ILD  -pt is supposed to be on 2L at baseline but is noncompliant  -increased O2 requirements 3/22, CXR revealed mild patchy airspace disease at lung bases bilaterally, which appeared new/progressed from prior, likely r/t mild  pneumonia  -WBCs 14.56 on 3/22, now 8.32  -Respiratory panel negative  -Blood Cx NG at 3 days   -Continue scheduled DuoNebs while on abx  -Continue Rocephin + Doxy to complete 10-day course  -Continue incentive spirometer as able  -Wean O2 to home 2L as tolerated    TCP  -resolved      Acute blood loss anemia  -H&H stable     Hyperkalemia  -resolved     PAF  HFpEF  -continue Eliquis  -continue rate controlling meds, Bisoprolol         Hx PE/DVT  -Continue Eliquis      T2DM  -Hgb A1C 6.9%  -held home glimepiride, Januvia, Toujeo 20 units nightly  -given Lantus 10 units nightly and SSI during admissio  -resume home glimepiride and Januvia. Start Toujeo at 10 units nightly and increase by 2 units every 2 days until morning BG is < 160 in order to avoid hypoglycemia.      BPH  -continue Proscar     Hypothyroidism  -continue Synthroid      Decreased oral intake  -Add Boost TID      Constipation  -continue bowel regimen     Gout  -home allopurinol reduced to 100 mg daily d/t renal function/CrCl       Discharge Follow Up Recommendations for outpatient labs/diagnostics:  --Follow up with PCP one week after discharge from SNF rehab, PCP to arrange post-op follow up in osteoporotic care program  --Follow up with Dr. Baltazar 2-3 weeks  --Continue covaderm dressing for 1 week after surgery and then remove and replace with a covaderm or dry dressing every other day. Keep incision covered at all times. DC staples on or around 2-3 weeks.   --Weight-bearing as tolerated    Day of Discharge     HPI:   Patient resting in bed. Son is at the bedside. Per son, patient more alert, oriented, and conversant this morning and asking for more to drink. Patient says he is having pain in right hip intermittently. Still on 4L NC. Loose cough.      Vital Signs:   Temp:  [98 °F (36.7 °C)-100.6 °F (38.1 °C)] 98.7 °F (37.1 °C)  Heart Rate:  [69-97] 97  Resp:  [16-18] 18  BP: (105-146)/(54-93) 122/63  Flow (L/min) (Oxygen Therapy):  [4-6]  4      Physical Exam:  Constitutional: No acute distress, awake, alert  HENT: NCAT, mucous membranes moist  Respiratory: Fine rales at bases bilaterally, expiratory wheezes R>L, respiratory effort normal, 4L NC  Cardiovascular: RRR, no murmurs, rubs, or gallops  Gastrointestinal: Positive bowel sounds, soft, nontender, nondistended  Musculoskeletal: No bilateral ankle edema  Psychiatric: Appropriate affect, cooperative  Neurologic: Oriented x 3, moves all extremities, speech clear  Skin: No rashes      Pertinent  and/or Most Recent Results     LAB RESULTS:      Lab 03/25/25  0826 03/24/25  0457 03/22/25  0623 03/21/25  1525   WBC 8.32 10.22 14.56* 15.92*   HEMOGLOBIN 9.3* 8.8* 10.7* 13.7   HEMATOCRIT 30.5* 28.3* 34.8* 43.1   PLATELETS 159 117* 146 193   NEUTROS ABS 5.39 8.22* 9.61* 14.07*   IMMATURE GRANS (ABS) 0.04 0.04 0.09* 0.11*   LYMPHS ABS 1.42 1.03 3.04 1.04   MONOS ABS 0.84 0.87 1.01* 0.54   EOS ABS 0.58* 0.03 0.72* 0.10   MCV 97.1* 97.9* 98.3* 94.5   PROCALCITONIN  --   --  0.45*  --          Lab 03/25/25  0826 03/24/25  0738 03/24/25  0457 03/22/25  0623 03/21/25  1525   SODIUM 137  --  137 136 142   POTASSIUM 5.2 5.3* 6.1* 4.8 5.7*   CHLORIDE 108*  --  107 106 106   CO2 20.0*  --  16.0* 18.0* 21.0*   ANION GAP 9.0  --  14.0 12.0 15.0   BUN 65*  --  59* 52* 52*   CREATININE 2.04*  --  2.13* 2.16* 2.02*   EGFR 30.0*  --  28.5* 28.0* 30.4*   GLUCOSE 148*  --  165* 135* 258*   CALCIUM 8.5  --  8.6 8.6 10.0*   MAGNESIUM  --   --   --  1.8  --    HEMOGLOBIN A1C  --   --   --  6.90*  --    TSH  --   --   --  1.740  --          Lab 03/22/25  0623 03/21/25  1525   TOTAL PROTEIN 6.1 7.7   ALBUMIN 3.1* 4.0   GLOBULIN 3.0 3.7   ALT (SGPT) 10 13   AST (SGOT) 19 24   BILIRUBIN 0.5 0.6   ALK PHOS 79 108         Lab 03/22/25  0623   PROBNP 573.4             Lab 03/21/25  1526   ABO TYPING O   RH TYPING Negative   ANTIBODY SCREEN Negative         Brief Urine Lab Results  (Last result in the past 365 days)        Color    Clarity   Blood   Leuk Est   Nitrite   Protein   CREAT   Urine HCG        03/21/25 1618 Yellow   Turbid   Large (3+)   Large (3+)   Negative   100 mg/dL (2+)                 Microbiology Results (last 10 days)       Procedure Component Value - Date/Time    S. Pneumo Ag Urine or CSF - Urine, Urine, Clean Catch [[id number]]  (Normal) Collected: 03/22/25 1328    Lab Status: Final result Specimen: Urine, Clean Catch Updated: 03/23/25 0407     Strep Pneumo Ag Negative    Legionella Antigen, Urine - Urine, Urine, Clean Catch [[id number]]  (Normal) Collected: 03/22/25 1328    Lab Status: Final result Specimen: Urine, Clean Catch Updated: 03/23/25 0406     LEGIONELLA ANTIGEN, URINE Negative    Blood Culture - Blood, Hand, Left [[id number]]  (Normal) Collected: 03/22/25 1249    Lab Status: Preliminary result Specimen: Blood from Hand, Left Updated: 03/25/25 1315     Blood Culture No growth at 3 days    Narrative:      Less than seven (7) mL's of blood was collected.  Insufficient quantity may yield false negative results.    Blood Culture - Blood, Hand, Right [[id number]]  (Normal) Collected: 03/22/25 1245    Lab Status: Preliminary result Specimen: Blood from Hand, Right Updated: 03/25/25 1315     Blood Culture No growth at 3 days    Narrative:      Less than seven (7) mL's of blood was collected.  Insufficient quantity may yield false negative results.    MRSA Screen, PCR (Inpatient) - Swab, Nares [[id number]]  (Normal) Collected: 03/22/25 1146    Lab Status: Final result Specimen: Swab from Nares Updated: 03/22/25 1328     MRSA PCR Negative    Narrative:      The negative predictive value of this diagnostic test is high and should only be used to consider de-escalating anti-MRSA therapy. A positive result may indicate colonization with MRSA and must be correlated clinically.  MRSA Negative    Respiratory Panel PCR w/COVID-19(SARS-CoV-2) GOMEZ/JOSE/ISHA/PAD/COR/JEISON In-House, NP Swab in UTM/VTM, 2 HR TAT - Swab, Nasopharynx  [[id number]]  (Normal) Collected: 03/22/25 1146    Lab Status: Final result Specimen: Swab from Nasopharynx Updated: 03/22/25 1308     ADENOVIRUS, PCR Not Detected     Coronavirus 229E Not Detected     Coronavirus HKU1 Not Detected     Coronavirus NL63 Not Detected     Coronavirus OC43 Not Detected     COVID19 Not Detected     Human Metapneumovirus Not Detected     Human Rhinovirus/Enterovirus Not Detected     Influenza A PCR Not Detected     Influenza B PCR Not Detected     Parainfluenza Virus 1 Not Detected     Parainfluenza Virus 2 Not Detected     Parainfluenza Virus 3 Not Detected     Parainfluenza Virus 4 Not Detected     RSV, PCR Not Detected     Bordetella pertussis pcr Not Detected     Bordetella parapertussis PCR Not Detected     Chlamydophila pneumoniae PCR Not Detected     Mycoplasma pneumo by PCR Not Detected    Narrative:      In the setting of a positive respiratory panel with a viral infection PLUS a negative procalcitonin without other underlying concern for bacterial infection, consider observing off antibiotics or discontinuation of antibiotics and continue supportive care. If the respiratory panel is positive for atypical bacterial infection (Bordetella pertussis, Chlamydophila pneumoniae, or Mycoplasma pneumoniae), consider antibiotic de-escalation to target atypical bacterial infection.    Urine Culture - Urine, Urine, Catheter [[id number]]  (Normal) Collected: 03/21/25 1618    Lab Status: Final result Specimen: Urine, Catheter Updated: 03/23/25 1054     Urine Culture No growth            FL C Arm During Surgery  Result Date: 3/23/2025  This procedure was auto-finalized with no dictation required.    RIGHT FI SS  Result Date: 3/23/2025  Brodie Perkins CRNA     3/23/2025  8:46 AM RIGHT FI SS Patient reassessed immediately prior to procedure Start time: 3/23/2025 8:24 AM Reason for block: at surgeon's request and post-op pain management Performed by CRNA/CAA: Brodie Perkins CRNA Preanesthetic  "Checklist Completed: patient identified, IV checked, site marked, risks and benefits discussed, surgical consent, monitors and equipment checked, pre-op evaluation and timeout performed Prep: Pt Position: supine Sterile barriers:cap, gloves, mask and washed/disinfected hands Prep: ChloraPrep Patient monitoring: blood pressure monitoring, continuous pulse oximetry and EKG Procedure Sedation: no Performed under: general Guidance:ultrasound guided ULTRASOUND INTERPRETATION.  Using ultrasound guidance a 20 G gauge needle was placed in close proximity to the nerve, at which point, under ultrasound guidance anesthetic was injected in the area of the nerve and spread of the anesthesia was seen on ultrasound in close proximity thereto.  There were no abnormalities seen on ultrasound; a digital image was taken; and the patient tolerated the procedure with no complications. Images:still images obtained, printed/placed on chart Laterality:right Block Type:fascia iliaca compartment Injection Technique:single-shot Needle Type:echogenic and Tuohy Needle Gauge:18 G Resistance on Injection: none Catheter Size:20 G (20g) Medications Used: ropivacaine (NAROPIN) 0.5 % injection - Injection  30 mL - 3/23/2025 8:24:00 AM Medications Preservative Free Saline:30ml Post Assessment Injection Assessment: negative aspiration for heme, no paresthesia on injection and incremental injection Patient Tolerance:comfortable throughout block Complications:no Additional Notes CKAFASCIAILIACA: SINGLE shot A high-frequency linear transducer, with sterile cover, was placed in parasagittal plane on top of the Anterior Superior Iliac Spine (ASIS) and moved medially to identify the Internal Oblique muscle, Sartorius muscle, Iliacus Muscle, Fascia Iliaca (FI) and Fascia Latae. The insertion site was prepped and draped in sterile fashion. Skin and cutaneous tissue was infiltrated with 2-5 ml of 1% Lidocaine. Using ultrasound-guidance, a 20-gauge B-Issa 4\" " Ultraplex 360 non-stimulating echogenic needle was advanced in plane from caudad to cephalad. Preservative-free normal saline was utilized for hydro-dissection of tissue, advancement of needle, and to confirm final needle placement below FI. Local anesthetic in incremental 3-5 ml injections. Aspiration every 5 ml to prevent intravascular injection. Injection was completed with negative aspiration of blood and negative intravascular injection. Injection pressures were normal with minimal resistance. Performed by: Brodie Perkins CRNA    XR Chest 1 View  Result Date: 3/22/2025  XR CHEST 1 VW Date of Exam: 3/22/2025 5:38 AM EDT Indication: dyspnea Comparison: 3/21/2025. Findings: Mild patchy airspace disease is present within the lung bases bilaterally which appears new/progressed as compared to the previous study. Stable left subclavian AICD/pacemaker device. Stable chronic interstitial changes.. No pleural fluid. No pneumothorax. The pulmonary vasculature appears within normal limits. The cardiac and mediastinal silhouette appear unremarkable. No acute osseous abnormality identified.     Impression: Mild patchy airspace disease present within the lung bases bilaterally which appears new/progressed as compared to the previous study, likely related to a mild pneumonia. Electronically Signed: Jennifer Mendoza MD  3/22/2025 6:02 AM EDT  Workstation ID: [id number]    XR Chest 1 View  Result Date: 3/21/2025  XR CHEST 1 VW Date of Exam: 3/21/2025 3:48 PM EDT Indication: HIP FX Comparison: 11/20/2024 Findings: There is a dual-chamber pacemaker. There is incomplete inspiration and exaggerated lordotic positioning which essentially obscures the heart border. The pulmonary vasculature appears within normal limits. There are reticular interstitial markings in the peripheral lungs and lung bases. No acute infiltrate is demonstrated     Impression: Interstitial lung disease/pulmonary fibrosis. No acute process demonstrated Electronically  Signed: Golden Ngo  3/21/2025 3:57 PM EDT  Workstation ID: [id number]    XR Femur 2 View Right  Result Date: 3/21/2025  XR FEMUR 2 VW RIGHT Date of Exam: 3/21/2025 3:48 PM EDT Indication: HIP FX Comparison: None available. Findings/    Impression: There is a mildly displaced and mildly comminuted intertrochanteric fracture of the right proximal femur. Bones are demineralized. Right knee arthroplasty is noted. No hardware complication in the provided views. The visualized bony pelvis appears unremarkable. Partially visualized left ureteral stent. Vascular calcifications are seen. Electronically Signed: Ramon Paredes MD  3/21/2025 3:53 PM EDT  Workstation ID: [id number]    XR Pelvis 1 or 2 View  Result Date: 3/21/2025  XR PELVIS 1 OR 2 VW Date of Exam: 3/21/2025 3:48 PM EDT Indication: HIP FX Comparison: None available. Findings: The bones appear somewhat osteopenic. There is a fracture of the intertrochanteric right femur without significant displacement on this single view. The femoral head is not dislocated. No fracture of the pelvis is visualized. A left ureteral stent is present. There is degenerative disease in the lower lumbar spine     Impression: Intertrochanteric right femur fracture Electronically Signed: Golden Jeni  3/21/2025 3:53 PM EDT  Workstation ID: [id number]      Results for orders placed during the hospital encounter of 08/22/19    Duplex Venous Lower Extremity - Bilateral CAR    Interpretation Summary  · Acute right lower extremity deep vein thrombosis noted in the distal femoral and popliteal.  · All other veins appeared normal bilaterally.      Results for orders placed during the hospital encounter of 08/22/19    Duplex Venous Lower Extremity - Bilateral CAR    Interpretation Summary  · Acute right lower extremity deep vein thrombosis noted in the distal femoral and popliteal.  · All other veins appeared normal bilaterally.      Results for orders placed in visit on 11/13/23    Adult  Transthoracic Echo Complete W/ Cont if Necessary Per Protocol    Interpretation Summary    Left ventricular systolic function is normal. Estimated left ventricular EF = 52% Left ventricular ejection fraction appears to be 51 - 55%.    Left ventricular wall thickness is consistent with mild concentric hypertrophy.    Left ventricular diastolic function is consistent with (grade I) impaired relaxation.    The right ventricular cavity is mildly dilated.    The left atrial cavity is mildly dilated.    Left atrial volume is moderately increased.    There is mild calcification of the aortic valve mainly affecting the non-coronary, left coronary and right coronary cusp(s).    Estimated right ventricular systolic pressure from tricuspid regurgitation is normal (<35 mmHg).      Plan for Follow-up of Pending Labs/Results:   Pending Labs       Order Current Status    Blood Culture - Blood, Hand, Left Preliminary result    Blood Culture - Blood, Hand, Right Preliminary result          Discharge Details        Discharge Medications        New Medications        Instructions Start Date   cefdinir 300 MG capsule  Commonly known as: OMNICEF   300 mg, Oral, Daily      doxycycline 100 MG capsule  Commonly known as: MONODOX   100 mg, Oral, Every 12 Hours Scheduled      ipratropium-albuterol 0.5-2.5 mg/3 ml nebulizer  Commonly known as: DUO-NEB   3 mL, Nebulization, Every 6 Hours While Awake - RT             Changes to Medications        Instructions Start Date   allopurinol 100 MG tablet  Commonly known as: ZYLOPRIM  What changed:   medication strength  when to take this   300 mg, Oral, Daily, To lower risk of gout      Toujeo SoloStar 300 UNIT/ML solution pen-injector injection  Generic drug: Insulin Glargine (1 Unit Dial)  What changed:   how much to take  additional instructions   10 Units, Subcutaneous, Nightly, Increase Toujeo by 2 units every 2 nights until morning BG is < 160.             Continue These Medications         Instructions Start Date   Aspirin Low Dose 81 MG EC tablet  Generic drug: aspirin   81 mg, Oral, Daily      bisoprolol 5 MG tablet  Commonly known as: ZEBeta   Take 1 tablet by mouth Daily.      Eliquis 2.5 MG tablet tablet  Generic drug: apixaban   2.5 mg, Oral, 2 Times Daily      finasteride 5 MG tablet  Commonly known as: PROSCAR   5 mg, Oral, Daily      glimepiride 1 MG tablet  Commonly known as: AMARYL   1 mg, Oral, Every Morning Before Breakfast      HYDROcodone-acetaminophen 5-325 MG per tablet  Commonly known as: NORCO   1 tablet, Oral, Every 6 Hours PRN      levothyroxine 50 MCG tablet  Commonly known as: SYNTHROID, LEVOTHROID   50 mcg, Oral, Every Early Morning      Myrbetriq 50 MG tablet sustained-release 24 hour 24 hr tablet  Generic drug: Mirabegron ER   50 mg, Oral, Daily      OCUVITE ADULT 50+ PO   1 capsule, Daily      SITagliptin 25 MG tablet  Commonly known as: Januvia   25 mg, Oral, Daily, For diabetes               Allergies   Allergen Reactions    Statins Diarrhea and Myalgia    Metformin Diarrhea and GI Intolerance         Discharge Disposition:  Skilled Nursing Facility ([place] - External)    Diet:  Hospital:  Diet Order   Procedures    Diet: Regular/House; Fluid Consistency: Thin (IDDSI 0)       Diet Instructions       Diet: Regular/House Diet; Regular (IDDSI 7); Thin (IDDSI 0)      Discharge Diet: Regular/House Diet    Texture: Regular (IDDSI 7)    Fluid Consistency: Thin (IDDSI 0)             Activity:      Restrictions or Other Recommendations:         CODE STATUS:    Code Status and Medical Interventions: No CPR (Do Not Attempt to Resuscitate); Limited Support; No intubation (DNI)   Ordered at: 03/21/25 1849     Code Status (Patient has no pulse and is not breathing):    No CPR (Do Not Attempt to Resuscitate)     Medical Interventions (Patient has pulse or is breathing):    Limited Support     Medical Intervention Limits:    No intubation (DNI)     Level Of Support Discussed With:    Patient        Future Appointments   Date Time Provider Department Center   3/26/2025  9:30 AM MED 13 [facility] None   4/9/2025  3:00 PM Brodie Baltazar MD [facility]   4/14/2025  3:30 PM Deanne Pitts MD [facility]   7/7/2025  1:00 PM Win Saha MD [facility] CD BG [facility]   7/7/2025  1:00 PM [facility] CARD CUBA DEVICE CHECK [facility] CD BG [facility]       Additional Instructions for the Follow-ups that You Need to Schedule       Discharge Follow-up with PCP   As directed       Currently Documented PCP:    Kary Wen MD    PCP Phone Number:    [phone]     Follow Up Details: 1 week after discharge from SNF rehab. Patient will need referral for osteoporosis care program s/p hospitalization for right hip repair/osteoporosis.        Discharge Follow-up with Specified Provider: Dr. Baltazar with orthopedic surgery; 2 Weeks   As directed      To: Dr. Baltazar with orthopedic surgery   Follow Up: 2 Weeks   Follow Up Details: 2-3 weeks, post right hip repair                  JOYCE Grissom  03/26/25      Time Spent on Discharge:  I spent  45  minutes on this discharge activity which included: face-to-face encounter with the patient, reviewing the data in the system, coordination of the care with the nursing staff as well as consultants, documentation, and entering orders.            Electronically signed by Betty Covarrubias APRN at 03/26/25 2309

## 2025-03-26 NOTE — DISCHARGE SUMMARY
[facility] Medicine Services  DISCHARGE SUMMARY    Patient Name: Forrest Zepeda  : 1932  MRN: [id number]    Date of Admission: 3/21/2025  3:15 PM  Date of Discharge:  3/26/2025  Primary Care Physician: Kary Wen MD    Consults       Date and Time Order Name Status Description    3/21/2025  8:26 PM Inpatient Orthopedic Surgery Consult              Hospital Course     Presenting Problem: Fall, right hip pain    Active Hospital Problems    Diagnosis  POA    **Closed right hip fracture [S72.001A]  Yes    T2DM (type 2 diabetes mellitus) [E11.9]  Yes      Resolved Hospital Problems    Diagnosis Date Resolved POA    Thrombocytopenia [D69.6] 2025 No    Leukocytosis [D72.829] 2025 Yes    Hyperkalemia [E87.5] 2025 Yes          Hospital Course:  Forrest Zepeda is a 89 y.o. male  with past medical history significant for interstitial lung disease, saddle PE, BPH, HFpEF, chronic hydronephrosis d/t urinary stricture, CKD 3, T2DM, hypothyroidism, and PAF who presented to [facility] due to fall with severe right hip pain. X-ray revealed right intertrochanteric femur fracture. He underwent right hip repair with Dr. Baltazar on 3/23. Patient had increasing O2 demands overnight on 3/22. CXR was concerning for pneumonia and patient was treated with Rocephin and doxycycline.        Right hip fracture with repair 3/23/2025  -WBAT per Ortho  -Eliquis for DVT PPX   -Follow-up in 2-3 weeks with Dr Baltazar  -Dr. Baltazar recommends enrolling patient in osteoporotic care program post-operatively, PCP to make referral  -Continue covaderm dressing for 1 week after surgery and then remove and replace with a covaderm or dry dressing every other day. Keep incision covered at all times. DC staples on or around 2-3 weeks.      CKD III  Chronic hydronephrosis  -unknown baseline creatinine  -Follows with urologist Dr. Pitts   -Brayden stable     Acute on chronic hypoxia  PNA  ILD  -pt is supposed to be on  2L at baseline but is noncompliant  -increased O2 requirements 3/22, CXR revealed mild patchy airspace disease at lung bases bilaterally, which appeared new/progressed from prior, likely r/t mild pneumonia  -WBCs 14.56 on 3/22, now 8.32  -Respiratory panel negative  -Blood Cx NG at 3 days   -Continue scheduled DuoNebs while on abx  -Continue Rocephin + Doxy to complete 10-day course  -Continue incentive spirometer as able  -Wean O2 to home 2L as tolerated    TCP  -resolved      Acute blood loss anemia  -H&H stable     Hyperkalemia  -resolved     PAF  HFpEF  -continue Eliquis  -continue rate controlling meds, Bisoprolol         Hx PE/DVT  -Continue Eliquis      T2DM  -Hgb A1C 6.9%  -held home glimepiride, Januvia, Toujeo 20 units nightly  -given Lantus 10 units nightly and SSI during admissio  -resume home glimepiride and Januvia. Start Toujeo at 10 units nightly and increase by 2 units every 2 days until morning BG is < 160 in order to avoid hypoglycemia.      BPH  -continue Proscar     Hypothyroidism  -continue Synthroid      Decreased oral intake  -Add Boost TID      Constipation  -continue bowel regimen     Gout  -home allopurinol reduced to 100 mg daily d/t renal function/CrCl       Discharge Follow Up Recommendations for outpatient labs/diagnostics:  --Follow up with PCP one week after discharge from SNF rehab, PCP to arrange post-op follow up in osteoporotic care program  --Follow up with Dr. Baltazar 2-3 weeks  --Continue covaderm dressing for 1 week after surgery and then remove and replace with a covaderm or dry dressing every other day. Keep incision covered at all times. DC staples on or around 2-3 weeks.   --Weight-bearing as tolerated    Day of Discharge     HPI:   Patient resting in bed. Son is at the bedside. Per son, patient more alert, oriented, and conversant this morning and asking for more to drink. Patient says he is having pain in right hip intermittently. Still on 4L NC. Loose cough.      Vital  Signs:   Temp:  [98 °F (36.7 °C)-100.6 °F (38.1 °C)] 98.7 °F (37.1 °C)  Heart Rate:  [69-97] 97  Resp:  [16-18] 18  BP: (105-146)/(54-93) 122/63  Flow (L/min) (Oxygen Therapy):  [4-6] 4      Physical Exam:  Constitutional: No acute distress, awake, alert  HENT: NCAT, mucous membranes moist  Respiratory: Fine rales at bases bilaterally, expiratory wheezes R>L, respiratory effort normal, 4L NC  Cardiovascular: RRR, no murmurs, rubs, or gallops  Gastrointestinal: Positive bowel sounds, soft, nontender, nondistended  Musculoskeletal: No bilateral ankle edema  Psychiatric: Appropriate affect, cooperative  Neurologic: Oriented x 3, moves all extremities, speech clear  Skin: No rashes      Pertinent  and/or Most Recent Results     LAB RESULTS:      Lab 03/25/25  0826 03/24/25  0457 03/22/25  0623 03/21/25  1525   WBC 8.32 10.22 14.56* 15.92*   HEMOGLOBIN 9.3* 8.8* 10.7* 13.7   HEMATOCRIT 30.5* 28.3* 34.8* 43.1   PLATELETS 159 117* 146 193   NEUTROS ABS 5.39 8.22* 9.61* 14.07*   IMMATURE GRANS (ABS) 0.04 0.04 0.09* 0.11*   LYMPHS ABS 1.42 1.03 3.04 1.04   MONOS ABS 0.84 0.87 1.01* 0.54   EOS ABS 0.58* 0.03 0.72* 0.10   MCV 97.1* 97.9* 98.3* 94.5   PROCALCITONIN  --   --  0.45*  --          Lab 03/25/25  0826 03/24/25  0738 03/24/25  0457 03/22/25  0623 03/21/25  1525   SODIUM 137  --  137 136 142   POTASSIUM 5.2 5.3* 6.1* 4.8 5.7*   CHLORIDE 108*  --  107 106 106   CO2 20.0*  --  16.0* 18.0* 21.0*   ANION GAP 9.0  --  14.0 12.0 15.0   BUN 65*  --  59* 52* 52*   CREATININE 2.04*  --  2.13* 2.16* 2.02*   EGFR 30.0*  --  28.5* 28.0* 30.4*   GLUCOSE 148*  --  165* 135* 258*   CALCIUM 8.5  --  8.6 8.6 10.0*   MAGNESIUM  --   --   --  1.8  --    HEMOGLOBIN A1C  --   --   --  6.90*  --    TSH  --   --   --  1.740  --          Lab 03/22/25  0623 03/21/25  1525   TOTAL PROTEIN 6.1 7.7   ALBUMIN 3.1* 4.0   GLOBULIN 3.0 3.7   ALT (SGPT) 10 13   AST (SGOT) 19 24   BILIRUBIN 0.5 0.6   ALK PHOS 79 108         Lab 03/22/25  0623   PROBNP  573.4             Lab 03/21/25  1526   ABO TYPING O   RH TYPING Negative   ANTIBODY SCREEN Negative         Brief Urine Lab Results  (Last result in the past 365 days)        Color   Clarity   Blood   Leuk Est   Nitrite   Protein   CREAT   Urine HCG        03/21/25 1618 Yellow   Turbid   Large (3+)   Large (3+)   Negative   100 mg/dL (2+)                 Microbiology Results (last 10 days)       Procedure Component Value - Date/Time    S. Pneumo Ag Urine or CSF - Urine, Urine, Clean Catch [[id number]]  (Normal) Collected: 03/22/25 1328    Lab Status: Final result Specimen: Urine, Clean Catch Updated: 03/23/25 0407     Strep Pneumo Ag Negative    Legionella Antigen, Urine - Urine, Urine, Clean Catch [[id number]]  (Normal) Collected: 03/22/25 1328    Lab Status: Final result Specimen: Urine, Clean Catch Updated: 03/23/25 0406     LEGIONELLA ANTIGEN, URINE Negative    Blood Culture - Blood, Hand, Left [[id number]]  (Normal) Collected: 03/22/25 1249    Lab Status: Preliminary result Specimen: Blood from Hand, Left Updated: 03/25/25 1315     Blood Culture No growth at 3 days    Narrative:      Less than seven (7) mL's of blood was collected.  Insufficient quantity may yield false negative results.    Blood Culture - Blood, Hand, Right [[id number]]  (Normal) Collected: 03/22/25 1245    Lab Status: Preliminary result Specimen: Blood from Hand, Right Updated: 03/25/25 1315     Blood Culture No growth at 3 days    Narrative:      Less than seven (7) mL's of blood was collected.  Insufficient quantity may yield false negative results.    MRSA Screen, PCR (Inpatient) - Swab, Nares [[id number]]  (Normal) Collected: 03/22/25 1146    Lab Status: Final result Specimen: Swab from Nares Updated: 03/22/25 1328     MRSA PCR Negative    Narrative:      The negative predictive value of this diagnostic test is high and should only be used to consider de-escalating anti-MRSA therapy. A positive result may indicate colonization with MRSA and  must be correlated clinically.  MRSA Negative    Respiratory Panel PCR w/COVID-19(SARS-CoV-2) [facility]/ISHA/PAD/[facility] In-House, NP Swab in UTM/VTM, 2 HR TAT - Swab, Nasopharynx [[id number]]  (Normal) Collected: 03/22/25 1146    Lab Status: Final result Specimen: Swab from Nasopharynx Updated: 03/22/25 1308     ADENOVIRUS, PCR Not Detected     Coronavirus 229E Not Detected     Coronavirus HKU1 Not Detected     Coronavirus NL63 Not Detected     Coronavirus OC43 Not Detected     COVID19 Not Detected     Human Metapneumovirus Not Detected     Human Rhinovirus/Enterovirus Not Detected     Influenza A PCR Not Detected     Influenza B PCR Not Detected     Parainfluenza Virus 1 Not Detected     Parainfluenza Virus 2 Not Detected     Parainfluenza Virus 3 Not Detected     Parainfluenza Virus 4 Not Detected     RSV, PCR Not Detected     Bordetella pertussis pcr Not Detected     Bordetella parapertussis PCR Not Detected     Chlamydophila pneumoniae PCR Not Detected     Mycoplasma pneumo by PCR Not Detected    Narrative:      In the setting of a positive respiratory panel with a viral infection PLUS a negative procalcitonin without other underlying concern for bacterial infection, consider observing off antibiotics or discontinuation of antibiotics and continue supportive care. If the respiratory panel is positive for atypical bacterial infection (Bordetella pertussis, Chlamydophila pneumoniae, or Mycoplasma pneumoniae), consider antibiotic de-escalation to target atypical bacterial infection.    Urine Culture - Urine, Urine, Catheter [[id number]]  (Normal) Collected: 03/21/25 1618    Lab Status: Final result Specimen: Urine, Catheter Updated: 03/23/25 1054     Urine Culture No growth            FL C Arm During Surgery  Result Date: 3/23/2025  This procedure was auto-finalized with no dictation required.    RIGHT FI SS  Result Date: 3/23/2025  Brodie Perkins CRNA     3/23/2025  8:46 AM RIGHT FI SS Patient reassessed immediately  prior to procedure Start time: 3/23/2025 8:24 AM Reason for block: at surgeon's request and post-op pain management Performed by CRNA/CAA: Brodie Perkins CRNA Preanesthetic Checklist Completed: patient identified, IV checked, site marked, risks and benefits discussed, surgical consent, monitors and equipment checked, pre-op evaluation and timeout performed Prep: Pt Position: supine Sterile barriers:cap, gloves, mask and washed/disinfected hands Prep: ChloraPrep Patient monitoring: blood pressure monitoring, continuous pulse oximetry and EKG Procedure Sedation: no Performed under: general Guidance:ultrasound guided ULTRASOUND INTERPRETATION.  Using ultrasound guidance a 20 G gauge needle was placed in close proximity to the nerve, at which point, under ultrasound guidance anesthetic was injected in the area of the nerve and spread of the anesthesia was seen on ultrasound in close proximity thereto.  There were no abnormalities seen on ultrasound; a digital image was taken; and the patient tolerated the procedure with no complications. Images:still images obtained, printed/placed on chart Laterality:right Block Type:fascia iliaca compartment Injection Technique:single-shot Needle Type:echogenic and Tuohy Needle Gauge:18 G Resistance on Injection: none Catheter Size:20 G (20g) Medications Used: ropivacaine (NAROPIN) 0.5 % injection - Injection  30 mL - 3/23/2025 8:24:00 AM Medications Preservative Free Saline:30ml Post Assessment Injection Assessment: negative aspiration for heme, no paresthesia on injection and incremental injection Patient Tolerance:comfortable throughout block Complications:no Additional Notes CKAFASCIAILIACA: SINGLE shot A high-frequency linear transducer, with sterile cover, was placed in parasagittal plane on top of the Anterior Superior Iliac Spine (ASIS) and moved medially to identify the Internal Oblique muscle, Sartorius muscle, Iliacus Muscle, Fascia Iliaca (FI) and Fascia Latae. The  "insertion site was prepped and draped in sterile fashion. Skin and cutaneous tissue was infiltrated with 2-5 ml of 1% Lidocaine. Using ultrasound-guidance, a 20-gauge B-Issa 4\" Ultraplex 360 non-stimulating echogenic needle was advanced in plane from caudad to cephalad. Preservative-free normal saline was utilized for hydro-dissection of tissue, advancement of needle, and to confirm final needle placement below FI. Local anesthetic in incremental 3-5 ml injections. Aspiration every 5 ml to prevent intravascular injection. Injection was completed with negative aspiration of blood and negative intravascular injection. Injection pressures were normal with minimal resistance. Performed by: Brodie Perkins CRNA    XR Chest 1 View  Result Date: 3/22/2025  XR CHEST 1 VW Date of Exam: 3/22/2025 5:38 AM EDT Indication: dyspnea Comparison: 3/21/2025. Findings: Mild patchy airspace disease is present within the lung bases bilaterally which appears new/progressed as compared to the previous study. Stable left subclavian AICD/pacemaker device. Stable chronic interstitial changes.. No pleural fluid. No pneumothorax. The pulmonary vasculature appears within normal limits. The cardiac and mediastinal silhouette appear unremarkable. No acute osseous abnormality identified.     Impression: Mild patchy airspace disease present within the lung bases bilaterally which appears new/progressed as compared to the previous study, likely related to a mild pneumonia. Electronically Signed: Jennifer Mendoza MD  3/22/2025 6:02 AM EDT  Workstation ID: [id number]    XR Chest 1 View  Result Date: 3/21/2025  XR CHEST 1 VW Date of Exam: 3/21/2025 3:48 PM EDT Indication: HIP FX Comparison: 11/20/2024 Findings: There is a dual-chamber pacemaker. There is incomplete inspiration and exaggerated lordotic positioning which essentially obscures the heart border. The pulmonary vasculature appears within normal limits. There are reticular interstitial markings in " the peripheral lungs and lung bases. No acute infiltrate is demonstrated     Impression: Interstitial lung disease/pulmonary fibrosis. No acute process demonstrated Electronically Signed: Golden Ngo  3/21/2025 3:57 PM EDT  Workstation ID: [id number]    XR Femur 2 View Right  Result Date: 3/21/2025  XR FEMUR 2 VW RIGHT Date of Exam: 3/21/2025 3:48 PM EDT Indication: HIP FX Comparison: None available. Findings/    Impression: There is a mildly displaced and mildly comminuted intertrochanteric fracture of the right proximal femur. Bones are demineralized. Right knee arthroplasty is noted. No hardware complication in the provided views. The visualized bony pelvis appears unremarkable. Partially visualized left ureteral stent. Vascular calcifications are seen. Electronically Signed: Ramon Paredes MD  3/21/2025 3:53 PM EDT  Workstation ID: [id number]    XR Pelvis 1 or 2 View  Result Date: 3/21/2025  XR PELVIS 1 OR 2 VW Date of Exam: 3/21/2025 3:48 PM EDT Indication: HIP FX Comparison: None available. Findings: The bones appear somewhat osteopenic. There is a fracture of the intertrochanteric right femur without significant displacement on this single view. The femoral head is not dislocated. No fracture of the pelvis is visualized. A left ureteral stent is present. There is degenerative disease in the lower lumbar spine     Impression: Intertrochanteric right femur fracture Electronically Signed: Golden Ngo  3/21/2025 3:53 PM EDT  Workstation ID: [id number]      Results for orders placed during the hospital encounter of 08/22/19    Duplex Venous Lower Extremity - Bilateral CAR    Interpretation Summary  · Acute right lower extremity deep vein thrombosis noted in the distal femoral and popliteal.  · All other veins appeared normal bilaterally.      Results for orders placed during the hospital encounter of 08/22/19    Duplex Venous Lower Extremity - Bilateral CAR    Interpretation Summary  · Acute right lower extremity  deep vein thrombosis noted in the distal femoral and popliteal.  · All other veins appeared normal bilaterally.      Results for orders placed in visit on 11/13/23    Adult Transthoracic Echo Complete W/ Cont if Necessary Per Protocol    Interpretation Summary    Left ventricular systolic function is normal. Estimated left ventricular EF = 52% Left ventricular ejection fraction appears to be 51 - 55%.    Left ventricular wall thickness is consistent with mild concentric hypertrophy.    Left ventricular diastolic function is consistent with (grade I) impaired relaxation.    The right ventricular cavity is mildly dilated.    The left atrial cavity is mildly dilated.    Left atrial volume is moderately increased.    There is mild calcification of the aortic valve mainly affecting the non-coronary, left coronary and right coronary cusp(s).    Estimated right ventricular systolic pressure from tricuspid regurgitation is normal (<35 mmHg).      Plan for Follow-up of Pending Labs/Results:   Pending Labs       Order Current Status    Blood Culture - Blood, Hand, Left Preliminary result    Blood Culture - Blood, Hand, Right Preliminary result          Discharge Details        Discharge Medications        New Medications        Instructions Start Date   cefdinir 300 MG capsule  Commonly known as: OMNICEF   300 mg, Oral, Daily      doxycycline 100 MG capsule  Commonly known as: MONODOX   100 mg, Oral, Every 12 Hours Scheduled      ipratropium-albuterol 0.5-2.5 mg/3 ml nebulizer  Commonly known as: DUO-NEB   3 mL, Nebulization, Every 6 Hours While Awake - RT             Changes to Medications        Instructions Start Date   allopurinol 100 MG tablet  Commonly known as: ZYLOPRIM  What changed:   medication strength  when to take this   300 mg, Oral, Daily, To lower risk of gout      Toujeo SoloStar 300 UNIT/ML solution pen-injector injection  Generic drug: Insulin Glargine (1 Unit Dial)  What changed:   how much to  take  additional instructions   10 Units, Subcutaneous, Nightly, Increase Toujeo by 2 units every 2 nights until morning BG is < 160.             Continue These Medications        Instructions Start Date   Aspirin Low Dose 81 MG EC tablet  Generic drug: aspirin   81 mg, Oral, Daily      bisoprolol 5 MG tablet  Commonly known as: ZEBeta   Take 1 tablet by mouth Daily.      Eliquis 2.5 MG tablet tablet  Generic drug: apixaban   2.5 mg, Oral, 2 Times Daily      finasteride 5 MG tablet  Commonly known as: PROSCAR   5 mg, Oral, Daily      glimepiride 1 MG tablet  Commonly known as: AMARYL   1 mg, Oral, Every Morning Before Breakfast      HYDROcodone-acetaminophen 5-325 MG per tablet  Commonly known as: NORCO   1 tablet, Oral, Every 6 Hours PRN      levothyroxine 50 MCG tablet  Commonly known as: SYNTHROID, LEVOTHROID   50 mcg, Oral, Every Early Morning      Myrbetriq 50 MG tablet sustained-release 24 hour 24 hr tablet  Generic drug: Mirabegron ER   50 mg, Oral, Daily      OCUVITE ADULT 50+ PO   1 capsule, Daily      SITagliptin 25 MG tablet  Commonly known as: Januvia   25 mg, Oral, Daily, For diabetes               Allergies   Allergen Reactions    Statins Diarrhea and Myalgia    Metformin Diarrhea and GI Intolerance         Discharge Disposition:  Skilled Nursing Facility (DC - External)    Diet:  Hospital:  Diet Order   Procedures    Diet: Regular/House; Fluid Consistency: Thin (IDDSI 0)       Diet Instructions       Diet: Regular/House Diet; Regular (IDDSI 7); Thin (IDDSI 0)      Discharge Diet: Regular/House Diet    Texture: Regular (IDDSI 7)    Fluid Consistency: Thin (IDDSI 0)             Activity:      Restrictions or Other Recommendations:         CODE STATUS:    Code Status and Medical Interventions: No CPR (Do Not Attempt to Resuscitate); Limited Support; No intubation (DNI)   Ordered at: 03/21/25 8527     Code Status (Patient has no pulse and is not breathing):    No CPR (Do Not Attempt to Resuscitate)      Medical Interventions (Patient has pulse or is breathing):    Limited Support     Medical Intervention Limits:    No intubation (DNI)     Level Of Support Discussed With:    Patient       Future Appointments   Date Time Provider Department Center   3/26/2025  9:30 AM MED 13 [facility] None   4/9/2025  3:00 PM Brodie Baltazar MD [facility]   4/14/2025  3:30 PM Deanne Pitts MD [facility]   7/7/2025  1:00 PM Win Saha MD [facility]   7/7/2025  1:00 PM [facility] CARD CUBA DEVICE CHECK [facility]       Additional Instructions for the Follow-ups that You Need to Schedule       Discharge Follow-up with PCP   As directed       Currently Documented PCP:    Kary Wen MD    PCP Phone Number:    [phone]     Follow Up Details: 1 week after discharge from SNF rehab. Patient will need referral for osteoporosis care program s/p hospitalization for right hip repair/osteoporosis.        Discharge Follow-up with Specified Provider: Dr. Baltazar with orthopedic surgery; 2 Weeks   As directed      To: Dr. Baltazar with orthopedic surgery   Follow Up: 2 Weeks   Follow Up Details: 2-3 weeks, post right hip repair                  Betty Covarrubias, APRN  03/26/25      Time Spent on Discharge:  I spent  45  minutes on this discharge activity which included: face-to-face encounter with the patient, reviewing the data in the system, coordination of the care with the nursing staff as well as consultants, documentation, and entering orders.

## 2025-03-26 NOTE — TELEPHONE ENCOUNTER
(NEW ADMIT)    ILENE REQUESTING MED REFILL FOR NORCO 5-325 MG.    DIRECTIONS : NORCO 5-325 MG 1 TAB PO Q 6 HRS PRN.

## 2025-03-26 NOTE — PLAN OF CARE
Problem: Adult Inpatient Plan of Care  Goal: Plan of Care Review  Outcome: Progressing  Flowsheets  Taken 3/26/2025 0316 by Aditya Vivar, GUERLINE  Progress: improving  Outcome Evaluation: VSS. Skin and fall precautions in place. No complaints of nausea. PO intake encouraged. Norco given for hip pain. Tylenol given for fever and was effective. Will continue plan of care.  Taken 3/25/2025 1119 by Leela Modi PT  Plan of Care Reviewed With:   patient   grandchild(shital)  Goal: Patient-Specific Goal (Individualized)  Outcome: Progressing  Goal: Absence of Hospital-Acquired Illness or Injury  Outcome: Progressing  Intervention: Identify and Manage Fall Risk  Recent Flowsheet Documentation  Taken 3/26/2025 0200 by Aditya Vivar, GUERLINE  Safety Promotion/Fall Prevention:   activity supervised   assistive device/personal items within reach   clutter free environment maintained   nonskid shoes/slippers when out of bed   safety round/check completed   room organization consistent  Taken 3/26/2025 0000 by Aditya Vivar, GUERLINE  Safety Promotion/Fall Prevention:   activity supervised   assistive device/personal items within reach   clutter free environment maintained   nonskid shoes/slippers when out of bed   room organization consistent   safety round/check completed  Taken 3/25/2025 2227 by Aditya Vivar, GUERLINE  Safety Promotion/Fall Prevention:   activity supervised   assistive device/personal items within reach   clutter free environment maintained   nonskid shoes/slippers when out of bed   room organization consistent   safety round/check completed  Taken 3/25/2025 2000 by Aditya Vivar, GUERLINE  Safety Promotion/Fall Prevention:   activity supervised   assistive device/personal items within reach   clutter free environment maintained   nonskid shoes/slippers when out of bed   room organization consistent   safety round/check completed  Intervention: Prevent Skin Injury  Recent Flowsheet Documentation  Taken 3/26/2025 0200 by Cb  GUERLINE Burgess  Body Position:   turned   left  Skin Protection:   incontinence pads utilized   silicone foam dressing in place   transparent dressing maintained  Taken 3/26/2025 0000 by Aditya Vivar RN  Body Position: supine  Skin Protection:   incontinence pads utilized   silicone foam dressing in place   transparent dressing maintained  Taken 3/25/2025 2227 by Aditya Vivar RN  Body Position:   turned   left  Skin Protection:   incontinence pads utilized   silicone foam dressing in place   transparent dressing maintained  Taken 3/25/2025 2000 by Aditya Vivar RN  Body Position:   turned   right  Skin Protection: (silicone dressings peeled back and skin assessed)   incontinence pads utilized   silicone foam dressing in place   transparent dressing maintained  Intervention: Prevent and Manage VTE (Venous Thromboembolism) Risk  Recent Flowsheet Documentation  Taken 3/25/2025 2000 by Aditya Vivar RN  VTE Prevention/Management: (eliquis) other (see comments)  Intervention: Prevent Infection  Recent Flowsheet Documentation  Taken 3/26/2025 0200 by Aditya Vivar RN  Infection Prevention:   cohorting utilized   environmental surveillance performed   single patient room provided   rest/sleep promoted  Taken 3/26/2025 0000 by Aditya Vivar RN  Infection Prevention:   cohorting utilized   environmental surveillance performed   rest/sleep promoted   single patient room provided  Taken 3/25/2025 2227 by Aditya Vivar RN  Infection Prevention:   cohorting utilized   environmental surveillance performed   rest/sleep promoted   single patient room provided  Taken 3/25/2025 2000 by Aditya Vivar RN  Infection Prevention:   cohorting utilized   environmental surveillance performed   rest/sleep promoted   single patient room provided  Goal: Optimal Comfort and Wellbeing  Outcome: Progressing  Intervention: Monitor Pain and Promote Comfort  Recent Flowsheet Documentation  Taken 3/25/2025 2227 by Aditya Vivar RN  Pain  Management Interventions: pain medication given  Intervention: Provide Person-Centered Care  Recent Flowsheet Documentation  Taken 3/25/2025 2000 by Aditya Vivar RN  Trust Relationship/Rapport:   care explained   choices provided  Goal: Readiness for Transition of Care  Outcome: Progressing     Problem: Comorbidity Management  Goal: Blood Glucose Level Within Target Range  Outcome: Progressing     Problem: Skin Injury Risk Increased  Goal: Skin Health and Integrity  Outcome: Progressing  Intervention: Optimize Skin Protection  Recent Flowsheet Documentation  Taken 3/26/2025 0200 by Aditya Vivar RN  Activity Management: activity encouraged  Pressure Reduction Techniques:   positioned off wounds   pressure points protected  Head of Bed (HOB) Positioning: \[facility]\"" elevated  Pressure Reduction Devices:   foam padding utilized   positioning supports utilized   pressure-redistributing mattress utilized  Skin Protection:   incontinence pads utilized   silicone foam dressing in place   transparent dressing maintained  Taken 3/26/2025 0000 by Aditya Vivar RN  Activity Management: activity encouraged  Pressure Reduction Techniques:   positioned off wounds   pressure points protected  Head of Bed (HOB) Positioning: \[facility]\"" elevated  Pressure Reduction Devices:   foam padding utilized   positioning supports utilized   pressure-redistributing mattress utilized  Skin Protection:   incontinence pads utilized   silicone foam dressing in place   transparent dressing maintained  Taken 3/25/2025 2227 by Aditya Vivar RN  Activity Management: activity encouraged  Pressure Reduction Techniques:   pressure points protected   positioned off wounds  Head of Bed (HOB) Positioning: \[facility]\"" elevated  Pressure Reduction Devices:   heel offloading device utilized   positioning supports utilized   pressure-redistributing mattress utilized  Skin Protection:   incontinence pads utilized   silicone foam dressing in place   transparent dressing  maintained  Taken 3/25/2025 2000 by Aditya Vivar, RN  Activity Management: activity encouraged  Pressure Reduction Techniques:   positioned off wounds   pressure points protected  Head of Bed (HOB) Positioning: HOB elevated  Pressure Reduction Devices:   heel offloading device utilized   pressure-redistributing mattress utilized   positioning supports utilized  Skin Protection: (silicone dressings peeled back and skin assessed)   incontinence pads utilized   silicone foam dressing in place   transparent dressing maintained     Problem: Fall Injury Risk  Goal: Absence of Fall and Fall-Related Injury  Outcome: Progressing  Intervention: Promote Injury-Free Environment  Recent Flowsheet Documentation  Taken 3/26/2025 0200 by Aditya Vivar, GUERLINE  Safety Promotion/Fall Prevention:   activity supervised   assistive device/personal items within reach   clutter free environment maintained   nonskid shoes/slippers when out of bed   safety round/check completed   room organization consistent  Taken 3/26/2025 0000 by Aditya Vivar, GUERLINE  Safety Promotion/Fall Prevention:   activity supervised   assistive device/personal items within reach   clutter free environment maintained   nonskid shoes/slippers when out of bed   room organization consistent   safety round/check completed  Taken 3/25/2025 2227 by Aditya Vivar, GUERLINE  Safety Promotion/Fall Prevention:   activity supervised   assistive device/personal items within reach   clutter free environment maintained   nonskid shoes/slippers when out of bed   room organization consistent   safety round/check completed  Taken 3/25/2025 2000 by Aditya Vivar, GUERLINE  Safety Promotion/Fall Prevention:   activity supervised   assistive device/personal items within reach   clutter free environment maintained   nonskid shoes/slippers when out of bed   room organization consistent   safety round/check completed   Goal Outcome Evaluation:           Progress: improving  Outcome Evaluation: VSS. Skin  and fall precautions in place. No complaints of nausea. PO intake encouraged. Norco given for hip pain. Tylenol given for fever and was effective. Will continue plan of care.

## 2025-03-27 LAB
BACTERIA SPEC AEROBE CULT: NORMAL
BACTERIA SPEC AEROBE CULT: NORMAL

## 2025-03-28 PROBLEM — U07.1 COVID: Status: ACTIVE | Noted: 2025-01-01

## 2025-03-28 NOTE — LETTER
[facility]  [address] [facility] [address]  [phone]        March 30, 2025      Patient: Forrest Zepeda  YOB: 1932  Date of Visit: 3/28/2025      Inpatient referral at [facility].    Hospice APRN: Estephania Quezada APRN  Certifying: Dr. Blanca Barrera  Referring: Tess COOK    Thank you,        Racheal Catalan RN

## 2025-03-28 NOTE — ED PROVIDER NOTES
"Subjective   History of Present Illness    Pt presents with report of dyspnea.  EMS reports they were called out for shortness of breath with increasing oxygen requirement.  He does wear chronic oxygen.  They report he is in rehab from recent hip injury.  They also report multiple family contacts have tested positive for covid in the last few days.    Patient denies dyspnea. He denies all complains including fever, cough, chest pain, abd pain, sore throat, vomiting, leg pain/swelling.    History provided by:  Patient and EMS personnel      Review of Systems    Past Medical History:   Diagnosis Date    Abnormal EKG     Abnormal PSA     Reported abnormal    Acute cystitis with hematuria 05/01/2023    Acute deep vein thrombosis (DVT) of right lower extremity 08/22/2019    Acute diverticulitis 2019    Acute hyperkalemia 08/22/2019    Acute respiratory failure with hypoxia     Acute saddle pulmonary embolism with acute cor pulmonale 08/22/2019    Acute systolic right heart failure 08/22/2019    Acute UTI (urinary tract infection) 10/9/2023    Arthritis     KNEES,  BUT SINCE HAD REPLACEMENT    Benign localized hyperplasia of prostate     Bilateral knee pain     C. difficile diarrhea 2010    Cataracts, bilateral     CKD (chronic kidney disease)     Coronary artery disease     Diabetic neuropathy     Diastolic dysfunction     Disease of thyroid gland     HYPOTHYROIDISM    Diverticulitis     Dyslipidemia     H/O    Erectile dysfunction     Family history of malignant hyperthermia     Brother     Full dentures     Generalized weakness     History of ESBL E. coli infection     most recent 4-2022 f/u with ID Dr Johnson    History of gout     History of hepatitis A vaccination     History of nephrolithiasis     History of nuclear stress test     STATES IN THE 1990'S.  \"IT WAS OK\"    History of osteopenia     History of recurrent UTI (urinary tract infection)     Hydronephrosis of left kidney 07/18/2019    Hydronephrosis with " renal and ureteral calculus obstruction 10/21/2019    Added automatically from request for surgery [id number]    Hydronephrosis with ureteral stricture     Hypercholesterolemia     Hyperkalemia     PT UNSURE REGARDING HX OF THIS    Hyperlipidemia     Hypertension     Hypothyroidism     Impaired functional mobility, balance, gait, and endurance     Insomnia     IPF (idiopathic pulmonary fibrosis)     per patient and son, dx at [facility], was told no treatment necessary, not to worry about it    On supplemental oxygen therapy     2L NC QHS    Other hydronephrosis 08/12/2019    Added automatically from request for surgery [id number]    Paroxysmal atrial fibrillation     Pulmonary fibrosis     Pulmonary hypertension, mild 02/2021    per echo per cardiology note 1/9/23, per pt's son, PCP does not agree with this dx    Renal insufficiency     Sepsis 2019    Shortness of breath     STATES WITH EXERTION, OTHERWISE, DENIES    Sigmoid diverticulitis without abscess or perforation 08/09/2017    Sinus node dysfunction     Skin breakdown     fourth toe right foot noted in 2019 MD D/C note    Skin ulcer of fourth toe of right foot, limited to breakdown of skin 07/05/2019    Stricture of ureter     Type 2 diabetes mellitus     Ureteral stricture, left     Urinary incontinence     UTI (urinary tract infection) 07/18/2019    Vitamin D deficiency     Wears glasses        Allergies   Allergen Reactions    Statins Diarrhea and Myalgia    Metformin Diarrhea and GI Intolerance       Past Surgical History:   Procedure Laterality Date    APPENDECTOMY      CARDIAC ELECTROPHYSIOLOGY PROCEDURE N/A 12/23/2020    Procedure: Pacemaker DC new. DNS meds.;  Surgeon: Jimy Ledezma DO;  Location:  [facility];  Service: Cardiology;  Laterality: N/A;    CHOLECYSTECTOMY      CIRCUMCISION N/A 10/23/2019    Procedure: CIRCUMCISIOn-DORSAL SLIT;  Surgeon: Griffin Romero MD;  Location: [facility] OR;  Service: Urology    COLONOSCOPY      CYSTOSCOPY  RETROGRADE PYELOGRAM Left 06/17/2022    Procedure: CYSTOSCOPY RETROGRADE PYELOGRAM;  Surgeon: Ryne Mccann MD;  Location:  [facility];  Service: Urology;  Laterality: Left;    CYSTOSCOPY RETROGRADE PYELOGRAM Left 7/10/2024    Procedure: CYSTOSCOPY RETROGRADE PYELOGRAM AND STENT INSERTION LEFT;  Surgeon: Deanne Pitts MD;  Location:  [facility];  Service: Urology;  Laterality: Left;    CYSTOSCOPY URETEROSCOPY Left 02/11/2019    Procedure: URETEROSCOPY WITH STENT EXTRACTION, RPG;  Surgeon: Griffin Romero MD;  Location:  [facility];  Service: Urology    CYSTOSCOPY W/ URETERAL STENT PLACEMENT Left 07/20/2019    Procedure: CYSTOSCOPY, LEFT RETROGRADE PYELOGRAM,  ATTEMPTED LEFT URETERAL STENT INSERTION;  Surgeon: Isaac Flynn MD;  Location:  [facility];  Service: Urology    CYSTOSCOPY W/ URETERAL STENT PLACEMENT Left 06/17/2022    Procedure: CYSTOSCOPY URETERAL CATHETER/STENT INSERTION;  Surgeon: Ryne Mccann MD;  Location:  [facility];  Service: Urology;  Laterality: Left;    CYSTOSCOPY W/ URETERAL STENT PLACEMENT Left 01/27/2023    Procedure: CYSTOSCOPY URETERAL CATHETER/STENT INSERTION;  Surgeon: Deanne Pitts MD;  Location:  [facility];  Service: Urology;  Laterality: Left;    CYSTOSCOPY W/ URETERAL STENT PLACEMENT Left 02/15/2024    Procedure: CYSTOSCOPY LEFT STENT EXCHANGE;  Surgeon: Deanne Pitts MD;  Location:  [facility];  Service: Urology;  Laterality: Left;    CYSTOSCOPY, URETEROSCOPY, RETROGRADE PYELOGRAM, STONE EXTRACTION, STENT INSERTION Left 06/15/2023    Procedure: URETEROSCOPY, RETROGRADE PYELOGRAM, STENT INSERTION;  Surgeon: Deanne Pitts MD;  Location:  [facility];  Service: Urology;  Laterality: Left;    CYSTOSCOPY, URETEROSCOPY, RETROGRADE PYELOGRAM, STONE EXTRACTION, STENT INSERTION Left 11/22/2024    Procedure: CYSTOSCOPY RETROGRADE PYELOGRAM AND STENT INSERTION LEFT;  Surgeon: Deanne Pitts MD;  Location:  [facility];  Service: Urology;  Laterality: Left;    EYE SURGERY      CATARACTS REMOVED  BILATERALLY    HIP TROCHANTERIC NAILING WITH INTRAMEDULLARY HIP SCREW Right 3/23/2025    Procedure: HIP TROCHANTERIC NAILING RIGHT;  Surgeon: Brodie Baltazar MD;  Location:  [facility];  Service: Orthopedics;  Laterality: Right;    JOINT REPLACEMENT      Bilateral knees    KNEE ARTHROPLASTY Bilateral     KNEE ARTHROSCOPY Bilateral     RENAL ARTERY STENT      SEVERAL TIMES EVERY SINCE     URETERAL STENT INSERTION  10/2020    URETEROSCOPY LASER LITHOTRIPSY WITH STENT INSERTION Left 01/10/2018    Procedure:  cysto, left ureteral stent replacement ;  Surgeon: Griffin Romero MD;  Location: [facility] OR;  Service:     URETEROSCOPY LASER LITHOTRIPSY WITH STENT INSERTION Left 2019    Procedure: URETEROSCOPY, STONE REMOVAL WITH STENT INSERTION;  Surgeon: Griffin Romero MD;  Location: [facility] OR;  Service: Urology    URETEROSCOPY LASER LITHOTRIPSY WITH STENT INSERTION Left 10/23/2019    Procedure: Left URETEROSCOPY WITH STENT INSERTION-LEFT;  Surgeon: Griffin Romero MD;  Location: [facility] OR;  Service: Urology       Family History   Problem Relation Age of Onset    No Known Problems Mother     Stomach cancer Father     Heart attack Sister     Brain cancer Sister     Stroke Sister     Lung cancer Sister     Hypertension Sister     Brain cancer Brother     Lung cancer Brother     Malig Hyperthermia Brother        Social History     Socioeconomic History    Marital status:    Tobacco Use    Smoking status: Former     Current packs/day: 0.00     Average packs/day: 0.3 packs/day for 2.0 years (0.5 ttl pk-yrs)     Types: Cigarettes     Quit date: 1950     Years since quittin.2     Passive exposure: Past    Smokeless tobacco: Never    Tobacco comments:     SMOKED SOME AS A TEEN, DENIES ANY HABIT OR ADDICTION   Vaping Use    Vaping status: Never Used   Substance and Sexual Activity    Alcohol use: No    Drug use: No    Sexual activity: Not Currently     Partners: Female           Objective   Physical  Exam  Vitals and nursing note reviewed.   Constitutional:       General: He is not in acute distress.     Appearance: Normal appearance. He is not ill-appearing.   HENT:      Head: Normocephalic and atraumatic.      Mouth/Throat:      Mouth: Mucous membranes are moist.   Eyes:      General: No scleral icterus.        Right eye: No discharge.         Left eye: No discharge.      Conjunctiva/sclera: Conjunctivae normal.   Cardiovascular:      Rate and Rhythm: Normal rate and regular rhythm.      Heart sounds: No murmur heard.  Pulmonary:      Effort: Pulmonary effort is normal. No respiratory distress.      Breath sounds: Normal breath sounds. No wheezing.   Abdominal:      General: Bowel sounds are normal. There is no distension.      Palpations: Abdomen is soft.      Tenderness: There is no abdominal tenderness. There is no guarding or rebound.   Musculoskeletal:         General: No swelling. Normal range of motion.      Cervical back: Normal range of motion and neck supple.   Skin:     General: Skin is warm and dry.      Findings: No rash.   Neurological:      General: No focal deficit present.      Mental Status: He is alert and oriented to person, place, and time. Mental status is at baseline.   Psychiatric:         Mood and Affect: Mood normal.         Behavior: Behavior normal.         Thought Content: Thought content normal.         Critical Care    Performed by: Jerzy Cavanaugh MD  Authorized by: Jerzy Cavanaugh MD    Critical care provider statement:     Critical care time (minutes):  113    Critical care time was exclusive of:  Separately billable procedures and treating other patients    Critical care was necessary to treat or prevent imminent or life-threatening deterioration of the following conditions:  Sepsis    Critical care was time spent personally by me on the following activities:  Ordering and performing treatments and interventions, ordering and review of laboratory studies, ordering  and review of radiographic studies, re-evaluation of patient's condition, review of old charts, evaluation of patient's response to treatment, examination of patient and development of treatment plan with patient or surrogate    I assumed direction of critical care for this patient from another provider in my specialty: no      Care discussed with: admitting provider               ED Course    I reviewed outside records.  Discharge summary 3/26/25 notes admission for hop fracture with operative repair 3/23/25.  Eliquis for DVT prevention.  Chart mentions history of saddle PE as well as CKD3 and HFpEF and ILD.  Was treated for pneumonia with Rocephin and doxy for mild patchy bibasilar airspace disease on CXR.      Covid positive.    Labs c/w sepsis as well as OCTAVIA on CKD.  Troponin mildly elevated.      EKG read by me sinus rhythm with anterior ST changes that are new.    CXR read by me no acute process.    Fluids and abx given.    Preliminary dx is sepsis secondary to covid infection.  Bacterial source not identified but broad spectrum abx ordered for empiric coverage.  PE is considered with his history of same, recent interruption of anticoagulation, recent surgery combined with hypotension, hypoxia, and tachycardia.  GFR tonight precludes CTA.  I plan empiric PE treatment and consulted ED pharmacist for Lovenox dosing.  I also considered echo for evaluation of possible R heart strain as that would be the primary  of more aggressive PE therapy this evening.  I discussed with Dr Scott and we got an echo and per him there is no acute R heart strain.    With fluid resuscitation his BP improved.  Pressors were not needed.    Numerous rechecks. Family present on rechecks.  Discussed severity of illness.  Confirmed DNR status but family notes he was living independently before the fall last week and would like otherwise full care measures to give him a chance to recover.    Hospitalist consulted for  admission.                                                       Medical Decision Making  Problems Addressed:  Pneumonia due to COVID-19 virus: complicated acute illness or injury with systemic symptoms that poses a threat to life or bodily functions  Sepsis due to undetermined organism: complicated acute illness or injury with systemic symptoms that poses a threat to life or bodily functions    Amount and/or Complexity of Data Reviewed  Independent Historian: caregiver  External Data Reviewed: notes.  Labs: ordered. Decision-making details documented in ED Course.  Radiology: ordered and independent interpretation performed. Decision-making details documented in ED Course.  ECG/medicine tests: ordered and independent interpretation performed. Decision-making details documented in ED Course.    Risk  Prescription drug management.  Decision regarding hospitalization.    Critical Care  Total time providing critical care: 115 minutes        Final diagnoses:   Pneumonia due to COVID-19 virus   Sepsis due to undetermined organism       ED Disposition  ED Disposition       ED Disposition   Decision to Admit    Condition   --    Comment   Level of Care: Telemetry [5]   Diagnosis: COVID [[id number]]   Admitting Physician: DANIAL DIMAS [[id number]]   Attending Physician: DANIAL DIMAS [[id number]]   Certification: I Certify That Inpatient Hospital Services Are Medically Necessary For Greater Than 2 Midnights                 No follow-up provider specified.       Medication List      No changes were made to your prescriptions during this visit.            Jerzy Cavanaugh MD  03/28/25 2054

## 2025-03-28 NOTE — ED NOTES
" Forrest HERNANDEZ [facility]    Nursing Report ED to Floor:  Mental status: a&ox4  Ambulatory status: upx1  Oxygen Therapy:  2-3 L NC  Cardiac Rhythm: v paced  Admitted from: [facility]  Safety Concerns:  fall  Precautions: covid (airborne and contact)  Social Issues: family at bedside  ED Room #:  22    ED Nurse Phone Extension - [phone] or may call [phone].      HPI:   Chief Complaint   Patient presents with    Shortness of Breath       Past Medical History:  Past Medical History:   Diagnosis Date    Abnormal EKG     Abnormal PSA     Reported abnormal    Acute cystitis with hematuria 05/01/2023    Acute deep vein thrombosis (DVT) of right lower extremity 08/22/2019    Acute diverticulitis 2019    Acute hyperkalemia 08/22/2019    Acute respiratory failure with hypoxia     Acute saddle pulmonary embolism with acute cor pulmonale 08/22/2019    Acute systolic right heart failure 08/22/2019    Acute UTI (urinary tract infection) 10/9/2023    Arthritis     KNEES,  BUT SINCE HAD REPLACEMENT    Benign localized hyperplasia of prostate     Bilateral knee pain     C. difficile diarrhea 2010    Cataracts, bilateral     CKD (chronic kidney disease)     Coronary artery disease     Diabetic neuropathy     Diastolic dysfunction     Disease of thyroid gland     HYPOTHYROIDISM    Diverticulitis     Dyslipidemia     H/O    Erectile dysfunction     Family history of malignant hyperthermia     Brother     Full dentures     Generalized weakness     History of ESBL E. coli infection     most recent 4-2022 f/u with ID Dr Johnson    History of gout     History of hepatitis A vaccination     History of nephrolithiasis     History of nuclear stress test     STATES IN THE 1990'S.  \"IT WAS OK\"    History of osteopenia     History of recurrent UTI (urinary tract infection)     Hydronephrosis of left kidney 07/18/2019    Hydronephrosis with renal and ureteral calculus obstruction 10/21/2019    Added automatically from request for surgery [id number] "    Hydronephrosis with ureteral stricture     Hypercholesterolemia     Hyperkalemia     PT UNSURE REGARDING HX OF THIS    Hyperlipidemia     Hypertension     Hypothyroidism     Impaired functional mobility, balance, gait, and endurance     Insomnia     IPF (idiopathic pulmonary fibrosis)     per patient and son, dx at [facility], was told no treatment necessary, not to worry about it    On supplemental oxygen therapy     2L NC QHS    Other hydronephrosis 08/12/2019    Added automatically from request for surgery [id number]    Paroxysmal atrial fibrillation     Pulmonary fibrosis     Pulmonary hypertension, mild 02/2021    per echo per cardiology note 1/9/23, per pt's son, PCP does not agree with this dx    Renal insufficiency     Sepsis 2019    Shortness of breath     STATES WITH EXERTION, OTHERWISE, DENIES    Sigmoid diverticulitis without abscess or perforation 08/09/2017    Sinus node dysfunction     Skin breakdown     fourth toe right foot noted in 2019 MD D/C note    Skin ulcer of fourth toe of right foot, limited to breakdown of skin 07/05/2019    Stricture of ureter     Type 2 diabetes mellitus     Ureteral stricture, left     Urinary incontinence     UTI (urinary tract infection) 07/18/2019    Vitamin D deficiency     Wears glasses         Past Surgical History:  Past Surgical History:   Procedure Laterality Date    APPENDECTOMY      CARDIAC ELECTROPHYSIOLOGY PROCEDURE N/A 12/23/2020    Procedure: Pacemaker DC new. DNS meds.;  Surgeon: Jimy Ledezma DO;  Location:  [facility];  Service: Cardiology;  Laterality: N/A;    CHOLECYSTECTOMY      CIRCUMCISION N/A 10/23/2019    Procedure: CIRCUMCISIOn-DORSAL SLIT;  Surgeon: Griffin Romero MD;  Location:  [facility];  Service: Urology    COLONOSCOPY      CYSTOSCOPY RETROGRADE PYELOGRAM Left 06/17/2022    Procedure: CYSTOSCOPY RETROGRADE PYELOGRAM;  Surgeon: Ryne Mccann MD;  Location:  [facility];  Service: Urology;  Laterality: Left;    CYSTOSCOPY  RETROGRADE PYELOGRAM Left 7/10/2024    Procedure: CYSTOSCOPY RETROGRADE PYELOGRAM AND STENT INSERTION LEFT;  Surgeon: Deanne Pitts MD;  Location:  [facility];  Service: Urology;  Laterality: Left;    CYSTOSCOPY URETEROSCOPY Left 02/11/2019    Procedure: URETEROSCOPY WITH STENT EXTRACTION, RPG;  Surgeon: Griffin Romero MD;  Location:  [facility];  Service: Urology    CYSTOSCOPY W/ URETERAL STENT PLACEMENT Left 07/20/2019    Procedure: CYSTOSCOPY, LEFT RETROGRADE PYELOGRAM,  ATTEMPTED LEFT URETERAL STENT INSERTION;  Surgeon: Isaac Flynn MD;  Location:  [facility];  Service: Urology    CYSTOSCOPY W/ URETERAL STENT PLACEMENT Left 06/17/2022    Procedure: CYSTOSCOPY URETERAL CATHETER/STENT INSERTION;  Surgeon: Ryne Mccann MD;  Location:  [facility];  Service: Urology;  Laterality: Left;    CYSTOSCOPY W/ URETERAL STENT PLACEMENT Left 01/27/2023    Procedure: CYSTOSCOPY URETERAL CATHETER/STENT INSERTION;  Surgeon: Deanne Pitts MD;  Location:  [facility];  Service: Urology;  Laterality: Left;    CYSTOSCOPY W/ URETERAL STENT PLACEMENT Left 02/15/2024    Procedure: CYSTOSCOPY LEFT STENT EXCHANGE;  Surgeon: Deanne Pitts MD;  Location:  [facility];  Service: Urology;  Laterality: Left;    CYSTOSCOPY, URETEROSCOPY, RETROGRADE PYELOGRAM, STONE EXTRACTION, STENT INSERTION Left 06/15/2023    Procedure: URETEROSCOPY, RETROGRADE PYELOGRAM, STENT INSERTION;  Surgeon: Deanne Pitts MD;  Location:  [facility];  Service: Urology;  Laterality: Left;    CYSTOSCOPY, URETEROSCOPY, RETROGRADE PYELOGRAM, STONE EXTRACTION, STENT INSERTION Left 11/22/2024    Procedure: CYSTOSCOPY RETROGRADE PYELOGRAM AND STENT INSERTION LEFT;  Surgeon: Deanne Pitts MD;  Location:  [facility];  Service: Urology;  Laterality: Left;    EYE SURGERY      CATARACTS REMOVED BILATERALLY    HIP TROCHANTERIC NAILING WITH INTRAMEDULLARY HIP SCREW Right 3/23/2025    Procedure: HIP TROCHANTERIC NAILING RIGHT;  Surgeon: Brodie Baltazar MD;  Location:  [facility];   "Service: Orthopedics;  Laterality: Right;    JOINT REPLACEMENT      Bilateral knees    KNEE ARTHROPLASTY Bilateral     KNEE ARTHROSCOPY Bilateral     RENAL ARTERY STENT      SEVERAL TIMES EVERY SINCE 1978    URETERAL STENT INSERTION  10/2020    URETEROSCOPY LASER LITHOTRIPSY WITH STENT INSERTION Left 01/10/2018    Procedure:  cysto, left ureteral stent replacement ;  Surgeon: Griffin Romero MD;  Location: [facility];  Service:     URETEROSCOPY LASER LITHOTRIPSY WITH STENT INSERTION Left 08/14/2019    Procedure: URETEROSCOPY, STONE REMOVAL WITH STENT INSERTION;  Surgeon: Griffin Romero MD;  Location: [facility] OR;  Service: Urology    URETEROSCOPY LASER LITHOTRIPSY WITH STENT INSERTION Left 10/23/2019    Procedure: Left URETEROSCOPY WITH STENT INSERTION-LEFT;  Surgeon: Griffin Romero MD;  Location: [facility] OR;  Service: Urology        Admitting Doctor:   Fern Trujillo DO    Consulting Provider(s):  Consults       No orders found from 2/27/2025 to 3/29/2025.             Admitting Diagnosis:   The primary encounter diagnosis was Pneumonia due to COVID-19 virus. A diagnosis of Sepsis due to undetermined organism was also pertinent to this visit.    Most Recent Vitals:   Vitals:    03/28/25 1856 03/28/25 1900 03/28/25 1927 03/28/25 1930   BP:  92/61 106/64 105/65   BP Location:       Patient Position:       Pulse:  115 115 115   Resp:       Temp:       TempSrc:       SpO2:  92% 94% 93%   Weight: 93.4 kg (205 lb 14.6 oz)      Height: 175.3 cm (69.02\")          Active LDAs/IV Access:   Lines, Drains & Airways       Active LDAs       Name Placement date Placement time Site Days    Peripheral IV 03/28/25 1550 Anterior;Distal;Right;Upper Arm 03/28/25  1550  Arm  less than 1    Peripheral IV 03/28/25 1723 Left;Upper Arm 03/28/25  1723  Arm  less than 1    Ureteral Drain/Stent Left ureter 8 Fr. 07/10/24  --  Left ureter  261    Ureteral Drain/Stent Left ureter 8 Fr. 11/22/24  1346  Left ureter  126              "       Labs (abnormal labs have a star):   Labs Reviewed   COVID-19 AND FLU A/B, NP SWAB IN TRANSPORT MEDIA 1 HR TAT - Abnormal; Notable for the following components:       Result Value    COVID19 Detected (*)     All other components within normal limits    Narrative:     Fact sheet for providers: https://www.fda.gov/media/209984/download    Fact sheet for patients: https://www.fda.gov/media/004700/download    Test performed by PCR.  Influenza A and Influenza B negative results should be considered presumptive in samples that have a positive SARS-CoV-2 result.    Competitive inhibition studies showed that SARS-CoV-2 virus, when present at concentrations above 3.6E+04 copies/mL, can inhibit the detection and amplification of influenza A and influenza B virus RNA if present at or below 1.8E+02 copies/mL or 4.9E+02 copies/mL, respectively, and may lead to false negative influenza virus results. If co-infection with influenza A or influenza B virus is suspected in samples with a positive SARS-CoV-2 result, the sample should be re-tested with another FDA cleared, approved, or authorized influenza test, if influenza virus detection would change clinical management.   COMPREHENSIVE METABOLIC PANEL - Abnormal; Notable for the following components:    Glucose 205 (*)      (*)     Creatinine 3.43 (*)     Sodium 135 (*)     CO2 16.0 (*)     Albumin 3.2 (*)     AST (SGOT) 58 (*)     Alkaline Phosphatase 125 (*)     BUN/Creatinine Ratio 29.7 (*)     Anion Gap 18.0 (*)     eGFR 16.1 (*)     All other components within normal limits    Narrative:     GFR Categories in Chronic Kidney Disease (CKD)      GFR Category          GFR (mL/min/1.73)    Interpretation  G1                     90 or greater         Normal or high (1)  G2                      60-89                Mild decrease (1)  G3a                   45-59                Mild to moderate decrease  G3b                   30-44                Moderate to severe  decrease  G4                    15-29                Severe decrease  G5                    14 or less           Kidney failure          (1)In the absence of evidence of kidney disease, neither GFR category G1 or G2 fulfill the criteria for CKD.    eGFR calculation 2021 CKD-EPI creatinine equation, which does not include race as a factor   BNP (IN-HOUSE) - Abnormal; Notable for the following components:    proBNP 18,046.0 (*)     All other components within normal limits    Narrative:     This assay is used as an aid in the diagnosis of individuals suspected of having heart failure. It can be used as an aid in the diagnosis of acute decompensated heart failure (ADHF) in patients presenting with signs and symptoms of ADHF to the emergency department (ED). In addition, NT-proBNP of <300 pg/mL indicates ADHF is not likely.    Age Range Result Interpretation  NT-proBNP Concentration (pg/mL:      <50             Positive            >450                   Gray                 300-450                    Negative             <300    50-75           Positive            >900                  Gray                300-900                  Negative            <300      >75             Positive            >1800                  Gray                300-1800                  Negative            <300   TROPONIN - Abnormal; Notable for the following components:    HS Troponin T 144 (*)     All other components within normal limits    Narrative:     High Sensitive Troponin T Reference Range:  <14.0 ng/L- Negative Female for AMI  <22.0 ng/L- Negative Male for AMI  >=14 - Abnormal Female indicating possible myocardial injury.  >=22 - Abnormal Male indicating possible myocardial injury.   Clinicians would have to utilize clinical acumen, EKG, Troponin, and serial changes to determine if it is an Acute Myocardial Infarction or myocardial injury due to an underlying chronic condition.        CBC WITH AUTO DIFFERENTIAL - Abnormal; Notable  "for the following components:    WBC 17.26 (*)     RBC 3.21 (*)     Hemoglobin 9.7 (*)     Hematocrit 30.9 (*)     MCHC 31.4 (*)     RDW 16.8 (*)     RDW-SD 59.2 (*)     Neutrophil % 85.0 (*)     Lymphocyte % 4.9 (*)     Eosinophil % 0.1 (*)     Immature Grans % 2.0 (*)     Neutrophils, Absolute 14.65 (*)     Monocytes, Absolute 1.32 (*)     Immature Grans, Absolute 0.35 (*)     nRBC 2.3 (*)     All other components within normal limits   PROTIME-INR - Abnormal; Notable for the following components:    Protime 16.3 (*)     INR 1.30 (*)     All other components within normal limits   APTT - Abnormal; Notable for the following components:    PTT 36.5 (*)     All other components within normal limits    Narrative:     PTT = The equivalent PTT values for the therapeutic range of heparin levels at 0.3 to 0.5 U/ml are 60 to 70 seconds.   LACTIC ACID, PLASMA - Abnormal; Notable for the following components:    Lactate 3.3 (*)     All other components within normal limits   PROCALCITONIN - Abnormal; Notable for the following components:    Procalcitonin 1.51 (*)     All other components within normal limits    Narrative:     As a Marker for Sepsis (Non-Neonates):    1. <0.5 ng/mL represents a low risk of severe sepsis and/or septic shock.  2. >2 ng/mL represents a high risk of severe sepsis and/or septic shock.    As a Marker for Lower Respiratory Tract Infections that require antibiotic therapy:    PCT on Admission    Antibiotic Therapy       6-12 Hrs later    >0.5                Strongly Recommended  >0.25 - <0.5        Recommended   0.1 - 0.25          Discouraged              Remeasure/reassess PCT  <0.1                Strongly Discouraged     Remeasure/reassess PCT    As 28 day mortality risk marker: \"Change in Procalcitonin Result\" (>80% or <=80%) if Day 0 (or Day 1) and Day 4 values are available. Refer to http://www.[employer]-pct-calculator.com    Change in PCT <=80%  A decrease of PCT levels below or equal to 80% " defines a positive change in PCT test result representing a higher risk for 28-day all-cause mortality of patients diagnosed with severe sepsis for septic shock.    Change in PCT >80%  A decrease of PCT levels of more than 80% defines a negative change in PCT result representing a lower risk for 28-day all-cause mortality of patients diagnosed with severe sepsis or septic shock.      COVID PRE-OP / PRE-PROCEDURE SCREENING ORDER (NO ISOLATION)    Narrative:     The following orders were created for panel order COVID PRE-OP / PRE-PROCEDURE SCREENING ORDER (NO ISOLATION) - Swab, Nasal Cavity.  Procedure                               Abnormality         Status                     ---------                               -----------         ------                     COVID-19 and FLU A/B PCR...[[id number]]  Abnormal            Final result                 Please view results for these tests on the individual orders.   BLOOD CULTURE   BLOOD CULTURE   RAINBOW DRAW    Narrative:     The following orders were created for panel order [place] Draw.  Procedure                               Abnormality         Status                     ---------                               -----------         ------                     Green Top (Gel)[[id number]]                                  Final result               Lavender Top[[id number]]                                     Final result               Gold Top - SST[[id number]]                                   Final result               [place] Top[[id number]]                                         Final result               Light Blue Top[[id number]]                                   Final result                 Please view results for these tests on the individual orders.   HIGH SENSITIVITIY TROPONIN T 1HR   LACTIC ACID, REFLEX   CBC AND DIFFERENTIAL    Narrative:     The following orders were created for panel order CBC & Differential.  Procedure                               Abnormality          Status                     ---------                               -----------         ------                     CBC Auto Differential[[id number]]        Abnormal            Final result               Scan Slide[[id number]]                                                                    Please view results for these tests on the individual orders.   GREEN TOP   LAVENDER TOP   GOLD TOP - SST   GRAY TOP   LIGHT BLUE TOP       Meds Given in ED:   Medications   sodium chloride 0.9 % flush 10 mL (has no administration in time range)   lactated ringers bolus 1,000 mL (1,000 mL Intravenous New Bag 3/28/25 1928)   lactated ringers bolus 500 mL (has no administration in time range)   lactated ringers bolus 1,000 mL (0 mL Intravenous Stopped 3/28/25 1751)   cefepime 1000 mg IVPB in 100 mL NS (MBP) (0 mg Intravenous Stopped 3/28/25 1804)   vancomycin IVPB 1750 mg in 0.9% Sodium Chloride (premix) 500 mL (0 mg Intravenous Stopped 3/28/25 1948)   enoxaparin sodium (LOVENOX) syringe 90 mg (90 mg Subcutaneous Given 3/28/25 1729)           Last NIH score:                                                          Dysphagia screening results:  Patient Factors Component (Dysphagia:Stroke or Rule-out)  Best Eye Response: 4-->(E4) spontaneous (03/28/25 1811)  Best Motor Response: 6-->(M6) obeys commands (03/28/25 1811)  Best Verbal Response: 5-->(V5) oriented (03/28/25 1811)  [place] Coma Scale Score: 15 (03/28/25 1811)     [place] Coma Scale:  No data recorded     CIWA:        Restraint Type:            Isolation Status:  Contact, Airborne

## 2025-03-29 NOTE — CONSULTS
Palliative Care Initial Consult   Attending Physician: Anthony Dockery MD  Referring Provider: Dr. Anthony Dockery    Reason for Referral:  assistance with clarification of goals of care and non-pain symptoms    Code Status:   Code Status and Medical Interventions: No CPR (Do Not Attempt to Resuscitate); Limited Support; No intubation (DNI)   Ordered at: 03/28/25 2006     Code Status (Patient has no pulse and is not breathing):    No CPR (Do Not Attempt to Resuscitate)     Medical Interventions (Patient has pulse or is breathing):    Limited Support     Medical Intervention Limits:    No intubation (DNI)      Advanced Directives: Advance Directive Status: Patient has advance directive, copy requested   Family/Support: Erika Trujillo (grandchild/HCS), Eron Zepeda (alt HCS), Mariam Dior (dtr)  Goals of Care: TBD.    HPI: Forrest Zepeda is a 89 y.o. male with PMH significant for ILD, saddle PE, PAF on Eliquis, CKD, chronic hydronephrosis with stent placement, recent right hip fracture s/p repair. Patient presented to [facility] ED on 3/28 from the [facility] with poor PO intake and fever. Work up revealed sepsis secondary to COVID infection, hypotension and acute on chronic respiratory failure with hypoxia, currently on 4LNC. Palliative care consulted for GOC in the context of complex medical decision making and symptom management.       Patient drowsy and restless, oriented to self only. He reports shortness of breath. He is unable to complete a full ROS due to confusion. Granddaughter Erika/Ying present at bedside. Moved to family conference room in order to speak with Erika and patient's son, Eron, on speaker phone. Prior to hip fracture patient lived alone, independent, no altered mentation. Since hip fracture, patient has had an ongoing decline with confusion, decreased PO intake. Now very symptomatic due to dyspnea and delirium. Patient experiencing hallucinations per granddaughter.     ROS: +shortness of breath, currently on 5LNC.  "+restless. ROS limited by confusion/restlessness.       Past Medical History:   Diagnosis Date    Abnormal EKG     Abnormal PSA     Reported abnormal    Acute cystitis with hematuria 05/01/2023    Acute deep vein thrombosis (DVT) of right lower extremity 08/22/2019    Acute diverticulitis 2019    Acute hyperkalemia 08/22/2019    Acute respiratory failure with hypoxia     Acute saddle pulmonary embolism with acute cor pulmonale 08/22/2019    Acute systolic right heart failure 08/22/2019    Acute UTI (urinary tract infection) 10/9/2023    Arthritis     KNEES,  BUT SINCE HAD REPLACEMENT    Benign localized hyperplasia of prostate     Bilateral knee pain     C. difficile diarrhea 2010    Cataracts, bilateral     CKD (chronic kidney disease)     Coronary artery disease     Diabetic neuropathy     Diastolic dysfunction     Disease of thyroid gland     HYPOTHYROIDISM    Diverticulitis     Dyslipidemia     H/O    Erectile dysfunction     Family history of malignant hyperthermia     Brother     Full dentures     Generalized weakness     History of ESBL E. coli infection     most recent 4-2022 f/u with ID Dr Johnson    History of gout     History of hepatitis A vaccination     History of nephrolithiasis     History of nuclear stress test     STATES IN THE 1990'S.  \"IT WAS OK\"    History of osteopenia     History of recurrent UTI (urinary tract infection)     Hydronephrosis of left kidney 07/18/2019    Hydronephrosis with renal and ureteral calculus obstruction 10/21/2019    Added automatically from request for surgery [id number]    Hydronephrosis with ureteral stricture     Hypercholesterolemia     Hyperkalemia     PT UNSURE REGARDING HX OF THIS    Hyperlipidemia     Hypertension     Hypothyroidism     Impaired functional mobility, balance, gait, and endurance     Insomnia     IPF (idiopathic pulmonary fibrosis)     per patient and son, dx at [facility], was told no treatment necessary, not to worry about it    On supplemental " oxygen therapy     2L NC QHS    Other hydronephrosis 08/12/2019    Added automatically from request for surgery [id number]    Paroxysmal atrial fibrillation     Pulmonary fibrosis     Pulmonary hypertension, mild 02/2021    per echo per cardiology note 1/9/23, per pt's son, PCP does not agree with this dx    Renal insufficiency     Sepsis 2019    Shortness of breath     STATES WITH EXERTION, OTHERWISE, DENIES    Sigmoid diverticulitis without abscess or perforation 08/09/2017    Sinus node dysfunction     Skin breakdown     fourth toe right foot noted in 2019 MD D/C note    Skin ulcer of fourth toe of right foot, limited to breakdown of skin 07/05/2019    Stricture of ureter     Type 2 diabetes mellitus     Ureteral stricture, left     Urinary incontinence     UTI (urinary tract infection) 07/18/2019    Vitamin D deficiency     Wears glasses      Past Surgical History:   Procedure Laterality Date    APPENDECTOMY      CARDIAC ELECTROPHYSIOLOGY PROCEDURE N/A 12/23/2020    Procedure: Pacemaker DC new. DNS meds.;  Surgeon: Jimy Ledezma DO;  Location:  [facility];  Service: Cardiology;  Laterality: N/A;    CHOLECYSTECTOMY      CIRCUMCISION N/A 10/23/2019    Procedure: CIRCUMCISIOn-DORSAL SLIT;  Surgeon: Griffin Romero MD;  Location:  [facility];  Service: Urology    COLONOSCOPY      CYSTOSCOPY RETROGRADE PYELOGRAM Left 06/17/2022    Procedure: CYSTOSCOPY RETROGRADE PYELOGRAM;  Surgeon: Ryne Mccann MD;  Location:  [facility];  Service: Urology;  Laterality: Left;    CYSTOSCOPY RETROGRADE PYELOGRAM Left 7/10/2024    Procedure: CYSTOSCOPY RETROGRADE PYELOGRAM AND STENT INSERTION LEFT;  Surgeon: Deanne Pitts MD;  Location:  [facility];  Service: Urology;  Laterality: Left;    CYSTOSCOPY URETEROSCOPY Left 02/11/2019    Procedure: URETEROSCOPY WITH STENT EXTRACTION, RPG;  Surgeon: Griffin Romero MD;  Location:  [facility];  Service: Urology    CYSTOSCOPY W/ URETERAL STENT PLACEMENT Left 07/20/2019     Procedure: CYSTOSCOPY, LEFT RETROGRADE PYELOGRAM,  ATTEMPTED LEFT URETERAL STENT INSERTION;  Surgeon: Isaac Flynn MD;  Location:  [facility];  Service: Urology    CYSTOSCOPY W/ URETERAL STENT PLACEMENT Left 06/17/2022    Procedure: CYSTOSCOPY URETERAL CATHETER/STENT INSERTION;  Surgeon: Ryne Mccann MD;  Location:  [facility];  Service: Urology;  Laterality: Left;    CYSTOSCOPY W/ URETERAL STENT PLACEMENT Left 01/27/2023    Procedure: CYSTOSCOPY URETERAL CATHETER/STENT INSERTION;  Surgeon: Deanne Pitts MD;  Location:  [facility];  Service: Urology;  Laterality: Left;    CYSTOSCOPY W/ URETERAL STENT PLACEMENT Left 02/15/2024    Procedure: CYSTOSCOPY LEFT STENT EXCHANGE;  Surgeon: Deanne Pitts MD;  Location:  [facility];  Service: Urology;  Laterality: Left;    CYSTOSCOPY, URETEROSCOPY, RETROGRADE PYELOGRAM, STONE EXTRACTION, STENT INSERTION Left 06/15/2023    Procedure: URETEROSCOPY, RETROGRADE PYELOGRAM, STENT INSERTION;  Surgeon: Deanne Pitts MD;  Location:  [facility];  Service: Urology;  Laterality: Left;    CYSTOSCOPY, URETEROSCOPY, RETROGRADE PYELOGRAM, STONE EXTRACTION, STENT INSERTION Left 11/22/2024    Procedure: CYSTOSCOPY RETROGRADE PYELOGRAM AND STENT INSERTION LEFT;  Surgeon: Deanne Pitts MD;  Location:  [facility];  Service: Urology;  Laterality: Left;    EYE SURGERY      CATARACTS REMOVED BILATERALLY    HIP TROCHANTERIC NAILING WITH INTRAMEDULLARY HIP SCREW Right 3/23/2025    Procedure: HIP TROCHANTERIC NAILING RIGHT;  Surgeon: Brodie Baltazar MD;  Location:  [facility];  Service: Orthopedics;  Laterality: Right;    JOINT REPLACEMENT      Bilateral knees    KNEE ARTHROPLASTY Bilateral     KNEE ARTHROSCOPY Bilateral     RENAL ARTERY STENT      SEVERAL TIMES EVERY SINCE 1978    URETERAL STENT INSERTION  10/2020    URETEROSCOPY LASER LITHOTRIPSY WITH STENT INSERTION Left 01/10/2018    Procedure:  cysto, left ureteral stent replacement ;  Surgeon: Griffin Romero MD;  Location:  [facility];  Service:   "   URETEROSCOPY LASER LITHOTRIPSY WITH STENT INSERTION Left 2019    Procedure: URETEROSCOPY, STONE REMOVAL WITH STENT INSERTION;  Surgeon: Griffin Romero MD;  Location: [facility];  Service: Urology    URETEROSCOPY LASER LITHOTRIPSY WITH STENT INSERTION Left 10/23/2019    Procedure: Left URETEROSCOPY WITH STENT INSERTION-LEFT;  Surgeon: Griffin Romero MD;  Location: [facility];  Service: Urology     Social History     Socioeconomic History    Marital status:    Tobacco Use    Smoking status: Former     Current packs/day: 0.00     Average packs/day: 0.3 packs/day for 2.0 years (0.5 ttl pk-yrs)     Types: Cigarettes     Quit date: 1950     Years since quittin.3     Passive exposure: Past    Smokeless tobacco: Never    Tobacco comments:     SMOKED SOME AS A TEEN, DENIES ANY HABIT OR ADDICTION   Vaping Use    Vaping status: Never Used   Substance and Sexual Activity    Alcohol use: No    Drug use: No    Sexual activity: Not Currently     Partners: Female     Family History   Problem Relation Age of Onset    No Known Problems Mother     Stomach cancer Father     Heart attack Sister     Brain cancer Sister     Stroke Sister     Lung cancer Sister     Hypertension Sister     Brain cancer Brother     Lung cancer Brother     Malig Hyperthermia Brother        Allergies   Allergen Reactions    Statins Diarrhea and Myalgia    Metformin Diarrhea and GI Intolerance       Current medication reviewed for route, type, dose and frequency and are current per MAR at time of dictation.    Palliative Performance Scale Score:  30%    /55 (BP Location: Left arm, Patient Position: Lying)   Pulse 114   Temp 98.4 °F (36.9 °C) (Axillary)   Resp 20   Ht 175.3 cm (69.02\")   Wt 93.4 kg (205 lb 14.6 oz)   SpO2 92%   BMI 30.39 kg/m²     Intake/Output Summary (Last 24 hours) at 3/29/2025 0953  Last data filed at 3/29/2025 0817  Gross per 24 hour   Intake 2541 ml   Output 660 ml   Net 1881 ml       Physical " Exam:    General Appearance:    Patient laying in bed, drowsy, awake, A/C ill appearing, restless,    HEENT:    NC/AT, EOMI, anicteric, MMM, face relaxed   Neck:   supple, trachea midline, no JVD   Lungs:     CTA bilat, diminished in bases; respirations regular, even and unlabored; RR 20-22 on exam, 5LNC    Heart:    RRR, normal S1 and S2, no M/R/G,    Abdomen:     Normal bowel sounds, soft, nontender, nondistended   G/U:   Deferred   MSK/Extremities:   Wasting, no edema   Pulses:   Pulses palpable and equal bilaterally   Skin:   Warm, dry   Neurologic:   Drowsy, oriented to self only, restless, agitated,  GARRETT   Psych:   Restless, agitated         Labs:   Results from last 7 days   Lab Units 03/29/25  0416   WBC 10*3/mm3 12.17*   HEMOGLOBIN g/dL 8.6*   HEMATOCRIT % 27.5*   PLATELETS 10*3/mm3 226     Results from last 7 days   Lab Units 03/29/25  0416   SODIUM mmol/L 137   POTASSIUM mmol/L 4.8   CHLORIDE mmol/L 106   CO2 mmol/L 11.0*   BUN mg/dL 103*   CREATININE mg/dL 3.09*   GLUCOSE mg/dL 100*   CALCIUM mg/dL 7.7*     Results from last 7 days   Lab Units 03/29/25  0416   SODIUM mmol/L 137   POTASSIUM mmol/L 4.8   CHLORIDE mmol/L 106   CO2 mmol/L 11.0*   BUN mg/dL 103*   CREATININE mg/dL 3.09*   CALCIUM mg/dL 7.7*   BILIRUBIN mg/dL 0.4   ALK PHOS U/L 102   ALT (SGPT) U/L 18   AST (SGOT) U/L 62*   GLUCOSE mg/dL 100*     Imaging Results (Last 72 Hours)       Procedure Component Value Units Date/Time    XR Chest 1 View [[id number]] Collected: 03/28/25 1621     Updated: 03/28/25 1625    Narrative:      XR CHEST 1 VW    Date of Exam: 3/28/2025 3:54 PM EDT    Indication: SOA triage protocol    Comparison: AP chest x-ray 3/22/2025, 3/21/2025, 11/20/2024; CT chest 1/26/2024    Findings:  Stable chronic interstitial opacities in the lung bases. No consolidation, pneumothorax, or large pleural effusion is seen. Cardiomediastinal contours and pacemaker leads appear stable.      Impression:      Impression:  Stable  findings of chronic interstitial lung disease without definite acute cardiopulmonary abnormality.        Electronically Signed: Dulce Doyle    3/28/2025 4:22 PM EDT    Workstation ID: [id number]                  Diagnostics: Reviewed    A:   COVID     89 y.o. male with sepsis secondary to COVID infection, ILD, saddle PE, PAF on Eliquis, CKD, chronic hydronephrosis with stent placement, recent right hip fracture s/p repair, dyspnea, delirium.     S/S:   Dyspnea -currently on 5LNC  -antibiotics  -transitioned to Hydromorphone 0.5mg IV q 2 hours prn dyspnea, Morphine contraindicated with current renal function    2. Agitation -started Versed 0.5mg IV q 4 hours prn agitation  -started Haldol 1mg IV q 6 hours prn hallucinations    3. Pain -presumed recent hip fracture, MSK  -may use Hydromorphone IV for pain    4. GOC -DNR/DNI/no NIPPV/no vasopressors/no escalation to ICU -per discussion with Erika (granddaughter) and Eron (son) who are patient's HCS's  -reviewed patient's condition and current symptoms as above  -medications adjusted as above  -goal for current level of treatment with symptom management, if patient is declining then shift to comfort measures, open to hospice    P:  Introduced Palliative Care and services to patient and granddaughter. Stepped out to family conference room to discuss GOC with son (on speaker phone) and granddaughter. Reviewed patient's condition, symptoms and GOC. Goal for current level of treatment with symptom management. If patient declining then shift to comfort measures. They do not want patient placed on BiPAP/HFNC, no escalation to ICU. Emotional support provided throughout discussion.    Thank you for this consult and allowing us to participate in patient's plan of care. Palliative Care Team will continue to follow patient. Please do not hesitate to contact us regarding further symptom management or goals of care needs.  Time: 60 minutes spent reviewing medical and medication  records, assessing and examining patient, discussing with family, answering questions, providing some guidance about a plan and documentation of care, and coordinating care with other healthcare members, with > 50% time spent face to face.         Tess Mcginnis, APRN  3/29/2025

## 2025-03-29 NOTE — PROGRESS NOTES
[facility] Medicine Services  PROGRESS NOTE    Patient Name: Forrest Zepeda  : 1932  MRN: [id number]    Date of Admission: 3/28/2025  Primary Care Physician: Kary Wen MD    Subjective   Subjective     CC: Follow-up pneumonia    HPI: Patient remains on 5 L NC, he reports patient was restless, with some delirium      Objective   Objective     Vital Signs:   Temp:  [97.4 °F (36.3 °C)-98.3 °F (36.8 °C)] 97.9 °F (36.6 °C)  Heart Rate:  [] 67  Resp:  [20-22] 22  BP: ()/(46-80) 107/80  Flow (L/min) (Oxygen Therapy):  [3-4] 4     Physical Exam:  Constitutional: Elderly male, uncomfortable  HENT: NCAT, mucous membranes moist  Respiratory: Moderately labored respirations, diffuse coarse breath sounds on 5 L NC  Cardiovascular: Tachycardic, no murmurs, rubs, or gallops  Gastrointestinal: Positive bowel sounds, soft, nontender, nondistended  Musculoskeletal: No bilateral ankle edema  Psychiatric: Appropriate affect, cooperative  Neurologic: Nonfocal, pleasantly confused    Results Reviewed:  LAB RESULTS:      Lab 25  0416 25  1926 25  1552 25  0826 25  0457   WBC 12.17*  --  17.26* 8.32 10.22   HEMOGLOBIN 8.6*  --  9.7* 9.3* 8.8*   HEMATOCRIT 27.5*  --  30.9* 30.5* 28.3*   PLATELETS 226  --  237 159 117*   NEUTROS ABS 9.14*  --  14.65* 5.39 8.22*   IMMATURE GRANS (ABS) 0.16*  --  0.35* 0.04 0.04   LYMPHS ABS 1.54  --  0.85 1.42 1.03   MONOS ABS 1.10*  --  1.32* 0.84 0.87   EOS ABS 0.18  --  0.02 0.58* 0.03   MCV 96.2  --  96.3 97.1* 97.9*   PROCALCITONIN 16.46*  --  1.51*  --   --    LACTATE  --  1.7 3.3*  --   --    PROTIME  --   --  16.3*  --   --    APTT  --   --  36.5*  --   --    HSTROP T  --  139* 144*  --   --          Lab 25  0416 25  1552 25  0826 25  0738 25  0457   SODIUM 137 135* 137  --  137   POTASSIUM 4.8 5.1 5.2 5.3* 6.1*   CHLORIDE 106 101 108*  --  107   CO2 11.0* 16.0* 20.0*  --  16.0*    ANION GAP 20.0* 18.0* 9.0  --  14.0   * 102* 65*  --  59*   CREATININE 3.09* 3.43* 2.04*  --  2.13*   EGFR 18.2* 16.1* 30.0*  --  28.5*   GLUCOSE 100* 205* 148*  --  165*   CALCIUM 7.7* 9.4 8.5  --  8.6         Lab 03/29/25  0416 03/28/25  1552   TOTAL PROTEIN 5.3* 6.9   ALBUMIN 2.6* 3.2*   GLOBULIN 2.7 3.7   ALT (SGPT) 18 20   AST (SGOT) 62* 58*   BILIRUBIN 0.4 0.4   ALK PHOS 102 125*         Lab 03/28/25  1926 03/28/25  1552   PROBNP  --  18,046.0*   HSTROP T 139* 144*   PROTIME  --  16.3*   INR  --  1.30*                 Brief Urine Lab Results  (Last result in the past 365 days)        Color   Clarity   Blood   Leuk Est   Nitrite   Protein   CREAT   Urine HCG        03/28/25 2151 Red   Turbid   Large (3+)   Large (3+)   Negative   >=300 mg/dL (3+)                   Microbiology Results Abnormal       Procedure Component Value - Date/Time    COVID PRE-OP / PRE-PROCEDURE SCREENING ORDER (NO ISOLATION) - Swab, Nasal Cavity [[id number]]  (Abnormal) Collected: 03/28/25 1556    Lab Status: Final result Specimen: Swab from Nasal Cavity Updated: 03/28/25 1703    Narrative:      The following orders were created for panel order COVID PRE-OP / PRE-PROCEDURE SCREENING ORDER (NO ISOLATION) - Swab, Nasal Cavity.  Procedure                               Abnormality         Status                     ---------                               -----------         ------                     COVID-19 and FLU A/B PCR...[[id number]]  Abnormal            Final result                 Please view results for these tests on the individual orders.    COVID-19 and FLU A/B PCR, 1 HR TAT - Swab, Nasopharynx [[id number]]  (Abnormal) Collected: 03/28/25 1556    Lab Status: Final result Specimen: Swab from Nasopharynx Updated: 03/28/25 1703     COVID19 Detected     Influenza A PCR Not Detected     Influenza B PCR Not Detected    Narrative:      Fact sheet for providers: https://www.fda.gov/media/328766/download    Fact sheet for patients:  https://www.fda.gov/media/319197/download    Test performed by PCR.  Influenza A and Influenza B negative results should be considered presumptive in samples that have a positive SARS-CoV-2 result.    Competitive inhibition studies showed that SARS-CoV-2 virus, when present at concentrations above 3.6E+04 copies/mL, can inhibit the detection and amplification of influenza A and influenza B virus RNA if present at or below 1.8E+02 copies/mL or 4.9E+02 copies/mL, respectively, and may lead to false negative influenza virus results. If co-infection with influenza A or influenza B virus is suspected in samples with a positive SARS-CoV-2 result, the sample should be re-tested with another FDA cleared, approved, or authorized influenza test, if influenza virus detection would change clinical management.            XR Chest 1 View  Result Date: 3/28/2025  XR CHEST 1 VW Date of Exam: 3/28/2025 3:54 PM EDT Indication: SOA triage protocol Comparison: AP chest x-ray 3/22/2025, 3/21/2025, 11/20/2024; CT chest 1/26/2024 Findings: Stable chronic interstitial opacities in the lung bases. No consolidation, pneumothorax, or large pleural effusion is seen. Cardiomediastinal contours and pacemaker leads appear stable.     Impression: Impression: Stable findings of chronic interstitial lung disease without definite acute cardiopulmonary abnormality. Electronically Signed: Dulce Doyle  3/28/2025 4:22 PM EDT  Workstation ID: [id number]      Results for orders placed during the hospital encounter of 03/28/25    Adult Transthoracic Echo Complete w/ Color, Spectral and Contrast if Necessary Per Protocol    Interpretation Summary    Left ventricular systolic function is mildly decreased. Left ventricular ejection fraction appears to be 41 - 45%.    Septal wall motion is abnormal, consistent with right ventricular pacing.    Left ventricular wall thickness is consistent with moderate concentric hypertrophy.    The right ventricular cavity is  mild to moderately dilated. Mildly reduced right ventricular systolic function noted.    Mild to moderate mitral valve regurgitation is present    Mild to moderate tricuspid valve regurgitation is present. Estimated right ventricular systolic pressure from tricuspid regurgitation is moderately elevated (45-55 mmHg).      Current medications:  Scheduled Meds:apixaban, 2.5 mg, Oral, BID  aspirin, 81 mg, Oral, Daily  bisoprolol, 2.5 mg, Oral, Daily  cefTRIAXone, 2,000 mg, Intravenous, Q24H  finasteride, 5 mg, Oral, Daily  insulin lispro, 2-7 Units, Subcutaneous, 4x Daily AC & at Bedtime  lactated ringers, 500 mL, Intravenous, Once  levothyroxine, 50 mcg, Oral, Q AM  oxybutynin XL, 10 mg, Oral, Daily  sodium chloride, 10 mL, Intravenous, Q12H      Continuous Infusions:sodium chloride, 100 mL/hr, Last Rate: 100 mL/hr (03/28/25 2907)      PRN Meds:.  acetaminophen **OR** acetaminophen **OR** acetaminophen    senna-docusate sodium **AND** polyethylene glycol **AND** bisacodyl **AND** bisacodyl    Calcium Replacement - Follow Nurse / BPA Driven Protocol    dextrose    dextrose    glucagon (human recombinant)    HYDROcodone-acetaminophen    Magnesium Standard Dose Replacement - Follow Nurse / BPA Driven Protocol    nitroglycerin    ondansetron    Phosphorus Replacement - Follow Nurse / BPA Driven Protocol    Potassium Replacement - Follow Nurse / BPA Driven Protocol    sodium chloride    sodium chloride    sodium chloride    Assessment & Plan   Assessment & Plan     Active Hospital Problems    Diagnosis  POA    **COVID [U07.1]  Yes      Resolved Hospital Problems   No resolved problems to display.        Brief Hospital Course to date:  Forrest Zepeda is a 89 y.o. male with medical history significant for interstitial lung disease, prior history of saddle PE, paroxysmal A-fib on Eliquis, CKD, chronic hydro with stent replacement every 6 months just discharged to the [facility] in [place] for right hip fracture status post  repair.  Prior to discharge some of the notes mention that he had a low-grade fever and some congestion. Presented back here with hypotension and hypoxia, COVID test positive.    Sepsis secondary to COVID   Acute on chronic respiratory failure hypoxia  -Patient with leukocytosis, lactic acidosis, elevated procalcitonin, tachycardia respiratory PCR positive for COVID  -Chest x-ray shows chronic interstitial disease  -Patient is currently on 4 L NC, continue to wean as able  -Follow-up blood cultures currently NGTD, continue antibiotics with IV Rocephin    Hypotension  -BP is low but much improved  -Continue IV fluids and closely monitor for volume overload    History of PE  Paroxysmal atrial fibrillation  -Continue bisoprolol, continue anticoagulation with Eliquis    Well-controlled type 2 diabetes with A1c 6.9%  -FSBG's reviewed and are labile, continue CSI for now    OCTAVIA on CKD stage III  -Continue IV fluids    Hypothyroidism-continue Synthroid    Chronic hydronephrosis  -follows with urology Dr. Pitts  -Has chronic ureteral stent that is exchanged every 6 months.  Next appointment is April 14 per daughter at bedside  -Urinary retention protocol  -Check UA to rule out infection     Recent right hip fracture, s/p repair  -PT/OT     GOC  Prognosis is currently guarded, granddaughter at bedside is POA understands current prognosis, would like to continue current treatment, agreeable to palliative consult for symptom management.  If no significant clinical improvement is seen after the weekend plan will be to pursue comfort measures with hospice    Expected Discharge Location and Transportation: Rehab  Expected Discharge   Expected discharge date/ time has not been documented.     VTE Prophylaxis:  Pharmacologic VTE prophylaxis orders are present.         AM-PAC 6 Clicks Score (PT): 10 (03/29/25 0045)    CODE STATUS:   Code Status and Medical Interventions: No CPR (Do Not Attempt to Resuscitate); Limited Support; No  intubation (DNI)   Ordered at: 03/28/25 2006     Code Status (Patient has no pulse and is not breathing):    No CPR (Do Not Attempt to Resuscitate)     Medical Interventions (Patient has pulse or is breathing):    Limited Support     Medical Intervention Limits:    No intubation (DNI)       Anthony Dockery MD  03/29/25

## 2025-03-29 NOTE — H&P
[facility] Medicine Services  HISTORY AND PHYSICAL    Patient Name: Forrest Zepeda  : 1932  MRN: [id number]  Primary Care Physician: Kary Wen MD  Date of admission: 3/28/2025      Subjective   Subjective     Chief Complaint:  PNA    HPI:  Forrest Zepeda is a 89 y.o. male with medical history significant for interstitial lung disease, prior history of saddle PE, paroxysmal A-fib on Eliquis, CKD, chronic hydro with stent replacement every 6 months just discharged to the [facility] in [place] for right hip fracture status post repair.  Prior to discharge some of the notes mention that he had a low-grade fever and some congestion.  Daughter at bedside notes that he had not been eating or drinking well and then had fever.  Presented back here with hypotension and hypoxia, COVID test positive.      Personal History     Past Medical History:   Diagnosis Date    Abnormal EKG     Abnormal PSA     Reported abnormal    Acute cystitis with hematuria 2023    Acute deep vein thrombosis (DVT) of right lower extremity 2019    Acute diverticulitis     Acute hyperkalemia 2019    Acute respiratory failure with hypoxia     Acute saddle pulmonary embolism with acute cor pulmonale 2019    Acute systolic right heart failure 2019    Acute UTI (urinary tract infection) 10/9/2023    Arthritis     KNEES,  BUT SINCE HAD REPLACEMENT    Benign localized hyperplasia of prostate     Bilateral knee pain     C. difficile diarrhea 2010    Cataracts, bilateral     CKD (chronic kidney disease)     Coronary artery disease     Diabetic neuropathy     Diastolic dysfunction     Disease of thyroid gland     HYPOTHYROIDISM    Diverticulitis     Dyslipidemia     H/O    Erectile dysfunction     Family history of malignant hyperthermia     Brother     Full dentures     Generalized weakness     History of ESBL E. coli infection     most recent  f/u with ID Dr Johnson     "History of gout     History of hepatitis A vaccination     History of nephrolithiasis     History of nuclear stress test     STATES IN THE 1990'S.  \"IT WAS OK\"    History of osteopenia     History of recurrent UTI (urinary tract infection)     Hydronephrosis of left kidney 07/18/2019    Hydronephrosis with renal and ureteral calculus obstruction 10/21/2019    Added automatically from request for surgery [id number]    Hydronephrosis with ureteral stricture     Hypercholesterolemia     Hyperkalemia     PT UNSURE REGARDING HX OF THIS    Hyperlipidemia     Hypertension     Hypothyroidism     Impaired functional mobility, balance, gait, and endurance     Insomnia     IPF (idiopathic pulmonary fibrosis)     per patient and son, dx at [facility], was told no treatment necessary, not to worry about it    On supplemental oxygen therapy     2L NC QHS    Other hydronephrosis 08/12/2019    Added automatically from request for surgery [id number]    Paroxysmal atrial fibrillation     Pulmonary fibrosis     Pulmonary hypertension, mild 02/2021    per echo per cardiology note 1/9/23, per pt's son, PCP does not agree with this dx    Renal insufficiency     Sepsis 2019    Shortness of breath     STATES WITH EXERTION, OTHERWISE, DENIES    Sigmoid diverticulitis without abscess or perforation 08/09/2017    Sinus node dysfunction     Skin breakdown     fourth toe right foot noted in 2019 MD D/C note    Skin ulcer of fourth toe of right foot, limited to breakdown of skin 07/05/2019    Stricture of ureter     Type 2 diabetes mellitus     Ureteral stricture, left     Urinary incontinence     UTI (urinary tract infection) 07/18/2019    Vitamin D deficiency     Wears glasses            Past Surgical History:   Procedure Laterality Date    APPENDECTOMY      CARDIAC ELECTROPHYSIOLOGY PROCEDURE N/A 12/23/2020    Procedure: Pacemaker DC new. DNS meds.;  Surgeon: Jimy Ledezma DO;  Location: [facility] INVASIVE LOCATION;  Service: Cardiology;  Laterality: " N/A;    CHOLECYSTECTOMY      CIRCUMCISION N/A 10/23/2019    Procedure: CIRCUMCISIOn-DORSAL SLIT;  Surgeon: Griffin Romero MD;  Location:  [facility];  Service: Urology    COLONOSCOPY      CYSTOSCOPY RETROGRADE PYELOGRAM Left 06/17/2022    Procedure: CYSTOSCOPY RETROGRADE PYELOGRAM;  Surgeon: Ryne Mccann MD;  Location:  [facility];  Service: Urology;  Laterality: Left;    CYSTOSCOPY RETROGRADE PYELOGRAM Left 7/10/2024    Procedure: CYSTOSCOPY RETROGRADE PYELOGRAM AND STENT INSERTION LEFT;  Surgeon: Deanne Pitts MD;  Location:  [facility];  Service: Urology;  Laterality: Left;    CYSTOSCOPY URETEROSCOPY Left 02/11/2019    Procedure: URETEROSCOPY WITH STENT EXTRACTION, RPG;  Surgeon: Griffin Romero MD;  Location:  [facility];  Service: Urology    CYSTOSCOPY W/ URETERAL STENT PLACEMENT Left 07/20/2019    Procedure: CYSTOSCOPY, LEFT RETROGRADE PYELOGRAM,  ATTEMPTED LEFT URETERAL STENT INSERTION;  Surgeon: Isaac Flynn MD;  Location:  [facility];  Service: Urology    CYSTOSCOPY W/ URETERAL STENT PLACEMENT Left 06/17/2022    Procedure: CYSTOSCOPY URETERAL CATHETER/STENT INSERTION;  Surgeon: Ryne Mccann MD;  Location:  [facility];  Service: Urology;  Laterality: Left;    CYSTOSCOPY W/ URETERAL STENT PLACEMENT Left 01/27/2023    Procedure: CYSTOSCOPY URETERAL CATHETER/STENT INSERTION;  Surgeon: Deanne Pitts MD;  Location:  [facility];  Service: Urology;  Laterality: Left;    CYSTOSCOPY W/ URETERAL STENT PLACEMENT Left 02/15/2024    Procedure: CYSTOSCOPY LEFT STENT EXCHANGE;  Surgeon: Deanne Pitts MD;  Location:  [facility];  Service: Urology;  Laterality: Left;    CYSTOSCOPY, URETEROSCOPY, RETROGRADE PYELOGRAM, STONE EXTRACTION, STENT INSERTION Left 06/15/2023    Procedure: URETEROSCOPY, RETROGRADE PYELOGRAM, STENT INSERTION;  Surgeon: Deanne Pitts MD;  Location:  [facility];  Service: Urology;  Laterality: Left;    CYSTOSCOPY, URETEROSCOPY, RETROGRADE PYELOGRAM, STONE EXTRACTION, STENT INSERTION Left 11/22/2024     Procedure: CYSTOSCOPY RETROGRADE PYELOGRAM AND STENT INSERTION LEFT;  Surgeon: Deanne Pitts MD;  Location:  [facility];  Service: Urology;  Laterality: Left;    EYE SURGERY      CATARACTS REMOVED BILATERALLY    HIP TROCHANTERIC NAILING WITH INTRAMEDULLARY HIP SCREW Right 3/23/2025    Procedure: HIP TROCHANTERIC NAILING RIGHT;  Surgeon: Brodie Baltazar MD;  Location:  [facility];  Service: Orthopedics;  Laterality: Right;    JOINT REPLACEMENT      Bilateral knees    KNEE ARTHROPLASTY Bilateral     KNEE ARTHROSCOPY Bilateral     RENAL ARTERY STENT      SEVERAL TIMES EVERY SINCE 1978    URETERAL STENT INSERTION  10/2020    URETEROSCOPY LASER LITHOTRIPSY WITH STENT INSERTION Left 01/10/2018    Procedure:  cysto, left ureteral stent replacement ;  Surgeon: Griffin Romero MD;  Location:  [facility];  Service:     URETEROSCOPY LASER LITHOTRIPSY WITH STENT INSERTION Left 08/14/2019    Procedure: URETEROSCOPY, STONE REMOVAL WITH STENT INSERTION;  Surgeon: Griffin Romero MD;  Location:  [facility];  Service: Urology    URETEROSCOPY LASER LITHOTRIPSY WITH STENT INSERTION Left 10/23/2019    Procedure: Left URETEROSCOPY WITH STENT INSERTION-LEFT;  Surgeon: Griffin Romero MD;  Location:  [facility];  Service: Urology       Family History: family history includes Brain cancer in his brother and sister; Heart attack in his sister; Hypertension in his sister; Lung cancer in his brother and sister; Malig Hyperthermia in his brother; No Known Problems in his mother; Stomach cancer in his father; Stroke in his sister.     Social History:  reports that he quit smoking about 74 years ago. His smoking use included cigarettes. He has a 0.5 pack-year smoking history. He has been exposed to tobacco smoke. He has never used smokeless tobacco. He reports that he does not drink alcohol and does not use drugs.  Social History     Social History Narrative    Pt lives alone in [place]    Granddaughter Erika is nurse.     Had 7 brothers and 3  sisters.     Uses a cane and oxygen as needed    Still drives       Medications:  Available home medication information reviewed.  HYDROcodone-acetaminophen, Insulin Glargine (1 Unit Dial), Mirabegron ER, Multiple Vitamins-Minerals, SITagliptin, allopurinol, apixaban, aspirin, bisoprolol, cefdinir, doxycycline, finasteride, glimepiride, ipratropium-albuterol, and levothyroxine    Allergies   Allergen Reactions    Statins Diarrhea and Myalgia    Metformin Diarrhea and GI Intolerance       Objective   Objective     Vital Signs:   Temp:  [97.4 °F (36.3 °C)] 97.4 °F (36.3 °C)  Heart Rate:  [] 115  Resp:  [20] 20  BP: ()/(46-65) 103/59  Flow (L/min) (Oxygen Therapy):  [3] 3       Physical Exam  Constitutional:       General: He is not in acute distress.     Appearance: He is ill-appearing.   HENT:      Head: Normocephalic and atraumatic.      Mouth/Throat:      Mouth: Mucous membranes are dry.   Eyes:      Pupils: Pupils are equal, round, and reactive to light.   Cardiovascular:      Rate and Rhythm: Tachycardia present. Rhythm irregular.   Pulmonary:      Effort: No respiratory distress.      Breath sounds: Rhonchi present.   Abdominal:      General: There is distension.      Palpations: Abdomen is soft.      Tenderness: There is no abdominal tenderness.   Musculoskeletal:         General: No swelling.   Skin:     General: Skin is warm and dry.   Neurological:      Mental Status: He is disoriented.            Result Review:  I have personally reviewed the results from the time of this admission to 3/28/2025 20:27 EDT and agree with these findings:  [x]  Laboratory list / accordion  []  Microbiology  [x]  Radiology  []  EKG/Telemetry   []  Cardiology/Vascular   []  Pathology  [x]  Old records  []  Other:  Most notable findings include:       LAB RESULTS:      Lab 03/28/25  1926 03/28/25  1552 03/25/25  0826 03/24/25  0457 03/22/25  0623   WBC  --  17.26* 8.32 10.22 14.56*   HEMOGLOBIN  --  9.7* 9.3* 8.8*  10.7*   HEMATOCRIT  --  30.9* 30.5* 28.3* 34.8*   PLATELETS  --  237 159 117* 146   NEUTROS ABS  --  14.65* 5.39 8.22* 9.61*   IMMATURE GRANS (ABS)  --  0.35* 0.04 0.04 0.09*   LYMPHS ABS  --  0.85 1.42 1.03 3.04   MONOS ABS  --  1.32* 0.84 0.87 1.01*   EOS ABS  --  0.02 0.58* 0.03 0.72*   MCV  --  96.3 97.1* 97.9* 98.3*   PROCALCITONIN  --  1.51*  --   --  0.45*   LACTATE 1.7 3.3*  --   --   --    PROTIME  --  16.3*  --   --   --    INR  --  1.30*  --   --   --    APTT  --  36.5*  --   --   --          Lab 03/28/25  1552 03/25/25  0826 03/24/25  0738 03/24/25  0457 03/22/25  0623   SODIUM 135* 137  --  137 136   POTASSIUM 5.1 5.2 5.3* 6.1* 4.8   CHLORIDE 101 108*  --  107 106   CO2 16.0* 20.0*  --  16.0* 18.0*   ANION GAP 18.0* 9.0  --  14.0 12.0   * 65*  --  59* 52*   CREATININE 3.43* 2.04*  --  2.13* 2.16*   EGFR 16.1* 30.0*  --  28.5* 28.0*   GLUCOSE 205* 148*  --  165* 135*   CALCIUM 9.4 8.5  --  8.6 8.6   MAGNESIUM  --   --   --   --  1.8   HEMOGLOBIN A1C  --   --   --   --  6.90*   TSH  --   --   --   --  1.740         Lab 03/28/25  1552 03/22/25  0623   TOTAL PROTEIN 6.9 6.1   ALBUMIN 3.2* 3.1*   GLOBULIN 3.7 3.0   ALT (SGPT) 20 10   AST (SGOT) 58* 19   BILIRUBIN 0.4 0.5   ALK PHOS 125* 79         Lab 03/28/25  1926 03/28/25  1552 03/22/25  0623   PROBNP  --  18,046.0* 573.4   HSTROP T 139* 144*  --                  UA          11/20/2024    20:31 12/11/2024    15:12 3/21/2025    16:18   Urinalysis   Squamous Epithelial Cells, UA 0-2   3-6    Specific Gravity, UA 1.025   1.013    Ketones, UA 40 mg/dL (2+)  Negative  Negative    Blood, UA Large (3+)   Large (3+)    Leukocytes, UA Moderate (2+)  Large (3+)  Large (3+)    Nitrite, UA Negative   Negative    RBC, UA Too Numerous to Count   None Seen    WBC, UA Too Numerous to Count   Too Numerous to Count    Bacteria, UA 4+   1+        Microbiology Results (last 10 days)       Procedure Component Value - Date/Time    COVID PRE-OP / PRE-PROCEDURE SCREENING  ORDER (NO ISOLATION) - Swab, Nasal Cavity [[id number]]  (Abnormal) Collected: 03/28/25 1556    Lab Status: Final result Specimen: Swab from Nasal Cavity Updated: 03/28/25 1703    Narrative:      The following orders were created for panel order COVID PRE-OP / PRE-PROCEDURE SCREENING ORDER (NO ISOLATION) - Swab, Nasal Cavity.  Procedure                               Abnormality         Status                     ---------                               -----------         ------                     COVID-19 and FLU A/B PCR...[[id number]]  Abnormal            Final result                 Please view results for these tests on the individual orders.    COVID-19 and FLU A/B PCR, 1 HR TAT - Swab, Nasopharynx [[id number]]  (Abnormal) Collected: 03/28/25 1556    Lab Status: Final result Specimen: Swab from Nasopharynx Updated: 03/28/25 1703     COVID19 Detected     Influenza A PCR Not Detected     Influenza B PCR Not Detected    Narrative:      Fact sheet for providers: https://www.fda.gov/media/649874/download    Fact sheet for patients: https://www.fda.gov/media/169998/download    Test performed by PCR.  Influenza A and Influenza B negative results should be considered presumptive in samples that have a positive SARS-CoV-2 result.    Competitive inhibition studies showed that SARS-CoV-2 virus, when present at concentrations above 3.6E+04 copies/mL, can inhibit the detection and amplification of influenza A and influenza B virus RNA if present at or below 1.8E+02 copies/mL or 4.9E+02 copies/mL, respectively, and may lead to false negative influenza virus results. If co-infection with influenza A or influenza B virus is suspected in samples with a positive SARS-CoV-2 result, the sample should be re-tested with another FDA cleared, approved, or authorized influenza test, if influenza virus detection would change clinical management.    S. Pneumo Ag Urine or CSF - Urine, Urine, Clean Catch [[id number]]  (Normal) Collected:  03/22/25 1328    Lab Status: Final result Specimen: Urine, Clean Catch Updated: 03/23/25 0407     Strep Pneumo Ag Negative    Legionella Antigen, Urine - Urine, Urine, Clean Catch [[id number]]  (Normal) Collected: 03/22/25 1328    Lab Status: Final result Specimen: Urine, Clean Catch Updated: 03/23/25 0406     LEGIONELLA ANTIGEN, URINE Negative    Blood Culture - Blood, Hand, Left [[id number]]  (Normal) Collected: 03/22/25 1249    Lab Status: Final result Specimen: Blood from Hand, Left Updated: 03/27/25 1315     Blood Culture No growth at 5 days    Narrative:      Less than seven (7) mL's of blood was collected.  Insufficient quantity may yield false negative results.    Blood Culture - Blood, Hand, Right [[id number]]  (Normal) Collected: 03/22/25 1245    Lab Status: Final result Specimen: Blood from Hand, Right Updated: 03/27/25 1315     Blood Culture No growth at 5 days    Narrative:      Less than seven (7) mL's of blood was collected.  Insufficient quantity may yield false negative results.    MRSA Screen, PCR (Inpatient) - Swab, Nares [[id number]]  (Normal) Collected: 03/22/25 1146    Lab Status: Final result Specimen: Swab from Nares Updated: 03/22/25 1328     MRSA PCR Negative    Narrative:      The negative predictive value of this diagnostic test is high and should only be used to consider de-escalating anti-MRSA therapy. A positive result may indicate colonization with MRSA and must be correlated clinically.  MRSA Negative    Respiratory Panel PCR w/COVID-19(SARS-CoV-2) [facility]-House, NP Swab in UTM/VTM, 2 HR TAT - Swab, Nasopharynx [[id number]]  (Normal) Collected: 03/22/25 1146    Lab Status: Final result Specimen: Swab from Nasopharynx Updated: 03/22/25 1308     ADENOVIRUS, PCR Not Detected     Coronavirus 229E Not Detected     Coronavirus HKU1 Not Detected     Coronavirus NL63 Not Detected     Coronavirus OC43 Not Detected     COVID19 Not Detected     Human Metapneumovirus Not Detected      Human Rhinovirus/Enterovirus Not Detected     Influenza A PCR Not Detected     Influenza B PCR Not Detected     Parainfluenza Virus 1 Not Detected     Parainfluenza Virus 2 Not Detected     Parainfluenza Virus 3 Not Detected     Parainfluenza Virus 4 Not Detected     RSV, PCR Not Detected     Bordetella pertussis pcr Not Detected     Bordetella parapertussis PCR Not Detected     Chlamydophila pneumoniae PCR Not Detected     Mycoplasma pneumo by PCR Not Detected    Narrative:      In the setting of a positive respiratory panel with a viral infection PLUS a negative procalcitonin without other underlying concern for bacterial infection, consider observing off antibiotics or discontinuation of antibiotics and continue supportive care. If the respiratory panel is positive for atypical bacterial infection (Bordetella pertussis, Chlamydophila pneumoniae, or Mycoplasma pneumoniae), consider antibiotic de-escalation to target atypical bacterial infection.    Urine Culture - Urine, Urine, Catheter [[id number]]  (Normal) Collected: 03/21/25 1618    Lab Status: Final result Specimen: Urine, Catheter Updated: 03/23/25 1054     Urine Culture No growth            XR Chest 1 View  Result Date: 3/28/2025  XR CHEST 1 VW Date of Exam: 3/28/2025 3:54 PM EDT Indication: SOA triage protocol Comparison: AP chest x-ray 3/22/2025, 3/21/2025, 11/20/2024; CT chest 1/26/2024 Findings: Stable chronic interstitial opacities in the lung bases. No consolidation, pneumothorax, or large pleural effusion is seen. Cardiomediastinal contours and pacemaker leads appear stable.     Impression: Impression: Stable findings of chronic interstitial lung disease without definite acute cardiopulmonary abnormality. Electronically Signed: Dulce Doyle  3/28/2025 4:22 PM EDT  Workstation ID: [id number]      Results for orders placed during the hospital encounter of 03/28/25    Adult Transthoracic Echo Complete w/ Color, Spectral and Contrast if Necessary Per  Protocol    Interpretation Summary    Left ventricular systolic function is mildly decreased. Left ventricular ejection fraction appears to be 41 - 45%.    Septal wall motion is abnormal, consistent with right ventricular pacing.    Left ventricular wall thickness is consistent with moderate concentric hypertrophy.    The right ventricular cavity is mild to moderately dilated. Mildly reduced right ventricular systolic function noted.    Mild to moderate mitral valve regurgitation is present    Mild to moderate tricuspid valve regurgitation is present. Estimated right ventricular systolic pressure from tricuspid regurgitation is moderately elevated (45-55 mmHg).      Assessment & Plan   Assessment & Plan       COVID    89-year-old male with history of PAF, CKD, recent right hip fracture status/post repair admitted for hypotension, concern for PNA.  Respiratory panel positive for COVID.    Sepsis likely secondary to PNA  COVID-positive  Cannot rule out superimposed bacterial infection  -Pro-Alejo elevated 1.5 on admission  -Repeat procalcitonin again in a.m.  -S/p cefepime and vancomycin in ER  -Continue Rocephin    Hypotension  -Improved with IVF  -Bisoprolol ordered in a.m., more for rate control, at half dose 2.5 mg with hold parameters for SBP less than 110    PAF  -Resumed low-dose Eliquis 2.5 mg, hopefully renal function will improve in a.m.  -Continue ASA    Diabetes type 2  -I ordered regular diet as he has not eaten or drinking much in the last few days  -Continue SSI      OCTAVIA on CKD  -Suspect related to sepsis, hypotension  -Repeat labs in a.m. after IVF    Hypothyroidism  -Continue levothyroxine      Chronic hydro  -follows with Dr. Pitts  -Has chronic ureteral stent that is exchanged every 6 months.  Next appointment is April 14 per daughter at bedside  -Urinary retention protocol  -Check UA to rule out infection    Recent right hip fracture, s/p repair  -PT/OT      VTE Prophylaxis:  Pharmacologic VTE  prophylaxis orders are present.          CODE STATUS:    Code Status and Medical Interventions: No CPR (Do Not Attempt to Resuscitate); Limited Support; No intubation (DNI)   Ordered at: 03/28/25 2006     Code Status (Patient has no pulse and is not breathing):    No CPR (Do Not Attempt to Resuscitate)     Medical Interventions (Patient has pulse or is breathing):    Limited Support     Medical Intervention Limits:    No intubation (DNI)       Expected Discharge   Expected discharge date/ time has not been documented.     Fern Trujillo, DO  03/28/25

## 2025-03-29 NOTE — PLAN OF CARE
Goal Outcome Evaluation:      A&O to self. Pt very confused and hallucinating off and on. Family at bedside. 3L NC. Palliative consulted. PRNs given today for dyspnea and anxiety/restlessness. Ascencio catheter placed. Safety maintained.

## 2025-03-30 PROBLEM — A41.9 SEPSIS: Status: ACTIVE | Noted: 2025-03-30

## 2025-03-30 NOTE — PROGRESS NOTES
Patient appears more comfortable with IV medication administration for dyspnea and agitation. Granddaughter, Erika, at bedside. Confirms goal for comfort focused plan of care with no further IV antibiotics, no further labs, no IV fluids. Goal for symptom management throughout end of life. Inpatient hospice to meet with granddaughter this morning.

## 2025-03-30 NOTE — DISCHARGE PLACEMENT REQUEST
"Forrest Zepeda \"Jarrett\" (89 y.o. Male)       Date of Birth   05/06/1932    Social Security Number       Address   [address] [place], [place] [address]    Home Phone   [phone]    MRN   [id number]       [facility]    Marital Status                               Admission Date   3/28/2025    Admission Type   Emergency    Admitting Provider   Anthony Dockery MD    Attending Provider   Anthony Dockery MD    Department, Room/Bed   [address], S567/1       Discharge Date       Discharge Disposition       Discharge Destination                                 Attending Provider: Anthony Dockery MD    Allergies: Statins, Metformin    Isolation: Contact, Airborne   Infection: COVID (confirmed) (03/28/25)   Code Status: No CPR    Ht: 175.3 cm (69.02\")   Wt: 93.4 kg (205 lb 14.6 oz)    Admission Cmt: None   Principal Problem: COVID [U07.1]                   Active Insurance as of 3/28/2025       Primary Coverage       Payor Plan Insurance Group Employer/Plan Group    HUMANA MEDICARE REPLACEMENT HUMANA MEDICARE ADVANTAGE GROUP [id number]       Payor Plan Address Payor Plan Phone Number Payor Plan Fax Number Effective Dates    [address] [address] [phone]  1/1/2018 - None Entered    [facility] [address]         Subscriber Name Subscriber Birth Date Member ID       FORREST ZEPEDA 5/6/1932 [id number]                     Emergency Contacts        (Rel.) Home Phone Work Phone Mobile Phone    MARCUS DMIAS (Grandchild) [phone] -- --    Eron Zepeda (Power of ) [phone] -- --    Mariam Dior (Daughter) [phone] -- --              Emergency Contact Information       Name Relation Home Work Mobile    MARCUS DIMAS Grandchild [phone]      Eron Zepeda Power of  [phone]      Mariam Dior Daughter [phone]            Other Contacts    None on File       Insurance Information                  HUMANA MEDICARE REPLACEMENT/HUMANA MEDICARE " ADVANTAGE GROUP Phone: [phone]    Subscriber: Forrest Zepeda Subscriber#: [id number]    Group#: [id number] Precert#: --    Authorization#: -- Effective Date: --             History & Physical        Fern Trujillo, DO at 25              [facility] Medicine Services  HISTORY AND PHYSICAL    Patient Name: Forrest Zepeda  : 1932  MRN: [id number]  Primary Care Physician: Kary Wen MD  Date of admission: 3/28/2025      Subjective  Subjective     Chief Complaint:  PNA    HPI:  Forrest Zepeda is a 89 y.o. male with medical history significant for interstitial lung disease, prior history of saddle PE, paroxysmal A-fib on Eliquis, CKD, chronic hydro with stent replacement every 6 months just discharged to the [facility] in [place] for right hip fracture status post repair.  Prior to discharge some of the notes mention that he had a low-grade fever and some congestion.  Daughter at bedside notes that he had not been eating or drinking well and then had fever.  Presented back here with hypotension and hypoxia, COVID test positive.      Personal History     Past Medical History:   Diagnosis Date    Abnormal EKG     Abnormal PSA     Reported abnormal    Acute cystitis with hematuria 2023    Acute deep vein thrombosis (DVT) of right lower extremity 2019    Acute diverticulitis     Acute hyperkalemia 2019    Acute respiratory failure with hypoxia     Acute saddle pulmonary embolism with acute cor pulmonale 2019    Acute systolic right heart failure 2019    Acute UTI (urinary tract infection) 10/9/2023    Arthritis     KNEES,  BUT SINCE HAD REPLACEMENT    Benign localized hyperplasia of prostate     Bilateral knee pain     C. difficile diarrhea 2010    Cataracts, bilateral     CKD (chronic kidney disease)     Coronary artery disease     Diabetic neuropathy     Diastolic dysfunction     Disease of thyroid gland      "HYPOTHYROIDISM    Diverticulitis     Dyslipidemia     H/O    Erectile dysfunction     Family history of malignant hyperthermia     Brother     Full dentures     Generalized weakness     History of ESBL E. coli infection     most recent 4-2022 f/u with ID Dr Johnson    History of gout     History of hepatitis A vaccination     History of nephrolithiasis     History of nuclear stress test     STATES IN THE 1990'S.  \"IT WAS OK\"    History of osteopenia     History of recurrent UTI (urinary tract infection)     Hydronephrosis of left kidney 07/18/2019    Hydronephrosis with renal and ureteral calculus obstruction 10/21/2019    Added automatically from request for surgery [id number]    Hydronephrosis with ureteral stricture     Hypercholesterolemia     Hyperkalemia     PT UNSURE REGARDING HX OF THIS    Hyperlipidemia     Hypertension     Hypothyroidism     Impaired functional mobility, balance, gait, and endurance     Insomnia     IPF (idiopathic pulmonary fibrosis)     per patient and son, dx at [facility], was told no treatment necessary, not to worry about it    On supplemental oxygen therapy     2L NC QHS    Other hydronephrosis 08/12/2019    Added automatically from request for surgery [id number]    Paroxysmal atrial fibrillation     Pulmonary fibrosis     Pulmonary hypertension, mild 02/2021    per echo per cardiology note 1/9/23, per pt's son, PCP does not agree with this dx    Renal insufficiency     Sepsis 2019    Shortness of breath     STATES WITH EXERTION, OTHERWISE, DENIES    Sigmoid diverticulitis without abscess or perforation 08/09/2017    Sinus node dysfunction     Skin breakdown     fourth toe right foot noted in 2019 MD D/C note    Skin ulcer of fourth toe of right foot, limited to breakdown of skin 07/05/2019    Stricture of ureter     Type 2 diabetes mellitus     Ureteral stricture, left     Urinary incontinence     UTI (urinary tract infection) 07/18/2019    Vitamin D deficiency     Wears glasses  "           Past Surgical History:   Procedure Laterality Date    APPENDECTOMY      CARDIAC ELECTROPHYSIOLOGY PROCEDURE N/A 12/23/2020    Procedure: Pacemaker DC new. DNS meds.;  Surgeon: Jimy Ledezma DO;  Location:  [facility];  Service: Cardiology;  Laterality: N/A;    CHOLECYSTECTOMY      CIRCUMCISION N/A 10/23/2019    Procedure: CIRCUMCISIOn-DORSAL SLIT;  Surgeon: Griffin Romero MD;  Location:  [facility];  Service: Urology    COLONOSCOPY      CYSTOSCOPY RETROGRADE PYELOGRAM Left 06/17/2022    Procedure: CYSTOSCOPY RETROGRADE PYELOGRAM;  Surgeon: Ryne Mccann MD;  Location:  [facility];  Service: Urology;  Laterality: Left;    CYSTOSCOPY RETROGRADE PYELOGRAM Left 7/10/2024    Procedure: CYSTOSCOPY RETROGRADE PYELOGRAM AND STENT INSERTION LEFT;  Surgeon: Deanne Pitts MD;  Location:  [facility];  Service: Urology;  Laterality: Left;    CYSTOSCOPY URETEROSCOPY Left 02/11/2019    Procedure: URETEROSCOPY WITH STENT EXTRACTION, RPG;  Surgeon: Griffin Romero MD;  Location:  [facility];  Service: Urology    CYSTOSCOPY W/ URETERAL STENT PLACEMENT Left 07/20/2019    Procedure: CYSTOSCOPY, LEFT RETROGRADE PYELOGRAM,  ATTEMPTED LEFT URETERAL STENT INSERTION;  Surgeon: Isaac Flynn MD;  Location:  [facility];  Service: Urology    CYSTOSCOPY W/ URETERAL STENT PLACEMENT Left 06/17/2022    Procedure: CYSTOSCOPY URETERAL CATHETER/STENT INSERTION;  Surgeon: Ryne Mccann MD;  Location:  [facility];  Service: Urology;  Laterality: Left;    CYSTOSCOPY W/ URETERAL STENT PLACEMENT Left 01/27/2023    Procedure: CYSTOSCOPY URETERAL CATHETER/STENT INSERTION;  Surgeon: Deanne Pitts MD;  Location:  [facility];  Service: Urology;  Laterality: Left;    CYSTOSCOPY W/ URETERAL STENT PLACEMENT Left 02/15/2024    Procedure: CYSTOSCOPY LEFT STENT EXCHANGE;  Surgeon: Deanne Pitts MD;  Location:  [facility];  Service: Urology;  Laterality: Left;    CYSTOSCOPY, URETEROSCOPY, RETROGRADE PYELOGRAM, STONE EXTRACTION, STENT  INSERTION Left 06/15/2023    Procedure: URETEROSCOPY, RETROGRADE PYELOGRAM, STENT INSERTION;  Surgeon: Deanne Pitts MD;  Location:  [facility];  Service: Urology;  Laterality: Left;    CYSTOSCOPY, URETEROSCOPY, RETROGRADE PYELOGRAM, STONE EXTRACTION, STENT INSERTION Left 11/22/2024    Procedure: CYSTOSCOPY RETROGRADE PYELOGRAM AND STENT INSERTION LEFT;  Surgeon: Deanne Pitts MD;  Location:  [facility];  Service: Urology;  Laterality: Left;    EYE SURGERY      CATARACTS REMOVED BILATERALLY    HIP TROCHANTERIC NAILING WITH INTRAMEDULLARY HIP SCREW Right 3/23/2025    Procedure: HIP TROCHANTERIC NAILING RIGHT;  Surgeon: Brodie Baltazar MD;  Location:  [facility];  Service: Orthopedics;  Laterality: Right;    JOINT REPLACEMENT      Bilateral knees    KNEE ARTHROPLASTY Bilateral     KNEE ARTHROSCOPY Bilateral     RENAL ARTERY STENT      SEVERAL TIMES EVERY SINCE 1978    URETERAL STENT INSERTION  10/2020    URETEROSCOPY LASER LITHOTRIPSY WITH STENT INSERTION Left 01/10/2018    Procedure:  cysto, left ureteral stent replacement ;  Surgeon: Griffin Romero MD;  Location:  [facility];  Service:     URETEROSCOPY LASER LITHOTRIPSY WITH STENT INSERTION Left 08/14/2019    Procedure: URETEROSCOPY, STONE REMOVAL WITH STENT INSERTION;  Surgeon: Griffin Romero MD;  Location:  [facility];  Service: Urology    URETEROSCOPY LASER LITHOTRIPSY WITH STENT INSERTION Left 10/23/2019    Procedure: Left URETEROSCOPY WITH STENT INSERTION-LEFT;  Surgeon: Griffin Romero MD;  Location:  [facility];  Service: Urology       Family History: family history includes Brain cancer in his brother and sister; Heart attack in his sister; Hypertension in his sister; Lung cancer in his brother and sister; Malig Hyperthermia in his brother; No Known Problems in his mother; Stomach cancer in his father; Stroke in his sister.     Social History:  reports that he quit smoking about 74 years ago. His smoking use included cigarettes. He has a 0.5 pack-year smoking  history. He has been exposed to tobacco smoke. He has never used smokeless tobacco. He reports that he does not drink alcohol and does not use drugs.  Social History     Social History Narrative    Pt lives alone in [place]    Granddaughter Erika is nurse.     Had 7 brothers and 3 sisters.     Uses a cane and oxygen as needed    Still drives       Medications:  Available home medication information reviewed.  HYDROcodone-acetaminophen, Insulin Glargine (1 Unit Dial), Mirabegron ER, Multiple Vitamins-Minerals, SITagliptin, allopurinol, apixaban, aspirin, bisoprolol, cefdinir, doxycycline, finasteride, glimepiride, ipratropium-albuterol, and levothyroxine    Allergies   Allergen Reactions    Statins Diarrhea and Myalgia    Metformin Diarrhea and GI Intolerance       Objective  Objective     Vital Signs:   Temp:  [97.4 °F (36.3 °C)] 97.4 °F (36.3 °C)  Heart Rate:  [] 115  Resp:  [20] 20  BP: ()/(46-65) 103/59  Flow (L/min) (Oxygen Therapy):  [3] 3       Physical Exam  Constitutional:       General: He is not in acute distress.     Appearance: He is ill-appearing.   HENT:      Head: Normocephalic and atraumatic.      Mouth/Throat:      Mouth: Mucous membranes are dry.   Eyes:      Pupils: Pupils are equal, round, and reactive to light.   Cardiovascular:      Rate and Rhythm: Tachycardia present. Rhythm irregular.   Pulmonary:      Effort: No respiratory distress.      Breath sounds: Rhonchi present.   Abdominal:      General: There is distension.      Palpations: Abdomen is soft.      Tenderness: There is no abdominal tenderness.   Musculoskeletal:         General: No swelling.   Skin:     General: Skin is warm and dry.   Neurological:      Mental Status: He is disoriented.            Result Review:  I have personally reviewed the results from the time of this admission to 3/28/2025 20:27 EDT and agree with these findings:  [x]  Laboratory list / accordion  []  Microbiology  [x]  Radiology  []   EKG/Telemetry   []  Cardiology/Vascular   []  Pathology  [x]  Old records  []  Other:  Most notable findings include:       LAB RESULTS:      Lab 03/28/25  1926 03/28/25  1552 03/25/25  0826 03/24/25  0457 03/22/25  0623   WBC  --  17.26* 8.32 10.22 14.56*   HEMOGLOBIN  --  9.7* 9.3* 8.8* 10.7*   HEMATOCRIT  --  30.9* 30.5* 28.3* 34.8*   PLATELETS  --  237 159 117* 146   NEUTROS ABS  --  14.65* 5.39 8.22* 9.61*   IMMATURE GRANS (ABS)  --  0.35* 0.04 0.04 0.09*   LYMPHS ABS  --  0.85 1.42 1.03 3.04   MONOS ABS  --  1.32* 0.84 0.87 1.01*   EOS ABS  --  0.02 0.58* 0.03 0.72*   MCV  --  96.3 97.1* 97.9* 98.3*   PROCALCITONIN  --  1.51*  --   --  0.45*   LACTATE 1.7 3.3*  --   --   --    PROTIME  --  16.3*  --   --   --    INR  --  1.30*  --   --   --    APTT  --  36.5*  --   --   --          Lab 03/28/25  1552 03/25/25  0826 03/24/25  0738 03/24/25  0457 03/22/25  0623   SODIUM 135* 137  --  137 136   POTASSIUM 5.1 5.2 5.3* 6.1* 4.8   CHLORIDE 101 108*  --  107 106   CO2 16.0* 20.0*  --  16.0* 18.0*   ANION GAP 18.0* 9.0  --  14.0 12.0   * 65*  --  59* 52*   CREATININE 3.43* 2.04*  --  2.13* 2.16*   EGFR 16.1* 30.0*  --  28.5* 28.0*   GLUCOSE 205* 148*  --  165* 135*   CALCIUM 9.4 8.5  --  8.6 8.6   MAGNESIUM  --   --   --   --  1.8   HEMOGLOBIN A1C  --   --   --   --  6.90*   TSH  --   --   --   --  1.740         Lab 03/28/25  1552 03/22/25  0623   TOTAL PROTEIN 6.9 6.1   ALBUMIN 3.2* 3.1*   GLOBULIN 3.7 3.0   ALT (SGPT) 20 10   AST (SGOT) 58* 19   BILIRUBIN 0.4 0.5   ALK PHOS 125* 79         Lab 03/28/25  1926 03/28/25  1552 03/22/25  0623   PROBNP  --  18,046.0* 573.4   HSTROP T 139* 144*  --                  UA          11/20/2024    20:31 12/11/2024    15:12 3/21/2025    16:18   Urinalysis   Squamous Epithelial Cells, UA 0-2   3-6    Specific Gravity, UA 1.025   1.013    Ketones, UA 40 mg/dL (2+)  Negative  Negative    Blood, UA Large (3+)   Large (3+)    Leukocytes, UA Moderate (2+)  Large (3+)  Large (3+)     Nitrite, UA Negative   Negative    RBC, UA Too Numerous to Count   None Seen    WBC, UA Too Numerous to Count   Too Numerous to Count    Bacteria, UA 4+   1+        Microbiology Results (last 10 days)       Procedure Component Value - Date/Time    COVID PRE-OP / PRE-PROCEDURE SCREENING ORDER (NO ISOLATION) - Swab, Nasal Cavity [[id number]]  (Abnormal) Collected: 03/28/25 1556    Lab Status: Final result Specimen: Swab from Nasal Cavity Updated: 03/28/25 1703    Narrative:      The following orders were created for panel order COVID PRE-OP / PRE-PROCEDURE SCREENING ORDER (NO ISOLATION) - Swab, Nasal Cavity.  Procedure                               Abnormality         Status                     ---------                               -----------         ------                     COVID-19 and FLU A/B PCR...[[id number]]  Abnormal            Final result                 Please view results for these tests on the individual orders.    COVID-19 and FLU A/B PCR, 1 HR TAT - Swab, Nasopharynx [[id number]]  (Abnormal) Collected: 03/28/25 1556    Lab Status: Final result Specimen: Swab from Nasopharynx Updated: 03/28/25 1703     COVID19 Detected     Influenza A PCR Not Detected     Influenza B PCR Not Detected    Narrative:      Fact sheet for providers: https://www.fda.gov/media/445831/download    Fact sheet for patients: https://www.fda.gov/media/675402/download    Test performed by PCR.  Influenza A and Influenza B negative results should be considered presumptive in samples that have a positive SARS-CoV-2 result.    Competitive inhibition studies showed that SARS-CoV-2 virus, when present at concentrations above 3.6E+04 copies/mL, can inhibit the detection and amplification of influenza A and influenza B virus RNA if present at or below 1.8E+02 copies/mL or 4.9E+02 copies/mL, respectively, and may lead to false negative influenza virus results. If co-infection with influenza A or influenza B virus is suspected in samples  with a positive SARS-CoV-2 result, the sample should be re-tested with another FDA cleared, approved, or authorized influenza test, if influenza virus detection would change clinical management.    S. Pneumo Ag Urine or CSF - Urine, Urine, Clean Catch [[id number]]  (Normal) Collected: 03/22/25 1328    Lab Status: Final result Specimen: Urine, Clean Catch Updated: 03/23/25 0407     Strep Pneumo Ag Negative    Legionella Antigen, Urine - Urine, Urine, Clean Catch [[id number]]  (Normal) Collected: 03/22/25 1328    Lab Status: Final result Specimen: Urine, Clean Catch Updated: 03/23/25 0406     LEGIONELLA ANTIGEN, URINE Negative    Blood Culture - Blood, Hand, Left [[id number]]  (Normal) Collected: 03/22/25 1249    Lab Status: Final result Specimen: Blood from Hand, Left Updated: 03/27/25 1315     Blood Culture No growth at 5 days    Narrative:      Less than seven (7) mL's of blood was collected.  Insufficient quantity may yield false negative results.    Blood Culture - Blood, Hand, Right [[id number]]  (Normal) Collected: 03/22/25 1245    Lab Status: Final result Specimen: Blood from Hand, Right Updated: 03/27/25 1315     Blood Culture No growth at 5 days    Narrative:      Less than seven (7) mL's of blood was collected.  Insufficient quantity may yield false negative results.    MRSA Screen, PCR (Inpatient) - Swab, Nares [[id number]]  (Normal) Collected: 03/22/25 1146    Lab Status: Final result Specimen: Swab from Nares Updated: 03/22/25 1328     MRSA PCR Negative    Narrative:      The negative predictive value of this diagnostic test is high and should only be used to consider de-escalating anti-MRSA therapy. A positive result may indicate colonization with MRSA and must be correlated clinically.  MRSA Negative    Respiratory Panel PCR w/COVID-19(SARS-CoV-2) [facility] In-House, NP Swab in UTM/VTM, 2 HR TAT - Swab, Nasopharynx [[id number]]  (Normal) Collected: 03/22/25 1146    Lab Status: Final result  Specimen: Swab from Nasopharynx Updated: 03/22/25 1308     ADENOVIRUS, PCR Not Detected     Coronavirus 229E Not Detected     Coronavirus HKU1 Not Detected     Coronavirus NL63 Not Detected     Coronavirus OC43 Not Detected     COVID19 Not Detected     Human Metapneumovirus Not Detected     Human Rhinovirus/Enterovirus Not Detected     Influenza A PCR Not Detected     Influenza B PCR Not Detected     Parainfluenza Virus 1 Not Detected     Parainfluenza Virus 2 Not Detected     Parainfluenza Virus 3 Not Detected     Parainfluenza Virus 4 Not Detected     RSV, PCR Not Detected     Bordetella pertussis pcr Not Detected     Bordetella parapertussis PCR Not Detected     Chlamydophila pneumoniae PCR Not Detected     Mycoplasma pneumo by PCR Not Detected    Narrative:      In the setting of a positive respiratory panel with a viral infection PLUS a negative procalcitonin without other underlying concern for bacterial infection, consider observing off antibiotics or discontinuation of antibiotics and continue supportive care. If the respiratory panel is positive for atypical bacterial infection (Bordetella pertussis, Chlamydophila pneumoniae, or Mycoplasma pneumoniae), consider antibiotic de-escalation to target atypical bacterial infection.    Urine Culture - Urine, Urine, Catheter [[id number]]  (Normal) Collected: 03/21/25 1618    Lab Status: Final result Specimen: Urine, Catheter Updated: 03/23/25 1054     Urine Culture No growth            XR Chest 1 View  Result Date: 3/28/2025  XR CHEST 1 VW Date of Exam: 3/28/2025 3:54 PM EDT Indication: SOA triage protocol Comparison: AP chest x-ray 3/22/2025, 3/21/2025, 11/20/2024; CT chest 1/26/2024 Findings: Stable chronic interstitial opacities in the lung bases. No consolidation, pneumothorax, or large pleural effusion is seen. Cardiomediastinal contours and pacemaker leads appear stable.     Impression: Impression: Stable findings of chronic interstitial lung disease without  definite acute cardiopulmonary abnormality. Electronically Signed: Dulce Doyle  3/28/2025 4:22 PM EDT  Workstation ID: [id number]      Results for orders placed during the hospital encounter of 03/28/25    Adult Transthoracic Echo Complete w/ Color, Spectral and Contrast if Necessary Per Protocol    Interpretation Summary    Left ventricular systolic function is mildly decreased. Left ventricular ejection fraction appears to be 41 - 45%.    Septal wall motion is abnormal, consistent with right ventricular pacing.    Left ventricular wall thickness is consistent with moderate concentric hypertrophy.    The right ventricular cavity is mild to moderately dilated. Mildly reduced right ventricular systolic function noted.    Mild to moderate mitral valve regurgitation is present    Mild to moderate tricuspid valve regurgitation is present. Estimated right ventricular systolic pressure from tricuspid regurgitation is moderately elevated (45-55 mmHg).      Assessment & Plan  Assessment & Plan       COVID    89-year-old male with history of PAF, CKD, recent right hip fracture status/post repair admitted for hypotension, concern for PNA.  Respiratory panel positive for COVID.    Sepsis likely secondary to PNA  COVID-positive  Cannot rule out superimposed bacterial infection  -Pro-Alejo elevated 1.5 on admission  -Repeat procalcitonin again in a.m.  -S/p cefepime and vancomycin in ER  -Continue Rocephin    Hypotension  -Improved with IVF  -Bisoprolol ordered in a.m., more for rate control, at half dose 2.5 mg with hold parameters for SBP less than 110    PAF  -Resumed low-dose Eliquis 2.5 mg, hopefully renal function will improve in a.m.  -Continue ASA    Diabetes type 2  -I ordered regular diet as he has not eaten or drinking much in the last few days  -Continue SSI      OCTAVIA on CKD  -Suspect related to sepsis, hypotension  -Repeat labs in a.m. after IVF    Hypothyroidism  -Continue levothyroxine      Chronic hydro  -follows  with Dr. Pitts  -Has chronic ureteral stent that is exchanged every 6 months.  Next appointment is April 14 per daughter at bedside  -Urinary retention protocol  -Check UA to rule out infection    Recent right hip fracture, s/p repair  -PT/OT      VTE Prophylaxis:  Pharmacologic VTE prophylaxis orders are present.          CODE STATUS:    Code Status and Medical Interventions: No CPR (Do Not Attempt to Resuscitate); Limited Support; No intubation (DNI)   Ordered at: 03/28/25 2006     Code Status (Patient has no pulse and is not breathing):    No CPR (Do Not Attempt to Resuscitate)     Medical Interventions (Patient has pulse or is breathing):    Limited Support     Medical Intervention Limits:    No intubation (DNI)       Expected Discharge   Expected discharge date/ time has not been documented.     Fern Trujillo DO  03/28/25      Electronically signed by Fern Trujillo DO at 03/28/25 2027

## 2025-03-30 NOTE — PLAN OF CARE
Goal Outcome Evaluation:  Plan of Care Reviewed With: patient, family           Outcome Evaluation: Pt to 5B this shift. Family at bedside. Precautions maintained. Ascencio patent with concentrated UOP. PRN versed given for restlessness. Comfort care ongoing.

## 2025-03-30 NOTE — PROGRESS NOTES
Continued Stay Note   [facility]     Patient Name: Forrest Zepeda  MRN: [id number]  Today's Date: 3/30/2025    Admit Date: 3/28/2025        Discharge Plan       Row Name 03/30/25 1104       Plan    Plan Comments Inpatient hospice to meet with patient's hcs Erika in patient room this am. Please call [phone] with needs/concerns.                    Discharge Codes    No documentation.                 Expected Discharge Date and Time       Expected Discharge Date Expected Discharge Time    Apr 2, 2025               Racheal Catalan RN

## 2025-03-30 NOTE — PLAN OF CARE
Goal Outcome Evaluation:              Problem: Adult Inpatient Plan of Care  Goal: Plan of Care Review  Outcome: Progressing  Goal: Patient-Specific Goal (Individualized)  Outcome: Progressing  Goal: Absence of Hospital-Acquired Illness or Injury  Outcome: Progressing  Goal: Optimal Comfort and Wellbeing  Outcome: Progressing  Goal: Readiness for Transition of Care  Outcome: Progressing     Problem: Pain Acute  Goal: Optimal Pain Control and Function  Outcome: Progressing     Problem: Skin Injury Risk Increased  Goal: Skin Health and Integrity  Outcome: Progressing     Problem: Fall Injury Risk  Goal: Absence of Fall and Fall-Related Injury  Outcome: Progressing

## 2025-03-30 NOTE — PROGRESS NOTES
Continued Stay Note  [facility]     Patient Name: Forrest Zepeda  MRN: [id number]  Today's Date: 3/30/2025    Admit Date: 3/30/2025    Plan: Inpatient hospice   Discharge Plan       Row Name 03/30/25 1518       Plan    Plan Inpatient hospice    Plan Comments Hospice RN to bedside of RITCHIE Zepeda 89yo male with terminal diagnosis of sepsis with covid and pneumonia. Patient observed minimally responsive, minimal eye opening. Patient's granddaughter/hcs Erika is supportive at bedside. Erika relays patient has just received dilaudid injectable for dyspnea palliation. Erika relays goal of comfort care. Rn reassures patient that he is safe and cared for. RN and SW speak with Erika in conference room and actively listen as she relays patient's journey to this point, goal of comfort care with desire for patient to have symptoms palliated with medications. Erika relays desire for 5B transfer and verbalizes understanding of discussions of hospice, inpatient hospice, and picture of care with inpatient hospice services. Support and open communication provided. Patient is approved for inpatient hospice per Dr. Dyer. Dr. Dockery aware of discharge/readmit. Rn discussed medications, nonverbal indicators of comfort, eol signs/symptoms, what to possibly expect from this point forward, eol communication, and self care. Rn and SW provided for open communication and continued availability. Care coordinated with [facility] staff. Updated LIZ Quezada APRN and orders received and verified. Patient requires injectable medications for symptom palliation necessitating skilled nursing care requiring gip level of hospice care.    Final Discharge Disposition Code 51 - hospice medical facility                   Discharge Codes    No documentation.                       Racheal Catalan RN

## 2025-03-30 NOTE — PROGRESS NOTES
[facility] Medicine Services  PROGRESS NOTE    Patient Name: Forrest Zepeda  : 1932  MRN: [id number]    Date of Admission: 3/28/2025  Primary Care Physician: Kary Wen MD    Subjective   Subjective     CC: Follow-up pneumonia    HPI: Family is at bedside, reports patient had intermittent episodes of restlessness, this morning he seems comfortable    Objective   Objective     Vital Signs:   Temp:  [98 °F (36.7 °C)-99.3 °F (37.4 °C)] 99.3 °F (37.4 °C)  Heart Rate:  [] 74  Resp:  [16-20] 18  BP: (114-125)/(53-65) 123/53  Flow (L/min) (Oxygen Therapy):  [3-5] 4     Physical Exam:  Constitutional: Chronically ill-appearing elderly male  HENT: NCAT, mucous membranes moist  Respiratory: Nonlabored respirations, diffuse coarse breath sounds on 4 L NC  Cardiovascular: RRR, no murmurs, rubs, or gallops  Gastrointestinal: Positive bowel sounds, soft, nontender, nondistended  Musculoskeletal: No bilateral ankle edema  Psychiatric: Appropriate affect, cooperative  Neurologic nonfocal, hard of hearing    Results Reviewed:  LAB RESULTS:      Lab 25  0407 25  0416 25  1926 25  1552 25  0826 25  0457   WBC 11.73* 12.17*  --  17.26* 8.32 10.22   HEMOGLOBIN 8.0* 8.6*  --  9.7* 9.3* 8.8*   HEMATOCRIT 26.0* 27.5*  --  30.9* 30.5* 28.3*   PLATELETS 252 226  --  237 159 117*   NEUTROS ABS 9.60* 9.14*  --  14.65* 5.39 8.22*   IMMATURE GRANS (ABS) 0.19* 0.16*  --  0.35* 0.04 0.04   LYMPHS ABS 1.01 1.54  --  0.85 1.42 1.03   MONOS ABS 0.73 1.10*  --  1.32* 0.84 0.87   EOS ABS 0.16 0.18  --  0.02 0.58* 0.03   MCV 95.9 96.2  --  96.3 97.1* 97.9*   PROCALCITONIN  --  16.46*  --  1.51*  --   --    LACTATE  --   --  1.7 3.3*  --   --    PROTIME  --   --   --  16.3*  --   --    APTT  --   --   --  36.5*  --   --    HSTROP T  --   --  139* 144*  --   --          Lab 25  0407 25  0416 25  1552 25  0826 25  0738 25  0457    SODIUM 139 137 135* 137  --  137   POTASSIUM 4.8 4.8 5.1 5.2 5.3* 6.1*   CHLORIDE 108* 106 101 108*  --  107   CO2 13.0* 11.0* 16.0* 20.0*  --  16.0*   ANION GAP 18.0* 20.0* 18.0* 9.0  --  14.0   * 103* 102* 65*  --  59*   CREATININE 2.85* 3.09* 3.43* 2.04*  --  2.13*   EGFR 20.1* 18.2* 16.1* 30.0*  --  28.5*   GLUCOSE 81 100* 205* 148*  --  165*   CALCIUM 8.5 7.7* 9.4 8.5  --  8.6         Lab 03/30/25  0407 03/29/25  0416 03/28/25  1552   TOTAL PROTEIN 5.5* 5.3* 6.9   ALBUMIN 2.7* 2.6* 3.2*   GLOBULIN 2.8 2.7 3.7   ALT (SGPT) 19 18 20   AST (SGOT) 68* 62* 58*   BILIRUBIN 0.2 0.4 0.4   ALK PHOS 96 102 125*         Lab 03/28/25  1926 03/28/25  1552   PROBNP  --  18,046.0*   HSTROP T 139* 144*   PROTIME  --  16.3*   INR  --  1.30*                 Brief Urine Lab Results  (Last result in the past 365 days)        Color   Clarity   Blood   Leuk Est   Nitrite   Protein   CREAT   Urine HCG        03/28/25 2151 Red   Turbid   Large (3+)   Large (3+)   Negative   >=300 mg/dL (3+)                   Microbiology Results Abnormal       Procedure Component Value - Date/Time    COVID PRE-OP / PRE-PROCEDURE SCREENING ORDER (NO ISOLATION) - Swab, Nasal Cavity [[id number]]  (Abnormal) Collected: 03/28/25 1556    Lab Status: Final result Specimen: Swab from Nasal Cavity Updated: 03/28/25 1703    Narrative:      The following orders were created for panel order COVID PRE-OP / PRE-PROCEDURE SCREENING ORDER (NO ISOLATION) - Swab, Nasal Cavity.  Procedure                               Abnormality         Status                     ---------                               -----------         ------                     COVID-19 and FLU A/B PCR...[[id number]]  Abnormal            Final result                 Please view results for these tests on the individual orders.    COVID-19 and FLU A/B PCR, 1 HR TAT - Swab, Nasopharynx [[id number]]  (Abnormal) Collected: 03/28/25 1556    Lab Status: Final result Specimen: Swab from Nasopharynx  Updated: 03/28/25 1703     COVID19 Detected     Influenza A PCR Not Detected     Influenza B PCR Not Detected    Narrative:      Fact sheet for providers: https://www.fda.gov/media/905331/download    Fact sheet for patients: https://www.fda.gov/media/781635/download    Test performed by PCR.  Influenza A and Influenza B negative results should be considered presumptive in samples that have a positive SARS-CoV-2 result.    Competitive inhibition studies showed that SARS-CoV-2 virus, when present at concentrations above 3.6E+04 copies/mL, can inhibit the detection and amplification of influenza A and influenza B virus RNA if present at or below 1.8E+02 copies/mL or 4.9E+02 copies/mL, respectively, and may lead to false negative influenza virus results. If co-infection with influenza A or influenza B virus is suspected in samples with a positive SARS-CoV-2 result, the sample should be re-tested with another FDA cleared, approved, or authorized influenza test, if influenza virus detection would change clinical management.            XR Chest 1 View  Result Date: 3/28/2025  XR CHEST 1 VW Date of Exam: 3/28/2025 3:54 PM EDT Indication: SOA triage protocol Comparison: AP chest x-ray 3/22/2025, 3/21/2025, 11/20/2024; CT chest 1/26/2024 Findings: Stable chronic interstitial opacities in the lung bases. No consolidation, pneumothorax, or large pleural effusion is seen. Cardiomediastinal contours and pacemaker leads appear stable.     Impression: Impression: Stable findings of chronic interstitial lung disease without definite acute cardiopulmonary abnormality. Electronically Signed: Dulce Doyle  3/28/2025 4:22 PM EDT  Workstation ID: [id number]      Results for orders placed during the hospital encounter of 03/28/25    Adult Transthoracic Echo Complete w/ Color, Spectral and Contrast if Necessary Per Protocol    Interpretation Summary    Left ventricular systolic function is mildly decreased. Left ventricular ejection  fraction appears to be 41 - 45%.    Septal wall motion is abnormal, consistent with right ventricular pacing.    Left ventricular wall thickness is consistent with moderate concentric hypertrophy.    The right ventricular cavity is mild to moderately dilated. Mildly reduced right ventricular systolic function noted.    Mild to moderate mitral valve regurgitation is present    Mild to moderate tricuspid valve regurgitation is present. Estimated right ventricular systolic pressure from tricuspid regurgitation is moderately elevated (45-55 mmHg).      Current medications:  Scheduled Meds:apixaban, 2.5 mg, Oral, BID  aspirin, 81 mg, Oral, Daily  bisoprolol, 2.5 mg, Oral, Daily  cefTRIAXone, 2,000 mg, Intravenous, Q24H  finasteride, 5 mg, Oral, Daily  insulin lispro, 2-7 Units, Subcutaneous, 4x Daily AC & at Bedtime  lactated ringers, 500 mL, Intravenous, Once  levothyroxine, 50 mcg, Oral, Q AM  oxybutynin XL, 10 mg, Oral, Daily  sodium chloride, 10 mL, Intravenous, Q12H      Continuous Infusions:   PRN Meds:.  acetaminophen **OR** acetaminophen **OR** acetaminophen    senna-docusate sodium **AND** polyethylene glycol **AND** bisacodyl **AND** bisacodyl    Calcium Replacement - Follow Nurse / BPA Driven Protocol    dextrose    dextrose    glucagon (human recombinant)    haloperidol lactate    HYDROcodone-acetaminophen    HYDROmorphone    Magnesium Standard Dose Replacement - Follow Nurse / BPA Driven Protocol    midazolam    nitroglycerin    ondansetron    Phosphorus Replacement - Follow Nurse / BPA Driven Protocol    Potassium Replacement - Follow Nurse / BPA Driven Protocol    sodium chloride    sodium chloride    sodium chloride    Assessment & Plan   Assessment & Plan     Active Hospital Problems    Diagnosis  POA    **COVID [U07.1]  Yes      Resolved Hospital Problems   No resolved problems to display.        Brief Hospital Course to date:  Forrest Zepeda is a 89 y.o. male with medical history significant for  interstitial lung disease, prior history of saddle PE, paroxysmal A-fib on Eliquis, CKD, chronic hydro with stent replacement every 6 months just discharged to the [facility] in [place] for right hip fracture status post repair.  Prior to discharge some of the notes mention that he had a low-grade fever and some congestion. Presented back here with hypotension and hypoxia, COVID test positive.     Sepsis secondary to COVID   Acute on chronic respiratory failure hypoxia  -Patient with leukocytosis, lactic acidosis, elevated procalcitonin, tachycardia respiratory PCR positive for COVID  -Chest x-ray shows chronic interstitial disease  -Patient is currently on 4 L NC, continue to wean as able  -Follow-up blood cultures currently NGTD, continue antibiotics with IV Rocephin     Hypotension  -BP is low but much improved  -Continue IV fluids and closely monitor for volume overload     History of PE  Paroxysmal atrial fibrillation  -Continue bisoprolol, continue anticoagulation with Eliquis     Well-controlled type 2 diabetes with A1c 6.9%  -FSBG's reviewed and are labile, continue SSI for now     OCTAVIA on CKD stage III  -Continue IV fluids     Hypothyroidism-continue Synthroid     Chronic hydronephrosis  -follows with urology Dr. Pitts  -Has chronic ureteral stent that is exchanged every 6 months.  Next appointment is April 14 per daughter at bedside  -Urinary retention protocol  -Check UA to rule out infection     Recent right hip fracture, s/p repair  -PT/OT evaluate     GOC  Prognosis is currently guarded, son and granddaughter at bedside, decision has been made to pursue comfort measures.  Palliative care is  following, we will have hospice evaluate for either inpatient vs home hospice    Expected Discharge Location and Transportation: TBD  Expected Discharge   Expected Discharge Date: 4/2/2025; Expected Discharge Time:      VTE Prophylaxis:  Pharmacologic VTE prophylaxis orders are present.       AM-PAC 6 Clicks Score  (PT): 10 (03/29/25 2159)    CODE STATUS:   Code Status and Medical Interventions: No CPR (Do Not Attempt to Resuscitate); Limited Support; No intubation (DNI), No NIPPV (Non-Invasive Positive Pressure Ventilation), No vasopressors; No escalation to ICU   Ordered at: 03/29/25 1055     Code Status (Patient has no pulse and is not breathing):    No CPR (Do Not Attempt to Resuscitate)     Medical Interventions (Patient has pulse or is breathing):    Limited Support     Medical Intervention Limits:    No intubation (DNI)       No NIPPV (Non-Invasive Positive Pressure Ventilation)       No vasopressors     Comments:    No escalation to ICU     Level Of Support Discussed With:    Health Care Surrogate       Anthony Dockery MD  03/30/25

## 2025-03-30 NOTE — DISCHARGE SUMMARY
[facility] Medicine Services  DISCHARGE TO INPATIENT HOSPICE    Patient Name: Forrest Zepeda  : 1932  MRN: [id number]    Date of Admission: 3/28/2025  Date of Discharge:  3/30/2025  Primary Care Physician: Kary Wen MD    Consults       Date and Time Order Name Status Description    3/29/2025  9:09 AM Inpatient Palliative Care MD Consult Completed           Hospital Course     Presenting Problem:   COVID [U07.1]    Active Hospital Problems    Diagnosis  POA    **COVID [U07.1]  Yes      Resolved Hospital Problems   No resolved problems to display.      Hospital Course:  Forrest Zepeda is a 89 y.o. male with medical history significant for interstitial lung disease, prior history of saddle PE, paroxysmal A-fib on Eliquis, CKD, chronic hydro with stent replacement every 6 months just discharged to the [facility] in [place] for right hip fracture status post repair.  Prior to discharge some of the notes mention that he had a low-grade fever and some congestion. Presented back here with hypotension and hypoxia, COVID test positive.  He was admitted with sepsis secondary to COVID, acute on chronic respiratory failure with hypoxia, hypotension. BP improved with IV fluids, he remained on 4L NC, blood cultures were currently NGTD. Per [facility] discussions, prognosis was guarded, son and granddaughter at bedside, decision was made to pursue comfort measures.  Palliative care was  following, hospice was consulted and decision made to transfer to inpatient hospice.     Day of Discharge     HPI: per day of dc progress note    ROS:  N/A    Vital Signs:   Temp:  [98 °F (36.7 °C)-99.5 °F (37.5 °C)] 98.5 °F (36.9 °C)  Heart Rate:  [65-78] 72  Resp:  [16-18] 17  BP: (111-123)/(53-65) 111/59     Physical Exam:  See day of dc progress note    Discharge Details     Discharge Disposition:  Transfer care to inpatient Hospice at [facility]    Time Spent on Discharge:  10  minutes    Anthony Dockery MD  03/30/25

## 2025-03-31 NOTE — PROGRESS NOTES
Continued Stay Note  [facility]     Patient Name: Forrest Zepeda  MRN: [id number]  Today's Date: 3/31/2025    Admit Date: 3/30/2025    Plan: Inpatient hospice   Discharge Plan       Row Name 03/31/25 1246       Plan    Plan Inpatient hospice    Plan Comments CBJ 10%pps is a 89yo male with terminal diagnosis of sepsis. Chart reviewed (LBM 3/30/2025, PRN's: dulcolax x1, haldol x2, dilaudid x3), visit to bedside, assess completed. Patient observed unresponsive with accessory muscle use with respirations. Pallor, dusky color. Tips of toes cooler than rest of legs. Patient tenses when Rn touches hands. Rn reassures patient that he is safe, cared for. Actively listens as family relays that patient has had increased periods of labored respirations and anxiety last night into today. Granddaughter, Erika, relays that dilaudid and versed together have provided most relief. Validated. Rn discusses assess, condition, eol signs/symptoms, medications, what to possibly expect from this point forward, eol communication, and self care. Family verbalize understanding. Open communication and ongoing support provided. Care coordinated with [facility] who will administer prn medications for comfort. Updated VMisti Burt APRN. Patient continues to require medication titration and injectable medications for symptom palliation necessitating skilled nursing care requiring gip level of hospice care.    Final Discharge Disposition Code 51 - hospice medical facility                   Discharge Codes    No documentation.                       Racheal Catalan RN

## 2025-03-31 NOTE — H&P
Hospice History and Physical     Patient Name:  Forrest Zepeda   : 1932   Sex: male    Patient Care Team:  Kary Wen MD as PCP - General (Family Medicine)  Win Saha MD as Consulting Physician (Cardiology)  Deanne Pitts MD as Consulting Physician (Urology)  Ron Mukherjee DO (Long Term Care Facility)    Code Status: Comfort measures     Subjective     89 y.o.male presented to ED on 3/28/2025 with c/o worsening SOA with fever and chest congestion.  At baseline patient has been at [facility] for rehab following recent hip fracture.    PMH significant for interstitial lung disease, pAF (on Eliquis), CKD, prior saddle PE and recent right hip fx s/p ORIF.     In ED workup revealed hypoxia and hypotension.  COVID + with low grade fever and congestion.     Hospital Course:    -Admitted with sepsis secondary to COVID, acute on chronic respiratory failure with hypoxia, hypotension.   -BP improved with IV fluids, he remained on 4L NC, blood cultures were currently NGTD. -Per GOC discussions, prognosis was guarded, son and granddaughter at bedside, decision was made to pursue comfort measures.    -Palliative care following for goals of care and complex medical decision making.     Inpatient hospice team met with son and daughter at bedside.  Inpatient team reviewed inpatient hospice service, medication management, and goals of care.  Family elected comfort focused POC.    Patient admitted to inpatient hospice on 2025 with terminal diagnosis of Sepsis [A41.9] for management of severe pain, anxiety, dyspnea and terminal secretions that will require injectable medications and frequent skilled nursing assessments to manage.  Prognosis poor.      Review of Systems:  Review of Systems   Unable to perform ROS: Acuity of condition       History:  Past Medical History:   Diagnosis Date    Abnormal EKG     Abnormal PSA     Reported abnormal    Acute cystitis with hematuria  "05/01/2023    Acute deep vein thrombosis (DVT) of right lower extremity 08/22/2019    Acute diverticulitis 2019    Acute hyperkalemia 08/22/2019    Acute respiratory failure with hypoxia     Acute saddle pulmonary embolism with acute cor pulmonale 08/22/2019    Acute systolic right heart failure 08/22/2019    Acute UTI (urinary tract infection) 10/9/2023    Arthritis     KNEES,  BUT SINCE HAD REPLACEMENT    Benign localized hyperplasia of prostate     Bilateral knee pain     C. difficile diarrhea 2010    Cataracts, bilateral     CKD (chronic kidney disease)     Coronary artery disease     Diabetic neuropathy     Diastolic dysfunction     Disease of thyroid gland     HYPOTHYROIDISM    Diverticulitis     Dyslipidemia     H/O    Erectile dysfunction     Family history of malignant hyperthermia     Brother     Full dentures     Generalized weakness     History of ESBL E. coli infection     most recent 4-2022 f/u with ID Dr Johnson    History of gout     History of hepatitis A vaccination     History of nephrolithiasis     History of nuclear stress test     STATES IN THE 1990'S.  \"IT WAS OK\"    History of osteopenia     History of recurrent UTI (urinary tract infection)     Hydronephrosis of left kidney 07/18/2019    Hydronephrosis with renal and ureteral calculus obstruction 10/21/2019    Added automatically from request for surgery [id number]    Hydronephrosis with ureteral stricture     Hypercholesterolemia     Hyperkalemia     PT UNSURE REGARDING HX OF THIS    Hyperlipidemia     Hypertension     Hypothyroidism     Impaired functional mobility, balance, gait, and endurance     Insomnia     IPF (idiopathic pulmonary fibrosis)     per patient and son, dx at [facility], was told no treatment necessary, not to worry about it    On supplemental oxygen therapy     2L NC QHS    Other hydronephrosis 08/12/2019    Added automatically from request for surgery [id number]    Paroxysmal atrial fibrillation     Pulmonary fibrosis     " Pulmonary hypertension, mild 02/2021    per echo per cardiology note 1/9/23, per pt's son, PCP does not agree with this dx    Renal insufficiency     Sepsis 2019    Shortness of breath     STATES WITH EXERTION, OTHERWISE, DENIES    Sigmoid diverticulitis without abscess or perforation 08/09/2017    Sinus node dysfunction     Skin breakdown     fourth toe right foot noted in 2019 MD D/C note    Skin ulcer of fourth toe of right foot, limited to breakdown of skin 07/05/2019    Stricture of ureter     Type 2 diabetes mellitus     Ureteral stricture, left     Urinary incontinence     UTI (urinary tract infection) 07/18/2019    Vitamin D deficiency     Wears glasses      Past Surgical History:   Procedure Laterality Date    APPENDECTOMY      CARDIAC ELECTROPHYSIOLOGY PROCEDURE N/A 12/23/2020    Procedure: Pacemaker DC new. DNS meds.;  Surgeon: Jimy Ledezma DO;  Location:  [facility];  Service: Cardiology;  Laterality: N/A;    CHOLECYSTECTOMY      CIRCUMCISION N/A 10/23/2019    Procedure: CIRCUMCISIOn-DORSAL SLIT;  Surgeon: Griffin Romero MD;  Location:  [facility];  Service: Urology    COLONOSCOPY      CYSTOSCOPY RETROGRADE PYELOGRAM Left 06/17/2022    Procedure: CYSTOSCOPY RETROGRADE PYELOGRAM;  Surgeon: Ryne Mccann MD;  Location:  [facility];  Service: Urology;  Laterality: Left;    CYSTOSCOPY RETROGRADE PYELOGRAM Left 7/10/2024    Procedure: CYSTOSCOPY RETROGRADE PYELOGRAM AND STENT INSERTION LEFT;  Surgeon: Deanne Pitts MD;  Location:  [facility];  Service: Urology;  Laterality: Left;    CYSTOSCOPY URETEROSCOPY Left 02/11/2019    Procedure: URETEROSCOPY WITH STENT EXTRACTION, RPG;  Surgeon: Griffin Romero MD;  Location:  [facility];  Service: Urology    CYSTOSCOPY W/ URETERAL STENT PLACEMENT Left 07/20/2019    Procedure: CYSTOSCOPY, LEFT RETROGRADE PYELOGRAM,  ATTEMPTED LEFT URETERAL STENT INSERTION;  Surgeon: Isaac Flynn MD;  Location:  [facility];  Service: Urology    CYSTOSCOPY W/  URETERAL STENT PLACEMENT Left 06/17/2022    Procedure: CYSTOSCOPY URETERAL CATHETER/STENT INSERTION;  Surgeon: Ryne Mccann MD;  Location:  [facility];  Service: Urology;  Laterality: Left;    CYSTOSCOPY W/ URETERAL STENT PLACEMENT Left 01/27/2023    Procedure: CYSTOSCOPY URETERAL CATHETER/STENT INSERTION;  Surgeon: Deanne Pitts MD;  Location:  [facility];  Service: Urology;  Laterality: Left;    CYSTOSCOPY W/ URETERAL STENT PLACEMENT Left 02/15/2024    Procedure: CYSTOSCOPY LEFT STENT EXCHANGE;  Surgeon: Deanne Pitts MD;  Location:  [facility];  Service: Urology;  Laterality: Left;    CYSTOSCOPY, URETEROSCOPY, RETROGRADE PYELOGRAM, STONE EXTRACTION, STENT INSERTION Left 06/15/2023    Procedure: URETEROSCOPY, RETROGRADE PYELOGRAM, STENT INSERTION;  Surgeon: Deanne Pitts MD;  Location:  [facility];  Service: Urology;  Laterality: Left;    CYSTOSCOPY, URETEROSCOPY, RETROGRADE PYELOGRAM, STONE EXTRACTION, STENT INSERTION Left 11/22/2024    Procedure: CYSTOSCOPY RETROGRADE PYELOGRAM AND STENT INSERTION LEFT;  Surgeon: Deanne Pitts MD;  Location:  [facility];  Service: Urology;  Laterality: Left;    EYE SURGERY      CATARACTS REMOVED BILATERALLY    HIP TROCHANTERIC NAILING WITH INTRAMEDULLARY HIP SCREW Right 3/23/2025    Procedure: HIP TROCHANTERIC NAILING RIGHT;  Surgeon: Brodie Baltazar MD;  Location:  [facility];  Service: Orthopedics;  Laterality: Right;    JOINT REPLACEMENT      Bilateral knees    KNEE ARTHROPLASTY Bilateral     KNEE ARTHROSCOPY Bilateral     RENAL ARTERY STENT      SEVERAL TIMES EVERY SINCE 1978    URETERAL STENT INSERTION  10/2020    URETEROSCOPY LASER LITHOTRIPSY WITH STENT INSERTION Left 01/10/2018    Procedure:  cysto, left ureteral stent replacement ;  Surgeon: Griffin Romero MD;  Location:  [facility];  Service:     URETEROSCOPY LASER LITHOTRIPSY WITH STENT INSERTION Left 08/14/2019    Procedure: URETEROSCOPY, STONE REMOVAL WITH STENT INSERTION;  Surgeon: Griffin Romero MD;  Location:  [facility];  Service: Urology    URETEROSCOPY LASER LITHOTRIPSY WITH STENT INSERTION Left 10/23/2019    Procedure: Left URETEROSCOPY WITH STENT INSERTION-LEFT;  Surgeon: Griffin Romero MD;  Location: [facility] OR;  Service: Urology     Current Facility-Administered Medications   Medication Dose Route Frequency Provider Last Rate Last Admin    bisacodyl (DULCOLAX) suppository 10 mg  10 mg Rectal Daily PRN Estephania Quezada APRN   10 mg at 03/30/25 2141    bisacodyl (DULCOLAX) suppository 10 mg  10 mg Rectal Once Estephania Quezada APRN        glycopyrrolate (ROBINUL) injection 0.4 mg  0.4 mg Intravenous Q4H PRN Estephania Quezada APRN        haloperidol lactate (HALDOL) injection 1 mg  1 mg Intravenous Q6H PRN Estephania Quezada APRN   1 mg at 03/30/25 2148    HYDROmorphone (DILAUDID) injection 0.75 mg  0.75 mg Intravenous Q4H Sofia Burt APRN   0.75 mg at 03/31/25 1347    HYDROmorphone (DILAUDID) injection 0.75 mg  0.75 mg Intravenous Q1H PRN Sofia Burt APRN        midazolam (VERSED) injection 0.5 mg  0.5 mg Intravenous Q2H PRN Estephania Quezada APRN   0.5 mg at 03/31/25 0940    midazolam (VERSED) injection 0.5 mg  0.5 mg Intravenous Q4H Sofia Burt, APRN   0.5 mg at 03/31/25 1130    ondansetron (ZOFRAN) injection 4 mg  4 mg Intravenous Q6H PRN Estephania Quezada APRN        palliative care oral rinse   Mouth/Throat PRN Estephania Quezada APRN        palliative care oral rinse   Mouth/Throat Q6H Estephania Quezada APRN   Given at 03/31/25 0757    Polyvinyl Alcohol-Povidone PF (ARTIFICIAL TEARS) 1.4-0.6 % ophthalmic solution 1 drop  1 drop Both Eyes Q30 Min PRN Estephania Quezada APRN        Polyvinyl Alcohol-Povidone PF (ARTIFICIAL TEARS) 1.4-0.6 % ophthalmic solution 1 drop  1 drop Both Eyes Q6H Estephania Quezada APRN   1 drop at 03/31/25 0802    scopolamine patch 1 mg/72 hr  1 patch Transdermal Q72H PRN Estephania Quezada APRN   1 patch at 03/31/25 0756          bisacodyl    glycopyrrolate     haloperidol lactate    HYDROmorphone    midazolam    ondansetron    palliative care oral rinse    Polyvinyl Alcohol-Povidone PF    Scopolamine  Allergies   Allergen Reactions    Statins Diarrhea and Myalgia    Metformin Diarrhea and GI Intolerance     Family History   Problem Relation Age of Onset    No Known Problems Mother     Stomach cancer Father     Heart attack Sister     Brain cancer Sister     Stroke Sister     Lung cancer Sister     Hypertension Sister     Brain cancer Brother     Lung cancer Brother     Malig Hyperthermia Brother      Social History     Socioeconomic History    Marital status:    Tobacco Use    Smoking status: Former     Current packs/day: 0.00     Average packs/day: 0.3 packs/day for 2.0 years (0.5 ttl pk-yrs)     Types: Cigarettes     Quit date: 1950     Years since quittin.3     Passive exposure: Past    Smokeless tobacco: Never    Tobacco comments:     SMOKED SOME AS A TEEN, DENIES ANY HABIT OR ADDICTION   Vaping Use    Vaping status: Never Used   Substance and Sexual Activity    Alcohol use: No    Drug use: No    Sexual activity: Not Currently     Partners: Female       Objective     Vital Signs:       PPS: Palliative Performance Scale score as of 3/31/2025, 15:26 EDT is 10% based on the following measures:   Ambulation: Totally bed bound  Activity and Evidence of Disease: Unable to do any work, extensive evidence of disease  Self-Care: Total care  Intake:  Mouth care only  LOC: Drowsy or coma     Physical Exam:  Physical Exam  Vitals and nursing note reviewed.   Constitutional:       General: He is not in acute distress.     Appearance: He is ill-appearing.   HENT:      Head: Normocephalic.   Cardiovascular:      Rate and Rhythm: Normal rate. Rhythm irregular.   Pulmonary:      Effort: Pulmonary effort is normal. No respiratory distress.      Breath sounds: Rales present. No wheezing.   Abdominal:      General: Bowel sounds are normal.   Skin:     General: Skin is  warm.   Neurological:      Mental Status: He is unresponsive.         Results Reviewed:  LAB RESULTS:      Lab 03/30/25 0407 03/29/25 0416 03/28/25  1926 03/28/25  1552 03/25/25  0826   WBC 11.73* 12.17*  --  17.26* 8.32   HEMOGLOBIN 8.0* 8.6*  --  9.7* 9.3*   HEMATOCRIT 26.0* 27.5*  --  30.9* 30.5*   PLATELETS 252 226  --  237 159   NEUTROS ABS 9.60* 9.14*  --  14.65* 5.39   IMMATURE GRANS (ABS) 0.19* 0.16*  --  0.35* 0.04   LYMPHS ABS 1.01 1.54  --  0.85 1.42   MONOS ABS 0.73 1.10*  --  1.32* 0.84   EOS ABS 0.16 0.18  --  0.02 0.58*   MCV 95.9 96.2  --  96.3 97.1*   PROCALCITONIN  --  16.46*  --  1.51*  --    LACTATE  --   --  1.7 3.3*  --    PROTIME  --   --   --  16.3*  --    APTT  --   --   --  36.5*  --          Lab 03/30/25 0407 03/29/25 0416 03/28/25  1552 03/25/25  0826   SODIUM 139 137 135* 137   POTASSIUM 4.8 4.8 5.1 5.2   CHLORIDE 108* 106 101 108*   CO2 13.0* 11.0* 16.0* 20.0*   ANION GAP 18.0* 20.0* 18.0* 9.0   * 103* 102* 65*   CREATININE 2.85* 3.09* 3.43* 2.04*   EGFR 20.1* 18.2* 16.1* 30.0*   GLUCOSE 81 100* 205* 148*   CALCIUM 8.5 7.7* 9.4 8.5         Lab 03/30/25 0407 03/29/25 0416 03/28/25  1552   TOTAL PROTEIN 5.5* 5.3* 6.9   ALBUMIN 2.7* 2.6* 3.2*   GLOBULIN 2.8 2.7 3.7   ALT (SGPT) 19 18 20   AST (SGOT) 68* 62* 58*   BILIRUBIN 0.2 0.4 0.4   ALK PHOS 96 102 125*         Lab 03/28/25  1926 03/28/25  1552   PROBNP  --  18,046.0*   HSTROP T 139* 144*   PROTIME  --  16.3*   INR  --  1.30*                 Brief Urine Lab Results  (Last result in the past 365 days)        Color   Clarity   Blood   Leuk Est   Nitrite   Protein   CREAT   Urine HCG        03/28/25 2151 Red   Turbid   Large (3+)   Large (3+)   Negative   >=300 mg/dL (3+)                   Microbiology Results Abnormal       None              Sepsis      Assessment & Plan     Assessment/Plan:   -Admit to inpatient hospice service 03/30/2025 with Sepsis [A41.9].     -Coordination of care with nursing staff and [facility] IDT.      -Pain: Dilaudid 0.75 mg q 4 hrs atc: Dilaudid 0.75 mg q 1 hr PRN     -Dyspnea:  Dilaudid as above, oxygen via NC PRN     -Nausea/Vomiting: Zofran 4 mg scheduled q 6 hr PRN       -Anxiety/Agitation: Versed 0.5 mg q 4 hrs; Versed 0.5 mg q 2 hrs  PRN; haldol 1 mg q 6 hrs PRN      -Bowel/bladder: bisacodyl supp q day PRN     -Nutrition: Comfort diet as tolerated     -ADLs: total care     -Terminal fever: tylenol supp 650 mg q 6 hr PRN. tordal 15 mg IV q 6 hr PRN     -Terminal secretions:  May start PRN robinul/scop patch if needed     -Patient comfort: palliative oral rinse PRN, liquifilm PRN      -Goals of Care: achieve comfortable death, educate and support family through this process.           Total Visit Time: 50 min   Face to Face Time: 30 min   Total time spent includes time reviewing chart, face-to-face time, counseling patient/family/caregiver, ordering medications/tests/procedures, communicating with other health care professionals, documenting clinical information in the electronic health record, and coordination of care with facility staff and [facility] IDT.      Verbal certification obtained from Dr Dyer who has determined patient's eligibility with terminal diagnosis of Sepsis [A41.9].  Medications and diagnosis determined to be related to terminal prognosis. See physician's CTI for information on eligibility determination.      Justification for care:  Patient meets criteria for acute in-patient care with required nursing assessment and interventions for symptoms with IV medications.      ABRIL SWENSON, MSN, APRN  [facility]  Hospice and Palliative Care Nurse Practitioner  03/31/25  14:52 EDT

## 2025-03-31 NOTE — PLAN OF CARE
Goal Outcome Evaluation:  Plan of Care Reviewed With: patient, family           Outcome Evaluation: Pt resting throughout shift. Family remains at bedside. Labored breathing and secretions noted, meds given for symptom management. Hospice adjusted medications and meds given per MAR. Scopolamine patch applied behind left ear. Ascencio and IV's remain patent. Pt orientated to self only. Continue comfort care.

## 2025-04-01 NOTE — PLAN OF CARE
Goal Outcome Evaluation:  Plan of Care Reviewed With: family        Progress: declining  Outcome Evaluation: Responds to pain. Breathing labored with abdominal muscle use. PRNs given for dyspnea. No BM. Concentrated UOP from patent urinary catheter. Daughter at bedside. Comfort measures continued.

## 2025-04-01 NOTE — SIGNIFICANT NOTE
Exam confirms with auscultation zero audible heart tones and zero audible respirations. Mr.Carl MARY Zepeda was pronounced dead at 0815.  MD notified by Patient's RN.    Jin Cat RN  Clinical House Supervisor  4/1/2025 08:58 EDT

## 2025-04-02 LAB
BACTERIA SPEC AEROBE CULT: NORMAL
BACTERIA SPEC AEROBE CULT: NORMAL

## 2025-04-03 NOTE — DISCHARGE SUMMARY
Date of Admission: 03/30/2025   Date and Time of Death: 4/1/2025 at 8:15 AM    Hospital Course:  Forrest Zepeda is a 89 y.o. male admitted to inpatient hospice with diagnosis of Sepsis [A41.9]  for symptom management. Medications were available throughout admission for symptom management and comfort. Patient continued to decline after admission as expected ultimately to their death on 4/1/2025 at 8:15 AM. Patient was pronounced dead by Clinical House Supervisor. Spiritual and psychosocial support were available to patient and family throughout admission. Patient's remains were released per [facility] protocol.       Sofia Burt, MSN, APRN, ACHPN  [facility]ators  Hospice and Palliative Care Nurse Practitioner  04/03/25  13:55 EDT

## 2025-04-15 ENCOUNTER — TELEPHONE (OUTPATIENT)
Dept: INTERNAL MEDICINE | Facility: CLINIC | Age: OVER 89
End: 2025-04-15
Payer: MEDICARE

## (undated) DEVICE — PK CYSTO-TUR BASIC 10

## (undated) DEVICE — GLV SURG SENSICARE MICRO PF LF 7.5 STRL

## (undated) DEVICE — GW .035 145CM

## (undated) DEVICE — GLV SURG TRIUMPH LT PF LTX 7.5 STRL

## (undated) DEVICE — LEGGINGS, PAIR, 29X43, STERILE: Brand: MEDLINE

## (undated) DEVICE — CATH URETRL FLXITIP CLSD/END 5F 70CM RT

## (undated) DEVICE — COVER,C-ARM,41X74: Brand: MEDLINE

## (undated) DEVICE — LEX ELECTRO PHYSIOLOGY: Brand: MEDLINE INDUSTRIES, INC.

## (undated) DEVICE — GLV SURG BIOGEL LTX PF 7

## (undated) DEVICE — CATH URETRL FLXITP POLLACK STD 5F 70CM

## (undated) DEVICE — SUT VIC 4/0 SH 27IN J415H

## (undated) DEVICE — CVR FTSWITCH UNIV

## (undated) DEVICE — DEV SECUREMENT LEG CATH ADHS

## (undated) DEVICE — CATH URETRL RUTNER UNIV WEDGE 5F70CM

## (undated) DEVICE — Device

## (undated) DEVICE — SYR LL TP 10ML STRL

## (undated) DEVICE — SOL IRR NACL 0.9PCT 3000ML

## (undated) DEVICE — TUBING, SUCTION, 1/4" X 10', STRAIGHT: Brand: MEDLINE

## (undated) DEVICE — PAD,ARMBOARD,CONV,FOAM,2X8X20",12PR/CS: Brand: MEDLINE

## (undated) DEVICE — TAPE,ELASTIC,FOAM,CURAD,4"X5.5YD,LF: Brand: CURAD

## (undated) DEVICE — TRAP FLD MINIVAC MEGADYNE 100ML

## (undated) DEVICE — CAUTERY TIP POLISHER: Brand: DEVON

## (undated) DEVICE — LIMB HOLDER, WRIST/ANKLE: Brand: DEROYAL

## (undated) DEVICE — GLV SURG SENSICARE PI ORTHO SZ7.5 LF STRL

## (undated) DEVICE — NITINOL WIRE WITH HYDROPHILIC TIP: Brand: SENSOR

## (undated) DEVICE — MEDI-VAC YANKAUER SUCTION HANDLE W/BULBOUS TIP: Brand: CARDINAL HEALTH

## (undated) DEVICE — SUT ETHIB 0/0 MO6 I8IN CX45D

## (undated) DEVICE — DECANT BG O JET

## (undated) DEVICE — CATHETER,FOLEY,SILI-ELAST,LTX,16FR,10ML: Brand: MEDLINE

## (undated) DEVICE — CUFF SCD HEMOFORCE SEQ CALF STD MD

## (undated) DEVICE — BNDG ELAS SLF ADHR 1IN 5YD COLR PK LF

## (undated) DEVICE — CANN NASL CO2 DIVIDED A/

## (undated) DEVICE — BAG,LEG,COMFORT-STRAPS,LARGE,32OZ: Brand: MEDLINE

## (undated) DEVICE — ST EXT IV SMARTSITE 2VLV SP M LL 5ML IV1

## (undated) DEVICE — DRSNG TELFA PAD NONADH STR 1S 3X8IN

## (undated) DEVICE — BLANKT WARM UPPR/BDY ARM/OUT 57X196CM

## (undated) DEVICE — RICH CYSTO: Brand: MEDLINE INDUSTRIES, INC.

## (undated) DEVICE — UNDERGLV SURG BIOGEL INDICATOR LF PF 7.5

## (undated) DEVICE — DRSNG SURESITE123 4X4.8IN

## (undated) DEVICE — SET PRIMARY GRVTY 10DP MALE LL 104IN

## (undated) DEVICE — DRSNG SURG AQUACEL AG/ADVNTGE 9X15CM 3.5X6IN

## (undated) DEVICE — TBG FLUID WARM ST SNGL

## (undated) DEVICE — PENCL SMOKE/EVAC MEGADYNE TELESCP 10FT

## (undated) DEVICE — TOTAL TRAY, 16FR 10ML SIL FOLEY, URN: Brand: MEDLINE

## (undated) DEVICE — SPK10295 ORTHOPEDIC FRACTURE KIT: Brand: SPK10295 ORTHOPEDIC FRACTURE KIT

## (undated) DEVICE — PATIENT RETURN ELECTRODE, SINGLE-USE, CONTACT QUALITY MONITORING, ADULT, WITH 9FT CORD, FOR PATIENTS WEIGING OVER 33LBS. (15KG): Brand: MEGADYNE

## (undated) DEVICE — DRP SURG U/DRP INVISISHIELD PA 48X52IN

## (undated) DEVICE — PK MAJ FX HIP 10

## (undated) DEVICE — 1LYRTR 16FR10ML100%SIL UMS SNP: Brand: MEDLINE INDUSTRIES, INC.

## (undated) DEVICE — ST INF PRI SMRTSTE 20DRP 2VLV 24ML 117

## (undated) DEVICE — INTRO TEAR AWAY/LVD W/SD PRT 6F 13CM

## (undated) DEVICE — GOWN SURG ORBIS LVL3 2XL STRL

## (undated) DEVICE — APPL CHLORAPREP TINTED 26ML TEAL

## (undated) DEVICE — SYR LUERLOK 30CC

## (undated) DEVICE — BNDG GZ SOF-FORM CONFRM 2X75IN LF STRL

## (undated) DEVICE — GLW STD FLX STR 8CM .035IN 150CM

## (undated) DEVICE — 450 ML BOTTLE OF 0.05% CHLORHEXIDINE GLUCONATE IN 99.95% STERILE WATER FOR IRRIGATION, USP AND APPLICATOR.: Brand: IRRISEPT ANTIMICROBIAL WOUND LAVAGE

## (undated) DEVICE — ANTIBACTERIAL UNDYED BRAIDED (POLYGLACTIN 910), SYNTHETIC ABSORBABLE SUTURE: Brand: COATED VICRYL

## (undated) DEVICE — SKIN PREP TRAY W/CHG: Brand: MEDLINE INDUSTRIES, INC.

## (undated) DEVICE — PROXIMATE RH ROTATING HEAD SKIN STAPLERS (35 WIDE) CONTAINS 35 STAINLESS STEEL STAPLES: Brand: PROXIMATE

## (undated) DEVICE — STRAP POSTN KN/BDY FM 5X72IN DISP

## (undated) DEVICE — TAPE,CLOTH/SILK,CURAD,3"X10YD,LF,40/CS: Brand: CURAD

## (undated) DEVICE — BIT DRL 3FLUT QC CALIB 4.2X330X100MM

## (undated) DEVICE — DRSNG WND GZ PAD BORDERED 4X8IN STRL

## (undated) DEVICE — PENCL E/S HNDSWCH ROCKRBTN HOLSTR 10FT

## (undated) DEVICE — NDL HYPO ECLPS SFTY 25G 1 1/2IN

## (undated) DEVICE — SOL NACL 0.9PCT 1000ML

## (undated) DEVICE — APPL COTN TP PLSTC 6IN STRL LF PK/2

## (undated) DEVICE — TRY SKINPREP PVP SCRB W PAINT

## (undated) DEVICE — GW FOR TROCH NAIL 3.2X400MM

## (undated) DEVICE — GLV SURG BIOGEL LTX PF 7 1/2

## (undated) DEVICE — RICH MAJOR PROCEDURE: Brand: MEDLINE INDUSTRIES, INC.

## (undated) DEVICE — ADULT, W/LG. BACK PAD, RADIOTRANSPARENT ELEMENT AND LEAD WIRE: Brand: DEFIBRILLATION ELECTRODES